# Patient Record
Sex: MALE | Race: WHITE | NOT HISPANIC OR LATINO | Employment: FULL TIME | URBAN - METROPOLITAN AREA
[De-identification: names, ages, dates, MRNs, and addresses within clinical notes are randomized per-mention and may not be internally consistent; named-entity substitution may affect disease eponyms.]

---

## 2017-01-05 ENCOUNTER — OFFICE VISIT (OUTPATIENT)
Dept: FAMILY MEDICINE | Facility: CLINIC | Age: 42
End: 2017-01-05
Payer: COMMERCIAL

## 2017-01-05 VITALS
SYSTOLIC BLOOD PRESSURE: 146 MMHG | DIASTOLIC BLOOD PRESSURE: 70 MMHG | OXYGEN SATURATION: 99 % | HEIGHT: 72 IN | WEIGHT: 315 LBS | HEART RATE: 106 BPM | TEMPERATURE: 98 F | BODY MASS INDEX: 42.66 KG/M2

## 2017-01-05 DIAGNOSIS — J30.89 NON-SEASONAL ALLERGIC RHINITIS DUE TO OTHER ALLERGIC TRIGGER: ICD-10-CM

## 2017-01-05 DIAGNOSIS — J02.9 PHARYNGITIS, UNSPECIFIED ETIOLOGY: Primary | ICD-10-CM

## 2017-01-05 PROCEDURE — 96372 THER/PROPH/DIAG INJ SC/IM: CPT | Mod: S$GLB,,, | Performed by: NURSE PRACTITIONER

## 2017-01-05 PROCEDURE — 3077F SYST BP >= 140 MM HG: CPT | Mod: S$GLB,,, | Performed by: NURSE PRACTITIONER

## 2017-01-05 PROCEDURE — 99213 OFFICE O/P EST LOW 20 MIN: CPT | Mod: 25,S$GLB,, | Performed by: NURSE PRACTITIONER

## 2017-01-05 PROCEDURE — 3078F DIAST BP <80 MM HG: CPT | Mod: S$GLB,,, | Performed by: NURSE PRACTITIONER

## 2017-01-05 PROCEDURE — 1159F MED LIST DOCD IN RCRD: CPT | Mod: S$GLB,,, | Performed by: NURSE PRACTITIONER

## 2017-01-05 RX ORDER — AZITHROMYCIN 250 MG/1
TABLET, FILM COATED ORAL
Qty: 6 TABLET | Refills: 0 | Status: SHIPPED | OUTPATIENT
Start: 2017-01-05 | End: 2017-01-10

## 2017-01-05 RX ORDER — BETAMETHASONE SODIUM PHOSPHATE AND BETAMETHASONE ACETATE 3; 3 MG/ML; MG/ML
12 INJECTION, SUSPENSION INTRA-ARTICULAR; INTRALESIONAL; INTRAMUSCULAR; SOFT TISSUE
Status: COMPLETED | OUTPATIENT
Start: 2017-01-05 | End: 2017-01-05

## 2017-01-05 RX ORDER — MIRTAZAPINE 15 MG/1
1 TABLET, FILM COATED ORAL NIGHTLY
Refills: 5 | COMMUNITY
Start: 2016-12-06 | End: 2019-03-08

## 2017-01-05 RX ADMIN — BETAMETHASONE SODIUM PHOSPHATE AND BETAMETHASONE ACETATE 12 MG: 3; 3 INJECTION, SUSPENSION INTRA-ARTICULAR; INTRALESIONAL; INTRAMUSCULAR; SOFT TISSUE at 08:01

## 2017-01-05 NOTE — PROGRESS NOTES
Subjective:       Patient ID: Kirill Gandhi III is a 41 y.o. male.    Chief Complaint: Nasal Congestion; Cough (coughing up green/brown mucus); and Sore Throat    Cough   This is a new problem. The current episode started in the past 7 days. The problem has been unchanged. The problem occurs hourly. The cough is productive of sputum. Associated symptoms include nasal congestion, postnasal drip, rhinorrhea and a sore throat. Pertinent negatives include no chest pain, chills, ear congestion, ear pain, fever, headaches, heartburn, hemoptysis, myalgias, rash, shortness of breath, sweats, weight loss or wheezing. The symptoms are aggravated by lying down. Treatments tried: beandryl, zyrtec. The treatment provided no relief. There is no history of asthma, bronchiectasis, bronchitis, COPD, emphysema, environmental allergies or pneumonia.   Sore Throat    This is a new problem. The current episode started in the past 7 days. The problem has been unchanged. Neither side of throat is experiencing more pain than the other. There has been no fever. The pain is at a severity of 0/10. The patient is experiencing no pain. Associated symptoms include congestion and coughing. Pertinent negatives include no abdominal pain, diarrhea, drooling, ear discharge, ear pain, headaches, hoarse voice, plugged ear sensation, neck pain, shortness of breath, stridor, swollen glands, trouble swallowing or vomiting. He has had no exposure to strep or mono. Treatments tried: benadyl zyrtec. The treatment provided no relief.     Review of Systems   Constitutional: Negative for chills, diaphoresis, fatigue, fever and weight loss.   HENT: Positive for congestion, postnasal drip, rhinorrhea, sinus pressure, sore throat and voice change. Negative for drooling, ear discharge, ear pain, hoarse voice and trouble swallowing.    Eyes: Negative for photophobia.   Respiratory: Positive for cough. Negative for hemoptysis, chest tightness, shortness of  breath, wheezing and stridor.    Cardiovascular: Negative for chest pain, palpitations and leg swelling.   Gastrointestinal: Negative for abdominal pain, diarrhea, heartburn and vomiting.   Musculoskeletal: Negative for myalgias and neck pain.   Skin: Negative for color change, pallor, rash and wound.   Allergic/Immunologic: Negative for environmental allergies.   Neurological: Negative for dizziness, weakness, light-headedness and headaches.       Objective:      Physical Exam   Constitutional: He is oriented to person, place, and time. He appears well-developed and well-nourished. No distress.   HENT:   Right Ear: External ear normal. A middle ear effusion is present.   Left Ear: External ear normal. A middle ear effusion is present.   Nose: Mucosal edema and rhinorrhea present. Right sinus exhibits no maxillary sinus tenderness and no frontal sinus tenderness. Left sinus exhibits no maxillary sinus tenderness and no frontal sinus tenderness.   Mouth/Throat: Posterior oropharyngeal edema and posterior oropharyngeal erythema present. No oropharyngeal exudate.   Cardiovascular: Normal rate, regular rhythm and normal heart sounds.    Pulmonary/Chest: Effort normal and breath sounds normal.   Neurological: He is alert and oriented to person, place, and time.   Skin: Skin is warm and dry. He is not diaphoretic.   Psychiatric: He has a normal mood and affect. His behavior is normal. Judgment and thought content normal.   Vitals reviewed.      Assessment:       1. Pharyngitis, unspecified etiology    2. Non-seasonal allergic rhinitis due to other allergic trigger        Plan:       Pharyngitis, unspecified etiology  -     azithromycin (Z-ZAINAB) 250 MG tablet; Take 2 tablets by mouth on day 1; Take 1 tablet by mouth on days 2-5  Dispense: 6 tablet; Refill: 0    Non-seasonal allergic rhinitis due to other allergic trigger    Other orders  -     betamethasone acetate-betamethasone sodium phosphate injection 12 mg; Inject 2  mLs (12 mg total) into the muscle one time.      Continue zyrtec  Start mucinex

## 2017-03-10 DIAGNOSIS — E11.9 TYPE 2 DIABETES MELLITUS WITHOUT COMPLICATION: ICD-10-CM

## 2017-03-24 ENCOUNTER — OFFICE VISIT (OUTPATIENT)
Dept: FAMILY MEDICINE | Facility: CLINIC | Age: 42
End: 2017-03-24
Payer: COMMERCIAL

## 2017-03-24 VITALS
SYSTOLIC BLOOD PRESSURE: 136 MMHG | WEIGHT: 315 LBS | OXYGEN SATURATION: 97 % | BODY MASS INDEX: 42.66 KG/M2 | HEIGHT: 72 IN | DIASTOLIC BLOOD PRESSURE: 96 MMHG | TEMPERATURE: 99 F | HEART RATE: 82 BPM

## 2017-03-24 DIAGNOSIS — F32.A DEPRESSION, UNSPECIFIED DEPRESSION TYPE: ICD-10-CM

## 2017-03-24 DIAGNOSIS — Z20.2 EXPOSURE TO SEXUALLY TRANSMITTED DISEASE (STD): ICD-10-CM

## 2017-03-24 DIAGNOSIS — J30.89 NON-SEASONAL ALLERGIC RHINITIS DUE TO OTHER ALLERGIC TRIGGER: ICD-10-CM

## 2017-03-24 DIAGNOSIS — E11.9 TYPE 2 DIABETES MELLITUS WITHOUT COMPLICATION, WITHOUT LONG-TERM CURRENT USE OF INSULIN: ICD-10-CM

## 2017-03-24 DIAGNOSIS — I10 ESSENTIAL HYPERTENSION: ICD-10-CM

## 2017-03-24 DIAGNOSIS — Z00.00 ROUTINE HEALTH MAINTENANCE: Primary | ICD-10-CM

## 2017-03-24 LAB
C TRACH DNA SPEC QL NAA+PROBE: NOT DETECTED
N GONORRHOEA DNA SPEC QL NAA+PROBE: NOT DETECTED

## 2017-03-24 PROCEDURE — 87591 N.GONORRHOEAE DNA AMP PROB: CPT

## 2017-03-24 PROCEDURE — 3075F SYST BP GE 130 - 139MM HG: CPT | Mod: S$GLB,,, | Performed by: FAMILY MEDICINE

## 2017-03-24 PROCEDURE — 3080F DIAST BP >= 90 MM HG: CPT | Mod: S$GLB,,, | Performed by: FAMILY MEDICINE

## 2017-03-24 PROCEDURE — 99395 PREV VISIT EST AGE 18-39: CPT | Mod: S$GLB,,, | Performed by: FAMILY MEDICINE

## 2017-03-24 RX ORDER — METRONIDAZOLE 500 MG/1
TABLET ORAL
Qty: 4 TABLET | Refills: 0 | Status: SHIPPED | OUTPATIENT
Start: 2017-03-24 | End: 2017-07-14

## 2017-03-24 RX ORDER — ETODOLAC 400 MG/1
400 TABLET, FILM COATED ORAL 2 TIMES DAILY
COMMUNITY
End: 2017-06-19 | Stop reason: SDUPTHER

## 2017-03-24 NOTE — MR AVS SNAPSHOT
[facility]  [address] [place] [id number]  Phone: [phone]  Fax: [phone]                  Kirill Gandhi III   3/24/2017 8:40 AM   Office Visit    Description:  Male : 1975   Provider:  Marcos Ibarra MD   Department:  [facility]           Reason for Visit     Annual Exam           Diagnoses this Visit        Comments    Routine health maintenance    -  Primary     Type 2 diabetes mellitus without complication, without long-term current use of insulin         Depression, unspecified depression type         Non-seasonal allergic rhinitis due to other allergic trigger         Essential hypertension         Exposure to sexually transmitted disease (STD)                To Do List           Goals (5 Years of Data)     None      [facility]     [facility] -  Assistance  Registered nurses in the [facility] provide clinical advisement, health education, appointment booking, and other advisory services.  Call for this free service at [phone].             Medications           STOP taking these medications     aspirin 81 MG Chew Take 81 mg by mouth once daily.           Verify that the below list of medications is an accurate representation of the medications you are currently taking.  If none reported, the list may be blank. If incorrect, please contact your healthcare provider. Carry this list with you in case of emergency.           Current Medications     etodolac (LODINE) 400 MG tablet Take 400 mg by mouth 2 (two) times daily.    fluticasone (FLONASE) 50 mcg/actuation nasal spray 2 sprays by Each Nare route 2 (two) times daily.    fluvoxaMINE (LUVOX) 100 MG tablet Take 1.5 tablets (150 mg total) by mouth 2 (two) times daily.    ibuprofen (ADVIL,MOTRIN) 200 MG tablet Take 2 tablets (400 mg total) by mouth every 6 (six) hours as needed for Pain.    metformin (GLUCOPHAGE) 1000 MG tablet Take 1 tablet  (1,000 mg total) by mouth 2 (two) times daily with meals.    mirtazapine (REMERON) 15 MG tablet Take 1 tablet by mouth every evening.    multivitamin capsule Take 1 capsule by mouth once daily.    valsartan-hydrochlorothiazide (DIOVAN-HCT) 320-12.5 mg per tablet Take 1 tablet by mouth once daily.    vitamin D 1000 units Tab Take 4,000 mg by mouth once daily.     diazePAM (VALIUM) 2 MG tablet Take 1 tablet (2 mg total) by mouth every 12 (twelve) hours as needed for Anxiety.           Clinical Reference Information           Your Vitals Were     BP Pulse Temp Height Weight SpO2    136/96 (BP Location: Left arm, Patient Position: Sitting, BP Method: Manual) 82 98.6 °F (37 °C) 6' (1.829 m) 155.4 kg (342 lb 9.6 oz) 97%    BMI                46.46 kg/m2          Blood Pressure          Most Recent Value    BP  (!)  136/96      Allergies as of 3/24/2017     No Known Allergies      Immunizations Administered on Date of Encounter - 3/24/2017     None      Orders Placed During Today's Visit     Future Labs/Procedures Expected by Expires    HIV-1 and HIV-2 antibodies  3/24/2017 5/23/2018    C. trachomatis/N. gonorrhoeae by AMP DNA Urine  As directed 3/24/2018    CBC auto differential  As directed 3/24/2018    Comprehensive metabolic panel  As directed 3/24/2018    Hemoglobin A1c  As directed 3/24/2018    Lipid panel  As directed 3/24/2018    RPR  As directed 3/24/2018    TSH  As directed 3/24/2018      Language Assistance Services     ATTENTION: Language assistance services are available, free of charge. Please call [phone].      ATENCIÓN: Si habla español, tiene a pena disposición servicios gratuitos de asistencia lingüística. Llame al [phone].     CHÚ Ý: N?u b?n nói Ti?ng Vi?t, có các d?ch v? h? tr? ngôn ng? mi?n phí dành cho b?n. G?i s? [phone].         [facility] complies with applicable Federal civil rights laws and does not discriminate on the basis of race, color, national origin, age,  disability, or sex.

## 2017-03-25 NOTE — PROGRESS NOTES
Patient ID: Kirill Gandhi III is a 41 y.o. male.    Chief Complaint: Annual Exam    HPI      Kirill Gandhi III is a 41 y.o. malehere bc girlfriend with trich. Here for tx.  NO symtoms.  Chronic med prob   dm stable  Depression - stable  Sleep apnea stable               Review of Symptoms    Constitutional  Neg activity change, No chills /fever   Resp  Neg hemoptysis, stridor, choking  CVS  Neg chest pain, palpitations    Physical Exam    Constitutional:  Oriented to person, place, and time.appears well-developed and well-nourished.  No distress.      HENT  Head: Normocephalic and atraumatic  Right Ear: External ear normal.   Left Ear: External ear normal.   Nose: External nose normal.   Mouth:  Moist mucus membranes.    Eyes:  Conjunctivae are normal. Right eye exhibits no discharge.  Left eye exhibits no discharge. No scleral icterus.  No periorbital edema    Cardiovascular:  Regular rate, Regular rhythm     No extrasystole  Normal S1-S2      Pulmonary/Chest:   Normal effort and breath sounds.  No wheezing, rhonchi or rales.    Musculoskeletal:  No edema. No obvious deformity No waisting       Neurological:  Alert and oriented to person, place, and time.   Coordination normal.     Skin:   Skin is warm and dry.  No diaphoresis.   No rash noted.     Psychiatric: Normal mood and affect. Behavior is normal.  Judgment and thought content normal.       Assessment / Plan:      ICD-10-CM ICD-9-CM   1. Routine health maintenance Z00.00 V70.0   2. Type 2 diabetes mellitus without complication, without long-term current use of insulin E11.9 250.00   3. Depression, unspecified depression type F32.9 311   4. Non-seasonal allergic rhinitis due to other allergic trigger J30.89 477.8   5. Essential hypertension I10 401.9   6. Exposure to sexually transmitted disease (STD) Z20.2 V01.6     Routine health maintenance  -     Comprehensive metabolic panel; Future  -     CBC auto differential; Future  -     Lipid  panel; Future  -     TSH; Future  -     Hemoglobin A1c; Future    Type 2 diabetes mellitus without complication, without long-term current use of insulin  -     Comprehensive metabolic panel; Future  -     CBC auto differential; Future  -     Lipid panel; Future  -     TSH; Future  -     Hemoglobin A1c; Future    Depression, unspecified depression type    Non-seasonal allergic rhinitis due to other allergic trigger    Essential hypertension    Exposure to sexually transmitted disease (STD)  -     HIV-1 and HIV-2 antibodies; Future; Expected date: 3/24/17  -     C. trachomatis/N. gonorrhoeae by AMP DNA Urine; Future  -     RPR; Future    Other orders  -     metronidazole (FLAGYL) 500 MG tablet; 4 tablets one time  Dispense: 4 tablet; Refill: 0

## 2017-03-27 LAB
ALBUMIN SERPL-MCNC: 4 G/DL (ref 3.6–5.1)
ALBUMIN/GLOB SERPL: 1.2 (CALC) (ref 1–2.5)
ALP SERPL-CCNC: 60 U/L (ref 40–115)
ALT SERPL-CCNC: 27 U/L (ref 9–46)
AST SERPL-CCNC: 24 U/L (ref 10–40)
BASOPHILS # BLD AUTO: 8 CELLS/UL (ref 0–200)
BASOPHILS NFR BLD AUTO: 0.1 %
BILIRUB SERPL-MCNC: 0.6 MG/DL (ref 0.2–1.2)
BUN SERPL-MCNC: 11 MG/DL (ref 7–25)
BUN/CREAT SERPL: ABNORMAL (CALC) (ref 6–22)
CALCIUM SERPL-MCNC: 9.4 MG/DL (ref 8.6–10.3)
CHLORIDE SERPL-SCNC: 104 MMOL/L (ref 98–110)
CHOLEST SERPL-MCNC: 143 MG/DL (ref 125–200)
CHOLEST/HDLC SERPL: 3.9 (CALC)
CO2 SERPL-SCNC: 29 MMOL/L (ref 20–31)
CREAT SERPL-MCNC: 0.91 MG/DL (ref 0.6–1.35)
EOSINOPHIL # BLD AUTO: 92 CELLS/UL (ref 15–500)
EOSINOPHIL NFR BLD AUTO: 1.1 %
ERYTHROCYTE [DISTWIDTH] IN BLOOD BY AUTOMATED COUNT: 14.1 % (ref 11–15)
GFR SERPL CREATININE-BSD FRML MDRD: 106 ML/MIN/1.73M2
GLOBULIN SER CALC-MCNC: 3.4 G/DL (CALC) (ref 1.9–3.7)
GLUCOSE SERPL-MCNC: 114 MG/DL (ref 65–99)
HBA1C MFR BLD: 6.8 % OF TOTAL HGB
HCT VFR BLD AUTO: 44.2 % (ref 38.5–50)
HDLC SERPL-MCNC: 37 MG/DL
HGB BLD-MCNC: 14.6 G/DL (ref 13.2–17.1)
HIV 1+2 AB+HIV1 P24 AG SERPL QL IA: NORMAL
LDLC SERPL CALC-MCNC: 86 MG/DL (CALC)
LYMPHOCYTES # BLD AUTO: 2016 CELLS/UL (ref 850–3900)
LYMPHOCYTES NFR BLD AUTO: 24 %
MCH RBC QN AUTO: 29.3 PG (ref 27–33)
MCHC RBC AUTO-ENTMCNC: 33 G/DL (ref 32–36)
MCV RBC AUTO: 88.6 FL (ref 80–100)
MONOCYTES # BLD AUTO: 613 CELLS/UL (ref 200–950)
MONOCYTES NFR BLD AUTO: 7.3 %
NEUTROPHILS # BLD AUTO: 5670 CELLS/UL (ref 1500–7800)
NEUTROPHILS NFR BLD AUTO: 67.5 %
NONHDLC SERPL-MCNC: 106 MG/DL (CALC)
PLATELET # BLD AUTO: 244 THOUSAND/UL (ref 140–400)
PMV BLD REES-ECKER: 9.2 FL (ref 7.5–12.5)
POTASSIUM SERPL-SCNC: 4.3 MMOL/L (ref 3.5–5.3)
PROT SERPL-MCNC: 7.4 G/DL (ref 6.1–8.1)
RBC # BLD AUTO: 4.99 MILLION/UL (ref 4.2–5.8)
RPR SER QL: NORMAL
SODIUM SERPL-SCNC: 143 MMOL/L (ref 135–146)
TRIGL SERPL-MCNC: 100 MG/DL
TSH SERPL-ACNC: 0.45 MIU/L (ref 0.4–4.5)
WBC # BLD AUTO: 8.4 THOUSAND/UL (ref 3.8–10.8)

## 2017-04-28 ENCOUNTER — TELEPHONE (OUTPATIENT)
Dept: FAMILY MEDICINE | Facility: CLINIC | Age: 42
End: 2017-04-28

## 2017-04-28 RX ORDER — VALSARTAN AND HYDROCHLOROTHIAZIDE 320; 12.5 MG/1; MG/1
1 TABLET, FILM COATED ORAL DAILY
Qty: 90 TABLET | Refills: 3 | Status: CANCELLED | OUTPATIENT
Start: 2017-04-28

## 2017-05-03 RX ORDER — VALSARTAN AND HYDROCHLOROTHIAZIDE 320; 12.5 MG/1; MG/1
1 TABLET, FILM COATED ORAL DAILY
Qty: 90 TABLET | Refills: 3 | Status: SHIPPED | OUTPATIENT
Start: 2017-05-03 | End: 2017-10-11 | Stop reason: SDUPTHER

## 2017-05-03 NOTE — TELEPHONE ENCOUNTER
Please send 30 day supply to [facility] in [place]     valsartan-hydrochlorothiazide (DIOVAN-HCT) 320-12.5 mg per tablet Take 1 tablet by mouth once daily. - Oral

## 2017-06-19 RX ORDER — FLUTICASONE PROPIONATE 50 MCG
SPRAY, SUSPENSION (ML) NASAL
Qty: 48 G | Refills: 2 | Status: SHIPPED | OUTPATIENT
Start: 2017-06-19 | End: 2018-04-23 | Stop reason: SDUPTHER

## 2017-06-19 RX ORDER — ETODOLAC 400 MG/1
TABLET, FILM COATED ORAL
Qty: 270 TABLET | Refills: 0 | Status: SHIPPED | OUTPATIENT
Start: 2017-06-19 | End: 2018-04-23 | Stop reason: SDUPTHER

## 2017-06-19 RX ORDER — VALSARTAN AND HYDROCHLOROTHIAZIDE 320; 12.5 MG/1; MG/1
TABLET, FILM COATED ORAL
Qty: 90 TABLET | Refills: 2 | Status: SHIPPED | OUTPATIENT
Start: 2017-06-19 | End: 2017-07-14 | Stop reason: SDUPTHER

## 2017-07-14 ENCOUNTER — OFFICE VISIT (OUTPATIENT)
Dept: FAMILY MEDICINE | Facility: CLINIC | Age: 42
End: 2017-07-14
Payer: COMMERCIAL

## 2017-07-14 VITALS
WEIGHT: 315 LBS | HEART RATE: 101 BPM | HEIGHT: 72 IN | SYSTOLIC BLOOD PRESSURE: 118 MMHG | OXYGEN SATURATION: 98 % | BODY MASS INDEX: 42.66 KG/M2 | TEMPERATURE: 99 F | RESPIRATION RATE: 18 BRPM | DIASTOLIC BLOOD PRESSURE: 76 MMHG

## 2017-07-14 DIAGNOSIS — M62.838 TRAPEZIUS MUSCLE SPASM: Primary | ICD-10-CM

## 2017-07-14 DIAGNOSIS — I10 ESSENTIAL HYPERTENSION: ICD-10-CM

## 2017-07-14 DIAGNOSIS — G47.30 SLEEP APNEA, UNSPECIFIED TYPE: ICD-10-CM

## 2017-07-14 PROCEDURE — 99213 OFFICE O/P EST LOW 20 MIN: CPT | Mod: S$GLB,,, | Performed by: FAMILY MEDICINE

## 2017-07-14 RX ORDER — TIZANIDINE 2 MG/1
4 TABLET ORAL EVERY 6 HOURS PRN
Qty: 25 TABLET | Refills: 1 | Status: SHIPPED | OUTPATIENT
Start: 2017-07-14 | End: 2017-07-24

## 2017-07-17 ENCOUNTER — PATIENT MESSAGE (OUTPATIENT)
Dept: FAMILY MEDICINE | Facility: CLINIC | Age: 42
End: 2017-07-17

## 2017-07-17 RX ORDER — TRAMADOL HYDROCHLORIDE 50 MG/1
50 TABLET ORAL EVERY 6 HOURS PRN
Qty: 30 TABLET | Refills: 0 | Status: SHIPPED | OUTPATIENT
Start: 2017-07-17 | End: 2017-07-27

## 2017-07-17 NOTE — PROGRESS NOTES
Patient ID: Kirill Gandhi III is a 42 y.o. male.    Chief Complaint: Neck Pain (neck pain radiating into left shoulder , started about 1 week ago - nothing OTC )    HPI       Kirill Gandhi III is a 42 y.o. male complaints of left neck and shoulder pain.  Once the area along trapezius from insertion the occipital area toward shoulder.  Patient with increased pain with placing arm along stem well.  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  Pt with no loss of strength or neurological symptoms.    Sleep apnea-using CPAP-finds it very helpful.  Having denies sleep-much more rested    Hypertension-stable no complaints at this time      Review of Symptoms    Constitutional  No change in activity, No chills fever   Resp  Neg hemoptysis, stridor, choking  CVS  Neg chest pain, palpitations    Physical Exam    Constitutional:   Oriented to person, place, and time.appears well-developed and well-nourished.   No distress.     HENT  Head: Normocephalic and atraumatic  Right Ear: External ear normal.   Left Ear: External ear normal.   Nose: External nose normal.   Mouth: Moist mucous membranes    Eyes:   Conjunctivae are normal. Right eye exhibits no discharge. Left eye exhibits no discharge. No scleral icterus. No periorbital edema    Musculoskeletal:  No edema. No obvious deformity No waisting     Neurological:  Alert and oriented to person, place, and time. Coordination normal.     Skin:   Skin is warm and dry.  No diaphoresis.   No rash noted.     Psychiatric: Normal mood and affect. Behavior is normal. Judgment and thought content normal.       Assessment / Plan:      ICD-10-CM ICD-9-CM   1. Trapezius muscle spasm M62.838 728.85   2. Sleep apnea, unspecified type G47.30 780.57   3. Essential hypertension I10 401.9     Trapezius muscle spasm    Sleep apnea, unspecified type    Essential hypertension    Other orders  -     tizanidine (ZANAFLEX) 2 MG tablet; Take 2  tablets (4 mg total) by mouth every 6 (six) hours as needed (for muscle spasm).  Dispense: 25 tablet; Refill: 1    discussed injections along trapezius muscle-trigger point injections-seems to be amenable to discussed pros and cons  Injected no problems

## 2017-07-22 RX ORDER — DIAZEPAM 2 MG/1
TABLET ORAL
Qty: 60 TABLET | Refills: 0 | Status: SHIPPED | OUTPATIENT
Start: 2017-07-22 | End: 2017-10-11 | Stop reason: SDUPTHER

## 2017-10-11 RX ORDER — VALSARTAN AND HYDROCHLOROTHIAZIDE 320; 12.5 MG/1; MG/1
1 TABLET, FILM COATED ORAL DAILY
Qty: 90 TABLET | Refills: 3 | Status: SHIPPED | OUTPATIENT
Start: 2017-10-11 | End: 2018-04-23 | Stop reason: SDUPTHER

## 2017-10-11 RX ORDER — FLUVOXAMINE MALEATE 100 MG/1
150 TABLET, COATED ORAL 2 TIMES DAILY
Qty: 90 TABLET | Refills: 3 | Status: SHIPPED | OUTPATIENT
Start: 2017-10-11 | End: 2018-04-23 | Stop reason: SDUPTHER

## 2017-10-11 RX ORDER — DIAZEPAM 2 MG/1
2 TABLET ORAL EVERY 12 HOURS PRN
Qty: 60 TABLET | Refills: 0 | Status: SHIPPED | OUTPATIENT
Start: 2017-10-11 | End: 2018-04-16 | Stop reason: SDUPTHER

## 2017-10-20 ENCOUNTER — OFFICE VISIT (OUTPATIENT)
Dept: FAMILY MEDICINE | Facility: CLINIC | Age: 42
End: 2017-10-20

## 2017-10-20 VITALS
RESPIRATION RATE: 15 BRPM | TEMPERATURE: 99 F | SYSTOLIC BLOOD PRESSURE: 121 MMHG | BODY MASS INDEX: 49.26 KG/M2 | OXYGEN SATURATION: 98 % | WEIGHT: 315 LBS | DIASTOLIC BLOOD PRESSURE: 78 MMHG | HEART RATE: 98 BPM

## 2017-10-20 DIAGNOSIS — S39.012A STRAIN OF LUMBAR REGION, INITIAL ENCOUNTER: ICD-10-CM
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
## 2017-10-20 PROCEDURE — 99213 OFFICE O/P EST LOW 20 MIN: CPT | Mod: S$GLB,,, | Performed by: NURSE PRACTITIONER

## 2017-10-20 RX ORDER — HYDROCODONE BITARTRATE AND ACETAMINOPHEN 5; 325 MG/1; MG/1
1 TABLET ORAL EVERY 6 HOURS PRN
Qty: 20 TABLET | Refills: 0 | Status: SHIPPED | OUTPATIENT
Start: 2017-10-20 | End: 2019-03-08

## 2017-10-20 RX ORDER — ASPIRIN 81 MG/1
81 TABLET ORAL DAILY
COMMUNITY
End: 2021-03-25

## 2017-10-20 RX ORDER — CYCLOBENZAPRINE HCL 10 MG
10 TABLET ORAL 3 TIMES DAILY PRN
Qty: 30 TABLET | Refills: 0 | Status: SHIPPED | OUTPATIENT
Start: 2017-10-20 | End: 2019-07-22 | Stop reason: SDUPTHER
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
## 2017-12-07 ENCOUNTER — OFFICE VISIT (OUTPATIENT)
Dept: FAMILY MEDICINE | Facility: CLINIC | Age: 42
End: 2017-12-07
Payer: MEDICAID

## 2017-12-07 ENCOUNTER — LAB VISIT (OUTPATIENT)
Dept: LAB | Facility: HOSPITAL | Age: 42
End: 2017-12-07
Attending: NURSE PRACTITIONER
Payer: MEDICAID

## 2017-12-07 VITALS
OXYGEN SATURATION: 99 % | DIASTOLIC BLOOD PRESSURE: 78 MMHG | HEART RATE: 96 BPM | SYSTOLIC BLOOD PRESSURE: 126 MMHG | HEIGHT: 72 IN | WEIGHT: 315 LBS | TEMPERATURE: 99 F | BODY MASS INDEX: 42.66 KG/M2

## 2017-12-07 DIAGNOSIS — I10 ESSENTIAL HYPERTENSION: ICD-10-CM

## 2017-12-07 DIAGNOSIS — Z23 NEED FOR INFLUENZA VACCINATION: ICD-10-CM

## 2017-12-07 DIAGNOSIS — E11.9 TYPE 2 DIABETES MELLITUS WITHOUT COMPLICATION, WITHOUT LONG-TERM CURRENT USE OF INSULIN: ICD-10-CM

## 2017-12-07 DIAGNOSIS — Z23 NEED FOR TDAP VACCINATION: ICD-10-CM

## 2017-12-07 DIAGNOSIS — Z23 NEED FOR PNEUMOCOCCAL VACCINATION: ICD-10-CM

## 2017-12-07 DIAGNOSIS — Z00.00 ANNUAL PHYSICAL EXAM: ICD-10-CM

## 2017-12-07 DIAGNOSIS — Z00.00 ANNUAL PHYSICAL EXAM: Primary | ICD-10-CM

## 2017-12-07 LAB
ALBUMIN SERPL BCP-MCNC: 4.1 G/DL
ALP SERPL-CCNC: 73 U/L
ALT SERPL W/O P-5'-P-CCNC: 62 U/L
ANION GAP SERPL CALC-SCNC: 10 MMOL/L
AST SERPL-CCNC: 33 U/L
BASOPHILS # BLD AUTO: 0.03 K/UL
BASOPHILS NFR BLD: 0.5 %
BILIRUB SERPL-MCNC: 0.3 MG/DL
BUN SERPL-MCNC: 12 MG/DL
CALCIUM SERPL-MCNC: 9.1 MG/DL
CHLORIDE SERPL-SCNC: 106 MMOL/L
CHOLEST SERPL-MCNC: 164 MG/DL
CHOLEST/HDLC SERPL: 4.7 {RATIO}
CO2 SERPL-SCNC: 28 MMOL/L
CREAT SERPL-MCNC: 0.88 MG/DL
DIFFERENTIAL METHOD: ABNORMAL
EOSINOPHIL # BLD AUTO: 0.2 K/UL
EOSINOPHIL NFR BLD: 3.3 %
ERYTHROCYTE [DISTWIDTH] IN BLOOD BY AUTOMATED COUNT: 14.7 %
EST. GFR  (AFRICAN AMERICAN): >60 ML/MIN/1.73 M^2
EST. GFR  (NON AFRICAN AMERICAN): >60 ML/MIN/1.73 M^2
ESTIMATED AVG GLUCOSE: 151 MG/DL
GLUCOSE SERPL-MCNC: 161 MG/DL
HBA1C MFR BLD HPLC: 6.9 %
HCT VFR BLD AUTO: 43.9 %
HDLC SERPL-MCNC: 35 MG/DL
HDLC SERPL: 21.3 %
HGB BLD-MCNC: 14.2 G/DL
LDLC SERPL CALC-MCNC: 96.6 MG/DL
LYMPHOCYTES # BLD AUTO: 2 K/UL
LYMPHOCYTES NFR BLD: 32.6 %
MCH RBC QN AUTO: 29.3 PG
MCHC RBC AUTO-ENTMCNC: 32.3 G/DL
MCV RBC AUTO: 91 FL
MONOCYTES # BLD AUTO: 0.7 K/UL
MONOCYTES NFR BLD: 11.3 %
NEUTROPHILS # BLD AUTO: 3.2 K/UL
NEUTROPHILS NFR BLD: 52.1 %
NONHDLC SERPL-MCNC: 129 MG/DL
PLATELET # BLD AUTO: 296 K/UL
PMV BLD AUTO: 10.4 FL
POTASSIUM SERPL-SCNC: 3.7 MMOL/L
PROT SERPL-MCNC: 7.6 G/DL
RBC # BLD AUTO: 4.85 M/UL
SODIUM SERPL-SCNC: 144 MMOL/L
TRIGL SERPL-MCNC: 162 MG/DL
TSH SERPL DL<=0.005 MIU/L-ACNC: 0.84 UIU/ML
WBC # BLD AUTO: 6.04 K/UL

## 2017-12-07 PROCEDURE — 90472 IMMUNIZATION ADMIN EACH ADD: CPT | Mod: S$GLB,,, | Performed by: FAMILY MEDICINE

## 2017-12-07 PROCEDURE — 90732 PPSV23 VACC 2 YRS+ SUBQ/IM: CPT | Mod: S$GLB,,, | Performed by: FAMILY MEDICINE

## 2017-12-07 PROCEDURE — 90471 IMMUNIZATION ADMIN: CPT | Mod: S$GLB,,, | Performed by: FAMILY MEDICINE

## 2017-12-07 PROCEDURE — 83036 HEMOGLOBIN GLYCOSYLATED A1C: CPT | Mod: PO

## 2017-12-07 PROCEDURE — 90686 IIV4 VACC NO PRSV 0.5 ML IM: CPT | Mod: S$GLB,,, | Performed by: FAMILY MEDICINE

## 2017-12-07 PROCEDURE — 84443 ASSAY THYROID STIM HORMONE: CPT | Mod: PO

## 2017-12-07 PROCEDURE — 80053 COMPREHEN METABOLIC PANEL: CPT | Mod: PO

## 2017-12-07 PROCEDURE — 99396 PREV VISIT EST AGE 40-64: CPT | Mod: S$GLB,,, | Performed by: NURSE PRACTITIONER

## 2017-12-07 PROCEDURE — 80061 LIPID PANEL: CPT

## 2017-12-07 PROCEDURE — 90715 TDAP VACCINE 7 YRS/> IM: CPT | Mod: S$GLB,,, | Performed by: FAMILY MEDICINE

## 2017-12-07 PROCEDURE — 36415 COLL VENOUS BLD VENIPUNCTURE: CPT | Mod: PO

## 2017-12-07 PROCEDURE — 85025 COMPLETE CBC W/AUTO DIFF WBC: CPT | Mod: PO

## 2017-12-07 NOTE — PROGRESS NOTES
Subjective:       Patient ID: Kirill Gandhi III is a 42 y.o. male.    Chief Complaint: Annual Exam    Pt presents to the clinic today for annual exam. No complaints at this time.       Review of Systems   Constitutional: Negative.    HENT: Negative.    Eyes: Negative.    Respiratory: Negative.    Cardiovascular: Negative.    Gastrointestinal: Negative.    Genitourinary: Negative.    Musculoskeletal: Negative.    Neurological: Negative.    Psychiatric/Behavioral: Negative.        Objective:      Physical Exam   Constitutional: He is oriented to person, place, and time. He appears well-developed and well-nourished. No distress.   HENT:   Head: Normocephalic.   Right Ear: Tympanic membrane and external ear normal.   Left Ear: Tympanic membrane and external ear normal.   Nose: No mucosal edema or rhinorrhea. Right sinus exhibits no maxillary sinus tenderness and no frontal sinus tenderness. Left sinus exhibits no maxillary sinus tenderness and no frontal sinus tenderness.   Mouth/Throat: No oropharyngeal exudate, posterior oropharyngeal edema or posterior oropharyngeal erythema.   Eyes: Pupils are equal, round, and reactive to light.   Neck: Normal range of motion.   Cardiovascular: Normal rate, regular rhythm and normal heart sounds.    Pulmonary/Chest: Effort normal and breath sounds normal. No respiratory distress. He has no wheezes.   Abdominal: Soft. Bowel sounds are normal. He exhibits no distension. There is no tenderness.   Musculoskeletal: Normal range of motion. He exhibits no edema.   Neurological: He is alert and oriented to person, place, and time.   Skin: Skin is warm and dry. He is not diaphoretic.   Psychiatric: He has a normal mood and affect. His behavior is normal. Judgment and thought content normal.       Assessment:       1. Annual physical exam    2. Essential hypertension    3. Need for Tdap vaccination    4. Need for influenza vaccination    5. Need for pneumococcal vaccination    6.  Type 2 diabetes mellitus without complication, without long-term current use of insulin        Plan:       Annual physical exam  -     Lipid panel; Future  -     Hemoglobin A1c; Future  -     TSH; Future  -     CBC auto differential; Future  -     Comprehensive metabolic panel; Future  -     Urinalysis; Future    Essential hypertension  -     Lipid panel; Future  -     CBC auto differential; Future  -     Comprehensive metabolic panel; Future  -     Urinalysis; Future    Need for Tdap vaccination  -     (In Office Administered) Tdap Vaccine    Need for influenza vaccination  -     Influenza - Quadrivalent (3 years & older) (PF)    Need for pneumococcal vaccination  -     (In Office Administered) Pneumococcal Polysaccharide Vaccine (23 Valent) (SQ/IM)    Type 2 diabetes mellitus without complication, without long-term current use of insulin  -     Hemoglobin A1c; Future  -     Comprehensive metabolic panel; Future

## 2017-12-08 ENCOUNTER — PATIENT MESSAGE (OUTPATIENT)
Dept: FAMILY MEDICINE | Facility: CLINIC | Age: 42
End: 2017-12-08

## 2017-12-08 DIAGNOSIS — R73.09 ELEVATED HEMOGLOBIN A1C: Primary | ICD-10-CM

## 2017-12-08 DIAGNOSIS — R74.8 ELEVATED LIVER ENZYMES: ICD-10-CM

## 2018-03-29 DIAGNOSIS — E11.9 TYPE 2 DIABETES MELLITUS WITHOUT COMPLICATION: ICD-10-CM

## 2018-04-16 RX ORDER — DIAZEPAM 2 MG/1
TABLET ORAL
Qty: 60 TABLET | Refills: 0 | Status: SHIPPED | OUTPATIENT
Start: 2018-04-16 | End: 2018-04-23 | Stop reason: SDUPTHER

## 2018-04-24 ENCOUNTER — PATIENT MESSAGE (OUTPATIENT)
Dept: FAMILY MEDICINE | Facility: CLINIC | Age: 43
End: 2018-04-24

## 2018-04-24 RX ORDER — DIAZEPAM 2 MG/1
2 TABLET ORAL EVERY 12 HOURS PRN
Qty: 60 TABLET | Refills: 0 | Status: SHIPPED | OUTPATIENT
Start: 2018-04-24 | End: 2018-07-31 | Stop reason: SDUPTHER

## 2018-04-24 RX ORDER — FLUVOXAMINE MALEATE 100 MG/1
150 TABLET, COATED ORAL 2 TIMES DAILY
Qty: 90 TABLET | Refills: 3 | Status: SHIPPED | OUTPATIENT
Start: 2018-04-24 | End: 2019-03-30 | Stop reason: SDUPTHER

## 2018-04-24 RX ORDER — METFORMIN HYDROCHLORIDE 1000 MG/1
1000 TABLET ORAL 2 TIMES DAILY WITH MEALS
Qty: 180 TABLET | Refills: 3 | Status: SHIPPED | OUTPATIENT
Start: 2018-04-24 | End: 2019-05-28 | Stop reason: SDUPTHER

## 2018-04-24 RX ORDER — FLUTICASONE PROPIONATE 50 MCG
2 SPRAY, SUSPENSION (ML) NASAL DAILY
Qty: 48 G | Refills: 2 | Status: SHIPPED | OUTPATIENT
Start: 2018-04-24 | End: 2020-01-13 | Stop reason: SDUPTHER

## 2018-04-24 RX ORDER — ETODOLAC 400 MG/1
400 TABLET, FILM COATED ORAL 3 TIMES DAILY
Qty: 270 TABLET | Refills: 0 | Status: SHIPPED | OUTPATIENT
Start: 2018-04-24 | End: 2019-03-08

## 2018-04-24 RX ORDER — VALSARTAN AND HYDROCHLOROTHIAZIDE 320; 12.5 MG/1; MG/1
1 TABLET, FILM COATED ORAL DAILY
Qty: 90 TABLET | Refills: 3 | Status: SHIPPED | OUTPATIENT
Start: 2018-04-24 | End: 2019-06-03 | Stop reason: SDUPTHER

## 2018-04-24 NOTE — TELEPHONE ENCOUNTER
[place] JOINER [facility] Staff   Caller: Self (Today, 10:44 AM)             Just wanted you to know that he is now using [facility] for the pharmacy.  I updated the chart.

## 2018-07-20 ENCOUNTER — PATIENT OUTREACH (OUTPATIENT)
Dept: ADMINISTRATIVE | Facility: HOSPITAL | Age: 43
End: 2018-07-20

## 2018-07-31 RX ORDER — DIAZEPAM 2 MG/1
TABLET ORAL
Qty: 60 TABLET | Refills: 0 | Status: SHIPPED | OUTPATIENT
Start: 2018-07-31 | End: 2018-10-17 | Stop reason: SDUPTHER

## 2018-10-17 RX ORDER — DIAZEPAM 2 MG/1
TABLET ORAL
Qty: 60 TABLET | Refills: 0 | Status: SHIPPED | OUTPATIENT
Start: 2018-10-17 | End: 2018-12-02 | Stop reason: SDUPTHER

## 2018-12-03 RX ORDER — DIAZEPAM 2 MG/1
TABLET ORAL
Qty: 60 TABLET | Refills: 0 | Status: SHIPPED | OUTPATIENT
Start: 2018-12-03 | End: 2019-02-28 | Stop reason: SDUPTHER

## 2018-12-10 ENCOUNTER — PATIENT OUTREACH (OUTPATIENT)
Dept: ADMINISTRATIVE | Facility: HOSPITAL | Age: 43
End: 2018-12-10

## 2018-12-26 ENCOUNTER — PATIENT OUTREACH (OUTPATIENT)
Dept: ADMINISTRATIVE | Facility: HOSPITAL | Age: 43
End: 2018-12-26

## 2019-02-28 RX ORDER — DIAZEPAM 2 MG/1
TABLET ORAL
Qty: 60 TABLET | Refills: 0 | OUTPATIENT
Start: 2019-02-28

## 2019-02-28 RX ORDER — DIAZEPAM 2 MG/1
TABLET ORAL
Qty: 60 TABLET | Refills: 0 | Status: CANCELLED | OUTPATIENT
Start: 2019-02-28

## 2019-02-28 RX ORDER — DIAZEPAM 2 MG/1
2 TABLET ORAL EVERY 12 HOURS PRN
Qty: 60 TABLET | Refills: 0 | Status: SHIPPED | OUTPATIENT
Start: 2019-02-28 | End: 2019-03-08

## 2019-02-28 NOTE — TELEPHONE ENCOUNTER
----- Message from Asad Pardo sent at 2/28/2019  3:32 PM CST -----  Contact: [phone]/self  Refill request for diazePAM (VALIUM) 2 MG tablet  Pharmacy: [facility]# [phone]

## 2019-03-08 ENCOUNTER — TELEPHONE (OUTPATIENT)
Dept: FAMILY MEDICINE | Facility: CLINIC | Age: 44
End: 2019-03-08

## 2019-03-08 ENCOUNTER — OFFICE VISIT (OUTPATIENT)
Dept: FAMILY MEDICINE | Facility: CLINIC | Age: 44
End: 2019-03-08
Payer: COMMERCIAL

## 2019-03-08 VITALS
HEART RATE: 87 BPM | WEIGHT: 315 LBS | OXYGEN SATURATION: 100 % | TEMPERATURE: 99 F | BODY MASS INDEX: 42.66 KG/M2 | SYSTOLIC BLOOD PRESSURE: 112 MMHG | HEIGHT: 72 IN | DIASTOLIC BLOOD PRESSURE: 74 MMHG

## 2019-03-08 DIAGNOSIS — M19.90 ARTHRITIS: ICD-10-CM

## 2019-03-08 DIAGNOSIS — E11.8 TYPE 2 DIABETES MELLITUS WITH COMPLICATION, WITHOUT LONG-TERM CURRENT USE OF INSULIN: Primary | ICD-10-CM

## 2019-03-08 DIAGNOSIS — R09.81 SINUS CONGESTION: ICD-10-CM

## 2019-03-08 DIAGNOSIS — G47.30 SLEEP APNEA, UNSPECIFIED TYPE: ICD-10-CM

## 2019-03-08 DIAGNOSIS — R25.2 MUSCLE CRAMPS: ICD-10-CM

## 2019-03-08 DIAGNOSIS — Z23 NEED FOR INFLUENZA VACCINATION: ICD-10-CM

## 2019-03-08 DIAGNOSIS — Z13.220 NEED FOR LIPID SCREENING: ICD-10-CM

## 2019-03-08 PROCEDURE — 3074F SYST BP LT 130 MM HG: CPT | Mod: S$GLB,,, | Performed by: FAMILY MEDICINE

## 2019-03-08 PROCEDURE — 3078F PR MOST RECENT DIASTOLIC BLOOD PRESSURE < 80 MM HG: ICD-10-PCS | Mod: S$GLB,,, | Performed by: FAMILY MEDICINE

## 2019-03-08 PROCEDURE — 3008F BODY MASS INDEX DOCD: CPT | Mod: S$GLB,,, | Performed by: FAMILY MEDICINE

## 2019-03-08 PROCEDURE — 3078F DIAST BP <80 MM HG: CPT | Mod: S$GLB,,, | Performed by: FAMILY MEDICINE

## 2019-03-08 PROCEDURE — 99214 PR OFFICE/OUTPT VISIT, EST, LEVL IV, 30-39 MIN: ICD-10-PCS | Mod: S$GLB,,, | Performed by: FAMILY MEDICINE

## 2019-03-08 PROCEDURE — 3074F PR MOST RECENT SYSTOLIC BLOOD PRESSURE < 130 MM HG: ICD-10-PCS | Mod: S$GLB,,, | Performed by: FAMILY MEDICINE

## 2019-03-08 PROCEDURE — 99214 OFFICE O/P EST MOD 30 MIN: CPT | Mod: S$GLB,,, | Performed by: FAMILY MEDICINE

## 2019-03-08 PROCEDURE — 3008F PR BODY MASS INDEX (BMI) DOCUMENTED: ICD-10-PCS | Mod: S$GLB,,, | Performed by: FAMILY MEDICINE

## 2019-03-08 RX ORDER — AZITHROMYCIN 250 MG/1
TABLET, FILM COATED ORAL
Qty: 6 TABLET | Refills: 0 | Status: SHIPPED | OUTPATIENT
Start: 2019-03-08 | End: 2019-03-13

## 2019-03-08 RX ORDER — ATORVASTATIN CALCIUM 10 MG/1
10 TABLET, FILM COATED ORAL EVERY OTHER DAY
Qty: 45 TABLET | Refills: 3 | Status: SHIPPED | OUTPATIENT
Start: 2019-03-08 | End: 2020-01-13 | Stop reason: SDUPTHER

## 2019-03-08 RX ORDER — MELOXICAM 7.5 MG/1
7.5 TABLET ORAL DAILY
Qty: 90 TABLET | Refills: 1 | Status: SHIPPED | OUTPATIENT
Start: 2019-03-08 | End: 2019-09-12 | Stop reason: ALTCHOICE

## 2019-03-08 RX ORDER — BUPROPION HYDROCHLORIDE 150 MG/1
150 TABLET ORAL DAILY
Qty: 30 TABLET | Refills: 11 | Status: SHIPPED | OUTPATIENT
Start: 2019-03-08 | End: 2020-01-13 | Stop reason: SDUPTHER

## 2019-03-08 NOTE — TELEPHONE ENCOUNTER
----- Message from Meera Mary sent at 3/8/2019  8:43 AM CST -----  Contact: self / [phone]  Patient is requesting to be seen today; he said he has chest congestion symptoms, Runny nose , water eyes. sinus infection symptoms. Please advise

## 2019-03-11 NOTE — PROGRESS NOTES
Patient ID: Kiirll Gandhi III is a 43 y.o. male.    Chief Complaint: Nasal Congestion and Follow-up    HPI       Kirill Gandhi III is a 43 y.o. male complains about several things including:  Nasal congestion fullness and postnasal drip air without fever chills nausea vomiting diarrhea.  Complains that depression still persist despite the use of current medication.  Not severe but does not want to relapse-exacerbate current problems.  Sleep apnea-long time since he has had a sleep study and read titration  Complains of muscle cramps periodically when/in the evenings after he has walked a fair amount at work.  Change jobs now works a lot walking throughout the [employer]        Review of Symptoms    Constitutional  Neg activity change, No chills/ fever   Resp  Neg hemoptysis, stridor, choking  CVS  Neg chest pain, palpitations        Physical Exam    Vitals:    03/08/19 1442   BP: 112/74   Pulse: 87   Temp: 98.5 °F (36.9 °C)       Constitutional:   Oriented to person, place, and time.appears well-developed and well-nourished.   No distress.     HENT:   Head: Normocephalic and atraumatic.     Right Ear: Tympanic membrane, external ear and ear canal normal     Left Ear: Tympanic membrane, external ear and ear canal normal    Nose: External Normal. Normal turbinates, No rhinorrhea     Mouth/Throat: Uvula is midline, oropharynx is clear and moist and mucous membranes are normal.     Neck: supple no anterior cervical adenopathy    Carotid artery:  Bruit    NEG []   POS[]    Eyes:   Conjunctivae are normal. Right eye exhibits no discharge. Left eye exhibits no discharge. No scleral icterus. No periorbital edema       Cardiovascular:  Regular rate and rhythm with normal S1 and S2     Pulmonary/Chest:   Clear to auscultation bilaterally without wheezes, rhonchi or rales        Musculoskeletal:  No edema. No obvious deformity No wasting     Neurological:   Alert and oriented to person, place, and time.  Coordination normal.     Skin:   Skin is warm and dry.   No diaphoresis.   No rash noted.    Psychiatric: Normal mood and affect. Behavior is normal. Judgment and thought content normal.       Assessment / Plan:      ICD-10-CM ICD-9-CM   1. Type 2 diabetes mellitus with complication, without long-term current use of insulin E11.8 250.90   2. Need for influenza vaccination Z23 V04.81   3. Need for lipid screening Z13.220 V77.91   4. Arthritis M19.90 716.90   5. Muscle cramps R25.2 729.82   6. Sinus congestion R09.81 478.19   7. Sleep apnea, unspecified type G47.30 780.57     Type 2 diabetes mellitus with complication, without long-term current use of insulin  -     Hemoglobin A1c; Future; Expected date: 03/08/2019  -     Lipid panel; Future; Expected date: 03/08/2019  -     Diabetic Eye Screening Photo; Future  -     Comprehensive metabolic panel; Future  -     CBC auto differential; Future  -     TSH; Future    Need for influenza vaccination  -     Cancel: Influenza - Quadrivalent (3 years & older) (PF)    Need for lipid screening  -     Lipid panel; Future; Expected date: 03/08/2019    Arthritis    Muscle cramps    Sinus congestion    Sleep apnea, unspecified type  -     CPAP titration (Must have diagnosis of VICENTE from previous sleep study.); Future    Other orders  -     buPROPion (WELLBUTRIN XL) 150 MG TB24 tablet; Take 1 tablet (150 mg total) by mouth once daily.  Dispense: 30 tablet; Refill: 11  -     meloxicam (MOBIC) 7.5 MG tablet; Take 1 tablet (7.5 mg total) by mouth once daily.  Dispense: 90 tablet; Refill: 1  -     azithromycin (Z-ZAINAB) 250 MG tablet; Take 2 tablets by mouth on day 1; Take 1 tablet by mouth on days 2-5  Dispense: 6 tablet; Refill: 0  -     flash glucose sensor (FREESTYLE BARRETT 14 DAY SENSOR) Kit; Use to test glucose  Dispense: 12 kit; Refill: 3  -     flash glucose scanning reader Misc; Use to test glucose  Dispense: 1 each; Refill: 0  -     atorvastatin (LIPITOR) 10 MG tablet; Take 1  tablet (10 mg total) by mouth every other day.  Dispense: 45 tablet; Refill: 3

## 2019-03-28 ENCOUNTER — TELEPHONE (OUTPATIENT)
Dept: SLEEP MEDICINE | Facility: OTHER | Age: 44
End: 2019-03-28

## 2019-03-28 ENCOUNTER — LAB VISIT (OUTPATIENT)
Dept: LAB | Facility: HOSPITAL | Age: 44
End: 2019-03-28
Attending: FAMILY MEDICINE
Payer: COMMERCIAL

## 2019-03-28 DIAGNOSIS — E11.8 TYPE 2 DIABETES MELLITUS WITH COMPLICATION, WITHOUT LONG-TERM CURRENT USE OF INSULIN: ICD-10-CM

## 2019-03-28 DIAGNOSIS — Z13.220 NEED FOR LIPID SCREENING: ICD-10-CM

## 2019-03-28 LAB
ALBUMIN SERPL BCP-MCNC: 4.4 G/DL (ref 3.5–5.2)
ALP SERPL-CCNC: 71 U/L (ref 38–126)
ALT SERPL W/O P-5'-P-CCNC: 33 U/L (ref 10–44)
ANION GAP SERPL CALC-SCNC: 7 MMOL/L (ref 8–16)
AST SERPL-CCNC: 30 U/L (ref 15–46)
BASOPHILS # BLD AUTO: 0.04 K/UL (ref 0–0.2)
BASOPHILS NFR BLD: 0.6 % (ref 0–1.9)
BILIRUB SERPL-MCNC: 0.4 MG/DL (ref 0.1–1)
BUN SERPL-MCNC: 18 MG/DL (ref 2–20)
CALCIUM SERPL-MCNC: 9.4 MG/DL (ref 8.7–10.5)
CHLORIDE SERPL-SCNC: 105 MMOL/L (ref 95–110)
CHOLEST SERPL-MCNC: 130 MG/DL (ref 120–199)
CHOLEST/HDLC SERPL: 2.9 {RATIO} (ref 2–5)
CO2 SERPL-SCNC: 31 MMOL/L (ref 23–29)
CREAT SERPL-MCNC: 1.03 MG/DL (ref 0.5–1.4)
DIFFERENTIAL METHOD: ABNORMAL
EOSINOPHIL # BLD AUTO: 0.1 K/UL (ref 0–0.5)
EOSINOPHIL NFR BLD: 1.9 % (ref 0–8)
ERYTHROCYTE [DISTWIDTH] IN BLOOD BY AUTOMATED COUNT: 14 % (ref 11.5–14.5)
EST. GFR  (AFRICAN AMERICAN): >60 ML/MIN/1.73 M^2
EST. GFR  (NON AFRICAN AMERICAN): >60 ML/MIN/1.73 M^2
ESTIMATED AVG GLUCOSE: 117 MG/DL (ref 68–131)
GLUCOSE SERPL-MCNC: 112 MG/DL (ref 70–110)
HBA1C MFR BLD HPLC: 5.7 % (ref 4–5.6)
HCT VFR BLD AUTO: 44.8 % (ref 40–54)
HDLC SERPL-MCNC: 45 MG/DL (ref 40–75)
HDLC SERPL: 34.6 % (ref 20–50)
HGB BLD-MCNC: 14.3 G/DL (ref 14–18)
LDLC SERPL CALC-MCNC: 72.4 MG/DL (ref 63–159)
LYMPHOCYTES # BLD AUTO: 1.7 K/UL (ref 1–4.8)
LYMPHOCYTES NFR BLD: 24.7 % (ref 18–48)
MCH RBC QN AUTO: 29.8 PG (ref 27–31)
MCHC RBC AUTO-ENTMCNC: 31.9 G/DL (ref 32–36)
MCV RBC AUTO: 93 FL (ref 82–98)
MONOCYTES # BLD AUTO: 0.9 K/UL (ref 0.3–1)
MONOCYTES NFR BLD: 12.1 % (ref 4–15)
NEUTROPHILS # BLD AUTO: 4.3 K/UL (ref 1.8–7.7)
NEUTROPHILS NFR BLD: 60.6 % (ref 38–73)
NONHDLC SERPL-MCNC: 85 MG/DL
PLATELET # BLD AUTO: 248 K/UL (ref 150–350)
PMV BLD AUTO: 10.5 FL (ref 9.2–12.9)
POTASSIUM SERPL-SCNC: 5.1 MMOL/L (ref 3.5–5.1)
PROT SERPL-MCNC: 8.1 G/DL (ref 6–8.4)
RBC # BLD AUTO: 4.8 M/UL (ref 4.6–6.2)
SODIUM SERPL-SCNC: 143 MMOL/L (ref 136–145)
T4 FREE SERPL-MCNC: 1.07 NG/DL (ref 0.71–1.51)
TRIGL SERPL-MCNC: 63 MG/DL (ref 30–150)
TSH SERPL DL<=0.005 MIU/L-ACNC: 0.39 UIU/ML (ref 0.4–4)
WBC # BLD AUTO: 7.01 K/UL (ref 3.9–12.7)

## 2019-03-28 PROCEDURE — 36415 COLL VENOUS BLD VENIPUNCTURE: CPT | Mod: PO

## 2019-03-28 PROCEDURE — 80053 COMPREHEN METABOLIC PANEL: CPT | Mod: PO

## 2019-03-28 PROCEDURE — 80061 LIPID PANEL: CPT

## 2019-03-28 PROCEDURE — 84443 ASSAY THYROID STIM HORMONE: CPT | Mod: PO

## 2019-03-28 PROCEDURE — 83036 HEMOGLOBIN GLYCOSYLATED A1C: CPT

## 2019-03-28 PROCEDURE — 85025 COMPLETE CBC W/AUTO DIFF WBC: CPT | Mod: PO

## 2019-03-28 PROCEDURE — 84439 ASSAY OF FREE THYROXINE: CPT

## 2019-03-30 RX ORDER — FLUVOXAMINE MALEATE 100 MG/1
150 TABLET, COATED ORAL 2 TIMES DAILY
Qty: 90 TABLET | Refills: 1 | Status: SHIPPED | OUTPATIENT
Start: 2019-03-30 | End: 2019-04-22 | Stop reason: SDUPTHER

## 2019-04-04 ENCOUNTER — TELEPHONE (OUTPATIENT)
Dept: SLEEP MEDICINE | Facility: OTHER | Age: 44
End: 2019-04-04

## 2019-04-09 ENCOUNTER — TELEPHONE (OUTPATIENT)
Dept: SLEEP MEDICINE | Facility: OTHER | Age: 44
End: 2019-04-09

## 2019-04-15 ENCOUNTER — TELEPHONE (OUTPATIENT)
Dept: SLEEP MEDICINE | Facility: OTHER | Age: 44
End: 2019-04-15

## 2019-04-15 NOTE — TELEPHONE ENCOUNTER
Left messages to schedule the sleep study,no response.  Sent out a message through my [facility] to schedule.

## 2019-04-22 RX ORDER — FLUVOXAMINE MALEATE 100 MG/1
150 TABLET, COATED ORAL 2 TIMES DAILY
Qty: 90 TABLET | Refills: 1 | Status: SHIPPED | OUTPATIENT
Start: 2019-04-22 | End: 2019-07-22

## 2019-04-23 ENCOUNTER — TELEPHONE (OUTPATIENT)
Dept: SLEEP MEDICINE | Facility: OTHER | Age: 44
End: 2019-04-23

## 2019-04-25 ENCOUNTER — TELEPHONE (OUTPATIENT)
Dept: FAMILY MEDICINE | Facility: CLINIC | Age: 44
End: 2019-04-25

## 2019-04-27 RX ORDER — FLUVOXAMINE MALEATE 100 MG/1
150 TABLET, COATED ORAL 2 TIMES DAILY
Qty: 90 TABLET | Refills: 2 | Status: SHIPPED | OUTPATIENT
Start: 2019-04-27 | End: 2019-07-28 | Stop reason: SDUPTHER

## 2019-05-01 RX ORDER — DIAZEPAM 2 MG/1
2 TABLET ORAL EVERY 12 HOURS PRN
Qty: 60 TABLET | Refills: 0 | Status: SHIPPED | OUTPATIENT
Start: 2019-05-01 | End: 2019-08-23 | Stop reason: SDUPTHER

## 2019-05-02 ENCOUNTER — TELEPHONE (OUTPATIENT)
Dept: SLEEP MEDICINE | Facility: OTHER | Age: 44
End: 2019-05-02

## 2019-05-24 ENCOUNTER — PATIENT MESSAGE (OUTPATIENT)
Dept: FAMILY MEDICINE | Facility: CLINIC | Age: 44
End: 2019-05-24

## 2019-05-25 RX ORDER — TERBINAFINE HYDROCHLORIDE 250 MG/1
TABLET ORAL
Qty: 30 TABLET | Refills: 1 | Status: SHIPPED | OUTPATIENT
Start: 2019-05-25 | End: 2019-07-22

## 2019-05-28 RX ORDER — METFORMIN HYDROCHLORIDE 1000 MG/1
1000 TABLET ORAL 2 TIMES DAILY WITH MEALS
Qty: 180 TABLET | Refills: 0 | Status: SHIPPED | OUTPATIENT
Start: 2019-05-28 | End: 2019-08-26 | Stop reason: SDUPTHER

## 2019-06-03 RX ORDER — VALSARTAN AND HYDROCHLOROTHIAZIDE 320; 12.5 MG/1; MG/1
1 TABLET, FILM COATED ORAL DAILY
Qty: 90 TABLET | Refills: 2 | Status: SHIPPED | OUTPATIENT
Start: 2019-06-03 | End: 2020-01-13 | Stop reason: SDUPTHER

## 2019-07-22 ENCOUNTER — OFFICE VISIT (OUTPATIENT)
Dept: FAMILY MEDICINE | Facility: CLINIC | Age: 44
End: 2019-07-22
Payer: COMMERCIAL

## 2019-07-22 ENCOUNTER — TELEPHONE (OUTPATIENT)
Dept: FAMILY MEDICINE | Facility: CLINIC | Age: 44
End: 2019-07-22

## 2019-07-22 VITALS
TEMPERATURE: 99 F | DIASTOLIC BLOOD PRESSURE: 80 MMHG | SYSTOLIC BLOOD PRESSURE: 124 MMHG | HEART RATE: 100 BPM | BODY MASS INDEX: 42.66 KG/M2 | WEIGHT: 315 LBS | HEIGHT: 72 IN | OXYGEN SATURATION: 98 %

## 2019-07-22 DIAGNOSIS — M54.30 SCIATIC LEG PAIN: Primary | ICD-10-CM

## 2019-07-22 DIAGNOSIS — S39.012A STRAIN OF LUMBAR REGION, INITIAL ENCOUNTER: ICD-10-CM

## 2019-07-22 PROCEDURE — 3079F PR MOST RECENT DIASTOLIC BLOOD PRESSURE 80-89 MM HG: ICD-10-PCS | Mod: CPTII,S$GLB,, | Performed by: FAMILY MEDICINE

## 2019-07-22 PROCEDURE — 3079F DIAST BP 80-89 MM HG: CPT | Mod: CPTII,S$GLB,, | Performed by: FAMILY MEDICINE

## 2019-07-22 PROCEDURE — 3008F PR BODY MASS INDEX (BMI) DOCUMENTED: ICD-10-PCS | Mod: CPTII,S$GLB,, | Performed by: FAMILY MEDICINE

## 2019-07-22 PROCEDURE — 3074F PR MOST RECENT SYSTOLIC BLOOD PRESSURE < 130 MM HG: ICD-10-PCS | Mod: CPTII,S$GLB,, | Performed by: FAMILY MEDICINE

## 2019-07-22 PROCEDURE — 99214 PR OFFICE/OUTPT VISIT, EST, LEVL IV, 30-39 MIN: ICD-10-PCS | Mod: S$GLB,,, | Performed by: FAMILY MEDICINE

## 2019-07-22 PROCEDURE — 3074F SYST BP LT 130 MM HG: CPT | Mod: CPTII,S$GLB,, | Performed by: FAMILY MEDICINE

## 2019-07-22 PROCEDURE — 3008F BODY MASS INDEX DOCD: CPT | Mod: CPTII,S$GLB,, | Performed by: FAMILY MEDICINE

## 2019-07-22 PROCEDURE — 99214 OFFICE O/P EST MOD 30 MIN: CPT | Mod: S$GLB,,, | Performed by: FAMILY MEDICINE

## 2019-07-22 RX ORDER — TERBINAFINE HYDROCHLORIDE 250 MG/1
TABLET ORAL
Qty: 10 TABLET | Refills: 3 | Status: SHIPPED | OUTPATIENT
Start: 2019-07-22 | End: 2019-12-27

## 2019-07-22 RX ORDER — HYDROCODONE BITARTRATE AND ACETAMINOPHEN 7.5; 325 MG/1; MG/1
1 TABLET ORAL EVERY 6 HOURS PRN
Qty: 25 TABLET | Refills: 0 | Status: SHIPPED | OUTPATIENT
Start: 2019-07-22 | End: 2019-09-12 | Stop reason: ALTCHOICE

## 2019-07-22 RX ORDER — CYCLOBENZAPRINE HCL 10 MG
10 TABLET ORAL 3 TIMES DAILY PRN
Qty: 30 TABLET | Refills: 0 | Status: SHIPPED | OUTPATIENT
Start: 2019-07-22 | End: 2019-08-01

## 2019-07-22 NOTE — TELEPHONE ENCOUNTER
Please advise     ----- Message from Erin Blandon sent at 7/22/2019 10:56 AM CDT -----  Contact: self, [phone]  Patient requests to be seen today, states he is having back pain due to sciatica.

## 2019-07-23 NOTE — PROGRESS NOTES
Patient ID: Kirill Gandhi III is a 44 y.o. male.    Chief Complaint: Leg Pain    HPI      Kirill Gandhi III is a 44 y.o. male complains of lower back pain mild to moderate intensity however pain running down his leg is a shooting fire type pain. Severe intensity.  No fever chills.  No nausea vomiting diarrhea.  No red flag symptoms such as loss of bowel or bladder function.  No loss of strength.  Is uncomfortable moving from sitting to standing and standing is sitting.  Use of over-the-counter medicines ibuprofen etc not much help.  Not aggressively using ice or heat.    Vitals:    07/22/19 1620   BP: 124/80   Pulse: 100   Temp: 98.5 °F (36.9 °C)   SpO2: 98%   Weight: (!) 160.6 kg (353 lb 15.2 oz)   Height: 6' (1.829 m)            Review of Symptoms    Constitutional  Neg activity change, No chills /fever   Resp  Neg hemoptysis, stridor, choking  CVS  Neg chest pain, palpitations    Physical Exam    Constitutional:  Oriented to person, place, and time.appears well-developed and well-nourished.  No distress.      HENT  Head: Normocephalic and atraumatic  Right Ear: External ear normal.   Left Ear: External ear normal.   Nose: External nose normal.   Mouth:  Moist mucus membranes.    Eyes:  Conjunctivae are normal. Right eye exhibits no discharge.  Left eye exhibits no discharge. No scleral icterus.  No periorbital edema    Cardiovascular:  Regular rate and rhythm with normal S1 and S2     Pulmonary/Chest:   Clear to auscultation bilaterally without wheezes, rhonchi or rales      Musculoskeletal:  No edema. No obvious deformity No wasting  Gait is normal blood walks very slowly and moves gingerly.  Strength of upper and lower leg muscle groups normal   sensation is normal.  Able to flex forward about hip over 45° degrees and able to extend spine backwards with no pain.  Straight leg lift-assessing with patient on table seated position and lifting more leg produces severe neurological pain right  side     Neurological:  Alert and oriented to person, place, and time.   Coordination normal.     Skin:   Skin is warm and dry.  No diaphoresis.   No rash noted.     Psychiatric: Normal mood and affect. Behavior is normal.  Judgment and thought content normal.     Complete Blood Count  Lab Results   Component Value Date    RBC 4.80 03/28/2019    HGB 14.3 03/28/2019    HCT 44.8 03/28/2019    MCV 93 03/28/2019    MCH 29.8 03/28/2019    MCHC 31.9 (L) 03/28/2019    RDW 14.0 03/28/2019     03/28/2019    MPV 10.5 03/28/2019    GRAN 4.3 03/28/2019    GRAN 60.6 03/28/2019    LYMPH 1.7 03/28/2019    LYMPH 24.7 03/28/2019    MONO 0.9 03/28/2019    MONO 12.1 03/28/2019    EOS 0.1 03/28/2019    BASO 0.04 03/28/2019    EOSINOPHIL 1.9 03/28/2019    BASOPHIL 0.6 03/28/2019    DIFFMETHOD Automated 03/28/2019       Comprehensive Metabolic Panel  Lab Results   Component Value Date     (H) 03/28/2019    BUN 18 03/28/2019    CREATININE 1.03 03/28/2019     03/28/2019    K 5.1 03/28/2019     03/28/2019    PROT 8.1 03/28/2019    ALBUMIN 4.4 03/28/2019    BILITOT 0.4 03/28/2019    AST 30 03/28/2019    ALKPHOS 71 03/28/2019    CO2 31 (H) 03/28/2019    ALT 33 03/28/2019    ANIONGAP 7 (L) 03/28/2019    EGFRNONAA >60.0 03/28/2019    ESTGFRAFRICA >60.0 03/28/2019       TSH  Lab Results   Component Value Date    TSH 0.389 (L) 03/28/2019       Assessment / Plan:      ICD-10-CM ICD-9-CM   1. Sciatic leg pain M54.30 724.3   2. Strain of lumbar region, initial encounter S39.012A 847.2     Sciatic leg pain    Strain of lumbar region, initial encounter  -     cyclobenzaprine (FLEXERIL) 10 MG tablet; Take 1 tablet (10 mg total) by mouth 3 (three) times daily as needed for Muscle spasms.  Dispense: 30 tablet; Refill: 0    Other orders  -     HYDROcodone-acetaminophen (NORCO) 7.5-325 mg per tablet; Take 1 tablet by mouth every 6 (six) hours as needed for Pain.  Dispense: 25 tablet; Refill: 0  -     terbinafine HCl (LAMISIL) 250  mg tablet; Two po the first 5 days of the month for 3 mo  Dispense: 10 tablet; Refill: 3     discussed using heat ice-continue moving do not lay sedentary-use of over-the-counter medications and Flexeril needed-hydrocodone for severe pain.

## 2019-07-24 ENCOUNTER — HOSPITAL ENCOUNTER (EMERGENCY)
Facility: HOSPITAL | Age: 44
Discharge: HOME | End: 2019-07-24
Attending: EMERGENCY MEDICINE
Payer: COMMERCIAL

## 2019-07-24 VITALS
TEMPERATURE: 98 F | WEIGHT: 315 LBS | RESPIRATION RATE: 15 BRPM | HEIGHT: 72 IN | SYSTOLIC BLOOD PRESSURE: 151 MMHG | OXYGEN SATURATION: 99 % | DIASTOLIC BLOOD PRESSURE: 84 MMHG | HEART RATE: 74 BPM | BODY MASS INDEX: 42.66 KG/M2

## 2019-07-24 DIAGNOSIS — E11.9 TYPE 2 DIABETES MELLITUS WITHOUT COMPLICATION, WITHOUT LONG-TERM CURRENT USE OF INSULIN: ICD-10-CM

## 2019-07-24 DIAGNOSIS — E66.01 MORBID OBESITY: ICD-10-CM

## 2019-07-24 DIAGNOSIS — M54.31 SCIATICA OF RIGHT SIDE: Primary | ICD-10-CM

## 2019-07-24 PROCEDURE — 99284 EMERGENCY DEPT VISIT MOD MDM: CPT | Mod: 25

## 2019-07-24 PROCEDURE — 99283 EMERGENCY DEPT VISIT LOW MDM: CPT | Mod: ,,, | Performed by: EMERGENCY MEDICINE

## 2019-07-24 PROCEDURE — 63600175 PHARM REV CODE 636 W HCPCS: Performed by: EMERGENCY MEDICINE

## 2019-07-24 PROCEDURE — 96372 THER/PROPH/DIAG INJ SC/IM: CPT

## 2019-07-24 PROCEDURE — 99283 PR EMERGENCY DEPT VISIT,LEVEL III: ICD-10-PCS | Mod: ,,, | Performed by: EMERGENCY MEDICINE

## 2019-07-24 RX ORDER — METHYLPREDNISOLONE SOD SUCC 125 MG
125 VIAL (EA) INJECTION
Status: COMPLETED | OUTPATIENT
Start: 2019-07-24 | End: 2019-07-24

## 2019-07-24 RX ORDER — METHYLPREDNISOLONE 4 MG/1
TABLET ORAL
Qty: 1 PACKAGE | Refills: 0 | Status: SHIPPED | OUTPATIENT
Start: 2019-07-24 | End: 2019-09-12 | Stop reason: ALTCHOICE

## 2019-07-24 RX ORDER — HYDROMORPHONE HYDROCHLORIDE 1 MG/ML
2 INJECTION, SOLUTION INTRAMUSCULAR; INTRAVENOUS; SUBCUTANEOUS
Status: COMPLETED | OUTPATIENT
Start: 2019-07-24 | End: 2019-07-24

## 2019-07-24 RX ADMIN — METHYLPREDNISOLONE SODIUM SUCCINATE 125 MG: 125 INJECTION, POWDER, FOR SOLUTION INTRAMUSCULAR; INTRAVENOUS at 11:07

## 2019-07-24 RX ADMIN — HYDROMORPHONE HYDROCHLORIDE 2 MG: 1 INJECTION, SOLUTION INTRAMUSCULAR; INTRAVENOUS; SUBCUTANEOUS at 11:07

## 2019-07-25 NOTE — ED PROVIDER NOTES
Encounter Date: 7/24/2019    SCRIBE #1 NOTE: I, Linda Brett, am scribing for, and in the presence of,  Baudilio Silva III, MD. I have scribed the entire note.       History     Chief Complaint   Patient presents with    Sciatica     Pt reports w/ c/o sciatica pain after seeing PCP. PEr pt regimen of ibuprofen and muscle relaxer not working. Denies injury or trauma. Ambulatory to triage     Time patient was seen by the provider: 11:12 PM      The patient is a 44 y.o. male with co-morbidities including: HTN, DM, sleep apnea, CTS, who presents to the ED with a complaint of sciatica pain. Patient reports he was seen by his PCP two days ago and was given Ibuprofen and muscle relaxer without relief of pain. He reports slight relief when he lays on his back and puts his wedge behinds his knees. Pain is exacerbated when he stands for a long period of time and when he is moving to get into certain positions with associated symptom of limited activity secondary to pain. Denies any loss of sensation, urinary or bowel incontinence. 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 He has not been able to work for the past 2 days. Patient is on Metformin and reports testing his sugar by looking at the inflammation in his hands. He reports his sugar is normally under 160.         Review of patient's allergies indicates:  No Known Allergies  Past Medical History:   Diagnosis Date    Allergy     Anxiety     CTS (carpal tunnel syndrome) 6/26/2015    Depression     Diabetes 4/17/2015    Hx of vasectomy     Hypertension     Sleep apnea      Past Surgical History:   Procedure Laterality Date    ADENOIDECTOMY      FRACTURE SURGERY      bilateral elbow fracture 3 years ago    HERNIA REPAIR      bilateral groin hernia    TONSILLECTOMY       Family History   Problem Relation Age of Onset    Fibromyalgia Mother     Osteoarthritis Mother     Rheum arthritis Neg Hx     Psoriasis Neg Hx     Lupus Neg  Hx     Kidney disease Neg Hx     Inflammatory bowel disease Neg Hx      Social History     Tobacco Use    Smoking status: Never Smoker    Smokeless tobacco: Never Used    Tobacco comment: ; one child    Substance Use Topics    Alcohol use: Yes     Comment: 750 ml  once a month or so    Drug use: No     Review of Systems   Constitutional: Positive for activity change. Negative for fever.   HENT: Negative for sore throat.    Respiratory: Negative for shortness of breath.    Cardiovascular: Negative for chest pain.   Gastrointestinal: Negative for nausea.   Genitourinary: Negative for dysuria.   Musculoskeletal: Positive for back pain.   Skin: Negative for rash.   Neurological: Negative for weakness.   Hematological: Does not bruise/bleed easily.       Physical Exam     Initial Vitals [07/24/19 2038]   BP Pulse Resp Temp SpO2   (!) 151/84 74 15 98.1 °F (36.7 °C) 99 %      MAP       --         Physical Exam    Constitutional: He appears well-developed and well-nourished. He appears distressed (secondary to pain).   Morbidly obese    HENT:   Head: Normocephalic and atraumatic.   Eyes: EOM are normal. Pupils are equal, round, and reactive to light.   Neck: Normal range of motion. Neck supple.   Cardiovascular: Normal rate and regular rhythm. Exam reveals no gallop and no friction rub.    No murmur heard.  Pulmonary/Chest: Breath sounds normal. No respiratory distress. He has no wheezes. He has no rhonchi. He has no rales.   Abdominal: Soft. There is no tenderness. There is no rebound and no guarding.   Musculoskeletal: Normal range of motion. He exhibits tenderness. He exhibits no edema.   Patient is tender in area of right sacral iliac region. Although due to body habitus, it is difficult to pinpoint exact location of pain. No palpable spasms.    Neurological: He is alert and oriented to person, place, and time. He has normal strength. No cranial nerve deficit or sensory deficit.   Light sensation is intact  to light touch with 5/5 strength in bilateral lower extremity and + DTRs at the patella, bilaterally.    Skin: Skin is warm and dry.   Psychiatric: He has a normal mood and affect. His behavior is normal. Judgment and thought content normal.         ED Course   Procedures  Labs Reviewed - No data to display       Imaging Results    None          Medical Decision Making:   History:   Old Medical Records: I decided to obtain old medical records.  Old Records Summarized: records from clinic visits.       <> Summary of Records: Records summarized in HPI  Initial Assessment:   Patient has no signs of cauda equina syndrome. I explained the signs and symptoms to the patient and family in regards of emergent findings of sciatic pathology. Patient and family verbally there understands of this and expectation of treatment and when to follow up with his physician. Patient was advised that he needs physical therapy and weight control as well as increase in mobility in contrast to being sedentary. Patient is given injection of Sulu-Medrol and prescription of Medrol pack. He is advised that this may alter his blood sugar and he would need to monitor it closely.             Scribe Attestation:   Scribe #1: I performed the above scribed service and the documentation accurately describes the services I performed. I attest to the accuracy of the note.               Clinical Impression:       ICD-10-CM ICD-9-CM   1. Sciatica of right side M54.31 724.3   2. Type 2 diabetes mellitus without complication, without long-term current use of insulin E11.9 250.00   3. Morbid obesity E66.01 278.01         Disposition:   Disposition: Discharged  Condition: Stable                        Baudiilo Silva III, MD  07/24/19 3667

## 2019-07-25 NOTE — DISCHARGE INSTRUCTIONS
Please drink plenty of fluids.  Please get plenty of rest.  Please return here or go to the Emergency Department for any concerns or worsening of condition.  If you were prescribed a narcotic medication, do not drive or operate heavy equipment or machinery while taking these medications.  If you were not prescribed an anti-inflammatory medication, and if you do not have any history of stomach/intestinal ulcers, or kidney disease, or are not taking a blood thinner such as Coumadin, Plavix, Pradaxa, Eloquis, or Xaralta for example, it is OK to take over the counter Ibuprofen or Advil or Motrin or Aleve as directed.  Do not take these medications on an empty stomach.  If you lose control of your bowel and/or bladder, please go to the nearest Emergency Department immediately.  If you lose sensation in between your legs by your genitalia and/or rectum, please go to the nearest Emergency Department immediately.  If you lose control or sensation of any extremity, please go to the nearest Emergency Department immediately.  Please follow up with your primary care doctor or specialist as needed.    If you  smoke, please stop smoking.  PLEASE NOTE THAT THE STEROIDS THAT YOU HAVE RECEIVED AND ARE GOING TO TAKE ARE GOING TO AFFECT YOUR BLOOD SUGARS AND THEY SHOULD BE FOLLOWED ACCORDINGLY

## 2019-07-25 NOTE — ED TRIAGE NOTES
Pt reports R sciatica pain that increased x5 days. Pt reports 9/10 pain that radiates from R buttocks to R knee. Pt reports only being able to stand for 10min at a time. Went to PCP on Monday who prescribed pain rx, muscle relaxer, and ibuprofen.

## 2019-07-28 RX ORDER — FLUVOXAMINE MALEATE 100 MG/1
150 TABLET, COATED ORAL 2 TIMES DAILY
Qty: 270 TABLET | Refills: 0 | Status: SHIPPED | OUTPATIENT
Start: 2019-07-28 | End: 2020-01-21 | Stop reason: SDUPTHER

## 2019-07-30 ENCOUNTER — TELEPHONE (OUTPATIENT)
Dept: FAMILY MEDICINE | Facility: CLINIC | Age: 44
End: 2019-07-30

## 2019-07-30 ENCOUNTER — PATIENT MESSAGE (OUTPATIENT)
Dept: FAMILY MEDICINE | Facility: CLINIC | Age: 44
End: 2019-07-30

## 2019-07-30 NOTE — TELEPHONE ENCOUNTER
Haydee JOINER [facility] Staff   Caller: [phone]/self (Today,  4:46 PM)             Patient is requesting a call back regarding

## 2019-07-30 NOTE — TELEPHONE ENCOUNTER
----- Message from Haydee Yee sent at 7/30/2019 11:25 AM CDT -----  Contact: [phone]/self  Type:  Sooner Apoointment Request    Name of Caller: self  When is the first available appointment? 08/09  Symptoms: ER follow up with sCiatica  Would the patient rather a call back or a response via [email or web address]? call  Best Call Back Number:   Additional Information:  Needs a Thursday appointment only with Dr. Valencia. 08/08 Thursday        And I called him and LM with details and advised him to rtn call if this will not work    I also sent him a message on his my chart

## 2019-07-30 NOTE — TELEPHONE ENCOUNTER
I spoke with the pt  He went to Er last week after seeing you and   The doc added steroid to the       Pain med and muscle relaxer he was already taking   Finished medrol dose pack today ( the pain never got better with steroids )  Pain is going all the way down his leg now  No loss of movement  Severe pain with any movement  Ok while at rest    He is scheduled to see you 8/9/19 ( next Friday )    He said you advised him to call with update if no better  Please advise

## 2019-07-31 NOTE — TELEPHONE ENCOUNTER
Message   Received: Today   Message Contents   Haydee JOINER [facility] [place] Staff   Caller: [phone] /self (Today,  3:32 PM)             Patient is requesting a call back. thanks

## 2019-07-31 NOTE — TELEPHONE ENCOUNTER
MONET I spoke with the pt   He will try PT - he said his insurance coverage regarding PT sucks - his payment will probably be around $75 a visit.  He said he will go for an eval and determine if they can give him home PT ideas to try    He is leaving work early daily bc he is not able to stand all day at work    He will call me back with the name of PT that he would like me to send the orders to   As he is living on the [facility] now. He will call his insurance to get a list of covered locations - I will put in the order once I hear back from the pt

## 2019-08-06 NOTE — TELEPHONE ENCOUNTER
Pt is requesting an appt to see you Friday at 3 pm ?  This is regarding the sciatic pain    Also he is requesting a refill of pain meds.    
Sure that is ok for Friday     
congestion

## 2019-08-08 ENCOUNTER — TELEPHONE (OUTPATIENT)
Dept: ORTHOPEDICS | Facility: CLINIC | Age: 44
End: 2019-08-08

## 2019-08-08 DIAGNOSIS — M51.36 DDD (DEGENERATIVE DISC DISEASE), LUMBAR: Primary | ICD-10-CM

## 2019-08-13 ENCOUNTER — HOSPITAL ENCOUNTER (OUTPATIENT)
Dept: RADIOLOGY | Facility: HOSPITAL | Age: 44
Discharge: HOME | End: 2019-08-13
Attending: PHYSICIAN ASSISTANT
Payer: COMMERCIAL

## 2019-08-13 ENCOUNTER — INITIAL CONSULT (OUTPATIENT)
Dept: ORTHOPEDICS | Facility: CLINIC | Age: 44
End: 2019-08-13
Payer: COMMERCIAL

## 2019-08-13 VITALS — BODY MASS INDEX: 44.1 KG/M2 | WEIGHT: 315 LBS | HEIGHT: 71 IN

## 2019-08-13 DIAGNOSIS — M51.36 DDD (DEGENERATIVE DISC DISEASE), LUMBAR: ICD-10-CM

## 2019-08-13 DIAGNOSIS — M54.16 LUMBAR RADICULOPATHY: Primary | ICD-10-CM

## 2019-08-13 PROCEDURE — 99204 PR OFFICE/OUTPT VISIT, NEW, LEVL IV, 45-59 MIN: ICD-10-PCS | Mod: S$GLB,,, | Performed by: PHYSICIAN ASSISTANT

## 2019-08-13 PROCEDURE — 99999 PR PBB SHADOW E&M-EST. PATIENT-LVL III: CPT | Mod: PBBFAC,,, | Performed by: PHYSICIAN ASSISTANT

## 2019-08-13 PROCEDURE — 72100 XR LUMBAR SPINE AP AND LAT WITH FLEX/EXT: ICD-10-PCS | Mod: 26,,, | Performed by: RADIOLOGY

## 2019-08-13 PROCEDURE — 72120 XR LUMBAR SPINE AP AND LAT WITH FLEX/EXT: ICD-10-PCS | Mod: 26,,, | Performed by: RADIOLOGY

## 2019-08-13 PROCEDURE — 72100 X-RAY EXAM L-S SPINE 2/3 VWS: CPT | Mod: 26,,, | Performed by: RADIOLOGY

## 2019-08-13 PROCEDURE — 3008F BODY MASS INDEX DOCD: CPT | Mod: CPTII,S$GLB,, | Performed by: PHYSICIAN ASSISTANT

## 2019-08-13 PROCEDURE — 3008F PR BODY MASS INDEX (BMI) DOCUMENTED: ICD-10-PCS | Mod: CPTII,S$GLB,, | Performed by: PHYSICIAN ASSISTANT

## 2019-08-13 PROCEDURE — 99999 PR PBB SHADOW E&M-EST. PATIENT-LVL III: ICD-10-PCS | Mod: PBBFAC,,, | Performed by: PHYSICIAN ASSISTANT

## 2019-08-13 PROCEDURE — 72120 X-RAY BEND ONLY L-S SPINE: CPT | Mod: 26,,, | Performed by: RADIOLOGY

## 2019-08-13 PROCEDURE — 99204 OFFICE O/P NEW MOD 45 MIN: CPT | Mod: S$GLB,,, | Performed by: PHYSICIAN ASSISTANT

## 2019-08-13 PROCEDURE — 72100 X-RAY EXAM L-S SPINE 2/3 VWS: CPT | Mod: TC

## 2019-08-13 RX ORDER — METHOCARBAMOL 750 MG/1
750 TABLET, FILM COATED ORAL 3 TIMES DAILY
Qty: 60 TABLET | Refills: 0 | Status: SHIPPED | OUTPATIENT
Start: 2019-08-13 | End: 2019-09-02

## 2019-08-13 NOTE — PROGRESS NOTES
DATE: 8/13/2019  PATIENT: Kirill Gandhi III    Supervising Physician: Freedom Hardin M.D.    CHIEF COMPLAINT: low back and right leg pain    HISTORY:  Kirill Gandhi III is a 44 y.o. male here for initial evaluation of low back and right leg pain (Back - 9, Leg - 9).  The pain in the back is what bothers him most.  The pain has been present for about a month. The patient describes the pain as burning and sharp.  The pain is worse with standing, walking, and bending and improved by sitting and lying down. There is associated numbness and tingling. There is subjective weakness in his foot.  He says sometimes he rolls his ankle when the pain is really bad.  Prior treatments have included medrol dose pack, norco, flexeril, ibuprofen, mobic and tylenol, but no ESIs or surgery.    The patient denies myelopathic symptoms such as handwriting changes or difficulty with buttons/coins/keys. Denies perineal paresthesias, bowel/bladder dysfunction.    PAST MEDICAL/SURGICAL HISTORY:  Past Medical History:   Diagnosis Date    Allergy     Anxiety     CTS (carpal tunnel syndrome) 6/26/2015    Depression     Diabetes 4/17/2015    Hx of vasectomy     Hypertension     Sleep apnea      Past Surgical History:   Procedure Laterality Date    ADENOIDECTOMY      FRACTURE SURGERY      bilateral elbow fracture 3 years ago    HERNIA REPAIR      bilateral groin hernia    TONSILLECTOMY         Medications:   Current Outpatient Medications on File Prior to Visit   Medication Sig Dispense Refill    aspirin (ECOTRIN) 81 MG EC tablet Take 81 mg by mouth once daily.      atorvastatin (LIPITOR) 10 MG tablet Take 1 tablet (10 mg total) by mouth every other day. 45 tablet 3    buPROPion (WELLBUTRIN XL) 150 MG TB24 tablet Take 1 tablet (150 mg total) by mouth once daily. 30 tablet 11    diazePAM (VALIUM) 2 MG tablet TAKE 1 TABLET (2 MG TOTAL) BY MOUTH EVERY 12 (TWELVE) HOURS AS NEEDED FOR ANXIETY 60 tablet 0    flash  glucose scanning reader Misc Use to test glucose 1 each 0    flash glucose sensor (FREESTYLE BARRETT 14 DAY SENSOR) Kit Use to test glucose 12 kit 3    fluticasone (FLONASE) 50 mcg/actuation nasal spray 2 sprays (100 mcg total) by Each Nare route once daily. 48 g 2    fluvoxaMINE (LUVOX) 100 MG tablet TAKE 1.5 TABLETS (150 MG TOTAL) BY MOUTH 2 (TWO) TIMES DAILY. 270 tablet 0    ibuprofen (ADVIL,MOTRIN) 200 MG tablet Take 2 tablets (400 mg total) by mouth every 6 (six) hours as needed for Pain. 30 tablet 0    meloxicam (MOBIC) 7.5 MG tablet Take 1 tablet (7.5 mg total) by mouth once daily. 90 tablet 1    metFORMIN (GLUCOPHAGE) 1000 MG tablet TAKE 1 TABLET (1,000 MG TOTAL) BY MOUTH 2 (TWO) TIMES DAILY WITH MEALS. 180 tablet 0    multivitamin capsule Take 1 capsule by mouth once daily.      terbinafine HCl (LAMISIL) 250 mg tablet Two po the first 5 days of the month for 3 mo 10 tablet 3    valsartan-hydrochlorothiazide (DIOVAN-HCT) 320-12.5 mg per tablet TAKE 1 TABLET BY MOUTH ONCE DAILY. 90 tablet 2    vitamin D 1000 units Tab Take 4,000 mg by mouth once daily.       HYDROcodone-acetaminophen (NORCO) 7.5-325 mg per tablet Take 1 tablet by mouth every 6 (six) hours as needed for Pain. 25 tablet 0    methylPREDNISolone (MEDROL DOSEPACK) 4 mg tablet use as directed 1 Package 0     No current facility-administered medications on file prior to visit.        Social History:   Social History     Socioeconomic History    Marital status:      Spouse name: Not on file    Number of children: Not on file    Years of education: Not on file    Highest education level: Not on file   Occupational History    Not on file   Social Needs    Financial resource strain: Not on file    Food insecurity:     Worry: Not on file     Inability: Not on file    Transportation needs:     Medical: Not on file     Non-medical: Not on file   Tobacco Use    Smoking status: Never Smoker    Smokeless tobacco: Never Used     "Tobacco comment: ; one child    Substance and Sexual Activity    Alcohol use: Yes     Comment: 750 ml  once a month or so    Drug use: No    Sexual activity: Not on file   Lifestyle    Physical activity:     Days per week: Not on file     Minutes per session: Not on file    Stress: Not on file   Relationships    Social connections:     Talks on phone: Not on file     Gets together: Not on file     Attends Buddhism service: Not on file     Active member of club or organization: Not on file     Attends meetings of clubs or organizations: Not on file     Relationship status: Not on file   Other Topics Concern    Not on file   Social History Narrative    Not on file       REVIEW OF SYSTEMS:  Constitution: Negative. Negative for chills, fever and night sweats.   Cardiovascular: Negative for chest pain and syncope.   Respiratory: Negative for cough and shortness of breath.   Gastrointestinal: See HPI. Negative for nausea/vomiting. Negative for abdominal pain.  Genitourinary: See HPI. Negative for discoloration or dysuria.  Skin: Negative for dry skin, itching and rash.   Hematologic/Lymphatic: Negative for bleeding problem. Does not bruise/bleed easily.   Musculoskeletal: Negative for falls and muscle weakness.   Neurological: See HPI. No seizures.   Endocrine: Negative for polydipsia, polyphagia and polyuria.   Allergic/Immunologic: Negative for hives and persistent infections.     EXAM:  Ht 5' 11" (1.803 m)   Wt (!) 160.8 kg (354 lb 8 oz)   BMI 49.44 kg/m²     General: The patient is a very pleasant 44 y.o. male in no apparent distress, the patient is oriented to person, place and time.  Psych: Normal mood and affect  HEENT: Vision grossly intact, hearing intact to the spoken word.  Lungs: Respirations unlabored.  Gait: Antalgic station and gait, mild difficulty with toe and heel walk.   Skin: Dorsal lumbar skin negative for rashes, lesions, hairy patches and surgical scars. There is mild lumbar " tenderness to palpation.  Range of motion: Lumbar range of motion is acceptable.  Spinal Balance: Global saggital and coronal spinal balance acceptable, not significant for scoliosis and kyphosis.  Musculoskeletal: No pain with the range of motion of the bilateral hips. No trochanteric tenderness to palpation.  Vascular: Bilateral lower extremities warm and well perfused, dorsalis pedis pulses 2+ bilaterally.  Neurological: Normal strength and tone in all major motor groups in the bilateral lower extremities with the exception of decreased strength with dorsiflexion and EHL extension on the right. Normal sensation to light touch in the L2-S1 dermatomes bilaterally.  Deep tendon reflexes symmetric 2+ in the left lower extremity, slightly diminished right patellar reflex.  Negative Babinski bilaterally. Straight leg raise positive bilaterally, reproduces pain on the right.    IMAGING:      Today I personally reviewed AP, Lat and Flex/Ex  upright L-spine films that demonstrate mild degenerative changes and mild scoliosis.          Body mass index is 49.44 kg/m².    Hemoglobin A1C   Date Value Ref Range Status   03/28/2019 5.7 (H) 4.0 - 5.6 % Final     Comment:     ADA Screening Guidelines:  5.7-6.4%  Consistent with prediabetes  >or=6.5%  Consistent with diabetes  High levels of fetal hemoglobin interfere with the HbA1C  assay. Heterozygous hemoglobin variants (HbS, HgC, etc)do  not significantly interfere with this assay.   However, presence of multiple variants may affect accuracy.     12/07/2017 6.9 (H) 4.0 - 5.6 % Final     Comment:     According to ADA guidelines, hemoglobin A1c <7.0% represents  optimal control in non-pregnant diabetic patients. Different  metrics may apply to specific patient populations.   Standards of Medical Care in Diabetes-2016.  For the purpose of screening for the presence of diabetes:  <5.7%     Consistent with the absence of diabetes  5.7-6.4%  Consistent with increasing risk for  diabetes   (prediabetes)  >or=6.5%  Consistent with diabetes  Currently, no consensus exists for use of hemoglobin A1c  for diagnosis of diabetes for children.  This Hemoglobin A1c assay has significant interference with fetal   hemoglobin   (HbF). The results are invalid for patients with abnormal amounts of   HbF,   including those with known Hereditary Persistence   of Fetal Hemoglobin. Heterozygous hemoglobin variants (HbAS, HbAC,   HbAD, HbAE, HbA2) do not significantly interfere with this assay;   however, presence of multiple variants in a sample may impact the %   interference.     07/20/2016 6.6 (H) 4.5 - 6.2 % Final     Comment:     According to ADA guidelines, hemoglobin A1C <7.0% represents  optimal control in non-pregnant diabetic patients.  Different  metrics may apply to specific populations.   Standards of Medical Care in Diabetes - 2016.  For the purpose of screening for the presence of diabetes:  <5.7%     Consistent with the absence of diabetes  5.7-6.4%  Consistent with increasing risk for diabetes   (prediabetes)  >or=6.5%  Consistent with diabetes  Currently no consensus exists for use of hemoglobin A1C  for diagnosis of diabetes for children.             ASSESSMENT/PLAN:    Kirill was seen today for low-back pain and leg pain.    Diagnoses and all orders for this visit:    Lumbar radiculopathy  -     MRI Lumbar Spine Without Contrast; Future    DDD (degenerative disc disease), lumbar  -     MRI Lumbar Spine Without Contrast; Future    Other orders  -     methocarbamol (ROBAXIN) 750 MG Tab; Take 1 tablet (750 mg total) by mouth 3 (three) times daily. As needed for muscle spasms for 60 doses        Today we discussed at length all of the different treatment options including anti-inflammatories, acetaminophen, rest, ice, heat, physical therapy including strengthening and stretching exercises, home exercises, ROM, aerobic conditioning, aqua therapy, other modalities including ultrasound, massage,  and dry needling, epidural steroid injections and finally surgical intervention.      The patient is having low back and right leg pain that has been present for about a month.  His pain has failed to respond to steroids, anti-inflammatory medications and pain medication.  He has diminished patellar reflex on the right and decreased strength on the right.  We will get an MRI lumbar spine.  Follow up after the MRI to discuss results and further treatment including ESIs.     Follow up if symptoms worsen or fail to improve.

## 2019-08-20 ENCOUNTER — OFFICE VISIT (OUTPATIENT)
Dept: ORTHOPEDICS | Facility: CLINIC | Age: 44
End: 2019-08-20
Payer: COMMERCIAL

## 2019-08-20 ENCOUNTER — HOSPITAL ENCOUNTER (OUTPATIENT)
Dept: RADIOLOGY | Facility: HOSPITAL | Age: 44
Discharge: HOME | End: 2019-08-20
Attending: PHYSICIAN ASSISTANT
Payer: COMMERCIAL

## 2019-08-20 VITALS — HEIGHT: 71 IN | WEIGHT: 315 LBS | BODY MASS INDEX: 44.1 KG/M2

## 2019-08-20 DIAGNOSIS — M48.062 SPINAL STENOSIS OF LUMBAR REGION WITH NEUROGENIC CLAUDICATION: ICD-10-CM

## 2019-08-20 DIAGNOSIS — M54.16 LUMBAR RADICULOPATHY: Primary | ICD-10-CM

## 2019-08-20 DIAGNOSIS — M51.36 DDD (DEGENERATIVE DISC DISEASE), LUMBAR: ICD-10-CM

## 2019-08-20 DIAGNOSIS — M54.16 LUMBAR RADICULOPATHY: ICD-10-CM

## 2019-08-20 PROCEDURE — 99213 PR OFFICE/OUTPT VISIT, EST, LEVL III, 20-29 MIN: ICD-10-PCS | Mod: S$GLB,,, | Performed by: PHYSICIAN ASSISTANT

## 2019-08-20 PROCEDURE — 99213 OFFICE O/P EST LOW 20 MIN: CPT | Mod: S$GLB,,, | Performed by: PHYSICIAN ASSISTANT

## 2019-08-20 PROCEDURE — 99999 PR PBB SHADOW E&M-EST. PATIENT-LVL III: ICD-10-PCS | Mod: PBBFAC,,, | Performed by: PHYSICIAN ASSISTANT

## 2019-08-20 PROCEDURE — 3008F BODY MASS INDEX DOCD: CPT | Mod: CPTII,S$GLB,, | Performed by: PHYSICIAN ASSISTANT

## 2019-08-20 PROCEDURE — 72148 MRI LUMBAR SPINE W/O DYE: CPT | Mod: TC

## 2019-08-20 PROCEDURE — 72148 MRI LUMBAR SPINE WITHOUT CONTRAST: ICD-10-PCS | Mod: 26,,, | Performed by: RADIOLOGY

## 2019-08-20 PROCEDURE — 3008F PR BODY MASS INDEX (BMI) DOCUMENTED: ICD-10-PCS | Mod: CPTII,S$GLB,, | Performed by: PHYSICIAN ASSISTANT

## 2019-08-20 PROCEDURE — 72148 MRI LUMBAR SPINE W/O DYE: CPT | Mod: 26,,, | Performed by: RADIOLOGY

## 2019-08-20 PROCEDURE — 99999 PR PBB SHADOW E&M-EST. PATIENT-LVL III: CPT | Mod: PBBFAC,,, | Performed by: PHYSICIAN ASSISTANT

## 2019-08-20 NOTE — PROGRESS NOTES
"DATE: 8/21/2019  PATIENT: Kirill Gandhi III    Attending Physician: Freedom Hardin M.D.    HISTORY:  Kirill Gandhi III is a 44 y.o. male who returns to me today for MRI results.  He was last seen by me 8/13/2019.  Today he is doing well but notes he is feeling a little better today.    The Patient denies myelopathic symptoms such as handwriting changes or difficulty with buttons/coins/keys. Denies perineal paresthesias, bowel/bladder dysfunction.    PMH/PSH/FamHx/SocHx:  Unchanged from prior visit    ROS:  REVIEW OF SYSTEMS:  Constitution: Negative. Negative for chills, fever and night sweats.   HENT: Negative for congestion and headaches.    Eyes: Negative for blurred vision, left vision loss and right vision loss.   Cardiovascular: Negative for chest pain and syncope.   Respiratory: Negative for cough and shortness of breath.    Endocrine: Negative for polydipsia, polyphagia and polyuria.   Hematologic/Lymphatic: Negative for bleeding problem. Does not bruise/bleed easily.   Skin: Negative for dry skin, itching and rash.   Musculoskeletal: Negative for falls and muscle weakness.   Gastrointestinal: Negative for abdominal pain and bowel incontinence.   Allergic/Immunologic: Negative for hives and persistent infections.  Genitourinary: Negative for urinary retention/incontinence and nocturia.   Neurological: negative for disturbances in coordination, no myelopathic symptoms such as handwriting changes or difficulty with buttons, coins, keys or small objects. No loss of balance and seizures.   Psychiatric/Behavioral: Negative for depression. The patient does not have insomnia.   Denies perineal paresthesias, bowel or bladder incontinence    EXAM:  Ht 5' 11" (1.803 m)   Wt (!) 160.6 kg (354 lb)   BMI 49.37 kg/m²     Physical exam stable.  Neuro exam stable.       IMAGING:  No new imaging today.    Today I personally re- reviewed AP, Lat and Flex/Ex  upright L-spine that demonstrate mild " degenerative changes and mild scoliosis.      MRI lumbar spine demonstrates congenital stenosis with moderate stenosis at L3/4 and mild stenosis at L4/5 with moderate bilateral neural foraminal narrowing.     Body mass index is 49.37 kg/m².    Hemoglobin A1C   Date Value Ref Range Status   03/28/2019 5.7 (H) 4.0 - 5.6 % Final     Comment:     ADA Screening Guidelines:  5.7-6.4%  Consistent with prediabetes  >or=6.5%  Consistent with diabetes  High levels of fetal hemoglobin interfere with the HbA1C  assay. Heterozygous hemoglobin variants (HbS, HgC, etc)do  not significantly interfere with this assay.   However, presence of multiple variants may affect accuracy.     12/07/2017 6.9 (H) 4.0 - 5.6 % Final     Comment:     According to ADA guidelines, hemoglobin A1c <7.0% represents  optimal control in non-pregnant diabetic patients. Different  metrics may apply to specific patient populations.   Standards of Medical Care in Diabetes-2016.  For the purpose of screening for the presence of diabetes:  <5.7%     Consistent with the absence of diabetes  5.7-6.4%  Consistent with increasing risk for diabetes   (prediabetes)  >or=6.5%  Consistent with diabetes  Currently, no consensus exists for use of hemoglobin A1c  for diagnosis of diabetes for children.  This Hemoglobin A1c assay has significant interference with fetal   hemoglobin   (HbF). The results are invalid for patients with abnormal amounts of   HbF,   including those with known Hereditary Persistence   of Fetal Hemoglobin. Heterozygous hemoglobin variants (HbAS, HbAC,   HbAD, HbAE, HbA2) do not significantly interfere with this assay;   however, presence of multiple variants in a sample may impact the %   interference.     07/20/2016 6.6 (H) 4.5 - 6.2 % Final     Comment:     According to ADA guidelines, hemoglobin A1C <7.0% represents  optimal control in non-pregnant diabetic patients.  Different  metrics may apply to specific populations.   Standards of Medical  Care in Diabetes - 2016.  For the purpose of screening for the presence of diabetes:  <5.7%     Consistent with the absence of diabetes  5.7-6.4%  Consistent with increasing risk for diabetes   (prediabetes)  >or=6.5%  Consistent with diabetes  Currently no consensus exists for use of hemoglobin A1C  for diagnosis of diabetes for children.           ASSESSMENT/PLAN:    Kirill was seen today for follow-up.    Diagnoses and all orders for this visit:    Lumbar radiculopathy    Spinal stenosis of lumbar region with neurogenic claudication        Today we discussed at length all of the different treatment options including anti-inflammatories, acetaminophen, rest, ice, heat, physical therapy including strengthening and stretching exercises, home exercises, ROM, aerobic conditioning, aqua therapy, other modalities including ultrasound, massage, and dry needling, epidural steroid injections and finally surgical intervention.      Plan for TFESI with Dr. Batista on the [facility].  Follow up 2 weeks after injection.     Follow up if symptoms worsen or fail to improve.

## 2019-08-20 NOTE — H&P (VIEW-ONLY)
"DATE: 8/21/2019  PATIENT: Kirill Gandhi III    Attending Physician: Freedom Hardin M.D.    HISTORY:  Kirill Gandhi III is a 44 y.o. male who returns to me today for MRI results.  He was last seen by me 8/13/2019.  Today he is doing well but notes he is feeling a little better today.    The Patient denies myelopathic symptoms such as handwriting changes or difficulty with buttons/coins/keys. Denies perineal paresthesias, bowel/bladder dysfunction.    PMH/PSH/FamHx/SocHx:  Unchanged from prior visit    ROS:  REVIEW OF SYSTEMS:  Constitution: Negative. Negative for chills, fever and night sweats.   HENT: Negative for congestion and headaches.    Eyes: Negative for blurred vision, left vision loss and right vision loss.   Cardiovascular: Negative for chest pain and syncope.   Respiratory: Negative for cough and shortness of breath.    Endocrine: Negative for polydipsia, polyphagia and polyuria.   Hematologic/Lymphatic: Negative for bleeding problem. Does not bruise/bleed easily.   Skin: Negative for dry skin, itching and rash.   Musculoskeletal: Negative for falls and muscle weakness.   Gastrointestinal: Negative for abdominal pain and bowel incontinence.   Allergic/Immunologic: Negative for hives and persistent infections.  Genitourinary: Negative for urinary retention/incontinence and nocturia.   Neurological: negative for disturbances in coordination, no myelopathic symptoms such as handwriting changes or difficulty with buttons, coins, keys or small objects. No loss of balance and seizures.   Psychiatric/Behavioral: Negative for depression. The patient does not have insomnia.   Denies perineal paresthesias, bowel or bladder incontinence    EXAM:  Ht 5' 11" (1.803 m)   Wt (!) 160.6 kg (354 lb)   BMI 49.37 kg/m²     Physical exam stable.  Neuro exam stable.       IMAGING:  No new imaging today.    Today I personally re- reviewed AP, Lat and Flex/Ex  upright L-spine that demonstrate mild " degenerative changes and mild scoliosis.      MRI lumbar spine demonstrates congenital stenosis with moderate stenosis at L3/4 and mild stenosis at L4/5 with moderate bilateral neural foraminal narrowing.     Body mass index is 49.37 kg/m².    Hemoglobin A1C   Date Value Ref Range Status   03/28/2019 5.7 (H) 4.0 - 5.6 % Final     Comment:     ADA Screening Guidelines:  5.7-6.4%  Consistent with prediabetes  >or=6.5%  Consistent with diabetes  High levels of fetal hemoglobin interfere with the HbA1C  assay. Heterozygous hemoglobin variants (HbS, HgC, etc)do  not significantly interfere with this assay.   However, presence of multiple variants may affect accuracy.     12/07/2017 6.9 (H) 4.0 - 5.6 % Final     Comment:     According to ADA guidelines, hemoglobin A1c <7.0% represents  optimal control in non-pregnant diabetic patients. Different  metrics may apply to specific patient populations.   Standards of Medical Care in Diabetes-2016.  For the purpose of screening for the presence of diabetes:  <5.7%     Consistent with the absence of diabetes  5.7-6.4%  Consistent with increasing risk for diabetes   (prediabetes)  >or=6.5%  Consistent with diabetes  Currently, no consensus exists for use of hemoglobin A1c  for diagnosis of diabetes for children.  This Hemoglobin A1c assay has significant interference with fetal   hemoglobin   (HbF). The results are invalid for patients with abnormal amounts of   HbF,   including those with known Hereditary Persistence   of Fetal Hemoglobin. Heterozygous hemoglobin variants (HbAS, HbAC,   HbAD, HbAE, HbA2) do not significantly interfere with this assay;   however, presence of multiple variants in a sample may impact the %   interference.     07/20/2016 6.6 (H) 4.5 - 6.2 % Final     Comment:     According to ADA guidelines, hemoglobin A1C <7.0% represents  optimal control in non-pregnant diabetic patients.  Different  metrics may apply to specific populations.   Standards of Medical  Care in Diabetes - 2016.  For the purpose of screening for the presence of diabetes:  <5.7%     Consistent with the absence of diabetes  5.7-6.4%  Consistent with increasing risk for diabetes   (prediabetes)  >or=6.5%  Consistent with diabetes  Currently no consensus exists for use of hemoglobin A1C  for diagnosis of diabetes for children.           ASSESSMENT/PLAN:    Kirill was seen today for follow-up.    Diagnoses and all orders for this visit:    Lumbar radiculopathy    Spinal stenosis of lumbar region with neurogenic claudication        Today we discussed at length all of the different treatment options including anti-inflammatories, acetaminophen, rest, ice, heat, physical therapy including strengthening and stretching exercises, home exercises, ROM, aerobic conditioning, aqua therapy, other modalities including ultrasound, massage, and dry needling, epidural steroid injections and finally surgical intervention.      Plan for TFESI with Dr. Batista on the [facility].  Follow up 2 weeks after injection.     Follow up if symptoms worsen or fail to improve.

## 2019-08-23 ENCOUNTER — PATIENT MESSAGE (OUTPATIENT)
Dept: ORTHOPEDICS | Facility: CLINIC | Age: 44
End: 2019-08-23

## 2019-08-23 RX ORDER — DIAZEPAM 2 MG/1
2 TABLET ORAL EVERY 12 HOURS PRN
Qty: 60 TABLET | Refills: 0 | Status: SHIPPED | OUTPATIENT
Start: 2019-08-23 | End: 2019-10-13 | Stop reason: SDUPTHER

## 2019-08-26 ENCOUNTER — TELEPHONE (OUTPATIENT)
Dept: FAMILY MEDICINE | Facility: CLINIC | Age: 44
End: 2019-08-26

## 2019-08-26 ENCOUNTER — PATIENT MESSAGE (OUTPATIENT)
Dept: PAIN MEDICINE | Facility: CLINIC | Age: 44
End: 2019-08-26

## 2019-08-26 ENCOUNTER — TELEPHONE (OUTPATIENT)
Dept: PAIN MEDICINE | Facility: CLINIC | Age: 44
End: 2019-08-26

## 2019-08-26 DIAGNOSIS — M54.16 LUMBAR RADICULOPATHY: Primary | ICD-10-CM

## 2019-08-26 RX ORDER — METFORMIN HYDROCHLORIDE 1000 MG/1
TABLET ORAL
Qty: 180 TABLET | Refills: 0 | Status: SHIPPED | OUTPATIENT
Start: 2019-08-26 | End: 2020-01-13 | Stop reason: SDUPTHER

## 2019-08-26 NOTE — TELEPHONE ENCOUNTER
VM left asking patient to contact clinic to discuss scheduling epidural ordered by NIKHIL Goyal.  Phone number included.

## 2019-08-26 NOTE — TELEPHONE ENCOUNTER
----- Message from Nedra Goyal PA-C sent at 8/26/2019 10:15 AM CDT -----  Regarding: RE: Procedure Referral?  He would like to have it on the [facility].  It is for a right L3/4 and L4/5 TFESI.    Thanks    ----- Message -----  From: Dede Real RN  Sent: 8/26/2019  10:09 AM  To: Nedra Goyal PA-C  Subject: RE: Procedure Referral?                          I apologize, I do not see the message.  Would you like him scheduled on the [facility]?  If so, which procedure.  Or I can forward to [place] or [place] staff if needed.    Just an FYI our next opening for procedures on the  is 9/18/19.  ----- Message -----  From: Nedra Goyal PA-C  Sent: 8/26/2019  10:05 AM  To: Dede Real RN  Subject: RE: Procedure Referral?                          I sent a message to Dr. Batista and his staff inbox last week.    ----- Message -----  From: Dede Real RN  Sent: 8/26/2019   9:55 AM  To: Nedra Goyal PA-C  Subject: Procedure Referral?                              Good morning,     I received a message from this patient mentioning an epidural.  I do not recall a referral from you for a procedure for him.  Did I miss it, or was it to another location?    Please advise.

## 2019-08-26 NOTE — TELEPHONE ENCOUNTER
Please advise      ----- Message from Dede Real RN sent at 8/26/2019 11:21 AM CDT -----  Regarding: Clearance to hold ASA  Dr. Valencia,    Mr. Gandhi is scheduled to have right L3 + L4 TF NATALIE on 09/18/2019.  Dr. Batista is requesting that the patient stop taking ASA seven days prior to this procedure.  Please indicate whether or not he is able to stop taking the medication listed above.  Feel free to contact the clinic with any questions or concerns you may have.      Thank you in advance for your time and expertise,    AIDAN Rowley, RN

## 2019-08-26 NOTE — TELEPHONE ENCOUNTER
Mr. Gandhi will be scheduled for right L3 + L4 TF NATALIE on 09/18/2019.  Reviewed the pre-procedure instructions listed below with him- copy will also be sent via [employer].  Instructed patient to notify clinic if he begins feeling ill, runs a fever, is prescribed antibiotics, and/or has any outpatient procedures within the two weeks leading up to this procedure.  Instructed patient to report to [facility], 2nd Floor Endoscopy Registration Desk.  All questions answered- patient verbalized understanding.     Day of Procedure  - Ensure you have obtained an arrival time from the Pain Management Staff  o Procedure Area will call 1-3 days in advance with your arrival time.  Please check any voicemails received.  o If you arrive past your scheduled procedure time, you may be asked to reschedule your procedure.  - Ensure you have a  with you to remain present throughout your procedure for your safety.  o If you arrive without a responsible adult to stay with you and drive you home, you may be asked to reschedule your procedure.  - Take all of your prescribed medications (exceptions noted below) with a small amount of water  - This is NOT a fasting procedure, you may have a light meal before coming.  - Wear comfortable clothing (loose fitting pants).  - You may wear glasses, dentures, contact lenses, and/or hearing aids. Please remove all jewelry and metal hairpins.  - Notify the nurse during the intake process if you are allergic to any medications, if you are diabetic, or if you are not feeling well  - Contact the Pain Management Clinic with the following:  o A fever greater than 100° (degrees)   o Feel ill, have any type of infection, or are taking antibiotics now or within the two (2) weeks leading up to this procedure  o Have any outpatient procedures within the two (2) weeks leading up to this procedure (colonoscopy, major dental work, etc.)    IF you are taking blood thinners: Only upon receiving  clearance and notification from the Pain Management Department  7 Days Prior to Your Procedure:  - Stop taking Plavix/Clopridogrel, Effient/Prasugel, ASA  5 Days Prior to Your Procedure:  - Stop taking Coumadin/Warfarin.  An INR may be drawn prior to your procedure.  If labs are required, you will need to arrive earlier than your scheduled arrival time.  - Stop taking Pradaxa/Dabigatra,  Brilinta/ Ticagrelor  3 Days Prior to Your Procedure:  - Stop taking Xarelto/Rivaroxaban, Eliquis/Apixaban, Aggrenox/Dipyridamole, Reopro/Abciximab

## 2019-08-26 NOTE — TELEPHONE ENCOUNTER
----- Message from Nedra Goyal PA-C sent at 8/26/2019 10:15 AM CDT -----  Regarding: RE: Procedure Referral?  He would like to have it on the [facility].  It is for a right L3/4 and L4/5 TFESI.    Thanks    ----- Message -----  From: Dede Real RN  Sent: 8/26/2019  10:09 AM  To: Nedra Goyal PA-C  Subject: RE: Procedure Referral?                          I apologize, I do not see the message.  Would you like [place] [facility]  If so, which procedure.  Or I can forward to [place] or [place] staff if needed.    Just an FYI our next opening for procedures on the  is 9/18/19.  ----- Message -----  From: Nedra Goyal PA-C  Sent: 8/26/2019  10:05 AM  To: Dede Real RN  Subject: RE: Procedure Referral?                          I sent a message to Dr. Batista and his staff inbox last week.    ----- Message -----  From: Dede Real RN  Sent: 8/26/2019   9:55 AM  To: Nedra Goyal PA-C  Subject: Procedure Referral?                              Good morning,     I received a message from this patient mentioning an epidural.  I do not recall a referral from you for a procedure for him.  Did I miss it, or was it to another location?    Please advise.

## 2019-08-27 ENCOUNTER — TELEPHONE (OUTPATIENT)
Dept: PAIN MEDICINE | Facility: CLINIC | Age: 44
End: 2019-08-27

## 2019-08-27 DIAGNOSIS — M54.16 LUMBAR RADICULOPATHY: Primary | ICD-10-CM

## 2019-08-30 ENCOUNTER — TELEPHONE (OUTPATIENT)
Dept: SLEEP MEDICINE | Facility: OTHER | Age: 44
End: 2019-08-30

## 2019-09-03 ENCOUNTER — TELEPHONE (OUTPATIENT)
Dept: SLEEP MEDICINE | Facility: OTHER | Age: 44
End: 2019-09-03

## 2019-09-04 ENCOUNTER — HOSPITAL ENCOUNTER (OUTPATIENT)
Facility: HOSPITAL | Age: 44
Discharge: HOME | End: 2019-09-04
Attending: PAIN MEDICINE | Admitting: PAIN MEDICINE
Payer: COMMERCIAL

## 2019-09-04 ENCOUNTER — HOSPITAL ENCOUNTER (OUTPATIENT)
Dept: RADIOLOGY | Facility: HOSPITAL | Age: 44
Discharge: HOME | End: 2019-09-04
Attending: PAIN MEDICINE | Admitting: PAIN MEDICINE
Payer: COMMERCIAL

## 2019-09-04 VITALS
OXYGEN SATURATION: 98 % | RESPIRATION RATE: 18 BRPM | WEIGHT: 315 LBS | BODY MASS INDEX: 44.1 KG/M2 | TEMPERATURE: 98 F | HEIGHT: 71 IN | HEART RATE: 74 BPM | SYSTOLIC BLOOD PRESSURE: 123 MMHG | DIASTOLIC BLOOD PRESSURE: 68 MMHG

## 2019-09-04 DIAGNOSIS — M51.36 DDD (DEGENERATIVE DISC DISEASE), LUMBAR: Primary | ICD-10-CM

## 2019-09-04 DIAGNOSIS — G56.00 CTS (CARPAL TUNNEL SYNDROME): ICD-10-CM

## 2019-09-04 PROBLEM — M51.369 DDD (DEGENERATIVE DISC DISEASE), LUMBAR: Status: ACTIVE | Noted: 2019-09-04

## 2019-09-04 LAB — POCT GLUCOSE: 124 MG/DL (ref 70–110)

## 2019-09-04 PROCEDURE — 64484 NJX AA&/STRD TFRM EPI L/S EA: CPT | Performed by: PAIN MEDICINE

## 2019-09-04 PROCEDURE — 64483 NJX AA&/STRD TFRM EPI L/S 1: CPT | Mod: RT,,, | Performed by: PAIN MEDICINE

## 2019-09-04 PROCEDURE — 25500020 PHARM REV CODE 255: Performed by: PAIN MEDICINE

## 2019-09-04 PROCEDURE — 64483 PR EPIDURAL INJ, ANES/STEROID, TRANSFORAMINAL, LUMB/SACR, SNGL LEVL: ICD-10-PCS | Mod: RT,,, | Performed by: PAIN MEDICINE

## 2019-09-04 PROCEDURE — 25000003 PHARM REV CODE 250: Performed by: PAIN MEDICINE

## 2019-09-04 PROCEDURE — 64483 NJX AA&/STRD TFRM EPI L/S 1: CPT | Performed by: PAIN MEDICINE

## 2019-09-04 PROCEDURE — 63600175 PHARM REV CODE 636 W HCPCS: Performed by: PAIN MEDICINE

## 2019-09-04 PROCEDURE — 82962 GLUCOSE BLOOD TEST: CPT | Performed by: PAIN MEDICINE

## 2019-09-04 PROCEDURE — 25000003 PHARM REV CODE 250

## 2019-09-04 RX ORDER — LIDOCAINE HYDROCHLORIDE 20 MG/ML
10 INJECTION, SOLUTION INFILTRATION; PERINEURAL ONCE
Status: COMPLETED | OUTPATIENT
Start: 2019-09-04 | End: 2019-09-04

## 2019-09-04 RX ORDER — ALPRAZOLAM 0.5 MG/1
TABLET, ORALLY DISINTEGRATING ORAL
Status: COMPLETED
Start: 2019-09-04 | End: 2019-09-04

## 2019-09-04 RX ORDER — DEXAMETHASONE SODIUM PHOSPHATE 10 MG/ML
10 INJECTION INTRAMUSCULAR; INTRAVENOUS ONCE
Status: COMPLETED | OUTPATIENT
Start: 2019-09-04 | End: 2019-09-04

## 2019-09-04 RX ORDER — LIDOCAINE HYDROCHLORIDE 10 MG/ML
INJECTION, SOLUTION EPIDURAL; INFILTRATION; INTRACAUDAL; PERINEURAL
Status: DISCONTINUED
Start: 2019-09-04 | End: 2019-09-04 | Stop reason: HOSPADM

## 2019-09-04 RX ORDER — LIDOCAINE HYDROCHLORIDE 20 MG/ML
INJECTION, SOLUTION INFILTRATION; PERINEURAL
Status: DISCONTINUED
Start: 2019-09-04 | End: 2019-09-04 | Stop reason: HOSPADM

## 2019-09-04 RX ORDER — ALPRAZOLAM 0.5 MG/1
1 TABLET, ORALLY DISINTEGRATING ORAL ONCE AS NEEDED
Status: COMPLETED | OUTPATIENT
Start: 2019-09-04 | End: 2019-09-04

## 2019-09-04 RX ORDER — DEXAMETHASONE SODIUM PHOSPHATE 10 MG/ML
INJECTION INTRAMUSCULAR; INTRAVENOUS
Status: DISCONTINUED
Start: 2019-09-04 | End: 2019-09-04 | Stop reason: HOSPADM

## 2019-09-04 RX ORDER — LIDOCAINE HYDROCHLORIDE 10 MG/ML
1 INJECTION, SOLUTION EPIDURAL; INFILTRATION; INTRACAUDAL; PERINEURAL ONCE
Status: COMPLETED | OUTPATIENT
Start: 2019-09-04 | End: 2019-09-04

## 2019-09-04 RX ADMIN — ALPRAZOLAM 1 MG: 0.5 TABLET, ORALLY DISINTEGRATING ORAL at 01:09

## 2019-09-04 NOTE — DISCHARGE INSTRUCTIONS
Home Care Instructions Pain Management:    1. DIET:   You may resume your normal diet today.   2. BATHING:   You may shower with luke warm water.  3. DRESSING:   You may remove your bandage today.   4. ACTIVITY LEVEL:   You may resume your normal activities 24 hrs after your procedure.  5. MEDICATIONS:   You may resume your normal medications today.   6. SPECIAL INSTRUCTIONS:   No heat to the injection site for 24 hrs including, bath or shower, heating pad, moist heat, or hot tubs.    Use ice pack to injection site for any pain or discomfort.  Apply ice packs for 20 minute intervals as needed.   If you have received any sedatives by mouth today you may not drive for 12 hours.    If you have received any sedation through your IV, you may not drive for 24 hrs.     PLEASE CALL YOUR DOCTOR IF:  1. Redness or swelling around the injection site.  2. Fever of 101 degrees  3. Drainage (pus) from the injection site.  4. For any continuous bleeding (some dried blood over the incision is normal.)    FOR EMERGENCIES:   If any unusual problems or difficulties occur during clinic hours, call [phone] or [phone].       Fall Prevention  Millions of people fall every year and injure themselves. You may have had anesthesia or sedation which may increase your risk of falling. You may have health issues that put you at an increased risk of falling.     Here are ways to reduce your risk of falling.  ·   · Make your home safe by keeping walkways clear of objects you may trip over.  · Use non-slip pads under rugs. Do not use area rugs or small throw rugs.  · Use non-slip mats in bathtubs and showers.  · Install handrails and lights on staircases.  · Do not walk in poorly lit areas.  · Do not stand on chairs or wobbly ladders.  · Use caution when reaching overhead or looking upward. This position can cause a loss of balance.  · Be sure your shoes fit properly, have non-slip bottoms and are in good condition.   · Wear shoes both inside and  out. Avoid going barefoot or wearing slippers.  · Be cautious when going up and down stairs, curbs, and when walking on uneven sidewalks.  · If your balance is poor, consider using a cane or walker.  · If your fall was related to alcohol use, stop or limit alcohol intake.   · If your fall was related to use of sleeping medicines, talk to your doctor about this. You may need to reduce your dosage at bedtime if you awaken during the night to go to the bathroom.    · To reduce the need for nighttime bathroom trips:  ¨ Avoid drinking fluids for several hours before going to bed  ¨ Empty your bladder before going to bed  ¨ Men can keep a urinal at the bedside  · Stay as active as you can. Balance, flexibility, strength, and endurance all come from exercise. They all play a role in preventing falls. Ask your healthcare provider which types of activity are right for you.  · Get your vision checked on a regular basis.  · If you have pets, know where they are before you stand up or walk so you don't trip over them.  · Use night lights.

## 2019-09-04 NOTE — OP NOTE
Lumbar transforaminal NATALIE    Time-out taken to identify patient and procedure side prior to starting the procedure.   I attest that I have reviewed the patient's home medications prior to the procedure and no contraindication have been identified. I  re-evaluated the patient after the patient was positioned for the procedure in the procedure room immediately before the procedural time-out. The vital signs are current and represent the current state of the patient which has not significantly changed since the preprocedure assessment.                              Date of Service: 09/04/2019    PCP: Marcos Ibarra MD    Referring Physician:                                     PROCEDURE: Right L3 (L3 foraminal access achieved, however arterial uptake of contrast medium was noted.  Needle was withdrawn and no medications injected at this level) and L4 transforaminal epidural steroid injection under fluoroscopy    REASON FOR PROCEDURE: Right Lumbar radiculopathy [M54.16]    PHYSICIAN: Harjinder Batista MD  ASSISTANTS:None    MEDICATIONS INJECTED:  Preservative-free dexamethasone 10mg, Xylocaine 1% MPF 3-5ml. 3ml per level. Preservative free, sterile normal saline is used to get larger volume as needed.  LOCAL ANESTHETIC INJECTED:  Xylocaine 2% 3ml per site.    SEDATION MEDICATIONS: None  ESTIMATED BLOOD LOSS:  None.    COMPLICATIONS:  None.    TECHNIQUE:   Laying in a prone position, the patient was prepped and draped in the usual sterile fashion using ChloraPrep and fenestrated drape.  The area to be injected was determined under fluoroscopic guidance.  Local anesthetic was given by raising a wheel and going down to the hub of a 27-gauge 1.25 inch needle.  The 5 inch 22-gauge spinal needle was introduced towards the transverse process of each above named nerve root level.  The needle was walked medially then hinged into the neural foramen.  Omnipaque was injected to confirm appropriate placement and that there was  no vascular runoff. L3 foraminal access achieved, however arterial uptake of contrast medium was noted.  Needle was withdrawn and no medications injected at this level. At the L4 level, no vascular uptake was noted and the medication was injected after applying negative pressure. The patient tolerated the procedure well.    PAIN BEFORE THE PROCEDURE: 5/10.    PAIN AFTER THE PROCEDURE: 2/10.    The patient was monitored after the procedure.  Patient was given post procedure and discharge instructions to follow at home.  We will see the patient back in two weeks or the patient may call to inform of status. The patient was discharged in a stable condition.

## 2019-09-10 ENCOUNTER — PATIENT MESSAGE (OUTPATIENT)
Dept: ORTHOPEDICS | Facility: CLINIC | Age: 44
End: 2019-09-10

## 2019-09-10 ENCOUNTER — TELEPHONE (OUTPATIENT)
Dept: SLEEP MEDICINE | Facility: OTHER | Age: 44
End: 2019-09-10

## 2019-09-11 RX ORDER — METHOCARBAMOL 750 MG/1
750 TABLET, FILM COATED ORAL 4 TIMES DAILY
Qty: 40 TABLET | Refills: 0 | Status: SHIPPED | OUTPATIENT
Start: 2019-09-11 | End: 2019-09-12

## 2019-09-12 ENCOUNTER — HOSPITAL ENCOUNTER (EMERGENCY)
Facility: HOSPITAL | Age: 44
Discharge: HOME | End: 2019-09-12
Attending: EMERGENCY MEDICINE
Payer: COMMERCIAL

## 2019-09-12 VITALS
HEIGHT: 72 IN | TEMPERATURE: 98 F | OXYGEN SATURATION: 98 % | RESPIRATION RATE: 18 BRPM | SYSTOLIC BLOOD PRESSURE: 150 MMHG | BODY MASS INDEX: 42.66 KG/M2 | WEIGHT: 315 LBS | DIASTOLIC BLOOD PRESSURE: 80 MMHG | HEART RATE: 85 BPM

## 2019-09-12 DIAGNOSIS — M54.31 SCIATICA OF RIGHT SIDE: Primary | ICD-10-CM

## 2019-09-12 PROCEDURE — 63600175 PHARM REV CODE 636 W HCPCS: Performed by: NURSE PRACTITIONER

## 2019-09-12 PROCEDURE — 96372 THER/PROPH/DIAG INJ SC/IM: CPT

## 2019-09-12 PROCEDURE — 99284 EMERGENCY DEPT VISIT MOD MDM: CPT | Mod: 25

## 2019-09-12 RX ORDER — KETOROLAC TROMETHAMINE 30 MG/ML
10 INJECTION, SOLUTION INTRAMUSCULAR; INTRAVENOUS
Status: COMPLETED | OUTPATIENT
Start: 2019-09-12 | End: 2019-09-12

## 2019-09-12 RX ORDER — TRIAMCINOLONE ACETONIDE 40 MG/ML
40 INJECTION, SUSPENSION INTRA-ARTICULAR; INTRAMUSCULAR
Status: COMPLETED | OUTPATIENT
Start: 2019-09-12 | End: 2019-09-12

## 2019-09-12 RX ORDER — TIZANIDINE 2 MG/1
2 TABLET ORAL EVERY 6 HOURS PRN
Qty: 12 TABLET | Refills: 0 | Status: SHIPPED | OUTPATIENT
Start: 2019-09-12 | End: 2019-09-13

## 2019-09-12 RX ORDER — ORPHENADRINE CITRATE 30 MG/ML
30 INJECTION INTRAMUSCULAR; INTRAVENOUS
Status: COMPLETED | OUTPATIENT
Start: 2019-09-12 | End: 2019-09-12

## 2019-09-12 RX ORDER — SULINDAC 150 MG/1
150 TABLET ORAL 2 TIMES DAILY
Qty: 10 TABLET | Refills: 0 | Status: SHIPPED | OUTPATIENT
Start: 2019-09-12 | End: 2019-10-16

## 2019-09-12 RX ADMIN — ORPHENADRINE CITRATE 30 MG: 60 INJECTION INTRAMUSCULAR; INTRAVENOUS at 07:09

## 2019-09-12 RX ADMIN — TRIAMCINOLONE ACETONIDE 40 MG: 40 INJECTION, SUSPENSION INTRA-ARTICULAR; INTRAMUSCULAR at 07:09

## 2019-09-12 RX ADMIN — KETOROLAC TROMETHAMINE 10 MG: 30 INJECTION, SOLUTION INTRAMUSCULAR at 07:09

## 2019-09-13 RX ORDER — CYCLOBENZAPRINE HCL 10 MG
TABLET ORAL
Qty: 30 TABLET | Refills: 0 | Status: SHIPPED | OUTPATIENT
Start: 2019-09-13 | End: 2019-09-18

## 2019-09-13 NOTE — ED PROVIDER NOTES
"Encounter Date: 9/12/2019    SCRIBE #1 NOTE: I, Jas Renteria, am scribing for, and in the presence of,  Noel De Leon DNP. I have scribed the following portions of the note - Other sections scribed: HPI, ROS.       History     Chief Complaint   Patient presents with    Back Pain     congential back pain "it feels like electric shocks from midback to feet"; received epidural injections x1 wk ago; denies any incontience     CC: Back Pain    HPI: This 44 y.o. Male with anxiety, carpal tunnel syndrome, depression, diabetes mellitus, hypertension and sleep apnea presents to the ED for an evaluation of back pain which radiates down his bilateral leg for the past 1.5 months. Pt reports his pain has been worsening since moving furniture this past weekend. He has shocking pain with standing. His pain is worse with bending. Pt visited his PCP for sciatica and was rx pain meds and muscle relaxers. He was referred to a specialist to have a MRI. Pt was scheduled for 2 epidurals 1 week ago but during the procedure the provider hit a blood vessel. He did not inject the meds. Pt has a follow up next week. He took norco for pain in the past with no relief. Pt took Ketoralac in the past with relief but his insurance does not cover it anymore. He attempted ibuprofen and tylenol for the past week with slight relief. Pt denies fever, abdominal pain, rhinorrhea, cough or chest pain.     The history is provided by the patient. No  was used.     Review of patient's allergies indicates:  No Known Allergies  Past Medical History:   Diagnosis Date    Allergy     Anxiety     CTS (carpal tunnel syndrome) 6/26/2015    Depression     Diabetes 4/17/2015    Hx of vasectomy     Hypertension     Sleep apnea      Past Surgical History:   Procedure Laterality Date    ADENOIDECTOMY      FRACTURE SURGERY      bilateral elbow fracture 3 years ago    HERNIA REPAIR      bilateral groin hernia    Injection, Steroid, " Epidural Transforaminal Right 9/4/2019    Performed by Harjinder [facility]ista Jr., [facility] at [facility] ENDO    TONSILLECTOMY       Family History   Problem Relation Age of Onset    Fibromyalgia Mother     Osteoarthritis Mother     Rheum arthritis Neg Hx     Psoriasis Neg Hx     Lupus Neg Hx     Kidney disease Neg Hx     Inflammatory bowel disease Neg Hx      Social History     Tobacco Use    Smoking status: Never Smoker    Smokeless tobacco: Never Used    Tobacco comment: ; one child    Substance Use Topics    Alcohol use: Yes     Comment: 750 ml  once a month or so    Drug use: No     Review of Systems   Constitutional: Negative for chills and fever.   HENT: Negative for congestion, rhinorrhea and sore throat.    Eyes: Negative for pain.   Respiratory: Negative for shortness of breath.    Cardiovascular: Negative for chest pain.   Gastrointestinal: Negative for abdominal pain, diarrhea, nausea and vomiting.   Genitourinary: Negative for dysuria and hematuria.   Musculoskeletal: Positive for arthralgias and back pain. Negative for neck pain.   Skin: Negative for rash.   Neurological: Negative for dizziness, syncope and light-headedness.   Psychiatric/Behavioral: The patient is not nervous/anxious.        Physical Exam     Initial Vitals [09/12/19 1847]   BP Pulse Resp Temp SpO2   (!) 168/92 92 20 98 °F (36.7 °C) 98 %      MAP       --         Physical Exam    Nursing note and vitals reviewed.  Constitutional: He appears well-developed and well-nourished. He is not diaphoretic. No distress.   HENT:   Head: Normocephalic and atraumatic.   Right Ear: External ear normal.   Left Ear: External ear normal.   Nose: Nose normal.   Eyes: Pupils are equal, round, and reactive to light. Right eye exhibits no discharge. Left eye exhibits no discharge. No scleral icterus.   Neck: Normal range of motion.   Pulmonary/Chest: No respiratory distress.   Abdominal: He exhibits no distension.   Musculoskeletal: Normal range of  motion.   Spine is atraumatic, without step-offs or tenderness. Pos right straight leg raise.   Neurological: He is alert and oriented to person, place, and time. He has normal strength. No cranial nerve deficit or sensory deficit. He exhibits normal muscle tone. He displays a negative Romberg sign. Coordination and gait normal. GCS eye subscore is 4. GCS verbal subscore is 5. GCS motor subscore is 6.   Equal  strength bilaterally, equal bicep flexion and tricep extension strength, leg extension and flexion strength appropriate and equal, foot plantar- and dorsi-flexion equal and appropriate   Skin: Skin is dry. Capillary refill takes less than 2 seconds.         ED Course   Procedures  Labs Reviewed - No data to display       Imaging Results    None          Medical Decision Making:   Initial Assessment:   44-year-old male with a 1.5 month history of sciatica presents for same.  He reports shooting stabbing pain down the right leg not resolved by previous measures tried which include muscle relaxers, epidural spinal injections.  Physical examination reveals an obese 44-year-old male with a positive right straight leg raise, no tenderness of the back or spine, no step-offs.  He denies being febrile and other red flags.  Differential Diagnosis:   Sciatica, lumbar radiculopathy, cervical radiculopathy  ED Management:  After carefully reviewing the patient's medical record I have ordered injection of triamcinolone 40 mg IM, Toradol 10 mg IM, Norflex 30 mg IM.  He will be discharged home with prescriptions for Clinoril and Zanaflex with drowsy warning.  Return for worsening or changes in condition. Symptomatic therapies and return precautions on AVS.              Scribe Attestation:   Scribe #1: I performed the above scribed service and the documentation accurately describes the services I performed. I attest to the accuracy of the note.    Attending Attestation:           Physician Attestation for  Scribe:  Physician Attestation Statement for Scribe #1: I, Noel De Leon DNP, reviewed documentation, as scribed by Jas Renteria in my presence, and it is both accurate and complete.                    Clinical Impression:   No diagnosis found.      Disposition:   Disposition: Discharged  Condition: Stable                        Noel De Leon DNP  09/12/19 1952

## 2019-09-13 NOTE — ED TRIAGE NOTES
Month and half of back pain radiating down the legs. Has seen specialist but moved furniture this weekend and now it is worsened. Alert and oriented, ambulatory, respirations even unlabored.

## 2019-09-18 ENCOUNTER — OFFICE VISIT (OUTPATIENT)
Dept: ORTHOPEDICS | Facility: CLINIC | Age: 44
End: 2019-09-18
Payer: COMMERCIAL

## 2019-09-18 ENCOUNTER — PATIENT MESSAGE (OUTPATIENT)
Dept: ORTHOPEDICS | Facility: CLINIC | Age: 44
End: 2019-09-18

## 2019-09-18 VITALS — HEIGHT: 72 IN | WEIGHT: 315 LBS | BODY MASS INDEX: 42.66 KG/M2

## 2019-09-18 DIAGNOSIS — M48.062 SPINAL STENOSIS OF LUMBAR REGION WITH NEUROGENIC CLAUDICATION: ICD-10-CM

## 2019-09-18 DIAGNOSIS — M54.16 LUMBAR RADICULOPATHY: Primary | ICD-10-CM

## 2019-09-18 PROCEDURE — 99999 PR PBB SHADOW E&M-EST. PATIENT-LVL III: CPT | Mod: PBBFAC,,, | Performed by: PHYSICIAN ASSISTANT

## 2019-09-18 PROCEDURE — 99999 PR PBB SHADOW E&M-EST. PATIENT-LVL III: ICD-10-PCS | Mod: PBBFAC,,, | Performed by: PHYSICIAN ASSISTANT

## 2019-09-18 PROCEDURE — 3008F BODY MASS INDEX DOCD: CPT | Mod: CPTII,S$GLB,, | Performed by: PHYSICIAN ASSISTANT

## 2019-09-18 PROCEDURE — 99213 OFFICE O/P EST LOW 20 MIN: CPT | Mod: S$GLB,,, | Performed by: PHYSICIAN ASSISTANT

## 2019-09-18 PROCEDURE — 99213 PR OFFICE/OUTPT VISIT, EST, LEVL III, 20-29 MIN: ICD-10-PCS | Mod: S$GLB,,, | Performed by: PHYSICIAN ASSISTANT

## 2019-09-18 PROCEDURE — 3008F PR BODY MASS INDEX (BMI) DOCUMENTED: ICD-10-PCS | Mod: CPTII,S$GLB,, | Performed by: PHYSICIAN ASSISTANT

## 2019-09-18 RX ORDER — TIZANIDINE 4 MG/1
4 TABLET ORAL EVERY 6 HOURS PRN
Qty: 60 TABLET | Refills: 0 | Status: SHIPPED | OUTPATIENT
Start: 2019-09-18 | End: 2019-09-28

## 2019-09-18 NOTE — PROGRESS NOTES
DATE: 9/18/2019  PATIENT: Kirill Gandhi III    Attending Physician: Freedom Hardin M.D.    HISTORY:  Kirill Gandhi III is a 44 y.o. male who returns to me today after having an NATALIE 9/4/2019.  He was last seen by me 8/20/2019.  Today he is doing well but notes he did not have any relief with the injections. .    The Patient denies myelopathic symptoms such as handwriting changes or difficulty with buttons/coins/keys. Denies perineal paresthesias, bowel/bladder dysfunction.    PMH/PSH/FamHx/SocHx:  Unchanged from prior visit    ROS:  REVIEW OF SYSTEMS:  Constitution: Negative. Negative for chills, fever and night sweats.   HENT: Negative for congestion and headaches.    Eyes: Negative for blurred vision, left vision loss and right vision loss.   Cardiovascular: Negative for chest pain and syncope.   Respiratory: Negative for cough and shortness of breath.    Endocrine: Negative for polydipsia, polyphagia and polyuria.   Hematologic/Lymphatic: Negative for bleeding problem. Does not bruise/bleed easily.   Skin: Negative for dry skin, itching and rash.   Musculoskeletal: Negative for falls and muscle weakness.   Gastrointestinal: Negative for abdominal pain and bowel incontinence.   Allergic/Immunologic: Negative for hives and persistent infections.  Genitourinary: Negative for urinary retention/incontinence and nocturia.   Neurological: negative for disturbances in coordination, no myelopathic symptoms such as handwriting changes or difficulty with buttons, coins, keys or small objects. No loss of balance and seizures.   Psychiatric/Behavioral: Negative for depression. The patient does not have insomnia.   Denies perineal paresthesias, bowel or bladder incontinence    EXAM:  Ht 6' (1.829 m)   Wt (!) 163 kg (359 lb 5.6 oz)   BMI 48.74 kg/m²     Physical exam stable.  Neuro exam stable.       IMAGING:  No new imaging today.    Today I personally re- reviewed AP, Lat and Flex/Ex  upright L-spine  that demonstrate mild degenerative changes and mild scoliosis.   there is slight retrolisthesis of L3/4.      MRI lumbar spine demonstrates congenital stenosis with moderate stenosis at L3/4 and mild stenosis at L4/5 with moderate bilateral neural foraminal narrowing.     Body mass index is 48.74 kg/m².    Hemoglobin A1C   Date Value Ref Range Status   03/28/2019 5.7 (H) 4.0 - 5.6 % Final     Comment:     ADA Screening Guidelines:  5.7-6.4%  Consistent with prediabetes  >or=6.5%  Consistent with diabetes  High levels of fetal hemoglobin interfere with the HbA1C  assay. Heterozygous hemoglobin variants (HbS, HgC, etc)do  not significantly interfere with this assay.   However, presence of multiple variants may affect accuracy.     12/07/2017 6.9 (H) 4.0 - 5.6 % Final     Comment:     According to ADA guidelines, hemoglobin A1c <7.0% represents  optimal control in non-pregnant diabetic patients. Different  metrics may apply to specific patient populations.   Standards of Medical Care in Diabetes-2016.  For the purpose of screening for the presence of diabetes:  <5.7%     Consistent with the absence of diabetes  5.7-6.4%  Consistent with increasing risk for diabetes   (prediabetes)  >or=6.5%  Consistent with diabetes  Currently, no consensus exists for use of hemoglobin A1c  for diagnosis of diabetes for children.  This Hemoglobin A1c assay has significant interference with fetal   hemoglobin   (HbF). The results are invalid for patients with abnormal amounts of   HbF,   including those with known Hereditary Persistence   of Fetal Hemoglobin. Heterozygous hemoglobin variants (HbAS, HbAC,   HbAD, HbAE, HbA2) do not significantly interfere with this assay;   however, presence of multiple variants in a sample may impact the %   interference.     07/20/2016 6.6 (H) 4.5 - 6.2 % Final     Comment:     According to ADA guidelines, hemoglobin A1C <7.0% represents  optimal control in non-pregnant diabetic patients.   Different  metrics may apply to specific populations.   Standards of Medical Care in Diabetes - 2016.  For the purpose of screening for the presence of diabetes:  <5.7%     Consistent with the absence of diabetes  5.7-6.4%  Consistent with increasing risk for diabetes   (prediabetes)  >or=6.5%  Consistent with diabetes  Currently no consensus exists for use of hemoglobin A1C  for diagnosis of diabetes for children.           ASSESSMENT/PLAN:    Kirill was seen today for follow-up.    Diagnoses and all orders for this visit:    Lumbar radiculopathy    Spinal stenosis of lumbar region with neurogenic claudication    Other orders  -     tiZANidine (ZANAFLEX) 4 MG tablet; Take 1 tablet (4 mg total) by mouth every 6 (six) hours as needed.        Discussed with Dr. Hardin.  The patient is a candidate for an L3/4 TLIF.  We will get this scheduled for the end of October.     Follow up if symptoms worsen or fail to improve.

## 2019-09-19 ENCOUNTER — TELEPHONE (OUTPATIENT)
Dept: SLEEP MEDICINE | Facility: OTHER | Age: 44
End: 2019-09-19

## 2019-09-19 RX ORDER — METHOCARBAMOL 750 MG/1
TABLET, FILM COATED ORAL
Qty: 40 TABLET | Refills: 0 | OUTPATIENT
Start: 2019-09-19

## 2019-09-20 ENCOUNTER — PATIENT MESSAGE (OUTPATIENT)
Dept: ORTHOPEDICS | Facility: CLINIC | Age: 44
End: 2019-09-20

## 2019-09-20 DIAGNOSIS — M54.16 LUMBAR RADICULOPATHY: ICD-10-CM

## 2019-09-20 DIAGNOSIS — M48.062 SPINAL STENOSIS OF LUMBAR REGION WITH NEUROGENIC CLAUDICATION: Primary | ICD-10-CM

## 2019-09-20 DIAGNOSIS — M51.36 DDD (DEGENERATIVE DISC DISEASE), LUMBAR: ICD-10-CM

## 2019-09-25 RX ORDER — CYCLOBENZAPRINE HCL 10 MG
TABLET ORAL
Qty: 30 TABLET | Refills: 0 | OUTPATIENT
Start: 2019-09-25

## 2019-09-26 ENCOUNTER — PATIENT MESSAGE (OUTPATIENT)
Dept: FAMILY MEDICINE | Facility: CLINIC | Age: 44
End: 2019-09-26

## 2019-09-26 DIAGNOSIS — Z01.818 PREOPERATIVE TESTING: Primary | ICD-10-CM

## 2019-09-26 NOTE — TELEPHONE ENCOUNTER
----- Message from Sarah Pedersen RN sent at 9/26/2019  2:52 PM CDT -----  Patient is scheduled for lumbar spine fusion ,TLIF, posterior approach, using pedicle screw L3-4 on 11/7 with Dr. Hardin( approximately 260 minutes of general anesthesia). Patient will need medical clearance for this surgery.Please schedule a preop medical clearance appt. Thanks!          I scheduled the pt for October 22 at 840 am  I mailed an appt reminder to the pt   And sent message thru the portal/also Lm with details for the pt         Dr Ibarra do you want any work up prior to this appt?

## 2019-10-02 ENCOUNTER — TELEPHONE (OUTPATIENT)
Dept: SLEEP MEDICINE | Facility: OTHER | Age: 44
End: 2019-10-02

## 2019-10-02 ENCOUNTER — TELEPHONE (OUTPATIENT)
Dept: FAMILY MEDICINE | Facility: CLINIC | Age: 44
End: 2019-10-02

## 2019-10-02 NOTE — TELEPHONE ENCOUNTER
LM advising patient, appointment has been rescheduled to 10/08/2019 @ 11 am. Patient advised to contact office to confirm.

## 2019-10-02 NOTE — TELEPHONE ENCOUNTER
----- Message from Sarah Pedersen RN sent at 9/26/2019  2:52 PM CDT -----  Patient is scheduled for lumbar spine fusion ,TLIF, posterior approach, using pedicle screw L3-4 on 11/7 with Dr. Hardin( approximately 260 minutes of general anesthesia). Patient will need medical clearance for this surgery.Please schedule a preop medical clearance appt. Thanks!

## 2019-10-02 NOTE — TELEPHONE ENCOUNTER
Patient rescheduled to 10/16/2019 @ 2:40 pm       ----- Message from Icara Austin sent at 10/2/2019  9:41 AM CDT -----  Contact: self  Type:  Patient states received a call that his appointment that was scheduled on 10/22/2019 was rescheduled for 10/8/2019   Patient states that 10/8/80310 is not a good day.        Name of Caller:Patient   When is the first available appointment?  Symptoms:  Best Call Back Number:[phone]  Additional Information: please call patient to discus

## 2019-10-08 ENCOUNTER — HOSPITAL ENCOUNTER (OUTPATIENT)
Dept: SLEEP MEDICINE | Facility: OTHER | Age: 44
Discharge: HOME | End: 2019-10-08
Attending: FAMILY MEDICINE
Payer: COMMERCIAL

## 2019-10-08 ENCOUNTER — PATIENT OUTREACH (OUTPATIENT)
Dept: ADMINISTRATIVE | Facility: OTHER | Age: 44
End: 2019-10-08

## 2019-10-08 DIAGNOSIS — G47.30 SLEEP APNEA, UNSPECIFIED TYPE: ICD-10-CM

## 2019-10-08 DIAGNOSIS — G47.33 OSA (OBSTRUCTIVE SLEEP APNEA): ICD-10-CM

## 2019-10-08 PROCEDURE — 95811 POLYSOM 6/>YRS CPAP 4/> PARM: CPT | Mod: 26,,, | Performed by: PSYCHIATRY & NEUROLOGY

## 2019-10-08 PROCEDURE — 95811 POLYSOM 6/>YRS CPAP 4/> PARM: CPT

## 2019-10-08 PROCEDURE — 95811 PR POLYSOMNOGRAPHY W/CPAP: ICD-10-PCS | Mod: 26,,, | Performed by: PSYCHIATRY & NEUROLOGY

## 2019-10-09 ENCOUNTER — OFFICE VISIT (OUTPATIENT)
Dept: NEUROSURGERY | Facility: CLINIC | Age: 44
End: 2019-10-09
Payer: COMMERCIAL

## 2019-10-09 ENCOUNTER — TELEPHONE (OUTPATIENT)
Dept: NEUROSURGERY | Facility: CLINIC | Age: 44
End: 2019-10-09

## 2019-10-09 VITALS
SYSTOLIC BLOOD PRESSURE: 133 MMHG | HEART RATE: 64 BPM | BODY MASS INDEX: 42.66 KG/M2 | WEIGHT: 315 LBS | DIASTOLIC BLOOD PRESSURE: 87 MMHG | HEIGHT: 72 IN

## 2019-10-09 DIAGNOSIS — M51.16 LUMBAR DISC HERNIATION WITH RADICULOPATHY: Primary | ICD-10-CM

## 2019-10-09 PROCEDURE — 3075F SYST BP GE 130 - 139MM HG: CPT | Mod: CPTII,S$GLB,, | Performed by: NEUROLOGICAL SURGERY

## 2019-10-09 PROCEDURE — 99999 PR PBB SHADOW E&M-EST. PATIENT-LVL III: CPT | Mod: PBBFAC,,, | Performed by: NEUROLOGICAL SURGERY

## 2019-10-09 PROCEDURE — 3079F PR MOST RECENT DIASTOLIC BLOOD PRESSURE 80-89 MM HG: ICD-10-PCS | Mod: CPTII,S$GLB,, | Performed by: NEUROLOGICAL SURGERY

## 2019-10-09 PROCEDURE — 99999 PR PBB SHADOW E&M-EST. PATIENT-LVL III: ICD-10-PCS | Mod: PBBFAC,,, | Performed by: NEUROLOGICAL SURGERY

## 2019-10-09 PROCEDURE — 3008F PR BODY MASS INDEX (BMI) DOCUMENTED: ICD-10-PCS | Mod: CPTII,S$GLB,, | Performed by: NEUROLOGICAL SURGERY

## 2019-10-09 PROCEDURE — 3075F PR MOST RECENT SYSTOLIC BLOOD PRESS GE 130-139MM HG: ICD-10-PCS | Mod: CPTII,S$GLB,, | Performed by: NEUROLOGICAL SURGERY

## 2019-10-09 PROCEDURE — 99205 PR OFFICE/OUTPT VISIT, NEW, LEVL V, 60-74 MIN: ICD-10-PCS | Mod: S$GLB,,, | Performed by: NEUROLOGICAL SURGERY

## 2019-10-09 PROCEDURE — 3008F BODY MASS INDEX DOCD: CPT | Mod: CPTII,S$GLB,, | Performed by: NEUROLOGICAL SURGERY

## 2019-10-09 PROCEDURE — 99205 OFFICE O/P NEW HI 60 MIN: CPT | Mod: S$GLB,,, | Performed by: NEUROLOGICAL SURGERY

## 2019-10-09 PROCEDURE — 3079F DIAST BP 80-89 MM HG: CPT | Mod: CPTII,S$GLB,, | Performed by: NEUROLOGICAL SURGERY

## 2019-10-09 RX ORDER — GABAPENTIN 300 MG/1
300 CAPSULE ORAL 3 TIMES DAILY
Qty: 90 CAPSULE | Refills: 11 | Status: SHIPPED | OUTPATIENT
Start: 2019-10-09 | End: 2019-10-21

## 2019-10-09 NOTE — PROGRESS NOTES
CPAP Titration was performed on [facility]. The entire procedure was explained, patient was informed that there may be a need to enter the room during the night to fix a lead or make an adjustment to the equipment, and bio calibrations were completed. He was given instructions on how to call out for assistance.    Patient education was performed. He was given the after visit summary.    EKG: NSR    The patient was titrated from a pressure of 7  to 11 cm H2O. Medium Eson nasal mask was used with chinstrap, humidity, and C-Flex.     Supine REM sleep was obtained during study    His reaction to PAP was normal.    Leg kicks observed

## 2019-10-09 NOTE — PROGRESS NOTES
NEUROSURGICAL OUTPATIENT CONSULTATION NOTE    DATE OF SERVICE:  10/09/2019    ATTENDING PHYSICIAN:  Jose L Brown MD    CONSULT REQUESTED BY:  Self-referred    REASON FOR CONSULT:  Bilateral leg pain, low back pain    SUBJECTIVE:    HISTORY OF PRESENT ILLNESS:  This is a very pleasant 44 y.o. male, morbid obesity with BMI of 48.44.  Has been complaining of worsening low back pain and bilateral leg pain.  His main complaint is the leg pain.  He has more right than left leg pain.  The right leg pain is is in the L4 distribution in the leftleg pain is in the L5 distribution.  Pain is associated with numbness.  Pain is worse with bending forward.  Pain is interfering with walking.  The more he walks the more he has to lean forward or set.  Pain is also interfering at work when he has to stand for prolonged period of time in sometime lift up to 50 lb of weight.    He recently saw Dr. Hardin's physician assistant and a L3-4 transforaminal interbody fusion with instrumentation was recommended.  The patient is seeking a 2nd opinion.    Low Back Pain Scale  R Low Back-Pain Score: 7  R Low Back-Pain Intensity: Pain killers give moderate relief from pain  R Low Back-Pain Score: It is painful to look after myself and I am slow and careful  Low Back-Lifting: Pain prevents me from lifting heavy weights  but I can manage light weights if they are conveniently placed   Low Back-Walking: Pain prevents me walking more than .25 mile   Low Back-Sitting: Pain prevents me from sitting more than 30 minutes   Low Back-Standing: I cannot stand for longer than 10 minutes with increasing pain   Low Back-Sleeping: Because of pain my normal nights sleep is reduced by less than one quarter   Low Back-Social Life: Pain has restricted my social life and I do not go out very often   Low Back-Traveling: I have extra pain while traveling but it does not compel me to seek alternate forms of travel   Low Back-Changing Degree of Pain: My pain seems  to be getting better but improvement is slow         PAST MEDICAL HISTORY:  Active Ambulatory Problems     Diagnosis Date Noted    Joint pain 04/16/2015    Obesity 04/16/2015    Vitamin D deficiency 04/16/2015    Fatigue 04/16/2015    Bilateral hand pain 04/16/2015    Diabetes 04/17/2015    Sleep apnea 06/17/2015    CTS (carpal tunnel syndrome) 06/26/2015    Elevated sed rate 06/26/2015    DDD (degenerative disc disease), lumbar 09/04/2019     Resolved Ambulatory Problems     Diagnosis Date Noted    No Resolved Ambulatory Problems     Past Medical History:   Diagnosis Date    Allergy     Anxiety     Depression     Hx of vasectomy     Hypertension        PAST SURGICAL HISTORY:  Past Surgical History:   Procedure Laterality Date    ADENOIDECTOMY      EPIDURAL STEROID INJECTION Right 9/4/2019    Procedure: Injection, Steroid, Epidural Transforaminal;  Surgeon: Harjinder Batista Jr., MD;  Location: [facility];  Service: Pain Management;  Laterality: Right;  Right L3 + L4 TF NATALIE    [id number]  [id number]    Arrive @ 1300; ASA last 8/27; Check BG; NEEDS CONSENT    FRACTURE SURGERY      bilateral elbow fracture 3 years ago    HERNIA REPAIR      bilateral groin hernia    TONSILLECTOMY         SOCIAL HISTORY:   Social History     Socioeconomic History    Marital status: Significant Other     Spouse name: Not on file    Number of children: Not on file    Years of education: Not on file    Highest education level: Not on file   Occupational History    Not on file   Social Needs    Financial resource strain: Not on file    Food insecurity:     Worry: Not on file     Inability: Not on file    Transportation needs:     Medical: Not on file     Non-medical: Not on file   Tobacco Use    Smoking status: Never Smoker    Smokeless tobacco: Never Used    Tobacco comment: ; one child    Substance and Sexual Activity    Alcohol use: Yes     Comment: 750 ml  once a month or so    Drug use: No    Sexual  activity: Not on file   Lifestyle    Physical activity:     Days per week: Not on file     Minutes per session: Not on file    Stress: Not on file   Relationships    Social connections:     Talks on phone: Not on file     Gets together: Not on file     Attends Oriental orthodox service: Not on file     Active member of club or organization: Not on file     Attends meetings of clubs or organizations: Not on file     Relationship status: Not on file   Other Topics Concern    Not on file   Social History Narrative    Not on file       FAMILY HISTORY:  Family History   Problem Relation Age of Onset    Fibromyalgia Mother     Osteoarthritis Mother     Rheum arthritis Neg Hx     Psoriasis Neg Hx     Lupus Neg Hx     Kidney disease Neg Hx     Inflammatory bowel disease Neg Hx        CURRENTS MEDICATIONS:  Current Outpatient Medications on File Prior to Visit   Medication Sig Dispense Refill    aspirin (ECOTRIN) 81 MG EC tablet Take 81 mg by mouth once daily.      atorvastatin (LIPITOR) 10 MG tablet Take 1 tablet (10 mg total) by mouth every other day. 45 tablet 3    buPROPion (WELLBUTRIN XL) 150 MG TB24 tablet Take 1 tablet (150 mg total) by mouth once daily. 30 tablet 11    flash glucose scanning reader Misc Use to test glucose 1 each 0    flash glucose sensor (FREESTYLE BARRETT 14 DAY SENSOR) Kit Use to test glucose 12 kit 3    fluvoxaMINE (LUVOX) 100 MG tablet TAKE 1.5 TABLETS (150 MG TOTAL) BY MOUTH 2 (TWO) TIMES DAILY. 270 tablet 0    ibuprofen (ADVIL,MOTRIN) 800 MG tablet Take 800 mg by mouth 3 (three) times daily.      metFORMIN (GLUCOPHAGE) 1000 MG tablet TAKE 1 TABLET BY MOUTH TWICE A DAY WITH MEALS 180 tablet 0    multivitamin capsule Take 1 capsule by mouth once daily.      sulindac (CLINORIL) 150 MG tablet Take 1 tablet (150 mg total) by mouth 2 (two) times daily. 10 tablet 0    valsartan-hydrochlorothiazide (DIOVAN-HCT) 320-12.5 mg per tablet TAKE 1 TABLET BY MOUTH ONCE DAILY. 90 tablet 2     vitamin D 1000 units Tab Take 4,000 mg by mouth once daily.       diazePAM (VALIUM) 2 MG tablet Take 1 tablet (2 mg total) by mouth every 12 (twelve) hours as needed for Anxiety. (Patient not taking: Reported on 10/9/2019) 60 tablet 0    fluticasone (FLONASE) 50 mcg/actuation nasal spray 2 sprays (100 mcg total) by Each Nare route once daily. (Patient not taking: Reported on 10/9/2019) 48 g 2    terbinafine HCl (LAMISIL) 250 mg tablet Two po the first 5 days of the month for 3 mo (Patient not taking: Reported on 10/9/2019) 10 tablet 3     No current facility-administered medications on file prior to visit.        ALLERGIES:  Review of patient's allergies indicates:  No Known Allergies    REVIEW OF SYSTEMS:  Review of Systems   Constitutional: Negative for diaphoresis, fever and weight loss.   Respiratory: Negative for shortness of breath.    Cardiovascular: Negative for chest pain.   Gastrointestinal: Negative for blood in stool.   Genitourinary: Negative for hematuria.   Endo/Heme/Allergies: Does not bruise/bleed easily.   All other systems reviewed and are negative.      OBJECTIVE:    PHYSICAL EXAMINATION:   Vitals:    10/09/19 0810   BP: 133/87   Pulse: 64       Physical Exam:  Vitals reviewed.    Constitutional: He appears well-developed and well-nourished.     Eyes: Pupils are equal, round, and reactive to light. Conjunctivae and EOM are normal.     Cardiovascular: Normal distal pulses and no edema.     Abdominal: Soft.     Skin: Skin displays no rash on trunk and no rash on extremities. Skin displays no lesions on trunk and no lesions on extremities.     Psych/Behavior: He is alert. He is oriented to person, place, and time. He has a normal mood and affect.     Musculoskeletal:        Neck: Range of motion is full.     Neurological:        DTRs: Tricep reflexes are 2+ on the right side and 2+ on the left side. Bicep reflexes are 2+ on the right side and 2+ on the left side. Brachioradialis reflexes are 2+  on the right side and 2+ on the left side. Patellar reflexes are 2+ on the right side and 2+ on the left side. Achilles reflexes are 2+ on the right side and 2+ on the left side.       Back Exam     Tenderness   The patient is experiencing no tenderness.     Range of Motion   Extension: normal   Flexion: normal   Lateral bend right: normal   Lateral bend left: normal   Rotation right: normal   Rotation left: normal     Muscle Strength   Right Quadriceps:  5/5   Left Quadriceps:  5/5   Right Hamstrings:  5/5   Left Hamstrings:  5/5     Tests   Straight leg raise right: negative  Straight leg raise left: negative    Other   Toe walk: normal  Heel walk: normal              Neurologic Exam     Mental Status   Oriented to person, place, and time.   Speech: speech is normal   Level of consciousness: alert    Cranial Nerves   Cranial nerves II through XII intact.     CN III, IV, VI   Pupils are equal, round, and reactive to light.  Extraocular motions are normal.     Motor Exam   Muscle bulk: normal  Overall muscle tone: normal    Strength   Right deltoid: 5/5  Left deltoid: 5/5  Right biceps: 5/5  Left biceps: 5/5  Right triceps: 5/5  Left triceps: 5/5  Right wrist flexion: 5/5  Left wrist flexion: 5/5  Right wrist extension: 5/5  Left wrist extension: 5/5  Right interossei: 5/5  Left interossei: 5/5  Right iliopsoas: 5/5  Left iliopsoas: 5/5  Right quadriceps: 5/5  Left quadriceps: 5/5  Right hamstrin/5  Left hamstrin/5  Right anterior tibial: 5/5  Left anterior tibial: 5/5  Right posterior tibial: 5/5  Left posterior tibial: 5/5  Right peroneal: 5/5  Left peroneal: 5/5  Right gastroc: 5/5  Left gastroc: 5/5    Sensory Exam   Light touch normal.   Pinprick normal.     Gait, Coordination, and Reflexes     Gait  Gait: normal    Coordination   Finger to nose coordination: normal  Tandem walking coordination: normal    Reflexes   Right brachioradialis: 2+  Left brachioradialis: 2+  Right biceps: 2+  Left biceps:  2+  Right triceps: 2+  Left triceps: 2+  Right patellar: 2+  Left patellar: 2+  Right achilles: 2+  Left achilles: 2+  Right plantar: normal  Left plantar: normal  Right Hughes: absent  Left Hughes: absent  Right ankle clonus: absent  Left ankle clonus: absent        DIAGNOSTIC DATA:  I personally interpreted the following imaging:   Lumbar x-rays shows mild levoscoliosis at L3-4  Lumbar spine MRI 2019 shows right L3-4 disc herniation with lateral recess stenosis and left L4-5 disc herniation with lateral recess stenosis, no spondylolisthesis    ASSESMENT:  This is a 44 y.o. male with     Problem List Items Addressed This Visit        Neuro    Lumbar disc herniation with radiculopathy - Primary    Relevant Orders    Ambulatory consult to Physical Therapy      Mild scoliosis    PLAN:  I explained the natural history of the disease and all treatment options including lumbar fusion surgery and micro decompression surgery.  After a long discussion the patient agreed upon a right L3-4 and left L4-5 minimally invasive laminectomy with medial facetectomy and microdiskectomy to help with his radicular symptoms.  Patient is aware that in the future his scoliosis may worsen and might require a fusion surgery.    We have discussed the risks of surgery including bleeding, infection, failure of surgery, CSF leak, nerve root injury, spinal cord injury, ureter injury, weakness, paralysis, peripheral neuropathy, need for reoperation. Patient understands the risks and would like to proceed with surgery.      The patient has increased perioperative risks because of these comorbidities:  Morbid obesity with BMI 48, sleep apnea, type 2 diabetes.         Jose L Brown MD  Cell:[phone]

## 2019-10-11 ENCOUNTER — CLINICAL SUPPORT (OUTPATIENT)
Dept: REHABILITATION | Facility: HOSPITAL | Age: 44
End: 2019-10-11
Attending: NEUROLOGICAL SURGERY
Payer: COMMERCIAL

## 2019-10-11 DIAGNOSIS — M51.16 LUMBAR DISC HERNIATION WITH RADICULOPATHY: ICD-10-CM

## 2019-10-11 DIAGNOSIS — M54.41 CHRONIC BILATERAL LOW BACK PAIN WITH BILATERAL SCIATICA: ICD-10-CM

## 2019-10-11 DIAGNOSIS — M54.42 CHRONIC BILATERAL LOW BACK PAIN WITH BILATERAL SCIATICA: ICD-10-CM

## 2019-10-11 DIAGNOSIS — G89.29 CHRONIC BILATERAL LOW BACK PAIN WITH BILATERAL SCIATICA: ICD-10-CM

## 2019-10-11 DIAGNOSIS — M51.36 DDD (DEGENERATIVE DISC DISEASE), LUMBAR: Primary | ICD-10-CM

## 2019-10-11 PROCEDURE — 97163 PT EVAL HIGH COMPLEX 45 MIN: CPT | Mod: PN | Performed by: PHYSICAL THERAPIST

## 2019-10-11 PROCEDURE — 97110 THERAPEUTIC EXERCISES: CPT | Mod: PN | Performed by: PHYSICAL THERAPIST

## 2019-10-11 NOTE — PLAN OF CARE
[facility]  Physical Therapy Initial Evaluation    [facility]  [facility] [id number]    Therapy Diagnosis:   Encounter Diagnoses   Name Primary?    Chronic bilateral low back pain with bilateral sciatica     DDD (degenerative disc disease), lumbar Yes    Lumbar disc herniation with radiculopathy      Physician: Jose L Brown MD    Physician Orders: PT Eval and Treat   --Right L3-4 and Left L4-5 disc herniation with radiculopathy. Lumbar spine physical therapy 3 times a week for 6 weeks.   Back extensor and core muscles strengthening. Myofascial release. Laura exercises. Home exercises.  Medical Diagnosis from Referral: M51.16 (ICD-10-CM) - Lumbar disc herniation with radiculopathy  Evaluation Date: 10/11/2019  Authorization Period Expiration: 12/31/2019  Plan of Care Expiration: 11/22/2019  Visit # / Visits authorized: 1/ 60  FOTO: 1/10        Time In: 0805  Time Out: 0855  Total Billable Time: 50 minutes     Precautions: Standard, Diabetes and morbid obesity    Subjective   Date of onset: 08/2019  History of current condition - Kirill reports: sciatic problems all life, started back in august and wouldn't go away. Medication didn't help. Saw specialist. MRI - bulging disc. NATALIE - 3 weeks later. Only 20% better day after. Standing and walking are horrible. Walks a lot with job. Has to keep hand on cart to take pressure off. Has to lean over to take pressure off. 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 Takes muscle relaxor. Right side is always worse even though it happends on both side. Fallen 1 time. Numbness burning in leg at some point, left side same pattern just not as bad or often. Muscle relaxor helps.        Medical History:   Past Medical History:   Diagnosis Date    Allergy     Anxiety     CTS (carpal tunnel syndrome) 6/26/2015    Depression     Diabetes 4/17/2015    Hx of vasectomy     Hypertension     Sleep apnea         Surgical History:   Kirill [facility] [place] [facility]  has a past surgical history that includes Tonsillectomy; Adenoidectomy; Fracture surgery; Hernia repair; and Epidural steroid injection (Right, 9/4/2019).    Medications:   Kirill has a current medication list which includes the following prescription(s): aspirin, atorvastatin, bupropion, diazepam, flash glucose scanning reader, flash glucose sensor, fluticasone propionate, fluvoxamine, gabapentin, ibuprofen, metformin, multivitamin, sulindac, terbinafine hcl, valsartan-hydrochlorothiazide, and vitamin d.    Allergies:   Review of patient's allergies indicates:  No Known Allergies     Imaging, MRI studies: 1. Mild lumbar levoscoliosis.  2. Lumbar spondylosis, most significant at L3-L4, L4-L5, and L5-S1 resulting in moderate spinal canal stenosis at L3-L4 and variable mild to moderate neural foraminal narrowing from L3-S1.    Prior Therapy: No  Social History:  lives with an adult   Occupation: Inventory  Prior Level of Function: Able to walk 5 miles for job without back pain  Current Level of Function: unable to walk for over 10 minutes at work without significant, debilitating pain    Pain:  Current 6/10, worst 10/10, best 3/10   Location: bilateral trunk  Description: Aching, Dull, Burning, Throbbing, Grabbing, Tight, Tingling, Sharp, Electric and Shooting  Aggravating Factors: Standing, Walking, Extension, Flexing and Getting out of bed/chair  Easing Factors: pain medication and rest    Pts goals: To work without significant pain    Objective     Posture: slight decrease in lumbar lordosis, forward head, rounded shoulders  Palpation: significant tenderness along mid lumbar spine with PA mobilizations  Sensation: no significant deficits globally    Range of Motion/Strength:   Thoracolumbar AROM Pain/Dysfunction with Movement   Flexion 50 Pain   Extension 15 Pain   Right side bending 15    Left side bending 15        Hip Right Left Pain/Dysfunction  "with Movement   AROM/PROM WNL WNL No pain elicited with bilateral hip ROM, minimal stiffness with end range rotational activity but no further pain     Knee Right Left Pain/Dysfunction with Movement   AROM/PROM WNL WNL      Ankle Right Left Pain/Dysfunction with Movement   AROM/PROM WNL WNL      L/E MMT Right Left Pain/Dysfunction with Movement   Hip Flexion 4/5 4/5    Hip Extension 4+/5 4+/5    Hip Abduction 4/5 4/5    Hip Adduction 5/5 5/5    Knee Flexion 5/5 5/5    Knee Extension 4+/5 4+/5    Ankle DF 5/5 5/5    Ankle PF 5/5 5/5        Special Tests: SLR elicited hamstring pulling and tightness with some back pain bilaterally. JASEN (-). Piriformis test (-)    Gait Analysis: No deficits noted at this time but pt reports following 10 minutes of walking, leaning over and holding onto cart is only option.       CMS Impairment/Limitation/Restriction for FOTO Lumbar Survey    Therapist reviewed FOTO scores for Kirill [facility] on 10/11/2019.   FOTO documents entered into [employer] ([employer]) - see Media section.    Limitation Score: 60%         TREATMENT   Treatment Time In: 835  Treatment Time Out: 855  Total Treatment time separate from Evaluation: 20 minutes    Kirill received therapeutic exercises to develop strength, endurance and core stabilization for 20 minutes including:  Abdominal bracing - 5" hold, 30x  Bracing with marching - 4 marches, 20x  Supine Hip Add - 5" hold, 30x  Supine Hip Abd -  5 " hold, 30x  Piriformis stretch - 15" hold, 5x bilateral      Home Exercises Provided and Patient Education Provided     Education provided:   - HEP provided  - Home modalities as needed    Written Home Exercises Provided: yes.  Exercises were reviewed and Kirill was able to demonstrate them prior to the end of the session.  Kirill demonstrated good  understanding of the education provided.     See EMR under Patient Instructions for exercises provided on evaluation.      Assessment   Kirill is a 44 y.o. male referred to " outpatient Physical Therapy with a medical diagnosis of lumbar radiculopathy. Pt presents with low back pain that crosses along spine with right sided radiculopathy mainly but does get left at times. Radiating pains better now on muscle relaxers. Pt tolerated gentle lumbar stability program and was unable to tolerate extension program at this time.     Pt prognosis is Guarded.   Pt will benefit from skilled outpatient Physical Therapy to address the deficits stated above and in the chart below, provide pt/family education, and to maximize pt's level of independence.     Plan of care discussed with patient: Yes  Pt's spiritual, cultural and educational needs considered and patient is agreeable to the plan of care and goals as stated below:     Anticipated Barriers for therapy: High BMI, Profession    Medical Necessity is demonstrated by the following  History  Co-morbidities and personal factors that may impact the plan of care Co-morbidities:   high BMI and young age    Personal Factors:   lifestyle     high   Examination  Body Structures and Functions, activity limitations and participation restrictions that may impact the plan of care Body Regions:   back    Body Systems:    gross symmetry  ROM  strength  gross coordinated movement  transfers    Participation Restrictions:   Walking    Activity limitations:   Learning and applying knowledge  no deficits    General Tasks and Commands  undertaking a single task    Communication  no deficits    Mobility  lifting and carrying objects  walking    Self care  no deficits    Domestic Life  doing house work (cleaning house, washing dishes, laundry)    Interactions/Relationships  no deficits    Life Areas  no deficits    Community and Social Life  community life  recreation and leisure         high   Clinical Presentation unstable clinical presentation with unpredictable characteristics high   Decision Making/ Complexity Score: high       Goals:  Long Term Goals (6  Weeks):  1. Pt will be compliant with HEP to supplement PT in decreasing pain with functional mobility.  2. Pt will improve FOTO score to </= 50% limited to decrease perceived limitation with maintaining/changing body position.   3. Pt will improve impaired LE MMTs to >/=4+/5 to improve strength for functional tasks.  4. Pt will report no pain during lumbar ROM to promote functional mobility.    Plan   Plan of care Certification: 10/11/2019 to 11/22/2019.    Outpatient Physical Therapy 3 times weekly for 6 weeks to include the following interventions: Gait Training, Manual Therapy, Moist Heat/ Ice, Neuromuscular Re-ed, Patient Education, Self Care, Therapeutic Activites and Therapeutic Exercise, ASTYM, Kinesiotaping PRN, Functional Dry Needling    Ivan Miramontes, PT, DPT

## 2019-10-11 NOTE — PATIENT INSTRUCTIONS
Posture - Sitting    Sit upright, head facing forward. Scoot your tailbone all the way to the back of your chair. Lean slightly forward and place a rolled up towel at your waist. Lean back so shoulder blades lightly touch the back of the chair. Keep shoulders relaxed, and avoid rounded back. Keep hips level with knees. Avoid crossing legs for long periods.         Lumbar Rotation    Feet on floor, slowly rock knees from side to side in small, pain-free range of motion. Allow lower back to rotate slightly, but keep shoulders flat on ground. Hold 1-2 seconds.  Repeat 15 times per set. Do 2 sets per session. Do 1 sessions per day.         Pelvic Tilt    Flatten back by tightening stomach muscles and buttocks. Do not hold your breath.  Hold 5 seconds.  Repeat 20 times per set. Do 2 sets per session. Do 1 sessions per day.        Bent Leg Lift (Hook-Lying)    Tighten stomach and slowly raise right leg from floor. Keep trunk rigid. Slowly lower and switch sides.  Repeat 20 times per set. Do 1 sets per session. Do 1 sessions per day.           Strengthening: Hip Abductor - Resisted    Lie flat with band looped around both legs above knees, and push thighs apart, ensuring right and left move together.  Repeat 30 times per set. Do 1 sets per session. Do 1 sessions per day.

## 2019-10-13 RX ORDER — DIAZEPAM 2 MG/1
TABLET ORAL
Qty: 60 TABLET | Refills: 0 | Status: SHIPPED | OUTPATIENT
Start: 2019-10-13 | End: 2019-11-27 | Stop reason: SDUPTHER

## 2019-10-14 PROBLEM — M54.41 CHRONIC BILATERAL LOW BACK PAIN WITH BILATERAL SCIATICA: Status: ACTIVE | Noted: 2019-10-14

## 2019-10-14 PROBLEM — M54.42 CHRONIC BILATERAL LOW BACK PAIN WITH BILATERAL SCIATICA: Status: ACTIVE | Noted: 2019-10-14

## 2019-10-14 PROBLEM — G89.29 CHRONIC BILATERAL LOW BACK PAIN WITH BILATERAL SCIATICA: Status: ACTIVE | Noted: 2019-10-14

## 2019-10-15 ENCOUNTER — TELEPHONE (OUTPATIENT)
Dept: PREADMISSION TESTING | Facility: HOSPITAL | Age: 44
End: 2019-10-15

## 2019-10-15 NOTE — TELEPHONE ENCOUNTER
This patient is using Dr Brown and the surgery will be done at [facility]. He is canceling with .

## 2019-10-15 NOTE — TELEPHONE ENCOUNTER
----- Message from Sarah Pedersen RN sent at 10/11/2019  3:08 PM CDT -----  11/7 SURGERY  Please schedule poc appt, labs, ua and ekg.  Thanks!

## 2019-10-16 ENCOUNTER — OFFICE VISIT (OUTPATIENT)
Dept: FAMILY MEDICINE | Facility: CLINIC | Age: 44
End: 2019-10-16
Payer: COMMERCIAL

## 2019-10-16 VITALS
HEIGHT: 72 IN | WEIGHT: 315 LBS | TEMPERATURE: 99 F | DIASTOLIC BLOOD PRESSURE: 70 MMHG | SYSTOLIC BLOOD PRESSURE: 116 MMHG | OXYGEN SATURATION: 99 % | BODY MASS INDEX: 42.66 KG/M2 | HEART RATE: 108 BPM

## 2019-10-16 DIAGNOSIS — E11.8 TYPE 2 DIABETES MELLITUS WITH COMPLICATION, WITHOUT LONG-TERM CURRENT USE OF INSULIN: ICD-10-CM

## 2019-10-16 DIAGNOSIS — I10 ESSENTIAL HYPERTENSION: ICD-10-CM

## 2019-10-16 DIAGNOSIS — R73.09 ELEVATED HEMOGLOBIN A1C: ICD-10-CM

## 2019-10-16 DIAGNOSIS — Z01.818 PREOP EXAMINATION: Primary | ICD-10-CM

## 2019-10-16 DIAGNOSIS — M54.30 SCIATIC LEG PAIN: ICD-10-CM

## 2019-10-16 PROCEDURE — 3044F HG A1C LEVEL LT 7.0%: CPT | Mod: CPTII,S$GLB,, | Performed by: FAMILY MEDICINE

## 2019-10-16 PROCEDURE — 99214 PR OFFICE/OUTPT VISIT, EST, LEVL IV, 30-39 MIN: ICD-10-PCS | Mod: S$GLB,,, | Performed by: FAMILY MEDICINE

## 2019-10-16 PROCEDURE — 3074F PR MOST RECENT SYSTOLIC BLOOD PRESSURE < 130 MM HG: ICD-10-PCS | Mod: CPTII,S$GLB,, | Performed by: FAMILY MEDICINE

## 2019-10-16 PROCEDURE — 3074F SYST BP LT 130 MM HG: CPT | Mod: CPTII,S$GLB,, | Performed by: FAMILY MEDICINE

## 2019-10-16 PROCEDURE — 3008F PR BODY MASS INDEX (BMI) DOCUMENTED: ICD-10-PCS | Mod: CPTII,S$GLB,, | Performed by: FAMILY MEDICINE

## 2019-10-16 PROCEDURE — 3078F DIAST BP <80 MM HG: CPT | Mod: CPTII,S$GLB,, | Performed by: FAMILY MEDICINE

## 2019-10-16 PROCEDURE — 3008F BODY MASS INDEX DOCD: CPT | Mod: CPTII,S$GLB,, | Performed by: FAMILY MEDICINE

## 2019-10-16 PROCEDURE — 3078F PR MOST RECENT DIASTOLIC BLOOD PRESSURE < 80 MM HG: ICD-10-PCS | Mod: CPTII,S$GLB,, | Performed by: FAMILY MEDICINE

## 2019-10-16 PROCEDURE — 3044F PR MOST RECENT HEMOGLOBIN A1C LEVEL <7.0%: ICD-10-PCS | Mod: CPTII,S$GLB,, | Performed by: FAMILY MEDICINE

## 2019-10-16 PROCEDURE — 99214 OFFICE O/P EST MOD 30 MIN: CPT | Mod: S$GLB,,, | Performed by: FAMILY MEDICINE

## 2019-10-16 RX ORDER — ACETAMINOPHEN 325 MG/1
325 TABLET ORAL EVERY 6 HOURS PRN
Status: ON HOLD | COMMUNITY
End: 2021-05-01 | Stop reason: HOSPADM

## 2019-10-16 RX ORDER — CYCLOBENZAPRINE HCL 5 MG
5 TABLET ORAL 3 TIMES DAILY PRN
COMMUNITY
End: 2019-10-22

## 2019-10-16 NOTE — Clinical Note
If lab work EKG and other imaging are normal patient in stable condition and prepared for surgery-- no additional intervention needed.

## 2019-10-18 ENCOUNTER — CLINICAL SUPPORT (OUTPATIENT)
Dept: REHABILITATION | Facility: HOSPITAL | Age: 44
End: 2019-10-18
Attending: NEUROLOGICAL SURGERY
Payer: COMMERCIAL

## 2019-10-18 DIAGNOSIS — M51.36 DDD (DEGENERATIVE DISC DISEASE), LUMBAR: ICD-10-CM

## 2019-10-18 DIAGNOSIS — M54.42 CHRONIC BILATERAL LOW BACK PAIN WITH BILATERAL SCIATICA: ICD-10-CM

## 2019-10-18 DIAGNOSIS — G89.29 CHRONIC BILATERAL LOW BACK PAIN WITH BILATERAL SCIATICA: ICD-10-CM

## 2019-10-18 DIAGNOSIS — M51.16 LUMBAR DISC HERNIATION WITH RADICULOPATHY: ICD-10-CM

## 2019-10-18 DIAGNOSIS — M54.41 CHRONIC BILATERAL LOW BACK PAIN WITH BILATERAL SCIATICA: ICD-10-CM

## 2019-10-18 PROCEDURE — 97110 THERAPEUTIC EXERCISES: CPT | Mod: PN

## 2019-10-18 NOTE — PROGRESS NOTES
"  Physical Therapy Daily Treatment Note     [facility]  [facility] Number[facility] [id number]    Therapy Diagnosis:   Encounter Diagnoses   Name Primary?    Chronic bilateral low back pain with bilateral sciatica     Lumbar disc herniation with radiculopathy     DDD (degenerative disc disease), lumbar      Physician: Jose L Brown MD    Visit Date: 10/18/2019    Physician Orders: PT Eval and Treat   --Right L3-4 and Left L4-5 disc herniation with radiculopathy. Lumbar spine physical therapy 3 times a week for 6 weeks.   Back extensor and core muscles strengthening. Myofascial release. Laura exercises. Home exercises.  Medical Diagnosis from Referral: M51.16 (ICD-10-CM) - Lumbar disc herniation with radiculopathy  Evaluation Date: 10/11/2019  Authorization Period Expiration: 12/31/2019  Plan of Care Expiration: 11/22/2019  Visit # / Visits authorized: 2/ 60  FOTO: 2/10      Time In: 1400  Time Out: 1445  Total Billable Time: 40 minutes    Precautions: Standard, Diabetes and morbid obesity    Subjective     Pt reports: he had severe pain shortly after leaving last visit "10/10" rest of that day.  "back to "normal - normal for me which is a 7/10" next morning.  States he did not even try any of the exercises after that. Coming straight from work.  "I work in [employer] - it took about an hour and 15 minutes."  Lives in [place].  It will take another hour from here to get home.  I should have taken that muscle relaxor"  He was not compliant with home exercise program.  Response to previous treatment: pain  Functional change: none    Pain: 7/10  Location: bilateral low back right side pain greater than left      Objective     Kirill received therapeutic exercises to develop strength, endurance and core stabilization for 40 minutes including:  Abdominal bracing - 5" hold, 30x  Bracing with marching - 4 marches, 20x  Supine Hip Add - 5" hold, 30x  Supine Hip Abd -  5 " hold, 30x  Piriformis stretch - 15" hold, " "5x bilateral    Home Exercises Provided and Patient Education Provided     Education provided:   - Reviewed log roll technique for supine<>sit and bed mobility  - Postural awareness and TAs with daily activity.  - Technique for car transfers and use of lumbar roll to decrease strain especially with long drive.    Written Home Exercises Provided: Patient instructed to cont prior HEP.  Exercises were reviewed and Kirill was able to demonstrate them prior to the end of the session.  Kirill demonstrated fair  understanding of the education provided.     See EMR under Patient Instructions for exercises provided 10/11/2019.      Assessment     Patient with difficulty isolating and maintaining abdominal bracing with good technique.  Required verbal instruction and tactile facilitation as well as increased time to perform same and to do so with normal breathing with limited success, greater in sitting compared to supine.  Completed all therex with reports of pain "about the same" after treatment.      Kirill is progressing well towards his goals.   Pt prognosis is Guarded.     Pt will continue to benefit from skilled outpatient physical therapy to address the deficits listed in the problem list box on initial evaluation, provide pt/family education and to maximize pt's level of independence in the home and community environment.     Pt's spiritual, cultural and educational needs considered and pt agreeable to plan of care and goals.    Anticipated barriers to physical therapy: High BMI, profession    Goals:  Long Term Goals (6 Weeks):  1. Pt will be compliant with HEP to supplement PT in decreasing pain with functional mobility.  (PROGRESSING, NOT MET)  2. Pt will improve FOTO score to </= 50% limited to decrease perceived limitation with maintaining/changing body position.   (PROGRESSING, NOT MET)  3. Pt will improve impaired LE MMTs to >/=4+/5 to improve strength for functional tasks.   (PROGRESSING, NOT MET)  4. Pt will " report no pain during lumbar ROM to promote functional mobility.   (PROGRESSING, NOT MET)      Plan     Continue PT per POC, progress towards established goals.    Gillian Deluna, PTA

## 2019-10-20 ENCOUNTER — PATIENT MESSAGE (OUTPATIENT)
Dept: ORTHOPEDICS | Facility: CLINIC | Age: 44
End: 2019-10-20

## 2019-10-20 ENCOUNTER — PATIENT MESSAGE (OUTPATIENT)
Dept: SURGERY | Facility: HOSPITAL | Age: 44
End: 2019-10-20

## 2019-10-20 NOTE — PROGRESS NOTES
Patient ID: Kirill Gandhi III is a 44 y.o. male.    Chief Complaint: Pre-op Exam; Toe Injury; and Nail Problem    HPI       Kirill Gandhi III is a 44 y.o. male here for preoperative examination prior to laminectomy L3-4 right side and L4-5 left side.  No complaints of respiratory problems or cardiovascular problems at this time.  Previously treated for all key mycosis.  Would like to make sure treatment is going well.      Review of Symptoms    Constitutional  Neg activity change, No chills/ fever   Resp  Neg hemoptysis, stridor, choking  CVS  Neg chest pain, palpitations        Physical Exam    Vitals:    10/16/19 1454   BP: 116/70   Pulse: 108   Temp: 99.1 °F (37.3 °C)   SpO2: 99%   Weight: (!) 164.2 kg (361 lb 15.9 oz)   Height: 6' (1.829 m)        Constitutional:   Oriented to person, place, and time.appears well-developed and well-nourished.   No distress.     HENT:   Head: Normocephalic and atraumatic.     Right Ear: Tympanic membrane, external ear and ear canal normal     Left Ear: Tympanic membrane, external ear and ear canal normal    Nose: External Normal. Normal turbinates, No rhinorrhea     Mouth/Throat: Uvula is midline, oropharynx is clear and moist and mucous membranes are normal.     Neck: supple no anterior cervical adenopathy      Eyes:   Conjunctivae are normal. Right eye exhibits no discharge. Left eye exhibits no discharge. No scleral icterus. No periorbital edema     Cardiovascular:  Regular rate and rhythm with normal S1 and S2     Pulmonary/Chest:   Clear to auscultation bilaterally without wheezes, rhonchi or rales    Musculoskeletal:  No edema. No obvious deformity No wasting     Neurological:   Alert and oriented to person, place, and time. Coordination normal.     Skin:   Skin is warm and dry.   No diaphoresis.   No rash noted.    Psychiatric: Normal mood and affect. Behavior is normal. Judgment and thought content normal.       Assessment / Plan:      ICD-10-CM  ICD-9-CM   1. Preop examination Z01.818 V72.84   2. Sciatic leg pain M54.30 724.3   3. Type 2 diabetes mellitus with complication, without long-term current use of insulin E11.8 250.90   4. Elevated hemoglobin A1c R73.09 790.29   5. Essential hypertension I10 401.9     Preop examination    Sciatic leg pain    Type 2 diabetes mellitus with complication, without long-term current use of insulin    Elevated hemoglobin A1c    Essential hypertension      Patient with preoperative visit-in stable condition medically at this time.  Lab work is pending.  Last hemoglobin A1c six months ago had control of glucose without hemoglobin A1c of 5.7.    If lab work EKG and other imaging are normal patient in stable condition and prepared for surgery- no additional intervention needed.

## 2019-10-21 RX ORDER — GABAPENTIN 300 MG/1
600 CAPSULE ORAL 3 TIMES DAILY
Qty: 180 CAPSULE | Refills: 11 | Status: SHIPPED | OUTPATIENT
Start: 2019-10-21 | End: 2020-01-13 | Stop reason: SDUPTHER

## 2019-10-22 RX ORDER — TIZANIDINE HYDROCHLORIDE 4 MG/1
4 CAPSULE, GELATIN COATED ORAL 3 TIMES DAILY PRN
Qty: 90 CAPSULE | Refills: 0 | Status: SHIPPED | OUTPATIENT
Start: 2019-10-22 | End: 2019-11-25 | Stop reason: SDUPTHER

## 2019-10-30 ENCOUNTER — CLINICAL SUPPORT (OUTPATIENT)
Dept: REHABILITATION | Facility: HOSPITAL | Age: 44
End: 2019-10-30
Attending: NEUROLOGICAL SURGERY
Payer: COMMERCIAL

## 2019-10-30 ENCOUNTER — TELEPHONE (OUTPATIENT)
Dept: FAMILY MEDICINE | Facility: CLINIC | Age: 44
End: 2019-10-30

## 2019-10-30 ENCOUNTER — PATIENT MESSAGE (OUTPATIENT)
Dept: FAMILY MEDICINE | Facility: CLINIC | Age: 44
End: 2019-10-30

## 2019-10-30 DIAGNOSIS — M51.16 LUMBAR DISC HERNIATION WITH RADICULOPATHY: ICD-10-CM

## 2019-10-30 DIAGNOSIS — M54.42 CHRONIC BILATERAL LOW BACK PAIN WITH BILATERAL SCIATICA: ICD-10-CM

## 2019-10-30 DIAGNOSIS — M51.36 DDD (DEGENERATIVE DISC DISEASE), LUMBAR: ICD-10-CM

## 2019-10-30 DIAGNOSIS — M54.41 CHRONIC BILATERAL LOW BACK PAIN WITH BILATERAL SCIATICA: ICD-10-CM

## 2019-10-30 DIAGNOSIS — G89.29 CHRONIC BILATERAL LOW BACK PAIN WITH BILATERAL SCIATICA: ICD-10-CM

## 2019-10-30 PROCEDURE — 97110 THERAPEUTIC EXERCISES: CPT | Mod: PN

## 2019-10-30 NOTE — TELEPHONE ENCOUNTER
I LM for the pt to rtn call regarding his recent sleep study  He needs a cpap  I have the orders and records printed   I will discuss with the pt and then fax to DME    I spoke with rakesh and she advised that she can run it and see is she is in network unless he prefers another provider

## 2019-10-30 NOTE — PROGRESS NOTES
"  Physical Therapy Daily Treatment Note     Name: Kirill [facility]  Clinic Number: [id number]    Therapy Diagnosis:   Encounter Diagnoses   Name Primary?    Chronic bilateral low back pain with bilateral sciatica     Lumbar disc herniation with radiculopathy     DDD (degenerative disc disease), lumbar      Physician: Jose L Brown MD    Visit Date: 10/30/2019    Physician Orders: PT Eval and Treat   --Right L3-4 and Left L4-5 disc herniation with radiculopathy. Lumbar spine physical therapy 3 times a week for 6 weeks.   Back extensor and core muscles strengthening. Myofascial release. Laura exercises. Home exercises.  Medical Diagnosis from Referral: M51.16 (ICD-10-CM) - Lumbar disc herniation with radiculopathy  Evaluation Date: 10/11/2019  Authorization Period Expiration: 12/31/2019  Plan of Care Expiration: 11/22/2019  Visit # / Visits authorized: 3/ 60  FOTO: 3/10      Time In: 1800  Time Out: 1900  Total Billable Time: 60 minutes    Precautions: Standard, Diabetes and morbid obesity    Subjective     Pt reports: Has sever pain. Went to see a chiropractor over the weekend where he received TENS, decompression table and another tool to help with pain. Pt reports he has been able to sleep better at night but still in pain when waking up in the morning. He also feels his pain medication by his doctor that is in capsule form has not worked as well as the tablet form.   He was not compliant with home exercise program.  Response to previous treatment: pain  Functional change: none    Pain: 10/10  Location: bilateral low back right side pain greater than left      Objective     Kirill received therapeutic exercises to develop strength, endurance and core stabilization for 60 minutes including:  Abdominal bracing - 5" hold, 30x  Bracing with marching - 4 marches, 20x  Supine Hip Add - 5" hold, 30x  Supine Hip Abd -  5 " hold, 30x  Piriformis stretch - 15" hold, 5x bilateral  Pelvic Titls - 3x10, " "5"    Mechanical Traction 10': 150lb high 75lb low    Home Exercises Provided and Patient Education Provided     Education provided:   - Reviewed log roll technique for supine<>sit and bed mobility  - Postural awareness and TAs with daily activity.  - Technique for car transfers and use of lumbar roll to decrease strain especially with long drive.    Written Home Exercises Provided: Patient instructed to cont prior HEP.  Exercises were reviewed and Kirill was able to demonstrate them prior to the end of the session.  Kirill demonstrated fair  understanding of the education provided.     See EMR under Patient Instructions for exercises provided 10/11/2019.      Assessment     Pt was able to tolerate all exercises this session. Pt felt relief in lower back when lying in supine and knees over a bolster. Extended time needed between sets and exercises due to fatigue and pain. Pt progressed with pelvic tilts and mechanical traction. Pt reports he feels a lot better at the end of the session after having traction performed.     Kirill is progressing well towards his goals.   Pt prognosis is Guarded.     Pt will continue to benefit from skilled outpatient physical therapy to address the deficits listed in the problem list box on initial evaluation, provide pt/family education and to maximize pt's level of independence in the home and community environment.     Pt's spiritual, cultural and educational needs considered and pt agreeable to plan of care and goals.    Anticipated barriers to physical therapy: High BMI, profession    Goals:  Long Term Goals (6 Weeks):  1. Pt will be compliant with HEP to supplement PT in decreasing pain with functional mobility.  (PROGRESSING, NOT MET)  2. Pt will improve FOTO score to </= 50% limited to decrease perceived limitation with maintaining/changing body position.   (PROGRESSING, NOT MET)  3. Pt will improve impaired LE MMTs to >/=4+/5 to improve strength for functional tasks.   " (PROGRESSING, NOT MET)  4. Pt will report no pain during lumbar ROM to promote functional mobility.   (PROGRESSING, NOT MET)      Plan     Continue PT per POC, progress towards established goals. Assess pt's pain level from after doing traction until the next day.     Justin Hickey, PT

## 2019-11-01 ENCOUNTER — CLINICAL SUPPORT (OUTPATIENT)
Dept: REHABILITATION | Facility: HOSPITAL | Age: 44
End: 2019-11-01
Attending: NEUROLOGICAL SURGERY
Payer: COMMERCIAL

## 2019-11-01 DIAGNOSIS — M51.16 LUMBAR DISC HERNIATION WITH RADICULOPATHY: ICD-10-CM

## 2019-11-01 DIAGNOSIS — M54.42 CHRONIC BILATERAL LOW BACK PAIN WITH BILATERAL SCIATICA: ICD-10-CM

## 2019-11-01 DIAGNOSIS — M51.36 DDD (DEGENERATIVE DISC DISEASE), LUMBAR: ICD-10-CM

## 2019-11-01 DIAGNOSIS — G89.29 CHRONIC BILATERAL LOW BACK PAIN WITH BILATERAL SCIATICA: ICD-10-CM

## 2019-11-01 DIAGNOSIS — M54.41 CHRONIC BILATERAL LOW BACK PAIN WITH BILATERAL SCIATICA: ICD-10-CM

## 2019-11-01 PROCEDURE — 97012 MECHANICAL TRACTION THERAPY: CPT | Mod: PN

## 2019-11-01 PROCEDURE — 97110 THERAPEUTIC EXERCISES: CPT | Mod: PN

## 2019-11-01 NOTE — PROGRESS NOTES
"  Physical Therapy Daily Treatment Note     [facility]  [facility] [id number]    Therapy Diagnosis:   No diagnosis found.  Physician: Jose L Brown MD    Visit Date: 11/1/2019    Physician Orders: PT Eval and Treat   --Right L3-4 and Left L4-5 disc herniation with radiculopathy. Lumbar spine physical therapy 3 times a week for 6 weeks.   Back extensor and core muscles strengthening. Myofascial release. Laura exercises. Home exercises.  Medical Diagnosis from Referral: M51.16 (ICD-10-CM) - Lumbar disc herniation with radiculopathy  Evaluation Date: 10/11/2019  Authorization Period Expiration: 12/31/2019  Plan of Care Expiration: 11/22/2019  Visit # / Visits authorized: 3/ 60  FOTO: 3/10      Time In: 1800  Time Out: 1900  Total Billable Time: 60 minutes    Precautions: Standard, Diabetes and morbid obesity    Subjective     Pt reports: Has sever pain. Went to see a chiropractor over the weekend where he received TENS, decompression table and another tool to help with pain. Pt reports he has been able to sleep better at night but still in pain when waking up in the morning. He also feels his pain medication by his doctor that is in capsule form has not worked as well as the tablet form.   He was not compliant with home exercise program.  Response to previous treatment: pain  Functional change: none    Pain: 10/10  Location: bilateral low back right side pain greater than left      Objective     Kirill received therapeutic exercises to develop strength, endurance and core stabilization for 60 minutes including:  Abdominal bracing - 5" hold, 30x  Bracing with marching - 4 marches, 20x  Supine Hip Add - 5" hold, 30x  Supine Hip Abd -  5 " hold, 30x  Piriformis stretch - 15" hold, 5x bilateral  Pelvic Titls - 3x10, 5"    Mechanical Traction 10': 150lb high 75lb low    Home Exercises Provided and Patient Education Provided     Education provided:   - Reviewed log roll technique for supine<>sit " and bed mobility  - Postural awareness and TAs with daily activity.  - Technique for car transfers and use of lumbar roll to decrease strain especially with long drive.    Written Home Exercises Provided: Patient instructed to cont prior HEP.  Exercises were reviewed and Kirill was able to demonstrate them prior to the end of the session.  Kirill demonstrated fair  understanding of the education provided.     See EMR under Patient Instructions for exercises provided 10/11/2019.      Assessment     Pt was able to tolerate all exercises this session. Pt felt relief in lower back when lying in supine and knees over a bolster. Extended time needed between sets and exercises due to fatigue and pain. Pt progressed with pelvic tilts and mechanical traction. Pt reports he feels a lot better at the end of the session after having traction performed.     Kirill is progressing well towards his goals.   Pt prognosis is Guarded.     Pt will continue to benefit from skilled outpatient physical therapy to address the deficits listed in the problem list box on initial evaluation, provide pt/family education and to maximize pt's level of independence in the home and community environment.     Pt's spiritual, cultural and educational needs considered and pt agreeable to plan of care and goals.    Anticipated barriers to physical therapy: High BMI, profession    Goals:  Long Term Goals (6 Weeks):  1. Pt will be compliant with HEP to supplement PT in decreasing pain with functional mobility.  (PROGRESSING, NOT MET)  2. Pt will improve FOTO score to </= 50% limited to decrease perceived limitation with maintaining/changing body position.   (PROGRESSING, NOT MET)  3. Pt will improve impaired LE MMTs to >/=4+/5 to improve strength for functional tasks.   (PROGRESSING, NOT MET)  4. Pt will report no pain during lumbar ROM to promote functional mobility.   (PROGRESSING, NOT MET)      Plan     Continue PT per POC, progress towards established  goals. Assess pt's pain level from after doing traction until the next day.     Gillian Deluna, PTA

## 2019-11-01 NOTE — PROGRESS NOTES
"  Physical Therapy Daily Treatment Note     [facility]  [facility] [id number]    Therapy Diagnosis:   Encounter Diagnoses   Name Primary?    Chronic bilateral low back pain with bilateral sciatica     Lumbar disc herniation with radiculopathy     DDD (degenerative disc disease), lumbar      Physician: Jose L Brown MD    Visit Date: 11/1/2019    Physician Orders: PT Eval and Treat   --Right L3-4 and Left L4-5 disc herniation with radiculopathy. Lumbar spine physical therapy 3 times a week for 6 weeks.   Back extensor and core muscles strengthening. Myofascial release. Laura exercises. Home exercises.  Medical Diagnosis from Referral: M51.16 (ICD-10-CM) - Lumbar disc herniation with radiculopathy  Evaluation Date: 10/11/2019  Authorization Period Expiration: 12/31/2019  Plan of Care Expiration: 11/22/2019  Visit # / Visits authorized: 4/ 60  FOTO: 4/10      Time In: 1500  Time Out: 1600  Total Billable Time: 60 minutes (1 traction, 3 TE)    Precautions: Standard, Diabetes and morbid obesity    Subjective     Pt reports: The traction table was amazing and has really helped. I have mild pain today.   He was not compliant with home exercise program.  Response to previous treatment: pain  Functional change: none    Pain: 2/10  Location: bilateral low back right side pain greater than left      Objective     Kirill received therapeutic exercises to develop strength, endurance and core stabilization for 60 minutes including:  Abdominal bracing - 5" hold, 30x  Bracing with marching - 4 marches, 20x  Supine Hip Add - 5" hold, 30x  Supine Hip Abd -  5 " hold, 30x  Piriformis stretch - 15" hold, 5x bilateral  Pelvic Titls - 3x10, 5"  Reverse Crunches on red swiss ball in supine 3x10    Mechanical Traction 10': 150lb high 75lb low    Home Exercises Provided and Patient Education Provided     Education provided:   - Reviewed log roll technique for supine<>sit and bed mobility  - Postural awareness " and TAs with daily activity.  - Technique for car transfers and use of lumbar roll to decrease strain especially with long drive.    Written Home Exercises Provided: Patient instructed to cont prior HEP.  Exercises were reviewed and Kirill was able to demonstrate them prior to the end of the session.  Kirill demonstrated fair  understanding of the education provided.     See EMR under Patient Instructions for exercises provided 10/11/2019.      Assessment     Pt was able to tolerate all exercises this session. Pt was educated on importance of performing HEP. Pt finds big relief with traction. Pt progressed with reverse curls on swiss ball. He will report back next visit on how weekend goes since PT tx with traction.   Kirill is progressing well towards his goals.   Pt prognosis is Guarded.     Pt will continue to benefit from skilled outpatient physical therapy to address the deficits listed in the problem list box on initial evaluation, provide pt/family education and to maximize pt's level of independence in the home and community environment.     Pt's spiritual, cultural and educational needs considered and pt agreeable to plan of care and goals.    Anticipated barriers to physical therapy: High BMI, profession    Goals:  Long Term Goals (6 Weeks):  1. Pt will be compliant with HEP to supplement PT in decreasing pain with functional mobility.  (PROGRESSING, NOT MET)  2. Pt will improve FOTO score to </= 50% limited to decrease perceived limitation with maintaining/changing body position.   (PROGRESSING, NOT MET)  3. Pt will improve impaired LE MMTs to >/=4+/5 to improve strength for functional tasks.   (PROGRESSING, NOT MET)  4. Pt will report no pain during lumbar ROM to promote functional mobility.   (PROGRESSING, NOT MET)      Plan     Continue PT per POC, progress towards established goals. Check how pt faired over weekend after PT with traction.     Justin Hickey, PT

## 2019-11-06 ENCOUNTER — CLINICAL SUPPORT (OUTPATIENT)
Dept: REHABILITATION | Facility: HOSPITAL | Age: 44
End: 2019-11-06
Attending: NEUROLOGICAL SURGERY
Payer: COMMERCIAL

## 2019-11-06 DIAGNOSIS — M54.41 CHRONIC BILATERAL LOW BACK PAIN WITH BILATERAL SCIATICA: ICD-10-CM

## 2019-11-06 DIAGNOSIS — G89.29 CHRONIC BILATERAL LOW BACK PAIN WITH BILATERAL SCIATICA: ICD-10-CM

## 2019-11-06 DIAGNOSIS — M54.42 CHRONIC BILATERAL LOW BACK PAIN WITH BILATERAL SCIATICA: ICD-10-CM

## 2019-11-06 DIAGNOSIS — M51.36 DDD (DEGENERATIVE DISC DISEASE), LUMBAR: ICD-10-CM

## 2019-11-06 DIAGNOSIS — M51.16 LUMBAR DISC HERNIATION WITH RADICULOPATHY: ICD-10-CM

## 2019-11-06 PROCEDURE — 97110 THERAPEUTIC EXERCISES: CPT | Mod: PN | Performed by: PHYSICAL THERAPIST

## 2019-11-06 PROCEDURE — 97012 MECHANICAL TRACTION THERAPY: CPT | Mod: PN | Performed by: PHYSICAL THERAPIST

## 2019-11-06 NOTE — PROGRESS NOTES
"  Physical Therapy Daily Treatment Note     [facility]  [id number]    Therapy Diagnosis:   Encounter Diagnoses   Name Primary?    Chronic bilateral low back pain with bilateral sciatica     Lumbar disc herniation with radiculopathy     DDD (degenerative disc disease), lumbar      Physician: Jose L Brown MD    Visit Date: 11/6/2019    Physician Orders: PT Eval and Treat   --Right L3-4 and Left L4-5 disc herniation with radiculopathy. Lumbar spine physical therapy 3 times a week for 6 weeks.   Back extensor and core muscles strengthening. Myofascial release. Laura exercises. Home exercises.  Medical Diagnosis from Referral: M51.16 (ICD-10-CM) - Lumbar disc herniation with radiculopathy  Evaluation Date: 10/11/2019  Authorization Period Expiration: 12/31/2019  Plan of Care Expiration: 11/22/2019  Visit # / Visits authorized: 5/ 60  FOTO: 5/10      Time In: 500  Time Out: 600  Total Billable Time: 60 minutes (1 traction, 3 TE)    Precautions: Standard, Diabetes and morbid obesity    Subjective     Pt reports: Relief with traction more than anything, continuing therex at home   He was not compliant with home exercise program.  Response to previous treatment: pain  Functional change: none    Pain: 2/10  Location: bilateral low back right side pain greater than left      Objective     Kirill received therapeutic exercises to develop strength, endurance and core stabilization for 50 minutes including:  Abdominal bracing - 5" hold, 30x  Bracing with marching - 4 marches, 20x  Supine Hip Add - 5" hold, 30x  Supine Hip Abd -  5 " hold, 30x  Piriformis stretch - 15" hold, 5x bilateral  Pelvic Tilts - 3x10, 5"  Reverse Crunches on red swiss ball in supine 3x10    Mechanical Traction 10': 150lb high 75lb low    Home Exercises Provided and Patient Education Provided     Education provided:   - Reviewed log roll technique for supine<>sit and bed mobility  - Postural awareness and TAs with " daily activity.  - Technique for car transfers and use of lumbar roll to decrease strain especially with long drive.    Written Home Exercises Provided: Patient instructed to cont prior HEP.  Exercises were reviewed and Kirill was able to demonstrate them prior to the end of the session.  Kirill demonstrated fair  understanding of the education provided.     See EMR under Patient Instructions for exercises provided 10/11/2019.      Assessment     Pt reports minimal pain during therex today, pt gets most relief from lumbar traction table. Pt getting ready for surgery but performing core therex at home   Kirill is progressing well towards his goals.   Pt prognosis is Guarded.     Pt will continue to benefit from skilled outpatient physical therapy to address the deficits listed in the problem list box on initial evaluation, provide pt/family education and to maximize pt's level of independence in the home and community environment.     Pt's spiritual, cultural and educational needs considered and pt agreeable to plan of care and goals.    Anticipated barriers to physical therapy: High BMI, profession    Goals:  Long Term Goals (6 Weeks):  1. Pt will be compliant with HEP to supplement PT in decreasing pain with functional mobility.  (PROGRESSING, NOT MET)  2. Pt will improve FOTO score to </= 50% limited to decrease perceived limitation with maintaining/changing body position.   (PROGRESSING, NOT MET)  3. Pt will improve impaired LE MMTs to >/=4+/5 to improve strength for functional tasks.   (PROGRESSING, NOT MET)  4. Pt will report no pain during lumbar ROM to promote functional mobility.   (PROGRESSING, NOT MET)      Plan     Continue PT per POC, progress towards established goals. Check how pt faired over weekend after PT with traction.     Ivan Miramontes, PT

## 2019-11-08 ENCOUNTER — CLINICAL SUPPORT (OUTPATIENT)
Dept: REHABILITATION | Facility: HOSPITAL | Age: 44
End: 2019-11-08
Attending: NEUROLOGICAL SURGERY
Payer: COMMERCIAL

## 2019-11-08 DIAGNOSIS — M54.41 CHRONIC BILATERAL LOW BACK PAIN WITH BILATERAL SCIATICA: ICD-10-CM

## 2019-11-08 DIAGNOSIS — M54.42 CHRONIC BILATERAL LOW BACK PAIN WITH BILATERAL SCIATICA: ICD-10-CM

## 2019-11-08 DIAGNOSIS — M51.16 LUMBAR DISC HERNIATION WITH RADICULOPATHY: ICD-10-CM

## 2019-11-08 DIAGNOSIS — G89.29 CHRONIC BILATERAL LOW BACK PAIN WITH BILATERAL SCIATICA: ICD-10-CM

## 2019-11-08 DIAGNOSIS — M51.36 DDD (DEGENERATIVE DISC DISEASE), LUMBAR: ICD-10-CM

## 2019-11-08 PROCEDURE — 97012 MECHANICAL TRACTION THERAPY: CPT | Mod: PN

## 2019-11-08 PROCEDURE — 97110 THERAPEUTIC EXERCISES: CPT | Mod: PN

## 2019-11-11 NOTE — PROGRESS NOTES
"  Physical Therapy Daily Treatment Note     [facility]  [id number]    Therapy Diagnosis:   Encounter Diagnoses   Name Primary?    Chronic bilateral low back pain with bilateral sciatica     Lumbar disc herniation with radiculopathy     DDD (degenerative disc disease), lumbar      Physician: Jose L Brown MD    Visit Date: 11/8/2019    Physician Orders: PT Eval and Treat   --Right L3-4 and Left L4-5 disc herniation with radiculopathy. Lumbar spine physical therapy 3 times a week for 6 weeks.   Back extensor and core muscles strengthening. Myofascial release. Laura exercises. Home exercises.  Medical Diagnosis from Referral: M51.16 (ICD-10-CM) - Lumbar disc herniation with radiculopathy  Evaluation Date: 10/11/2019  Authorization Period Expiration: 12/31/2019  Plan of Care Expiration: 11/22/2019    Visit # / Visits authorized: 6/ 60  FOTO: 6/10    Time In: 1500  Time Out: 1600  Total Billable Time: 40 minutes (1 traction, 2 TE)  Precautions: Standard, Diabetes and morbid obesity    Subjective     Pt reports: Relief with traction more than anything, continuing therex at home.  Set for surgery per Dr. Brown Thanksgiving week.   He was compliant with home exercise program per patient   Response to previous treatment: pain  Functional change: none  Pain: 2/10  Location: bilateral low back right side pain greater than left      Objective     Kirill received therapeutic exercises to develop strength, endurance and core stabilization for 30 minutes with PT 1:1 including assessment and 10 minutes under supervison:  Abdominal bracing - 5" hold, 30x  Bracing with marching - 4 marches, 20x  Supine Hip Add - 5" hold, 30x  Supine Hip Abd -  5 " hold, 30x  Piriformis stretch - 15" hold, 5x bilateral  Pelvic Tilts - 3x10, 5"  Reverse Crunches on red swiss ball in supine 3x10    Mechanical Traction 10': 150lb high 75lb low    Home Exercises Provided and Patient Education Provided "     Education provided:   - Reviewed log roll technique for supine<>sit and bed mobility  - Postural awareness and TAs with daily activity.  - Technique for car transfers and use of lumbar roll to decrease strain especially with long drive.    Written Home Exercises Provided: Patient instructed to cont prior HEP.  Exercises were reviewed and Kirill was able to demonstrate them prior to the end of the session.  Kirill demonstrated fair  understanding of the education provided.     See EMR under Patient Instructions for exercises provided 10/11/2019.      Assessment   A:  Good relief with mechanical traction especially involving LE radicular pain.  Pre-hab prior to surgery in two weeks.     Kirill is progressing well towards his goals.   Pt prognosis is Guarded.     Pt will continue to benefit from skilled outpatient physical therapy to address the deficits listed in the problem list box on initial evaluation, provide pt/family education and to maximize pt's level of independence in the home and community environment.     Pt's spiritual, cultural and educational needs considered and pt agreeable to plan of care and goals.    Anticipated barriers to physical therapy: High BMI, profession    Goals:  Long Term Goals (6 Weeks):  1. Pt will be compliant with HEP to supplement PT in decreasing pain with functional mobility.  (PROGRESSING, NOT MET)  2. Pt will improve FOTO score to </= 50% limited to decrease perceived limitation with maintaining/changing body position.   (PROGRESSING, NOT MET)  3. Pt will improve impaired LE MMTs to >/=4+/5 to improve strength for functional tasks.   (PROGRESSING, NOT MET)  4. Pt will report no pain during lumbar ROM to promote functional mobility.   (PROGRESSING, NOT MET)      Plan     Continue PT per POC, progress towards established goals. Check how pt faired over weekend after PT with traction.     Paul Fung, PT

## 2019-11-12 ENCOUNTER — PATIENT MESSAGE (OUTPATIENT)
Dept: FAMILY MEDICINE | Facility: CLINIC | Age: 44
End: 2019-11-12

## 2019-11-13 ENCOUNTER — CLINICAL SUPPORT (OUTPATIENT)
Dept: REHABILITATION | Facility: HOSPITAL | Age: 44
End: 2019-11-13
Attending: NEUROLOGICAL SURGERY
Payer: COMMERCIAL

## 2019-11-13 ENCOUNTER — HOSPITAL ENCOUNTER (OUTPATIENT)
Dept: CARDIOLOGY | Facility: HOSPITAL | Age: 44
Discharge: HOME | End: 2019-11-13
Attending: ANESTHESIOLOGY
Payer: COMMERCIAL

## 2019-11-13 DIAGNOSIS — G89.29 CHRONIC BILATERAL LOW BACK PAIN WITH BILATERAL SCIATICA: ICD-10-CM

## 2019-11-13 DIAGNOSIS — M54.41 CHRONIC BILATERAL LOW BACK PAIN WITH BILATERAL SCIATICA: ICD-10-CM

## 2019-11-13 DIAGNOSIS — Z01.818 PREOPERATIVE TESTING: ICD-10-CM

## 2019-11-13 DIAGNOSIS — M51.36 DDD (DEGENERATIVE DISC DISEASE), LUMBAR: ICD-10-CM

## 2019-11-13 DIAGNOSIS — M54.42 CHRONIC BILATERAL LOW BACK PAIN WITH BILATERAL SCIATICA: ICD-10-CM

## 2019-11-13 DIAGNOSIS — M51.16 LUMBAR DISC HERNIATION WITH RADICULOPATHY: ICD-10-CM

## 2019-11-13 PROCEDURE — 93005 ELECTROCARDIOGRAM TRACING: CPT | Mod: PO

## 2019-11-13 PROCEDURE — 97012 MECHANICAL TRACTION THERAPY: CPT | Mod: PN | Performed by: PHYSICAL THERAPIST

## 2019-11-13 PROCEDURE — 97110 THERAPEUTIC EXERCISES: CPT | Mod: PN | Performed by: PHYSICAL THERAPIST

## 2019-11-13 PROCEDURE — 93010 ELECTROCARDIOGRAM REPORT: CPT | Mod: ,,, | Performed by: INTERNAL MEDICINE

## 2019-11-13 PROCEDURE — 93010 EKG 12-LEAD: ICD-10-PCS | Mod: ,,, | Performed by: INTERNAL MEDICINE

## 2019-11-13 NOTE — TELEPHONE ENCOUNTER
I have the form and will fax back to [facility] today  Can you please review test results and advise

## 2019-11-15 ENCOUNTER — CLINICAL SUPPORT (OUTPATIENT)
Dept: REHABILITATION | Facility: HOSPITAL | Age: 44
End: 2019-11-15
Attending: NEUROLOGICAL SURGERY
Payer: COMMERCIAL

## 2019-11-15 DIAGNOSIS — M54.42 CHRONIC BILATERAL LOW BACK PAIN WITH BILATERAL SCIATICA: ICD-10-CM

## 2019-11-15 DIAGNOSIS — M51.36 DDD (DEGENERATIVE DISC DISEASE), LUMBAR: ICD-10-CM

## 2019-11-15 DIAGNOSIS — M54.41 CHRONIC BILATERAL LOW BACK PAIN WITH BILATERAL SCIATICA: ICD-10-CM

## 2019-11-15 DIAGNOSIS — M51.16 LUMBAR DISC HERNIATION WITH RADICULOPATHY: ICD-10-CM

## 2019-11-15 DIAGNOSIS — G89.29 CHRONIC BILATERAL LOW BACK PAIN WITH BILATERAL SCIATICA: ICD-10-CM

## 2019-11-15 PROCEDURE — 97110 THERAPEUTIC EXERCISES: CPT | Mod: PN

## 2019-11-15 PROCEDURE — 97012 MECHANICAL TRACTION THERAPY: CPT | Mod: PN

## 2019-11-15 NOTE — PROGRESS NOTES
"  Physical Therapy Daily Treatment Note     [facility]  [facility] [id number]    Therapy Diagnosis:   Encounter Diagnoses   Name Primary?    Chronic bilateral low back pain with bilateral sciatica     Lumbar disc herniation with radiculopathy     DDD (degenerative disc disease), lumbar      Physician: Jose L Brown MD    Visit Date: 11/15/2019    Physician Orders: PT Eval and Treat   --Right L3-4 and Left L4-5 disc herniation with radiculopathy. Lumbar spine physical therapy 3 times a week for 6 weeks.   Back extensor and core muscles strengthening. Myofascial release. Laura exercises. Home exercises.  Medical Diagnosis from Referral: M51.16 (ICD-10-CM) - Lumbar disc herniation with radiculopathy  Evaluation Date: 10/11/2019  Authorization Period Expiration: 12/31/2019  Plan of Care Expiration: 11/22/2019    Visit # / Visits authorized: 8/ 60  FOTO: 8/10    Time In: 1505  Time Out: 1600  Total Billable Time: 50 minutes (1 traction, 3 TE)  Precautions: Standard, Diabetes and morbid obesity    Subjective     Pt reports: Relief with traction for several hours.  Set for surgery per Dr. Brown day after Thanksgiving,. States pain goes down left leg now not right.  He was compliant with home exercise program per patient   Response to previous treatment: pain  Functional change: none  Pain: 2/10  Location: bilateral low back right side pain greater than left      Objective     Kirill received therapeutic exercises to develop strength, endurance and core stabilization for 40 minutes.  Abdominal bracing - 5" hold, 30x  Bracing with marching - 4 marches, 20x  Supine Hip Add - 5" hold, 30x  Not today  Supine Hip Abd -  5 " hold, 30x  Not today  Piriformis stretch - 30" hold, 5x bilateral  Pelvic Tilts - 3x10, 5"  Reverse Crunches on blue peanut in supine 3x10  Seated TAs;  5"x20  Standing TAs:  5"x30  Standing hip abduction:  1x10 B stopped due to increased pain  Step ups:  Blue 1x10 B (with " "TAs)  Leg Press:  5.0 2x10 DL    Mechanical Traction 10': 150lb high 75lb low    Home Exercises Provided and Patient Education Provided     Education provided:   - Reviewed log roll technique for supine<>sit and bed mobility  - Postural awareness and TAs with daily activity.  - Technique for car transfers and use of lumbar roll to decrease strain especially with long drive.    Written Home Exercises Provided: Patient instructed to cont prior HEP.  Exercises were reviewed and Kirill was able to demonstrate them prior to the end of the session.  Kirill demonstrated fair  understanding of the education provided.     See EMR under Patient Instructions for exercises provided 10/11/2019.      Assessment   Difficulty with isolation and tone of abdominals with core stabilization in supine.  Improved when performed in sitting and standing. Continued reports of good relief with mechanical traction especially involving LE radicular pain, but notes return of symptoms by next day.  Reports some increased pain with trial standing core stabilization despite verbal instruction to perform in pain free range - rated "8/10" before mechanical traction, States "much better now - 2/10" after mechanical traction.     Kirill is progressing well towards his goals.   Pt prognosis is Guarded.     Pt will continue to benefit from skilled outpatient physical therapy to address the deficits listed in the problem list box on initial evaluation, provide pt/family education and to maximize pt's level of independence in the home and community environment.     Pt's spiritual, cultural and educational needs considered and pt agreeable to plan of care and goals.    Anticipated barriers to physical therapy: High BMI, profession    Goals:  Long Term Goals (6 Weeks):  1. Pt will be compliant with HEP to supplement PT in decreasing pain with functional mobility.  (PROGRESSING, NOT MET)  2. Pt will improve FOTO score to </= 50% limited to decrease perceived " limitation with maintaining/changing body position.   (PROGRESSING, NOT MET)  3. Pt will improve impaired LE MMTs to >/=4+/5 to improve strength for functional tasks.   (PROGRESSING, NOT MET)  4. Pt will report no pain during lumbar ROM to promote functional mobility.   (PROGRESSING, NOT MET)      Plan     Continue PT per POC, progress towards established goals. Monitor response to standing core stabilization today.    Gillian Deluna, PTA

## 2019-11-18 ENCOUNTER — TELEPHONE (OUTPATIENT)
Dept: SURGERY | Facility: HOSPITAL | Age: 44
End: 2019-11-18

## 2019-11-18 NOTE — PROGRESS NOTES
"  Physical Therapy Daily Treatment Note     [facility]  Clinic Number: [id number]    Therapy Diagnosis:   Encounter Diagnoses   Name Primary?    Chronic bilateral low back pain with bilateral sciatica     Lumbar disc herniation with radiculopathy     DDD (degenerative disc disease), lumbar      Physician: Jose L Brown MD    Visit Date: 11/13/2019    Physician Orders: PT Eval and Treat   --Right L3-4 and Left L4-5 disc herniation with radiculopathy. Lumbar spine physical therapy 3 times a week for 6 weeks.   Back extensor and core muscles strengthening. Myofascial release. Laura exercises. Home exercises.  Medical Diagnosis from Referral: M51.16 (ICD-10-CM) - Lumbar disc herniation with radiculopathy  Evaluation Date: 10/11/2019  Authorization Period Expiration: 12/31/2019  Plan of Care Expiration: 11/22/2019    Visit # / Visits authorized: 7/ 60  FOTO: 7/10    Time In: 500  Time Out: 550  Total Billable Time: 50 minutes (1 traction, 2 TE)  Precautions: Standard, Diabetes and morbid obesity    Subjective     Pt reports: continued sciatica pain in LLE but improved with traction and stretching   He was compliant with home exercise program per patient   Response to previous treatment: decrease pain  Functional change: none  Pain: 2/10  Location: bilateral low back right side pain greater than left      Objective     Kirill received therapeutic exercises to develop strength, endurance and core stabilization for 40 minutes including:  Abdominal bracing - 5" hold, 30x  Bracing with marching - 4 marches, 20x  Supine Hip Add - 5" hold, 30x  Supine Hip Abd -  5 " hold, 30x  Piriformis stretch - 15" hold, 5x bilateral  Pelvic Tilts - 3x10, 5"  Reverse Crunches on red swiss ball in supine 3x10    -progress to standing next visit    Mechanical Lumbar Traction 10': 150lb high 75lb low    Home Exercises Provided and Patient Education Provided     Education provided:   - Postural awareness and TAs with " daily activity.  - Technique for car transfers and use of lumbar roll to decrease strain especially with long drive.    Written Home Exercises Provided: Patient instructed to cont prior HEP.  Exercises were reviewed and Kirill was able to demonstrate them prior to the end of the session.  Kirill demonstrated fair  understanding of the education provided.     See EMR under Patient Instructions for exercises provided 10/11/2019.      Assessment   A:  Decrease pain with traction activity, continued improved in pain but still has consistent sciatic pain that affects work.    Kirill is progressing well towards his goals.   Pt prognosis is Guarded.     Pt will continue to benefit from skilled outpatient physical therapy to address the deficits listed in the problem list box on initial evaluation, provide pt/family education and to maximize pt's level of independence in the home and community environment.     Pt's spiritual, cultural and educational needs considered and pt agreeable to plan of care and goals.    Anticipated barriers to physical therapy: High BMI, profession    Goals:  Long Term Goals (6 Weeks):  1. Pt will be compliant with HEP to supplement PT in decreasing pain with functional mobility.  (PROGRESSING, NOT MET)  2. Pt will improve FOTO score to </= 50% limited to decrease perceived limitation with maintaining/changing body position.   (PROGRESSING, NOT MET)  3. Pt will improve impaired LE MMTs to >/=4+/5 to improve strength for functional tasks.   (PROGRESSING, NOT MET)  4. Pt will report no pain during lumbar ROM to promote functional mobility.   (PROGRESSING, NOT MET)      Plan     Continue PT per POC, progress towards established goals.    Ivan Miramontes, PT

## 2019-11-19 ENCOUNTER — TELEPHONE (OUTPATIENT)
Dept: SURGERY | Facility: HOSPITAL | Age: 44
End: 2019-11-19

## 2019-11-20 ENCOUNTER — HOSPITAL ENCOUNTER (OUTPATIENT)
Dept: PREADMISSION TESTING | Facility: HOSPITAL | Age: 44
Discharge: HOME | End: 2019-11-20
Attending: NEUROLOGICAL SURGERY
Payer: COMMERCIAL

## 2019-11-20 ENCOUNTER — ANESTHESIA EVENT (OUTPATIENT)
Dept: SURGERY | Facility: HOSPITAL | Age: 44
End: 2019-11-20
Payer: COMMERCIAL

## 2019-11-20 ENCOUNTER — CLINICAL SUPPORT (OUTPATIENT)
Dept: REHABILITATION | Facility: HOSPITAL | Age: 44
End: 2019-11-20
Attending: NEUROLOGICAL SURGERY
Payer: COMMERCIAL

## 2019-11-20 VITALS
OXYGEN SATURATION: 96 % | SYSTOLIC BLOOD PRESSURE: 126 MMHG | HEART RATE: 87 BPM | RESPIRATION RATE: 18 BRPM | HEIGHT: 71 IN | WEIGHT: 315 LBS | DIASTOLIC BLOOD PRESSURE: 69 MMHG | BODY MASS INDEX: 44.1 KG/M2

## 2019-11-20 DIAGNOSIS — M51.16 LUMBAR DISC HERNIATION WITH RADICULOPATHY: ICD-10-CM

## 2019-11-20 DIAGNOSIS — M54.42 CHRONIC BILATERAL LOW BACK PAIN WITH BILATERAL SCIATICA: ICD-10-CM

## 2019-11-20 DIAGNOSIS — M51.36 DDD (DEGENERATIVE DISC DISEASE), LUMBAR: ICD-10-CM

## 2019-11-20 DIAGNOSIS — M54.41 CHRONIC BILATERAL LOW BACK PAIN WITH BILATERAL SCIATICA: ICD-10-CM

## 2019-11-20 DIAGNOSIS — G89.29 CHRONIC BILATERAL LOW BACK PAIN WITH BILATERAL SCIATICA: ICD-10-CM

## 2019-11-20 PROCEDURE — 97012 MECHANICAL TRACTION THERAPY: CPT | Mod: PN | Performed by: PHYSICAL THERAPIST

## 2019-11-20 PROCEDURE — 97110 THERAPEUTIC EXERCISES: CPT | Mod: PN | Performed by: PHYSICAL THERAPIST

## 2019-11-20 RX ORDER — LIDOCAINE HYDROCHLORIDE 10 MG/ML
1 INJECTION, SOLUTION EPIDURAL; INFILTRATION; INTRACAUDAL; PERINEURAL ONCE
Status: CANCELLED | OUTPATIENT
Start: 2019-11-20 | End: 2019-11-20

## 2019-11-20 RX ORDER — SODIUM CHLORIDE, SODIUM LACTATE, POTASSIUM CHLORIDE, CALCIUM CHLORIDE 600; 310; 30; 20 MG/100ML; MG/100ML; MG/100ML; MG/100ML
INJECTION, SOLUTION INTRAVENOUS CONTINUOUS
Status: CANCELLED | OUTPATIENT
Start: 2019-11-20

## 2019-11-20 NOTE — PROGRESS NOTES
"  Physical Therapy Daily Treatment Note     [facility]  [facility] [id number]    Therapy Diagnosis:   Encounter Diagnoses   Name Primary?    Chronic bilateral low back pain with bilateral sciatica     Lumbar disc herniation with radiculopathy     DDD (degenerative disc disease), lumbar      Physician: Jose L Brown MD    Visit Date: 11/20/2019    Physician Orders: PT Eval and Treat   --Right L3-4 and Left L4-5 disc herniation with radiculopathy. Lumbar spine physical therapy 3 times a week for 6 weeks.   Back extensor and core muscles strengthening. Myofascial release. Laura exercises. Home exercises.  Medical Diagnosis from Referral: M51.16 (ICD-10-CM) - Lumbar disc herniation with radiculopathy  Evaluation Date: 10/11/2019  Authorization Period Expiration: 12/31/2019  Plan of Care Expiration: 11/22/2019    Visit # / Visits authorized: 9/ 60  FOTO: 9/10    Time In: 500  Time Out: 600  Total Billable Time: 60 minutes (1 traction, 3 TE)  Precautions: Standard, Diabetes and morbid obesity    Subjective     Pt reports: traction has improved pain but would like to increase strengthening component.   He was compliant with home exercise program per patient   Response to previous treatment: pain  Functional change: none  Pain: 2/10  Location: bilateral low back right side pain greater than left      Objective     Kirill received therapeutic exercises to develop strength, endurance and core stabilization for 50 minutes.  Bracing with marching - 4 marches, 20x  Piriformis stretch - 30" hold, 5x bilateral  Pelvic Tilts - 3x10, 5"  Reverse Crunches on blue peanut in supine 3x10  Standing hip abduction:  2x10 B stopped due to increased pain  Step ups:  Blue 3x10 B (with TAs)  Leg Press:  5.0 2x10 DL  Standing punch outs: purple 2x15 bilateral  Scap retractions: purple SC 2x15  Straight arm pull downs: GTB 3x10    Mechanical Traction 10': 150lb high 75lb low    Home Exercises Provided and Patient " Education Provided     Education provided:   - Reviewed log roll technique for supine<>sit and bed mobility  - Postural awareness and TAs with daily activity.  - Technique for car transfers and use of lumbar roll to decrease strain especially with long drive.    Written Home Exercises Provided: Patient instructed to cont prior HEP.  Exercises were reviewed and Kirill was able to demonstrate them prior to the end of the session.  Kirill demonstrated fair  understanding of the education provided.     See EMR under Patient Instructions for exercises provided 10/11/2019.      Assessment   Improvement in pain with traction activity. Pt reports increased fatigue following therex due to increase in strengthening therex overall. Pt ready for dc next visit.      Kirill is progressing well towards his goals.   Pt prognosis is Guarded.     Pt will continue to benefit from skilled outpatient physical therapy to address the deficits listed in the problem list box on initial evaluation, provide pt/family education and to maximize pt's level of independence in the home and community environment.     Pt's spiritual, cultural and educational needs considered and pt agreeable to plan of care and goals.    Anticipated barriers to physical therapy: High BMI, profession    Goals:  Long Term Goals (6 Weeks):  1. Pt will be compliant with HEP to supplement PT in decreasing pain with functional mobility.  (PROGRESSING, NOT MET)  2. Pt will improve FOTO score to </= 50% limited to decrease perceived limitation with maintaining/changing body position.   (PROGRESSING, NOT MET)  3. Pt will improve impaired LE MMTs to >/=4+/5 to improve strength for functional tasks.   (PROGRESSING, NOT MET)  4. Pt will report no pain during lumbar ROM to promote functional mobility.   (PROGRESSING, NOT MET)      Plan     Continue PT per POC, progress towards established goals. Monitor response to standing core stabilization today.    Ivan Miramontes, PT

## 2019-11-20 NOTE — PLAN OF CARE
· Arrive on 11/29/2019 at  05:30 am.  · Report to the 2nd floor Procedural Check In Room .   · Nothing to eat or drink after midnight the night before your procedure.  · Take the Diovan medications the morning of surgery with a small sip of water.                                                         · Please remove all body piercings and leave all your jewelery and valuables at home .  · Please remove your contact lenses.   · Wear loose comfortable clothing.  · You will not be able to drive that day, please make arrangement for transportation to and from your procedure.  · You cannot be alone for 24 hours after anesthesia. Make arrangements as needed.  · Shower the night before your procedure and the morning of your procedure with Hibiclens antibacterial solution.  · No lotions, creams or powders  · Do not shave 3 days prior to procedure  · Report any signs or symptoms of an infection to your surgeon. Examples: urinary (bladder) infection, upper respiratory infection, skin boils.   · Practice good hand washing before, during, and after procedure.   · Stop Aspirin, Ibuprofen, Advil, Motrin, Aleve, Nuprin, Naprosyn (all NSAID Medication) or any other blood thinners 5 days before your procedure unless directed by your physician.  Also hold all over the counter vitamins and herbal supplements for 5 days prior to your procedure.  · You may take Tylenol or Acetaminophen up the day of surgery for any pain.        I have read or had read and explained to me, and understand the above information.    Additional comments or instructions:  For additional questions call [phone]        Pre-Op Bathing Instructions    Before surgery, you can play an important role in your own health.    Because skin is not sterile, we need to be sure that your skin is as free of germs as possible. By following the instructions below, you can reduce the number of germs on your skin before surgery.    IMPORTANT: You will need to shower with a  special soap called Hibiclens*, available at any pharmacy, over the counter usually in the first aid isle.  If you are allergic to Chlorhexidine (the antiseptic in Hibiclens), use an antibacterial soap such as Dial Soap for your preoperative showers.  You will shower with Hibiclens the night before and the morning of surgery. Both the night before your surgery and the morning of your surgery see steps 2 and 3.   Do not use Hibiclens on the head, face or genitals to avoid injury to those areas.    STEP #1  1.  Shower with Hibiclens solution night before and the morning of surgery.      STEP #2: THE NIGHT BEFORE YOUR SURGERY     1. Do not shave the area of your body where your surgery will be performed.  2. Wash your hair as usual with your normal  Shampoo. Wash body shoulder to toes with your normal soap.  3. Squeeze Hibiclens into hand apply to surgical site.   4. Wash the site gently for five (5) minutes. Do not scrub your skin too hard.   5. Do not wash with your regular soap after Hibiclens is used.  6. Rinse your body thoroughly.  7. Pat yourself dry with a clean, soft towel.  8. Do not use lotion, cream, or powder.  9. Wear clean clothes.    STEP #3: THE MORNING OF YOUR SURGERY     1. Repeat Step #2.    * Not to be used by people allergic to Chlorhexidine.          ANESTHESIA  -For the first 24 hours after surgery:  Do not drive, use heavy equipment, make important decisions, or drink alcohol  -It is normal to feel sleepy for several hours.  Rest until you are more awake.  -Have someone stay with you, if needed.  They can watch for problems and help keep you safe.  -Some possible post anesthesia side effects include: nausea and vomiting, sore throat and hoarseness, sleepiness, and dizziness.

## 2019-11-20 NOTE — ANESTHESIA PREPROCEDURE EVALUATION
11/20/2019  Kirill Gandhi III is a 44 y.o., male scheduled for L3-4, L4-5 laminectomy on 11/29/19.    PCP optimization in [facility].    Past Medical History:   Diagnosis Date    Allergy     Anxiety     Back pain     CTS (carpal tunnel syndrome) 6/26/2015    Depression     Diabetes 4/17/2015    Hx of vasectomy     Hypertension     OCD (obsessive compulsive disorder)     Sleep apnea      Past Surgical History:   Procedure Laterality Date    ADENOIDECTOMY      EPIDURAL STEROID INJECTION Right 9/4/2019    Procedure: Injection, Steroid, Epidural Transforaminal;  Surgeon: Harjinder Batista Jr., MD;  Location: [facility];  Service: Pain Management;  Laterality: Right;  Right L3 + L4 TF NATALIE    [id number]  [id number]    Arrive @ 1300; ASA last 8/27; Check BG; NEEDS CONSENT    FRACTURE SURGERY      bilateral elbow fracture 3 years ago    HERNIA REPAIR      bilateral groin hernia    TONSILLECTOMY      VASECTOMY         Anesthesia Evaluation    I have reviewed the Patient Summary Reports.    I have reviewed the Nursing Notes.   I have reviewed the Medications.     Review of Systems  Anesthesia Hx:  No problems with previous Anesthesia  Denies Family Hx of Anesthesia complications.    Social:  Non-Smoker, Social Alcohol Use    Hematology/Oncology:  Hematology Normal        Cardiovascular:   Hypertension  Denies Angina. ECG has been reviewed.    Pulmonary:   Denies Shortness of breath. Sleep Apnea, CPAP    Renal/:  Renal/ Normal     Hepatic/GI:  Hepatic/GI Normal    Musculoskeletal:   Arthritis   Lumbar Spine Disorders, Lumbar Disc Disease, Radiculopathy   Neurological:  Neurology Normal    Endocrine:   Diabetes, well controlled, type 2        Physical Exam  General:  Morbid Obesity    Airway/Jaw/Neck:  Airway Findings: Mouth Opening: Normal Tongue: Normal  General Airway Assessment: Adult  Mallampati: III   TM Distance: Normal, at least 6 cm  Jaw/Neck Findings:  Neck Findings:  Short Neck, Girth Increased      Dental:  DENTAL FINDINGS: Normal   Chest/Lungs:  Chest/Lungs Findings: Clear to auscultation, Normal Respiratory Rate     Heart/Vascular:  Heart Findings: Rate: Normal  Rhythm: Regular Rhythm  Sounds: Normal        Mental Status:  Mental Status Findings:  Cooperative, Alert and Oriented       EKG 11/13/19  NSR      Anesthesia Plan  Type of Anesthesia, risks & benefits discussed:  Anesthesia Type:  general  Patient's Preference:   Intra-op Monitoring Plan: standard ASA monitors  Intra-op Monitoring Plan Comments:   Post Op Pain Control Plan: multimodal analgesia  Post Op Pain Control Plan Comments:   Induction:   IV  Beta Blocker:  Patient is not currently on a Beta-Blocker (No further documentation required).       Informed Consent:    ASA Score: 3     Day of Surgery Review of History & Physical:        Anesthesia Plan Notes: Anesthesia consent to be signed prior to procedure on 11/29/19          Ready For Surgery From Anesthesia Perspective.

## 2019-11-20 NOTE — PLAN OF CARE
Allergies, medical, surgical, family and psychosocial histories reviewed with patient. Periop plan of care discussed. Preop instructions given, including medications to take and to hold. Hibiclens soap and instructions on use given. Time given for questions and pt voiced understanding.      Pt contact number: [phone]

## 2019-11-20 NOTE — DISCHARGE INSTRUCTIONS
· Arrive on 11/29/2019 at  05:30 am.  · Report to the 2nd floor Procedural Check In Room .   · Nothing to eat or drink after midnight the night before your procedure.  · Take the Diovan medications the morning of surgery with a small sip of water.                                                         · Please remove all body piercings and leave all your jewelery and valuables at home .  · Please remove your contact lenses.   · Wear loose comfortable clothing.  · You will not be able to drive that day, please make arrangement for transportation to and from your procedure.  · You cannot be alone for 24 hours after anesthesia. Make arrangements as needed.  · Shower the night before your procedure and the morning of your procedure with Hibiclens antibacterial solution.  · No lotions, creams or powders  · Do not shave 3 days prior to procedure  · Report any signs or symptoms of an infection to your surgeon. Examples: urinary (bladder) infection, upper respiratory infection, skin boils.   · Practice good hand washing before, during, and after procedure.   · Stop Aspirin, Ibuprofen, Advil, Motrin, Aleve, Nuprin, Naprosyn (all NSAID Medication) or any other blood thinners 5 days before your procedure unless directed by your physician.  Also hold all over the counter vitamins and herbal supplements for 5 days prior to your procedure.  · You may take Tylenol or Acetaminophen up the day of surgery for any pain.        I have read or had read and explained to me, and understand the above information.    Additional comments or instructions:  For additional questions call [phone]        Pre-Op Bathing Instructions    Before surgery, you can play an important role in your own health.    Because skin is not sterile, we need to be sure that your skin is as free of germs as possible. By following the instructions below, you can reduce the number of germs on your skin before surgery.    IMPORTANT: You will need to shower with a  special soap called Hibiclens*, available at any pharmacy, over the counter usually in the first aid isle.  If you are allergic to Chlorhexidine (the antiseptic in Hibiclens), use an antibacterial soap such as Dial Soap for your preoperative showers.  You will shower with Hibiclens the night before and the morning of surgery. Both the night before your surgery and the morning of your surgery see steps 2 and 3.   Do not use Hibiclens on the head, face or genitals to avoid injury to those areas.    STEP #1  1.  Shower with Hibiclens solution night before and the morning of surgery.      STEP #2: THE NIGHT BEFORE YOUR SURGERY     1. Do not shave the area of your body where your surgery will be performed.  2. Wash your hair as usual with your normal  Shampoo. Wash body shoulder to toes with your normal soap.  3. Squeeze Hibiclens into hand apply to surgical site.   4. Wash the site gently for five (5) minutes. Do not scrub your skin too hard.   5. Do not wash with your regular soap after Hibiclens is used.  6. Rinse your body thoroughly.  7. Pat yourself dry with a clean, soft towel.  8. Do not use lotion, cream, or powder.  9. Wear clean clothes.    STEP #3: THE MORNING OF YOUR SURGERY     1. Repeat Step #2.    * Not to be used by people allergic to Chlorhexidine.          ANESTHESIA  -For the first 24 hours after surgery:  Do not drive, use heavy equipment, make important decisions, or drink alcohol  -It is normal to feel sleepy for several hours.  Rest until you are more awake.  -Have someone stay with you, if needed.  They can watch for problems and help keep you safe.  -Some possible post anesthesia side effects include: nausea and vomiting, sore throat and hoarseness, sleepiness, and dizziness.

## 2019-11-21 ENCOUNTER — PATIENT MESSAGE (OUTPATIENT)
Dept: SURGERY | Facility: HOSPITAL | Age: 44
End: 2019-11-21

## 2019-11-22 ENCOUNTER — CLINICAL SUPPORT (OUTPATIENT)
Dept: REHABILITATION | Facility: HOSPITAL | Age: 44
End: 2019-11-22
Attending: NEUROLOGICAL SURGERY
Payer: COMMERCIAL

## 2019-11-22 DIAGNOSIS — M54.42 CHRONIC BILATERAL LOW BACK PAIN WITH BILATERAL SCIATICA: ICD-10-CM

## 2019-11-22 DIAGNOSIS — M51.16 LUMBAR DISC HERNIATION WITH RADICULOPATHY: ICD-10-CM

## 2019-11-22 DIAGNOSIS — M54.41 CHRONIC BILATERAL LOW BACK PAIN WITH BILATERAL SCIATICA: ICD-10-CM

## 2019-11-22 DIAGNOSIS — G89.29 CHRONIC BILATERAL LOW BACK PAIN WITH BILATERAL SCIATICA: ICD-10-CM

## 2019-11-22 DIAGNOSIS — M51.36 DDD (DEGENERATIVE DISC DISEASE), LUMBAR: ICD-10-CM

## 2019-11-22 PROCEDURE — 97110 THERAPEUTIC EXERCISES: CPT | Mod: PN | Performed by: PHYSICAL THERAPIST

## 2019-11-22 PROCEDURE — 97012 MECHANICAL TRACTION THERAPY: CPT | Mod: PN | Performed by: PHYSICAL THERAPIST

## 2019-11-25 RX ORDER — TIZANIDINE HYDROCHLORIDE 4 MG/1
CAPSULE, GELATIN COATED ORAL
Qty: 90 CAPSULE | Refills: 0 | Status: SHIPPED | OUTPATIENT
Start: 2019-11-25 | End: 2019-12-26

## 2019-11-25 NOTE — PROGRESS NOTES
"  Physical Therapy Daily Treatment Note     [facility]  [facility] [id number]    Therapy Diagnosis:   Encounter Diagnoses   Name Primary?    Chronic bilateral low back pain with bilateral sciatica     Lumbar disc herniation with radiculopathy     DDD (degenerative disc disease), lumbar      Physician: Jose L Brown MD    Visit Date: 11/22/2019    Physician Orders: PT Eval and Treat   --Right L3-4 and Left L4-5 disc herniation with radiculopathy. Lumbar spine physical therapy 3 times a week for 6 weeks.   Back extensor and core muscles strengthening. Myofascial release. Laura exercises. Home exercises.  Medical Diagnosis from Referral: M51.16 (ICD-10-CM) - Lumbar disc herniation with radiculopathy  Evaluation Date: 10/11/2019  Authorization Period Expiration: 12/31/2019  Plan of Care Expiration: 11/22/2019    Visit # / Visits authorized: 10/ 60  FOTO: 10/10    Time In: 300  Time Out: 400  Total Billable Time: 60 minutes (1 traction, 3 TE)  Precautions: Standard, Diabetes and morbid obesity    Subjective     Pt reports: ready for DC today, continuing HEP  He was compliant with home exercise program per patient   Response to previous treatment: decreased pain  Functional change: none  Pain: 2/10  Location: bilateral low back right side pain greater than left      Objective     Kirill received therapeutic exercises to develop strength, endurance and core stabilization for 50 minutes.  Bracing with marching - 4 marches, 20x  Piriformis stretch - 30" hold, 5x bilateral  Pelvic Tilts - 3x10, 5"  Reverse Crunches on blue peanut in supine 3x10  Standing hip abduction:  2x10 B  Step ups:  Blue 3x10 B (with TAs)  Leg Press:  5.0 2x10 DL  Standing punch outs: purple 2x15 bilateral  Standing chops and lifts: GTB 2x15 bilateral  Scap retractions: purple SC 2x15  Straight arm pull downs: GTB 3x10    Mechanical Traction 10': 150lb high 75lb low    Range of Motion/Strength:   Thoracolumbar AROM " Pain/Dysfunction with Movement   Flexion 55 Pain   Extension 25 Pain   Right side bending 15     Left side bending 15           L/E MMT Right Left Pain/Dysfunction with Movement   Hip Flexion 5/5 5/5     Hip Extension 4+/5 4+/5     Hip Abduction 5/5 4/5     Hip Adduction 5/5 5/5     Knee Flexion 5/5 5/5     Knee Extension 4+/5 4+/5     Ankle DF 5/5 5/5     Ankle PF 5/5 5/5             Home Exercises Provided and Patient Education Provided     Education provided:   - Reviewed log roll technique for supine<>sit and bed mobility  - Postural awareness and TAs with daily activity.  - Technique for car transfers and use of lumbar roll to decrease strain especially with long drive.    Written Home Exercises Provided: Patient instructed to cont prior HEP.  Exercises were reviewed and Kirill was able to demonstrate them prior to the end of the session.  Kirill demonstrated fair  understanding of the education provided.     See EMR under Patient Instructions for exercises provided 10/11/2019.      Assessment   Pt reports fatigue but no increase in pain in hips or back with therex. Pt will continue HEP to further improve core stability prior to and after surgery.       Kirill is progressing well towards his goals.   Pt prognosis is Guarded.     Pt will continue to benefit from skilled outpatient physical therapy to address the deficits listed in the problem list box on initial evaluation, provide pt/family education and to maximize pt's level of independence in the home and community environment.     Pt's spiritual, cultural and educational needs considered and pt agreeable to plan of care and goals.    Anticipated barriers to physical therapy: High BMI, profession    Goals:  Long Term Goals (6 Weeks):  1. Pt will be compliant with HEP to supplement PT in decreasing pain with functional mobility.  MET  2. Pt will improve FOTO score to </= 50% limited to decrease perceived limitation with maintaining/changing body position.   NOT  MET  3. Pt will improve impaired LE MMTs to >/=4+/5 to improve strength for functional tasks.   MET  4. Pt will report no pain during lumbar ROM to promote functional mobility.  MET      Plan     MATHEUS Miramontes PT       Outpatient Therapy Discharge Summary     Name: Kirill Gandhi [facility]  Clinic Number: [id number]    Therapy Diagnosis:   Encounter Diagnoses   Name Primary?    Chronic bilateral low back pain with bilateral sciatica     Lumbar disc herniation with radiculopathy     DDD (degenerative disc disease), lumbar      Physician: Jose L Brown MD     Physician Orders: PT Eval and Treat   --Right L3-4 and Left L4-5 disc herniation with radiculopathy. Lumbar spine physical therapy 3 times a week for 6 weeks.   Back extensor and core muscles strengthening. Myofascial release. Laura exercises. Home exercises.  Medical Diagnosis from Referral: M51.16 (ICD-10-CM) - Lumbar disc herniation with radiculopathy  Evaluation Date: 10/11/2019      Date of Last visit: 11/22/2019  Total Visits Received: 10      Assessment    Goals: Please see above for advancement toward functional goals    Discharge reason: Patient has completed the physician's prescription, Patient has reached the maximum rehab potential for the present time and Patient requested discharge    Plan   This patient is discharged from Physical Therapy

## 2019-11-27 RX ORDER — DIAZEPAM 2 MG/1
TABLET ORAL
Qty: 60 TABLET | Refills: 0 | Status: SHIPPED | OUTPATIENT
Start: 2019-11-27 | End: 2020-01-21 | Stop reason: SDUPTHER

## 2019-11-29 ENCOUNTER — HOSPITAL ENCOUNTER (OUTPATIENT)
Facility: HOSPITAL | Age: 44
Discharge: HOME | End: 2019-11-30
Attending: NEUROLOGICAL SURGERY | Admitting: NEUROLOGICAL SURGERY
Payer: COMMERCIAL

## 2019-11-29 ENCOUNTER — ANESTHESIA (OUTPATIENT)
Dept: SURGERY | Facility: HOSPITAL | Age: 44
End: 2019-11-29
Payer: COMMERCIAL

## 2019-11-29 DIAGNOSIS — M51.16 LUMBAR DISC HERNIATION WITH RADICULOPATHY: ICD-10-CM

## 2019-11-29 DIAGNOSIS — E11.9 TYPE 2 DIABETES MELLITUS WITHOUT COMPLICATION: ICD-10-CM

## 2019-11-29 DIAGNOSIS — G47.33 OSA (OBSTRUCTIVE SLEEP APNEA): Primary | ICD-10-CM

## 2019-11-29 LAB
ABO + RH BLD: NORMAL
BLD GP AB SCN CELLS X3 SERPL QL: NORMAL
POCT GLUCOSE: 100 MG/DL (ref 70–110)
POCT GLUCOSE: 111 MG/DL (ref 70–110)
POCT GLUCOSE: 135 MG/DL (ref 70–110)

## 2019-11-29 PROCEDURE — 63047 PR LAMINEC/FACETECT/FORAMIN,LUMBAR 1 SEG: ICD-10-PCS | Mod: 22,,, | Performed by: NEUROLOGICAL SURGERY

## 2019-11-29 PROCEDURE — 25000003 PHARM REV CODE 250: Performed by: NEUROLOGICAL SURGERY

## 2019-11-29 PROCEDURE — 63600175 PHARM REV CODE 636 W HCPCS: Performed by: NURSE PRACTITIONER

## 2019-11-29 PROCEDURE — 94660 CPAP INITIATION&MGMT: CPT

## 2019-11-29 PROCEDURE — 86850 RBC ANTIBODY SCREEN: CPT

## 2019-11-29 PROCEDURE — 99900035 HC TECH TIME PER 15 MIN (STAT)

## 2019-11-29 PROCEDURE — 63600175 PHARM REV CODE 636 W HCPCS: Performed by: ANESTHESIOLOGY

## 2019-11-29 PROCEDURE — 97161 PT EVAL LOW COMPLEX 20 MIN: CPT

## 2019-11-29 PROCEDURE — 36000711: Performed by: NEUROLOGICAL SURGERY

## 2019-11-29 PROCEDURE — 63048 LAM FACETEC &FORAMOT EA ADDL: CPT | Mod: ,,, | Performed by: NEUROLOGICAL SURGERY

## 2019-11-29 PROCEDURE — C1729 CATH, DRAINAGE: HCPCS | Performed by: NEUROLOGICAL SURGERY

## 2019-11-29 PROCEDURE — 25000003 PHARM REV CODE 250: Performed by: PHYSICIAN ASSISTANT

## 2019-11-29 PROCEDURE — 63600175 PHARM REV CODE 636 W HCPCS: Performed by: NEUROLOGICAL SURGERY

## 2019-11-29 PROCEDURE — 63048 PR LAMINECT/FACETECT/FORAMINOT, EA ADDTL VERTEBRAL SEGM: ICD-10-PCS | Mod: ,,, | Performed by: NEUROLOGICAL SURGERY

## 2019-11-29 PROCEDURE — 63600175 PHARM REV CODE 636 W HCPCS: Performed by: NURSE ANESTHETIST, CERTIFIED REGISTERED

## 2019-11-29 PROCEDURE — 94799 UNLISTED PULMONARY SVC/PX: CPT

## 2019-11-29 PROCEDURE — 94761 N-INVAS EAR/PLS OXIMETRY MLT: CPT

## 2019-11-29 PROCEDURE — 97165 OT EVAL LOW COMPLEX 30 MIN: CPT

## 2019-11-29 PROCEDURE — 63047 LAM FACETEC & FORAMOT LUMBAR: CPT | Mod: 22,,, | Performed by: NEUROLOGICAL SURGERY

## 2019-11-29 PROCEDURE — 63600175 PHARM REV CODE 636 W HCPCS: Performed by: PHYSICIAN ASSISTANT

## 2019-11-29 PROCEDURE — 97535 SELF CARE MNGMENT TRAINING: CPT

## 2019-11-29 PROCEDURE — 27000190 HC CPAP FULL FACE MASK W/VALVE

## 2019-11-29 PROCEDURE — 36415 COLL VENOUS BLD VENIPUNCTURE: CPT

## 2019-11-29 PROCEDURE — 37000009 HC ANESTHESIA EA ADD 15 MINS: Performed by: NEUROLOGICAL SURGERY

## 2019-11-29 PROCEDURE — 36000710: Performed by: NEUROLOGICAL SURGERY

## 2019-11-29 PROCEDURE — 71000033 HC RECOVERY, INTIAL HOUR: Performed by: NEUROLOGICAL SURGERY

## 2019-11-29 PROCEDURE — 37000008 HC ANESTHESIA 1ST 15 MINUTES: Performed by: NEUROLOGICAL SURGERY

## 2019-11-29 PROCEDURE — 25000003 PHARM REV CODE 250: Performed by: NURSE ANESTHETIST, CERTIFIED REGISTERED

## 2019-11-29 RX ORDER — NEOSTIGMINE METHYLSULFATE 1 MG/ML
INJECTION, SOLUTION INTRAVENOUS
Status: DISCONTINUED | OUTPATIENT
Start: 2019-11-29 | End: 2019-11-29

## 2019-11-29 RX ORDER — PROPOFOL 10 MG/ML
VIAL (ML) INTRAVENOUS
Status: DISCONTINUED | OUTPATIENT
Start: 2019-11-29 | End: 2019-11-29

## 2019-11-29 RX ORDER — MUPIROCIN 20 MG/G
1 OINTMENT TOPICAL 2 TIMES DAILY
Status: DISCONTINUED | OUTPATIENT
Start: 2019-11-29 | End: 2019-11-30 | Stop reason: HOSPADM

## 2019-11-29 RX ORDER — VALSARTAN AND HYDROCHLOROTHIAZIDE 320; 12.5 MG/1; MG/1
1 TABLET, FILM COATED ORAL DAILY
Status: DISCONTINUED | OUTPATIENT
Start: 2019-11-29 | End: 2019-11-29 | Stop reason: CLARIF

## 2019-11-29 RX ORDER — GLYCOPYRROLATE 0.2 MG/ML
INJECTION INTRAMUSCULAR; INTRAVENOUS
Status: DISCONTINUED | OUTPATIENT
Start: 2019-11-29 | End: 2019-11-29

## 2019-11-29 RX ORDER — GABAPENTIN 300 MG/1
600 CAPSULE ORAL 3 TIMES DAILY
Status: DISCONTINUED | OUTPATIENT
Start: 2019-11-29 | End: 2019-11-30 | Stop reason: HOSPADM

## 2019-11-29 RX ORDER — BACITRACIN 50000 [IU]/1
INJECTION, POWDER, FOR SOLUTION INTRAMUSCULAR
Status: DISCONTINUED | OUTPATIENT
Start: 2019-11-29 | End: 2019-11-29 | Stop reason: HOSPADM

## 2019-11-29 RX ORDER — ACETAMINOPHEN 325 MG/1
650 TABLET ORAL EVERY 6 HOURS
Status: DISCONTINUED | OUTPATIENT
Start: 2019-11-29 | End: 2019-11-30 | Stop reason: HOSPADM

## 2019-11-29 RX ORDER — ROCURONIUM BROMIDE 10 MG/ML
INJECTION, SOLUTION INTRAVENOUS
Status: DISCONTINUED | OUTPATIENT
Start: 2019-11-29 | End: 2019-11-29

## 2019-11-29 RX ORDER — SODIUM CHLORIDE 9 MG/ML
INJECTION, SOLUTION INTRAVENOUS CONTINUOUS
Status: DISCONTINUED | OUTPATIENT
Start: 2019-11-29 | End: 2019-11-29

## 2019-11-29 RX ORDER — HYDROMORPHONE HYDROCHLORIDE 2 MG/ML
0.5 INJECTION, SOLUTION INTRAMUSCULAR; INTRAVENOUS; SUBCUTANEOUS EVERY 5 MIN PRN
Status: DISCONTINUED | OUTPATIENT
Start: 2019-11-29 | End: 2019-11-29 | Stop reason: HOSPADM

## 2019-11-29 RX ORDER — SODIUM CHLORIDE 0.9 % (FLUSH) 0.9 %
10 SYRINGE (ML) INJECTION
Status: DISCONTINUED | OUTPATIENT
Start: 2019-11-29 | End: 2019-11-30 | Stop reason: HOSPADM

## 2019-11-29 RX ORDER — ONDANSETRON 2 MG/ML
4 INJECTION INTRAMUSCULAR; INTRAVENOUS ONCE AS NEEDED
Status: DISCONTINUED | OUTPATIENT
Start: 2019-11-29 | End: 2019-11-29 | Stop reason: HOSPADM

## 2019-11-29 RX ORDER — CYCLOBENZAPRINE HCL 10 MG
10 TABLET ORAL
Status: COMPLETED | OUTPATIENT
Start: 2019-11-29 | End: 2019-11-29

## 2019-11-29 RX ORDER — CELECOXIB 100 MG/1
200 CAPSULE ORAL 2 TIMES DAILY
Status: DISCONTINUED | OUTPATIENT
Start: 2019-11-29 | End: 2019-11-30 | Stop reason: HOSPADM

## 2019-11-29 RX ORDER — ATORVASTATIN CALCIUM 10 MG/1
10 TABLET, FILM COATED ORAL EVERY OTHER DAY
Status: DISCONTINUED | OUTPATIENT
Start: 2019-11-30 | End: 2019-11-30 | Stop reason: HOSPADM

## 2019-11-29 RX ORDER — IBUPROFEN 200 MG
16 TABLET ORAL
Status: DISCONTINUED | OUTPATIENT
Start: 2019-11-29 | End: 2019-11-30 | Stop reason: HOSPADM

## 2019-11-29 RX ORDER — LIDOCAINE HYDROCHLORIDE 10 MG/ML
1 INJECTION, SOLUTION EPIDURAL; INFILTRATION; INTRACAUDAL; PERINEURAL ONCE
Status: DISCONTINUED | OUTPATIENT
Start: 2019-11-29 | End: 2019-11-29 | Stop reason: HOSPADM

## 2019-11-29 RX ORDER — CANDESARTAN 16 MG/1
16 TABLET ORAL DAILY
Status: DISCONTINUED | OUTPATIENT
Start: 2019-11-30 | End: 2019-11-30 | Stop reason: HOSPADM

## 2019-11-29 RX ORDER — ACETAMINOPHEN 500 MG
1000 TABLET ORAL
Status: COMPLETED | OUTPATIENT
Start: 2019-11-29 | End: 2019-11-29

## 2019-11-29 RX ORDER — LIDOCAINE HCL/PF 100 MG/5ML
SYRINGE (ML) INTRAVENOUS
Status: DISCONTINUED | OUTPATIENT
Start: 2019-11-29 | End: 2019-11-29

## 2019-11-29 RX ORDER — INSULIN ASPART 100 [IU]/ML
1-10 INJECTION, SOLUTION INTRAVENOUS; SUBCUTANEOUS
Status: DISCONTINUED | OUTPATIENT
Start: 2019-11-29 | End: 2019-11-30 | Stop reason: HOSPADM

## 2019-11-29 RX ORDER — FENTANYL CITRATE 50 UG/ML
INJECTION, SOLUTION INTRAMUSCULAR; INTRAVENOUS
Status: DISCONTINUED | OUTPATIENT
Start: 2019-11-29 | End: 2019-11-29

## 2019-11-29 RX ORDER — PREGABALIN 75 MG/1
75 CAPSULE ORAL
Status: COMPLETED | OUTPATIENT
Start: 2019-11-29 | End: 2019-11-29

## 2019-11-29 RX ORDER — VANCOMYCIN HYDROCHLORIDE 1 G/20ML
INJECTION, POWDER, LYOPHILIZED, FOR SOLUTION INTRAVENOUS
Status: DISCONTINUED | OUTPATIENT
Start: 2019-11-29 | End: 2019-11-29 | Stop reason: HOSPADM

## 2019-11-29 RX ORDER — SODIUM CHLORIDE, SODIUM LACTATE, POTASSIUM CHLORIDE, CALCIUM CHLORIDE 600; 310; 30; 20 MG/100ML; MG/100ML; MG/100ML; MG/100ML
INJECTION, SOLUTION INTRAVENOUS CONTINUOUS
Status: DISCONTINUED | OUTPATIENT
Start: 2019-11-29 | End: 2019-11-29

## 2019-11-29 RX ORDER — IBUPROFEN 200 MG
24 TABLET ORAL
Status: DISCONTINUED | OUTPATIENT
Start: 2019-11-29 | End: 2019-11-30 | Stop reason: HOSPADM

## 2019-11-29 RX ORDER — GLUCAGON 1 MG
1 KIT INJECTION
Status: DISCONTINUED | OUTPATIENT
Start: 2019-11-29 | End: 2019-11-30 | Stop reason: HOSPADM

## 2019-11-29 RX ORDER — DIAZEPAM 2 MG/1
2 TABLET ORAL EVERY 12 HOURS PRN
Status: DISCONTINUED | OUTPATIENT
Start: 2019-11-29 | End: 2019-11-30 | Stop reason: HOSPADM

## 2019-11-29 RX ORDER — BUPROPION HYDROCHLORIDE 150 MG/1
150 TABLET ORAL DAILY
Status: DISCONTINUED | OUTPATIENT
Start: 2019-11-29 | End: 2019-11-30 | Stop reason: HOSPADM

## 2019-11-29 RX ORDER — HEPARIN SODIUM 5000 [USP'U]/ML
5000 INJECTION, SOLUTION INTRAVENOUS; SUBCUTANEOUS EVERY 8 HOURS
Status: DISCONTINUED | OUTPATIENT
Start: 2019-11-29 | End: 2019-11-30 | Stop reason: HOSPADM

## 2019-11-29 RX ORDER — SUCCINYLCHOLINE CHLORIDE 20 MG/ML
INJECTION INTRAMUSCULAR; INTRAVENOUS
Status: DISCONTINUED | OUTPATIENT
Start: 2019-11-29 | End: 2019-11-29

## 2019-11-29 RX ORDER — BISACODYL 10 MG
10 SUPPOSITORY, RECTAL RECTAL DAILY
Status: DISCONTINUED | OUTPATIENT
Start: 2019-11-29 | End: 2019-11-30 | Stop reason: HOSPADM

## 2019-11-29 RX ORDER — CELECOXIB 100 MG/1
200 CAPSULE ORAL
Status: COMPLETED | OUTPATIENT
Start: 2019-11-29 | End: 2019-11-29

## 2019-11-29 RX ORDER — TRAMADOL HYDROCHLORIDE 50 MG/1
100 TABLET ORAL EVERY 6 HOURS
Status: DISCONTINUED | OUTPATIENT
Start: 2019-11-29 | End: 2019-11-30 | Stop reason: HOSPADM

## 2019-11-29 RX ORDER — METFORMIN HYDROCHLORIDE 500 MG/1
1000 TABLET ORAL 2 TIMES DAILY WITH MEALS
Status: DISCONTINUED | OUTPATIENT
Start: 2019-11-29 | End: 2019-11-30 | Stop reason: HOSPADM

## 2019-11-29 RX ORDER — HYDROCHLOROTHIAZIDE 12.5 MG/1
12.5 TABLET ORAL DAILY
Status: DISCONTINUED | OUTPATIENT
Start: 2019-11-30 | End: 2019-11-30 | Stop reason: HOSPADM

## 2019-11-29 RX ORDER — OXYCODONE HCL 10 MG/1
10 TABLET, FILM COATED, EXTENDED RELEASE ORAL
Status: COMPLETED | OUTPATIENT
Start: 2019-11-29 | End: 2019-11-29

## 2019-11-29 RX ORDER — HYDROMORPHONE HYDROCHLORIDE 1 MG/ML
1 INJECTION, SOLUTION INTRAMUSCULAR; INTRAVENOUS; SUBCUTANEOUS
Status: DISCONTINUED | OUTPATIENT
Start: 2019-11-29 | End: 2019-11-30 | Stop reason: HOSPADM

## 2019-11-29 RX ORDER — MAG HYDROX/ALUMINUM HYD/SIMETH 200-200-20
30 SUSPENSION, ORAL (FINAL DOSE FORM) ORAL EVERY 4 HOURS PRN
Status: DISCONTINUED | OUTPATIENT
Start: 2019-11-29 | End: 2019-11-30 | Stop reason: HOSPADM

## 2019-11-29 RX ORDER — PHENYLEPHRINE HYDROCHLORIDE 10 MG/ML
INJECTION INTRAVENOUS
Status: DISCONTINUED | OUTPATIENT
Start: 2019-11-29 | End: 2019-11-29

## 2019-11-29 RX ORDER — MIDAZOLAM HYDROCHLORIDE 1 MG/ML
INJECTION INTRAMUSCULAR; INTRAVENOUS
Status: DISCONTINUED | OUTPATIENT
Start: 2019-11-29 | End: 2019-11-29

## 2019-11-29 RX ORDER — ONDANSETRON 8 MG/1
8 TABLET, ORALLY DISINTEGRATING ORAL EVERY 6 HOURS PRN
Status: DISCONTINUED | OUTPATIENT
Start: 2019-11-29 | End: 2019-11-30 | Stop reason: HOSPADM

## 2019-11-29 RX ORDER — ONDANSETRON HYDROCHLORIDE 2 MG/ML
INJECTION, SOLUTION INTRAMUSCULAR; INTRAVENOUS
Status: DISCONTINUED | OUTPATIENT
Start: 2019-11-29 | End: 2019-11-29

## 2019-11-29 RX ORDER — AMOXICILLIN 250 MG
2 CAPSULE ORAL NIGHTLY PRN
Status: DISCONTINUED | OUTPATIENT
Start: 2019-11-29 | End: 2019-11-30 | Stop reason: HOSPADM

## 2019-11-29 RX ADMIN — OXYCODONE HYDROCHLORIDE 10 MG: 10 TABLET, FILM COATED, EXTENDED RELEASE ORAL at 06:11

## 2019-11-29 RX ADMIN — PHENYLEPHRINE HYDROCHLORIDE 100 MCG: 10 INJECTION INTRAVENOUS at 07:11

## 2019-11-29 RX ADMIN — TRAMADOL HYDROCHLORIDE 100 MG: 50 TABLET, FILM COATED ORAL at 05:11

## 2019-11-29 RX ADMIN — PHENYLEPHRINE HYDROCHLORIDE 100 MCG: 10 INJECTION INTRAVENOUS at 08:11

## 2019-11-29 RX ADMIN — HYDROMORPHONE HYDROCHLORIDE 1 MG: 1 INJECTION, SOLUTION INTRAMUSCULAR; INTRAVENOUS; SUBCUTANEOUS at 03:11

## 2019-11-29 RX ADMIN — ACETAMINOPHEN 1000 MG: 500 TABLET ORAL at 06:11

## 2019-11-29 RX ADMIN — NEOSTIGMINE METHYLSULFATE 5 MG: 1 INJECTION INTRAVENOUS at 09:11

## 2019-11-29 RX ADMIN — ROCURONIUM BROMIDE 25 MG: 10 INJECTION, SOLUTION INTRAVENOUS at 07:11

## 2019-11-29 RX ADMIN — PHENYLEPHRINE HYDROCHLORIDE 200 MCG: 10 INJECTION INTRAVENOUS at 07:11

## 2019-11-29 RX ADMIN — METFORMIN HYDROCHLORIDE 1000 MG: 500 TABLET ORAL at 05:11

## 2019-11-29 RX ADMIN — ROCURONIUM BROMIDE 5 MG: 10 INJECTION, SOLUTION INTRAVENOUS at 07:11

## 2019-11-29 RX ADMIN — GABAPENTIN 600 MG: 300 CAPSULE ORAL at 09:11

## 2019-11-29 RX ADMIN — FENTANYL CITRATE 50 MCG: 50 INJECTION, SOLUTION INTRAMUSCULAR; INTRAVENOUS at 09:11

## 2019-11-29 RX ADMIN — SODIUM CHLORIDE, SODIUM LACTATE, POTASSIUM CHLORIDE, AND CALCIUM CHLORIDE 10 ML/HR: .6; .31; .03; .02 INJECTION, SOLUTION INTRAVENOUS at 06:11

## 2019-11-29 RX ADMIN — GLYCOPYRROLATE 0.8 MG: 0.2 INJECTION, SOLUTION INTRAMUSCULAR; INTRAVENOUS at 09:11

## 2019-11-29 RX ADMIN — ACETAMINOPHEN 650 MG: 325 TABLET ORAL at 12:11

## 2019-11-29 RX ADMIN — ONDANSETRON 8 MG: 2 INJECTION, SOLUTION INTRAMUSCULAR; INTRAVENOUS at 09:11

## 2019-11-29 RX ADMIN — FENTANYL CITRATE 100 MCG: 50 INJECTION, SOLUTION INTRAMUSCULAR; INTRAVENOUS at 08:11

## 2019-11-29 RX ADMIN — SODIUM CHLORIDE: 0.9 INJECTION, SOLUTION INTRAVENOUS at 12:11

## 2019-11-29 RX ADMIN — HEPARIN SODIUM 5000 UNITS: 5000 INJECTION, SOLUTION INTRAVENOUS; SUBCUTANEOUS at 09:11

## 2019-11-29 RX ADMIN — SUCCINYLCHOLINE CHLORIDE 120 MG: 20 INJECTION, SOLUTION INTRAMUSCULAR; INTRAVENOUS at 07:11

## 2019-11-29 RX ADMIN — DEXTROSE 3 G: 50 INJECTION, SOLUTION INTRAVENOUS at 07:11

## 2019-11-29 RX ADMIN — CELECOXIB 200 MG: 100 CAPSULE ORAL at 06:11

## 2019-11-29 RX ADMIN — SODIUM CHLORIDE, SODIUM LACTATE, POTASSIUM CHLORIDE, AND CALCIUM CHLORIDE: .6; .31; .03; .02 INJECTION, SOLUTION INTRAVENOUS at 09:11

## 2019-11-29 RX ADMIN — HYDROMORPHONE HYDROCHLORIDE 1 MG: 1 INJECTION, SOLUTION INTRAMUSCULAR; INTRAVENOUS; SUBCUTANEOUS at 06:11

## 2019-11-29 RX ADMIN — HYDROMORPHONE HYDROCHLORIDE 0.5 MG: 2 INJECTION, SOLUTION INTRAMUSCULAR; INTRAVENOUS; SUBCUTANEOUS at 10:11

## 2019-11-29 RX ADMIN — CYCLOBENZAPRINE 10 MG: 10 TABLET, FILM COATED ORAL at 06:11

## 2019-11-29 RX ADMIN — PROPOFOL 200 MG: 10 INJECTION, EMULSION INTRAVENOUS at 07:11

## 2019-11-29 RX ADMIN — CELECOXIB 200 MG: 100 CAPSULE ORAL at 12:11

## 2019-11-29 RX ADMIN — ACETAMINOPHEN 650 MG: 325 TABLET ORAL at 05:11

## 2019-11-29 RX ADMIN — MUPIROCIN 1 G: 20 OINTMENT TOPICAL at 09:11

## 2019-11-29 RX ADMIN — CELECOXIB 200 MG: 100 CAPSULE ORAL at 09:11

## 2019-11-29 RX ADMIN — MIDAZOLAM HYDROCHLORIDE 2 MG: 1 INJECTION, SOLUTION INTRAMUSCULAR; INTRAVENOUS at 06:11

## 2019-11-29 RX ADMIN — FENTANYL CITRATE 100 MCG: 50 INJECTION, SOLUTION INTRAMUSCULAR; INTRAVENOUS at 07:11

## 2019-11-29 RX ADMIN — HEPARIN SODIUM 5000 UNITS: 5000 INJECTION, SOLUTION INTRAVENOUS; SUBCUTANEOUS at 02:11

## 2019-11-29 RX ADMIN — TRAMADOL HYDROCHLORIDE 100 MG: 50 TABLET, FILM COATED ORAL at 12:11

## 2019-11-29 RX ADMIN — MUPIROCIN 1 G: 20 OINTMENT TOPICAL at 12:11

## 2019-11-29 RX ADMIN — GABAPENTIN 600 MG: 300 CAPSULE ORAL at 02:11

## 2019-11-29 RX ADMIN — SODIUM CHLORIDE, SODIUM LACTATE, POTASSIUM CHLORIDE, AND CALCIUM CHLORIDE: .6; .31; .03; .02 INJECTION, SOLUTION INTRAVENOUS at 08:11

## 2019-11-29 RX ADMIN — HYDROMORPHONE HYDROCHLORIDE 1 MG: 1 INJECTION, SOLUTION INTRAMUSCULAR; INTRAVENOUS; SUBCUTANEOUS at 10:11

## 2019-11-29 RX ADMIN — PREGABALIN 75 MG: 75 CAPSULE ORAL at 06:11

## 2019-11-29 RX ADMIN — LIDOCAINE HYDROCHLORIDE 60 MG: 20 INJECTION, SOLUTION INTRAVENOUS at 07:11

## 2019-11-29 NOTE — PT/OT/SLP EVAL
"Occupational Therapy   Evaluation    Name: Kirill Gandhi III  MRN: [id number]  Admitting Diagnosis:  Lumbar disc herniation with radiculopathy Day of Surgery    Recommendations:     Discharge Recommendations: (OP PT When cleared by MD)  Discharge Equipment Recommendations:  shower chair  Barriers to discharge:  None    Assessment:     Kirill Gandhi III is a 44 y.o. male with a medical diagnosis of Lumbar disc herniation with radiculopathy.  He presents with s/o lumbar surgery . Performance deficits affecting function: weakness, impaired self care skills, impaired balance, impaired functional mobilty, impaired endurance, gait instability, pain, decreased ROM, impaired skin, orthopedic precautions.      Pt would benefit from cont OT services in order to maximize functional independence. Recommending shower chair for home use. OP PT when cleared by MD     Rehab Prognosis: Good; patient would benefit from acute skilled OT services to address these deficits and reach maximum level of function.       Plan:     Patient to be seen 5 x/week to address the above listed problems via self-care/home management, therapeutic activities, therapeutic exercises  · Plan of Care Expires: 12/29/19  · Plan of Care Reviewed with: patient, daughter, mother    Subjective     Chief Complaint: low back pain   Patient/Family Comments/goals: "to not hurt no more"     Occupational Profile:  Living Environment: mother and dghtr, SSH , threshold to enter, t/s combo  Previous level of function: reports independence; working at a  ; drives; SPC PRN with increased pain   Equipment Used at Home:  cane, straight  Assistance upon Discharge: from mother     Pain/Comfort:  · Pain Rating 1: 8/10  · Location - Orientation 1: lower  · Location 1: lumbar spine  · Pain Addressed 1: Reposition, Distraction, Pre-medicate for activity, Nurse notified    Patients cultural, spiritual, Christian conflicts given the current " situation:      Objective:     Communicated with: nsg prior to session.    General Precautions: Standard, fall   Orthopedic Precautions:spinal precautions   Braces: N/A     Occupational Performance:    Bed Mobility:    · Patient completed Rolling/Turning to Right with contact guard assistance  · Patient completed Scooting/Bridging with contact guard assistance  · Patient completed Supine to Sit with contact guard assistance and minimum assistance  · Patient completed Sit to Supine with contact guard assistance and minimum assistance    Functional Mobility/Transfers:  · Patient completed Sit <> Stand Transfer with contact guard assistance and minimum assistance  with  rolling walker   · Functional Mobility: CGA with RW; verbal/visual cues for safety and management of RW     Activities of Daily Living:  · Lower Body Dressing: total assistance    · Toileting: contact guard assistance and minimum assistance use of urinal in standing in bathroom     Cognitive/Visual Perceptual:  Cognitive/Psychosocial Skills:  -       Oriented to: Person, Place, Time and Situation   -       Follows Commands/attention:Follows multistep  commands  -       Communication: clear/fluent  -       Memory: No Deficits noted  -       Safety awareness/insight to disability: intact   -       Mood/Affect/Coping skills/emotional control: Lethargic    Physical Exam:  Balance:    -       WFL sitting- limited by pain; CGA standing   Postural examination/scapula alignment:    -       Rounded shoulders  Skin integrity: Wound surgical lumbar spine  Sensation:    -       Intact  Upper Extremity Range of Motion:   WFL based on observed function during session   Upper Extremity Strength:  WFL based on observed function during session    Strength:  Good     AMPAC 6 Click ADL:  AMPAC Total Score: 18    Treatment & Education:  Pt educated on back precautions prior to OOB axs   Educated on log rolling technique for bed mobility with pt return demonstration  CGA- Min A  Therapist demonstrating use of RW and turning precautions while maintaining spinal precautions prior to mobility trials; Functional mobility in room and hallway CGA with RW  Pt performing toileting CGA/min A with urinal while standing; unable to void at this time   Returned to supine log rolling min A   Therapist demonstrating adaptive LBD technique- pt verbalizing understanding   Education:    Patient left supine with all lines intact, call button in reach, bed alarm on and nsg notified    GOALS:   Multidisciplinary Problems     Occupational Therapy Goals        Problem: Occupational Therapy Goal    Goal Priority Disciplines Outcome Interventions   Occupational Therapy Goal     OT, PT/OT Ongoing, Progressing    Description:  Goals to be met by: 12/29/19     Patient will increase functional independence with ADLs by performing:    LE Dressing with Contact Guard Assistance.  Grooming while standing with Supervision.  Toileting from toilet with Supervision for hygiene and clothing management.   Supine to sit with Supervision.  Step transfer with Supervision  Toilet transfer to toilet with Supervision.                      History:     Past Medical History:   Diagnosis Date    Allergy     Anxiety     Back pain     CTS (carpal tunnel syndrome) 6/26/2015    Depression     Diabetes 4/17/2015    Hx of vasectomy     Hypertension     OCD (obsessive compulsive disorder)     Sleep apnea        Past Surgical History:   Procedure Laterality Date    ADENOIDECTOMY      EPIDURAL STEROID INJECTION Right 9/4/2019    Procedure: Injection, Steroid, Epidural Transforaminal;  Surgeon: Harjinder Batista Jr., MD;  Location: [facility];  Service: Pain Management;  Laterality: Right;  Right L3 + L4 TF NATALIE    [id number]  [id number]    Arrive @ 1300; ASA last 8/27; Check BG; NEEDS CONSENT    FRACTURE SURGERY      bilateral elbow fracture 3 years ago    HERNIA REPAIR      bilateral groin hernia    TONSILLECTOMY      VASECTOMY          Time Tracking:     OT Date of Treatment: 11/29/19  OT Start Time: 1336  OT Stop Time: 1400  OT Total Time (min): 24 min    Billable Minutes:Evaluation 10  Self Care/Home Management 14    Glendy Gibbs OT  11/29/2019

## 2019-11-29 NOTE — PLAN OF CARE
Problem: Occupational Therapy Goal  Goal: Occupational Therapy Goal  Description  Goals to be met by: 12/29/19     Patient will increase functional independence with ADLs by performing:    LE Dressing with Contact Guard Assistance.  Grooming while standing with Supervision.  Toileting from toilet with Supervision for hygiene and clothing management.   Supine to sit with Supervision.  Step transfer with Supervision  Toilet transfer to toilet with Supervision.     Outcome: Ongoing, Progressing       Pt would benefit from cont OT services in order to maximize functional independence. Recommending shower chair for home use. OP PT when cleared by MD

## 2019-11-29 NOTE — ANESTHESIA POSTPROCEDURE EVALUATION
Anesthesia Post Evaluation    Patient: Kirill Dentherford III    Procedure(s) Performed: Procedure(s) (LRB):  LAMINECTOMY, SPINE, LUMBAR Rigth L3-4, Left L4-5 Laminectomy, medial Facetectomy, and microdiscectomy (Bilateral)    Final Anesthesia Type: general    Patient location during evaluation: PACU  Patient participation: Yes- Able to Participate  Level of consciousness: awake and alert  Post-procedure vital signs: reviewed and stable  Pain management: adequate  Airway patency: patent  VICENTE mitigation strategies: Multimodal analgesia and Extubation while patient is awake  PONV status at discharge: No PONV  Anesthetic complications: no      Cardiovascular status: hemodynamically stable and blood pressure returned to baseline  Respiratory status: room air, unassisted and spontaneous ventilation  Hydration status: euvolemic  Follow-up not needed.          Vitals Value Taken Time   BP 87/48 11/29/2019 10:57 AM   Temp 36.5 °C (97.7 °F) 11/29/2019 10:10 AM   Pulse 84 11/29/2019 10:58 AM   Resp 11 11/29/2019 10:58 AM   SpO2 92 % 11/29/2019 10:58 AM   Vitals shown include unvalidated device data.      No case tracking events are documented in the log.      Pain/Ingrid Score: Pain Rating Prior to Med Admin: 3 (11/29/2019 10:55 AM)  Ingrid Score: 10 (11/29/2019 10:55 AM)

## 2019-11-29 NOTE — H&P
NEUROSURGICAL OUTPATIENT CONSULTATION NOTE     DATE OF SERVICE:  11/29/2019     ATTENDING PHYSICIAN:  Jose L Brown MD     CONSULT REQUESTED BY:  Self-referred     REASON FOR CONSULT:  Bilateral leg pain, low back pain     SUBJECTIVE:     HISTORY OF PRESENT ILLNESS:  This is a very pleasant 44 y.o. male, morbid obesity with BMI of 48.44.  Has been complaining of worsening low back pain and bilateral leg pain.  His main complaint is the leg pain.  He has more right than left leg pain.  The right leg pain is is in the L4 distribution in the leftleg pain is in the L5 distribution.  Pain is associated with numbness.  Pain is worse with bending forward.  Pain is interfering with walking.  The more he walks the more he has to lean forward or set.  Pain is also interfering at work when he has to stand for prolonged period of time in sometime lift up to 50 lb of weight.     He recently saw Dr. Hardin's physician assistant and a L3-4 transforaminal interbody fusion with instrumentation was recommended.  The patient is seeking a 2nd opinion.     Low Back Pain Scale  R Low Back-Pain Score: 7  R Low Back-Pain Intensity: Pain killers give moderate relief from pain  R Low Back-Pain Score: It is painful to look after myself and I am slow and careful  Low Back-Lifting: Pain prevents me from lifting heavy weights  but I can manage light weights if they are conveniently placed   Low Back-Walking: Pain prevents me walking more than .25 mile   Low Back-Sitting: Pain prevents me from sitting more than 30 minutes   Low Back-Standing: I cannot stand for longer than 10 minutes with increasing pain   Low Back-Sleeping: Because of pain my normal nights sleep is reduced by less than one quarter   Low Back-Social Life: Pain has restricted my social life and I do not go out very often   Low Back-Traveling: I have extra pain while traveling but it does not compel me to seek alternate forms of travel   Low Back-Changing Degree of Pain: My  pain seems to be getting better but improvement is slow        PAST MEDICAL HISTORY:       Active Ambulatory Problems     Diagnosis Date Noted    Joint pain 04/16/2015    Obesity 04/16/2015    Vitamin D deficiency 04/16/2015    Fatigue 04/16/2015    Bilateral hand pain 04/16/2015    Diabetes 04/17/2015    Sleep apnea 06/17/2015    CTS (carpal tunnel syndrome) 06/26/2015    Elevated sed rate 06/26/2015    DDD (degenerative disc disease), lumbar 09/04/2019           Resolved Ambulatory Problems     Diagnosis Date Noted    No Resolved Ambulatory Problems           Past Medical History:   Diagnosis Date    Allergy      Anxiety      Depression      Hx of vasectomy      Hypertension           PAST SURGICAL HISTORY:        Past Surgical History:   Procedure Laterality Date    ADENOIDECTOMY        EPIDURAL STEROID INJECTION Right 9/4/2019     Procedure: Injection, Steroid, Epidural Transforaminal;  Surgeon: Harjinder Batista Jr., MD;  Location: [facility];  Service: Pain Management;  Laterality: Right;  Right L3 + L4 TF NATALIE     [id number]  [id number]     Arrive @ 1300; ASA last 8/27; Check BG; NEEDS CONSENT    FRACTURE SURGERY         bilateral elbow fracture 3 years ago    HERNIA REPAIR         bilateral groin hernia    TONSILLECTOMY             SOCIAL HISTORY:   Social History               Socioeconomic History    Marital status: Significant Other       Spouse name: Not on file    Number of children: Not on file    Years of education: Not on file    Highest education level: Not on file   Occupational History    Not on file   Social Needs    Financial resource strain: Not on file    Food insecurity:       Worry: Not on file       Inability: Not on file    Transportation needs:       Medical: Not on file       Non-medical: Not on file   Tobacco Use    Smoking status: Never Smoker    Smokeless tobacco: Never Used    Tobacco comment: ; one child    Substance and Sexual Activity    Alcohol use:  Yes       Comment: 750 ml  once a month or so    Drug use: No    Sexual activity: Not on file   Lifestyle    Physical activity:       Days per week: Not on file       Minutes per session: Not on file    Stress: Not on file   Relationships    Social connections:       Talks on phone: Not on file       Gets together: Not on file       Attends Restoration service: Not on file       Active member of club or organization: Not on file       Attends meetings of clubs or organizations: Not on file       Relationship status: Not on file   Other Topics Concern    Not on file   Social History Narrative    Not on file            FAMILY HISTORY:        Family History   Problem Relation Age of Onset    Fibromyalgia Mother      Osteoarthritis Mother      Rheum arthritis Neg Hx      Psoriasis Neg Hx      Lupus Neg Hx      Kidney disease Neg Hx      Inflammatory bowel disease Neg Hx           CURRENTS MEDICATIONS:         Current Outpatient Medications on File Prior to Visit   Medication Sig Dispense Refill    aspirin (ECOTRIN) 81 MG EC tablet Take 81 mg by mouth once daily.        atorvastatin (LIPITOR) 10 MG tablet Take 1 tablet (10 mg total) by mouth every other day. 45 tablet 3    buPROPion (WELLBUTRIN XL) 150 MG TB24 tablet Take 1 tablet (150 mg total) by mouth once daily. 30 tablet 11    flash glucose scanning reader Misc Use to test glucose 1 each 0    flash glucose sensor (FREESTYLE BARRETT 14 DAY SENSOR) Kit Use to test glucose 12 kit 3    fluvoxaMINE (LUVOX) 100 MG tablet TAKE 1.5 TABLETS (150 MG TOTAL) BY MOUTH 2 (TWO) TIMES DAILY. 270 tablet 0    ibuprofen (ADVIL,MOTRIN) 800 MG tablet Take 800 mg by mouth 3 (three) times daily.        metFORMIN (GLUCOPHAGE) 1000 MG tablet TAKE 1 TABLET BY MOUTH TWICE A DAY WITH MEALS 180 tablet 0    multivitamin capsule Take 1 capsule by mouth once daily.        sulindac (CLINORIL) 150 MG tablet Take 1 tablet (150 mg total) by mouth 2 (two) times daily. 10 tablet 0     valsartan-hydrochlorothiazide (DIOVAN-HCT) 320-12.5 mg per tablet TAKE 1 TABLET BY MOUTH ONCE DAILY. 90 tablet 2    vitamin D 1000 units Tab Take 4,000 mg by mouth once daily.         diazePAM (VALIUM) 2 MG tablet Take 1 tablet (2 mg total) by mouth every 12 (twelve) hours as needed for Anxiety. (Patient not taking: Reported on 10/9/2019) 60 tablet 0    fluticasone (FLONASE) 50 mcg/actuation nasal spray 2 sprays (100 mcg total) by Each Nare route once daily. (Patient not taking: Reported on 10/9/2019) 48 g 2    terbinafine HCl (LAMISIL) 250 mg tablet Two po the first 5 days of the month for 3 mo (Patient not taking: Reported on 10/9/2019) 10 tablet 3      No current facility-administered medications on file prior to visit.          ALLERGIES:  Review of patient's allergies indicates:  No Known Allergies     REVIEW OF SYSTEMS:  Review of Systems   Constitutional: Negative for diaphoresis, fever and weight loss.   Respiratory: Negative for shortness of breath.    Cardiovascular: Negative for chest pain.   Gastrointestinal: Negative for blood in stool.   Genitourinary: Negative for hematuria.   Endo/Heme/Allergies: Does not bruise/bleed easily.   All other systems reviewed and are negative.        OBJECTIVE:     PHYSICAL EXAMINATION:       Vitals:     10/09/19 0810   BP: 133/87   Pulse: 64         Physical Exam:  Vitals reviewed.     Constitutional: He appears well-developed and well-nourished.      Eyes: Pupils are equal, round, and reactive to light. Conjunctivae and EOM are normal.      Cardiovascular: Normal distal pulses and no edema.      Abdominal: Soft.      Skin: Skin displays no rash on trunk and no rash on extremities. Skin displays no lesions on trunk and no lesions on extremities.     Psych/Behavior: He is alert. He is oriented to person, place, and time. He has a normal mood and affect.     Musculoskeletal:        Neck: Range of motion is full.     Neurological:        DTRs: Tricep reflexes are 2+  on the right side and 2+ on the left side. Bicep reflexes are 2+ on the right side and 2+ on the left side. Brachioradialis reflexes are 2+ on the right side and 2+ on the left side. Patellar reflexes are 2+ on the right side and 2+ on the left side. Achilles reflexes are 2+ on the right side and 2+ on the left side.         Back Exam      Tenderness   The patient is experiencing no tenderness.      Range of Motion   Extension: normal   Flexion: normal   Lateral bend right: normal   Lateral bend left: normal   Rotation right: normal   Rotation left: normal      Muscle Strength   Right Quadriceps:  5/5   Left Quadriceps:  5/5   Right Hamstrings:  5/5   Left Hamstrings:  5/5      Tests   Straight leg raise right: negative  Straight leg raise left: negative     Other   Toe walk: normal  Heel walk: normal                    Neurologic Exam      Mental Status   Oriented to person, place, and time.   Speech: speech is normal   Level of consciousness: alert     Cranial Nerves   Cranial nerves II through XII intact.      CN III, IV, VI   Pupils are equal, round, and reactive to light.  Extraocular motions are normal.      Motor Exam   Muscle bulk: normal  Overall muscle tone: normal     Strength   Right deltoid: 5/5  Left deltoid: 5/5  Right biceps: 5/5  Left biceps: 5/5  Right triceps: 5/5  Left triceps: 5/5  Right wrist flexion: 5/5  Left wrist flexion: 5/5  Right wrist extension: 5/5  Left wrist extension: 5/5  Right interossei: 5/5  Left interossei: 5/5  Right iliopsoas: 5/5  Left iliopsoas: 5/5  Right quadriceps: 5/5  Left quadriceps: 5/5  Right hamstrin/5  Left hamstrin/5  Right anterior tibial: 5/5  Left anterior tibial: 5/5  Right posterior tibial: 5/5  Left posterior tibial: 5/5  Right peroneal: 5/5  Left peroneal: 5/5  Right gastroc: 5/5  Left gastroc: 5/5     Sensory Exam   Light touch normal.   Pinprick normal.      Gait, Coordination, and Reflexes      Gait  Gait: normal     Coordination   Finger to  nose coordination: normal  Tandem walking coordination: normal     Reflexes   Right brachioradialis: 2+  Left brachioradialis: 2+  Right biceps: 2+  Left biceps: 2+  Right triceps: 2+  Left triceps: 2+  Right patellar: 2+  Left patellar: 2+  Right achilles: 2+  Left achilles: 2+  Right plantar: normal  Left plantar: normal  Right Hughes: absent  Left Hughes: absent  Right ankle clonus: absent  Left ankle clonus: absent           DIAGNOSTIC DATA:  I personally interpreted the following imaging:   Lumbar x-rays shows mild levoscoliosis at L3-4  Lumbar spine MRI 2019 shows right L3-4 disc herniation with lateral recess stenosis and left L4-5 disc herniation with lateral recess stenosis, no spondylolisthesis     ASSESMENT:  This is a 44 y.o. male with           Problem List Items Addressed This Visit                 Neuro      Lumbar disc herniation with radiculopathy - Primary      Relevant Orders      Ambulatory consult to Physical Therapy        Mild scoliosis     PLAN:  I explained the natural history of the disease and all treatment options including lumbar fusion surgery and micro decompression surgery.  After a long discussion the patient agreed upon a right L3-4 and left L4-5 minimally invasive laminectomy with medial facetectomy and microdiskectomy to help with his radicular symptoms.  Patient is aware that in the future his scoliosis may worsen and might require a fusion surgery.     We have discussed the risks of surgery including bleeding, infection, failure of surgery, CSF leak, nerve root injury, spinal cord injury, ureter injury, weakness, paralysis, peripheral neuropathy, need for reoperation. Patient understands the risks and would like to proceed with surgery.        The patient has increased perioperative risks because of these comorbidities:  Morbid obesity with BMI 48, sleep apnea, type 2 diabetes.           Jose L Brown MD  Cell:[phone]

## 2019-11-29 NOTE — PT/OT/SLP EVAL
Physical Therapy Evaluation    Patient Name:  Kirill Gandhi III   MRN:  [id number]    Recommendations:     Discharge Recommendations:  home   Discharge Equipment Recommendations: walker, rolling, bath bench, shower chair   Barriers to discharge: None    Assessment:     Kirill Gandhi III is a 44 y.o. male admitted with a medical diagnosis of Lumbar disc herniation with radiculopathy.  He presents with the following impairments/functional limitations:  weakness, gait instability, decreased ROM, decreased lower extremity function, impaired balance, impaired endurance, impaired functional mobilty, impaired self care skills, pain, impaired skin, edema . Patient able to come from supine <> sit with SBA. Gait with RW ~ 70 ft and SBA.    .    Rehab Prognosis: Good; patient would benefit from acute skilled PT services to address these deficits and reach maximum level of function.    Recent Surgery: Procedure(s) (LRB):  LAMINECTOMY, SPINE, LUMBAR Rigth L3-4, Left L4-5 Laminectomy, medial Facetectomy, and microdiscectomy (Bilateral) Day of Surgery    Plan:     During this hospitalization, patient to be seen daily to address the identified rehab impairments via gait training, therapeutic activities, therapeutic exercises and progress toward the following goals:    · Plan of Care Expires:  12/29/19    Subjective     Chief Complaint: weakness and pain  Patient/Family Comments/goals: go home  Pain/Comfort:  · Pain Rating 1: 8/10  · Location - Side 1: Right  · Location 1: lumbar spine  · Pain Addressed 1: Reposition, Distraction    Patients cultural, spiritual, Hoahaoism conflicts given the current situation:      Living Environment:  Lives with family H t/s combo  Prior to admission, patients level of function was independent.  Equipment used at home: none.  DME owned (not currently used): none.  Upon discharge, patient will have assistance from family.    Objective:     Communicated with primary nurse prior to  session.  Patient found HOB elevated with peripheral IV, SCD  upon PT entry to room.    General Precautions: Standard, fall   Orthopedic Precautions:N/A   Braces: N/A     Exams:  · RLE ROM: WFL  · RLE Strength: WFL  · LLE ROM: WFL  · LLE Strength: WFL    Functional Mobility:  · Bed Mobility:     · Supine to Sit: stand by assistance and contact guard assistance  · Sit to Supine: stand by assistance and contact guard assistance  · Transfers:     · Sit to Stand:  contact guard assistance with rolling walker  · Gait: 70 ft with RW SBA  · Balance: fair with RW      Therapeutic Activities and Exercises:   Reviewed proper posture and body mechanics    AM-PAC 6 CLICK MOBILITY  Total Score:20     Patient left HOB elevated with all lines intact and call button in reach.    GOALS:   Multidisciplinary Problems     Physical Therapy Goals        Problem: Physical Therapy Goal    Goal Priority Disciplines Outcome Goal Variances Interventions   Physical Therapy Goal     PT, PT/OT      Description:  Goals to be met by: 2019     Patient will increase functional independence with mobility by performin. Supine to sit with Modified Lawrence  2. Sit to stand transfer with Modified Lawrence  3. Gait  x >200 feet with Modified Lawrence using Rolling Walker/or no AD.                       History:     Past Medical History:   Diagnosis Date    Allergy     Anxiety     Back pain     CTS (carpal tunnel syndrome) 2015    Depression     Diabetes 2015    Hx of vasectomy     Hypertension     OCD (obsessive compulsive disorder)     Sleep apnea        Past Surgical History:   Procedure Laterality Date    ADENOIDECTOMY      EPIDURAL STEROID INJECTION Right 2019    Procedure: Injection, Steroid, Epidural Transforaminal;  Surgeon: Harjinder Batista Jr., MD;  Location: [facility];  Service: Pain Management;  Laterality: Right;  Right L3 + L4 TF NATALIE    [id number]  [id number]    Arrive @ 1300; ASA last ; Check  BG; NEEDS CONSENT    FRACTURE SURGERY      bilateral elbow fracture 3 years ago    HERNIA REPAIR      bilateral groin hernia    TONSILLECTOMY      VASECTOMY         Time Tracking:     PT Received On: 11/29/19  PT Start Time: 1336     PT Stop Time: 1400  PT Total Time (min): 24 min     Billable Minutes: Evaluation 20      Jose L Miller, PT  11/29/2019

## 2019-11-29 NOTE — NURSING
Permission to enter room through virtual sytem attained from patient.  The role of virtual nurse explained. Admission questions asked and replies entered into chart   Safety precautions reviewed with patient.  Physical and occupational therapy arrived .  Will complete admit process after therapy session complete  Pt pale in color.  Drifts into a light sleep with snoring but is listening to all statements and opens eyes to answer appropriately   Mother at bedside

## 2019-11-29 NOTE — OP NOTE
DATE OF PROCEDURE: 11/29/2019     PREOPERATIVE DIAGNOSES:  1.  L3-4 bilateral lateral stenosis with radiculopathy  2.  L4-5 left disc herniation with radiculopathy  3.  Morbid obesity with BMI of 48.74     POSTOPERATIVE DIAGNOSES:  1.  L3-4 bilateral lateral stenosis with radiculopathy  2.  L4-5 left disc herniation with radiculopathy  3.  Morbid obesity with BMI of 48.74     PROCEDURES:     1.  Left MIS approach to the L4-5 level and right MIS approach to the L3-4 level  2.  L3-4 laminectomy, medial facetectomy and microdiskectomy  3.  L4-5 laminectomy, medial facetectomy and microdiskectomy  4. Fluoroscopy     PRIMARY SURGEON: Jose L Brown M.D.     ASSISTANT SURGEON: ROBBIN     INDICATIONS: This is a 44-year-old male with worsening bilateral leg pain and difficulty walking.  The risks of the procedure include hemorrhage, infection, paralysis, loss of sensation, loss of sphincter functions, death and postoperative medical complication.    COMPLEXITY:  This was deemed to be a more complex procedure requiring more time and skills.  1 hr more was needed to achieve the exposure, the decompression and the closure because of the morbid obesity of the patient with a BMI of 48.74.     DESCRIPTION OF THE PROCEDURE: The patient was intubated under general   anesthesia. He was placed prone on the José Luis table frame. All pressure points   were carefully padded.      Using fluoroscopy, 1 x 18 mm incision was planned between   L4-5 15 mm left to the midline. Incision was made with # 15 blade. Subcutaneous tissue hemostasis was completed and the thoracolumbar fascia was opened with a k-wire. We then inserted the serial dilators and a final 18 mm x 8 cm tubular retractor was docked at the junction of the inferior lamina and the right inferior facet of L4 and the retractor was fixed on the table and then using a microscope, the multifidus muscle was subperiosteally dissected using the monopolar. We then exposed the base of the  spinous process, the lamina, and the medial facets. Using the high speed caleb, we drilled the base of the spinous process, the ipsilateral lamina, as well as the ipsilateral one-half of the medial facet to decompress the  traversing  left L5 nerve root. The yellow ligament insertion was exposed and  the ligament was resected using Kerrisons ipsilaterally and contralaterally.      We the retracted the dural sac, we divided the annulus and the PLL partially.  We then performed a microdiskectomy. The extruded disc was resected piece meal to decompress the left traversing L5 nerve root further more.      Abundant irrigation and vacomycin application.      We placed a TLS drain in the epidural space and the distal extremity of the drain was tunneled through the skin.  The drain was fixated with 3 0 nylon.    Using fluoroscopy, 1 x 18 mm incision was planned between   L3-4 15 mm right to the midline. Incision was made with # 15 blade. Subcutaneous tissue hemostasis was completed and the thoracolumbar fascia was opened with a k-wire. We then inserted the serial dilators and a final 18 mm x 8 cm tubular retractor was docked at the junction of the inferior lamina and the right inferior facet of L3 and the retractor was fixed on the table and then using a microscope, the multifidus muscle was subperiosteally dissected using the monopolar. We then exposed the base of the spinous process, the lamina, and the medial facets. Using the high speed caleb, we drilled the base of the spinous process, the ipsilateral lamina, as well as the ipsilateral one-half of the medial facet to decompress the  traversing  left L4 nerve root. The yellow ligament insertion was exposed and  the ligament was resected using Kerrisons ipsilaterally and contralaterally.      We the retracted the dural sac, we divided the annulus and the PLL partially.  We then performed a microdiskectomy. The extruded disc was resected piece meal to decompress the left  traversing L4 nerve root further more.      Abundant irrigation and vacomycin application.      We placed a TLS drain in the epidural space and the distal extremity of the drain was tunneled through the skin.  The drain was fixated with 3 0 nylon.     The thoracolumbar fascia was closed with 0 Vicryl. The wound's subcutaneous layer was closed with inverted 2-0 Vicryl and the skin was closed with staples. The wound was dressed and the patient was transferred to the Recovery Room under the care of the anesthesiologist. Blood loss was 40 mL. There was no complication.

## 2019-11-29 NOTE — TRANSFER OF CARE
Anesthesia Transfer of Care Note    Patient: Kirill Dentherford III    Procedure(s) Performed: Procedure(s) (LRB):  LAMINECTOMY, SPINE, LUMBAR Rigth L3-4, Left L4-5 Laminectomy, medial Facetectomy, and microdiscectomy (Bilateral)    Patient location: PACU    Anesthesia Type: general    Transport from OR: Transported from OR on 6-10 L/min O2 by face mask with adequate spontaneous ventilation    Post pain: adequate analgesia    Post assessment: no apparent anesthetic complications and tolerated procedure well    Post vital signs: stable    Level of consciousness: awake, alert and oriented    Nausea/Vomiting: no nausea/vomiting    Complications: none    Transfer of care protocol was followed      Last vitals:   Visit Vitals  /65   Pulse 84   Temp 36.6 °C (97.9 °F) (Skin)   Resp 20   Ht 6' (1.829 m)   Wt (!) 163 kg (359 lb 5.6 oz)   SpO2 97%   BMI 48.74 kg/m²

## 2019-11-30 VITALS
SYSTOLIC BLOOD PRESSURE: 133 MMHG | DIASTOLIC BLOOD PRESSURE: 62 MMHG | HEART RATE: 73 BPM | RESPIRATION RATE: 20 BRPM | OXYGEN SATURATION: 94 % | BODY MASS INDEX: 42.66 KG/M2 | TEMPERATURE: 98 F | WEIGHT: 315 LBS | HEIGHT: 72 IN

## 2019-11-30 LAB
POCT GLUCOSE: 115 MG/DL (ref 70–110)
POCT GLUCOSE: 155 MG/DL (ref 70–110)

## 2019-11-30 PROCEDURE — 97116 GAIT TRAINING THERAPY: CPT

## 2019-11-30 PROCEDURE — 25000003 PHARM REV CODE 250: Performed by: NEUROLOGICAL SURGERY

## 2019-11-30 PROCEDURE — 99900035 HC TECH TIME PER 15 MIN (STAT)

## 2019-11-30 PROCEDURE — 94761 N-INVAS EAR/PLS OXIMETRY MLT: CPT

## 2019-11-30 PROCEDURE — 63600175 PHARM REV CODE 636 W HCPCS: Performed by: NEUROLOGICAL SURGERY

## 2019-11-30 PROCEDURE — 90686 IIV4 VACC NO PRSV 0.5 ML IM: CPT | Performed by: NEUROLOGICAL SURGERY

## 2019-11-30 PROCEDURE — 97110 THERAPEUTIC EXERCISES: CPT

## 2019-11-30 PROCEDURE — 90471 IMMUNIZATION ADMIN: CPT | Performed by: NEUROLOGICAL SURGERY

## 2019-11-30 PROCEDURE — 94660 CPAP INITIATION&MGMT: CPT

## 2019-11-30 RX ORDER — OXYCODONE AND ACETAMINOPHEN 5; 325 MG/1; MG/1
1-2 TABLET ORAL EVERY 6 HOURS PRN
Qty: 48 TABLET | Refills: 0 | Status: SHIPPED | OUTPATIENT
Start: 2019-11-30 | End: 2019-12-08 | Stop reason: SDUPTHER

## 2019-11-30 RX ORDER — CELECOXIB 200 MG/1
200 CAPSULE ORAL 2 TIMES DAILY
Qty: 60 CAPSULE | Refills: 2 | Status: SHIPPED | OUTPATIENT
Start: 2019-11-30 | End: 2020-01-22 | Stop reason: SDUPTHER

## 2019-11-30 RX ADMIN — TRAMADOL HYDROCHLORIDE 100 MG: 50 TABLET, FILM COATED ORAL at 12:11

## 2019-11-30 RX ADMIN — HYDROMORPHONE HYDROCHLORIDE 1 MG: 1 INJECTION, SOLUTION INTRAMUSCULAR; INTRAVENOUS; SUBCUTANEOUS at 05:11

## 2019-11-30 RX ADMIN — TRAMADOL HYDROCHLORIDE 100 MG: 50 TABLET, FILM COATED ORAL at 06:11

## 2019-11-30 RX ADMIN — MUPIROCIN 1 G: 20 OINTMENT TOPICAL at 08:11

## 2019-11-30 RX ADMIN — ACETAMINOPHEN 650 MG: 325 TABLET ORAL at 12:11

## 2019-11-30 RX ADMIN — HYDROCHLOROTHIAZIDE 12.5 MG: 12.5 TABLET ORAL at 08:11

## 2019-11-30 RX ADMIN — GABAPENTIN 600 MG: 300 CAPSULE ORAL at 08:11

## 2019-11-30 RX ADMIN — HYDROMORPHONE HYDROCHLORIDE 1 MG: 1 INJECTION, SOLUTION INTRAMUSCULAR; INTRAVENOUS; SUBCUTANEOUS at 12:11

## 2019-11-30 RX ADMIN — BUPROPION HYDROCHLORIDE 150 MG: 150 TABLET, FILM COATED, EXTENDED RELEASE ORAL at 08:11

## 2019-11-30 RX ADMIN — INFLUENZA VIRUS VACCINE 0.5 ML: 15; 15; 15; 15 SUSPENSION INTRAMUSCULAR at 03:11

## 2019-11-30 RX ADMIN — HEPARIN SODIUM 5000 UNITS: 5000 INJECTION, SOLUTION INTRAVENOUS; SUBCUTANEOUS at 06:11

## 2019-11-30 RX ADMIN — ACETAMINOPHEN 650 MG: 325 TABLET ORAL at 06:11

## 2019-11-30 RX ADMIN — HYDROMORPHONE HYDROCHLORIDE 1 MG: 1 INJECTION, SOLUTION INTRAMUSCULAR; INTRAVENOUS; SUBCUTANEOUS at 04:11

## 2019-11-30 RX ADMIN — CANDESARTAN CILEXETIL 16 MG: 16 TABLET ORAL at 08:11

## 2019-11-30 RX ADMIN — HYDROMORPHONE HYDROCHLORIDE 1 MG: 1 INJECTION, SOLUTION INTRAMUSCULAR; INTRAVENOUS; SUBCUTANEOUS at 09:11

## 2019-11-30 RX ADMIN — ATORVASTATIN CALCIUM 10 MG: 10 TABLET, FILM COATED ORAL at 09:11

## 2019-11-30 RX ADMIN — METFORMIN HYDROCHLORIDE 1000 MG: 500 TABLET ORAL at 08:11

## 2019-11-30 RX ADMIN — CELECOXIB 200 MG: 100 CAPSULE ORAL at 08:11

## 2019-11-30 NOTE — NURSING
Cued into patient's room with permission. Family at bedside. Patient is alert and oriented. Denies pain at this time. Side rails x3 up with bed alarm activated and is on. No concerns or questions at this time. Patient is on room air. Patient verbalizes understanding on fall precautions and to call for assistance before getting out of bed. Will continue  To be available as needed

## 2019-11-30 NOTE — NURSING
Patient discharged per MD orders. VN to go over discharge instructions. PIV discontinued per MD orders. awaitting transport to wheel patient to main lobby.

## 2019-11-30 NOTE — PLAN OF CARE
Discharge orders noted, no HH  Ordered.    DME orders placed for Heavy duty drop arm BSC & Heavy duty Rolling Walker. [place] [facility] [phone], he gave Ok to pull from [facility] [facility], he stated both HD were ok to pull.    Future Appointments   Date Time Provider Department Center   12/13/2019  9:00 AM Rosendo aBrroso PA-C [facility]   1/6/2020  4:00 PM Jose L Brown MD [facility]       Pt's nurse will go over medications/signs and symptoms prior to discharge       11/30/19 1430   Final Note   Assessment Type Final Discharge Note   Anticipated Discharge Disposition Home   What phone number can be called within the next 1-3 days to see how you are doing after discharge? [phone]   Hospital Follow Up  Appt(s) scheduled? Yes   Right Care Referral Info   Post Acute Recommendation No Care     Senia Chang, RN Transitional Navigator  [phone]

## 2019-11-30 NOTE — DISCHARGE INSTRUCTIONS
Remain active with walking and other light activities daily.  No heavy lifting, no extreme bending or twisting.  Limit upright sitting to 15 minutes per hour.     Ok to shower on today, but do not immerse wound in water. Remove dressing after shower and leave incision air dry.

## 2019-11-30 NOTE — PLAN OF CARE
VN reviewed discharge instructions with pt.  AVS printed and handed to pt by bedside nurse.  Reviewed follow-up appointments, medications, diet, and importance of medication compliance.  Reviewed home care instructions, treatment plan, self-management, and when to seek medical attention.  Allowed time for questions.  All questions answered.  Patient verbalized complete understanding of discharge instructions and voices no concerns.     Discharge instructions complete.    Transport/wheelchair requested.  Bedside nurse notified.

## 2019-11-30 NOTE — PLAN OF CARE
Problem: Diabetes Comorbidity  Goal: Blood Glucose Level Within Desired Range  Outcome: Ongoing, Progressing     Problem: Adult Inpatient Plan of Care  Goal: Plan of Care Review  Outcome: Ongoing, Progressing  Goal: Patient-Specific Goal (Individualization)  Outcome: Ongoing, Progressing  Goal: Absence of Hospital-Acquired Illness or Injury  Outcome: Ongoing, Progressing  Goal: Optimal Comfort and Wellbeing  Outcome: Ongoing, Progressing  Goal: Readiness for Transition of Care  Outcome: Ongoing, Progressing  Goal: Rounds/Family Conference  Outcome: Ongoing, Progressing     Problem: Fall Injury Risk  Goal: Absence of Fall and Fall-Related Injury  Outcome: Ongoing, Progressing     Problem: Infection  Goal: Infection Symptom Resolution  Outcome: Ongoing, Progressing

## 2019-11-30 NOTE — PLAN OF CARE
Problem: Physical Therapy Goal  Goal: Physical Therapy Goal  Description  Goals to be met by: 2019     Patient will increase functional independence with mobility by performin. Supine to sit with Modified [place]  2. Sit to stand transfer with Modified [place]  3. Gait  x >200 feet with Modified [place] using Rolling Walker/or no AD.       2019 1547 by Mendy Goldberg, PTA  Reactivated   Pt continues to work and progress toward all goals.

## 2019-11-30 NOTE — PT/OT/SLP PROGRESS
Physical Therapy Treatment    Patient Name:  Kirill Gandhi III   MRN:  [id number]    Recommendations:     Discharge Recommendations:  home   Discharge Equipment Recommendations: shower chair, walker, rolling(Bariatric RW)   Barriers to discharge: None    Assessment:     Kirill Gandhi III is a 44 y.o. male admitted with a medical diagnosis of Lumbar disc herniation with radiculopathy.  He presents with the following impairments/functional limitations:  weakness, impaired endurance, impaired functional mobilty, gait instability, impaired balance, decreased coordination, decreased lower extremity function, decreased ROM, pain, orthopedic precautions, impaired self care skills. Pt able to perform ambulation training by 2 trials with bariatric RW and SBA ~150 ft and ~200 ft. Also, seated therapeutic exercises 1 x 10 reps BLE's and 5 sit to stand transfers. Recommending home upon discharge.    Rehab Prognosis: Good; patient would benefit from acute skilled PT services to address these deficits and reach maximum level of function.    Recent Surgery: Procedure(s) (LRB):  LAMINECTOMY, SPINE, LUMBAR Rigth L3-4, Left L4-5 Laminectomy, medial Facetectomy, and microdiscectomy (Bilateral) 1 Day Post-Op    Plan:     During this hospitalization, patient to be seen daily to address the identified rehab impairments via gait training, therapeutic activities, therapeutic exercises and progress toward the following goals:    · Plan of Care Expires:  12/29/19    Subjective     Chief Complaint: None expressed  Patient/Family Comments/goals: None expressed  Pain/Comfort:  · Pain Rating 1: 8/10  · Location - Orientation 1: lower  · Location 1: lumbar spine  · Pain Addressed 1: Distraction, Reposition, Cessation of Activity  · Pain Rating Post-Intervention 1: 8/10      Objective:     Communicated with  nurse prior to session.  Patient found  sitting at EOB with peripheral IV upon PT entry to room.     General Precautions:  Standard, fall   Orthopedic Precautions:spinal precautions   Braces: N/A     Functional Mobility:  · Transfers:     · Sit to Stand:  stand by assistance, contact guard assistance and  1 from EOB and 1 from waiting room chair. Also, 5 reps from waiting room chair  with rolling walker  · Gait:  2 trials ~150 ft and ~200 ft with RW and SBA      AM-PAC 6 CLICK MOBILITY  Turning over in bed (including adjusting bedclothes, sheets and blankets)?: 4  Sitting down on and standing up from a chair with arms (e.g., wheelchair, bedside commode, etc.): 3  Moving from lying on back to sitting on the side of the bed?: 4  Moving to and from a bed to a chair (including a wheelchair)?: 4  Need to walk in hospital room?: 3  Climbing 3-5 steps with a railing?: 3  Basic Mobility Total Score: 21       Therapeutic Activities and Exercises:   Pt sitting at EOB with mother present. Performed ambulation training as documented above. Seated therapeutic exercises 1 x 10 reps BLE's consisting of LAQ's and hip add/abd. Also 10 reps of glut isometrics with an approximate 3 second hold. 5 sit to stand transfers for additional exercise.    Patient left  seated at EOB with mother present with all lines intact, call button in reach,  nurse notified and  nurse and mother present..    GOALS:   Multidisciplinary Problems     Physical Therapy Goals        Problem: Physical Therapy Goal    Goal Priority Disciplines Outcome Goal Variances Interventions   Physical Therapy Goal     PT, PT/OT Ongoing, Progressing     Description:  Goals to be met by: 2019     Patient will increase functional independence with mobility by performin. Supine to sit with Modified [place]  2. Sit to stand transfer with Modified [place]  3. Gait  x >200 feet with Modified [place] using Rolling Walker/or no AD.                        Time Tracking:     PT Received On: 19  PT Start Time: 1227     PT Stop Time: 1253  PT Total Time (min): 26 min      Billable Minutes: Gait Training  15 and Therapeutic Exercise  11    Treatment Type: Treatment  PT/PTA: PTA     PTA Visit Number: 1     Mendy Goldberg PTA  11/30/2019

## 2019-11-30 NOTE — PLAN OF CARE
Patient Mr. Gandhi is AAOX4. Vital signs stable. Pain controlled with Prn. Dilaudid and regular analgesia. Independently mobilizing to the toilet. Drains X 2 in situ with serosanguineous drainage. Blood sugar remains stable. Slept well with CPAP. Voided freely and frequently. Anticipated discharge today. Will continue to monitor patient.

## 2019-11-30 NOTE — PROGRESS NOTES
NEUROSURGICAL POST-OPERATIVE PROGRESS NOTE    DATE OF SERVICE:  11/30/2019      ATTENDING PHYSICIAN:  Jose L Brown MD    SUBJECTIVE:    INTERIM HISTORY:    This is a very pleasant 44 y.o. y.o. male, who is status day one L3-4 and L4-5 laminectomy and microdiscectomy. No leg pain since the surgery. Incisional pain controlled by medicine. Has been walking out of bed and in corridor. .               OBJECTIVE:    PHYSICAL EXAMINATION:   Vitals:    11/30/19 0805   BP: (!) 126/56   Pulse: 89   Resp: 20   Temp: 96.8 °F (36 °C)       Neurosurgery Physical Exam    Ortho Exam    Neurologic Exam     Motor Exam     Strength   Strength 5/5 throughout.       Incision CDI    DIAGNOSTIC DATA:    NA    ASSESMENT:    This is a 44 y.o. male who is s/p day one L3-4 and L4-5 laminectomy and microdiscectomy with resolution of pre-operative leg pain. Voiding, tolerating PO.    Problem List Items Addressed This Visit        Neuro    * (Principal) Lumbar disc herniation with radiculopathy    Relevant Orders    Recheck Blood Glucose:    POCT glucose    If any glucose result is less than 50 or greater than 400:    If 2nd result is less than 50 or greater than 400:    Do not admin Aspart correction between scheduled prandial Aspart    Diet diabetic [facility]; 1500 Calorie    Vital signs    Neurovascular checks    Intake and output    Chlorohexidine Gluconate Bath    PT evaluate and treat    OT evaluate and treat    Pulse Oximetry Q4H    Incentive spirometry    Drain - full suction (Completed)    Patient does not have a dobbs    Place in Outpatient - Extended Recovery    CPAP QHS    WALKER FOR HOME USE    COMMODE FOR HOME USE    BATH/SHOWER CHAIR FOR HOME USE    DISCHARGE PATIENT       Other    VICENTE (obstructive sleep apnea) - Primary    Relevant Orders    CPAP QHS          PLAN:    DC home today  Rolling walker, bedside commode, shower chair  Percocet and celebrex ordered  FU in 2 weeks for wound check        Jose L Brown  MD  Pager: [phone]

## 2019-12-01 NOTE — DISCHARGE SUMMARY
[facility]  Neurosurgery  Discharge Summary      Patient Name: Kirill Gandhi III  MRN: [id number]  Admission Date: 11/29/2019  Hospital Length of Stay: 0 days  Discharge Date and Time: 11/30/2019  5:49 PM  Attending Physician: No att. providers found   Discharging Provider: Jose L Brown MD  Primary Care Provider: Marcos Ibarra MD     HPI: Patient with disc herniation at L3-4 and L4-5 causing lateral recess stenosis and radiculopathy    Procedure(s) (LRB):  LAMINECTOMY, SPINE, LUMBAR Rigth L3-4, Left L4-5 Laminectomy, medial Facetectomy, and microdiscectomy (Bilateral)     Hospital Course: Surgery was uncomplicated and post-operative course uneventful. The patient was able to void, tolerate PO and walk after the surgery. Reports resolution of pre-operative leg pain.     Consults: PT-OT    Significant Diagnostic Studies: NA    Pending Diagnostic Studies:     None        Final Active Diagnoses:    Diagnosis Date Noted POA    PRINCIPAL PROBLEM:  Lumbar disc herniation with radiculopathy [M51.16] 10/09/2019 Yes      Problems Resolved During this Admission:      Discharged Condition: good    Disposition: Home or Self Care    Follow Up:  Follow-up Information     Rosendo Barroso PA-C On 12/13/2019.    Specialty:  Neurosurgery  Why:  @ 9:00 am  Contact information:  [address]  [address] [address] [phone]             Please follow up.    Why:  DME: Bath/Shower Chair orders were entered. Bath/Shower Chairs are not covered by patient's insurance, one can be purchased out of pocket at [employer] and most drug [email or web address] for an approximate cost of $45-$65.           [facility].    Specialty:  DME Provider  Why:  DME: provider of Bed Side Commode and Rolling Walker  Contact information:  [address]  [address]  [place] [id number] [phone]                 Patient Instructions:      WALKER FOR HOME USE     Order Specific Question Answer Comments   Type of  Walker: Heavy duty    With wheels? Yes    Height: 6' (1.829 m)    Weight: 171.6 kg (378 lb 5 oz)    Length of need (1-99 months): 1    Does patient have medical equipment at home? cane, straight    Please check all that apply: Patient's condition impairs ambulation.      COMMODE FOR HOME USE     Order Specific Question Answer Comments   Type: Heavy duty drop arm    Height: 6' (1.829 m)    Weight: 171.6 kg (378 lb 5 oz)    Does patient have medical equipment at home? cane, straight    Length of need (1-99 months): 1      BATH/SHOWER CHAIR FOR HOME USE   Order Comments: Heavy duty     Order Specific Question Answer Comments   Height: 6' (1.829 m)    Weight: 171.6 kg (378 lb 5 oz)    Does patient have medical equipment at home? cane, straight    Length of need (1-99 months): 1    Type: With back      Medications:  Reconciled Home Medications:      Medication List      START taking these medications    celecoxib 200 MG capsule  Commonly known as:  CeleBREX  Take 1 capsule (200 mg total) by mouth 2 (two) times daily.     oxyCODONE-acetaminophen 5-325 mg per tablet  Commonly known as:  PERCOCET  Take 1-2 tablets by mouth every 6 (six) hours as needed for Pain.        CONTINUE taking these medications    acetaminophen 325 MG tablet  Commonly known as:  TYLENOL  Take 325 mg by mouth every 6 (six) hours as needed for Pain.     aspirin 81 MG EC tablet  Commonly known as:  ECOTRIN  Take 81 mg by mouth once daily.     atorvastatin 10 MG tablet  Commonly known as:  LIPITOR  Take 1 tablet (10 mg total) by mouth every other day.     buPROPion 150 MG TB24 tablet  Commonly known as:  WELLBUTRIN XL  Take 1 tablet (150 mg total) by mouth once daily.     diazePAM 2 MG tablet  Commonly known as:  VALIUM  TAKE 1 TABLET BY MOUTH EVERY 12 HOURS AS NEEDED FOR ANXIETY     flash glucose scanning reader Misc  Use to test glucose     flash glucose sensor Kit  Commonly known as:  FreeStyle Vishnu 14 Day Sensor  Use to test glucose     fluticasone  propionate 50 mcg/actuation nasal spray  Commonly known as:  FLONASE  2 sprays (100 mcg total) by Each Nare route once daily.     fluvoxaMINE 100 MG tablet  Commonly known as:  LUVOX  TAKE 1.5 TABLETS (150 MG TOTAL) BY MOUTH 2 (TWO) TIMES DAILY.     gabapentin 300 MG capsule  Commonly known as:  NEURONTIN  Take 2 capsules (600 mg total) by mouth 3 (three) times daily.     metFORMIN 1000 MG tablet  Commonly known as:  GLUCOPHAGE  TAKE 1 TABLET BY MOUTH TWICE A DAY WITH MEALS     multivitamin capsule  Take 1 capsule by mouth once daily.     NON FORMULARY MEDICATION  Uses Cpap Machine nightly on a setting of 10     terbinafine HCl 250 mg tablet  Commonly known as:  LamISIL  Two po the first 5 days of the month for 3 mo     tiZANidine 4 mg Cap  TAKE 1 CAPSULE BY MOUTH THREE TIMES DAILY AS NEEDED     valsartan-hydrochlorothiazide 320-12.5 mg per tablet  Commonly known as:  DIOVAN-HCT  TAKE 1 TABLET BY MOUTH ONCE DAILY.     vitamin D 1000 units Tab  Commonly known as:  VITAMIN D3  Take 4,000 mg by mouth once daily.            Jose L Brown MD  Neurosurgery  [facility]

## 2019-12-06 ENCOUNTER — PATIENT MESSAGE (OUTPATIENT)
Dept: NEUROSURGERY | Facility: CLINIC | Age: 44
End: 2019-12-06

## 2019-12-08 RX ORDER — OXYCODONE AND ACETAMINOPHEN 5; 325 MG/1; MG/1
1-2 TABLET ORAL EVERY 6 HOURS PRN
Qty: 48 TABLET | Refills: 0 | Status: SHIPPED | OUTPATIENT
Start: 2019-12-08 | End: 2020-05-13

## 2019-12-10 ENCOUNTER — PATIENT MESSAGE (OUTPATIENT)
Dept: NEUROSURGERY | Facility: CLINIC | Age: 44
End: 2019-12-10

## 2019-12-10 ENCOUNTER — PATIENT MESSAGE (OUTPATIENT)
Dept: SURGERY | Facility: HOSPITAL | Age: 44
End: 2019-12-10

## 2019-12-11 NOTE — PROGRESS NOTES
Neurosurgery Wound Check Progress Note    HPI  Kirill Gandhi III is 44 y.o. male with history of morbid obesity (BMI 47.72), vitamin-D deficiency, diabetes, sleep apnea, multi joint pain, and who is status post left MIS L3-4 and L4-5 laminectomy, medial facetectomy, microdiskectomy with Dr. Brown on 11/29/2019.  Patient is here with his mother for 2 weeks wound check.  He reports that preoperative bilateral shooting pain down to his feet has resolved.  He has some residual intermittent posterior incision and left anterior thigh pain that is very tolerable.  He continues to take gabapentin 600 mg t.i.d., tizanidine p.r.n. muscle spasm, and oxycodone only with severe pain.  Denies any fever, chills, bladder/bowel incontinence, or new complaints.        Low Back Pain Scale  R Low Back-Pain Score: 2  R Low Back-Pain Intensity: The pain is bad, but I manage without taking pain killers  R Low Back-Pain Score: It is painful to look after myself and I am slow and careful  Low Back-Lifting: I can only lift very light weights   Low Back-Walking: Pain prevents me walking more than .5 mile   Low Back-Sitting: I can sit only in my favorite chair as long as I like   Low Back-Standing: I cannot stand for longer than 10 minutes with increasing pain   Low Back-Sleeping: I have pain in bed but it does not prevent me from sleeping well   Low Back-Social Life: Pain has restricted my social life and I do not go out very often   Low Back-Traveling: Pain restricts me to short necessary journeys under 30 minutes   Low Back-Changing Degree of Pain: My pain seems to be getting better but improvement is slow           EXAM  Vitals:    12/13/19 0914   BP: (!) 146/99   Pulse: 94   Temp: 98.9 °F (37.2 °C)     Body mass index is 47.72 kg/m².      General: well developed, well nourished. no acute distress. Comfortable.  Morbidly obese.  Neurologic: Awake, alert and oriented x3. Thought content appropriate.  Head: normocephalic,  atraumatic    GCS: Motor: 6/Verbal: 5/Eyes: 4 GCS Total: 15  Cranial nerves: face symmetric, tongue midline, pupils equal, round, reactive to light with accomodation, extraocular muscles intact. CN II-XII grossly intact.    Sensory: response to light touch throughout  Motor Strength: Moves all extremities spontaneously with good tone. Full strength upper and lower extremities. No abnormal movements seen.      Strength   Deltoids Triceps Biceps Wrist Extension Wrist Flexion Hand    Upper: R 5/5 5/5 5/5 5/5 5/5 5/5     L 5/5 5/5 5/5 5/5 5/5 5/5       Iliopsoas Quadriceps Knee  Flexion Tibialis  anterior Gastro- cnemius EHL   Lower: R 5/5 5/5 5/5 5/5 5/5 5/5     L 5/5 5/5 5/5 5/5 5/5 5/5     Negative straight leg raise    No midline or paraspinal tenderness to palpation  Gait is normal.       Heart: RRR, no cyanosis, pallor, or edema.    Lungs: normal respiratory effort  Abdomen: soft, non-tender and symmetric  Extremities: warm with no cyanosis, edema, or clubbing  Skin: warm, dry and intact. No visible rashes or lesions.        Bilateral posterior lumbar Surgical incision:  C/D/I without any signs of infection.  Incision is healing well.  Right lumbar incision is slowly healing with superficial scabbing along incision.  No erythema, warmth, edema, TTP.  No drainage.  Wound edges are well approximated.    Staples intact and removed without difficulty.          IMAGING/LABS  No imaging/labs available for review during this appt.        ASSESSMENT/PLAN    44-year-old male with history of morbidly obese D and is 2 weeks status post MIS L3-4 L4-5 laminectomy, medial facetectomy, microdiskectomy who presents for 2 weeks wound check.      -Patient is doing well postoperatively.  Preoperative bilateral shooting pain to feet has resolved since surgery.  Some intermittent incisional pain and muscle soreness that is expected. Incision is healing well.I have reiterated wound care, activity restrictions, and follow up appts  as below.     -continue above pain regimen.  No refills today.  -Do not submerge incision for another 6 weeks. Pat the incision dry after your shower.  -Patient to continue using bacitracin twice a day for another 2 weeks.  -Keep incision open to air. Turn q2h to promote appropriate wound healing.  -Monocryl suture will dissolve on it's own.   -Continue p.r.n. stool softener and miralax to prevent constipation with pain med use.   -Increase ambulation. No heavy lifting >10lbs.   No over bending or twisting at incisional site.  Fall precautions.  -Patient has follow up with Dr. Brown in 4-6 weeks.      All questions were answered. Patient was encouraged to call clinic with any future concerns prior to follow up appt. If any worsening symptoms, patient should report to ED.     Please call with any questions.    SAILAJA Aguilar Neurosurgery      Note dictated with voice recognition software, please excuse any grammatical errors.

## 2019-12-12 ENCOUNTER — NURSE TRIAGE (OUTPATIENT)
Dept: ADMINISTRATIVE | Facility: CLINIC | Age: 44
End: 2019-12-12

## 2019-12-12 NOTE — TELEPHONE ENCOUNTER
Hand where iv was, lump where iv was  Nickel size  Area appear to be getting larger.  Denies fever  Area is mildly tender to touch but otherwise not painful  Denies redness, some bruising  Recommended to be seen within 24 hours. Kirill already has appt with NP.  Reason for Disposition   Small painless lump at prior IV site   Minor bruising at prior IV site    Additional Information   Negative: Severe difficulty breathing (e.g., struggling for each breath, speaks in single words)   Negative: Shock suspected (e.g., cold/pale/clammy skin, too weak to stand, low BP, rapid pulse)   Negative: Sounds like a life-threatening emergency to the triager   Negative: IV not running or running slowly   Negative: [1] Difficulty breathing AND [2] not severe   Negative: Arm is swollen, new onset (or leg swelling if IV in lower extremity)   Negative: Fever > 100.5 F (38.1 C)   Negative: Patient sounds very sick or weak to the triager   Negative: Pus or cloudy fluid from IV site   Negative: [1] Red streak at IV site AND [2] palpable cord   Negative: [1] Red streak at IV site AND [2] longer than 1 inch (2.5 cm)   Negative: [1] Skin redness AND [2] extends > 1 inch (2.5 cm) from IV site   Negative: Skin swelling at IV site   Negative: [1] Raised bruise at IV site AND [2] size > 2 inches (5 cm) AND [3] expanding   Negative: [1] Pain at IV site or shooting up arm AND [2] IV running slowly   Negative: [1] Mild bleeding at IV site AND [2] not stopped after following CARE ADVICE   Negative: [1] Dressing needs to be changed (e.g., wet, soiled, loose, or open to air) AND [2] unable or unwilling to perform dressing change   Negative: [1] Small area of skin redness at IV site (<1 inch or 2.5 cm) AND [2] no fever, swelling   Negative: [1] Pain at IV site or shooting up arm AND [2] NO redness or swelling AND [3]  IV running normally   Negative: [1] Pain at IV site or shooting up arm AND [2] NO redness or swelling AND [3] IV has  been removed    Protocols used: IV SITE (SKIN) SYMPTOMS-A-AH

## 2019-12-13 ENCOUNTER — OFFICE VISIT (OUTPATIENT)
Dept: NEUROSURGERY | Facility: CLINIC | Age: 44
End: 2019-12-13
Payer: COMMERCIAL

## 2019-12-13 VITALS
TEMPERATURE: 99 F | DIASTOLIC BLOOD PRESSURE: 99 MMHG | WEIGHT: 315 LBS | HEART RATE: 94 BPM | HEIGHT: 72 IN | SYSTOLIC BLOOD PRESSURE: 146 MMHG | BODY MASS INDEX: 42.66 KG/M2

## 2019-12-13 DIAGNOSIS — Z51.89 VISIT FOR WOUND CHECK: ICD-10-CM

## 2019-12-13 DIAGNOSIS — M51.16 LUMBAR DISC HERNIATION WITH RADICULOPATHY: Primary | ICD-10-CM

## 2019-12-13 PROCEDURE — 99999 PR PBB SHADOW E&M-EST. PATIENT-LVL IV: ICD-10-PCS | Mod: PBBFAC,,, | Performed by: PHYSICIAN ASSISTANT

## 2019-12-13 PROCEDURE — 99024 PR POST-OP FOLLOW-UP VISIT: ICD-10-PCS | Mod: S$GLB,,, | Performed by: PHYSICIAN ASSISTANT

## 2019-12-13 PROCEDURE — 99024 POSTOP FOLLOW-UP VISIT: CPT | Mod: S$GLB,,, | Performed by: PHYSICIAN ASSISTANT

## 2019-12-13 PROCEDURE — 99999 PR PBB SHADOW E&M-EST. PATIENT-LVL IV: CPT | Mod: PBBFAC,,, | Performed by: PHYSICIAN ASSISTANT

## 2019-12-26 ENCOUNTER — PATIENT MESSAGE (OUTPATIENT)
Dept: NEUROSURGERY | Facility: CLINIC | Age: 44
End: 2019-12-26

## 2019-12-26 RX ORDER — TIZANIDINE HYDROCHLORIDE 4 MG/1
CAPSULE, GELATIN COATED ORAL
Qty: 90 CAPSULE | Refills: 0 | Status: SHIPPED | OUTPATIENT
Start: 2019-12-26 | End: 2020-01-13 | Stop reason: SDUPTHER

## 2019-12-27 RX ORDER — TERBINAFINE HYDROCHLORIDE 250 MG/1
TABLET ORAL
Qty: 40 TABLET | Refills: 2 | Status: SHIPPED | OUTPATIENT
Start: 2019-12-27 | End: 2020-03-04

## 2019-12-30 ENCOUNTER — PATIENT MESSAGE (OUTPATIENT)
Dept: NEUROSURGERY | Facility: CLINIC | Age: 44
End: 2019-12-30

## 2020-01-13 ENCOUNTER — PATIENT MESSAGE (OUTPATIENT)
Dept: FAMILY MEDICINE | Facility: CLINIC | Age: 45
End: 2020-01-13

## 2020-01-14 RX ORDER — VALSARTAN AND HYDROCHLOROTHIAZIDE 320; 12.5 MG/1; MG/1
1 TABLET, FILM COATED ORAL DAILY
Qty: 90 TABLET | Refills: 2 | Status: SHIPPED | OUTPATIENT
Start: 2020-01-14 | End: 2020-09-30 | Stop reason: SDUPTHER

## 2020-01-14 RX ORDER — METFORMIN HYDROCHLORIDE 1000 MG/1
1000 TABLET ORAL 2 TIMES DAILY WITH MEALS
Qty: 180 TABLET | Refills: 0 | Status: SHIPPED | OUTPATIENT
Start: 2020-01-14 | End: 2020-09-16

## 2020-01-14 RX ORDER — ATORVASTATIN CALCIUM 10 MG/1
10 TABLET, FILM COATED ORAL EVERY OTHER DAY
Qty: 45 TABLET | Refills: 3 | Status: SHIPPED | OUTPATIENT
Start: 2020-01-14 | End: 2020-09-30 | Stop reason: SDUPTHER

## 2020-01-14 RX ORDER — FLUTICASONE PROPIONATE 50 MCG
2 SPRAY, SUSPENSION (ML) NASAL DAILY
Qty: 48 G | Refills: 2 | Status: SHIPPED | OUTPATIENT
Start: 2020-01-14 | End: 2021-04-06

## 2020-01-14 RX ORDER — GABAPENTIN 300 MG/1
600 CAPSULE ORAL 3 TIMES DAILY
Qty: 180 CAPSULE | Refills: 11 | Status: SHIPPED | OUTPATIENT
Start: 2020-01-14 | End: 2020-11-01

## 2020-01-14 RX ORDER — TIZANIDINE HYDROCHLORIDE 4 MG/1
1 CAPSULE, GELATIN COATED ORAL 3 TIMES DAILY PRN
Qty: 90 CAPSULE | Refills: 0 | Status: SHIPPED | OUTPATIENT
Start: 2020-01-14 | End: 2020-02-06

## 2020-01-14 RX ORDER — BUPROPION HYDROCHLORIDE 150 MG/1
150 TABLET ORAL DAILY
Qty: 30 TABLET | Refills: 11 | Status: SHIPPED | OUTPATIENT
Start: 2020-01-14 | End: 2020-02-19

## 2020-01-21 ENCOUNTER — PATIENT MESSAGE (OUTPATIENT)
Dept: FAMILY MEDICINE | Facility: CLINIC | Age: 45
End: 2020-01-21

## 2020-01-21 RX ORDER — FLUVOXAMINE MALEATE 100 MG/1
150 TABLET, COATED ORAL 2 TIMES DAILY
Qty: 270 TABLET | Refills: 0 | Status: SHIPPED | OUTPATIENT
Start: 2020-01-21 | End: 2020-01-31 | Stop reason: SDUPTHER

## 2020-01-21 RX ORDER — DIAZEPAM 2 MG/1
2 TABLET ORAL EVERY 12 HOURS PRN
Qty: 60 TABLET | Refills: 0 | Status: SHIPPED | OUTPATIENT
Start: 2020-01-21 | End: 2020-01-31 | Stop reason: SDUPTHER

## 2020-01-22 ENCOUNTER — OFFICE VISIT (OUTPATIENT)
Dept: NEUROSURGERY | Facility: CLINIC | Age: 45
End: 2020-01-22
Payer: COMMERCIAL

## 2020-01-22 VITALS
HEART RATE: 88 BPM | WEIGHT: 315 LBS | HEIGHT: 72 IN | BODY MASS INDEX: 42.66 KG/M2 | SYSTOLIC BLOOD PRESSURE: 147 MMHG | DIASTOLIC BLOOD PRESSURE: 82 MMHG | TEMPERATURE: 99 F

## 2020-01-22 DIAGNOSIS — Z98.890 S/P LUMBAR MICRODISCECTOMY: Primary | ICD-10-CM

## 2020-01-22 PROCEDURE — 99999 PR PBB SHADOW E&M-EST. PATIENT-LVL V: CPT | Mod: PBBFAC,,, | Performed by: NEUROLOGICAL SURGERY

## 2020-01-22 PROCEDURE — 99024 PR POST-OP FOLLOW-UP VISIT: ICD-10-PCS | Mod: S$GLB,,, | Performed by: NEUROLOGICAL SURGERY

## 2020-01-22 PROCEDURE — 99999 PR PBB SHADOW E&M-EST. PATIENT-LVL V: ICD-10-PCS | Mod: PBBFAC,,, | Performed by: NEUROLOGICAL SURGERY

## 2020-01-22 PROCEDURE — 99024 POSTOP FOLLOW-UP VISIT: CPT | Mod: S$GLB,,, | Performed by: NEUROLOGICAL SURGERY

## 2020-01-22 RX ORDER — CELECOXIB 200 MG/1
200 CAPSULE ORAL 2 TIMES DAILY
Qty: 180 CAPSULE | Refills: 1 | Status: SHIPPED | OUTPATIENT
Start: 2020-01-22 | End: 2020-09-22

## 2020-01-23 NOTE — PROGRESS NOTES
NEUROSURGICAL POST-OPERATIVE PROGRESS NOTE    DATE OF SERVICE:  01/23/2020      ATTENDING PHYSICIAN:  Jose L Brown MD    SUBJECTIVE:    INTERIM HISTORY:    This is a very pleasant 44 y.o. y.o. male, who is status post L3-4 and L4-5 laminectomy with microdiskectomy.  Patient reports near complete resolution of his right leg pain and left leg pain.  He only has occasional right leg pain when he walks.  He has started to become more active and has the joint a gym.  He is very satisfied with his outcome.              OBJECTIVE:    PHYSICAL EXAMINATION:   Vitals:    01/22/20 1718   BP: (!) 147/82   Pulse: 88   Temp: 99 °F (37.2 °C)       Neurosurgery Physical Exam    Ortho Exam    Neurologic Exam     Motor Exam     Strength   Strength 5/5 throughout.       Wound has healed.    DIAGNOSTIC DATA:    NA    ASSESMENT:    This is a 44 y.o. male who is s/p 6 weeks L3-4 and L4-5 microdiskectomy with their complete resolution of leg pain.    Problem List Items Addressed This Visit     None      Visit Diagnoses     S/P lumbar microdiscectomy    -  Primary    Relevant Orders    Ambulatory consult to Physical Therapy          PLAN:    Will follow-up as needed  All questions answered        Jose L Brown MD  Pager: [phone]

## 2020-01-24 ENCOUNTER — PATIENT MESSAGE (OUTPATIENT)
Dept: FAMILY MEDICINE | Facility: CLINIC | Age: 45
End: 2020-01-24

## 2020-01-28 ENCOUNTER — TELEPHONE (OUTPATIENT)
Dept: NEUROSURGERY | Facility: CLINIC | Age: 45
End: 2020-01-28

## 2020-01-28 NOTE — TELEPHONE ENCOUNTER
----- Message from Adrienne Cueva sent at 1/28/2020  9:43 AM CST -----  Good Morning we are unable to contact pt to [facility] for thrpy

## 2020-01-28 NOTE — TELEPHONE ENCOUNTER
----- Message from Adrienne Cueva sent at 1/28/2020  9:43 AM CST -----  Good Morning we are unable to contact pt to [facility] for thrpy

## 2020-01-31 RX ORDER — FLUVOXAMINE MALEATE 100 MG/1
150 TABLET, COATED ORAL 2 TIMES DAILY
Qty: 270 TABLET | Refills: 0 | Status: SHIPPED | OUTPATIENT
Start: 2020-01-31 | End: 2020-02-19

## 2020-01-31 RX ORDER — DIAZEPAM 2 MG/1
2 TABLET ORAL EVERY 12 HOURS PRN
Qty: 60 TABLET | Refills: 0 | Status: SHIPPED | OUTPATIENT
Start: 2020-01-31 | End: 2020-03-19 | Stop reason: SDUPTHER

## 2020-02-06 RX ORDER — TIZANIDINE HYDROCHLORIDE 4 MG/1
CAPSULE, GELATIN COATED ORAL
Qty: 90 CAPSULE | Refills: 0 | Status: SHIPPED | OUTPATIENT
Start: 2020-02-06 | End: 2021-02-16 | Stop reason: SDUPTHER

## 2020-02-10 ENCOUNTER — CLINICAL SUPPORT (OUTPATIENT)
Dept: REHABILITATION | Facility: HOSPITAL | Age: 45
End: 2020-02-10
Attending: NEUROLOGICAL SURGERY
Payer: COMMERCIAL

## 2020-02-10 DIAGNOSIS — G89.29 CHRONIC BILATERAL LOW BACK PAIN WITH BILATERAL SCIATICA: Primary | ICD-10-CM

## 2020-02-10 DIAGNOSIS — M54.42 CHRONIC BILATERAL LOW BACK PAIN WITH BILATERAL SCIATICA: Primary | ICD-10-CM

## 2020-02-10 DIAGNOSIS — M54.41 CHRONIC BILATERAL LOW BACK PAIN WITH BILATERAL SCIATICA: Primary | ICD-10-CM

## 2020-02-10 PROBLEM — M54.50 LUMBAGO: Status: ACTIVE | Noted: 2019-10-14

## 2020-02-10 PROBLEM — M51.16 LUMBAR DISC HERNIATION WITH RADICULOPATHY: Status: RESOLVED | Noted: 2019-10-09 | Resolved: 2020-02-10

## 2020-02-10 PROBLEM — M51.36 DDD (DEGENERATIVE DISC DISEASE), LUMBAR: Status: RESOLVED | Noted: 2019-09-04 | Resolved: 2020-02-10

## 2020-02-10 PROBLEM — M51.369 DDD (DEGENERATIVE DISC DISEASE), LUMBAR: Status: RESOLVED | Noted: 2019-09-04 | Resolved: 2020-02-10

## 2020-02-10 PROCEDURE — 97162 PT EVAL MOD COMPLEX 30 MIN: CPT | Mod: PN

## 2020-02-10 PROCEDURE — 97110 THERAPEUTIC EXERCISES: CPT | Mod: PN

## 2020-02-11 NOTE — PLAN OF CARE
[facility]  Physical Therapy Initial Evaluation    Name: Kirill Gandhi III  Clinic Number: [id number]    Therapy Diagnosis: No diagnosis found.  Physician: Jose L Brown MD    Physician Orders: PT Eval and Treat:    S/P right L3-4 and left L4-5 microdiscectomy. Lumbar spine physical therapy 3 times a week for 6 weeks.   Back extensor and core muscles strengthening. Bridges, plank, reverse situp. Home exercises.      Medical Diagnosis from Referral: Z98.890 (ICD-10-CM) - S/P lumbar microdiscectomy  Evaluation Date: 2/10/2020  Authorization Period Expiration: 12/31/2020  Plan of Care Expiration: 3/20/2020  Visit # / Visits authorized: 1 / 60    Time In: 600pm  Time Out: 700pm  Total Appointment Time (timed & untimed codes): 60 minutes    Precautions: Standard, Diabetes and Recent s/p L3-4 and L4-5 microdiscectomy    Subjective   Date of onset: 11/29/2019 right L3-4 and left L4-5 microdiscectomy  History of current condition - Kirill reports: chronic issues prior to Sx. Reports was sent to   Pt reports that today was his first day of work; pt works inventory in a [employer] Reports he is now full duty and reports that he walks a lot for work - upwards of 5 miles on some days. Also reports that recently he has purchased a local gym membership and has been walking 1 mile x 2 bouts and 15 minutes on a Nu Step between each mile.      Medical History:   Past Medical History:   Diagnosis Date    Allergy     Anxiety     Back pain     CTS (carpal tunnel syndrome) 6/26/2015    Depression     Diabetes 4/17/2015    Hx of vasectomy     Hypertension     OCD (obsessive compulsive disorder)     Sleep apnea        Surgical History:   Kirill Gandhi III  has a past surgical history that includes Tonsillectomy; Adenoidectomy; Fracture surgery; Hernia repair; Epidural steroid injection (Right, 9/4/2019); Vasectomy; and Lumbar laminectomy (Bilateral, 11/29/2019).    Medications:    Kirill has a current medication list which includes the following prescription(s): acetaminophen, aspirin, atorvastatin, bupropion, celecoxib, diazepam, flash glucose scanning reader, flash glucose sensor, fluticasone propionate, fluvoxamine, gabapentin, metformin, multivitamin, NON FORMULARY MEDICATION, oxycodone-acetaminophen, terbinafine hcl, tizanidine, valsartan-hydrochlorothiazide, and vitamin d.    Allergies:   Review of patient's allergies indicates:  No Known Allergies     Imaging:   MRI of the lumbar spine on 8/13/2019 prior to Sx: 1. Mild lumbar levoscoliosis.  2. Lumbar spondylosis, most significant at L3-L4, L4-L5, and L5-S1 resulting in moderate spinal canal stenosis at L3-L4 and variable mild to moderate neural foraminal narrowing from L3-S1.    Prior Therapy: Yes, preoperatively   Social History:  lives with their family  Occupation: [employer]  Prior Level of Function: Independent with all ADLs; however, marked pain and limited walking, lifting, pulling, pushing abilities  Current Level of Function: Independent with all ADLs; however, limited walking and standing tolerance as well as limited pushing, pulling, lifting abilities    Pain:  Current 2/10, worst 5/10, best 0/10   Location: bilateral back  and buttocks ; then also in B calves. Currently denies any shooting pains in the upper thighs.   Description: Burning and Sharp  Aggravating Factors: Just dealing with chasity long; walking for too long  Easing Factors: But also walking makes feel better and sitting makes it better, just in the correct dosage. Also medications make his s/s ease and more tolerable to functional activities.     Pts goals: Lifting and pushing 50# for work, decrease pain, standing much longer with less pain (standing roughly 7-8 hours per day over a 10 hour shift).     Objective     Observations: Pt is obese; ASIS/PSIS appears symmetrical    Lumbar AROM :   Lumbar AROM (*) = degrees current Comments   Flexion  51 Min  c/o buttock and calf muscle tightness   Extension  20 Min c/o LBP    R Rotation  60 % no signs or symptoms   L Rotation  60 % no signs or symptoms   R Side bending  50% no signs or symptoms   L Side bending   50% no signs or symptoms     - Michelle's sign with return to neutral: (-)  Neuro screening:   - Dermatomes: WFL  - Myotomes: WFL  - UMN screening: WNL  - Slump testing (for provocation of s/s): (+) B  - SLR (for provocation of s/s): (-)  Hip screening: MMT grossly 4 /5  Muscle length: Hamstring 90/90 (-)  Palpation: scar appear well healed for time since Sx.   Functional screening: lifting mechanics, pushing, pulling: NT at this time.       Limitation/Restriction for FOTO Lumbar Survey    Therapist reviewed FOTO scores for Kirill Riggsford III on 2/10/2020.   FOTO documents entered into [employer] - see Media section.    Limitation Score: 41%         TREATMENT   Treatment Time In: 635pm  Treatment Time Out: 700pm  Total Treatment time (time-based codes) separate from Evaluation: 25 minutes    Kirill received therapeutic exercises to develop strength, endurance, ROM, flexibility and core stabilization for 25 minutes including:  - Bridging with gluteal set to initiate  2x10  - Lateral plank lift    1x10 each  - SL Hip abduction    1x10 each  - Supine sciatic nn glides   1x20 each  - Supine TrA marching   2x10 keeping posterior pelvic tilt  - Standing hip extension   1x10 each  - TM for endurance and posture  10 mins    Home Exercises and Patient Education Provided    Education provided:   - POC, HEP, Body mechanics with t/f in bed    Written Home Exercises Provided: yes.  Exercises were reviewed and Kirill was able to demonstrate them prior to the end of the session.  Kirill demonstrated good  understanding of the education provided.     See EMR under Patient Instructions for exercises provided 2/10/2020.    Assessment   Kirill is a 44 y.o. male referred to outpatient Physical Therapy with a medical diagnosis of S/P  right L3-4 and left L4-5 microdiscectomy.     Pt presents with reports of marked improvements in s/s post-operatively; however, reports that he cont to have chief c/o upper buttock and B calf discomfort with prolonged activities such as walking which is plays a major role with his work duties (reports 75% of his work day is typically walking). He is currently full duty at work though reports 75% sitting and only 25% standing/walking and he needs to be able to build up his standing and walking tolerance to be able to tolerate 75% of his work day standing.   He further demonstrates marked core weakness and + slump testing B, though dermatomes and myotomes all appears WFL at this time.     Pt prognosis is Good.   Pt will benefit from skilled outpatient Physical Therapy to address the deficits stated above and in the chart below, provide pt/family education, and to maximize pt's level of independence.     Plan of care discussed with patient: Yes  Pt's spiritual, cultural and educational needs considered and patient is agreeable to the plan of care and goals as stated below:     Anticipated Barriers for therapy: High BMI, profession, recent Lumbar Sx    Medical Necessity is demonstrated by the following  History  Co-morbidities and personal factors that may impact the plan of care Co-morbidities:   diabetes, high BMI and prior lumbar surgery    Personal Factors:   lifestyle     moderate   Examination  Body Structures and Functions, activity limitations and participation restrictions that may impact the plan of care Body Regions:   back  lower extremities    Body Systems:    ROM  strength  transfers    Participation Restrictions:   Work schedule    Activity limitations:   Learning and applying knowledge  no deficits    General Tasks and Commands  no deficits    Communication  no deficits    Mobility  walking, carrying, lifting, pulling, pushing    Self care  no deficits    Domestic Life  cooking  doing house work  (cleaning house, washing dishes, laundry)  assisting others    Interactions/Relationships  no deficits    Life Areas  employment    Community and Social Life  community life  recreation and leisure         high   Clinical Presentation evolving clinical presentation with changing clinical characteristics moderate   Decision Making/ Complexity Score: moderate     Goals:  Short Term Goals: 4 weeks   1.) Pt will become compliant and independent with her initial HEP to promote decreased pain and improved mobility and strength.   2) Pt to report decrease in pain levels from 5/10 at worse to 3/10 at worse.   3.) Pt will improve walking tolerance from 1 mile to 2 miles in order to promote full and safe return to work as he reports he has to walk upwards of 5 miles per day for work.    4.) Pt will be able to lift from floor to waist 30# with proper body mechanics in order to promote a safe return to work as he reports he is required to be able to lift up to 50# and is now considered full duty.    5.) Pt will demonstrate WFL lumbar AROM in all planes with min to no c/o s/s to improve general mobility.     Long Term Goals: 8 weeks   1.) Pt will become compliant and independent with an updated, progressed HEP to promote decreased pain and improved mobility and strength as well as prep for discharge from further PT intervention.   2) Pt to report decrease in pain levels from 5/10 at worse to 1/10 at worse.  3.) Pt will improve walking tolerance from 1 mile to 3 miles in order to promote full and safe return to work as he reports he has to walk upwards of 5 miles per day for work.    4.) Pt will be able to lift from floor to waist 50# with proper body mechanics in order to promote a safe return to work as he reports he is required to be able to lift up to 50# and is now considered full duty.    5.) Pt will improve her FOTO score from 41% impairment to 35% improvement to indicate improvement in overall function.    6.) Pt will  demonstrate full lumbar AROM in all planes with no c/o s/s to improve general mobility.     Plan   Plan of care Certification: 2/10/2020 to 3/20/2020.    Outpatient Physical Therapy 2 times weekly for 6 weeks to include the following interventions: Manual Therapy, Moist Heat/ Ice, Neuromuscular Re-ed, Patient Education, Self Care, Therapeutic Activites, Therapeutic Exercise and Soft tissue modalities as needed.      (MD orders for 3 x per week; however, the patient reports that he is unable to come into the clinic at a frequency of 3 x per week due to his work schedule. He has been set up at a frequency of 2 x per week at this time and is also transitioning to the gym setting as well.)    Abundio Jones, PT

## 2020-02-13 ENCOUNTER — OFFICE VISIT (OUTPATIENT)
Dept: URGENT CARE | Facility: CLINIC | Age: 45
End: 2020-02-13
Payer: COMMERCIAL

## 2020-02-13 VITALS
OXYGEN SATURATION: 97 % | SYSTOLIC BLOOD PRESSURE: 170 MMHG | RESPIRATION RATE: 18 BRPM | HEIGHT: 72 IN | HEART RATE: 65 BPM | TEMPERATURE: 97 F | BODY MASS INDEX: 42.66 KG/M2 | DIASTOLIC BLOOD PRESSURE: 96 MMHG | WEIGHT: 315 LBS

## 2020-02-13 DIAGNOSIS — J06.9 URI, ACUTE: ICD-10-CM

## 2020-02-13 DIAGNOSIS — J02.9 ACUTE PHARYNGITIS, UNSPECIFIED ETIOLOGY: Primary | ICD-10-CM

## 2020-02-13 LAB
CTP QC/QA: YES
MOLECULAR STREP A: NEGATIVE

## 2020-02-13 PROCEDURE — 99213 PR OFFICE/OUTPT VISIT, EST, LEVL III, 20-29 MIN: ICD-10-PCS | Mod: S$GLB,,, | Performed by: FAMILY MEDICINE

## 2020-02-13 PROCEDURE — 87651 POCT STREP A MOLECULAR: ICD-10-PCS | Mod: QW,S$GLB,, | Performed by: FAMILY MEDICINE

## 2020-02-13 PROCEDURE — 87651 STREP A DNA AMP PROBE: CPT | Mod: QW,S$GLB,, | Performed by: FAMILY MEDICINE

## 2020-02-13 PROCEDURE — 99213 OFFICE O/P EST LOW 20 MIN: CPT | Mod: S$GLB,,, | Performed by: FAMILY MEDICINE

## 2020-02-14 ENCOUNTER — TELEPHONE (OUTPATIENT)
Dept: FAMILY MEDICINE | Facility: CLINIC | Age: 45
End: 2020-02-14

## 2020-02-14 NOTE — PROGRESS NOTES
Subjective:       Patient ID: Kirill Gandhi III is a 44 y.o. male.    Vitals:  height is 6' (1.829 m) and weight is 157.9 kg (348 lb) (abnormal). His temperature is 96.6 °F (35.9 °C). His blood pressure is 170/96 (abnormal) and his pulse is 65. His respiration is 18 and oxygen saturation is 97%.     Chief Complaint: Sore Throat    44-year-old with complaint of sore throat the since this morning no fever no chills no body ache just sore throat    Sore Throat    This is a new problem. The current episode started today. The problem has been unchanged. Neither side of throat is experiencing more pain than the other. There has been no fever. The pain is at a severity of 3/10. The pain is mild. Associated symptoms include trouble swallowing. Pertinent negatives include no congestion, coughing, ear pain, shortness of breath, stridor or vomiting. He has tried nothing for the symptoms. The treatment provided no relief.       Constitution: Negative for chills, sweating, fatigue and fever.   HENT: Positive for sinus pain, sore throat and trouble swallowing. Negative for ear pain, congestion, sinus pressure and voice change.    Neck: Negative for painful lymph nodes.   Eyes: Negative for eye redness.   Respiratory: Negative for chest tightness, cough, sputum production, bloody sputum, COPD, shortness of breath, stridor, wheezing and asthma.    Gastrointestinal: Negative for nausea and vomiting.   Musculoskeletal: Negative for muscle ache.   Skin: Negative for rash.   Allergic/Immunologic: Negative for seasonal allergies and asthma.   Hematologic/Lymphatic: Negative for swollen lymph nodes.       Objective:      Physical Exam   Constitutional: He is oriented to person, place, and time. He appears well-developed and well-nourished. He is cooperative.  Non-toxic appearance. He does not appear ill.   HENT:   Head: Normocephalic and atraumatic.   Right Ear: Hearing, tympanic membrane, external ear and ear canal normal.    Left Ear: Hearing, tympanic membrane, external ear and ear canal normal.   Nose: Nose normal. No mucosal edema, rhinorrhea or nasal deformity. No epistaxis. Right sinus exhibits no maxillary sinus tenderness and no frontal sinus tenderness. Left sinus exhibits no maxillary sinus tenderness and no frontal sinus tenderness.   Mouth/Throat: Uvula is midline, oropharynx is clear and moist and mucous membranes are normal. No trismus in the jaw. Normal dentition. No uvula swelling. No oropharyngeal exudate or posterior oropharyngeal erythema.   Throat is clear no exudates TMs normal no lymphadenopathy   Eyes: Conjunctivae and lids are normal. Right eye exhibits no discharge. Left eye exhibits no discharge. No scleral icterus.   Neck: Trachea normal, normal range of motion, full passive range of motion without pain and phonation normal. Neck supple. No JVD present. No tracheal deviation present.   Cardiovascular: Normal rate, regular rhythm, normal heart sounds, intact distal pulses and normal pulses. Exam reveals no friction rub.   No murmur heard.  Pulmonary/Chest: Effort normal and breath sounds normal. No stridor. No respiratory distress. He has no wheezes. He has no rales.   Abdominal: Soft. Normal appearance and bowel sounds are normal. He exhibits no distension, no pulsatile midline mass and no mass. There is no tenderness.   Musculoskeletal: Normal range of motion. He exhibits no edema or deformity.   Lymphadenopathy:     He has no cervical adenopathy.   Neurological: He is alert and oriented to person, place, and time. He exhibits normal muscle tone. Coordination normal.   Skin: Skin is warm, dry, intact, not diaphoretic and not pale.   Psychiatric: He has a normal mood and affect. His speech is normal and behavior is normal. Judgment and thought content normal. Cognition and memory are normal.   Nursing note and vitals reviewed.        Assessment:       1. Acute pharyngitis, unspecified etiology    2. URI,  acute        Plan:         Acute pharyngitis, unspecified etiology  -     POCT Strep A, Molecular    URI, acute  -     POCT Strep A, Molecular     Claritin one daily    Warm salt water gargles q 4 hours           Results for orders placed or performed in visit on 02/13/20   POCT Strep A, Molecular   Result Value Ref Range    Molecular Strep A, POC Negative Negative     Acceptable Yes

## 2020-02-17 ENCOUNTER — CLINICAL SUPPORT (OUTPATIENT)
Dept: REHABILITATION | Facility: HOSPITAL | Age: 45
End: 2020-02-17
Attending: NEUROLOGICAL SURGERY
Payer: COMMERCIAL

## 2020-02-17 DIAGNOSIS — M54.42 CHRONIC BILATERAL LOW BACK PAIN WITH BILATERAL SCIATICA: ICD-10-CM

## 2020-02-17 DIAGNOSIS — G89.29 CHRONIC BILATERAL LOW BACK PAIN WITH BILATERAL SCIATICA: ICD-10-CM

## 2020-02-17 DIAGNOSIS — M54.41 CHRONIC BILATERAL LOW BACK PAIN WITH BILATERAL SCIATICA: ICD-10-CM

## 2020-02-17 PROCEDURE — 97110 THERAPEUTIC EXERCISES: CPT | Mod: PN

## 2020-02-17 NOTE — PROGRESS NOTES
"  Physical Therapy Daily Treatment Note     Name: Kirill Riggs [facility]  Clinic Number: [id number]    Therapy Diagnosis:   Encounter Diagnosis   Name Primary?    Chronic bilateral low back pain with bilateral sciatica      Physician: Jose L Brown MD    Visit Date: 2/17/2020       Physician Orders: PT Eval and Treat:     S/P right L3-4 and left L4-5 microdiscectomy. Lumbar spine physical therapy 3 times a week for 6 weeks.   Back extensor and core muscles strengthening. Bridges, plank, reverse situp. Home exercises.      Medical Diagnosis from Referral: Z98.890 (ICD-10-CM) - S/P lumbar microdiscectomy  Evaluation Date: 2/10/2020  Authorization Period Expiration: 12/31/2020  Plan of Care Expiration: 3/20/2020  Visit # / Visits authorized: 2 / 60    Time In: 600pm  Time Out: 700pm  Total Billable Time: 60 minutes (4 TE)  Precautions: Standard, Diabetes and Recent s/p L3-4 and L4-5 microdiscectomy       Subjective     Pt reports: that he was unable to come last week due to being ill; however, reports that he has been keeping up with his ex's as much as possible. Still working full duty though not on his feet at this time as much as he was before. Spending 25% of the day on his feet standing/walking and 75% sitting vs before 75% standing/walking.   He was compliant with home exercise program.  Response to previous treatment: Was very sore after his IE  Functional change: none noted at this time since IE    Pain: 1/10  Location: bilateral buttocks      Objective     Kirill received therapeutic exercises to develop strength, endurance, ROM, flexibility and core stabilization for 60 minutes including:  - TM for posture and endurance  5 minutes  - Standing gastroc/soleus stretch  5x30" (B)  - Bridging with gluteal set to initiate                2x10 from large bolster  - SL Clams                                                 1x10 each  - Supine sciatic nn glides                                2x20 each  - Supine TrA " marching                                    2x10 keeping posterior pelvic tilt  - Standing hip extension                                 1x10 each  - [place] holding onto yellow wt ball  3x10 focusing on body mechanics  - 5# lateral walk    3x10 steps  - Staggered stance OG scaption lift  2x15 reps, 3# in each hand  - TM for endurance and posture                     5 mins    Home Exercises Provided and Patient Education Provided     Education provided:   - POC, HEP, Body mechanics with t/f in bed      Written Home Exercises Provided: Patient instructed to cont prior HEP.  Exercises were reviewed and Kirill was able to demonstrate them prior to the end of the session.  Kirill demonstrated good  understanding of the education provided.     See EMR under Patient Instructions for exercises provided prior visit.    Assessment     Kirill is a 44 y.o. male referred to outpatient Physical Therapy with a medical diagnosis of S/P right L3-4 and left L4-5 microdiscectomy. He was out of the clinic last week due to illness and has returned for his first f/u today. He did well with his activities today though with ongoing marked core and LE weakness noted. Furthermore his standing and walking tolerance is fair at this time. 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     Kirill is progressing well towards his goals.   Pt prognosis is Good.     Pt will continue to benefit from skilled outpatient physical therapy to address the deficits listed in the problem list box on initial evaluation, provide pt/family education and to maximize pt's level of independence in the home and community environment.     Pt's spiritual, cultural and educational needs considered and pt agreeable to plan of care and goals.     Anticipated Barriers for therapy: High BMI, profession, recent Lumbar Sx      Goals:  Short Term Goals: 4 weeks   1.) Pt will become compliant and independent with her initial HEP to promote decreased pain and improved mobility and strength. (Ongoing, not met)  2) Pt to report decrease in pain levels from 5/10 at worse to 3/10 at worse. (Ongoing, not met)  3.) Pt will improve walking tolerance from 1 mile to 2 miles in order to promote full and safe return to work as he reports he has to walk upwards of 5 miles per day for work.  (Ongoing, not met)  4.) Pt will be able to lift from floor to waist 30# with proper body mechanics in order to promote a safe return to work as he reports he is required to be able to lift up to 50# and is now considered full duty.  (Ongoing, not met)  5.) Pt will demonstrate WFL lumbar AROM in all planes with min to no c/o s/s to improve general mobility. (Ongoing, not met)     Long Term Goals: 8 weeks   1.) Pt will become compliant and independent with an updated, progressed HEP to promote decreased pain and improved mobility and strength as well as prep for discharge from further PT intervention. (Ongoing, not met)  2) Pt to report decrease in pain levels from 5/10 at worse to 1/10 at worse.(Ongoing, not met)  3.) Pt will improve walking tolerance from 1 mile to 3 miles in order to promote full and safe return to work as he reports he has to walk upwards of 5 miles per day for work.  (Ongoing, not met)  4.) Pt will be able to lift from floor to waist 50# with proper body mechanics in order to promote a safe return to work as he reports he is required to be able to lift up to 50# and is now considered full duty.  (Ongoing, not met)  5.) Pt will improve her FOTO score from 41% impairment to 35% improvement to indicate improvement in overall function.  (Ongoing, not met)  6.) Pt will demonstrate full lumbar AROM in all planes with no c/o s/s to improve general mobility. (Ongoing, not met)      Plan     Plan of care Certification: 2/10/2020 to 3/20/2020.      Outpatient Physical Therapy 2 times weekly for 6 weeks to include the following interventions: Manual Therapy, Moist Heat/ Ice, Neuromuscular Re-ed, Patient Education, Self Care, Therapeutic Activites, Therapeutic Exercise and Soft tissue modalities as needed.       (MD orders for 3 x per week; however, the patient reports that he is unable to come into the clinic at a frequency of 3 x per week due to his work schedule. He has been set up at a frequency of 2 x per week at this time and is also transitioning to the gym setting as well.)      Abundio Jones, PT

## 2020-02-18 ENCOUNTER — TELEPHONE (OUTPATIENT)
Dept: FAMILY MEDICINE | Facility: CLINIC | Age: 45
End: 2020-02-18

## 2020-02-18 PROBLEM — M54.50 CHRONIC LOWER BACK PAIN: Status: ACTIVE | Noted: 2020-02-18

## 2020-02-18 PROBLEM — G89.29 CHRONIC LOWER BACK PAIN: Status: ACTIVE | Noted: 2020-02-18

## 2020-02-19 ENCOUNTER — CLINICAL SUPPORT (OUTPATIENT)
Dept: REHABILITATION | Facility: HOSPITAL | Age: 45
End: 2020-02-19
Attending: NEUROLOGICAL SURGERY
Payer: COMMERCIAL

## 2020-02-19 ENCOUNTER — OFFICE VISIT (OUTPATIENT)
Dept: PSYCHIATRY | Facility: CLINIC | Age: 45
End: 2020-02-19
Payer: COMMERCIAL

## 2020-02-19 ENCOUNTER — OFFICE VISIT (OUTPATIENT)
Dept: OPTOMETRY | Facility: CLINIC | Age: 45
End: 2020-02-19
Payer: COMMERCIAL

## 2020-02-19 VITALS
BODY MASS INDEX: 42.66 KG/M2 | HEART RATE: 107 BPM | SYSTOLIC BLOOD PRESSURE: 142 MMHG | DIASTOLIC BLOOD PRESSURE: 87 MMHG | WEIGHT: 315 LBS | HEIGHT: 72 IN

## 2020-02-19 DIAGNOSIS — H52.203 HYPEROPIA WITH ASTIGMATISM AND PRESBYOPIA, BILATERAL: ICD-10-CM

## 2020-02-19 DIAGNOSIS — H25.13 NUCLEAR SCLEROSIS OF BOTH EYES: ICD-10-CM

## 2020-02-19 DIAGNOSIS — F33.1 MODERATE EPISODE OF RECURRENT MAJOR DEPRESSIVE DISORDER: ICD-10-CM

## 2020-02-19 DIAGNOSIS — H52.03 HYPEROPIA WITH ASTIGMATISM AND PRESBYOPIA, BILATERAL: ICD-10-CM

## 2020-02-19 DIAGNOSIS — G89.29 CHRONIC BILATERAL LOW BACK PAIN WITH BILATERAL SCIATICA: ICD-10-CM

## 2020-02-19 DIAGNOSIS — M54.41 CHRONIC BILATERAL LOW BACK PAIN WITH BILATERAL SCIATICA: ICD-10-CM

## 2020-02-19 DIAGNOSIS — H52.4 HYPEROPIA WITH ASTIGMATISM AND PRESBYOPIA, BILATERAL: ICD-10-CM

## 2020-02-19 DIAGNOSIS — F42.9 OBSESSIVE-COMPULSIVE DISORDER, UNSPECIFIED TYPE: ICD-10-CM

## 2020-02-19 DIAGNOSIS — E11.9 TYPE 2 DIABETES MELLITUS WITHOUT RETINOPATHY: ICD-10-CM

## 2020-02-19 DIAGNOSIS — H02.831 DERMATOCHALASIS OF BOTH UPPER EYELIDS: Primary | ICD-10-CM

## 2020-02-19 DIAGNOSIS — F41.9 ANXIETY: ICD-10-CM

## 2020-02-19 DIAGNOSIS — H02.834 DERMATOCHALASIS OF BOTH UPPER EYELIDS: Primary | ICD-10-CM

## 2020-02-19 DIAGNOSIS — M54.42 CHRONIC BILATERAL LOW BACK PAIN WITH BILATERAL SCIATICA: ICD-10-CM

## 2020-02-19 PROCEDURE — 92015 PR REFRACTION: ICD-10-PCS | Mod: S$GLB,,, | Performed by: OPTOMETRIST

## 2020-02-19 PROCEDURE — 3079F DIAST BP 80-89 MM HG: CPT | Mod: CPTII,S$GLB,, | Performed by: NURSE PRACTITIONER

## 2020-02-19 PROCEDURE — 3077F PR MOST RECENT SYSTOLIC BLOOD PRESSURE >= 140 MM HG: ICD-10-PCS | Mod: CPTII,S$GLB,, | Performed by: NURSE PRACTITIONER

## 2020-02-19 PROCEDURE — 3008F PR BODY MASS INDEX (BMI) DOCUMENTED: ICD-10-PCS | Mod: CPTII,S$GLB,, | Performed by: NURSE PRACTITIONER

## 2020-02-19 PROCEDURE — 99205 PR OFFICE/OUTPT VISIT, NEW, LEVL V, 60-74 MIN: ICD-10-PCS | Mod: S$GLB,,, | Performed by: NURSE PRACTITIONER

## 2020-02-19 PROCEDURE — 3077F SYST BP >= 140 MM HG: CPT | Mod: CPTII,S$GLB,, | Performed by: NURSE PRACTITIONER

## 2020-02-19 PROCEDURE — 99999 PR PBB SHADOW E&M-EST. PATIENT-LVL II: ICD-10-PCS | Mod: PBBFAC,,, | Performed by: OPTOMETRIST

## 2020-02-19 PROCEDURE — 99205 OFFICE O/P NEW HI 60 MIN: CPT | Mod: S$GLB,,, | Performed by: NURSE PRACTITIONER

## 2020-02-19 PROCEDURE — 92004 PR EYE EXAM, NEW PATIENT,COMPREHESV: ICD-10-PCS | Mod: S$GLB,,, | Performed by: OPTOMETRIST

## 2020-02-19 PROCEDURE — 3008F BODY MASS INDEX DOCD: CPT | Mod: CPTII,S$GLB,, | Performed by: NURSE PRACTITIONER

## 2020-02-19 PROCEDURE — 99999 PR PBB SHADOW E&M-EST. PATIENT-LVL III: ICD-10-PCS | Mod: PBBFAC,,, | Performed by: NURSE PRACTITIONER

## 2020-02-19 PROCEDURE — 97110 THERAPEUTIC EXERCISES: CPT | Mod: PN

## 2020-02-19 PROCEDURE — 99999 PR PBB SHADOW E&M-EST. PATIENT-LVL III: CPT | Mod: PBBFAC,,, | Performed by: NURSE PRACTITIONER

## 2020-02-19 PROCEDURE — 3079F PR MOST RECENT DIASTOLIC BLOOD PRESSURE 80-89 MM HG: ICD-10-PCS | Mod: CPTII,S$GLB,, | Performed by: NURSE PRACTITIONER

## 2020-02-19 PROCEDURE — 92004 COMPRE OPH EXAM NEW PT 1/>: CPT | Mod: S$GLB,,, | Performed by: OPTOMETRIST

## 2020-02-19 PROCEDURE — 99999 PR PBB SHADOW E&M-EST. PATIENT-LVL II: CPT | Mod: PBBFAC,,, | Performed by: OPTOMETRIST

## 2020-02-19 PROCEDURE — 92015 DETERMINE REFRACTIVE STATE: CPT | Mod: S$GLB,,, | Performed by: OPTOMETRIST

## 2020-02-19 RX ORDER — FLUOXETINE HYDROCHLORIDE 20 MG/1
20 CAPSULE ORAL DAILY
Qty: 30 CAPSULE | Refills: 1 | Status: SHIPPED | OUTPATIENT
Start: 2020-02-19 | End: 2020-03-19 | Stop reason: SDUPTHER

## 2020-02-19 RX ORDER — DIAZEPAM 2 MG/1
2 TABLET ORAL 2 TIMES DAILY PRN
Qty: 60 TABLET | Refills: 2 | Status: SHIPPED | OUTPATIENT
Start: 2020-02-19 | End: 2020-03-19 | Stop reason: SDUPTHER

## 2020-02-19 NOTE — PROGRESS NOTES
"  Physical Therapy Daily Treatment Note     Name: [facility]  Clinic Number: [id number]    Therapy Diagnosis:   Encounter Diagnosis   Name Primary?    Chronic bilateral low back pain with bilateral sciatica      Physician: Jose L Brown MD    Visit Date: 2/19/2020       Physician Orders: PT Eval and Treat:     S/P right L3-4 and left L4-5 microdiscectomy. Lumbar spine physical therapy 3 times a week for 6 weeks.   Back extensor and core muscles strengthening. Bridges, plank, reverse situp. Home exercises.      Medical Diagnosis from Referral: Z98.890 (ICD-10-CM) - S/P lumbar microdiscectomy  Evaluation Date: 2/10/2020  Authorization Period Expiration: 12/31/2020  Plan of Care Expiration: 3/20/2020  Visit # / Visits authorized: 3/ 60 DONE    Time In: 1100am  Time Out: 1155am  Total Billable Time: 55 minutes (4 TE)  Precautions: Standard, Diabetes and Recent s/p L3-4 and L4-5 microdiscectomy       Subjective     Pt reports: that he was feeling much better with changes to prior session vs ex's performed on IE.   Still working full duty though not on his feet at this time as much as he was before. Spending 25% of the day on his feet standing/walking and 75% sitting vs before 75% standing/walking.   He was compliant with home exercise program.  Response to previous treatment: Decreased soreness as compared to after IE  Functional change: none noted at this time since IE    Pain: 1/10  Location: bilateral buttocks      Objective     Kirill received therapeutic exercises to develop strength, endurance, ROM, flexibility and core stabilization for 60 minutes including:  - TM for posture and endurance  10 minutes (0.32mi)  - Standing gastroc/soleus stretch  5x30" (B)  - Bridging with gluteal set to initiate                2x10 from large bolster  - Supine bent knee fall out   2x10 each, BTB  - SL Clams                                                 1x10 each  - Supine sciatic nn glides                        "         2x20 each  - Supine TrA marching                                    2x10 keeping posterior pelvic tilt 3# ankle wts  - Standing hip extension                                 2x10 each  - Standing hip abduction    2x10 each  - [place] holding onto yellow wt ball  3x10 focusing on body mechanics  - 5# lateral walk    3x10 steps  - Staggered stance OG scaption lift  2x15 reps, 3# in each hand  - Staggered stance Horizontal Rows  2x20 (GTB)  - Staggered stance B Shoulder pull downs 2x20 (GTB)  - Treadmill      5 minutes working on postural and ambulatory endurance (0.16mi)    Home Exercises Provided and Patient Education Provided     Education provided:   - POC, HEP, Body mechanics with t/f in bed    Written Home Exercises Provided: Patient instructed to cont prior HEP.  Exercises were reviewed and Kirill was able to demonstrate them prior to the end of the session.  Kirill demonstrated good  understanding of the education provided.     See EMR under Patient Instructions for exercises provided prior visit.    Assessment     Kirill is a 44 y.o. male referred to outpatient Physical Therapy with a medical diagnosis of S/P right L3-4 and left L4-5 microdiscectomy. Pt responded well to changes made at last visit reported marked decrease in soreness as compared to initial evaluation. He did well with his activities today though with ongoing marked core and LE weakness noted. Furthermore his standing and walking tolerance is fair at this time. 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     Kirill is progressing well towards his goals.   Pt prognosis is Good.     Pt will continue to benefit from skilled outpatient physical therapy to address the deficits listed in the problem list box on initial evaluation, provide pt/family education and to maximize pt's level of independence  in the home and community environment.     Pt's spiritual, cultural and educational needs considered and pt agreeable to plan of care and goals.     Anticipated Barriers for therapy: High BMI, profession, recent Lumbar Sx     Goals:  Short Term Goals: 4 weeks   1.) Pt will become compliant and independent with her initial HEP to promote decreased pain and improved mobility and strength. (Ongoing, not met)  2) Pt to report decrease in pain levels from 5/10 at worse to 3/10 at worse. (Ongoing, not met)  3.) Pt will improve walking tolerance from 1 mile to 2 miles in order to promote full and safe return to work as he reports he has to walk upwards of 5 miles per day for work.  (Ongoing, not met)  4.) Pt will be able to lift from floor to waist 30# with proper body mechanics in order to promote a safe return to work as he reports he is required to be able to lift up to 50# and is now considered full duty.  (Ongoing, not met)  5.) Pt will demonstrate WFL lumbar AROM in all planes with min to no c/o s/s to improve general mobility. (Ongoing, not met)     Long Term Goals: 8 weeks   1.) Pt will become compliant and independent with an updated, progressed HEP to promote decreased pain and improved mobility and strength as well as prep for discharge from further PT intervention. (Ongoing, not met)  2) Pt to report decrease in pain levels from 5/10 at worse to 1/10 at worse.(Ongoing, not met)  3.) Pt will improve walking tolerance from 1 mile to 3 miles in order to promote full and safe return to work as he reports he has to walk upwards of 5 miles per day for work.  (Ongoing, not met)  4.) Pt will be able to lift from floor to waist 50# with proper body mechanics in order to promote a safe return to work as he reports he is required to be able to lift up to 50# and is now considered full duty.  (Ongoing, not met)  5.) Pt will improve her FOTO score from 41% impairment to 35% improvement to indicate improvement in overall  function.  (Ongoing, not met)  6.) Pt will demonstrate full lumbar AROM in all planes with no c/o s/s to improve general mobility. (Ongoing, not met)      Plan     Plan of care Certification: 2/10/2020 to 3/20/2020.     Outpatient Physical Therapy 2 times weekly for 6 weeks to include the following interventions: Manual Therapy, Moist Heat/ Ice, Neuromuscular Re-ed, Patient Education, Self Care, Therapeutic Activites, Therapeutic Exercise and Soft tissue modalities as needed.       (MD orders for 3 x per week; however, the patient reports that he is unable to come into the clinic at a frequency of 3 x per week due to his work schedule. He has been set up at a frequency of 2 x per week at this time and is also transitioning to the gym setting as well.)      Abundio Jones, PT

## 2020-02-19 NOTE — PROGRESS NOTES
Outpatient Psychiatry Initial Visit (MD/NP)    2/19/2020    Kirill Gandhi III, a 44 y.o. male, presenting for initial evaluation visit. Met with patient.    Reason for Encounter: Referral from Marcos Ibarra MD. Patient complains of No chief complaint on file.    History of Present Illness:   Kirill Gandhi III arrived early for his appointment. He is casually dressed with good hygiene and grooming. He reports having depression, anxiety, and OCD. Hasn't seen a psychiatrist in a few years since 2016.- has been managed by PCP since. Recently stopped Luvox due to interaction with Tizanidine. Started on Wellbutrin about a 3 months ago but feels its ineffective. Takes Valium twice a day everyday. Discussed starting Prozac and continuing Valium for the time being. Patient agreeable.     Depression and Anxiety   He complains of anhedonia, depressed mood, difficulty concentrating, fatigue, feelings of worthlessness/guilt, hopelessness and hypersomnia. Patient denies recurrent thoughts of death, suicidal attempt, suicidal thoughts with specific plan, suicidal thoughts without plan, weight gain and weight loss. He has the following anxiety symptoms: difficulty concentrating, fatigue, feelings of losing control, irritable, palpitations, racing thoughts, and restlessness. Checking verifying, making use everything is perfect, will leave and have to come back to to check things, Onset of symptoms was approximately 8 months ago.  Symptoms have been stable since that time. He denies current suicidal and homicidal ideation. Family history significant for anxiety, depression, heart disease and substance abuse.Possible organic causes contributing are: none. Risk factors: positive family history in  father, negative life event divorce, recent, breakup, job loss, house forceclosed, filed bankruptcy and previous episode of depression Previous treatment includes individual therapy and medication Valium,  Wellbutrin and Luvox.   He complains of the following medication side effects: none.    Stressors:  Finances, things not going as planned,     History:     Past Psychiatric History:   Previous therapy: yes- therapy as a child when parent   Previous psychiatric treatment and medication trials: yes - Wellbutrin for 3 months total, Valium  Previous psychiatric hospitalizations: no  Previous diagnoses: yes - Depression, Anxiety, OCD  Previous suicide attempts: no  History of violence: no  Education: high school diploma/GED  Other pertinent history: Financial  Depression screening was performed with standardized tool: Yes - No Depression  Major depression Inventory 18/50    Substance Abuse History:  Recreational drugs: denies  Use of alcohol: occasional, social use 3 times since Jan - previously having drinking  Use of caffeine: caffeinated soft drinks 1 /day  Tobacco use: yes - vaping  Legal consequences of chemical use: no  Use of OTC medications: Tylenol as needed    Additional historical information includes:   Seizure: denies  Head trauma/TBI: fell off a horse about 20 years ago and LOC    The following portions of the patient's history were reviewed and updated as appropriate: allergies, current medications, past family history, past medical history, past social history, past surgical history and problem list.      Review Of Systems:     Medical Review Of Systems:  Constitutional: negative  Eyes: negative  Ears, nose, mouth, throat, and face: negative  Respiratory: negative  Cardiovascular: negative  Gastrointestinal: positive for diarrhea    Psychiatric Review Of Systems:  Sleep: no  Appetite changes: no  Weight changes: no  Energy: yes, fatigue  Interest/pleasure/anhedonia: no  Somatic symptoms: no  Libido: no  Anxiety/panic: yes  Guilty/hopeless: yes, sometimes  Self-injurious behavior/risky behavior: yes, driving fast on interstate 85mph  Any drugs: no  Alcohol: yes       Current Evaluation:  "    Musculoskeletal  Muscle Strength/Tone:  no tremor, no tic   Gait & Station:  non-ataxic      Relevant Elements of Neurological Exam: normal gait    Nutritional Screening: Considering the patient's height and weight, medications, medical history and preferences, should a referral be made to the dietitian? no    Mental Status Evaluation:  Appearance:  unremarkable, age appropriate   Behavior:  normal, cooperative   Speech:  no latency; no press   Mood:  "alright"   Affect:  congruent and appropriate   Thought Process:  normal and logical   Thought Content:  normal, no suicidality, no homicidality, delusions, or paranoia   Sensorium:  grossly intact   Cognition:  grossly intact   Insight:  intact   Judgment:  behavior is adequate to circumstances     Physical/Somatic Complaints   The patient lists: no physical complaints.    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    Vitals:    02/19/20 1246   BP: (!) 142/87   Pulse: 107   Weight: (!) 157.8 kg (347 lb 14.2 oz)   Height: 5' 11.5" (1.816 m)        General:  unremarkable, age appropriate       Laboratory Data  Office Visit on 02/13/2020   Component Date Value Ref Range Status    Molecular Strep A, POC 02/13/2020 Negative  Negative Final     Acceptable 02/13/2020 Yes   Final         Medications  Outpatient Encounter Medications as of 2/19/2020   Medication Sig Dispense Refill    acetaminophen (TYLENOL) 325 MG tablet Take 325 mg by mouth every 6 (six) hours as needed for Pain.      aspirin (ECOTRIN) 81 MG EC tablet Take 81 mg by mouth once daily.      atorvastatin (LIPITOR) 10 MG tablet Take 1 tablet (10 mg total) by mouth every other day. 45 tablet 3    " celecoxib (CELEBREX) 200 MG capsule Take 1 capsule (200 mg total) by mouth 2 (two) times daily. 180 capsule 1    diazePAM (VALIUM) 2 MG tablet Take 1 tablet (2 mg total) by mouth every 12 (twelve) hours as needed. 60 tablet 0    diazePAM (VALIUM) 2 MG tablet Take 1 tablet (2 mg total) by mouth 2 (two) times daily as needed for Anxiety. 60 tablet 2    flash glucose scanning reader Misc Use to test glucose 1 each 0    flash glucose sensor (FREESTYLE BARRETT 14 DAY SENSOR) Kit Use to test glucose 12 kit 3    FLUoxetine 20 MG capsule Take 1 capsule (20 mg total) by mouth once daily. 30 capsule 1    fluticasone propionate (FLONASE) 50 mcg/actuation nasal spray 2 sprays (100 mcg total) by Each Nostril route once daily. 48 g 2    gabapentin (NEURONTIN) 300 MG capsule Take 2 capsules (600 mg total) by mouth 3 (three) times daily. 180 capsule 11    metFORMIN (GLUCOPHAGE) 1000 MG tablet Take 1 tablet (1,000 mg total) by mouth 2 (two) times daily with meals. 180 tablet 0    multivitamin capsule Take 1 capsule by mouth once daily.      NON FORMULARY MEDICATION Uses Cpap Machine nightly on a setting of 10      oxyCODONE-acetaminophen (PERCOCET) 5-325 mg per tablet Take 1-2 tablets by mouth every 6 (six) hours as needed for Pain. 48 tablet 0    terbinafine HCl (LAMISIL) 250 mg tablet TAKE 2 TABLETS BY MOUTH DAILY FOR THE FIRST 5 DAYS OF EACH MONTH FOR 3 MONTHS 40 tablet 2    tiZANidine 4 mg Cap TAKE 1 CAPSULE BY MOUTH THREE TIMES A DAY AS NEEDED 90 capsule 0    valsartan-hydrochlorothiazide (DIOVAN-HCT) 320-12.5 mg per tablet Take 1 tablet by mouth once daily. 90 tablet 2    vitamin D 1000 units Tab Take 4,000 mg by mouth once daily.       [DISCONTINUED] buPROPion (WELLBUTRIN XL) 150 MG TB24 tablet Take 1 tablet (150 mg total) by mouth once daily. 30 tablet 11    [DISCONTINUED] fluvoxaMINE (LUVOX) 100 MG tablet Take 1.5 tablets (150 mg total) by mouth 2 (two) times daily. 270 tablet 0     No facility-administered  encounter medications on file as of 2/19/2020.            Assessment - Diagnosis - Goals:     Impression:  Kirill Gandhi III, a 44 y.o. male, presenting for initial evaluation visit. He reports having depression, anxiety, and OCD. Hasn't seen a psychiatrist in a few years since 2016.- has been managed by PCP since. Recently stopped Luvox due to interaction with Tizanidine. Started on Wellbutrin about a 3 months ago but feels its ineffective. Takes Valium twice a day everyday. Discussed starting Prozac and continuing Valium for the time being while we attempt to treat the anxiety with a medication with fewer risks. . Patient agreeable.         ICD-10-CM ICD-9-CM   1. Obsessive-compulsive disorder, unspecified type F42.9 300.3   2. Moderate episode of recurrent major depressive disorder F33.1 296.32   3. Anxiety F41.9 300.00       Strengths and Liabilities: Strength: Patient accepts guidance/feedback, Strength: Patient is expressive/articulate., Strength: Patient is intelligent., Liability: Patient lacks coping skills.    Treatment Goals:    Anxiety: reducing negative automatic thoughts, reducing physical symptoms of anxiety and reducing time spent worrying (<30 minutes/day)  Depression: increasing energy, increasing interest in usual activities, increasing motivation, reducing excessive guilt, reducing fatigue and reducing negative automatic thoughts    Treatment Plan/Recommendations:   · Medication Management: Continue current medications.   · Discontinue Wellbutrin  · Start Prozac 20 mg daily  · Continue Valium 2 mg BID prn anxiety - continue this medication for the time being while we attempt to treat the anxiety with a medication with fewer risks.   Informed pt of the risks of continuous Benzodiazepine use including tolerance, dependence and withdrawals that may be life threatening upon abrupt cessation. Also advised not to take Benzodiazepines with Opiates or other sedatives and also not to drive or  operate heavy machinery while using Benzodiazepines.  · The risks and benefits of medication were discussed with the patient.  · Referral for further treatment to social work team for psychotherapy and psychologist for psychotherapy  · The treatment plan and follow up plan were reviewed with the patient.   Discussed diagnosis, risks and benefits of proposed treatment above vs alternative treatments vs no treatment, and potential side effects of these treatments. The patient expresses understanding of the above and displays the capacity to agree with this treatment given said understanding. Patient also agrees that, currently, the benefits outweigh the risks and would like to pursue treatment at this time. Particularly emphasized risk of triggering suicidal ideation with SSRIs/antidepressants and risk of serotonin syndrome as patient is on multiple serotonergic agents. The patient expresses understanding of the former and displays the capacity to agree with the current plan.    Encouraged Patient to keep future appointments.    Take medications as prescribed and abstain from substance abuse.    In the event of an emergency patient was advised to go to the emergency room    Referral to: Outpatient individual therapy.    Return to Clinic: 3 months    Total time: 60 minutes with more than 50% of time spent counseling and/or coordinating care.  (which included pts differential diagnosis and prognosis for psychiatric conditions, risks, benefits of treatments, instructions and adherence to treatment plan, risk reduction, reviewing current psychiatric medication regimen, medical problems and social stressors. In addtion to possible discussion with other healthcare provider/s)    Blaire Staples DNP

## 2020-02-19 NOTE — PROGRESS NOTES
HPI     43 y/o male here today for DM eye exam. Patient is type 2 Dm on oral   meds. Decreased Va ou at both distance and near. Patient is also concerned   with droopy lids would like to consider ptosis eye sx. Pt has floaters ou   w/o flashes. No previous sx or injury ou. No other ocular complaints at   this time. Pt is glasses wearer.    No gtts      Hemoglobin A1C       Date                     Value               Ref Range             Status                11/13/2019               5.9 (H)             4.0 - 5.6 %           Final                   03/28/2019               5.7 (H)             4.0 - 5.6 %           Final                  12/07/2017               6.9 (H)             4.0 - 5.6 %           Final                    Last edited by Nakia Holt, OD on 2/19/2020  3:42 PM. (History)            Assessment /Plan     For exam results, see Encounter Report.    Dermatochalasis of both upper eyelids    Nuclear sclerosis of both eyes    Type 2 diabetes mellitus without retinopathy    Hyperopia with astigmatism and presbyopia, bilateral      1. Educated pt on findings. Symptomatic. Refer to Dr. Deal for ptosis eval.    2. Trc NS OU. Not visually significant. No need for removal at this time. Monitor yearly.     3. No retinopathy noted, OU. Continue proper BS control and annual DM eye exams. Monitor yearly.     4. Updated SRx. First time PAL wearer, educated on possible adaptation symptoms. Monitor yearly.       RTC with Dr. Deal, me yearly or prn.

## 2020-02-20 ENCOUNTER — TELEPHONE (OUTPATIENT)
Dept: OPHTHALMOLOGY | Facility: CLINIC | Age: 45
End: 2020-02-20

## 2020-02-20 NOTE — TELEPHONE ENCOUNTER
----- Message from Qi Guadalupe sent at 2/19/2020  5:00 PM CST -----      ----- Message -----  From: Keyona Trujillo  Sent: 2/19/2020   4:13 PM CST  To: [facility] Staff    Dr. Holt would like this pt scheduled for a ptosis eval. Please advise on how Dr. Deal would like this scheduled or let me know if you will reach out to the pt for scheduling.     Thank you

## 2020-02-23 ENCOUNTER — HOSPITAL ENCOUNTER (EMERGENCY)
Facility: HOSPITAL | Age: 45
Discharge: HOME | End: 2020-02-23
Attending: EMERGENCY MEDICINE
Payer: COMMERCIAL

## 2020-02-23 VITALS
HEART RATE: 118 BPM | HEIGHT: 71 IN | SYSTOLIC BLOOD PRESSURE: 144 MMHG | RESPIRATION RATE: 20 BRPM | TEMPERATURE: 99 F | OXYGEN SATURATION: 97 % | DIASTOLIC BLOOD PRESSURE: 98 MMHG | WEIGHT: 315 LBS | BODY MASS INDEX: 44.1 KG/M2

## 2020-02-23 DIAGNOSIS — J10.1 INFLUENZA A: Primary | ICD-10-CM

## 2020-02-23 LAB
CTP QC/QA: YES
POC MOLECULAR INFLUENZA A AGN: POSITIVE
POC MOLECULAR INFLUENZA B AGN: NEGATIVE

## 2020-02-23 PROCEDURE — 87502 INFLUENZA DNA AMP PROBE: CPT | Mod: ER

## 2020-02-23 PROCEDURE — 99284 EMERGENCY DEPT VISIT MOD MDM: CPT | Mod: 25,ER

## 2020-02-23 PROCEDURE — 25000003 PHARM REV CODE 250: Mod: ER | Performed by: PHYSICIAN ASSISTANT

## 2020-02-23 RX ORDER — ACETAMINOPHEN 500 MG
1000 TABLET ORAL
Status: COMPLETED | OUTPATIENT
Start: 2020-02-23 | End: 2020-02-23

## 2020-02-23 RX ORDER — BENZONATATE 100 MG/1
100 CAPSULE ORAL 3 TIMES DAILY PRN
Qty: 20 CAPSULE | Refills: 0 | Status: SHIPPED | OUTPATIENT
Start: 2020-02-23 | End: 2020-03-04

## 2020-02-23 RX ORDER — OSELTAMIVIR PHOSPHATE 75 MG/1
75 CAPSULE ORAL 2 TIMES DAILY
Qty: 10 CAPSULE | Refills: 0 | Status: SHIPPED | OUTPATIENT
Start: 2020-02-23 | End: 2020-02-28

## 2020-02-23 RX ORDER — OXYMETAZOLINE HCL 0.05 %
1 SPRAY, NON-AEROSOL (ML) NASAL 2 TIMES DAILY
Qty: 15 ML | Refills: 0 | Status: SHIPPED | OUTPATIENT
Start: 2020-02-23 | End: 2020-02-26

## 2020-02-23 RX ORDER — IBUPROFEN 600 MG/1
600 TABLET ORAL
Status: COMPLETED | OUTPATIENT
Start: 2020-02-23 | End: 2020-02-23

## 2020-02-23 RX ADMIN — ACETAMINOPHEN 1000 MG: 500 TABLET ORAL at 06:02

## 2020-02-23 RX ADMIN — IBUPROFEN 600 MG: 600 TABLET ORAL at 06:02

## 2020-02-24 NOTE — ED PROVIDER NOTES
Encounter Date: 2/23/2020    SCRIBE #1 NOTE: I, Tiera Patterson, am scribing for, and in the presence of,  BOLIVAR Hoyt. I have scribed the following portions of the note - Other sections scribed: HPI, and ROS.       History     Chief Complaint   Patient presents with    Nasal Congestion     reports generalized body aches     44 year old male complains of congestion, cough with green production, body aches, runny nose and sneezing x 2 days. He notes nausea and sore throat. He denies vomiting and abdominal pain. He reports taking Tylenol. He reports his girlfriend has the flu. PMHx of Hypertension and Diabetes Mellitus.    The history is provided by the patient. No  was used.     Review of patient's allergies indicates:  No Known Allergies  Past Medical History:   Diagnosis Date    Allergy     Anxiety     Back pain     CTS (carpal tunnel syndrome) 6/26/2015    Depression     Diabetes 4/17/2015    Hx of vasectomy     Hypertension     OCD (obsessive compulsive disorder)     Sleep apnea      Past Surgical History:   Procedure Laterality Date    ADENOIDECTOMY      EPIDURAL STEROID INJECTION Right 9/4/2019    Procedure: Injection, Steroid, Epidural Transforaminal;  Surgeon: Harjinder Batista Jr., MD;  Location: [facility] ENDO;  Service: Pain Management;  Laterality: Right;  Right L3 + L4 TF NATALIE    [id number]  [id number]    Arrive @ 1300; ASA last 8/27; Check BG; NEEDS CONSENT    FRACTURE SURGERY      bilateral elbow fracture 3 years ago    HERNIA REPAIR      bilateral groin hernia    LUMBAR LAMINECTOMY Bilateral 11/29/2019    Procedure: LAMINECTOMY, SPINE, LUMBAR Rigth L3-4, Left L4-5 Laminectomy, medial Facetectomy, and microdiscectomy;  Surgeon: Jose L Brown MD;  Location: [facility] OR;  Service: Neurosurgery;  Laterality: Bilateral;  Procedure: Rigth L3-4, Left L4-5 Laminectomy, medial Facetectomy, and microdiscectomy  Surgery Time: 2.5 Hrs  LOS: 0-1  Anesthesia: General  Blood: Type &  Screen  Radiology: C-arm  Micros    TONSILLECTOMY      VASECTOMY       Family History   Problem Relation Age of Onset    Fibromyalgia Mother     Osteoarthritis Mother     Cataracts Maternal Grandmother     Macular degeneration Maternal Grandfather     Cataracts Maternal Grandfather     Rheum arthritis Neg Hx     Psoriasis Neg Hx     Lupus Neg Hx     Kidney disease Neg Hx     Inflammatory bowel disease Neg Hx     Amblyopia Neg Hx     Blindness Neg Hx     Glaucoma Neg Hx     Retinal detachment Neg Hx     Strabismus Neg Hx      Social History     Tobacco Use    Smoking status: Never Smoker    Smokeless tobacco: Never Used    Tobacco comment: ; one child    Substance Use Topics    Alcohol use: Yes     Frequency: Monthly or less     Drinks per session: 1 or 2     Binge frequency: Never     Comment: 750 ml  once a month or so    Drug use: No     Review of Systems   Constitutional: Negative.  Negative for fever.   HENT: Positive for congestion, rhinorrhea, sneezing and sore throat.    Eyes: Negative.  Negative for pain.   Respiratory: Positive for cough. Negative for shortness of breath.    Cardiovascular: Negative.  Negative for chest pain.   Gastrointestinal: Positive for nausea. Negative for abdominal pain and vomiting.   Genitourinary: Negative.  Negative for dysuria.   Musculoskeletal: Positive for myalgias. Negative for back pain.   Skin: Negative.  Negative for rash.   Neurological: Negative.  Negative for headaches.   Hematological: Negative.    Psychiatric/Behavioral: Negative.    All other systems reviewed and are negative.      Physical Exam     Initial Vitals [02/23/20 1743]   BP Pulse Resp Temp SpO2   (!) 144/98 (!) 124 20 98.2 °F (36.8 °C) 97 %      MAP       --         Physical Exam    Nursing note and vitals reviewed.  Constitutional: He appears well-developed and well-nourished. He is not diaphoretic. No distress.   HENT:   Head: Normocephalic and atraumatic.   Nasal congestion  present without active rhinorrhea. No sinus TTP. TMs intact without erythema or swelling; able to discern bony landmarks. No mastoid tenderness or swelling behind the ears. No pain with manipulation of external ears. No oropharyngeal edema, swelling, erythema, tonsillar exudates, uvula deviation, changes in phonation, trismus, drooling, or cervical adenopathy. No meningeal signs.      Eyes: Conjunctivae and EOM are normal. Pupils are equal, round, and reactive to light. Right eye exhibits no discharge. Left eye exhibits no discharge.   Neck: Normal range of motion. No tracheal deviation present. No JVD present.   Cardiovascular: Regular rhythm and normal heart sounds. Tachycardia present.  Exam reveals no friction rub.    No murmur heard.  Pulmonary/Chest: Breath sounds normal. No stridor. No respiratory distress. He has no wheezes. He has no rhonchi. He has no rales. He exhibits no tenderness.   Abdominal: Soft. He exhibits no distension. There is no tenderness. There is no guarding.   Musculoskeletal: Normal range of motion.   Neurological: He is alert and oriented to person, place, and time.   Skin: Skin is warm and dry. No rash and no abscess noted. No erythema. No pallor.         ED Course   Procedures  Labs Reviewed   POCT INFLUENZA A/B MOLECULAR - Abnormal; Notable for the following components:       Result Value    POC Molecular Influenza A Ag Positive (*)     All other components within normal limits          Imaging Results    None          Medical Decision Making:   History:   Old Medical Records: I decided to obtain old medical records.  Clinical Tests:   Lab Tests: Ordered and Reviewed      This is an urgent evaluation of a 44 y.o. male presenting to the ED for URI symptoms. Denies abdominal pain and emesis. Patient is non-toxic appearing and in no acute distress. Spectrum of symptoms most consistent with viral URI. Low suspicion for PNA. No respiratory distress. No strep throat. No sinus TTP or  purulent rhinorrhea to suggest acute bacterial rhinosinusitis at this time. No evidence of AOM, mastoiditis, PTA, Chano's, epiglottitis, and meningitis. Does not endorse symptoms consistent with DKA today.       Tolerating PO. Discharged home with supportive care. I discussed the use of OTC medications for symptom control. I advised patient to maintain adequate hydration and advance diet as tolerated to maintain adequate nutrition.    I discussed with the patient the diagnosis, treatment plan, indications for return to the emergency department, and for expected follow-up. The patient verbalized an understanding. The patient is asked if there are any questions or concerns. We discuss the case, until all issues are addressed to the patients satisfaction. Patient understands and is agreeable to the plan.          Scribe Attestation:   Scribe #1: I performed the above scribed service and the documentation accurately describes the services I performed. I attest to the accuracy of the note.    Scribe attestation: I, Jas Reid, personally performed the services described in this documentation. All medical record entries made by the scribe were at my direction and in my presence.  I have reviewed the chart and agree that the record reflects my personal performance and is accurate and complete                       Clinical Impression:     1. Influenza A                                Jas Reid PA-C  02/23/20 2045

## 2020-02-26 PROBLEM — F32.A DEPRESSION: Status: ACTIVE | Noted: 2020-02-26

## 2020-02-26 PROBLEM — F42.9 OCD (OBSESSIVE COMPULSIVE DISORDER): Status: ACTIVE | Noted: 2020-02-26

## 2020-02-26 PROBLEM — F41.9 ANXIETY: Status: ACTIVE | Noted: 2020-02-26

## 2020-03-03 ENCOUNTER — CLINICAL SUPPORT (OUTPATIENT)
Dept: REHABILITATION | Facility: HOSPITAL | Age: 45
End: 2020-03-03
Attending: NEUROLOGICAL SURGERY
Payer: COMMERCIAL

## 2020-03-03 DIAGNOSIS — M54.42 CHRONIC BILATERAL LOW BACK PAIN WITH BILATERAL SCIATICA: ICD-10-CM

## 2020-03-03 DIAGNOSIS — G89.29 CHRONIC BILATERAL LOW BACK PAIN WITH BILATERAL SCIATICA: ICD-10-CM

## 2020-03-03 DIAGNOSIS — M54.41 CHRONIC BILATERAL LOW BACK PAIN WITH BILATERAL SCIATICA: ICD-10-CM

## 2020-03-03 PROCEDURE — 97110 THERAPEUTIC EXERCISES: CPT | Mod: PN

## 2020-03-04 ENCOUNTER — OFFICE VISIT (OUTPATIENT)
Dept: FAMILY MEDICINE | Facility: CLINIC | Age: 45
End: 2020-03-04
Payer: COMMERCIAL

## 2020-03-04 ENCOUNTER — CLINICAL SUPPORT (OUTPATIENT)
Dept: REHABILITATION | Facility: HOSPITAL | Age: 45
End: 2020-03-04
Attending: NEUROLOGICAL SURGERY
Payer: COMMERCIAL

## 2020-03-04 VITALS
OXYGEN SATURATION: 98 % | DIASTOLIC BLOOD PRESSURE: 76 MMHG | WEIGHT: 315 LBS | TEMPERATURE: 98 F | BODY MASS INDEX: 44.1 KG/M2 | SYSTOLIC BLOOD PRESSURE: 122 MMHG | HEART RATE: 92 BPM | HEIGHT: 71 IN

## 2020-03-04 DIAGNOSIS — L73.9 FOLLICULITIS: Primary | ICD-10-CM

## 2020-03-04 DIAGNOSIS — B35.1 ONYCHOMYCOSIS OF LEFT GREAT TOE: ICD-10-CM

## 2020-03-04 DIAGNOSIS — G89.29 CHRONIC BILATERAL LOW BACK PAIN WITH BILATERAL SCIATICA: ICD-10-CM

## 2020-03-04 DIAGNOSIS — R05.9 COUGH: ICD-10-CM

## 2020-03-04 DIAGNOSIS — M54.42 CHRONIC BILATERAL LOW BACK PAIN WITH BILATERAL SCIATICA: ICD-10-CM

## 2020-03-04 DIAGNOSIS — M54.41 CHRONIC BILATERAL LOW BACK PAIN WITH BILATERAL SCIATICA: ICD-10-CM

## 2020-03-04 PROCEDURE — 99214 OFFICE O/P EST MOD 30 MIN: CPT | Mod: S$GLB,,, | Performed by: FAMILY MEDICINE

## 2020-03-04 PROCEDURE — 3074F SYST BP LT 130 MM HG: CPT | Mod: CPTII,S$GLB,, | Performed by: FAMILY MEDICINE

## 2020-03-04 PROCEDURE — 3074F PR MOST RECENT SYSTOLIC BLOOD PRESSURE < 130 MM HG: ICD-10-PCS | Mod: CPTII,S$GLB,, | Performed by: FAMILY MEDICINE

## 2020-03-04 PROCEDURE — 97110 THERAPEUTIC EXERCISES: CPT | Mod: PN

## 2020-03-04 PROCEDURE — 3008F PR BODY MASS INDEX (BMI) DOCUMENTED: ICD-10-PCS | Mod: CPTII,S$GLB,, | Performed by: FAMILY MEDICINE

## 2020-03-04 PROCEDURE — 3078F DIAST BP <80 MM HG: CPT | Mod: CPTII,S$GLB,, | Performed by: FAMILY MEDICINE

## 2020-03-04 PROCEDURE — 3078F PR MOST RECENT DIASTOLIC BLOOD PRESSURE < 80 MM HG: ICD-10-PCS | Mod: CPTII,S$GLB,, | Performed by: FAMILY MEDICINE

## 2020-03-04 PROCEDURE — 99214 PR OFFICE/OUTPT VISIT, EST, LEVL IV, 30-39 MIN: ICD-10-PCS | Mod: S$GLB,,, | Performed by: FAMILY MEDICINE

## 2020-03-04 PROCEDURE — 3008F BODY MASS INDEX DOCD: CPT | Mod: CPTII,S$GLB,, | Performed by: FAMILY MEDICINE

## 2020-03-04 RX ORDER — TERBINAFINE HYDROCHLORIDE 250 MG/1
250 TABLET ORAL DAILY
Qty: 30 TABLET | Refills: 3 | Status: SHIPPED | OUTPATIENT
Start: 2020-03-04 | End: 2020-04-03

## 2020-03-04 RX ORDER — SULFAMETHOXAZOLE AND TRIMETHOPRIM 800; 160 MG/1; MG/1
1 TABLET ORAL 2 TIMES DAILY
Qty: 14 TABLET | Refills: 0 | Status: SHIPPED | OUTPATIENT
Start: 2020-03-04 | End: 2020-09-30

## 2020-03-04 NOTE — PROGRESS NOTES
"  Physical Therapy Daily Treatment Note     Name: Kirill Riggs [facility]  Clinic Number: [id number]    Therapy Diagnosis:   Encounter Diagnosis   Name Primary?    Chronic bilateral low back pain with bilateral sciatica      Physician: Jose L Brown MD    Visit Date: 3/3/2020       Physician Orders: PT Eval and Treat:     S/P right L3-4 and left L4-5 microdiscectomy. Lumbar spine physical therapy 3 times a week for 6 weeks.   Back extensor and core muscles strengthening. Bridges, plank, reverse situp. Home exercises.      Medical Diagnosis from Referral: Z98.890 (ICD-10-CM) - S/P lumbar microdiscectomy  Evaluation Date: 2/10/2020  Authorization Period Expiration: 12/31/2020  Plan of Care Expiration: 3/20/2020  Visit # / Visits authorized: 4/ 60     Time In: 1800  Time Out: 1900  Total Billable Time: 55 minutes (4 TE)  Precautions: Standard, Diabetes and Recent s/p L3-4 and L4-5 microdiscectomy       Subjective     Pt reports: Is having good days and bad days at work. But for the most part has seen an improvement in his strength and recovery.   He was compliant with home exercise program.  Response to previous treatment: Decreased soreness as compared to after IE  Functional change: none noted at this time since IE    Pain: 1/10  Location: bilateral buttocks      Objective     Kirill received therapeutic exercises to develop strength, endurance, ROM, flexibility and core stabilization for 55 minutes including:  - TM for posture and endurance  10 minutes (0.32mi)  - Standing gastroc/soleus stretch  5x30" (B)  - Bridging with gluteal set to initiate                3x10 from large bolster  - Supine bent knee fall out   3x10 each, BTB  - SL Clams                                                 3x10 B, BTB  - Supine sciatic nn glides                                2x20 each  - Supine TrA marching                                    2x10 keeping posterior pelvic tilt 3# ankle wts  - Standing hip extension                "                  2x10 each  - Standing hip abduction    2x10 each  - [place] holding onto yellow wt ball  3x10 focusing on body mechanics  - 5# lateral walk    3x10 steps  - Staggered stance OG scaption lift  2x15 reps, 3# in each hand - OOT  - Staggered stance Horizontal Rows  2x20 (GTB) - OOT  - Staggered stance B Shoulder pull downs 2x20 (GTB) - OOT  - Treadmill      5 minutes working on postural and ambulatory endurance (0.16mi)- OOT    Home Exercises Provided and Patient Education Provided     Education provided:   - POC, HEP, Body mechanics with t/f in bed    Written Home Exercises Provided: Patient instructed to cont prior HEP.  Exercises were reviewed and Kirill was able to demonstrate them prior to the end of the session.  Kirill demonstrated good  understanding of the education provided.     See EMR under Patient Instructions for exercises provided prior visit.    Assessment     Pt was able to tolerate all his exercises without any c/o increased pain or discomfort. Pt needed min verbal cuing for mini squats. Pt progressed with additional sets for all exercises. PT will monitor response for next visit and progress accordingly.     Kirill is progressing well towards his goals.   Pt prognosis is Good.     Pt will continue to benefit from skilled outpatient physical therapy to address the deficits listed in the problem list box on initial evaluation, provide pt/family education and to maximize pt's level of independence in the home and community environment.     Pt's spiritual, cultural and educational needs considered and pt agreeable to plan of care and goals.     Anticipated Barriers for therapy: High BMI, profession, recent Lumbar Sx     Goals:  Short Term Goals: 4 weeks   1.) Pt will become compliant and independent with her initial HEP to promote decreased pain and improved mobility and strength. (Ongoing, not met)  2) Pt to report decrease in pain levels from 5/10 at worse to 3/10 at worse. (Ongoing, not  met)  3.) Pt will improve walking tolerance from 1 mile to 2 miles in order to promote full and safe return to work as he reports he has to walk upwards of 5 miles per day for work.  (Ongoing, not met)  4.) Pt will be able to lift from floor to waist 30# with proper body mechanics in order to promote a safe return to work as he reports he is required to be able to lift up to 50# and is now considered full duty.  (Ongoing, not met)  5.) Pt will demonstrate WFL lumbar AROM in all planes with min to no c/o s/s to improve general mobility. (Ongoing, not met)     Long Term Goals: 8 weeks   1.) Pt will become compliant and independent with an updated, progressed HEP to promote decreased pain and improved mobility and strength as well as prep for discharge from further PT intervention. (Ongoing, not met)  2) Pt to report decrease in pain levels from 5/10 at worse to 1/10 at worse.(Ongoing, not met)  3.) Pt will improve walking tolerance from 1 mile to 3 miles in order to promote full and safe return to work as he reports he has to walk upwards of 5 miles per day for work.  (Ongoing, not met)  4.) Pt will be able to lift from floor to waist 50# with proper body mechanics in order to promote a safe return to work as he reports he is required to be able to lift up to 50# and is now considered full duty.  (Ongoing, not met)  5.) Pt will improve her FOTO score from 41% impairment to 35% improvement to indicate improvement in overall function.  (Ongoing, not met)  6.) Pt will demonstrate full lumbar AROM in all planes with no c/o s/s to improve general mobility. (Ongoing, not met)      Plan     Plan of care Certification: 2/10/2020 to 3/20/2020.     Outpatient Physical Therapy 2 times weekly for 6 weeks to include the following interventions: Manual Therapy, Moist Heat/ Ice, Neuromuscular Re-ed, Patient Education, Self Care, Therapeutic Activites, Therapeutic Exercise and Soft tissue modalities as needed.       (MD orders for 3  x per week; however, the patient reports that he is unable to come into the clinic at a frequency of 3 x per week due to his work schedule. He has been set up at a frequency of 2 x per week at this time and is also transitioning to the gym setting as well.)      Justin Hickey, PT

## 2020-03-04 NOTE — PROGRESS NOTES
Subjective:       Patient ID: Kirill Gandhi III is a 44 y.o. male.    Chief Complaint:   Chief Complaint   Patient presents with    URI       URI    This is a new problem. Episode onset: Was treated for flu 10 days ago at urgent care.  Feels better, but concerned that he is still coughing.  The problem has been gradually improving. There has been no fever. Associated symptoms include coughing. Pertinent negatives include no abdominal pain, chest pain, congestion, diarrhea, dysuria, ear pain, headaches, joint pain, joint swelling, nausea, neck pain, plugged ear sensation, rash, rhinorrhea, sinus pain, sneezing, sore throat, swollen glands, vomiting or wheezing. Treatments tried: tamiflu, tessalon perles and otc decongestants.  The treatment provided significant relief.   Nail Problem   This is a chronic problem. The current episode started more than 1 year ago. Episode frequency: Left great toenail.  The problem has been gradually worsening. Associated symptoms include coughing. Pertinent negatives include no abdominal pain, chest pain, congestion, fatigue, fever, headaches, nausea, neck pain, rash, sore throat, swollen glands or vomiting. Treatments tried: took lamisil previously and it improved, now getting worse again.  The treatment provided significant relief.   Rash   This is a recurrent problem. The current episode started more than 1 year ago (rash on his neck whenever he shaves. ). The problem has been waxing and waning since onset. The affected locations include the neck. The rash is characterized by redness. Associated symptoms include coughing and nail changes. Pertinent negatives include no congestion, diarrhea, fatigue, fever, joint pain, rhinorrhea, shortness of breath, sore throat or vomiting. Past treatments include nothing.     Review of Systems   Constitutional: Negative for fatigue and fever.   HENT: Negative for congestion, ear pain, postnasal drip, rhinorrhea, sinus pressure, sinus  pain, sneezing, sore throat and voice change.    Eyes: Negative for discharge.   Respiratory: Positive for cough. Negative for chest tightness, shortness of breath and wheezing.    Cardiovascular: Negative for chest pain.   Gastrointestinal: Negative for abdominal pain, diarrhea, nausea and vomiting.   Genitourinary: Negative for dysuria.   Musculoskeletal: Negative for joint pain and neck pain.   Skin: Positive for nail changes. Negative for rash.   Neurological: Negative for headaches.       Past Medical History:   Diagnosis Date    Allergy     Anxiety     Back pain     CTS (carpal tunnel syndrome) 6/26/2015    Depression     Diabetes 4/17/2015    Hx of vasectomy     Hypertension     OCD (obsessive compulsive disorder)     Sleep apnea      Social History     Socioeconomic History    Marital status: Significant Other     Spouse name: Not on file    Number of children: Not on file    Years of education: Not on file    Highest education level: Not on file   Occupational History    Not on file   Social Needs    Financial resource strain: Very hard    Food insecurity:     Worry: Often true     Inability: Often true    Transportation needs:     Medical: No     Non-medical: No   Tobacco Use    Smoking status: Never Smoker    Smokeless tobacco: Never Used    Tobacco comment: ; one child    Substance and Sexual Activity    Alcohol use: Yes     Frequency: Monthly or less     Drinks per session: 1 or 2     Binge frequency: Never     Comment: 750 ml  once a month or so    Drug use: No    Sexual activity: Not on file   Lifestyle    Physical activity:     Days per week: 0 days     Minutes per session: 0 min    Stress: Very much   Relationships    Social connections:     Talks on phone: More than three times a week     Gets together: Never     Attends Yazdanism service: Not on file     Active member of club or organization: No     Attends meetings of clubs or organizations: Never      "Relationship status: Living with partner   Other Topics Concern    Not on file   Social History Narrative    Not on file       Objective:     /76 (BP Location: Left arm, Patient Position: Sitting)   Pulse 92   Temp 98 °F (36.7 °C) (Oral)   Ht 5' 11" (1.803 m)   Wt (!) 156.9 kg (345 lb 13.7 oz)   SpO2 98%   BMI 48.24 kg/m²     Physical Exam   Constitutional: He appears well-developed. No distress.   HENT:   Head: Normocephalic.   Right Ear: Tympanic membrane, external ear and ear canal normal.   Left Ear: Tympanic membrane, external ear and ear canal normal.   Nose: Rhinorrhea present.   Mouth/Throat: Posterior oropharyngeal erythema present.   Cardiovascular: Normal rate and regular rhythm.   Pulmonary/Chest: Effort normal and breath sounds normal. He has no wheezes.   Lymphadenopathy:     He has no cervical adenopathy.   Skin: Skin is warm and dry. No rash noted.   Erythematous petechial rash on neck where he shaves.     Left great toenail is yellow and thickened.        Assessment:       Folliculitis  -     sulfamethoxazole-trimethoprim 800-160mg (BACTRIM DS) 800-160 mg Tab; Take 1 tablet by mouth 2 (two) times daily.  Dispense: 14 tablet; Refill: 0    Onychomycosis of left great toe  -     terbinafine HCl (LAMISIL) 250 mg tablet; Take 1 tablet (250 mg total) by mouth once daily.  Dispense: 30 tablet; Refill: 3    Cough     - Reassurance given.  He is no longer contagious, but the cough can persist for several weeks.     "

## 2020-03-04 NOTE — PROGRESS NOTES
"  Physical Therapy Daily Treatment Note     [facility]  Cl[facility] Number: [id number]    Therapy Diagnosis:   Encounter Diagnosis   Name Primary?    Chronic bilateral low back pain with bilateral sciatica      Physician: Jose L Brown MD    Visit Date: 3/4/2020       Physician Orders: PT Eval and Treat:     S/P right L3-4 and left L4-5 microdiscectomy. Lumbar spine physical therapy 3 times a week for 6 weeks.   Back extensor and core muscles strengthening. Bridges, plank, reverse situp. Home exercises.      Medical Diagnosis from Referral: Z98.890 (ICD-10-CM) - S/P lumbar microdiscectomy  Evaluation Date: 2/10/2020  Authorization Period Expiration: 12/31/2020  Plan of Care Expiration: 3/20/2020  Visit # / Visits authorized: 4/ 60     Time In: 1800  Time Out: 1900  Total Billable Time: 55 minutes (4 TE)  Precautions: Standard, Diabetes and Recent s/p L3-4 and L4-5 microdiscectomy       Subjective     Pt reports: Getting up and moving first thing in the morning is tough.    He was compliant with home exercise program.  Response to previous treatment: Decreased soreness as compared to after IE  Functional change: none noted at this time since IE    Pain: 3/10  Location: bilateral buttocks      Objective     Kirill received therapeutic exercises to develop strength, endurance, ROM, flexibility and core stabilization for 55 minutes including:  - TM for posture and endurance  10 minutes (0.32mi)  - Standing gastroc/soleus stretch  5x30" (B)  - Bridging with gluteal set to initiate                3x10 from large bolster  - Supine bent knee fall out   3x10 each, BTB  - SL Clams                                                 3x10 B, BTB  - Supine sciatic nn glides                                2x20 each  - Supine TrA marching                                    2x10 keeping posterior pelvic tilt 3# ankle wts  - Standing hip extension                                 2x10 each  - Standing hip abduction " "   2x10 each  - [place] holding onto yellow wt ball  3x10 focusing on body mechanics  - 5# lateral walk    3x10 steps- OOT  - Staggered stance OG scaption lift  2x15 reps, 3# in each hand - OOT  - Staggered stance Horizontal Rows  2x20 (GTB) - OOT  - Staggered stance B Shoulder pull downs 2x20 (GTB) - OOT  - Seated 3-way prayer stretch  3x30"    Home Exercises Provided and Patient Education Provided     Education provided:   - POC, HEP, Body mechanics with t/f in bed    Written Home Exercises Provided: Patient instructed to cont prior HEP.  Exercises were reviewed and Kirill was able to demonstrate them prior to the end of the session.  Kirill demonstrated good  understanding of the education provided.     See EMR under Patient Instructions for exercises provided prior visit.    Assessment     Pt was able to tolerate all his exercises without any c/o increased pain or discomfort. Pt progressed with 3-way prayer stretch seated and felt a good relief. PT and pt discussed joining a gym to continue strengthening after therapy is done, as well as looking into a diet that works best for him or speaking with a nutritionist or dietician to help lose some weight in order to improve health and decrease back pain.     Kirill is progressing well towards his goals.   Pt prognosis is Good.     Pt will continue to benefit from skilled outpatient physical therapy to address the deficits listed in the problem list box on initial evaluation, provide pt/family education and to maximize pt's level of independence in the home and community environment.     Pt's spiritual, cultural and educational needs considered and pt agreeable to plan of care and goals.     Anticipated Barriers for therapy: High BMI, profession, recent Lumbar Sx     Goals:  Short Term Goals: 4 weeks   1.) Pt will become compliant and independent with her initial HEP to promote decreased pain and improved mobility and strength. (Ongoing, not met)  2) Pt to report " decrease in pain levels from 5/10 at worse to 3/10 at worse. (Ongoing, not met)  3.) Pt will improve walking tolerance from 1 mile to 2 miles in order to promote full and safe return to work as he reports he has to walk upwards of 5 miles per day for work.  (Ongoing, not met)  4.) Pt will be able to lift from floor to waist 30# with proper body mechanics in order to promote a safe return to work as he reports he is required to be able to lift up to 50# and is now considered full duty.  (Ongoing, not met)  5.) Pt will demonstrate WFL lumbar AROM in all planes with min to no c/o s/s to improve general mobility. (Ongoing, not met)     Long Term Goals: 8 weeks   1.) Pt will become compliant and independent with an updated, progressed HEP to promote decreased pain and improved mobility and strength as well as prep for discharge from further PT intervention. (Ongoing, not met)  2) Pt to report decrease in pain levels from 5/10 at worse to 1/10 at worse.(Ongoing, not met)  3.) Pt will improve walking tolerance from 1 mile to 3 miles in order to promote full and safe return to work as he reports he has to walk upwards of 5 miles per day for work.  (Ongoing, not met)  4.) Pt will be able to lift from floor to waist 50# with proper body mechanics in order to promote a safe return to work as he reports he is required to be able to lift up to 50# and is now considered full duty.  (Ongoing, not met)  5.) Pt will improve her FOTO score from 41% impairment to 35% improvement to indicate improvement in overall function.  (Ongoing, not met)  6.) Pt will demonstrate full lumbar AROM in all planes with no c/o s/s to improve general mobility. (Ongoing, not met)      Plan     Plan of care Certification: 2/10/2020 to 3/20/2020.     Outpatient Physical Therapy 2 times weekly for 6 weeks to include the following interventions: Manual Therapy, Moist Heat/ Ice, Neuromuscular Re-ed, Patient Education, Self Care, Therapeutic Activites,  Therapeutic Exercise and Soft tissue modalities as needed.       (MD orders for 3 x per week; however, the patient reports that he is unable to come into the clinic at a frequency of 3 x per week due to his work schedule. He has been set up at a frequency of 2 x per week at this time and is also transitioning to the gym setting as well.)      Justin Hickey, PT

## 2020-03-17 ENCOUNTER — PATIENT MESSAGE (OUTPATIENT)
Dept: FAMILY MEDICINE | Facility: CLINIC | Age: 45
End: 2020-03-17

## 2020-03-19 ENCOUNTER — PATIENT MESSAGE (OUTPATIENT)
Dept: FAMILY MEDICINE | Facility: CLINIC | Age: 45
End: 2020-03-19

## 2020-03-19 ENCOUNTER — PATIENT MESSAGE (OUTPATIENT)
Dept: PSYCHIATRY | Facility: CLINIC | Age: 45
End: 2020-03-19

## 2020-03-19 DIAGNOSIS — Z20.2 ENCOUNTER FOR ASSESSMENT OF STD EXPOSURE: Primary | ICD-10-CM

## 2020-03-19 DIAGNOSIS — Z20.2 STD EXPOSURE: Primary | ICD-10-CM

## 2020-03-19 RX ORDER — DIAZEPAM 2 MG/1
2 TABLET ORAL 2 TIMES DAILY PRN
Qty: 180 TABLET | Refills: 0 | Status: SHIPPED | OUTPATIENT
Start: 2020-03-19 | End: 2020-05-13 | Stop reason: SDUPTHER

## 2020-03-19 RX ORDER — FLUOXETINE HYDROCHLORIDE 40 MG/1
40 CAPSULE ORAL DAILY
Qty: 90 CAPSULE | Refills: 0 | Status: SHIPPED | OUTPATIENT
Start: 2020-03-19 | End: 2020-05-13 | Stop reason: SDUPTHER

## 2020-03-19 RX ORDER — TRIAMCINOLONE ACETONIDE 1 MG/G
CREAM TOPICAL 2 TIMES DAILY
Qty: 80 G | Refills: 0 | Status: SHIPPED | OUTPATIENT
Start: 2020-03-19 | End: 2020-05-13

## 2020-03-21 NOTE — TELEPHONE ENCOUNTER
Shahla I do not under stand why some of the labs I ordered were not drawn.  I do not see the GC/CL test I ordered but when I go to order it again is ask me to dc the previous order.  ( I knew I had ordered it)      I am so confused.

## 2020-03-23 ENCOUNTER — PATIENT MESSAGE (OUTPATIENT)
Dept: FAMILY MEDICINE | Facility: CLINIC | Age: 45
End: 2020-03-23

## 2020-03-23 DIAGNOSIS — Z20.2 ENCOUNTER FOR ASSESSMENT OF STD EXPOSURE: Primary | ICD-10-CM

## 2020-03-23 DIAGNOSIS — Z20.2 STD EXPOSURE: ICD-10-CM

## 2020-03-23 LAB
HBV SURFACE AB SER QL IA: NORMAL
HCV AB S/CO SERPL IA: 0.01
HCV AB SERPL QL IA: NORMAL
HIV 1+2 AB+HIV1 P24 AG SERPL QL IA: NORMAL

## 2020-03-24 RX ORDER — FLUOXETINE HYDROCHLORIDE 20 MG/1
CAPSULE ORAL
Qty: 30 CAPSULE | Refills: 1 | OUTPATIENT
Start: 2020-03-24

## 2020-03-26 LAB
ALBUMIN/CREAT UR: 5 MCG/MG CREAT
C TRACH RRNA SPEC QL NAA+PROBE: NOT DETECTED
COMMENT: NORMAL
CREAT UR-MCNC: 131 MG/DL (ref 20–320)
MICROALBUMIN UR-MCNC: 0.6 MG/DL
N GONORRHOEA RRNA SPEC QL NAA+PROBE: NOT DETECTED

## 2020-04-03 ENCOUNTER — TELEPHONE (OUTPATIENT)
Dept: REHABILITATION | Facility: HOSPITAL | Age: 45
End: 2020-04-03

## 2020-05-11 ENCOUNTER — PATIENT MESSAGE (OUTPATIENT)
Dept: PSYCHIATRY | Facility: CLINIC | Age: 45
End: 2020-05-11

## 2020-05-13 ENCOUNTER — OFFICE VISIT (OUTPATIENT)
Dept: PSYCHIATRY | Facility: CLINIC | Age: 45
End: 2020-05-13
Payer: COMMERCIAL

## 2020-05-13 DIAGNOSIS — F42.9 OBSESSIVE-COMPULSIVE DISORDER, UNSPECIFIED TYPE: Primary | ICD-10-CM

## 2020-05-13 DIAGNOSIS — F41.9 ANXIETY: ICD-10-CM

## 2020-05-13 PROCEDURE — 99213 PR OFFICE/OUTPT VISIT, EST, LEVL III, 20-29 MIN: ICD-10-PCS | Mod: 95,,, | Performed by: NURSE PRACTITIONER

## 2020-05-13 PROCEDURE — 99213 OFFICE O/P EST LOW 20 MIN: CPT | Mod: 95,,, | Performed by: NURSE PRACTITIONER

## 2020-05-13 RX ORDER — DIAZEPAM 2 MG/1
2 TABLET ORAL 2 TIMES DAILY PRN
Qty: 180 TABLET | Refills: 0 | Status: SHIPPED | OUTPATIENT
Start: 2020-06-14 | End: 2020-07-15 | Stop reason: SDUPTHER

## 2020-05-13 RX ORDER — FLUOXETINE HYDROCHLORIDE 20 MG/1
60 CAPSULE ORAL DAILY
Qty: 270 CAPSULE | Refills: 0 | Status: SHIPPED | OUTPATIENT
Start: 2020-05-13 | End: 2020-07-15 | Stop reason: SDUPTHER

## 2020-05-13 NOTE — PROGRESS NOTES
"Outpatient Psychiatry Follow-Up Visit (MD/NP)    5/13/2020    The patient location is: [facility]  The chief complaint leading to consultation is: OCD  Visit type: audiovisual  Total time spent with patient: 18 minutes  Each patient to whom he or she provides medical services by telemedicine is:  (1) informed of the relationship between the physician and patient and the respective role of any other health care provider with respect to management of the patient; and (2) notified that he or she may decline to receive medical services by telemedicine and may withdraw from such care at any time.    Clinical Status of Patient:  Outpatient (Ambulatory)    Chief Complaint:  Kirill Gandhi III is a 44 y.o. male who presents today for follow-up of OCD.  Met with patient.      Interval History and Content of Current Session:  Interim Events/Subjective Report/Content of Current Session:    Kirill Gandhi III checked in on time for his appointment. He reports he tried Prozac 40 mg daily - notes he deal with situations better. Luvox kept him from checking things better than this one however neither are completely resolving his issues. Currently still getting into a funk, not wanting to get out of the bed sometimes. Denies ASE to current regimen. Discussed going up to 60 then 80 mg. Will consider Effexor in future. Reports he is more active with his DM care- believes he lost 5 pounds. Sleeping well- no issues once his CPAP is on. Appetite- normal. Energy- "dull." Using valium 2 mg twice a day every day. He feels less antsy- forgot to take it a few times and feels "more nervous" than he normally would feel when on Valium. First psychotherapy visit on with MADHAV Chau PhD Taryn 3. Denies SI/HI/AVH. No objective s/sx of psychosis or mihaela.Patient verbalized motivation for compliance with medications and all other elements of treatment plan.       Previous medications:  Wellbutrin- ineffective  Prozac-   Luvox- partial " response  Risperidone - ineffective    Review of Systems   · PSYCHIATRIC: Pertinant items are noted in the narrative.  · CONSTITUTIONAL: No weight gain or loss.   · MUSCULOSKELETAL: No pain or stiffness of the joints.  · ENT: No dizziness, tinnitus or hearing loss.  · RESPIRATORY: No shortness of breath.  · CARDIOVASCULAR: No tachycardia or chest pain.  · GASTROINTESTINAL: No nausea, vomiting, pain, constipation or diarrhea.    Past Medical, Family and Social History: The patient's past medical, family and social history have been reviewed and updated as appropriate within the electronic medical record - see encounter notes.    Compliance: yes    Side effects: None    Risk Parameters:  Patient reports no suicidal ideation  Patient reports no homicidal ideation  Patient reports no self-injurious behavior  Patient reports no violent behavior    Exam (detailed: at least 9 elements; comprehensive: all 15 elements)   Constitutional  Vitals:     General:  unremarkable, age appropriate     Musculoskeletal  Muscle Strength/Tone:  not examined   Gait & Station:  SHARIF     Psychiatric  Speech:  no latency; no press   Mood & Affect:  steady  congruent and appropriate   Thought Process:  normal and logical   Associations:  intact   Thought Content:  normal, no suicidality, no homicidality, delusions, or paranoia   Insight:  intact   Judgement: behavior is adequate to circumstances   Orientation:  grossly intact   Memory: intact for content of interview   Language: grossly intact   Attention Span & Concentration:  able to focus   Fund of Knowledge:  intact and appropriate to age and level of education     Assessment and Diagnosis   Status/Progress: Based on the examination today, the patient's problem(s) is/are inadequately controlled.  New problems have not been presented today.   Co-morbidities, Diagnostic uncertainty and Lack of compliance are not complicating management of the primary condition.  There are no active rule-out  diagnoses for this patient at this time.     General Impression:Kirill Gandhi III, a 44 y.o. male, presenting for initial evaluation visit. He reports having depression, anxiety, and OCD. Hasn't seen a psychiatrist in a few years since 2016.- has been managed by PCP since. Recently stopped Luvox due to interaction with Tizanidine. Started on Wellbutrin about a 3 months ago but feels its ineffective. Takes Valium twice a day everyday. Discussed starting Prozac and continuing Valium for the time being while we attempt to treat the anxiety with a medication with fewer risks. Patient agreeable. Presents 5/13/20- not some improvement on mid range dose of Prozac. Will increase to 60 then 80 if needed      ICD-10-CM ICD-9-CM   1. Obsessive-compulsive disorder, unspecified type F42.9 300.3   2. Anxiety F41.9 300.00       Intervention/Counseling/Treatment Plan   · Medication Management: Continue current medications.   · Increase Prozac to 60 mg daily  · Continue Valium 2 mg BID prn anxiety - continue this medication for the time being while we attempt to treat the anxiety with a medication with fewer risks.   Informed pt of the risks of continuous Benzodiazepine use including tolerance, dependence and withdrawals that may be life threatening upon abrupt cessation. Also advised not to take Benzodiazepines with Opiates or other sedatives and also not to drive or operate heavy machinery while using Benzodiazepines.  · The risks and benefits of medication were discussed with the patient.  · The treatment plan and follow up plan were reviewed with the patient.   Discussed diagnosis, risks and benefits of proposed treatment above vs alternative treatments vs no treatment, and potential side effects of these treatments. The patient expresses understanding of the above and displays the capacity to agree with this treatment given said understanding. Patient also agrees that, currently, the benefits outweigh the risks and  would like to pursue treatment at this time. Particularly emphasized risk of triggering suicidal ideation with SSRIs/antidepressants and risk of serotonin syndrome as patient is on multiple serotonergic agents. The patient expresses understanding of the former and displays the capacity to agree with the current plan.    Encouraged Patient to keep future appointments.    Take medications as prescribed and abstain from substance abuse.    In the event of an emergency patient was advised to go to the emergency room      Return to Clinic: 3 months    Blaire Staples DNP  [facility]

## 2020-05-27 ENCOUNTER — PATIENT MESSAGE (OUTPATIENT)
Dept: PSYCHIATRY | Facility: CLINIC | Age: 45
End: 2020-05-27

## 2020-05-29 DIAGNOSIS — E11.9 TYPE 2 DIABETES MELLITUS WITHOUT COMPLICATION: ICD-10-CM

## 2020-06-03 ENCOUNTER — OFFICE VISIT (OUTPATIENT)
Dept: PSYCHIATRY | Facility: CLINIC | Age: 45
End: 2020-06-03
Payer: COMMERCIAL

## 2020-06-03 DIAGNOSIS — F42.9 OBSESSIVE-COMPULSIVE DISORDER, UNSPECIFIED TYPE: Primary | ICD-10-CM

## 2020-06-03 PROCEDURE — 99999 PR PBB SHADOW E&M-EST. PATIENT-LVL I: CPT | Mod: PBBFAC,,, | Performed by: SOCIAL WORKER

## 2020-06-03 PROCEDURE — 99999 PR PBB SHADOW E&M-EST. PATIENT-LVL I: ICD-10-PCS | Mod: PBBFAC,,, | Performed by: SOCIAL WORKER

## 2020-06-03 PROCEDURE — 90791 PSYCH DIAGNOSTIC EVALUATION: CPT | Mod: S$GLB,,, | Performed by: SOCIAL WORKER

## 2020-06-03 PROCEDURE — 90791 PR PSYCHIATRIC DIAGNOSTIC EVALUATION: ICD-10-PCS | Mod: S$GLB,,, | Performed by: SOCIAL WORKER

## 2020-06-05 NOTE — PROGRESS NOTES
"Psychiatry Initial Visit (PhD/LCSW)  Diagnostic Interview - CPT 46164    Date: 6/3/2020    Site: [facility]    Referral source: Blaire Staples NP    Clinical status of patient: Outpatient    Kirill Gandhi III, a 44 y.o. male, for initial evaluation visit.  Met with patient.    Chief complaint/reason for encounter: anxiety    History of present illness: Pt seen for referral for therapy.  He says he has had depression and anxiety since a child when he worried a lot.  His mother and friends frequently tell him he needs to see someone for therapy.  In September of last year a 3 year off and on relationship ended.  He was really hurt of this even though there were many reasons it was good for the relationship to end. He was  previously for 14 years and was  3 and a half years ago.  He has custody of his 16 year old daughter as her mother has mental illnesses.  He lives with his mother in [place] and works an hour away in [place], [place].  He states he gets into a "funk" of not caring about anything and then he changes and his anxiety and OCD get worse.  He finds it difficult if things don't work out as planned.  His co-workers tell him he must always need a relationship in his life as he complains about not having one all the time.    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  His 16 year odl daughter is currently living with " her mother in [facility].      Substance use:   Alcohol: social   Drugs: none   Tobacco: vaping   Caffeine: 1-2 cups a day    Current medications and drug reactions (include OTC, herbal): see medication list     Strengths and liabilities: Strength: Patient accepts guidance/feedback, Strength: Patient is expressive/articulate., Strength: Patient has positive support network., Strength: Patient is stable., Liability: Patient lacks coping skills.    Current Evaluation:     Mental Status Exam:  General Appearance:  unremarkable, age appropriate, obese   Speech: normal tone, normal rate, normal pitch, normal volume      Level of Cooperation: cooperative      Thought Processes: normal and logical   Mood: steady      Thought Content: normal, no suicidality, no homicidality, delusions, or paranoia   Affect: congruent and appropriate   Orientation: Oriented x3   Memory: intact   Attention Span & Concentration: intact   Fund of General Knowledge: intact and appropriate to age and level of education   Abstract Reasoning: did not assess   Judgment & Insight: fair     Language  intact     Diagnostic Impression - Plan:       ICD-10-CM ICD-9-CM   1. Obsessive-compulsive disorder, unspecified type F42.9 300.3       Plan:individual psychotherapy and medication management by physician    Return to Clinic: 3 weeks    Length of Service (minutes): 45

## 2020-06-24 ENCOUNTER — PATIENT OUTREACH (OUTPATIENT)
Dept: ADMINISTRATIVE | Facility: OTHER | Age: 45
End: 2020-06-24

## 2020-06-25 ENCOUNTER — OFFICE VISIT (OUTPATIENT)
Dept: OPHTHALMOLOGY | Facility: CLINIC | Age: 45
End: 2020-06-25
Payer: COMMERCIAL

## 2020-06-25 ENCOUNTER — CLINICAL SUPPORT (OUTPATIENT)
Dept: OPHTHALMOLOGY | Facility: CLINIC | Age: 45
End: 2020-06-25
Payer: COMMERCIAL

## 2020-06-25 DIAGNOSIS — G47.33 OSA (OBSTRUCTIVE SLEEP APNEA): ICD-10-CM

## 2020-06-25 DIAGNOSIS — H02.403 PTOSIS OF BOTH EYELIDS: Primary | ICD-10-CM

## 2020-06-25 DIAGNOSIS — H57.813 BROW PTOSIS, BILATERAL: ICD-10-CM

## 2020-06-25 PROCEDURE — 92083 EXTENDED VISUAL FIELD XM: CPT | Mod: S$GLB,,, | Performed by: OPHTHALMOLOGY

## 2020-06-25 PROCEDURE — 99999 PR PBB SHADOW E&M-EST. PATIENT-LVL I: CPT | Mod: PBBFAC,,,

## 2020-06-25 PROCEDURE — 92285 EXTERNAL OCULAR PHOTOGRAPHY: CPT | Mod: S$GLB,,, | Performed by: OPHTHALMOLOGY

## 2020-06-25 PROCEDURE — 92285 EXTERNAL PHOTOGRAPHY - OU - BOTH EYES: ICD-10-PCS | Mod: S$GLB,,, | Performed by: OPHTHALMOLOGY

## 2020-06-25 PROCEDURE — 99999 PR PBB SHADOW E&M-EST. PATIENT-LVL I: ICD-10-PCS | Mod: PBBFAC,,,

## 2020-06-25 PROCEDURE — 92014 PR EYE EXAM, EST PATIENT,COMPREHESV: ICD-10-PCS | Mod: S$GLB,,, | Performed by: OPHTHALMOLOGY

## 2020-06-25 PROCEDURE — 99999 PR PBB SHADOW E&M-EST. PATIENT-LVL III: ICD-10-PCS | Mod: PBBFAC,,, | Performed by: OPHTHALMOLOGY

## 2020-06-25 PROCEDURE — 92014 COMPRE OPH EXAM EST PT 1/>: CPT | Mod: S$GLB,,, | Performed by: OPHTHALMOLOGY

## 2020-06-25 PROCEDURE — 99999 PR PBB SHADOW E&M-EST. PATIENT-LVL III: CPT | Mod: PBBFAC,,, | Performed by: OPHTHALMOLOGY

## 2020-06-25 PROCEDURE — 92083 GOLDMANN PERIMETRY - OU - EXTENDED - BOTH EYES: ICD-10-PCS | Mod: S$GLB,,, | Performed by: OPHTHALMOLOGY

## 2020-06-25 RX ORDER — TERBINAFINE HYDROCHLORIDE 250 MG/1
TABLET ORAL
COMMUNITY
Start: 2020-06-21 | End: 2020-09-30 | Stop reason: SDUPTHER

## 2020-06-25 NOTE — PROGRESS NOTES
HPI     Patient here for ptosis. Referred by: Dr. Holt at [facility].  How long have eyelid(s) been droopy? Several yrs.  Any h/o wearing hard CLs? No.  Do eyelids feel heavy? Patient is unsure.  Does the height of the eyelid(s) fluctuate throughout the day? No.  Do eyelid(s) interfere with daily activities such as driving, reading,   working? Yes, patient states that he has to lift up eyebrows when trying   to read.  Patient states that his mother noticed that he was holding his eyebrows up   to read and they began a conversation about having ptosis repair. Patient   states that his mother has ptosis and is having surgery soon. Patient   denies any other complaints.    No eye meds.    No previous eye sx.  Dm      Last edited by Qi Guadalupe on 6/25/2020 11:04 AM. (History)            Assessment /Plan     For exam results, see Encounter Report.    Ptosis of both eyelids  -     External Photography - OU - Both Eyes  -     Goldmann Perimetry - OU - Extended - Both Eyes    Brow ptosis, bilateral    VICENTE (obstructive sleep apnea)      The patient is a pleasant 45-year-old male here for evaluation of bilateral upper eyelid heaviness.  Patient feels the heaviness equally on both sides.  This has been progressive and affects his activities of daily living.    On exam, the patient has bilateral brow ptosis and bilateral upper eyelid acquired myogenic ptosis.  He has very mild bilateral upper eyelid dermatochalasis.    Pt. With significant improvement of superior visual fields with lids and brows taped vs. untaped.    Options of mullerectomy versus external levator resection were discussed with the patient in regard to upper eyelid ptosis repair. He will need bilateral direct brow lift as well. Patient is concerned about the external levator resection as it requires him to be awake to adjust the eyelid height and contour during the operation.     Patient has a history of sleep apnea, and understands that his  tissues will loosen up over time.    He will decide between mm CR and external levator resection and let us know.    Patient would prefer to be asleep during the operation.    Informed consent obtained after extensive risks/benefits/alternatives were discussed with the patient including but not limited to pain, bleeding, infection, ocular injury, loss of the eye, asymmetry, need for revision in future, scarring.  Alternatives such as waiting were discussed.  All questions were answered.

## 2020-06-25 NOTE — PROGRESS NOTES
Ext pix done ou       Ptosis vf done ou     Rel & Fix =  Good ou      Coop =   Good     Patient denies any allergies to latex/ eye patch at this moment     jthomas

## 2020-07-15 ENCOUNTER — OFFICE VISIT (OUTPATIENT)
Dept: PSYCHIATRY | Facility: CLINIC | Age: 45
End: 2020-07-15
Payer: COMMERCIAL

## 2020-07-15 DIAGNOSIS — F42.2 MIXED OBSESSIONAL THOUGHTS AND ACTS: Primary | ICD-10-CM

## 2020-07-15 DIAGNOSIS — F41.9 ANXIETY: ICD-10-CM

## 2020-07-15 PROCEDURE — 99213 OFFICE O/P EST LOW 20 MIN: CPT | Mod: 95,,, | Performed by: NURSE PRACTITIONER

## 2020-07-15 PROCEDURE — 99213 PR OFFICE/OUTPT VISIT, EST, LEVL III, 20-29 MIN: ICD-10-PCS | Mod: 95,,, | Performed by: NURSE PRACTITIONER

## 2020-07-15 RX ORDER — FLUOXETINE HYDROCHLORIDE 40 MG/1
80 CAPSULE ORAL DAILY
Qty: 180 CAPSULE | Refills: 1 | Status: SHIPPED | OUTPATIENT
Start: 2020-07-15 | End: 2021-02-05 | Stop reason: SDUPTHER

## 2020-07-15 RX ORDER — DIAZEPAM 2 MG/1
1 TABLET ORAL 2 TIMES DAILY PRN
Qty: 30 TABLET | Refills: 2 | Status: SHIPPED | OUTPATIENT
Start: 2020-07-15 | End: 2020-08-10

## 2020-07-15 NOTE — PROGRESS NOTES
"Outpatient Psychiatry Follow-Up Visit (MD/NP)    7/15/2020    The patient location is: parked in car outside house  The chief complaint leading to consultation is: OCD    Visit type: audiovisual    Face to Face time with patient:   20 minutes of total time spent on the encounter, which includes face to face time and non-face to face time preparing to see the patient (eg, review of tests), Obtaining and/or reviewing separately obtained history, Documenting clinical information in the electronic or other health record, Independently interpreting results (not separately reported) and communicating results to the patient/family/caregiver, or Care coordination (not separately reported).     Each patient to whom he or she provides medical services by telemedicine is:  (1) informed of the relationship between the physician and patient and the respective role of any other health care provider with respect to management of the patient; and (2) notified that he or she may decline to receive medical services by telemedicine and may withdraw from such care at any time.    Clinical Status of Patient:  Outpatient (Ambulatory)    Chief Complaint:  Kirill Gandhi III is a 45 y.o. male who presents today for follow-up of OCD.  Met with patient.      Interval History and Content of Current Session:  Interim Events/Subjective Report/Content of Current Session:    Kirill Gandhi III checked in on time for his appointment. He reports he "definitely feels a difference." Feels its the right dosage. 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 Work has been crazy but he is able to deal with it without "feeling like I am going crazy." He reports when he wakes up he can't just stay in the bed anymore. Denies ASE to current regimen. Energy is good. Sleep is "pretty good." Appetite - trying to do a diet competition with his cousin and they are both struggling. Feels his " "appetite is too good. He reports losing 4 pounds the first week. Continues to utilize Valium twice daily. Discussed decreasing to 1 mg BID - patient agreeable to plan.  First psychotherapy visit on with MADHAV Chau PhD went well - will be seeing her every 3 weeks. Denies SI/HI/AVH. No objective s/sx of psychosis or mihaela.Patient verbalized motivation for compliance with medications and all other elements of treatment plan.       Previous medications:  Wellbutrin- ineffective  Prozac- effective  Luvox- partial response  Risperidone - ineffective    Review of Systems   · PSYCHIATRIC: Pertinant items are noted in the narrative.  · CONSTITUTIONAL: No weight gain or loss.   · ENT: No dizziness, tinnitus or hearing loss.  · RESPIRATORY: No shortness of breath.  · CARDIOVASCULAR: No tachycardia or chest pain.  · GASTROINTESTINAL: No nausea, vomiting, pain, constipation or diarrhea.    Past Medical, Family and Social History: The patient's past medical, family and social history have been reviewed and updated as appropriate within the electronic medical record - see encounter notes.    Compliance: yes    Side effects: None    Risk Parameters:  Patient reports no suicidal ideation  Patient reports no homicidal ideation  Patient reports no self-injurious behavior  Patient reports no violent behavior    Exam (detailed: at least 9 elements; comprehensive: all 15 elements)   Constitutional  Vitals:     General:  unremarkable, age appropriate     Musculoskeletal  Muscle Strength/Tone:  not examined   Gait & Station:  SHARIF     Psychiatric  Speech:  no latency; no press   Mood & Affect:  "pretty good"  congruent and appropriate   Thought Process:  normal and logical   Associations:  intact   Thought Content:  normal, no suicidality, no homicidality, delusions, or paranoia   Insight:  intact   Judgement: behavior is adequate to circumstances   Orientation:  grossly intact   Memory: intact for content of interview   Language: grossly " intact   Attention Span & Concentration:  able to focus   Fund of Knowledge:  intact and appropriate to age and level of education     Assessment and Diagnosis   Status/Progress: Based on the examination today, the patient's problem(s) is/are improved.  New problems have not been presented today.   Co-morbidities, Diagnostic uncertainty and Lack of compliance are not complicating management of the primary condition.  There are no active rule-out diagnoses for this patient at this time.     General Impression:Kirill Gandhi III, a 44 y.o. male, presenting for initial evaluation visit. He reports having depression, anxiety, and OCD. Hasn't seen a psychiatrist in a few years since 2016.- has been managed by PCP since. Recently stopped Luvox due to interaction with Tizanidine. Started on Wellbutrin about a 3 months ago but feels its ineffective. Takes Valium twice a day everyday. Discussed starting Prozac and continuing Valium for the time being while we attempt to treat the anxiety with a medication with fewer risks. Patient agreeable. Presents 5/13/20- not some improvement on mid range dose of Prozac. Will increase to 60 then 80 if needed. Presents 7/15/20- doing well on Prozac 80. Valium taper started.       ICD-10-CM ICD-9-CM   1. Mixed obsessional thoughts and acts  F42.2 300.3   2. Anxiety  F41.9 300.00       Intervention/Counseling/Treatment Plan   · Medication Management: Continue current medications.   · Prozac to 80 mg daily  · Decrease Valium to 1 mg BID prn anxiety - continue this medication for the time being while we attempt to treat the anxiety with a medication with fewer risks.   Informed pt of the risks of continuous Benzodiazepine use including tolerance, dependence and withdrawals that may be life threatening upon abrupt cessation. Also advised not to take Benzodiazepines with Opiates or other sedatives and also not to drive or operate heavy machinery while using Benzodiazepines.  · The  risks and benefits of medication were discussed with the patient.  · The treatment plan and follow up plan were reviewed with the patient.   Discussed diagnosis, risks and benefits of proposed treatment above vs alternative treatments vs no treatment, and potential side effects of these treatments. The patient expresses understanding of the above and displays the capacity to agree with this treatment given said understanding. Patient also agrees that, currently, the benefits outweigh the risks and would like to pursue treatment at this time. Particularly emphasized risk of triggering suicidal ideation with SSRIs/antidepressants and risk of serotonin syndrome as patient is on multiple serotonergic agents. The patient expresses understanding of the former and displays the capacity to agree with the current plan.    Encouraged Patient to keep future appointments.    Take medications as prescribed and abstain from substance abuse.    In the event of an emergency patient was advised to go to the emergency room      Return to Clinic: 3 months    Blaire Staples DNP  [facility]

## 2020-07-22 ENCOUNTER — OFFICE VISIT (OUTPATIENT)
Dept: PSYCHIATRY | Facility: CLINIC | Age: 45
End: 2020-07-22
Payer: COMMERCIAL

## 2020-07-22 DIAGNOSIS — F41.9 ANXIETY: ICD-10-CM

## 2020-07-22 DIAGNOSIS — F42.2 MIXED OBSESSIONAL THOUGHTS AND ACTS: Primary | ICD-10-CM

## 2020-07-22 PROCEDURE — 90834 PSYTX W PT 45 MINUTES: CPT | Mod: S$GLB,,, | Performed by: SOCIAL WORKER

## 2020-07-22 PROCEDURE — 90834 PR PSYCHOTHERAPY W/PATIENT, 45 MIN: ICD-10-PCS | Mod: S$GLB,,, | Performed by: SOCIAL WORKER

## 2020-07-22 PROCEDURE — 99999 PR PBB SHADOW E&M-EST. PATIENT-LVL I: CPT | Mod: PBBFAC,,, | Performed by: SOCIAL WORKER

## 2020-07-22 PROCEDURE — 99999 PR PBB SHADOW E&M-EST. PATIENT-LVL I: ICD-10-PCS | Mod: PBBFAC,,, | Performed by: SOCIAL WORKER

## 2020-07-22 NOTE — PROGRESS NOTES
Individual Psychotherapy (PhD/LCSW)    7/22/2020    Site:  [facility]         Therapeutic Intervention: Met with patient.  Outpatient - Insight oriented psychotherapy 45 min - CPT code 91388    Chief complaint/reason for encounter: anxiety     Interval history and content of current session: Pt seen for first follow-up.  His 16 year old daughter has been with him since mid June and he has been busy doing things with her.  She will go back to [place] in 3 weeks to be with her mother for the school year.  Discussed how he hasn't been lonely since she has been here but he is afraid of elsie alone once she leaves.  He wants to resume looking for someone to have a relationship with then.  Discussed how he has a major fear of being alone and this makes him desperate to find a partner.  However, his desperation often makes him choose the wrong person and he fears not finding anyone else.    Treatment plan:  · Target symptoms: anxiety   · Why chosen therapy is appropriate versus another modality: relevant to diagnosis  · Outcome monitoring methods: self-report, observation  · Therapeutic intervention type: insight oriented psychotherapy    Risk parameters:  Patient reports no suicidal ideation  Patient reports no homicidal ideation  Patient reports no self-injurious behavior  Patient reports no violent behavior    Verbal deficits: None    Patient's response to intervention:  The patient's response to intervention is accepting.    Progress toward goals and other mental status changes:  The patient's progress toward goals is fair .    Diagnosis:     ICD-10-CM ICD-9-CM   1. Mixed obsessional thoughts and acts  F42.2 300.3   2. Anxiety  F41.9 300.00       Plan:  individual psychotherapy and medication management by physician    Return to clinic: 3 weeks    Length of Service (minutes): 45

## 2020-08-10 ENCOUNTER — TELEPHONE (OUTPATIENT)
Dept: OPHTHALMOLOGY | Facility: CLINIC | Age: 45
End: 2020-08-10

## 2020-08-10 RX ORDER — BUSPIRONE HYDROCHLORIDE 15 MG/1
15 TABLET ORAL 2 TIMES DAILY
Qty: 60 TABLET | Refills: 2 | Status: SHIPPED | OUTPATIENT
Start: 2020-08-10 | End: 2020-10-07

## 2020-08-10 RX ORDER — DIAZEPAM 2 MG/1
2 TABLET ORAL NIGHTLY
Qty: 30 TABLET | Refills: 0 | Status: SHIPPED | OUTPATIENT
Start: 2020-08-10 | End: 2021-02-05 | Stop reason: SDUPTHER

## 2020-09-02 ENCOUNTER — OFFICE VISIT (OUTPATIENT)
Dept: PSYCHIATRY | Facility: CLINIC | Age: 45
End: 2020-09-02
Payer: COMMERCIAL

## 2020-09-02 DIAGNOSIS — F42.2 MIXED OBSESSIONAL THOUGHTS AND ACTS: Primary | ICD-10-CM

## 2020-09-02 DIAGNOSIS — F41.9 ANXIETY: ICD-10-CM

## 2020-09-02 PROCEDURE — 90834 PR PSYCHOTHERAPY W/PATIENT, 45 MIN: ICD-10-PCS | Mod: S$GLB,,, | Performed by: SOCIAL WORKER

## 2020-09-02 PROCEDURE — 90834 PSYTX W PT 45 MINUTES: CPT | Mod: S$GLB,,, | Performed by: SOCIAL WORKER
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
## 2020-09-15 ENCOUNTER — HOSPITAL ENCOUNTER (EMERGENCY)
Facility: HOSPITAL | Age: 45
Discharge: HOME | End: 2020-09-15
Attending: EMERGENCY MEDICINE
Payer: COMMERCIAL

## 2020-09-15 VITALS
OXYGEN SATURATION: 97 % | SYSTOLIC BLOOD PRESSURE: 192 MMHG | RESPIRATION RATE: 18 BRPM | HEIGHT: 72 IN | DIASTOLIC BLOOD PRESSURE: 81 MMHG | WEIGHT: 315 LBS | TEMPERATURE: 98 F | BODY MASS INDEX: 42.66 KG/M2 | HEART RATE: 86 BPM

## 2020-09-15 DIAGNOSIS — Z20.822 SUSPECTED COVID-19 VIRUS INFECTION: Primary | ICD-10-CM

## 2020-09-15 PROCEDURE — 99283 EMERGENCY DEPT VISIT LOW MDM: CPT | Mod: 25,ER

## 2020-09-15 PROCEDURE — U0003 INFECTIOUS AGENT DETECTION BY NUCLEIC ACID (DNA OR RNA); SEVERE ACUTE RESPIRATORY SYNDROME CORONAVIRUS 2 (SARS-COV-2) (CORONAVIRUS DISEASE [COVID-19]), AMPLIFIED PROBE TECHNIQUE, MAKING USE OF HIGH THROUGHPUT TECHNOLOGIES AS DESCRIBED BY CMS-2020-01-R: HCPCS | Mod: ER

## 2020-09-15 RX ORDER — PROMETHAZINE HYDROCHLORIDE AND DEXTROMETHORPHAN HYDROBROMIDE 6.25; 15 MG/5ML; MG/5ML
5 SYRUP ORAL 3 TIMES DAILY PRN
Qty: 118 ML | Refills: 0 | Status: SHIPPED | OUTPATIENT
Start: 2020-09-15 | End: 2020-09-25

## 2020-09-15 NOTE — Clinical Note
"Kirill"Irving Gandhi was seen and treated in our emergency department on 9/15/2020.  He may return to work on 09/22/2020.  COVID-19 test is pending.  If test is negative and all symptoms have resolved per CDC guidelines patient may return to work in 72 hr.  If test is positive return to work 10 days after onset of symptoms and 24 hr after symptoms have resolved per current CDC guidelines.      If you have any questions or concerns, please don't hesitate to call.      BOLIVAR Gan"

## 2020-09-15 NOTE — ED PROVIDER NOTES
NAME:  Kirill Gandhi III  CSN:     [id number]  MRN:    [id number]  ADMIT DATE: 9/15/2020        EMERGENCY DEPARTMENT ENCOUNTER    CHIEF COMPLAINT    Chief Complaint   Patient presents with    Headache     Pt with c/o sore throat, headache, congestion, loss of taste.     Sore Throat         HPI    Kirill Gandhi III is a 45 y.o. male who  has a past medical history of Allergy, Anxiety, Back pain, CTS (carpal tunnel syndrome) (6/26/2015), Depression, Diabetes (4/17/2015), vasectomy, Hypertension, OCD (obsessive compulsive disorder), and Sleep apnea.    Patient presents with sore throat, headache, congestion and decreased sense of smell and taste since yesterday.  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  He complains of mild shortness of breath.  No chest pain.  They are not a healthcare worker, immunocompromised due to transplant, medications/chemotherapy or active cancer.   Patient does not receive hemodialysis for ESRD nor do they have chronic lung disease.  The patient does not live/work in a communal setting such as a nursing/group home or shelter.           ALLERGIES    Review of patient's allergies indicates:  No Known Allergies      PAST MEDICAL HISTORY  Past Medical History:   Diagnosis Date    Allergy     Anxiety     Back pain     CTS (carpal tunnel syndrome) 6/26/2015    Depression     Diabetes 4/17/2015    Hx of vasectomy     Hypertension     OCD (obsessive compulsive disorder)     Sleep apnea          SURGICAL HISTORY    Past Surgical History:   Procedure Laterality Date    ADENOIDECTOMY      EPIDURAL STEROID INJECTION Right 9/4/2019    Procedure: Injection, Steroid, Epidural Transforaminal;  Surgeon: Harjinder Batista Jr., MD;  Location: [facility] [place] [facility];  Service: Pain Management;  Laterality: Right;  Right L3 + L4 TF NATALIE    [id number]  [id number]    Arrive @ 1300; ASA last 8/27;  Check BG; NEEDS CONSENT    FRACTURE SURGERY      bilateral elbow fracture 3 years ago    HERNIA REPAIR      bilateral groin hernia    LUMBAR LAMINECTOMY Bilateral 11/29/2019    Procedure: LAMINECTOMY, SPINE, LUMBAR Rigth L3-4, Left L4-5 Laminectomy, medial Facetectomy, and microdiscectomy;  Surgeon: Jose L Brown MD;  Location: [facility] OR;  Service: Neurosurgery;  Laterality: Bilateral;  Procedure: Rigth L3-4, Left L4-5 Laminectomy, medial Facetectomy, and microdiscectomy  Surgery Time: 2.5 Hrs  LOS: 0-1  Anesthesia: General  Blood: Type & Screen  Radiology: C-arm  Micros    TONSILLECTOMY      VASECTOMY           SOCIAL HISTORY    Social History     Socioeconomic History    Marital status: Significant Other     Spouse name: Not on file    Number of children: Not on file    Years of education: Not on file    Highest education level: Not on file   Occupational History    Not on file   Social Needs    Financial resource strain: Very hard    Food insecurity     Worry: Often true     Inability: Often true    Transportation needs     Medical: No     Non-medical: No   Tobacco Use    Smoking status: Never Smoker    Smokeless tobacco: Never Used    Tobacco comment: ; one child    Substance and Sexual Activity    Alcohol use: Yes     Frequency: Monthly or less     Drinks per session: 1 or 2     Binge frequency: Never     Comment: 750 ml  once a month or so    Drug use: No    Sexual activity: Not on file   Lifestyle    Physical activity     Days per week: 0 days     Minutes per session: 0 min    Stress: Very much   Relationships    Social connections     Talks on phone: More than three times a week     Gets together: Never     Attends Jehovah's witness service: Not on file     Active member of club or organization: No     Attends meetings of clubs or organizations: Never     Relationship status: Living with partner   Other Topics Concern    Not on file   Social History Narrative    Not on file          FAMILY HISTORY    Family History   Problem Relation Age of Onset    Fibromyalgia Mother     Osteoarthritis Mother     Cataracts Maternal Grandmother     Macular degeneration Maternal Grandfather     Cataracts Maternal Grandfather     Rheum arthritis Neg Hx     Psoriasis Neg Hx     Lupus Neg Hx     Kidney disease Neg Hx     Inflammatory bowel disease Neg Hx     Amblyopia Neg Hx     Blindness Neg Hx     Glaucoma Neg Hx     Retinal detachment Neg Hx     Strabismus Neg Hx          REVIEW OF SYSTEMS   Review of Systems  As per HPI and below:  --  General:  (-)  fever, (-)  chills, (+)   fatigue, (+) appetite change   --  HENT:  (+)  congestion, (+) sore throat , + anosmia  --  EYE: (-) visual changes, (-) eye pain    --  Allergy and Immunology:  (-) seasonal allergies  --  Hematological:  (-) easy bleeding/bruising  --  Respiratory:  (+)  cough, (+)  shortness of breath  --  Cardiovascular:  (-) chest pain  --  Gastrointestinal:  (-) abdominal pain, (-) change in bowel habits, (-) nausea   --  Genito-Urinary:  (-) dysuria, (-) flank pain    --  Dermatological:  (-) rash   --  Musculoskeletal:  (+) myalgias, (-) back pain   --  Neurological:  (-) for headache  --  Psychological:  (-) sleep disturbance   --  All other systems reviewed and otherwise negative.      PHYSICAL EXAM    Reviewed Triage Note    VITAL SIGNS:   Initial Vitals [09/15/20 1607]   BP Pulse Resp Temp SpO2   (!) 192/81 86 18 98.4 °F (36.9 °C) 97 %      MAP       --              Physical Exam    Nursing Notes and Triage Vitals reviewed by me.    Telemedicine exam performed via [employer]     Constitutional:  Well developed, well nourished, no apparent distress.  HENT:  Normocephalic, atraumatic.  Nose:  No rhinorrhea or discharge noted.  Pharynx:  Mucous membranes moist, no erythema per tele-presenter.  No tenderness to frontal/maxillary sinuses on exam by tele-presenter.  Eyes:  No conjunctival injection, pallor or icterus.   No discharge.  Neck: Normal range of motion.  No cervical adenopathy palpated per patient self-exam.  Respiratory:  No respiratory distress or conversational dyspnea. No accessory muscle use or retractions.  Cardiovascular:  No perioral cyanosis, cap refill < 2 sec on patient self-exam.  Musculoskeletal:  No gross deformity or limited range of motion of all major joints.  Integument:  Warm and dry. No rash.  Neurologic:  Alert and oriented x3, GCS 15. Moving all extremities. No aphasia.   Psychiatric:  Affect normal. Mood normal.  Normal behavior.        LABS  Pertinent labs reviewed. (See chart for details)   Labs Reviewed   SARS-COV-2 (COVID-19) QUALITATIVE PCR         RADIOLOGY    Imaging Results          X-Ray Chest AP Portable (Final result)  Result time 09/15/20 16:28:39    Final result by Srinivasa Christian MD (09/15/20 16:28:39)                 Impression:      No acute findings.      Electronically signed by: Srinivasa Christian MD  Date:    09/15/2020  Time:    16:28             Narrative:    EXAMINATION:  XR CHEST AP PORTABLE    CLINICAL HISTORY:  Cough., Suspected Covid-19 Virus Infection;    COMPARISON:  None    FINDINGS:  Heart size is normal. The lung fields are clear. No acute cardiopulmonary infiltrate.                                  PROCEDURES    Procedures      EKG               ED COURSE & MEDICAL DECISION MAKING:  Suspect COVID-19 due to decreased smell and taste and other related symptoms.  Patient was advised to home quarantine and we will treated by COVID-19 protocols.  Return to the emergency department for chest pain, increased shortness of breath or if worse in any way.      Medications - No data to display              IMPRESSION:    ICD-10-CM ICD-9-CM   1. Suspected Covid-19 Virus Infection  R68.89          DISPOSITION:   ED Disposition Condition    Discharge Stable              PRESCRIPTIONS:   ED Prescriptions     None               Virtual Onsite Care Note:  This emergency  department encounter was performed via [employer], a HIPAA-compliant virtual exam application, in concert with a tele-presenter in the room. A face to face evaluation was available to the patient in the case it was deemed necessary by me or the Emergency Department staff.       DISCLAIMER: This note was prepared with [employer] voice recognition transcription software. Garbled syntax, mangled pronouns, and other bizarre constructions may be attributed to that software system.         BOLIVAR Gan  09/15/20 3073

## 2020-09-16 LAB — SARS-COV-2 RNA RESP QL NAA+PROBE: NOT DETECTED

## 2020-09-16 RX ORDER — METFORMIN HYDROCHLORIDE 1000 MG/1
TABLET ORAL
Qty: 60 TABLET | Refills: 0 | Status: SHIPPED | OUTPATIENT
Start: 2020-09-16 | End: 2020-09-30

## 2020-09-30 ENCOUNTER — OFFICE VISIT (OUTPATIENT)
Dept: FAMILY MEDICINE | Facility: CLINIC | Age: 45
End: 2020-09-30
Payer: COMMERCIAL

## 2020-09-30 ENCOUNTER — TELEPHONE (OUTPATIENT)
Dept: FAMILY MEDICINE | Facility: CLINIC | Age: 45
End: 2020-09-30

## 2020-09-30 VITALS
SYSTOLIC BLOOD PRESSURE: 124 MMHG | TEMPERATURE: 98 F | DIASTOLIC BLOOD PRESSURE: 62 MMHG | WEIGHT: 315 LBS | HEIGHT: 72 IN | BODY MASS INDEX: 42.66 KG/M2 | OXYGEN SATURATION: 97 % | HEART RATE: 106 BPM

## 2020-09-30 DIAGNOSIS — E11.8 TYPE 2 DIABETES MELLITUS WITH COMPLICATION, WITHOUT LONG-TERM CURRENT USE OF INSULIN: Primary | ICD-10-CM

## 2020-09-30 DIAGNOSIS — I10 ESSENTIAL HYPERTENSION: ICD-10-CM

## 2020-09-30 DIAGNOSIS — F42.2 MIXED OBSESSIONAL THOUGHTS AND ACTS: ICD-10-CM

## 2020-09-30 DIAGNOSIS — E11.9 TYPE 2 DIABETES MELLITUS WITHOUT COMPLICATION, WITHOUT LONG-TERM CURRENT USE OF INSULIN: ICD-10-CM

## 2020-09-30 PROCEDURE — 3074F SYST BP LT 130 MM HG: CPT | Mod: CPTII,S$GLB,, | Performed by: FAMILY MEDICINE

## 2020-09-30 PROCEDURE — 3008F BODY MASS INDEX DOCD: CPT | Mod: CPTII,S$GLB,, | Performed by: FAMILY MEDICINE

## 2020-09-30 PROCEDURE — 99214 OFFICE O/P EST MOD 30 MIN: CPT | Mod: S$GLB,,, | Performed by: FAMILY MEDICINE

## 2020-09-30 PROCEDURE — 3044F HG A1C LEVEL LT 7.0%: CPT | Mod: CPTII,S$GLB,, | Performed by: FAMILY MEDICINE

## 2020-09-30 PROCEDURE — 3044F PR MOST RECENT HEMOGLOBIN A1C LEVEL <7.0%: ICD-10-PCS | Mod: CPTII,S$GLB,, | Performed by: FAMILY MEDICINE

## 2020-09-30 PROCEDURE — 3078F PR MOST RECENT DIASTOLIC BLOOD PRESSURE < 80 MM HG: ICD-10-PCS | Mod: CPTII,S$GLB,, | Performed by: FAMILY MEDICINE

## 2020-09-30 PROCEDURE — 3008F PR BODY MASS INDEX (BMI) DOCUMENTED: ICD-10-PCS | Mod: CPTII,S$GLB,, | Performed by: FAMILY MEDICINE

## 2020-09-30 PROCEDURE — 3078F DIAST BP <80 MM HG: CPT | Mod: CPTII,S$GLB,, | Performed by: FAMILY MEDICINE

## 2020-09-30 PROCEDURE — 3074F PR MOST RECENT SYSTOLIC BLOOD PRESSURE < 130 MM HG: ICD-10-PCS | Mod: CPTII,S$GLB,, | Performed by: FAMILY MEDICINE

## 2020-09-30 PROCEDURE — 99214 PR OFFICE/OUTPT VISIT, EST, LEVL IV, 30-39 MIN: ICD-10-PCS | Mod: S$GLB,,, | Performed by: FAMILY MEDICINE

## 2020-09-30 RX ORDER — METFORMIN HYDROCHLORIDE 500 MG/1
1000 TABLET, EXTENDED RELEASE ORAL
Qty: 180 TABLET | Refills: 3 | Status: SHIPPED | OUTPATIENT
Start: 2020-09-30 | End: 2021-04-14 | Stop reason: SDUPTHER

## 2020-09-30 RX ORDER — VALSARTAN AND HYDROCHLOROTHIAZIDE 320; 12.5 MG/1; MG/1
1 TABLET, FILM COATED ORAL DAILY
Qty: 90 TABLET | Refills: 2 | Status: SHIPPED | OUTPATIENT
Start: 2020-09-30 | End: 2021-04-14 | Stop reason: SDUPTHER

## 2020-09-30 RX ORDER — IBUPROFEN 100 MG/5ML
1000 SUSPENSION, ORAL (FINAL DOSE FORM) ORAL DAILY
COMMUNITY

## 2020-09-30 RX ORDER — ATORVASTATIN CALCIUM 10 MG/1
10 TABLET, FILM COATED ORAL EVERY OTHER DAY
Qty: 45 TABLET | Refills: 3 | Status: SHIPPED | OUTPATIENT
Start: 2020-09-30 | End: 2021-04-14 | Stop reason: SDUPTHER

## 2020-09-30 RX ORDER — TERBINAFINE HYDROCHLORIDE 250 MG/1
250 TABLET ORAL DAILY
Qty: 90 TABLET | Refills: 0 | Status: SHIPPED | OUTPATIENT
Start: 2020-09-30 | End: 2021-09-27

## 2020-09-30 NOTE — PROGRESS NOTES
Patient ID: Kirill Gandhi III is a 45 y.o. male.    Chief Complaint: Follow-up and Medication Refill    HPI      Kirill Gandhi III is a 45 y.o. male here following up on diabetes.  Patient states diabetes under better control.  Weight loss over the last several months intentional.  Psychiatric-see in psychiatrist and medications have been changed.  Allergic rhinitis-stable with current medication p.r.n. use of Flonase  Hypertension taking valsartan HCTZ-stable      Vitals:    09/30/20 1428   BP: 124/62   BP Location: Left arm   Patient Position: Sitting   Pulse: 106   Temp: 97.6 °F (36.4 °C)   TempSrc: Oral   SpO2: 97%   Weight: (!) 153.3 kg (337 lb 13.7 oz)   Height: 6' (1.829 m)            Review of Symptoms  Answers for HPI/ROS submitted by the patient on 9/30/2020   activity change: No  unexpected weight change: No  neck pain: No  hearing loss: No  rhinorrhea: No  trouble swallowing: No  eye discharge: No  visual disturbance: No  chest tightness: No  wheezing: No  chest pain: No  palpitations: No  blood in stool: No  constipation: No  vomiting: No  diarrhea: No  polydipsia: No  polyuria: No  difficulty urinating: No  urgency: No  hematuria: No  joint swelling: No  arthralgias: No  headaches: No  weakness: No  confusion: No  dysphoric mood: No  Constitutional  Neg activity change, No chills /fever   Resp  Neg hemoptysis, stridor, choking  CVS  Neg chest pain, palpitations    Physical Exam    Constitutional:  Oriented to person, place, and time.appears well-developed and well-nourished.  No distress.      HENT  Head: Normocephalic and atraumatic  Right Ear: External ear normal.   Left Ear: External ear normal.   Nose: External nose normal.   Mouth:  Moist mucus membranes.    Eyes:  Conjunctivae are normal. Right eye exhibits no discharge.  Left eye exhibits no discharge. No scleral icterus.  No periorbital edema    Cardiovascular:  Regular rate and rhythm with normal S1 and S2      Pulmonary/Chest:   Clear to auscultation bilaterally without wheezes, rhonchi or rales      Musculoskeletal:  No edema. No obvious deformity No wasting       Neurological:  Alert and oriented to person, place, and time.   Coordination normal.     Skin:   Skin is warm and dry.  No diaphoresis.   No rash noted.     Psychiatric: Normal mood and affect. Behavior is normal.  Judgment and thought content normal.     Complete Blood Count  Lab Results   Component Value Date    RBC 4.24 (L) 10/07/2020    HGB 13.0 (L) 10/07/2020    HCT 41.3 10/07/2020    MCV 97 10/07/2020    MCH 30.7 10/07/2020    MCHC 31.5 (L) 10/07/2020    RDW 13.4 10/07/2020     10/07/2020    MPV 10.2 10/07/2020    GRAN 4.0 10/07/2020    GRAN 57.3 10/07/2020    LYMPH 2.0 10/07/2020    LYMPH 28.4 10/07/2020    MONO 0.8 10/07/2020    MONO 11.0 10/07/2020    EOS 0.1 10/07/2020    BASO 0.06 10/07/2020    EOSINOPHIL 2.0 10/07/2020    BASOPHIL 0.9 10/07/2020    DIFFMETHOD Automated 10/07/2020       Comprehensive Metabolic Panel  Lab Results   Component Value Date     10/07/2020    BUN 16 10/07/2020    CREATININE 0.97 10/07/2020     (H) 10/07/2020    K 4.6 10/07/2020     10/07/2020    PROT 7.5 10/07/2020    ALBUMIN 4.3 10/07/2020    BILITOT 0.3 10/07/2020    AST 37 10/07/2020    ALKPHOS 73 10/07/2020    CO2 31 (H) 10/07/2020    ALT 34 10/07/2020    ANIONGAP 7 (L) 10/07/2020    EGFRNONAA >60.0 10/07/2020    ESTGFRAFRICA >60.0 10/07/2020       TSH  Lab Results   Component Value Date    TSH 0.365 (L) 10/07/2020       Assessment / Plan:      ICD-10-CM ICD-9-CM   1. Type 2 diabetes mellitus with complication, without long-term current use of insulin  E11.8 250.90   2. Essential hypertension  I10 401.9   3. Type 2 diabetes mellitus without complication, without long-term current use of insulin  E11.9 250.00   4. Mixed obsessional thoughts and acts  F42.2 300.3     Type 2 diabetes mellitus with complication, without long-term current use of  insulin  -     Hemoglobin A1C; Future; Expected date: 09/30/2020  -     Comprehensive metabolic panel; Future  -     CBC auto differential; Future  -     Lipid Panel; Future  -     TSH; Future    Essential hypertension  -     Hemoglobin A1C; Future; Expected date: 09/30/2020  -     Comprehensive metabolic panel; Future  -     CBC auto differential; Future  -     Lipid Panel; Future  -     TSH; Future    Type 2 diabetes mellitus without complication, without long-term current use of insulin  -     Hemoglobin A1C; Future; Expected date: 09/30/2020  -     Comprehensive metabolic panel; Future  -     CBC auto differential; Future  -     Lipid Panel; Future  -     TSH; Future    Mixed obsessional thoughts and acts  -     Hemoglobin A1C; Future; Expected date: 09/30/2020  -     Comprehensive metabolic panel; Future  -     CBC auto differential; Future  -     Lipid Panel; Future  -     TSH; Future    Other orders  -     flash glucose scanning reader Misc; Disp one reader to monitor glucose- covered by insurance dexcom  Dispense: 1 each; Refill: 1  -     flash glucose sensor Kit; Dispense sensor to read glucose  dexcom  Dispense: 12 kit; Refill: 11  -     valsartan-hydrochlorothiazide (DIOVAN-HCT) 320-12.5 mg per tablet; Take 1 tablet by mouth once daily.  Dispense: 90 tablet; Refill: 2  -     terbinafine HCL (LAMISIL) 250 mg tablet; Take 1 tablet (250 mg total) by mouth once daily.  Dispense: 90 tablet; Refill: 0  -     metFORMIN (GLUCOPHAGE-XR) 500 MG ER 24hr tablet; Take 2 tablets (1,000 mg total) by mouth daily with breakfast.  Dispense: 180 tablet; Refill: 3  -     atorvastatin (LIPITOR) 10 MG tablet; Take 1 tablet (10 mg total) by mouth every other day.  Dispense: 45 tablet; Refill: 3

## 2020-10-05 ENCOUNTER — TELEPHONE (OUTPATIENT)
Dept: FAMILY MEDICINE | Facility: CLINIC | Age: 45
End: 2020-10-05

## 2020-10-05 ENCOUNTER — PATIENT MESSAGE (OUTPATIENT)
Dept: INTERNAL MEDICINE | Facility: CLINIC | Age: 45
End: 2020-10-05

## 2020-10-07 ENCOUNTER — LAB VISIT (OUTPATIENT)
Dept: LAB | Facility: HOSPITAL | Age: 45
End: 2020-10-07
Attending: FAMILY MEDICINE
Payer: COMMERCIAL

## 2020-10-07 DIAGNOSIS — I10 ESSENTIAL HYPERTENSION: ICD-10-CM

## 2020-10-07 DIAGNOSIS — F42.2 MIXED OBSESSIONAL THOUGHTS AND ACTS: ICD-10-CM

## 2020-10-07 DIAGNOSIS — E11.9 TYPE 2 DIABETES MELLITUS WITHOUT COMPLICATION, WITHOUT LONG-TERM CURRENT USE OF INSULIN: ICD-10-CM

## 2020-10-07 DIAGNOSIS — E11.8 TYPE 2 DIABETES MELLITUS WITH COMPLICATION, WITHOUT LONG-TERM CURRENT USE OF INSULIN: ICD-10-CM

## 2020-10-07 LAB
ALBUMIN SERPL BCP-MCNC: 4.3 G/DL (ref 3.5–5.2)
ALP SERPL-CCNC: 73 U/L (ref 38–126)
ALT SERPL W/O P-5'-P-CCNC: 34 U/L (ref 10–44)
ANION GAP SERPL CALC-SCNC: 7 MMOL/L (ref 8–16)
AST SERPL-CCNC: 37 U/L (ref 15–46)
BASOPHILS # BLD AUTO: 0.06 K/UL (ref 0–0.2)
BASOPHILS NFR BLD: 0.9 % (ref 0–1.9)
BILIRUB SERPL-MCNC: 0.3 MG/DL (ref 0.1–1)
BUN SERPL-MCNC: 16 MG/DL (ref 2–20)
CALCIUM SERPL-MCNC: 9 MG/DL (ref 8.7–10.5)
CHLORIDE SERPL-SCNC: 108 MMOL/L (ref 95–110)
CHOLEST SERPL-MCNC: 132 MG/DL (ref 120–199)
CHOLEST/HDLC SERPL: 3.6 {RATIO} (ref 2–5)
CO2 SERPL-SCNC: 31 MMOL/L (ref 23–29)
CREAT SERPL-MCNC: 0.97 MG/DL (ref 0.5–1.4)
DIFFERENTIAL METHOD: ABNORMAL
EOSINOPHIL # BLD AUTO: 0.1 K/UL (ref 0–0.5)
EOSINOPHIL NFR BLD: 2 % (ref 0–8)
ERYTHROCYTE [DISTWIDTH] IN BLOOD BY AUTOMATED COUNT: 13.4 % (ref 11.5–14.5)
EST. GFR  (AFRICAN AMERICAN): >60 ML/MIN/1.73 M^2
EST. GFR  (NON AFRICAN AMERICAN): >60 ML/MIN/1.73 M^2
ESTIMATED AVG GLUCOSE: 108 MG/DL (ref 68–131)
GLUCOSE SERPL-MCNC: 102 MG/DL (ref 70–110)
HBA1C MFR BLD HPLC: 5.4 % (ref 4–5.6)
HCT VFR BLD AUTO: 41.3 % (ref 40–54)
HDLC SERPL-MCNC: 37 MG/DL (ref 40–75)
HDLC SERPL: 28 % (ref 20–50)
HGB BLD-MCNC: 13 G/DL (ref 14–18)
IMM GRANULOCYTES # BLD AUTO: 0.03 K/UL (ref 0–0.04)
IMM GRANULOCYTES NFR BLD AUTO: 0.4 % (ref 0–0.5)
LDLC SERPL CALC-MCNC: 76 MG/DL (ref 63–159)
LYMPHOCYTES # BLD AUTO: 2 K/UL (ref 1–4.8)
LYMPHOCYTES NFR BLD: 28.4 % (ref 18–48)
MCH RBC QN AUTO: 30.7 PG (ref 27–31)
MCHC RBC AUTO-ENTMCNC: 31.5 G/DL (ref 32–36)
MCV RBC AUTO: 97 FL (ref 82–98)
MONOCYTES # BLD AUTO: 0.8 K/UL (ref 0.3–1)
MONOCYTES NFR BLD: 11 % (ref 4–15)
NEUTROPHILS # BLD AUTO: 4 K/UL (ref 1.8–7.7)
NEUTROPHILS NFR BLD: 57.3 % (ref 38–73)
NONHDLC SERPL-MCNC: 95 MG/DL
NRBC BLD-RTO: 0 /100 WBC
PLATELET # BLD AUTO: 314 K/UL (ref 150–350)
PMV BLD AUTO: 10.2 FL (ref 9.2–12.9)
POTASSIUM SERPL-SCNC: 4.6 MMOL/L (ref 3.5–5.1)
PROT SERPL-MCNC: 7.5 G/DL (ref 6–8.4)
RBC # BLD AUTO: 4.24 M/UL (ref 4.6–6.2)
SODIUM SERPL-SCNC: 146 MMOL/L (ref 136–145)
T4 FREE SERPL-MCNC: 0.93 NG/DL (ref 0.71–1.51)
TRIGL SERPL-MCNC: 95 MG/DL (ref 30–150)
TSH SERPL DL<=0.005 MIU/L-ACNC: 0.36 UIU/ML (ref 0.4–4)
WBC # BLD AUTO: 6.97 K/UL (ref 3.9–12.7)

## 2020-10-07 PROCEDURE — 85025 COMPLETE CBC W/AUTO DIFF WBC: CPT | Mod: PO

## 2020-10-07 PROCEDURE — 84439 ASSAY OF FREE THYROXINE: CPT

## 2020-10-07 PROCEDURE — 80053 COMPREHEN METABOLIC PANEL: CPT | Mod: PO

## 2020-10-07 PROCEDURE — 84443 ASSAY THYROID STIM HORMONE: CPT | Mod: PO

## 2020-10-07 PROCEDURE — 36415 COLL VENOUS BLD VENIPUNCTURE: CPT | Mod: PO

## 2020-10-07 PROCEDURE — 80061 LIPID PANEL: CPT

## 2020-10-07 PROCEDURE — 83036 HEMOGLOBIN GLYCOSYLATED A1C: CPT

## 2020-10-28 ENCOUNTER — OFFICE VISIT (OUTPATIENT)
Dept: PSYCHIATRY | Facility: CLINIC | Age: 45
End: 2020-10-28
Payer: COMMERCIAL

## 2020-10-28 DIAGNOSIS — F41.9 ANXIETY: ICD-10-CM

## 2020-10-28 DIAGNOSIS — F42.2 MIXED OBSESSIONAL THOUGHTS AND ACTS: Primary | ICD-10-CM

## 2020-10-28 PROCEDURE — 90834 PSYTX W PT 45 MINUTES: CPT | Mod: S$GLB,,, | Performed by: SOCIAL WORKER

## 2020-10-28 PROCEDURE — 99999 PR PBB SHADOW E&M-EST. PATIENT-LVL I: ICD-10-PCS | Mod: PBBFAC,,, | Performed by: SOCIAL WORKER

## 2020-10-28 PROCEDURE — 90834 PR PSYCHOTHERAPY W/PATIENT, 45 MIN: ICD-10-PCS | Mod: S$GLB,,, | Performed by: SOCIAL WORKER

## 2020-10-28 PROCEDURE — 99999 PR PBB SHADOW E&M-EST. PATIENT-LVL I: CPT | Mod: PBBFAC,,, | Performed by: SOCIAL WORKER

## 2020-10-29 NOTE — PROGRESS NOTES
Individual Psychotherapy (PhD/LCSW)    10/28/2020    Site:  [facility]         Therapeutic Intervention: Met with patient.  Outpatient - Insight oriented psychotherapy 45 min - CPT code 57782    Chief complaint/reason for encounter: anxiety     Interval history and content of current session: Pt seen for follow-up.  He is sitting very close to a woman in the waiting room and excitedly tells me he decided to go back to online dating site after our last visit and met this woman.  She is very similar to him.  Likes to see him daily and to be close to him.  He has introduced her to his family and they all like her.  She has 2 daughters 12 and 15 and lives with her mother.  He feels very good about this.  He also talks about frustration about hie 16 year old daughter who has 1 F and 4 D's in school in [place]. He know her mother doesn't push her as he would and is trying to do so from here.    Treatment plan:  · Target symptoms: anxiety   · Why chosen therapy is appropriate versus another modality: relevant to diagnosis  · Outcome monitoring methods: self-report, observation  · Therapeutic intervention type: insight oriented psychotherapy    Risk parameters:  Patient reports no suicidal ideation  Patient reports no homicidal ideation  Patient reports no self-injurious behavior  Patient reports no violent behavior    Verbal deficits: None    Patient's response to intervention:  The patient's response to intervention is accepting.    Progress toward goals and other mental status changes:  The patient's progress toward goals is fair .    Diagnosis:     ICD-10-CM ICD-9-CM   1. Mixed obsessional thoughts and acts  F42.2 300.3   2. Anxiety  F41.9 300.00       Plan:  individual psychotherapy and medication management by physician    Return to clinic: 3 weeks    Length of Service (minutes): 45

## 2020-12-09 ENCOUNTER — OFFICE VISIT (OUTPATIENT)
Dept: PSYCHIATRY | Facility: CLINIC | Age: 45
End: 2020-12-09
Payer: COMMERCIAL

## 2020-12-09 DIAGNOSIS — F41.9 ANXIETY: Primary | ICD-10-CM

## 2020-12-09 PROCEDURE — 90834 PSYTX W PT 45 MINUTES: CPT | Mod: S$GLB,,, | Performed by: SOCIAL WORKER

## 2020-12-09 PROCEDURE — 90834 PR PSYCHOTHERAPY W/PATIENT, 45 MIN: ICD-10-PCS | Mod: S$GLB,,, | Performed by: SOCIAL WORKER

## 2020-12-09 NOTE — PROGRESS NOTES
Individual Psychotherapy (PhD/LCSW)    12/9/2020    Site:  [facility]         Therapeutic Intervention: Met with patient.  Outpatient - Insight oriented psychotherapy 45 min - CPT code 51594    Chief complaint/reason for encounter: anxiety     Interval history and content of current session: Pt seen for follow-up.  He states his relationship is going well and he plans to move into her house with her and her mother next month.  His daughter was here for Thanksgiving and got along well with GF's 2 daughters.  She is coming back for [place] holidays.  His biggest concern is his daughter and how she is failing in school in [place] and how her mother does not push her to do better.  He would like to make her live here with him but feels she will make his life miserable if she does this.  Discussed his options on how to handle this situation.    Treatment plan:  · Target symptoms: anxiety   · Why chosen therapy is appropriate versus another modality: relevant to diagnosis  · Outcome monitoring methods: self-report, observation  · Therapeutic intervention type: insight oriented psychotherapy    Risk parameters:  Patient reports no suicidal ideation  Patient reports no homicidal ideation  Patient reports no self-injurious behavior  Patient reports no violent behavior    Verbal deficits: None    Patient's response to intervention:  The patient's response to intervention is accepting.    Progress toward goals and other mental status changes:  The patient's progress toward goals is fair .    Diagnosis:     ICD-10-CM ICD-9-CM   1. Anxiety  F41.9 300.00       Plan:  individual psychotherapy and medication management by physician    Return to clinic: 3 weeks    Length of Service (minutes): 45

## 2021-01-26 ENCOUNTER — TELEPHONE (OUTPATIENT)
Dept: FAMILY MEDICINE | Facility: CLINIC | Age: 46
End: 2021-01-26

## 2021-01-27 ENCOUNTER — OFFICE VISIT (OUTPATIENT)
Dept: FAMILY MEDICINE | Facility: CLINIC | Age: 46
End: 2021-01-27
Payer: COMMERCIAL

## 2021-01-27 VITALS
HEART RATE: 93 BPM | DIASTOLIC BLOOD PRESSURE: 80 MMHG | TEMPERATURE: 98 F | HEIGHT: 72 IN | WEIGHT: 315 LBS | BODY MASS INDEX: 42.66 KG/M2 | OXYGEN SATURATION: 98 % | SYSTOLIC BLOOD PRESSURE: 140 MMHG

## 2021-01-27 DIAGNOSIS — R05.9 COUGH IN ADULT: ICD-10-CM

## 2021-01-27 DIAGNOSIS — J06.9 URI, ACUTE: Primary | ICD-10-CM

## 2021-01-27 DIAGNOSIS — Z03.818 ENCNTR FOR OBS FOR SUSP EXPSR TO OTH BIOLG AGENTS RULED OUT: ICD-10-CM

## 2021-01-27 PROCEDURE — 3072F PR LOW RISK FOR RETINOPATHY: ICD-10-PCS | Mod: S$GLB,,, | Performed by: FAMILY MEDICINE

## 2021-01-27 PROCEDURE — 3079F DIAST BP 80-89 MM HG: CPT | Mod: CPTII,S$GLB,, | Performed by: FAMILY MEDICINE

## 2021-01-27 PROCEDURE — 3077F SYST BP >= 140 MM HG: CPT | Mod: CPTII,S$GLB,, | Performed by: FAMILY MEDICINE

## 2021-01-27 PROCEDURE — 3008F PR BODY MASS INDEX (BMI) DOCUMENTED: ICD-10-PCS | Mod: CPTII,S$GLB,, | Performed by: FAMILY MEDICINE

## 2021-01-27 PROCEDURE — 99213 OFFICE O/P EST LOW 20 MIN: CPT | Mod: S$GLB,,, | Performed by: FAMILY MEDICINE

## 2021-01-27 PROCEDURE — 1125F AMNT PAIN NOTED PAIN PRSNT: CPT | Mod: S$GLB,,, | Performed by: FAMILY MEDICINE

## 2021-01-27 PROCEDURE — 3077F PR MOST RECENT SYSTOLIC BLOOD PRESSURE >= 140 MM HG: ICD-10-PCS | Mod: CPTII,S$GLB,, | Performed by: FAMILY MEDICINE

## 2021-01-27 PROCEDURE — 3072F LOW RISK FOR RETINOPATHY: CPT | Mod: S$GLB,,, | Performed by: FAMILY MEDICINE

## 2021-01-27 PROCEDURE — 3079F PR MOST RECENT DIASTOLIC BLOOD PRESSURE 80-89 MM HG: ICD-10-PCS | Mod: CPTII,S$GLB,, | Performed by: FAMILY MEDICINE

## 2021-01-27 PROCEDURE — 1125F PR PAIN SEVERITY QUANTIFIED, PAIN PRESENT: ICD-10-PCS | Mod: S$GLB,,, | Performed by: FAMILY MEDICINE

## 2021-01-27 PROCEDURE — 3008F BODY MASS INDEX DOCD: CPT | Mod: CPTII,S$GLB,, | Performed by: FAMILY MEDICINE

## 2021-01-27 PROCEDURE — 99213 PR OFFICE/OUTPT VISIT, EST, LEVL III, 20-29 MIN: ICD-10-PCS | Mod: S$GLB,,, | Performed by: FAMILY MEDICINE

## 2021-01-27 RX ORDER — AZITHROMYCIN 250 MG/1
TABLET, FILM COATED ORAL
Qty: 6 TABLET | Refills: 0 | Status: SHIPPED | OUTPATIENT
Start: 2021-01-27 | End: 2021-03-25

## 2021-01-27 RX ORDER — ALBUTEROL SULFATE 90 UG/1
2 AEROSOL, METERED RESPIRATORY (INHALATION) EVERY 6 HOURS PRN
Qty: 18 G | Refills: 0 | Status: SHIPPED | OUTPATIENT
Start: 2021-01-27 | End: 2021-02-16

## 2021-02-05 ENCOUNTER — PATIENT MESSAGE (OUTPATIENT)
Dept: PSYCHIATRY | Facility: CLINIC | Age: 46
End: 2021-02-05

## 2021-02-05 ENCOUNTER — OFFICE VISIT (OUTPATIENT)
Dept: PSYCHIATRY | Facility: CLINIC | Age: 46
End: 2021-02-05
Payer: COMMERCIAL

## 2021-02-05 DIAGNOSIS — F99 INSOMNIA DUE TO OTHER MENTAL DISORDER: ICD-10-CM

## 2021-02-05 DIAGNOSIS — F51.05 INSOMNIA DUE TO OTHER MENTAL DISORDER: ICD-10-CM

## 2021-02-05 DIAGNOSIS — F33.0 MILD EPISODE OF RECURRENT MAJOR DEPRESSIVE DISORDER: ICD-10-CM

## 2021-02-05 DIAGNOSIS — F42.2 MIXED OBSESSIONAL THOUGHTS AND ACTS: Primary | ICD-10-CM

## 2021-02-05 PROCEDURE — 99214 OFFICE O/P EST MOD 30 MIN: CPT | Mod: 95,,, | Performed by: NURSE PRACTITIONER

## 2021-02-05 PROCEDURE — 3072F PR LOW RISK FOR RETINOPATHY: ICD-10-PCS | Mod: ,,, | Performed by: NURSE PRACTITIONER

## 2021-02-05 PROCEDURE — 3072F LOW RISK FOR RETINOPATHY: CPT | Mod: ,,, | Performed by: NURSE PRACTITIONER

## 2021-02-05 PROCEDURE — 99214 PR OFFICE/OUTPT VISIT, EST, LEVL IV, 30-39 MIN: ICD-10-PCS | Mod: 95,,, | Performed by: NURSE PRACTITIONER

## 2021-02-05 RX ORDER — DIAZEPAM 2 MG/1
2 TABLET ORAL NIGHTLY
Qty: 5 TABLET | Refills: 0 | Status: SHIPPED | OUTPATIENT
Start: 2021-02-05 | End: 2021-04-22 | Stop reason: SDUPTHER

## 2021-02-05 RX ORDER — FLUOXETINE HYDROCHLORIDE 40 MG/1
80 CAPSULE ORAL DAILY
Qty: 180 CAPSULE | Refills: 1 | Status: SHIPPED | OUTPATIENT
Start: 2021-02-05 | End: 2021-02-05 | Stop reason: SDUPTHER

## 2021-02-05 RX ORDER — BUSPIRONE HYDROCHLORIDE 15 MG/1
15 TABLET ORAL 3 TIMES DAILY
Qty: 90 TABLET | Refills: 2 | Status: SHIPPED | OUTPATIENT
Start: 2021-02-05 | End: 2021-04-14 | Stop reason: SDUPTHER

## 2021-02-05 RX ORDER — FLUOXETINE HYDROCHLORIDE 40 MG/1
80 CAPSULE ORAL DAILY
Qty: 180 CAPSULE | Refills: 1 | Status: SHIPPED | OUTPATIENT
Start: 2021-02-05 | End: 2021-04-14 | Stop reason: SDUPTHER

## 2021-02-05 RX ORDER — BUSPIRONE HYDROCHLORIDE 15 MG/1
15 TABLET ORAL 2 TIMES DAILY
Qty: 60 TABLET | Refills: 2 | Status: SHIPPED | OUTPATIENT
Start: 2021-02-05 | End: 2021-02-05 | Stop reason: SDUPTHER

## 2021-02-17 RX ORDER — TIZANIDINE HYDROCHLORIDE 4 MG/1
4 CAPSULE, GELATIN COATED ORAL 3 TIMES DAILY PRN
Qty: 90 CAPSULE | Refills: 0 | Status: ON HOLD | OUTPATIENT
Start: 2021-02-17 | End: 2021-05-01 | Stop reason: HOSPADM

## 2021-03-01 ENCOUNTER — TELEPHONE (OUTPATIENT)
Dept: NEUROSURGERY | Facility: CLINIC | Age: 46
End: 2021-03-01

## 2021-03-01 ENCOUNTER — HOSPITAL ENCOUNTER (OUTPATIENT)
Dept: RADIOLOGY | Facility: HOSPITAL | Age: 46
Discharge: HOME | End: 2021-03-01
Attending: PHYSICIAN ASSISTANT
Payer: COMMERCIAL

## 2021-03-01 ENCOUNTER — OFFICE VISIT (OUTPATIENT)
Dept: NEUROSURGERY | Facility: CLINIC | Age: 46
End: 2021-03-01
Payer: COMMERCIAL

## 2021-03-01 VITALS — BODY MASS INDEX: 42.66 KG/M2 | HEIGHT: 72 IN | WEIGHT: 315 LBS

## 2021-03-01 DIAGNOSIS — M54.41 CHRONIC BILATERAL LOW BACK PAIN WITH RIGHT-SIDED SCIATICA: Primary | ICD-10-CM

## 2021-03-01 DIAGNOSIS — Z98.890 H/O LUMBAR DISCECTOMY: ICD-10-CM

## 2021-03-01 DIAGNOSIS — M47.26 OTHER SPONDYLOSIS WITH RADICULOPATHY, LUMBAR REGION: ICD-10-CM

## 2021-03-01 DIAGNOSIS — G89.29 CHRONIC BILATERAL LOW BACK PAIN WITH RIGHT-SIDED SCIATICA: Primary | ICD-10-CM

## 2021-03-01 DIAGNOSIS — M51.36 DDD (DEGENERATIVE DISC DISEASE), LUMBAR: ICD-10-CM

## 2021-03-01 DIAGNOSIS — M54.16 LUMBAR RADICULOPATHY: ICD-10-CM

## 2021-03-01 DIAGNOSIS — M54.9 BACK PAIN, UNSPECIFIED BACK LOCATION, UNSPECIFIED BACK PAIN LATERALITY, UNSPECIFIED CHRONICITY: ICD-10-CM

## 2021-03-01 DIAGNOSIS — M54.9 BACK PAIN, UNSPECIFIED BACK LOCATION, UNSPECIFIED BACK PAIN LATERALITY, UNSPECIFIED CHRONICITY: Primary | ICD-10-CM

## 2021-03-01 PROCEDURE — 1125F PR PAIN SEVERITY QUANTIFIED, PAIN PRESENT: ICD-10-PCS | Mod: S$GLB,,, | Performed by: PHYSICIAN ASSISTANT

## 2021-03-01 PROCEDURE — 3072F PR LOW RISK FOR RETINOPATHY: ICD-10-PCS | Mod: S$GLB,,, | Performed by: PHYSICIAN ASSISTANT

## 2021-03-01 PROCEDURE — 72100 X-RAY EXAM L-S SPINE 2/3 VWS: CPT | Mod: 26,,, | Performed by: RADIOLOGY

## 2021-03-01 PROCEDURE — 1125F AMNT PAIN NOTED PAIN PRSNT: CPT | Mod: S$GLB,,, | Performed by: PHYSICIAN ASSISTANT

## 2021-03-01 PROCEDURE — 72100 XR LUMBAR SPINE AP AND LAT WITH FLEX/EXT: ICD-10-PCS | Mod: 26,,, | Performed by: RADIOLOGY

## 2021-03-01 PROCEDURE — 99999 PR PBB SHADOW E&M-EST. PATIENT-LVL IV: CPT | Mod: PBBFAC,,, | Performed by: PHYSICIAN ASSISTANT

## 2021-03-01 PROCEDURE — 99214 OFFICE O/P EST MOD 30 MIN: CPT | Mod: S$GLB,,, | Performed by: PHYSICIAN ASSISTANT

## 2021-03-01 PROCEDURE — 3008F BODY MASS INDEX DOCD: CPT | Mod: CPTII,S$GLB,, | Performed by: PHYSICIAN ASSISTANT

## 2021-03-01 PROCEDURE — 72120 XR LUMBAR SPINE AP AND LAT WITH FLEX/EXT: ICD-10-PCS | Mod: 26,,, | Performed by: RADIOLOGY

## 2021-03-01 PROCEDURE — 72100 X-RAY EXAM L-S SPINE 2/3 VWS: CPT | Mod: TC,PN

## 2021-03-01 PROCEDURE — 99999 PR PBB SHADOW E&M-EST. PATIENT-LVL IV: ICD-10-PCS | Mod: PBBFAC,,, | Performed by: PHYSICIAN ASSISTANT

## 2021-03-01 PROCEDURE — 3008F PR BODY MASS INDEX (BMI) DOCUMENTED: ICD-10-PCS | Mod: CPTII,S$GLB,, | Performed by: PHYSICIAN ASSISTANT

## 2021-03-01 PROCEDURE — 99214 PR OFFICE/OUTPT VISIT, EST, LEVL IV, 30-39 MIN: ICD-10-PCS | Mod: S$GLB,,, | Performed by: PHYSICIAN ASSISTANT

## 2021-03-01 PROCEDURE — 3072F LOW RISK FOR RETINOPATHY: CPT | Mod: S$GLB,,, | Performed by: PHYSICIAN ASSISTANT

## 2021-03-01 PROCEDURE — 72120 X-RAY BEND ONLY L-S SPINE: CPT | Mod: 26,,, | Performed by: RADIOLOGY

## 2021-03-01 RX ORDER — GABAPENTIN 800 MG/1
800 TABLET ORAL 3 TIMES DAILY
Qty: 270 TABLET | Refills: 1 | Status: SHIPPED | OUTPATIENT
Start: 2021-03-01 | End: 2021-06-16 | Stop reason: SDUPTHER

## 2021-03-01 RX ORDER — METHYLPREDNISOLONE 4 MG/1
TABLET ORAL
Qty: 1 PACKAGE | Refills: 0 | Status: SHIPPED | OUTPATIENT
Start: 2021-03-01 | End: 2021-03-25

## 2021-03-17 ENCOUNTER — HOSPITAL ENCOUNTER (OUTPATIENT)
Dept: RADIOLOGY | Facility: HOSPITAL | Age: 46
Discharge: HOME | End: 2021-03-17
Attending: PHYSICIAN ASSISTANT
Payer: COMMERCIAL

## 2021-03-17 DIAGNOSIS — G89.29 CHRONIC BILATERAL LOW BACK PAIN WITH RIGHT-SIDED SCIATICA: ICD-10-CM

## 2021-03-17 DIAGNOSIS — M54.41 CHRONIC BILATERAL LOW BACK PAIN WITH RIGHT-SIDED SCIATICA: ICD-10-CM

## 2021-03-17 DIAGNOSIS — M54.16 LUMBAR RADICULOPATHY: ICD-10-CM

## 2021-03-17 DIAGNOSIS — M51.36 DDD (DEGENERATIVE DISC DISEASE), LUMBAR: ICD-10-CM

## 2021-03-17 DIAGNOSIS — M47.26 OTHER SPONDYLOSIS WITH RADICULOPATHY, LUMBAR REGION: ICD-10-CM

## 2021-03-17 PROCEDURE — 72148 MRI LUMBAR SPINE W/O DYE: CPT | Mod: TC

## 2021-03-17 PROCEDURE — 72148 MRI LUMBAR SPINE W/O DYE: CPT | Mod: 26,,, | Performed by: RADIOLOGY

## 2021-03-17 PROCEDURE — 72148 MRI LUMBAR SPINE WITHOUT CONTRAST: ICD-10-PCS | Mod: 26,,, | Performed by: RADIOLOGY

## 2021-03-24 ENCOUNTER — TELEPHONE (OUTPATIENT)
Dept: ELECTROPHYSIOLOGY | Facility: CLINIC | Age: 46
End: 2021-03-24

## 2021-03-24 ENCOUNTER — OFFICE VISIT (OUTPATIENT)
Dept: ELECTROPHYSIOLOGY | Facility: CLINIC | Age: 46
End: 2021-03-24
Payer: COMMERCIAL

## 2021-03-24 VITALS
HEIGHT: 72 IN | HEART RATE: 80 BPM | WEIGHT: 315 LBS | DIASTOLIC BLOOD PRESSURE: 32 MMHG | SYSTOLIC BLOOD PRESSURE: 124 MMHG | BODY MASS INDEX: 42.66 KG/M2

## 2021-03-24 DIAGNOSIS — I48.92 ATRIAL FLUTTER, UNSPECIFIED TYPE: Primary | ICD-10-CM

## 2021-03-24 DIAGNOSIS — G47.33 OSA (OBSTRUCTIVE SLEEP APNEA): ICD-10-CM

## 2021-03-24 DIAGNOSIS — E74.39 GLUCOSE INTOLERANCE: ICD-10-CM

## 2021-03-24 DIAGNOSIS — R00.2 PALPITATIONS: Primary | ICD-10-CM

## 2021-03-24 DIAGNOSIS — I48.92 ATRIAL FLUTTER, UNSPECIFIED TYPE: ICD-10-CM

## 2021-03-24 DIAGNOSIS — E66.01 MORBID OBESITY: ICD-10-CM

## 2021-03-24 DIAGNOSIS — I10 ESSENTIAL HYPERTENSION: ICD-10-CM

## 2021-03-24 PROCEDURE — 93010 ELECTROCARDIOGRAM REPORT: CPT | Mod: S$GLB,,, | Performed by: INTERNAL MEDICINE

## 2021-03-24 PROCEDURE — 3072F LOW RISK FOR RETINOPATHY: CPT | Mod: S$GLB,,, | Performed by: STUDENT IN AN ORGANIZED HEALTH CARE EDUCATION/TRAINING PROGRAM

## 2021-03-24 PROCEDURE — 99205 OFFICE O/P NEW HI 60 MIN: CPT | Mod: S$GLB,,, | Performed by: STUDENT IN AN ORGANIZED HEALTH CARE EDUCATION/TRAINING PROGRAM

## 2021-03-24 PROCEDURE — 93005 ELECTROCARDIOGRAM TRACING: CPT | Mod: S$GLB,,, | Performed by: STUDENT IN AN ORGANIZED HEALTH CARE EDUCATION/TRAINING PROGRAM

## 2021-03-24 PROCEDURE — 99999 PR PBB SHADOW E&M-EST. PATIENT-LVL IV: CPT | Mod: PBBFAC,,, | Performed by: STUDENT IN AN ORGANIZED HEALTH CARE EDUCATION/TRAINING PROGRAM

## 2021-03-24 PROCEDURE — 3008F PR BODY MASS INDEX (BMI) DOCUMENTED: ICD-10-PCS | Mod: CPTII,S$GLB,, | Performed by: STUDENT IN AN ORGANIZED HEALTH CARE EDUCATION/TRAINING PROGRAM

## 2021-03-24 PROCEDURE — 3078F DIAST BP <80 MM HG: CPT | Mod: CPTII,S$GLB,, | Performed by: STUDENT IN AN ORGANIZED HEALTH CARE EDUCATION/TRAINING PROGRAM

## 2021-03-24 PROCEDURE — 1126F AMNT PAIN NOTED NONE PRSNT: CPT | Mod: S$GLB,,, | Performed by: STUDENT IN AN ORGANIZED HEALTH CARE EDUCATION/TRAINING PROGRAM

## 2021-03-24 PROCEDURE — 3078F PR MOST RECENT DIASTOLIC BLOOD PRESSURE < 80 MM HG: ICD-10-PCS | Mod: CPTII,S$GLB,, | Performed by: STUDENT IN AN ORGANIZED HEALTH CARE EDUCATION/TRAINING PROGRAM

## 2021-03-24 PROCEDURE — 93010 RHYTHM STRIP: ICD-10-PCS | Mod: S$GLB,,, | Performed by: INTERNAL MEDICINE

## 2021-03-24 PROCEDURE — 99999 PR PBB SHADOW E&M-EST. PATIENT-LVL IV: ICD-10-PCS | Mod: PBBFAC,,, | Performed by: STUDENT IN AN ORGANIZED HEALTH CARE EDUCATION/TRAINING PROGRAM

## 2021-03-24 PROCEDURE — 93005 RHYTHM STRIP: ICD-10-PCS | Mod: S$GLB,,, | Performed by: STUDENT IN AN ORGANIZED HEALTH CARE EDUCATION/TRAINING PROGRAM

## 2021-03-24 PROCEDURE — 3074F PR MOST RECENT SYSTOLIC BLOOD PRESSURE < 130 MM HG: ICD-10-PCS | Mod: CPTII,S$GLB,, | Performed by: STUDENT IN AN ORGANIZED HEALTH CARE EDUCATION/TRAINING PROGRAM

## 2021-03-24 PROCEDURE — 3074F SYST BP LT 130 MM HG: CPT | Mod: CPTII,S$GLB,, | Performed by: STUDENT IN AN ORGANIZED HEALTH CARE EDUCATION/TRAINING PROGRAM

## 2021-03-24 PROCEDURE — 99205 PR OFFICE/OUTPT VISIT, NEW, LEVL V, 60-74 MIN: ICD-10-PCS | Mod: S$GLB,,, | Performed by: STUDENT IN AN ORGANIZED HEALTH CARE EDUCATION/TRAINING PROGRAM

## 2021-03-24 PROCEDURE — 3072F PR LOW RISK FOR RETINOPATHY: ICD-10-PCS | Mod: S$GLB,,, | Performed by: STUDENT IN AN ORGANIZED HEALTH CARE EDUCATION/TRAINING PROGRAM

## 2021-03-24 PROCEDURE — 3008F BODY MASS INDEX DOCD: CPT | Mod: CPTII,S$GLB,, | Performed by: STUDENT IN AN ORGANIZED HEALTH CARE EDUCATION/TRAINING PROGRAM

## 2021-03-24 PROCEDURE — 1126F PR PAIN SEVERITY QUANTIFIED, NO PAIN PRESENT: ICD-10-PCS | Mod: S$GLB,,, | Performed by: STUDENT IN AN ORGANIZED HEALTH CARE EDUCATION/TRAINING PROGRAM

## 2021-03-25 PROBLEM — R00.2 PALPITATIONS: Status: ACTIVE | Noted: 2021-03-25

## 2021-03-31 ENCOUNTER — HOSPITAL ENCOUNTER (OUTPATIENT)
Dept: RADIOLOGY | Facility: HOSPITAL | Age: 46
Discharge: HOME | End: 2021-03-31
Attending: NEUROLOGICAL SURGERY
Payer: COMMERCIAL

## 2021-03-31 ENCOUNTER — PATIENT MESSAGE (OUTPATIENT)
Dept: NEUROSURGERY | Facility: CLINIC | Age: 46
End: 2021-03-31

## 2021-03-31 ENCOUNTER — OFFICE VISIT (OUTPATIENT)
Dept: NEUROSURGERY | Facility: CLINIC | Age: 46
End: 2021-03-31
Payer: COMMERCIAL

## 2021-03-31 VITALS — HEIGHT: 72 IN | WEIGHT: 315 LBS | BODY MASS INDEX: 42.66 KG/M2

## 2021-03-31 DIAGNOSIS — Z98.890 S/P LUMBAR LAMINECTOMY: Primary | ICD-10-CM

## 2021-03-31 DIAGNOSIS — M43.8X9 SAGITTAL PLANE IMBALANCE: ICD-10-CM

## 2021-03-31 DIAGNOSIS — M51.36 DEGENERATIVE DISC DISEASE, LUMBAR: ICD-10-CM

## 2021-03-31 DIAGNOSIS — M43.16 SPONDYLOLISTHESIS AT L4-L5 LEVEL: ICD-10-CM

## 2021-03-31 DIAGNOSIS — M41.50 SCOLIOSIS DUE TO DEGENERATIVE DISEASE OF SPINE IN ADULT PATIENT: ICD-10-CM

## 2021-03-31 DIAGNOSIS — Z98.890 S/P LUMBAR LAMINECTOMY: ICD-10-CM

## 2021-03-31 DIAGNOSIS — M53.2X6 SPINAL INSTABILITY OF LUMBAR REGION: ICD-10-CM

## 2021-03-31 DIAGNOSIS — M40.30 FLAT BACK SYNDROME, ACQUIRED: ICD-10-CM

## 2021-03-31 PROCEDURE — 1125F PR PAIN SEVERITY QUANTIFIED, PAIN PRESENT: ICD-10-PCS | Mod: S$GLB,,, | Performed by: NEUROLOGICAL SURGERY

## 2021-03-31 PROCEDURE — 3008F PR BODY MASS INDEX (BMI) DOCUMENTED: ICD-10-PCS | Mod: CPTII,S$GLB,, | Performed by: NEUROLOGICAL SURGERY

## 2021-03-31 PROCEDURE — 3072F LOW RISK FOR RETINOPATHY: CPT | Mod: S$GLB,,, | Performed by: NEUROLOGICAL SURGERY

## 2021-03-31 PROCEDURE — 72082 X-RAY EXAM ENTIRE SPI 2/3 VW: CPT | Mod: TC,PN

## 2021-03-31 PROCEDURE — 99214 PR OFFICE/OUTPT VISIT, EST, LEVL IV, 30-39 MIN: ICD-10-PCS | Mod: S$GLB,,, | Performed by: NEUROLOGICAL SURGERY

## 2021-03-31 PROCEDURE — 3072F PR LOW RISK FOR RETINOPATHY: ICD-10-PCS | Mod: S$GLB,,, | Performed by: NEUROLOGICAL SURGERY

## 2021-03-31 PROCEDURE — 3008F BODY MASS INDEX DOCD: CPT | Mod: CPTII,S$GLB,, | Performed by: NEUROLOGICAL SURGERY

## 2021-03-31 PROCEDURE — 99999 PR PBB SHADOW E&M-EST. PATIENT-LVL III: ICD-10-PCS | Mod: PBBFAC,,, | Performed by: NEUROLOGICAL SURGERY

## 2021-03-31 PROCEDURE — 1125F AMNT PAIN NOTED PAIN PRSNT: CPT | Mod: S$GLB,,, | Performed by: NEUROLOGICAL SURGERY

## 2021-03-31 PROCEDURE — 99214 OFFICE O/P EST MOD 30 MIN: CPT | Mod: S$GLB,,, | Performed by: NEUROLOGICAL SURGERY

## 2021-03-31 PROCEDURE — 72082 XR SCOLIOSIS COMPLETE: ICD-10-PCS | Mod: 26,,, | Performed by: RADIOLOGY

## 2021-03-31 PROCEDURE — 99999 PR PBB SHADOW E&M-EST. PATIENT-LVL III: CPT | Mod: PBBFAC,,, | Performed by: NEUROLOGICAL SURGERY

## 2021-03-31 PROCEDURE — 72082 X-RAY EXAM ENTIRE SPI 2/3 VW: CPT | Mod: 26,,, | Performed by: RADIOLOGY

## 2021-04-01 ENCOUNTER — TELEPHONE (OUTPATIENT)
Dept: NEUROSURGERY | Facility: CLINIC | Age: 46
End: 2021-04-01

## 2021-04-01 DIAGNOSIS — M43.8X9 SAGITTAL PLANE IMBALANCE: Primary | ICD-10-CM

## 2021-04-01 DIAGNOSIS — M43.27 FUSION OF SPINE, LUMBOSACRAL REGION: ICD-10-CM

## 2021-04-01 DIAGNOSIS — M40.30 FLAT BACK SYNDROME: ICD-10-CM

## 2021-04-05 ENCOUNTER — PATIENT MESSAGE (OUTPATIENT)
Dept: ELECTROPHYSIOLOGY | Facility: CLINIC | Age: 46
End: 2021-04-05

## 2021-04-07 ENCOUNTER — OFFICE VISIT (OUTPATIENT)
Dept: PSYCHIATRY | Facility: CLINIC | Age: 46
End: 2021-04-07
Payer: COMMERCIAL

## 2021-04-07 DIAGNOSIS — F41.9 ANXIETY: ICD-10-CM

## 2021-04-07 DIAGNOSIS — F42.2 MIXED OBSESSIONAL THOUGHTS AND ACTS: Primary | ICD-10-CM

## 2021-04-07 PROCEDURE — 3072F PR LOW RISK FOR RETINOPATHY: ICD-10-PCS | Mod: S$GLB,,, | Performed by: SOCIAL WORKER

## 2021-04-07 PROCEDURE — 90834 PSYTX W PT 45 MINUTES: CPT | Mod: S$GLB,,, | Performed by: SOCIAL WORKER

## 2021-04-07 PROCEDURE — 90834 PR PSYCHOTHERAPY W/PATIENT, 45 MIN: ICD-10-PCS | Mod: S$GLB,,, | Performed by: SOCIAL WORKER

## 2021-04-07 PROCEDURE — 3072F LOW RISK FOR RETINOPATHY: CPT | Mod: S$GLB,,, | Performed by: SOCIAL WORKER

## 2021-04-08 ENCOUNTER — ANESTHESIA EVENT (OUTPATIENT)
Dept: SURGERY | Facility: HOSPITAL | Age: 46
DRG: 460 | End: 2021-04-08
Payer: COMMERCIAL

## 2021-04-09 ENCOUNTER — PATIENT MESSAGE (OUTPATIENT)
Dept: SURGERY | Facility: HOSPITAL | Age: 46
End: 2021-04-09

## 2021-04-12 ENCOUNTER — CLINICAL SUPPORT (OUTPATIENT)
Dept: CARDIOLOGY | Facility: HOSPITAL | Age: 46
End: 2021-04-12
Attending: STUDENT IN AN ORGANIZED HEALTH CARE EDUCATION/TRAINING PROGRAM
Payer: COMMERCIAL

## 2021-04-12 DIAGNOSIS — I48.92 ATRIAL FLUTTER, UNSPECIFIED TYPE: ICD-10-CM

## 2021-04-12 PROCEDURE — 93227 HOLTER MONITOR - 48 HOUR (CUPID ONLY): ICD-10-PCS | Mod: ,,, | Performed by: STUDENT IN AN ORGANIZED HEALTH CARE EDUCATION/TRAINING PROGRAM

## 2021-04-12 PROCEDURE — 93226 XTRNL ECG REC<48 HR SCAN A/R: CPT

## 2021-04-12 PROCEDURE — 93227 XTRNL ECG REC<48 HR R&I: CPT | Mod: ,,, | Performed by: STUDENT IN AN ORGANIZED HEALTH CARE EDUCATION/TRAINING PROGRAM

## 2021-04-13 ENCOUNTER — PATIENT MESSAGE (OUTPATIENT)
Dept: SURGERY | Facility: HOSPITAL | Age: 46
End: 2021-04-13

## 2021-04-14 ENCOUNTER — PATIENT MESSAGE (OUTPATIENT)
Dept: FAMILY MEDICINE | Facility: CLINIC | Age: 46
End: 2021-04-14

## 2021-04-14 ENCOUNTER — OFFICE VISIT (OUTPATIENT)
Dept: FAMILY MEDICINE | Facility: CLINIC | Age: 46
End: 2021-04-14
Payer: COMMERCIAL

## 2021-04-14 ENCOUNTER — HOSPITAL ENCOUNTER (OUTPATIENT)
Dept: RADIOLOGY | Facility: HOSPITAL | Age: 46
Discharge: HOME | End: 2021-04-14
Attending: FAMILY MEDICINE
Payer: COMMERCIAL

## 2021-04-14 ENCOUNTER — PATIENT MESSAGE (OUTPATIENT)
Dept: SURGERY | Facility: HOSPITAL | Age: 46
End: 2021-04-14

## 2021-04-14 ENCOUNTER — HOSPITAL ENCOUNTER (OUTPATIENT)
Dept: CARDIOLOGY | Facility: HOSPITAL | Age: 46
Discharge: HOME | End: 2021-04-14
Attending: STUDENT IN AN ORGANIZED HEALTH CARE EDUCATION/TRAINING PROGRAM
Payer: COMMERCIAL

## 2021-04-14 VITALS
DIASTOLIC BLOOD PRESSURE: 78 MMHG | BODY MASS INDEX: 42.66 KG/M2 | HEART RATE: 96 BPM | SYSTOLIC BLOOD PRESSURE: 120 MMHG | TEMPERATURE: 98 F | WEIGHT: 315 LBS | HEIGHT: 72 IN | OXYGEN SATURATION: 97 %

## 2021-04-14 VITALS — HEIGHT: 72 IN | BODY MASS INDEX: 42.66 KG/M2 | WEIGHT: 315 LBS

## 2021-04-14 DIAGNOSIS — F42.2 MIXED OBSESSIONAL THOUGHTS AND ACTS: ICD-10-CM

## 2021-04-14 DIAGNOSIS — E11.8 TYPE 2 DIABETES MELLITUS WITH COMPLICATION, WITHOUT LONG-TERM CURRENT USE OF INSULIN: ICD-10-CM

## 2021-04-14 DIAGNOSIS — Z72.0 TOBACCO USE: ICD-10-CM

## 2021-04-14 DIAGNOSIS — F33.0 MILD EPISODE OF RECURRENT MAJOR DEPRESSIVE DISORDER: ICD-10-CM

## 2021-04-14 DIAGNOSIS — F99 INSOMNIA DUE TO OTHER MENTAL DISORDER: ICD-10-CM

## 2021-04-14 DIAGNOSIS — Z00.00 ROUTINE HEALTH MAINTENANCE: Primary | ICD-10-CM

## 2021-04-14 DIAGNOSIS — F51.05 INSOMNIA DUE TO OTHER MENTAL DISORDER: ICD-10-CM

## 2021-04-14 DIAGNOSIS — I10 ESSENTIAL HYPERTENSION: ICD-10-CM

## 2021-04-14 DIAGNOSIS — Z01.818 PRE-OP EXAM: ICD-10-CM

## 2021-04-14 DIAGNOSIS — I48.92 ATRIAL FLUTTER, UNSPECIFIED TYPE: ICD-10-CM

## 2021-04-14 LAB
ASCENDING AORTA: 2.91 CM
BSA FOR ECHO PROCEDURE: 2.96 M2
CV ECHO LV RWT: 0.34 CM
CV STRESS BASE HR: 63 BPM
DIASTOLIC BLOOD PRESSURE: 53 MMHG
DOP CALC LVOT AREA: 4.6 CM2
DOP CALC LVOT DIAMETER: 2.43 CM
DOP CALC LVOT PEAK VEL: 1.06 M/S
DOP CALC LVOT STROKE VOLUME: 99.94 CM3
DOP CALCLVOT PEAK VEL VTI: 21.56 CM
E WAVE DECELERATION TIME: 223.22 MSEC
E/A RATIO: 1.17
E/E' RATIO: 8.11 M/S
ECHO LV POSTERIOR WALL: 0.93 CM (ref 0.6–1.1)
EJECTION FRACTION: 60 %
FRACTIONAL SHORTENING: 32 % (ref 28–44)
INTERVENTRICULAR SEPTUM: 0.97 CM (ref 0.6–1.1)
IVRT: 122.74 MSEC
LA MAJOR: 5.48 CM
LA MINOR: 5.55 CM
LA WIDTH: 3.03 CM
LEFT ATRIUM SIZE: 4.13 CM
LEFT ATRIUM VOLUME INDEX: 20.9 ML/M2
LEFT ATRIUM VOLUME: 58.66 CM3
LEFT INTERNAL DIMENSION IN SYSTOLE: 3.69 CM (ref 2.1–4)
LEFT VENTRICLE DIASTOLIC VOLUME INDEX: 51.67 ML/M2
LEFT VENTRICLE DIASTOLIC VOLUME: 144.68 ML
LEFT VENTRICLE MASS INDEX: 70 G/M2
LEFT VENTRICLE SYSTOLIC VOLUME INDEX: 20.6 ML/M2
LEFT VENTRICLE SYSTOLIC VOLUME: 57.63 ML
LEFT VENTRICULAR INTERNAL DIMENSION IN DIASTOLE: 5.46 CM (ref 3.5–6)
LEFT VENTRICULAR MASS: 196.88 G
LV LATERAL E/E' RATIO: 7 M/S
LV SEPTAL E/E' RATIO: 9.63 M/S
MV A" WAVE DURATION": 9.42 MSEC
MV PEAK A VEL: 0.66 M/S
MV PEAK E VEL: 0.77 M/S
MV STENOSIS PRESSURE HALF TIME: 64.73 MS
MV VALVE AREA P 1/2 METHOD: 3.4 CM2
OHS CV CPX 1 MINUTE RECOVERY HEART RATE: 125 BPM
OHS CV CPX 85 PERCENT MAX PREDICTED HEART RATE MALE: 150
OHS CV CPX ESTIMATED METS: 8
OHS CV CPX MAX PREDICTED HEART RATE: 177
OHS CV CPX PATIENT IS FEMALE: 0
OHS CV CPX PATIENT IS MALE: 1
OHS CV CPX PEAK DIASTOLIC BLOOD PRESSURE: 104 MMHG
OHS CV CPX PEAK HEAR RATE: 150 BPM
OHS CV CPX PEAK RATE PRESSURE PRODUCT: NORMAL
OHS CV CPX PEAK SYSTOLIC BLOOD PRESSURE: 154 MMHG
OHS CV CPX PERCENT MAX PREDICTED HEART RATE ACHIEVED: 85
OHS CV CPX RATE PRESSURE PRODUCT PRESENTING: 6993
PISA TR MAX VEL: 2.81 M/S
PULM VEIN S/D RATIO: 1.17
PV PEAK D VEL: 0.41 M/S
PV PEAK S VEL: 0.48 M/S
RA MAJOR: 5.03 CM
RA PRESSURE: 3 MMHG
RA WIDTH: 3.54 CM
RIGHT VENTRICULAR END-DIASTOLIC DIMENSION: 3.65 CM
RV TISSUE DOPPLER FREE WALL SYSTOLIC VELOCITY 1 (APICAL 4 CHAMBER VIEW): 9.87 CM/S
SINUS: 3.32 CM
STJ: 2.54 CM
STRESS ECHO POST EXERCISE DUR MIN: 5 MINUTES
STRESS ECHO POST EXERCISE DUR SEC: 28 SECONDS
SYSTOLIC BLOOD PRESSURE: 111 MMHG
TDI LATERAL: 0.11 M/S
TDI SEPTAL: 0.08 M/S
TDI: 0.1 M/S
TR MAX PG: 32 MMHG
TRICUSPID ANNULAR PLANE SYSTOLIC EXCURSION: 2.34 CM
TV REST PULMONARY ARTERY PRESSURE: 35 MMHG

## 2021-04-14 PROCEDURE — 25500020 PHARM REV CODE 255: Performed by: STUDENT IN AN ORGANIZED HEALTH CARE EDUCATION/TRAINING PROGRAM

## 2021-04-14 PROCEDURE — 3072F PR LOW RISK FOR RETINOPATHY: ICD-10-PCS | Mod: S$GLB,,, | Performed by: FAMILY MEDICINE

## 2021-04-14 PROCEDURE — 3008F BODY MASS INDEX DOCD: CPT | Mod: CPTII,S$GLB,, | Performed by: FAMILY MEDICINE

## 2021-04-14 PROCEDURE — 93352 ADMIN ECG CONTRAST AGENT: CPT | Mod: ,,, | Performed by: INTERNAL MEDICINE

## 2021-04-14 PROCEDURE — 93351 STRESS TTE COMPLETE: CPT

## 2021-04-14 PROCEDURE — 3008F PR BODY MASS INDEX (BMI) DOCUMENTED: ICD-10-PCS | Mod: CPTII,S$GLB,, | Performed by: FAMILY MEDICINE

## 2021-04-14 PROCEDURE — 3051F HG A1C>EQUAL 7.0%<8.0%: CPT | Mod: CPTII,S$GLB,, | Performed by: FAMILY MEDICINE

## 2021-04-14 PROCEDURE — 3051F PR MOST RECENT HEMOGLOBIN A1C LEVEL 7.0 - < 8.0%: ICD-10-PCS | Mod: CPTII,S$GLB,, | Performed by: FAMILY MEDICINE

## 2021-04-14 PROCEDURE — 93351 STRESS ECHO (CUPID ONLY): ICD-10-PCS | Mod: 26,,, | Performed by: INTERNAL MEDICINE

## 2021-04-14 PROCEDURE — 93351 STRESS TTE COMPLETE: CPT | Mod: 26,,, | Performed by: INTERNAL MEDICINE

## 2021-04-14 PROCEDURE — 93352 STRESS ECHO (CUPID ONLY): ICD-10-PCS | Mod: ,,, | Performed by: INTERNAL MEDICINE

## 2021-04-14 PROCEDURE — 1125F AMNT PAIN NOTED PAIN PRSNT: CPT | Mod: S$GLB,,, | Performed by: FAMILY MEDICINE

## 2021-04-14 PROCEDURE — 1125F PR PAIN SEVERITY QUANTIFIED, PAIN PRESENT: ICD-10-PCS | Mod: S$GLB,,, | Performed by: FAMILY MEDICINE

## 2021-04-14 PROCEDURE — 71046 X-RAY EXAM CHEST 2 VIEWS: CPT | Mod: TC,FY,PO

## 2021-04-14 PROCEDURE — 99214 PR OFFICE/OUTPT VISIT, EST, LEVL IV, 30-39 MIN: ICD-10-PCS | Mod: S$GLB,,, | Performed by: FAMILY MEDICINE

## 2021-04-14 PROCEDURE — 99214 OFFICE O/P EST MOD 30 MIN: CPT | Mod: S$GLB,,, | Performed by: FAMILY MEDICINE

## 2021-04-14 PROCEDURE — 3072F LOW RISK FOR RETINOPATHY: CPT | Mod: S$GLB,,, | Performed by: FAMILY MEDICINE

## 2021-04-14 RX ORDER — MUPIROCIN 20 MG/G
OINTMENT TOPICAL
Status: ON HOLD | COMMUNITY
Start: 2021-04-12 | End: 2021-05-01 | Stop reason: HOSPADM

## 2021-04-14 RX ORDER — CLOBETASOL PROPIONATE 0.5 MG/G
OINTMENT TOPICAL
COMMUNITY
Start: 2021-04-12 | End: 2022-01-12

## 2021-04-14 RX ORDER — ATORVASTATIN CALCIUM 10 MG/1
10 TABLET, FILM COATED ORAL EVERY OTHER DAY
Qty: 45 TABLET | Refills: 3 | Status: SHIPPED | OUTPATIENT
Start: 2021-04-14 | End: 2022-08-08 | Stop reason: SDUPTHER

## 2021-04-14 RX ORDER — VALSARTAN AND HYDROCHLOROTHIAZIDE 320; 12.5 MG/1; MG/1
1 TABLET, FILM COATED ORAL DAILY
Qty: 90 TABLET | Refills: 3 | Status: SHIPPED | OUTPATIENT
Start: 2021-04-14 | End: 2022-08-17 | Stop reason: SDUPTHER

## 2021-04-14 RX ORDER — ASPIRIN 81 MG/1
81 TABLET ORAL NIGHTLY
COMMUNITY

## 2021-04-14 RX ORDER — METFORMIN HYDROCHLORIDE 500 MG/1
1000 TABLET, EXTENDED RELEASE ORAL
Qty: 180 TABLET | Refills: 3 | Status: SHIPPED | OUTPATIENT
Start: 2021-04-14 | End: 2022-04-18

## 2021-04-14 RX ORDER — DICLOFENAC SODIUM 10 MG/G
GEL TOPICAL
COMMUNITY
Start: 2021-04-12 | End: 2022-01-12

## 2021-04-14 RX ORDER — BUSPIRONE HYDROCHLORIDE 15 MG/1
15 TABLET ORAL 3 TIMES DAILY
Qty: 90 TABLET | Refills: 3 | Status: SHIPPED | OUTPATIENT
Start: 2021-04-14 | End: 2021-05-19 | Stop reason: SDUPTHER

## 2021-04-14 RX ORDER — LIDOCAINE 50 MG/G
OINTMENT TOPICAL
COMMUNITY
Start: 2021-04-12 | End: 2022-01-12

## 2021-04-14 RX ORDER — CELECOXIB 200 MG/1
200 CAPSULE ORAL 2 TIMES DAILY
Qty: 180 CAPSULE | Refills: 0 | Status: ON HOLD | OUTPATIENT
Start: 2021-04-14 | End: 2021-05-01 | Stop reason: SDUPTHER

## 2021-04-14 RX ORDER — FLUOXETINE HYDROCHLORIDE 40 MG/1
80 CAPSULE ORAL DAILY
Qty: 180 CAPSULE | Refills: 1 | Status: SHIPPED | OUTPATIENT
Start: 2021-04-14 | End: 2021-05-19 | Stop reason: SDUPTHER

## 2021-04-14 RX ORDER — FLUTICASONE PROPIONATE 50 MCG
2 SPRAY, SUSPENSION (ML) NASAL DAILY
Qty: 48 ML | Refills: 2 | Status: SHIPPED | OUTPATIENT
Start: 2021-04-14

## 2021-04-14 RX ADMIN — HUMAN ALBUMIN MICROSPHERES AND PERFLUTREN 0.66 MG: 10; .22 INJECTION, SOLUTION INTRAVENOUS at 11:04

## 2021-04-15 ENCOUNTER — PATIENT MESSAGE (OUTPATIENT)
Dept: RESEARCH | Facility: HOSPITAL | Age: 46
End: 2021-04-15

## 2021-04-15 ENCOUNTER — PATIENT MESSAGE (OUTPATIENT)
Dept: FAMILY MEDICINE | Facility: CLINIC | Age: 46
End: 2021-04-15

## 2021-04-15 LAB
OHS CV EVENT MONITOR DAY: 0
OHS CV HOLTER LENGTH DECIMAL HOURS: 47.98
OHS CV HOLTER LENGTH HOURS: 47
OHS CV HOLTER LENGTH MINUTES: 59

## 2021-04-16 ENCOUNTER — PATIENT MESSAGE (OUTPATIENT)
Dept: FAMILY MEDICINE | Facility: CLINIC | Age: 46
End: 2021-04-16

## 2021-04-19 ENCOUNTER — PATIENT MESSAGE (OUTPATIENT)
Dept: SURGERY | Facility: HOSPITAL | Age: 46
End: 2021-04-19

## 2021-04-19 ENCOUNTER — PATIENT MESSAGE (OUTPATIENT)
Dept: NEUROSURGERY | Facility: CLINIC | Age: 46
End: 2021-04-19

## 2021-04-23 ENCOUNTER — HOSPITAL ENCOUNTER (OUTPATIENT)
Dept: PREADMISSION TESTING | Facility: HOSPITAL | Age: 46
Discharge: HOME | DRG: 460 | End: 2021-04-23
Attending: NEUROLOGICAL SURGERY
Payer: COMMERCIAL

## 2021-04-23 ENCOUNTER — LAB VISIT (OUTPATIENT)
Dept: FAMILY MEDICINE | Facility: CLINIC | Age: 46
End: 2021-04-23
Payer: COMMERCIAL

## 2021-04-23 VITALS
TEMPERATURE: 98 F | OXYGEN SATURATION: 99 % | WEIGHT: 315 LBS | RESPIRATION RATE: 18 BRPM | HEART RATE: 66 BPM | DIASTOLIC BLOOD PRESSURE: 70 MMHG | HEIGHT: 72 IN | BODY MASS INDEX: 42.66 KG/M2 | SYSTOLIC BLOOD PRESSURE: 123 MMHG

## 2021-04-23 DIAGNOSIS — Z41.9 SURGERY, ELECTIVE: ICD-10-CM

## 2021-04-23 PROCEDURE — U0005 INFEC AGEN DETEC AMPLI PROBE: HCPCS | Performed by: NEUROLOGICAL SURGERY

## 2021-04-23 PROCEDURE — U0003 INFECTIOUS AGENT DETECTION BY NUCLEIC ACID (DNA OR RNA); SEVERE ACUTE RESPIRATORY SYNDROME CORONAVIRUS 2 (SARS-COV-2) (CORONAVIRUS DISEASE [COVID-19]), AMPLIFIED PROBE TECHNIQUE, MAKING USE OF HIGH THROUGHPUT TECHNOLOGIES AS DESCRIBED BY CMS-2020-01-R: HCPCS | Performed by: NEUROLOGICAL SURGERY

## 2021-04-23 RX ORDER — LIDOCAINE HYDROCHLORIDE 10 MG/ML
1 INJECTION, SOLUTION EPIDURAL; INFILTRATION; INTRACAUDAL; PERINEURAL ONCE
Status: CANCELLED | OUTPATIENT
Start: 2021-04-23 | End: 2021-04-23

## 2021-04-23 RX ORDER — SODIUM CHLORIDE, SODIUM LACTATE, POTASSIUM CHLORIDE, CALCIUM CHLORIDE 600; 310; 30; 20 MG/100ML; MG/100ML; MG/100ML; MG/100ML
INJECTION, SOLUTION INTRAVENOUS CONTINUOUS
Status: CANCELLED | OUTPATIENT
Start: 2021-04-23

## 2021-04-24 LAB — SARS-COV-2 RNA RESP QL NAA+PROBE: NOT DETECTED

## 2021-04-26 ENCOUNTER — ANESTHESIA (OUTPATIENT)
Dept: SURGERY | Facility: HOSPITAL | Age: 46
DRG: 460 | End: 2021-04-26
Payer: COMMERCIAL

## 2021-04-26 ENCOUNTER — HOSPITAL ENCOUNTER (INPATIENT)
Facility: HOSPITAL | Age: 46
LOS: 5 days | Discharge: HOME HEALTH | DRG: 460 | End: 2021-05-01
Attending: NEUROLOGICAL SURGERY | Admitting: NEUROLOGICAL SURGERY
Payer: COMMERCIAL

## 2021-04-26 VITALS — SYSTOLIC BLOOD PRESSURE: 143 MMHG | OXYGEN SATURATION: 94 % | HEART RATE: 96 BPM | DIASTOLIC BLOOD PRESSURE: 66 MMHG

## 2021-04-26 DIAGNOSIS — Z01.818 PREOP TESTING: ICD-10-CM

## 2021-04-26 DIAGNOSIS — G47.33 OSA (OBSTRUCTIVE SLEEP APNEA): ICD-10-CM

## 2021-04-26 DIAGNOSIS — M43.27 FUSION OF SPINE, LUMBOSACRAL REGION: ICD-10-CM

## 2021-04-26 DIAGNOSIS — M51.36 DDD (DEGENERATIVE DISC DISEASE), LUMBAR: Primary | ICD-10-CM

## 2021-04-26 PROBLEM — M51.369 DDD (DEGENERATIVE DISC DISEASE), LUMBAR: Status: ACTIVE | Noted: 2021-04-26

## 2021-04-26 LAB — POCT GLUCOSE: 181 MG/DL (ref 70–110)

## 2021-04-26 PROCEDURE — 22585 ARTHRD ANT NTRBD MIN DSC EA: CPT | Mod: ,,, | Performed by: NEUROLOGICAL SURGERY

## 2021-04-26 PROCEDURE — 63600175 PHARM REV CODE 636 W HCPCS

## 2021-04-26 PROCEDURE — 63600175 PHARM REV CODE 636 W HCPCS: Performed by: STUDENT IN AN ORGANIZED HEALTH CARE EDUCATION/TRAINING PROGRAM

## 2021-04-26 PROCEDURE — 63600175 PHARM REV CODE 636 W HCPCS: Performed by: NEUROLOGICAL SURGERY

## 2021-04-26 PROCEDURE — 63600175 PHARM REV CODE 636 W HCPCS: Performed by: NURSE ANESTHETIST, CERTIFIED REGISTERED

## 2021-04-26 PROCEDURE — 22842 INSERT SPINE FIXATION DEVICE: CPT | Mod: ,,, | Performed by: NEUROLOGICAL SURGERY

## 2021-04-26 PROCEDURE — C1713 ANCHOR/SCREW BN/BN,TIS/BN: HCPCS | Performed by: NEUROLOGICAL SURGERY

## 2021-04-26 PROCEDURE — 36000710: Performed by: NEUROLOGICAL SURGERY

## 2021-04-26 PROCEDURE — 71000033 HC RECOVERY, INTIAL HOUR: Performed by: NEUROLOGICAL SURGERY

## 2021-04-26 PROCEDURE — 22853 PR INSERT BIOMECH DEV W/INTERBODY ARTHRODESIS, EA CONTIGUOUS DEFECT: ICD-10-PCS | Mod: ,,, | Performed by: NEUROLOGICAL SURGERY

## 2021-04-26 PROCEDURE — 22853 INSJ BIOMECHANICAL DEVICE: CPT | Mod: ,,, | Performed by: NEUROLOGICAL SURGERY

## 2021-04-26 PROCEDURE — 22585 PR ARTHRODESIS ANT INTERBODY MIN DISCECTOMY,EA ADDL: ICD-10-PCS | Mod: ,,, | Performed by: NEUROLOGICAL SURGERY

## 2021-04-26 PROCEDURE — 99900035 HC TECH TIME PER 15 MIN (STAT)

## 2021-04-26 PROCEDURE — 94761 N-INVAS EAR/PLS OXIMETRY MLT: CPT

## 2021-04-26 PROCEDURE — 27000221 HC OXYGEN, UP TO 24 HOURS

## 2021-04-26 PROCEDURE — 20930 SP BONE ALGRFT MORSEL ADD-ON: CPT | Mod: ,,, | Performed by: NEUROLOGICAL SURGERY

## 2021-04-26 PROCEDURE — 63600175 PHARM REV CODE 636 W HCPCS: Performed by: NURSE PRACTITIONER

## 2021-04-26 PROCEDURE — 22842 PR POSTERIOR SEGMENTAL INSTRUMENTATION 3-6 VRT SEG: ICD-10-PCS | Mod: ,,, | Performed by: NEUROLOGICAL SURGERY

## 2021-04-26 PROCEDURE — 37000008 HC ANESTHESIA 1ST 15 MINUTES: Performed by: NEUROLOGICAL SURGERY

## 2021-04-26 PROCEDURE — 22558 ARTHRD ANT NTRBD MIN DSC LUM: CPT | Mod: 22,,, | Performed by: NEUROLOGICAL SURGERY

## 2021-04-26 PROCEDURE — 27201423 OPTIME MED/SURG SUP & DEVICES STERILE SUPPLY: Performed by: NEUROLOGICAL SURGERY

## 2021-04-26 PROCEDURE — 25000003 PHARM REV CODE 250: Performed by: NURSE ANESTHETIST, CERTIFIED REGISTERED

## 2021-04-26 PROCEDURE — 20000000 HC ICU ROOM

## 2021-04-26 PROCEDURE — 27800903 OPTIME MED/SURG SUP & DEVICES OTHER IMPLANTS: Performed by: NEUROLOGICAL SURGERY

## 2021-04-26 PROCEDURE — 22558 PR ARTHRODESIS ANT INTERBODY MIN DISCECTOMY,LUMBAR: ICD-10-PCS | Mod: 22,,, | Performed by: NEUROLOGICAL SURGERY

## 2021-04-26 PROCEDURE — 25000003 PHARM REV CODE 250: Performed by: NEUROLOGICAL SURGERY

## 2021-04-26 PROCEDURE — 36000711: Performed by: NEUROLOGICAL SURGERY

## 2021-04-26 PROCEDURE — 25000003 PHARM REV CODE 250: Performed by: STUDENT IN AN ORGANIZED HEALTH CARE EDUCATION/TRAINING PROGRAM

## 2021-04-26 PROCEDURE — 37000009 HC ANESTHESIA EA ADD 15 MINS: Performed by: NEUROLOGICAL SURGERY

## 2021-04-26 PROCEDURE — 20930 PR ALLOGRAFT FOR SPINE SURGERY ONLY MORSELIZED: ICD-10-PCS | Mod: ,,, | Performed by: NEUROLOGICAL SURGERY

## 2021-04-26 DEVICE — IMPLANTABLE DEVICE: Type: IMPLANTABLE DEVICE | Site: BACK | Status: FUNCTIONAL

## 2021-04-26 RX ORDER — FLUOXETINE HYDROCHLORIDE 20 MG/1
80 CAPSULE ORAL DAILY
Status: DISCONTINUED | OUTPATIENT
Start: 2021-04-27 | End: 2021-05-01 | Stop reason: HOSPADM

## 2021-04-26 RX ORDER — INSULIN ASPART 100 [IU]/ML
1-10 INJECTION, SOLUTION INTRAVENOUS; SUBCUTANEOUS
Status: DISCONTINUED | OUTPATIENT
Start: 2021-04-26 | End: 2021-05-01 | Stop reason: HOSPADM

## 2021-04-26 RX ORDER — HYDROMORPHONE HYDROCHLORIDE 2 MG/ML
0.2 INJECTION, SOLUTION INTRAMUSCULAR; INTRAVENOUS; SUBCUTANEOUS EVERY 5 MIN PRN
Status: DISCONTINUED | OUTPATIENT
Start: 2021-04-26 | End: 2021-04-27

## 2021-04-26 RX ORDER — VECURONIUM BROMIDE FOR INJECTION 1 MG/ML
INJECTION, POWDER, LYOPHILIZED, FOR SOLUTION INTRAVENOUS
Status: DISCONTINUED | OUTPATIENT
Start: 2021-04-26 | End: 2021-04-26

## 2021-04-26 RX ORDER — NEOSTIGMINE METHYLSULFATE 1 MG/ML
INJECTION, SOLUTION INTRAVENOUS
Status: DISCONTINUED | OUTPATIENT
Start: 2021-04-26 | End: 2021-04-26

## 2021-04-26 RX ORDER — PREGABALIN 75 MG/1
75 CAPSULE ORAL
Status: COMPLETED | OUTPATIENT
Start: 2021-04-26 | End: 2021-04-26

## 2021-04-26 RX ORDER — HYDROMORPHONE HYDROCHLORIDE 2 MG/ML
0.5 INJECTION, SOLUTION INTRAMUSCULAR; INTRAVENOUS; SUBCUTANEOUS EVERY 5 MIN PRN
Status: DISCONTINUED | OUTPATIENT
Start: 2021-04-26 | End: 2021-04-27

## 2021-04-26 RX ORDER — FENTANYL CITRATE 50 UG/ML
INJECTION, SOLUTION INTRAMUSCULAR; INTRAVENOUS
Status: DISCONTINUED | OUTPATIENT
Start: 2021-04-26 | End: 2021-04-26

## 2021-04-26 RX ORDER — CELECOXIB 100 MG/1
200 CAPSULE ORAL 2 TIMES DAILY
Status: DISCONTINUED | OUTPATIENT
Start: 2021-04-26 | End: 2021-05-01 | Stop reason: HOSPADM

## 2021-04-26 RX ORDER — SODIUM CHLORIDE, SODIUM LACTATE, POTASSIUM CHLORIDE, CALCIUM CHLORIDE 600; 310; 30; 20 MG/100ML; MG/100ML; MG/100ML; MG/100ML
INJECTION, SOLUTION INTRAVENOUS CONTINUOUS
Status: DISCONTINUED | OUTPATIENT
Start: 2021-04-26 | End: 2021-04-26

## 2021-04-26 RX ORDER — CELECOXIB 100 MG/1
200 CAPSULE ORAL
Status: COMPLETED | OUTPATIENT
Start: 2021-04-26 | End: 2021-04-26

## 2021-04-26 RX ORDER — HEPARIN SODIUM 5000 [USP'U]/ML
5000 INJECTION, SOLUTION INTRAVENOUS; SUBCUTANEOUS EVERY 8 HOURS
Status: DISCONTINUED | OUTPATIENT
Start: 2021-04-26 | End: 2021-05-01 | Stop reason: HOSPADM

## 2021-04-26 RX ORDER — OXYCODONE HYDROCHLORIDE 5 MG/1
15 TABLET ORAL ONCE
Status: DISCONTINUED | OUTPATIENT
Start: 2021-04-26 | End: 2021-04-26

## 2021-04-26 RX ORDER — BUSPIRONE HYDROCHLORIDE 5 MG/1
15 TABLET ORAL 3 TIMES DAILY
Status: DISCONTINUED | OUTPATIENT
Start: 2021-04-26 | End: 2021-04-26

## 2021-04-26 RX ORDER — PROCHLORPERAZINE EDISYLATE 5 MG/ML
5 INJECTION INTRAMUSCULAR; INTRAVENOUS EVERY 30 MIN PRN
Status: DISCONTINUED | OUTPATIENT
Start: 2021-04-26 | End: 2021-04-27

## 2021-04-26 RX ORDER — ONDANSETRON 8 MG/1
8 TABLET, ORALLY DISINTEGRATING ORAL EVERY 6 HOURS PRN
Status: DISCONTINUED | OUTPATIENT
Start: 2021-04-26 | End: 2021-05-01 | Stop reason: HOSPADM

## 2021-04-26 RX ORDER — AMOXICILLIN 250 MG
2 CAPSULE ORAL NIGHTLY PRN
Status: DISCONTINUED | OUTPATIENT
Start: 2021-04-26 | End: 2021-05-01 | Stop reason: HOSPADM

## 2021-04-26 RX ORDER — ONDANSETRON HYDROCHLORIDE 2 MG/ML
INJECTION, SOLUTION INTRAMUSCULAR; INTRAVENOUS
Status: DISCONTINUED | OUTPATIENT
Start: 2021-04-26 | End: 2021-04-26

## 2021-04-26 RX ORDER — CYCLOBENZAPRINE HCL 10 MG
10 TABLET ORAL
Status: COMPLETED | OUTPATIENT
Start: 2021-04-26 | End: 2021-04-26

## 2021-04-26 RX ORDER — CEFAZOLIN SODIUM 2 G/50ML
2 SOLUTION INTRAVENOUS ONCE
Status: COMPLETED | OUTPATIENT
Start: 2021-04-26 | End: 2021-04-26

## 2021-04-26 RX ORDER — OXYCODONE HCL 10 MG/1
10 TABLET, FILM COATED, EXTENDED RELEASE ORAL
Status: COMPLETED | OUTPATIENT
Start: 2021-04-26 | End: 2021-04-26

## 2021-04-26 RX ORDER — PROPOFOL 10 MG/ML
VIAL (ML) INTRAVENOUS
Status: DISCONTINUED | OUTPATIENT
Start: 2021-04-26 | End: 2021-04-26

## 2021-04-26 RX ORDER — ACETAMINOPHEN 325 MG/1
650 TABLET ORAL
Status: COMPLETED | OUTPATIENT
Start: 2021-04-26 | End: 2021-04-26

## 2021-04-26 RX ORDER — HYDROMORPHONE HYDROCHLORIDE 2 MG/ML
0.5 INJECTION, SOLUTION INTRAMUSCULAR; INTRAVENOUS; SUBCUTANEOUS EVERY 5 MIN PRN
Status: DISCONTINUED | OUTPATIENT
Start: 2021-04-26 | End: 2021-04-26 | Stop reason: HOSPADM

## 2021-04-26 RX ORDER — MIDAZOLAM HYDROCHLORIDE 1 MG/ML
2 INJECTION INTRAMUSCULAR; INTRAVENOUS ONCE
Status: COMPLETED | OUTPATIENT
Start: 2021-04-26 | End: 2021-04-26

## 2021-04-26 RX ORDER — PHENYLEPHRINE HYDROCHLORIDE 10 MG/ML
INJECTION INTRAVENOUS
Status: DISCONTINUED | OUTPATIENT
Start: 2021-04-26 | End: 2021-04-26

## 2021-04-26 RX ORDER — BUPIVACAINE HCL/EPINEPHRINE 0.25-.0005
VIAL (ML) INJECTION
Status: DISCONTINUED | OUTPATIENT
Start: 2021-04-26 | End: 2021-04-26 | Stop reason: HOSPADM

## 2021-04-26 RX ORDER — TRAMADOL HYDROCHLORIDE 50 MG/1
100 TABLET ORAL EVERY 6 HOURS
Status: DISCONTINUED | OUTPATIENT
Start: 2021-04-27 | End: 2021-04-26

## 2021-04-26 RX ORDER — HYDROMORPHONE HYDROCHLORIDE 2 MG/ML
2 INJECTION, SOLUTION INTRAMUSCULAR; INTRAVENOUS; SUBCUTANEOUS
Status: DISCONTINUED | OUTPATIENT
Start: 2021-04-26 | End: 2021-05-01 | Stop reason: HOSPADM

## 2021-04-26 RX ORDER — GLUCAGON 1 MG
1 KIT INJECTION
Status: DISCONTINUED | OUTPATIENT
Start: 2021-04-26 | End: 2021-05-01 | Stop reason: HOSPADM

## 2021-04-26 RX ORDER — ATORVASTATIN CALCIUM 10 MG/1
10 TABLET, FILM COATED ORAL EVERY OTHER DAY
Status: DISCONTINUED | OUTPATIENT
Start: 2021-04-27 | End: 2021-05-01 | Stop reason: HOSPADM

## 2021-04-26 RX ORDER — BISACODYL 10 MG
10 SUPPOSITORY, RECTAL RECTAL DAILY
Status: DISCONTINUED | OUTPATIENT
Start: 2021-04-27 | End: 2021-05-01 | Stop reason: HOSPADM

## 2021-04-26 RX ORDER — LIDOCAINE HCL/PF 100 MG/5ML
SYRINGE (ML) INTRAVENOUS
Status: DISCONTINUED | OUTPATIENT
Start: 2021-04-26 | End: 2021-04-26

## 2021-04-26 RX ORDER — LIDOCAINE HYDROCHLORIDE 10 MG/ML
1 INJECTION, SOLUTION EPIDURAL; INFILTRATION; INTRACAUDAL; PERINEURAL ONCE
Status: DISCONTINUED | OUTPATIENT
Start: 2021-04-26 | End: 2021-04-26 | Stop reason: HOSPADM

## 2021-04-26 RX ORDER — MIDAZOLAM HYDROCHLORIDE 1 MG/ML
INJECTION INTRAMUSCULAR; INTRAVENOUS
Status: DISCONTINUED | OUTPATIENT
Start: 2021-04-26 | End: 2021-04-26

## 2021-04-26 RX ORDER — IBUPROFEN 200 MG
16 TABLET ORAL
Status: DISCONTINUED | OUTPATIENT
Start: 2021-04-26 | End: 2021-05-01 | Stop reason: HOSPADM

## 2021-04-26 RX ORDER — METHYLENE BLUE 5 MG/ML
INJECTION INTRAVENOUS
Status: DISCONTINUED | OUTPATIENT
Start: 2021-04-26 | End: 2021-04-26

## 2021-04-26 RX ORDER — BUPIVACAINE HCL/EPINEPHRINE 0.5-1:200K
VIAL (ML) INJECTION
Status: DISCONTINUED | OUTPATIENT
Start: 2021-04-26 | End: 2021-04-26 | Stop reason: HOSPADM

## 2021-04-26 RX ORDER — MIDAZOLAM HYDROCHLORIDE 1 MG/ML
INJECTION INTRAMUSCULAR; INTRAVENOUS
Status: COMPLETED
Start: 2021-04-26 | End: 2021-04-26

## 2021-04-26 RX ORDER — DIAZEPAM 10 MG/2ML
2.5 INJECTION INTRAMUSCULAR EVERY 4 HOURS PRN
Status: DISCONTINUED | OUTPATIENT
Start: 2021-04-26 | End: 2021-04-30

## 2021-04-26 RX ORDER — PROCHLORPERAZINE EDISYLATE 5 MG/ML
5 INJECTION INTRAMUSCULAR; INTRAVENOUS EVERY 6 HOURS PRN
Status: DISCONTINUED | OUTPATIENT
Start: 2021-04-26 | End: 2021-05-01 | Stop reason: HOSPADM

## 2021-04-26 RX ORDER — SODIUM CHLORIDE, SODIUM LACTATE, POTASSIUM CHLORIDE, CALCIUM CHLORIDE 600; 310; 30; 20 MG/100ML; MG/100ML; MG/100ML; MG/100ML
INJECTION, SOLUTION INTRAVENOUS CONTINUOUS
Status: DISCONTINUED | OUTPATIENT
Start: 2021-04-26 | End: 2021-04-27

## 2021-04-26 RX ORDER — OXYCODONE HYDROCHLORIDE 5 MG/1
15 TABLET ORAL ONCE
Status: COMPLETED | OUTPATIENT
Start: 2021-04-26 | End: 2021-04-26

## 2021-04-26 RX ORDER — TRAMADOL HYDROCHLORIDE 50 MG/1
100 TABLET ORAL EVERY 6 HOURS
Status: DISCONTINUED | OUTPATIENT
Start: 2021-04-27 | End: 2021-04-30

## 2021-04-26 RX ORDER — MUPIROCIN 20 MG/G
OINTMENT TOPICAL 2 TIMES DAILY
Status: COMPLETED | OUTPATIENT
Start: 2021-04-26 | End: 2021-05-01

## 2021-04-26 RX ORDER — BUSPIRONE HYDROCHLORIDE 5 MG/1
15 TABLET ORAL 3 TIMES DAILY
Status: DISCONTINUED | OUTPATIENT
Start: 2021-04-27 | End: 2021-05-01 | Stop reason: HOSPADM

## 2021-04-26 RX ORDER — ONDANSETRON 2 MG/ML
4 INJECTION INTRAMUSCULAR; INTRAVENOUS DAILY PRN
Status: DISCONTINUED | OUTPATIENT
Start: 2021-04-26 | End: 2021-04-27

## 2021-04-26 RX ORDER — MUPIROCIN 20 MG/G
OINTMENT TOPICAL 2 TIMES DAILY
Status: DISCONTINUED | OUTPATIENT
Start: 2021-04-26 | End: 2021-04-26

## 2021-04-26 RX ORDER — IBUPROFEN 200 MG
24 TABLET ORAL
Status: DISCONTINUED | OUTPATIENT
Start: 2021-04-26 | End: 2021-05-01 | Stop reason: HOSPADM

## 2021-04-26 RX ORDER — SUCCINYLCHOLINE CHLORIDE 20 MG/ML
INJECTION INTRAMUSCULAR; INTRAVENOUS
Status: DISCONTINUED | OUTPATIENT
Start: 2021-04-26 | End: 2021-04-26

## 2021-04-26 RX ORDER — MAG HYDROX/ALUMINUM HYD/SIMETH 200-200-20
30 SUSPENSION, ORAL (FINAL DOSE FORM) ORAL EVERY 4 HOURS PRN
Status: DISCONTINUED | OUTPATIENT
Start: 2021-04-26 | End: 2021-05-01 | Stop reason: HOSPADM

## 2021-04-26 RX ORDER — ACETAMINOPHEN 325 MG/1
650 TABLET ORAL EVERY 6 HOURS
Status: DISCONTINUED | OUTPATIENT
Start: 2021-04-27 | End: 2021-05-01 | Stop reason: HOSPADM

## 2021-04-26 RX ORDER — GABAPENTIN 300 MG/1
600 CAPSULE ORAL 3 TIMES DAILY
Status: DISCONTINUED | OUTPATIENT
Start: 2021-04-26 | End: 2021-04-27

## 2021-04-26 RX ORDER — ROCURONIUM BROMIDE 10 MG/ML
INJECTION, SOLUTION INTRAVENOUS
Status: DISCONTINUED | OUTPATIENT
Start: 2021-04-26 | End: 2021-04-26

## 2021-04-26 RX ORDER — DEXAMETHASONE SODIUM PHOSPHATE 4 MG/ML
INJECTION, SOLUTION INTRA-ARTICULAR; INTRALESIONAL; INTRAMUSCULAR; INTRAVENOUS; SOFT TISSUE
Status: DISCONTINUED | OUTPATIENT
Start: 2021-04-26 | End: 2021-04-26

## 2021-04-26 RX ADMIN — SODIUM CHLORIDE, SODIUM LACTATE, POTASSIUM CHLORIDE, AND CALCIUM CHLORIDE: .6; .31; .03; .02 INJECTION, SOLUTION INTRAVENOUS at 09:04

## 2021-04-26 RX ADMIN — HYDROMORPHONE HYDROCHLORIDE 0.5 MG: 2 INJECTION INTRAMUSCULAR; INTRAVENOUS; SUBCUTANEOUS at 08:04

## 2021-04-26 RX ADMIN — METHYLENE BLUE 25 MG: 5 INJECTION INTRAVENOUS at 02:04

## 2021-04-26 RX ADMIN — VECURONIUM BROMIDE 4 MG: 10 INJECTION, POWDER, LYOPHILIZED, FOR SOLUTION INTRAVENOUS at 01:04

## 2021-04-26 RX ADMIN — HYDROMORPHONE HYDROCHLORIDE 2 MG: 2 INJECTION INTRAMUSCULAR; INTRAVENOUS; SUBCUTANEOUS at 08:04

## 2021-04-26 RX ADMIN — BUSPIRONE HYDROCHLORIDE 15 MG: 5 TABLET ORAL at 08:04

## 2021-04-26 RX ADMIN — SODIUM CHLORIDE, SODIUM LACTATE, POTASSIUM CHLORIDE, AND CALCIUM CHLORIDE: .6; .31; .03; .02 INJECTION, SOLUTION INTRAVENOUS at 08:04

## 2021-04-26 RX ADMIN — SODIUM CHLORIDE, SODIUM LACTATE, POTASSIUM CHLORIDE, AND CALCIUM CHLORIDE: .6; .31; .03; .02 INJECTION, SOLUTION INTRAVENOUS at 12:04

## 2021-04-26 RX ADMIN — FENTANYL CITRATE 50 MCG: 50 INJECTION, SOLUTION INTRAMUSCULAR; INTRAVENOUS at 02:04

## 2021-04-26 RX ADMIN — DIAZEPAM 2.5 MG: 10 INJECTION, SOLUTION INTRAMUSCULAR; INTRAVENOUS at 08:04

## 2021-04-26 RX ADMIN — CELECOXIB 200 MG: 100 CAPSULE ORAL at 08:04

## 2021-04-26 RX ADMIN — ROCURONIUM BROMIDE 10 MG: 10 INJECTION, SOLUTION INTRAVENOUS at 12:04

## 2021-04-26 RX ADMIN — ROCURONIUM BROMIDE 30 MG: 10 INJECTION, SOLUTION INTRAVENOUS at 12:04

## 2021-04-26 RX ADMIN — MIDAZOLAM HYDROCHLORIDE 2 MG: 1 INJECTION INTRAMUSCULAR; INTRAVENOUS at 11:04

## 2021-04-26 RX ADMIN — CELECOXIB 200 MG: 100 CAPSULE ORAL at 09:04

## 2021-04-26 RX ADMIN — MIDAZOLAM HYDROCHLORIDE 2 MG: 1 INJECTION, SOLUTION INTRAMUSCULAR; INTRAVENOUS at 12:04

## 2021-04-26 RX ADMIN — FENTANYL CITRATE 100 MCG: 50 INJECTION, SOLUTION INTRAMUSCULAR; INTRAVENOUS at 12:04

## 2021-04-26 RX ADMIN — PREGABALIN 75 MG: 75 CAPSULE ORAL at 09:04

## 2021-04-26 RX ADMIN — NEOSTIGMINE METHYLSULFATE 5 MG: 1 INJECTION INTRAVENOUS at 07:04

## 2021-04-26 RX ADMIN — GABAPENTIN 600 MG: 300 CAPSULE ORAL at 08:04

## 2021-04-26 RX ADMIN — CEFAZOLIN SODIUM 3 G: 2 SOLUTION INTRAVENOUS at 01:04

## 2021-04-26 RX ADMIN — GLYCOPYRROLATE 0.8 MG: 0.2 INJECTION, SOLUTION INTRAMUSCULAR; INTRAVITREAL at 07:04

## 2021-04-26 RX ADMIN — FENTANYL CITRATE 100 MCG: 50 INJECTION, SOLUTION INTRAMUSCULAR; INTRAVENOUS at 07:04

## 2021-04-26 RX ADMIN — FENTANYL CITRATE 50 MCG: 50 INJECTION, SOLUTION INTRAMUSCULAR; INTRAVENOUS at 06:04

## 2021-04-26 RX ADMIN — FENTANYL CITRATE 50 MCG: 50 INJECTION, SOLUTION INTRAMUSCULAR; INTRAVENOUS at 01:04

## 2021-04-26 RX ADMIN — ACETAMINOPHEN 650 MG: 325 TABLET ORAL at 09:04

## 2021-04-26 RX ADMIN — CEFAZOLIN SODIUM 3 G: 2 SOLUTION INTRAVENOUS at 04:04

## 2021-04-26 RX ADMIN — CYCLOBENZAPRINE 10 MG: 10 TABLET, FILM COATED ORAL at 09:04

## 2021-04-26 RX ADMIN — HEPARIN SODIUM 5000 UNITS: 5000 INJECTION INTRAVENOUS; SUBCUTANEOUS at 09:04

## 2021-04-26 RX ADMIN — OXYCODONE HYDROCHLORIDE 10 MG: 10 TABLET, FILM COATED, EXTENDED RELEASE ORAL at 09:04

## 2021-04-26 RX ADMIN — VECURONIUM BROMIDE 4 MG: 10 INJECTION, POWDER, LYOPHILIZED, FOR SOLUTION INTRAVENOUS at 02:04

## 2021-04-26 RX ADMIN — PROPOFOL 50 MG: 10 INJECTION, EMULSION INTRAVENOUS at 02:04

## 2021-04-26 RX ADMIN — ACETAMINOPHEN 650 MG: 325 TABLET ORAL at 11:04

## 2021-04-26 RX ADMIN — PHENYLEPHRINE HYDROCHLORIDE 100 MCG: 10 INJECTION INTRAVENOUS at 05:04

## 2021-04-26 RX ADMIN — PROPOFOL 200 MG: 10 INJECTION, EMULSION INTRAVENOUS at 12:04

## 2021-04-26 RX ADMIN — OXYCODONE HYDROCHLORIDE 15 MG: 5 TABLET ORAL at 10:04

## 2021-04-26 RX ADMIN — FENTANYL CITRATE 50 MCG: 50 INJECTION, SOLUTION INTRAMUSCULAR; INTRAVENOUS at 05:04

## 2021-04-26 RX ADMIN — DEXAMETHASONE SODIUM PHOSPHATE 8 MG: 4 INJECTION, SOLUTION INTRA-ARTICULAR; INTRALESIONAL; INTRAMUSCULAR; INTRAVENOUS; SOFT TISSUE at 01:04

## 2021-04-26 RX ADMIN — VECURONIUM BROMIDE 1 MG: 10 INJECTION, POWDER, LYOPHILIZED, FOR SOLUTION INTRAVENOUS at 06:04

## 2021-04-26 RX ADMIN — PROCHLORPERAZINE EDISYLATE 5 MG: 5 INJECTION INTRAMUSCULAR; INTRAVENOUS at 10:04

## 2021-04-26 RX ADMIN — VECURONIUM BROMIDE 2 MG: 10 INJECTION, POWDER, LYOPHILIZED, FOR SOLUTION INTRAVENOUS at 05:04

## 2021-04-26 RX ADMIN — ONDANSETRON 8 MG: 2 INJECTION, SOLUTION INTRAMUSCULAR; INTRAVENOUS at 06:04

## 2021-04-26 RX ADMIN — SUCCINYLCHOLINE CHLORIDE 180 MG: 20 INJECTION, SOLUTION INTRAMUSCULAR; INTRAVENOUS at 12:04

## 2021-04-26 RX ADMIN — MIDAZOLAM 2 MG: 1 INJECTION INTRAMUSCULAR; INTRAVENOUS at 11:04

## 2021-04-26 RX ADMIN — MUPIROCIN: 20 OINTMENT TOPICAL at 09:04

## 2021-04-26 RX ADMIN — SODIUM CHLORIDE, SODIUM LACTATE, POTASSIUM CHLORIDE, AND CALCIUM CHLORIDE: .6; .31; .03; .02 INJECTION, SOLUTION INTRAVENOUS at 01:04

## 2021-04-26 RX ADMIN — HYDROMORPHONE HYDROCHLORIDE 2 MG: 2 INJECTION INTRAMUSCULAR; INTRAVENOUS; SUBCUTANEOUS at 11:04

## 2021-04-26 RX ADMIN — LIDOCAINE HYDROCHLORIDE 100 MG: 20 INJECTION, SOLUTION INTRAVENOUS at 12:04

## 2021-04-26 RX ADMIN — INSULIN ASPART 3 UNITS: 100 INJECTION, SOLUTION INTRAVENOUS; SUBCUTANEOUS at 09:04

## 2021-04-26 RX ADMIN — ONDANSETRON 4 MG: 2 INJECTION INTRAMUSCULAR; INTRAVENOUS at 09:04

## 2021-04-27 ENCOUNTER — CLINICAL SUPPORT (OUTPATIENT)
Dept: SMOKING CESSATION | Facility: CLINIC | Age: 46
End: 2021-04-27

## 2021-04-27 ENCOUNTER — TELEPHONE (OUTPATIENT)
Dept: NEUROSURGERY | Facility: CLINIC | Age: 46
End: 2021-04-27

## 2021-04-27 DIAGNOSIS — F17.210 CIGARETTE SMOKER: Primary | ICD-10-CM

## 2021-04-27 LAB
ANION GAP SERPL CALC-SCNC: 14 MMOL/L (ref 8–16)
BASOPHILS # BLD AUTO: 0.03 K/UL (ref 0–0.2)
BASOPHILS NFR BLD: 0.2 % (ref 0–1.9)
BUN SERPL-MCNC: 12 MG/DL (ref 6–20)
CALCIUM SERPL-MCNC: 8.3 MG/DL (ref 8.7–10.5)
CHLORIDE SERPL-SCNC: 101 MMOL/L (ref 95–110)
CO2 SERPL-SCNC: 21 MMOL/L (ref 23–29)
CREAT SERPL-MCNC: 0.8 MG/DL (ref 0.5–1.4)
DIFFERENTIAL METHOD: ABNORMAL
EOSINOPHIL # BLD AUTO: 0 K/UL (ref 0–0.5)
EOSINOPHIL NFR BLD: 0 % (ref 0–8)
ERYTHROCYTE [DISTWIDTH] IN BLOOD BY AUTOMATED COUNT: 13 % (ref 11.5–14.5)
EST. GFR  (AFRICAN AMERICAN): >60 ML/MIN/1.73 M^2
EST. GFR  (NON AFRICAN AMERICAN): >60 ML/MIN/1.73 M^2
GLUCOSE SERPL-MCNC: 224 MG/DL (ref 70–110)
HCT VFR BLD AUTO: 34.3 % (ref 40–54)
HGB BLD-MCNC: 11.8 G/DL (ref 14–18)
IMM GRANULOCYTES # BLD AUTO: 0.07 K/UL (ref 0–0.04)
IMM GRANULOCYTES NFR BLD AUTO: 0.5 % (ref 0–0.5)
LYMPHOCYTES # BLD AUTO: 1.1 K/UL (ref 1–4.8)
LYMPHOCYTES NFR BLD: 8 % (ref 18–48)
MCH RBC QN AUTO: 31.5 PG (ref 27–31)
MCHC RBC AUTO-ENTMCNC: 34.4 G/DL (ref 32–36)
MCV RBC AUTO: 92 FL (ref 82–98)
MONOCYTES # BLD AUTO: 1.2 K/UL (ref 0.3–1)
MONOCYTES NFR BLD: 8.9 % (ref 4–15)
NEUTROPHILS # BLD AUTO: 10.9 K/UL (ref 1.8–7.7)
NEUTROPHILS NFR BLD: 82.4 % (ref 38–73)
NRBC BLD-RTO: 0 /100 WBC
PLATELET # BLD AUTO: 262 K/UL (ref 150–450)
PMV BLD AUTO: 10.7 FL (ref 9.2–12.9)
POCT GLUCOSE: 145 MG/DL (ref 70–110)
POCT GLUCOSE: 173 MG/DL (ref 70–110)
POCT GLUCOSE: 188 MG/DL (ref 70–110)
POCT GLUCOSE: 218 MG/DL (ref 70–110)
POCT GLUCOSE: 292 MG/DL (ref 70–110)
POTASSIUM SERPL-SCNC: 5 MMOL/L (ref 3.5–5.1)
RBC # BLD AUTO: 3.75 M/UL (ref 4.6–6.2)
SODIUM SERPL-SCNC: 136 MMOL/L (ref 136–145)
WBC # BLD AUTO: 13.27 K/UL (ref 3.9–12.7)

## 2021-04-27 PROCEDURE — 25000003 PHARM REV CODE 250: Performed by: NEUROLOGICAL SURGERY

## 2021-04-27 PROCEDURE — 80048 BASIC METABOLIC PNL TOTAL CA: CPT | Performed by: NEUROLOGICAL SURGERY

## 2021-04-27 PROCEDURE — 63600175 PHARM REV CODE 636 W HCPCS: Performed by: NEUROLOGICAL SURGERY

## 2021-04-27 PROCEDURE — 27000190 HC CPAP FULL FACE MASK W/VALVE

## 2021-04-27 PROCEDURE — 99406 PT REFUSED TOBACCO CESSATION: ICD-10-PCS | Mod: S$GLB,,,

## 2021-04-27 PROCEDURE — 99406 BEHAV CHNG SMOKING 3-10 MIN: CPT | Mod: S$GLB,,,

## 2021-04-27 PROCEDURE — 97530 THERAPEUTIC ACTIVITIES: CPT

## 2021-04-27 PROCEDURE — 97161 PT EVAL LOW COMPLEX 20 MIN: CPT

## 2021-04-27 PROCEDURE — 94799 UNLISTED PULMONARY SVC/PX: CPT

## 2021-04-27 PROCEDURE — 27000221 HC OXYGEN, UP TO 24 HOURS

## 2021-04-27 PROCEDURE — 85025 COMPLETE CBC W/AUTO DIFF WBC: CPT | Performed by: NEUROLOGICAL SURGERY

## 2021-04-27 PROCEDURE — 94761 N-INVAS EAR/PLS OXIMETRY MLT: CPT

## 2021-04-27 PROCEDURE — 97165 OT EVAL LOW COMPLEX 30 MIN: CPT

## 2021-04-27 PROCEDURE — 99900035 HC TECH TIME PER 15 MIN (STAT)

## 2021-04-27 PROCEDURE — 11000001 HC ACUTE MED/SURG PRIVATE ROOM

## 2021-04-27 PROCEDURE — 97535 SELF CARE MNGMENT TRAINING: CPT

## 2021-04-27 PROCEDURE — 94660 CPAP INITIATION&MGMT: CPT

## 2021-04-27 RX ORDER — POLYETHYLENE GLYCOL 3350 17 G/17G
17 POWDER, FOR SOLUTION ORAL DAILY
Status: DISCONTINUED | OUTPATIENT
Start: 2021-04-27 | End: 2021-05-01 | Stop reason: HOSPADM

## 2021-04-27 RX ORDER — OXYCODONE HYDROCHLORIDE 5 MG/1
10 TABLET ORAL EVERY 6 HOURS PRN
Status: DISCONTINUED | OUTPATIENT
Start: 2021-04-27 | End: 2021-04-28

## 2021-04-27 RX ORDER — GABAPENTIN 400 MG/1
800 CAPSULE ORAL 3 TIMES DAILY
Status: DISCONTINUED | OUTPATIENT
Start: 2021-04-27 | End: 2021-05-01 | Stop reason: HOSPADM

## 2021-04-27 RX ORDER — DIPHENHYDRAMINE HCL 25 MG
25 CAPSULE ORAL EVERY 6 HOURS PRN
Status: DISCONTINUED | OUTPATIENT
Start: 2021-04-27 | End: 2021-05-01 | Stop reason: HOSPADM

## 2021-04-27 RX ADMIN — Medication 2 TABLET: at 11:04

## 2021-04-27 RX ADMIN — INSULIN ASPART 4 UNITS: 100 INJECTION, SOLUTION INTRAVENOUS; SUBCUTANEOUS at 07:04

## 2021-04-27 RX ADMIN — HEPARIN SODIUM 5000 UNITS: 5000 INJECTION INTRAVENOUS; SUBCUTANEOUS at 01:04

## 2021-04-27 RX ADMIN — HYDROMORPHONE HYDROCHLORIDE 2 MG: 2 INJECTION INTRAMUSCULAR; INTRAVENOUS; SUBCUTANEOUS at 11:04

## 2021-04-27 RX ADMIN — TRAMADOL HYDROCHLORIDE 100 MG: 50 TABLET ORAL at 11:04

## 2021-04-27 RX ADMIN — CELECOXIB 200 MG: 100 CAPSULE ORAL at 08:04

## 2021-04-27 RX ADMIN — DIAZEPAM 2.5 MG: 10 INJECTION, SOLUTION INTRAMUSCULAR; INTRAVENOUS at 05:04

## 2021-04-27 RX ADMIN — ONDANSETRON 8 MG: 8 TABLET, ORALLY DISINTEGRATING ORAL at 12:04

## 2021-04-27 RX ADMIN — FLUOXETINE HYDROCHLORIDE 80 MG: 20 CAPSULE ORAL at 03:04

## 2021-04-27 RX ADMIN — GABAPENTIN 800 MG: 400 CAPSULE ORAL at 03:04

## 2021-04-27 RX ADMIN — POLYETHYLENE GLYCOL 3350 17 GRAM ORAL POWDER PACKET 17 G: at 08:04

## 2021-04-27 RX ADMIN — ACETAMINOPHEN 650 MG: 325 TABLET ORAL at 11:04

## 2021-04-27 RX ADMIN — BUSPIRONE HYDROCHLORIDE 15 MG: 5 TABLET ORAL at 08:04

## 2021-04-27 RX ADMIN — OXYCODONE HYDROCHLORIDE 10 MG: 5 TABLET ORAL at 05:04

## 2021-04-27 RX ADMIN — OXYCODONE HYDROCHLORIDE 10 MG: 5 TABLET ORAL at 09:04

## 2021-04-27 RX ADMIN — ATORVASTATIN CALCIUM 10 MG: 10 TABLET, FILM COATED ORAL at 08:04

## 2021-04-27 RX ADMIN — ACETAMINOPHEN 650 MG: 325 TABLET ORAL at 05:04

## 2021-04-27 RX ADMIN — GABAPENTIN 800 MG: 400 CAPSULE ORAL at 08:04

## 2021-04-27 RX ADMIN — Medication 2 TABLET: at 08:04

## 2021-04-27 RX ADMIN — HYDROMORPHONE HYDROCHLORIDE 2 MG: 2 INJECTION INTRAMUSCULAR; INTRAVENOUS; SUBCUTANEOUS at 07:04

## 2021-04-27 RX ADMIN — HYDROMORPHONE HYDROCHLORIDE 2 MG: 2 INJECTION INTRAMUSCULAR; INTRAVENOUS; SUBCUTANEOUS at 03:04

## 2021-04-27 RX ADMIN — HEPARIN SODIUM 5000 UNITS: 5000 INJECTION INTRAVENOUS; SUBCUTANEOUS at 10:04

## 2021-04-27 RX ADMIN — MUPIROCIN: 20 OINTMENT TOPICAL at 08:04

## 2021-04-27 RX ADMIN — INSULIN ASPART 2 UNITS: 100 INJECTION, SOLUTION INTRAVENOUS; SUBCUTANEOUS at 04:04

## 2021-04-27 RX ADMIN — HEPARIN SODIUM 5000 UNITS: 5000 INJECTION INTRAVENOUS; SUBCUTANEOUS at 05:04

## 2021-04-27 RX ADMIN — HYDROMORPHONE HYDROCHLORIDE 2 MG: 2 INJECTION INTRAMUSCULAR; INTRAVENOUS; SUBCUTANEOUS at 04:04

## 2021-04-27 RX ADMIN — DIPHENHYDRAMINE HYDROCHLORIDE 25 MG: 25 CAPSULE ORAL at 05:04

## 2021-04-27 RX ADMIN — SODIUM CHLORIDE, SODIUM LACTATE, POTASSIUM CHLORIDE, AND CALCIUM CHLORIDE: .6; .31; .03; .02 INJECTION, SOLUTION INTRAVENOUS at 06:04

## 2021-04-27 RX ADMIN — BUSPIRONE HYDROCHLORIDE 15 MG: 5 TABLET ORAL at 03:04

## 2021-04-27 RX ADMIN — TRAMADOL HYDROCHLORIDE 100 MG: 50 TABLET ORAL at 01:04

## 2021-04-28 LAB
POCT GLUCOSE: 194 MG/DL (ref 70–110)
POCT GLUCOSE: 210 MG/DL (ref 70–110)
POCT GLUCOSE: 233 MG/DL (ref 70–110)
POCT GLUCOSE: 242 MG/DL (ref 70–110)

## 2021-04-28 PROCEDURE — 11000001 HC ACUTE MED/SURG PRIVATE ROOM

## 2021-04-28 PROCEDURE — 97110 THERAPEUTIC EXERCISES: CPT

## 2021-04-28 PROCEDURE — 97116 GAIT TRAINING THERAPY: CPT

## 2021-04-28 PROCEDURE — 63600175 PHARM REV CODE 636 W HCPCS: Performed by: NEUROLOGICAL SURGERY

## 2021-04-28 PROCEDURE — 25000003 PHARM REV CODE 250: Performed by: NEUROLOGICAL SURGERY

## 2021-04-28 PROCEDURE — 99900035 HC TECH TIME PER 15 MIN (STAT)

## 2021-04-28 PROCEDURE — 97535 SELF CARE MNGMENT TRAINING: CPT

## 2021-04-28 PROCEDURE — 94799 UNLISTED PULMONARY SVC/PX: CPT

## 2021-04-28 PROCEDURE — 94761 N-INVAS EAR/PLS OXIMETRY MLT: CPT

## 2021-04-28 PROCEDURE — 97530 THERAPEUTIC ACTIVITIES: CPT

## 2021-04-28 RX ORDER — OXYCODONE HYDROCHLORIDE 5 MG/1
10 TABLET ORAL EVERY 6 HOURS
Status: DISCONTINUED | OUTPATIENT
Start: 2021-04-28 | End: 2021-05-01 | Stop reason: HOSPADM

## 2021-04-28 RX ADMIN — BUSPIRONE HYDROCHLORIDE 15 MG: 5 TABLET ORAL at 03:04

## 2021-04-28 RX ADMIN — OXYCODONE HYDROCHLORIDE 10 MG: 5 TABLET ORAL at 04:04

## 2021-04-28 RX ADMIN — INSULIN ASPART 2 UNITS: 100 INJECTION, SOLUTION INTRAVENOUS; SUBCUTANEOUS at 08:04

## 2021-04-28 RX ADMIN — BUSPIRONE HYDROCHLORIDE 15 MG: 5 TABLET ORAL at 08:04

## 2021-04-28 RX ADMIN — MUPIROCIN: 20 OINTMENT TOPICAL at 10:04

## 2021-04-28 RX ADMIN — INSULIN ASPART 4 UNITS: 100 INJECTION, SOLUTION INTRAVENOUS; SUBCUTANEOUS at 06:04

## 2021-04-28 RX ADMIN — GABAPENTIN 800 MG: 400 CAPSULE ORAL at 03:04

## 2021-04-28 RX ADMIN — HYDROMORPHONE HYDROCHLORIDE 2 MG: 2 INJECTION INTRAMUSCULAR; INTRAVENOUS; SUBCUTANEOUS at 03:04

## 2021-04-28 RX ADMIN — TRAMADOL HYDROCHLORIDE 100 MG: 50 TABLET ORAL at 05:04

## 2021-04-28 RX ADMIN — HEPARIN SODIUM 5000 UNITS: 5000 INJECTION INTRAVENOUS; SUBCUTANEOUS at 02:04

## 2021-04-28 RX ADMIN — HYDROMORPHONE HYDROCHLORIDE 2 MG: 2 INJECTION INTRAMUSCULAR; INTRAVENOUS; SUBCUTANEOUS at 01:04

## 2021-04-28 RX ADMIN — HYDROMORPHONE HYDROCHLORIDE 2 MG: 2 INJECTION INTRAMUSCULAR; INTRAVENOUS; SUBCUTANEOUS at 06:04

## 2021-04-28 RX ADMIN — GABAPENTIN 800 MG: 400 CAPSULE ORAL at 10:04

## 2021-04-28 RX ADMIN — HYDROMORPHONE HYDROCHLORIDE 2 MG: 2 INJECTION INTRAMUSCULAR; INTRAVENOUS; SUBCUTANEOUS at 08:04

## 2021-04-28 RX ADMIN — OXYCODONE HYDROCHLORIDE 10 MG: 5 TABLET ORAL at 11:04

## 2021-04-28 RX ADMIN — HEPARIN SODIUM 5000 UNITS: 5000 INJECTION INTRAVENOUS; SUBCUTANEOUS at 09:04

## 2021-04-28 RX ADMIN — TRAMADOL HYDROCHLORIDE 100 MG: 50 TABLET ORAL at 11:04

## 2021-04-28 RX ADMIN — HYDROMORPHONE HYDROCHLORIDE 2 MG: 2 INJECTION INTRAMUSCULAR; INTRAVENOUS; SUBCUTANEOUS at 04:04

## 2021-04-28 RX ADMIN — CELECOXIB 200 MG: 100 CAPSULE ORAL at 08:04

## 2021-04-28 RX ADMIN — DIAZEPAM 2.5 MG: 10 INJECTION, SOLUTION INTRAMUSCULAR; INTRAVENOUS at 09:04

## 2021-04-28 RX ADMIN — HYDROMORPHONE HYDROCHLORIDE 2 MG: 2 INJECTION INTRAMUSCULAR; INTRAVENOUS; SUBCUTANEOUS at 10:04

## 2021-04-28 RX ADMIN — OXYCODONE HYDROCHLORIDE 10 MG: 5 TABLET ORAL at 06:04

## 2021-04-28 RX ADMIN — FLUOXETINE HYDROCHLORIDE 80 MG: 20 CAPSULE ORAL at 10:04

## 2021-04-28 RX ADMIN — TRAMADOL HYDROCHLORIDE 100 MG: 50 TABLET ORAL at 04:04

## 2021-04-28 RX ADMIN — ACETAMINOPHEN 650 MG: 325 TABLET ORAL at 05:04

## 2021-04-28 RX ADMIN — ACETAMINOPHEN 650 MG: 325 TABLET ORAL at 11:04

## 2021-04-28 RX ADMIN — CELECOXIB 200 MG: 100 CAPSULE ORAL at 10:04

## 2021-04-28 RX ADMIN — GABAPENTIN 800 MG: 400 CAPSULE ORAL at 08:04

## 2021-04-28 RX ADMIN — MUPIROCIN: 20 OINTMENT TOPICAL at 08:04

## 2021-04-28 RX ADMIN — BUSPIRONE HYDROCHLORIDE 15 MG: 5 TABLET ORAL at 10:04

## 2021-04-28 RX ADMIN — HEPARIN SODIUM 5000 UNITS: 5000 INJECTION INTRAVENOUS; SUBCUTANEOUS at 05:04

## 2021-04-28 RX ADMIN — POLYETHYLENE GLYCOL 3350 17 GRAM ORAL POWDER PACKET 17 G: at 10:04

## 2021-04-29 ENCOUNTER — TELEPHONE (OUTPATIENT)
Dept: FAMILY MEDICINE | Facility: CLINIC | Age: 46
End: 2021-04-29

## 2021-04-29 LAB
POCT GLUCOSE: 182 MG/DL (ref 70–110)
POCT GLUCOSE: 241 MG/DL (ref 70–110)
POCT GLUCOSE: 250 MG/DL (ref 70–110)
POCT GLUCOSE: 261 MG/DL (ref 70–110)

## 2021-04-29 PROCEDURE — 94761 N-INVAS EAR/PLS OXIMETRY MLT: CPT

## 2021-04-29 PROCEDURE — 11000001 HC ACUTE MED/SURG PRIVATE ROOM

## 2021-04-29 PROCEDURE — 97530 THERAPEUTIC ACTIVITIES: CPT | Mod: CQ

## 2021-04-29 PROCEDURE — 25000003 PHARM REV CODE 250: Performed by: NEUROLOGICAL SURGERY

## 2021-04-29 PROCEDURE — 99900035 HC TECH TIME PER 15 MIN (STAT)

## 2021-04-29 PROCEDURE — 63600175 PHARM REV CODE 636 W HCPCS: Performed by: NEUROLOGICAL SURGERY

## 2021-04-29 PROCEDURE — 97110 THERAPEUTIC EXERCISES: CPT | Mod: CQ

## 2021-04-29 PROCEDURE — 94799 UNLISTED PULMONARY SVC/PX: CPT

## 2021-04-29 PROCEDURE — 94760 N-INVAS EAR/PLS OXIMETRY 1: CPT

## 2021-04-29 PROCEDURE — 97116 GAIT TRAINING THERAPY: CPT | Mod: CQ

## 2021-04-29 RX ADMIN — HEPARIN SODIUM 5000 UNITS: 5000 INJECTION INTRAVENOUS; SUBCUTANEOUS at 09:04

## 2021-04-29 RX ADMIN — HEPARIN SODIUM 5000 UNITS: 5000 INJECTION INTRAVENOUS; SUBCUTANEOUS at 01:04

## 2021-04-29 RX ADMIN — INSULIN ASPART 2 UNITS: 100 INJECTION, SOLUTION INTRAVENOUS; SUBCUTANEOUS at 06:04

## 2021-04-29 RX ADMIN — ACETAMINOPHEN 650 MG: 325 TABLET ORAL at 12:04

## 2021-04-29 RX ADMIN — MUPIROCIN: 20 OINTMENT TOPICAL at 08:04

## 2021-04-29 RX ADMIN — TRAMADOL HYDROCHLORIDE 100 MG: 50 TABLET ORAL at 11:04

## 2021-04-29 RX ADMIN — MUPIROCIN: 20 OINTMENT TOPICAL at 09:04

## 2021-04-29 RX ADMIN — BUSPIRONE HYDROCHLORIDE 15 MG: 5 TABLET ORAL at 08:04

## 2021-04-29 RX ADMIN — GABAPENTIN 800 MG: 400 CAPSULE ORAL at 09:04

## 2021-04-29 RX ADMIN — DIAZEPAM 2.5 MG: 10 INJECTION, SOLUTION INTRAMUSCULAR; INTRAVENOUS at 04:04

## 2021-04-29 RX ADMIN — HYDROMORPHONE HYDROCHLORIDE 2 MG: 2 INJECTION INTRAMUSCULAR; INTRAVENOUS; SUBCUTANEOUS at 12:04

## 2021-04-29 RX ADMIN — DIAZEPAM 2.5 MG: 10 INJECTION, SOLUTION INTRAMUSCULAR; INTRAVENOUS at 01:04

## 2021-04-29 RX ADMIN — TRAMADOL HYDROCHLORIDE 100 MG: 50 TABLET ORAL at 12:04

## 2021-04-29 RX ADMIN — HYDROMORPHONE HYDROCHLORIDE 2 MG: 2 INJECTION INTRAMUSCULAR; INTRAVENOUS; SUBCUTANEOUS at 08:04

## 2021-04-29 RX ADMIN — ACETAMINOPHEN 650 MG: 325 TABLET ORAL at 06:04

## 2021-04-29 RX ADMIN — HYDROMORPHONE HYDROCHLORIDE 2 MG: 2 INJECTION INTRAMUSCULAR; INTRAVENOUS; SUBCUTANEOUS at 11:04

## 2021-04-29 RX ADMIN — ACETAMINOPHEN 650 MG: 325 TABLET ORAL at 11:04

## 2021-04-29 RX ADMIN — TRAMADOL HYDROCHLORIDE 100 MG: 50 TABLET ORAL at 06:04

## 2021-04-29 RX ADMIN — FLUOXETINE HYDROCHLORIDE 80 MG: 20 CAPSULE ORAL at 08:04

## 2021-04-29 RX ADMIN — INSULIN ASPART 6 UNITS: 100 INJECTION, SOLUTION INTRAVENOUS; SUBCUTANEOUS at 04:04

## 2021-04-29 RX ADMIN — GABAPENTIN 800 MG: 400 CAPSULE ORAL at 02:04

## 2021-04-29 RX ADMIN — ACETAMINOPHEN 650 MG: 325 TABLET ORAL at 05:04

## 2021-04-29 RX ADMIN — HYDROMORPHONE HYDROCHLORIDE 2 MG: 2 INJECTION INTRAMUSCULAR; INTRAVENOUS; SUBCUTANEOUS at 05:04

## 2021-04-29 RX ADMIN — DIAZEPAM 2.5 MG: 10 INJECTION, SOLUTION INTRAMUSCULAR; INTRAVENOUS at 11:04

## 2021-04-29 RX ADMIN — HYDROMORPHONE HYDROCHLORIDE 2 MG: 2 INJECTION INTRAMUSCULAR; INTRAVENOUS; SUBCUTANEOUS at 04:04

## 2021-04-29 RX ADMIN — DIAZEPAM 2.5 MG: 10 INJECTION, SOLUTION INTRAMUSCULAR; INTRAVENOUS at 09:04

## 2021-04-29 RX ADMIN — DIAZEPAM 2.5 MG: 10 INJECTION, SOLUTION INTRAMUSCULAR; INTRAVENOUS at 06:04

## 2021-04-29 RX ADMIN — BUSPIRONE HYDROCHLORIDE 15 MG: 5 TABLET ORAL at 02:04

## 2021-04-29 RX ADMIN — OXYCODONE HYDROCHLORIDE 10 MG: 5 TABLET ORAL at 06:04

## 2021-04-29 RX ADMIN — INSULIN ASPART 2 UNITS: 100 INJECTION, SOLUTION INTRAVENOUS; SUBCUTANEOUS at 09:04

## 2021-04-29 RX ADMIN — HYDROMORPHONE HYDROCHLORIDE 2 MG: 2 INJECTION INTRAMUSCULAR; INTRAVENOUS; SUBCUTANEOUS at 02:04

## 2021-04-29 RX ADMIN — ATORVASTATIN CALCIUM 10 MG: 10 TABLET, FILM COATED ORAL at 08:04

## 2021-04-29 RX ADMIN — HYDROMORPHONE HYDROCHLORIDE 2 MG: 2 INJECTION INTRAMUSCULAR; INTRAVENOUS; SUBCUTANEOUS at 09:04

## 2021-04-29 RX ADMIN — CELECOXIB 200 MG: 100 CAPSULE ORAL at 08:04

## 2021-04-29 RX ADMIN — INSULIN ASPART 6 UNITS: 100 INJECTION, SOLUTION INTRAVENOUS; SUBCUTANEOUS at 06:04

## 2021-04-29 RX ADMIN — OXYCODONE HYDROCHLORIDE 10 MG: 5 TABLET ORAL at 12:04

## 2021-04-29 RX ADMIN — HEPARIN SODIUM 5000 UNITS: 5000 INJECTION INTRAVENOUS; SUBCUTANEOUS at 06:04

## 2021-04-29 RX ADMIN — GABAPENTIN 800 MG: 400 CAPSULE ORAL at 08:04

## 2021-04-29 RX ADMIN — POLYETHYLENE GLYCOL 3350 17 GRAM ORAL POWDER PACKET 17 G: at 08:04

## 2021-04-29 RX ADMIN — INSULIN ASPART 4 UNITS: 100 INJECTION, SOLUTION INTRAVENOUS; SUBCUTANEOUS at 12:04

## 2021-04-29 RX ADMIN — OXYCODONE HYDROCHLORIDE 10 MG: 5 TABLET ORAL at 11:04

## 2021-04-29 RX ADMIN — Medication 2 TABLET: at 09:04

## 2021-04-29 RX ADMIN — BUSPIRONE HYDROCHLORIDE 15 MG: 5 TABLET ORAL at 09:04

## 2021-04-29 RX ADMIN — CELECOXIB 200 MG: 100 CAPSULE ORAL at 09:04

## 2021-04-30 LAB
POCT GLUCOSE: 188 MG/DL (ref 70–110)
POCT GLUCOSE: 230 MG/DL (ref 70–110)
POCT GLUCOSE: 231 MG/DL (ref 70–110)
POCT GLUCOSE: 279 MG/DL (ref 70–110)

## 2021-04-30 PROCEDURE — 97116 GAIT TRAINING THERAPY: CPT | Mod: CQ

## 2021-04-30 PROCEDURE — 94660 CPAP INITIATION&MGMT: CPT

## 2021-04-30 PROCEDURE — 11000001 HC ACUTE MED/SURG PRIVATE ROOM

## 2021-04-30 PROCEDURE — 97530 THERAPEUTIC ACTIVITIES: CPT | Mod: CQ

## 2021-04-30 PROCEDURE — 94761 N-INVAS EAR/PLS OXIMETRY MLT: CPT

## 2021-04-30 PROCEDURE — 25000003 PHARM REV CODE 250: Performed by: PHYSICIAN ASSISTANT

## 2021-04-30 PROCEDURE — 94799 UNLISTED PULMONARY SVC/PX: CPT

## 2021-04-30 PROCEDURE — 99900035 HC TECH TIME PER 15 MIN (STAT)

## 2021-04-30 PROCEDURE — 63600175 PHARM REV CODE 636 W HCPCS: Performed by: NEUROLOGICAL SURGERY

## 2021-04-30 PROCEDURE — 97535 SELF CARE MNGMENT TRAINING: CPT | Mod: CO

## 2021-04-30 PROCEDURE — 25000003 PHARM REV CODE 250: Performed by: NEUROLOGICAL SURGERY

## 2021-04-30 RX ORDER — DIAZEPAM 5 MG/1
5 TABLET ORAL EVERY 8 HOURS PRN
Status: DISCONTINUED | OUTPATIENT
Start: 2021-04-30 | End: 2021-05-01 | Stop reason: HOSPADM

## 2021-04-30 RX ORDER — MORPHINE SULFATE 15 MG/1
15 TABLET, FILM COATED, EXTENDED RELEASE ORAL EVERY 12 HOURS
Status: DISCONTINUED | OUTPATIENT
Start: 2021-04-30 | End: 2021-05-01 | Stop reason: HOSPADM

## 2021-04-30 RX ADMIN — CELECOXIB 200 MG: 100 CAPSULE ORAL at 08:04

## 2021-04-30 RX ADMIN — ACETAMINOPHEN 650 MG: 325 TABLET ORAL at 12:04

## 2021-04-30 RX ADMIN — DIAZEPAM 5 MG: 5 TABLET ORAL at 08:04

## 2021-04-30 RX ADMIN — TRAMADOL HYDROCHLORIDE 100 MG: 50 TABLET ORAL at 06:04

## 2021-04-30 RX ADMIN — BUSPIRONE HYDROCHLORIDE 15 MG: 5 TABLET ORAL at 08:04

## 2021-04-30 RX ADMIN — CELECOXIB 200 MG: 100 CAPSULE ORAL at 09:04

## 2021-04-30 RX ADMIN — FLUOXETINE HYDROCHLORIDE 80 MG: 20 CAPSULE ORAL at 08:04

## 2021-04-30 RX ADMIN — BUSPIRONE HYDROCHLORIDE 15 MG: 5 TABLET ORAL at 09:04

## 2021-04-30 RX ADMIN — HEPARIN SODIUM 5000 UNITS: 5000 INJECTION INTRAVENOUS; SUBCUTANEOUS at 07:04

## 2021-04-30 RX ADMIN — HEPARIN SODIUM 5000 UNITS: 5000 INJECTION INTRAVENOUS; SUBCUTANEOUS at 09:04

## 2021-04-30 RX ADMIN — MORPHINE SULFATE 15 MG: 15 TABLET, FILM COATED, EXTENDED RELEASE ORAL at 11:04

## 2021-04-30 RX ADMIN — HYDROMORPHONE HYDROCHLORIDE 2 MG: 2 INJECTION INTRAMUSCULAR; INTRAVENOUS; SUBCUTANEOUS at 06:04

## 2021-04-30 RX ADMIN — INSULIN ASPART 4 UNITS: 100 INJECTION, SOLUTION INTRAVENOUS; SUBCUTANEOUS at 06:04

## 2021-04-30 RX ADMIN — HYDROMORPHONE HYDROCHLORIDE 2 MG: 2 INJECTION INTRAMUSCULAR; INTRAVENOUS; SUBCUTANEOUS at 10:04

## 2021-04-30 RX ADMIN — INSULIN ASPART 1 UNITS: 100 INJECTION, SOLUTION INTRAVENOUS; SUBCUTANEOUS at 10:04

## 2021-04-30 RX ADMIN — MORPHINE SULFATE 15 MG: 15 TABLET, FILM COATED, EXTENDED RELEASE ORAL at 09:04

## 2021-04-30 RX ADMIN — GABAPENTIN 800 MG: 400 CAPSULE ORAL at 04:04

## 2021-04-30 RX ADMIN — BISACODYL 10 MG: 10 SUPPOSITORY RECTAL at 08:04

## 2021-04-30 RX ADMIN — INSULIN ASPART 4 UNITS: 100 INJECTION, SOLUTION INTRAVENOUS; SUBCUTANEOUS at 05:04

## 2021-04-30 RX ADMIN — MUPIROCIN: 20 OINTMENT TOPICAL at 08:04

## 2021-04-30 RX ADMIN — ACETAMINOPHEN 650 MG: 325 TABLET ORAL at 06:04

## 2021-04-30 RX ADMIN — OXYCODONE HYDROCHLORIDE 10 MG: 5 TABLET ORAL at 06:04

## 2021-04-30 RX ADMIN — HEPARIN SODIUM 5000 UNITS: 5000 INJECTION INTRAVENOUS; SUBCUTANEOUS at 02:04

## 2021-04-30 RX ADMIN — HYDROMORPHONE HYDROCHLORIDE 2 MG: 2 INJECTION INTRAMUSCULAR; INTRAVENOUS; SUBCUTANEOUS at 01:04

## 2021-04-30 RX ADMIN — DIAZEPAM 5 MG: 5 TABLET ORAL at 11:04

## 2021-04-30 RX ADMIN — HYDROMORPHONE HYDROCHLORIDE 2 MG: 2 INJECTION INTRAMUSCULAR; INTRAVENOUS; SUBCUTANEOUS at 02:04

## 2021-04-30 RX ADMIN — POLYETHYLENE GLYCOL 3350 17 GRAM ORAL POWDER PACKET 17 G: at 08:04

## 2021-04-30 RX ADMIN — MUPIROCIN: 20 OINTMENT TOPICAL at 09:04

## 2021-04-30 RX ADMIN — GABAPENTIN 800 MG: 400 CAPSULE ORAL at 08:04

## 2021-04-30 RX ADMIN — ACETAMINOPHEN 650 MG: 325 TABLET ORAL at 05:04

## 2021-04-30 RX ADMIN — GABAPENTIN 800 MG: 400 CAPSULE ORAL at 09:04

## 2021-04-30 RX ADMIN — HYDROMORPHONE HYDROCHLORIDE 2 MG: 2 INJECTION INTRAMUSCULAR; INTRAVENOUS; SUBCUTANEOUS at 08:04

## 2021-04-30 RX ADMIN — BUSPIRONE HYDROCHLORIDE 15 MG: 5 TABLET ORAL at 04:04

## 2021-04-30 RX ADMIN — Medication 2 TABLET: at 10:04

## 2021-04-30 RX ADMIN — OXYCODONE HYDROCHLORIDE 10 MG: 5 TABLET ORAL at 12:04

## 2021-04-30 RX ADMIN — OXYCODONE HYDROCHLORIDE 10 MG: 5 TABLET ORAL at 05:04

## 2021-04-30 RX ADMIN — INSULIN ASPART 6 UNITS: 100 INJECTION, SOLUTION INTRAVENOUS; SUBCUTANEOUS at 12:04

## 2021-05-01 ENCOUNTER — PATIENT MESSAGE (OUTPATIENT)
Dept: NEUROSURGERY | Facility: CLINIC | Age: 46
End: 2021-05-01

## 2021-05-01 ENCOUNTER — NURSE TRIAGE (OUTPATIENT)
Dept: ADMINISTRATIVE | Facility: CLINIC | Age: 46
End: 2021-05-01

## 2021-05-01 VITALS
HEIGHT: 71 IN | RESPIRATION RATE: 16 BRPM | DIASTOLIC BLOOD PRESSURE: 58 MMHG | HEART RATE: 80 BPM | OXYGEN SATURATION: 96 % | BODY MASS INDEX: 44.1 KG/M2 | WEIGHT: 315 LBS | TEMPERATURE: 98 F | SYSTOLIC BLOOD PRESSURE: 125 MMHG

## 2021-05-01 LAB
POCT GLUCOSE: 212 MG/DL (ref 70–110)
POCT GLUCOSE: 216 MG/DL (ref 70–110)

## 2021-05-01 PROCEDURE — 99900035 HC TECH TIME PER 15 MIN (STAT)

## 2021-05-01 PROCEDURE — 94660 CPAP INITIATION&MGMT: CPT

## 2021-05-01 PROCEDURE — 25000003 PHARM REV CODE 250: Performed by: NEUROLOGICAL SURGERY

## 2021-05-01 PROCEDURE — 97110 THERAPEUTIC EXERCISES: CPT | Mod: CQ

## 2021-05-01 PROCEDURE — 63600175 PHARM REV CODE 636 W HCPCS: Performed by: NEUROLOGICAL SURGERY

## 2021-05-01 PROCEDURE — 25000003 PHARM REV CODE 250: Performed by: PHYSICIAN ASSISTANT

## 2021-05-01 PROCEDURE — 97116 GAIT TRAINING THERAPY: CPT | Mod: CQ

## 2021-05-01 RX ORDER — MORPHINE SULFATE 15 MG/1
15 TABLET, FILM COATED, EXTENDED RELEASE ORAL EVERY 12 HOURS
Qty: 14 TABLET | Refills: 0 | Status: SHIPPED | OUTPATIENT
Start: 2021-05-01 | End: 2021-09-27

## 2021-05-01 RX ORDER — TIZANIDINE 4 MG/1
4 TABLET ORAL EVERY 8 HOURS PRN
Qty: 90 TABLET | Refills: 0 | Status: SHIPPED | OUTPATIENT
Start: 2021-05-01 | End: 2021-05-24

## 2021-05-01 RX ORDER — CELECOXIB 200 MG/1
200 CAPSULE ORAL 2 TIMES DAILY PRN
Qty: 180 CAPSULE | Refills: 0
Start: 2021-05-01 | End: 2021-08-27

## 2021-05-01 RX ORDER — OXYCODONE AND ACETAMINOPHEN 10; 325 MG/1; MG/1
1 TABLET ORAL EVERY 8 HOURS PRN
Qty: 60 TABLET | Refills: 0 | Status: SHIPPED | OUTPATIENT
Start: 2021-05-01 | End: 2021-05-10 | Stop reason: SDUPTHER

## 2021-05-01 RX ADMIN — MUPIROCIN: 20 OINTMENT TOPICAL at 08:05

## 2021-05-01 RX ADMIN — HYDROMORPHONE HYDROCHLORIDE 2 MG: 2 INJECTION INTRAMUSCULAR; INTRAVENOUS; SUBCUTANEOUS at 01:05

## 2021-05-01 RX ADMIN — OXYCODONE HYDROCHLORIDE 10 MG: 5 TABLET ORAL at 06:05

## 2021-05-01 RX ADMIN — FLUOXETINE HYDROCHLORIDE 80 MG: 20 CAPSULE ORAL at 08:05

## 2021-05-01 RX ADMIN — ACETAMINOPHEN 650 MG: 325 TABLET ORAL at 06:05

## 2021-05-01 RX ADMIN — INSULIN ASPART 4 UNITS: 100 INJECTION, SOLUTION INTRAVENOUS; SUBCUTANEOUS at 04:05

## 2021-05-01 RX ADMIN — GABAPENTIN 800 MG: 400 CAPSULE ORAL at 02:05

## 2021-05-01 RX ADMIN — MORPHINE SULFATE 15 MG: 15 TABLET, FILM COATED, EXTENDED RELEASE ORAL at 08:05

## 2021-05-01 RX ADMIN — ACETAMINOPHEN 650 MG: 325 TABLET ORAL at 12:05

## 2021-05-01 RX ADMIN — HYDROMORPHONE HYDROCHLORIDE 2 MG: 2 INJECTION INTRAMUSCULAR; INTRAVENOUS; SUBCUTANEOUS at 10:05

## 2021-05-01 RX ADMIN — HEPARIN SODIUM 5000 UNITS: 5000 INJECTION INTRAVENOUS; SUBCUTANEOUS at 02:05

## 2021-05-01 RX ADMIN — HYDROMORPHONE HYDROCHLORIDE 2 MG: 2 INJECTION INTRAMUSCULAR; INTRAVENOUS; SUBCUTANEOUS at 04:05

## 2021-05-01 RX ADMIN — HYDROMORPHONE HYDROCHLORIDE 2 MG: 2 INJECTION INTRAMUSCULAR; INTRAVENOUS; SUBCUTANEOUS at 02:05

## 2021-05-01 RX ADMIN — CELECOXIB 200 MG: 100 CAPSULE ORAL at 08:05

## 2021-05-01 RX ADMIN — BUSPIRONE HYDROCHLORIDE 15 MG: 5 TABLET ORAL at 02:05

## 2021-05-01 RX ADMIN — POLYETHYLENE GLYCOL 3350 17 GRAM ORAL POWDER PACKET 17 G: at 08:05

## 2021-05-01 RX ADMIN — DIAZEPAM 5 MG: 5 TABLET ORAL at 12:05

## 2021-05-01 RX ADMIN — DIPHENHYDRAMINE HYDROCHLORIDE 25 MG: 25 CAPSULE ORAL at 04:05

## 2021-05-01 RX ADMIN — BUSPIRONE HYDROCHLORIDE 15 MG: 5 TABLET ORAL at 08:05

## 2021-05-01 RX ADMIN — BISACODYL 10 MG: 10 SUPPOSITORY RECTAL at 08:05

## 2021-05-01 RX ADMIN — DIAZEPAM 5 MG: 5 TABLET ORAL at 04:05

## 2021-05-01 RX ADMIN — OXYCODONE HYDROCHLORIDE 10 MG: 5 TABLET ORAL at 12:05

## 2021-05-01 RX ADMIN — GABAPENTIN 800 MG: 400 CAPSULE ORAL at 08:05

## 2021-05-01 RX ADMIN — Medication 2 TABLET: at 08:05

## 2021-05-01 RX ADMIN — ATORVASTATIN CALCIUM 10 MG: 10 TABLET, FILM COATED ORAL at 08:05

## 2021-05-01 RX ADMIN — HEPARIN SODIUM 5000 UNITS: 5000 INJECTION INTRAVENOUS; SUBCUTANEOUS at 06:05

## 2021-05-02 PROCEDURE — G0180 PR HOME HEALTH MD CERTIFICATION: ICD-10-PCS | Mod: ,,, | Performed by: NEUROLOGICAL SURGERY

## 2021-05-02 PROCEDURE — G0180 MD CERTIFICATION HHA PATIENT: HCPCS | Mod: ,,, | Performed by: NEUROLOGICAL SURGERY

## 2021-05-04 ENCOUNTER — PATIENT OUTREACH (OUTPATIENT)
Dept: ADMINISTRATIVE | Facility: CLINIC | Age: 46
End: 2021-05-04

## 2021-05-04 ENCOUNTER — PATIENT MESSAGE (OUTPATIENT)
Dept: ADMINISTRATIVE | Facility: CLINIC | Age: 46
End: 2021-05-04

## 2021-05-08 ENCOUNTER — HOSPITAL ENCOUNTER (EMERGENCY)
Facility: HOSPITAL | Age: 46
Discharge: HOME | End: 2021-05-08
Attending: EMERGENCY MEDICINE
Payer: COMMERCIAL

## 2021-05-08 VITALS
DIASTOLIC BLOOD PRESSURE: 80 MMHG | RESPIRATION RATE: 19 BRPM | HEIGHT: 72 IN | HEART RATE: 81 BPM | BODY MASS INDEX: 42.66 KG/M2 | SYSTOLIC BLOOD PRESSURE: 130 MMHG | TEMPERATURE: 99 F | WEIGHT: 315 LBS | OXYGEN SATURATION: 99 %

## 2021-05-08 DIAGNOSIS — Z48.89 ENCOUNTER FOR POST SURGICAL WOUND CHECK: Primary | ICD-10-CM

## 2021-05-08 PROCEDURE — 99283 EMERGENCY DEPT VISIT LOW MDM: CPT | Mod: ER

## 2021-05-08 RX ORDER — BACITRACIN ZINC 500 UNIT/G
OINTMENT (GRAM) TOPICAL 2 TIMES DAILY
Qty: 30 G | Refills: 0 | Status: SHIPPED | OUTPATIENT
Start: 2021-05-08 | End: 2021-09-27

## 2021-05-10 ENCOUNTER — CLINICAL SUPPORT (OUTPATIENT)
Dept: NEUROSURGERY | Facility: CLINIC | Age: 46
End: 2021-05-10
Payer: COMMERCIAL

## 2021-05-10 ENCOUNTER — HOSPITAL ENCOUNTER (OUTPATIENT)
Dept: RADIOLOGY | Facility: HOSPITAL | Age: 46
Discharge: HOME | End: 2021-05-10
Attending: NEUROLOGICAL SURGERY
Payer: COMMERCIAL

## 2021-05-10 VITALS
HEART RATE: 74 BPM | WEIGHT: 315 LBS | BODY MASS INDEX: 42.66 KG/M2 | DIASTOLIC BLOOD PRESSURE: 84 MMHG | HEIGHT: 72 IN | SYSTOLIC BLOOD PRESSURE: 149 MMHG

## 2021-05-10 DIAGNOSIS — Z98.890 POST-OPERATIVE STATE: Primary | ICD-10-CM

## 2021-05-10 DIAGNOSIS — M43.27 FUSION OF SPINE, LUMBOSACRAL REGION: ICD-10-CM

## 2021-05-10 PROCEDURE — 72100 X-RAY EXAM L-S SPINE 2/3 VWS: CPT | Mod: TC,PN

## 2021-05-10 PROCEDURE — 72100 X-RAY EXAM L-S SPINE 2/3 VWS: CPT | Mod: 26,,, | Performed by: RADIOLOGY

## 2021-05-10 PROCEDURE — 72100 XR LUMBAR SPINE AP AND LATERAL: ICD-10-PCS | Mod: 26,,, | Performed by: RADIOLOGY

## 2021-05-10 PROCEDURE — 99999 PR PBB SHADOW E&M-EST. PATIENT-LVL V: CPT | Mod: PBBFAC,,,

## 2021-05-10 PROCEDURE — 99999 PR PBB SHADOW E&M-EST. PATIENT-LVL V: ICD-10-PCS | Mod: PBBFAC,,,

## 2021-05-10 RX ORDER — HONEY 100 %
1 PASTE (ML) TOPICAL 2 TIMES DAILY
Qty: 1 TUBE | Refills: 3 | Status: SHIPPED | OUTPATIENT
Start: 2021-05-10 | End: 2022-01-12

## 2021-05-10 RX ORDER — OXYCODONE AND ACETAMINOPHEN 10; 325 MG/1; MG/1
1 TABLET ORAL EVERY 8 HOURS PRN
Qty: 60 TABLET | Refills: 0 | Status: SHIPPED | OUTPATIENT
Start: 2021-05-10 | End: 2021-06-23 | Stop reason: SDUPTHER

## 2021-05-11 ENCOUNTER — EXTERNAL HOME HEALTH (OUTPATIENT)
Dept: HOME HEALTH SERVICES | Facility: HOSPITAL | Age: 46
End: 2021-05-11
Payer: COMMERCIAL

## 2021-05-12 ENCOUNTER — OFFICE VISIT (OUTPATIENT)
Dept: FAMILY MEDICINE | Facility: CLINIC | Age: 46
End: 2021-05-12
Payer: COMMERCIAL

## 2021-05-12 VITALS
HEIGHT: 72 IN | WEIGHT: 315 LBS | OXYGEN SATURATION: 98 % | SYSTOLIC BLOOD PRESSURE: 120 MMHG | BODY MASS INDEX: 42.66 KG/M2 | DIASTOLIC BLOOD PRESSURE: 60 MMHG | HEART RATE: 119 BPM | TEMPERATURE: 97 F

## 2021-05-12 DIAGNOSIS — M51.36 DDD (DEGENERATIVE DISC DISEASE), LUMBAR: Primary | ICD-10-CM

## 2021-05-12 DIAGNOSIS — E11.8 TYPE 2 DIABETES MELLITUS WITH COMPLICATION, WITHOUT LONG-TERM CURRENT USE OF INSULIN: ICD-10-CM

## 2021-05-12 PROCEDURE — 99213 PR OFFICE/OUTPT VISIT, EST, LEVL III, 20-29 MIN: ICD-10-PCS | Mod: S$GLB,,, | Performed by: FAMILY MEDICINE

## 2021-05-12 PROCEDURE — 3008F BODY MASS INDEX DOCD: CPT | Mod: CPTII,S$GLB,, | Performed by: FAMILY MEDICINE

## 2021-05-12 PROCEDURE — 3008F PR BODY MASS INDEX (BMI) DOCUMENTED: ICD-10-PCS | Mod: CPTII,S$GLB,, | Performed by: FAMILY MEDICINE

## 2021-05-12 PROCEDURE — 1125F PR PAIN SEVERITY QUANTIFIED, PAIN PRESENT: ICD-10-PCS | Mod: S$GLB,,, | Performed by: FAMILY MEDICINE

## 2021-05-12 PROCEDURE — 3072F PR LOW RISK FOR RETINOPATHY: ICD-10-PCS | Mod: S$GLB,,, | Performed by: FAMILY MEDICINE

## 2021-05-12 PROCEDURE — 99213 OFFICE O/P EST LOW 20 MIN: CPT | Mod: S$GLB,,, | Performed by: FAMILY MEDICINE

## 2021-05-12 PROCEDURE — 1125F AMNT PAIN NOTED PAIN PRSNT: CPT | Mod: S$GLB,,, | Performed by: FAMILY MEDICINE

## 2021-05-12 PROCEDURE — 3072F LOW RISK FOR RETINOPATHY: CPT | Mod: S$GLB,,, | Performed by: FAMILY MEDICINE

## 2021-05-13 ENCOUNTER — TELEPHONE (OUTPATIENT)
Dept: FAMILY MEDICINE | Facility: CLINIC | Age: 46
End: 2021-05-13

## 2021-05-19 ENCOUNTER — TELEPHONE (OUTPATIENT)
Dept: ELECTROPHYSIOLOGY | Facility: CLINIC | Age: 46
End: 2021-05-19

## 2021-05-19 ENCOUNTER — OFFICE VISIT (OUTPATIENT)
Dept: PSYCHIATRY | Facility: CLINIC | Age: 46
End: 2021-05-19
Payer: COMMERCIAL

## 2021-05-19 DIAGNOSIS — F42.2 MIXED OBSESSIONAL THOUGHTS AND ACTS: ICD-10-CM

## 2021-05-19 DIAGNOSIS — F33.0 MILD EPISODE OF RECURRENT MAJOR DEPRESSIVE DISORDER: ICD-10-CM

## 2021-05-19 DIAGNOSIS — F99 INSOMNIA DUE TO OTHER MENTAL DISORDER: ICD-10-CM

## 2021-05-19 DIAGNOSIS — F43.21 GRIEF: ICD-10-CM

## 2021-05-19 DIAGNOSIS — F43.23 ADJUSTMENT DISORDER WITH MIXED ANXIETY AND DEPRESSED MOOD: ICD-10-CM

## 2021-05-19 DIAGNOSIS — F51.05 INSOMNIA DUE TO OTHER MENTAL DISORDER: ICD-10-CM

## 2021-05-19 PROCEDURE — 99214 PR OFFICE/OUTPT VISIT, EST, LEVL IV, 30-39 MIN: ICD-10-PCS | Mod: 95,,, | Performed by: NURSE PRACTITIONER

## 2021-05-19 PROCEDURE — 3072F PR LOW RISK FOR RETINOPATHY: ICD-10-PCS | Mod: ,,, | Performed by: NURSE PRACTITIONER

## 2021-05-19 PROCEDURE — 99214 OFFICE O/P EST MOD 30 MIN: CPT | Mod: 95,,, | Performed by: NURSE PRACTITIONER

## 2021-05-19 PROCEDURE — 3072F LOW RISK FOR RETINOPATHY: CPT | Mod: ,,, | Performed by: NURSE PRACTITIONER

## 2021-05-19 RX ORDER — DIAZEPAM 2 MG/1
2 TABLET ORAL EVERY 12 HOURS
Qty: 15 TABLET | Refills: 1 | Status: SHIPPED | OUTPATIENT
Start: 2021-05-19 | End: 2021-06-25 | Stop reason: SDUPTHER

## 2021-05-19 RX ORDER — FLUOXETINE HYDROCHLORIDE 40 MG/1
80 CAPSULE ORAL DAILY
Qty: 180 CAPSULE | Refills: 1 | Status: SHIPPED | OUTPATIENT
Start: 2021-05-19 | End: 2021-06-25 | Stop reason: SDUPTHER

## 2021-05-19 RX ORDER — BUSPIRONE HYDROCHLORIDE 15 MG/1
15 TABLET ORAL 3 TIMES DAILY
Qty: 90 TABLET | Refills: 3 | Status: SHIPPED | OUTPATIENT
Start: 2021-05-19 | End: 2021-06-25 | Stop reason: SDUPTHER

## 2021-05-24 ENCOUNTER — PATIENT MESSAGE (OUTPATIENT)
Dept: PSYCHIATRY | Facility: CLINIC | Age: 46
End: 2021-05-24

## 2021-05-24 DIAGNOSIS — I48.0 PAROXYSMAL ATRIAL FIBRILLATION: Primary | ICD-10-CM

## 2021-06-02 ENCOUNTER — PATIENT MESSAGE (OUTPATIENT)
Dept: NEUROSURGERY | Facility: CLINIC | Age: 46
End: 2021-06-02

## 2021-06-03 ENCOUNTER — PATIENT MESSAGE (OUTPATIENT)
Dept: NEUROSURGERY | Facility: CLINIC | Age: 46
End: 2021-06-03

## 2021-06-04 ENCOUNTER — TELEPHONE (OUTPATIENT)
Dept: NEUROSURGERY | Facility: CLINIC | Age: 46
End: 2021-06-04

## 2021-06-07 ENCOUNTER — OFFICE VISIT (OUTPATIENT)
Dept: NEUROSURGERY | Facility: CLINIC | Age: 46
End: 2021-06-07
Payer: COMMERCIAL

## 2021-06-07 VITALS
WEIGHT: 315 LBS | SYSTOLIC BLOOD PRESSURE: 147 MMHG | BODY MASS INDEX: 42.66 KG/M2 | HEIGHT: 72 IN | TEMPERATURE: 98 F | HEART RATE: 78 BPM | DIASTOLIC BLOOD PRESSURE: 88 MMHG

## 2021-06-07 DIAGNOSIS — Z98.1 S/P LUMBAR SPINAL FUSION: ICD-10-CM

## 2021-06-07 DIAGNOSIS — T81.89XA DELAYED SURGICAL WOUND HEALING, INITIAL ENCOUNTER: Primary | ICD-10-CM

## 2021-06-07 PROCEDURE — 1125F AMNT PAIN NOTED PAIN PRSNT: CPT | Mod: S$GLB,,, | Performed by: PHYSICIAN ASSISTANT

## 2021-06-07 PROCEDURE — 3072F LOW RISK FOR RETINOPATHY: CPT | Mod: S$GLB,,, | Performed by: PHYSICIAN ASSISTANT

## 2021-06-07 PROCEDURE — 99024 POSTOP FOLLOW-UP VISIT: CPT | Mod: S$GLB,,, | Performed by: PHYSICIAN ASSISTANT

## 2021-06-07 PROCEDURE — 3008F PR BODY MASS INDEX (BMI) DOCUMENTED: ICD-10-PCS | Mod: CPTII,S$GLB,, | Performed by: PHYSICIAN ASSISTANT

## 2021-06-07 PROCEDURE — 1125F PR PAIN SEVERITY QUANTIFIED, PAIN PRESENT: ICD-10-PCS | Mod: S$GLB,,, | Performed by: PHYSICIAN ASSISTANT

## 2021-06-07 PROCEDURE — 3008F BODY MASS INDEX DOCD: CPT | Mod: CPTII,S$GLB,, | Performed by: PHYSICIAN ASSISTANT

## 2021-06-07 PROCEDURE — 99999 PR PBB SHADOW E&M-EST. PATIENT-LVL V: CPT | Mod: PBBFAC,,, | Performed by: PHYSICIAN ASSISTANT

## 2021-06-07 PROCEDURE — 99024 PR POST-OP FOLLOW-UP VISIT: ICD-10-PCS | Mod: S$GLB,,, | Performed by: PHYSICIAN ASSISTANT

## 2021-06-07 PROCEDURE — 3072F PR LOW RISK FOR RETINOPATHY: ICD-10-PCS | Mod: S$GLB,,, | Performed by: PHYSICIAN ASSISTANT

## 2021-06-07 PROCEDURE — 99999 PR PBB SHADOW E&M-EST. PATIENT-LVL V: ICD-10-PCS | Mod: PBBFAC,,, | Performed by: PHYSICIAN ASSISTANT

## 2021-06-08 ENCOUNTER — TELEPHONE (OUTPATIENT)
Dept: NEUROSURGERY | Facility: CLINIC | Age: 46
End: 2021-06-08

## 2021-06-09 ENCOUNTER — TELEPHONE (OUTPATIENT)
Dept: NEUROSURGERY | Facility: CLINIC | Age: 46
End: 2021-06-09

## 2021-06-09 DIAGNOSIS — E11.9 TYPE 2 DIABETES MELLITUS WITHOUT COMPLICATION: ICD-10-CM

## 2021-06-09 PROCEDURE — 99499 UNLISTED E&M SERVICE: CPT | Mod: ,,, | Performed by: NEUROLOGICAL SURGERY

## 2021-06-09 PROCEDURE — 99499 NO LOS: ICD-10-PCS | Mod: ,,, | Performed by: NEUROLOGICAL SURGERY

## 2021-06-10 ENCOUNTER — TELEPHONE (OUTPATIENT)
Dept: NEUROSURGERY | Facility: CLINIC | Age: 46
End: 2021-06-10

## 2021-06-14 ENCOUNTER — DOCUMENT SCAN (OUTPATIENT)
Dept: HOME HEALTH SERVICES | Facility: HOSPITAL | Age: 46
End: 2021-06-14
Payer: COMMERCIAL

## 2021-06-16 ENCOUNTER — OFFICE VISIT (OUTPATIENT)
Dept: NEUROSURGERY | Facility: CLINIC | Age: 46
End: 2021-06-16
Payer: COMMERCIAL

## 2021-06-16 ENCOUNTER — HOSPITAL ENCOUNTER (OUTPATIENT)
Dept: RADIOLOGY | Facility: HOSPITAL | Age: 46
Discharge: HOME | End: 2021-06-16
Attending: NEUROLOGICAL SURGERY
Payer: COMMERCIAL

## 2021-06-16 ENCOUNTER — TELEPHONE (OUTPATIENT)
Dept: NEUROSURGERY | Facility: CLINIC | Age: 46
End: 2021-06-16

## 2021-06-16 ENCOUNTER — PATIENT MESSAGE (OUTPATIENT)
Dept: NEUROSURGERY | Facility: CLINIC | Age: 46
End: 2021-06-16

## 2021-06-16 VITALS
BODY MASS INDEX: 50.74 KG/M2 | SYSTOLIC BLOOD PRESSURE: 138 MMHG | DIASTOLIC BLOOD PRESSURE: 86 MMHG | HEART RATE: 102 BPM | HEIGHT: 72 IN

## 2021-06-16 DIAGNOSIS — M43.27 FUSION OF SPINE, LUMBOSACRAL REGION: ICD-10-CM

## 2021-06-16 DIAGNOSIS — M79.18 ACUTE BUTTOCK PAIN: Primary | ICD-10-CM

## 2021-06-16 PROCEDURE — 3008F BODY MASS INDEX DOCD: CPT | Mod: CPTII,S$GLB,, | Performed by: NEUROLOGICAL SURGERY

## 2021-06-16 PROCEDURE — 72100 X-RAY EXAM L-S SPINE 2/3 VWS: CPT | Mod: 26,,, | Performed by: RADIOLOGY

## 2021-06-16 PROCEDURE — 99024 PR POST-OP FOLLOW-UP VISIT: ICD-10-PCS | Mod: S$GLB,,, | Performed by: NEUROLOGICAL SURGERY

## 2021-06-16 PROCEDURE — 3008F PR BODY MASS INDEX (BMI) DOCUMENTED: ICD-10-PCS | Mod: CPTII,S$GLB,, | Performed by: NEUROLOGICAL SURGERY

## 2021-06-16 PROCEDURE — 72100 XR LUMBAR SPINE AP AND LATERAL: ICD-10-PCS | Mod: 26,,, | Performed by: RADIOLOGY

## 2021-06-16 PROCEDURE — 99999 PR PBB SHADOW E&M-EST. PATIENT-LVL V: ICD-10-PCS | Mod: PBBFAC,,, | Performed by: NEUROLOGICAL SURGERY

## 2021-06-16 PROCEDURE — 99999 PR PBB SHADOW E&M-EST. PATIENT-LVL V: CPT | Mod: PBBFAC,,, | Performed by: NEUROLOGICAL SURGERY

## 2021-06-16 PROCEDURE — 1125F PR PAIN SEVERITY QUANTIFIED, PAIN PRESENT: ICD-10-PCS | Mod: S$GLB,,, | Performed by: NEUROLOGICAL SURGERY

## 2021-06-16 PROCEDURE — 99024 POSTOP FOLLOW-UP VISIT: CPT | Mod: S$GLB,,, | Performed by: NEUROLOGICAL SURGERY

## 2021-06-16 PROCEDURE — 1125F AMNT PAIN NOTED PAIN PRSNT: CPT | Mod: S$GLB,,, | Performed by: NEUROLOGICAL SURGERY

## 2021-06-16 PROCEDURE — 72100 X-RAY EXAM L-S SPINE 2/3 VWS: CPT | Mod: TC,PN

## 2021-06-16 PROCEDURE — 3072F PR LOW RISK FOR RETINOPATHY: ICD-10-PCS | Mod: S$GLB,,, | Performed by: NEUROLOGICAL SURGERY

## 2021-06-16 PROCEDURE — 3072F LOW RISK FOR RETINOPATHY: CPT | Mod: S$GLB,,, | Performed by: NEUROLOGICAL SURGERY

## 2021-06-16 RX ORDER — TIZANIDINE 4 MG/1
4 TABLET ORAL EVERY 8 HOURS PRN
Qty: 270 TABLET | Refills: 0 | Status: SHIPPED | OUTPATIENT
Start: 2021-06-16 | End: 2021-10-04

## 2021-06-16 RX ORDER — GABAPENTIN 800 MG/1
800 TABLET ORAL 3 TIMES DAILY
Qty: 270 TABLET | Refills: 3 | Status: SHIPPED | OUTPATIENT
Start: 2021-06-16 | End: 2023-02-05 | Stop reason: SDUPTHER

## 2021-06-17 ENCOUNTER — TELEPHONE (OUTPATIENT)
Dept: NEUROSURGERY | Facility: CLINIC | Age: 46
End: 2021-06-17

## 2021-06-22 ENCOUNTER — OFFICE VISIT (OUTPATIENT)
Dept: PSYCHIATRY | Facility: CLINIC | Age: 46
End: 2021-06-22
Payer: COMMERCIAL

## 2021-06-22 ENCOUNTER — HOSPITAL ENCOUNTER (OUTPATIENT)
Dept: RADIOLOGY | Facility: HOSPITAL | Age: 46
Discharge: HOME | End: 2021-06-22
Attending: NEUROLOGICAL SURGERY
Payer: COMMERCIAL

## 2021-06-22 DIAGNOSIS — F42.2 MIXED OBSESSIONAL THOUGHTS AND ACTS: Primary | ICD-10-CM

## 2021-06-22 PROCEDURE — 90834 PR PSYCHOTHERAPY W/PATIENT, 45 MIN: ICD-10-PCS | Mod: S$GLB,,, | Performed by: SOCIAL WORKER

## 2021-06-22 PROCEDURE — 3072F LOW RISK FOR RETINOPATHY: CPT | Mod: S$GLB,,, | Performed by: SOCIAL WORKER

## 2021-06-22 PROCEDURE — 3072F PR LOW RISK FOR RETINOPATHY: ICD-10-PCS | Mod: S$GLB,,, | Performed by: SOCIAL WORKER

## 2021-06-22 PROCEDURE — 72131 CT LUMBAR SPINE W/O DYE: CPT | Mod: TC

## 2021-06-22 PROCEDURE — 72131 CT LUMBAR SPINE W/O DYE: CPT | Mod: 26,,, | Performed by: RADIOLOGY

## 2021-06-22 PROCEDURE — 72131 CT LUMBAR SPINE WITHOUT CONTRAST: ICD-10-PCS | Mod: 26,,, | Performed by: RADIOLOGY

## 2021-06-22 PROCEDURE — 90834 PSYTX W PT 45 MINUTES: CPT | Mod: S$GLB,,, | Performed by: SOCIAL WORKER

## 2021-06-23 ENCOUNTER — DOCUMENT SCAN (OUTPATIENT)
Dept: HOME HEALTH SERVICES | Facility: HOSPITAL | Age: 46
End: 2021-06-23
Payer: COMMERCIAL

## 2021-06-23 RX ORDER — OXYCODONE AND ACETAMINOPHEN 10; 325 MG/1; MG/1
1 TABLET ORAL EVERY 8 HOURS PRN
Qty: 60 TABLET | Refills: 0 | Status: SHIPPED | OUTPATIENT
Start: 2021-06-23 | End: 2021-11-01

## 2021-06-25 ENCOUNTER — OFFICE VISIT (OUTPATIENT)
Dept: PSYCHIATRY | Facility: CLINIC | Age: 46
End: 2021-06-25
Payer: COMMERCIAL

## 2021-06-25 DIAGNOSIS — F51.05 INSOMNIA DUE TO OTHER MENTAL DISORDER: ICD-10-CM

## 2021-06-25 DIAGNOSIS — F42.2 MIXED OBSESSIONAL THOUGHTS AND ACTS: ICD-10-CM

## 2021-06-25 DIAGNOSIS — F99 INSOMNIA DUE TO OTHER MENTAL DISORDER: ICD-10-CM

## 2021-06-25 DIAGNOSIS — F33.0 MILD EPISODE OF RECURRENT MAJOR DEPRESSIVE DISORDER: ICD-10-CM

## 2021-06-25 PROCEDURE — 99214 OFFICE O/P EST MOD 30 MIN: CPT | Mod: 95,,, | Performed by: NURSE PRACTITIONER

## 2021-06-25 PROCEDURE — 3072F PR LOW RISK FOR RETINOPATHY: ICD-10-PCS | Mod: ,,, | Performed by: NURSE PRACTITIONER

## 2021-06-25 PROCEDURE — 3072F LOW RISK FOR RETINOPATHY: CPT | Mod: ,,, | Performed by: NURSE PRACTITIONER

## 2021-06-25 PROCEDURE — 99214 PR OFFICE/OUTPT VISIT, EST, LEVL IV, 30-39 MIN: ICD-10-PCS | Mod: 95,,, | Performed by: NURSE PRACTITIONER

## 2021-06-25 RX ORDER — FLUOXETINE HYDROCHLORIDE 40 MG/1
80 CAPSULE ORAL DAILY
Qty: 180 CAPSULE | Refills: 1 | Status: SHIPPED | OUTPATIENT
Start: 2021-06-25 | End: 2021-09-16 | Stop reason: SDUPTHER

## 2021-06-25 RX ORDER — BUSPIRONE HYDROCHLORIDE 15 MG/1
15 TABLET ORAL 3 TIMES DAILY
Qty: 90 TABLET | Refills: 3 | Status: SHIPPED | OUTPATIENT
Start: 2021-06-25 | End: 2021-09-16 | Stop reason: SDUPTHER

## 2021-06-25 RX ORDER — DIAZEPAM 2 MG/1
2 TABLET ORAL EVERY 12 HOURS
Qty: 15 TABLET | Refills: 1 | Status: SHIPPED | OUTPATIENT
Start: 2021-06-25 | End: 2022-04-11

## 2021-07-01 ENCOUNTER — TELEPHONE (OUTPATIENT)
Dept: NEUROSURGERY | Facility: CLINIC | Age: 46
End: 2021-07-01

## 2021-07-01 ENCOUNTER — PATIENT OUTREACH (OUTPATIENT)
Dept: ADMINISTRATIVE | Facility: OTHER | Age: 46
End: 2021-07-01

## 2021-07-06 ENCOUNTER — EXTERNAL HOME HEALTH (OUTPATIENT)
Dept: HOME HEALTH SERVICES | Facility: HOSPITAL | Age: 46
End: 2021-07-06
Payer: COMMERCIAL

## 2021-07-07 ENCOUNTER — PATIENT MESSAGE (OUTPATIENT)
Dept: ADMINISTRATIVE | Facility: HOSPITAL | Age: 46
End: 2021-07-07

## 2021-07-07 ENCOUNTER — PATIENT MESSAGE (OUTPATIENT)
Dept: NEUROSURGERY | Facility: CLINIC | Age: 46
End: 2021-07-07

## 2021-07-13 ENCOUNTER — CLINICAL SUPPORT (OUTPATIENT)
Dept: REHABILITATION | Facility: HOSPITAL | Age: 46
End: 2021-07-13
Attending: NEUROLOGICAL SURGERY
Payer: COMMERCIAL

## 2021-07-13 DIAGNOSIS — M54.41 CHRONIC BILATERAL LOW BACK PAIN WITH BILATERAL SCIATICA: ICD-10-CM

## 2021-07-13 DIAGNOSIS — M54.42 CHRONIC BILATERAL LOW BACK PAIN WITH BILATERAL SCIATICA: ICD-10-CM

## 2021-07-13 DIAGNOSIS — G89.29 CHRONIC BILATERAL LOW BACK PAIN WITH BILATERAL SCIATICA: ICD-10-CM

## 2021-07-13 DIAGNOSIS — M79.18 ACUTE BUTTOCK PAIN: ICD-10-CM

## 2021-07-13 DIAGNOSIS — M43.27 FUSION OF SPINE, LUMBOSACRAL REGION: Primary | ICD-10-CM

## 2021-07-13 PROCEDURE — 97162 PT EVAL MOD COMPLEX 30 MIN: CPT | Mod: PN

## 2021-07-13 PROCEDURE — 97530 THERAPEUTIC ACTIVITIES: CPT | Mod: PN

## 2021-07-13 PROCEDURE — 97110 THERAPEUTIC EXERCISES: CPT | Mod: PN

## 2021-07-14 ENCOUNTER — OFFICE VISIT (OUTPATIENT)
Dept: PSYCHIATRY | Facility: CLINIC | Age: 46
End: 2021-07-14
Payer: COMMERCIAL

## 2021-07-14 DIAGNOSIS — F33.0 MILD EPISODE OF RECURRENT MAJOR DEPRESSIVE DISORDER: Primary | ICD-10-CM

## 2021-07-14 PROCEDURE — 3072F PR LOW RISK FOR RETINOPATHY: ICD-10-PCS | Mod: S$GLB,,, | Performed by: SOCIAL WORKER

## 2021-07-14 PROCEDURE — 90834 PSYTX W PT 45 MINUTES: CPT | Mod: S$GLB,,, | Performed by: SOCIAL WORKER

## 2021-07-14 PROCEDURE — 90834 PR PSYCHOTHERAPY W/PATIENT, 45 MIN: ICD-10-PCS | Mod: S$GLB,,, | Performed by: SOCIAL WORKER

## 2021-07-14 PROCEDURE — 3072F LOW RISK FOR RETINOPATHY: CPT | Mod: S$GLB,,, | Performed by: SOCIAL WORKER

## 2021-07-17 ENCOUNTER — CLINICAL SUPPORT (OUTPATIENT)
Dept: REHABILITATION | Facility: HOSPITAL | Age: 46
End: 2021-07-17
Payer: COMMERCIAL

## 2021-07-17 DIAGNOSIS — M43.27 FUSION OF SPINE, LUMBOSACRAL REGION: Primary | ICD-10-CM

## 2021-07-17 DIAGNOSIS — M79.18 ACUTE BUTTOCK PAIN: ICD-10-CM

## 2021-07-17 DIAGNOSIS — G89.29 CHRONIC BILATERAL LOW BACK PAIN, UNSPECIFIED WHETHER SCIATICA PRESENT: ICD-10-CM

## 2021-07-17 DIAGNOSIS — M54.50 CHRONIC BILATERAL LOW BACK PAIN, UNSPECIFIED WHETHER SCIATICA PRESENT: ICD-10-CM

## 2021-07-17 PROCEDURE — 97110 THERAPEUTIC EXERCISES: CPT | Mod: PN,CQ

## 2021-07-19 ENCOUNTER — PATIENT MESSAGE (OUTPATIENT)
Dept: PSYCHIATRY | Facility: CLINIC | Age: 46
End: 2021-07-19

## 2021-07-23 ENCOUNTER — OFFICE VISIT (OUTPATIENT)
Dept: PSYCHIATRY | Facility: CLINIC | Age: 46
End: 2021-07-23
Payer: COMMERCIAL

## 2021-07-23 DIAGNOSIS — F99 INSOMNIA DUE TO OTHER MENTAL DISORDER: Primary | ICD-10-CM

## 2021-07-23 DIAGNOSIS — F51.05 INSOMNIA DUE TO OTHER MENTAL DISORDER: Primary | ICD-10-CM

## 2021-07-23 DIAGNOSIS — F42.2 MIXED OBSESSIONAL THOUGHTS AND ACTS: ICD-10-CM

## 2021-07-23 DIAGNOSIS — F33.0 MILD EPISODE OF RECURRENT MAJOR DEPRESSIVE DISORDER: ICD-10-CM

## 2021-07-23 PROCEDURE — 3072F PR LOW RISK FOR RETINOPATHY: ICD-10-PCS | Mod: CPTII,,, | Performed by: NURSE PRACTITIONER

## 2021-07-23 PROCEDURE — 3051F PR MOST RECENT HEMOGLOBIN A1C LEVEL 7.0 - < 8.0%: ICD-10-PCS | Mod: CPTII,,, | Performed by: NURSE PRACTITIONER

## 2021-07-23 PROCEDURE — 3051F HG A1C>EQUAL 7.0%<8.0%: CPT | Mod: CPTII,,, | Performed by: NURSE PRACTITIONER

## 2021-07-23 PROCEDURE — 3072F LOW RISK FOR RETINOPATHY: CPT | Mod: CPTII,,, | Performed by: NURSE PRACTITIONER

## 2021-07-23 PROCEDURE — 99214 PR OFFICE/OUTPT VISIT, EST, LEVL IV, 30-39 MIN: ICD-10-PCS | Mod: 95,,, | Performed by: NURSE PRACTITIONER

## 2021-07-23 PROCEDURE — 99214 OFFICE O/P EST MOD 30 MIN: CPT | Mod: 95,,, | Performed by: NURSE PRACTITIONER

## 2021-07-23 RX ORDER — HYDROXYZINE HYDROCHLORIDE 25 MG/1
TABLET, FILM COATED ORAL
Qty: 60 TABLET | Refills: 2 | Status: SHIPPED | OUTPATIENT
Start: 2021-07-23 | End: 2021-10-14

## 2021-07-24 ENCOUNTER — CLINICAL SUPPORT (OUTPATIENT)
Dept: REHABILITATION | Facility: HOSPITAL | Age: 46
End: 2021-07-24
Payer: COMMERCIAL

## 2021-07-24 DIAGNOSIS — M43.27 FUSION OF SPINE, LUMBOSACRAL REGION: Primary | ICD-10-CM

## 2021-07-24 DIAGNOSIS — M79.18 ACUTE BUTTOCK PAIN: ICD-10-CM

## 2021-07-24 PROCEDURE — 97110 THERAPEUTIC EXERCISES: CPT | Mod: PN,CQ

## 2021-07-27 ENCOUNTER — CLINICAL SUPPORT (OUTPATIENT)
Dept: REHABILITATION | Facility: HOSPITAL | Age: 46
End: 2021-07-27
Payer: COMMERCIAL

## 2021-07-27 DIAGNOSIS — M79.18 ACUTE BUTTOCK PAIN: ICD-10-CM

## 2021-07-27 DIAGNOSIS — M43.27 FUSION OF SPINE, LUMBOSACRAL REGION: ICD-10-CM

## 2021-07-27 PROCEDURE — 97140 MANUAL THERAPY 1/> REGIONS: CPT | Mod: PN,CQ

## 2021-07-27 PROCEDURE — 97110 THERAPEUTIC EXERCISES: CPT | Mod: PN,CQ

## 2021-07-28 ENCOUNTER — OFFICE VISIT (OUTPATIENT)
Dept: NEUROSURGERY | Facility: CLINIC | Age: 46
End: 2021-07-28
Payer: COMMERCIAL

## 2021-07-28 ENCOUNTER — TELEPHONE (OUTPATIENT)
Dept: NEUROSURGERY | Facility: CLINIC | Age: 46
End: 2021-07-28

## 2021-07-28 ENCOUNTER — HOSPITAL ENCOUNTER (OUTPATIENT)
Dept: RADIOLOGY | Facility: HOSPITAL | Age: 46
Discharge: HOME | End: 2021-07-28
Attending: NEUROLOGICAL SURGERY
Payer: COMMERCIAL

## 2021-07-28 VITALS — BODY MASS INDEX: 50.74 KG/M2 | HEIGHT: 72 IN

## 2021-07-28 DIAGNOSIS — M79.18 LEFT BUTTOCK PAIN: ICD-10-CM

## 2021-07-28 DIAGNOSIS — Z98.1 S/P LUMBAR SPINAL FUSION: Primary | ICD-10-CM

## 2021-07-28 DIAGNOSIS — M43.27 FUSION OF SPINE, LUMBOSACRAL REGION: ICD-10-CM

## 2021-07-28 PROCEDURE — 72100 X-RAY EXAM L-S SPINE 2/3 VWS: CPT | Mod: TC,PN

## 2021-07-28 PROCEDURE — 3072F PR LOW RISK FOR RETINOPATHY: ICD-10-PCS | Mod: CPTII,S$GLB,, | Performed by: PHYSICIAN ASSISTANT

## 2021-07-28 PROCEDURE — 72100 XR LUMBAR SPINE AP AND LATERAL: ICD-10-PCS | Mod: 26,,, | Performed by: INTERNAL MEDICINE

## 2021-07-28 PROCEDURE — 3008F PR BODY MASS INDEX (BMI) DOCUMENTED: ICD-10-PCS | Mod: CPTII,S$GLB,, | Performed by: PHYSICIAN ASSISTANT

## 2021-07-28 PROCEDURE — 99999 PR PBB SHADOW E&M-EST. PATIENT-LVL III: CPT | Mod: PBBFAC,,, | Performed by: PHYSICIAN ASSISTANT

## 2021-07-28 PROCEDURE — 1159F PR MEDICATION LIST DOCUMENTED IN MEDICAL RECORD: ICD-10-PCS | Mod: CPTII,S$GLB,, | Performed by: PHYSICIAN ASSISTANT

## 2021-07-28 PROCEDURE — 99214 OFFICE O/P EST MOD 30 MIN: CPT | Mod: S$GLB,,, | Performed by: PHYSICIAN ASSISTANT

## 2021-07-28 PROCEDURE — 1159F MED LIST DOCD IN RCRD: CPT | Mod: CPTII,S$GLB,, | Performed by: PHYSICIAN ASSISTANT

## 2021-07-28 PROCEDURE — 3051F PR MOST RECENT HEMOGLOBIN A1C LEVEL 7.0 - < 8.0%: ICD-10-PCS | Mod: CPTII,S$GLB,, | Performed by: PHYSICIAN ASSISTANT

## 2021-07-28 PROCEDURE — 1160F RVW MEDS BY RX/DR IN RCRD: CPT | Mod: CPTII,S$GLB,, | Performed by: PHYSICIAN ASSISTANT

## 2021-07-28 PROCEDURE — 3051F HG A1C>EQUAL 7.0%<8.0%: CPT | Mod: CPTII,S$GLB,, | Performed by: PHYSICIAN ASSISTANT

## 2021-07-28 PROCEDURE — 1125F PR PAIN SEVERITY QUANTIFIED, PAIN PRESENT: ICD-10-PCS | Mod: CPTII,S$GLB,, | Performed by: PHYSICIAN ASSISTANT

## 2021-07-28 PROCEDURE — 1125F AMNT PAIN NOTED PAIN PRSNT: CPT | Mod: CPTII,S$GLB,, | Performed by: PHYSICIAN ASSISTANT

## 2021-07-28 PROCEDURE — 99999 PR PBB SHADOW E&M-EST. PATIENT-LVL III: ICD-10-PCS | Mod: PBBFAC,,, | Performed by: PHYSICIAN ASSISTANT

## 2021-07-28 PROCEDURE — 3072F LOW RISK FOR RETINOPATHY: CPT | Mod: CPTII,S$GLB,, | Performed by: PHYSICIAN ASSISTANT

## 2021-07-28 PROCEDURE — 99214 PR OFFICE/OUTPT VISIT, EST, LEVL IV, 30-39 MIN: ICD-10-PCS | Mod: S$GLB,,, | Performed by: PHYSICIAN ASSISTANT

## 2021-07-28 PROCEDURE — 3008F BODY MASS INDEX DOCD: CPT | Mod: CPTII,S$GLB,, | Performed by: PHYSICIAN ASSISTANT

## 2021-07-28 PROCEDURE — 72100 X-RAY EXAM L-S SPINE 2/3 VWS: CPT | Mod: 26,,, | Performed by: INTERNAL MEDICINE

## 2021-07-28 PROCEDURE — 1160F PR REVIEW ALL MEDS BY PRESCRIBER/CLIN PHARMACIST DOCUMENTED: ICD-10-PCS | Mod: CPTII,S$GLB,, | Performed by: PHYSICIAN ASSISTANT

## 2021-07-29 ENCOUNTER — DOCUMENTATION ONLY (OUTPATIENT)
Dept: REHABILITATION | Facility: HOSPITAL | Age: 46
End: 2021-07-29

## 2021-07-31 ENCOUNTER — CLINICAL SUPPORT (OUTPATIENT)
Dept: REHABILITATION | Facility: HOSPITAL | Age: 46
End: 2021-07-31
Payer: COMMERCIAL

## 2021-07-31 DIAGNOSIS — M79.18 ACUTE BUTTOCK PAIN: ICD-10-CM

## 2021-07-31 DIAGNOSIS — M43.27 FUSION OF SPINE, LUMBOSACRAL REGION: Primary | ICD-10-CM

## 2021-07-31 PROCEDURE — 97110 THERAPEUTIC EXERCISES: CPT | Mod: PN,CQ

## 2021-07-31 PROCEDURE — 97140 MANUAL THERAPY 1/> REGIONS: CPT | Mod: PN,CQ

## 2021-08-02 ENCOUNTER — LAB VISIT (OUTPATIENT)
Dept: LAB | Facility: HOSPITAL | Age: 46
End: 2021-08-02
Attending: PLASTIC SURGERY
Payer: COMMERCIAL

## 2021-08-02 DIAGNOSIS — E11.9 DIABETES MELLITUS WITHOUT COMPLICATION: ICD-10-CM

## 2021-08-02 DIAGNOSIS — T81.89XA NON-HEALING SURGICAL WOUND: Primary | ICD-10-CM

## 2021-08-02 LAB — PREALB SERPL-MCNC: <2.5 MG/DL (ref 20–43)

## 2021-08-02 PROCEDURE — 84134 ASSAY OF PREALBUMIN: CPT | Performed by: PLASTIC SURGERY

## 2021-08-03 ENCOUNTER — PATIENT MESSAGE (OUTPATIENT)
Dept: OPHTHALMOLOGY | Facility: CLINIC | Age: 46
End: 2021-08-03

## 2021-08-03 ENCOUNTER — TELEPHONE (OUTPATIENT)
Dept: OPTOMETRY | Facility: CLINIC | Age: 46
End: 2021-08-03

## 2021-08-04 ENCOUNTER — TELEPHONE (OUTPATIENT)
Dept: PAIN MEDICINE | Facility: CLINIC | Age: 46
End: 2021-08-04

## 2021-08-05 ENCOUNTER — CLINICAL SUPPORT (OUTPATIENT)
Dept: REHABILITATION | Facility: HOSPITAL | Age: 46
End: 2021-08-05
Payer: COMMERCIAL

## 2021-08-05 DIAGNOSIS — M54.50 CHRONIC LEFT-SIDED LOW BACK PAIN WITHOUT SCIATICA: ICD-10-CM

## 2021-08-05 DIAGNOSIS — M79.18 ACUTE BUTTOCK PAIN: ICD-10-CM

## 2021-08-05 DIAGNOSIS — M43.27 FUSION OF SPINE, LUMBOSACRAL REGION: ICD-10-CM

## 2021-08-05 DIAGNOSIS — G89.29 CHRONIC LEFT-SIDED LOW BACK PAIN WITHOUT SCIATICA: ICD-10-CM

## 2021-08-05 PROCEDURE — 97140 MANUAL THERAPY 1/> REGIONS: CPT | Mod: PN,CQ

## 2021-08-05 PROCEDURE — 97110 THERAPEUTIC EXERCISES: CPT | Mod: PN,CQ

## 2021-08-08 PROCEDURE — G0179 MD RECERTIFICATION HHA PT: HCPCS | Mod: ,,, | Performed by: NEUROLOGICAL SURGERY

## 2021-08-08 PROCEDURE — G0179 PR HOME HEALTH MD RECERTIFICATION: ICD-10-PCS | Mod: ,,, | Performed by: NEUROLOGICAL SURGERY

## 2021-08-10 ENCOUNTER — CLINICAL SUPPORT (OUTPATIENT)
Dept: REHABILITATION | Facility: HOSPITAL | Age: 46
End: 2021-08-10
Payer: COMMERCIAL

## 2021-08-10 DIAGNOSIS — M54.50 CHRONIC LEFT-SIDED LOW BACK PAIN WITHOUT SCIATICA: ICD-10-CM

## 2021-08-10 DIAGNOSIS — M43.27 FUSION OF SPINE, LUMBOSACRAL REGION: ICD-10-CM

## 2021-08-10 DIAGNOSIS — G89.29 CHRONIC LEFT-SIDED LOW BACK PAIN WITHOUT SCIATICA: ICD-10-CM

## 2021-08-10 DIAGNOSIS — M79.18 ACUTE BUTTOCK PAIN: ICD-10-CM

## 2021-08-10 PROCEDURE — 97140 MANUAL THERAPY 1/> REGIONS: CPT | Mod: PN

## 2021-08-10 PROCEDURE — 97110 THERAPEUTIC EXERCISES: CPT | Mod: PN

## 2021-08-11 ENCOUNTER — DOCUMENT SCAN (OUTPATIENT)
Dept: HOME HEALTH SERVICES | Facility: HOSPITAL | Age: 46
End: 2021-08-11
Payer: COMMERCIAL

## 2021-08-12 ENCOUNTER — OFFICE VISIT (OUTPATIENT)
Dept: OPTOMETRY | Facility: CLINIC | Age: 46
End: 2021-08-12
Payer: COMMERCIAL

## 2021-08-12 DIAGNOSIS — E11.9 TYPE 2 DIABETES MELLITUS WITHOUT RETINOPATHY: Primary | ICD-10-CM

## 2021-08-12 DIAGNOSIS — H52.4 HYPEROPIA WITH ASTIGMATISM AND PRESBYOPIA, BILATERAL: ICD-10-CM

## 2021-08-12 DIAGNOSIS — H02.831 DERMATOCHALASIS OF BOTH UPPER EYELIDS: ICD-10-CM

## 2021-08-12 DIAGNOSIS — H52.203 HYPEROPIA WITH ASTIGMATISM AND PRESBYOPIA, BILATERAL: ICD-10-CM

## 2021-08-12 DIAGNOSIS — H25.13 NUCLEAR SCLEROSIS OF BOTH EYES: ICD-10-CM

## 2021-08-12 DIAGNOSIS — H02.834 DERMATOCHALASIS OF BOTH UPPER EYELIDS: ICD-10-CM

## 2021-08-12 DIAGNOSIS — H52.03 HYPEROPIA WITH ASTIGMATISM AND PRESBYOPIA, BILATERAL: ICD-10-CM

## 2021-08-12 LAB
LEFT EYE DM RETINOPATHY: NEGATIVE
RIGHT EYE DM RETINOPATHY: NEGATIVE

## 2021-08-12 PROCEDURE — 1126F AMNT PAIN NOTED NONE PRSNT: CPT | Mod: CPTII,S$GLB,, | Performed by: OPTOMETRIST

## 2021-08-12 PROCEDURE — 2023F DILAT RTA XM W/O RTNOPTHY: CPT | Mod: CPTII,S$GLB,, | Performed by: OPTOMETRIST

## 2021-08-12 PROCEDURE — 3051F PR MOST RECENT HEMOGLOBIN A1C LEVEL 7.0 - < 8.0%: ICD-10-PCS | Mod: CPTII,S$GLB,, | Performed by: OPTOMETRIST

## 2021-08-12 PROCEDURE — 2023F PR DILATED RETINAL EXAM W/O EVID OF RETINOPATHY: ICD-10-PCS | Mod: CPTII,S$GLB,, | Performed by: OPTOMETRIST

## 2021-08-12 PROCEDURE — 92014 PR EYE EXAM, EST PATIENT,COMPREHESV: ICD-10-PCS | Mod: S$GLB,,, | Performed by: OPTOMETRIST

## 2021-08-12 PROCEDURE — 92015 DETERMINE REFRACTIVE STATE: CPT | Mod: S$GLB,,, | Performed by: OPTOMETRIST

## 2021-08-12 PROCEDURE — 99999 PR PBB SHADOW E&M-EST. PATIENT-LVL II: ICD-10-PCS | Mod: PBBFAC,,, | Performed by: OPTOMETRIST

## 2021-08-12 PROCEDURE — 1160F RVW MEDS BY RX/DR IN RCRD: CPT | Mod: CPTII,S$GLB,, | Performed by: OPTOMETRIST

## 2021-08-12 PROCEDURE — 1126F PR PAIN SEVERITY QUANTIFIED, NO PAIN PRESENT: ICD-10-PCS | Mod: CPTII,S$GLB,, | Performed by: OPTOMETRIST

## 2021-08-12 PROCEDURE — 3051F HG A1C>EQUAL 7.0%<8.0%: CPT | Mod: CPTII,S$GLB,, | Performed by: OPTOMETRIST

## 2021-08-12 PROCEDURE — 1160F PR REVIEW ALL MEDS BY PRESCRIBER/CLIN PHARMACIST DOCUMENTED: ICD-10-PCS | Mod: CPTII,S$GLB,, | Performed by: OPTOMETRIST

## 2021-08-12 PROCEDURE — 99999 PR PBB SHADOW E&M-EST. PATIENT-LVL II: CPT | Mod: PBBFAC,,, | Performed by: OPTOMETRIST

## 2021-08-12 PROCEDURE — 92014 COMPRE OPH EXAM EST PT 1/>: CPT | Mod: S$GLB,,, | Performed by: OPTOMETRIST

## 2021-08-12 PROCEDURE — 92015 PR REFRACTION: ICD-10-PCS | Mod: S$GLB,,, | Performed by: OPTOMETRIST

## 2021-08-12 PROCEDURE — 1159F MED LIST DOCD IN RCRD: CPT | Mod: CPTII,S$GLB,, | Performed by: OPTOMETRIST

## 2021-08-12 PROCEDURE — 1159F PR MEDICATION LIST DOCUMENTED IN MEDICAL RECORD: ICD-10-PCS | Mod: CPTII,S$GLB,, | Performed by: OPTOMETRIST

## 2021-08-16 ENCOUNTER — TELEPHONE (OUTPATIENT)
Dept: PAIN MEDICINE | Facility: CLINIC | Age: 46
End: 2021-08-16

## 2021-08-17 ENCOUNTER — CLINICAL SUPPORT (OUTPATIENT)
Dept: REHABILITATION | Facility: HOSPITAL | Age: 46
End: 2021-08-17
Payer: COMMERCIAL

## 2021-08-17 ENCOUNTER — EXTERNAL HOME HEALTH (OUTPATIENT)
Dept: HOME HEALTH SERVICES | Facility: HOSPITAL | Age: 46
End: 2021-08-17
Payer: COMMERCIAL

## 2021-08-17 DIAGNOSIS — G89.29 CHRONIC LEFT-SIDED LOW BACK PAIN WITHOUT SCIATICA: ICD-10-CM

## 2021-08-17 DIAGNOSIS — M79.18 ACUTE BUTTOCK PAIN: ICD-10-CM

## 2021-08-17 DIAGNOSIS — M54.50 CHRONIC LEFT-SIDED LOW BACK PAIN WITHOUT SCIATICA: ICD-10-CM

## 2021-08-17 DIAGNOSIS — M43.27 FUSION OF SPINE, LUMBOSACRAL REGION: ICD-10-CM

## 2021-08-17 PROCEDURE — 97110 THERAPEUTIC EXERCISES: CPT | Mod: PN

## 2021-08-17 PROCEDURE — 97530 THERAPEUTIC ACTIVITIES: CPT | Mod: PN

## 2021-08-17 PROCEDURE — 97112 NEUROMUSCULAR REEDUCATION: CPT | Mod: PN

## 2021-08-19 ENCOUNTER — CLINICAL SUPPORT (OUTPATIENT)
Dept: REHABILITATION | Facility: HOSPITAL | Age: 46
End: 2021-08-19
Payer: COMMERCIAL

## 2021-08-19 ENCOUNTER — PATIENT MESSAGE (OUTPATIENT)
Dept: NEUROSURGERY | Facility: CLINIC | Age: 46
End: 2021-08-19

## 2021-08-19 DIAGNOSIS — M79.18 ACUTE BUTTOCK PAIN: ICD-10-CM

## 2021-08-19 DIAGNOSIS — M54.50 CHRONIC LEFT-SIDED LOW BACK PAIN WITHOUT SCIATICA: ICD-10-CM

## 2021-08-19 DIAGNOSIS — M43.27 FUSION OF SPINE, LUMBOSACRAL REGION: ICD-10-CM

## 2021-08-19 DIAGNOSIS — G89.29 CHRONIC LEFT-SIDED LOW BACK PAIN WITHOUT SCIATICA: ICD-10-CM

## 2021-08-19 PROCEDURE — 97110 THERAPEUTIC EXERCISES: CPT | Mod: PN,CQ

## 2021-08-24 ENCOUNTER — DOCUMENTATION ONLY (OUTPATIENT)
Dept: REHABILITATION | Facility: HOSPITAL | Age: 46
End: 2021-08-24

## 2021-08-26 ENCOUNTER — CLINICAL SUPPORT (OUTPATIENT)
Dept: REHABILITATION | Facility: HOSPITAL | Age: 46
End: 2021-08-26
Payer: COMMERCIAL

## 2021-08-26 DIAGNOSIS — M43.27 FUSION OF SPINE, LUMBOSACRAL REGION: ICD-10-CM

## 2021-08-26 DIAGNOSIS — G89.29 CHRONIC LEFT-SIDED LOW BACK PAIN WITHOUT SCIATICA: ICD-10-CM

## 2021-08-26 DIAGNOSIS — M54.50 CHRONIC LEFT-SIDED LOW BACK PAIN WITHOUT SCIATICA: ICD-10-CM

## 2021-08-26 DIAGNOSIS — M79.18 ACUTE BUTTOCK PAIN: ICD-10-CM

## 2021-08-26 PROCEDURE — 97110 THERAPEUTIC EXERCISES: CPT | Mod: PN

## 2021-08-27 RX ORDER — CELECOXIB 200 MG/1
CAPSULE ORAL
Qty: 180 CAPSULE | Refills: 0 | Status: SHIPPED | OUTPATIENT
Start: 2021-08-27 | End: 2022-02-04

## 2021-09-08 ENCOUNTER — DOCUMENTATION ONLY (OUTPATIENT)
Dept: PSYCHIATRY | Facility: CLINIC | Age: 46
End: 2021-09-08

## 2021-09-14 ENCOUNTER — PATIENT MESSAGE (OUTPATIENT)
Dept: PSYCHIATRY | Facility: CLINIC | Age: 46
End: 2021-09-14

## 2021-09-14 ENCOUNTER — TELEPHONE (OUTPATIENT)
Dept: NEUROSURGERY | Facility: CLINIC | Age: 46
End: 2021-09-14

## 2021-09-16 ENCOUNTER — OFFICE VISIT (OUTPATIENT)
Dept: PSYCHIATRY | Facility: CLINIC | Age: 46
End: 2021-09-16
Payer: COMMERCIAL

## 2021-09-16 DIAGNOSIS — F99 INSOMNIA DUE TO OTHER MENTAL DISORDER: ICD-10-CM

## 2021-09-16 DIAGNOSIS — F51.05 INSOMNIA DUE TO OTHER MENTAL DISORDER: ICD-10-CM

## 2021-09-16 DIAGNOSIS — F42.2 MIXED OBSESSIONAL THOUGHTS AND ACTS: ICD-10-CM

## 2021-09-16 DIAGNOSIS — F33.0 MILD EPISODE OF RECURRENT MAJOR DEPRESSIVE DISORDER: ICD-10-CM

## 2021-09-16 PROCEDURE — 3072F PR LOW RISK FOR RETINOPATHY: ICD-10-PCS | Mod: CPTII,95,, | Performed by: NURSE PRACTITIONER

## 2021-09-16 PROCEDURE — 1160F PR REVIEW ALL MEDS BY PRESCRIBER/CLIN PHARMACIST DOCUMENTED: ICD-10-PCS | Mod: CPTII,95,, | Performed by: NURSE PRACTITIONER

## 2021-09-16 PROCEDURE — 3051F HG A1C>EQUAL 7.0%<8.0%: CPT | Mod: CPTII,95,, | Performed by: NURSE PRACTITIONER

## 2021-09-16 PROCEDURE — 99214 OFFICE O/P EST MOD 30 MIN: CPT | Mod: 95,,, | Performed by: NURSE PRACTITIONER

## 2021-09-16 PROCEDURE — 1159F MED LIST DOCD IN RCRD: CPT | Mod: CPTII,95,, | Performed by: NURSE PRACTITIONER

## 2021-09-16 PROCEDURE — 3072F LOW RISK FOR RETINOPATHY: CPT | Mod: CPTII,95,, | Performed by: NURSE PRACTITIONER

## 2021-09-16 PROCEDURE — 1159F PR MEDICATION LIST DOCUMENTED IN MEDICAL RECORD: ICD-10-PCS | Mod: CPTII,95,, | Performed by: NURSE PRACTITIONER

## 2021-09-16 PROCEDURE — 3051F PR MOST RECENT HEMOGLOBIN A1C LEVEL 7.0 - < 8.0%: ICD-10-PCS | Mod: CPTII,95,, | Performed by: NURSE PRACTITIONER

## 2021-09-16 PROCEDURE — 99214 PR OFFICE/OUTPT VISIT, EST, LEVL IV, 30-39 MIN: ICD-10-PCS | Mod: 95,,, | Performed by: NURSE PRACTITIONER

## 2021-09-16 PROCEDURE — 1160F RVW MEDS BY RX/DR IN RCRD: CPT | Mod: CPTII,95,, | Performed by: NURSE PRACTITIONER

## 2021-09-16 RX ORDER — FLUOXETINE HYDROCHLORIDE 40 MG/1
80 CAPSULE ORAL DAILY
Qty: 180 CAPSULE | Refills: 1 | Status: SHIPPED | OUTPATIENT
Start: 2021-09-16 | End: 2022-04-05

## 2021-09-16 RX ORDER — BUSPIRONE HYDROCHLORIDE 15 MG/1
15 TABLET ORAL 3 TIMES DAILY
Qty: 90 TABLET | Refills: 3 | Status: SHIPPED | OUTPATIENT
Start: 2021-09-16 | End: 2022-03-10

## 2021-09-21 ENCOUNTER — CLINICAL SUPPORT (OUTPATIENT)
Dept: REHABILITATION | Facility: HOSPITAL | Age: 46
End: 2021-09-21
Payer: COMMERCIAL

## 2021-09-21 DIAGNOSIS — G89.29 CHRONIC LEFT-SIDED LOW BACK PAIN WITHOUT SCIATICA: ICD-10-CM

## 2021-09-21 DIAGNOSIS — M79.18 ACUTE BUTTOCK PAIN: ICD-10-CM

## 2021-09-21 DIAGNOSIS — M54.50 CHRONIC LEFT-SIDED LOW BACK PAIN WITHOUT SCIATICA: ICD-10-CM

## 2021-09-21 DIAGNOSIS — M43.27 FUSION OF SPINE, LUMBOSACRAL REGION: ICD-10-CM

## 2021-09-21 PROCEDURE — 97110 THERAPEUTIC EXERCISES: CPT | Mod: PN

## 2021-09-21 PROCEDURE — 97140 MANUAL THERAPY 1/> REGIONS: CPT | Mod: PN

## 2021-09-21 PROCEDURE — 97530 THERAPEUTIC ACTIVITIES: CPT | Mod: PN

## 2021-09-22 ENCOUNTER — DOCUMENT SCAN (OUTPATIENT)
Dept: HOME HEALTH SERVICES | Facility: HOSPITAL | Age: 46
End: 2021-09-22
Payer: COMMERCIAL

## 2021-09-23 ENCOUNTER — CLINICAL SUPPORT (OUTPATIENT)
Dept: REHABILITATION | Facility: HOSPITAL | Age: 46
End: 2021-09-23
Payer: COMMERCIAL

## 2021-09-23 DIAGNOSIS — M54.50 CHRONIC LEFT-SIDED LOW BACK PAIN WITHOUT SCIATICA: ICD-10-CM

## 2021-09-23 DIAGNOSIS — G89.29 CHRONIC LEFT-SIDED LOW BACK PAIN WITHOUT SCIATICA: ICD-10-CM

## 2021-09-23 DIAGNOSIS — M79.18 ACUTE BUTTOCK PAIN: ICD-10-CM

## 2021-09-23 DIAGNOSIS — M43.27 FUSION OF SPINE, LUMBOSACRAL REGION: ICD-10-CM

## 2021-09-23 PROCEDURE — 97110 THERAPEUTIC EXERCISES: CPT | Mod: PN,CQ

## 2021-09-25 ENCOUNTER — HOSPITAL ENCOUNTER (EMERGENCY)
Facility: HOSPITAL | Age: 46
Discharge: HOME | End: 2021-09-25
Attending: EMERGENCY MEDICINE
Payer: COMMERCIAL

## 2021-09-25 VITALS
OXYGEN SATURATION: 97 % | RESPIRATION RATE: 18 BRPM | DIASTOLIC BLOOD PRESSURE: 59 MMHG | SYSTOLIC BLOOD PRESSURE: 133 MMHG | WEIGHT: 262.81 LBS | BODY MASS INDEX: 35.6 KG/M2 | HEART RATE: 85 BPM | HEIGHT: 72 IN | TEMPERATURE: 100 F

## 2021-09-25 DIAGNOSIS — R73.9 HYPERGLYCEMIA: ICD-10-CM

## 2021-09-25 DIAGNOSIS — K80.20 GALLSTONES: ICD-10-CM

## 2021-09-25 DIAGNOSIS — R10.9 FLANK PAIN: Primary | ICD-10-CM

## 2021-09-25 LAB
ALBUMIN SERPL-MCNC: 3.1 G/DL (ref 3.3–5.5)
ALLENS TEST: ABNORMAL
ALP SERPL-CCNC: 111 U/L (ref 42–141)
BILIRUB SERPL-MCNC: 0.5 MG/DL (ref 0.2–1.6)
BILIRUBIN, POC UA: NEGATIVE
BLOOD, POC UA: ABNORMAL
BUN SERPL-MCNC: 13 MG/DL (ref 7–22)
CALCIUM SERPL-MCNC: 9.6 MG/DL (ref 8–10.3)
CHLORIDE SERPL-SCNC: 100 MMOL/L (ref 98–108)
CLARITY, POC UA: CLEAR
COLOR, POC UA: YELLOW
CREAT SERPL-MCNC: 0.8 MG/DL (ref 0.6–1.2)
GLUCOSE SERPL-MCNC: 433 MG/DL (ref 73–118)
GLUCOSE, POC UA: ABNORMAL
HCO3 UR-SCNC: 28.3 MMOL/L (ref 24–28)
KETONES, POC UA: NEGATIVE
LDH SERPL L TO P-CCNC: 1.94 MMOL/L (ref 0.5–2.2)
LEUKOCYTE EST, POC UA: NEGATIVE
NITRITE, POC UA: NEGATIVE
PCO2 BLDA: 46.7 MMHG (ref 35–45)
PH SMN: 7.39 [PH] (ref 7.35–7.45)
PH UR STRIP: 6 [PH]
PO2 BLDA: 57 MMHG (ref 40–60)
POC ALT (SGPT): 29 U/L (ref 10–47)
POC AST (SGOT): 30 U/L (ref 11–38)
POC BE: 3 MMOL/L
POC SATURATED O2: 89 % (ref 95–100)
POC TCO2: 27 MMOL/L (ref 18–33)
POC TCO2: 30 MMOL/L (ref 24–29)
POCT GLUCOSE: 290 MG/DL (ref 70–110)
POCT GLUCOSE: 374 MG/DL (ref 70–110)
POTASSIUM BLD-SCNC: 4 MMOL/L (ref 3.6–5.1)
PROTEIN, POC UA: NEGATIVE
PROTEIN, POC: 7.2 G/DL (ref 6.4–8.1)
SAMPLE: ABNORMAL
SITE: ABNORMAL
SODIUM BLD-SCNC: 142 MMOL/L (ref 128–145)
SPECIFIC GRAVITY, POC UA: 1.02
UROBILINOGEN, POC UA: 0.2 E.U./DL

## 2021-09-25 PROCEDURE — 96375 TX/PRO/DX INJ NEW DRUG ADDON: CPT | Mod: ER

## 2021-09-25 PROCEDURE — 63600175 PHARM REV CODE 636 W HCPCS: Mod: ER | Performed by: EMERGENCY MEDICINE

## 2021-09-25 PROCEDURE — 99285 EMERGENCY DEPT VISIT HI MDM: CPT | Mod: 25,ER

## 2021-09-25 PROCEDURE — 25500020 PHARM REV CODE 255: Mod: ER | Performed by: EMERGENCY MEDICINE

## 2021-09-25 PROCEDURE — 80053 COMPREHEN METABOLIC PANEL: CPT | Mod: ER

## 2021-09-25 PROCEDURE — 96361 HYDRATE IV INFUSION ADD-ON: CPT | Mod: ER

## 2021-09-25 PROCEDURE — 25000003 PHARM REV CODE 250: Mod: ER | Performed by: EMERGENCY MEDICINE

## 2021-09-25 PROCEDURE — 96374 THER/PROPH/DIAG INJ IV PUSH: CPT | Mod: ER,59

## 2021-09-25 PROCEDURE — 82962 GLUCOSE BLOOD TEST: CPT | Mod: ER

## 2021-09-25 PROCEDURE — 85025 COMPLETE CBC W/AUTO DIFF WBC: CPT | Mod: ER

## 2021-09-25 PROCEDURE — 82803 BLOOD GASES ANY COMBINATION: CPT | Mod: ER

## 2021-09-25 PROCEDURE — 81003 URINALYSIS AUTO W/O SCOPE: CPT | Mod: ER

## 2021-09-25 PROCEDURE — 83036 HEMOGLOBIN GLYCOSYLATED A1C: CPT | Performed by: EMERGENCY MEDICINE

## 2021-09-25 RX ORDER — KETOROLAC TROMETHAMINE 30 MG/ML
15 INJECTION, SOLUTION INTRAMUSCULAR; INTRAVENOUS
Status: COMPLETED | OUTPATIENT
Start: 2021-09-25 | End: 2021-09-25

## 2021-09-25 RX ADMIN — IOHEXOL 100 ML: 350 INJECTION, SOLUTION INTRAVENOUS at 08:09

## 2021-09-25 RX ADMIN — KETOROLAC TROMETHAMINE 15 MG: 30 INJECTION, SOLUTION INTRAMUSCULAR; INTRAVENOUS at 06:09

## 2021-09-25 RX ADMIN — SODIUM CHLORIDE 1000 ML: 0.9 INJECTION, SOLUTION INTRAVENOUS at 08:09

## 2021-09-25 RX ADMIN — INSULIN HUMAN 4 UNITS: 100 INJECTION, SOLUTION PARENTERAL at 06:09

## 2021-09-26 LAB
ESTIMATED AVG GLUCOSE: 266 MG/DL (ref 68–131)
HBA1C MFR BLD: 10.9 % (ref 4–5.6)

## 2021-09-27 ENCOUNTER — OFFICE VISIT (OUTPATIENT)
Dept: FAMILY MEDICINE | Facility: CLINIC | Age: 46
End: 2021-09-27
Payer: COMMERCIAL

## 2021-09-27 VITALS
HEIGHT: 72 IN | DIASTOLIC BLOOD PRESSURE: 72 MMHG | OXYGEN SATURATION: 96 % | BODY MASS INDEX: 36.14 KG/M2 | HEART RATE: 84 BPM | SYSTOLIC BLOOD PRESSURE: 118 MMHG

## 2021-09-27 DIAGNOSIS — F42.2 MIXED OBSESSIONAL THOUGHTS AND ACTS: ICD-10-CM

## 2021-09-27 DIAGNOSIS — E11.8 TYPE 2 DIABETES MELLITUS WITH COMPLICATION, WITHOUT LONG-TERM CURRENT USE OF INSULIN: Primary | ICD-10-CM

## 2021-09-27 PROCEDURE — 3074F SYST BP LT 130 MM HG: CPT | Mod: CPTII,S$GLB,, | Performed by: FAMILY MEDICINE

## 2021-09-27 PROCEDURE — 3008F BODY MASS INDEX DOCD: CPT | Mod: CPTII,S$GLB,, | Performed by: FAMILY MEDICINE

## 2021-09-27 PROCEDURE — 3046F PR MOST RECENT HEMOGLOBIN A1C LEVEL > 9.0%: ICD-10-PCS | Mod: CPTII,S$GLB,, | Performed by: FAMILY MEDICINE

## 2021-09-27 PROCEDURE — 3072F PR LOW RISK FOR RETINOPATHY: ICD-10-PCS | Mod: CPTII,S$GLB,, | Performed by: FAMILY MEDICINE

## 2021-09-27 PROCEDURE — 3008F PR BODY MASS INDEX (BMI) DOCUMENTED: ICD-10-PCS | Mod: CPTII,S$GLB,, | Performed by: FAMILY MEDICINE

## 2021-09-27 PROCEDURE — 99213 OFFICE O/P EST LOW 20 MIN: CPT | Mod: S$GLB,,, | Performed by: FAMILY MEDICINE

## 2021-09-27 PROCEDURE — 3072F LOW RISK FOR RETINOPATHY: CPT | Mod: CPTII,S$GLB,, | Performed by: FAMILY MEDICINE

## 2021-09-27 PROCEDURE — 3074F PR MOST RECENT SYSTOLIC BLOOD PRESSURE < 130 MM HG: ICD-10-PCS | Mod: CPTII,S$GLB,, | Performed by: FAMILY MEDICINE

## 2021-09-27 PROCEDURE — 1159F PR MEDICATION LIST DOCUMENTED IN MEDICAL RECORD: ICD-10-PCS | Mod: CPTII,S$GLB,, | Performed by: FAMILY MEDICINE

## 2021-09-27 PROCEDURE — 99213 PR OFFICE/OUTPT VISIT, EST, LEVL III, 20-29 MIN: ICD-10-PCS | Mod: S$GLB,,, | Performed by: FAMILY MEDICINE

## 2021-09-27 PROCEDURE — 1159F MED LIST DOCD IN RCRD: CPT | Mod: CPTII,S$GLB,, | Performed by: FAMILY MEDICINE

## 2021-09-27 PROCEDURE — 3046F HEMOGLOBIN A1C LEVEL >9.0%: CPT | Mod: CPTII,S$GLB,, | Performed by: FAMILY MEDICINE

## 2021-09-27 PROCEDURE — 3078F DIAST BP <80 MM HG: CPT | Mod: CPTII,S$GLB,, | Performed by: FAMILY MEDICINE

## 2021-09-27 PROCEDURE — 3078F PR MOST RECENT DIASTOLIC BLOOD PRESSURE < 80 MM HG: ICD-10-PCS | Mod: CPTII,S$GLB,, | Performed by: FAMILY MEDICINE

## 2021-09-27 RX ORDER — DULAGLUTIDE 0.75 MG/.5ML
0.75 INJECTION, SOLUTION SUBCUTANEOUS WEEKLY
Qty: 4 PEN | Refills: 1 | Status: SHIPPED | OUTPATIENT
Start: 2021-09-27 | End: 2021-12-09

## 2021-09-28 ENCOUNTER — DOCUMENTATION ONLY (OUTPATIENT)
Dept: REHABILITATION | Facility: HOSPITAL | Age: 46
End: 2021-09-28

## 2021-09-28 ENCOUNTER — CLINICAL SUPPORT (OUTPATIENT)
Dept: REHABILITATION | Facility: HOSPITAL | Age: 46
End: 2021-09-28
Payer: COMMERCIAL

## 2021-09-28 DIAGNOSIS — M43.27 FUSION OF SPINE, LUMBOSACRAL REGION: ICD-10-CM

## 2021-09-28 DIAGNOSIS — M79.18 ACUTE BUTTOCK PAIN: ICD-10-CM

## 2021-09-28 DIAGNOSIS — G89.29 CHRONIC LEFT-SIDED LOW BACK PAIN WITHOUT SCIATICA: ICD-10-CM

## 2021-09-28 DIAGNOSIS — M54.50 CHRONIC LEFT-SIDED LOW BACK PAIN WITHOUT SCIATICA: ICD-10-CM

## 2021-09-28 PROCEDURE — 97140 MANUAL THERAPY 1/> REGIONS: CPT | Mod: PN

## 2021-09-28 PROCEDURE — 97110 THERAPEUTIC EXERCISES: CPT | Mod: PN

## 2021-09-30 ENCOUNTER — CLINICAL SUPPORT (OUTPATIENT)
Dept: REHABILITATION | Facility: HOSPITAL | Age: 46
End: 2021-09-30
Payer: COMMERCIAL

## 2021-09-30 DIAGNOSIS — M79.18 ACUTE BUTTOCK PAIN: ICD-10-CM

## 2021-09-30 DIAGNOSIS — G89.29 CHRONIC LEFT-SIDED LOW BACK PAIN WITHOUT SCIATICA: ICD-10-CM

## 2021-09-30 DIAGNOSIS — M43.27 FUSION OF SPINE, LUMBOSACRAL REGION: ICD-10-CM

## 2021-09-30 DIAGNOSIS — M54.50 CHRONIC LEFT-SIDED LOW BACK PAIN WITHOUT SCIATICA: ICD-10-CM

## 2021-09-30 PROCEDURE — 97140 MANUAL THERAPY 1/> REGIONS: CPT | Mod: PN

## 2021-09-30 PROCEDURE — 97110 THERAPEUTIC EXERCISES: CPT | Mod: PN

## 2021-09-30 PROCEDURE — 97112 NEUROMUSCULAR REEDUCATION: CPT | Mod: PN

## 2021-10-04 ENCOUNTER — PATIENT MESSAGE (OUTPATIENT)
Dept: FAMILY MEDICINE | Facility: CLINIC | Age: 46
End: 2021-10-04

## 2021-10-06 ENCOUNTER — TELEPHONE (OUTPATIENT)
Dept: NEUROSURGERY | Facility: CLINIC | Age: 46
End: 2021-10-06

## 2021-10-06 DIAGNOSIS — Z98.1 S/P LUMBAR FUSION: Primary | ICD-10-CM

## 2021-10-12 ENCOUNTER — HOSPITAL ENCOUNTER (EMERGENCY)
Facility: HOSPITAL | Age: 46
Discharge: HOME | End: 2021-10-13
Attending: EMERGENCY MEDICINE
Payer: COMMERCIAL

## 2021-10-12 DIAGNOSIS — M54.9 BACK PAIN: ICD-10-CM

## 2021-10-12 DIAGNOSIS — S39.012A LUMBAR STRAIN, INITIAL ENCOUNTER: Primary | ICD-10-CM

## 2021-10-12 LAB
BILIRUBIN, POC UA: NEGATIVE
BLOOD, POC UA: ABNORMAL
CLARITY, POC UA: CLEAR
COLOR, POC UA: YELLOW
GLUCOSE, POC UA: ABNORMAL
KETONES, POC UA: NEGATIVE
LEUKOCYTE EST, POC UA: NEGATIVE
NITRITE, POC UA: NEGATIVE
PH UR STRIP: 5.5 [PH]
POCT GLUCOSE: 327 MG/DL (ref 70–110)
PROTEIN, POC UA: NEGATIVE
SPECIFIC GRAVITY, POC UA: >=1.03
UROBILINOGEN, POC UA: 0.2 E.U./DL

## 2021-10-12 PROCEDURE — 96361 HYDRATE IV INFUSION ADD-ON: CPT | Mod: ER

## 2021-10-12 PROCEDURE — 82962 GLUCOSE BLOOD TEST: CPT | Mod: ER

## 2021-10-12 PROCEDURE — 63600175 PHARM REV CODE 636 W HCPCS: Mod: ER | Performed by: EMERGENCY MEDICINE

## 2021-10-12 PROCEDURE — 96374 THER/PROPH/DIAG INJ IV PUSH: CPT | Mod: ER

## 2021-10-12 PROCEDURE — 96372 THER/PROPH/DIAG INJ SC/IM: CPT | Mod: ER

## 2021-10-12 PROCEDURE — 81003 URINALYSIS AUTO W/O SCOPE: CPT | Mod: ER

## 2021-10-12 PROCEDURE — 25000003 PHARM REV CODE 250: Mod: ER | Performed by: EMERGENCY MEDICINE

## 2021-10-12 PROCEDURE — 99284 EMERGENCY DEPT VISIT MOD MDM: CPT | Mod: 25,ER

## 2021-10-12 RX ORDER — KETOROLAC TROMETHAMINE 30 MG/ML
INJECTION, SOLUTION INTRAMUSCULAR; INTRAVENOUS
Status: DISCONTINUED
Start: 2021-10-12 | End: 2021-10-13 | Stop reason: HOSPADM

## 2021-10-12 RX ORDER — ORPHENADRINE CITRATE 30 MG/ML
60 INJECTION INTRAMUSCULAR; INTRAVENOUS
Status: COMPLETED | OUTPATIENT
Start: 2021-10-12 | End: 2021-10-12

## 2021-10-12 RX ORDER — KETOROLAC TROMETHAMINE 30 MG/ML
15 INJECTION, SOLUTION INTRAMUSCULAR; INTRAVENOUS
Status: COMPLETED | OUTPATIENT
Start: 2021-10-12 | End: 2021-10-12

## 2021-10-12 RX ORDER — ORPHENADRINE CITRATE 30 MG/ML
INJECTION INTRAMUSCULAR; INTRAVENOUS
Status: DISCONTINUED
Start: 2021-10-12 | End: 2021-10-13 | Stop reason: HOSPADM

## 2021-10-12 RX ADMIN — KETOROLAC TROMETHAMINE 15 MG: 30 INJECTION, SOLUTION INTRAMUSCULAR at 11:10

## 2021-10-12 RX ADMIN — SODIUM CHLORIDE 1000 ML: 0.9 INJECTION, SOLUTION INTRAVENOUS at 11:10

## 2021-10-12 RX ADMIN — ORPHENADRINE CITRATE 60 MG: 30 INJECTION INTRAMUSCULAR; INTRAVENOUS at 11:10

## 2021-10-13 VITALS
HEART RATE: 86 BPM | WEIGHT: 315 LBS | DIASTOLIC BLOOD PRESSURE: 60 MMHG | TEMPERATURE: 99 F | BODY MASS INDEX: 42.66 KG/M2 | HEIGHT: 72 IN | SYSTOLIC BLOOD PRESSURE: 117 MMHG | OXYGEN SATURATION: 98 % | RESPIRATION RATE: 14 BRPM

## 2021-10-13 LAB
ALBUMIN SERPL-MCNC: 3.5 G/DL (ref 3.3–5.5)
ALP SERPL-CCNC: 107 U/L (ref 42–141)
BILIRUB SERPL-MCNC: 0.5 MG/DL (ref 0.2–1.6)
BUN SERPL-MCNC: 17 MG/DL (ref 7–22)
CALCIUM SERPL-MCNC: 10 MG/DL (ref 8–10.3)
CHLORIDE SERPL-SCNC: 98 MMOL/L (ref 98–108)
CREAT SERPL-MCNC: 1 MG/DL (ref 0.6–1.2)
GLUCOSE SERPL-MCNC: 288 MG/DL (ref 73–118)
POC ALT (SGPT): 33 U/L (ref 10–47)
POC AST (SGOT): 38 U/L (ref 11–38)
POC TCO2: 29 MMOL/L (ref 18–33)
POTASSIUM BLD-SCNC: 3.8 MMOL/L (ref 3.6–5.1)
PROTEIN, POC: 8.5 G/DL (ref 6.4–8.1)
SODIUM BLD-SCNC: 144 MMOL/L (ref 128–145)

## 2021-10-13 PROCEDURE — 80053 COMPREHEN METABOLIC PANEL: CPT | Mod: ER

## 2021-10-13 PROCEDURE — 85025 COMPLETE CBC W/AUTO DIFF WBC: CPT | Mod: ER

## 2021-10-13 RX ORDER — ORPHENADRINE CITRATE 100 MG/1
100 TABLET, EXTENDED RELEASE ORAL 2 TIMES DAILY
Qty: 20 TABLET | Refills: 0 | Status: SHIPPED | OUTPATIENT
Start: 2021-10-13 | End: 2021-10-23

## 2021-10-13 RX ORDER — ORPHENADRINE CITRATE 100 MG/1
100 TABLET, EXTENDED RELEASE ORAL 2 TIMES DAILY
Qty: 20 TABLET | Refills: 0 | Status: SHIPPED | OUTPATIENT
Start: 2021-10-13 | End: 2021-10-13 | Stop reason: SDUPTHER

## 2021-10-13 RX ORDER — PREDNISONE 20 MG/1
40 TABLET ORAL DAILY
Qty: 10 TABLET | Refills: 0 | Status: SHIPPED | OUTPATIENT
Start: 2021-10-13 | End: 2021-10-18

## 2021-10-14 ENCOUNTER — CLINICAL SUPPORT (OUTPATIENT)
Dept: REHABILITATION | Facility: HOSPITAL | Age: 46
End: 2021-10-14
Payer: COMMERCIAL

## 2021-10-14 DIAGNOSIS — M79.18 ACUTE BUTTOCK PAIN: ICD-10-CM

## 2021-10-14 DIAGNOSIS — M54.50 CHRONIC LEFT-SIDED LOW BACK PAIN WITHOUT SCIATICA: ICD-10-CM

## 2021-10-14 DIAGNOSIS — G89.29 CHRONIC LEFT-SIDED LOW BACK PAIN WITHOUT SCIATICA: ICD-10-CM

## 2021-10-14 DIAGNOSIS — M43.27 FUSION OF SPINE, LUMBOSACRAL REGION: ICD-10-CM

## 2021-10-14 PROCEDURE — 97110 THERAPEUTIC EXERCISES: CPT | Mod: PN

## 2021-10-14 PROCEDURE — 97112 NEUROMUSCULAR REEDUCATION: CPT | Mod: PN

## 2021-10-27 ENCOUNTER — TELEPHONE (OUTPATIENT)
Dept: REHABILITATION | Facility: HOSPITAL | Age: 46
End: 2021-10-27
Payer: COMMERCIAL

## 2021-10-28 ENCOUNTER — PATIENT OUTREACH (OUTPATIENT)
Dept: ADMINISTRATIVE | Facility: OTHER | Age: 46
End: 2021-10-28
Payer: COMMERCIAL

## 2021-11-01 ENCOUNTER — OFFICE VISIT (OUTPATIENT)
Dept: NEUROSURGERY | Facility: CLINIC | Age: 46
End: 2021-11-01
Payer: COMMERCIAL

## 2021-11-01 ENCOUNTER — TELEPHONE (OUTPATIENT)
Dept: NEUROSURGERY | Facility: CLINIC | Age: 46
End: 2021-11-01

## 2021-11-01 ENCOUNTER — HOSPITAL ENCOUNTER (OUTPATIENT)
Dept: RADIOLOGY | Facility: HOSPITAL | Age: 46
Discharge: HOME | End: 2021-11-01
Attending: NEUROLOGICAL SURGERY
Payer: COMMERCIAL

## 2021-11-01 VITALS — DIASTOLIC BLOOD PRESSURE: 74 MMHG | HEART RATE: 76 BPM | SYSTOLIC BLOOD PRESSURE: 132 MMHG

## 2021-11-01 DIAGNOSIS — Z98.1 S/P LUMBAR FUSION: ICD-10-CM

## 2021-11-01 DIAGNOSIS — Z98.1 STATUS POST LUMBAR SPINAL FUSION: ICD-10-CM

## 2021-11-01 DIAGNOSIS — M53.3 SACROILIAC JOINT DYSFUNCTION OF BOTH SIDES: Primary | ICD-10-CM

## 2021-11-01 PROCEDURE — 72082 X-RAY EXAM ENTIRE SPI 2/3 VW: CPT | Mod: TC,PN

## 2021-11-01 PROCEDURE — 3072F LOW RISK FOR RETINOPATHY: CPT | Mod: CPTII,S$GLB,, | Performed by: NEUROLOGICAL SURGERY

## 2021-11-01 PROCEDURE — 72082 XR SCOLIOSIS COMPLETE: ICD-10-PCS | Mod: 26,,, | Performed by: RADIOLOGY

## 2021-11-01 PROCEDURE — 3078F PR MOST RECENT DIASTOLIC BLOOD PRESSURE < 80 MM HG: ICD-10-PCS | Mod: CPTII,S$GLB,, | Performed by: NEUROLOGICAL SURGERY

## 2021-11-01 PROCEDURE — 3046F HEMOGLOBIN A1C LEVEL >9.0%: CPT | Mod: CPTII,S$GLB,, | Performed by: NEUROLOGICAL SURGERY

## 2021-11-01 PROCEDURE — 1159F MED LIST DOCD IN RCRD: CPT | Mod: CPTII,S$GLB,, | Performed by: NEUROLOGICAL SURGERY

## 2021-11-01 PROCEDURE — 99999 PR PBB SHADOW E&M-EST. PATIENT-LVL III: ICD-10-PCS | Mod: PBBFAC,,, | Performed by: NEUROLOGICAL SURGERY

## 2021-11-01 PROCEDURE — 3075F SYST BP GE 130 - 139MM HG: CPT | Mod: CPTII,S$GLB,, | Performed by: NEUROLOGICAL SURGERY

## 2021-11-01 PROCEDURE — 99213 PR OFFICE/OUTPT VISIT, EST, LEVL III, 20-29 MIN: ICD-10-PCS | Mod: S$GLB,,, | Performed by: NEUROLOGICAL SURGERY

## 2021-11-01 PROCEDURE — 72082 X-RAY EXAM ENTIRE SPI 2/3 VW: CPT | Mod: 26,,, | Performed by: RADIOLOGY

## 2021-11-01 PROCEDURE — 3078F DIAST BP <80 MM HG: CPT | Mod: CPTII,S$GLB,, | Performed by: NEUROLOGICAL SURGERY

## 2021-11-01 PROCEDURE — 99999 PR PBB SHADOW E&M-EST. PATIENT-LVL III: CPT | Mod: PBBFAC,,, | Performed by: NEUROLOGICAL SURGERY

## 2021-11-01 PROCEDURE — 99213 OFFICE O/P EST LOW 20 MIN: CPT | Mod: S$GLB,,, | Performed by: NEUROLOGICAL SURGERY

## 2021-11-01 PROCEDURE — 3075F PR MOST RECENT SYSTOLIC BLOOD PRESS GE 130-139MM HG: ICD-10-PCS | Mod: CPTII,S$GLB,, | Performed by: NEUROLOGICAL SURGERY

## 2021-11-01 PROCEDURE — 3072F PR LOW RISK FOR RETINOPATHY: ICD-10-PCS | Mod: CPTII,S$GLB,, | Performed by: NEUROLOGICAL SURGERY

## 2021-11-01 PROCEDURE — 1159F PR MEDICATION LIST DOCUMENTED IN MEDICAL RECORD: ICD-10-PCS | Mod: CPTII,S$GLB,, | Performed by: NEUROLOGICAL SURGERY

## 2021-11-01 PROCEDURE — 3046F PR MOST RECENT HEMOGLOBIN A1C LEVEL > 9.0%: ICD-10-PCS | Mod: CPTII,S$GLB,, | Performed by: NEUROLOGICAL SURGERY

## 2021-11-01 RX ORDER — TRAMADOL HYDROCHLORIDE 50 MG/1
50 TABLET ORAL EVERY 8 HOURS PRN
Qty: 45 TABLET | Refills: 1 | Status: SHIPPED | OUTPATIENT
Start: 2021-11-01 | End: 2022-04-06 | Stop reason: CLARIF

## 2021-11-02 ENCOUNTER — TELEPHONE (OUTPATIENT)
Dept: NEUROSURGERY | Facility: CLINIC | Age: 46
End: 2021-11-02
Payer: COMMERCIAL

## 2021-11-02 DIAGNOSIS — M53.3 SACROILIAC JOINT DYSFUNCTION OF BOTH SIDES: Primary | ICD-10-CM

## 2021-11-03 ENCOUNTER — PATIENT MESSAGE (OUTPATIENT)
Dept: NEUROSURGERY | Facility: CLINIC | Age: 46
End: 2021-11-03
Payer: COMMERCIAL

## 2021-11-04 DIAGNOSIS — E11.9 TYPE 2 DIABETES MELLITUS WITHOUT COMPLICATION: ICD-10-CM

## 2021-11-08 ENCOUNTER — TELEPHONE (OUTPATIENT)
Dept: NEUROSURGERY | Facility: CLINIC | Age: 46
End: 2021-11-08
Payer: COMMERCIAL

## 2021-11-10 RX ORDER — METHOCARBAMOL 750 MG/1
1500 TABLET, FILM COATED ORAL
Qty: 180 TABLET | Refills: 6 | Status: SHIPPED | OUTPATIENT
Start: 2021-11-10 | End: 2021-11-20

## 2021-11-10 RX ORDER — METHOCARBAMOL 750 MG/1
750 TABLET, FILM COATED ORAL
Qty: 90 TABLET | Refills: 2 | Status: SHIPPED | OUTPATIENT
Start: 2021-11-10 | End: 2021-11-10 | Stop reason: SDUPTHER

## 2021-11-15 ENCOUNTER — PATIENT MESSAGE (OUTPATIENT)
Dept: SURGERY | Facility: HOSPITAL | Age: 46
End: 2021-11-15
Payer: COMMERCIAL

## 2021-11-16 ENCOUNTER — TELEPHONE (OUTPATIENT)
Dept: NEUROSURGERY | Facility: CLINIC | Age: 46
End: 2021-11-16
Payer: COMMERCIAL

## 2021-11-16 DIAGNOSIS — Z98.1 S/P LUMBAR FUSION: ICD-10-CM

## 2021-11-16 DIAGNOSIS — R29.898 WEAKNESS OF BOTH LOWER EXTREMITIES: Primary | ICD-10-CM

## 2021-11-23 ENCOUNTER — PATIENT MESSAGE (OUTPATIENT)
Dept: SURGERY | Facility: HOSPITAL | Age: 46
End: 2021-11-23
Payer: COMMERCIAL

## 2021-11-23 ENCOUNTER — HOSPITAL ENCOUNTER (OUTPATIENT)
Dept: RADIOLOGY | Facility: HOSPITAL | Age: 46
Discharge: HOME | End: 2021-11-23
Attending: NEUROLOGICAL SURGERY
Payer: COMMERCIAL

## 2021-11-23 DIAGNOSIS — Z98.1 S/P LUMBAR FUSION: ICD-10-CM

## 2021-11-23 DIAGNOSIS — R29.898 WEAKNESS OF BOTH LOWER EXTREMITIES: ICD-10-CM

## 2021-11-23 PROCEDURE — 72146 MRI SPINE CERVICAL-THORACIC-LUMBAR WITHOUT CONTRAST (XPD): ICD-10-PCS | Mod: 26,,, | Performed by: RADIOLOGY

## 2021-11-23 PROCEDURE — 72148 MRI LUMBAR SPINE W/O DYE: CPT | Mod: 26,,, | Performed by: RADIOLOGY

## 2021-11-23 PROCEDURE — 72148 MRI SPINE CERVICAL-THORACIC-LUMBAR WITHOUT CONTRAST (XPD): ICD-10-PCS | Mod: 26,,, | Performed by: RADIOLOGY

## 2021-11-23 PROCEDURE — 72146 MRI CHEST SPINE W/O DYE: CPT | Mod: TC

## 2021-11-23 PROCEDURE — 72146 MRI CHEST SPINE W/O DYE: CPT | Mod: 26,,, | Performed by: RADIOLOGY

## 2021-11-23 PROCEDURE — 72141 MRI SPINE CERVICAL-THORACIC-LUMBAR WITHOUT CONTRAST (XPD): ICD-10-PCS | Mod: 26,,, | Performed by: RADIOLOGY

## 2021-11-23 PROCEDURE — 72141 MRI NECK SPINE W/O DYE: CPT | Mod: 26,,, | Performed by: RADIOLOGY

## 2021-11-23 RX ORDER — OXYCODONE AND ACETAMINOPHEN 10; 325 MG/1; MG/1
1 TABLET ORAL EVERY 8 HOURS PRN
Qty: 90 TABLET | Refills: 0 | Status: SHIPPED | OUTPATIENT
Start: 2021-11-23 | End: 2022-02-14 | Stop reason: SDUPTHER

## 2021-11-26 ENCOUNTER — DOCUMENTATION ONLY (OUTPATIENT)
Dept: NEUROSURGERY | Facility: CLINIC | Age: 46
End: 2021-11-26
Payer: COMMERCIAL

## 2021-11-29 ENCOUNTER — TELEPHONE (OUTPATIENT)
Dept: NEUROSURGERY | Facility: CLINIC | Age: 46
End: 2021-11-29
Payer: COMMERCIAL

## 2021-11-30 ENCOUNTER — PATIENT MESSAGE (OUTPATIENT)
Dept: NEUROSURGERY | Facility: CLINIC | Age: 46
End: 2021-11-30
Payer: COMMERCIAL

## 2021-12-09 ENCOUNTER — TELEPHONE (OUTPATIENT)
Dept: FAMILY MEDICINE | Facility: CLINIC | Age: 46
End: 2021-12-09
Payer: COMMERCIAL

## 2021-12-09 ENCOUNTER — LAB VISIT (OUTPATIENT)
Dept: LAB | Facility: HOSPITAL | Age: 46
End: 2021-12-09
Attending: FAMILY MEDICINE
Payer: COMMERCIAL

## 2021-12-09 DIAGNOSIS — E11.9 TYPE 2 DIABETES MELLITUS WITHOUT COMPLICATION: ICD-10-CM

## 2021-12-09 DIAGNOSIS — E11.8 TYPE 2 DIABETES MELLITUS WITH COMPLICATION, WITHOUT LONG-TERM CURRENT USE OF INSULIN: ICD-10-CM

## 2021-12-09 DIAGNOSIS — E11.8 TYPE 2 DIABETES MELLITUS WITH COMPLICATION, WITHOUT LONG-TERM CURRENT USE OF INSULIN: Primary | ICD-10-CM

## 2021-12-09 LAB
CHOLEST SERPL-MCNC: 158 MG/DL (ref 120–199)
CHOLEST/HDLC SERPL: 4.4 {RATIO} (ref 2–5)
ESTIMATED AVG GLUCOSE: 252 MG/DL (ref 68–131)
HBA1C MFR BLD: 10.4 % (ref 4–5.6)
HDLC SERPL-MCNC: 36 MG/DL (ref 40–75)
HDLC SERPL: 22.8 % (ref 20–50)
LDLC SERPL CALC-MCNC: 67.2 MG/DL (ref 63–159)
NONHDLC SERPL-MCNC: 122 MG/DL
TRIGL SERPL-MCNC: 274 MG/DL (ref 30–150)

## 2021-12-09 PROCEDURE — 36415 COLL VENOUS BLD VENIPUNCTURE: CPT | Mod: PO | Performed by: FAMILY MEDICINE

## 2021-12-09 PROCEDURE — 83036 HEMOGLOBIN GLYCOSYLATED A1C: CPT | Performed by: FAMILY MEDICINE

## 2021-12-09 PROCEDURE — 80061 LIPID PANEL: CPT | Performed by: FAMILY MEDICINE

## 2021-12-09 RX ORDER — DULAGLUTIDE 1.5 MG/.5ML
1.5 INJECTION, SOLUTION SUBCUTANEOUS WEEKLY
Qty: 12 PEN | Refills: 3 | Status: SHIPPED | OUTPATIENT
Start: 2021-12-09 | End: 2022-02-16

## 2021-12-13 ENCOUNTER — HOSPITAL ENCOUNTER (OUTPATIENT)
Facility: HOSPITAL | Age: 46
Discharge: HOME | End: 2021-12-13
Attending: NEUROLOGICAL SURGERY | Admitting: NEUROLOGICAL SURGERY
Payer: COMMERCIAL

## 2021-12-13 ENCOUNTER — ANESTHESIA EVENT (OUTPATIENT)
Dept: SURGERY | Facility: HOSPITAL | Age: 46
End: 2021-12-13
Payer: COMMERCIAL

## 2021-12-13 ENCOUNTER — ANESTHESIA (OUTPATIENT)
Dept: SURGERY | Facility: HOSPITAL | Age: 46
End: 2021-12-13
Payer: COMMERCIAL

## 2021-12-13 VITALS
BODY MASS INDEX: 42.66 KG/M2 | HEART RATE: 62 BPM | TEMPERATURE: 98 F | RESPIRATION RATE: 18 BRPM | DIASTOLIC BLOOD PRESSURE: 59 MMHG | WEIGHT: 315 LBS | SYSTOLIC BLOOD PRESSURE: 110 MMHG | OXYGEN SATURATION: 99 % | HEIGHT: 72 IN

## 2021-12-13 DIAGNOSIS — M53.3 SACROILIAC JOINT DYSFUNCTION OF BOTH SIDES: Primary | ICD-10-CM

## 2021-12-13 DIAGNOSIS — M53.3 SI (SACROILIAC) JOINT DYSFUNCTION: ICD-10-CM

## 2021-12-13 LAB — POCT GLUCOSE: 241 MG/DL (ref 70–110)

## 2021-12-13 PROCEDURE — 25000003 PHARM REV CODE 250: Performed by: NEUROLOGICAL SURGERY

## 2021-12-13 PROCEDURE — 71000016 HC POSTOP RECOV ADDL HR: Performed by: NEUROLOGICAL SURGERY

## 2021-12-13 PROCEDURE — 27096 INJECT SACROILIAC JOINT: CPT | Mod: 50,,, | Performed by: NEUROLOGICAL SURGERY

## 2021-12-13 PROCEDURE — 63600175 PHARM REV CODE 636 W HCPCS: Performed by: NEUROLOGICAL SURGERY

## 2021-12-13 PROCEDURE — 25500020 PHARM REV CODE 255: Performed by: NEUROLOGICAL SURGERY

## 2021-12-13 PROCEDURE — 36000705 HC OR TIME LEV I EA ADD 15 MIN: Performed by: NEUROLOGICAL SURGERY

## 2021-12-13 PROCEDURE — 25000003 PHARM REV CODE 250: Performed by: NURSE ANESTHETIST, CERTIFIED REGISTERED

## 2021-12-13 PROCEDURE — 37000008 HC ANESTHESIA 1ST 15 MINUTES: Performed by: NEUROLOGICAL SURGERY

## 2021-12-13 PROCEDURE — 37000009 HC ANESTHESIA EA ADD 15 MINS: Performed by: NEUROLOGICAL SURGERY

## 2021-12-13 PROCEDURE — 63600175 PHARM REV CODE 636 W HCPCS: Performed by: NURSE ANESTHETIST, CERTIFIED REGISTERED

## 2021-12-13 PROCEDURE — 36000704 HC OR TIME LEV I 1ST 15 MIN: Performed by: NEUROLOGICAL SURGERY

## 2021-12-13 PROCEDURE — 71000015 HC POSTOP RECOV 1ST HR: Performed by: NEUROLOGICAL SURGERY

## 2021-12-13 PROCEDURE — 27096 PR INJECTION,SACROILIAC JOINT: ICD-10-PCS | Mod: 50,,, | Performed by: NEUROLOGICAL SURGERY

## 2021-12-13 RX ORDER — PROPOFOL 10 MG/ML
VIAL (ML) INTRAVENOUS
Status: DISCONTINUED | OUTPATIENT
Start: 2021-12-13 | End: 2021-12-13

## 2021-12-13 RX ORDER — PROPOFOL 10 MG/ML
VIAL (ML) INTRAVENOUS CONTINUOUS PRN
Status: DISCONTINUED | OUTPATIENT
Start: 2021-12-13 | End: 2021-12-13

## 2021-12-13 RX ORDER — BUPIVACAINE HYDROCHLORIDE 2.5 MG/ML
INJECTION, SOLUTION INFILTRATION; PERINEURAL
Status: DISCONTINUED | OUTPATIENT
Start: 2021-12-13 | End: 2021-12-13 | Stop reason: HOSPADM

## 2021-12-13 RX ORDER — LIDOCAINE HCL/PF 100 MG/5ML
SYRINGE (ML) INTRAVENOUS
Status: DISCONTINUED | OUTPATIENT
Start: 2021-12-13 | End: 2021-12-13

## 2021-12-13 RX ORDER — METHYLPREDNISOLONE ACETATE 40 MG/ML
INJECTION, SUSPENSION INTRA-ARTICULAR; INTRALESIONAL; INTRAMUSCULAR; SOFT TISSUE
Status: DISCONTINUED | OUTPATIENT
Start: 2021-12-13 | End: 2021-12-13 | Stop reason: HOSPADM

## 2021-12-13 RX ORDER — LIDOCAINE HYDROCHLORIDE 10 MG/ML
INJECTION INFILTRATION; PERINEURAL
Status: DISCONTINUED | OUTPATIENT
Start: 2021-12-13 | End: 2021-12-13 | Stop reason: HOSPADM

## 2021-12-13 RX ADMIN — PROPOFOL 20 MG: 10 INJECTION, EMULSION INTRAVENOUS at 07:12

## 2021-12-13 RX ADMIN — SODIUM CHLORIDE, SODIUM LACTATE, POTASSIUM CHLORIDE, AND CALCIUM CHLORIDE: .6; .31; .03; .02 INJECTION, SOLUTION INTRAVENOUS at 06:12

## 2021-12-13 RX ADMIN — LIDOCAINE HYDROCHLORIDE 50 MG: 20 INJECTION, SOLUTION INTRAVENOUS at 07:12

## 2021-12-13 RX ADMIN — PROPOFOL 150 MCG/KG/MIN: 10 INJECTION, EMULSION INTRAVENOUS at 07:12

## 2021-12-13 RX ADMIN — PROPOFOL 70 MG: 10 INJECTION, EMULSION INTRAVENOUS at 07:12

## 2021-12-14 ENCOUNTER — CLINICAL SUPPORT (OUTPATIENT)
Dept: REHABILITATION | Facility: HOSPITAL | Age: 46
End: 2021-12-14
Attending: NEUROLOGICAL SURGERY
Payer: COMMERCIAL

## 2021-12-14 ENCOUNTER — TELEPHONE (OUTPATIENT)
Dept: FAMILY MEDICINE | Facility: CLINIC | Age: 46
End: 2021-12-14
Payer: COMMERCIAL

## 2021-12-14 DIAGNOSIS — M53.3 SACROILIAC JOINT DYSFUNCTION OF BOTH SIDES: ICD-10-CM

## 2021-12-14 DIAGNOSIS — M54.17 RADICULOPATHY OF LUMBOSACRAL REGION: ICD-10-CM

## 2021-12-14 DIAGNOSIS — R26.2 DIFFICULTY WALKING: ICD-10-CM

## 2021-12-14 DIAGNOSIS — M54.41 CHRONIC BILATERAL LOW BACK PAIN WITH BILATERAL SCIATICA: ICD-10-CM

## 2021-12-14 DIAGNOSIS — M54.42 CHRONIC BILATERAL LOW BACK PAIN WITH BILATERAL SCIATICA: ICD-10-CM

## 2021-12-14 DIAGNOSIS — R29.898 WEAKNESS OF BOTH LOWER EXTREMITIES: ICD-10-CM

## 2021-12-14 DIAGNOSIS — G89.29 CHRONIC BILATERAL LOW BACK PAIN WITH BILATERAL SCIATICA: ICD-10-CM

## 2021-12-14 PROCEDURE — 97110 THERAPEUTIC EXERCISES: CPT | Mod: PN

## 2021-12-14 PROCEDURE — 97163 PT EVAL HIGH COMPLEX 45 MIN: CPT | Mod: PN

## 2021-12-15 DIAGNOSIS — G96.198 SPINAL ARACHNOID CYST: Primary | ICD-10-CM

## 2021-12-16 ENCOUNTER — PATIENT MESSAGE (OUTPATIENT)
Dept: NEUROSURGERY | Facility: CLINIC | Age: 46
End: 2021-12-16
Payer: COMMERCIAL

## 2021-12-16 ENCOUNTER — PATIENT OUTREACH (OUTPATIENT)
Dept: ADMINISTRATIVE | Facility: OTHER | Age: 46
End: 2021-12-16
Payer: COMMERCIAL

## 2021-12-17 ENCOUNTER — TELEPHONE (OUTPATIENT)
Dept: NEUROSURGERY | Facility: CLINIC | Age: 46
End: 2021-12-17
Payer: COMMERCIAL

## 2021-12-17 DIAGNOSIS — M89.9 LESION OF LUMBAR SPINE: Primary | ICD-10-CM

## 2021-12-18 DIAGNOSIS — G95.9 ACUTE MYELOPATHY: Primary | ICD-10-CM

## 2021-12-20 ENCOUNTER — CLINICAL SUPPORT (OUTPATIENT)
Dept: DIABETES | Facility: CLINIC | Age: 46
End: 2021-12-20
Payer: COMMERCIAL

## 2021-12-20 DIAGNOSIS — E11.8 TYPE 2 DIABETES MELLITUS WITH COMPLICATION, WITHOUT LONG-TERM CURRENT USE OF INSULIN: ICD-10-CM

## 2021-12-20 PROCEDURE — 99999 PR PBB SHADOW E&M-EST. PATIENT-LVL I: ICD-10-PCS | Mod: PBBFAC,,, | Performed by: DIETITIAN, REGISTERED

## 2021-12-20 PROCEDURE — G0108 DIAB MANAGE TRN  PER INDIV: HCPCS | Mod: S$GLB,,, | Performed by: DIETITIAN, REGISTERED

## 2021-12-20 PROCEDURE — G0108 PR DIAB MANAGE TRN  PER INDIV: ICD-10-PCS | Mod: S$GLB,,, | Performed by: DIETITIAN, REGISTERED

## 2021-12-20 PROCEDURE — 99999 PR PBB SHADOW E&M-EST. PATIENT-LVL I: CPT | Mod: PBBFAC,,, | Performed by: DIETITIAN, REGISTERED

## 2021-12-29 ENCOUNTER — OFFICE VISIT (OUTPATIENT)
Dept: NEUROSURGERY | Facility: CLINIC | Age: 46
End: 2021-12-29
Payer: COMMERCIAL

## 2021-12-29 DIAGNOSIS — G96.198 ARACHNOID CYST OF SPINE: Primary | ICD-10-CM

## 2021-12-29 DIAGNOSIS — Z98.1 STATUS POST LUMBAR AND LUMBOSACRAL FUSION BY ANTERIOR TECHNIQUE: ICD-10-CM

## 2021-12-29 DIAGNOSIS — M53.3 SACROILIAC JOINT DYSFUNCTION OF BOTH SIDES: ICD-10-CM

## 2021-12-29 PROCEDURE — 3061F PR NEG MICROALBUMINURIA RESULT DOCUMENTED/REVIEW: ICD-10-PCS | Mod: CPTII,95,, | Performed by: NEUROLOGICAL SURGERY

## 2021-12-29 PROCEDURE — 3046F PR MOST RECENT HEMOGLOBIN A1C LEVEL > 9.0%: ICD-10-PCS | Mod: CPTII,95,, | Performed by: NEUROLOGICAL SURGERY

## 2021-12-29 PROCEDURE — 99212 OFFICE O/P EST SF 10 MIN: CPT | Mod: 95,,, | Performed by: NEUROLOGICAL SURGERY

## 2021-12-29 PROCEDURE — 3072F PR LOW RISK FOR RETINOPATHY: ICD-10-PCS | Mod: CPTII,95,, | Performed by: NEUROLOGICAL SURGERY

## 2021-12-29 PROCEDURE — 3072F LOW RISK FOR RETINOPATHY: CPT | Mod: CPTII,95,, | Performed by: NEUROLOGICAL SURGERY

## 2021-12-29 PROCEDURE — 3066F NEPHROPATHY DOC TX: CPT | Mod: CPTII,95,, | Performed by: NEUROLOGICAL SURGERY

## 2021-12-29 PROCEDURE — 3061F NEG MICROALBUMINURIA REV: CPT | Mod: CPTII,95,, | Performed by: NEUROLOGICAL SURGERY

## 2021-12-29 PROCEDURE — 3066F PR DOCUMENTATION OF TREATMENT FOR NEPHROPATHY: ICD-10-PCS | Mod: CPTII,95,, | Performed by: NEUROLOGICAL SURGERY

## 2021-12-29 PROCEDURE — 99212 PR OFFICE/OUTPT VISIT, EST, LEVL II, 10-19 MIN: ICD-10-PCS | Mod: 95,,, | Performed by: NEUROLOGICAL SURGERY

## 2021-12-29 PROCEDURE — 3046F HEMOGLOBIN A1C LEVEL >9.0%: CPT | Mod: CPTII,95,, | Performed by: NEUROLOGICAL SURGERY

## 2022-01-02 ENCOUNTER — PATIENT MESSAGE (OUTPATIENT)
Dept: NEUROSURGERY | Facility: CLINIC | Age: 47
End: 2022-01-02
Payer: COMMERCIAL

## 2022-01-03 NOTE — PROGRESS NOTES
[facility]   Physical Therapy Treatment Note     [facility]  [facility] [id number]    Therapy Diagnosis:   Encounter Diagnoses   Name Primary?    Chronic bilateral low back pain with bilateral sciatica     Weakness of both lower extremities     Difficulty walking     Radiculopathy of lumbosacral region      Physician: Jose L Brown MD    Visit Date: 1/4/2022      Physician Orders: PT Eval and Treat   Medical Diagnosis from Referral: Sacroiliac joint dysfunction of both sides [M53.3]  Evaluation Date: 12/14/2021  Authorization Period Expiration: 12/13/2022  Plan of Care Expiration: 03/04/2022  Progress Note Due: 01/14/2022  Visit # / Visits authorized: 1/ pending   FOTO: 1/ 3      Precautions: Standard and Diabetes, hx of lumbar spine laminectomy (2019), lumbar spine fusion (04/2021)     Time In: 1035   Time Out: 1115  Total Appointment Time (timed & untimed codes):40 minutes      SUBJECTIVE     Pt reports: pain today. Continued numbness/tingling and pain into buttocks. He stated his symptoms are worsened when he goes to stand or lay down.   He was compliant with home exercise program.  Response to previous treatment: good  Functional change: none to note    Pain: 10/10  Location: bilateral back      OBJECTIVE     Objective Measures updated at progress report unless specified.     Treatment     Kirill received the treatments listed below:      therapeutic exercises to develop strength, endurance, ROM, flexibility, posture and core stabilization for 40 minutes including:    Nustep x 6 min  Seated LAQ 2 x 10  Seated HS curl RTB 2 x 10  Glute sets                                           2 x 10  Left hip flexion Iso                            2 x 10  Posterior pelvic tilt                           2 x 10  Bridges 2 x 10 -unable to perform  Hip add ball squeeze 2 x 10  Clamshells 2 x 10  LTR 2 x 10        cold pack for 10 minutes to low back.        Patient  Education and Home Exercises     Home Exercises Provided and Patient Education Provided     Education provided:   -     Written Home Exercises Provided: Patient instructed to cont prior HEP. Exercises were reviewed and Kirill was able to demonstrate them prior to the end of the session.  Kirill demonstrated good  understanding of the education provided. See EMR under Patient Instructions for exercises provided during therapy sessions    ASSESSMENT     Pt tolerated 1st treatment session post PT eval fairly well. He enters clinic with RW and notable reliance on UE's. Pain expressed and noted with transitional mobility (sit <>stand and supine <> sit).  Introduced and prescribed therex performed to address the pt's deficits in LE strength, core strength/stabilization, postural awareness and tolerance to activity. No adverse reaction to note. Performed to pt's tolerance.          Kirill Is progressing well towards his goals.   Pt prognosis is Good.     Pt will continue to benefit from skilled outpatient physical therapy to address the deficits listed in the problem list box on initial evaluation, provide pt/family education and to maximize pt's level of independence in the home and community environment.     Pt's spiritual, cultural and educational needs considered and pt agreeable to plan of care and goals.     Anticipated barriers to physical therapy: chronicity of symptoms, multiple body parts       Goals:   Short Term Goals: 4 weeks   1. The patient with report compliance with HEP to maximize functional outcomes.  2. The patient will demonstrate increase in BLE MMT by 1/2 grade to improve pt's functional mobility  3. The patient will decrease pain level by 50% in order to improve QOL.     Long Term Goals: 12 weeks   1. The patient will demonstrate understanding and performance of advanced HEP to allow for independence once discharged from therapy.   2. 2. The patient will demonstrate increase in BLE MMT by 1 grade to  improve pt's functional mobility  3. The patient will report 64 % functional limitation on FOTO to indicate an improvement in overall function.  4. The patient will be able to perform supine<>sit transfer w/o pain in order to improve pt's ability to sleep.  5. Pt will perform 10 squats w/ HHA w/o pain in order to improve pt's functional mobility.  6. Pt will be able to walk 5' w/o AD in order to improve pt's community ambulation      PLAN     Plan of care Certification: 12/14/2021 to 03/04/2022.     Outpatient Physical Therapy 2 times weekly for 6 weeks to include the following interventions: Cervical/Lumbar Traction, Electrical Stimulation Dry Needling/cupping, Gait Training, Manual Therapy, Moist Heat/ Ice, Neuromuscular Re-ed, Patient Education, Self Care, Therapeutic Activites and Therapeutic Exercise.       Sandra Paula, PTA

## 2022-01-04 ENCOUNTER — CLINICAL SUPPORT (OUTPATIENT)
Dept: REHABILITATION | Facility: HOSPITAL | Age: 47
End: 2022-01-04
Attending: NEUROLOGICAL SURGERY
Payer: COMMERCIAL

## 2022-01-04 DIAGNOSIS — G89.29 CHRONIC BILATERAL LOW BACK PAIN WITH BILATERAL SCIATICA: ICD-10-CM

## 2022-01-04 DIAGNOSIS — M54.41 CHRONIC BILATERAL LOW BACK PAIN WITH BILATERAL SCIATICA: ICD-10-CM

## 2022-01-04 DIAGNOSIS — R26.2 DIFFICULTY WALKING: ICD-10-CM

## 2022-01-04 DIAGNOSIS — M54.17 RADICULOPATHY OF LUMBOSACRAL REGION: ICD-10-CM

## 2022-01-04 DIAGNOSIS — R29.898 WEAKNESS OF BOTH LOWER EXTREMITIES: ICD-10-CM

## 2022-01-04 DIAGNOSIS — M54.42 CHRONIC BILATERAL LOW BACK PAIN WITH BILATERAL SCIATICA: ICD-10-CM

## 2022-01-04 PROCEDURE — 97110 THERAPEUTIC EXERCISES: CPT | Mod: PN,CQ

## 2022-01-06 ENCOUNTER — CLINICAL SUPPORT (OUTPATIENT)
Dept: REHABILITATION | Facility: HOSPITAL | Age: 47
End: 2022-01-06
Attending: NEUROLOGICAL SURGERY
Payer: COMMERCIAL

## 2022-01-06 DIAGNOSIS — M54.41 CHRONIC BILATERAL LOW BACK PAIN WITH BILATERAL SCIATICA: ICD-10-CM

## 2022-01-06 DIAGNOSIS — M54.17 RADICULOPATHY OF LUMBOSACRAL REGION: ICD-10-CM

## 2022-01-06 DIAGNOSIS — G89.29 CHRONIC BILATERAL LOW BACK PAIN WITH BILATERAL SCIATICA: ICD-10-CM

## 2022-01-06 DIAGNOSIS — R29.898 WEAKNESS OF BOTH LOWER EXTREMITIES: ICD-10-CM

## 2022-01-06 DIAGNOSIS — R26.2 DIFFICULTY WALKING: ICD-10-CM

## 2022-01-06 DIAGNOSIS — M54.42 CHRONIC BILATERAL LOW BACK PAIN WITH BILATERAL SCIATICA: ICD-10-CM

## 2022-01-06 PROCEDURE — 97110 THERAPEUTIC EXERCISES: CPT | Mod: PN,CQ

## 2022-01-06 NOTE — PROGRESS NOTES
[facility]   Physical Therapy Treatment Note     [facility]  [facility] [id number]    Therapy Diagnosis:   Encounter Diagnoses   Name Primary?    Chronic bilateral low back pain with bilateral sciatica     Weakness of both lower extremities     Difficulty walking     Radiculopathy of lumbosacral region      Physician: Jose L Brown MD    Visit Date: 1/6/2022      Physician Orders: PT Eval and Treat   Medical Diagnosis from Referral: Sacroiliac joint dysfunction of both sides [M53.3]  Evaluation Date: 12/14/2021  Authorization Period Expiration: 12/13/2022  Plan of Care Expiration: 03/04/2022  Progress Note Due: 01/14/2022  Visit # / Visits authorized: 2/ 20   FOTO: 1/ 3      Precautions: Standard and Diabetes, hx of lumbar spine laminectomy (2019), lumbar spine fusion (04/2021)     Time In: 1000   Time Out: 1055  Total Appointment Time (timed & untimed codes): 55 minutes      SUBJECTIVE     Pt reports: had increase in pain after last session. Pelvic exercises remain difficult and painful.   He was compliant with home exercise program.  Response to previous treatment: good  Functional change: none to note    Pain: 10/10  Location: bilateral back      OBJECTIVE     Objective Measures updated at progress report unless specified.     Treatment     Kirill received the treatments listed below:      therapeutic exercises to develop strength, endurance, ROM, flexibility, posture and core stabilization for 55 minutes including:    Nustep     x 6 min  Seated LAQ 2#    2 x 10  Seated HS curl GTB   2 x 10  +Seated row with RTB  2 x 10  +Seated no money with RTB 2 x 10  +Seated paloff press with RTB 2 x 10 ea  +seated flexion roll out with PB 10x  Glute sets                                          15 x ea  Left hip flexion Iso                            2 x 10  Posterior pelvic tilt                           15 x ea  Bridges 2 x 10 -unable to perform  Hip add ball  squeeze   15 x ea  + Hip fall out    15 x ea  Clamshells 2 x 10  LTR 2 x 10        cold pack for 10 minutes to low back.        Patient Education and Home Exercises     Home Exercises Provided and Patient Education Provided     Education provided:   -     Written Home Exercises Provided: Patient instructed to cont prior HEP. Exercises were reviewed and Kirill was able to demonstrate them prior to the end of the session.  Kirill demonstrated good  understanding of the education provided. See EMR under Patient Instructions for exercises provided during therapy sessions    ASSESSMENT     Kirill had improved tolerance to session today.  Progressed with postural, thoracic and lumbar strengthening/stabilization in seated position. No pain provocation to note with exercises. However he continues with pain with transitional mobility (sit <>stand and supine <> sit). Pt will benefit from continued PT interventions to help regain strength, activity tolerance and functional independence.         Kirill Is progressing well towards his goals.   Pt prognosis is Good.     Pt will continue to benefit from skilled outpatient physical therapy to address the deficits listed in the problem list box on initial evaluation, provide pt/family education and to maximize pt's level of independence in the home and community environment.     Pt's spiritual, cultural and educational needs considered and pt agreeable to plan of care and goals.     Anticipated barriers to physical therapy: chronicity of symptoms, multiple body parts       Goals:   Short Term Goals: 4 weeks   1. The patient with report compliance with HEP to maximize functional outcomes.  2. The patient will demonstrate increase in BLE MMT by 1/2 grade to improve pt's functional mobility  3. The patient will decrease pain level by 50% in order to improve QOL.     Long Term Goals: 12 weeks   1. The patient will demonstrate understanding and performance of advanced HEP to allow for  independence once discharged from therapy.   2. 2. The patient will demonstrate increase in BLE MMT by 1 grade to improve pt's functional mobility  3. The patient will report 64 % functional limitation on FOTO to indicate an improvement in overall function.  4. The patient will be able to perform supine<>sit transfer w/o pain in order to improve pt's ability to sleep.  5. Pt will perform 10 squats w/ HHA w/o pain in order to improve pt's functional mobility.  6. Pt will be able to walk 5' w/o AD in order to improve pt's community ambulation      PLAN     Plan of care Certification: 12/14/2021 to 03/04/2022.     Outpatient Physical Therapy 2 times weekly for 6 weeks to include the following interventions: Cervical/Lumbar Traction, Electrical Stimulation Dry Needling/cupping, Gait Training, Manual Therapy, Moist Heat/ Ice, Neuromuscular Re-ed, Patient Education, Self Care, Therapeutic Activites and Therapeutic Exercise.       Sandra Paula, PTA

## 2022-01-07 ENCOUNTER — TELEPHONE (OUTPATIENT)
Dept: NEUROSURGERY | Facility: CLINIC | Age: 47
End: 2022-01-07
Payer: COMMERCIAL

## 2022-01-07 NOTE — TELEPHONE ENCOUNTER
Please see the messages below and schedule the CT myelogram if it has not been done already.  Dr. Brown was asking if Glendy if she had figured it out.     Please respond to me and Dr. Brown with an update on this.    Thanks,  Lolly Duggan, [facility], PA-C  Neurosurgery  [facility]  01/07/2022

## 2022-01-07 NOTE — TELEPHONE ENCOUNTER
----- Message from Jose L Brown MD sent at 1/6/2022  2:49 PM CST -----  Regarding: RE: Myelogram  Did you figure out how to do it?    ----- Message -----  From: Glendy Shepard MA  Sent: 1/5/2022   8:43 AM CST  To: Lolly Duggan PA-C, #  Subject: FW: Myelogram                                    Same with this patient, he is scheduled for CT Myelogram but they are requesting a day of surgery bed request be put in. Dr. Brown is this something that you order or do I need to? Thanks, Glendy  ----- Message -----  From: RT Lulu  Sent: 1/4/2022   4:10 PM CST  To: Glendy Shepard MA  Subject: Myelogram                                        Hi Glendy,    Mr. Gandhi's myelogram has been approved and scheduled with the patient for 1/24.  You will need to place the DOSC request in for the same day.  The medications that he will need to be instructed to stop are aspirin and celebrex for 5 days, valium, fluoxetine, hydroxyzine HCL for 2 days.    Thank you  Bridgette Glass  Fluoroscopy Team

## 2022-01-11 ENCOUNTER — PATIENT MESSAGE (OUTPATIENT)
Dept: NEUROSURGERY | Facility: CLINIC | Age: 47
End: 2022-01-11
Payer: COMMERCIAL

## 2022-01-11 ENCOUNTER — CLINICAL SUPPORT (OUTPATIENT)
Dept: REHABILITATION | Facility: HOSPITAL | Age: 47
End: 2022-01-11
Attending: NEUROLOGICAL SURGERY
Payer: COMMERCIAL

## 2022-01-11 DIAGNOSIS — M54.42 CHRONIC BILATERAL LOW BACK PAIN WITH BILATERAL SCIATICA: ICD-10-CM

## 2022-01-11 DIAGNOSIS — G89.29 CHRONIC BILATERAL LOW BACK PAIN WITH BILATERAL SCIATICA: ICD-10-CM

## 2022-01-11 DIAGNOSIS — M54.17 RADICULOPATHY OF LUMBOSACRAL REGION: ICD-10-CM

## 2022-01-11 DIAGNOSIS — R26.2 DIFFICULTY WALKING: ICD-10-CM

## 2022-01-11 DIAGNOSIS — R29.898 WEAKNESS OF BOTH LOWER EXTREMITIES: ICD-10-CM

## 2022-01-11 DIAGNOSIS — M54.41 CHRONIC BILATERAL LOW BACK PAIN WITH BILATERAL SCIATICA: ICD-10-CM

## 2022-01-11 PROCEDURE — 97140 MANUAL THERAPY 1/> REGIONS: CPT | Mod: PN

## 2022-01-11 PROCEDURE — 97110 THERAPEUTIC EXERCISES: CPT | Mod: PN

## 2022-01-11 NOTE — PROGRESS NOTES
[facility]   Physical Therapy Treatment Note     [facility]  [facility] [id number]    Therapy Diagnosis:   Encounter Diagnoses   Name Primary?    Chronic bilateral low back pain with bilateral sciatica     Weakness of both lower extremities     Difficulty walking     Radiculopathy of lumbosacral region      Physician: Jose L Brown MD    Visit Date: 1/11/2022      Physician Orders: PT Eval and Treat   Medical Diagnosis from Referral: Sacroiliac joint dysfunction of both sides [M53.3]  Evaluation Date: 12/14/2021  Authorization Period Expiration: 12/13/2022  Plan of Care Expiration: 03/04/2022  Progress Note Due: 01/14/2022  Visit # / Visits authorized: 3/ 20   FOTO: 1/ 3      Precautions: Standard and Diabetes, hx of lumbar spine laminectomy (2019), lumbar spine fusion (04/2021)    Time In: 1520  Time Out: 1600  Total Appointment Time (timed & untimed codes): 40 minutes      SUBJECTIVE     Pt reports: that his back feels about the same, and it hurts most during transfers (10/10 pain). Pt reports that when he's just sitting still, his pain is at ~1/10.  He was compliant with home exercise program.  Response to previous treatment: good  Functional change: none to note    Pain: 1/10  Location: bilateral back      OBJECTIVE     Objective Measures updated at progress report unless specified.     Treatment     Kirill received the treatments listed below:      therapeutic exercises to develop strength, endurance, ROM, flexibility, posture and core stabilization for 25 minutes including:    Nustep     x 6 min  +Seated Pelvic Tilt   2 x 10  +Seated March   2 x 10  Seated LAQ 2#    2 x 10  Seated HS curl GTB   2 x 10  Seated row with RTB  2 x 10  Seated no money with RTB 2 x 10  Seated paloff press with RTB 2 x 10 ea  seated flexion roll out with PB 10x  Glute sets                                          15 x ea  Left hip flexion Iso                            2 x  10  Posterior pelvic tilt                           15 x ea  Bridges     2 x 10  +SLR     2 x 10  Hip add ball squeeze   15 x ea  + Hip fall out    15 x ea   Clamshells 2 x 10  LTR 2 x 10    Kirill received the following manual therapy techniques:  were applied for 15 minutes, including:  STM lumbar spine Paraspinals       cold pack for 00 minutes to low back.        Patient Education and Home Exercises     Home Exercises Provided and Patient Education Provided     Education provided:   -     Written Home Exercises Provided: Patient instructed to cont prior HEP. Exercises were reviewed and Kirill was able to demonstrate them prior to the end of the session.  Kirill demonstrated good  understanding of the education provided. See EMR under Patient Instructions for exercises provided during therapy sessions    ASSESSMENT     Pt tolerated tx well. After STM, pt reported significant relief in pain w/ active movement and transfers. Pt was able to perform a bridge and SLR on his first attempt. As a result, pt was progressed with greater lumbar stability exercises. Therapist provided verbal/tactile cues to maintain proper form. Pt reported no adverse effects to exercises. Pt would benefit from continued skilled physical therapy in order to reach pt's goals.         Kirill Is progressing well towards his goals.   Pt prognosis is Good.     Pt will continue to benefit from skilled outpatient physical therapy to address the deficits listed in the problem list box on initial evaluation, provide pt/family education and to maximize pt's level of independence in the home and community environment.     Pt's spiritual, cultural and educational needs considered and pt agreeable to plan of care and goals.     Anticipated barriers to physical therapy: chronicity of symptoms, multiple body parts       Goals:   Short Term Goals: 4 weeks   1. The patient with report compliance with HEP to maximize functional outcomes.  2. The patient will  demonstrate increase in BLE MMT by 1/2 grade to improve pt's functional mobility  3. The patient will decrease pain level by 50% in order to improve QOL.     Long Term Goals: 12 weeks   1. The patient will demonstrate understanding and performance of advanced HEP to allow for independence once discharged from therapy.   2. 2. The patient will demonstrate increase in BLE MMT by 1 grade to improve pt's functional mobility  3. The patient will report 64 % functional limitation on FOTO to indicate an improvement in overall function.  4. The patient will be able to perform supine<>sit transfer w/o pain in order to improve pt's ability to sleep.  5. Pt will perform 10 squats w/ HHA w/o pain in order to improve pt's functional mobility.  6. Pt will be able to walk 5' w/o AD in order to improve pt's community ambulation      PLAN     Plan of care Certification: 12/14/2021 to 03/04/2022.     Outpatient Physical Therapy 2 times weekly for 6 weeks to include the following interventions: Cervical/Lumbar Traction, Electrical Stimulation Dry Needling/cupping, Gait Training, Manual Therapy, Moist Heat/ Ice, Neuromuscular Re-ed, Patient Education, Self Care, Therapeutic Activites and Therapeutic Exercise.       Ronnie Hood, PT

## 2022-01-12 ENCOUNTER — OFFICE VISIT (OUTPATIENT)
Dept: FAMILY MEDICINE | Facility: CLINIC | Age: 47
End: 2022-01-12
Payer: COMMERCIAL

## 2022-01-12 ENCOUNTER — TELEPHONE (OUTPATIENT)
Dept: NEUROSURGERY | Facility: CLINIC | Age: 47
End: 2022-01-12
Payer: COMMERCIAL

## 2022-01-12 VITALS
HEART RATE: 91 BPM | SYSTOLIC BLOOD PRESSURE: 120 MMHG | HEIGHT: 72 IN | TEMPERATURE: 98 F | BODY MASS INDEX: 42.66 KG/M2 | OXYGEN SATURATION: 98 % | WEIGHT: 315 LBS | DIASTOLIC BLOOD PRESSURE: 68 MMHG

## 2022-01-12 DIAGNOSIS — G96.198 SPINAL ARACHNOID CYST: Primary | ICD-10-CM

## 2022-01-12 DIAGNOSIS — E11.8 TYPE 2 DIABETES MELLITUS WITH COMPLICATION, WITHOUT LONG-TERM CURRENT USE OF INSULIN: Primary | ICD-10-CM

## 2022-01-12 DIAGNOSIS — M54.50 CHRONIC LEFT-SIDED LOW BACK PAIN WITHOUT SCIATICA: ICD-10-CM

## 2022-01-12 DIAGNOSIS — I10 ESSENTIAL HYPERTENSION: ICD-10-CM

## 2022-01-12 DIAGNOSIS — G89.29 CHRONIC LEFT-SIDED LOW BACK PAIN WITHOUT SCIATICA: ICD-10-CM

## 2022-01-12 DIAGNOSIS — R29.898 WEAKNESS OF BOTH LOWER EXTREMITIES: ICD-10-CM

## 2022-01-12 PROCEDURE — 3008F BODY MASS INDEX DOCD: CPT | Mod: CPTII,S$GLB,, | Performed by: FAMILY MEDICINE

## 2022-01-12 PROCEDURE — 3074F PR MOST RECENT SYSTOLIC BLOOD PRESSURE < 130 MM HG: ICD-10-PCS | Mod: CPTII,S$GLB,, | Performed by: FAMILY MEDICINE

## 2022-01-12 PROCEDURE — 3074F SYST BP LT 130 MM HG: CPT | Mod: CPTII,S$GLB,, | Performed by: FAMILY MEDICINE

## 2022-01-12 PROCEDURE — 1159F MED LIST DOCD IN RCRD: CPT | Mod: CPTII,S$GLB,, | Performed by: FAMILY MEDICINE

## 2022-01-12 PROCEDURE — 3078F DIAST BP <80 MM HG: CPT | Mod: CPTII,S$GLB,, | Performed by: FAMILY MEDICINE

## 2022-01-12 PROCEDURE — 3072F LOW RISK FOR RETINOPATHY: CPT | Mod: CPTII,S$GLB,, | Performed by: FAMILY MEDICINE

## 2022-01-12 PROCEDURE — 3072F PR LOW RISK FOR RETINOPATHY: ICD-10-PCS | Mod: CPTII,S$GLB,, | Performed by: FAMILY MEDICINE

## 2022-01-12 PROCEDURE — 1159F PR MEDICATION LIST DOCUMENTED IN MEDICAL RECORD: ICD-10-PCS | Mod: CPTII,S$GLB,, | Performed by: FAMILY MEDICINE

## 2022-01-12 PROCEDURE — 99214 OFFICE O/P EST MOD 30 MIN: CPT | Mod: S$GLB,,, | Performed by: FAMILY MEDICINE

## 2022-01-12 PROCEDURE — 99214 PR OFFICE/OUTPT VISIT, EST, LEVL IV, 30-39 MIN: ICD-10-PCS | Mod: S$GLB,,, | Performed by: FAMILY MEDICINE

## 2022-01-12 PROCEDURE — 3078F PR MOST RECENT DIASTOLIC BLOOD PRESSURE < 80 MM HG: ICD-10-PCS | Mod: CPTII,S$GLB,, | Performed by: FAMILY MEDICINE

## 2022-01-12 PROCEDURE — 3008F PR BODY MASS INDEX (BMI) DOCUMENTED: ICD-10-PCS | Mod: CPTII,S$GLB,, | Performed by: FAMILY MEDICINE

## 2022-01-12 RX ORDER — INSULIN LISPRO 100 [IU]/ML
INJECTION, SOLUTION INTRAVENOUS; SUBCUTANEOUS
Qty: 15 ML | Refills: 1 | Status: SHIPPED | OUTPATIENT
Start: 2022-01-12 | End: 2022-02-16

## 2022-01-12 RX ORDER — BLOOD-GLUCOSE TRANSMITTER
EACH MISCELLANEOUS
Qty: 1 EACH | Refills: 11 | Status: SHIPPED | OUTPATIENT
Start: 2022-01-12 | End: 2023-04-21

## 2022-01-12 NOTE — PROGRESS NOTES
Patient ID: Kirill Gandhi III is a 46 y.o. male.    Chief Complaint: Follow-up, Fall, and Extremity Weakness    HPI      Kirill Gandhi III is a 46 y.o. male complains of new extremity weakness and recent fall.  Continues to do with lower back pain and denied only.  Working with neurosurgeon for correction.  Has had previous surgery which provided some relief but not full relief of current problems.  Diabetes not controlled this time.    Vitals:    01/12/22 1359   BP: 120/68   BP Location: Left arm   Patient Position: Sitting   Pulse: 91   Temp: 98.4 °F (36.9 °C)   TempSrc: Oral   SpO2: 98%   Weight: (!) 160.3 kg (353 lb 8.1 oz)   Height: 6' (1.829 m)            Review of Symptoms    Constitutional  Neg activity change, No chills /fever   Resp  Neg hemoptysis, stridor, choking  CVS  Neg chest pain, palpitations    Physical Exam    Constitutional:  Oriented to person, place, and time.appears well-developed and well-nourished.  No distress.      HENT  Head: Normocephalic and atraumatic  Right Ear: External ear normal.   Left Ear: External ear normal.   Nose: External nose normal.   Mouth:  Moist mucus membranes.    Eyes:  Conjunctivae are normal. Right eye exhibits no discharge.  Left eye exhibits no discharge. No scleral icterus.  No periorbital edema    Cardiovascular:  Regular rate and rhythm with normal S1 and S2     Pulmonary/Chest:   Clear to auscultation bilaterally without wheezes, rhonchi or rales      Musculoskeletal:  No edema. No obvious deformity No wasting       Neurological:  Alert and oriented to person, place, and time.   Coordination normal.     Skin:   Skin is warm and dry.  No diaphoresis.   No rash noted.     Psychiatric: Normal mood and affect. Behavior is normal.  Judgment and thought content normal.     Complete Blood Count  Lab Results   Component Value Date    RBC 3.75 (L) 04/27/2021    HGB 11.8 (L) 04/27/2021    HCT 34.3 (L) 04/27/2021    MCV 92 04/27/2021    MCH 31.5 (H)  04/27/2021    MCHC 34.4 04/27/2021    RDW 13.0 04/27/2021     04/27/2021    MPV 10.7 04/27/2021    GRAN 10.9 (H) 04/27/2021    GRAN 82.4 (H) 04/27/2021    LYMPH 1.1 04/27/2021    LYMPH 8.0 (L) 04/27/2021    MONO 1.2 (H) 04/27/2021    MONO 8.9 04/27/2021    EOS 0.0 04/27/2021    BASO 0.03 04/27/2021    EOSINOPHIL 0.0 04/27/2021    BASOPHIL 0.2 04/27/2021    DIFFMETHOD Automated 04/27/2021       Comprehensive Metabolic Panel  No results found for: GLU, BUN, CREATININE, NA, K, CL, PROT, ALBUMIN, BILITOT, AST, ALKPHOS, CO2, ALT, ANIONGAP, EGFRNONAA, ESTGFRAFRICA    TSH  No results found for: TSH    Assessment / Plan:      ICD-10-CM ICD-9-CM   1. Type 2 diabetes mellitus with complication, without long-term current use of insulin  E11.8 250.90   2. Essential hypertension  I10 401.9     Type 2 diabetes mellitus with complication, without long-term current use of insulin  -     Hemoglobin A1C; Standing    Essential hypertension    Other orders  -     insulin lispro (HUMALOG KWIKPEN INSULIN) 100 unit/mL pen; Use with sliding scale three times a day with meals.  ( see comments)  Dispense: 15 mL; Refill: 1  -     flash glucose sensor Kit; Dispense sensor to read glucose  dexcom  Dispense: 12 kit; Refill: 11  -     blood-glucose transmitter (DEXCOM G6 TRANSMITTER) [place]; Use to check glucose - 90 day supply  Dispense: 1 each; Refill: 11      Long discussion of the use of sliding scale following up with neurosurgeon regarding lumbar pain and weakness.

## 2022-01-12 NOTE — PATIENT INSTRUCTIONS
Insulin inject after checking glucose at breakfast lunch and dinner.      0 - 150 - 0 units  151 - 200 - 2u  201 - 250 - 4u  251 - 300 - 6u  301 - 350 - 8u

## 2022-01-13 ENCOUNTER — PATIENT MESSAGE (OUTPATIENT)
Dept: FAMILY MEDICINE | Facility: CLINIC | Age: 47
End: 2022-01-13
Payer: COMMERCIAL

## 2022-01-13 ENCOUNTER — CLINICAL SUPPORT (OUTPATIENT)
Dept: REHABILITATION | Facility: HOSPITAL | Age: 47
End: 2022-01-13
Attending: NEUROLOGICAL SURGERY
Payer: COMMERCIAL

## 2022-01-13 DIAGNOSIS — M54.17 RADICULOPATHY OF LUMBOSACRAL REGION: ICD-10-CM

## 2022-01-13 DIAGNOSIS — R29.898 WEAKNESS OF BOTH LOWER EXTREMITIES: ICD-10-CM

## 2022-01-13 DIAGNOSIS — R26.2 DIFFICULTY WALKING: ICD-10-CM

## 2022-01-13 DIAGNOSIS — M54.41 CHRONIC BILATERAL LOW BACK PAIN WITH BILATERAL SCIATICA: ICD-10-CM

## 2022-01-13 DIAGNOSIS — M54.42 CHRONIC BILATERAL LOW BACK PAIN WITH BILATERAL SCIATICA: ICD-10-CM

## 2022-01-13 DIAGNOSIS — G89.29 CHRONIC BILATERAL LOW BACK PAIN WITH BILATERAL SCIATICA: ICD-10-CM

## 2022-01-13 PROCEDURE — 97110 THERAPEUTIC EXERCISES: CPT | Mod: PN

## 2022-01-13 PROCEDURE — 97140 MANUAL THERAPY 1/> REGIONS: CPT | Mod: PN

## 2022-01-13 RX ORDER — PEN NEEDLE, DIABETIC 29 G X1/2"
NEEDLE, DISPOSABLE MISCELLANEOUS
Qty: 100 EACH | Refills: 11 | Status: SHIPPED | OUTPATIENT
Start: 2022-01-13

## 2022-01-13 RX ORDER — INSULIN ASPART 100 [IU]/ML
INJECTION, SOLUTION INTRAVENOUS; SUBCUTANEOUS
Qty: 5 EACH | Refills: 1 | Status: SHIPPED | OUTPATIENT
Start: 2022-01-13 | End: 2022-04-11

## 2022-01-13 NOTE — PROGRESS NOTES
"[facility] OUTPATIENT THERAPY AND WELLNESS   Physical Therapy Treatment Note     Name: Kirill Gandhi [facility]  Clinic Number: [id number]    Therapy Diagnosis:   Encounter Diagnoses   Name Primary?    Chronic bilateral low back pain with bilateral sciatica     Weakness of both lower extremities     Difficulty walking     Radiculopathy of lumbosacral region      Physician: Jose L Brown MD    Visit Date: 1/13/2022      Physician Orders: PT Eval and Treat   Medical Diagnosis from Referral: Sacroiliac joint dysfunction of both sides [M53.3]  Evaluation Date: 12/14/2021  Authorization Period Expiration: 12/13/2022  Plan of Care Expiration: 03/04/2022  Progress Note Due:  02/13/2022  Visit # / Visits authorized: 4/ 20   FOTO: 1/ 3      Precautions: Standard and Diabetes, hx of lumbar spine laminectomy (2019), lumbar spine fusion (04/2021)    Time In: 1045  Time Out: 1130  Total Appointment Time (timed & untimed codes): 45 minutes      SUBJECTIVE     Pt reports: that he is sore today, but he did not have the "shocking" pain the past two night for the first time in a long time. Pt reports that this was the first time he was able to get into/out of bed w/o 10/10 pain.  He was compliant with home exercise program.  Response to previous treatment: good  Functional change: none to note    Pain: 6/10  Location: bilateral back      OBJECTIVE     Last reassessed: 01/13/2022  Lumbar Range of Motion: Side bending and rotation were not tested secondary to high pain levels    Degrees Pain   Flexion 80%    Discomfort in his back         Extension 25%    No pain         Left Side Bending Mid thigh No pain         Right Side Bending Mid thigh No pain         Left rotation    75% Pain into Left glute         Right Rotation    50% No pain               Lower Extremity Strength  Right LE   Left LE     Knee extension: 4+/5 Knee extension: 4+/5   Knee flexion: 3+/5 Knee flexion: 4/5   Hip flexion: 3+/5 Hip flexion: 4/5   Hip extension:  " NT Hip extension: NT   Hip abduction: 4/5 Hip abduction: 4/5   Hip adduction: 4/5 Hip adduction 4/5   Ankle dorsiflexion: 4/5 Ankle dorsiflexion: 4/5   Ankle plantarflexion: NT Ankle plantarflexion: NT            Special Tests:  -Scour: R = (+) pain into Right glute               L = (+) pain into B glutes  -Quadrant: R = (+) pain into Right glute                     L = (+) pain into Left back  -SLR:   R = NT              L = NT  -SIJ Compression: (-)  -SIJ Distraction: (-)  -Leg Length Discrepancy: (+) Left posterior innominate     Neuro Dynamic Testing:               Sciatic nerve:                                       Slump: R = (+)                                      L = (+)                                Treatment     Kirill received the treatments listed below:      therapeutic exercises to develop strength, endurance, ROM, flexibility, posture and core stabilization for 30 minutes including:    Progress note Measurements  Nustep     x 6 min  Seated Pelvic Tilt   2 x 10  Seated March   2 x 10  +Prone hip Extension  2 x 10  Seated LAQ 2#    2 x 10  Seated HS curl GTB   2 x 10  Seated row with RTB  2 x 10  Seated no money with RTB 2 x 10  Seated paloff press with RTB 2 x 10 ea  seated flexion roll out with PB 10x  Glute sets                                          15 x ea  Left hip flexion Iso                            2 x 10  Posterior pelvic tilt                           15 x ea  Bridges     2 x 10  SLR     2 x 10  Hip add ball squeeze   15 x ea  Hip fall out    15 x ea   Clamshells 2 x 10  LTR w/ SB    2 x 10    Kirill received the following manual therapy techniques:  were applied for 15 minutes, including:  STM lumbar spine Paraspinals       cold pack for 00 minutes to low back.        Patient Education and Home Exercises     Home Exercises Provided and Patient Education Provided     Education provided:   -     Written Home Exercises Provided: Patient instructed to cont prior HEP. Exercises were reviewed  "and Kirill was able to demonstrate them prior to the end of the session.  Kirill demonstrated good  understanding of the education provided. See EMR under Patient Instructions for exercises provided during therapy sessions    ASSESSMENT     Pt tolerated tx well. Pt displays improvement in his RANGE OF MOTION and strength. However, Pt continues to have limitations of ROM, strength, gait, pain, and functional mobility which impact the patient's ability to perform functional activities such as walking, bending, lifting, squatting, performing transfers, and performing ADLs. Pt has reported that his pain at night has improved, and has reduced in his "shock-like" pain. Pt was progressed with greater glute strengthening to improve pelvic/hip stability. Therapist provided verbal/tactile cues to maintain proper form. Pt reported no adverse effects to exercises. Pt would benefit from continued skilled physical therapy in order to reach pt's goals.         Kirill Is progressing well towards his goals.   Pt prognosis is Good.     Pt will continue to benefit from skilled outpatient physical therapy to address the deficits listed in the problem list box on initial evaluation, provide pt/family education and to maximize pt's level of independence in the home and community environment.     Pt's spiritual, cultural and educational needs considered and pt agreeable to plan of care and goals.     Anticipated barriers to physical therapy: chronicity of symptoms, multiple body parts       Goals:   Short Term Goals: 4 weeks   1. The patient with report compliance with HEP to maximize functional outcomes. Met  2. The patient will demonstrate increase in BLE MMT by 1/2 grade to improve pt's functional mobility Ongoing  3. The patient will decrease pain level by 50% in order to improve QOL. ongoing     Long Term Goals: 12 weeks   1. The patient will demonstrate understanding and performance of advanced HEP to allow for independence once " discharged from therapy.   2. 2. The patient will demonstrate increase in BLE MMT by 1 grade to improve pt's functional mobility  3. The patient will report 64 % functional limitation on FOTO to indicate an improvement in overall function.  4. The patient will be able to perform supine<>sit transfer w/o pain in order to improve pt's ability to sleep.  5. Pt will perform 10 squats w/ HHA w/o pain in order to improve pt's functional mobility.  6. Pt will be able to walk 5' w/o AD in order to improve pt's community ambulation      PLAN     Plan of care Certification: 12/14/2021 to 03/04/2022.     Outpatient Physical Therapy 2 times weekly for 6 weeks to include the following interventions: Cervical/Lumbar Traction, Electrical Stimulation Dry Needling/cupping, Gait Training, Manual Therapy, Moist Heat/ Ice, Neuromuscular Re-ed, Patient Education, Self Care, Therapeutic Activites and Therapeutic Exercise.       Ronnie Hood, PT

## 2022-01-18 ENCOUNTER — TELEPHONE (OUTPATIENT)
Dept: NEUROSURGERY | Facility: CLINIC | Age: 47
End: 2022-01-18
Payer: COMMERCIAL

## 2022-01-18 ENCOUNTER — CLINICAL SUPPORT (OUTPATIENT)
Dept: REHABILITATION | Facility: HOSPITAL | Age: 47
End: 2022-01-18
Attending: NEUROLOGICAL SURGERY
Payer: COMMERCIAL

## 2022-01-18 DIAGNOSIS — R26.2 DIFFICULTY WALKING: ICD-10-CM

## 2022-01-18 DIAGNOSIS — R29.898 WEAKNESS OF BOTH LOWER EXTREMITIES: ICD-10-CM

## 2022-01-18 DIAGNOSIS — M54.17 RADICULOPATHY OF LUMBOSACRAL REGION: ICD-10-CM

## 2022-01-18 DIAGNOSIS — G89.29 CHRONIC BILATERAL LOW BACK PAIN WITH BILATERAL SCIATICA: ICD-10-CM

## 2022-01-18 DIAGNOSIS — M54.42 CHRONIC BILATERAL LOW BACK PAIN WITH BILATERAL SCIATICA: ICD-10-CM

## 2022-01-18 DIAGNOSIS — M54.41 CHRONIC BILATERAL LOW BACK PAIN WITH BILATERAL SCIATICA: ICD-10-CM

## 2022-01-18 PROCEDURE — 97110 THERAPEUTIC EXERCISES: CPT | Mod: PN

## 2022-01-18 PROCEDURE — 97140 MANUAL THERAPY 1/> REGIONS: CPT | Mod: PN

## 2022-01-18 NOTE — TELEPHONE ENCOUNTER
Spoke with pt regarding disability forms and pt stated he still has to use a walker or a wheel chair to go long distances. Pt states he also needs help getting dressed. I verbalized understanding and informed pt I would fax forms once complete. Pt verbalized understanding.

## 2022-01-18 NOTE — PROGRESS NOTES
[facility]   Physical Therapy Treatment Note     [facility]  [facility] [id number]    Therapy Diagnosis:   Encounter Diagnoses   Name Primary?    Chronic bilateral low back pain with bilateral sciatica     Weakness of both lower extremities     Difficulty walking     Radiculopathy of lumbosacral region      Physician: Jose L Brown MD    Visit Date: 1/18/2022      Physician Orders: PT Eval and Treat   Medical Diagnosis from Referral: Sacroiliac joint dysfunction of both sides [M53.3]  Evaluation Date: 12/14/2021  Authorization Period Expiration: 12/13/2022  Plan of Care Expiration: 03/04/2022  Progress Note Due:  02/13/2022  Visit # / Visits authorized: 5/ 20   FOTO: 1/ 3      Precautions: Standard and Diabetes, hx of lumbar spine laminectomy (2019), lumbar spine fusion (04/2021)    Time In: 1500  Time Out: 1555  Total Appointment Time (timed & untimed codes): 55 minutes      SUBJECTIVE     Pt reports: that his back is in a lot of pain today. He states that he almost did not come to therapy.  He was compliant with home exercise program.  Response to previous treatment: good  Functional change: none to note    Pain: 8/10  Location: bilateral back      OBJECTIVE   Last reassessed: 01/13/2022              Treatment     Kirill received the treatments listed below:      therapeutic exercises to develop strength, endurance, ROM, flexibility, posture and core stabilization for 30 minutes including:    Nustep     x 6 min  Seated Pelvic Tilt   2 x 10  Seated March   2 x 10  Prone hip Extension  2 x 10  Seated LAQ 2#    2 x 10  Seated HS curl GTB   2 x 10  Seated row with RTB  2 x 10  Seated no money with RTB 2 x 10  Seated paloff press with RTB 2 x 10 ea  seated flexion roll out with PB 10x  Glute sets                                          15 x ea  Left hip flexion Iso                            2 x 10  Posterior pelvic tilt                           15 x  ea  Bridges     2 x 10  SLR     2 x 10  +Supine march   2 x 10  Hip add ball squeeze   2x15 x ea  Hip fall out    15 x ea   Clamshells 2 x 10  LTR w/ SB    2 x 10    Kirill received the following manual therapy techniques:  were applied for 25 minutes, including:  Cupping lumbar spine Paraspinals & scar  STM lumbar spine Paraspinals   STM B Glutes      cold pack for 00 minutes to low back.        Patient Education and Home Exercises     Home Exercises Provided and Patient Education Provided     Education provided:   -     Written Home Exercises Provided: Patient instructed to cont prior HEP. Exercises were reviewed and Kirill was able to demonstrate them prior to the end of the session.  Kirill demonstrated good  understanding of the education provided. See EMR under Patient Instructions for exercises provided during therapy sessions    ASSESSMENT     Pt tolerated tx well. Due to pt's high pain, exercises were reduced, and more time was spent on STM. Pt could not tolerate SLR today, so supine marches were performed instead. LTR was also performed w/o the SB. Cupping was attempted on pt's lumbar spine, and pt reported decreased pain levels after. Therapist provided verbal/tactile cues to maintain proper form. Pt reported no adverse effects to exercises. Pt would benefit from continued skilled physical therapy in order to reach pt's goals.     Kirill Is progressing well towards his goals.   Pt prognosis is Good.     Pt will continue to benefit from skilled outpatient physical therapy to address the deficits listed in the problem list box on initial evaluation, provide pt/family education and to maximize pt's level of independence in the home and community environment.     Pt's spiritual, cultural and educational needs considered and pt agreeable to plan of care and goals.     Anticipated barriers to physical therapy: chronicity of symptoms, multiple body parts       Goals:   Short Term Goals: 4 weeks   1. The patient with  report compliance with HEP to maximize functional outcomes. Met  2. The patient will demonstrate increase in BLE MMT by 1/2 grade to improve pt's functional mobility Ongoing  3. The patient will decrease pain level by 50% in order to improve QOL. ongoing     Long Term Goals: 12 weeks   1. The patient will demonstrate understanding and performance of advanced HEP to allow for independence once discharged from therapy.   2. 2. The patient will demonstrate increase in BLE MMT by 1 grade to improve pt's functional mobility  3. The patient will report 64 % functional limitation on FOTO to indicate an improvement in overall function.  4. The patient will be able to perform supine<>sit transfer w/o pain in order to improve pt's ability to sleep.  5. Pt will perform 10 squats w/ HHA w/o pain in order to improve pt's functional mobility.  6. Pt will be able to walk 5' w/o AD in order to improve pt's community ambulation      PLAN     Plan of care Certification: 12/14/2021 to 03/04/2022.     Outpatient Physical Therapy 2 times weekly for 6 weeks to include the following interventions: Cervical/Lumbar Traction, Electrical Stimulation Dry Needling/cupping, Gait Training, Manual Therapy, Moist Heat/ Ice, Neuromuscular Re-ed, Patient Education, Self Care, Therapeutic Activites and Therapeutic Exercise.       Ronnie Hood, PT

## 2022-01-20 ENCOUNTER — TELEPHONE (OUTPATIENT)
Dept: NEUROSURGERY | Facility: CLINIC | Age: 47
End: 2022-01-20
Payer: COMMERCIAL

## 2022-01-20 ENCOUNTER — PATIENT MESSAGE (OUTPATIENT)
Dept: NEUROSURGERY | Facility: CLINIC | Age: 47
End: 2022-01-20
Payer: COMMERCIAL

## 2022-01-20 ENCOUNTER — CLINICAL SUPPORT (OUTPATIENT)
Dept: REHABILITATION | Facility: HOSPITAL | Age: 47
End: 2022-01-20
Attending: NEUROLOGICAL SURGERY
Payer: COMMERCIAL

## 2022-01-20 DIAGNOSIS — Z41.9 SURGERY, ELECTIVE: Primary | ICD-10-CM

## 2022-01-20 DIAGNOSIS — M54.42 CHRONIC BILATERAL LOW BACK PAIN WITH BILATERAL SCIATICA: ICD-10-CM

## 2022-01-20 DIAGNOSIS — M54.41 CHRONIC BILATERAL LOW BACK PAIN WITH BILATERAL SCIATICA: ICD-10-CM

## 2022-01-20 DIAGNOSIS — R26.2 DIFFICULTY WALKING: ICD-10-CM

## 2022-01-20 DIAGNOSIS — R29.898 WEAKNESS OF BOTH LOWER EXTREMITIES: ICD-10-CM

## 2022-01-20 DIAGNOSIS — M54.17 RADICULOPATHY OF LUMBOSACRAL REGION: ICD-10-CM

## 2022-01-20 DIAGNOSIS — G89.29 CHRONIC BILATERAL LOW BACK PAIN WITH BILATERAL SCIATICA: ICD-10-CM

## 2022-01-20 PROCEDURE — 97140 MANUAL THERAPY 1/> REGIONS: CPT | Mod: PN

## 2022-01-20 PROCEDURE — 97110 THERAPEUTIC EXERCISES: CPT | Mod: PN

## 2022-01-20 NOTE — PROGRESS NOTES
"[facility]   Physical Therapy Treatment Note     [facility]  [facility] Number[facility] [id number]    Therapy Diagnosis:   Encounter Diagnoses   Name Primary?    Chronic bilateral low back pain with bilateral sciatica     Weakness of both lower extremities     Difficulty walking     Radiculopathy of lumbosacral region      Physician: Jose L Brown MD    Visit Date: 1/20/2022      Physician Orders: PT Eval and Treat   Medical Diagnosis from Referral: Sacroiliac joint dysfunction of both sides [M53.3]  Evaluation Date: 12/14/2021  Authorization Period Expiration: 12/13/2022  Plan of Care Expiration: 03/04/2022  Progress Note Due:  02/13/2022  Visit # / Visits authorized: 6/ 20   FOTO: 1/ 3      Precautions: Standard and Diabetes, hx of lumbar spine laminectomy (2019), lumbar spine fusion (04/2021)    Time In: 1546  Time Out: 1632  Total Appointment Time (timed & untimed codes): 46 minutes      SUBJECTIVE     Pt reports: that his back feels a little better, but he still has N/T going into his "butt cheeks."  He was compliant with home exercise program.  Response to previous treatment: good  Functional change: none to note    Pain: 6/10  Location: bilateral back      OBJECTIVE   Last reassessed: 01/13/2022              Treatment     Kirill received the treatments listed below:      therapeutic exercises to develop strength, endurance, ROM, flexibility, posture and core stabilization for 20 minutes including:    Nustep     x 6 min  +Pudendal N glide Seated on High Low 1 x 10  Seated Pelvic Tilt   2 x 10  Seated March   2 x 10  Prone hip Extension  1 x 10  +Windshield wipers   2 x 10  Seated LAQ 2#    2 x 10  Seated HS curl GTB   2 x 10  Seated row with RTB  2 x 10  Seated no money with RTB 2 x 10  Seated paloff press with RTB 2 x 10 ea  seated flexion roll out with PB 10x  Glute sets                                          15 x ea  Left hip flexion Iso                           "  2 x 10  Posterior pelvic tilt                           15 x ea  Bridges     2 x 10  Left SLR    2 x 12  +Right HSS    2 x 10  +Supine march   2 x 10  Hip add ball squeeze   2x15 x ea  Hip fall out    15 x ea   Clamshells 2 x 10  LTR w/ SB    2 x 10    Kirill received the following manual therapy techniques:  were applied for 25 minutes, including:  Cupping lumbar spine Paraspinals & scar  STM lumbar spine Paraspinals   STM B Glutes  Left posterior innominate correction    cold pack for 00 minutes to low back.        Patient Education and Home Exercises     Home Exercises Provided and Patient Education Provided     Education provided:   -     Written Home Exercises Provided: Patient instructed to cont prior HEP. Exercises were reviewed and Kirill was able to demonstrate them prior to the end of the session.  Kirill demonstrated good  understanding of the education provided. See EMR under Patient Instructions for exercises provided during therapy sessions    ASSESSMENT     Pt tolerated tx well. Due to pt's reduction in pain, pt's innominate rotation was tested. Pt tested positive for a Left posterior innominate. As a result, MET correction was performed. After MET correction, pt reported improved ability to perform transfers w/ reduced pain. Pt was also progressed with pelvic stability exercises. Therapist provided verbal/tactile cues to maintain proper form. Pt reported no adverse effects to exercises. Pt would benefit from continued skilled physical therapy in order to reach pt's goals.     Kirill Is progressing well towards his goals.   Pt prognosis is Good.     Pt will continue to benefit from skilled outpatient physical therapy to address the deficits listed in the problem list box on initial evaluation, provide pt/family education and to maximize pt's level of independence in the home and community environment.     Pt's spiritual, cultural and educational needs considered and pt agreeable to plan of care and  goals.     Anticipated barriers to physical therapy: chronicity of symptoms, multiple body parts       Goals:   Short Term Goals: 4 weeks   1. The patient with report compliance with HEP to maximize functional outcomes. Met  2. The patient will demonstrate increase in BLE MMT by 1/2 grade to improve pt's functional mobility Ongoing  3. The patient will decrease pain level by 50% in order to improve QOL. ongoing     Long Term Goals: 12 weeks   1. The patient will demonstrate understanding and performance of advanced HEP to allow for independence once discharged from therapy.   2. 2. The patient will demonstrate increase in BLE MMT by 1 grade to improve pt's functional mobility  3. The patient will report 64 % functional limitation on FOTO to indicate an improvement in overall function.  4. The patient will be able to perform supine<>sit transfer w/o pain in order to improve pt's ability to sleep.  5. Pt will perform 10 squats w/ HHA w/o pain in order to improve pt's functional mobility.  6. Pt will be able to walk 5' w/o AD in order to improve pt's community ambulation      PLAN     Plan of care Certification: 12/14/2021 to 03/04/2022.     Outpatient Physical Therapy 2 times weekly for 6 weeks to include the following interventions: Cervical/Lumbar Traction, Electrical Stimulation Dry Needling/cupping, Gait Training, Manual Therapy, Moist Heat/ Ice, Neuromuscular Re-ed, Patient Education, Self Care, Therapeutic Activites and Therapeutic Exercise.       Ronnie Hood, PT

## 2022-01-21 ENCOUNTER — LAB VISIT (OUTPATIENT)
Dept: PRIMARY CARE CLINIC | Facility: CLINIC | Age: 47
End: 2022-01-21
Payer: COMMERCIAL

## 2022-01-21 DIAGNOSIS — Z41.9 SURGERY, ELECTIVE: ICD-10-CM

## 2022-01-21 PROCEDURE — U0005 INFEC AGEN DETEC AMPLI PROBE: HCPCS | Performed by: NEUROLOGICAL SURGERY

## 2022-01-21 PROCEDURE — U0003 INFECTIOUS AGENT DETECTION BY NUCLEIC ACID (DNA OR RNA); SEVERE ACUTE RESPIRATORY SYNDROME CORONAVIRUS 2 (SARS-COV-2) (CORONAVIRUS DISEASE [COVID-19]), AMPLIFIED PROBE TECHNIQUE, MAKING USE OF HIGH THROUGHPUT TECHNOLOGIES AS DESCRIBED BY CMS-2020-01-R: HCPCS | Performed by: NEUROLOGICAL SURGERY

## 2022-01-22 LAB — SARS-COV-2 RNA RESP QL NAA+PROBE: NOT DETECTED

## 2022-01-24 ENCOUNTER — TELEPHONE (OUTPATIENT)
Dept: NEUROSURGERY | Facility: CLINIC | Age: 47
End: 2022-01-24
Payer: COMMERCIAL

## 2022-01-24 ENCOUNTER — HOSPITAL ENCOUNTER (OUTPATIENT)
Dept: RADIOLOGY | Facility: HOSPITAL | Age: 47
Discharge: HOME | End: 2022-01-24
Attending: NEUROLOGICAL SURGERY
Payer: COMMERCIAL

## 2022-01-24 ENCOUNTER — HOSPITAL ENCOUNTER (OUTPATIENT)
Facility: HOSPITAL | Age: 47
Discharge: HOME | End: 2022-01-24
Attending: NEUROLOGICAL SURGERY | Admitting: NEUROLOGICAL SURGERY
Payer: COMMERCIAL

## 2022-01-24 VITALS
BODY MASS INDEX: 42.66 KG/M2 | TEMPERATURE: 98 F | HEIGHT: 72 IN | SYSTOLIC BLOOD PRESSURE: 108 MMHG | WEIGHT: 315 LBS | OXYGEN SATURATION: 100 % | RESPIRATION RATE: 18 BRPM | HEART RATE: 77 BPM | DIASTOLIC BLOOD PRESSURE: 51 MMHG

## 2022-01-24 LAB — POCT GLUCOSE: 241 MG/DL (ref 70–110)

## 2022-01-24 PROCEDURE — 82962 GLUCOSE BLOOD TEST: CPT

## 2022-01-24 RX ORDER — ACETAMINOPHEN 500 MG
500 TABLET ORAL NIGHTLY
COMMUNITY
End: 2022-04-06 | Stop reason: CLARIF

## 2022-01-24 RX ORDER — ACETAMINOPHEN 500 MG
1000 TABLET ORAL 2 TIMES DAILY PRN
COMMUNITY

## 2022-01-24 NOTE — TELEPHONE ENCOUNTER
Pt's Disability forms were scanned into chart and faxed to [employer] [phone]. Confirmation was received.

## 2022-01-26 ENCOUNTER — OFFICE VISIT (OUTPATIENT)
Dept: PSYCHIATRY | Facility: CLINIC | Age: 47
End: 2022-01-26
Payer: COMMERCIAL

## 2022-01-26 DIAGNOSIS — F41.9 ANXIETY: ICD-10-CM

## 2022-01-26 DIAGNOSIS — F33.0 MILD EPISODE OF RECURRENT MAJOR DEPRESSIVE DISORDER: Primary | ICD-10-CM

## 2022-01-26 PROCEDURE — 90834 PSYTX W PT 45 MINUTES: CPT | Mod: S$GLB,,, | Performed by: SOCIAL WORKER

## 2022-01-26 PROCEDURE — 3072F PR LOW RISK FOR RETINOPATHY: ICD-10-PCS | Mod: CPTII,S$GLB,, | Performed by: SOCIAL WORKER

## 2022-01-26 PROCEDURE — 99999 PR PBB SHADOW E&M-EST. PATIENT-LVL I: CPT | Mod: PBBFAC,,, | Performed by: SOCIAL WORKER

## 2022-01-26 PROCEDURE — 90834 PR PSYCHOTHERAPY W/PATIENT, 45 MIN: ICD-10-PCS | Mod: S$GLB,,, | Performed by: SOCIAL WORKER

## 2022-01-26 PROCEDURE — 3072F LOW RISK FOR RETINOPATHY: CPT | Mod: CPTII,S$GLB,, | Performed by: SOCIAL WORKER

## 2022-01-26 PROCEDURE — 99999 PR PBB SHADOW E&M-EST. PATIENT-LVL I: ICD-10-PCS | Mod: PBBFAC,,, | Performed by: SOCIAL WORKER

## 2022-01-27 ENCOUNTER — PATIENT MESSAGE (OUTPATIENT)
Dept: NEUROSURGERY | Facility: CLINIC | Age: 47
End: 2022-01-27
Payer: COMMERCIAL

## 2022-01-27 ENCOUNTER — PATIENT MESSAGE (OUTPATIENT)
Dept: PSYCHIATRY | Facility: CLINIC | Age: 47
End: 2022-01-27
Payer: COMMERCIAL

## 2022-01-27 NOTE — PROGRESS NOTES
Individual Psychotherapy (PhD/LCSW)    1/26/2022    Site:  [facility]         Therapeutic Intervention: Met with patient.  Outpatient - Insight oriented psychotherapy 45 min - CPT code 78709    Chief complaint/reason for encounter: anxiety     Interval history and content of current session: Pt seen for follow-up.  He has not been seen since July and is not doing well.  He arrives in a wheelchair.  He states that after returning to work for 2 weeks, his legs gave out and he is having back problems from his surgery that are causing all of this.  He is able to get around with a walker in his home.  He is having additional major stress due to his 17 year old daughter and GF not getting along.  The GF picks on the daughter who gets very angry and resentful.  Pt is at the point of wanting to move out but he doesn't know how to get things working better again.  Suggested that GF come in with him next time to work on their issues with his daughter.    Treatment plan:  · Target symptoms: anxiety   · Why chosen therapy is appropriate versus another modality: relevant to diagnosis  · Outcome monitoring methods: self-report, observation  · Therapeutic intervention type: insight oriented psychotherapy    Risk parameters:  Patient reports no suicidal ideation  Patient reports no homicidal ideation  Patient reports no self-injurious behavior  Patient reports no violent behavior    Verbal deficits: None    Patient's response to intervention:  The patient's response to intervention is accepting.    Progress toward goals and other mental status changes:  The patient's progress toward goals is fair .    Diagnosis:     ICD-10-CM ICD-9-CM   1. Mild episode of recurrent major depressive disorder  F33.0 296.31   2. Anxiety  F41.9 300.00       Plan:  individual psychotherapy and medication management by physician    Return to clinic: 3 weeks    Length of Service (minutes): 45

## 2022-01-28 ENCOUNTER — PATIENT MESSAGE (OUTPATIENT)
Dept: NEUROSURGERY | Facility: CLINIC | Age: 47
End: 2022-01-28
Payer: COMMERCIAL

## 2022-02-01 ENCOUNTER — CLINICAL SUPPORT (OUTPATIENT)
Dept: REHABILITATION | Facility: HOSPITAL | Age: 47
End: 2022-02-01
Attending: NEUROLOGICAL SURGERY
Payer: COMMERCIAL

## 2022-02-01 DIAGNOSIS — R26.2 DIFFICULTY WALKING: ICD-10-CM

## 2022-02-01 DIAGNOSIS — G89.29 CHRONIC BILATERAL LOW BACK PAIN WITH BILATERAL SCIATICA: ICD-10-CM

## 2022-02-01 DIAGNOSIS — R29.898 WEAKNESS OF BOTH LOWER EXTREMITIES: ICD-10-CM

## 2022-02-01 DIAGNOSIS — M54.17 RADICULOPATHY OF LUMBOSACRAL REGION: ICD-10-CM

## 2022-02-01 DIAGNOSIS — M54.42 CHRONIC BILATERAL LOW BACK PAIN WITH BILATERAL SCIATICA: ICD-10-CM

## 2022-02-01 DIAGNOSIS — M54.41 CHRONIC BILATERAL LOW BACK PAIN WITH BILATERAL SCIATICA: ICD-10-CM

## 2022-02-01 PROCEDURE — 97110 THERAPEUTIC EXERCISES: CPT | Mod: PN

## 2022-02-01 PROCEDURE — 97116 GAIT TRAINING THERAPY: CPT | Mod: PN

## 2022-02-01 NOTE — PROGRESS NOTES
[facility]   Physical Therapy Treatment Note     [facility]  [facility] [id number]    Therapy Diagnosis:   Encounter Diagnoses   Name Primary?    Chronic bilateral low back pain with bilateral sciatica     Weakness of both lower extremities     Difficulty walking     Radiculopathy of lumbosacral region      Physician: Jose L Brown MD    Visit Date: 2/1/2022      Physician Orders: PT Eval and Treat   Medical Diagnosis from Referral: Sacroiliac joint dysfunction of both sides [M53.3]  Evaluation Date: 12/14/2021  Authorization Period Expiration: 12/13/2022  Plan of Care Expiration: 03/04/2022  Progress Note Due:  02/13/2022  Visit # / Visits authorized: 7/ 20   FOTO: 1/ 3      Precautions: Standard and Diabetes, hx of lumbar spine laminectomy (2019), lumbar spine fusion (04/2021)    Time In: 1130  Time Out: 1215  Total Appointment Time (timed & untimed codes): 45 minutes      SUBJECTIVE     Pt reports:that he feels a little better today  He was compliant with home exercise program.  Response to previous treatment: good  Functional change: none to note    Pain: 4/10  Location: bilateral back      OBJECTIVE   Last reassessed: 01/13/2022              Treatment     Kirill received the treatments listed below:      therapeutic exercises to develop strength, endurance, ROM, flexibility, posture and core stabilization for 30 minutes including:    Nustep     x 6 min  +Pudendal N glide Seated on High Low 1 x 10  Seated Pelvic Tilt   2 x 10  Seated March   2 x 10  Prone hip Extension  1 x 10  +Windshield wipers   2 x 10  Seated LAQ 2#    2 x 10  Seated HS curl GTB   2 x 10  Seated row with RTB  2 x 10  Seated no money with RTB 2 x 10  Seated paloff press with RTB 2 x 10 ea  seated flexion roll out with PB 10x  Glute sets                                          15 x ea  Left hip flexion Iso                            2 x 10  Posterior pelvic tilt                            15 x ea  Bridges     2 x 10  Left SLR    2 x 12  +Supine March   2 x 8  Right HSS    2 x 10  +Supine march   2 x 10  Hip add ball squeeze   2x15 x ea  Hip fall out    15 x ea   Clamshells 2 x 10  LTR w/ SB    2 x 10  +Seated Sciatic N glide  2 x 10  +Seated T/S rotation  2 x 10    Kirill received the following manual therapy techniques:  were applied for 05 minutes, including:  Cupping lumbar spine Paraspinals & scar  STM lumbar spine Paraspinals   STM B Glutes  Left posterior innominate correction    Kirill participated in gait training to improve functional mobility and safety for 10  minutes, includin laps in //  4 steps x 8 w/o UE support    cold pack for 00 minutes to low back.        Patient Education and Home Exercises     Home Exercises Provided and Patient Education Provided     Education provided:   -     Written Home Exercises Provided: Patient instructed to cont prior HEP. Exercises were reviewed and Kirill was able to demonstrate them prior to the end of the session.  Kirill demonstrated good  understanding of the education provided. See EMR under Patient Instructions for exercises provided during therapy sessions    ASSESSMENT     Pt tolerated tx well. Due to pt's reduction in pain, pt's innominate rotation was tested. Pt tested positive for a Left posterior innominate. As a result, MET correction was performed. After MET correction, pt reported improved pain levels. Pt was given greater thoracic and core stability exercises to improve pt's bed mobility. Therapist provided verbal/tactile cues to maintain proper form. Pt reported no adverse effects to exercises. Pt would benefit from continued skilled physical therapy in order to reach pt's goals.     Kirill Is progressing well towards his goals.   Pt prognosis is Good.     Pt will continue to benefit from skilled outpatient physical therapy to address the deficits listed in the problem list box on initial evaluation, provide pt/family education and  to maximize pt's level of independence in the home and community environment.     Pt's spiritual, cultural and educational needs considered and pt agreeable to plan of care and goals.     Anticipated barriers to physical therapy: chronicity of symptoms, multiple body parts       Goals:   Short Term Goals: 4 weeks   1. The patient with report compliance with HEP to maximize functional outcomes. Met  2. The patient will demonstrate increase in BLE MMT by 1/2 grade to improve pt's functional mobility Ongoing  3. The patient will decrease pain level by 50% in order to improve QOL. ongoing     Long Term Goals: 12 weeks   1. The patient will demonstrate understanding and performance of advanced HEP to allow for independence once discharged from therapy.   2. 2. The patient will demonstrate increase in BLE MMT by 1 grade to improve pt's functional mobility  3. The patient will report 64 % functional limitation on FOTO to indicate an improvement in overall function.  4. The patient will be able to perform supine<>sit transfer w/o pain in order to improve pt's ability to sleep.  5. Pt will perform 10 squats w/ HHA w/o pain in order to improve pt's functional mobility.  6. Pt will be able to walk 5' w/o AD in order to improve pt's community ambulation      PLAN     Plan of care Certification: 12/14/2021 to 03/04/2022.     Outpatient Physical Therapy 2 times weekly for 6 weeks to include the following interventions: Cervical/Lumbar Traction, Electrical Stimulation Dry Needling/cupping, Gait Training, Manual Therapy, Moist Heat/ Ice, Neuromuscular Re-ed, Patient Education, Self Care, Therapeutic Activites and Therapeutic Exercise.       Ronnie Hood, PT

## 2022-02-02 NOTE — PROGRESS NOTES
[facility]   Physical Therapy Treatment Note     [facility]  [facility] [id number]    Therapy Diagnosis:   Encounter Diagnoses   Name Primary?    Chronic bilateral low back pain with bilateral sciatica     Weakness of both lower extremities     Difficulty walking     Radiculopathy of lumbosacral region      Physician: Jose L Brown MD    Visit Date: 2/3/2022      Physician Orders: PT Eval and Treat   Medical Diagnosis from Referral: Sacroiliac joint dysfunction of both sides [M53.3]  Evaluation Date: 12/14/2021  Authorization Period Expiration: 12/13/2022  Plan of Care Expiration: 03/04/2022  Progress Note Due:  02/13/2022  Visit # / Visits authorized: 8/ 20   FOTO: 1/ 3      Precautions: Standard and Diabetes, hx of lumbar spine laminectomy (2019), lumbar spine fusion (04/2021)    Time In: 1000  Time Out: 1045  Total Appointment Time (timed & untimed codes): 45 minutes      SUBJECTIVE     Pt reports: that his back pain is worse today.   He was compliant with home exercise program.  Response to previous treatment: soreness  Functional change: none to note    Pain: 7/10  Location: bilateral back      OBJECTIVE   Last reassessed: 01/13/2022              Treatment     Kirill received the treatments listed below:      therapeutic exercises to develop strength, endurance, ROM, flexibility, posture and core stabilization for 30 minutes including:    Nustep     x 6 min  +Pudendal N glide Seated on High Low 1 x 10  Seated Pelvic Tilt   2 x 10  Seated March   2 x 10  Prone hip Extension  2 x 10  +Windshield wipers   2 x 10  Seated LAQ 2#    2 x 10  Seated HS curl GTB   2 x 10  Seated row with RTB  2 x 10  Seated no money with RTB 2 x 10  Seated paloff press with RTB 2 x 10 ea  seated flexion roll out with PB 10x  Glute sets                                          15 x ea  Left hip flexion Iso                            2 x 10  Posterior pelvic tilt                            15 x ea  Bridges     2 x 12  SLR     2 x 12  +Supine March   2 x 8  Right HSS    2 x 10  +Supine march   2 x 10  Hip add ball squeeze   2x15 x ea  Hip fall out    15 x ea   Clamshells 2 x 10  LTR w/ SB    2 x 10  Seated Sciatic N glide  2 x 10  Seated T/S rotation   2 x 15    Kirill received the following manual therapy techniques:  were applied for 15 minutes, including:  Cupping lumbar spine Paraspinals & scar  STM lumbar spine Paraspinals   Gr II-III CPA T8-T12  STM B Glutes  Left posterior innominate correction    Kirill participated in gait training to improve functional mobility and safety for 00  minutes, includin laps in //  4 steps x 8 w/o UE support    cold pack for 00 minutes to low back.        Patient Education and Home Exercises     Home Exercises Provided and Patient Education Provided     Education provided:   - form    Written Home Exercises Provided: Patient instructed to cont prior HEP. Exercises were reviewed and Kirill was able to demonstrate them prior to the end of the session.  Kirill demonstrated good  understanding of the education provided. See EMR under Patient Instructions for exercises provided during therapy sessions    ASSESSMENT     Pt tolerated tx well. Pt presented to clinic w/ RW, but was not wearing his lumbar brace today. Due to pt's reports of increased, STM and joint mobilization was performed. After manual, pt reported no radicular symptoms into his glutes or LE. Pt was progressed on strengthening w/ increased repetitions. Therapist provided verbal/tactile cues to maintain proper form. Pt reported no adverse effects to exercises. Pt would benefit from continued skilled physical therapy in order to reach pt's goals.     Kirill Is progressing well towards his goals.   Pt prognosis is Good.     Pt will continue to benefit from skilled outpatient physical therapy to address the deficits listed in the problem list box on initial evaluation, provide pt/family education and  to maximize pt's level of independence in the home and community environment.     Pt's spiritual, cultural and educational needs considered and pt agreeable to plan of care and goals.     Anticipated barriers to physical therapy: chronicity of symptoms, multiple body parts       Goals:   Short Term Goals: 4 weeks   1. The patient with report compliance with HEP to maximize functional outcomes. Met  2. The patient will demonstrate increase in BLE MMT by 1/2 grade to improve pt's functional mobility Ongoing  3. The patient will decrease pain level by 50% in order to improve QOL. ongoing     Long Term Goals: 12 weeks   1. The patient will demonstrate understanding and performance of advanced HEP to allow for independence once discharged from therapy.   2. 2. The patient will demonstrate increase in BLE MMT by 1 grade to improve pt's functional mobility  3. The patient will report 64 % functional limitation on FOTO to indicate an improvement in overall function.  4. The patient will be able to perform supine<>sit transfer w/o pain in order to improve pt's ability to sleep.  5. Pt will perform 10 squats w/ HHA w/o pain in order to improve pt's functional mobility.  6. Pt will be able to walk 5' w/o AD in order to improve pt's community ambulation      PLAN     Plan of care Certification: 12/14/2021 to 03/04/2022.     Outpatient Physical Therapy 2 times weekly for 6 weeks to include the following interventions: Cervical/Lumbar Traction, Electrical Stimulation Dry Needling/cupping, Gait Training, Manual Therapy, Moist Heat/ Ice, Neuromuscular Re-ed, Patient Education, Self Care, Therapeutic Activites and Therapeutic Exercise.       Ronnie Hood, PT

## 2022-02-03 ENCOUNTER — CLINICAL SUPPORT (OUTPATIENT)
Dept: REHABILITATION | Facility: HOSPITAL | Age: 47
End: 2022-02-03
Attending: NEUROLOGICAL SURGERY
Payer: COMMERCIAL

## 2022-02-03 DIAGNOSIS — R29.898 WEAKNESS OF BOTH LOWER EXTREMITIES: ICD-10-CM

## 2022-02-03 DIAGNOSIS — M54.41 CHRONIC BILATERAL LOW BACK PAIN WITH BILATERAL SCIATICA: ICD-10-CM

## 2022-02-03 DIAGNOSIS — M54.17 RADICULOPATHY OF LUMBOSACRAL REGION: ICD-10-CM

## 2022-02-03 DIAGNOSIS — G89.29 CHRONIC BILATERAL LOW BACK PAIN WITH BILATERAL SCIATICA: ICD-10-CM

## 2022-02-03 DIAGNOSIS — M54.42 CHRONIC BILATERAL LOW BACK PAIN WITH BILATERAL SCIATICA: ICD-10-CM

## 2022-02-03 DIAGNOSIS — R26.2 DIFFICULTY WALKING: ICD-10-CM

## 2022-02-03 PROCEDURE — 97140 MANUAL THERAPY 1/> REGIONS: CPT | Mod: PN

## 2022-02-03 PROCEDURE — 97110 THERAPEUTIC EXERCISES: CPT | Mod: PN

## 2022-02-09 NOTE — PROGRESS NOTES
[facility]   Physical Therapy Treatment Note     Name: Kirill Gandhi [facility]  Clinic Number: [id number]    Therapy Diagnosis:   Encounter Diagnoses   Name Primary?    Chronic bilateral low back pain with bilateral sciatica     Weakness of both lower extremities     Difficulty walking     Radiculopathy of lumbosacral region      Physician: Jose L Brown MD    Visit Date: 2/10/2022      Physician Orders: PT Eval and Treat   Medical Diagnosis from Referral: Sacroiliac joint dysfunction of both sides [M53.3]  Evaluation Date: 12/14/2021  Authorization Period Expiration: 12/13/2022  Plan of Care Expiration: 05/04/2022  Progress Note Due:  03/10/2022  Visit # / Visits authorized: 9/ 20   FOTO: 1/ 3      Precautions: Standard and Diabetes, hx of lumbar spine laminectomy (2019), lumbar spine fusion (04/2021)    Time In: 1045  Time Out: 1130  Total Appointment Time (timed & untimed codes): 45 minutes      SUBJECTIVE     Pt reports:that he had a miserable day, because he could not fall asleep last night. However, pt reports that on average, he feels ~50-60% better since starting therapy. Pt also reports that his LE knee are buckling less often, but still feels the need for a walker for safety.  He was compliant with home exercise program.  Response to previous treatment: soreness  Functional change: none to note    Pain: 6/10  Location: bilateral back      OBJECTIVE   Last reassessed: 02/10/2022    Lumbar Range of Motion: Side bending and rotation were not tested secondary to high pain levels    Degrees Pain   Flexion 80%    Pain into lower back         Extension 25%    Discomfort in lower back         Left Side Bending 2in above knee No pain         Right Side Bending 2 in above knee No pain         Left rotation    75% Pain into lower back         Right Rotation    75% Slight pain into lower back               Lower Extremity Strength  Right LE   Left LE     Knee extension: 5/5 Knee  extension: 5/5   Knee flexion: 4-/5 Knee flexion: 4+/5   Hip flexion: 4/5 Hip flexion: 4+/5   Hip extension:  4-/5 Hip extension: 4-/5   Hip abduction: 4+/5 Hip abduction: 4+/5   Hip adduction: 4/5 Hip adduction 4/5   Ankle dorsiflexion: 4/5 Ankle dorsiflexion: 4/5   Ankle plantarflexion: NT Ankle plantarflexion: NT            Special Tests:  -Scour: R = (+) pain into Left lower back               L = (+) pain into Left lower back  -Quadrant: R = (+) pain into left back                     L = (+) pain into Left back  -SLR:   R = (-)              L = (-)  -SIJ Compression: (-)  -SIJ Distraction: (-)  -Leg Length Discrepancy: (+) Left posterior innominate     Neuro Dynamic Testing:               Sciatic nerve:                                       Slump: R = (+)                                      L = (+)                Treatment     Kirill received the treatments listed below:      therapeutic exercises to develop strength, endurance, ROM, flexibility, posture and core stabilization for 30 minutes including:    Progress Note Measurements  Nustep     x 6 min  +Pudendal N glide Seated on High Low 1 x 10  Seated Pelvic Tilt   2 x 10  Seated March   2 x 10  Prone hip Extension  2 x 12  +Windshield wipers   2 x 10  Seated LAQ 2#    2 x 10  Seated HS curl GTB   2 x 10  Seated row with RTB  2 x 10  Seated no money with RTB 2 x 10  Seated paloff press with RTB 2 x 10 ea  seated flexion roll out with PB 10x  Glute sets                                          15 x ea  Left hip flexion Iso                            2 x 10  Posterior pelvic tilt                           15 x ea  Bridges     2 x 15  SLR     2 x 12  +Supine March   2 x 8  Right HSS    2 x 10  +Supine march   2 x 10  Hip add ball squeeze   2x15 x ea  Hip fall out    15 x ea   Clamshells 2 x 10  LTR w/ SB    2 x 10  Seated Sciatic N glide  2 x 10  Seated T/S rotation   2 x 15    Kirill received the following manual therapy techniques:  were applied for 15  minutes, including:  Cupping lumbar spine Paraspinals & scar  STM lumbar spine Paraspinals   Gr II-III CPA T8-T12  STM B Glutes  Left posterior innominate correction    Kirill participated in gait training to improve functional mobility and safety for 00  minutes, includin laps in //  4 steps x 8 w/o UE support    cold pack for 00 minutes to low back.        Patient Education and Home Exercises     Home Exercises Provided and Patient Education Provided     Education provided:   - form    Written Home Exercises Provided: Patient instructed to cont prior HEP. Exercises were reviewed and Kirill was able to demonstrate them prior to the end of the session.  Kirill demonstrated good  understanding of the education provided. See EMR under Patient Instructions for exercises provided during therapy sessions    ASSESSMENT     Pt tolerated tx well. Pt displays improvement into his pain and BLE strength. However, pt continues to have limitations of ROM, strength, gait, pain, and functional mobility which impact the patient's ability to perform functional activities such as walking, standing, bending, lifting, squatting, performing transfers, and performing ADLs. Pt reports that his pain has been improving with therapy, and feels that it has been benefiting him. Pt was progressed with greater repetitions in order to improve his BLE and core strength/stability.  Therapist provided verbal/tactile cues to maintain proper form. Pt reported no adverse effects to exercises. Pt would benefit from continued skilled physical therapy in order to reach pt's goals.     Kirill Is progressing well towards his goals.   Pt prognosis is Good.     Pt will continue to benefit from skilled outpatient physical therapy to address the deficits listed in the problem list box on initial evaluation, provide pt/family education and to maximize pt's level of independence in the home and community environment.     Pt's spiritual, cultural and educational  needs considered and pt agreeable to plan of care and goals.     Anticipated barriers to physical therapy: chronicity of symptoms, multiple body parts       Goals:   Short Term Goals: 4 weeks   1. The patient with report compliance with HEP to maximize functional outcomes. Met  2. The patient will demonstrate increase in BLE MMT by 1/2 grade to improve pt's functional mobility Met  3. The patient will decrease pain level by 50% in order to improve QOL. ongoing     Long Term Goals: 12 weeks   1. The patient will demonstrate understanding and performance of advanced HEP to allow for independence once discharged from therapy. Met  2. 2. The patient will demonstrate increase in BLE MMT by 1 grade to improve pt's functional mobility Ongoing  3. The patient will report 64 % functional limitation on FOTO to indicate an improvement in overall function.  4. The patient will be able to perform supine<>sit transfer w/o pain in order to improve pt's ability to sleep.  5. Pt will perform 10 squats w/ HHA w/o pain in order to improve pt's functional mobility.  6. Pt will be able to walk 5' w/o AD in order to improve pt's community ambulation      PLAN     Plan of care Certification: 12/14/2021 to 05/04/2022.     Outpatient Physical Therapy 2 times weekly for 6 weeks to include the following interventions: Cervical/Lumbar Traction, Electrical Stimulation Dry Needling/cupping, Gait Training, Manual Therapy, Moist Heat/ Ice, Neuromuscular Re-ed, Patient Education, Self Care, Therapeutic Activites and Therapeutic Exercise.       Ronnie Hood, PT

## 2022-02-10 ENCOUNTER — CLINICAL SUPPORT (OUTPATIENT)
Dept: REHABILITATION | Facility: HOSPITAL | Age: 47
End: 2022-02-10
Attending: NEUROLOGICAL SURGERY
Payer: COMMERCIAL

## 2022-02-10 DIAGNOSIS — R29.898 WEAKNESS OF BOTH LOWER EXTREMITIES: ICD-10-CM

## 2022-02-10 DIAGNOSIS — G89.29 CHRONIC BILATERAL LOW BACK PAIN WITH BILATERAL SCIATICA: ICD-10-CM

## 2022-02-10 DIAGNOSIS — M54.42 CHRONIC BILATERAL LOW BACK PAIN WITH BILATERAL SCIATICA: ICD-10-CM

## 2022-02-10 DIAGNOSIS — R26.2 DIFFICULTY WALKING: ICD-10-CM

## 2022-02-10 DIAGNOSIS — M54.17 RADICULOPATHY OF LUMBOSACRAL REGION: ICD-10-CM

## 2022-02-10 DIAGNOSIS — M54.41 CHRONIC BILATERAL LOW BACK PAIN WITH BILATERAL SCIATICA: ICD-10-CM

## 2022-02-10 PROCEDURE — 97110 THERAPEUTIC EXERCISES: CPT | Mod: PN

## 2022-02-10 PROCEDURE — 97140 MANUAL THERAPY 1/> REGIONS: CPT | Mod: PN

## 2022-02-14 ENCOUNTER — OFFICE VISIT (OUTPATIENT)
Dept: NEUROSURGERY | Facility: CLINIC | Age: 47
End: 2022-02-14
Payer: COMMERCIAL

## 2022-02-14 VITALS — BODY MASS INDEX: 42.66 KG/M2 | HEIGHT: 72 IN | WEIGHT: 315 LBS

## 2022-02-14 DIAGNOSIS — M46.1 BILATERAL SACROILIITIS: Primary | ICD-10-CM

## 2022-02-14 DIAGNOSIS — Z98.1 STATUS POST LUMBAR SPINAL FUSION: ICD-10-CM

## 2022-02-14 DIAGNOSIS — G96.198 SPINAL ARACHNOID CYST: ICD-10-CM

## 2022-02-14 DIAGNOSIS — M54.9 DORSALGIA, UNSPECIFIED: ICD-10-CM

## 2022-02-14 PROCEDURE — 3008F BODY MASS INDEX DOCD: CPT | Mod: CPTII,S$GLB,, | Performed by: NEUROLOGICAL SURGERY

## 2022-02-14 PROCEDURE — 3072F PR LOW RISK FOR RETINOPATHY: ICD-10-PCS | Mod: CPTII,S$GLB,, | Performed by: NEUROLOGICAL SURGERY

## 2022-02-14 PROCEDURE — 3072F LOW RISK FOR RETINOPATHY: CPT | Mod: CPTII,S$GLB,, | Performed by: NEUROLOGICAL SURGERY

## 2022-02-14 PROCEDURE — 1159F PR MEDICATION LIST DOCUMENTED IN MEDICAL RECORD: ICD-10-PCS | Mod: CPTII,S$GLB,, | Performed by: NEUROLOGICAL SURGERY

## 2022-02-14 PROCEDURE — 99999 PR PBB SHADOW E&M-EST. PATIENT-LVL III: ICD-10-PCS | Mod: PBBFAC,,, | Performed by: NEUROLOGICAL SURGERY

## 2022-02-14 PROCEDURE — 3008F PR BODY MASS INDEX (BMI) DOCUMENTED: ICD-10-PCS | Mod: CPTII,S$GLB,, | Performed by: NEUROLOGICAL SURGERY

## 2022-02-14 PROCEDURE — 99215 OFFICE O/P EST HI 40 MIN: CPT | Mod: S$GLB,,, | Performed by: NEUROLOGICAL SURGERY

## 2022-02-14 PROCEDURE — 99215 PR OFFICE/OUTPT VISIT, EST, LEVL V, 40-54 MIN: ICD-10-PCS | Mod: S$GLB,,, | Performed by: NEUROLOGICAL SURGERY

## 2022-02-14 PROCEDURE — 99999 PR PBB SHADOW E&M-EST. PATIENT-LVL III: CPT | Mod: PBBFAC,,, | Performed by: NEUROLOGICAL SURGERY

## 2022-02-14 PROCEDURE — 1159F MED LIST DOCD IN RCRD: CPT | Mod: CPTII,S$GLB,, | Performed by: NEUROLOGICAL SURGERY

## 2022-02-14 RX ORDER — OXYCODONE AND ACETAMINOPHEN 10; 325 MG/1; MG/1
1 TABLET ORAL EVERY 8 HOURS PRN
Qty: 90 TABLET | Refills: 0 | Status: SHIPPED | OUTPATIENT
Start: 2022-02-14 | End: 2022-04-06 | Stop reason: SDUPTHER

## 2022-02-14 NOTE — PROGRESS NOTES
NEUROSURGICAL PROGRESS NOTE    DATE OF SERVICE:  02/14/2022    ATTENDING PHYSICIAN:  Jose L Brown MD    SUBJECTIVE:    INTERIM HISTORY:    This is a very pleasant 46 y.o. male, BMI of 48, who is status post L3-S1 OLIF with posterior instrumentation in April 2021 for status post lumbar laminectomy with degenerative disc disease and recurrent stenosis with radiculopathy.  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  Since his last lumbar fusion surgery he has been complaining of worsening bilateral buttock pain.  He had a few episodes of sudden leg weakness associated with sharp pain, mainly on the left side.  It happened at work when he was climbing stairs.  We proceeded with bilateral SI joint block and steroid injection that did not give him significant pain relief.  It is unclear if the patient had pain relief for the duration of the block.  Reviewing the fluoroscopy shots the left side injection was in the SI joint but the right side injection appeared to be superficial and subcapsular.  He has been doing physical therapy 3 times a week for 6 weeks for hip strengthening flexibility exercises, force closure exercises with partial pain relief.  He reports significant improvement in his back pain and ability to walk when he wears his SI joint belt.  He does not have worsening gait imbalance or sphincter dysfunction symptoms.              PAST MEDICAL HISTORY:  Active Ambulatory Problems     Diagnosis Date Noted    Joint pain 04/16/2015    Obesity 04/16/2015    Vitamin D deficiency 04/16/2015    Fatigue 04/16/2015    Bilateral hand pain 04/16/2015    Diabetes 04/17/2015    VICENTE (obstructive sleep apnea) 06/17/2015    CTS (carpal tunnel syndrome) 06/26/2015    Elevated sed rate 06/26/2015    Lumbago 10/14/2019    Chronic lower back pain 02/18/2020    OCD (obsessive compulsive disorder) 02/26/2020    Depression 02/26/2020    Anxiety 02/26/2020    Insomnia due to other mental disorder  02/05/2021    Palpitations 03/25/2021    DDD (degenerative disc disease), lumbar 04/26/2021    Grief 05/19/2021    Adjustment disorder with mixed anxiety and depressed mood 05/19/2021    Mixed obsessional thoughts and acts 06/25/2021    Mild episode of recurrent major depressive disorder 06/25/2021    Fusion of spine, lumbosacral region 07/13/2021    Acute buttock pain 07/13/2021    Sacroiliac joint dysfunction of both sides 12/13/2021    Chronic bilateral low back pain with bilateral sciatica 12/14/2021    Weakness of both lower extremities 12/14/2021    Difficulty walking 12/14/2021    Radiculopathy of lumbosacral region 12/14/2021     Resolved Ambulatory Problems     Diagnosis Date Noted    DDD (degenerative disc disease), lumbar 09/04/2019    Lumbar disc herniation with radiculopathy 10/09/2019     Past Medical History:   Diagnosis Date    Allergy     Back pain     Hx of vasectomy     Hypertension     Sleep apnea        PAST SURGICAL HISTORY:  Past Surgical History:   Procedure Laterality Date    ADENOIDECTOMY      EPIDURAL STEROID INJECTION Right 9/4/2019    Procedure: Injection, Steroid, Epidural Transforaminal;  Surgeon: Harjinder Batista Jr., MD;  Location: [facility] ENDO;  Service: Pain Management;  Laterality: Right;  Right L3 + L4 TF NATALIE    [id number]  [id number]    Arrive @ 1300; ASA last 8/27; Check BG; NEEDS CONSENT    FRACTURE SURGERY      bilateral elbow fracture 3 years ago    FUSION OF LUMBAR SPINE BY ANTERIOR APPROACH Left 4/26/2021    Procedure: FUSION, SPINE, LUMBAR, ANTERIOR APPROACH Left L3-4 L4-5 OLIF BRENDA, ALL Release, L5-S1 ALIF;  Surgeon: Jose L Brown MD;  Location: [facility] OR;  Service: Neurosurgery;  Laterality: Left;  Procedure: Left L3-4 L4-5 OLIF BRENDA, ALL Release, L5-S1 ALIF  Surgery Time: 2.5 Hrs  LOS: 2-3  Anesthesia: General  Blood: Type & Screen  Radiology: Josesline  Brace: LSO  Bed: Regular  Position: LAteral Righ    FUSION OF SPINE WITH INSTRUMENTATION N/A 4/26/2021     Procedure: FUSION, SPINE, WITH INSTRUMENTATION L3-S1 Posteior Segmental Instrumentation;  Surgeon: Jose L Brown MD;  Location: [facility] OR;  Service: Neurosurgery;  Laterality: N/A;  Procedure: L3-S1 Posteior Segmental Instrumentation  Sirgery Time: 2 Hrs  LOS: 2-3  Anesthesia: General  Blood: Tyoe & Screen  Radiology: Dual C arm  Brace:LSO  Bed: [place] [id number] Poster  Position: Prone  Equipment: Depuy     HERNIA REPAIR      bilateral groin hernia    INJECTION OF STEROID Bilateral 12/13/2021    Procedure: INJECTION, STEROID Bilateral SI JOint Block and Steroid Injection;  Surgeon: Jose L Brown MD;  Location: [facility] OR;  Service: Neurosurgery;  Laterality: Bilateral;  Procedure: Bilateral SI JOint Block and Steroid Injection   Surgery Time: 30 min  LOS: 0  Anesthesia: MAC  Radiology: C-arm  Bed: Regular with pillows  Position: Prone    LUMBAR LAMINECTOMY Bilateral 11/29/2019    Procedure: LAMINECTOMY, SPINE, LUMBAR Rigth L3-4, Left L4-5 Laminectomy, medial Facetectomy, and microdiscectomy;  Surgeon: Jose L Brown MD;  Location: [facility] OR;  Service: Neurosurgery;  Laterality: Bilateral;  Procedure: Rigth L3-4, Left L4-5 Laminectomy, medial Facetectomy, and microdiscectomy  Surgery Time: 2.5 Hrs  LOS: 0-1  Anesthesia: General  Blood: Type & Screen  Radiology: C-arm  Micros    TONSILLECTOMY      VASECTOMY         SOCIAL HISTORY:   Social History     Socioeconomic History    Marital status: Significant Other   Tobacco Use    Smoking status: Current Every Day Smoker     Packs/day: 0.00     Years: 24.00     Pack years: 0.00     Types: Vaping with nicotine     Start date: 1997    Smokeless tobacco: Never Used    Tobacco comment: Handout provided for Ambualtory Smoking Cessaiton program   Substance and Sexual Activity    Alcohol use: Not Currently     Alcohol/week: 0.0 standard drinks     Comment: 750 ml  once a month or so    Drug use: Never    Sexual activity: Yes     Partners: Female     Birth  control/protection: None     Comment: ; one child      Social Determinants of Health     Financial Resource Strain: High Risk    Difficulty of Paying Living Expenses: Very hard   Food Insecurity: No Food Insecurity    Worried About Running Out of Food in the Last Year: Never true    Ran Out of Food in the Last Year: Never true   Transportation Needs: No Transportation Needs    Lack of Transportation (Medical): No    Lack of Transportation (Non-Medical): No   Physical Activity: Inactive    Days of Exercise per Week: 0 days    Minutes of Exercise per Session: 0 min   Stress: Stress Concern Present    Feeling of Stress : Very much   Social Connections: Unknown    Frequency of Communication with Friends and Family: Once a week    Frequency of Social Gatherings with Friends and Family: Never    Active Member of Clubs or Organizations: No    Attends Club or Organization Meetings: Never    Marital Status: Living with partner   Housing Stability: High Risk    Unable to Pay for Housing in the Last Year: Yes    Number of Places Lived in the Last Year: 1    Unstable Housing in the Last Year: No       FAMILY HISTORY:  Family History   Problem Relation Age of Onset    Fibromyalgia Mother     Osteoarthritis Mother     Cataracts Maternal Grandmother     Macular degeneration Maternal Grandfather     Cataracts Maternal Grandfather     Rheum arthritis Neg Hx     Psoriasis Neg Hx     Lupus Neg Hx     Kidney disease Neg Hx     Inflammatory bowel disease Neg Hx     Amblyopia Neg Hx     Blindness Neg Hx     Glaucoma Neg Hx     Retinal detachment Neg Hx     Strabismus Neg Hx        CURRENTS MEDICATIONS:  Current Outpatient Medications on File Prior to Visit   Medication Sig Dispense Refill    acetaminophen (TYLENOL) 500 MG tablet Take 1,000 mg by mouth daily as needed for Pain.      ascorbic acid, vitamin C, (VITAMIN C) 1000 MG tablet Take 1,000 mg by mouth once daily.      aspirin (ECOTRIN) 81 MG  "EC tablet Take 81 mg by mouth every evening.      atorvastatin (LIPITOR) 10 MG tablet Take 1 tablet (10 mg total) by mouth every other day. 45 tablet 3    blood-glucose transmitter (DEXCOM G6 TRANSMITTER) Cayla Use to check glucose - 90 day supply 1 each 11    busPIRone (BUSPAR) 15 MG tablet Take 1 tablet (15 mg total) by mouth 3 (three) times daily. (Patient taking differently: Take 15 mg by mouth. 1 1/2 tablets BID) 90 tablet 3    celecoxib (CELEBREX) 200 MG capsule TAKE 1 CAPSULE BY MOUTH TWICE A  capsule 0    dulaglutide (TRULICITY) 1.5 mg/0.5 mL pen injector Inject 1.5 mg into the skin once a week. 12 pen 3    flash glucose scanning reader Misc Disp one reader to monitor glucose- covered by insurance dexcom 1 each 1    flash glucose sensor Kit Dispense sensor to read glucose  dexcom 12 kit 11    FLUoxetine 40 MG capsule Take 2 capsules (80 mg total) by mouth once daily. 180 capsule 1    fluticasone propionate (FLONASE) 50 mcg/actuation nasal spray 2 sprays (100 mcg total) by Each Nostril route once daily. 48 mL 2    gabapentin (NEURONTIN) 800 MG tablet Take 1 tablet (800 mg total) by mouth 3 (three) times daily. 270 tablet 3    hydrOXYzine HCL (ATARAX) 25 MG tablet TAKE 1 -2 TABS NIGHTLY AS NEEDED FOR INSOMNIA 180 tablet 0    insulin aspart U-100 (NOVOLOG FLEXPEN U-100 INSULIN) 100 unit/mL (3 mL) InPn pen Inject 2 to 8 units of insulin according to the sliding scale 2 to 3 times a day with meals.  See comments for ss..asss 5 each 1    metFORMIN (GLUCOPHAGE-XR) 500 MG ER 24hr tablet Take 2 tablets (1,000 mg total) by mouth daily with breakfast. 180 tablet 3    multivitamin capsule Take 1 capsule by mouth once daily.      NON FORMULARY MEDICATION Uses Cpap Machine nightly on a setting of 10      oxyCODONE-acetaminophen (PERCOCET)  mg per tablet Take 1 tablet by mouth every 8 (eight) hours as needed for Pain. 90 tablet 0    pen needle, diabetic 31 gauge x 1/4" Ndle Use with flex pen " 100 each 11    tiZANidine (ZANAFLEX) 4 MG tablet TAKE 1 TABLET (4 MG TOTAL) BY MOUTH EVERY 8 (EIGHT) HOURS AS NEEDED (SPASMS). 270 tablet 0    valsartan-hydrochlorothiazide (DIOVAN-HCT) 320-12.5 mg per tablet Take 1 tablet by mouth once daily. 90 tablet 3    vitamin D 1000 units Tab Take 4,000 mg by mouth once daily.       acetaminophen (TYLENOL) 500 MG tablet Take 500 mg by mouth every evening.      diazePAM (VALIUM) 2 MG tablet Take 1 tablet (2 mg total) by mouth every 12 (twelve) hours. 15 tablet 1    insulin lispro (HUMALOG KWIKPEN INSULIN) 100 unit/mL pen Use with sliding scale three times a day with meals.  ( see comments) 15 mL 1    traMADoL (ULTRAM) 50 mg tablet Take 1 tablet (50 mg total) by mouth every 8 (eight) hours as needed for Pain. 45 tablet 1    [DISCONTINUED] fluvoxaMINE (LUVOX) 100 MG tablet Take 1.5 tablets (150 mg total) by mouth 2 (two) times daily. 270 tablet 0     No current facility-administered medications on file prior to visit.       ALLERGIES:  Review of patient's allergies indicates:  No Known Allergies    REVIEW OF SYSTEMS:  Review of Systems   Constitutional: Negative for diaphoresis, fever and weight loss.   Respiratory: Negative for shortness of breath.    Cardiovascular: Negative for chest pain.   Gastrointestinal: Negative for blood in stool.   Genitourinary: Negative for hematuria.   Endo/Heme/Allergies: Does not bruise/bleed easily.   All other systems reviewed and are negative.        OBJECTIVE:    PHYSICAL EXAMINATION:   There were no vitals filed for this visit.    Physical Exam:  Vitals reviewed.    Constitutional: He appears well-developed and well-nourished.     Eyes: Pupils are equal, round, and reactive to light. Conjunctivae and EOM are normal.     Cardiovascular: Normal distal pulses and no edema.     Abdominal: Soft.     Skin: Skin displays no rash on trunk and no rash on extremities. Skin displays no lesions on trunk and no lesions on extremities.      Psych/Behavior: He is alert. He is oriented to person, place, and time. He has a normal mood and affect.     Musculoskeletal:        Neck: Range of motion is full.     Neurological:        DTRs: Tricep reflexes are 2+ on the right side and 2+ on the left side. Bicep reflexes are 2+ on the right side and 2+ on the left side. Brachioradialis reflexes are 2+ on the right side and 2+ on the left side. Patellar reflexes are 3+ on the right side and 3+ on the left side. Achilles reflexes are 2+ on the right side and 2+ on the left side.       Back Exam     Tenderness   The patient is experiencing tenderness in the sacroiliac.    Range of Motion   Extension: normal   Flexion: normal   Lateral bend right: normal   Lateral bend left: normal   Rotation right: normal   Rotation left: normal     Muscle Strength   Right Quadriceps:  5/5   Left Quadriceps:  5/5   Right Hamstrings:  5/5   Left Hamstrings:  5/5     Tests   Straight leg raise right: negative  Straight leg raise left: negative    Other   Toe walk: normal  Heel walk: normal            SI joint:   Palpation at the right and left SI joints is positive and painful  JASEN test is positive bilaterally  Gaenslen test is positive bilaterally  Thigh thrust test is positive bilaterally     Neurologic Exam     Mental Status   Oriented to person, place, and time.   Speech: speech is normal   Level of consciousness: alert    Cranial Nerves   Cranial nerves II through XII intact.     CN III, IV, VI   Pupils are equal, round, and reactive to light.  Extraocular motions are normal.     Motor Exam   Muscle bulk: normal  Overall muscle tone: normal    Strength   Right deltoid: 5/5  Left deltoid: 5/5  Right biceps: 5/5  Left biceps: 5/5  Right triceps: 5/5  Left triceps: 5/5  Right wrist flexion: 5/5  Left wrist flexion: 5/5  Right wrist extension: 5/5  Left wrist extension: 5/5  Right interossei: 5/5  Left interossei: 5/5  Right iliopsoas: 5/5  Left iliopsoas: 5/5  Right  quadriceps: 5/5  Left quadriceps: 5/5  Right hamstrin/5  Left hamstrin/5  Right anterior tibial: 5/5  Left anterior tibial: 5/5  Right posterior tibial: 5/5  Left posterior tibial: 5/5  Right peroneal: 5/5  Left peroneal: 5/5  Right gastroc: 5/5  Left gastroc: 5/5    Sensory Exam   Light touch normal.   Pinprick normal.     Gait, Coordination, and Reflexes     Gait  Gait: normal (Antalgic, leaning forward)    Coordination   Finger to nose coordination: normal    Reflexes   Right brachioradialis: 2+  Left brachioradialis: 2+  Right biceps: 2+  Left biceps: 2+  Right triceps: 2+  Left triceps: 2+  Right patellar: 3+  Left patellar: 3+  Right achilles: 2+  Left achilles: 2+  Right plantar: normal  Left plantar: normal  Right Hughes: absent  Left Hughes: absent  Right ankle clonus: absent  Left ankle clonus: absent        DIAGNOSTIC DATA:  I personally interpreted the following imaging:   Thoracic spine MRI shows arachnoid web at T3-4 with slight indentation of the cord posteriorly, no intramedullary signal change  Lumbar spine MRI shows L3-S1 fusion without residual stenosis  Cervical spine MRI shows mild central canal stenosis throughout        ASSESMENT:  This is a 46 y.o. male with     Problem List Items Addressed This Visit    None     Visit Diagnoses     Bilateral sacroiliitis    -  Primary    Relevant Orders    NM Fusion Spect CT    Ambulatory referral/consult to Physical/Occupational Therapy    Dorsalgia, unspecified        Relevant Orders    CT Lumbar Spine Without Contrast    NM Fusion Spect CT    Ambulatory referral/consult to Physical/Occupational Therapy    Status post lumbar spinal fusion        Relevant Orders    Ambulatory referral/consult to Physical/Occupational Therapy    Spinal arachnoid cyst        BMI 45.0-49.9, adult                PLAN:  Continue physical therapy with muscle strengthening and flexibility exercises in the pelvis, abdomen hip areas  Will order CT of the lumbar spine to  rule out pseudoarthrosis  SPECT CT to rule out pseudoarthrosis and to rule out sacroiliitis  We might proceed with a repeat SI joint block and steroid injection if no pseudoarthrosis was found on the CT of the lumbar spine  Can continue to use SI joint belt as needed when he walks for prolonged period of time  Physical therapy should zahida at improving self confidence, improving ability to walk for longer distances  All questions answered  Will call back once imaging is completed    More than 50% of the time was spent on discussing conservative management treatments (medication, physical therapy exercises) and possible interventions (spinal injections and surgical procedures). Care coordination was discussed.  45 minutes          Jose L Brown MD  Cell:[phone]

## 2022-02-16 ENCOUNTER — TELEPHONE (OUTPATIENT)
Dept: FAMILY MEDICINE | Facility: CLINIC | Age: 47
End: 2022-02-16
Payer: COMMERCIAL

## 2022-02-16 ENCOUNTER — PATIENT MESSAGE (OUTPATIENT)
Dept: FAMILY MEDICINE | Facility: CLINIC | Age: 47
End: 2022-02-16
Payer: COMMERCIAL

## 2022-02-16 RX ORDER — DULAGLUTIDE 3 MG/.5ML
3 INJECTION, SOLUTION SUBCUTANEOUS
Qty: 12 PEN | Refills: 11 | Status: ON HOLD | OUTPATIENT
Start: 2022-02-16 | End: 2022-04-18 | Stop reason: HOSPADM

## 2022-02-16 NOTE — TELEPHONE ENCOUNTER
Patient requesting a sooner appt  As he is already scheduled on 3/4/2022    He said he is having trouble with his DM    . Dr. Valencia put me on Insulin sliding scale before breakfast lunch and dinner. And with that my numbers run between 200 to 325. Right now I am at 271 at 11:05am and the last thing I ate was two hot pockets at 5:30 am. Also I am having a lot of burning in my feet the last few days. Thank you in advance for your help     Please advise

## 2022-02-16 NOTE — TELEPHONE ENCOUNTER
Can you send me a complete list of your readings-over the last week you can send this through the portal.  Take a picture if need be.

## 2022-02-17 ENCOUNTER — OFFICE VISIT (OUTPATIENT)
Dept: UROLOGY | Facility: CLINIC | Age: 47
End: 2022-02-17
Payer: COMMERCIAL

## 2022-02-17 VITALS
DIASTOLIC BLOOD PRESSURE: 71 MMHG | SYSTOLIC BLOOD PRESSURE: 102 MMHG | HEIGHT: 72 IN | HEART RATE: 76 BPM | WEIGHT: 315 LBS | BODY MASS INDEX: 42.66 KG/M2

## 2022-02-17 DIAGNOSIS — R35.0 FREQUENCY OF MICTURITION: Primary | ICD-10-CM

## 2022-02-17 DIAGNOSIS — R39.15 URGENCY OF URINATION: ICD-10-CM

## 2022-02-17 PROCEDURE — 3078F PR MOST RECENT DIASTOLIC BLOOD PRESSURE < 80 MM HG: ICD-10-PCS | Mod: CPTII,S$GLB,, | Performed by: UROLOGY

## 2022-02-17 PROCEDURE — 99204 PR OFFICE/OUTPT VISIT, NEW, LEVL IV, 45-59 MIN: ICD-10-PCS | Mod: S$GLB,,, | Performed by: UROLOGY

## 2022-02-17 PROCEDURE — 3078F DIAST BP <80 MM HG: CPT | Mod: CPTII,S$GLB,, | Performed by: UROLOGY

## 2022-02-17 PROCEDURE — 3074F SYST BP LT 130 MM HG: CPT | Mod: CPTII,S$GLB,, | Performed by: UROLOGY

## 2022-02-17 PROCEDURE — 99999 PR PBB SHADOW E&M-EST. PATIENT-LVL V: CPT | Mod: PBBFAC,,, | Performed by: UROLOGY

## 2022-02-17 PROCEDURE — 1159F PR MEDICATION LIST DOCUMENTED IN MEDICAL RECORD: ICD-10-PCS | Mod: CPTII,S$GLB,, | Performed by: UROLOGY

## 2022-02-17 PROCEDURE — 3072F PR LOW RISK FOR RETINOPATHY: ICD-10-PCS | Mod: CPTII,S$GLB,, | Performed by: UROLOGY

## 2022-02-17 PROCEDURE — 3008F BODY MASS INDEX DOCD: CPT | Mod: CPTII,S$GLB,, | Performed by: UROLOGY

## 2022-02-17 PROCEDURE — 99204 OFFICE O/P NEW MOD 45 MIN: CPT | Mod: S$GLB,,, | Performed by: UROLOGY

## 2022-02-17 PROCEDURE — 99999 PR PBB SHADOW E&M-EST. PATIENT-LVL V: ICD-10-PCS | Mod: PBBFAC,,, | Performed by: UROLOGY

## 2022-02-17 PROCEDURE — 3072F LOW RISK FOR RETINOPATHY: CPT | Mod: CPTII,S$GLB,, | Performed by: UROLOGY

## 2022-02-17 PROCEDURE — 1159F MED LIST DOCD IN RCRD: CPT | Mod: CPTII,S$GLB,, | Performed by: UROLOGY

## 2022-02-17 PROCEDURE — 3008F PR BODY MASS INDEX (BMI) DOCUMENTED: ICD-10-PCS | Mod: CPTII,S$GLB,, | Performed by: UROLOGY

## 2022-02-17 PROCEDURE — 3074F PR MOST RECENT SYSTOLIC BLOOD PRESSURE < 130 MM HG: ICD-10-PCS | Mod: CPTII,S$GLB,, | Performed by: UROLOGY

## 2022-02-17 NOTE — PROGRESS NOTES
Subjective:       Patient ID: Kirill Gandhi III is a 46 y.o. male.    Chief Complaint: Urinary Tract Infection and Urinary Frequency    HPI patient is here complaining of urinary frequency and occasionally has urgency and minimal urge does take of his medication with HCTZ in it for is hypertension.  He voids twice an hour during the day and 2-3 times per night.  incontinence.  His urine is negative.  He is diabetic.  nsulin-dependent.  He    Past Medical History:   Diagnosis Date    Allergy     Anxiety     Back pain     CTS (carpal tunnel syndrome) 6/26/2015    Depression     Diabetes 4/17/2015    Hx of vasectomy     Hypertension     OCD (obsessive compulsive disorder)     Sleep apnea        Past Surgical History:   Procedure Laterality Date    ADENOIDECTOMY      EPIDURAL STEROID INJECTION Right 9/4/2019    Procedure: Injection, Steroid, Epidural Transforaminal;  Surgeon: Harjinder Batista Jr., MD;  Location: [facility];  Service: Pain Management;  Laterality: Right;  Right L3 + L4 TF NATALIE    19100  24125    Arrive @ 1300; ASA last 8/27; Check BG; NEEDS CONSENT    FRACTURE SURGERY      bilateral elbow fracture 3 years ago    FUSION OF LUMBAR SPINE BY ANTERIOR APPROACH Left 4/26/2021    Procedure: FUSION, SPINE, LUMBAR, ANTERIOR APPROACH Left L3-4 L4-5 OLIF BRENDA, ALL Release, L5-S1 ALIF;  Surgeon: Jose L Brown MD;  Location: [facility] OR;  Service: Neurosurgery;  Laterality: Left;  Procedure: Left L3-4 L4-5 OLIF BRENDA, ALL Release, L5-S1 ALIF  Surgery Time: 2.5 Hrs  LOS: 2-3  Anesthesia: General  Blood: Type & Screen  Radiology: Josseline  Brace: LSO  Bed: Regular  Position: LAteral Righ    FUSION OF SPINE WITH INSTRUMENTATION N/A 4/26/2021    Procedure: FUSION, SPINE, WITH INSTRUMENTATION L3-S1 Posteior Segmental Instrumentation;  Surgeon: Jose L Brown MD;  Location: [facility] OR;  Service: Neurosurgery;  Laterality: N/A;  Procedure: L3-S1 Posteior Segmental Instrumentation  Sirgery Time: 2  Hrs  LOS: 2-3  Anesthesia: General  Blood: Tyoe & Screen  Radiology: Dual C arm  Brace:LSO  Bed: [place] [id number] Poster  Position: Prone  Equipment: Depuy     HERNIA REPAIR      bilateral groin hernia    INJECTION OF STEROID Bilateral 12/13/2021    Procedure: INJECTION, STEROID Bilateral SI JOint Block and Steroid Injection;  Surgeon: Jose L Brown MD;  Location: [facility] OR;  Service: Neurosurgery;  Laterality: Bilateral;  Procedure: Bilateral SI JOint Block and Steroid Injection   Surgery Time: 30 min  LOS: 0  Anesthesia: MAC  Radiology: C-arm  Bed: Regular with pillows  Position: Prone    LUMBAR LAMINECTOMY Bilateral 11/29/2019    Procedure: LAMINECTOMY, SPINE, LUMBAR Rigth L3-4, Left L4-5 Laminectomy, medial Facetectomy, and microdiscectomy;  Surgeon: Jose L Brown MD;  Location: [facility] OR;  Service: Neurosurgery;  Laterality: Bilateral;  Procedure: Rigth L3-4, Left L4-5 Laminectomy, medial Facetectomy, and microdiscectomy  Surgery Time: 2.5 Hrs  LOS: 0-1  Anesthesia: General  Blood: Type & Screen  Radiology: C-arm  Micros    TONSILLECTOMY      VASECTOMY         Family History   Problem Relation Age of Onset    Fibromyalgia Mother     Osteoarthritis Mother     Cataracts Maternal Grandmother     Macular degeneration Maternal Grandfather     Cataracts Maternal Grandfather     Rheum arthritis Neg Hx     Psoriasis Neg Hx     Lupus Neg Hx     Kidney disease Neg Hx     Inflammatory bowel disease Neg Hx     Amblyopia Neg Hx     Blindness Neg Hx     Glaucoma Neg Hx     Retinal detachment Neg Hx     Strabismus Neg Hx        Social History     Socioeconomic History    Marital status: Significant Other   Tobacco Use    Smoking status: Current Every Day Smoker     Packs/day: 0.00     Years: 24.00     Pack years: 0.00     Types: Vaping with nicotine     Start date: 1997    Smokeless tobacco: Never Used    Tobacco comment: Handout provided for [employer] Smoking Cessaiton program   Substance and Sexual  Activity    Alcohol use: Not Currently     Alcohol/week: 0.0 standard drinks     Comment: 750 ml  once a month or so    Drug use: Never    Sexual activity: Yes     Partners: Female     Birth control/protection: None     Comment: ; one child      Social Determinants of Health     Financial Resource Strain: High Risk    Difficulty of Paying Living Expenses: Very hard   Food Insecurity: No Food Insecurity    Worried About Running Out of Food in the Last Year: Never true    Ran Out of Food in the Last Year: Never true   Transportation Needs: No Transportation Needs    Lack of Transportation (Medical): No    Lack of Transportation (Non-Medical): No   Physical Activity: Inactive    Days of Exercise per Week: 0 days    Minutes of Exercise per Session: 0 min   Stress: Stress Concern Present    Feeling of Stress : Very much   Social Connections: Unknown    Frequency of Communication with Friends and Family: Once a week    Frequency of Social Gatherings with Friends and Family: Never    Active Member of Clubs or Organizations: No    Attends Club or Organization Meetings: Never    Marital Status: Living with partner   Housing Stability: High Risk    Unable to Pay for Housing in the Last Year: Yes    Number of Places Lived in the Last Year: 1    Unstable Housing in the Last Year: No       Allergies:  Patient has no known allergies.    Medications:    Current Outpatient Medications:     acetaminophen (TYLENOL) 500 MG tablet, Take 1,000 mg by mouth daily as needed for Pain., Disp: , Rfl:     ascorbic acid, vitamin C, (VITAMIN C) 1000 MG tablet, Take 1,000 mg by mouth once daily., Disp: , Rfl:     aspirin (ECOTRIN) 81 MG EC tablet, Take 81 mg by mouth every evening., Disp: , Rfl:     atorvastatin (LIPITOR) 10 MG tablet, Take 1 tablet (10 mg total) by mouth every other day., Disp: 45 tablet, Rfl: 3    blood-glucose transmitter (DEXCOM G6 TRANSMITTER) Cayla, Use to check glucose - 90 day supply, Disp: 1  "each, Rfl: 11    busPIRone (BUSPAR) 15 MG tablet, Take 1 tablet (15 mg total) by mouth 3 (three) times daily. (Patient taking differently: Take 15 mg by mouth. 1 1/2 tablets BID), Disp: 90 tablet, Rfl: 3    celecoxib (CELEBREX) 200 MG capsule, TAKE 1 CAPSULE BY MOUTH TWICE A DAY, Disp: 180 capsule, Rfl: 0    dulaglutide (TRULICITY) 3 mg/0.5 mL pen injector, Inject 3 mg into the skin every 7 days., Disp: 12 pen, Rfl: 11    flash glucose scanning reader Misc, Disp one reader to monitor glucose- covered by insurance dexcom, Disp: 1 each, Rfl: 1    flash glucose sensor Kit, Dispense sensor to read glucose  dexcom, Disp: 12 kit, Rfl: 11    FLUoxetine 40 MG capsule, Take 2 capsules (80 mg total) by mouth once daily., Disp: 180 capsule, Rfl: 1    fluticasone propionate (FLONASE) 50 mcg/actuation nasal spray, 2 sprays (100 mcg total) by Each Nostril route once daily., Disp: 48 mL, Rfl: 2    gabapentin (NEURONTIN) 800 MG tablet, Take 1 tablet (800 mg total) by mouth 3 (three) times daily., Disp: 270 tablet, Rfl: 3    hydrOXYzine HCL (ATARAX) 25 MG tablet, TAKE 1 -2 TABS NIGHTLY AS NEEDED FOR INSOMNIA, Disp: 180 tablet, Rfl: 0    insulin aspart U-100 (NOVOLOG FLEXPEN U-100 INSULIN) 100 unit/mL (3 mL) InPn pen, Inject 2 to 8 units of insulin according to the sliding scale 2 to 3 times a day with meals.  See comments for ss..asss, Disp: 5 each, Rfl: 1    metFORMIN (GLUCOPHAGE-XR) 500 MG ER 24hr tablet, Take 2 tablets (1,000 mg total) by mouth daily with breakfast., Disp: 180 tablet, Rfl: 3    multivitamin capsule, Take 1 capsule by mouth once daily., Disp: , Rfl:     NON FORMULARY MEDICATION, Uses Cpap Machine nightly on a setting of 10, Disp: , Rfl:     oxyCODONE-acetaminophen (PERCOCET)  mg per tablet, Take 1 tablet by mouth every 8 (eight) hours as needed for Pain., Disp: 90 tablet, Rfl: 0    pen needle, diabetic 31 gauge x 1/4" Ndle, Use with flex pen, Disp: 100 each, Rfl: 11    tiZANidine (ZANAFLEX) 4 " MG tablet, TAKE 1 TABLET (4 MG TOTAL) BY MOUTH EVERY 8 (EIGHT) HOURS AS NEEDED (SPASMS)., Disp: 270 tablet, Rfl: 0    valsartan-hydrochlorothiazide (DIOVAN-HCT) 320-12.5 mg per tablet, Take 1 tablet by mouth once daily., Disp: 90 tablet, Rfl: 3    vitamin D 1000 units Tab, Take 4,000 mg by mouth once daily. , Disp: , Rfl:     acetaminophen (TYLENOL) 500 MG tablet, Take 500 mg by mouth every evening., Disp: , Rfl:     diazePAM (VALIUM) 2 MG tablet, Take 1 tablet (2 mg total) by mouth every 12 (twelve) hours., Disp: 15 tablet, Rfl: 1    traMADoL (ULTRAM) 50 mg tablet, Take 1 tablet (50 mg total) by mouth every 8 (eight) hours as needed for Pain., Disp: 45 tablet, Rfl: 1    Review of Systems   Constitutional: Negative for activity change, appetite change, chills, diaphoresis, fatigue, fever and unexpected weight change.   HENT: Negative for congestion, dental problem, hearing loss, mouth sores, postnasal drip, rhinorrhea, sinus pressure and trouble swallowing.    Eyes: Negative for pain, discharge and itching.   Respiratory: Negative for apnea, cough, choking, chest tightness, shortness of breath and wheezing.    Cardiovascular: Negative for chest pain, palpitations and leg swelling.   Gastrointestinal: Negative for abdominal distention, abdominal pain, anal bleeding, blood in stool, constipation, diarrhea, nausea, rectal pain and vomiting.   Endocrine: Negative for polydipsia and polyuria.   Genitourinary: Positive for decreased urine volume, difficulty urinating, frequency and urgency. Negative for dysuria, enuresis, flank pain, genital sores, hematuria, penile discharge, penile pain, penile swelling, scrotal swelling and testicular pain.   Musculoskeletal: Negative for arthralgias, back pain and myalgias.   Skin: Negative for color change, rash and wound.   Neurological: Negative for dizziness, syncope, speech difficulty, light-headedness and headaches.   Hematological: Negative for adenopathy. Does not  bruise/bleed easily.   Psychiatric/Behavioral: Negative for behavioral problems, confusion, hallucinations and sleep disturbance.       Objective:      Physical Exam  Constitutional:       Appearance: He is well-developed.   HENT:      Head: Normocephalic.   Cardiovascular:      Rate and Rhythm: Normal rate.   Pulmonary:      Effort: Pulmonary effort is normal.   Abdominal:      Palpations: Abdomen is soft.   Genitourinary:     Prostate: Normal.      Comments: Small and flat patient is significantly overweight and in wheelchair postvoid residual is minimal  Skin:     General: Skin is warm.   Neurological:      Mental Status: He is alert.   Psychiatric:         Mood and Affect: Mood and affect normal.         Assessment:       1. Frequency of micturition    2. Urgency of urination        Plan:       Kirill was seen today for urinary tract infection and urinary frequency.    Diagnoses and all orders for this visit:    Frequency of micturition  -     US Retroperitoneal Complete; Future  -     Cystoscopy; Future  -     Simple urodynamics; Future    Urgency of urination    Some of the patient is symptoms are situational.  He takes a walker to the bathroom at ni of recommend he cut back on some his fluids ght his symptoms seem to be worse durin and will get a workup g the day than at night indicating that this might have to do with his diuretics

## 2022-02-18 ENCOUNTER — OFFICE VISIT (OUTPATIENT)
Dept: PODIATRY | Facility: CLINIC | Age: 47
End: 2022-02-18
Payer: COMMERCIAL

## 2022-02-18 VITALS
BODY MASS INDEX: 42.66 KG/M2 | SYSTOLIC BLOOD PRESSURE: 115 MMHG | HEART RATE: 66 BPM | DIASTOLIC BLOOD PRESSURE: 67 MMHG | HEIGHT: 72 IN | WEIGHT: 315 LBS

## 2022-02-18 DIAGNOSIS — B35.1 ONYCHOMYCOSIS DUE TO DERMATOPHYTE: Primary | ICD-10-CM

## 2022-02-18 DIAGNOSIS — E11.42 TYPE 2 DIABETES MELLITUS WITH DIABETIC POLYNEUROPATHY, UNSPECIFIED WHETHER LONG TERM INSULIN USE: ICD-10-CM

## 2022-02-18 PROCEDURE — 3078F PR MOST RECENT DIASTOLIC BLOOD PRESSURE < 80 MM HG: ICD-10-PCS | Mod: CPTII,S$GLB,, | Performed by: PODIATRIST

## 2022-02-18 PROCEDURE — 3074F PR MOST RECENT SYSTOLIC BLOOD PRESSURE < 130 MM HG: ICD-10-PCS | Mod: CPTII,S$GLB,, | Performed by: PODIATRIST

## 2022-02-18 PROCEDURE — 3008F PR BODY MASS INDEX (BMI) DOCUMENTED: ICD-10-PCS | Mod: CPTII,S$GLB,, | Performed by: PODIATRIST

## 2022-02-18 PROCEDURE — 1159F PR MEDICATION LIST DOCUMENTED IN MEDICAL RECORD: ICD-10-PCS | Mod: CPTII,S$GLB,, | Performed by: PODIATRIST

## 2022-02-18 PROCEDURE — 3072F LOW RISK FOR RETINOPATHY: CPT | Mod: CPTII,S$GLB,, | Performed by: PODIATRIST

## 2022-02-18 PROCEDURE — 99204 OFFICE O/P NEW MOD 45 MIN: CPT | Mod: S$GLB,,, | Performed by: PODIATRIST

## 2022-02-18 PROCEDURE — 99204 PR OFFICE/OUTPT VISIT, NEW, LEVL IV, 45-59 MIN: ICD-10-PCS | Mod: S$GLB,,, | Performed by: PODIATRIST

## 2022-02-18 PROCEDURE — 1160F RVW MEDS BY RX/DR IN RCRD: CPT | Mod: CPTII,S$GLB,, | Performed by: PODIATRIST

## 2022-02-18 PROCEDURE — 3074F SYST BP LT 130 MM HG: CPT | Mod: CPTII,S$GLB,, | Performed by: PODIATRIST

## 2022-02-18 PROCEDURE — 99999 PR PBB SHADOW E&M-EST. PATIENT-LVL V: CPT | Mod: PBBFAC,,, | Performed by: PODIATRIST

## 2022-02-18 PROCEDURE — 3008F BODY MASS INDEX DOCD: CPT | Mod: CPTII,S$GLB,, | Performed by: PODIATRIST

## 2022-02-18 PROCEDURE — 3072F PR LOW RISK FOR RETINOPATHY: ICD-10-PCS | Mod: CPTII,S$GLB,, | Performed by: PODIATRIST

## 2022-02-18 PROCEDURE — 3078F DIAST BP <80 MM HG: CPT | Mod: CPTII,S$GLB,, | Performed by: PODIATRIST

## 2022-02-18 PROCEDURE — 1160F PR REVIEW ALL MEDS BY PRESCRIBER/CLIN PHARMACIST DOCUMENTED: ICD-10-PCS | Mod: CPTII,S$GLB,, | Performed by: PODIATRIST

## 2022-02-18 PROCEDURE — 99999 PR PBB SHADOW E&M-EST. PATIENT-LVL V: ICD-10-PCS | Mod: PBBFAC,,, | Performed by: PODIATRIST

## 2022-02-18 PROCEDURE — 1159F MED LIST DOCD IN RCRD: CPT | Mod: CPTII,S$GLB,, | Performed by: PODIATRIST

## 2022-02-18 RX ORDER — BLOOD-GLUCOSE METER
1 EACH MISCELLANEOUS 3 TIMES DAILY
COMMUNITY
Start: 2022-02-10

## 2022-02-18 RX ORDER — CICLOPIROX 80 MG/ML
SOLUTION TOPICAL NIGHTLY
Qty: 6.6 ML | Refills: 11 | Status: SHIPPED | OUTPATIENT
Start: 2022-02-18 | End: 2022-04-06 | Stop reason: CLARIF

## 2022-02-18 RX ORDER — METHOCARBAMOL 750 MG/1
TABLET, FILM COATED ORAL
COMMUNITY
Start: 2022-02-08 | End: 2022-04-06 | Stop reason: CLARIF

## 2022-02-18 RX ORDER — BLOOD-GLUCOSE SENSOR
EACH MISCELLANEOUS
Status: ON HOLD | COMMUNITY
Start: 2022-01-12 | End: 2022-10-31 | Stop reason: CLARIF

## 2022-02-18 RX ORDER — LANCETS 33 GAUGE
EACH MISCELLANEOUS 3 TIMES DAILY
COMMUNITY
Start: 2022-02-10

## 2022-02-18 NOTE — PROGRESS NOTES
Subjective:      Patient ID: Kirill Gandhi III is a 46 y.o. male.    Chief Complaint: Nail Problem (Pain from toe nail that was damaged in the past and has fungus for a while.)    Kirill is a 46 y.o. male who presents to the clinic for evaluation and treatment of high risk feet. Kirill has a past medical history of Allergy, Anxiety, Back pain, CTS (carpal tunnel syndrome) (6/26/2015), Depression, Diabetes (4/17/2015), vasectomy, Hypertension, OCD (obsessive compulsive disorder), and Sleep apnea. The patient's chief complaint is long, thick toenail right hallux. . Gradual onset, worsening over past several weeks, aggravated by increased weight bearing, shoe gear, pressure.  No previous medical treatment.  OTC pain med not helping. Denies recent trauma, surgery right hallux.  Dropped couch on it many years ago.    PCP: Marcos Ibarra MD    Date Last Seen by PCP:   Chief Complaint   Patient presents with    Nail Problem     Pain from toe nail that was damaged in the past and has fungus for a while.        Current shoe gear:  Affected Foot: Casual shoes     Unaffected Foot: Casual shoes    Hemoglobin A1C   Date Value Ref Range Status   12/09/2021 10.4 (H) 4.0 - 5.6 % Final     Comment:     ADA Screening Guidelines:  5.7-6.4%  Consistent with prediabetes  >or=6.5%  Consistent with diabetes    High levels of fetal hemoglobin interfere with the HbA1C  assay. Heterozygous hemoglobin variants (HbS, HgC, etc)do  not significantly interfere with this assay.   However, presence of multiple variants may affect accuracy.     09/25/2021 10.9 (H) 4.0 - 5.6 % Final     Comment:     ADA Screening Guidelines:  5.7-6.4%  Consistent with prediabetes  >or=6.5%  Consistent with diabetes    High levels of fetal hemoglobin interfere with the HbA1C  assay. Heterozygous hemoglobin variants (HbS, HgC, etc)do  not significantly interfere with this assay.   However, presence of multiple variants may affect accuracy.     04/14/2021  7.6 (H) 4.0 - 5.6 % Final     Comment:     ADA Screening Guidelines:  5.7-6.4%  Consistent with prediabetes  >or=6.5%  Consistent with diabetes    High levels of fetal hemoglobin interfere with the HbA1C  assay. Heterozygous hemoglobin variants (HbS, HgC, etc)do  not significantly interfere with this assay.   However, presence of multiple variants may affect accuracy.         Review of Systems   Constitutional: Negative for chills, diaphoresis, fever, malaise/fatigue and night sweats.   Cardiovascular: Negative for claudication, cyanosis, leg swelling and syncope.   Skin: Positive for nail changes. Negative for color change, dry skin, rash, suspicious lesions and unusual hair distribution.   Musculoskeletal: Negative for falls, joint pain, joint swelling, muscle cramps, muscle weakness and stiffness.   Gastrointestinal: Negative for constipation, diarrhea, nausea and vomiting.   Neurological: Positive for paresthesias and sensory change. Negative for brief paralysis, disturbances in coordination, focal weakness, numbness and tremors.           Objective:      Physical Exam  Constitutional:       General: He is not in acute distress.     Appearance: He is well-developed and well-nourished. He is not diaphoretic.   Cardiovascular:      Pulses:           Popliteal pulses are 2+ on the right side and 2+ on the left side.        Dorsalis pedis pulses are 2+ on the right side and 2+ on the left side.        Posterior tibial pulses are 2+ on the right side and 2+ on the left side.      Comments: Capillary refill 3 seconds all toes/distal feet, all toes/both feet warm to touch.      Negative lymphadenopathy bilateral popliteal fossa and tarsal tunnel.      Negavie lower extremity edema bilateral.    Musculoskeletal:      Right ankle: No swelling, deformity, ecchymosis or lacerations. Normal range of motion. Normal pulse.      Right Achilles Tendon: Normal. No pain or defects. Garcia's test negative.      Comments: Normal  angle, base, station of gait. All ten toes without clubbing, cyanosis, or signs of ischemia.  No pain to palpation bilateral lower extremities.  Range of motion, stability, muscle strength, and muscle tone normal bilateral feet and legs.    Lymphadenopathy:      Lower Body: No right inguinal adenopathy. No left inguinal adenopathy.      Comments: Negative lymphadenopathy bilateral popliteal fossa and tarsal tunnel.    Negative lymphangitic streaking bilateral feet/ankles/legs.   Skin:     General: Skin is warm, dry and intact.      Capillary Refill: Capillary refill takes 2 to 3 seconds.      Coloration: Skin is not pale.      Findings: No abrasion, bruising, burn, ecchymosis, erythema, laceration, lesion or rash.      Nails: There is no clubbing or cyanosis.      Comments: Skin is normal age and health appropriate color, turgor, texture, and temperature bilateral lower extremities without ulceration, hyperpigmentation, discoloration, masses nodules or cords palpated.  No ecchymosis, erythema, edema, or cardinal signs of infection bilateral lower extremities.    Toenails 1st  right are hypertrophic, dystrophic, discolored tanish brown with tan, gray crumbly subungual debris.  Tender to distal nail plate pressure, without periungual skin abnormality of each.         Neurological:      Mental Status: He is alert and oriented to person, place, and time.      Sensory: No sensory deficit.      Motor: No tremor, atrophy or abnormal muscle tone.      Gait: Gait normal.      Deep Tendon Reflexes: Strength normal.      Reflex Scores:       Patellar reflexes are 2+ on the right side and 2+ on the left side.       Achilles reflexes are 2+ on the right side and 2+ on the left side.     Comments: Negative tinel sign to percussion sural, superficial peroneal, deep peroneal, saphenous, and posterior tibial nerves right and left ankles and feet.    Paresthesias, and burning bilateral feet with no clearly identified trigger or  source.     Psychiatric:         Mood and Affect: Mood and affect normal.         Behavior: Behavior is cooperative.               Assessment:       Encounter Diagnosis   Name Primary?    Onychomycosis due to dermatophyte Yes         Plan:       Kirill was seen today for nail problem.    Diagnoses and all orders for this visit:    Onychomycosis due to dermatophyte    Other orders  -     ciclopirox (PENLAC) 8 % Soln; Apply topically nightly.      I counseled the patient on his conditions, their implications and medical management.    With the patient's permission, I debrided right hallux with a sterile nipper and curette, removing all offending nail and debris.  Patient tolerated the procedure well and related significant relief.        penlac    Discussed conservative treatment with shoes of adequate dimensions, material, and style to alleviate symptoms and delay or prevent surgical intervention.    The patient has received literature on basic diabetic foot care.  Patient will inspect feet daily, wear protective shoe gear when ambulatory, and apply moisturizer to skin as needed to maintain elasticity and help prevent ulceration.            No follow-ups on file.

## 2022-02-21 ENCOUNTER — HOSPITAL ENCOUNTER (OUTPATIENT)
Dept: RADIOLOGY | Facility: HOSPITAL | Age: 47
Discharge: HOME | End: 2022-02-21
Attending: NEUROLOGICAL SURGERY
Payer: COMMERCIAL

## 2022-02-21 DIAGNOSIS — M54.9 DORSALGIA, UNSPECIFIED: ICD-10-CM

## 2022-02-21 PROCEDURE — 72131 CT LUMBAR SPINE WITHOUT CONTRAST: ICD-10-PCS | Mod: 26,,, | Performed by: RADIOLOGY

## 2022-02-21 PROCEDURE — 72131 CT LUMBAR SPINE W/O DYE: CPT | Mod: TC

## 2022-02-21 PROCEDURE — 72131 CT LUMBAR SPINE W/O DYE: CPT | Mod: 26,,, | Performed by: RADIOLOGY

## 2022-02-23 ENCOUNTER — CLINICAL SUPPORT (OUTPATIENT)
Dept: REHABILITATION | Facility: HOSPITAL | Age: 47
End: 2022-02-23
Attending: NEUROLOGICAL SURGERY
Payer: COMMERCIAL

## 2022-02-23 ENCOUNTER — TELEPHONE (OUTPATIENT)
Dept: NEUROSURGERY | Facility: CLINIC | Age: 47
End: 2022-02-23
Payer: COMMERCIAL

## 2022-02-23 ENCOUNTER — PATIENT MESSAGE (OUTPATIENT)
Dept: NEUROSURGERY | Facility: CLINIC | Age: 47
End: 2022-02-23
Payer: COMMERCIAL

## 2022-02-23 DIAGNOSIS — M54.17 RADICULOPATHY OF LUMBOSACRAL REGION: ICD-10-CM

## 2022-02-23 DIAGNOSIS — M54.42 CHRONIC BILATERAL LOW BACK PAIN WITH BILATERAL SCIATICA: Primary | ICD-10-CM

## 2022-02-23 DIAGNOSIS — M54.41 CHRONIC BILATERAL LOW BACK PAIN WITH BILATERAL SCIATICA: Primary | ICD-10-CM

## 2022-02-23 DIAGNOSIS — R26.2 DIFFICULTY WALKING: ICD-10-CM

## 2022-02-23 DIAGNOSIS — R29.898 WEAKNESS OF BOTH LOWER EXTREMITIES: ICD-10-CM

## 2022-02-23 DIAGNOSIS — G89.29 CHRONIC BILATERAL LOW BACK PAIN WITH BILATERAL SCIATICA: Primary | ICD-10-CM

## 2022-02-23 PROCEDURE — 97140 MANUAL THERAPY 1/> REGIONS: CPT | Mod: PN,CQ

## 2022-02-23 PROCEDURE — 97110 THERAPEUTIC EXERCISES: CPT | Mod: PN,CQ

## 2022-02-23 NOTE — PROGRESS NOTES
"[facility]   Physical Therapy Treatment Note     [facility]  [facility] Number: [id number]    Therapy Diagnosis:   Encounter Diagnoses   Name Primary?    Chronic bilateral low back pain with bilateral sciatica Yes    Weakness of both lower extremities     Difficulty walking     Radiculopathy of lumbosacral region      Physician: Jose L Brown MD    Visit Date: 2/23/2022      Physician Orders: PT Eval and Treat   Medical Diagnosis from Referral: Sacroiliac joint dysfunction of both sides [M53.3]  Evaluation Date: 12/14/2021  Authorization Period Expiration: 12/13/2022  Plan of Care Expiration: 05/04/2022  Progress Note Due:  03/10/2022  Visit # / Visits authorized: 10/ 20   FOTO: 1/ 3      Precautions: Standard and Diabetes, hx of lumbar spine laminectomy (2019), lumbar spine fusion (04/2021)    Time In: 1500PM  Time Out: 1610PM  Total Appointment Time (timed & untimed codes): 60 minutes      SUBJECTIVE     Pt reports: not doing well since he hasn't been able to get into therapy. He recently got a CT scan that showed loose hardware.      He was compliant with home exercise program.  Response to previous treatment: soreness  Functional change: none to note    Pain: 6/10  Location: bilateral back      OBJECTIVE   Last reassessed: 02/10/2022              Treatment     Kirill received the treatments listed below:      therapeutic exercises to develop strength, endurance, ROM, flexibility, posture and core stabilization for 40 minutes including:    Progress Note Measurements  Nustep     x 6 min  Seated Sciatic N glide  2 x 10  Supine march   2 x 10/ea  LTR w/ SB    2 x 10  +Piriformis Stretch    3x30"/ea  HSS     3 x 30"  Bridges     2 x 15    +Pudendal N glide Seated on High Low 1 x 10  Seated Pelvic Tilt   2 x 10  Seated March   2 x 10  Prone hip Extension  2 x 12  +Windshield wipers   2 x 10  Seated LAQ 2#    2 x 10  Seated HS curl GTB   2 x 10  Seated row with RTB  2 " x 10  Seated no money with RTB 2 x 10  Seated paloff press with RTB 2 x 10 ea  seated flexion roll out with PB 10x  Glute sets                                          15 x ea  Left hip flexion Iso                            2 x 10  Posterior pelvic tilt                           15 x ea    SLR     2 x 12  Hip add ball squeeze   2x15 x ea  Hip fall out    15 x ea   Clamshells 2 x 10  Seated T/S rotation   2 x 15    Kirill received the following manual therapy techniques:  were applied for 20 minutes, including:  Cupping lumbar spine Paraspinals & scar  STM lumbar spine Paraspinals  W/ biofreeze  Gr II-III CPA T8-T12  MFR B piriformis  Left posterior innominate correction    Kirill participated in gait training to improve functional mobility and safety for 00  minutes, includin laps in //  4 steps x 8 w/o UE support    cold pack for 00 minutes to low back.        Patient Education and Home Exercises     Home Exercises Provided and Patient Education Provided     Education provided:   - form  -Add piriformis stretch to HEP    Written Home Exercises Provided: Patient instructed to cont prior HEP. Exercises were reviewed and Kirill was able to demonstrate them prior to the end of the session.  Kirill demonstrated good  understanding of the education provided. See EMR under Patient Instructions for exercises provided during therapy sessions    ASSESSMENT     Kirill tolerated the above tx session well with minimal c/o pain. Pt presents reporting an increase in symptoms due to limited therapy visits. Pt also reported that CT results shows loose hardware in his back. There were no adverse effects with any of the prescribed exercises, and pt responded well to piriformis release on both sides. Pain decreased post tx, but no numerical value given.     Kirill Is progressing well towards his goals.   Pt prognosis is Good.     Pt will continue to benefit from skilled outpatient physical therapy to address the deficits listed in the  problem list box on initial evaluation, provide pt/family education and to maximize pt's level of independence in the home and community environment.     Pt's spiritual, cultural and educational needs considered and pt agreeable to plan of care and goals.     Anticipated barriers to physical therapy: chronicity of symptoms, multiple body parts       Goals:   Short Term Goals: 4 weeks   1. The patient with report compliance with HEP to maximize functional outcomes. Met  2. The patient will demonstrate increase in BLE MMT by 1/2 grade to improve pt's functional mobility Met  3. The patient will decrease pain level by 50% in order to improve QOL. ongoing     Long Term Goals: 12 weeks   1. The patient will demonstrate understanding and performance of advanced HEP to allow for independence once discharged from therapy. Met  2. 2. The patient will demonstrate increase in BLE MMT by 1 grade to improve pt's functional mobility Ongoing  3. The patient will report 64 % functional limitation on FOTO to indicate an improvement in overall function.  4. The patient will be able to perform supine<>sit transfer w/o pain in order to improve pt's ability to sleep.  5. Pt will perform 10 squats w/ HHA w/o pain in order to improve pt's functional mobility.  6. Pt will be able to walk 5' w/o AD in order to improve pt's community ambulation      PLAN     Plan of care Certification: 12/14/2021 to 05/04/2022.     Outpatient Physical Therapy 2 times weekly for 6 weeks to include the following interventions: Cervical/Lumbar Traction, Electrical Stimulation Dry Needling/cupping, Gait Training, Manual Therapy, Moist Heat/ Ice, Neuromuscular Re-ed, Patient Education, Self Care, Therapeutic Activites and Therapeutic Exercise.       Chikis Diaz, PTA   2/23/2022

## 2022-02-23 NOTE — TELEPHONE ENCOUNTER
----- Message from Jose L Brown MD sent at 2/22/2022 11:08 AM CST -----  Give him an appointment with me to discuss results of lumbar CT. He has non fusion at L5-S1 with loosening of the S1  hardware. This is most likely why he has increased buttock pain    DD

## 2022-02-24 ENCOUNTER — TELEPHONE (OUTPATIENT)
Dept: FAMILY MEDICINE | Facility: CLINIC | Age: 47
End: 2022-02-24
Payer: COMMERCIAL

## 2022-02-24 ENCOUNTER — PATIENT MESSAGE (OUTPATIENT)
Dept: FAMILY MEDICINE | Facility: CLINIC | Age: 47
End: 2022-02-24
Payer: COMMERCIAL

## 2022-02-25 ENCOUNTER — TELEPHONE (OUTPATIENT)
Dept: FAMILY MEDICINE | Facility: CLINIC | Age: 47
End: 2022-02-25
Payer: COMMERCIAL

## 2022-02-25 ENCOUNTER — TELEPHONE (OUTPATIENT)
Dept: NEUROSURGERY | Facility: CLINIC | Age: 47
End: 2022-02-25

## 2022-02-25 ENCOUNTER — OFFICE VISIT (OUTPATIENT)
Dept: NEUROSURGERY | Facility: CLINIC | Age: 47
End: 2022-02-25
Payer: COMMERCIAL

## 2022-02-25 VITALS — HEIGHT: 72 IN | BODY MASS INDEX: 42.66 KG/M2 | WEIGHT: 315 LBS

## 2022-02-25 DIAGNOSIS — Z98.1 S/P LUMBAR SPINAL FUSION: ICD-10-CM

## 2022-02-25 DIAGNOSIS — M53.3 SACROILIAC JOINT DYSFUNCTION OF BOTH SIDES: ICD-10-CM

## 2022-02-25 DIAGNOSIS — S32.009K PSEUDOARTHROSIS OF LUMBAR SPINE: ICD-10-CM

## 2022-02-25 DIAGNOSIS — S32.009K PSEUDOARTHROSIS OF LUMBAR SPINE: Primary | ICD-10-CM

## 2022-02-25 DIAGNOSIS — Z98.1 STATUS POST LUMBAR SPINAL FUSION: Primary | ICD-10-CM

## 2022-02-25 DIAGNOSIS — Q74.2 FUSION CONGENITAL, SACROILIAC JOINT: ICD-10-CM

## 2022-02-25 DIAGNOSIS — M53.3 SI (SACROILIAC) JOINT DYSFUNCTION: ICD-10-CM

## 2022-02-25 DIAGNOSIS — G96.198 SPINAL ARACHNOID CYST: ICD-10-CM

## 2022-02-25 DIAGNOSIS — Z41.9 SURGERY, ELECTIVE: ICD-10-CM

## 2022-02-25 DIAGNOSIS — T84.498A LOOSENING OF BONE FIXATION DEVICE, INITIAL ENCOUNTER: ICD-10-CM

## 2022-02-25 PROCEDURE — 3008F PR BODY MASS INDEX (BMI) DOCUMENTED: ICD-10-PCS | Mod: CPTII,S$GLB,, | Performed by: NEUROLOGICAL SURGERY

## 2022-02-25 PROCEDURE — 99214 PR OFFICE/OUTPT VISIT, EST, LEVL IV, 30-39 MIN: ICD-10-PCS | Mod: S$GLB,,, | Performed by: NEUROLOGICAL SURGERY

## 2022-02-25 PROCEDURE — 3072F LOW RISK FOR RETINOPATHY: CPT | Mod: CPTII,S$GLB,, | Performed by: NEUROLOGICAL SURGERY

## 2022-02-25 PROCEDURE — 3008F BODY MASS INDEX DOCD: CPT | Mod: CPTII,S$GLB,, | Performed by: NEUROLOGICAL SURGERY

## 2022-02-25 PROCEDURE — 1159F PR MEDICATION LIST DOCUMENTED IN MEDICAL RECORD: ICD-10-PCS | Mod: CPTII,S$GLB,, | Performed by: NEUROLOGICAL SURGERY

## 2022-02-25 PROCEDURE — 99999 PR PBB SHADOW E&M-EST. PATIENT-LVL IV: ICD-10-PCS | Mod: PBBFAC,,, | Performed by: NEUROLOGICAL SURGERY

## 2022-02-25 PROCEDURE — 99214 OFFICE O/P EST MOD 30 MIN: CPT | Mod: S$GLB,,, | Performed by: NEUROLOGICAL SURGERY

## 2022-02-25 PROCEDURE — 3052F HG A1C>EQUAL 8.0%<EQUAL 9.0%: CPT | Mod: CPTII,S$GLB,, | Performed by: NEUROLOGICAL SURGERY

## 2022-02-25 PROCEDURE — 1159F MED LIST DOCD IN RCRD: CPT | Mod: CPTII,S$GLB,, | Performed by: NEUROLOGICAL SURGERY

## 2022-02-25 PROCEDURE — 3072F PR LOW RISK FOR RETINOPATHY: ICD-10-PCS | Mod: CPTII,S$GLB,, | Performed by: NEUROLOGICAL SURGERY

## 2022-02-25 PROCEDURE — 99999 PR PBB SHADOW E&M-EST. PATIENT-LVL IV: CPT | Mod: PBBFAC,,, | Performed by: NEUROLOGICAL SURGERY

## 2022-02-25 PROCEDURE — 3052F PR MOST RECENT HEMOGLOBIN A1C LEVEL 8.0 - < 9.0%: ICD-10-PCS | Mod: CPTII,S$GLB,, | Performed by: NEUROLOGICAL SURGERY

## 2022-02-25 NOTE — TELEPHONE ENCOUNTER
----- Message from Glendy Shepard MA sent at 2/25/2022  4:33 PM CST -----  Regarding: Pre op clearance  Good evening, this patient is scheduled for spine surgery with Dr. Jose L Brown on 3/23/22 and will need pre op clearance from his PCP prior to surgery. Can you please assist with contacting this patient to schedule an appointment with your office for pre op clearance?     Thanks,   Glendy Shepard MA   [facility]

## 2022-02-25 NOTE — PROGRESS NOTES
NEUROSURGICAL PROGRESS NOTE    DATE OF SERVICE:  02/25/2022    ATTENDING PHYSICIAN:  Jose L Brown MD    SUBJECTIVE:  This is a very pleasant 46 y.o. male, BMI of 48, who is status post L3-S1 OLIF with posterior instrumentation in April 2021 for status post lumbar laminectomy with degenerative disc disease and recurrent stenosis with radiculopathy.  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  Since his last lumbar fusion surgery he has been complaining of worsening bilateral buttock pain.  He had a few episodes of sudden leg weakness associated with sharp pain, mainly on the left side.  It happened at work when he was climbing stairs.  We proceeded with bilateral SI joint block and steroid injection that did not give him significant pain relief.  It is unclear if the patient had pain relief for the duration of the block.  Reviewing the fluoroscopy shots the left side injection was in the SI joint but the right side injection appeared to be superficial and subcapsular.  He has been doing physical therapy 3 times a week for 6 weeks for hip strengthening flexibility exercises, force closure exercises with partial pain relief.  He reports significant improvement in his back pain and ability to walk when he wears his SI joint belt.  He does not have worsening gait imbalance or sphincter dysfunction symptoms.    INTERIM HISTORY:    Been complaining of worsening back and buttock pain.  He also feels a pop in his back with certain movement.  He is in physical therapy and has notice improvement of the pain with massage therapy.  He is in a wheelchair today due to difficulty walking for prolonged period of time.              PAST MEDICAL HISTORY:  Active Ambulatory Problems     Diagnosis Date Noted    Joint pain 04/16/2015    Obesity 04/16/2015    Vitamin D deficiency 04/16/2015    Fatigue 04/16/2015    Bilateral hand pain 04/16/2015    Diabetes 04/17/2015    VICENTE (obstructive sleep apnea) 06/17/2015     CTS (carpal tunnel syndrome) 06/26/2015    Elevated sed rate 06/26/2015    Lumbago 10/14/2019    Chronic lower back pain 02/18/2020    OCD (obsessive compulsive disorder) 02/26/2020    Depression 02/26/2020    Anxiety 02/26/2020    Insomnia due to other mental disorder 02/05/2021    Palpitations 03/25/2021    DDD (degenerative disc disease), lumbar 04/26/2021    Grief 05/19/2021    Adjustment disorder with mixed anxiety and depressed mood 05/19/2021    Mixed obsessional thoughts and acts 06/25/2021    Mild episode of recurrent major depressive disorder 06/25/2021    Fusion of spine, lumbosacral region 07/13/2021    Acute buttock pain 07/13/2021    Sacroiliac joint dysfunction of both sides 12/13/2021    Chronic bilateral low back pain with bilateral sciatica 12/14/2021    Weakness of both lower extremities 12/14/2021    Difficulty walking 12/14/2021    Radiculopathy of lumbosacral region 12/14/2021     Resolved Ambulatory Problems     Diagnosis Date Noted    DDD (degenerative disc disease), lumbar 09/04/2019    Lumbar disc herniation with radiculopathy 10/09/2019     Past Medical History:   Diagnosis Date    Allergy     Back pain     Hx of vasectomy     Hypertension     Sleep apnea        PAST SURGICAL HISTORY:  Past Surgical History:   Procedure Laterality Date    ADENOIDECTOMY      EPIDURAL STEROID INJECTION Right 9/4/2019    Procedure: Injection, Steroid, Epidural Transforaminal;  Surgeon: Harjinder Batista Jr., MD;  Location: [facility];  Service: Pain Management;  Laterality: Right;  Right L3 + L4 TF NATALIE    [id number]  [id number]    Arrive @ 1300; ASA last 8/27; Check BG; NEEDS CONSENT    FRACTURE SURGERY      bilateral elbow fracture 3 years ago    FUSION OF LUMBAR SPINE BY ANTERIOR APPROACH Left 4/26/2021    Procedure: FUSION, SPINE, LUMBAR, ANTERIOR APPROACH Left L3-4 L4-5 OLIF BRENDA, ALL Release, L5-S1 ALIF;  Surgeon: Jose L Brown MD;  Location: [facility] OR;  Service: Neurosurgery;   Laterality: Left;  Procedure: Left L3-4 L4-5 OLIF BRENDA, ALL Release, L5-S1 ALIF  Surgery Time: 2.5 Hrs  LOS: 2-3  Anesthesia: General  Blood: Type & Screen  Radiology: Josseline  Brace: LSO  Bed: Regular  Position: LAteral Righ    FUSION OF SPINE WITH INSTRUMENTATION N/A 4/26/2021    Procedure: FUSION, SPINE, WITH INSTRUMENTATION L3-S1 Posteior Segmental Instrumentation;  Surgeon: Jose L Brown MD;  Location[facility];  Service: Neurosurgery;  Laterality: N/A;  Procedure: L3-S1 Posteior Segmental Instrumentation  Sirgery Time: 2 Hrs  LOS: 2-3  Anesthesia: General  Blood: Tyoe & Screen  Radiology: Dual C arm  Brace:LSO  Bed: [place] [address] Poster  Position: Prone  Equipment: eSoft     HERNIA REPAIR      bilateral groin hernia    INJECTION OF STEROID Bilateral 12/13/2021    Procedure: INJECTION, STEROID Bilateral SI JOint Block and Steroid Injection;  Surgeon: Jose L Brown MD;  Location: [facility];  Service: Neurosurgery;  Laterality: Bilateral;  Procedure: Bilateral SI JOint Block and Steroid Injection   Surgery Time: 30 min  LOS: 0  Anesthesia: MAC  Radiology: C-arm  Bed: Regular with pillows  Position: Prone    LUMBAR LAMINECTOMY Bilateral 11/29/2019    Procedure: LAMINECTOMY, SPINE, LUMBAR Rigth L3-4, Left L4-5 Laminectomy, medial Facetectomy, and microdiscectomy;  Surgeon: Jose L Brown MD;  Location: [facility];  Service: Neurosurgery;  Laterality: Bilateral;  Procedure: Rigth L3-4, Left L4-5 Laminectomy, medial Facetectomy, and microdiscectomy  Surgery Time: 2.5 Hrs  LOS: 0-1  Anesthesia: General  Blood: Type & Screen  Radiology: C-arm  Micros    TONSILLECTOMY      VASECTOMY         SOCIAL HISTORY:   Social History     Socioeconomic History    Marital status: Significant Other   Tobacco Use    Smoking status: Current Every Day Smoker     Packs/day: 0.00     Years: 24.00     Pack years: 0.00     Types: Vaping with nicotine     Start date: 1997    Smokeless tobacco: Never Used    Tobacco comment: Handout  provided for [employer] Smoking Cessaiton program   Substance and Sexual Activity    Alcohol use: Not Currently     Alcohol/week: 0.0 standard drinks     Comment: 750 ml  once a month or so    Drug use: Never    Sexual activity: Yes     Partners: Female     Birth control/protection: None     Comment: ; one child      Social Determinants of Health     Financial Resource Strain: High Risk    Difficulty of Paying Living Expenses: Very hard   Food Insecurity: No Food Insecurity    Worried About Running Out of Food in the Last Year: Never true    Ran Out of Food in the Last Year: Never true   Transportation Needs: No Transportation Needs    Lack of Transportation (Medical): No    Lack of Transportation (Non-Medical): No   Physical Activity: Inactive    Days of Exercise per Week: 0 days    Minutes of Exercise per Session: 0 min   Stress: Stress Concern Present    Feeling of Stress : Very much   Social Connections: Unknown    Frequency of Communication with Friends and Family: Once a week    Frequency of Social Gatherings with Friends and Family: Never    Active Member of Clubs or Organizations: No    Attends Club or Organization Meetings: Never    Marital Status: Living with partner   Housing Stability: High Risk    Unable to Pay for Housing in the Last Year: Yes    Number of Places Lived in the Last Year: 1    Unstable Housing in the Last Year: No       FAMILY HISTORY:  Family History   Problem Relation Age of Onset    Fibromyalgia Mother     Osteoarthritis Mother     Cataracts Maternal Grandmother     Macular degeneration Maternal Grandfather     Cataracts Maternal Grandfather     Rheum arthritis Neg Hx     Psoriasis Neg Hx     Lupus Neg Hx     Kidney disease Neg Hx     Inflammatory bowel disease Neg Hx     Amblyopia Neg Hx     Blindness Neg Hx     Glaucoma Neg Hx     Retinal detachment Neg Hx     Strabismus Neg Hx        CURRENTS MEDICATIONS:  Current Outpatient Medications on  File Prior to Visit   Medication Sig Dispense Refill    acetaminophen (TYLENOL) 500 MG tablet Take 1,000 mg by mouth daily as needed for Pain.      ascorbic acid, vitamin C, (VITAMIN C) 1000 MG tablet Take 1,000 mg by mouth once daily.      aspirin (ECOTRIN) 81 MG EC tablet Take 81 mg by mouth every evening.      atorvastatin (LIPITOR) 10 MG tablet Take 1 tablet (10 mg total) by mouth every other day. 45 tablet 3    blood-glucose transmitter (DEXCOM G6 TRANSMITTER) Cayla Use to check glucose - 90 day supply 1 each 11    busPIRone (BUSPAR) 15 MG tablet Take 1 tablet (15 mg total) by mouth 3 (three) times daily. (Patient taking differently: Take 15 mg by mouth. 1 1/2 tablets BID) 90 tablet 3    celecoxib (CELEBREX) 200 MG capsule TAKE 1 CAPSULE BY MOUTH TWICE A  capsule 0    ciclopirox (PENLAC) 8 % Soln Apply topically nightly. 6.6 mL 11    DEXCOM G6 SENSOR Cayla SMARTSI Each Topical Every 10 Days      dulaglutide (TRULICITY) 3 mg/0.5 mL pen injector Inject 3 mg into the skin every 7 days. 12 pen 11    flash glucose scanning reader Misc Disp one reader to monitor glucose- covered by insurance dexcom 1 each 1    flash glucose sensor Kit Dispense sensor to read glucose  dexcom 12 kit 11    FLUoxetine 40 MG capsule Take 2 capsules (80 mg total) by mouth once daily. 180 capsule 1    fluticasone propionate (FLONASE) 50 mcg/actuation nasal spray 2 sprays (100 mcg total) by Each Nostril route once daily. 48 mL 2    gabapentin (NEURONTIN) 800 MG tablet Take 1 tablet (800 mg total) by mouth 3 (three) times daily. 270 tablet 3    hydrOXYzine HCL (ATARAX) 25 MG tablet TAKE 1 -2 TABS NIGHTLY AS NEEDED FOR INSOMNIA 180 tablet 0    insulin aspart U-100 (NOVOLOG FLEXPEN U-100 INSULIN) 100 unit/mL (3 mL) InPn pen Inject 2 to 8 units of insulin according to the sliding scale 2 to 3 times a day with meals.  See comments for ss..asss 5 each 1    metFORMIN (GLUCOPHAGE-XR) 500 MG ER 24hr tablet Take 2 tablets  "(1,000 mg total) by mouth daily with breakfast. 180 tablet 3    methocarbamoL (ROBAXIN) 750 MG Tab       multivitamin capsule Take 1 capsule by mouth once daily.      NON FORMULARY MEDICATION Uses Cpap Machine nightly on a setting of 10      ONETOUCH DELICA PLUS LANCET 33 gauge Misc Apply topically 3 (three) times daily.      ONETOUCH VERIO TEST STRIPS Strp 1 strip 3 (three) times daily.      oxyCODONE-acetaminophen (PERCOCET)  mg per tablet Take 1 tablet by mouth every 8 (eight) hours as needed for Pain. 90 tablet 0    pen needle, diabetic 31 gauge x 1/4" Ndle Use with flex pen 100 each 11    tiZANidine (ZANAFLEX) 4 MG tablet TAKE 1 TABLET (4 MG TOTAL) BY MOUTH EVERY 8 (EIGHT) HOURS AS NEEDED (SPASMS). 270 tablet 0    valsartan-hydrochlorothiazide (DIOVAN-HCT) 320-12.5 mg per tablet Take 1 tablet by mouth once daily. 90 tablet 3    vitamin D 1000 units Tab Take 4,000 mg by mouth once daily.       acetaminophen (TYLENOL) 500 MG tablet Take 500 mg by mouth every evening.      diazePAM (VALIUM) 2 MG tablet Take 1 tablet (2 mg total) by mouth every 12 (twelve) hours. 15 tablet 1    traMADoL (ULTRAM) 50 mg tablet Take 1 tablet (50 mg total) by mouth every 8 (eight) hours as needed for Pain. 45 tablet 1    [DISCONTINUED] fluvoxaMINE (LUVOX) 100 MG tablet Take 1.5 tablets (150 mg total) by mouth 2 (two) times daily. 270 tablet 0     No current facility-administered medications on file prior to visit.       ALLERGIES:  Review of patient's allergies indicates:  No Known Allergies    REVIEW OF SYSTEMS:  Review of Systems   Constitutional: Negative for diaphoresis, fever and weight loss.   Respiratory: Negative for shortness of breath.    Cardiovascular: Negative for chest pain.   Gastrointestinal: Negative for blood in stool.   Genitourinary: Negative for hematuria.   Endo/Heme/Allergies: Does not bruise/bleed easily.   All other systems reviewed and are negative.        OBJECTIVE:    PHYSICAL " EXAMINATION:   Physical Exam:  Vitals reviewed.     Constitutional: He appears well-developed and well-nourished.      Eyes: Pupils are equal, round, and reactive to light. Conjunctivae and EOM are normal.      Cardiovascular: Normal distal pulses and no edema.      Abdominal: Soft.      Skin: Skin displays no rash on trunk and no rash on extremities. Skin displays no lesions on trunk and no lesions on extremities.     Psych/Behavior: He is alert. He is oriented to person, place, and time. He has a normal mood and affect.     Musculoskeletal:        Neck: Range of motion is full.     Neurological:        DTRs: Tricep reflexes are 2+ on the right side and 2+ on the left side. Bicep reflexes are 2+ on the right side and 2+ on the left side. Brachioradialis reflexes are 2+ on the right side and 2+ on the left side. Patellar reflexes are 3+ on the right side and 3+ on the left side. Achilles reflexes are 2+ on the right side and 2+ on the left side.         Back Exam      Tenderness   The patient is experiencing tenderness in the sacroiliac.     Range of Motion   Extension: normal   Flexion: normal   Lateral bend right: normal   Lateral bend left: normal   Rotation right: normal   Rotation left: normal      Muscle Strength   Right Quadriceps:  5/5   Left Quadriceps:  5/5   Right Hamstrings:  5/5   Left Hamstrings:  5/5      Tests   Straight leg raise right: negative  Straight leg raise left: negative     Other   Toe walk: normal  Heel walk: normal                 SI joint:   Palpation at the right and left SI joints is positive and painful  JASEN test is positive bilaterally  Gaenslen test is positive bilaterally  Thigh thrust test is positive bilaterally      Neurologic Exam      Mental Status   Oriented to person, place, and time.   Speech: speech is normal   Level of consciousness: alert     Cranial Nerves   Cranial nerves II through XII intact.      CN III, IV, VI   Pupils are equal, round, and reactive to  light.  Extraocular motions are normal.      Motor Exam   Muscle bulk: normal  Overall muscle tone: normal     Strength   Right deltoid: 5/5  Left deltoid: 5/5  Right biceps: 5/5  Left biceps: 5/5  Right triceps: 5/5  Left triceps: 5/5  Right wrist flexion: 5/5  Left wrist flexion: 5/5  Right wrist extension: 5/5  Left wrist extension: 5/5  Right interossei: 5/5  Left interossei: 5/5  Right iliopsoas: 5/5  Left iliopsoas: 5/5  Right quadriceps: 5/5  Left quadriceps: 5/5  Right hamstrin/5  Left hamstrin/5  Right anterior tibial: 5/5  Left anterior tibial: 5/5  Right posterior tibial: 5/5  Left posterior tibial: 5/5  Right peroneal: 5/5  Left peroneal: 5/5  Right gastroc: 5/5  Left gastroc: 5/5     Sensory Exam   Light touch normal.   Pinprick normal.      Gait, Coordination, and Reflexes      Gait  Gait: normal (Antalgic, leaning forward)     Coordination   Finger to nose coordination: normal     Reflexes   Right brachioradialis: 2+  Left brachioradialis: 2+  Right biceps: 2+  Left biceps: 2+  Right triceps: 2+  Left triceps: 2+  Right patellar: 3+  Left patellar: 3+  Right achilles: 2+  Left achilles: 2+  Right plantar: normal  Left plantar: normal  Right Hughes: absent  Left Hughes: absent  Right ankle clonus: absent  Left ankle clonus: absent    DIAGNOSTIC DATA:  I personally interpreted the following imaging:   Thoracic spine MRI shows arachnoid web at T3-4 with slight indentation of the cord posteriorly, no intramedullary signal change  Lumbar spine MRI shows L3-S1 fusion without residual stenosis  Cervical spine MRI shows mild central canal stenosis throughout    CT of the lumbar spine shows lucency around the bilateral S1 screws and around the L5-S1 anterior interbody fusion spacer, including the integral instrumentation of the spacer, no lucency around the L3, L4 and L5 screws    SPECT CT pending    ASSESMENT:  This is a 46 y.o. male with     Problem List Items Addressed This Visit    None     Visit  Diagnoses     Status post lumbar spinal fusion    -  Primary    Pseudoarthrosis of lumbar spine        Loosening of bone fixation device, initial encounter        SI (sacroiliac) joint dysfunction        Spinal arachnoid cyst                PLAN:  I explained the natural history of the disease and all treatment options. I recommended a open posterior revision of L3-S1 segmental instrumentation, mentation of the screw with PMMA, sacral pelvic fixation, posterolateral arthrodesis at L4-5 and L5-S1 using autograft harvested from the same incision and allograft Rodríguez Altapore, open bilateral SI joint fusion using Bedrock device from [employer] to prevent further pseudoarthrosis in the context of sacral pelvic fixation.  The goals of surgery is to help with the pain associated with pseudoarthrosis.  Hopefully will regain more mobility in be able to walk normally.  However since he has a upper thoracic arachnoid cyst this possible that his gait remains partially unstable.    We have discussed the risks of surgery including death, coma, bleeding, infection, failure of surgery, CSF leak, nerve root injury, spinal cord injury, ureter injury, weakness, paralysis, peripheral neuropathy, malplaced hardware, migration of hardware, non-union, need for reoperation. Patient understands the risks and would like to proceed with surgery.      The patient has increase perioperative risks because of these comorbidities:  Morbid obesity with BMI of 48.         Jose L Brown MD  Cell:[phone]

## 2022-02-28 ENCOUNTER — HOSPITAL ENCOUNTER (OUTPATIENT)
Dept: RADIOLOGY | Facility: HOSPITAL | Age: 47
Discharge: HOME | End: 2022-02-28
Attending: NEUROLOGICAL SURGERY
Payer: COMMERCIAL

## 2022-02-28 ENCOUNTER — TELEPHONE (OUTPATIENT)
Dept: NEUROSURGERY | Facility: CLINIC | Age: 47
End: 2022-02-28
Payer: COMMERCIAL

## 2022-02-28 DIAGNOSIS — M54.9 DORSALGIA, UNSPECIFIED: ICD-10-CM

## 2022-02-28 DIAGNOSIS — M46.1 BILATERAL SACROILIITIS: ICD-10-CM

## 2022-02-28 PROCEDURE — 78830 NM SPECT/CT SINGLE AREA: ICD-10-PCS | Mod: 26,,, | Performed by: RADIOLOGY

## 2022-02-28 PROCEDURE — A9503 TC99M MEDRONATE: HCPCS

## 2022-02-28 PROCEDURE — 78830 RP LOCLZJ TUM SPECT W/CT 1: CPT | Mod: 26,,, | Performed by: RADIOLOGY

## 2022-02-28 PROCEDURE — 78830 RP LOCLZJ TUM SPECT W/CT 1: CPT | Mod: TC

## 2022-02-28 NOTE — TELEPHONE ENCOUNTER
Pt's disability forms were scanned into chart and faxed to [employer] [phone]. Confirmation was received.

## 2022-02-28 NOTE — TELEPHONE ENCOUNTER
I LM for the pt that he has an appt already scheduled with Dr Ibarra march 4 at 8 am    I advised we can do his sx clearance at this appt

## 2022-03-03 ENCOUNTER — CLINICAL SUPPORT (OUTPATIENT)
Dept: REHABILITATION | Facility: HOSPITAL | Age: 47
End: 2022-03-03
Attending: NEUROLOGICAL SURGERY
Payer: COMMERCIAL

## 2022-03-03 DIAGNOSIS — R26.2 DIFFICULTY WALKING: ICD-10-CM

## 2022-03-03 DIAGNOSIS — G89.29 CHRONIC BILATERAL LOW BACK PAIN WITH BILATERAL SCIATICA: Primary | ICD-10-CM

## 2022-03-03 DIAGNOSIS — M54.17 RADICULOPATHY OF LUMBOSACRAL REGION: ICD-10-CM

## 2022-03-03 DIAGNOSIS — M54.41 CHRONIC BILATERAL LOW BACK PAIN WITH BILATERAL SCIATICA: Primary | ICD-10-CM

## 2022-03-03 DIAGNOSIS — M54.42 CHRONIC BILATERAL LOW BACK PAIN WITH BILATERAL SCIATICA: Primary | ICD-10-CM

## 2022-03-03 DIAGNOSIS — R29.898 WEAKNESS OF BOTH LOWER EXTREMITIES: ICD-10-CM

## 2022-03-03 PROCEDURE — 97110 THERAPEUTIC EXERCISES: CPT | Mod: PN

## 2022-03-04 ENCOUNTER — PATIENT MESSAGE (OUTPATIENT)
Dept: FAMILY MEDICINE | Facility: CLINIC | Age: 47
End: 2022-03-04

## 2022-03-04 ENCOUNTER — OFFICE VISIT (OUTPATIENT)
Dept: FAMILY MEDICINE | Facility: CLINIC | Age: 47
End: 2022-03-04
Payer: COMMERCIAL

## 2022-03-04 DIAGNOSIS — Z01.818 PRE-OP EXAM: ICD-10-CM

## 2022-03-04 DIAGNOSIS — E11.8 TYPE 2 DIABETES MELLITUS WITH COMPLICATION, WITHOUT LONG-TERM CURRENT USE OF INSULIN: Primary | ICD-10-CM

## 2022-03-04 DIAGNOSIS — I10 ESSENTIAL HYPERTENSION: ICD-10-CM

## 2022-03-04 PROCEDURE — 3052F HG A1C>EQUAL 8.0%<EQUAL 9.0%: CPT | Mod: CPTII,95,, | Performed by: FAMILY MEDICINE

## 2022-03-04 PROCEDURE — 3072F LOW RISK FOR RETINOPATHY: CPT | Mod: CPTII,95,, | Performed by: FAMILY MEDICINE

## 2022-03-04 PROCEDURE — 3072F PR LOW RISK FOR RETINOPATHY: ICD-10-PCS | Mod: CPTII,95,, | Performed by: FAMILY MEDICINE

## 2022-03-04 PROCEDURE — 99214 OFFICE O/P EST MOD 30 MIN: CPT | Mod: 95,,, | Performed by: FAMILY MEDICINE

## 2022-03-04 PROCEDURE — 99214 PR OFFICE/OUTPT VISIT, EST, LEVL IV, 30-39 MIN: ICD-10-PCS | Mod: 95,,, | Performed by: FAMILY MEDICINE

## 2022-03-04 PROCEDURE — 3052F PR MOST RECENT HEMOGLOBIN A1C LEVEL 8.0 - < 9.0%: ICD-10-PCS | Mod: CPTII,95,, | Performed by: FAMILY MEDICINE

## 2022-03-04 NOTE — PROGRESS NOTES
Patient ID: Kirill Gandhi III is a 46 y.o. male.    Chief Complaint: Pre-op Exam and Follow-up    HPI       Kirill Gandhi III is a 46 y.o. male video visit for preoperative clearance revision of surgical procedure and fusion of SI joint as well as diabetes follow-up.  Currently is without any cardiac symptoms such as chest pain palpitations shortness of breath PND orthopnea.  He has new respiratory illnesses.  No cough cold congestion fever chills nausea vomiting or urinary symptoms.  Diabetes-recently increased from 1.5-3 milligram Trulicity weekly.  Glucose readings have been running in the low 200s to high 106. Hemoglobin A1c was over 11 now 8.711 days ago-experiencing better control than previous.  Continues to struggle with appropriate diet-ingestion of a significant amount of carbohydrates.        Review of Symptoms    Constitutional  No change in activity, No chills fever   Resp  Neg hemoptysis, stridor, choking  CVS  Neg chest pain, palpitations    There were no vitals taken for this visit.    Physical Exam    There were no vitals filed for this visit.    Constitutional:   Oriented to person, place, and time.appears well-developed and well-nourished.   No distress.        Psychiatric: Normal mood and affect. Behavior is normal. Judgment and thought content normal.     Complete Blood Count  Lab Results   Component Value Date    RBC 3.75 (L) 04/27/2021    HGB 11.8 (L) 04/27/2021    HCT 34.3 (L) 04/27/2021    MCV 92 04/27/2021    MCH 31.5 (H) 04/27/2021    MCHC 34.4 04/27/2021    RDW 13.0 04/27/2021     04/27/2021    MPV 10.7 04/27/2021    GRAN 10.9 (H) 04/27/2021    GRAN 82.4 (H) 04/27/2021    LYMPH 1.1 04/27/2021    LYMPH 8.0 (L) 04/27/2021    MONO 1.2 (H) 04/27/2021    MONO 8.9 04/27/2021    EOS 0.0 04/27/2021    BASO 0.03 04/27/2021    EOSINOPHIL 0.0 04/27/2021    BASOPHIL 0.2 04/27/2021    DIFFMETHOD Automated 04/27/2021       Comprehensive Metabolic Panel  No results found for:  GLU, BUN, CREATININE, NA, K, CL, PROT, ALBUMIN, BILITOT, AST, ALKPHOS, CO2, ALT, ANIONGAP, EGFRNONAA, ESTGFRAFRICA    TSH  No results found for: TSH    Assessment / Plan:      ICD-10-CM ICD-9-CM   1. Type 2 diabetes mellitus with complication, without long-term current use of insulin  E11.8 250.90   2. Essential hypertension  I10 401.9   3. Pre-op exam  Z01.818 V72.84     Type 2 diabetes mellitus with complication, without long-term current use of insulin  -     X-Ray Chest PA And Lateral; Future; Expected date: 03/04/2022  -     Protime-INR; Future  -     APTT; Future  -     Comprehensive Metabolic Panel; Future; Expected date: 03/04/2022  -     CBC Auto Differential; Future; Expected date: 03/04/2022  -     EKG 12-lead; Future    Essential hypertension  -     X-Ray Chest PA And Lateral; Future; Expected date: 03/04/2022  -     Protime-INR; Future  -     APTT; Future  -     Comprehensive Metabolic Panel; Future; Expected date: 03/04/2022  -     CBC Auto Differential; Future; Expected date: 03/04/2022  -     EKG 12-lead; Future    Pre-op exam  -     X-Ray Chest PA And Lateral; Future; Expected date: 03/04/2022  -     Protime-INR; Future  -     APTT; Future  -     Comprehensive Metabolic Panel; Future; Expected date: 03/04/2022  -     CBC Auto Differential; Future; Expected date: 03/04/2022  -     EKG 12-lead; Future    Current we appears stable for coming surgery.  Obviously no physical examination done.  Lab work ordered as well as EKG and chest x-ray appear    The patient location is:  home  The chief complaint leading to consultation is:  Preop diabetes  Visit type: Virtual visit with synchronous audio and video  Total time spent with patient:  15 minutes  Each patient to whom he or she provides medical services by telemedicine is:  (1) informed of the relationship between the physician and patient and the respective role of any other health care provider with respect to management of the patient; and (2)  notified that he or she may decline to receive medical services by telemedicine and may withdraw from such care at any time.    Answers for HPI/ROS submitted by the patient on 3/3/2022  Diabetes type: type 2  MedicAlert ID: No  Disease duration: 5 years  blurred vision: No  chest pain: No  fatigue: Yes  foot paresthesias: Yes  foot ulcerations: No  polydipsia: No  polyphagia: Yes  polyuria: Yes  visual change: No  weakness: No  weight loss: No  Symptom course: stable  confusion: No  dizziness: No  headaches: No  hunger: No  mood changes: No  nervous/anxious: Yes  pallor: No  seizures: No  sleepiness: No  speech difficulty: No  sweats: No  tremors: No  blackouts: No  hospitalization: No  nocturnal hypoglycemia: No  required assistance: No  required glucagon: No  CVA: No  heart disease: No  impotence: No  nephropathy: No  peripheral neuropathy: No  PVD: No  retinopathy: No  autonomic neuropathy: No  CAD risks: hypertension, obesity, sedentary lifestyle, stress, diabetes mellitus, male sex  Current treatments: insulin injections, oral agent (dual therapy)  Treatment compliance: some of the time  Dose schedule: pre-breakfast, pre-lunch, pre-dinner  Given by: patient  Injection sites: abdominal wall  Home blood tests: 5+ x per day  Home urines: <1 x per month  Monitoring compliance: excellent  Blood glucose trend: fluctuating dramatically  Weight trend: stable  Current diet: generally unhealthy, high fat/cholesterol, high salt  Meal planning: avoidance of concentrated sweets  Exercise: never  Dietitian visit: Yes  Eye exam current: Yes  Sees podiatrist: No

## 2022-03-07 ENCOUNTER — HOSPITAL ENCOUNTER (OUTPATIENT)
Dept: RADIOLOGY | Facility: HOSPITAL | Age: 47
Discharge: HOME | End: 2022-03-07
Attending: FAMILY MEDICINE
Payer: COMMERCIAL

## 2022-03-07 ENCOUNTER — HOSPITAL ENCOUNTER (OUTPATIENT)
Dept: CARDIOLOGY | Facility: HOSPITAL | Age: 47
Discharge: HOME | End: 2022-03-07
Attending: FAMILY MEDICINE
Payer: COMMERCIAL

## 2022-03-07 DIAGNOSIS — Z01.818 PRE-OP EXAM: ICD-10-CM

## 2022-03-07 DIAGNOSIS — E11.8 TYPE 2 DIABETES MELLITUS WITH COMPLICATION, WITHOUT LONG-TERM CURRENT USE OF INSULIN: ICD-10-CM

## 2022-03-07 DIAGNOSIS — I10 ESSENTIAL HYPERTENSION: ICD-10-CM

## 2022-03-07 PROCEDURE — 93010 EKG 12-LEAD: ICD-10-PCS | Mod: ,,, | Performed by: INTERNAL MEDICINE

## 2022-03-07 PROCEDURE — 93005 ELECTROCARDIOGRAM TRACING: CPT | Mod: PO

## 2022-03-07 PROCEDURE — 93010 ELECTROCARDIOGRAM REPORT: CPT | Mod: ,,, | Performed by: INTERNAL MEDICINE

## 2022-03-07 PROCEDURE — 71046 X-RAY EXAM CHEST 2 VIEWS: CPT | Mod: TC,FY,PO

## 2022-03-09 ENCOUNTER — TELEPHONE (OUTPATIENT)
Dept: FAMILY MEDICINE | Facility: CLINIC | Age: 47
End: 2022-03-09
Payer: COMMERCIAL

## 2022-03-09 ENCOUNTER — PATIENT MESSAGE (OUTPATIENT)
Dept: FAMILY MEDICINE | Facility: CLINIC | Age: 47
End: 2022-03-09
Payer: COMMERCIAL

## 2022-03-09 DIAGNOSIS — R94.31 ABNORMAL EKG: ICD-10-CM

## 2022-03-09 DIAGNOSIS — I10 ESSENTIAL HYPERTENSION: Primary | ICD-10-CM

## 2022-03-09 DIAGNOSIS — E11.8 TYPE 2 DIABETES MELLITUS WITH COMPLICATION, WITHOUT LONG-TERM CURRENT USE OF INSULIN: ICD-10-CM

## 2022-03-10 ENCOUNTER — TELEPHONE (OUTPATIENT)
Dept: ELECTROPHYSIOLOGY | Facility: CLINIC | Age: 47
End: 2022-03-10
Payer: COMMERCIAL

## 2022-03-10 ENCOUNTER — OFFICE VISIT (OUTPATIENT)
Dept: CARDIOLOGY | Facility: CLINIC | Age: 47
End: 2022-03-10
Payer: COMMERCIAL

## 2022-03-10 VITALS
BODY MASS INDEX: 42.66 KG/M2 | OXYGEN SATURATION: 99 % | SYSTOLIC BLOOD PRESSURE: 131 MMHG | DIASTOLIC BLOOD PRESSURE: 77 MMHG | WEIGHT: 315 LBS | HEIGHT: 72 IN | RESPIRATION RATE: 16 BRPM | HEART RATE: 82 BPM

## 2022-03-10 DIAGNOSIS — R07.89 CHEST PAIN, ATYPICAL: ICD-10-CM

## 2022-03-10 DIAGNOSIS — G47.33 OSA (OBSTRUCTIVE SLEEP APNEA): ICD-10-CM

## 2022-03-10 DIAGNOSIS — R06.09 DOE (DYSPNEA ON EXERTION): ICD-10-CM

## 2022-03-10 DIAGNOSIS — R94.31 ABNORMAL EKG: ICD-10-CM

## 2022-03-10 DIAGNOSIS — I10 ESSENTIAL HYPERTENSION: ICD-10-CM

## 2022-03-10 DIAGNOSIS — E11.8 TYPE 2 DIABETES MELLITUS WITH COMPLICATION, WITHOUT LONG-TERM CURRENT USE OF INSULIN: ICD-10-CM

## 2022-03-10 DIAGNOSIS — R00.2 PALPITATIONS: Primary | ICD-10-CM

## 2022-03-10 PROCEDURE — 3075F SYST BP GE 130 - 139MM HG: CPT | Mod: CPTII,S$GLB,, | Performed by: INTERNAL MEDICINE

## 2022-03-10 PROCEDURE — 3052F HG A1C>EQUAL 8.0%<EQUAL 9.0%: CPT | Mod: CPTII,S$GLB,, | Performed by: INTERNAL MEDICINE

## 2022-03-10 PROCEDURE — 3078F DIAST BP <80 MM HG: CPT | Mod: CPTII,S$GLB,, | Performed by: INTERNAL MEDICINE

## 2022-03-10 PROCEDURE — 3008F BODY MASS INDEX DOCD: CPT | Mod: CPTII,S$GLB,, | Performed by: INTERNAL MEDICINE

## 2022-03-10 PROCEDURE — 3052F PR MOST RECENT HEMOGLOBIN A1C LEVEL 8.0 - < 9.0%: ICD-10-PCS | Mod: CPTII,S$GLB,, | Performed by: INTERNAL MEDICINE

## 2022-03-10 PROCEDURE — 3072F PR LOW RISK FOR RETINOPATHY: ICD-10-PCS | Mod: CPTII,S$GLB,, | Performed by: INTERNAL MEDICINE

## 2022-03-10 PROCEDURE — 99204 PR OFFICE/OUTPT VISIT, NEW, LEVL IV, 45-59 MIN: ICD-10-PCS | Mod: S$GLB,,, | Performed by: INTERNAL MEDICINE

## 2022-03-10 PROCEDURE — 99999 PR PBB SHADOW E&M-EST. PATIENT-LVL III: ICD-10-PCS | Mod: PBBFAC,,, | Performed by: INTERNAL MEDICINE

## 2022-03-10 PROCEDURE — 3078F PR MOST RECENT DIASTOLIC BLOOD PRESSURE < 80 MM HG: ICD-10-PCS | Mod: CPTII,S$GLB,, | Performed by: INTERNAL MEDICINE

## 2022-03-10 PROCEDURE — 3075F PR MOST RECENT SYSTOLIC BLOOD PRESS GE 130-139MM HG: ICD-10-PCS | Mod: CPTII,S$GLB,, | Performed by: INTERNAL MEDICINE

## 2022-03-10 PROCEDURE — 99204 OFFICE O/P NEW MOD 45 MIN: CPT | Mod: S$GLB,,, | Performed by: INTERNAL MEDICINE

## 2022-03-10 PROCEDURE — 99999 PR PBB SHADOW E&M-EST. PATIENT-LVL III: CPT | Mod: PBBFAC,,, | Performed by: INTERNAL MEDICINE

## 2022-03-10 PROCEDURE — 3072F LOW RISK FOR RETINOPATHY: CPT | Mod: CPTII,S$GLB,, | Performed by: INTERNAL MEDICINE

## 2022-03-10 PROCEDURE — 3008F PR BODY MASS INDEX (BMI) DOCUMENTED: ICD-10-PCS | Mod: CPTII,S$GLB,, | Performed by: INTERNAL MEDICINE

## 2022-03-10 NOTE — PROGRESS NOTES
Subjective:    Patient ID:  Kirill Gandhi III is a 46 y.o. male who presents for evaluation of No chief complaint on file.      HPI     Referred by Dr Ibarra   Kirill Gandhi III is a 46 y.o. male video visit for preoperative clearance revision of surgical procedure and fusion of SI joint as well as diabetes follow-up.  Currently is without any cardiac symptoms such as chest pain palpitations shortness of breath PND orthopnea.  He has new respiratory illnesses.  No cough cold congestion fever chills nausea vomiting or urinary symptoms.  Diabetes-recently increased from 1.5-3 milligram Trulicity weekly.  Glucose readings have been running in the low 200s to high 106. Hemoglobin A1c was over 11 now 8.711 days ago-experiencing better control than previous.  Continues to struggle with appropriate diet-ingestion of a significant amount of carbohydrates.      EKG 3/7/22 NSR NSSTT changes - anterolateral and inferior TWI - new from 2021    Stress echo 4/14/21  ·  The stress echo portion of this study is negative for myocardial ischemia. Target heart rate was achieved.  · The ECG portion of this study is negative for myocardial ischemia.  · There were no arrhythmias during stress.  · The test was stopped because the patient experienced fatigue.  · The patient's exercise capacity was below average. Achieved 8 METs.  · The left ventricle is normal in size with normal systolic function. The estimated ejection fraction is 60%.  · Normal left ventricular diastolic function.  · Normal right ventricular size with low normal right ventricular systolic function.  · The estimated PA systolic pressure is 35 mmHg.  · Normal central venous pressure (3 mmHg).    Holter 4/12/21  · Baseline rhythm was normal sinus rhythm with normal intervals and an average heart rate of 76 bpm.  · There was one episode with reported symtpoms of nausea, tiredness, and fatigue. This episode corresponded to normal sinus rhythm.  · There  were no atrial or ventricular arrhythmias.    3/10/22 Needs clearance for spine surgery revision. Activity limited. Mild HOLMAN. Denies CP    Review of Systems   Constitutional: Negative for decreased appetite.   HENT: Negative for ear discharge.    Eyes: Negative for blurred vision.   Endocrine: Negative for polyphagia.   Skin: Negative for nail changes.   Genitourinary: Negative for bladder incontinence.   Neurological: Negative for aphonia.   Psychiatric/Behavioral: Negative for hallucinations.   Allergic/Immunologic: Negative for hives.        Objective:    Physical Exam  Constitutional:       Appearance: He is well-developed.   HENT:      Head: Normocephalic and atraumatic.   Eyes:      Conjunctiva/sclera: Conjunctivae normal.      Pupils: Pupils are equal, round, and reactive to light.   Cardiovascular:      Rate and Rhythm: Normal rate.      Pulses: Intact distal pulses.      Heart sounds: Normal heart sounds.   Pulmonary:      Effort: Pulmonary effort is normal.      Breath sounds: Normal breath sounds.   Abdominal:      General: Bowel sounds are normal.      Palpations: Abdomen is soft.   Musculoskeletal:         General: Normal range of motion.      Cervical back: Normal range of motion and neck supple.   Skin:     General: Skin is warm and dry.   Neurological:      Mental Status: He is alert and oriented to person, place, and time.           Assessment:       1. Palpitations    2. VICENTE (obstructive sleep apnea)    3. Chest pain, atypical    4. HOLMAN (dyspnea on exertion)         Plan:       PET stress for new EKG changes - will clear for spine surgery if ok

## 2022-03-10 NOTE — PROGRESS NOTES
"[facility]   Physical Therapy Treatment Note     [facility]  [facility] [id number]    Therapy Diagnosis:   Encounter Diagnoses   Name Primary?    Chronic bilateral low back pain with bilateral sciatica Yes    Weakness of both lower extremities     Difficulty walking     Radiculopathy of lumbosacral region      Physician: Jose L Brown MD    Visit Date: 3/11/2022      Physician Orders: PT Eval and Treat   Medical Diagnosis from Referral: Sacroiliac joint dysfunction of both sides [M53.3]  Evaluation Date: 12/14/2021  Authorization Period Expiration: 12/13/2022  Plan of Care Expiration: 05/04/2022  Progress Note Due:  03/10/2022  Visit # / Visits authorized: 12/ 20   FOTO: 1/ 3      Precautions: Standard and Diabetes, hx of lumbar spine laminectomy (2019), lumbar spine fusion (04/2021)    Time In: 1220PM  Time Out: 1310PM  Total Appointment Time (timed & untimed codes): 50 minutes      SUBJECTIVE   Pt reports: his surgery has been pushed back due to an abnormality they found with his heart.    He hasn't been feeling up to doing his exercises lately.    He was not compliant with home exercise program.  Response to previous treatment: okay with normal soreness  Functional change: none to note    Pain: 6/10  Location: bilateral back      OBJECTIVE   Last reassessed: 02/10/2022              Treatment     Kirill received the treatments listed below:      therapeutic exercises to develop strength, endurance, ROM, flexibility, posture and core stabilization for 40 minutes including:      Recumbent Bike lvl 2.5  x 6 min  Seated Sciatic N glide  2 x 10   Seated QL stretch   2 x 10   Supine march   3 x 10/ea  LTR w/ SB    2 x 10  Piriformis Stretch    2x30"/ea  HSS     3 x 30"/B  Bridges     2 x 15    NP:  +Pudendal N glide Seated on High Low 1 x 10  Seated Pelvic Tilt   2 x 10  Seated March   2 x 10  Prone hip Extension  2 x 12  +Windshield wipers   2 x 10  Seated LAQ " "2#    2 x 10  Seated HS curl GTB   2 x 10  Seated row with RTB  2 x 10  Seated no money with RTB 2 x 10  Seated paloff press with RTB 2 x 10 ea  seated flexion roll out with PB 10x  Glute sets                                          15 x ea  Left hip flexion Iso                            2 x 10  Posterior pelvic tilt                           15 x ea  SLR     2 x 12  Hip add ball squeeze   2x15 x ea  Hip fall out    15 x ea   Clamshells 2 x 10  Seated T/S rotation   2 x 15    Kirill received the following manual therapy techniques:  were applied for 10 minutes, including:  Cupping lumbar spine Paraspinals & scar  STM lumbar spine Paraspinals  W/ biofreeze  Gr II-III CPA T8-T12  MFR B piriformis  Left posterior innominate correction    Kirill participated in gait training to improve functional mobility and safety for 00  minutes, includin laps in //  4 steps x 8 w/o UE support    cold pack for 00 minutes to low back.        Patient Education and Home Exercises     Home Exercises Provided and Patient Education Provided     Education provided:   -Continue HEP    Written Home Exercises Provided: Patient instructed to cont prior HEP. Exercises were reviewed and Kirill was able to demonstrate them prior to the end of the session.  Kirill demonstrated good  understanding of the education provided. See EMR under Patient Instructions for exercises provided during therapy sessions    ASSESSMENT   Kirill tolerated the above tx session well with minimal c/o pain.There were no adverse effects with any of the prescribed exercises, and pt responded well to piriformis release on both sides.Pt also continues to have pain and difficulty with changing positions. Continue to progress based on pt tolerance. Pt encouraged to continue HEP as it will help serve as "prehab" for upcoming surgery as well as decrease instances of pain. Reassessment and updated FOTO score due immediately.     Kirill Is progressing well towards his goals.   Pt " prognosis is Good.     Pt will continue to benefit from skilled outpatient physical therapy to address the deficits listed in the problem list box on initial evaluation, provide pt/family education and to maximize pt's level of independence in the home and community environment.     Pt's spiritual, cultural and educational needs considered and pt agreeable to plan of care and goals.     Anticipated barriers to physical therapy: chronicity of symptoms, multiple body parts       Goals:   Short Term Goals: 4 weeks   1. The patient with report compliance with HEP to maximize functional outcomes. Met  2. The patient will demonstrate increase in BLE MMT by 1/2 grade to improve pt's functional mobility Met  3. The patient will decrease pain level by 50% in order to improve QOL. ongoing     Long Term Goals: 12 weeks   1. The patient will demonstrate understanding and performance of advanced HEP to allow for independence once discharged from therapy. Met  2. 2. The patient will demonstrate increase in BLE MMT by 1 grade to improve pt's functional mobility Ongoing  3. The patient will report 64 % functional limitation on FOTO to indicate an improvement in overall function.  4. The patient will be able to perform supine<>sit transfer w/o pain in order to improve pt's ability to sleep.  5. Pt will perform 10 squats w/ HHA w/o pain in order to improve pt's functional mobility.  6. Pt will be able to walk 5' w/o AD in order to improve pt's community ambulation      PLAN     Plan of care Certification: 12/14/2021 to 05/04/2022.     Outpatient Physical Therapy 2 times weekly for 6 weeks to include the following interventions: Cervical/Lumbar Traction, Electrical Stimulation Dry Needling/cupping, Gait Training, Manual Therapy, Moist Heat/ Ice, Neuromuscular Re-ed, Patient Education, Self Care, Therapeutic Activites and Therapeutic Exercise.       Chikis Diaz, PTA   03/11/2022

## 2022-03-11 ENCOUNTER — CLINICAL SUPPORT (OUTPATIENT)
Dept: REHABILITATION | Facility: HOSPITAL | Age: 47
End: 2022-03-11
Attending: NEUROLOGICAL SURGERY
Payer: COMMERCIAL

## 2022-03-11 DIAGNOSIS — G89.29 CHRONIC BILATERAL LOW BACK PAIN WITH BILATERAL SCIATICA: Primary | ICD-10-CM

## 2022-03-11 DIAGNOSIS — M54.17 RADICULOPATHY OF LUMBOSACRAL REGION: ICD-10-CM

## 2022-03-11 DIAGNOSIS — R26.2 DIFFICULTY WALKING: ICD-10-CM

## 2022-03-11 DIAGNOSIS — M54.42 CHRONIC BILATERAL LOW BACK PAIN WITH BILATERAL SCIATICA: Primary | ICD-10-CM

## 2022-03-11 DIAGNOSIS — R29.898 WEAKNESS OF BOTH LOWER EXTREMITIES: ICD-10-CM

## 2022-03-11 DIAGNOSIS — M54.41 CHRONIC BILATERAL LOW BACK PAIN WITH BILATERAL SCIATICA: Primary | ICD-10-CM

## 2022-03-11 PROCEDURE — 97140 MANUAL THERAPY 1/> REGIONS: CPT | Mod: PN,CQ

## 2022-03-11 PROCEDURE — 97110 THERAPEUTIC EXERCISES: CPT | Mod: PN,CQ

## 2022-03-14 ENCOUNTER — TELEPHONE (OUTPATIENT)
Dept: NEUROSURGERY | Facility: CLINIC | Age: 47
End: 2022-03-14
Payer: COMMERCIAL

## 2022-03-14 ENCOUNTER — PATIENT MESSAGE (OUTPATIENT)
Dept: SURGERY | Facility: HOSPITAL | Age: 47
End: 2022-03-14
Payer: COMMERCIAL

## 2022-03-14 ENCOUNTER — PATIENT MESSAGE (OUTPATIENT)
Dept: CARDIOLOGY | Facility: CLINIC | Age: 47
End: 2022-03-14
Payer: COMMERCIAL

## 2022-03-16 ENCOUNTER — CLINICAL SUPPORT (OUTPATIENT)
Dept: REHABILITATION | Facility: HOSPITAL | Age: 47
End: 2022-03-16
Attending: NEUROLOGICAL SURGERY
Payer: COMMERCIAL

## 2022-03-16 DIAGNOSIS — R26.2 DIFFICULTY WALKING: ICD-10-CM

## 2022-03-16 DIAGNOSIS — G89.29 CHRONIC BILATERAL LOW BACK PAIN WITH BILATERAL SCIATICA: Primary | ICD-10-CM

## 2022-03-16 DIAGNOSIS — M54.42 CHRONIC BILATERAL LOW BACK PAIN WITH BILATERAL SCIATICA: Primary | ICD-10-CM

## 2022-03-16 DIAGNOSIS — M54.17 RADICULOPATHY OF LUMBOSACRAL REGION: ICD-10-CM

## 2022-03-16 DIAGNOSIS — R29.898 WEAKNESS OF BOTH LOWER EXTREMITIES: ICD-10-CM

## 2022-03-16 DIAGNOSIS — M54.41 CHRONIC BILATERAL LOW BACK PAIN WITH BILATERAL SCIATICA: Primary | ICD-10-CM

## 2022-03-16 PROCEDURE — 97140 MANUAL THERAPY 1/> REGIONS: CPT | Mod: PN

## 2022-03-16 PROCEDURE — 97110 THERAPEUTIC EXERCISES: CPT | Mod: PN

## 2022-03-16 PROCEDURE — 97116 GAIT TRAINING THERAPY: CPT | Mod: PN

## 2022-03-16 NOTE — PROGRESS NOTES
"[facility]   Physical Therapy Progress Note     Name: Kirill Gandhi [facility]  Clinic Number: [id number]    Therapy Diagnosis:   Encounter Diagnoses   Name Primary?    Chronic bilateral low back pain with bilateral sciatica Yes    Weakness of both lower extremities     Difficulty walking     Radiculopathy of lumbosacral region      Physician: Jose L Brown MD    Visit Date: 3/16/2022      Physician Orders: PT Eval and Treat   Medical Diagnosis from Referral: Sacroiliac joint dysfunction of both sides [M53.3]  Evaluation Date: 12/14/2021  Authorization Period Expiration: 12/13/2022  Plan of Care Expiration: 05/04/2022  Progress Note Due:  04/16/2022   Visit # / Visits authorized: 13/ 20   FOTO: 1/ 3      Precautions: Standard and Diabetes, hx of lumbar spine laminectomy (2019), lumbar spine fusion (04/2021)    Time In: 1515  Time Out: 1600  Total Appointment Time (timed & untimed codes): 45 minutes      SUBJECTIVE   Pt reports: that his surgery will not be until mid April to correct his lumbar fusion. Pt states that his L5 I not currently fused, because the hole "widened" where the screws are located.  He was not compliant with home exercise program.  Response to previous treatment: okay with normal soreness  Functional change: none to note    Pain: 6/10  Location: bilateral back      OBJECTIVE   Last reassessed: 03/16//2022     Lumbar Range of Motion: Side bending and rotation were not tested secondary to high pain levels    Degrees Pain   Flexion Mid-shin    Mild pain into Left/S         Extension 25%    No pain         Left Side Bending 2in above knee No pain - slight discomfort         Right Side Bending 2 in above knee No pain - slight discomfort         Left rotation    75% Discomfort into Left side         Right Rotation    75% Slight discomfort               Lower Extremity Strength  Right LE   Left LE     Knee extension: 5/5 Knee extension: 5/5   Knee flexion: 4/5 Knee " "flexion: 4+/5   Hip flexion: 4/5 Hip flexion: 4+/5   Hip extension:  4-/5 Hip extension: 4-/5   Hip abduction: 5/5 Hip abduction: 5/5   Hip adduction: 4+/5 Hip adduction 4+/5   Ankle dorsiflexion: 4/5 Ankle dorsiflexion: 4/5   Ankle plantarflexion: NT Ankle plantarflexion: NT            Special Tests:  -Scour: R = (+) slight pain into lower back               L = (-)  -Quadrant: R = (+) slight pain into lower back                     L = (-)  -SLR:   R = (-)              L = (-)  -SIJ Compression: (-)  -SIJ Distraction: (-)  -Leg Length Discrepancy: (+) Left posterior innominate     Neuro Dynamic Testing:               Sciatic nerve:                                       Slump: R = (+)                                      L = (+)                  Treatment     Kirill received the treatments listed below:      therapeutic exercises to develop strength, endurance, ROM, flexibility, posture and core stabilization for 27 minutes including:    Progress Note measurements  Recumbent Bike lvl 2.5  x 6 min  Seated March   2 x 10  Bridges     2 x 15  SLR     2 x 12  +SL hip Abduction   2 x 6    Seated Sciatic N glide  2 x 10   Seated QL stretch   2 x 10   Supine march   3 x 10/ea  LTR w/ SB    2 x 10  Piriformis Stretch    2x30"/ea  HSS     3 x 30"/B    NP:  +Pudendal N glide Seated on High Low 1 x 10  Seated Pelvic Tilt   2 x 10  Prone hip Extension  2 x 12  +Windshield wipers   2 x 10  Seated LAQ 2#    2 x 10  Seated HS curl GTB   2 x 10  Seated row with RTB  2 x 10  Seated no money with RTB 2 x 10  Seated paloff press with RTB 2 x 10 ea  seated flexion roll out with PB 10x  Glute sets                                          15 x ea  Left hip flexion Iso                            2 x 10  Posterior pelvic tilt                           15 x ea  Hip add ball squeeze   2x15 x ea  Hip fall out    15 x ea   Clamshells 2 x 10  Seated T/S rotation   2 x 15    Kirill received the following manual therapy techniques:  were applied " for 10 minutes, including:    Cupping lumbar spine Paraspinals & scar  STM lumbar spine Paraspinals  W/ biofreeze  Gr II-III CPA T8-T12  MFR B piriformis  Left posterior innominate correction  STM Right iliopsoas    Kirill participated in gait training to improve functional mobility and safety for 08  minutes, includin laps in //  4 steps x 8 w/o UE support  100' w/ quad cane & instruction    cold pack for 00 minutes to low back.        Patient Education and Home Exercises     Home Exercises Provided and Patient Education Provided     Education provided:   -Continue HEP    Written Home Exercises Provided: Patient instructed to cont prior HEP. Exercises were reviewed and Kirill was able to demonstrate them prior to the end of the session.  Kirill demonstrated good  understanding of the education provided. See EMR under Patient Instructions for exercises provided during therapy sessions    ASSESSMENT   Pt tolerated tx well. Pt displays improvement in his strength, pain, and RANGE OF MOTION. However, pt continues to have limitations of ROM, strength, gait, pain, and functional mobility which impact the patient's ability to perform functional activities such as bed transfers, bending, lifting, squatting, performing transfers, and performing ADLs. Pt continues to improve on his pain levels and gait. Pt will be progressed to more independent ambulation and greater LE strengthening in future visits. Therapist provided verbal/tactile cues to maintain proper form. Pt reported no adverse effects to exercises. Pt would benefit from continued skilled physical therapy in order to reach pt's goals.     Kirill Is progressing well towards his goals.   Pt prognosis is Good.     Pt will continue to benefit from skilled outpatient physical therapy to address the deficits listed in the problem list box on initial evaluation, provide pt/family education and to maximize pt's level of independence in the home and community environment.      Pt's spiritual, cultural and educational needs considered and pt agreeable to plan of care and goals.     Anticipated barriers to physical therapy: chronicity of symptoms, multiple body parts       Goals:   Short Term Goals: 4 weeks   1. The patient with report compliance with HEP to maximize functional outcomes. Met  2. The patient will demonstrate increase in BLE MMT by 1/2 grade to improve pt's functional mobility Met  3. The patient will decrease pain level by 50% in order to improve QOL. ongoing     Long Term Goals: 12 weeks   1. The patient will demonstrate understanding and performance of advanced HEP to allow for independence once discharged from therapy. Met  2. 2. The patient will demonstrate increase in BLE MMT by 1 grade to improve pt's functional mobility Ongoing  3. The patient will report 64 % functional limitation on FOTO to indicate an improvement in overall function. Ongoing  4. The patient will be able to perform supine<>sit transfer w/o pain in order to improve pt's ability to sleep. Met  5. Pt will perform 10 squats w/ HHA w/o pain in order to improve pt's functional mobility. Ongoing  6. Pt will be able to walk 5' w/o AD in order to improve pt's community ambulation Ongoing    PLAN     Plan of care Certification: 12/14/2021 to 05/04/2022.     Outpatient Physical Therapy 2 times weekly for 6 weeks to include the following interventions: Cervical/Lumbar Traction, Electrical Stimulation Dry Needling/cupping, Gait Training, Manual Therapy, Moist Heat/ Ice, Neuromuscular Re-ed, Patient Education, Self Care, Therapeutic Activites and Therapeutic Exercise.       Ronnie Hood, PT   03/16/2022

## 2022-03-17 NOTE — PROGRESS NOTES
[facility]   Physical Therapy Treatment Note     Name: Kirill Gandhi [facility]  Clinic Number: [id number]    Therapy Diagnosis:   Encounter Diagnoses   Name Primary?    Chronic bilateral low back pain with bilateral sciatica Yes    Weakness of both lower extremities     Difficulty walking     Radiculopathy of lumbosacral region      Physician: Jose L Brown MD    Visit Date: 3/18/2022      Physician Orders: PT Eval and Treat   Medical Diagnosis from Referral: Sacroiliac joint dysfunction of both sides [M53.3]  Evaluation Date: 12/14/2021  Authorization Period Expiration: 12/13/2022  Plan of Care Expiration: 05/04/2022  Progress Note Due:  04/16/2022   Visit # / Visits authorized: 13/ 20   FOTO: 1/ 3      Precautions: Standard and Diabetes, hx of lumbar spine laminectomy (2019), lumbar spine fusion (04/2021)    Time In: 1130  Time Out: 1215  Total Appointment Time (timed & untimed codes): 45 minutes      SUBJECTIVE   Pt reports: that when he left his last visit, his pain increased to 10/10 for 30-45 minutes. However, his pain calmed down  He was not compliant with home exercise program.  Response to previous treatment: okay with normal soreness  Functional change: none to note    Pain: 5/10  Location: bilateral back      OBJECTIVE   Last reassessed: 03/16//2022     Lumbar Range of Motion: Side bending and rotation were not tested secondary to high pain levels    Degrees Pain   Flexion Mid-shin    Mild pain into Left/S         Extension 25%    No pain         Left Side Bending 2in above knee No pain - slight discomfort         Right Side Bending 2 in above knee No pain - slight discomfort         Left rotation    75% Discomfort into Left side         Right Rotation    75% Slight discomfort               Lower Extremity Strength  Right LE   Left LE     Knee extension: 5/5 Knee extension: 5/5   Knee flexion: 4/5 Knee flexion: 4+/5   Hip flexion: 4/5 Hip flexion: 4+/5   Hip extension:  " 4-/5 Hip extension: 4-/5   Hip abduction: 5/5 Hip abduction: 5/5   Hip adduction: 4+/5 Hip adduction 4+/5   Ankle dorsiflexion: 4/5 Ankle dorsiflexion: 4/5   Ankle plantarflexion: NT Ankle plantarflexion: NT            Special Tests:  -Scour: R = (+) slight pain into lower back               L = (-)  -Quadrant: R = (+) slight pain into lower back                     L = (-)  -SLR:   R = (-)              L = (-)  -SIJ Compression: (-)  -SIJ Distraction: (-)  -Leg Length Discrepancy: (+) Left posterior innominate     Neuro Dynamic Testing:               Sciatic nerve:                                       Slump: R = (+)                                      L = (+)                  Treatment     Kirill received the treatments listed below:      therapeutic exercises to develop strength, endurance, ROM, flexibility, posture and core stabilization for 30 minutes including:      Recumbent Bike lvl 2.5  x 6 min  Prone hip Extension  2 x 15  Seated March   2 x 10  Bridges 8#     2 x 10  SLR     2 x 12  Posterior pelvic tilt                          2x15   Seated Sciatic N glide  2 x 10   Piriformis Stretch    2x30"/ea    +SL hip Abduction   2 x 6  Seated QL stretch   2 x 10   Supine march   3 x 10/ea  LTR w/ SB    2 x 10  HSS     3 x 30"/B    NP:  +Pudendal N glide Seated on High Low 1 x 10  Seated Pelvic Tilt   2 x 10  +Windshield wipers   2 x 10  Seated LAQ 2#    2 x 10  Seated HS curl GTB   2 x 10  Seated row with RTB  2 x 10  Seated no money with RTB 2 x 10  Seated paloff press with RTB 2 x 10 ea  seated flexion roll out with PB 10x  Glute sets                                          15 x ea  Left hip flexion Iso                            2 x 10  Hip add ball squeeze   2x15 x ea  Hip fall out    15 x ea   Clamshells 2 x 10  Seated T/S rotation   2 x 15    Kirill received the following manual therapy techniques:  were applied for 15 minutes, including:    Cupping lumbar spine Paraspinals & scar  STM lumbar spine " Paraspinals   Gr II-III CPA T8-T12  MFR B piriformis  Left posterior innominate correction  STM Right iliopsoas    Kirill participated in gait training to improve functional mobility and safety for 00  minutes, includin laps in //  4 steps x 8 w/o UE support  100' w/ quad cane & instruction    cold pack for 00 minutes to low back.        Patient Education and Home Exercises     Home Exercises Provided and Patient Education Provided     Education provided:   -Continue HEP    Written Home Exercises Provided: Patient instructed to cont prior HEP. Exercises were reviewed and Kirill was able to demonstrate them prior to the end of the session.  Kirill demonstrated good  understanding of the education provided. See EMR under Patient Instructions for exercises provided during therapy sessions    ASSESSMENT   Pt tolerated tx well. Pt tolerated manual intervention much better compared to previous session. Pt had no c/o abnormal tenderness into his lumbar spine Paraspinals, but did report TTP on his lower multifidus. SL hip abduction and gait training were removed today secondary to c/o very high pain after his last session. Therapist provided verbal/tactile cues to maintain proper form. Pt reported no adverse effects to exercises. Pt would benefit from continued skilled physical therapy in order to reach pt's goals.     Kirill Is progressing well towards his goals.   Pt prognosis is Good.     Pt will continue to benefit from skilled outpatient physical therapy to address the deficits listed in the problem list box on initial evaluation, provide pt/family education and to maximize pt's level of independence in the home and community environment.     Pt's spiritual, cultural and educational needs considered and pt agreeable to plan of care and goals.     Anticipated barriers to physical therapy: chronicity of symptoms, multiple body parts       Goals:   Short Term Goals: 4 weeks   1. The patient with report compliance with  HEP to maximize functional outcomes. Met  2. The patient will demonstrate increase in BLE MMT by 1/2 grade to improve pt's functional mobility Met  3. The patient will decrease pain level by 50% in order to improve QOL. ongoing     Long Term Goals: 12 weeks   1. The patient will demonstrate understanding and performance of advanced HEP to allow for independence once discharged from therapy. Met  2. 2. The patient will demonstrate increase in BLE MMT by 1 grade to improve pt's functional mobility Ongoing  3. The patient will report 64 % functional limitation on FOTO to indicate an improvement in overall function. Ongoing  4. The patient will be able to perform supine<>sit transfer w/o pain in order to improve pt's ability to sleep. Met  5. Pt will perform 10 squats w/ HHA w/o pain in order to improve pt's functional mobility. Ongoing  6. Pt will be able to walk 5' w/o AD in order to improve pt's community ambulation Ongoing    PLAN     Plan of care Certification: 12/14/2021 to 05/04/2022.     Outpatient Physical Therapy 2 times weekly for 6 weeks to include the following interventions: Cervical/Lumbar Traction, Electrical Stimulation Dry Needling/cupping, Gait Training, Manual Therapy, Moist Heat/ Ice, Neuromuscular Re-ed, Patient Education, Self Care, Therapeutic Activites and Therapeutic Exercise.       Ronnie Hood, PT   03/18/2022

## 2022-03-18 ENCOUNTER — CLINICAL SUPPORT (OUTPATIENT)
Dept: REHABILITATION | Facility: HOSPITAL | Age: 47
End: 2022-03-18
Attending: NEUROLOGICAL SURGERY
Payer: COMMERCIAL

## 2022-03-18 DIAGNOSIS — R29.898 WEAKNESS OF BOTH LOWER EXTREMITIES: ICD-10-CM

## 2022-03-18 DIAGNOSIS — G89.29 CHRONIC BILATERAL LOW BACK PAIN WITH BILATERAL SCIATICA: Primary | ICD-10-CM

## 2022-03-18 DIAGNOSIS — R26.2 DIFFICULTY WALKING: ICD-10-CM

## 2022-03-18 DIAGNOSIS — M54.42 CHRONIC BILATERAL LOW BACK PAIN WITH BILATERAL SCIATICA: Primary | ICD-10-CM

## 2022-03-18 DIAGNOSIS — M54.41 CHRONIC BILATERAL LOW BACK PAIN WITH BILATERAL SCIATICA: Primary | ICD-10-CM

## 2022-03-18 DIAGNOSIS — M54.17 RADICULOPATHY OF LUMBOSACRAL REGION: ICD-10-CM

## 2022-03-18 PROCEDURE — 97140 MANUAL THERAPY 1/> REGIONS: CPT | Mod: PN

## 2022-03-18 PROCEDURE — 97110 THERAPEUTIC EXERCISES: CPT | Mod: PN

## 2022-03-21 NOTE — PROGRESS NOTES
[facility] OUTPAT[facility] THERAPY AND [facility]LLNESS   Physical Therapy Treatment Note     Name: Kirill Gandhi III  Clinic Number: [id number]    Therapy Diagnosis:   Encounter Diagnoses   Name Primary?    Chronic bilateral low back pain with bilateral sciatica Yes    Weakness of both lower extremities     Difficulty walking     Radiculopathy of lumbosacral region      Physician: Jose L Brown MD    Visit Date: 3/22/2022      Physician Orders: PT Eval and Treat   Medical Diagnosis from Referral: Sacroiliac joint dysfunction of both sides [M53.3]  Evaluation Date: 12/14/2021  Authorization Period Expiration: 12/13/2022  Plan of Care Expiration: 05/04/2022  Progress Note Due:  04/16/2022   Visit # / Visits authorized: 15/ 20   FOTO: 1/ 3      Precautions: Standard and Diabetes, hx of lumbar spine laminectomy (2019), lumbar spine fusion (04/2021)    Time In: 1045  Time Out: 1130  Total Appointment Time (timed & untimed codes): 45 minutes      SUBJECTIVE   Pt reports:that his back is feeling okay today  He was not compliant with home exercise program.  Response to previous treatment: okay with normal soreness  Functional change: none to note    Pain: 5/10  Location: bilateral back      OBJECTIVE   Last reassessed: 03/16//2022     Lumbar Range of Motion: Side bending and rotation were not tested secondary to high pain levels    Degrees Pain   Flexion Mid-shin    Mild pain into Left/S         Extension 25%    No pain         Left Side Bending 2in above knee No pain - slight discomfort         Right Side Bending 2 in above knee No pain - slight discomfort         Left rotation    75% Discomfort into Left side         Right Rotation    75% Slight discomfort               Lower Extremity Strength  Right LE   Left LE     Knee extension: 5/5 Knee extension: 5/5   Knee flexion: 4/5 Knee flexion: 4+/5   Hip flexion: 4/5 Hip flexion: 4+/5   Hip extension:  4-/5 Hip extension: 4-/5   Hip abduction: 5/5 Hip abduction: 5/5   Hip  "adduction: 4+/5 Hip adduction 4+/5   Ankle dorsiflexion: 4/5 Ankle dorsiflexion: 4/5   Ankle plantarflexion: NT Ankle plantarflexion: NT            Special Tests:  -Scour: R = (+) slight pain into lower back               L = (-)  -Quadrant: R = (+) slight pain into lower back                     L = (-)  -SLR:   R = (-)              L = (-)  -SIJ Compression: (-)  -SIJ Distraction: (-)  -Leg Length Discrepancy: (+) Left posterior innominate     Neuro Dynamic Testing:               Sciatic nerve:                                       Slump: R = (+)                                      L = (+)                  Treatment     Kirill received the treatments listed below:      therapeutic exercises to develop strength, endurance, ROM, flexibility, posture and core stabilization for 25 minutes including:      Recumbent Bike lvl 2.5  x 6 min  Prone hip Extension  2 x 15  Seated March 1#   2 x 10  Bridges 8#     2 x 10  SLR 1#    2 x 10  Posterior pelvic tilt                          2x15   Seated Sciatic N glide  2 x 10   Piriformis Stretch    2x30"/ea    SL hip Abduction   2 x 10  Seated QL stretch   2 x 10   Supine march   3 x 10/ea  LTR w/ SB    2 x 10  HSS     3 x 30"/B    NP:  +Pudendal N glide Seated on High Low 1 x 10  Seated Pelvic Tilt   2 x 10  +Windshield wipers   2 x 10  Seated LAQ 2#    2 x 10  Seated HS curl GTB   2 x 10  Seated row with RTB  2 x 10  Seated no money with RTB 2 x 10  Seated paloff press with RTB 2 x 10 ea  seated flexion roll out with PB 10x  Glute sets                                          15 x ea  Left hip flexion Iso                            2 x 10  Hip add ball squeeze   2x15 x ea  Hip fall out    15 x ea   Clamshells 2 x 10  Seated T/S rotation   2 x 15    Kirill received the following manual therapy techniques:  were applied for 20 minutes, including:    Cupping lumbar spine Paraspinals & scar  STM lumbar spine Paraspinals   Gr II-III CPA T8-T12  MFR B piriformis  Left posterior " innominate correction  STM Right iliopsoas  STM Right glute    Kirill participated in gait training to improve functional mobility and safety for 00  minutes, includin laps in //  4 steps x 8 w/o UE support  100' w/ quad cane & instruction    cold pack for 00 minutes to low back.        Patient Education and Home Exercises     Home Exercises Provided and Patient Education Provided     Education provided:   -Continue HEP    Written Home Exercises Provided: Patient instructed to cont prior HEP. Exercises were reviewed and Kirill was able to demonstrate them prior to the end of the session.  Kirill demonstrated good  understanding of the education provided. See EMR under Patient Instructions for exercises provided during therapy sessions    ASSESSMENT   Pt tolerated tx well. Sl hip abduction was reintroduced, because pt was feeling better. However after his 2nd set, pt reported greater TTP on his Right glute. As a result, STM was performed to reduce pt's pain levels. After STM, pt reported relief with arom. Therapist provided verbal/tactile cues to maintain proper form. Pt reported no adverse effects to exercises. Pt would benefit from continued skilled physical therapy in order to reach pt's goals.       Kirill Is progressing well towards his goals.   Pt prognosis is Good.     Pt will continue to benefit from skilled outpatient physical therapy to address the deficits listed in the problem list box on initial evaluation, provide pt/family education and to maximize pt's level of independence in the home and community environment.     Pt's spiritual, cultural and educational needs considered and pt agreeable to plan of care and goals.     Anticipated barriers to physical therapy: chronicity of symptoms, multiple body parts       Goals:   Short Term Goals: 4 weeks   1. The patient with report compliance with HEP to maximize functional outcomes. Met  2. The patient will demonstrate increase in BLE MMT by 1/2 grade to  improve pt's functional mobility Met  3. The patient will decrease pain level by 50% in order to improve QOL. ongoing     Long Term Goals: 12 weeks   1. The patient will demonstrate understanding and performance of advanced HEP to allow for independence once discharged from therapy. Met  2. 2. The patient will demonstrate increase in BLE MMT by 1 grade to improve pt's functional mobility Ongoing  3. The patient will report 64 % functional limitation on FOTO to indicate an improvement in overall function. Ongoing  4. The patient will be able to perform supine<>sit transfer w/o pain in order to improve pt's ability to sleep. Met  5. Pt will perform 10 squats w/ HHA w/o pain in order to improve pt's functional mobility. Ongoing  6. Pt will be able to walk 5' w/o AD in order to improve pt's community ambulation Ongoing    PLAN     Plan of care Certification: 12/14/2021 to 05/04/2022.     Outpatient Physical Therapy 2 times weekly for 6 weeks to include the following interventions: Cervical/Lumbar Traction, Electrical Stimulation Dry Needling/cupping, Gait Training, Manual Therapy, Moist Heat/ Ice, Neuromuscular Re-ed, Patient Education, Self Care, Therapeutic Activites and Therapeutic Exercise.       Ronnie Hood, PT   03/22/2022

## 2022-03-22 ENCOUNTER — CLINICAL SUPPORT (OUTPATIENT)
Dept: REHABILITATION | Facility: HOSPITAL | Age: 47
End: 2022-03-22
Attending: NEUROLOGICAL SURGERY
Payer: COMMERCIAL

## 2022-03-22 DIAGNOSIS — R26.2 DIFFICULTY WALKING: ICD-10-CM

## 2022-03-22 DIAGNOSIS — R29.898 WEAKNESS OF BOTH LOWER EXTREMITIES: ICD-10-CM

## 2022-03-22 DIAGNOSIS — M54.41 CHRONIC BILATERAL LOW BACK PAIN WITH BILATERAL SCIATICA: Primary | ICD-10-CM

## 2022-03-22 DIAGNOSIS — G89.29 CHRONIC BILATERAL LOW BACK PAIN WITH BILATERAL SCIATICA: Primary | ICD-10-CM

## 2022-03-22 DIAGNOSIS — M54.17 RADICULOPATHY OF LUMBOSACRAL REGION: ICD-10-CM

## 2022-03-22 DIAGNOSIS — M54.42 CHRONIC BILATERAL LOW BACK PAIN WITH BILATERAL SCIATICA: Primary | ICD-10-CM

## 2022-03-22 PROCEDURE — 97140 MANUAL THERAPY 1/> REGIONS: CPT | Mod: PN

## 2022-03-22 PROCEDURE — 97110 THERAPEUTIC EXERCISES: CPT | Mod: PN

## 2022-03-24 ENCOUNTER — CLINICAL SUPPORT (OUTPATIENT)
Dept: REHABILITATION | Facility: HOSPITAL | Age: 47
End: 2022-03-24
Attending: NEUROLOGICAL SURGERY
Payer: COMMERCIAL

## 2022-03-24 DIAGNOSIS — M54.17 RADICULOPATHY OF LUMBOSACRAL REGION: ICD-10-CM

## 2022-03-24 DIAGNOSIS — G89.29 CHRONIC BILATERAL LOW BACK PAIN WITH BILATERAL SCIATICA: Primary | ICD-10-CM

## 2022-03-24 DIAGNOSIS — M54.41 CHRONIC BILATERAL LOW BACK PAIN WITH BILATERAL SCIATICA: Primary | ICD-10-CM

## 2022-03-24 DIAGNOSIS — R29.898 WEAKNESS OF BOTH LOWER EXTREMITIES: ICD-10-CM

## 2022-03-24 DIAGNOSIS — M54.42 CHRONIC BILATERAL LOW BACK PAIN WITH BILATERAL SCIATICA: Primary | ICD-10-CM

## 2022-03-24 DIAGNOSIS — R26.2 DIFFICULTY WALKING: ICD-10-CM

## 2022-03-24 PROCEDURE — 97140 MANUAL THERAPY 1/> REGIONS: CPT | Mod: PN

## 2022-03-24 PROCEDURE — 97110 THERAPEUTIC EXERCISES: CPT | Mod: PN

## 2022-03-24 PROCEDURE — 97116 GAIT TRAINING THERAPY: CPT | Mod: PN

## 2022-03-24 NOTE — PROGRESS NOTES
[facility]   Physical Therapy Treatment Note     Name: Kirill Gandhi III  Clinic Number: [id number]    Therapy Diagnosis:   Encounter Diagnoses   Name Primary?    Chronic bilateral low back pain with bilateral sciatica Yes    Weakness of both lower extremities     Difficulty walking     Radiculopathy of lumbosacral region      Physician: Jose L Brown MD    Visit Date: 3/24/2022      Physician Orders: PT Eval and Treat   Medical Diagnosis from Referral: Sacroiliac joint dysfunction of both sides [M53.3]  Evaluation Date: 12/14/2021  Authorization Period Expiration: 12/13/2022  Plan of Care Expiration: 05/04/2022  Progress Note Due:  04/16/2022   Visit # / Visits authorized: 16/ 20   FOTO: 1/ 3      Precautions: Standard and Diabetes, hx of lumbar spine laminectomy (2019), lumbar spine fusion (04/2021)    Time In: 1600  Time Out: 1645  Total Appointment Time (timed & untimed codes): 45 minutes      SUBJECTIVE   Pt reports:that he had another bout of 10/10 after his last appointment. Pt stated that he believes it's coming from his SL hip abduction.  He was not compliant with home exercise program.  Response to previous treatment: okay with normal soreness  Functional change: none to note    Pain: 5/10  Location: bilateral back      OBJECTIVE   Last reassessed: 03/16//2022     Lumbar Range of Motion: Side bending and rotation were not tested secondary to high pain levels    Degrees Pain   Flexion Mid-shin    Mild pain into Left/S         Extension 25%    No pain         Left Side Bending 2in above knee No pain - slight discomfort         Right Side Bending 2 in above knee No pain - slight discomfort         Left rotation    75% Discomfort into Left side         Right Rotation    75% Slight discomfort               Lower Extremity Strength  Right LE   Left LE     Knee extension: 5/5 Knee extension: 5/5   Knee flexion: 4/5 Knee flexion: 4+/5   Hip flexion: 4/5 Hip flexion: 4+/5  "  Hip extension:  4-/5 Hip extension: 4-/5   Hip abduction: 5/5 Hip abduction: 5/5   Hip adduction: 4+/5 Hip adduction 4+/5   Ankle dorsiflexion: 4/5 Ankle dorsiflexion: 4/5   Ankle plantarflexion: NT Ankle plantarflexion: NT            Special Tests:  -Scour: R = (+) slight pain into lower back               L = (-)  -Quadrant: R = (+) slight pain into lower back                     L = (-)  -SLR:   R = (-)              L = (-)  -SIJ Compression: (-)  -SIJ Distraction: (-)  -Leg Length Discrepancy: (+) Left posterior innominate     Neuro Dynamic Testing:               Sciatic nerve:                                       Slump: R = (+)                                      L = (+)                  Treatment     Kirill received the treatments listed below:      therapeutic exercises to develop strength, endurance, ROM, flexibility, posture and core stabilization for 23 minutes including:      Recumbent Bike lvl 2.5  x 6 min  Prone hip Extension  2 x 15  Seated March 1#   2 x 10  Bridges 8#     2 x 10  SLR 1#    2 x 12  +Sit<>stand    2 x 6  +Windshield wipers   1 x 10  Posterior pelvic tilt                          2x15   Seated Sciatic N glide  2 x 10   Piriformis Stretch    2x30"/ea    SL hip Abduction   2 x 10  Seated QL stretch   2 x 10   Supine march   3 x 10/ea  LTR w/ SB    2 x 10  HSS     3 x 30"/B    NP:  +Pudendal N glide Seated on High Low 1 x 10  Seated Pelvic Tilt   2 x 10  +Windshield wipers   2 x 10  Seated LAQ 2#    2 x 10  Seated HS curl GTB   2 x 10  Seated row with RTB  2 x 10  Seated no money with RTB 2 x 10  Seated paloff press with RTB 2 x 10 ea  seated flexion roll out with PB 10x  Glute sets                                          15 x ea  Left hip flexion Iso                            2 x 10  Hip add ball squeeze   2x15 x ea  Hip fall out    15 x ea   Clamshells 2 x 10  Seated T/S rotation   2 x 15    Kirill received the following manual therapy techniques:  were applied for 14 minutes, " including:    Cupping lumbar spine Paraspinals & scar  STM lumbar spine Paraspinals   Gr II-III CPA T8-T12  MFR B piriformis  Left posterior innominate correction  STM R iliopsoas  STM L glute med/min/max, QL, & piriformis    Kirill participated in gait training to improve functional mobility and safety for 8  minutes, includin laps in //  4 steps x 8 w/o UE support  4x100' w/ quad cane & instruction    cold pack for 00 minutes to low back.        Patient Education and Home Exercises     Home Exercises Provided and Patient Education Provided     Education provided:   -Continue HEP    Written Home Exercises Provided: Patient instructed to cont prior HEP. Exercises were reviewed and Kirill was able to demonstrate them prior to the end of the session.  Kirill demonstrated good  understanding of the education provided. See EMR under Patient Instructions for exercises provided during therapy sessions    ASSESSMENT   Pt tolerated tx well. Pt continues to have TTP on his multifidus, B QL, and glutes. However, pt continues to improve on his BLE strength. As a result, pt was progressed with sit<>stands w/ HHA. Therapist provided verbal/tactile cues to maintain proper form. Pt reported no adverse effects to exercises. Pt would benefit from continued skilled physical therapy in order to reach pt's goals.       Kirill Is progressing well towards his goals.   Pt prognosis is Good.     Pt will continue to benefit from skilled outpatient physical therapy to address the deficits listed in the problem list box on initial evaluation, provide pt/family education and to maximize pt's level of independence in the home and community environment.     Pt's spiritual, cultural and educational needs considered and pt agreeable to plan of care and goals.     Anticipated barriers to physical therapy: chronicity of symptoms, multiple body parts       Goals:   Short Term Goals: 4 weeks   1. The patient with report compliance with HEP to  maximize functional outcomes. Met  2. The patient will demonstrate increase in BLE MMT by 1/2 grade to improve pt's functional mobility Met  3. The patient will decrease pain level by 50% in order to improve QOL. ongoing     Long Term Goals: 12 weeks   1. The patient will demonstrate understanding and performance of advanced HEP to allow for independence once discharged from therapy. Met  2. 2. The patient will demonstrate increase in BLE MMT by 1 grade to improve pt's functional mobility Ongoing  3. The patient will report 64 % functional limitation on FOTO to indicate an improvement in overall function. Ongoing  4. The patient will be able to perform supine<>sit transfer w/o pain in order to improve pt's ability to sleep. Met  5. Pt will perform 10 squats w/ HHA w/o pain in order to improve pt's functional mobility. Ongoing  6. Pt will be able to walk 5' w/o AD in order to improve pt's community ambulation Ongoing    PLAN     Plan of care Certification: 12/14/2021 to 05/04/2022.     Outpatient Physical Therapy 2 times weekly for 6 weeks to include the following interventions: Cervical/Lumbar Traction, Electrical Stimulation Dry Needling/cupping, Gait Training, Manual Therapy, Moist Heat/ Ice, Neuromuscular Re-ed, Patient Education, Self Care, Therapeutic Activites and Therapeutic Exercise.       Ronnie Hood, PT   03/24/2022

## 2022-03-31 ENCOUNTER — CLINICAL SUPPORT (OUTPATIENT)
Dept: REHABILITATION | Facility: HOSPITAL | Age: 47
End: 2022-03-31
Attending: NEUROLOGICAL SURGERY
Payer: COMMERCIAL

## 2022-03-31 PROCEDURE — 97140 MANUAL THERAPY 1/> REGIONS: CPT | Mod: PN

## 2022-03-31 PROCEDURE — 97110 THERAPEUTIC EXERCISES: CPT | Mod: PN

## 2022-03-31 NOTE — PROGRESS NOTES
[facility]   Physical Therapy Treatment Note     Name: Kirill Gandhi III  Clinic Number: [id number]    Therapy Diagnosis:   No diagnosis found.  Physician: Jose L Brown MD    Visit Date: 3/31/2022      Physician Orders: PT Eval and Treat   Medical Diagnosis from Referral: Sacroiliac joint dysfunction of both sides [M53.3]  Evaluation Date: 12/14/2021  Authorization Period Expiration: 12/13/2022  Plan of Care Expiration: 05/04/2022  Progress Note Due:  04/16/2022   Visit # / Visits authorized: 17/ 20   FOTO: 1/ 3      Precautions: Standard and Diabetes, hx of lumbar spine laminectomy (2019), lumbar spine fusion (04/2021)    Time In: 1130  Time Out: 1215  Total Appointment Time (timed & untimed codes): 45 minutes      SUBJECTIVE   Pt reports: that he feels more pain today after having moved this past weekend.  He was not compliant with home exercise program.  Response to previous treatment: okay with normal soreness  Functional change: none to note    Pain: 6/10  Location: bilateral back      OBJECTIVE   Last reassessed: 03/16//2022     Lumbar Range of Motion: Side bending and rotation were not tested secondary to high pain levels    Degrees Pain   Flexion Mid-shin    Mild pain into Left/S         Extension 25%    No pain         Left Side Bending 2in above knee No pain - slight discomfort         Right Side Bending 2 in above knee No pain - slight discomfort         Left rotation    75% Discomfort into Left side         Right Rotation    75% Slight discomfort               Lower Extremity Strength  Right LE   Left LE     Knee extension: 5/5 Knee extension: 5/5   Knee flexion: 4/5 Knee flexion: 4+/5   Hip flexion: 4/5 Hip flexion: 4+/5   Hip extension:  4-/5 Hip extension: 4-/5   Hip abduction: 5/5 Hip abduction: 5/5   Hip adduction: 4+/5 Hip adduction 4+/5   Ankle dorsiflexion: 4/5 Ankle dorsiflexion: 4/5   Ankle plantarflexion: NT Ankle plantarflexion: NT            Special  "Tests:  -Scour: R = (+) slight pain into lower back               L = (-)  -Quadrant: R = (+) slight pain into lower back                     L = (-)  -SLR:   R = (-)              L = (-)  -SIJ Compression: (-)  -SIJ Distraction: (-)  -Leg Length Discrepancy: (+) Left posterior innominate     Neuro Dynamic Testing:               Sciatic nerve:                                       Slump: R = (+)                                      L = (+)                  Treatment     Kirill received the treatments listed below:      therapeutic exercises to develop strength, endurance, ROM, flexibility, posture and core stabilization for 25 minutes including:      Recumbent Bike lvl 2.5  x 6 min  Prone hip Extension  2 x 15  Seated March 1#   2 x 10  Bridges     2 x 15  SLR 2#    2 x 10  Seated Pelvic Tilt   2 x 15    NP:  +Sit<>stand    2 x 6  +Windshield wipers   1 x 10  Posterior pelvic tilt                          2x15   Seated Sciatic N glide  2 x 10   Piriformis Stretch    2x30"/ea  SL hip Abduction   2 x 10  Seated QL stretch   2 x 10   Supine march   3 x 10/ea  LTR w/ SB    2 x 10  HSS     3 x 30"/B  +Pudendal N glide Seated on High Low 1 x 10  +Windshield wipers   2 x 10  Seated LAQ 2#    2 x 10  Seated HS curl GTB   2 x 10  Seated row with RTB  2 x 10  Seated no money with RTB 2 x 10  Seated paloff press with RTB 2 x 10 ea  seated flexion roll out with PB 10x  Glute sets                                          15 x ea  Left hip flexion Iso                            2 x 10  Hip add ball squeeze   2x15 x ea  Hip fall out    15 x ea   Clamshells 2 x 10  Seated T/S rotation   2 x 15    Kirill received the following manual therapy techniques:  were applied for 20 minutes, including:    Cupping lumbar spine Paraspinals & scar  STM lumbar spine Paraspinals   Gr II-III CPA T8-T12  MFR B piriformis  Left posterior innominate correction  STM R iliopsoas  STM B glute med/min/max, QL, & piriformis    Kirill participated in gait " training to improve functional mobility and safety for 00  minutes, includin laps in //  4 steps x 8 w/o UE support  4x100' w/ quad cane & instruction    cold pack for 00 minutes to low back.        Patient Education and Home Exercises     Home Exercises Provided and Patient Education Provided     Education provided:   -Continue HEP    Written Home Exercises Provided: Patient instructed to cont prior HEP. Exercises were reviewed and Kirill was able to demonstrate them prior to the end of the session.  Kirill demonstrated good  understanding of the education provided. See EMR under Patient Instructions for exercises provided during therapy sessions    ASSESSMENT   Pt tolerated tx well. Pt reported increased pain into his Right side today, where in previous weeks, pt reported pain predominantly into his Left lower back. As a result, greater STM was performed on his lumbar spine Paraspinals and multifidus. Open books were added to improve thoracolumbar mobility and reduce stess into his Paraspinals. Therapist provided verbal/tactile cues to maintain proper form. Pt reported no adverse effects to exercises. Pt would benefit from continued skilled physical therapy in order to reach pt's goals.       Kirill Is progressing well towards his goals.   Pt prognosis is Good.     Pt will continue to benefit from skilled outpatient physical therapy to address the deficits listed in the problem list box on initial evaluation, provide pt/family education and to maximize pt's level of independence in the home and community environment.     Pt's spiritual, cultural and educational needs considered and pt agreeable to plan of care and goals.     Anticipated barriers to physical therapy: chronicity of symptoms, multiple body parts       Goals:   Short Term Goals: 4 weeks   1. The patient with report compliance with HEP to maximize functional outcomes. Met  2. The patient will demonstrate increase in BLE MMT by 1/2 grade to improve  pt's functional mobility Met  3. The patient will decrease pain level by 50% in order to improve QOL. ongoing     Long Term Goals: 12 weeks   1. The patient will demonstrate understanding and performance of advanced HEP to allow for independence once discharged from therapy. Met  2. 2. The patient will demonstrate increase in BLE MMT by 1 grade to improve pt's functional mobility Ongoing  3. The patient will report 64 % functional limitation on FOTO to indicate an improvement in overall function. Ongoing  4. The patient will be able to perform supine<>sit transfer w/o pain in order to improve pt's ability to sleep. Met  5. Pt will perform 10 squats w/ HHA w/o pain in order to improve pt's functional mobility. Ongoing  6. Pt will be able to walk 5' w/o AD in order to improve pt's community ambulation Ongoing    PLAN     Plan of care Certification: 12/14/2021 to 05/04/2022.     Outpatient Physical Therapy 2 times weekly for 6 weeks to include the following interventions: Cervical/Lumbar Traction, Electrical Stimulation Dry Needling/cupping, Gait Training, Manual Therapy, Moist Heat/ Ice, Neuromuscular Re-ed, Patient Education, Self Care, Therapeutic Activites and Therapeutic Exercise.       Ronnie Hood, PT   03/31/2022

## 2022-04-06 ENCOUNTER — ANESTHESIA EVENT (OUTPATIENT)
Dept: SURGERY | Facility: HOSPITAL | Age: 47
DRG: 460 | End: 2022-04-06
Payer: COMMERCIAL

## 2022-04-06 ENCOUNTER — PATIENT MESSAGE (OUTPATIENT)
Dept: SURGERY | Facility: HOSPITAL | Age: 47
End: 2022-04-06
Payer: COMMERCIAL

## 2022-04-06 ENCOUNTER — HOSPITAL ENCOUNTER (OUTPATIENT)
Dept: PREADMISSION TESTING | Facility: HOSPITAL | Age: 47
Discharge: HOME | End: 2022-04-06
Attending: NEUROLOGICAL SURGERY
Payer: COMMERCIAL

## 2022-04-06 ENCOUNTER — TELEPHONE (OUTPATIENT)
Dept: CARDIOLOGY | Facility: HOSPITAL | Age: 47
End: 2022-04-06
Payer: COMMERCIAL

## 2022-04-06 VITALS
BODY MASS INDEX: 42.66 KG/M2 | RESPIRATION RATE: 16 BRPM | TEMPERATURE: 99 F | HEIGHT: 72 IN | HEART RATE: 95 BPM | DIASTOLIC BLOOD PRESSURE: 68 MMHG | WEIGHT: 315 LBS | SYSTOLIC BLOOD PRESSURE: 129 MMHG | OXYGEN SATURATION: 99 %

## 2022-04-06 DIAGNOSIS — Z01.818 PREOP TESTING: Primary | ICD-10-CM

## 2022-04-06 RX ORDER — LIDOCAINE HYDROCHLORIDE 10 MG/ML
1 INJECTION, SOLUTION EPIDURAL; INFILTRATION; INTRACAUDAL; PERINEURAL ONCE
Status: CANCELLED | OUTPATIENT
Start: 2022-04-06 | End: 2022-04-06

## 2022-04-06 RX ORDER — SODIUM CHLORIDE, SODIUM LACTATE, POTASSIUM CHLORIDE, CALCIUM CHLORIDE 600; 310; 30; 20 MG/100ML; MG/100ML; MG/100ML; MG/100ML
INJECTION, SOLUTION INTRAVENOUS CONTINUOUS
Status: CANCELLED | OUTPATIENT
Start: 2022-04-06

## 2022-04-06 NOTE — ANESTHESIA PREPROCEDURE EVALUATION
"                                                                                                             04/06/2022  Kirill Gandhi III is a 46 y.o., male scheduled for FUSION, SPINE, WITH INSTRUMENTATION 4/13/22.    Felt like fighting with staff in OR at some time during last surgery.     Per cardiology "Cleared for spine surgery at low cardiac risk"      PET stress 4/8/22    There are no clinically significant perfusion abnormalities. There is a small to moderate (10-15%) amount of mild resting heterogeneity in the anterior and apical wall(s) that improves with stress consisent with endothelial dysfunction secondary to diabetes.    The whole heart absolute myocardial perfusion values averaged 0.49 cc/min/g at rest, which is reduced; 1.17 cc/min/g at stress, which is mildly reduced; and CFR is 2.41 , which is low normal.    CT attenuation images demonstrate no coronary calcifications and no aortic calcifications.    The gated perfusion images showed an ejection fraction of 58% at rest and 66% during stress. A normal ejection fraction is greater than 53%.    The wall motion is normal at rest and during stress.    The LV cavity size is normal at rest and stress.    The EKG portion of this study is negative for ischemia.    There were no arrhythmias during stress.    The patient reported no chest pain during the stress test.    There are no prior studies for comparison.        Stress echo 4/14/21  ·  The stress echo portion of this study is negative for myocardial ischemia. Target heart rate was achieved.  · The ECG portion of this study is negative for myocardial ischemia.  · There were no arrhythmias during stress.  · The test was stopped because the patient experienced fatigue.  · The patient's exercise capacity was below average. Achieved 8 METs.  · The left ventricle is normal in size with normal systolic function. The estimated ejection fraction is 60%.  · Normal left ventricular diastolic " function.  · Normal right ventricular size with low normal right ventricular systolic function.  · The estimated PA systolic pressure is 35 mmHg.  · Normal central venous pressure (3 mmHg).      Past Medical History:   Diagnosis Date    Allergy     Anxiety     Back pain     CTS (carpal tunnel syndrome) 6/26/2015    Depression     Diabetes 4/17/2015    Hx of vasectomy     Hypertension     OCD (obsessive compulsive disorder)     Sleep apnea      Past Surgical History:   Procedure Laterality Date    ADENOIDECTOMY      EPIDURAL STEROID INJECTION Right 9/4/2019    Procedure: Injection, Steroid, Epidural Transforaminal;  Surgeon: Harjinder Batista Jr., MD;  Location[facility] ENDO;  Service: Pain Management;  Laterality: Right;  Right L3 + L4 TF NATALIE    [id number]  [id number]    Arrive @ 1300; ASA last 8/27; Check BG; NEEDS CONSENT    FRACTURE SURGERY      bilateral elbow fracture 3 years ago    FUSION OF LUMBAR SPINE BY ANTERIOR APPROACH Left 4/26/2021    Procedure: FUSION, SPINE, LUMBAR, ANTERIOR APPROACH Left L3-4 L4-5 OLIF BRENDA, ALL Release, L5-S1 ALIF;  Surgeon: Jose L Brown MD;  Location: [facility] OR;  Service: Neurosurgery;  Laterality: Left;  Procedure: Left L3-4 L4-5 OLIF BRENDA, ALL Release, L5-S1 ALIF  Surgery Time: 2.5 Hrs  LOS: 2-3  Anesthesia: General  Blood: Type & Screen  Radiology: Josseline  Brace: LSO  Bed: Regular  Position: LAteral Righ    FUSION OF SPINE WITH INSTRUMENTATION N/A 4/26/2021    Procedure: FUSION, SPINE, WITH INSTRUMENTATION L3-S1 Posteior Segmental Instrumentation;  Surgeon: Jose L Brown MD;  Location: [facility] OR;  Service: Neurosurgery;  Laterality: N/A;  Procedure: L3-S1 Posteior Segmental Instrumentation  Sirgery Time: 2 Hrs  LOS: 2-3  Anesthesia: General  Blood: Tyoe & Screen  Radiology: Dual C arm  Brace:LSO  Bed: [place] [id number] Poster  Position: Prone  Equipment: Depuy     HERNIA REPAIR      bilateral groin hernia    INJECTION OF STEROID Bilateral 12/13/2021    Procedure: INJECTION,  STEROID Bilateral SI JOint Block and Steroid Injection;  Surgeon: Jose L Brown MD;  Location: [facility] OR;  Service: Neurosurgery;  Laterality: Bilateral;  Procedure: Bilateral SI JOint Block and Steroid Injection   Surgery Time: 30 min  LOS: 0  Anesthesia: MAC  Radiology: C-arm  Bed: Regular with pillows  Position: Prone    LUMBAR LAMINECTOMY Bilateral 11/29/2019    Procedure: LAMINECTOMY, SPINE, LUMBAR Rigth L3-4, Left L4-5 Laminectomy, medial Facetectomy, and microdiscectomy;  Surgeon: Jose L Brown MD;  Location: [facility] OR;  Service: Neurosurgery;  Laterality: Bilateral;  Procedure: Rigth L3-4, Left L4-5 Laminectomy, medial Facetectomy, and microdiscectomy  Surgery Time: 2.5 Hrs  LOS: 0-1  Anesthesia: General  Blood: Type & Screen  Radiology: C-arm  Micros    TONSILLECTOMY      VASECTOMY       Pre-op Assessment    I have reviewed the Patient Summary Reports.     I have reviewed the Nursing Notes.    I have reviewed the Medications.     Review of Systems  Anesthesia Hx:  No problems with previous Anesthesia Denies Hx of Anesthetic complications  History of prior surgery of interest to airway management or planning:  Denies Personal Hx of Anesthesia complications.   Social:  Former Smoker, No Alcohol Use    Hematology/Oncology:  Hematology Normal   Oncology Normal     EENT/Dental:EENT/Dental Normal   Cardiovascular:   Exercise tolerance: good Denies Pacemaker. Hypertension, well controlled   Denies Angina. HOLMAN    Pulmonary:   Sleep Apnea, CPAP    Renal/:  Renal/ Normal     Hepatic/GI:  Hepatic/GI Normal    Musculoskeletal:  Spine Disorders: lumbar Disc disease and Degenerative disease    Neurological:   Denies TIA. Denies CVA. Neuromuscular Disease,  Denies Seizures.   Peripheral Neuropathy    Endocrine:   Diabetes, type 2  Morbid Obesity / BMI > 40  Psych:   Psychiatric History anxiety depression          Physical Exam  General: Well nourished and Cooperative    Airway:  Mallampati: III   Mouth Opening:  Normal  TM Distance: Normal  Tongue: Normal  Neck ROM: Normal ROM    Dental:  Intact    Chest/Lungs:  Clear to auscultation, Normal Respiratory Rate    Heart:  Rate: Normal  Rhythm: Regular Rhythm  Sounds: Normal    CXR 3/7/22  The heart is normal and the lungs are clear.  Impression:No acute cardiopulmonary disease.    EKG 3/7/22  Normal sinus rhythm   Nonspecific ST abnormality   Abnormal ECG   When compared with ECG of 14-APR-2021 10:12,   Previous ECG has undetermined rhythm, needs review   T wave inversion now evident in Inferior leads   Confirmed by Noel Quintanilla MD ([id number]) on 3/7/2022 8:44:03 PM     Stress echo 4/14/21  ·  The stress echo portion of this study is negative for myocardial ischemia. Target heart rate was achieved.  · The ECG portion of this study is negative for myocardial ischemia.  · There were no arrhythmias during stress.  · The test was stopped because the patient experienced fatigue.  · The patient's exercise capacity was below average. Achieved 8 METs.  · The left ventricle is normal in size with normal systolic function. The estimated ejection fraction is 60%.  · Normal left ventricular diastolic function.  · Normal right ventricular size with low normal right ventricular systolic function.  · The estimated PA systolic pressure is 35 mmHg.  · Normal central venous pressure (3 mmHg).     Holter 4/12/21  · Baseline rhythm was normal sinus rhythm with normal intervals and an average heart rate of 76 bpm.  · There was one episode with reported symtpoms of nausea, tiredness, and fatigue. This episode corresponded to normal sinus rhythm.  · There were no atrial or ventricular arrhythmias.      Anesthesia Plan  Type of Anesthesia, risks & benefits discussed:    Anesthesia Type: Gen ETT  Intra-op Monitoring Plan: Standard ASA Monitors  Post Op Pain Control Plan: multimodal analgesia  Induction:  IV  Informed Consent: Informed consent signed with the Patient and all parties understand the risks  and agree with anesthesia plan.  All questions answered.   ASA Score: 3  Day of Surgery Review of History & Physical: I have interviewed and examined the patient. I have reviewed the patient's H&P dated:   Anesthesia Plan Notes: Anesthesia consent to be signed prior to surgery 4/13/22 4/13/22  Glucose 343 this AM. Surgeon aware. Wishes to proceed given patient with loose hardware and likely infeciton. Will order BMP and insulin injection     Ready For Surgery From Anesthesia Perspective.     .

## 2022-04-06 NOTE — PRE-PROCEDURE INSTRUCTIONS
shellie [phone]    Allergies, medical, surgical, family and psychosocial histories reviewed with patient. Periop plan of care reviewed. Preop instructions given, including medications to take and to hold. Hibiclens soap and instructions on use given. Time allotted for questions to be addressed.  Patient verbalized understanding.

## 2022-04-08 ENCOUNTER — HOSPITAL ENCOUNTER (OUTPATIENT)
Dept: CARDIOLOGY | Facility: HOSPITAL | Age: 47
Discharge: HOME | End: 2022-04-08
Attending: INTERNAL MEDICINE
Payer: COMMERCIAL

## 2022-04-08 ENCOUNTER — TELEPHONE (OUTPATIENT)
Dept: NEUROSURGERY | Facility: CLINIC | Age: 47
End: 2022-04-08
Payer: COMMERCIAL

## 2022-04-08 VITALS
SYSTOLIC BLOOD PRESSURE: 137 MMHG | BODY MASS INDEX: 42.66 KG/M2 | HEIGHT: 72 IN | RESPIRATION RATE: 22 BRPM | WEIGHT: 315 LBS | HEART RATE: 75 BPM | DIASTOLIC BLOOD PRESSURE: 58 MMHG

## 2022-04-08 DIAGNOSIS — R06.09 DOE (DYSPNEA ON EXERTION): ICD-10-CM

## 2022-04-08 DIAGNOSIS — R00.2 PALPITATIONS: ICD-10-CM

## 2022-04-08 DIAGNOSIS — G47.33 OSA (OBSTRUCTIVE SLEEP APNEA): ICD-10-CM

## 2022-04-08 DIAGNOSIS — R07.89 CHEST PAIN, ATYPICAL: ICD-10-CM

## 2022-04-08 LAB
CFR FLOW - ANTERIOR: 2.47
CFR FLOW - INFERIOR: 2.3
CFR FLOW - LATERAL: 2.25
CFR FLOW - MAX: 3.3
CFR FLOW - MIN: 1.44
CFR FLOW - SEPTAL: 2.6
CFR FLOW - WHOLE HEART: 2.41
CV PHARM DOSE: 0.4 MG
CV STRESS BASE HR: 73 BPM
DIASTOLIC BLOOD PRESSURE: 86 MMHG
EJECTION FRACTION- HIGH: 65 %
END DIASTOLIC INDEX-HIGH: 153 ML/M2
END DIASTOLIC INDEX-LOW: 93 ML/M2
END SYSTOLIC INDEX-HIGH: 71 ML/M2
END SYSTOLIC INDEX-LOW: 31 ML/M2
NUC REST DIASTOLIC VOLUME INDEX: 129
NUC REST EJECTION FRACTION: 58
NUC REST SYSTOLIC VOLUME INDEX: 54
NUC STRESS DIASTOLIC VOLUME INDEX: 149
NUC STRESS EJECTION FRACTION: 66 %
NUC STRESS SYSTOLIC VOLUME INDEX: 50
OHS CV CPX 85 PERCENT MAX PREDICTED HEART RATE MALE: 150
OHS CV CPX MAX PREDICTED HEART RATE: 176
OHS CV CPX PATIENT IS FEMALE: 0
OHS CV CPX PATIENT IS MALE: 1
OHS CV CPX PEAK DIASTOLIC BLOOD PRESSURE: 70 MMHG
OHS CV CPX PEAK HEAR RATE: 97 BPM
OHS CV CPX PEAK RATE PRESSURE PRODUCT: NORMAL
OHS CV CPX PEAK SYSTOLIC BLOOD PRESSURE: 193 MMHG
OHS CV CPX PERCENT MAX PREDICTED HEART RATE ACHIEVED: 55
OHS CV CPX RATE PRESSURE PRODUCT PRESENTING: NORMAL
REST FLOW - ANTERIOR: 0.42 CC/MIN/G
REST FLOW - INFERIOR: 0.56 CC/MIN/G
REST FLOW - LATERAL: 0.56 CC/MIN/G
REST FLOW - MAX: 0.79 CC/MIN/G
REST FLOW - MIN: 0.31 CC/MIN/G
REST FLOW - SEPTAL: 0.42 CC/MIN/G
REST FLOW - WHOLE HEART: 0.49 CC/MIN/G
RETIRED EF AND QEF - SEE NOTES: 53 %
STRESS FLOW - ANTERIOR: 1.04 CC/MIN/G
STRESS FLOW - INFERIOR: 1.27 CC/MIN/G
STRESS FLOW - LATERAL: 1.26 CC/MIN/G
STRESS FLOW - MAX: 1.84 CC/MIN/G
STRESS FLOW - MIN: 0.52 CC/MIN/G
STRESS FLOW - SEPTAL: 1.11 CC/MIN/G
STRESS FLOW - WHOLE HEART: 1.17 CC/MIN/G
SYSTOLIC BLOOD PRESSURE: 153 MMHG

## 2022-04-08 PROCEDURE — 78434 AQMBF PET REST & RX STRESS: CPT | Mod: 26,,, | Performed by: INTERNAL MEDICINE

## 2022-04-08 PROCEDURE — 93016 CV STRESS TEST SUPVJ ONLY: CPT | Mod: ,,, | Performed by: INTERNAL MEDICINE

## 2022-04-08 PROCEDURE — 78434 AQMBF PET REST & RX STRESS: CPT

## 2022-04-08 PROCEDURE — 63600175 PHARM REV CODE 636 W HCPCS: Performed by: INTERNAL MEDICINE

## 2022-04-08 PROCEDURE — 78434 CARDIAC PET SCAN STRESS (CUPID ONLY): ICD-10-PCS | Mod: 26,,, | Performed by: INTERNAL MEDICINE

## 2022-04-08 PROCEDURE — 93016 CARDIAC PET SCAN STRESS (CUPID ONLY): ICD-10-PCS | Mod: ,,, | Performed by: INTERNAL MEDICINE

## 2022-04-08 PROCEDURE — 78431 MYOCRD IMG PET RST&STRS CT: CPT | Mod: 26,,, | Performed by: INTERNAL MEDICINE

## 2022-04-08 PROCEDURE — 93018 CV STRESS TEST I&R ONLY: CPT | Mod: ,,, | Performed by: INTERNAL MEDICINE

## 2022-04-08 PROCEDURE — 93018 CARDIAC PET SCAN STRESS (CUPID ONLY): ICD-10-PCS | Mod: ,,, | Performed by: INTERNAL MEDICINE

## 2022-04-08 PROCEDURE — 78431 CARDIAC PET SCAN STRESS (CUPID ONLY): ICD-10-PCS | Mod: 26,,, | Performed by: INTERNAL MEDICINE

## 2022-04-08 RX ORDER — REGADENOSON 0.08 MG/ML
0.4 INJECTION, SOLUTION INTRAVENOUS ONCE
Status: COMPLETED | OUTPATIENT
Start: 2022-04-08 | End: 2022-04-08

## 2022-04-08 RX ADMIN — REGADENOSON 0.4 MG: 0.08 INJECTION, SOLUTION INTRAVENOUS at 01:04

## 2022-04-08 NOTE — TELEPHONE ENCOUNTER
----- Message from Nanci Lopez sent at 4/8/2022  1:35 PM CDT -----  Type:  Pharmacy Calling to Clarify an RX    Name of Caller:JAIMIE  Pharmacy Name:[facility]  Prescription Name:DICLOFENAC 1.5% 300ML OR PLIAGLIS LIDOCAINE TRICANIE  CLACIPOTRIENE 0.005%  What do they need to clarify?:Which one pt should be taking  Best Call Back Number:  Additional Information:

## 2022-04-09 RX ORDER — OXYCODONE AND ACETAMINOPHEN 10; 325 MG/1; MG/1
1 TABLET ORAL EVERY 8 HOURS PRN
Qty: 90 TABLET | Refills: 0 | Status: SHIPPED | OUTPATIENT
Start: 2022-04-09 | End: 2022-04-12 | Stop reason: SDUPTHER

## 2022-04-11 ENCOUNTER — PATIENT OUTREACH (OUTPATIENT)
Dept: ADMINISTRATIVE | Facility: OTHER | Age: 47
End: 2022-04-11
Payer: COMMERCIAL

## 2022-04-11 RX ORDER — INSULIN ASPART 100 [IU]/ML
INJECTION, SOLUTION INTRAVENOUS; SUBCUTANEOUS
Qty: 15 EACH | Refills: 3 | Status: ON HOLD | OUTPATIENT
Start: 2022-04-11 | End: 2022-04-18 | Stop reason: HOSPADM

## 2022-04-11 NOTE — PROGRESS NOTES
Health Maintenance Due   Topic Date Due    Pneumococcal Vaccines (Age 0-64) (2 - PCV) 12/07/2018     Updates were requested from care everywhere.  Chart was reviewed for overdue Proactive [facility] Encounters ([facility]UB[facility]) topics (CRS, Breast Cancer Screening, Eye exam)  Health Maintenance has been updated.  LINKS immunization registry triggered.  Immunizations were reconciled.

## 2022-04-11 NOTE — TELEPHONE ENCOUNTER
Refill Routing Note   Medication(s) are not appropriate for processing by [facility] for the following reason(s):      - SSI is outside of protocol    [facility] action(s):  Defer          Medication reconciliation completed: No     Appointments  past 12m or future 3m with PCP    Date Provider   Last Visit   3/4/2022 Marcos Ibarra MD   Next Visit   Visit date not found Marcos Ibarra MD   ED visits in past 90 days: 0        Note composed:6:36 PM 04/11/2022

## 2022-04-11 NOTE — TELEPHONE ENCOUNTER
No new care gaps identified.  Powered by [employer]. Reference number: [id number].   4/10/2022 10:17:45 PM CDT

## 2022-04-12 ENCOUNTER — OFFICE VISIT (OUTPATIENT)
Dept: CARDIOLOGY | Facility: CLINIC | Age: 47
End: 2022-04-12
Payer: COMMERCIAL

## 2022-04-12 VITALS
SYSTOLIC BLOOD PRESSURE: 125 MMHG | HEIGHT: 72 IN | RESPIRATION RATE: 16 BRPM | WEIGHT: 315 LBS | DIASTOLIC BLOOD PRESSURE: 73 MMHG | OXYGEN SATURATION: 97 % | BODY MASS INDEX: 42.66 KG/M2 | HEART RATE: 87 BPM

## 2022-04-12 DIAGNOSIS — R00.2 PALPITATIONS: Primary | ICD-10-CM

## 2022-04-12 DIAGNOSIS — G47.33 OSA (OBSTRUCTIVE SLEEP APNEA): ICD-10-CM

## 2022-04-12 PROCEDURE — 3052F HG A1C>EQUAL 8.0%<EQUAL 9.0%: CPT | Mod: CPTII,S$GLB,, | Performed by: INTERNAL MEDICINE

## 2022-04-12 PROCEDURE — 99999 PR PBB SHADOW E&M-EST. PATIENT-LVL III: ICD-10-PCS | Mod: PBBFAC,,, | Performed by: INTERNAL MEDICINE

## 2022-04-12 PROCEDURE — 3074F SYST BP LT 130 MM HG: CPT | Mod: CPTII,S$GLB,, | Performed by: INTERNAL MEDICINE

## 2022-04-12 PROCEDURE — 3078F DIAST BP <80 MM HG: CPT | Mod: CPTII,S$GLB,, | Performed by: INTERNAL MEDICINE

## 2022-04-12 PROCEDURE — 3072F LOW RISK FOR RETINOPATHY: CPT | Mod: CPTII,S$GLB,, | Performed by: INTERNAL MEDICINE

## 2022-04-12 PROCEDURE — 99214 OFFICE O/P EST MOD 30 MIN: CPT | Mod: S$GLB,,, | Performed by: INTERNAL MEDICINE

## 2022-04-12 PROCEDURE — 3008F PR BODY MASS INDEX (BMI) DOCUMENTED: ICD-10-PCS | Mod: CPTII,S$GLB,, | Performed by: INTERNAL MEDICINE

## 2022-04-12 PROCEDURE — 3078F PR MOST RECENT DIASTOLIC BLOOD PRESSURE < 80 MM HG: ICD-10-PCS | Mod: CPTII,S$GLB,, | Performed by: INTERNAL MEDICINE

## 2022-04-12 PROCEDURE — 1159F PR MEDICATION LIST DOCUMENTED IN MEDICAL RECORD: ICD-10-PCS | Mod: CPTII,S$GLB,, | Performed by: INTERNAL MEDICINE

## 2022-04-12 PROCEDURE — 3008F BODY MASS INDEX DOCD: CPT | Mod: CPTII,S$GLB,, | Performed by: INTERNAL MEDICINE

## 2022-04-12 PROCEDURE — 1159F MED LIST DOCD IN RCRD: CPT | Mod: CPTII,S$GLB,, | Performed by: INTERNAL MEDICINE

## 2022-04-12 PROCEDURE — 3052F PR MOST RECENT HEMOGLOBIN A1C LEVEL 8.0 - < 9.0%: ICD-10-PCS | Mod: CPTII,S$GLB,, | Performed by: INTERNAL MEDICINE

## 2022-04-12 PROCEDURE — 3074F PR MOST RECENT SYSTOLIC BLOOD PRESSURE < 130 MM HG: ICD-10-PCS | Mod: CPTII,S$GLB,, | Performed by: INTERNAL MEDICINE

## 2022-04-12 PROCEDURE — 3072F PR LOW RISK FOR RETINOPATHY: ICD-10-PCS | Mod: CPTII,S$GLB,, | Performed by: INTERNAL MEDICINE

## 2022-04-12 PROCEDURE — 99999 PR PBB SHADOW E&M-EST. PATIENT-LVL III: CPT | Mod: PBBFAC,,, | Performed by: INTERNAL MEDICINE

## 2022-04-12 PROCEDURE — 99214 PR OFFICE/OUTPT VISIT, EST, LEVL IV, 30-39 MIN: ICD-10-PCS | Mod: S$GLB,,, | Performed by: INTERNAL MEDICINE

## 2022-04-12 RX ORDER — OXYCODONE AND ACETAMINOPHEN 10; 325 MG/1; MG/1
1 TABLET ORAL EVERY 8 HOURS PRN
Qty: 90 TABLET | Refills: 0 | Status: SHIPPED | OUTPATIENT
Start: 2022-04-12 | End: 2022-09-14 | Stop reason: SDUPTHER

## 2022-04-12 NOTE — PROGRESS NOTES
Subjective:    Patient ID:  Kirill Gandhi III is a 46 y.o. male who presents for follow-up of No chief complaint on file.      HPI     Referred by Dr Ibarra   Kirill Gandhi III is a 46 y.o. male video visit for preoperative clearance revision of surgical procedure and fusion of SI joint as well as diabetes follow-up.  Currently is without any cardiac symptoms such as chest pain palpitations shortness of breath PND orthopnea.  He has new respiratory illnesses.  No cough cold congestion fever chills nausea vomiting or urinary symptoms.  Diabetes-recently increased from 1.5-3 milligram Trulicity weekly.  Glucose readings have been running in the low 200s to high 106. Hemoglobin A1c was over 11 now 8.711 days ago-experiencing better control than previous.  Continues to struggle with appropriate diet-ingestion of a significant amount of carbohydrates.       EKG 3/7/22 NSR NSSTT changes - anterolateral and inferior TWI - new from 2021    PET stress 4/8/22    There are no clinically significant perfusion abnormalities. There is a small to moderate (10-15%) amount of mild resting heterogeneity in the anterior and apical wall(s) that improves with stress consisent with endothelial dysfunction secondary to diabetes.    The whole heart absolute myocardial perfusion values averaged 0.49 cc/min/g at rest, which is reduced; 1.17 cc/min/g at stress, which is mildly reduced; and CFR is 2.41 , which is low normal.    CT attenuation images demonstrate no coronary calcifications and no aortic calcifications.    The gated perfusion images showed an ejection fraction of 58% at rest and 66% during stress. A normal ejection fraction is greater than 53%.    The wall motion is normal at rest and during stress.    The LV cavity size is normal at rest and stress.    The EKG portion of this study is negative for ischemia.    There were no arrhythmias during stress.    The patient reported no chest pain during  the stress test.    There are no prior studies for comparison.       Stress echo 4/14/21  ·  The stress echo portion of this study is negative for myocardial ischemia. Target heart rate was achieved.  · The ECG portion of this study is negative for myocardial ischemia.  · There were no arrhythmias during stress.  · The test was stopped because the patient experienced fatigue.  · The patient's exercise capacity was below average. Achieved 8 METs.  · The left ventricle is normal in size with normal systolic function. The estimated ejection fraction is 60%.  · Normal left ventricular diastolic function.  · Normal right ventricular size with low normal right ventricular systolic function.  · The estimated PA systolic pressure is 35 mmHg.  · Normal central venous pressure (3 mmHg).     Holter 4/12/21  · Baseline rhythm was normal sinus rhythm with normal intervals and an average heart rate of 76 bpm.  · There was one episode with reported symtpoms of nausea, tiredness, and fatigue. This episode corresponded to normal sinus rhythm.  · There were no atrial or ventricular arrhythmias.     3/10/22 Needs clearance for spine surgery revision. Activity limited. Mild HOLMAN. Denies CP   PET stress for new EKG changes - will clear for spine surgery if ok    4/12/22 Denies CP, mild stable HOLMAN  BP controlled    Review of Systems   Constitutional: Negative for decreased appetite.   HENT: Negative for ear discharge.    Eyes: Negative for blurred vision.   Endocrine: Negative for polyphagia.   Skin: Negative for nail changes.   Genitourinary: Negative for bladder incontinence.   Neurological: Negative for aphonia.   Psychiatric/Behavioral: Negative for hallucinations.   Allergic/Immunologic: Negative for hives.        Objective:    Physical Exam  Constitutional:       Appearance: He is well-developed.   HENT:      Head: Normocephalic and atraumatic.   Eyes:      Conjunctiva/sclera: Conjunctivae normal.      Pupils: Pupils are equal,  round, and reactive to light.   Cardiovascular:      Rate and Rhythm: Normal rate.      Pulses: Intact distal pulses.      Heart sounds: Normal heart sounds.   Pulmonary:      Effort: Pulmonary effort is normal.      Breath sounds: Normal breath sounds.   Abdominal:      General: Bowel sounds are normal.      Palpations: Abdomen is soft.   Musculoskeletal:         General: Normal range of motion.      Cervical back: Normal range of motion and neck supple.   Skin:     General: Skin is warm and dry.   Neurological:      Mental Status: He is alert and oriented to person, place, and time.           Assessment:       1. Palpitations    2. VICENTE (obstructive sleep apnea)         Plan:       Cleared for spine surgery at low cardiac risk  Continue Rx for  HTN, HLD  OV 6 months

## 2022-04-13 ENCOUNTER — HOSPITAL ENCOUNTER (INPATIENT)
Facility: HOSPITAL | Age: 47
LOS: 5 days | Discharge: HOME HEALTH | DRG: 460 | End: 2022-04-18
Attending: NEUROLOGICAL SURGERY | Admitting: NEUROLOGICAL SURGERY
Payer: COMMERCIAL

## 2022-04-13 ENCOUNTER — PATIENT MESSAGE (OUTPATIENT)
Dept: PSYCHIATRY | Facility: CLINIC | Age: 47
End: 2022-04-13
Payer: COMMERCIAL

## 2022-04-13 ENCOUNTER — ANESTHESIA (OUTPATIENT)
Dept: SURGERY | Facility: HOSPITAL | Age: 47
DRG: 460 | End: 2022-04-13
Payer: COMMERCIAL

## 2022-04-13 DIAGNOSIS — R07.9 CHEST PAIN: ICD-10-CM

## 2022-04-13 DIAGNOSIS — S32.009K PSEUDOARTHROSIS OF LUMBAR SPINE: Primary | ICD-10-CM

## 2022-04-13 LAB
ANION GAP SERPL CALC-SCNC: 11 MMOL/L (ref 8–16)
BUN SERPL-MCNC: 12 MG/DL (ref 6–20)
CALCIUM SERPL-MCNC: 9 MG/DL (ref 8.7–10.5)
CHLORIDE SERPL-SCNC: 104 MMOL/L (ref 95–110)
CO2 SERPL-SCNC: 24 MMOL/L (ref 23–29)
CREAT SERPL-MCNC: 1 MG/DL (ref 0.5–1.4)
EST. GFR  (AFRICAN AMERICAN): >60 ML/MIN/1.73 M^2
EST. GFR  (NON AFRICAN AMERICAN): >60 ML/MIN/1.73 M^2
GLUCOSE SERPL-MCNC: 325 MG/DL (ref 70–110)
POCT GLUCOSE: 231 MG/DL (ref 70–110)
POCT GLUCOSE: 255 MG/DL (ref 70–110)
POTASSIUM SERPL-SCNC: 3.8 MMOL/L (ref 3.5–5.1)
SODIUM SERPL-SCNC: 139 MMOL/L (ref 136–145)

## 2022-04-13 PROCEDURE — 20930 PR ALLOGRAFT FOR SPINE SURGERY ONLY MORSELIZED: ICD-10-PCS | Mod: ,,, | Performed by: NEUROLOGICAL SURGERY

## 2022-04-13 PROCEDURE — 25000003 PHARM REV CODE 250: Performed by: NEUROLOGICAL SURGERY

## 2022-04-13 PROCEDURE — 63600175 PHARM REV CODE 636 W HCPCS

## 2022-04-13 PROCEDURE — 63600175 PHARM REV CODE 636 W HCPCS: Performed by: NEUROLOGICAL SURGERY

## 2022-04-13 PROCEDURE — 61783 SCAN PROC SPINAL: CPT | Mod: ,,, | Performed by: NEUROLOGICAL SURGERY

## 2022-04-13 PROCEDURE — 94761 N-INVAS EAR/PLS OXIMETRY MLT: CPT

## 2022-04-13 PROCEDURE — 22614 PR ARTHRODESIS, POST/POSTLAT, SNGL INTERSPACE, EA ADDTL: ICD-10-PCS | Mod: ,,, | Performed by: NEUROLOGICAL SURGERY

## 2022-04-13 PROCEDURE — 22842 PR POSTERIOR SEGMENTAL INSTRUMENTATION 3-6 VRT SEG: ICD-10-PCS | Mod: ,,, | Performed by: NEUROLOGICAL SURGERY

## 2022-04-13 PROCEDURE — 63600175 PHARM REV CODE 636 W HCPCS: Performed by: STUDENT IN AN ORGANIZED HEALTH CARE EDUCATION/TRAINING PROGRAM

## 2022-04-13 PROCEDURE — 22842 INSERT SPINE FIXATION DEVICE: CPT | Mod: ,,, | Performed by: NEUROLOGICAL SURGERY

## 2022-04-13 PROCEDURE — 27000190 HC CPAP FULL FACE MASK W/VALVE

## 2022-04-13 PROCEDURE — 63600175 PHARM REV CODE 636 W HCPCS: Performed by: NURSE PRACTITIONER

## 2022-04-13 PROCEDURE — 22612 PR ARTHRODESIS, POST/POSTLAT, SNGL INTERSPACE, LUMBAR: ICD-10-PCS | Mod: 22,,, | Performed by: NEUROLOGICAL SURGERY

## 2022-04-13 PROCEDURE — 22848 PR PELVIC FIXATION OTHER THAN SACRUM: ICD-10-PCS | Mod: ,,, | Performed by: NEUROLOGICAL SURGERY

## 2022-04-13 PROCEDURE — 93010 EKG 12-LEAD: ICD-10-PCS | Mod: ,,, | Performed by: INTERNAL MEDICINE

## 2022-04-13 PROCEDURE — C1889 IMPLANT/INSERT DEVICE, NOC: HCPCS | Performed by: NEUROLOGICAL SURGERY

## 2022-04-13 PROCEDURE — 25000003 PHARM REV CODE 250

## 2022-04-13 PROCEDURE — 94660 CPAP INITIATION&MGMT: CPT

## 2022-04-13 PROCEDURE — 61783 PR STEREOTACTIC COMP ASSIST PROC,SPINAL: ICD-10-PCS | Mod: ,,, | Performed by: NEUROLOGICAL SURGERY

## 2022-04-13 PROCEDURE — 99900035 HC TECH TIME PER 15 MIN (STAT)

## 2022-04-13 PROCEDURE — 36415 COLL VENOUS BLD VENIPUNCTURE: CPT | Performed by: STUDENT IN AN ORGANIZED HEALTH CARE EDUCATION/TRAINING PROGRAM

## 2022-04-13 PROCEDURE — 27000221 HC OXYGEN, UP TO 24 HOURS

## 2022-04-13 PROCEDURE — 27280 PR FUSION OF SACROILIAC JOINT: ICD-10-PCS | Mod: 50,51,, | Performed by: NEUROLOGICAL SURGERY

## 2022-04-13 PROCEDURE — 37000009 HC ANESTHESIA EA ADD 15 MINS: Performed by: NEUROLOGICAL SURGERY

## 2022-04-13 PROCEDURE — 20930 SP BONE ALGRFT MORSEL ADD-ON: CPT | Mod: ,,, | Performed by: NEUROLOGICAL SURGERY

## 2022-04-13 PROCEDURE — 22614 ARTHRD PST TQ 1NTRSPC EA ADD: CPT | Mod: ,,, | Performed by: NEUROLOGICAL SURGERY

## 2022-04-13 PROCEDURE — 94799 UNLISTED PULMONARY SVC/PX: CPT

## 2022-04-13 PROCEDURE — 93005 ELECTROCARDIOGRAM TRACING: CPT

## 2022-04-13 PROCEDURE — 37000008 HC ANESTHESIA 1ST 15 MINUTES: Performed by: NEUROLOGICAL SURGERY

## 2022-04-13 PROCEDURE — 20000000 HC ICU ROOM

## 2022-04-13 PROCEDURE — 20936 PR AUTOGRAFT SPINE SURGERY LOCAL FROM SAME INCISION: ICD-10-PCS | Mod: ,,, | Performed by: NEUROLOGICAL SURGERY

## 2022-04-13 PROCEDURE — 93010 ELECTROCARDIOGRAM REPORT: CPT | Mod: ,,, | Performed by: INTERNAL MEDICINE

## 2022-04-13 PROCEDURE — 80048 BASIC METABOLIC PNL TOTAL CA: CPT | Performed by: STUDENT IN AN ORGANIZED HEALTH CARE EDUCATION/TRAINING PROGRAM

## 2022-04-13 PROCEDURE — C1713 ANCHOR/SCREW BN/BN,TIS/BN: HCPCS | Performed by: NEUROLOGICAL SURGERY

## 2022-04-13 PROCEDURE — 63600175 PHARM REV CODE 636 W HCPCS: Performed by: NURSE ANESTHETIST, CERTIFIED REGISTERED

## 2022-04-13 PROCEDURE — 20936 SP BONE AGRFT LOCAL ADD-ON: CPT | Mod: ,,, | Performed by: NEUROLOGICAL SURGERY

## 2022-04-13 PROCEDURE — 36000710: Performed by: NEUROLOGICAL SURGERY

## 2022-04-13 PROCEDURE — 27280 ARTHR SI JT OPN B1GRF INSTRM: CPT | Mod: 50,51,, | Performed by: NEUROLOGICAL SURGERY

## 2022-04-13 PROCEDURE — 22848 INSERT PELV FIXATION DEVICE: CPT | Mod: ,,, | Performed by: NEUROLOGICAL SURGERY

## 2022-04-13 PROCEDURE — 25000003 PHARM REV CODE 250: Performed by: NURSE ANESTHETIST, CERTIFIED REGISTERED

## 2022-04-13 PROCEDURE — 36000711: Performed by: NEUROLOGICAL SURGERY

## 2022-04-13 PROCEDURE — 22612 ARTHRD PST TQ 1NTRSPC LUMBAR: CPT | Mod: 22,,, | Performed by: NEUROLOGICAL SURGERY

## 2022-04-13 PROCEDURE — 27201423 OPTIME MED/SURG SUP & DEVICES STERILE SUPPLY: Performed by: NEUROLOGICAL SURGERY

## 2022-04-13 PROCEDURE — 27800903 OPTIME MED/SURG SUP & DEVICES OTHER IMPLANTS: Performed by: NEUROLOGICAL SURGERY

## 2022-04-13 DEVICE — BONE 30CC CANCELLOUS CRUSHED: Type: IMPLANTABLE DEVICE | Site: SPINE LUMBAR | Status: FUNCTIONAL

## 2022-04-13 DEVICE — SCREW INNER SINGLE SET TITANIU: Type: IMPLANTABLE DEVICE | Site: SPINE LUMBAR | Status: FUNCTIONAL

## 2022-04-13 DEVICE — SET SCREW EXPEDIUM VERSE UNITZ: Type: IMPLANTABLE DEVICE | Site: SPINE LUMBAR | Status: FUNCTIONAL

## 2022-04-13 DEVICE — KIT BONE GRFT BMP SM: Type: IMPLANTABLE DEVICE | Site: SPINE THORACIC | Status: FUNCTIONAL

## 2022-04-13 DEVICE — IMPLANTABLE DEVICE: Type: IMPLANTABLE DEVICE | Site: SPINE LUMBAR | Status: FUNCTIONAL

## 2022-04-13 RX ORDER — LIDOCAINE HYDROCHLORIDE 20 MG/ML
INJECTION, SOLUTION EPIDURAL; INFILTRATION; INTRACAUDAL; PERINEURAL
Status: DISCONTINUED | OUTPATIENT
Start: 2022-04-13 | End: 2022-04-13

## 2022-04-13 RX ORDER — BISACODYL 10 MG
10 SUPPOSITORY, RECTAL RECTAL DAILY
Status: DISCONTINUED | OUTPATIENT
Start: 2022-04-13 | End: 2022-04-18 | Stop reason: HOSPADM

## 2022-04-13 RX ORDER — ROCURONIUM BROMIDE 10 MG/ML
INJECTION, SOLUTION INTRAVENOUS
Status: DISCONTINUED | OUTPATIENT
Start: 2022-04-13 | End: 2022-04-13

## 2022-04-13 RX ORDER — PROCHLORPERAZINE EDISYLATE 5 MG/ML
5 INJECTION INTRAMUSCULAR; INTRAVENOUS EVERY 6 HOURS PRN
Status: DISCONTINUED | OUTPATIENT
Start: 2022-04-13 | End: 2022-04-18 | Stop reason: HOSPADM

## 2022-04-13 RX ORDER — PROCHLORPERAZINE EDISYLATE 5 MG/ML
5 INJECTION INTRAMUSCULAR; INTRAVENOUS EVERY 30 MIN PRN
Status: DISCONTINUED | OUTPATIENT
Start: 2022-04-13 | End: 2022-04-13 | Stop reason: HOSPADM

## 2022-04-13 RX ORDER — SUCCINYLCHOLINE CHLORIDE 20 MG/ML
INJECTION INTRAMUSCULAR; INTRAVENOUS
Status: DISCONTINUED | OUTPATIENT
Start: 2022-04-13 | End: 2022-04-13

## 2022-04-13 RX ORDER — ONDANSETRON 8 MG/1
8 TABLET, ORALLY DISINTEGRATING ORAL EVERY 6 HOURS PRN
Status: DISCONTINUED | OUTPATIENT
Start: 2022-04-13 | End: 2022-04-18 | Stop reason: HOSPADM

## 2022-04-13 RX ORDER — BUPIVACAINE HCL/EPINEPHRINE 0.25-.0005
VIAL (ML) INJECTION
Status: DISCONTINUED | OUTPATIENT
Start: 2022-04-13 | End: 2022-04-13 | Stop reason: HOSPADM

## 2022-04-13 RX ORDER — HYDROXYZINE HYDROCHLORIDE 25 MG/1
25 TABLET, FILM COATED ORAL NIGHTLY PRN
Status: DISCONTINUED | OUTPATIENT
Start: 2022-04-13 | End: 2022-04-18 | Stop reason: HOSPADM

## 2022-04-13 RX ORDER — EPHEDRINE SULFATE 50 MG/ML
INJECTION, SOLUTION INTRAVENOUS
Status: DISCONTINUED | OUTPATIENT
Start: 2022-04-13 | End: 2022-04-13

## 2022-04-13 RX ORDER — FLUOXETINE HYDROCHLORIDE 20 MG/1
80 CAPSULE ORAL DAILY
Status: DISCONTINUED | OUTPATIENT
Start: 2022-04-13 | End: 2022-04-18 | Stop reason: HOSPADM

## 2022-04-13 RX ORDER — HEPARIN SODIUM 5000 [USP'U]/ML
5000 INJECTION, SOLUTION INTRAVENOUS; SUBCUTANEOUS EVERY 8 HOURS
Status: DISCONTINUED | OUTPATIENT
Start: 2022-04-13 | End: 2022-04-18 | Stop reason: HOSPADM

## 2022-04-13 RX ORDER — INSULIN ASPART 100 [IU]/ML
1-10 INJECTION, SOLUTION INTRAVENOUS; SUBCUTANEOUS
Status: DISCONTINUED | OUTPATIENT
Start: 2022-04-13 | End: 2022-04-18 | Stop reason: HOSPADM

## 2022-04-13 RX ORDER — OXYCODONE HCL 10 MG/1
10 TABLET, FILM COATED, EXTENDED RELEASE ORAL
Status: COMPLETED | OUTPATIENT
Start: 2022-04-13 | End: 2022-04-13

## 2022-04-13 RX ORDER — LIDOCAINE HYDROCHLORIDE 10 MG/ML
1 INJECTION, SOLUTION EPIDURAL; INFILTRATION; INTRACAUDAL; PERINEURAL ONCE
Status: DISCONTINUED | OUTPATIENT
Start: 2022-04-13 | End: 2022-04-13 | Stop reason: HOSPADM

## 2022-04-13 RX ORDER — GLUCAGON 1 MG
1 KIT INJECTION
Status: DISCONTINUED | OUTPATIENT
Start: 2022-04-13 | End: 2022-04-18 | Stop reason: HOSPADM

## 2022-04-13 RX ORDER — MUPIROCIN 20 MG/G
OINTMENT TOPICAL 2 TIMES DAILY
Status: COMPLETED | OUTPATIENT
Start: 2022-04-13 | End: 2022-04-18

## 2022-04-13 RX ORDER — PHENYLEPHRINE HYDROCHLORIDE 10 MG/ML
INJECTION INTRAVENOUS
Status: DISCONTINUED | OUTPATIENT
Start: 2022-04-13 | End: 2022-04-13

## 2022-04-13 RX ORDER — DIAZEPAM 10 MG/2ML
2.5 INJECTION INTRAMUSCULAR EVERY 4 HOURS PRN
Status: DISCONTINUED | OUTPATIENT
Start: 2022-04-13 | End: 2022-04-16

## 2022-04-13 RX ORDER — BUSPIRONE HYDROCHLORIDE 5 MG/1
15 TABLET ORAL 3 TIMES DAILY
Status: DISCONTINUED | OUTPATIENT
Start: 2022-04-13 | End: 2022-04-18 | Stop reason: HOSPADM

## 2022-04-13 RX ORDER — MORPHINE SULFATE 2 MG/ML
10 INJECTION, SOLUTION INTRAMUSCULAR; INTRAVENOUS
Status: DISCONTINUED | OUTPATIENT
Start: 2022-04-13 | End: 2022-04-13

## 2022-04-13 RX ORDER — HYDROMORPHONE HYDROCHLORIDE 2 MG/ML
2 INJECTION, SOLUTION INTRAMUSCULAR; INTRAVENOUS; SUBCUTANEOUS
Status: DISCONTINUED | OUTPATIENT
Start: 2022-04-13 | End: 2022-04-16

## 2022-04-13 RX ORDER — CYCLOBENZAPRINE HCL 10 MG
10 TABLET ORAL
Status: COMPLETED | OUTPATIENT
Start: 2022-04-13 | End: 2022-04-13

## 2022-04-13 RX ORDER — PROPOFOL 10 MG/ML
VIAL (ML) INTRAVENOUS
Status: DISCONTINUED | OUTPATIENT
Start: 2022-04-13 | End: 2022-04-13

## 2022-04-13 RX ORDER — IBUPROFEN 200 MG
16 TABLET ORAL
Status: DISCONTINUED | OUTPATIENT
Start: 2022-04-13 | End: 2022-04-18 | Stop reason: HOSPADM

## 2022-04-13 RX ORDER — HYDROMORPHONE HYDROCHLORIDE 2 MG/ML
0.5 INJECTION, SOLUTION INTRAMUSCULAR; INTRAVENOUS; SUBCUTANEOUS EVERY 5 MIN PRN
Status: DISCONTINUED | OUTPATIENT
Start: 2022-04-13 | End: 2022-04-13 | Stop reason: HOSPADM

## 2022-04-13 RX ORDER — CEFAZOLIN SODIUM 1 G/3ML
INJECTION, POWDER, FOR SOLUTION INTRAMUSCULAR; INTRAVENOUS
Status: DISCONTINUED | OUTPATIENT
Start: 2022-04-13 | End: 2022-04-13

## 2022-04-13 RX ORDER — OXYCODONE HYDROCHLORIDE 5 MG/1
10 TABLET ORAL EVERY 4 HOURS PRN
Status: DISCONTINUED | OUTPATIENT
Start: 2022-04-13 | End: 2022-04-18 | Stop reason: HOSPADM

## 2022-04-13 RX ORDER — SODIUM CHLORIDE, SODIUM LACTATE, POTASSIUM CHLORIDE, CALCIUM CHLORIDE 600; 310; 30; 20 MG/100ML; MG/100ML; MG/100ML; MG/100ML
INJECTION, SOLUTION INTRAVENOUS CONTINUOUS
Status: DISCONTINUED | OUTPATIENT
Start: 2022-04-13 | End: 2022-04-14

## 2022-04-13 RX ORDER — VANCOMYCIN HYDROCHLORIDE 1 G/20ML
INJECTION, POWDER, LYOPHILIZED, FOR SOLUTION INTRAVENOUS
Status: DISCONTINUED | OUTPATIENT
Start: 2022-04-13 | End: 2022-04-13 | Stop reason: HOSPADM

## 2022-04-13 RX ORDER — NITROGLYCERIN 0.4 MG/1
0.4 TABLET SUBLINGUAL EVERY 5 MIN PRN
Status: DISCONTINUED | OUTPATIENT
Start: 2022-04-13 | End: 2022-04-18 | Stop reason: HOSPADM

## 2022-04-13 RX ORDER — MORPHINE SULFATE 4 MG/ML
4 INJECTION, SOLUTION INTRAMUSCULAR; INTRAVENOUS
Status: DISCONTINUED | OUTPATIENT
Start: 2022-04-13 | End: 2022-04-16

## 2022-04-13 RX ORDER — TIZANIDINE 4 MG/1
4 TABLET ORAL EVERY 8 HOURS PRN
Status: DISCONTINUED | OUTPATIENT
Start: 2022-04-13 | End: 2022-04-18 | Stop reason: HOSPADM

## 2022-04-13 RX ORDER — GABAPENTIN 400 MG/1
800 CAPSULE ORAL 3 TIMES DAILY
Status: DISCONTINUED | OUTPATIENT
Start: 2022-04-13 | End: 2022-04-18 | Stop reason: HOSPADM

## 2022-04-13 RX ORDER — HYDROMORPHONE HYDROCHLORIDE 2 MG/ML
0.2 INJECTION, SOLUTION INTRAMUSCULAR; INTRAVENOUS; SUBCUTANEOUS EVERY 5 MIN PRN
Status: ACTIVE | OUTPATIENT
Start: 2022-04-13 | End: 2022-04-13

## 2022-04-13 RX ORDER — CELECOXIB 100 MG/1
200 CAPSULE ORAL
Status: COMPLETED | OUTPATIENT
Start: 2022-04-13 | End: 2022-04-13

## 2022-04-13 RX ORDER — MIDAZOLAM HYDROCHLORIDE 1 MG/ML
INJECTION, SOLUTION INTRAMUSCULAR; INTRAVENOUS
Status: DISCONTINUED | OUTPATIENT
Start: 2022-04-13 | End: 2022-04-13

## 2022-04-13 RX ORDER — ATORVASTATIN CALCIUM 10 MG/1
10 TABLET, FILM COATED ORAL EVERY OTHER DAY
Status: DISCONTINUED | OUTPATIENT
Start: 2022-04-14 | End: 2022-04-18 | Stop reason: HOSPADM

## 2022-04-13 RX ORDER — AMOXICILLIN 250 MG
2 CAPSULE ORAL NIGHTLY PRN
Status: DISCONTINUED | OUTPATIENT
Start: 2022-04-13 | End: 2022-04-18 | Stop reason: HOSPADM

## 2022-04-13 RX ORDER — PREGABALIN 75 MG/1
75 CAPSULE ORAL
Status: COMPLETED | OUTPATIENT
Start: 2022-04-13 | End: 2022-04-13

## 2022-04-13 RX ORDER — CELECOXIB 100 MG/1
200 CAPSULE ORAL 2 TIMES DAILY
Status: DISCONTINUED | OUTPATIENT
Start: 2022-04-13 | End: 2022-04-18 | Stop reason: HOSPADM

## 2022-04-13 RX ORDER — IBUPROFEN 200 MG
24 TABLET ORAL
Status: DISCONTINUED | OUTPATIENT
Start: 2022-04-13 | End: 2022-04-18 | Stop reason: HOSPADM

## 2022-04-13 RX ORDER — ACETAMINOPHEN 325 MG/1
650 TABLET ORAL
Status: COMPLETED | OUTPATIENT
Start: 2022-04-13 | End: 2022-04-13

## 2022-04-13 RX ORDER — ONDANSETRON 2 MG/ML
4 INJECTION INTRAMUSCULAR; INTRAVENOUS DAILY PRN
Status: DISCONTINUED | OUTPATIENT
Start: 2022-04-13 | End: 2022-04-13 | Stop reason: HOSPADM

## 2022-04-13 RX ORDER — ACETAMINOPHEN 325 MG/1
650 TABLET ORAL EVERY 6 HOURS
Status: DISCONTINUED | OUTPATIENT
Start: 2022-04-13 | End: 2022-04-18 | Stop reason: HOSPADM

## 2022-04-13 RX ORDER — NEOSTIGMINE METHYLSULFATE 1 MG/ML
INJECTION, SOLUTION INTRAVENOUS
Status: DISCONTINUED | OUTPATIENT
Start: 2022-04-13 | End: 2022-04-13

## 2022-04-13 RX ORDER — MAG HYDROX/ALUMINUM HYD/SIMETH 200-200-20
30 SUSPENSION, ORAL (FINAL DOSE FORM) ORAL EVERY 4 HOURS PRN
Status: DISCONTINUED | OUTPATIENT
Start: 2022-04-13 | End: 2022-04-18 | Stop reason: HOSPADM

## 2022-04-13 RX ORDER — SODIUM CHLORIDE, SODIUM LACTATE, POTASSIUM CHLORIDE, CALCIUM CHLORIDE 600; 310; 30; 20 MG/100ML; MG/100ML; MG/100ML; MG/100ML
INJECTION, SOLUTION INTRAVENOUS CONTINUOUS
Status: DISCONTINUED | OUTPATIENT
Start: 2022-04-13 | End: 2022-04-16

## 2022-04-13 RX ORDER — ONDANSETRON 2 MG/ML
INJECTION INTRAMUSCULAR; INTRAVENOUS
Status: DISCONTINUED | OUTPATIENT
Start: 2022-04-13 | End: 2022-04-13

## 2022-04-13 RX ORDER — FENTANYL CITRATE 50 UG/ML
INJECTION, SOLUTION INTRAMUSCULAR; INTRAVENOUS
Status: DISCONTINUED | OUTPATIENT
Start: 2022-04-13 | End: 2022-04-13

## 2022-04-13 RX ADMIN — PHENYLEPHRINE HYDROCHLORIDE 100 MCG: 10 INJECTION INTRAVENOUS at 09:04

## 2022-04-13 RX ADMIN — PHENYLEPHRINE HYDROCHLORIDE 160 MCG: 10 INJECTION INTRAVENOUS at 12:04

## 2022-04-13 RX ADMIN — PHENYLEPHRINE HYDROCHLORIDE 100 MCG: 10 INJECTION INTRAVENOUS at 08:04

## 2022-04-13 RX ADMIN — PHENYLEPHRINE HYDROCHLORIDE 160 MCG: 10 INJECTION INTRAVENOUS at 11:04

## 2022-04-13 RX ADMIN — SODIUM CHLORIDE, SODIUM LACTATE, POTASSIUM CHLORIDE, AND CALCIUM CHLORIDE: .6; .31; .03; .02 INJECTION, SOLUTION INTRAVENOUS at 09:04

## 2022-04-13 RX ADMIN — SODIUM CHLORIDE, SODIUM LACTATE, POTASSIUM CHLORIDE, AND CALCIUM CHLORIDE 1000 ML: .6; .31; .03; .02 INJECTION, SOLUTION INTRAVENOUS at 02:04

## 2022-04-13 RX ADMIN — FENTANYL CITRATE 50 MCG: 50 INJECTION, SOLUTION INTRAMUSCULAR; INTRAVENOUS at 01:04

## 2022-04-13 RX ADMIN — PHENYLEPHRINE HYDROCHLORIDE 200 MCG: 10 INJECTION INTRAVENOUS at 08:04

## 2022-04-13 RX ADMIN — PHENYLEPHRINE HYDROCHLORIDE 200 MCG: 10 INJECTION INTRAVENOUS at 09:04

## 2022-04-13 RX ADMIN — MUPIROCIN: 20 OINTMENT TOPICAL at 09:04

## 2022-04-13 RX ADMIN — ROCURONIUM BROMIDE 40 MG: 10 INJECTION, SOLUTION INTRAVENOUS at 09:04

## 2022-04-13 RX ADMIN — ACETAMINOPHEN 650 MG: 325 TABLET ORAL at 05:04

## 2022-04-13 RX ADMIN — INSULIN HUMAN 10 UNITS: 100 INJECTION, SOLUTION PARENTERAL at 08:04

## 2022-04-13 RX ADMIN — HEPARIN SODIUM 5000 UNITS: 5000 INJECTION INTRAVENOUS; SUBCUTANEOUS at 09:04

## 2022-04-13 RX ADMIN — GLYCOPYRROLATE 0.2 MG: 0.2 INJECTION, SOLUTION INTRAMUSCULAR; INTRAVITREAL at 10:04

## 2022-04-13 RX ADMIN — GABAPENTIN 800 MG: 400 CAPSULE ORAL at 05:04

## 2022-04-13 RX ADMIN — FENTANYL CITRATE 50 MCG: 50 INJECTION, SOLUTION INTRAMUSCULAR; INTRAVENOUS at 10:04

## 2022-04-13 RX ADMIN — ROCURONIUM BROMIDE 40 MG: 10 INJECTION, SOLUTION INTRAVENOUS at 08:04

## 2022-04-13 RX ADMIN — GABAPENTIN 800 MG: 400 CAPSULE ORAL at 09:04

## 2022-04-13 RX ADMIN — ROCURONIUM BROMIDE 20 MG: 10 INJECTION, SOLUTION INTRAVENOUS at 08:04

## 2022-04-13 RX ADMIN — SODIUM CHLORIDE: 0.9 INJECTION, SOLUTION INTRAVENOUS at 08:04

## 2022-04-13 RX ADMIN — INSULIN ASPART 2 UNITS: 100 INJECTION, SOLUTION INTRAVENOUS; SUBCUTANEOUS at 09:04

## 2022-04-13 RX ADMIN — MORPHINE SULFATE 4 MG: 4 INJECTION INTRAVENOUS at 09:04

## 2022-04-13 RX ADMIN — FENTANYL CITRATE 100 MCG: 50 INJECTION, SOLUTION INTRAMUSCULAR; INTRAVENOUS at 08:04

## 2022-04-13 RX ADMIN — PREGABALIN 75 MG: 75 CAPSULE ORAL at 06:04

## 2022-04-13 RX ADMIN — HYDROMORPHONE HYDROCHLORIDE 2 MG: 2 INJECTION INTRAMUSCULAR; INTRAVENOUS; SUBCUTANEOUS at 07:04

## 2022-04-13 RX ADMIN — SODIUM CHLORIDE, SODIUM LACTATE, POTASSIUM CHLORIDE, AND CALCIUM CHLORIDE: .6; .31; .03; .02 INJECTION, SOLUTION INTRAVENOUS at 02:04

## 2022-04-13 RX ADMIN — OXYCODONE HYDROCHLORIDE 10 MG: 10 TABLET, FILM COATED, EXTENDED RELEASE ORAL at 06:04

## 2022-04-13 RX ADMIN — SODIUM CHLORIDE, SODIUM LACTATE, POTASSIUM CHLORIDE, AND CALCIUM CHLORIDE: .6; .31; .03; .02 INJECTION, SOLUTION INTRAVENOUS at 08:04

## 2022-04-13 RX ADMIN — ONDANSETRON 8 MG: 2 INJECTION, SOLUTION INTRAMUSCULAR; INTRAVENOUS at 11:04

## 2022-04-13 RX ADMIN — CYCLOBENZAPRINE 10 MG: 10 TABLET, FILM COATED ORAL at 06:04

## 2022-04-13 RX ADMIN — FLUOXETINE HYDROCHLORIDE 80 MG: 20 CAPSULE ORAL at 05:04

## 2022-04-13 RX ADMIN — PROPOFOL 200 MG: 10 INJECTION, EMULSION INTRAVENOUS at 08:04

## 2022-04-13 RX ADMIN — SUCCINYLCHOLINE CHLORIDE 140 MG: 20 INJECTION, SOLUTION INTRAMUSCULAR; INTRAVENOUS at 08:04

## 2022-04-13 RX ADMIN — MORPHINE SULFATE 4 MG: 4 INJECTION INTRAVENOUS at 04:04

## 2022-04-13 RX ADMIN — INSULIN ASPART 6 UNITS: 100 INJECTION, SOLUTION INTRAVENOUS; SUBCUTANEOUS at 06:04

## 2022-04-13 RX ADMIN — SODIUM CHLORIDE, SODIUM LACTATE, POTASSIUM CHLORIDE, AND CALCIUM CHLORIDE: .6; .31; .03; .02 INJECTION, SOLUTION INTRAVENOUS at 06:04

## 2022-04-13 RX ADMIN — PHENYLEPHRINE HYDROCHLORIDE 0.25 MCG/KG/MIN: 10 INJECTION INTRAVENOUS at 09:04

## 2022-04-13 RX ADMIN — GLYCOPYRROLATE 0.6 MG: 0.2 INJECTION, SOLUTION INTRAMUSCULAR; INTRAVITREAL at 01:04

## 2022-04-13 RX ADMIN — HYDROMORPHONE HYDROCHLORIDE 2 MG: 2 INJECTION INTRAMUSCULAR; INTRAVENOUS; SUBCUTANEOUS at 02:04

## 2022-04-13 RX ADMIN — BUSPIRONE HYDROCHLORIDE 15 MG: 5 TABLET ORAL at 09:04

## 2022-04-13 RX ADMIN — PHENYLEPHRINE HYDROCHLORIDE 160 MCG: 10 INJECTION INTRAVENOUS at 10:04

## 2022-04-13 RX ADMIN — HEPARIN SODIUM 5000 UNITS: 5000 INJECTION INTRAVENOUS; SUBCUTANEOUS at 05:04

## 2022-04-13 RX ADMIN — MIDAZOLAM 5 MG: 1 INJECTION INTRAMUSCULAR; INTRAVENOUS at 08:04

## 2022-04-13 RX ADMIN — ROCURONIUM BROMIDE 20 MG: 10 INJECTION, SOLUTION INTRAVENOUS at 11:04

## 2022-04-13 RX ADMIN — EPHEDRINE SULFATE 5 MG: 50 INJECTION, SOLUTION INTRAMUSCULAR; INTRAVENOUS; SUBCUTANEOUS at 11:04

## 2022-04-13 RX ADMIN — CEFAZOLIN 2 G: 330 INJECTION, POWDER, FOR SOLUTION INTRAMUSCULAR; INTRAVENOUS at 12:04

## 2022-04-13 RX ADMIN — CELECOXIB 200 MG: 100 CAPSULE ORAL at 06:04

## 2022-04-13 RX ADMIN — EPHEDRINE SULFATE 10 MG: 50 INJECTION, SOLUTION INTRAMUSCULAR; INTRAVENOUS; SUBCUTANEOUS at 10:04

## 2022-04-13 RX ADMIN — NEOSTIGMINE METHYLSULFATE 5 MG: 1 INJECTION INTRAVENOUS at 01:04

## 2022-04-13 RX ADMIN — LIDOCAINE HYDROCHLORIDE 100 MG: 20 INJECTION, SOLUTION EPIDURAL; INFILTRATION; INTRACAUDAL; PERINEURAL at 08:04

## 2022-04-13 RX ADMIN — CELECOXIB 200 MG: 100 CAPSULE ORAL at 09:04

## 2022-04-13 RX ADMIN — CEFAZOLIN 3 G: 330 INJECTION, POWDER, FOR SOLUTION INTRAMUSCULAR; INTRAVENOUS at 08:04

## 2022-04-13 RX ADMIN — BUSPIRONE HYDROCHLORIDE 15 MG: 5 TABLET ORAL at 05:04

## 2022-04-13 RX ADMIN — ACETAMINOPHEN 650 MG: 325 TABLET ORAL at 06:04

## 2022-04-13 RX ADMIN — FENTANYL CITRATE 50 MCG: 50 INJECTION, SOLUTION INTRAMUSCULAR; INTRAVENOUS at 08:04

## 2022-04-13 RX ADMIN — INSULIN HUMAN 10 UNITS: 100 INJECTION, SOLUTION PARENTERAL at 07:04

## 2022-04-13 RX ADMIN — DIAZEPAM 2.5 MG: 5 INJECTION, SOLUTION INTRAMUSCULAR; INTRAVENOUS at 09:04

## 2022-04-13 NOTE — ANESTHESIA PROCEDURE NOTES
Intubation    Date/Time: 4/13/2022 8:18 AM  Performed by: Nelia Grey  Authorized by: Verónica Soliz MD     Intubation:     Induction:  Intravenous    Intubated:  Postinduction    Mask Ventilation:  Easy mask    Attempts:  1    Attempted By:  Student    Method of Intubation:  Video laryngoscopy    Blade:  Muñoz 4    Laryngeal View Grade: Grade I - full view of cords      Difficult Airway Encountered?: No      Complications:  None    Airway Device:  Oral endotracheal tube    Airway Device Size:  7.5    Style/Cuff Inflation:  Cuffed (inflated to minimal occlusive pressure)    Tube secured:  22    Secured at:  The lips    Placement Verified By:  Capnometry    Complicating Factors:  None    Findings Post-Intubation:  BS equal bilateral

## 2022-04-13 NOTE — PLAN OF CARE
The sw met with the pt along with Aurora Gandhi(mother)[phone]/Madeline Gutierrez(s/o)[phone] who were in the room during the assessment. The pt was in a lot of pain so his mother and s/o answered the questions.  The pt lives in [place] along with his mother,s/o and dtr. The pt's s/o assists him with his adl's as needed and he has a w/c,rw,bcs,CPAP,shwr chair and Dexcom machine. The pt's employed at [employer] but isn't working currently and is on Long Term Disability. The pt still drives but his mother will transport him home at d/c. The sw completed the white board in the pt's room and gave his s/o a d/c brochure with her name and contact info on it. The sw encouraged them to call if they have any further questions or concerns. The sw will continue to follow the pt throughout his transitions of care and will assist with any d/c needs. The pt's s/o states the pt had this same surgery here a year ago. The pt has a very supportive family.        04/13/22 1637   Discharge Assessment   Assessment Type Discharge Planning Assessment   Confirmed/corrected address, phone number and insurance Yes   Confirmed Demographics Correct on Facesheet   Source of Information family   When was your last doctors appointment? 04/12/22   Communicated CORA with patient/caregiver Yes   Reason For Admission pseudoarthrosis of lumbar spine   Lives With parent(s);significant other;child(aashish), dependent   Do you expect to return to your current living situation? Yes   Do you have help at home or someone to help you manage your care at home? Yes   Who are your caregiver(s) and their phone number(s)? Aurora Gandhi(mother)[phone]/Madeline Gutierrez(s/o)[phone]   Prior to hospitilization cognitive status: Alert/Oriented   Current cognitive status: Alert/Oriented   Walking or Climbing Stairs Difficulty ambulation difficulty, requires equipment;stair climbing difficulty, requires equipment;transferring difficulty, assistance 1  person   Dressing/Bathing Difficulty bathing difficulty, assistance 1 person;dressing difficulty, assistance 1 person   Dressing/Bathing Management the pt's s/o assists him as needed with his bathing and putting on his socks and shoes   Home Accessibility wheelchair accessible   Home Layout Able to live on 1st floor   Equipment Currently Used at Home walker, rolling;bedside commode;CPAP;shower chair   Readmission within 30 days? No   Patient currently being followed by outpatient case management? No   Do you currently have service(s) that help you manage your care at home? No   Do you take prescription medications? Yes   Do you have prescription coverage? Yes   Coverage BCBS   Do you have any problems affording any of your prescribed medications? No  (the pt receives his meds affordably at [facility] in [place])   Is the patient taking medications as prescribed? yes   Who is going to help you get home at discharge? Aurora Gandhi(mother)[phone]   How do you get to doctors appointments? car, drives self   Are you on dialysis? No   Do you take coumadin? No   Discharge Plan A Home with family   Discharge Plan B Home Health   DME Needed Upon Discharge  other (see comments)  (TBD)   Discharge Plan discussed with: Spouse/sig other;Parent(s)   Name(s) and Number(s) Aurora Gandhi(mother)[phone]/Madeline Gutierrez(s/o)[phone]   Discharge Barriers Identified None   Relationship/Environment   Name(s) of Who Lives With Patient Aurora Gandhi(mother)[phone]/Madeline Gutierrez(s/o)[phone]

## 2022-04-13 NOTE — H&P
NEUROSURGICAL PROGRESS NOTE     DATE OF SERVICE:  04/13/22     ATTENDING PHYSICIAN:  Jose L Brown MD     SUBJECTIVE:  This is a very pleasant 46 y.o. male, BMI of 48, who is status post L3-S1 OLIF with posterior instrumentation in April 2021 for status post lumbar laminectomy with degenerative disc disease and recurrent stenosis with radiculopathy.  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  Since his last lumbar fusion surgery he has been complaining of worsening bilateral buttock pain.  He had a few episodes of sudden leg weakness associated with sharp pain, mainly on the left side.  It happened at work when he was climbing stairs.  We proceeded with bilateral SI joint block and steroid injection that did not give him significant pain relief.  It is unclear if the patient had pain relief for the duration of the block.  Reviewing the fluoroscopy shots the left side injection was in the SI joint but the right side injection appeared to be superficial and subcapsular.  He has been doing physical therapy 3 times a week for 6 weeks for hip strengthening flexibility exercises, force closure exercises with partial pain relief.  He reports significant improvement in his back pain and ability to walk when he wears his SI joint belt.  He does not have worsening gait imbalance or sphincter dysfunction symptoms.     INTERIM HISTORY:     Been complaining of worsening back and buttock pain.  He also feels a pop in his back with certain movement.  He is in physical therapy and has notice improvement of the pain with massage therapy.  He is in a wheelchair today due to difficulty walking for prolonged period of time.           PAST MEDICAL HISTORY:       Active Ambulatory Problems     Diagnosis Date Noted    Joint pain 04/16/2015    Obesity 04/16/2015    Vitamin D deficiency 04/16/2015    Fatigue 04/16/2015    Bilateral hand pain 04/16/2015    Diabetes 04/17/2015    VICENTE (obstructive sleep apnea) 06/17/2015     CTS (carpal tunnel syndrome) 06/26/2015    Elevated sed rate 06/26/2015    Lumbago 10/14/2019    Chronic lower back pain 02/18/2020    OCD (obsessive compulsive disorder) 02/26/2020    Depression 02/26/2020    Anxiety 02/26/2020    Insomnia due to other mental disorder 02/05/2021    Palpitations 03/25/2021    DDD (degenerative disc disease), lumbar 04/26/2021    Grief 05/19/2021    Adjustment disorder with mixed anxiety and depressed mood 05/19/2021    Mixed obsessional thoughts and acts 06/25/2021    Mild episode of recurrent major depressive disorder 06/25/2021    Fusion of spine, lumbosacral region 07/13/2021    Acute buttock pain 07/13/2021    Sacroiliac joint dysfunction of both sides 12/13/2021    Chronic bilateral low back pain with bilateral sciatica 12/14/2021    Weakness of both lower extremities 12/14/2021    Difficulty walking 12/14/2021    Radiculopathy of lumbosacral region 12/14/2021           Resolved Ambulatory Problems     Diagnosis Date Noted    DDD (degenerative disc disease), lumbar 09/04/2019    Lumbar disc herniation with radiculopathy 10/09/2019           Past Medical History:   Diagnosis Date    Allergy      Back pain      Hx of vasectomy      Hypertension      Sleep apnea           PAST SURGICAL HISTORY:        Past Surgical History:   Procedure Laterality Date    ADENOIDECTOMY        EPIDURAL STEROID INJECTION Right 9/4/2019     Procedure: Injection, Steroid, Epidural Transforaminal;  Surgeon: Harjinder Batista Jr., MD;  Location[facility];  Service: Pain Management;  Laterality: Right;  Right L3 + L4 TF NATALIE     [id number]  [id number]     Arrive @ 1300; ASA last 8/27; Check BG; NEEDS CONSENT    FRACTURE SURGERY         bilateral elbow fracture 3 years ago    FUSION OF LUMBAR SPINE BY ANTERIOR APPROACH Left 4/26/2021     Procedure: FUSION, SPINE, LUMBAR, ANTERIOR APPROACH Left L3-4 L4-5 OLIF BRENDA, ALL Release, L5-S1 ALIF;  Surgeon: Jose L Brown MD;  Location:  [facility] OR;  Service: Neurosurgery;  Laterality: Left;  Procedure: Left L3-4 L4-5 OLIF BRENDA, ALL Release, L5-S1 ALIF  Surgery Time: 2.5 Hrs  LOS: 2-3  Anesthesia: General  Blood: Type & Screen  Radiology: Josseline  Brace: LSO  Bed: Regular  Position: LAteral Righ    FUSION OF SPINE WITH INSTRUMENTATION N/A 4/26/2021     Procedure: FUSION, SPINE, WITH INSTRUMENTATION L3-S1 Posteior Segmental Instrumentation;  Surgeon: Jose L Brown MD;  Location: [facility];  Service: Neurosurgery;  Laterality: N/A;  Procedure: L3-S1 Posteior Segmental Instrumentation  Sirgery Time: 2 Hrs  LOS: 2-3  Anesthesia: General  Blood: Tyoe & Screen  Radiology: Dual C arm  Brace:LSO  Bed: [place] [address] Poster  Position: Prone  Equipment: SHERPANDIPITY     HERNIA REPAIR         bilateral groin hernia    INJECTION OF STEROID Bilateral 12/13/2021     Procedure: INJECTION, STEROID Bilateral SI JOint Block and Steroid Injection;  Surgeon: Jose L Brown MD;  Location: [facility];  Service: Neurosurgery;  Laterality: Bilateral;  Procedure: Bilateral SI JOint Block and Steroid Injection   Surgery Time: 30 min  LOS: 0  Anesthesia: MAC  Radiology: C-arm  Bed: Regular with pillows  Position: Prone    LUMBAR LAMINECTOMY Bilateral 11/29/2019     Procedure: LAMINECTOMY, SPINE, LUMBAR Rigth L3-4, Left L4-5 Laminectomy, medial Facetectomy, and microdiscectomy;  Surgeon: Jose L Brown MD;  Location: [facility];  Service: Neurosurgery;  Laterality: Bilateral;  Procedure: Rigth L3-4, Left L4-5 Laminectomy, medial Facetectomy, and microdiscectomy  Surgery Time: 2.5 Hrs  LOS: 0-1  Anesthesia: General  Blood: Type & Screen  Radiology: C-arm  Micros    TONSILLECTOMY        VASECTOMY             SOCIAL HISTORY:   Social History               Socioeconomic History    Marital status: Significant Other   Tobacco Use    Smoking status: Current Every Day Smoker       Packs/day: 0.00       Years: 24.00       Pack years: 0.00       Types: Vaping with nicotine       Start date: 1997     Smokeless tobacco: Never Used    Tobacco comment: Handout provided for Ambualtory Smoking Cessaiton program   Substance and Sexual Activity    Alcohol use: Not Currently       Alcohol/week: 0.0 standard drinks       Comment: 750 ml  once a month or so    Drug use: Never    Sexual activity: Yes       Partners: Female       Birth control/protection: None       Comment: ; one child       Social Determinants of Health          Financial Resource Strain: High Risk    Difficulty of Paying Living Expenses: Very hard   Food Insecurity: No Food Insecurity    Worried About Running Out of Food in the Last Year: Never true    Ran Out of Food in the Last Year: Never true   Transportation Needs: No Transportation Needs    Lack of Transportation (Medical): No    Lack of Transportation (Non-Medical): No   Physical Activity: Inactive    Days of Exercise per Week: 0 days    Minutes of Exercise per Session: 0 min   Stress: Stress Concern Present    Feeling of Stress : Very much   Social Connections: Unknown    Frequency of Communication with Friends and Family: Once a week    Frequency of Social Gatherings with Friends and Family: Never    Active Member of Clubs or Organizations: No    Attends Club or Organization Meetings: Never    Marital Status: Living with partner   Housing Stability: High Risk    Unable to Pay for Housing in the Last Year: Yes    Number of Places Lived in the Last Year: 1    Unstable Housing in the Last Year: No            FAMILY HISTORY:        Family History   Problem Relation Age of Onset    Fibromyalgia Mother      Osteoarthritis Mother      Cataracts Maternal Grandmother      Macular degeneration Maternal Grandfather      Cataracts Maternal Grandfather      Rheum arthritis Neg Hx      Psoriasis Neg Hx      Lupus Neg Hx      Kidney disease Neg Hx      Inflammatory bowel disease Neg Hx      Amblyopia Neg Hx      Blindness Neg Hx      Glaucoma Neg Hx      Retinal  detachment Neg Hx      Strabismus Neg Hx           CURRENTS MEDICATIONS:         Current Outpatient Medications on File Prior to Visit   Medication Sig Dispense Refill    acetaminophen (TYLENOL) 500 MG tablet Take 1,000 mg by mouth daily as needed for Pain.        ascorbic acid, vitamin C, (VITAMIN C) 1000 MG tablet Take 1,000 mg by mouth once daily.        aspirin (ECOTRIN) 81 MG EC tablet Take 81 mg by mouth every evening.        atorvastatin (LIPITOR) 10 MG tablet Take 1 tablet (10 mg total) by mouth every other day. 45 tablet 3    blood-glucose transmitter (DEXCOM G6 TRANSMITTER) Cayla Use to check glucose - 90 day supply 1 each 11    busPIRone (BUSPAR) 15 MG tablet Take 1 tablet (15 mg total) by mouth 3 (three) times daily. (Patient taking differently: Take 15 mg by mouth. 1 1/2 tablets BID) 90 tablet 3    celecoxib (CELEBREX) 200 MG capsule TAKE 1 CAPSULE BY MOUTH TWICE A  capsule 0    ciclopirox (PENLAC) 8 % Soln Apply topically nightly. 6.6 mL 11    DEXCOM G6 SENSOR Cayla SMARTSI Each Topical Every 10 Days        dulaglutide (TRULICITY) 3 mg/0.5 mL pen injector Inject 3 mg into the skin every 7 days. 12 pen 11    flash glucose scanning reader Misc Disp one reader to monitor glucose- covered by insurance dexcom 1 each 1    flash glucose sensor Kit Dispense sensor to read glucose  dexcom 12 kit 11    FLUoxetine 40 MG capsule Take 2 capsules (80 mg total) by mouth once daily. 180 capsule 1    fluticasone propionate (FLONASE) 50 mcg/actuation nasal spray 2 sprays (100 mcg total) by Each Nostril route once daily. 48 mL 2    gabapentin (NEURONTIN) 800 MG tablet Take 1 tablet (800 mg total) by mouth 3 (three) times daily. 270 tablet 3    hydrOXYzine HCL (ATARAX) 25 MG tablet TAKE 1 -2 TABS NIGHTLY AS NEEDED FOR INSOMNIA 180 tablet 0    insulin aspart U-100 (NOVOLOG FLEXPEN U-100 INSULIN) 100 unit/mL (3 mL) InPn pen Inject 2 to 8 units of insulin according to the sliding scale 2 to 3 times  "a day with meals.  See comments for ss..asss 5 each 1    metFORMIN (GLUCOPHAGE-XR) 500 MG ER 24hr tablet Take 2 tablets (1,000 mg total) by mouth daily with breakfast. 180 tablet 3    methocarbamoL (ROBAXIN) 750 MG Tab          multivitamin capsule Take 1 capsule by mouth once daily.        NON FORMULARY MEDICATION Uses Cpap Machine nightly on a setting of 10        ONETOUCH DELICA PLUS LANCET 33 gauge Misc Apply topically 3 (three) times daily.        ONETOUCH VERIO TEST STRIPS Strp 1 strip 3 (three) times daily.        oxyCODONE-acetaminophen (PERCOCET)  mg per tablet Take 1 tablet by mouth every 8 (eight) hours as needed for Pain. 90 tablet 0    pen needle, diabetic 31 gauge x 1/4" Ndle Use with flex pen 100 each 11    tiZANidine (ZANAFLEX) 4 MG tablet TAKE 1 TABLET (4 MG TOTAL) BY MOUTH EVERY 8 (EIGHT) HOURS AS NEEDED (SPASMS). 270 tablet 0    valsartan-hydrochlorothiazide (DIOVAN-HCT) 320-12.5 mg per tablet Take 1 tablet by mouth once daily. 90 tablet 3    vitamin D 1000 units Tab Take 4,000 mg by mouth once daily.         acetaminophen (TYLENOL) 500 MG tablet Take 500 mg by mouth every evening.        diazePAM (VALIUM) 2 MG tablet Take 1 tablet (2 mg total) by mouth every 12 (twelve) hours. 15 tablet 1    traMADoL (ULTRAM) 50 mg tablet Take 1 tablet (50 mg total) by mouth every 8 (eight) hours as needed for Pain. 45 tablet 1    [DISCONTINUED] fluvoxaMINE (LUVOX) 100 MG tablet Take 1.5 tablets (150 mg total) by mouth 2 (two) times daily. 270 tablet 0      No current facility-administered medications on file prior to visit.         ALLERGIES:  Review of patient's allergies indicates:  No Known Allergies     REVIEW OF SYSTEMS:  Review of Systems   Constitutional: Negative for diaphoresis, fever and weight loss.   Respiratory: Negative for shortness of breath.    Cardiovascular: Negative for chest pain.   Gastrointestinal: Negative for blood in stool.   Genitourinary: Negative for hematuria. "   Endo/Heme/Allergies: Does not bruise/bleed easily.   All other systems reviewed and are negative.           OBJECTIVE:     PHYSICAL EXAMINATION:   Physical Exam:  Vitals reviewed.     Constitutional: He appears well-developed and well-nourished.      Eyes: Pupils are equal, round, and reactive to light. Conjunctivae and EOM are normal.      Cardiovascular: Normal distal pulses and no edema.      Abdominal: Soft.      Skin: Skin displays no rash on trunk and no rash on extremities. Skin displays no lesions on trunk and no lesions on extremities.     Psych/Behavior: He is alert. He is oriented to person, place, and time. He has a normal mood and affect.     Musculoskeletal:        Neck: Range of motion is full.     Neurological:        DTRs: Tricep reflexes are 2+ on the right side and 2+ on the left side. Bicep reflexes are 2+ on the right side and 2+ on the left side. Brachioradialis reflexes are 2+ on the right side and 2+ on the left side. Patellar reflexes are 3+ on the right side and 3+ on the left side. Achilles reflexes are 2+ on the right side and 2+ on the left side.         Back Exam      Tenderness   The patient is experiencing tenderness in the sacroiliac.     Range of Motion   Extension: normal   Flexion: normal   Lateral bend right: normal   Lateral bend left: normal   Rotation right: normal   Rotation left: normal      Muscle Strength   Right Quadriceps:  5/5   Left Quadriceps:  5/5   Right Hamstrings:  5/5   Left Hamstrings:  5/5      Tests   Straight leg raise right: negative  Straight leg raise left: negative     Other   Toe walk: normal  Heel walk: normal                 SI joint:   Palpation at the right and left SI joints is positive and painful  JASEN test is positive bilaterally  Gaenslen test is positive bilaterally  Thigh thrust test is positive bilaterally      Neurologic Exam      Mental Status   Oriented to person, place, and time.   Speech: speech is normal   Level of consciousness:  alert     Cranial Nerves   Cranial nerves II through XII intact.      CN III, IV, VI   Pupils are equal, round, and reactive to light.  Extraocular motions are normal.      Motor Exam   Muscle bulk: normal  Overall muscle tone: normal     Strength   Right deltoid: 5/5  Left deltoid: 5/5  Right biceps: 5/5  Left biceps: 5/5  Right triceps: 5/5  Left triceps: 5/5  Right wrist flexion: 5/5  Left wrist flexion: 5/5  Right wrist extension: 5/5  Left wrist extension: 5/5  Right interossei: 5/5  Left interossei: 5/5  Right iliopsoas: 5/5  Left iliopsoas: 5/5  Right quadriceps: 5/5  Left quadriceps: 5/5  Right hamstrin/5  Left hamstrin/5  Right anterior tibial: 5/5  Left anterior tibial: 5/5  Right posterior tibial: 5/5  Left posterior tibial: 5/5  Right peroneal: 5/5  Left peroneal: 5/5  Right gastroc: 5/5  Left gastroc: 5/5     Sensory Exam   Light touch normal.   Pinprick normal.      Gait, Coordination, and Reflexes      Gait  Gait: normal (Antalgic, leaning forward)     Coordination   Finger to nose coordination: normal     Reflexes   Right brachioradialis: 2+  Left brachioradialis: 2+  Right biceps: 2+  Left biceps: 2+  Right triceps: 2+  Left triceps: 2+  Right patellar: 3+  Left patellar: 3+  Right achilles: 2+  Left achilles: 2+  Right plantar: normal  Left plantar: normal  Right Hughes: absent  Left Hughes: absent  Right ankle clonus: absent  Left ankle clonus: absent     DIAGNOSTIC DATA:  I personally interpreted the following imaging:   Thoracic spine MRI shows arachnoid web at T3-4 with slight indentation of the cord posteriorly, no intramedullary signal change  Lumbar spine MRI shows L3-S1 fusion without residual stenosis  Cervical spine MRI shows mild central canal stenosis throughout     CT of the lumbar spine shows lucency around the bilateral S1 screws and around the L5-S1 anterior interbody fusion spacer, including the integral instrumentation of the spacer, no lucency around the L3, L4 and L5  screws     His blood sugar 287     ASSESMENT:  This is a 46 y.o. male with      Problem List Items Addressed This Visit    None              Visit Diagnoses      Status post lumbar spinal fusion    -  Primary     Pseudoarthrosis of lumbar spine         Loosening of bone fixation device, initial encounter         SI (sacroiliac) joint dysfunction         Spinal arachnoid cyst                    PLAN:  I explained the natural history of the disease and all treatment options. I recommended a open posterior revision of L3-S1 segmental instrumentation, mentation of the screw with PMMA, sacral pelvic fixation, posterolateral arthrodesis at L4-5 and L5-S1 using autograft harvested from the same incision and allograft Rodríguez Altapore, open bilateral SI joint fusion using Bedrock device from [employer] to prevent further pseudoarthrosis in the context of sacral pelvic fixation.  The goals of surgery is to help with the pain associated with pseudoarthrosis.  Hopefully will regain more mobility in be able to walk normally.  However since he has a upper thoracic arachnoid cyst this possible that his gait remains partially unstable.     We have discussed the risks of surgery including death, coma, bleeding, infection, failure of surgery, CSF leak, nerve root injury, spinal cord injury, ureter injury, weakness, paralysis, peripheral neuropathy, malplaced hardware, migration of hardware, non-union, need for reoperation. Patient understands the risks and would like to proceed with surgery.        The patient has increase perioperative risks because of these comorbidities:  Morbid obesity with BMI of 48, hyperglycemia.           Jose L Brown MD  Cell:[phone]

## 2022-04-13 NOTE — OP NOTE
Date of surgery 04/13/2022    Preop diagnosis  1. Status post L3-S1 anterior interbody fusion with posterior instrumentation  2. Pseudoarthrosis at L5-S1 with nonfusion and loosening of hardware  3. SI joint dysfunction  4. Morbid obesity with BMI of 49    Postop diagnosis  Same    Surgery  1. Removal of bilateral rods and bilateral S1 loose screws  2. L2-S1 posterior segmental instrumentation, Depuis Viper Prime and Verse  3. Sacral pelvic fixation using S2 trans SI joint iliac screws  4. Open bilateral SI joint fusion using the SI bone Bedrock implants  5. Registration of navigated instruments for posterior segmental instrumentation and sacral pelvic fixation using [email or web address] 3D c-arm and [employer] navigation  6. Posterolateral arthrodesis at L2-3, L3-4, L4-5, L5-S1 bilaterally with allograft BMP and morselized bone chips    Surgeon  Jose L Brown MD    Complexity  This was deemed to be a more complex procedure requiring 1 hour more due to the revision nature of the surgery and the morbid obesity of the patient with BMI 49. More time was needed to achieve the exposure in proceed with all the steps of the surgeries.    Indication  This is a very pleasant 46 y.o. male, BMI of 48, who is status post L3-S1 OLIF with posterior instrumentation in April 2021 for status post lumbar laminectomy with degenerative disc disease and recurrent stenosis with radiculopathy.  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  Since his last lumbar fusion surgery he has been complaining of worsening bilateral buttock pain.  He had a few episodes of sudden leg weakness associated with sharp pain, mainly on the left side.  It happened at work when he was climbing stairs.  We proceeded with bilateral SI joint block and steroid injection that did not give him significant pain relief.  It is unclear if the patient had pain relief for the duration of the block.  Reviewing the fluoroscopy shots the left side injection was in the SI joint  but the right side injection appeared to be superficial and subcapsular.  He has been doing physical therapy 3 times a week for 6 weeks for hip strengthening flexibility exercises, force closure exercises with partial pain relief.  He reports significant improvement in his back pain and ability to walk when he wears his SI joint belt.  He does not have worsening gait imbalance or sphincter dysfunction symptoms.     INTERIM HISTORY:     Been complaining of worsening back and buttock pain.  He also feels a pop in his back with certain movement.  He is in physical therapy and has notice improvement of the pain with massage therapy.  He is in a wheelchair today due to difficulty walking for prolonged period of time.    Procedure  Patient was intubated under general anesthesia and positioned prone on a José Luis table.  All pressure points were carefully padded.  The thoracolumbar area was prepped and draped in a typical sterile fashion.  We used the midline scar as our incision.  Local anesthesia with 0.5 Marcaine with epi.  The skin was incised an orthostatic retractor were position.  Subperiosteal dissection over the posterior elements from L2-S2 and replacement of orthostatic retractor.  Hemostasis.  The right PSIS was felt and 2 array pins were placed for navigation.  A clear drape was then placed over the patient incision to protect sterility and a SHOP.CAm 3D spin was performed.  Reconstructed CT image was then transferred to the [facility].  The [facility] instruments were registered.  Clear drape was removed.  We then started with the S2 trans SI joint iliac screws bilaterally.  The entry point was right next lateral to the S2 foramen.  We used a navigated drill guide and drill her trajectory using navigation.  The trajectory was tapped.  Using navigation we placed bilateral 100 mm by 8.0 mm S2 iliac bolt bilaterally.  The positioning of the both were confirmed with fluoroscopy.  We then proceeded with the bilateral SI  joint fusion using the Kapit[employer] system.  The entry point was lateral to the S1 foramen.  Again using the navigated drill guide we drilled the trajectory at 70 mm through the SI joint right superiorly to the iliac bolt.  A guidewire was then placed in the trajectory.  A broach was used to create a tunnel for the implants.  Bilateral 70 mm triangular implants were then position through the SI joint to achieve the SI joint fusion.  Good positioning of the SI joint implants were confirmed with fluoroscopy.    We had removed all the caps and bilateral rods.  The bilateral S1 loose screws were removed.  Using the same prior trajectory we placed bilateral 9.0 by 55 mm screws at S1.  Good position of the screws were confirmed with fluoroscopy.  We then contoured bilateral titanium rods and reduced the rods on the screw tulips with caps and reducing towers.  All caps were torqued.  The tower tabs were snapped.  Abundant irrigation with 2 L of saline.  The posterior elements including the pars, transverse process and facet joints were decorticated and the bone dust was left in place.  This decortication was done at L2-3, L3-4, L4-5, L5-S1.  We then used a mixture of allograft containing a smal Infuse (BMP) and 60 cc of morselized bone chips and placed the allograft laterally and medially to the instrumentation over the decorticated bone to achieve the posterolateral arthrodesis at L2-3, L3-4, L4-5, L5-S1.  We then placed bilateral Hemovac draining over the graft and tunneled the drains superiorly through the muscle and skin.  The drains were fixated with 2 nylon.  The muscle layer was closed with interrupted 0 Vicryl.  The fascia was closed with interrupted 0 Vicryl.  The fat was closed in multilayer with 0 Vicryl.  Dermal layer was closed with inverted interrupted 2-0 Vicryl.  The skin was closed with staples.  The incision was dressed.  The blood loss was estimated that 600 cc. No complication

## 2022-04-13 NOTE — PROGRESS NOTES
He sw was asked by Dr. Brown to get this pt an LSO brace. The sw called [facility] [phone] spoke to Ana and gave her the info along with the MRN and room number. She states someone will be out later today to fit this pt.

## 2022-04-13 NOTE — PLAN OF CARE
Pt arrived from OR at 1428. Pt was in a lot of pain and requesting a lot of pain medication. Dilaudid initially given and pt became hypotensive. MD notified, 1L of LR bolus ordered and given. Pt BP returned to standard range following bolus. Morphine given for breakthrough pain, did not cause hypotension. Pt was c/o CP, MD notified, EKG ordered. Consult for hospital medicine placed. Pt has orders placed to begin PT/OT tomorrow. Pt will also use CPAP at night and when sleeping. Pt will also receive neuro checks q 4hrs. VS stable. Bed in lowest position, call light within reach, wheels locked. Will continue to monitor.

## 2022-04-13 NOTE — NURSING
Upon arrival to unit pt c/o pain and discomfort in lower back and legs. BP stable. PRN dose of dilaudid given. Pt became hypotensive 70's/40's. Dr. Brown notified. Order to give I L of LR bolus.

## 2022-04-13 NOTE — ANESTHESIA POSTPROCEDURE EVALUATION
Anesthesia Post Evaluation    Patient: Kirill Riggs Stevens III    Procedure(s) Performed: Procedure(s) (LRB):  FUSION, SPINE, WITH INSTRUMENTATION Stg 1: Revision of posterior L3-S1 hardware, Depuy. Sacro-pelvic fixation, screw augmentation PMMA. L4-S1 posterolateral arthrodesis. Open Gauge screws, 11-12mm (N/A)  FUSION, SACROILIAC JOINT Stg 2: Bilateral Open SI Joint fusion, Bedrock from Si Bone (Bilateral)    Final Anesthesia Type: general      Patient location during evaluation: ICU  Patient participation: Yes- Able to Participate  Level of consciousness: awake and alert  Post-procedure vital signs: reviewed and stable  Pain management: adequate  Airway patency: patent  VICENTE mitigation strategies: Multimodal analgesia  PONV status at discharge: No PONV  Anesthetic complications: no      Cardiovascular status: blood pressure returned to baseline and hemodynamically stable  Respiratory status: room air and nasal cannula  Hydration status: euvolemic  Follow-up not needed.          Vitals Value Taken Time   /63 04/13/22 1700   Temp 37.1 °C (98.8 °F) 04/13/22 1430   Pulse 94 04/13/22 1721   Resp 22 04/13/22 1721   SpO2 99 % 04/13/22 1721   Vitals shown include unvalidated device data.      No case tracking events are documented in the log.      Pain/Ingrid Score: Pain Rating Prior to Med Admin: 10 (4/13/2022  5:18 PM)

## 2022-04-13 NOTE — TRANSFER OF CARE
Anesthesia Transfer of Care Note    Patient: Kirill Riggs McKinley III    Procedure(s) Performed: Procedure(s) (LRB):  FUSION, SPINE, WITH INSTRUMENTATION Stg 1: Revision of posterior L3-S1 hardware, Depuy. Sacro-pelvic fixation, screw augmentation PMMA. L4-S1 posterolateral arthrodesis. Open Gauge screws, 11-12mm (N/A)  FUSION, SACROILIAC JOINT Stg 2: Bilateral Open SI Joint fusion, Bedrock from Si Bone (Bilateral)    Patient location: ICU    Anesthesia Type: general    Transport from OR: Transported from OR on 6-10 L/min O2 by face mask with adequate spontaneous ventilation    Post pain: pain needs to be addressed    Post assessment: no apparent anesthetic complications    Post vital signs: stable    Level of consciousness: awake, alert and oriented    Nausea/Vomiting: no nausea/vomiting    Complications: none    Transfer of care protocol was followed      Last vitals:   Visit Vitals  /66 (BP Location: Left arm, Patient Position: Lying)   Pulse 71   Temp 37 °C (98.6 °F) (Skin)   Resp 20   Ht 6' (1.829 m)   Wt (!) 165 kg (363 lb 12.1 oz)   SpO2 97%   BMI 49.33 kg/m²

## 2022-04-14 ENCOUNTER — CLINICAL SUPPORT (OUTPATIENT)
Dept: SMOKING CESSATION | Facility: CLINIC | Age: 47
End: 2022-04-14
Payer: COMMERCIAL

## 2022-04-14 DIAGNOSIS — F17.210 CIGARETTE SMOKER: Primary | ICD-10-CM

## 2022-04-14 LAB
ABO + RH BLD: NORMAL
ANION GAP SERPL CALC-SCNC: 12 MMOL/L (ref 8–16)
BASOPHILS # BLD AUTO: 0.04 K/UL (ref 0–0.2)
BASOPHILS NFR BLD: 0.4 % (ref 0–1.9)
BLD GP AB SCN CELLS X3 SERPL QL: NORMAL
BLD PROD TYP BPU: NORMAL
BLOOD UNIT EXPIRATION DATE: NORMAL
BLOOD UNIT TYPE CODE: 5100
BLOOD UNIT TYPE: NORMAL
BUN SERPL-MCNC: 16 MG/DL (ref 6–20)
CALCIUM SERPL-MCNC: 7.9 MG/DL (ref 8.7–10.5)
CHLORIDE SERPL-SCNC: 103 MMOL/L (ref 95–110)
CO2 SERPL-SCNC: 23 MMOL/L (ref 23–29)
CODING SYSTEM: NORMAL
CREAT SERPL-MCNC: 1.2 MG/DL (ref 0.5–1.4)
DIFFERENTIAL METHOD: ABNORMAL
DISPENSE STATUS: NORMAL
EOSINOPHIL # BLD AUTO: 0 K/UL (ref 0–0.5)
EOSINOPHIL NFR BLD: 0.3 % (ref 0–8)
ERYTHROCYTE [DISTWIDTH] IN BLOOD BY AUTOMATED COUNT: 15.8 % (ref 11.5–14.5)
EST. GFR  (AFRICAN AMERICAN): >60 ML/MIN/1.73 M^2
EST. GFR  (NON AFRICAN AMERICAN): >60 ML/MIN/1.73 M^2
GLUCOSE SERPL-MCNC: 207 MG/DL (ref 70–110)
GLUCOSE SERPL-MCNC: 243 MG/DL (ref 70–110)
GLUCOSE SERPL-MCNC: 277 MG/DL (ref 70–110)
HCO3 UR-SCNC: 22.9 MMOL/L (ref 24–28)
HCO3 UR-SCNC: 28 MMOL/L (ref 24–28)
HCT VFR BLD AUTO: 24.5 % (ref 40–54)
HCT VFR BLD CALC: 31 %PCV (ref 36–54)
HCT VFR BLD CALC: 31 %PCV (ref 36–54)
HGB BLD-MCNC: 11 G/DL
HGB BLD-MCNC: 11 G/DL
HGB BLD-MCNC: 7.7 G/DL (ref 14–18)
IMM GRANULOCYTES # BLD AUTO: 0.05 K/UL (ref 0–0.04)
IMM GRANULOCYTES NFR BLD AUTO: 0.5 % (ref 0–0.5)
LYMPHOCYTES # BLD AUTO: 1.5 K/UL (ref 1–4.8)
LYMPHOCYTES NFR BLD: 15.2 % (ref 18–48)
MCH RBC QN AUTO: 27 PG (ref 27–31)
MCHC RBC AUTO-ENTMCNC: 31.4 G/DL (ref 32–36)
MCV RBC AUTO: 86 FL (ref 82–98)
MONOCYTES # BLD AUTO: 1 K/UL (ref 0.3–1)
MONOCYTES NFR BLD: 10.4 % (ref 4–15)
NEUTROPHILS # BLD AUTO: 7 K/UL (ref 1.8–7.7)
NEUTROPHILS NFR BLD: 73.2 % (ref 38–73)
NRBC BLD-RTO: 0 /100 WBC
NUM UNITS TRANS PACKED RBC: NORMAL
PCO2 BLDA: 47.8 MMHG (ref 35–45)
PCO2 BLDA: 53 MMHG (ref 35–45)
PH SMN: 7.29 [PH] (ref 7.35–7.45)
PH SMN: 7.33 [PH] (ref 7.35–7.45)
PLATELET # BLD AUTO: 269 K/UL (ref 150–450)
PMV BLD AUTO: 10 FL (ref 9.2–12.9)
PO2 BLDA: 131 MMHG (ref 80–100)
PO2 BLDA: 538 MMHG (ref 80–100)
POC BE: -4 MMOL/L
POC BE: 2 MMOL/L
POC SATURATED O2: 100 % (ref 95–100)
POC SATURATED O2: 99 % (ref 95–100)
POC TCO2: 24 MMOL/L (ref 23–27)
POC TCO2: 30 MMOL/L (ref 23–27)
POCT GLUCOSE: 254 MG/DL (ref 70–110)
POCT GLUCOSE: 298 MG/DL (ref 70–110)
POCT GLUCOSE: 334 MG/DL (ref 70–110)
POCT GLUCOSE: 343 MG/DL (ref 70–110)
POTASSIUM BLD-SCNC: 4.1 MMOL/L (ref 3.5–5.1)
POTASSIUM BLD-SCNC: 4.5 MMOL/L (ref 3.5–5.1)
POTASSIUM SERPL-SCNC: 4.1 MMOL/L (ref 3.5–5.1)
RBC # BLD AUTO: 2.85 M/UL (ref 4.6–6.2)
SAMPLE: ABNORMAL
SAMPLE: ABNORMAL
SODIUM BLD-SCNC: 139 MMOL/L (ref 136–145)
SODIUM BLD-SCNC: 139 MMOL/L (ref 136–145)
SODIUM SERPL-SCNC: 138 MMOL/L (ref 136–145)
TROPONIN I SERPL DL<=0.01 NG/ML-MCNC: 0.01 NG/ML (ref 0–0.03)
WBC # BLD AUTO: 9.62 K/UL (ref 3.9–12.7)

## 2022-04-14 PROCEDURE — 99900035 HC TECH TIME PER 15 MIN (STAT)

## 2022-04-14 PROCEDURE — 36430 TRANSFUSION BLD/BLD COMPNT: CPT

## 2022-04-14 PROCEDURE — 97530 THERAPEUTIC ACTIVITIES: CPT

## 2022-04-14 PROCEDURE — 63600175 PHARM REV CODE 636 W HCPCS: Performed by: NEUROLOGICAL SURGERY

## 2022-04-14 PROCEDURE — 97161 PT EVAL LOW COMPLEX 20 MIN: CPT

## 2022-04-14 PROCEDURE — 25000003 PHARM REV CODE 250: Performed by: HOSPITALIST

## 2022-04-14 PROCEDURE — 86920 COMPATIBILITY TEST SPIN: CPT | Performed by: NURSE PRACTITIONER

## 2022-04-14 PROCEDURE — 11000001 HC ACUTE MED/SURG PRIVATE ROOM

## 2022-04-14 PROCEDURE — 86901 BLOOD TYPING SEROLOGIC RH(D): CPT | Performed by: NURSE PRACTITIONER

## 2022-04-14 PROCEDURE — 97535 SELF CARE MNGMENT TRAINING: CPT

## 2022-04-14 PROCEDURE — 84484 ASSAY OF TROPONIN QUANT: CPT | Performed by: NURSE PRACTITIONER

## 2022-04-14 PROCEDURE — 99406 BEHAV CHNG SMOKING 3-10 MIN: CPT | Mod: ,,,

## 2022-04-14 PROCEDURE — 94799 UNLISTED PULMONARY SVC/PX: CPT

## 2022-04-14 PROCEDURE — C9399 UNCLASSIFIED DRUGS OR BIOLOG: HCPCS | Performed by: HOSPITALIST

## 2022-04-14 PROCEDURE — 25000003 PHARM REV CODE 250: Performed by: NEUROLOGICAL SURGERY

## 2022-04-14 PROCEDURE — 94761 N-INVAS EAR/PLS OXIMETRY MLT: CPT

## 2022-04-14 PROCEDURE — 80048 BASIC METABOLIC PNL TOTAL CA: CPT | Performed by: NEUROLOGICAL SURGERY

## 2022-04-14 PROCEDURE — 85025 COMPLETE CBC W/AUTO DIFF WBC: CPT | Performed by: NEUROLOGICAL SURGERY

## 2022-04-14 PROCEDURE — P9016 RBC LEUKOCYTES REDUCED: HCPCS | Performed by: NURSE PRACTITIONER

## 2022-04-14 PROCEDURE — 94660 CPAP INITIATION&MGMT: CPT

## 2022-04-14 PROCEDURE — 97165 OT EVAL LOW COMPLEX 30 MIN: CPT

## 2022-04-14 PROCEDURE — 63600175 PHARM REV CODE 636 W HCPCS: Performed by: NURSE PRACTITIONER

## 2022-04-14 PROCEDURE — 97116 GAIT TRAINING THERAPY: CPT

## 2022-04-14 PROCEDURE — 99406 PT REFUSED TOBACCO CESSATION: ICD-10-PCS | Mod: ,,,

## 2022-04-14 PROCEDURE — 94760 N-INVAS EAR/PLS OXIMETRY 1: CPT

## 2022-04-14 RX ORDER — HYDROCODONE BITARTRATE AND ACETAMINOPHEN 500; 5 MG/1; MG/1
TABLET ORAL
Status: DISCONTINUED | OUTPATIENT
Start: 2022-04-14 | End: 2022-04-18 | Stop reason: HOSPADM

## 2022-04-14 RX ADMIN — MORPHINE SULFATE 4 MG: 4 INJECTION INTRAVENOUS at 01:04

## 2022-04-14 RX ADMIN — MUPIROCIN: 20 OINTMENT TOPICAL at 09:04

## 2022-04-14 RX ADMIN — INSULIN ASPART 6 UNITS: 100 INJECTION, SOLUTION INTRAVENOUS; SUBCUTANEOUS at 08:04

## 2022-04-14 RX ADMIN — CELECOXIB 200 MG: 100 CAPSULE ORAL at 08:04

## 2022-04-14 RX ADMIN — INSULIN ASPART 6 UNITS: 100 INJECTION, SOLUTION INTRAVENOUS; SUBCUTANEOUS at 11:04

## 2022-04-14 RX ADMIN — TIZANIDINE 4 MG: 4 TABLET ORAL at 09:04

## 2022-04-14 RX ADMIN — OXYCODONE 10 MG: 5 TABLET ORAL at 06:04

## 2022-04-14 RX ADMIN — HEPARIN SODIUM 5000 UNITS: 5000 INJECTION INTRAVENOUS; SUBCUTANEOUS at 05:04

## 2022-04-14 RX ADMIN — MORPHINE SULFATE 4 MG: 4 INJECTION INTRAVENOUS at 09:04

## 2022-04-14 RX ADMIN — DIAZEPAM 2.5 MG: 5 INJECTION, SOLUTION INTRAMUSCULAR; INTRAVENOUS at 09:04

## 2022-04-14 RX ADMIN — DEXTROSE 3 G: 50 INJECTION, SOLUTION INTRAVENOUS at 08:04

## 2022-04-14 RX ADMIN — INSULIN DETEMIR 15 UNITS: 100 INJECTION, SOLUTION SUBCUTANEOUS at 04:04

## 2022-04-14 RX ADMIN — INSULIN ASPART 3 UNITS: 100 INJECTION, SOLUTION INTRAVENOUS; SUBCUTANEOUS at 08:04

## 2022-04-14 RX ADMIN — MORPHINE SULFATE 4 MG: 4 INJECTION INTRAVENOUS at 12:04

## 2022-04-14 RX ADMIN — SODIUM CHLORIDE, SODIUM LACTATE, POTASSIUM CHLORIDE, AND CALCIUM CHLORIDE: .6; .31; .03; .02 INJECTION, SOLUTION INTRAVENOUS at 03:04

## 2022-04-14 RX ADMIN — OXYCODONE 10 MG: 5 TABLET ORAL at 02:04

## 2022-04-14 RX ADMIN — FLUOXETINE HYDROCHLORIDE 80 MG: 20 CAPSULE ORAL at 08:04

## 2022-04-14 RX ADMIN — BUSPIRONE HYDROCHLORIDE 15 MG: 5 TABLET ORAL at 08:04

## 2022-04-14 RX ADMIN — OXYCODONE 10 MG: 5 TABLET ORAL at 11:04

## 2022-04-14 RX ADMIN — HEPARIN SODIUM 5000 UNITS: 5000 INJECTION INTRAVENOUS; SUBCUTANEOUS at 09:04

## 2022-04-14 RX ADMIN — BISACODYL 10 MG: 10 SUPPOSITORY RECTAL at 08:04

## 2022-04-14 RX ADMIN — ATORVASTATIN CALCIUM 10 MG: 10 TABLET, FILM COATED ORAL at 08:04

## 2022-04-14 RX ADMIN — SODIUM CHLORIDE, SODIUM LACTATE, POTASSIUM CHLORIDE, AND CALCIUM CHLORIDE 500 ML: .6; .31; .03; .02 INJECTION, SOLUTION INTRAVENOUS at 02:04

## 2022-04-14 RX ADMIN — INSULIN ASPART 4 UNITS: 100 INJECTION, SOLUTION INTRAVENOUS; SUBCUTANEOUS at 05:04

## 2022-04-14 RX ADMIN — BUSPIRONE HYDROCHLORIDE 15 MG: 5 TABLET ORAL at 09:04

## 2022-04-14 RX ADMIN — DOCUSATE SODIUM AND SENNOSIDES 2 TABLET: 8.6; 5 TABLET, FILM COATED ORAL at 08:04

## 2022-04-14 RX ADMIN — DEXTROSE 3 G: 50 INJECTION, SOLUTION INTRAVENOUS at 11:04

## 2022-04-14 RX ADMIN — ALUMINUM HYDROXIDE, MAGNESIUM HYDROXIDE, AND SIMETHICONE 30 ML: 200; 200; 20 SUSPENSION ORAL at 11:04

## 2022-04-14 RX ADMIN — ACETAMINOPHEN 650 MG: 325 TABLET ORAL at 12:04

## 2022-04-14 RX ADMIN — GABAPENTIN 800 MG: 400 CAPSULE ORAL at 08:04

## 2022-04-14 RX ADMIN — ACETAMINOPHEN 650 MG: 325 TABLET ORAL at 05:04

## 2022-04-14 RX ADMIN — HEPARIN SODIUM 5000 UNITS: 5000 INJECTION INTRAVENOUS; SUBCUTANEOUS at 01:04

## 2022-04-14 RX ADMIN — GABAPENTIN 800 MG: 400 CAPSULE ORAL at 02:04

## 2022-04-14 RX ADMIN — DIAZEPAM 2.5 MG: 5 INJECTION, SOLUTION INTRAMUSCULAR; INTRAVENOUS at 05:04

## 2022-04-14 RX ADMIN — SODIUM CHLORIDE, SODIUM LACTATE, POTASSIUM CHLORIDE, AND CALCIUM CHLORIDE 500 ML: .6; .31; .03; .02 INJECTION, SOLUTION INTRAVENOUS at 05:04

## 2022-04-14 RX ADMIN — ACETAMINOPHEN 650 MG: 325 TABLET ORAL at 11:04

## 2022-04-14 RX ADMIN — OXYCODONE 10 MG: 5 TABLET ORAL at 07:04

## 2022-04-14 RX ADMIN — BUSPIRONE HYDROCHLORIDE 15 MG: 5 TABLET ORAL at 02:04

## 2022-04-14 NOTE — PT/OT/SLP EVAL
Physical Therapy Evaluation    Patient Name:  Kirill Gandhi III   MRN:  [id number]    Recommendations:     Discharge Recommendations:  home health PT (ongoing assessment pending progress)   Discharge Equipment Recommendations: walker, rolling, other (see comments) (appropriate height/weight fit RW)   Barriers to discharge: None    Assessment:     Kirill Gandhi III is a 46 y.o. male admitted with a medical diagnosis of Pseudoarthrosis of lumbar spine.  He presents with the following impairments/functional limitations:  impaired endurance, weakness, impaired sensation, impaired self care skills, impaired functional mobilty, pain, edema, impaired skin, orthopedic precautions, decreased lower extremity function, decreased upper extremity function, gait instability, impaired balance.    Pt demo good participation w min assist bed mobility and txfs sit<>stand w min assist.  Vitals stable throughout.  Rec cont acute PT services and at this time rec bariatric RW for home use and home health PT and OT with ongoing assessment for discharge needs pending progress.              Rehab Prognosis: Good; patient would benefit from acute skilled PT services to address these deficits and reach maximum level of function.    Recent Surgery: Procedure(s) (LRB):  FUSION, SPINE, WITH INSTRUMENTATION Stg 1: Revision of posterior L3-S1 hardware, Depuy. Sacro-pelvic fixation, screw augmentation PMMA. L4-S1 posterolateral arthrodesis. Open Gauge screws, 11-12mm (N/A)  FUSION, SACROILIAC JOINT Stg 2: Bilateral Open SI Joint fusion, Bedrock from Si Bone (Bilateral)  FUSION, SPINE, LUMBAR, OLIF, MINIMALLY INVASIVE  REMOVAL IMPLANT, POSTERIOR SEGMENT, INTRAOCULAR 1 Day Post-Op    Plan:     During this hospitalization, patient to be seen daily to address the identified rehab impairments via gait training, therapeutic activities, therapeutic exercises, neuromuscular re-education and progress toward the following goals:    · Plan of  Care Expires:  05/14/22    Subjective     Chief Complaint: reports 6/10 low back pain - reports dizziness w initial sit EOB but resolved during session w vitals stable throughout;    Patient/Family Comments/goals: PLOF - independence w mobility  Pain/Comfort:  · Pain Rating 1: 6/10  · Location - Side 1: Bilateral  · Location - Orientation 1: generalized (low back)  · Location 1: back  · Pain Addressed 1: Reposition, Pre-medicate for activity, Distraction, Cessation of Activity, Nurse notified  · Pain Rating Post-Intervention 1:  (no change after activity reported)    Patients cultural, spiritual, Ep[employer]opalian conflicts given the current situation: no    Living Environment:  Pt plans to discharge to stay at his mother's home which is [facility] with no [place].  Pt reports he was ambulating using a borrowed RW household levels and had min assistance w ADL for past approx 4 months.     Equipment used at home: walker, rolling, bedside commode, CPAP, shower chair (RW is borrowed and not bariatric/appropriate height or fit).    Upon discharge, patient will have assistance from mother and his girlfriend.    Objective:     Communicated with nsg prior to session and nsg present intermittently during session.  Patient found supine with bed alarm, blood pressure cuff, peripheral IV, MER drain, oxygen, pulse ox (continuous), telemetry  upon PT entry to room.    General Precautions: Standard, fall   Orthopedic Precautions:spinal precautions   Braces: LSO  Respiratory Status: Room air    Exams:  · Cog:  A & O x 4  · BLE ROM  WFL  Throughout  · BLE  MMT  WFL  Throughout    Modified testing to avoid increased pain s/p surgery  · Sensation lt touch and pressure intact throughout BLE     Functional Mobility:  · Bed mobility:  B rolling w min assist and VC tech log rolling and bed rail use; seated scoot sba w increased time and ed tech provided  · Txfs: sup<>sit with min assist and VC technique - increased time allowed throughout to improve tech;   Sit<>stand w min assist from elevated bed surface and VC technique w hand placement;  Seated EOB don/doff LSO fit and application tech provided max assist  · Pre gait and gait training;  Static stance w RW CGA with G balance;  amb ~10' w RW w change in direction w CGA and VC technique safety w slow sivan, careful movements and LSO intact throughout w step through gait pattern and good foot clearance demo'd    Therapeutic Activities and Exercises:   Ed safety w technique for spinal precautions, LSO fit and use and don/doff technique, tech w all mobility and AD use and fit, and  PT POC provided to pt and his mother and girlfriend present w all verbalizing understanding of ed.  Pt vitals stable throughout session w supine /61 and seated /55.  Bed mob training, sit EOB don/doff LSO fit and training and endurance and bal act;  txfs training, gait training.  Later in afternoon brought RW to pt in xray to assist CGA/min assist for sit<>stand from elevated bed surface w VC technique for standing spinal xray to be taken, then return to bed w ed for log roll and bed mobility and support positioning tech provided.    AM-PAC 6 CLICK MOBILITY  Total Score:      Patient left supine with all lines intact, call button in reach, bed alarm on, nsg notified and mother and girlfriend\ present.    GOALS:   Multidisciplinary Problems     Physical Therapy Goals        Problem: Physical Therapy    Goal Priority Disciplines Outcome Goal Variances Interventions   Physical Therapy Goal     PT, PT/OT Ongoing, Progressing     Description: Goals to be met by: discharge date     Patient will increase functional independence with mobility by performin. Supine to sit with Modified [place]  2. Sit to supine with Modified [place]  2. Rolling to Left and Right with Modified [place].  3. Sit to stand transfer with Modified [place]  4. Bed to chair transfer with Contact Guard Assistance using Rolling Walker  5.  Gait  x 150 feet with Stand-by Assistance using Rolling Walker.                      History:     Past Medical History:   Diagnosis Date    Allergy     Anxiety     Back pain     CTS (carpal tunnel syndrome) 6/26/2015    Depression     Diabetes 4/17/2015    Hx of vasectomy     Hypertension     OCD (obsessive compulsive disorder)     Sleep apnea        Past Surgical History:   Procedure Laterality Date    ADENOIDECTOMY      EPIDURAL STEROID INJECTION Right 9/4/2019    Procedure: Injection, Steroid, Epidural Transforaminal;  Surgeon: Harjinder Batista Jr., MD;  Location: [facility] ENDO;  Service: Pain Management;  Laterality: Right;  Right L3 + L4 TF NATALIE    [id number]  [id number]    Arrive @ 1300; ASA last 8/27; Check BG; NEEDS CONSENT    FRACTURE SURGERY      bilateral elbow fracture 3 years ago    FUSION OF LUMBAR SPINE BY ANTERIOR APPROACH Left 4/26/2021    Procedure: FUSION, SPINE, LUMBAR, ANTERIOR APPROACH Left L3-4 L4-5 OLIF BRENDA, ALL Release, L5-S1 ALIF;  Surgeon: Jose L Brown MD;  Location: [facility];  Service: Neurosurgery;  Laterality: Left;  Procedure: Left L3-4 L4-5 OLIF BRENDA, ALL Release, L5-S1 ALIF  Surgery Time: 2.5 Hrs  LOS: 2-3  Anesthesia: General  Blood: Type & Screen  Radiology: Josseline  Brace: LSO  Bed: Regular  Position: LAteral Righ    FUSION OF SACROILIAC JOINT Bilateral 4/13/2022    Procedure: FUSION, SACROILIAC JOINT Stg 2: Bilateral Open SI Joint fusion, Bedrock from Si Bone;  Surgeon: Jose L Brown MD;  Location: [facility];  Service: Neurosurgery;  Laterality: Bilateral;    FUSION OF SPINE WITH INSTRUMENTATION N/A 4/26/2021    Procedure: FUSION, SPINE, WITH INSTRUMENTATION L3-S1 Posteior Segmental Instrumentation;  Surgeon: Jose L Brown MD;  Location: [facility] OR;  Service: Neurosurgery;  Laterality: N/A;  Procedure: L3-S1 Posteior Segmental Instrumentation  Sirgery Time: 2 Hrs  LOS: 2-3  Anesthesia: General  Blood: Tyoe & Screen  Radiology: Dual C arm  Brace:LSO  Bed: [place] [address]  Poster  Position: Prone  Equipment: Depuy     FUSION OF SPINE WITH INSTRUMENTATION N/A 4/13/2022    Procedure: FUSION, SPINE, WITH INSTRUMENTATION Stg 1: Revision of posterior L3-S1 hardware, Depuy. Sacro-pelvic fixation, screw augmentation PMMA. L4-S1 posterolateral arthrodesis. Open Gauge screws, 11-12mm;  Surgeon: Jose L Brown MD;  Location: [facility] OR;  Service: Neurosurgery;  Laterality: N/A;    HERNIA REPAIR      bilateral groin hernia    INJECTION OF STEROID Bilateral 12/13/2021    Procedure: INJECTION, STEROID Bilateral SI JOint Block and Steroid Injection;  Surgeon: Jose L Brown MD;  Location: [facility] OR;  Service: Neurosurgery;  Laterality: Bilateral;  Procedure: Bilateral SI JOint Block and Steroid Injection   Surgery Time: 30 min  LOS: 0  Anesthesia: MAC  Radiology: C-arm  Bed: Regular with pillows  Position: Prone    LUMBAR LAMINECTOMY Bilateral 11/29/2019    Procedure: LAMINECTOMY, SPINE, LUMBAR Rigth L3-4, Left L4-5 Laminectomy, medial Facetectomy, and microdiscectomy;  Surgeon: Jose L Brown MD;  Location: [facility] OR;  Service: Neurosurgery;  Laterality: Bilateral;  Procedure: Rigth L3-4, Left L4-5 Laminectomy, medial Facetectomy, and microdiscectomy  Surgery Time: 2.5 Hrs  LOS: 0-1  Anesthesia: General  Blood: Type & Screen  Radiology: C-arm  Micros    REMOVAL IMPLANT, POSTERIOR SEGMENT, INTRAOCULAR  4/13/2022    Procedure: REMOVAL IMPLANT, POSTERIOR SEGMENT, INTRAOCULAR;  Surgeon: Jose L Brown MD;  Location: [facility];  Service: Neurosurgery;;    TONSILLECTOMY      VASECTOMY         Time Tracking:     PT Received On: 04/14/22  PT Start Time: 1235     PT Stop Time: 1320  (+[id number])  PT Total Time (min): 45 min  (+ OT)    Billable Minutes: Evaluation 15, Gait Training 15 and Therapeutic Activity 15      04/14/2022

## 2022-04-14 NOTE — ASSESSMENT & PLAN NOTE
- Consult for chest pain that developed yesterday  - EKG unremarkable and troponin negative  - Pain is reproducible with palpation and is likely musculoskeletal in origin due to long period of positioning on that side of the chest  - No further cardiac workup needed

## 2022-04-14 NOTE — CONSULTS
[facility] Medicine  Consult Note    Patient Name: Kirill Gandhi III  MRN: [id number]  Admission Date: 4/13/2022  Hospital Length of Stay: 0 days  Attending Physician: Jose L Brown MD   Primary Care Provider: Marcos Ibarra MD           Patient information was obtained from patient, past medical records and ER records.     Consults  Subjective:     Principal Problem:Pseudoarthrosis of lumbar spine    Chief Complaint: Chest pain       HPI: Kirill Gandhi is a 47 yo male with a past medical history of Anxiety, depression, Hypertension, Sleep apnea, Diabetes and Back pain. Dr. Marcos Valencia is his PCP. Dr. Jose L Brown is his Neurosurgeon and is admitted for spinal fusion. He is status post L3-S1 OLIF with posterior instrumentation in April 2021 for status post lumbar laminectomy with degenerative disc disease and recurrent stenosis with radiculopathy. Since his last lumbar fusion surgery he has been complaining of worsening bilateral buttock pain.    Hospital medicine was consulted for complaints of chest pain. Patient has no significant cardiac history. He does have a history of Hypertension and he is Morbidly Obese. Nitroglycerin 0.4mg SL resolved the pain. Troponin pending. EKG pending.     Past Medical History:   Diagnosis Date    Allergy     Anxiety     Back pain     CTS (carpal tunnel syndrome) 6/26/2015    Depression     Diabetes 4/17/2015    Hx of vasectomy     Hypertension     OCD (obsessive compulsive disorder)     Sleep apnea        Past Surgical History:   Procedure Laterality Date    ADENOIDECTOMY      EPIDURAL STEROID INJECTION Right 9/4/2019    Procedure: Injection, Steroid, Epidural Transforaminal;  Surgeon: Harjinder Batista Jr., MD;  Location: [facility];  Service: Pain Management;  Laterality: Right;  Right L3 + L4 TF NATALIE    [id number]  [id number]    Arrive @ 1300; ASA last 8/27; Check BG; NEEDS CONSENT    FRACTURE SURGERY      bilateral elbow fracture  3 years ago    FUSION OF LUMBAR SPINE BY ANTERIOR APPROACH Left 4/26/2021    Procedure: FUSION, SPINE, LUMBAR, ANTERIOR APPROACH Left L3-4 L4-5 OLIF BRENDA, ALL Release, L5-S1 ALIF;  Surgeon: Jose L Brown MD;  Location: [facility];  Service: Neurosurgery;  Laterality: Left;  Procedure: Left L3-4 L4-5 OLIF BRENDA, ALL Release, L5-S1 ALIF  Surgery Time: 2.5 Hrs  LOS: 2-3  Anesthesia: General  Blood: Type & Screen  Radiology: Josseline  Brace: LSO  Bed: Regular  Position: LAteral Righ    FUSION OF SPINE WITH INSTRUMENTATION N/A 4/26/2021    Procedure: FUSION, SPINE, WITH INSTRUMENTATION L3-S1 Posteior Segmental Instrumentation;  Surgeon: Jose L Brown MD;  Location: [facility];  Service: Neurosurgery;  Laterality: N/A;  Procedure: L3-S1 Posteior Segmental Instrumentation  Sirgery Time: 2 Hrs  LOS: 2-3  Anesthesia: General  Blood: Tyoe & Screen  Radiology: Dual C arm  Brace:LSO  Bed: [place] [id number] Poster  Position: Prone  Equipment: Depuy     HERNIA REPAIR      bilateral groin hernia    INJECTION OF STEROID Bilateral 12/13/2021    Procedure: INJECTION, STEROID Bilateral SI JOint Block and Steroid Injection;  Surgeon: Jose L Brown MD;  Location: [facility];  Service: Neurosurgery;  Laterality: Bilateral;  Procedure: Bilateral SI JOint Block and Steroid Injection   Surgery Time: 30 min  LOS: 0  Anesthesia: MAC  Radiology: C-arm  Bed: Regular with pillows  Position: Prone    LUMBAR LAMINECTOMY Bilateral 11/29/2019    Procedure: LAMINECTOMY, SPINE, LUMBAR Rigth L3-4, Left L4-5 Laminectomy, medial Facetectomy, and microdiscectomy;  Surgeon: Jose L Brown MD;  Location: [facility];  Service: Neurosurgery;  Laterality: Bilateral;  Procedure: Rigth L3-4, Left L4-5 Laminectomy, medial Facetectomy, and microdiscectomy  Surgery Time: 2.5 Hrs  LOS: 0-1  Anesthesia: General  Blood: Type & Screen  Radiology: C-arm  Micros    TONSILLECTOMY      VASECTOMY         Review of patient's allergies indicates:  No Known Allergies    No current  facility-administered medications on file prior to encounter.     Current Outpatient Medications on File Prior to Encounter   Medication Sig    acetaminophen (TYLENOL) 500 MG tablet Take 1,000 mg by mouth daily as needed for Pain.    ascorbic acid, vitamin C, (VITAMIN C) 1000 MG tablet Take 1,000 mg by mouth once daily.    atorvastatin (LIPITOR) 10 MG tablet Take 1 tablet (10 mg total) by mouth every other day.    fluticasone propionate (FLONASE) 50 mcg/actuation nasal spray 2 sprays (100 mcg total) by Each Nostril route once daily.    gabapentin (NEURONTIN) 800 MG tablet Take 1 tablet (800 mg total) by mouth 3 (three) times daily.    hydrOXYzine HCL (ATARAX) 25 MG tablet TAKE 1 -2 TABS NIGHTLY AS NEEDED FOR INSOMNIA    metFORMIN (GLUCOPHAGE-XR) 500 MG ER 24hr tablet Take 2 tablets (1,000 mg total) by mouth daily with breakfast.    multivitamin capsule Take 1 capsule by mouth once daily.    tiZANidine (ZANAFLEX) 4 MG tablet TAKE 1 TABLET (4 MG TOTAL) BY MOUTH EVERY 8 (EIGHT) HOURS AS NEEDED (SPASMS).    valsartan-hydrochlorothiazide (DIOVAN-HCT) 320-12.5 mg per tablet Take 1 tablet by mouth once daily.    vitamin D 1000 units Tab Take 4,000 mg by mouth once daily.     aspirin (ECOTRIN) 81 MG EC tablet Take 81 mg by mouth every evening.    blood-glucose transmitter (DEXCOM G6 TRANSMITTER) Cayla Use to check glucose - 90 day supply    celecoxib (CELEBREX) 200 MG capsule TAKE 1 CAPSULE BY MOUTH TWICE A DAY    DEXCOM G6 SENSOR Cayla SMARTSI Each Topical Every 10 Days    dulaglutide (TRULICITY) 3 mg/0.5 mL pen injector Inject 3 mg into the skin every 7 days.    flash glucose scanning reader Misc Disp one reader to monitor glucose- covered by insurance dexcom    flash glucose sensor Kit Dispense sensor to read glucose  dexcom    NON FORMULARY MEDICATION Uses Cpap Machine nightly on a setting of 10    ONETOUCH DELICA PLUS LANCET 33 gauge Misc Apply topically 3 (three) times daily.    ONETOUCH VERIO  "TEST STRIPS Strp 1 strip 3 (three) times daily.    pen needle, diabetic 31 gauge x 1/4" Ndle Use with flex pen    [DISCONTINUED] fluvoxaMINE (LUVOX) 100 MG tablet Take 1.5 tablets (150 mg total) by mouth 2 (two) times daily.     Family History     Problem Relation (Age of Onset)    Cataracts Maternal Grandmother, Maternal Grandfather    Fibromyalgia Mother    Macular degeneration Maternal Grandfather    Osteoarthritis Mother        Tobacco Use    Smoking status: Current Every Day Smoker     Packs/day: 0.00     Years: 24.00     Pack years: 0.00     Types: Vaping with nicotine     Start date: 1997    Smokeless tobacco: Never Used    Tobacco comment: Handout provided for Ambualtory Smoking Cessaiton program   Substance and Sexual Activity    Alcohol use: Not Currently     Alcohol/week: 0.0 standard drinks     Comment: 750 ml  once a month or so    Drug use: Never    Sexual activity: Yes     Partners: Female     Birth control/protection: None     Comment: ; one child      Review of Systems   Constitutional: Negative for diaphoresis and fever.   HENT: Negative for congestion, dental problem and trouble swallowing.    Eyes: Negative for redness and visual disturbance.   Respiratory: Negative for choking and shortness of breath.    Cardiovascular: Positive for chest pain. Negative for leg swelling.   Gastrointestinal: Negative for abdominal distention, abdominal pain and nausea.   Genitourinary: Negative for difficulty urinating and dysuria.   Musculoskeletal: Positive for gait problem.   Skin: Negative for color change and rash.   Allergic/Immunologic: Negative for food allergies.   Psychiatric/Behavioral: Negative for agitation.     Objective:     Vital Signs (Most Recent):  Temp: 99.7 °F (37.6 °C) (04/13/22 2000)  Pulse: 108 (04/13/22 2200)  Resp: 18 (04/13/22 2200)  BP: (!) 87/43 (04/13/22 2200)  SpO2: (!) 94 % (04/13/22 2304) Vital Signs (24h Range):  Temp:  [98.6 °F (37 °C)-99.7 °F (37.6 °C)] 99.7 °F " (37.6 °C)  Pulse:  [] 108  Resp:  [12-32] 18  SpO2:  [87 %-100 %] 94 %  BP: ()/(40-66) 87/43  Arterial Line BP: ()/(41-76) 110/45     Weight: (!) 166 kg (365 lb 15.4 oz)  Body mass index is 49.63 kg/m².    Physical Exam  HENT:      Head: Atraumatic.      Mouth/Throat:      Mouth: Mucous membranes are moist.   Eyes:      Pupils: Pupils are equal, round, and reactive to light.   Cardiovascular:      Rate and Rhythm: Normal rate.      Pulses: Normal pulses.   Pulmonary:      Effort: Pulmonary effort is normal.   Abdominal:      General: Abdomen is flat.      Palpations: Abdomen is soft.   Skin:     General: Skin is warm and dry.      Capillary Refill: Capillary refill takes less than 2 seconds.   Neurological:      Mental Status: He is alert and oriented to person, place, and time.   Psychiatric:         Mood and Affect: Mood normal.         Significant Labs: All pertinent labs within the past 24 hours have been reviewed.    Significant Imaging: EKG: I have reviewed all pertinent results/findings within the past 24 hours and my personal findings are: NSR    Assessment/Plan:     Active Diagnoses:    Diagnosis Date Noted POA    PRINCIPAL PROBLEM:  Pseudoarthrosis of lumbar spine [S32.009K] 04/13/2022 Yes      Problems Resolved During this Admission:     Chest Pain   STAT Ekg  Stat troponin  Nitroglycerin 0.4mg SL prn chest pain  Continous telemetry montoring        VTE Risk Mitigation (From admission, onward)         Ordered     heparin (porcine) injection 5,000 Units  Every 8 hours         04/13/22 1428     IP VTE HIGH RISK PATIENT  Once         04/13/22 1428     Place sequential compression device  Until discontinued         04/13/22 1428     Place DIAMOND hose  Until discontinued         04/13/22 0610                HOS POC IP DISCHARGE SUMMARY    Thank you for your consult. I will sign off. Please contact us if you have any additional questions.    Nia Wilks, NP  Department of Hospital Medicine    [facility] - Intensive [facility]

## 2022-04-14 NOTE — PLAN OF CARE
Problem: Occupational Therapy  Goal: Occupational Therapy Goal  Description: Goals to be met by: 4/30/2022    Patient will increase functional independence with ADLs by performing:    UE Dressing with Modified [place].  LE Dressing with Stand-by Assistance and Assistive Devices as needed.  Grooming while standing at sink with Stand-by Assistance.  Toileting from bedside commode with Stand-by Assistance for hygiene and clothing management.   Rolling to Bilateral with Modified [place] and use of bedrail as needed.   Supine to sit with Modified [place] and use of bedrail as needed.  Stand pivot transfers with Stand-by Assistance.  Toilet transfer to bedside commode with Stand-by Assistance.  Upper extremity exercise program x15 reps per handout, with independence.    Outcome: Ongoing, Progressing   OT initial eval completed and tx initiated.  OT recommending HHOT at discharge.  Continue with OT POC.

## 2022-04-14 NOTE — NURSING TRANSFER
Nursing Transfer Note      4/14/2022     Report called to Brandon RN for clean/ready bed 523.   All of patient's belongings were gathered by his significant other, Geraldine; and transported with pt, including his TLSO brace.       Reason patient is being transferred:  downgraded      Transfer To: 523 / medical bed    Transfer via bed    Transfer with bariatric bed    Transported by RN and transporter.        Medicines sent: IV fluids, insulin pen, mupiricon cream    Any special needs or follow-up needed: n/a    Chart send with patient: Yes    Notified: Geraldine PERALTA at     Patient reassessed at: 4/14/22 1530     Upon arrival to floor: patient oriented to room, call bell in reach and bed in lowest position

## 2022-04-14 NOTE — ASSESSMENT & PLAN NOTE
Status post L3-S1 anterior interbody fusion with posterior instrumentation  Pseudoarthrosis at L5-S1 with nonfusion and loosening of hardware  SI joint dysfunction  Chronic back pain  Fever   - Patient underwent the following procedure on 04/13/2022:  1. Removal of bilateral rods and bilateral S1 loose screws  2. L2-S1 posterior segmental instrumentation, Depuis Viper Prime and Verse  3. Sacral pelvic fixation using S2 trans SI joint iliac screws  4. Open bilateral SI joint fusion using the SI bone Bedrock implants  5. Registration of navigated instruments for posterior segmental instrumentation and sacral pelvic fixation using [employer] 3D c-arm and [employer] navigation  6. Posterolateral arthrodesis at L2-3, L3-4, L4-5, L5-S1 bilaterally with allograft BMP and morselized bone chips  - Pain control and physical therapy, antibiotics ordered by primary  - As per primary

## 2022-04-14 NOTE — NURSING
Notified Dr. Brown of pt's b/p low tonight and h/h 24.5/7.7 this morning and that the NP ordered one unit of PRBC. Notified of b/p lowers with iv meds. States to try p.o. med next and is ok for pt to get iv pain med if sbp over 100

## 2022-04-14 NOTE — PROGRESS NOTES
[facility] Medicine  Progress Note    Patient Name: Kirill Gandhi III  MRN: [id number]  Patient Class: IP- Inpatient   Admission Date: 4/13/2022  Length of Stay: 1 days  Attending Physician: Jose L Brown MD  Primary Care Provider: Marcos Ibarra MD        Subjective:     Principal Problem:Pseudoarthrosis of lumbar spine      Interval History: Noted fever overnight, blood pressure low in the 90s that was responsive to fluids.  Reports no real chest pain, but does have pain with pressure on the right side of his chest that is reproducible with pressure.  No shortness of breath, no nausea or vomiting.  No new weakness or numbness or tingling.    Review of Systems   Constitutional:  Positive for fever. Negative for chills.   Respiratory:  Negative for shortness of breath.    Cardiovascular:  Negative for chest pain.   Objective:     Vital Signs (Most Recent):  Temp: 98.5 °F (36.9 °C) (04/14/22 1443)  Pulse: 88 (04/14/22 1443)  Resp: (!) 22 (04/14/22 1443)  BP: (!) 97/46 (04/14/22 1443)  SpO2: (!) 91 % (04/14/22 1443)   Vital Signs (24h Range):  Temp:  [98.5 °F (36.9 °C)-101.1 °F (38.4 °C)] 98.5 °F (36.9 °C)  Pulse:  [] 88  Resp:  [10-38] 22  SpO2:  [87 %-100 %] 91 %  BP: ()/(41-63) 97/46  Arterial Line BP: ()/(37-76) 77/45     Weight: (!) 166 kg (365 lb 15.4 oz)  Body mass index is 49.63 kg/m².    Intake/Output Summary (Last 24 hours) at 4/14/2022 1601  Last data filed at 4/14/2022 1500  Gross per 24 hour   Intake 4565.17 ml   Output 2515 ml   Net 2050.17 ml      Physical Exam  Vitals and nursing note reviewed.   Constitutional:       General: He is not in acute distress.     Appearance: He is obese. He is not ill-appearing, toxic-appearing or diaphoretic.   HENT:      Head: Normocephalic and atraumatic.      Nose: Nose normal.   Eyes:      Extraocular Movements: Extraocular movements intact.      Conjunctiva/sclera: Conjunctivae normal.   Cardiovascular:      Rate and  Rhythm: Normal rate and regular rhythm.      Comments: Anterior chest wall, lateral to the sternum on the right side, tenderness to palpation present and is reproducible  Pulmonary:      Effort: Pulmonary effort is normal.      Breath sounds: Normal breath sounds.   Abdominal:      General: Bowel sounds are normal. There is no distension.      Palpations: Abdomen is soft.      Tenderness: There is no abdominal tenderness. There is no guarding or rebound.   Musculoskeletal:         General: Normal range of motion.      Cervical back: Normal range of motion and neck supple.   Skin:     Comments: Surgical site clean, dry and intact   Neurological:      General: No focal deficit present.      Mental Status: He is alert and oriented to person, place, and time.   Psychiatric:         Mood and Affect: Mood normal.         Thought Content: Thought content normal.       Significant Labs: All pertinent labs within the past 24 hours have been reviewed.    Significant Imaging: I have reviewed all pertinent imaging results/findings within the past 24 hours.      Assessment/Plan:      * Pseudoarthrosis of lumbar spine  Status post L3-S1 anterior interbody fusion with posterior instrumentation  Pseudoarthrosis at L5-S1 with nonfusion and loosening of hardware  SI joint dysfunction  Chronic back pain  Fever   - Patient underwent the following procedure on 04/13/2022:  1. Removal of bilateral rods and bilateral S1 loose screws  2. L2-S1 posterior segmental instrumentation, Depuis Viper Prime and Verse  3. Sacral pelvic fixation using S2 trans SI joint iliac screws  4. Open bilateral SI joint fusion using the SI bone Bedrock implants  5. Registration of navigated instruments for posterior segmental instrumentation and sacral pelvic fixation using [employer] 3D c-arm and [employer] navigation  6. Posterolateral arthrodesis at L2-3, L3-4, L4-5, L5-S1 bilaterally with allograft BMP and morselized bone chips  - Pain control and physical  therapy, antibiotics ordered by primary  - As per primary      Chest pain, atypical  - Consult for chest pain that developed yesterday  - EKG unremarkable and troponin negative  - Pain is reproducible with palpation and is likely musculoskeletal in origin due to long period of positioning on that side of the chest  - No further cardiac workup needed    Difficulty walking  - PT and OT    Depression  - Continue fluoxetine and buspirone    VICENTE (obstructive sleep apnea)  - CPAP QHS    Diabetes  Hemoglobin A1C   Date Value Ref Range Status   02/21/2022 8.7 (H) 4.0 - 5.6 % Final   - Hold outpatient regimen for now  - Start detemir 15 units daily and continue moderate dose sliding scale  - Will consider adding prandial insulin after review of repeat glucose levels  - Will continue to adjust insulin regimen as needed to achieve glucose between 140 and 180        VTE Risk Mitigation (From admission, onward)         Ordered     heparin (porcine) injection 5,000 Units  Every 8 hours         04/13/22 1428     IP VTE HIGH RISK PATIENT  Once         04/13/22 1428     Place sequential compression device  Until discontinued         04/13/22 1428     Place DIAMOND hose  Until discontinued         04/13/22 0610                      Erica Andrea MD  Department of Hospital Medicine   [place] [facility] Surg

## 2022-04-14 NOTE — PROGRESS NOTES
Smoking cessation education note: Pt is a former occasional cigarette smoker and vaping with nicotine. He states that he stopped using all nicotine approximately 1 yr. ago. Handout for Ambulatory Smoking Cessation Program provided in the event pt should require additional resources following hospital discharge.

## 2022-04-14 NOTE — SUBJECTIVE & OBJECTIVE
Interval History: Noted fever overnight, blood pressure low in the 90s that was responsive to fluids.  Reports no real chest pain, but does have pain with pressure on the right side of his chest that is reproducible with pressure.  No shortness of breath, no nausea or vomiting.  No new weakness or numbness or tingling.    Review of Systems   Constitutional:  Positive for fever. Negative for chills.   Respiratory:  Negative for shortness of breath.    Cardiovascular:  Negative for chest pain.   Objective:     Vital Signs (Most Recent):  Temp: 98.5 °F (36.9 °C) (04/14/22 1443)  Pulse: 88 (04/14/22 1443)  Resp: (!) 22 (04/14/22 1443)  BP: (!) 97/46 (04/14/22 1443)  SpO2: (!) 91 % (04/14/22 1443)   Vital Signs (24h Range):  Temp:  [98.5 °F (36.9 °C)-101.1 °F (38.4 °C)] 98.5 °F (36.9 °C)  Pulse:  [] 88  Resp:  [10-38] 22  SpO2:  [87 %-100 %] 91 %  BP: ()/(41-63) 97/46  Arterial Line BP: ()/(37-76) 77/45     Weight: (!) 166 kg (365 lb 15.4 oz)  Body mass index is 49.63 kg/m².    Intake/Output Summary (Last 24 hours) at 4/14/2022 1601  Last data filed at 4/14/2022 1500  Gross per 24 hour   Intake 4565.17 ml   Output 2515 ml   Net 2050.17 ml      Physical Exam  Vitals and nursing note reviewed.   Constitutional:       General: He is not in acute distress.     Appearance: He is obese. He is not ill-appearing, toxic-appearing or diaphoretic.   HENT:      Head: Normocephalic and atraumatic.      Nose: Nose normal.   Eyes:      Extraocular Movements: Extraocular movements intact.      Conjunctiva/sclera: Conjunctivae normal.   Cardiovascular:      Rate and Rhythm: Normal rate and regular rhythm.      Comments: Anterior chest wall, lateral to the sternum on the right side, tenderness to palpation present and is reproducible  Pulmonary:      Effort: Pulmonary effort is normal.      Breath sounds: Normal breath sounds.   Abdominal:      General: Bowel sounds are normal. There is no distension.      Palpations:  Abdomen is soft.      Tenderness: There is no abdominal tenderness. There is no guarding or rebound.   Musculoskeletal:         General: Normal range of motion.      Cervical back: Normal range of motion and neck supple.   Skin:     Comments: Surgical site clean, dry and intact   Neurological:      General: No focal deficit present.      Mental Status: He is alert and oriented to person, place, and time.   Psychiatric:         Mood and Affect: Mood normal.         Thought Content: Thought content normal.       Significant Labs: All pertinent labs within the past 24 hours have been reviewed.    Significant Imaging: I have reviewed all pertinent imaging results/findings within the past 24 hours.

## 2022-04-14 NOTE — ASSESSMENT & PLAN NOTE
Hemoglobin A1C   Date Value Ref Range Status   02/21/2022 8.7 (H) 4.0 - 5.6 % Final   - Hold outpatient regimen for now  - Start detemir 15 units daily and continue moderate dose sliding scale  - Will consider adding prandial insulin after review of repeat glucose levels  - Will continue to adjust insulin regimen as needed to achieve glucose between 140 and 180

## 2022-04-14 NOTE — NURSING
Pt transferred to bariatric bed w/ slide board and max assist x 4 RNs.  He tolerated the mobility w/o any incident.  Family at bs.  Pt set up for lunch.

## 2022-04-14 NOTE — PT/OT/SLP EVAL
Occupational Therapy  Co- Evaluation w/ PT and tx    Name: Kirill Gandhi III  MRN: [id number]  Admitting Diagnosis:  Pseudoarthrosis of lumbar spine  Recent Surgery: Procedure(s) (LRB):  FUSION, SPINE, WITH INSTRUMENTATION Stg 1: Revision of posterior L3-S1 hardware, Depuy. Sacro-pelvic fixation, screw augmentation PMMA. L4-S1 posterolateral arthrodesis. Open Gauge screws, 11-12mm (N/A)  FUSION, SACROILIAC JOINT Stg 2: Bilateral Open SI Joint fusion, Bedrock from Si Bone (Bilateral)  FUSION, SPINE, LUMBAR, OLIF, MINIMALLY INVASIVE  REMOVAL IMPLANT, POSTERIOR SEGMENT, INTRAOCULAR 1 Day Post-Op  The primary encounter diagnosis was Pseudoarthrosis of lumbar spine. A diagnosis of Chest pain was also pertinent to this visit.    Recommendations:     Discharge Recommendations: home health OT  Discharge Equipment Recommendations:  bath bench (bariatric)  Barriers to discharge:  None    Assessment:     Kirill Gandhi III is a 46 y.o. male with a medical diagnosis of Pseudoarthrosis of lumbar spine.  He presents with Performance deficits affecting function: weakness, impaired endurance, impaired self care skills, impaired functional mobilty, gait instability, impaired balance, decreased lower extremity function, decreased coordination, pain, impaired skin, orthopedic precautions.    Pt has a supportive family. HHOT and TTB recommended at [place].     Rehab Prognosis: Good; patient would benefit from acute skilled OT services to address these deficits and reach maximum level of function.       Plan:     Patient to be seen 5 x/week to address the above listed problems via self-care/home management, therapeutic activities, therapeutic exercises  · Plan of Care Expires: 05/14/22  · Plan of Care Reviewed with: patient, mother, significant other    Subjective     Chief Complaint: back pain  Patient/Family Comments/goals: pt would like to walk.    Occupational Profile:  Living Environment: Pt.lives with his mother, S.O.  and 19 y/o daughter in [facility] with no steps; t/s combo with shower chair which is not weight appropriate for pt (it belonged to his S.O.) pt reported the legs on the chair are starting to buckle.   Previous level of function: Pt. has been assisted via S.O. with bathing, dressing and toileting since Nov 2021 due issues with his back. Pt ambulated Letty with a standard RW (belongs to S.O.) due to limited space in previous home. Since pt is now living with his mother, he says the home is accessible for the bariatric rolling walker which he will be using at [place]. Pt has been using a wc also for mobility (standard sized wc is borrowed and too small for pt.) Family does al IADLS and provide transportation.   Roles and Routines: basic selfcare  Equipment Used at Home:  walker, rolling, bedside commode, grab bar (bariatric DME; grab bar next to toilet)  Assistance upon Discharge: S.O and mother    Pain/Comfort:  Pain Rating 1: 6/10  Location - Side 1: Bilateral  Location - Orientation 1: lower  Location 1: back  Pain Addressed 1: Reposition, Distraction, Cessation of Activity, Nurse notified, Pre-medicate for activity  Pain Rating Post-Intervention 1: 8/10    Patients cultural, spiritual, Sikh conflicts given the current situation: no    Objective:     Communicated with: nurse prior to session.  Patient found HOB elevated with telemetry, peripheral IV, pulse ox (continuous), blood pressure cuff, SCD, hemovac upon OT entry to room.    General Precautions: Standard, fall, diabetic   Orthopedic Precautions:spinal precautions   Braces: LSO  Respiratory Status: Room air    Occupational Performance:    Bed Mobility:    · Patient completed Rolling/Turning to Right with minimum assistance, with side rail and log roll  · Patient completed Scooting/Bridging with minimum assistance and with side rail  · Patient completed Supine to Sit with minimum assistance and with side rail  · Patient completed Sit to Supine with moderate  assistance, with side rail and for BLE    Functional Mobility/Transfers:  · Patient completed Sit <> Stand Transfer with minimum assistance  with  rolling walker (bariatric )  · Functional Mobility: ambulated CGA using RW for short distance in room.    Activities of Daily Living:  · Feeding:  independence .  · Upper Body Dressing: minimum assistance LSO brace seated EOB  · Lower Body Dressing: total assistance socks seated EOB    Cognitive/Visual Perceptual:  Cognitive/Psychosocial Skills:     -       Oriented to: Person, Place, Time and Situation   -       Follows Commands/attention:Follows multistep  commands  -       Communication: clear/fluent  -       Memory: No Deficits noted  -       Safety awareness/insight to disability: intact   -       Mood/Affect/Coping skills/emotional control: Cooperative  Visual/Perceptual:      -Intact glasses    Physical Exam:  Balance:    -       sitting:  good  dynamic: fair plus 2/2 spinal precautions  standing: fair plus   dynamic: fair  Postural examination/scapula alignment:    -       Rounded shoulders  Skin integrity: LB post op bandages in place and 2 hemovacs in place  Sensation:    -       Impaired  reports numbness finger tips; intact 3/3 to light touch  Dominant hand:    -       right  Upper Extremity Range of Motion:     -       Right Upper Extremity: WFL  -       Left Upper Extremity: WFL  Upper Extremity Strength:    -       Right Upper Extremity: WFL  -       Left Upper Extremity: WFL   Strength:    -       Right Upper Extremity: WFL  -       Left Upper Extremity: WFL    AMPAC 6 Click ADL:  AMPAC Total Score: 17    Treatment & Education:  Purpose of OT and POC explained to pt. S.O. and mother.  All questions/concerns addressed within scope.  Spinal precaution education provided, pt verbalized understanding and demonstrated.  HHOT and TTB recommended for home use. Pt explained that Shower chair is not appropriate for his weight and since shower chair legs are  bending it is not advisable to continue to use .   Education:    Patient left HOB elevated with all lines intact, call button in reach, nurse notified, family present and pt returned to bed 2/2 going to xray after OT session    GOALS:   Multidisciplinary Problems     Occupational Therapy Goals        Problem: Occupational Therapy    Goal Priority Disciplines Outcome Interventions   Occupational Therapy Goal     OT, PT/OT Ongoing, Progressing    Description: Goals to be met by: 4/30/2022    Patient will increase functional independence with ADLs by performing:    UE Dressing with Modified [place].  LE Dressing with Stand-by Assistance and Assistive Devices as needed.  Grooming while standing at sink with Stand-by Assistance.  Toileting from bedside commode with Stand-by Assistance for hygiene and clothing management.   Rolling to Bilateral with Modified [place] and use of bedrail as needed.   Supine to sit with Modified [place] and use of bedrail as needed.  Stand pivot transfers with Stand-by Assistance.  Toilet transfer to bedside commode with Stand-by Assistance.  Upper extremity exercise program x15 reps per handout, with independence.                     History:     Past Medical History:   Diagnosis Date    Allergy     Anxiety     Back pain     CTS (carpal tunnel syndrome) 6/26/2015    Depression     Diabetes 4/17/2015    Hx of vasectomy     Hypertension     OCD (obsessive compulsive disorder)     Sleep apnea          Past Surgical History:   Procedure Laterality Date    ADENOIDECTOMY      EPIDURAL STEROID INJECTION Right 9/4/2019    Procedure: Injection, Steroid, Epidural Transforaminal;  Surgeon: Harjinder Batista Jr., MD;  Location: [facility];  Service: Pain Management;  Laterality: Right;  Right L3 + L4 TF NATALIE    [id number]  [id number]    Arrive @ 1300; ASA last 8/27; Check BG; NEEDS CONSENT    FRACTURE SURGERY      bilateral elbow fracture 3 years ago    FUSION OF LUMBAR SPINE BY  ANTERIOR APPROACH Left 4/26/2021    Procedure: FUSION, SPINE, LUMBAR, ANTERIOR APPROACH Left L3-4 L4-5 OLIF BRENDA, ALL Release, L5-S1 ALIF;  Surgeon: Jose L Brown MD;  Location: [facility];  Service: Neurosurgery;  Laterality: Left;  Procedure: Left L3-4 L4-5 OLIF BRENDA, ALL Release, L5-S1 ALIF  Surgery Time: 2.5 Hrs  LOS: 2-3  Anesthesia: General  Blood: Type & Screen  Radiology: Josseline  Brace: LSO  Bed: Regular  Position: LAteral Rig    FUSION OF SACROILIAC JOINT Bilateral 4/13/2022    Procedure: FUSION, SACROILIAC JOINT Stg 2: Bilateral Open SI Joint fusion, Bedrock from Si Bone;  Surgeon: Jose L Brown MD;  Location: [facility];  Service: Neurosurgery;  Laterality: Bilateral;    FUSION OF SPINE WITH INSTRUMENTATION N/A 4/26/2021    Procedure: FUSION, SPINE, WITH INSTRUMENTATION L3-S1 Posteior Segmental Instrumentation;  Surgeon: Jose L Brown MD;  Location: [facility];  Service: Neurosurgery;  Laterality: N/A;  Procedure: L3-S1 Posteior Segmental Instrumentation  Sirgery Time: 2 Hrs  LOS: 2-3  Anesthesia: General  Blood: Tyoe & Screen  Radiology: Dual C arm  Brace:LSO  Bed: [place] [id number] Poster  Position: Prone  Equipment: Depuy     FUSION OF SPINE WITH INSTRUMENTATION N/A 4/13/2022    Procedure: FUSION, SPINE, WITH INSTRUMENTATION Stg 1: Revision of posterior L3-S1 hardware, Depuy. Sacro-pelvic fixation, screw augmentation PMMA. L4-S1 posterolateral arthrodesis. Open Gauge screws, 11-12mm;  Surgeon: Jose L Brown MD;  Location: [facility] OR;  Service: Neurosurgery;  Laterality: N/A;    HERNIA REPAIR      bilateral groin hernia    INJECTION OF STEROID Bilateral 12/13/2021    Procedure: INJECTION, STEROID Bilateral SI JOint Block and Steroid Injection;  Surgeon: Jose L Brown MD;  Location: [facility];  Service: Neurosurgery;  Laterality: Bilateral;  Procedure: Bilateral SI JOint Block and Steroid Injection   Surgery Time: 30 min  LOS: 0  Anesthesia: MAC  Radiology: C-arm  Bed: Regular with pillows  Position: Prone     LUMBAR LAMINECTOMY Bilateral 11/29/2019    Procedure: LAMINECTOMY, SPINE, LUMBAR Rigth L3-4, Left L4-5 Laminectomy, medial Facetectomy, and microdiscectomy;  Surgeon: Jose L Brown MD;  Location: [facility] OR;  Service: Neurosurgery;  Laterality: Bilateral;  Procedure: Rigth L3-4, Left L4-5 Laminectomy, medial Facetectomy, and microdiscectomy  Surgery Time: 2.5 Hrs  LOS: 0-1  Anesthesia: General  Blood: Type & Screen  Radiology: C-arm  Micros    REMOVAL IMPLANT, POSTERIOR SEGMENT, INTRAOCULAR  4/13/2022    Procedure: REMOVAL IMPLANT, POSTERIOR SEGMENT, INTRAOCULAR;  Surgeon: Jose L Brown MD;  Location: [facility] OR;  Service: Neurosurgery;;    TONSILLECTOMY      VASECTOMY         Time Tracking:     OT Date of Treatment: 04/14/22  OT Start Time: 1245  OT Stop Time: 1322  OT Total Time (min): 37 min    Billable Minutes:Evaluation 10  Self Care/Home Management 14  Total Time 24    4/14/2022

## 2022-04-14 NOTE — NURSING
Pt fitted with TLSO brace and seen by therapy.   Pt then transported down to radiology for standing xray; transported by hospital bed d/t no wide wheelchairs available.   PT was able to meet us in xray and assist with a walker for pt to stand.  Rad images completed, pt returned to bed and back to ICU w/o incident.

## 2022-04-14 NOTE — PLAN OF CARE
Problem: Physical Therapy  Goal: Physical Therapy Goal  Description: Goals to be met by: discharge date     Patient will increase functional independence with mobility by performin. Supine to sit with Modified [place]  2. Sit to supine with Modified [place]  2. Rolling to Left and Right with Modified [place].  3. Sit to stand transfer with Modified [place]  4. Bed to chair transfer with Contact Guard Assistance using Rolling Walker  5. Gait  x 150 feet with Stand-by Assistance using Rolling Walker.     Outcome: Ongoing, Progressing       PT Evaluation performed.  Pt demo good participation w min assist bed mobility and txfs sit<>stand w min assist.  Vitals stable throughout.  Rec cont acute PT services and at this time rec bariatric RW for home use and home health PT and OT with ongoing assessment for discharge needs pending progress.

## 2022-04-14 NOTE — PROGRESS NOTES
NEUROSURGICAL POST-OPERATIVE PROGRESS NOTE    DATE OF SERVICE:  04/14/2022      ATTENDING PHYSICIAN:  Jose L Brown MD    SUBJECTIVE:    INTERIM HISTORY:    This is a very pleasant 46 y.o. y.o. male, who is status postop day 1 revision of L2 to pelvis posterior instrumented fusion with SI joint fusion.  Doing well this morning.  Systolic blood pressure was running in the 90s.  Patient has remained in bed.  Has had some chest pain yesterday evening.  Was seen by hospital medicine.  Troponin were negative.  Patient received 1 unit overnight.    He is doing well this morning.  Mobilizing in bed okay.  No new onset of motor weakness or numbness.  No shortness of breath.  No chest pain this morning.              OBJECTIVE:    PHYSICAL EXAMINATION:   Vitals:    04/14/22 0658   BP:    Pulse:    Resp: 13   Temp:        Physical Exam:  Vitals reviewed.    Constitutional: He appears well-developed and well-nourished.     Eyes: Pupils are equal, round, and reactive to light. Conjunctivae and EOM are normal.     Cardiovascular: Normal distal pulses and no edema.     Abdominal: Soft.     Skin: Skin displays no rash on trunk and no rash on extremities. Skin displays no lesions on trunk and no lesions on extremities.     Psych/Behavior: He is alert. He is oriented to person, place, and time. He has a normal mood and affect.     Musculoskeletal:        Neck: Range of motion is full.       Ortho Exam    Neurologic Exam     Mental Status   Oriented to person, place, and time.     Cranial Nerves     CN III, IV, VI   Pupils are equal, round, and reactive to light.  Extraocular motions are normal.     Motor Exam     Strength   Strength 5/5 throughout.           DIAGNOSTIC DATA:    X-ray of the * lumbar spine pending  Drain 170 cc    ASSESMENT:    This is a 46 y.o. male who is s/p day 1 L2 to pelvis posterior instrumented fusion.  Doing well.      Problem List Items Addressed This Visit        Neuro    * (Principal) Pseudoarthrosis of  lumbar spine - Primary    Relevant Orders    Place DIAMOND hose    Compression Device (SCD)    Admit to Inpatient (Completed)    Inpatient consult to [facility]    POCT glucose    If any glucose result is less than 50 or greater than 400:    If 2nd result is less than 50 or greater than 400:    Do not admin Aspart correction between scheduled prandial Aspart    Recheck Blood Glucose:    Diet diabetic [facility]; 1500 Calorie    Vital signs    Turn cough deep breathe    Oral Care    Neurovascular checks    Intake and output    Place sequential compression device    Chlorohexidine Gluconate Bath    Incentive spirometry    Pulse Oximetry Q4H    Incentive spirometry    PT evaluate and treat    OT evaluate and treat    PT assistance with ambulation    Drain - full suction    Document drain output    Hemovac drain (Completed)    LSO brace (Completed)    Patient must wear orthosis while sitting or standing    Discontinue Sandhu Catheter (1 day after surgery)    CBC auto differential (Completed)    Basic metabolic panel (Completed)    X-Ray Lumbar Spine Ap And Lateral    Inpatient consult to Social Work    Transfer patient (Completed)    CPAP QHS    Transfer patient      Other Visit Diagnoses     Chest pain        Relevant Orders    EKG 12-lead (Completed)    EKG 12-lead (Completed)          PLAN:  Okay to transfer to floor.  Take oral pain medicine as prescribed  Acute to mobilize with PT/OT          Jose L Brown MD  Pager: [phone]

## 2022-04-15 LAB
ANION GAP SERPL CALC-SCNC: 6 MMOL/L (ref 8–16)
BASOPHILS # BLD AUTO: 0.04 K/UL (ref 0–0.2)
BASOPHILS NFR BLD: 0.6 % (ref 0–1.9)
BUN SERPL-MCNC: 11 MG/DL (ref 6–20)
CALCIUM SERPL-MCNC: 8.5 MG/DL (ref 8.7–10.5)
CHLORIDE SERPL-SCNC: 104 MMOL/L (ref 95–110)
CO2 SERPL-SCNC: 28 MMOL/L (ref 23–29)
CREAT SERPL-MCNC: 1 MG/DL (ref 0.5–1.4)
DIFFERENTIAL METHOD: ABNORMAL
EOSINOPHIL # BLD AUTO: 0.2 K/UL (ref 0–0.5)
EOSINOPHIL NFR BLD: 3.2 % (ref 0–8)
ERYTHROCYTE [DISTWIDTH] IN BLOOD BY AUTOMATED COUNT: 15.5 % (ref 11.5–14.5)
EST. GFR  (AFRICAN AMERICAN): >60 ML/MIN/1.73 M^2
EST. GFR  (NON AFRICAN AMERICAN): >60 ML/MIN/1.73 M^2
GLUCOSE SERPL-MCNC: 248 MG/DL (ref 70–110)
HCT VFR BLD AUTO: 25 % (ref 40–54)
HGB BLD-MCNC: 7.8 G/DL (ref 14–18)
IMM GRANULOCYTES # BLD AUTO: 0.03 K/UL (ref 0–0.04)
IMM GRANULOCYTES NFR BLD AUTO: 0.4 % (ref 0–0.5)
LYMPHOCYTES # BLD AUTO: 1.3 K/UL (ref 1–4.8)
LYMPHOCYTES NFR BLD: 19.7 % (ref 18–48)
MCH RBC QN AUTO: 27.2 PG (ref 27–31)
MCHC RBC AUTO-ENTMCNC: 31.2 G/DL (ref 32–36)
MCV RBC AUTO: 87 FL (ref 82–98)
MONOCYTES # BLD AUTO: 0.7 K/UL (ref 0.3–1)
MONOCYTES NFR BLD: 10.4 % (ref 4–15)
NEUTROPHILS # BLD AUTO: 4.5 K/UL (ref 1.8–7.7)
NEUTROPHILS NFR BLD: 65.7 % (ref 38–73)
NRBC BLD-RTO: 0 /100 WBC
PLATELET # BLD AUTO: 229 K/UL (ref 150–450)
PMV BLD AUTO: 10 FL (ref 9.2–12.9)
POCT GLUCOSE: 247 MG/DL (ref 70–110)
POCT GLUCOSE: 248 MG/DL (ref 70–110)
POCT GLUCOSE: 255 MG/DL (ref 70–110)
POCT GLUCOSE: 256 MG/DL (ref 70–110)
POCT GLUCOSE: 270 MG/DL (ref 70–110)
POCT GLUCOSE: 273 MG/DL (ref 70–110)
POCT GLUCOSE: 273 MG/DL (ref 70–110)
POCT GLUCOSE: 306 MG/DL (ref 70–110)
POTASSIUM SERPL-SCNC: 4 MMOL/L (ref 3.5–5.1)
RBC # BLD AUTO: 2.87 M/UL (ref 4.6–6.2)
SODIUM SERPL-SCNC: 138 MMOL/L (ref 136–145)
WBC # BLD AUTO: 6.81 K/UL (ref 3.9–12.7)

## 2022-04-15 PROCEDURE — 25000003 PHARM REV CODE 250: Performed by: NEUROLOGICAL SURGERY

## 2022-04-15 PROCEDURE — 94799 UNLISTED PULMONARY SVC/PX: CPT

## 2022-04-15 PROCEDURE — 97535 SELF CARE MNGMENT TRAINING: CPT

## 2022-04-15 PROCEDURE — C9399 UNCLASSIFIED DRUGS OR BIOLOG: HCPCS | Performed by: HOSPITALIST

## 2022-04-15 PROCEDURE — 85025 COMPLETE CBC W/AUTO DIFF WBC: CPT | Performed by: NEUROLOGICAL SURGERY

## 2022-04-15 PROCEDURE — 80048 BASIC METABOLIC PNL TOTAL CA: CPT | Performed by: NEUROLOGICAL SURGERY

## 2022-04-15 PROCEDURE — 94660 CPAP INITIATION&MGMT: CPT

## 2022-04-15 PROCEDURE — 63600175 PHARM REV CODE 636 W HCPCS: Performed by: NEUROLOGICAL SURGERY

## 2022-04-15 PROCEDURE — 97530 THERAPEUTIC ACTIVITIES: CPT

## 2022-04-15 PROCEDURE — 63600175 PHARM REV CODE 636 W HCPCS: Performed by: HOSPITALIST

## 2022-04-15 PROCEDURE — 25000003 PHARM REV CODE 250: Performed by: HOSPITALIST

## 2022-04-15 PROCEDURE — 99900035 HC TECH TIME PER 15 MIN (STAT)

## 2022-04-15 PROCEDURE — 36415 COLL VENOUS BLD VENIPUNCTURE: CPT | Performed by: NEUROLOGICAL SURGERY

## 2022-04-15 PROCEDURE — 97116 GAIT TRAINING THERAPY: CPT

## 2022-04-15 PROCEDURE — 63600175 PHARM REV CODE 636 W HCPCS: Performed by: NURSE PRACTITIONER

## 2022-04-15 PROCEDURE — 94760 N-INVAS EAR/PLS OXIMETRY 1: CPT

## 2022-04-15 PROCEDURE — 11000001 HC ACUTE MED/SURG PRIVATE ROOM

## 2022-04-15 RX ORDER — INSULIN ASPART 100 [IU]/ML
5 INJECTION, SOLUTION INTRAVENOUS; SUBCUTANEOUS
Status: DISCONTINUED | OUTPATIENT
Start: 2022-04-15 | End: 2022-04-15

## 2022-04-15 RX ORDER — INSULIN ASPART 100 [IU]/ML
10 INJECTION, SOLUTION INTRAVENOUS; SUBCUTANEOUS
Status: DISCONTINUED | OUTPATIENT
Start: 2022-04-15 | End: 2022-04-16

## 2022-04-15 RX ADMIN — SODIUM CHLORIDE, SODIUM LACTATE, POTASSIUM CHLORIDE, AND CALCIUM CHLORIDE 500 ML: .6; .31; .03; .02 INJECTION, SOLUTION INTRAVENOUS at 07:04

## 2022-04-15 RX ADMIN — ACETAMINOPHEN 650 MG: 325 TABLET ORAL at 05:04

## 2022-04-15 RX ADMIN — FLUOXETINE HYDROCHLORIDE 80 MG: 20 CAPSULE ORAL at 09:04

## 2022-04-15 RX ADMIN — INSULIN ASPART 3 UNITS: 100 INJECTION, SOLUTION INTRAVENOUS; SUBCUTANEOUS at 11:04

## 2022-04-15 RX ADMIN — INSULIN ASPART 6 UNITS: 100 INJECTION, SOLUTION INTRAVENOUS; SUBCUTANEOUS at 05:04

## 2022-04-15 RX ADMIN — INSULIN ASPART 8 UNITS: 100 INJECTION, SOLUTION INTRAVENOUS; SUBCUTANEOUS at 12:04

## 2022-04-15 RX ADMIN — HEPARIN SODIUM 5000 UNITS: 5000 INJECTION INTRAVENOUS; SUBCUTANEOUS at 11:04

## 2022-04-15 RX ADMIN — MUPIROCIN: 20 OINTMENT TOPICAL at 11:04

## 2022-04-15 RX ADMIN — CELECOXIB 200 MG: 100 CAPSULE ORAL at 09:04

## 2022-04-15 RX ADMIN — INSULIN ASPART 5 UNITS: 100 INJECTION, SOLUTION INTRAVENOUS; SUBCUTANEOUS at 12:04

## 2022-04-15 RX ADMIN — INSULIN DETEMIR 10 UNITS: 100 INJECTION, SOLUTION SUBCUTANEOUS at 12:04

## 2022-04-15 RX ADMIN — INSULIN ASPART 10 UNITS: 100 INJECTION, SOLUTION INTRAVENOUS; SUBCUTANEOUS at 05:04

## 2022-04-15 RX ADMIN — DEXTROSE 3 G: 50 INJECTION, SOLUTION INTRAVENOUS at 11:04

## 2022-04-15 RX ADMIN — HEPARIN SODIUM 5000 UNITS: 5000 INJECTION INTRAVENOUS; SUBCUTANEOUS at 05:04

## 2022-04-15 RX ADMIN — OXYCODONE 10 MG: 5 TABLET ORAL at 05:04

## 2022-04-15 RX ADMIN — GABAPENTIN 800 MG: 400 CAPSULE ORAL at 02:04

## 2022-04-15 RX ADMIN — ACETAMINOPHEN 650 MG: 325 TABLET ORAL at 12:04

## 2022-04-15 RX ADMIN — INSULIN DETEMIR 15 UNITS: 100 INJECTION, SOLUTION SUBCUTANEOUS at 09:04

## 2022-04-15 RX ADMIN — DEXTROSE 3 G: 50 INJECTION, SOLUTION INTRAVENOUS at 05:04

## 2022-04-15 RX ADMIN — OXYCODONE 10 MG: 5 TABLET ORAL at 01:04

## 2022-04-15 RX ADMIN — TIZANIDINE 4 MG: 4 TABLET ORAL at 05:04

## 2022-04-15 RX ADMIN — GABAPENTIN 800 MG: 400 CAPSULE ORAL at 09:04

## 2022-04-15 RX ADMIN — BUSPIRONE HYDROCHLORIDE 15 MG: 5 TABLET ORAL at 09:04

## 2022-04-15 RX ADMIN — DIAZEPAM 2.5 MG: 5 INJECTION, SOLUTION INTRAMUSCULAR; INTRAVENOUS at 01:04

## 2022-04-15 RX ADMIN — HEPARIN SODIUM 5000 UNITS: 5000 INJECTION INTRAVENOUS; SUBCUTANEOUS at 02:04

## 2022-04-15 RX ADMIN — INSULIN ASPART 4 UNITS: 100 INJECTION, SOLUTION INTRAVENOUS; SUBCUTANEOUS at 05:04

## 2022-04-15 RX ADMIN — BUSPIRONE HYDROCHLORIDE 15 MG: 5 TABLET ORAL at 02:04

## 2022-04-15 RX ADMIN — MORPHINE SULFATE 4 MG: 4 INJECTION INTRAVENOUS at 10:04

## 2022-04-15 RX ADMIN — DEXTROSE 3 G: 50 INJECTION, SOLUTION INTRAVENOUS at 02:04

## 2022-04-15 RX ADMIN — MUPIROCIN: 20 OINTMENT TOPICAL at 09:04

## 2022-04-15 RX ADMIN — OXYCODONE 10 MG: 5 TABLET ORAL at 03:04

## 2022-04-15 NOTE — PLAN OF CARE
Problem: Occupational Therapy  Goal: Occupational Therapy Goal  Description: Goals to be met by: 4/30/2022    Patient will increase functional independence with ADLs by performing:    UE Dressing with Modified [place].  LE Dressing with Stand-by Assistance and Assistive Devices as needed.  Grooming while standing at sink with Stand-by Assistance.  Toileting from bedside commode with Stand-by Assistance for hygiene and clothing management.   Rolling to Bilateral with Modified [place] and use of bedrail as needed.   Supine to sit with Modified [place] and use of bedrail as needed.  Stand pivot transfers with Stand-by Assistance.  Toilet transfer to bedside commode with Stand-by Assistance.  Upper extremity exercise program x15 reps per handout, with independence.    Outcome: Ongoing, Progressing

## 2022-04-15 NOTE — ASSESSMENT & PLAN NOTE
Hemoglobin A1C   Date Value Ref Range Status   02/21/2022 8.7 (H) 4.0 - 5.6 % Final   - Hold outpatient regimen   - Glucose levels   - Increase detemir 25 units daily, add Novolog 10 U QAC + continue moderate dose sliding scale  - Will continue to adjust insulin regimen as needed to achieve glucose between 140 and 180

## 2022-04-15 NOTE — PROGRESS NOTES
NEUROSURGICAL POST-OPERATIVE PROGRESS NOTE    DATE OF SERVICE:  04/15/2022      ATTENDING PHYSICIAN:  Jose L Brown MD    SUBJECTIVE:    INTERIM HISTORY:    This is a very pleasant 46 y.o. y.o. male, who is status postop day 2 revision of posterior lumbar fusion, sacral pelvic fixation and SI joint fusion.  Doing well.  Mobilizing with a walker well.  Improved back pain compared to before surgery.  No new onset of motor weakness or numbness.  Voiding appropriately.                OBJECTIVE:    PHYSICAL EXAMINATION:   Vitals:    04/15/22 1031   BP:    Pulse:    Resp: 18   Temp:        Physical Exam:  Vitals reviewed.    Constitutional: He appears well-developed and well-nourished.     Eyes: Pupils are equal, round, and reactive to light. Conjunctivae and EOM are normal.     Cardiovascular: Normal distal pulses and no edema.     Abdominal: Soft.     Skin: Skin displays no rash on trunk and no rash on extremities. Skin displays no lesions on trunk and no lesions on extremities.     Psych/Behavior: He is alert. He is oriented to person, place, and time. He has a normal mood and affect.     Musculoskeletal:        Neck: Range of motion is full.       Ortho Exam    Neurologic Exam     Mental Status   Oriented to person, place, and time.     Cranial Nerves     CN III, IV, VI   Pupils are equal, round, and reactive to light.  Extraocular motions are normal.     Motor Exam     Strength   Strength 5/5 throughout.       Dressing clean and dry    DIAGNOSTIC DATA:  Drain 320 cc  X-ray of the lumbar spine, AP and lateral views reveals the fusion hardware is intact. No loosening of screws or migration of hardware.    ASSESMENT:    This is a 46 y.o. male who is s/p day 2 revision of lumbar fusion, sacral pelvic fixation, SI joint fusion.  Doing well.    Problem List Items Addressed This Visit        Neuro    * (Principal) Pseudoarthrosis of lumbar spine - Primary    Relevant Orders    Admit to Inpatient (Completed)    Inpatient  consult to [facility]    POCT glucose    If any glucose result is less than 50 or greater than 400:    If 2nd result is less than 50 or greater than 400:    Do not admin Aspart correction between scheduled prandial Aspart    Recheck Blood Glucose:    Diet diabetic [facility]; 1500 Calorie    Vital signs    Turn cough deep breathe    Oral Care    Neurovascular checks    Intake and output    Place sequential compression device    Chlorohexidine Gluconate Bath    Incentive spirometry    Pulse Oximetry Q4H    Incentive spirometry    PT evaluate and treat    OT evaluate and treat    PT assistance with ambulation    Drain - full suction    Document drain output    Hemovac drain (Completed)    LSO brace (Completed)    Patient must wear orthosis while sitting or standing    Discontinue Sandhu Catheter (1 day after surgery)    CBC auto differential (Completed)    Basic metabolic panel (Completed)    X-Ray Lumbar Spine Ap And Lateral (Completed)    Inpatient consult to Social Work    Transfer patient (Completed)    CPAP QHS    Transfer patient (Completed)    Brace application (Completed)      Other Visit Diagnoses     Chest pain        Relevant Orders    EKG 12-lead (Completed)    EKG 12-lead (Completed)          PLAN:    Keep drain in  Continue to mobilize with PT        Jose L Brown MD  Pager: [phone]

## 2022-04-15 NOTE — PLAN OF CARE
Patient A&Ox4. On IVF rate increased. PRN pain medicine given. Low back hemovac drains intact. Call light within reach. Bed alarm on.

## 2022-04-15 NOTE — SUBJECTIVE & OBJECTIVE
Interval History:  Blood pressure low in the 90s that was responsive to fluids again overnight.  No chest pain.  No shortness of breath, no nausea or vomiting.  No new weakness or numbness or tingling.    Review of Systems   Constitutional:  Negative for chills and fever.   Respiratory:  Negative for shortness of breath.    Cardiovascular:  Negative for chest pain.   Objective:     Vital Signs (Most Recent):  Temp: 98.5 °F (36.9 °C) (04/15/22 0725)  Pulse: 84 (04/15/22 1132)  Resp: 18 (04/15/22 1533)  BP: (!) 111/51 (04/15/22 1132)  SpO2: 97 % (04/15/22 1132)   Vital Signs (24h Range):  Temp:  [98 °F (36.7 °C)-98.8 °F (37.1 °C)] 98.5 °F (36.9 °C)  Pulse:  [80-99] 84  Resp:  [17-21] 18  SpO2:  [94 %-98 %] 97 %  BP: (101-111)/(49-52) 111/51     Weight: (!) 166 kg (365 lb 15.4 oz)  Body mass index is 49.63 kg/m².    Intake/Output Summary (Last 24 hours) at 4/15/2022 1547  Last data filed at 4/15/2022 1200  Gross per 24 hour   Intake 240 ml   Output 1015 ml   Net -775 ml        Physical Exam  Vitals and nursing note reviewed.   Constitutional:       General: He is not in acute distress.     Appearance: He is obese. He is not ill-appearing, toxic-appearing or diaphoretic.   HENT:      Head: Normocephalic and atraumatic.      Nose: Nose normal.   Eyes:      Extraocular Movements: Extraocular movements intact.      Conjunctiva/sclera: Conjunctivae normal.   Cardiovascular:      Rate and Rhythm: Normal rate and regular rhythm.      Comments: Anterior chest wall, lateral to the sternum on the right side, tenderness to palpation present and is reproducible  Pulmonary:      Effort: Pulmonary effort is normal.      Breath sounds: Normal breath sounds.   Abdominal:      General: Bowel sounds are normal. There is no distension.      Palpations: Abdomen is soft.      Tenderness: There is no abdominal tenderness. There is no guarding or rebound.   Musculoskeletal:         General: Normal range of motion.      Cervical back: Normal  range of motion and neck supple.   Skin:     Comments: Surgical site clean, dry and intact   Neurological:      General: No focal deficit present.      Mental Status: He is alert and oriented to person, place, and time.   Psychiatric:         Mood and Affect: Mood normal.         Thought Content: Thought content normal.       Significant Labs: All pertinent labs within the past 24 hours have been reviewed.    Significant Imaging: I have reviewed all pertinent imaging results/findings within the past 24 hours.

## 2022-04-15 NOTE — ASSESSMENT & PLAN NOTE
Status post L3-S1 anterior interbody fusion with posterior instrumentation  Pseudoarthrosis at L5-S1 with nonfusion and loosening of hardware  SI joint dysfunction  Chronic back pain  Fever   - Patient underwent the following procedure on 04/13/2022:  1. Removal of bilateral rods and bilateral S1 loose screws  2. L2-S1 posterior segmental instrumentation, [employer] and Verse  3. Sacral pelvic fixation using S2 trans SI joint iliac screws  4. Open bilateral SI joint fusion using the SI bone Bedrock implants  5. Registration of navigated instruments for posterior segmental instrumentation and sacral pelvic fixation using [employer] 3D c-arm and "[employer] navigation  6. Posterolateral arthrodesis at L2-3, L3-4, L4-5, L5-S1 bilaterally with allograft BMP and morselized bone chips  - Pain control and physical therapy, antibiotics ordered by primary  - As per primary

## 2022-04-15 NOTE — PT/OT/SLP PROGRESS
Physical Therapy Treatment    Patient Name:  Kirill Gandhi III   MRN:  [id number]    Recommendations:     Discharge Recommendations:  home health PT, home health OT   Discharge Equipment Recommendations: bath bench   Barriers to discharge: None    Assessment:     Kirill Gandhi III is a 46 y.o. male admitted with a medical diagnosis of Pseudoarthrosis of lumbar spine.  He presents with the following impairments/functional limitations:  weakness, impaired endurance, impaired sensation, impaired self care skills, impaired functional mobilty, impaired balance, gait instability, decreased upper extremity function, decreased lower extremity function, pain, impaired skin, impaired cardiopulmonary response to activity.  Good participation with therapy and progressing with PT POC appropriately.  Rec HH PT upon discharge to home with family support.      Rehab Prognosis: Good; patient would benefit from acute skilled PT services to address these deficits and reach maximum level of function.    Recent Surgery: Procedure(s) (LRB):  FUSION, SPINE, WITH INSTRUMENTATION Stg 1: Revision of posterior L3-S1 hardware, Depuy. Sacro-pelvic fixation, screw augmentation PMMA. L4-S1 posterolateral arthrodesis. Open Gauge screws, 11-12mm (N/A)  FUSION, SACROILIAC JOINT Stg 2: Bilateral Open SI Joint fusion, Bedrock from Si Bone (Bilateral)  FUSION, SPINE, LUMBAR, OLIF, MINIMALLY INVASIVE  REMOVAL IMPLANT, POSTERIOR SEGMENT, INTRAOCULAR 2 Days Post-Op    Plan:     During this hospitalization, patient to be seen daily to address the identified rehab impairments via gait training, therapeutic activities, therapeutic exercises, neuromuscular re-education and progress toward the following goals:    · Plan of Care Expires:  05/14/22    Subjective     Chief Complaint: reports 9/10 back pain at incision site - recently returning to bed following restroom use and reporting fatigue;  Reports no dizziness or nausea;  Calling out in  pain during transitional movement from sup>sit but once seated resolving and improved reports  Patient/Family Comments/goals: PLOF;  independence  Pain/Comfort:  · Pain Rating 1: 9/10  · Location - Side 1: Bilateral  · Location - Orientation 1: generalized  · Location 1: back  · Pain Addressed 1: Pre-medicate for activity, Reposition, Distraction, Cessation of Activity, Nurse notified  · Pain Rating Post-Intervention 1: 9/10      Objective:     Communicated with nsg prior to session.  Patient found supine with bed alarm, peripheral IV, telemetry upon PT entry to room.     General Precautions: Standard, fall   Orthopedic Precautions:spinal precautions   Braces: LSO  Respiratory Status: Room air     Functional Mobility:  · Bed mobility:  B rolling with CGA and VC technique  · Txfs:  Sup>sit x 2 trials req'd secondary to pain and pt's quick return to R side lying during initial attempt;  Raised HOB w txf with min/mod assist and then SBA once seated EOB;  Sit<>stand with CGA/min assist and VC technique from slightly elevated bed height;  Don/doff LSO while seated EOB w set up and min assist provided  · Pre gait and gait:  Static stance w RW without c/o dizziness and adjusted LSO w SBA/CGA and VC;  and amb with RW w CGA and assist with line management ~50' including change in direction with slow careful sivan, good upright posture and step through gait pattern      AM-PAC 6 CLICK MOBILITY  Turning over in bed (including adjusting bedclothes, sheets and blankets)?: 3  Sitting down on and standing up from a chair with arms (e.g., wheelchair, bedside commode, etc.): 3  Moving from lying on back to sitting on the side of the bed?: 3  Moving to and from a bed to a chair (including a wheelchair)?: 3  Need to walk in hospital room?: 3  Climbing 3-5 steps with a railing?: 2  Basic Mobility Total Score: 17       Therapeutic Activities and Exercises:   Ed provided for safety w tech w mobility, spinal precautions, LSO use and  don/doff tech, AD height adjustment and use; and mobility  in home setting and in hospital room with staff present to assist w pt and sig other verbalizing understanding of safety ed to have staff present for mobility activities;  Bed mob training, txfs training, pre gait and gait training, endurance and bal act;  Pt reporting/demo increased pain today during transitional movements which improved once in resting sup or seated or stance positions but demo improving overall endurance and independence levels w good participation throughout    Patient left supine with all lines intact, call button in reach, bed alarm on, nsg notified and girlfriend present..    GOALS:   Multidisciplinary Problems     Physical Therapy Goals        Problem: Physical Therapy    Goal Priority Disciplines Outcome Goal Variances Interventions   Physical Therapy Goal     PT, PT/OT Ongoing, Progressing     Description: Goals to be met by: discharge date     Patient will increase functional independence with mobility by performin. Supine to sit with Modified [place]  2. Sit to supine with Modified [place]  2. Rolling to Left and Right with Modified [place].  3. Sit to stand transfer with Modified [place]  4. Bed to chair transfer with Contact Guard Assistance using Rolling Walker  5. Gait  x 150 feet with Stand-by Assistance using Rolling Walker.                      Time Tracking:     PT Received On: 04/15/22  PT Start Time: 1028     PT Stop Time: 1058  PT Total Time (min): 30 min     Billable Minutes: Gait Training 17 and Therapeutic Activity 13    Treatment Type: Treatment, Family Training  PT/PTA: PT     PTA Visit Number: 0     04/15/2022

## 2022-04-15 NOTE — PT/OT/SLP PROGRESS
Occupational Therapy   Treatment    Name: Kirill Gandhi III  MRN: [id number]  Admitting Diagnosis:  Pseudoarthrosis of lumbar spine  2 Days Post-Op  Pre-op Diagnosis: Pseudoarthrosis of lumbar spine [S32.009K]  Sacroiliac joint dysfunction of both sides [M53.3] s/p Procedure(s):  FUSION, SPINE, WITH INSTRUMENTATION Stg 1: Revision of posterior L3-S1 hardware, Depuy. Sacro-pelvic fixation, screw augmentation PMMA. L4-S1 posterolateral arthrodesis. Open Gauge screws, 11-12mm  FUSION, SACROILIAC JOINT Stg 2: Bilateral Open SI Joint fusion, Bedrock from Si Bone  FUSION, SPINE, LUMBAR, OLIF, MINIMALLY INVASIVE  REMOVAL IMPLANT, POSTERIOR SEGMENT, INTRAOCULAR   The primary encounter diagnosis was Pseudoarthrosis of lumbar spine. A diagnosis of Chest pain was also pertinent to this visit.    Recommendations:     Discharge Recommendations: home health OT, home health PT  Discharge Equipment Recommendations:  bath bench (bariatric)  Barriers to discharge:  None    Assessment:     Kirill Gandhi III is a 46 y.o. male with a medical diagnosis of Pseudoarthrosis of lumbar spine.  He presents with Performance deficits affecting function are weakness, impaired endurance, impaired functional mobilty, gait instability, impaired self care skills, impaired balance, decreased lower extremity function, pain, impaired skin, orthopedic precautions.   Pt. making excellent progress toward OT goals. He ambulated CGA using bariatric RW to sink, bathroom and BS chair on opposite side of room. No LOB. LSO brace in place all times. Pt. donned LSO brace with SBA seated EOB. He completed toileting with Min A for clothes management after urination seated on toilet. He completed g/h standing at sink with CGA,. Pt. agreeable to sit UI for dinner, CGA needed for stand step t/f using bariatric RW to BS chair with 2 pillow placed in seat. Family present and observed session. All questions and concerns addressed within scope.     Rehab  Prognosis:  Good; patient would benefit from acute skilled OT services to address these deficits and reach maximum level of function.       Plan:     Patient to be seen 5 x/week to address the above listed problems via self-care/home management, therapeutic activities, therapeutic exercises  · Plan of Care Expires: 05/14/22  · Plan of Care Reviewed with: patient, spouse, son, mother    Subjective     Pain/Comfort:  · Pain Rating 1: 8/10  · Location - Side 1: Bilateral  · Location - Orientation 1: lower  · Location 1: back  · Pain Addressed 1: Pre-medicate for activity, Reposition, Distraction, Cessation of Activity, Nurse notified  · Pain Rating Post-Intervention 1: 7/10    Objective:     Communicated with: nurse prior to session.  Patient found HOB elevated with peripheral IV, bed alarm, telemetry upon OT entry to room.    General Precautions: Standard, fall, diabetic   Orthopedic Precautions:spinal precautions   Braces: LSO  Respiratory Status: Room air     Occupational Performance:     Bed Mobility:    · Patient completed Rolling/Turning to Right with contact guard assistance and with side rail, log roll tech for spinal precautions  · Patient completed Scooting/Bridging with contact guard assistance  · Patient completed Supine to Sit with contact guard assistance and with side rail     Functional Mobility/Transfers:  · Patient completed Sit <> Stand Transfer with contact guard assistance and minimum assistance  with bariatric rolling walker and GB from toilet  · Patient completed Bed <> Chair Transfer using Step Transfer technique with contact guard assistance with bariatric rolling walker  · Patient completed Toilet Transfer Step Transfer technique with contact guard assistance with bariatric rolling walker  · Functional Mobility: ambulated short distance in room with CGA using BRW to sink, bathroom, sink and then bs chair on opposite side of the room; min vc for spinal prec when turning to avoid twisting  trunk.    Activities of Daily Living:  · Grooming: contact guard assistance. Pt. washed hands, brushed teeth and washed face standing at sink inside BRW  · Upper Body Dressing: stand by assistance donned LSO brace seated unsupported on EOB  · Toileting: minimum assistance pull up shorts after urinated seated on toilet with BRW for PRN standing balacne assist      AMPAC 6 Click ADL: 19    Treatment & Education:  issued /instructed in gait belt use, RW for ambulatory ADLs in room standing at sink, toileting in bathroom, reeducated in spinal precautions, impt of sitting UIC for meals/OOB activity, post op body mechanics for standing at sink    Patient left up in chair with all lines intact, call button in reach and mother, S.O and son presentEducation:      GOALS:   Multidisciplinary Problems     Occupational Therapy Goals        Problem: Occupational Therapy    Goal Priority Disciplines Outcome Interventions   Occupational Therapy Goal     OT, PT/OT Ongoing, Progressing    Description: Goals to be met by: 4/30/2022    Patient will increase functional independence with ADLs by performing:    UE Dressing with Modified [place].  LE Dressing with Stand-by Assistance and Assistive Devices as needed.  Grooming while standing at sink with Stand-by Assistance.  Toileting from bedside commode with Stand-by Assistance for hygiene and clothing management.   Rolling to Bilateral with Modified [place] and use of bedrail as needed.   Supine to sit with Modified [place] and use of bedrail as needed.  Stand pivot transfers with Stand-by Assistance.  Toilet transfer to bedside commode with Stand-by Assistance.  Upper extremity exercise program x15 reps per handout, with independence.                     Time Tracking:     OT Date of Treatment: 04/15/22  OT Start Time: 1522  OT Stop Time: 1555  OT Total Time (min): 33 min    Billable Minutes:Self Care/Home Management 20  Therapeutic Activity 13  Total Time 33    OT/JORDYN:  OT          4/15/2022

## 2022-04-15 NOTE — PLAN OF CARE
Problem: Physical Therapy  Goal: Physical Therapy Goal  Description: Goals to be met by: discharge date     Patient will increase functional independence with mobility by performin. Supine to sit with Modified [place]  2. Sit to supine with Modified [place]  2. Rolling to Left and Right with Modified [place].  3. Sit to stand transfer with Modified [place]  4. Bed to chair transfer with Contact Guard Assistance using Rolling Walker  5. Gait  x 150 feet with Stand-by Assistance using Rolling Walker.     Outcome: Ongoing, Progressing     Good participation with therapy and progressing with PT POC appropriately.  Rec HH PT upon discharge to home with family support.

## 2022-04-16 LAB
POCT GLUCOSE: 159 MG/DL (ref 70–110)
POCT GLUCOSE: 224 MG/DL (ref 70–110)
POCT GLUCOSE: 231 MG/DL (ref 70–110)
POCT GLUCOSE: 231 MG/DL (ref 70–110)

## 2022-04-16 PROCEDURE — C9399 UNCLASSIFIED DRUGS OR BIOLOG: HCPCS | Performed by: HOSPITALIST

## 2022-04-16 PROCEDURE — 97116 GAIT TRAINING THERAPY: CPT | Mod: CQ

## 2022-04-16 PROCEDURE — 97110 THERAPEUTIC EXERCISES: CPT | Mod: CQ

## 2022-04-16 PROCEDURE — 25000003 PHARM REV CODE 250: Performed by: NEUROLOGICAL SURGERY

## 2022-04-16 PROCEDURE — 99900035 HC TECH TIME PER 15 MIN (STAT)

## 2022-04-16 PROCEDURE — 11000001 HC ACUTE MED/SURG PRIVATE ROOM

## 2022-04-16 PROCEDURE — 94660 CPAP INITIATION&MGMT: CPT

## 2022-04-16 PROCEDURE — 63600175 PHARM REV CODE 636 W HCPCS: Performed by: NEUROLOGICAL SURGERY

## 2022-04-16 PROCEDURE — 94761 N-INVAS EAR/PLS OXIMETRY MLT: CPT

## 2022-04-16 PROCEDURE — 25000003 PHARM REV CODE 250: Performed by: HOSPITALIST

## 2022-04-16 PROCEDURE — 94799 UNLISTED PULMONARY SVC/PX: CPT

## 2022-04-16 RX ORDER — HYDROMORPHONE HYDROCHLORIDE 2 MG/ML
2 INJECTION, SOLUTION INTRAMUSCULAR; INTRAVENOUS; SUBCUTANEOUS
Status: DISCONTINUED | OUTPATIENT
Start: 2022-04-16 | End: 2022-04-18 | Stop reason: HOSPADM

## 2022-04-16 RX ORDER — PSEUDOEPHEDRINE HCL 120 MG/1
120 TABLET, FILM COATED, EXTENDED RELEASE ORAL 2 TIMES DAILY
Status: COMPLETED | OUTPATIENT
Start: 2022-04-16 | End: 2022-04-16

## 2022-04-16 RX ORDER — METHOCARBAMOL 750 MG/1
750 TABLET, FILM COATED ORAL 3 TIMES DAILY
Status: DISCONTINUED | OUTPATIENT
Start: 2022-04-16 | End: 2022-04-18 | Stop reason: HOSPADM

## 2022-04-16 RX ORDER — INSULIN ASPART 100 [IU]/ML
14 INJECTION, SOLUTION INTRAVENOUS; SUBCUTANEOUS
Status: DISCONTINUED | OUTPATIENT
Start: 2022-04-16 | End: 2022-04-17

## 2022-04-16 RX ADMIN — INSULIN ASPART 4 UNITS: 100 INJECTION, SOLUTION INTRAVENOUS; SUBCUTANEOUS at 08:04

## 2022-04-16 RX ADMIN — HYDROMORPHONE HYDROCHLORIDE 2 MG: 2 INJECTION, SOLUTION INTRAMUSCULAR; INTRAVENOUS; SUBCUTANEOUS at 09:04

## 2022-04-16 RX ADMIN — GABAPENTIN 800 MG: 400 CAPSULE ORAL at 03:04

## 2022-04-16 RX ADMIN — METHOCARBAMOL 750 MG: 750 TABLET ORAL at 09:04

## 2022-04-16 RX ADMIN — HYDROMORPHONE HYDROCHLORIDE 2 MG: 2 INJECTION, SOLUTION INTRAMUSCULAR; INTRAVENOUS; SUBCUTANEOUS at 03:04

## 2022-04-16 RX ADMIN — CELECOXIB 200 MG: 100 CAPSULE ORAL at 08:04

## 2022-04-16 RX ADMIN — HYDROMORPHONE HYDROCHLORIDE 2 MG: 2 INJECTION, SOLUTION INTRAMUSCULAR; INTRAVENOUS; SUBCUTANEOUS at 08:04

## 2022-04-16 RX ADMIN — GABAPENTIN 800 MG: 400 CAPSULE ORAL at 08:04

## 2022-04-16 RX ADMIN — GABAPENTIN 800 MG: 400 CAPSULE ORAL at 12:04

## 2022-04-16 RX ADMIN — ATORVASTATIN CALCIUM 10 MG: 10 TABLET, FILM COATED ORAL at 08:04

## 2022-04-16 RX ADMIN — BUSPIRONE HYDROCHLORIDE 15 MG: 5 TABLET ORAL at 08:04

## 2022-04-16 RX ADMIN — DEXTROSE 3 G: 50 INJECTION, SOLUTION INTRAVENOUS at 04:04

## 2022-04-16 RX ADMIN — INSULIN DETEMIR 10 UNITS: 100 INJECTION, SOLUTION SUBCUTANEOUS at 12:04

## 2022-04-16 RX ADMIN — HEPARIN SODIUM 5000 UNITS: 5000 INJECTION INTRAVENOUS; SUBCUTANEOUS at 03:04

## 2022-04-16 RX ADMIN — DEXTROSE 3 G: 50 INJECTION, SOLUTION INTRAVENOUS at 09:04

## 2022-04-16 RX ADMIN — INSULIN ASPART 4 UNITS: 100 INJECTION, SOLUTION INTRAVENOUS; SUBCUTANEOUS at 12:04

## 2022-04-16 RX ADMIN — METHOCARBAMOL 750 MG: 750 TABLET ORAL at 03:04

## 2022-04-16 RX ADMIN — MUPIROCIN: 20 OINTMENT TOPICAL at 08:04

## 2022-04-16 RX ADMIN — CELECOXIB 200 MG: 100 CAPSULE ORAL at 12:04

## 2022-04-16 RX ADMIN — BUSPIRONE HYDROCHLORIDE 15 MG: 5 TABLET ORAL at 03:04

## 2022-04-16 RX ADMIN — PSEUDOEPHEDRINE HCL 120 MG: 120 TABLET, FILM COATED, EXTENDED RELEASE ORAL at 09:04

## 2022-04-16 RX ADMIN — INSULIN ASPART 10 UNITS: 100 INJECTION, SOLUTION INTRAVENOUS; SUBCUTANEOUS at 08:04

## 2022-04-16 RX ADMIN — PSEUDOEPHEDRINE HCL 120 MG: 120 TABLET, FILM COATED, EXTENDED RELEASE ORAL at 03:04

## 2022-04-16 RX ADMIN — INSULIN ASPART 4 UNITS: 100 INJECTION, SOLUTION INTRAVENOUS; SUBCUTANEOUS at 04:04

## 2022-04-16 RX ADMIN — DEXTROSE 3 G: 50 INJECTION, SOLUTION INTRAVENOUS at 05:04

## 2022-04-16 RX ADMIN — FLUOXETINE HYDROCHLORIDE 80 MG: 20 CAPSULE ORAL at 08:04

## 2022-04-16 RX ADMIN — BUSPIRONE HYDROCHLORIDE 15 MG: 5 TABLET ORAL at 12:04

## 2022-04-16 RX ADMIN — HEPARIN SODIUM 5000 UNITS: 5000 INJECTION INTRAVENOUS; SUBCUTANEOUS at 10:04

## 2022-04-16 RX ADMIN — ACETAMINOPHEN 650 MG: 325 TABLET ORAL at 06:04

## 2022-04-16 RX ADMIN — METHOCARBAMOL 750 MG: 750 TABLET ORAL at 08:04

## 2022-04-16 RX ADMIN — ACETAMINOPHEN 650 MG: 325 TABLET ORAL at 12:04

## 2022-04-16 RX ADMIN — ACETAMINOPHEN 650 MG: 325 TABLET ORAL at 05:04

## 2022-04-16 RX ADMIN — INSULIN ASPART 14 UNITS: 100 INJECTION, SOLUTION INTRAVENOUS; SUBCUTANEOUS at 12:04

## 2022-04-16 RX ADMIN — INSULIN ASPART 14 UNITS: 100 INJECTION, SOLUTION INTRAVENOUS; SUBCUTANEOUS at 04:04

## 2022-04-16 RX ADMIN — HEPARIN SODIUM 5000 UNITS: 5000 INJECTION INTRAVENOUS; SUBCUTANEOUS at 05:04

## 2022-04-16 NOTE — ASSESSMENT & PLAN NOTE
Status post L3-S1 anterior interbody fusion with posterior instrumentation  Pseudoarthrosis at L5-S1 with nonfusion and loosening of hardware  SI joint dysfunction  Chronic back pain  Fever   - Patient underwent the following procedure on 04/13/2022:  1. Removal of bilateral rods and bilateral S1 loose screws  2. L2-S1 posterior segmental instrumentation, Depuis Viper Prime and Verse  3. Sacral pelvic fixation using S2 trans SI joint iliac screws  4. Open bilateral SI joint fusion using the SI bone Bedrock implants  5. Registration of navigated instruments for posterior segmental instrumentation and sacral pelvic fixation using AMEE 3D c-arm and [place] navigation  6. Posterolateral arthrodesis at L2-3, L3-4, L4-5, L5-S1 bilaterally with allograft BMP and morselized bone chips  - Pain control and physical therapy, antibiotics ordered by primary  - Drain removed today and pain medications adjusted   - As per primary

## 2022-04-16 NOTE — PROGRESS NOTES
NEUROSURGICAL POST-OPERATIVE PROGRESS NOTE    DATE OF SERVICE:  04/16/2022      ATTENDING PHYSICIAN:  Jose L Brown MD    SUBJECTIVE:    INTERIM HISTORY:    This is a very pleasant 46 y.o. y.o. male, who is status postop day 3 L2 to S1 fusion revision and sacral pelvic fixation/SI joint fusion.   Doing well.  Able to mobilize without dizziness.  Pain poorly controlled due to low blood pressure and unable to get the pain medicine.  Was able to void his bladder normally.  Passing bowel movement.  No new onset of motor weakness or numbness.            OBJECTIVE:    PHYSICAL EXAMINATION:   Vitals:    04/16/22 0713   BP: (!) 121/59   Pulse: 78   Resp: 18   Temp: 98.3 °F (36.8 °C)       Physical Exam:  Vitals reviewed.    Constitutional: He appears well-developed and well-nourished.     Eyes: Pupils are equal, round, and reactive to light. Conjunctivae and EOM are normal.     Cardiovascular: Normal distal pulses and no edema.     Abdominal: Soft.     Skin: Skin displays no rash on trunk and no rash on extremities. Skin displays no lesions on trunk and no lesions on extremities.     Psych/Behavior: He is alert. He is oriented to person, place, and time. He has a normal mood and affect.     Musculoskeletal:        Neck: Range of motion is full.       Ortho Exam    Neurologic Exam     Mental Status   Oriented to person, place, and time.     Cranial Nerves     CN III, IV, VI   Pupils are equal, round, and reactive to light.  Extraocular motions are normal.     Motor Exam     Strength   Strength 5/5 throughout.       Incision CDI    DIAGNOSTIC DATA:    X-ray of the lumbar spine, AP and lateral views reveals the fusion hardware is intact. No loosening of screws or migration of hardware.    ASSESMENT:    This is a 46 y.o. male who is s/p day 3 L2 to pelvis fusion    Problem List Items Addressed This Visit        Neuro    * (Principal) Pseudoarthrosis of lumbar spine - Primary    Relevant Orders    Admit to Inpatient  (Completed)    Inpatient consult to [facility]    POCT glucose    If any glucose result is less than 50 or greater than 400:    If 2nd result is less than 50 or greater than 400:    Do not admin Aspart correction between scheduled prandial Aspart    Recheck Blood Glucose:    Diet diabetic [facility]; 1500 Calorie    Vital signs    Turn cough deep breathe    Oral Care    Neurovascular checks    Intake and output    Place sequential compression device    Chlorohexidine Gluconate Bath    Pulse Oximetry Q4H    PT evaluate and treat    OT evaluate and treat    PT assistance with ambulation    Drain - full suction    Document drain output    Hemovac drain (Completed)    LSO brace (Completed)    Patient must wear orthosis while sitting or standing    Discontinue Sandhu Catheter (1 day after surgery)    CBC auto differential (Completed)    Basic metabolic panel (Completed)    X-Ray Lumbar Spine Ap And Lateral (Completed)    Inpatient consult to Social Work    Transfer patient (Completed)    CPAP QHS    Transfer patient (Completed)    Brace application (Completed)    Basic metabolic panel (Completed)    CBC auto differential (Completed)    Incentive spirometry      Other Visit Diagnoses     Chest pain        Relevant Orders    EKG 12-lead (Completed)    EKG 12-lead (Completed)          PLAN:    Will change IV Dilaudid to subcu Dilaudid to decrease the chances of hypotension  Drain removed  Continue mobilizing with PT        Jose L Brown MD  Pager: [phone]

## 2022-04-16 NOTE — ASSESSMENT & PLAN NOTE
Hemoglobin A1C   Date Value Ref Range Status   02/21/2022 8.7 (H) 4.0 - 5.6 % Final   - Hold outpatient regimen   - Glucose levels were reviewed  - Increase detemir to 35 units daily and increase Novolog to 14 U QAC + continue moderate dose sliding scale  - Will continue to adjust insulin regimen as needed to achieve glucose between 140 and 180

## 2022-04-16 NOTE — PROGRESS NOTES
[facility] Medicine  Progress Note    Patient Name: Kirill Gandhi III  MRN: [id number]  Patient Class: IP- Inpatient   Admission Date: 4/13/2022  Length of Stay: 2 days  Attending Physician: Jose L Brown MD  Primary Care Provider: Marcos Ibarra MD        Subjective:     Principal Problem:Pseudoarthrosis of lumbar spine      Interval History:  Blood pressure low in the 90s that was responsive to fluids again overnight.  No chest pain.  No shortness of breath, no nausea or vomiting.  No new weakness or numbness or tingling.    Review of Systems   Constitutional:  Negative for chills and fever.   Respiratory:  Negative for shortness of breath.    Cardiovascular:  Negative for chest pain.   Objective:     Vital Signs (Most Recent):  Temp: 98.5 °F (36.9 °C) (04/15/22 0725)  Pulse: 84 (04/15/22 1132)  Resp: 18 (04/15/22 1533)  BP: (!) 111/51 (04/15/22 1132)  SpO2: 97 % (04/15/22 1132)   Vital Signs (24h Range):  Temp:  [98 °F (36.7 °C)-98.8 °F (37.1 °C)] 98.5 °F (36.9 °C)  Pulse:  [80-99] 84  Resp:  [17-21] 18  SpO2:  [94 %-98 %] 97 %  BP: (101-111)/(49-52) 111/51     Weight: (!) 166 kg (365 lb 15.4 oz)  Body mass index is 49.63 kg/m².    Intake/Output Summary (Last 24 hours) at 4/15/2022 1547  Last data filed at 4/15/2022 1200  Gross per 24 hour   Intake 240 ml   Output 1015 ml   Net -775 ml        Physical Exam  Vitals and nursing note reviewed.   Constitutional:       General: He is not in acute distress.     Appearance: He is obese. He is not ill-appearing, toxic-appearing or diaphoretic.   HENT:      Head: Normocephalic and atraumatic.      Nose: Nose normal.   Eyes:      Extraocular Movements: Extraocular movements intact.      Conjunctiva/sclera: Conjunctivae normal.   Cardiovascular:      Rate and Rhythm: Normal rate and regular rhythm.      Comments: Anterior chest wall, lateral to the sternum on the right side, tenderness to palpation present and is reproducible  Pulmonary:       Effort: Pulmonary effort is normal.      Breath sounds: Normal breath sounds.   Abdominal:      General: Bowel sounds are normal. There is no distension.      Palpations: Abdomen is soft.      Tenderness: There is no abdominal tenderness. There is no guarding or rebound.   Musculoskeletal:         General: Normal range of motion.      Cervical back: Normal range of motion and neck supple.   Skin:     Comments: Surgical site clean, dry and intact   Neurological:      General: No focal deficit present.      Mental Status: He is alert and oriented to person, place, and time.   Psychiatric:         Mood and Affect: Mood normal.         Thought Content: Thought content normal.       Significant Labs: All pertinent labs within the past 24 hours have been reviewed.    Significant Imaging: I have reviewed all pertinent imaging results/findings within the past 24 hours.      Assessment/Plan:      * Pseudoarthrosis of lumbar spine  Status post L3-S1 anterior interbody fusion with posterior instrumentation  Pseudoarthrosis at L5-S1 with nonfusion and loosening of hardware  SI joint dysfunction  Chronic back pain  Fever   - Patient underwent the following procedure on 04/13/2022:  1. Removal of bilateral rods and bilateral S1 loose screws  2. L2-S1 posterior segmental instrumentation, Depuis Viper Prime and Verse  3. Sacral pelvic fixation using S2 trans SI joint iliac screws  4. Open bilateral SI joint fusion using the SI bone Bedrock implants  5. Registration of navigated instruments for posterior segmental instrumentation and sacral pelvic fixation using lang[place]ojin 3D c-arm and 4 the stars navigation  6. Posterolateral arthrodesis at L2-3, L3-4, L4-5, L5-S1 bilaterally with allograft BMP and morselized bone chips  - Pain control and physical therapy, antibiotics ordered by primary  - As per primary      Chest pain, atypical  - Consult for chest pain that developed yesterday  - EKG unremarkable and troponin negative  - Pain is  reproducible with palpation and is likely musculoskeletal in origin due to long period of positioning on that side of the chest  - No further cardiac workup needed    Difficulty walking  - PT and OT    Depression  - Continue fluoxetine and buspirone    VICENTE (obstructive sleep apnea)  - CPAP QHS    Diabetes  Hemoglobin A1C   Date Value Ref Range Status   02/21/2022 8.7 (H) 4.0 - 5.6 % Final   - Hold outpatient regimen   - Glucose levels   - Increase detemir 25 units daily, add Novolog 10 U QAC + continue moderate dose sliding scale  - Will continue to adjust insulin regimen as needed to achieve glucose between 140 and 180        VTE Risk Mitigation (From admission, onward)         Ordered     heparin (porcine) injection 5,000 Units  Every 8 hours         04/13/22 1428     IP VTE HIGH RISK PATIENT  Once         04/13/22 1428     Place sequential compression device  Until discontinued         04/13/22 1428                      Erica Andrea MD  Department of Hospital Medicine   [facility]

## 2022-04-16 NOTE — PLAN OF CARE
Problem: Adult Inpatient Plan of Care  Goal: Plan of Care Review  Outcome: Ongoing, Progressing   Chart review complete.

## 2022-04-16 NOTE — PROGRESS NOTES
[facility] Medicine  Progress Note    Patient Name: Kirill Gandhi III  MRN: [id number]  Patient Class: IP- Inpatient   Admission Date: 4/13/2022  Length of Stay: 3 days  Attending Physician: Jose L Brown MD  Primary Care Provider: Marcos Ibarra MD        Subjective:     Principal Problem:Pseudoarthrosis of lumbar spine      Interval History:  Blood pressure low in the 90s again overnight after pain medications.  No chest pain.  No shortness of breath, no nausea or vomiting.  No new weakness or numbness or tingling.    Review of Systems   Constitutional:  Negative for chills and fever.   Respiratory:  Negative for shortness of breath.    Cardiovascular:  Negative for chest pain.   Objective:     Vital Signs (Most Recent):  Temp: 98.3 °F (36.8 °C) (04/16/22 0713)  Pulse: 78 (04/16/22 0713)  Resp: 18 (04/16/22 0950)  BP: (!) 121/59 (04/16/22 0713)  SpO2: 100 % (04/16/22 0812)   Vital Signs (24h Range):  Temp:  [97.6 °F (36.4 °C)-98.8 °F (37.1 °C)] 98.3 °F (36.8 °C)  Pulse:  [67-92] 78  Resp:  [17-20] 18  SpO2:  [95 %-100 %] 100 %  BP: ()/(40-59) 121/59     Weight: (!) 166 kg (365 lb 15.4 oz)  Body mass index is 49.63 kg/m².    Intake/Output Summary (Last 24 hours) at 4/16/2022 0957  Last data filed at 4/16/2022 0800  Gross per 24 hour   Intake 2077.97 ml   Output 2050 ml   Net 27.97 ml        Physical Exam  Vitals and nursing note reviewed.   Constitutional:       General: He is not in acute distress.     Appearance: He is obese. He is not ill-appearing, toxic-appearing or diaphoretic.   HENT:      Head: Normocephalic and atraumatic.      Nose: Nose normal.   Eyes:      Extraocular Movements: Extraocular movements intact.      Conjunctiva/sclera: Conjunctivae normal.   Cardiovascular:      Rate and Rhythm: Normal rate and regular rhythm.      Comments: Anterior chest wall, lateral to the sternum on the right side, tenderness to palpation present and is reproducible  Pulmonary:       Effort: Pulmonary effort is normal.      Breath sounds: Normal breath sounds.   Abdominal:      General: Bowel sounds are normal. There is no distension.      Palpations: Abdomen is soft.      Tenderness: There is no abdominal tenderness. There is no guarding or rebound.   Musculoskeletal:         General: Normal range of motion.      Cervical back: Normal range of motion and neck supple.   Skin:     Comments: Surgical site clean, dry and intact   Neurological:      General: No focal deficit present.      Mental Status: He is alert and oriented to person, place, and time.   Psychiatric:         Mood and Affect: Mood normal.         Thought Content: Thought content normal.       Significant Labs: All pertinent labs within the past 24 hours have been reviewed.    Significant Imaging: I have reviewed all pertinent imaging results/findings within the past 24 hours.      Assessment/Plan:      * Pseudoarthrosis of lumbar spine  Status post L3-S1 anterior interbody fusion with posterior instrumentation  Pseudoarthrosis at L5-S1 with nonfusion and loosening of hardware  SI joint dysfunction  Chronic back pain  Fever   - Patient underwent the following procedure on 04/13/2022:  1. Removal of bilateral rods and bilateral S1 loose screws  2. L2-S1 posterior segmental instrumentation, Depuis Viper Prime and Verse  3. Sacral pelvic fixation using S2 trans SI joint iliac screws  4. Open bilateral SI joint fusion using the SI bone Bedrock implants  5. Registration of navigated instruments for posterior segmental instrumentation and sacral pelvic fixation using Key[employer]ive 3D c-arm and [employer] navigation  6. Posterolateral arthrodesis at L2-3, L3-4, L4-5, L5-S1 bilaterally with allograft BMP and morselized bone chips  - Pain control and physical therapy, antibiotics ordered by primary  - Drain removed today and pain medications adjusted   - As per primary      Chest pain, atypical  - Consult for chest pain that developed yesterday  -  EKG unremarkable and troponin negative  - Pain is reproducible with palpation and is likely musculoskeletal in origin due to long period of positioning on that side of the chest  - No further cardiac workup needed    Difficulty walking  - PT and OT    Depression  - Continue fluoxetine and buspirone    VICENTE (obstructive sleep apnea)  - CPAP QHS    Diabetes  Hemoglobin A1C   Date Value Ref Range Status   02/21/2022 8.7 (H) 4.0 - 5.6 % Final   - Hold outpatient regimen   - Glucose levels were reviewed  - Increase detemir to 35 units daily and increase Novolog to 14 U QAC + continue moderate dose sliding scale  - Will continue to adjust insulin regimen as needed to achieve glucose between 140 and 180        VTE Risk Mitigation (From admission, onward)         Ordered     heparin (porcine) injection 5,000 Units  Every 8 hours         04/13/22 1428     IP VTE HIGH RISK PATIENT  Once         04/13/22 1428     Place sequential compression device  Until discontinued         04/13/22 1428                      Erica Andrea MD  Department of Hospital Medicine   [facility]

## 2022-04-16 NOTE — NURSING
Pt AAOx4. NADN. Resp even and unlabored. Skin warm and dry to touch. Hypotensive, received 500 ml bolus of LR. BP 86/40 after bolus H&H 7.8 and 25. Dr. Brown notified. BP now 99/48. All meds given per MAR. Safety maintained. Will continue to monitor.

## 2022-04-16 NOTE — NURSING
Patient c/o drowsiness and being sweaty. BP is 88/44, blood sugar 244. NP Tamela informed and put in order for 500cc bolus of LR.

## 2022-04-16 NOTE — PT/OT/SLP PROGRESS
"Physical Therapy Treatment    Patient Name:  Kirill Gandhi III   MRN:  [id number]    Recommendations:     Discharge Recommendations:  home health PT, home health OT   Discharge Equipment Recommendations: bath bench   Barriers to discharge: None    Assessment:     Kirill Gandhi III is a 46 y.o. male admitted with a medical diagnosis of Pseudoarthrosis of lumbar spine.  He presents with the following impairments/functional limitations:  weakness, impaired endurance, impaired functional mobilty, gait instability, impaired balance, decreased coordination, decreased upper extremity function, decreased lower extremity function, pain, decreased ROM, impaired skin, orthopedic precautions. Pt able to perform 3 ambulation training trials with Bariatric RW, LSO donned, requiring CGA/SBA. Would benefit from continued PT services to increase pt's independent functional mobility to level prior to admission.    Rehab Prognosis: Good; patient would benefit from acute skilled PT services to address these deficits and reach maximum level of function.    Recent Surgery: Procedure(s) (LRB):  FUSION, SPINE, WITH INSTRUMENTATION Stg 1: Revision of posterior L3-S1 hardware, Depuy. Sacro-pelvic fixation, screw augmentation PMMA. L4-S1 posterolateral arthrodesis. Open Gauge screws, 11-12mm (N/A)  FUSION, SACROILIAC JOINT Stg 2: Bilateral Open SI Joint fusion, Bedrock from Si Bone (Bilateral)  FUSION, SPINE, LUMBAR, OLIF, MINIMALLY INVASIVE  REMOVAL IMPLANT, POSTERIOR SEGMENT, INTRAOCULAR 3 Days Post-Op    Plan:     During this hospitalization, patient to be seen daily to address the identified rehab impairments via gait training, therapeutic activities, therapeutic exercises, neuromuscular re-education and progress toward the following goals:    · Plan of Care Expires:  05/14/22    Subjective     Chief Complaint: None Expressed  Patient/Family Comments/goals: "I feel I'm doing better today".  Pain/Comfort:  · Pain Rating 1:  " (Did not rate)  · Location - Side 1: Bilateral  · Location - Orientation 1: lower  · Location 1: back  · Pain Addressed 1: Pre-medicate for activity, Reposition, Distraction, Cessation of Activity, Nurse notified  · Pain Rating Post-Intervention 1:  (Did not rate)      Objective:     Communicated with  nurse prior to session.  Patient found supine with telemetry upon PT entry to room.     General Precautions: Standard, fall, diabetic   Orthopedic Precautions:spinal precautions   Braces: LSO  Respiratory Status: Room air     Functional Mobility:  · Bed Mobility:     · Rolling Right: stand by assistance  · Scooting: stand by assistance  · Supine to Sit: contact guard assistance  · Sit to Supine: contact guard assistance and  slight assistance to advance BLE's  · Transfers:     · Sit to Stand:  contact guard assistance with rolling walker and  Bariatric RW  · Gait:  3 trials ~15 ft, ~50 ft, and ~60 ft with bariatric RW, LSO donned, requiring CGA/SBA.      AM-PAC 6 CLICK MOBILITY  Turning over in bed (including adjusting bedclothes, sheets and blankets)?: 4  Sitting down on and standing up from a chair with arms (e.g., wheelchair, bedside commode, etc.): 3  Moving from lying on back to sitting on the side of the bed?: 3  Moving to and from a bed to a chair (including a wheelchair)?: 3  Need to walk in hospital room?: 3  Climbing 3-5 steps with a railing?: 2  Basic Mobility Total Score: 18       Therapeutic Activities and Exercises:  Pt able to sit at EOB and alex LSO with set up. Pt ambulated ~15 ft to bathroom. When finished pt able to complete self hygiene independently. Ambulated an additional ~50 ft and ~60 ft all trial with bariatric RW, LSO donned, requiring CGA/SBA. Seated therapeutic exercises 1 x 10 BLE's consisting of hip add/abd, LAQ's, and heel/toe raises. Standing heel raises 1 x 10 reps within bariatric RW boundaries.     Patient left supine with all lines intact, call button in reach and  nurse  notified..    GOALS:   Multidisciplinary Problems     Physical Therapy Goals        Problem: Physical Therapy    Goal Priority Disciplines Outcome Goal Variances Interventions   Physical Therapy Goal     PT, PT/OT Ongoing, Progressing     Description: Goals to be met by: discharge date     Patient will increase functional independence with mobility by performin. Supine to sit with Modified [place]  2. Sit to supine with Modified [place]  2. Rolling to Left and Right with Modified [place].  3. Sit to stand transfer with Modified [place]  4. Bed to chair transfer with Contact Guard Assistance using Rolling Walker  5. Gait  x 150 feet with Stand-by Assistance using Rolling Walker.                      Time Tracking:     PT Received On: 22  PT Start Time: 1301     PT Stop Time: 1334  PT Total Time (min): 33 min     Billable Minutes: Gait Training   21 and Therapeutic Exercise   12    Treatment Type: Treatment  PT/PTA: PTA     PTA Visit Number: 2022

## 2022-04-16 NOTE — SUBJECTIVE & OBJECTIVE
Interval History:  Blood pressure low in the 90s again overnight after pain medications.  No chest pain.  No shortness of breath, no nausea or vomiting.  No new weakness or numbness or tingling.    Review of Systems   Constitutional:  Negative for chills and fever.   Respiratory:  Negative for shortness of breath.    Cardiovascular:  Negative for chest pain.   Objective:     Vital Signs (Most Recent):  Temp: 98.3 °F (36.8 °C) (04/16/22 0713)  Pulse: 78 (04/16/22 0713)  Resp: 18 (04/16/22 0950)  BP: (!) 121/59 (04/16/22 0713)  SpO2: 100 % (04/16/22 0812)   Vital Signs (24h Range):  Temp:  [97.6 °F (36.4 °C)-98.8 °F (37.1 °C)] 98.3 °F (36.8 °C)  Pulse:  [67-92] 78  Resp:  [17-20] 18  SpO2:  [95 %-100 %] 100 %  BP: ()/(40-59) 121/59     Weight: (!) 166 kg (365 lb 15.4 oz)  Body mass index is 49.63 kg/m².    Intake/Output Summary (Last 24 hours) at 4/16/2022 0957  Last data filed at 4/16/2022 0800  Gross per 24 hour   Intake 2077.97 ml   Output 2050 ml   Net 27.97 ml        Physical Exam  Vitals and nursing note reviewed.   Constitutional:       General: He is not in acute distress.     Appearance: He is obese. He is not ill-appearing, toxic-appearing or diaphoretic.   HENT:      Head: Normocephalic and atraumatic.      Nose: Nose normal.   Eyes:      Extraocular Movements: Extraocular movements intact.      Conjunctiva/sclera: Conjunctivae normal.   Cardiovascular:      Rate and Rhythm: Normal rate and regular rhythm.      Comments: Anterior chest wall, lateral to the sternum on the right side, tenderness to palpation present and is reproducible  Pulmonary:      Effort: Pulmonary effort is normal.      Breath sounds: Normal breath sounds.   Abdominal:      General: Bowel sounds are normal. There is no distension.      Palpations: Abdomen is soft.      Tenderness: There is no abdominal tenderness. There is no guarding or rebound.   Musculoskeletal:         General: Normal range of motion.      Cervical back:  Normal range of motion and neck supple.   Skin:     Comments: Surgical site clean, dry and intact   Neurological:      General: No focal deficit present.      Mental Status: He is alert and oriented to person, place, and time.   Psychiatric:         Mood and Affect: Mood normal.         Thought Content: Thought content normal.       Significant Labs: All pertinent labs within the past 24 hours have been reviewed.    Significant Imaging: I have reviewed all pertinent imaging results/findings within the past 24 hours.

## 2022-04-16 NOTE — PLAN OF CARE
Problem: Physical Therapy  Goal: Physical Therapy Goal  Description: Goals to be met by: discharge date     Patient will increase functional independence with mobility by performin. Supine to sit with Modified [place]  2. Sit to supine with Modified [place]  2. Rolling to Left and Right with Modified [place].  3. Sit to stand transfer with Modified [place]  4. Bed to chair transfer with Contact Guard Assistance using Rolling Walker  5. Gait  x 150 feet with Stand-by Assistance using Rolling Walker.     Outcome: Ongoing, Progressing   Pt continues to work toward all goals. Able to perform 3 ambulation training trials ~15 ft, ~50 ft, ~ ~60 ft with  RW, LSO donned, requiring CGA/SBA.

## 2022-04-17 LAB
POCT GLUCOSE: 168 MG/DL (ref 70–110)
POCT GLUCOSE: 178 MG/DL (ref 70–110)
POCT GLUCOSE: 216 MG/DL (ref 70–110)
POCT GLUCOSE: 226 MG/DL (ref 70–110)
POCT GLUCOSE: 238 MG/DL (ref 70–110)

## 2022-04-17 PROCEDURE — 25000003 PHARM REV CODE 250: Performed by: HOSPITALIST

## 2022-04-17 PROCEDURE — C9399 UNCLASSIFIED DRUGS OR BIOLOG: HCPCS | Performed by: HOSPITALIST

## 2022-04-17 PROCEDURE — 63600175 PHARM REV CODE 636 W HCPCS: Performed by: NEUROLOGICAL SURGERY

## 2022-04-17 PROCEDURE — 99900035 HC TECH TIME PER 15 MIN (STAT)

## 2022-04-17 PROCEDURE — 94660 CPAP INITIATION&MGMT: CPT

## 2022-04-17 PROCEDURE — 97116 GAIT TRAINING THERAPY: CPT | Mod: CQ

## 2022-04-17 PROCEDURE — 94761 N-INVAS EAR/PLS OXIMETRY MLT: CPT

## 2022-04-17 PROCEDURE — 11000001 HC ACUTE MED/SURG PRIVATE ROOM

## 2022-04-17 PROCEDURE — 97110 THERAPEUTIC EXERCISES: CPT | Mod: CQ

## 2022-04-17 PROCEDURE — 25000003 PHARM REV CODE 250: Performed by: NEUROLOGICAL SURGERY

## 2022-04-17 RX ORDER — INSULIN ASPART 100 [IU]/ML
16 INJECTION, SOLUTION INTRAVENOUS; SUBCUTANEOUS
Status: DISCONTINUED | OUTPATIENT
Start: 2022-04-17 | End: 2022-04-18 | Stop reason: HOSPADM

## 2022-04-17 RX ADMIN — INSULIN ASPART 14 UNITS: 100 INJECTION, SOLUTION INTRAVENOUS; SUBCUTANEOUS at 10:04

## 2022-04-17 RX ADMIN — HEPARIN SODIUM 5000 UNITS: 5000 INJECTION INTRAVENOUS; SUBCUTANEOUS at 02:04

## 2022-04-17 RX ADMIN — MUPIROCIN: 20 OINTMENT TOPICAL at 09:04

## 2022-04-17 RX ADMIN — DEXTROSE 3 G: 50 INJECTION, SOLUTION INTRAVENOUS at 09:04

## 2022-04-17 RX ADMIN — HYDROMORPHONE HYDROCHLORIDE 2 MG: 2 INJECTION, SOLUTION INTRAMUSCULAR; INTRAVENOUS; SUBCUTANEOUS at 06:04

## 2022-04-17 RX ADMIN — INSULIN DETEMIR 5 UNITS: 100 INJECTION, SOLUTION SUBCUTANEOUS at 10:04

## 2022-04-17 RX ADMIN — HYDROMORPHONE HYDROCHLORIDE 2 MG: 2 INJECTION, SOLUTION INTRAMUSCULAR; INTRAVENOUS; SUBCUTANEOUS at 02:04

## 2022-04-17 RX ADMIN — METHOCARBAMOL 750 MG: 750 TABLET ORAL at 09:04

## 2022-04-17 RX ADMIN — BUSPIRONE HYDROCHLORIDE 15 MG: 5 TABLET ORAL at 02:04

## 2022-04-17 RX ADMIN — INSULIN ASPART 16 UNITS: 100 INJECTION, SOLUTION INTRAVENOUS; SUBCUTANEOUS at 04:04

## 2022-04-17 RX ADMIN — CELECOXIB 200 MG: 100 CAPSULE ORAL at 09:04

## 2022-04-17 RX ADMIN — INSULIN ASPART 4 UNITS: 100 INJECTION, SOLUTION INTRAVENOUS; SUBCUTANEOUS at 12:04

## 2022-04-17 RX ADMIN — ACETAMINOPHEN 650 MG: 325 TABLET ORAL at 06:04

## 2022-04-17 RX ADMIN — HEPARIN SODIUM 5000 UNITS: 5000 INJECTION INTRAVENOUS; SUBCUTANEOUS at 06:04

## 2022-04-17 RX ADMIN — GABAPENTIN 800 MG: 400 CAPSULE ORAL at 09:04

## 2022-04-17 RX ADMIN — INSULIN ASPART 2 UNITS: 100 INJECTION, SOLUTION INTRAVENOUS; SUBCUTANEOUS at 04:04

## 2022-04-17 RX ADMIN — HYDROMORPHONE HYDROCHLORIDE 2 MG: 2 INJECTION, SOLUTION INTRAMUSCULAR; INTRAVENOUS; SUBCUTANEOUS at 10:04

## 2022-04-17 RX ADMIN — ACETAMINOPHEN 650 MG: 325 TABLET ORAL at 12:04

## 2022-04-17 RX ADMIN — BUSPIRONE HYDROCHLORIDE 15 MG: 5 TABLET ORAL at 09:04

## 2022-04-17 RX ADMIN — ACETAMINOPHEN 650 MG: 325 TABLET ORAL at 05:04

## 2022-04-17 RX ADMIN — METHOCARBAMOL 750 MG: 750 TABLET ORAL at 02:04

## 2022-04-17 RX ADMIN — OXYCODONE 10 MG: 5 TABLET ORAL at 09:04

## 2022-04-17 RX ADMIN — OXYCODONE 10 MG: 5 TABLET ORAL at 12:04

## 2022-04-17 RX ADMIN — OXYCODONE 10 MG: 5 TABLET ORAL at 05:04

## 2022-04-17 RX ADMIN — HEPARIN SODIUM 5000 UNITS: 5000 INJECTION INTRAVENOUS; SUBCUTANEOUS at 09:04

## 2022-04-17 RX ADMIN — FLUOXETINE HYDROCHLORIDE 80 MG: 20 CAPSULE ORAL at 09:04

## 2022-04-17 RX ADMIN — GABAPENTIN 800 MG: 400 CAPSULE ORAL at 02:04

## 2022-04-17 RX ADMIN — INSULIN ASPART 16 UNITS: 100 INJECTION, SOLUTION INTRAVENOUS; SUBCUTANEOUS at 12:04

## 2022-04-17 RX ADMIN — INSULIN ASPART 4 UNITS: 100 INJECTION, SOLUTION INTRAVENOUS; SUBCUTANEOUS at 10:04

## 2022-04-17 RX ADMIN — DEXTROSE 3 G: 50 INJECTION, SOLUTION INTRAVENOUS at 02:04

## 2022-04-17 RX ADMIN — DEXTROSE 3 G: 50 INJECTION, SOLUTION INTRAVENOUS at 06:04

## 2022-04-17 NOTE — SUBJECTIVE & OBJECTIVE
Interval History:  Blood pressure better after change in pain medications.  No chest pain.  No shortness of breath, no nausea or vomiting.  No new weakness or numbness or tingling. Feeling much better.    Review of Systems   Constitutional:  Negative for chills and fever.   Respiratory:  Negative for shortness of breath.    Cardiovascular:  Negative for chest pain.   Objective:     Vital Signs (Most Recent):  Temp: 97.8 °F (36.6 °C) (04/17/22 0755)  Pulse: 78 (04/17/22 0755)  Resp: 18 (04/17/22 0755)  BP: (!) 124/58 (04/17/22 0755)  SpO2: 97 % (04/17/22 0735)   Vital Signs (24h Range):  Temp:  [97.8 °F (36.6 °C)-98.6 °F (37 °C)] 97.8 °F (36.6 °C)  Pulse:  [75-90] 78  Resp:  [14-18] 18  SpO2:  [95 %-100 %] 97 %  BP: ()/(46-60) 124/58     Weight: (!) 166 kg (365 lb 15.4 oz)  Body mass index is 49.63 kg/m².    Intake/Output Summary (Last 24 hours) at 4/17/2022 0948  Last data filed at 4/17/2022 0200  Gross per 24 hour   Intake 687.33 ml   Output 625 ml   Net 62.33 ml        Physical Exam  Vitals and nursing note reviewed.   Constitutional:       General: He is not in acute distress.     Appearance: He is obese. He is not ill-appearing, toxic-appearing or diaphoretic.   HENT:      Head: Normocephalic and atraumatic.      Nose: Nose normal.   Eyes:      Extraocular Movements: Extraocular movements intact.      Conjunctiva/sclera: Conjunctivae normal.   Cardiovascular:      Rate and Rhythm: Normal rate and regular rhythm.      Comments: Anterior chest wall, lateral to the sternum on the right side, tenderness to palpation resolved  Pulmonary:      Effort: Pulmonary effort is normal.      Breath sounds: Normal breath sounds.   Abdominal:      General: Bowel sounds are normal. There is no distension.      Palpations: Abdomen is soft.      Tenderness: There is no abdominal tenderness. There is no guarding or rebound.   Musculoskeletal:         General: Normal range of motion.      Cervical back: Normal range of motion  and neck supple.   Skin:     Comments: Surgical site clean, dry and intact   Neurological:      General: No focal deficit present.      Mental Status: He is alert and oriented to person, place, and time.   Psychiatric:         Mood and Affect: Mood normal.         Thought Content: Thought content normal.       Significant Labs: All pertinent labs within the past 24 hours have been reviewed.    Significant Imaging: I have reviewed all pertinent imaging results/findings within the past 24 hours.

## 2022-04-17 NOTE — PHARMACY MED REC
"Admission Medication History     The home medication history was taken by Lolly Torres PharmD.    Medication history obtained from patient    You may go to "Admission" then "Reconcile Home Medications" tabs to review and/or act upon these items.      The home medication list has been updated by the Pharmacy department.    Please read ALL comments highlighted in yellow.    Please address this information as you see fit.     Feel free to contact us if you have any questions or require assistance.      The medications listed below were removed from the home medication list.  Please reorder if appropriate:   Patient reports NOT TAKING the following medication(s):  o    N/A    o Patient reports he/she IS TAKING the following which was not ordered upon admit  o Ascorbic acid  o Aspirin 81 mg  o Metformin 1,000 mg QD  o Valsartan-HCTZ 320-12.5 mg QD    o Patient reports taking a DIFFERENT DRUG than that ordered upon admit  o N/A    o Patient reports taking a drug DIFFERENTLY than how ordered upon admit  o N/A      Potential issues to be addressed PRIOR TO DISCHARGE   Drug cost interfering with therapy (provide alternatives if possible)   Patient request for refills   Prescription could not be filled prior to admission   Patient lacks understanding of therapy      Lolly Torres PharmD.  [phone]                .          "

## 2022-04-17 NOTE — PROGRESS NOTES
NEUROSURGICAL POST-OPERATIVE PROGRESS NOTE    DATE OF SERVICE:  04/17/2022      ATTENDING PHYSICIAN:  Jose L Brown MD    SUBJECTIVE:    INTERIM HISTORY:    This is a very pleasant 46 y.o. y.o. male, who is status postop day 4 L2 to pelvis fusion.  Doing well.  Mobilizing well to Bactrim.  Overall appears feeling better.                OBJECTIVE:    PHYSICAL EXAMINATION:   Vitals:    04/17/22 1112   BP: 134/64   Pulse: 83   Resp: 18   Temp: 98.6 °F (37 °C)       Neurosurgery Physical Exam    Ortho Exam    Neurologic Exam     Motor Exam     Strength   Strength 5/5 throughout.       Incision clean and dry    DIAGNOSTIC DATA:    X-ray of the lumbar spine, AP and lateral views reveals the fusion hardware is intact. No loosening of screws or migration of hardware.    Blood glucose 226    ASSESMENT:    This is a 46 y.o. male who is s/p postop day for L2 to pelvis fusion, revision.  Doing well.    Problem List Items Addressed This Visit        Neuro    * (Principal) Pseudoarthrosis of lumbar spine - Primary    Relevant Orders    Admit to Inpatient (Completed)    Inpatient consult to [facility]    POCT glucose    If any glucose result is less than 50 or greater than 400:    If 2nd result is less than 50 or greater than 400:    Do not admin Aspart correction between scheduled prandial Aspart    Recheck Blood Glucose:    Diet diabetic [facility]; 1500 Calorie    Vital signs    Turn cough deep breathe    Oral Care    Neurovascular checks    Intake and output    Place sequential compression device    Chlorohexidine Gluconate Bath    Pulse Oximetry Q4H    PT evaluate and treat    OT evaluate and treat    PT assistance with ambulation    Drain - full suction    Document drain output    Hemovac drain (Completed)    LSO brace (Completed)    Patient must wear orthosis while sitting or standing    Discontinue Sandhu Catheter (1 day after surgery)    CBC auto differential (Completed)    Basic metabolic panel  (Completed)    X-Ray Lumbar Spine Ap And Lateral (Completed)    Inpatient consult to Social Work    Transfer patient (Completed)    CPAP QHS    Transfer patient (Completed)    Brace application (Completed)    Basic metabolic panel (Completed)    CBC auto differential (Completed)    Incentive spirometry      Other Visit Diagnoses     Chest pain        Relevant Orders    EKG 12-lead (Completed)    EKG 12-lead (Completed)          PLAN:    Discharge home today with home health PT/OT  Appreciate hospital medicine management  Blood glucose management per hospital medicine        Jose L Brown MD  Pager: [phone]

## 2022-04-17 NOTE — ASSESSMENT & PLAN NOTE
Status post L3-S1 anterior interbody fusion with posterior instrumentation  Pseudoarthrosis at L5-S1 with nonfusion and loosening of hardware  SI joint dysfunction  Chronic back pain  Fever   - Patient underwent the following procedure on 04/13/2022:  1. Removal of bilateral rods and bilateral S1 loose screws  2. L2-S1 posterior segmental instrumentation, Depuis Viper Prime and Verse  3. Sacral pelvic fixation using S2 trans SI joint iliac screws  4. Open bilateral SI joint fusion using the SI bone Bedrock implants  5. Registration of navigated instruments for posterior segmental instrumentation and sacral pelvic fixation using [employer] 3D c-arm and [employer] navigation  6. Posterolateral arthrodesis at L2-3, L3-4, L4-5, L5-S1 bilaterally with allograft BMP and morselized bone chips  - Pain control and physical therapy, antibiotics ordered by primary  - Drain removed 4/16/2022 and pain medications adjusted   - As per primary

## 2022-04-17 NOTE — ASSESSMENT & PLAN NOTE
- Consult for chest pain that developed post op  - EKG unremarkable and troponin negative  - Pain is reproducible with palpation and is likely musculoskeletal in origin due to long period of positioning on that side of the chest  - No further cardiac workup needed  - Resolved

## 2022-04-17 NOTE — ASSESSMENT & PLAN NOTE
Hemoglobin A1C   Date Value Ref Range Status   02/21/2022 8.7 (H) 4.0 - 5.6 % Final   - Hold outpatient regimen   - Glucose levels were reviewed  - Increase detemir to 40 units daily and increase Novolog to 16 U QAC + continue moderate dose sliding scale  - Will continue to adjust insulin regimen as needed to achieve glucose between 140 and 180

## 2022-04-17 NOTE — PLAN OF CARE
Patient A&Ox4. Island border dressing removed by Dr Brown lower back nguyen intact. LSO brace used when sitting up and walking. PRN pain medicine given. Call light within reach.

## 2022-04-17 NOTE — PLAN OF CARE
Problem: Physical Therapy  Goal: Physical Therapy Goal  Description: Goals to be met by: discharge date     Patient will increase functional independence with mobility by performin. Supine to sit with Modified [place]  2. Sit to supine with Modified [place]  2. Rolling to Left and Right with Modified [place].  3. Sit to stand transfer with Modified [place]  4. Bed to chair transfer with Contact Guard Assistance using Rolling Walker  5. Gait  x 150 feet with Stand-by Assistance using Rolling Walker.     Outcome: Ongoing, Progressing

## 2022-04-17 NOTE — PT/OT/SLP PROGRESS
Physical Therapy Treatment    Patient Name:  Kirill Gandhi III   MRN:  [id number]    Recommendations:     Discharge Recommendations:  home health PT   Discharge Equipment Recommendations: bath bench   Barriers to discharge: decreased mobility,strength and endurance    Assessment:     Kirill Gandhi III is a 46 y.o. male admitted with a medical diagnosis of Pseudoarthrosis of lumbar spine.  He presents with the following impairments/functional limitations:  weakness, impaired endurance, impaired functional mobilty, gait instability, impaired balance, decreased upper extremity function, decreased lower extremity function, decreased ROM, pain, impaired skin, orthopedic precautions,pt with improving status and may benefit from  services to address above functional limitations.    Rehab Prognosis: Good; patient would benefit from acute skilled PT services to address these deficits and reach maximum level of function.    Recent Surgery: Procedure(s) (LRB):  FUSION, SPINE, WITH INSTRUMENTATION Stg 1: Revision of posterior L3-S1 hardware, Depuy. Sacro-pelvic fixation, screw augmentation PMMA. L4-S1 posterolateral arthrodesis. Open Gauge screws, 11-12mm (N/A)  FUSION, SACROILIAC JOINT Stg 2: Bilateral Open SI Joint fusion, Bedrock from Si Bone (Bilateral)  FUSION, SPINE, LUMBAR, OLIF, MINIMALLY INVASIVE  REMOVAL IMPLANT, POSTERIOR SEGMENT, INTRAOCULAR 4 Days Post-Op    Plan:     During this hospitalization, patient to be seen daily to address the identified rehab impairments via gait training, therapeutic activities, therapeutic exercises, neuromuscular re-education and progress toward the following goals:    · Plan of Care Expires:  05/14/22    Subjective     Chief Complaint: n/a  Patient/Family Comments/goals: pt is feeling better.  Pain/Comfort:  · Pain Rating 1:  (no c/o's)      Objective:     Communicated with nsg prior to session.  Patient found EOB with telemetry upon PT entry to room.     General  Precautions: Standard, diabetic, fall   Orthopedic Precautions:spinal precautions   Braces: LSO  Respiratory Status: Room air     Functional Mobility:  · Transfers:     · Sit to Stand:  modified independence with rolling walker  · Gait: amb ~200' X 2 with RW and S with LSO donned  · Balance: fair standing balance with RW      AM-PAC 6 CLICK MOBILITY  Turning over in bed (including adjusting bedclothes, sheets and blankets)?: 4  Sitting down on and standing up from a chair with arms (e.g., wheelchair, bedside commode, etc.): 4  Moving from lying on back to sitting on the side of the bed?: 4  Moving to and from a bed to a chair (including a wheelchair)?: 3  Need to walk in hospital room?: 3  Climbing 3-5 steps with a railing?: 2  Basic Mobility Total Score: 20       Therapeutic Activities and Exercises: le seated laq's,hip flex and ap's X 10-12 reps and seated cervical ROM X 5-7 reps.       Patient left EOB with all lines intact, call button in reach, nsg notified and pt's mom present..    GOALS:   Multidisciplinary Problems     Physical Therapy Goals        Problem: Physical Therapy    Goal Priority Disciplines Outcome Goal Variances Interventions   Physical Therapy Goal     PT, PT/OT Ongoing, Progressing     Description: Goals to be met by: discharge date     Patient will increase functional independence with mobility by performin. Supine to sit with Modified [place]  2. Sit to supine with Modified [place]  2. Rolling to Left and Right with Modified [place].  3. Sit to stand transfer with Modified [place]  4. Bed to chair transfer with Contact Guard Assistance using Rolling Walker  5. Gait  x 150 feet with Stand-by Assistance using Rolling Walker.                      Time Tracking:     PT Received On: 22  PT Start Time: 1044     PT Stop Time: 1110  PT Total Time (min): 26 min     Billable Minutes: Gait Training 15 and Therapeutic Exercise 11    Treatment Type: Treatment  PT/PTA: PTA      PTA Visit Number: 2     04/17/2022

## 2022-04-17 NOTE — PROGRESS NOTES
[facility] Medicine  Progress Note    Patient Name: Kirill Gandhi III  MRN: [id number]  Patient Class: IP- Inpatient   Admission Date: 4/13/2022  Length of Stay: 4 days  Attending Physician: Jose L Brown MD  Primary Care Provider: Marcos Ibarra MD        Subjective:     Principal Problem:Pseudoarthrosis of lumbar spine      Interval History:  Blood pressure better after change in pain medications.  No chest pain.  No shortness of breath, no nausea or vomiting.  No new weakness or numbness or tingling. Feeling much better.    Review of Systems   Constitutional:  Negative for chills and fever.   Respiratory:  Negative for shortness of breath.    Cardiovascular:  Negative for chest pain.   Objective:     Vital Signs (Most Recent):  Temp: 97.8 °F (36.6 °C) (04/17/22 0755)  Pulse: 78 (04/17/22 0755)  Resp: 18 (04/17/22 0755)  BP: (!) 124/58 (04/17/22 0755)  SpO2: 97 % (04/17/22 0735)   Vital Signs (24h Range):  Temp:  [97.8 °F (36.6 °C)-98.6 °F (37 °C)] 97.8 °F (36.6 °C)  Pulse:  [75-90] 78  Resp:  [14-18] 18  SpO2:  [95 %-100 %] 97 %  BP: ()/(46-60) 124/58     Weight: (!) 166 kg (365 lb 15.4 oz)  Body mass index is 49.63 kg/m².    Intake/Output Summary (Last 24 hours) at 4/17/2022 0948  Last data filed at 4/17/2022 0200  Gross per 24 hour   Intake 687.33 ml   Output 625 ml   Net 62.33 ml        Physical Exam  Vitals and nursing note reviewed.   Constitutional:       General: He is not in acute distress.     Appearance: He is obese. He is not ill-appearing, toxic-appearing or diaphoretic.   HENT:      Head: Normocephalic and atraumatic.      Nose: Nose normal.   Eyes:      Extraocular Movements: Extraocular movements intact.      Conjunctiva/sclera: Conjunctivae normal.   Cardiovascular:      Rate and Rhythm: Normal rate and regular rhythm.      Comments: Anterior chest wall, lateral to the sternum on the right side, tenderness to palpation resolved  Pulmonary:      Effort:  Pulmonary effort is normal.      Breath sounds: Normal breath sounds.   Abdominal:      General: Bowel sounds are normal. There is no distension.      Palpations: Abdomen is soft.      Tenderness: There is no abdominal tenderness. There is no guarding or rebound.   Musculoskeletal:         General: Normal range of motion.      Cervical back: Normal range of motion and neck supple.   Skin:     Comments: Surgical site clean, dry and intact   Neurological:      General: No focal deficit present.      Mental Status: He is alert and oriented to person, place, and time.   Psychiatric:         Mood and Affect: Mood normal.         Thought Content: Thought content normal.       Significant Labs: All pertinent labs within the past 24 hours have been reviewed.    Significant Imaging: I have reviewed all pertinent imaging results/findings within the past 24 hours.      Assessment/Plan:      * Pseudoarthrosis of lumbar spine  Status post L3-S1 anterior interbody fusion with posterior instrumentation  Pseudoarthrosis at L5-S1 with nonfusion and loosening of hardware  SI joint dysfunction  Chronic back pain  Fever   - Patient underwent the following procedure on 04/13/2022:  1. Removal of bilateral rods and bilateral S1 loose screws  2. L2-S1 posterior segmental instrumentation, Depuis Viper Prime and Verse  3. Sacral pelvic fixation using S2 trans SI joint iliac screws  4. Open bilateral SI joint fusion using the SI bone Bedrock implants  5. Registration of navigated instruments for posterior segmental instrumentation and sacral pelvic fixation using Jump or Fall 3D c-arm and codetag navigation  6. Posterolateral arthrodesis at L2-3, L3-4, L4-5, L5-S1 bilaterally with allograft BMP and morselized bone chips  - Pain control and physical therapy, antibiotics ordered by primary  - Drain removed 4/16/2022 and pain medications adjusted   - As per primary      Chest pain, atypical  - Consult for chest pain that developed post op  - EKG  unremarkable and troponin negative  - Pain is reproducible with palpation and is likely musculoskeletal in origin due to long period of positioning on that side of the chest  - No further cardiac workup needed  - Resolved    Difficulty walking  - PT and OT    Depression  - Continue fluoxetine and buspirone    VICENTE (obstructive sleep apnea)  - CPAP QHS    Diabetes  Hemoglobin A1C   Date Value Ref Range Status   02/21/2022 8.7 (H) 4.0 - 5.6 % Final   - Hold outpatient regimen   - Glucose levels were reviewed  - Increase detemir to 40 units daily and increase Novolog to 16 U QAC + continue moderate dose sliding scale  - Will continue to adjust insulin regimen as needed to achieve glucose between 140 and 180        VTE Risk Mitigation (From admission, onward)         Ordered     heparin (porcine) injection 5,000 Units  Every 8 hours         04/13/22 1428     IP VTE HIGH RISK PATIENT  Once         04/13/22 1428     Place sequential compression device  Until discontinued         04/13/22 1428                      Erica Andrea MD  Department of Hospital Medicine   [facility] Surg

## 2022-04-18 VITALS
SYSTOLIC BLOOD PRESSURE: 128 MMHG | WEIGHT: 315 LBS | RESPIRATION RATE: 16 BRPM | HEIGHT: 72 IN | BODY MASS INDEX: 42.66 KG/M2 | DIASTOLIC BLOOD PRESSURE: 61 MMHG | HEART RATE: 78 BPM | TEMPERATURE: 98 F | OXYGEN SATURATION: 96 %

## 2022-04-18 LAB
POCT GLUCOSE: 158 MG/DL (ref 70–110)
POCT GLUCOSE: 259 MG/DL (ref 70–110)

## 2022-04-18 PROCEDURE — 97530 THERAPEUTIC ACTIVITIES: CPT | Mod: CQ

## 2022-04-18 PROCEDURE — 63600175 PHARM REV CODE 636 W HCPCS: Performed by: NEUROLOGICAL SURGERY

## 2022-04-18 PROCEDURE — 25000003 PHARM REV CODE 250: Performed by: NEUROLOGICAL SURGERY

## 2022-04-18 PROCEDURE — 99900035 HC TECH TIME PER 15 MIN (STAT)

## 2022-04-18 PROCEDURE — 94799 UNLISTED PULMONARY SVC/PX: CPT

## 2022-04-18 PROCEDURE — 94660 CPAP INITIATION&MGMT: CPT

## 2022-04-18 PROCEDURE — 97116 GAIT TRAINING THERAPY: CPT | Mod: CQ

## 2022-04-18 PROCEDURE — 94761 N-INVAS EAR/PLS OXIMETRY MLT: CPT

## 2022-04-18 RX ORDER — INSULIN ASPART 100 [IU]/ML
16 INJECTION, SOLUTION INTRAVENOUS; SUBCUTANEOUS
Qty: 15 ML | Refills: 0 | Status: SHIPPED | OUTPATIENT
Start: 2022-04-18 | End: 2022-04-28 | Stop reason: SDUPTHER

## 2022-04-18 RX ORDER — DIAZEPAM 5 MG/1
5 TABLET ORAL DAILY PRN
Qty: 30 TABLET | Refills: 0 | Status: SHIPPED | OUTPATIENT
Start: 2022-04-18 | End: 2022-09-15 | Stop reason: SDUPTHER

## 2022-04-18 RX ADMIN — OXYCODONE 10 MG: 5 TABLET ORAL at 11:04

## 2022-04-18 RX ADMIN — ATORVASTATIN CALCIUM 10 MG: 10 TABLET, FILM COATED ORAL at 09:04

## 2022-04-18 RX ADMIN — GABAPENTIN 800 MG: 400 CAPSULE ORAL at 09:04

## 2022-04-18 RX ADMIN — DEXTROSE 3 G: 50 INJECTION, SOLUTION INTRAVENOUS at 05:04

## 2022-04-18 RX ADMIN — BUSPIRONE HYDROCHLORIDE 15 MG: 5 TABLET ORAL at 09:04

## 2022-04-18 RX ADMIN — ACETAMINOPHEN 650 MG: 325 TABLET ORAL at 07:04

## 2022-04-18 RX ADMIN — OXYCODONE 10 MG: 5 TABLET ORAL at 01:04

## 2022-04-18 RX ADMIN — MUPIROCIN: 20 OINTMENT TOPICAL at 08:04

## 2022-04-18 RX ADMIN — ACETAMINOPHEN 650 MG: 325 TABLET ORAL at 01:04

## 2022-04-18 RX ADMIN — HEPARIN SODIUM 5000 UNITS: 5000 INJECTION INTRAVENOUS; SUBCUTANEOUS at 05:04

## 2022-04-18 RX ADMIN — METHOCARBAMOL 750 MG: 750 TABLET ORAL at 09:04

## 2022-04-18 RX ADMIN — FLUOXETINE HYDROCHLORIDE 80 MG: 20 CAPSULE ORAL at 09:04

## 2022-04-18 RX ADMIN — OXYCODONE 10 MG: 5 TABLET ORAL at 05:04

## 2022-04-18 RX ADMIN — CELECOXIB 200 MG: 100 CAPSULE ORAL at 09:04

## 2022-04-18 RX ADMIN — INSULIN ASPART 16 UNITS: 100 INJECTION, SOLUTION INTRAVENOUS; SUBCUTANEOUS at 09:04

## 2022-04-18 NOTE — PROGRESS NOTES
NEUROSURGICAL POST-OPERATIVE PROGRESS NOTE    DATE OF SERVICE:  04/18/2022      ATTENDING PHYSICIAN:  Jose L Brown MD    SUBJECTIVE:    INTERIM HISTORY:    This is a very pleasant 46 y.o. y.o. male, who is status post-op day 5 L2 to pelvis fusion.  Doing well.  Mobilizing well.  His pain is already better compared to before surgery.  No new onset of motor weakness or numbness.  Voiding well..               OBJECTIVE:    PHYSICAL EXAMINATION:   Vitals:    04/18/22 0808   BP:    Pulse: 77   Resp: 16   Temp:        Neurosurgery Physical Exam    Ortho Exam    Neurologic Exam    Incision CDI    DIAGNOSTIC DATA:    X-ray of the lumbar spine, AP and lateral views reveals the fusion hardware is intact. No loosening of screws or migration of hardware.    ASSESMENT:    This is a 46 y.o. male who is s/p day 5 L2 to pelvis fusion.  Doing well pain    Problem List Items Addressed This Visit        Neuro    * (Principal) Pseudoarthrosis of lumbar spine - Primary    Relevant Orders    Admit to Inpatient (Completed)    Inpatient consult to [facility]    POCT glucose    If any glucose result is less than 50 or greater than 400:    If 2nd result is less than 50 or greater than 400:    Do not admin Aspart correction between scheduled prandial Aspart    Recheck Blood Glucose:    Diet diabetic [facility]; 1500 Calorie    Vital signs    Turn cough deep breathe    Oral Care    Neurovascular checks    Intake and output    Place sequential compression device    Chlorohexidine Gluconate Bath    Pulse Oximetry Q4H    PT evaluate and treat    OT evaluate and treat    PT assistance with ambulation    Drain - full suction    Document drain output    Hemovac drain (Completed)    LSO brace (Completed)    Patient must wear orthosis while sitting or standing    Discontinue Sandhu Catheter (1 day after surgery)    CBC auto differential (Completed)    Basic metabolic panel (Completed)    X-Ray Lumbar Spine Ap And Lateral  (Completed)    Inpatient consult to Social Work    Transfer patient (Completed)    CPAP QHS    Transfer patient (Completed)    Brace application (Completed)    Basic metabolic panel (Completed)    CBC auto differential (Completed)    Incentive spirometry    Ambulatory referral/consult to Home Health    DISCHARGE PATIENT ATTENTION: Patient requires surgical mask upon discharge.      Other Visit Diagnoses     Chest pain        Relevant Orders    EKG 12-lead (Completed)    EKG 12-lead (Completed)          PLAN:  Discharging home today with home health PT/OT  All questions answered  See discharge instructions and prescriptions        Jose L Brown MD  Pager: [phone]

## 2022-04-18 NOTE — PLAN OF CARE
7276  CM informed the patient's mother, Aurora Gandhi ([phone]), via phone of  orders written. Aurora stated that she would like the patient to receive HH services from [facility]. Referral sent to [facility] via [facility].    1105  Patient resting quietly in bed with mother present when CM rounded. Patient in agreement with plan to discharge home with [facility] today, denied the need for assistance with transportation at time of discharge, & verbalized understanding regarding the appointments scheduled on 4/27/2022 to have an x-ray done at 1300 followed by a hospital follow up appointment with BOLIVAR Duggan at 1330. Patient awaiting bedside delivery of prescriptions. CM informed nurse Girish ([phone]) & virtual nurse Rio Quezada of the discharge status.       Will continue to follow.

## 2022-04-18 NOTE — DISCHARGE INSTRUCTIONS
Remain active with walking and other light activities daily.  No heavy lifting, no extreme bending or twisting.    No driving for 2 weeks  Okay to shower today, do not immerse wound in water

## 2022-04-18 NOTE — PT/OT/SLP PROGRESS
Physical Therapy Treatment    Patient Name:  Kirill Gandhi III   MRN:  [id number]    Recommendations:     Discharge Recommendations:  home health PT   Discharge Equipment Recommendations: bath bench   Barriers to discharge: decreased mobility,strength and endurance    Assessment:     Kirill Gandhi III is a 46 y.o. male admitted with a medical diagnosis of Pseudoarthrosis of lumbar spine.  He presents with the following impairments/functional limitations:  weakness, impaired endurance, gait instability, impaired functional mobilty, decreased ROM, impaired coordination, orthopedic precautions,pt with improving status and is being discharged with  services.    Rehab Prognosis: Good; patient would benefit from acute skilled PT services to address these deficits and reach maximum level of function.    Recent Surgery: Procedure(s) (LRB):  FUSION, SPINE, WITH INSTRUMENTATION Stg 1: Revision of posterior L3-S1 hardware, Depuy. Sacro-pelvic fixation, screw augmentation PMMA. L4-S1 posterolateral arthrodesis. Open Gauge screws, 11-12mm (N/A)  FUSION, SACROILIAC JOINT Stg 2: Bilateral Open SI Joint fusion, Bedrock from Si Bone (Bilateral)  FUSION, SPINE, LUMBAR, OLIF, MINIMALLY INVASIVE  REMOVAL IMPLANT, POSTERIOR SEGMENT, INTRAOCULAR 5 Days Post-Op    Plan:     During this hospitalization, patient to be seen daily to address the identified rehab impairments via gait training, therapeutic activities, therapeutic exercises, neuromuscular re-education and progress toward the following goals:    · Plan of Care Expires:  05/14/22    Subjective     Chief Complaint: n/a  Patient/Family Comments/goals: pt is going home today.  Pain/Comfort:  · Pain Rating 1: 0/10      Objective:     Communicated with nsg prior to session.  Patient found EOB with  (LSO) upon PT entry to room.     General Precautions: Standard, diabetic, fall   Orthopedic Precautions:spinal precautions   Braces: LSO  Respiratory Status: Room air      Functional Mobility:  · Transfers:     · Sit to Stand:  modified independence with rolling walker  · Gait: amb ~250' with RW and S with LSO  · Balance: good standing balance with RW      AM-PAC 6 CLICK MOBILITY  Turning over in bed (including adjusting bedclothes, sheets and blankets)?: 4  Sitting down on and standing up from a chair with arms (e.g., wheelchair, bedside commode, etc.): 4  Moving from lying on back to sitting on the side of the bed?: 4  Moving to and from a bed to a chair (including a wheelchair)?: 4  Need to walk in hospital room?: 4  Climbing 3-5 steps with a railing?: 3  Basic Mobility Total Score: 23       Therapeutic Activities and Exercises: reviewed pt safety and issued HEP upon discharge today.       Patient left EOB with all lines intact, call button in reach and pt's mom present..    GOALS:   Multidisciplinary Problems     Physical Therapy Goals        Problem: Physical Therapy    Goal Priority Disciplines Outcome Goal Variances Interventions   Physical Therapy Goal     PT, PT/OT Ongoing, Progressing     Description: Goals to be met by: discharge date     Patient will increase functional independence with mobility by performin. Supine to sit with Modified [place]  2. Sit to supine with Modified [place]  2. Rolling to Left and Right with Modified [place].  3. Sit to stand transfer with Modified [place]  4. Bed to chair transfer with Contact Guard Assistance using Rolling Walker  5. Gait  x 150 feet with Stand-by Assistance using Rolling Walker.                      Time Tracking:     PT Received On: 22  PT Start Time: 941     PT Stop Time: 1005  PT Total Time (min): 24 min     Billable Minutes: Gait Training 15 and Therapeutic Activity 9    Treatment Type: Treatment  PT/PTA: PTA     PTA Visit Number: 3     2022

## 2022-04-18 NOTE — PROGRESS NOTES
[facility]                    Pharmacy       Discharge Medication Education    Patient ACCEPTED medication education. Pharmacy has provided education on the name, indication, and possible side effects of the medication(s) prescribed, using teach-back method.     The following medications have also been discussed, during this admission.        Medication List        START taking these medications      LEVEMIR FLEXTOUCH U-100 INSULN 100 unit/mL (3 mL) Inpn pen  Generic drug: insulin detemir U-100  Inject 40 Units into the skin once daily.            CHANGE how you take these medications      diazePAM 5 MG tablet  Commonly known as: VALIUM  Take 1 tablet (5 mg total) by mouth daily as needed for Anxiety (muscle spasms).  What changed:   medication strength  how much to take  when to take this  reasons to take this     fluticasone propionate 50 mcg/actuation nasal spray  Commonly known as: FLONASE  2 sprays (100 mcg total) by Each Nostril route once daily.  What changed:   when to take this  reasons to take this     hydrOXYzine HCL 25 MG tablet  Commonly known as: ATARAX  TAKE 1 -2 TABS NIGHTLY AS NEEDED FOR INSOMNIA  What changed: See the new instructions.     insulin aspart U-100 100 unit/mL (3 mL) Inpn pen  Commonly known as: NovoLOG  Inject 16 Units into the skin 3 (three) times daily with meals.  What changed:   how much to take  how to take this  when to take this  additional instructions            CONTINUE taking these medications      acetaminophen 500 MG tablet  Commonly known as: TYLENOL     ascorbic acid (vitamin C) 1000 MG tablet  Commonly known as: VITAMIN C     aspirin 81 MG EC tablet  Commonly known as: ECOTRIN     atorvastatin 10 MG tablet  Commonly known as: LIPITOR  Take 1 tablet (10 mg total) by mouth every other day.     busPIRone 15 MG tablet  Commonly known as: BUSPAR  Take 1 tablet (15 mg total) by mouth 3 (three) times daily.     celecoxib 200 MG capsule  Commonly known as:  "CeleBREX  TAKE 1 CAPSULE BY MOUTH TWICE A DAY     DEXCOM G6 SENSOR [place]  Generic drug: blood-glucose sensor     DEXCOM G6 TRANSMITTER [place]  Generic drug: blood-glucose transmitter  Use to check glucose - 90 day supply     flash glucose scanning reader Misc  Disp one reader to monitor glucose- covered by insurance dexcom     flash glucose sensor Kit  Dispense sensor to read glucose  dexcom     FLUoxetine 40 MG capsule  TAKE 2 CAPSULES (80 MG TOTAL) BY MOUTH ONCE DAILY.     gabapentin 800 MG tablet  Commonly known as: NEURONTIN  Take 1 tablet (800 mg total) by mouth 3 (three) times daily.     multivitamin capsule     NON FORMULARY MEDICATION     ONETOUCH DELICA PLUS LANCET 33 gauge Misc  Generic drug: lancets     ONETOUCH VERIO TEST STRIPS Strp  Generic drug: blood sugar diagnostic     oxyCODONE-acetaminophen  mg per tablet  Commonly known as: PERCOCET  Take 1 tablet by mouth every 8 (eight) hours as needed for Pain.     pen needle, diabetic 31 gauge x 1/4" Ndle  Use with flex pen     tiZANidine 4 MG tablet  Commonly known as: ZANAFLEX  TAKE 1 TABLET (4 MG TOTAL) BY MOUTH EVERY 8 (EIGHT) HOURS AS NEEDED (SPASMS).     valsartan-hydrochlorothiazide 320-12.5 mg per tablet  Commonly known as: DIOVAN-HCT  Take 1 tablet by mouth once daily.     vitamin D 1000 units Tab  Commonly known as: VITAMIN D3            STOP taking these medications      metFORMIN 500 MG ER 24hr tablet  Commonly known as: GLUCOPHAGE-XR     TRULICITY 3 mg/0.5 mL pen injector  Generic drug: dulaglutide            ASK your doctor about these medications      fluvoxaMINE 100 MG tablet  Commonly known as: LUVOX  Take 1.5 tablets (150 mg total) by mouth 2 (two) times daily.               Where to Get Your Medications        These medications were sent to [facility] [address], [place] [address]      Hours: Mon-Fri, 8a-5:30p Phone: [phone]   diazePAM 5 MG tablet  insulin aspart U-100 100 unit/mL (3 mL) Inpn " pen  LEVEMIR FLEXTOUCH U-100 INSULN 100 unit/mL (3 mL) Inpn pen     Patient will discuss with physican about the Metformin being discontinued.     Thank you  Trish Pinto, PharmD  [phone]

## 2022-04-18 NOTE — PLAN OF CARE
Pt on RA with documented sats. Pt does IS well.  Ready for self instruct. Will continue to monitor.

## 2022-04-18 NOTE — ASSESSMENT & PLAN NOTE
Hemoglobin A1C   Date Value Ref Range Status   02/21/2022 8.7 (H) 4.0 - 5.6 % Final   - Hold outpatient regimen   - Glucose levels were reviewed  - Maintain detemir to 40 units daily and Novolog to 16 U QAC + continue moderate dose sliding scale  - He is well controlled on this regimen and he has requested prescription be sent to his pharmacy, which has been done  - Patient to follow up with his PCP for further adjustments

## 2022-04-18 NOTE — PLAN OF CARE
Discharge orders noted. Additional clinical references attached. Patient's discharge instructions given by bedside RN and reviewed by this VN. Education provided on home care instructions, diagnosis, when to seek medical attention, and follow-up appointments. New medications delivered by pharmacy. Medications reviewed at length with pt and wife, including self monitoring for s/s hyper- and hypoglycemia with starting new long acting insulin. Patient verbalized understanding and teach back method was used. Patient's ride/transportation home at bedside. All questions answered. Transport to [facility] requested. Floor nurse notified.

## 2022-04-18 NOTE — SUBJECTIVE & OBJECTIVE
Interval History:  Feels good, happy with his glucose levels. No shortness of breath, no nausea or vomiting.  No new weakness or numbness or tingling. Feeling much better. Ready to go home.    Review of Systems   Constitutional:  Negative for chills and fever.   Respiratory:  Negative for shortness of breath.    Cardiovascular:  Negative for chest pain.   Objective:     Vital Signs (Most Recent):  Temp: 97.7 °F (36.5 °C) (04/18/22 0726)  Pulse: 77 (04/18/22 0808)  Resp: 16 (04/18/22 0808)  BP: (!) 110/56 (04/18/22 0726)  SpO2: 100 % (04/18/22 0808)   Vital Signs (24h Range):  Temp:  [97.7 °F (36.5 °C)-98.7 °F (37.1 °C)] 97.7 °F (36.5 °C)  Pulse:  [72-91] 77  Resp:  [14-20] 16  SpO2:  [97 %-100 %] 100 %  BP: (104-134)/(50-64) 110/56     Weight: (!) 166 kg (365 lb 15.4 oz)  Body mass index is 49.63 kg/m².    Intake/Output Summary (Last 24 hours) at 4/18/2022 0844  Last data filed at 4/17/2022 1700  Gross per 24 hour   Intake 480 ml   Output 600 ml   Net -120 ml        Physical Exam  Vitals and nursing note reviewed.   Constitutional:       General: He is not in acute distress.     Appearance: He is obese. He is not ill-appearing, toxic-appearing or diaphoretic.   HENT:      Head: Normocephalic and atraumatic.      Nose: Nose normal.   Eyes:      Extraocular Movements: Extraocular movements intact.      Conjunctiva/sclera: Conjunctivae normal.   Cardiovascular:      Rate and Rhythm: Normal rate and regular rhythm.   Pulmonary:      Effort: Pulmonary effort is normal.      Breath sounds: Normal breath sounds.   Abdominal:      General: Bowel sounds are normal. There is no distension.      Palpations: Abdomen is soft.      Tenderness: There is no abdominal tenderness. There is no guarding or rebound.   Musculoskeletal:         General: Normal range of motion.      Cervical back: Normal range of motion and neck supple.   Skin:     Comments: Surgical site clean, dry and intact   Neurological:      General: No focal deficit  present.      Mental Status: He is alert and oriented to person, place, and time.   Psychiatric:         Mood and Affect: Mood normal.         Thought Content: Thought content normal.       Significant Labs: All pertinent labs within the past 24 hours have been reviewed.    Significant Imaging: I have reviewed all pertinent imaging results/findings within the past 24 hours.

## 2022-04-18 NOTE — ASSESSMENT & PLAN NOTE
Status post L3-S1 anterior interbody fusion with posterior instrumentation  Pseudoarthrosis at L5-S1 with nonfusion and loosening of hardware  SI joint dysfunction  Chronic back pain  Fever   - Patient underwent the following procedure on 04/13/2022:  1. Removal of bilateral rods and bilateral S1 loose screws  2. L2-S1 posterior segmental instrumentation, Depuis Viper Prime and Verse  3. Sacral pelvic fixation using S2 trans SI joint iliac screws  4. Open bilateral SI joint fusion using the SI bone Bedrock implants  5. Registration of navigated instruments for posterior segmental instrumentation and sacral pelvic fixation using [email or web address] 3D c-arm and Plex navigation  6. Posterolateral arthrodesis at L2-3, L3-4, L4-5, L5-S1 bilaterally with allograft BMP and morselized bone chips  - Pain control and physical therapy, antibiotics ordered by primary  - Drain removed 4/16/2022 and pain medications adjusted, doing well  - As per primary

## 2022-04-18 NOTE — PLAN OF CARE
Pt AAOx4. NADN. VSS. Respirations even and unlabored. Wears CPAP at night. 22g to LOYDA intact and SL. Plan of care explained. All meds given per MAR. LSO brace and walker used when ambulating. Incision clean and approximated, staples intact. Pt voiding without difficulty. Progress well. Will continue to monitor.

## 2022-04-18 NOTE — PROGRESS NOTES
[facility] Medicine  Progress Note    Patient Name: Kirill Gandhi III  MRN: [id number]  Patient Class: IP- Inpatient   Admission Date: 4/13/2022  Length of Stay: 5 days  Attending Physician: Jose L Brown MD  Primary Care Provider: Marcos Ibarra MD        Subjective:     Principal Problem:Pseudoarthrosis of lumbar spine      Interval History:  Feels good, happy with his glucose levels. No shortness of breath, no nausea or vomiting.  No new weakness or numbness or tingling. Feeling much better. Ready to go home.    Review of Systems   Constitutional:  Negative for chills and fever.   Respiratory:  Negative for shortness of breath.    Cardiovascular:  Negative for chest pain.   Objective:     Vital Signs (Most Recent):  Temp: 97.7 °F (36.5 °C) (04/18/22 0726)  Pulse: 77 (04/18/22 0808)  Resp: 16 (04/18/22 0808)  BP: (!) 110/56 (04/18/22 0726)  SpO2: 100 % (04/18/22 0808)   Vital Signs (24h Range):  Temp:  [97.7 °F (36.5 °C)-98.7 °F (37.1 °C)] 97.7 °F (36.5 °C)  Pulse:  [72-91] 77  Resp:  [14-20] 16  SpO2:  [97 %-100 %] 100 %  BP: (104-134)/(50-64) 110/56     Weight: (!) 166 kg (365 lb 15.4 oz)  Body mass index is 49.63 kg/m².    Intake/Output Summary (Last 24 hours) at 4/18/2022 0844  Last data filed at 4/17/2022 1700  Gross per 24 hour   Intake 480 ml   Output 600 ml   Net -120 ml        Physical Exam  Vitals and nursing note reviewed.   Constitutional:       General: He is not in acute distress.     Appearance: He is obese. He is not ill-appearing, toxic-appearing or diaphoretic.   HENT:      Head: Normocephalic and atraumatic.      Nose: Nose normal.   Eyes:      Extraocular Movements: Extraocular movements intact.      Conjunctiva/sclera: Conjunctivae normal.   Cardiovascular:      Rate and Rhythm: Normal rate and regular rhythm.   Pulmonary:      Effort: Pulmonary effort is normal.      Breath sounds: Normal breath sounds.   Abdominal:      General: Bowel sounds are normal. There  is no distension.      Palpations: Abdomen is soft.      Tenderness: There is no abdominal tenderness. There is no guarding or rebound.   Musculoskeletal:         General: Normal range of motion.      Cervical back: Normal range of motion and neck supple.   Skin:     Comments: Surgical site clean, dry and intact   Neurological:      General: No focal deficit present.      Mental Status: He is alert and oriented to person, place, and time.   Psychiatric:         Mood and Affect: Mood normal.         Thought Content: Thought content normal.       Significant Labs: All pertinent labs within the past 24 hours have been reviewed.    Significant Imaging: I have reviewed all pertinent imaging results/findings within the past 24 hours.      Assessment/Plan:      * Pseudoarthrosis of lumbar spine  Status post L3-S1 anterior interbody fusion with posterior instrumentation  Pseudoarthrosis at L5-S1 with nonfusion and loosening of hardware  SI joint dysfunction  Chronic back pain  Fever   - Patient underwent the following procedure on 04/13/2022:  1. Removal of bilateral rods and bilateral S1 loose screws  2. L2-S1 posterior segmental instrumentation, Depuis Viper Prime and Verse  3. Sacral pelvic fixation using S2 trans SI joint iliac screws  4. Open bilateral SI joint fusion using the SI bone Bedrock implants  5. Registration of navigated instruments for posterior segmental instrumentation and sacral pelvic fixation using [employer] 3D c-arm and [email or web address] navigation  6. Posterolateral arthrodesis at L2-3, L3-4, L4-5, L5-S1 bilaterally with allograft BMP and morselized bone chips  - Pain control and physical therapy, antibiotics ordered by primary  - Drain removed 4/16/2022 and pain medications adjusted, doing well  - As per primary      Chest pain, atypical  - Consult for chest pain that developed post op  - EKG unremarkable and troponin negative  - Pain is reproducible with palpation and is likely musculoskeletal in origin due to  long period of positioning on that side of the chest  - No further cardiac workup needed  - Resolved    Difficulty walking  - PT and OT    Depression  - Continue fluoxetine and buspirone    VICENTE (obstructive sleep apnea)  - CPAP QHS    Diabetes  Hemoglobin A1C   Date Value Ref Range Status   02/21/2022 8.7 (H) 4.0 - 5.6 % Final   - Hold outpatient regimen   - Glucose levels were reviewed  - Maintain detemir to 40 units daily and Novolog to 16 U QAC + continue moderate dose sliding scale  - He is well controlled on this regimen and he has requested prescription be sent to his pharmacy, which has been done  - Patient to follow up with his PCP for further adjustments        VTE Risk Mitigation (From admission, onward)         Ordered     heparin (porcine) injection 5,000 Units  Every 8 hours         04/13/22 1428     IP VTE HIGH RISK PATIENT  Once         04/13/22 1428     Place sequential compression device  Until discontinued         04/13/22 1428                Will sign off. Please re-consult if needed.      Erica Andrea MD  Department of Hospital Medicine   [facility]

## 2022-04-19 ENCOUNTER — PATIENT OUTREACH (OUTPATIENT)
Dept: ADMINISTRATIVE | Facility: CLINIC | Age: 47
End: 2022-04-19
Payer: COMMERCIAL

## 2022-04-19 ENCOUNTER — PATIENT MESSAGE (OUTPATIENT)
Dept: FAMILY MEDICINE | Facility: CLINIC | Age: 47
End: 2022-04-19
Payer: COMMERCIAL

## 2022-04-19 DIAGNOSIS — I10 PRIMARY HYPERTENSION: ICD-10-CM

## 2022-04-19 PROCEDURE — G0180 PR HOME HEALTH MD CERTIFICATION: ICD-10-PCS | Mod: ,,, | Performed by: NEUROLOGICAL SURGERY

## 2022-04-19 PROCEDURE — G0180 MD CERTIFICATION HHA PATIENT: HCPCS | Mod: ,,, | Performed by: NEUROLOGICAL SURGERY

## 2022-04-19 NOTE — PLAN OF CARE
[facility]  Discharge Final Note    Primary Care Provider: Marcos Ibarra MD    Expected Discharge Date: 4/18/2022    Final Discharge Note (most recent)       Final Note - 04/19/22 1632          Final Note    Assessment Type Final Discharge Note (P)      Anticipated Discharge Disposition Home-Health Care Svc (P)    Bo AGUILAR                    Important Message from Medicare             Contact Info       [facility]   Specialty: Radiology    [address]  [place] [address]   Phone: [phone]       Next Steps: Follow up on 4/27/2022    Instructions: at 1:00PM; scheduled x-ray    Lolly Duggan PA-C   Specialty: Neurosurgery, Spine Surgery   Relationship: Physician Assistant    [address]  [address]  [place] [address]   Phone: [phone]       Next Steps: Follow up on 4/27/2022    Instructions: at 1:30pm; scheduled neuro-surg hospital follow up appointment    [facility] [address] [address]   Phone: [phone]       Next Steps: Follow up on 4/19/2022    Instructions: will provide home health services

## 2022-04-19 NOTE — TELEPHONE ENCOUNTER
If you order the digital medicine program please advise the pt that he will have to go to his portal and complete the questionnaire and then they will contact him via portal with instructions      Make sure the problem list has DM and HTN listed

## 2022-04-19 NOTE — DISCHARGE SUMMARY
[facility]  Neurosurgery  Discharge Summary      Patient Name: Kirill Gandhi III  MRN: [id number]  Admission Date: 4/13/2022  Hospital Length of Stay: 5 days  Discharge Date and Time: 4/18/2022 12:44 PM  Attending Physician: No att. providers found   Discharging Provider: Jose L Brown MD  Primary Care Provider: Marcos Ibarra MD     HPI:This is a very pleasant 46 y.o. male, BMI of 48, who is status post L3-S1 OLIF with posterior instrumentation in April 2021 for status post lumbar laminectomy with degenerative disc disease and recurrent stenosis with radiculopathy.  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  Since his last lumbar fusion surgery he has been complaining of worsening bilateral buttock pain.  He had a few episodes of sudden leg weakness associated with sharp pain, mainly on the left side.  It happened at work when he was climbing stairs.  We proceeded with bilateral SI joint block and steroid injection that did not give him significant pain relief.  It is unclear if the patient had pain relief for the duration of the block.  Reviewing the fluoroscopy shots the left side injection was in the SI joint but the right side injection appeared to be superficial and subcapsular.  He has been doing physical therapy 3 times a week for 6 weeks for hip strengthening flexibility exercises, force closure exercises with partial pain relief.  He reports significant improvement in his back pain and ability to walk when he wears his SI joint belt.  He does not have worsening gait imbalance or sphincter dysfunction symptoms.     INTERIM HISTORY:     Been complaining of worsening back and buttock pain.  He also feels a pop in his back with certain movement.  He is in physical therapy and has notice improvement of the pain with massage therapy.  He is in a wheelchair today due to difficulty walking for prolonged period of time.    Procedure(s) (LRB):  1. Removal of bilateral rods and  bilateral S1 loose screws  2. L2-S1 posterior segmental instrumentation, Depuis Viper Prime and Verse  3. Sacral pelvic fixation using S2 trans SI joint iliac screws  4. Open bilateral SI joint fusion using the SI bone Bedrock implants  5. Registration of navigated instruments for posterior segmental instrumentation and sacral pelvic fixation using [employer] 3D c-arm and [employer] navigation  6. Posterolateral arthrodesis at L2-3, L3-4, L4-5, L5-S1 bilaterally with allograft BMP and morselized bone chips    Hospital Course:  Surgery was uncomplicated and postoperative course uneventful.  Following the surgery the patient was able to mobilize without difficulty.  His back pain was better than before surgery.  No new onset of motor weakness or numbness.  He participated well with physical therapy.  He was able to void his bladder without difficulty.    Consults:  PT/OT  Significant Diagnostic Studies:  Postop lumbar x-ray showed good position of hardware.    Pending Diagnostic Studies:     None        Final Active Diagnoses:    Diagnosis Date Noted POA    PRINCIPAL PROBLEM:  Pseudoarthrosis of lumbar spine [S32.009K] 04/13/2022 Yes    Chest pain, atypical [R07.89] 03/10/2022 Yes    Difficulty walking [R26.2] 12/14/2021 Yes    Depression [F32.A] 02/26/2020 Yes    VICENTE (obstructive sleep apnea) [G47.33] 06/17/2015 Yes    Diabetes [E11.9] 04/17/2015 Yes      Problems Resolved During this Admission:      Discharged Condition: good    Disposition: Home-Health Care [facility]    Follow Up:   Follow-up Information     [facility] Follow up on 4/27/2022.    Specialty: Radiology  Why: at 1:00PM; scheduled x-ray  Contact information:  [address]  [facility] [address] [phone]  Additional information:  Please park in [address] or D and use Kaye [address]oyd. Take [employer] elevators.           Lolly Duggan PA-C Follow up on 4/27/2022.    Specialties: Neurosurgery, Spine  Surgery  Why: at 1:30pm; scheduled neuro-surg hospital follow up appointment  Contact information:  [address] [facility]  [address]  [place] [address] [phone]             [facility] Follow up on 4/19/2022.    Why: will provide home health services  Contact information:  [address] [facility] [address] [phone]                     Patient Instructions:   No discharge procedures on file.  Medications:  Reconciled Home Medications:      Medication List      START taking these medications    LEVEMIR FLEXTOUCH U-100 INSULN 100 unit/mL (3 mL) Inpn pen  Generic drug: insulin detemir U-100  Inject 40 Units into the skin once daily.        CHANGE how you take these medications    diazePAM 5 MG tablet  Commonly known as: VALIUM  Take 1 tablet (5 mg total) by mouth daily as needed for Anxiety (muscle spasms).  What changed:   · medication strength  · how much to take  · when to take this  · reasons to take this     insulin aspart U-100 100 unit/mL (3 mL) Inpn pen  Commonly known as: NovoLOG  Inject 16 Units into the skin 3 (three) times daily with meals.  What changed:   · how much to take  · how to take this  · when to take this  · additional instructions        CONTINUE taking these medications    acetaminophen 500 MG tablet  Commonly known as: TYLENOL  Take 1,000 mg by mouth daily as needed for Pain.     ascorbic acid (vitamin C) 1000 MG tablet  Commonly known as: VITAMIN C  Take 1,000 mg by mouth once daily.     aspirin 81 MG EC tablet  Commonly known as: ECOTRIN  Take 81 mg by mouth every evening.     atorvastatin 10 MG tablet  Commonly known as: LIPITOR  Take 1 tablet (10 mg total) by mouth every other day.     busPIRone 15 MG tablet  Commonly known as: BUSPAR  Take 1 tablet (15 mg total) by mouth 3 (three) times daily.     celecoxib 200 MG capsule  Commonly known as: CeleBREX  TAKE 1 CAPSULE BY MOUTH TWICE A DAY     DEXCOM G6 SENSOR Cayla  Generic drug:  "blood-glucose sensor  SMARTSI Each Topical Every 10 Days     DEXCOM G6 TRANSMITTER Cayla  Generic drug: blood-glucose transmitter  Use to check glucose - 90 day supply     flash glucose scanning reader Misc  Disp one reader to monitor glucose- covered by insurance dexcom     flash glucose sensor Kit  Dispense sensor to read glucose  dexcom     FLUoxetine 40 MG capsule  TAKE 2 CAPSULES (80 MG TOTAL) BY MOUTH ONCE DAILY.     fluticasone propionate 50 mcg/actuation nasal spray  Commonly known as: FLONASE  2 sprays (100 mcg total) by Each Nostril route once daily.     gabapentin 800 MG tablet  Commonly known as: NEURONTIN  Take 1 tablet (800 mg total) by mouth 3 (three) times daily.     hydrOXYzine HCL 25 MG tablet  Commonly known as: ATARAX  TAKE 1 -2 TABS NIGHTLY AS NEEDED FOR INSOMNIA     multivitamin capsule  Take 1 capsule by mouth once daily.     NON FORMULARY MEDICATION  Uses Cpap Machine nightly on a setting of 10     ONETOUCH DELICA PLUS LANCET 33 gauge Misc  Generic drug: lancets  Apply topically 3 (three) times daily.     ONETOUCH VERIO TEST STRIPS Strp  Generic drug: blood sugar diagnostic  1 strip 3 (three) times daily.     oxyCODONE-acetaminophen  mg per tablet  Commonly known as: PERCOCET  Take 1 tablet by mouth every 8 (eight) hours as needed for Pain.     pen needle, diabetic 31 gauge x 1/4" Ndle  Use with flex pen     tiZANidine 4 MG tablet  Commonly known as: ZANAFLEX  TAKE 1 TABLET (4 MG TOTAL) BY MOUTH EVERY 8 (EIGHT) HOURS AS NEEDED (SPASMS).     valsartan-hydrochlorothiazide 320-12.5 mg per tablet  Commonly known as: DIOVAN-HCT  Take 1 tablet by mouth once daily.     vitamin D 1000 units Tab  Commonly known as: VITAMIN D3  Take 5,000 Units by mouth 2 (two) times a day.        STOP taking these medications    metFORMIN 500 MG ER 24hr tablet  Commonly known as: GLUCOPHAGE-XR     TRULICITY 3 mg/0.5 mL pen injector  Generic drug: dulaglutide        ASK your doctor about these medications  "   fluvoxaMINE 100 MG tablet  Commonly known as: LUVOX  Take 1.5 tablets (150 mg total) by mouth 2 (two) times daily.            Jose L Brown MD  Neurosurgery  [facility]

## 2022-04-19 NOTE — PROGRESS NOTES
C3 nurse spoke with Kirill Gandhi III   for a TCC post hospital discharge follow up call. The patient has a scheduled HOSFU appointment with.Marcos Ibarra MD  on 4/28/22 @ 940am.    Please do not reply to this message, as this inbox is not routinely monitored.

## 2022-04-20 PROBLEM — I10 PRIMARY HYPERTENSION: Status: ACTIVE | Noted: 2022-04-20

## 2022-04-20 NOTE — PHYSICIAN QUERY
PT Name: Kirill Gandhi III  MR #: [id number]    DOCUMENTATION CLARIFICATION      CDS/: Rossy Marinelli RN, CDS   Contact Information: [email or web address]    This form is a permanent document in the medical record.      Query Date: April 20, 2022    By submitting this query, we are merely seeking further clarification of documentation. Please utilize your independent clinical judgment when addressing the question(s) below.    The Medical Record contains the following:   Indicators  Supporting Clinical Findings Location in Medical Record    Anemia documented     x H&H H&H  12.7/40.4  H&H    7.7/24.5  H&H    7.8/25.0 Labs 3/7/22  Labs 4/14  Labs 4/15   x BP                    HR Arterial Line BP: ()/(41-76) Pulse:  []  Arterial Line BP: ()/(37-76)  Pulse:  []  BP: (101-111)/(49-52)   Pulse:  [80-99]  BP: ()/(40-59)  Pulse:  [67-92]  BP: (104-134)/(50-64)  Pulse:  [72-91] Hospital Medicine 4/13  Hospital Medicine 4/14  [facility] Hospital Medicine 4/15  Hospital Medicine 4/16  [facility] Hospital Medicine 4/18    GI bleeding documented      Acute bleeding (Non GI site)     x Transfusion(s) Transfused with 1u PRBC's  Blood bank record 4/14   x Acute/Chronic illness Pseudoarthrosis of lumbar spine,Loosening of bone fixation device,  DM, VICENTE,   Atypical chest pain onset 4/13-Pain is reproducible with palpation and is likely musculoskeletal in origin due to long period of positioning on that side of the chest H&P    Hospital Medicine 4/14   x Treatments Lactated ringers bolus 1000 mL  Lactated ringers bolus 500 mL  given twice  Lactated ringers bolus 500 mL MAR 4/13  MAR 4/14  MAR 4/15   x Other 1. Removal of bilateral rods and bilateral S1 loose screws  2. L2-S1 posterior segmental instrumentation, Depuis Viper Prime and Verse  3. Sacral pelvic fixation using S2 trans SI joint iliac screws  4. Open bilateral SI joint fusion using the SI bone Bedrock implants  5. Registration of navigated  instruments for posterior segmental instrumentation and sacral pelvic fixation using [employer] 3D c-arm and [employer] navigation  6. Posterolateral arthrodesis at L2-3, L3-4, L4-5, L5-S1 bilaterally with allograft BMP and morselized bone chips   mL OP note 4/13     Provider, please specify diagnosis or diagnoses associated with above clinical findings.   [  X ] Acute blood loss anemia expected post-operatively    [   ] Anemia, unspecified    [   ] Other Hematological Diagnosis (please specify): _________________   [   ] Clinically Undetermined              Please document in your progress notes daily for the duration of treatment, until resolved, and include in your discharge summary.    Form No. [id number]

## 2022-04-27 ENCOUNTER — HOSPITAL ENCOUNTER (OUTPATIENT)
Dept: RADIOLOGY | Facility: HOSPITAL | Age: 47
Discharge: HOME | End: 2022-04-27
Attending: NEUROLOGICAL SURGERY
Payer: COMMERCIAL

## 2022-04-27 ENCOUNTER — OFFICE VISIT (OUTPATIENT)
Dept: NEUROSURGERY | Facility: CLINIC | Age: 47
End: 2022-04-27
Payer: COMMERCIAL

## 2022-04-27 VITALS
DIASTOLIC BLOOD PRESSURE: 63 MMHG | WEIGHT: 315 LBS | HEART RATE: 81 BPM | HEIGHT: 72 IN | SYSTOLIC BLOOD PRESSURE: 94 MMHG | BODY MASS INDEX: 42.66 KG/M2

## 2022-04-27 DIAGNOSIS — Q74.2 FUSION CONGENITAL, SACROILIAC JOINT: ICD-10-CM

## 2022-04-27 DIAGNOSIS — Z98.1 S/P LUMBAR SPINAL FUSION: Primary | ICD-10-CM

## 2022-04-27 DIAGNOSIS — Z98.1 S/P LUMBAR SPINAL FUSION: ICD-10-CM

## 2022-04-27 PROCEDURE — 3008F BODY MASS INDEX DOCD: CPT | Mod: CPTII,S$GLB,, | Performed by: PHYSICIAN ASSISTANT

## 2022-04-27 PROCEDURE — 99024 PR POST-OP FOLLOW-UP VISIT: ICD-10-PCS | Mod: S$GLB,,, | Performed by: PHYSICIAN ASSISTANT

## 2022-04-27 PROCEDURE — 1159F PR MEDICATION LIST DOCUMENTED IN MEDICAL RECORD: ICD-10-PCS | Mod: CPTII,S$GLB,, | Performed by: PHYSICIAN ASSISTANT

## 2022-04-27 PROCEDURE — 3052F HG A1C>EQUAL 8.0%<EQUAL 9.0%: CPT | Mod: CPTII,S$GLB,, | Performed by: PHYSICIAN ASSISTANT

## 2022-04-27 PROCEDURE — 99999 PR PBB SHADOW E&M-EST. PATIENT-LVL III: ICD-10-PCS | Mod: PBBFAC,,, | Performed by: PHYSICIAN ASSISTANT

## 2022-04-27 PROCEDURE — 72100 X-RAY EXAM L-S SPINE 2/3 VWS: CPT | Mod: 26,,, | Performed by: RADIOLOGY

## 2022-04-27 PROCEDURE — 72100 X-RAY EXAM L-S SPINE 2/3 VWS: CPT | Mod: TC,FY

## 2022-04-27 PROCEDURE — 99024 POSTOP FOLLOW-UP VISIT: CPT | Mod: S$GLB,,, | Performed by: PHYSICIAN ASSISTANT

## 2022-04-27 PROCEDURE — 3072F PR LOW RISK FOR RETINOPATHY: ICD-10-PCS | Mod: CPTII,S$GLB,, | Performed by: PHYSICIAN ASSISTANT

## 2022-04-27 PROCEDURE — 1159F MED LIST DOCD IN RCRD: CPT | Mod: CPTII,S$GLB,, | Performed by: PHYSICIAN ASSISTANT

## 2022-04-27 PROCEDURE — 3008F PR BODY MASS INDEX (BMI) DOCUMENTED: ICD-10-PCS | Mod: CPTII,S$GLB,, | Performed by: PHYSICIAN ASSISTANT

## 2022-04-27 PROCEDURE — 3052F PR MOST RECENT HEMOGLOBIN A1C LEVEL 8.0 - < 9.0%: ICD-10-PCS | Mod: CPTII,S$GLB,, | Performed by: PHYSICIAN ASSISTANT

## 2022-04-27 PROCEDURE — 3078F PR MOST RECENT DIASTOLIC BLOOD PRESSURE < 80 MM HG: ICD-10-PCS | Mod: CPTII,S$GLB,, | Performed by: PHYSICIAN ASSISTANT

## 2022-04-27 PROCEDURE — 3074F SYST BP LT 130 MM HG: CPT | Mod: CPTII,S$GLB,, | Performed by: PHYSICIAN ASSISTANT

## 2022-04-27 PROCEDURE — 1160F PR REVIEW ALL MEDS BY PRESCRIBER/CLIN PHARMACIST DOCUMENTED: ICD-10-PCS | Mod: CPTII,S$GLB,, | Performed by: PHYSICIAN ASSISTANT

## 2022-04-27 PROCEDURE — 1160F RVW MEDS BY RX/DR IN RCRD: CPT | Mod: CPTII,S$GLB,, | Performed by: PHYSICIAN ASSISTANT

## 2022-04-27 PROCEDURE — 3072F LOW RISK FOR RETINOPATHY: CPT | Mod: CPTII,S$GLB,, | Performed by: PHYSICIAN ASSISTANT

## 2022-04-27 PROCEDURE — 3078F DIAST BP <80 MM HG: CPT | Mod: CPTII,S$GLB,, | Performed by: PHYSICIAN ASSISTANT

## 2022-04-27 PROCEDURE — 99999 PR PBB SHADOW E&M-EST. PATIENT-LVL III: CPT | Mod: PBBFAC,,, | Performed by: PHYSICIAN ASSISTANT

## 2022-04-27 PROCEDURE — 3074F PR MOST RECENT SYSTOLIC BLOOD PRESSURE < 130 MM HG: ICD-10-PCS | Mod: CPTII,S$GLB,, | Performed by: PHYSICIAN ASSISTANT

## 2022-04-27 PROCEDURE — 72100 XR LUMBAR SPINE AP AND LATERAL: ICD-10-PCS | Mod: 26,,, | Performed by: RADIOLOGY

## 2022-04-28 ENCOUNTER — OFFICE VISIT (OUTPATIENT)
Dept: FAMILY MEDICINE | Facility: CLINIC | Age: 47
End: 2022-04-28
Payer: COMMERCIAL

## 2022-04-28 ENCOUNTER — PATIENT MESSAGE (OUTPATIENT)
Dept: NEUROSURGERY | Facility: CLINIC | Age: 47
End: 2022-04-28
Payer: COMMERCIAL

## 2022-04-28 VITALS
BODY MASS INDEX: 42.66 KG/M2 | SYSTOLIC BLOOD PRESSURE: 130 MMHG | TEMPERATURE: 99 F | OXYGEN SATURATION: 97 % | DIASTOLIC BLOOD PRESSURE: 70 MMHG | HEART RATE: 85 BPM | HEIGHT: 72 IN | WEIGHT: 315 LBS

## 2022-04-28 DIAGNOSIS — E11.8 TYPE 2 DIABETES MELLITUS WITH COMPLICATION, WITHOUT LONG-TERM CURRENT USE OF INSULIN: Primary | ICD-10-CM

## 2022-04-28 DIAGNOSIS — Z72.0 TOBACCO USE: ICD-10-CM

## 2022-04-28 PROCEDURE — 99214 PR OFFICE/OUTPT VISIT, EST, LEVL IV, 30-39 MIN: ICD-10-PCS | Mod: S$GLB,,, | Performed by: FAMILY MEDICINE

## 2022-04-28 PROCEDURE — 3072F PR LOW RISK FOR RETINOPATHY: ICD-10-PCS | Mod: CPTII,S$GLB,, | Performed by: FAMILY MEDICINE

## 2022-04-28 PROCEDURE — 3008F PR BODY MASS INDEX (BMI) DOCUMENTED: ICD-10-PCS | Mod: CPTII,S$GLB,, | Performed by: FAMILY MEDICINE

## 2022-04-28 PROCEDURE — 3078F DIAST BP <80 MM HG: CPT | Mod: CPTII,S$GLB,, | Performed by: FAMILY MEDICINE

## 2022-04-28 PROCEDURE — 1111F PR DISCHARGE MEDS RECONCILED W/ CURRENT OUTPATIENT MED LIST: ICD-10-PCS | Mod: CPTII,S$GLB,, | Performed by: FAMILY MEDICINE

## 2022-04-28 PROCEDURE — 3072F LOW RISK FOR RETINOPATHY: CPT | Mod: CPTII,S$GLB,, | Performed by: FAMILY MEDICINE

## 2022-04-28 PROCEDURE — 3075F PR MOST RECENT SYSTOLIC BLOOD PRESS GE 130-139MM HG: ICD-10-PCS | Mod: CPTII,S$GLB,, | Performed by: FAMILY MEDICINE

## 2022-04-28 PROCEDURE — 3052F HG A1C>EQUAL 8.0%<EQUAL 9.0%: CPT | Mod: CPTII,S$GLB,, | Performed by: FAMILY MEDICINE

## 2022-04-28 PROCEDURE — 3052F PR MOST RECENT HEMOGLOBIN A1C LEVEL 8.0 - < 9.0%: ICD-10-PCS | Mod: CPTII,S$GLB,, | Performed by: FAMILY MEDICINE

## 2022-04-28 PROCEDURE — 3008F BODY MASS INDEX DOCD: CPT | Mod: CPTII,S$GLB,, | Performed by: FAMILY MEDICINE

## 2022-04-28 PROCEDURE — 3078F PR MOST RECENT DIASTOLIC BLOOD PRESSURE < 80 MM HG: ICD-10-PCS | Mod: CPTII,S$GLB,, | Performed by: FAMILY MEDICINE

## 2022-04-28 PROCEDURE — 3075F SYST BP GE 130 - 139MM HG: CPT | Mod: CPTII,S$GLB,, | Performed by: FAMILY MEDICINE

## 2022-04-28 PROCEDURE — 99214 OFFICE O/P EST MOD 30 MIN: CPT | Mod: S$GLB,,, | Performed by: FAMILY MEDICINE

## 2022-04-28 PROCEDURE — 1111F DSCHRG MED/CURRENT MED MERGE: CPT | Mod: CPTII,S$GLB,, | Performed by: FAMILY MEDICINE

## 2022-04-28 RX ORDER — INSULIN ASPART 100 [IU]/ML
16 INJECTION, SOLUTION INTRAVENOUS; SUBCUTANEOUS
Qty: 15 ML | Refills: 6 | Status: SHIPPED | OUTPATIENT
Start: 2022-04-28 | End: 2022-05-13 | Stop reason: SDUPTHER

## 2022-04-28 RX ORDER — METFORMIN HYDROCHLORIDE 500 MG/1
1000 TABLET, EXTENDED RELEASE ORAL
Qty: 180 TABLET | Refills: 3 | Status: ON HOLD | OUTPATIENT
Start: 2022-04-28 | End: 2022-11-12 | Stop reason: HOSPADM

## 2022-04-28 RX ORDER — ORAL SEMAGLUTIDE 3 MG/1
3 TABLET ORAL DAILY
Qty: 30 TABLET | Refills: 1 | Status: SHIPPED | OUTPATIENT
Start: 2022-04-28 | End: 2022-07-13

## 2022-04-28 NOTE — PROGRESS NOTES
Postoperative Wound Check:    Date of surgery 04/13/2022     Preop diagnosis  1. Status post L3-S1 anterior interbody fusion with posterior instrumentation  2. Pseudoarthrosis at L5-S1 with nonfusion and loosening of hardware  3. SI joint dysfunction  4. Morbid obesity with BMI of 49     Postop diagnosis  Same     Surgery  1. Removal of bilateral rods and bilateral S1 loose screws  2. L2-S1 posterior segmental instrumentation, Depuis Viper Prime and Verse  3. Sacral pelvic fixation using S2 trans SI joint iliac screws  4. Open bilateral SI joint fusion using the SI bone Bedrock implants  5. Registration of navigated instruments for posterior segmental instrumentation and sacral pelvic fixation using TabSys 3D c-arm and HelpAround navigation  6. Posterolateral arthrodesis at L2-3, L3-4, L4-5, L5-S1 bilaterally with allograft BMP and morselized bone chips     Surgeon  Jose L Brown MD      HPI:    Patient states he was doing well after surgery.  He was getting around good.  Very little low back pain.  Two days ago he was working with physical therapy and started having pain in the left buttock radiating down the left posterior leg to the ankle.  When this pain occurs it is 10/10 on a pain scale.  He only has it when walking and not when sitting or lying.  No right leg pain or paresthesias.    He is working with home health physical therapy.    Patient states that the bottom of the incision he has noticed a little slight drainage in between the buttocks.  No active drainage.  No fever chills sweats.    He is taking Percocet 10 mg every 8 hours.        Physical Exam:    Vitals:    04/27/22 1330   BP: 94/63   Pulse: 81   Weight: (!) 166 kg (365 lb 15.4 oz)   Height: 6' (1.829 m)   PainSc: 0-No pain         Incision:  Wound edges are approximated until the inferior portion of the incision between the buttocks where there is some very slight wound dehiscence and white granulation tissue.  Black suture barely visible.  No  redness, swelling, or drainage.      Diagnosis:     1. S/P lumbar spinal fusion              Plan:              -Removed the staples without any complications.  Chloraprep applied.  Base of the incision where there is wound dehiscence cleaned.  I tried to remove some white granulation tissue but it would not come out.  I reviewed this with Dr. Brown and he recommends Medihoney twice a day for 10 days.  He also recommends that he monitor his blood glucose level closely as this was an issue in the hospital with hyperglycemia.  -lumbar spine x-rays show good hardware placement.  -Dr. Brown that he does not half to work with PT if it is hurting too much.  He can take it easy for now and just do light walking.  -Message sent through [facility]    The patient will follow up with me in 4 weeks for the 6 week po fu with xrays beforehand.    All of the above discussed and reviewed with Dr. Brown.      Lolly Duggan, [facility], PA-C  Neurosurgery  [facility]

## 2022-05-01 ENCOUNTER — OFFICE VISIT (OUTPATIENT)
Dept: URGENT CARE | Facility: CLINIC | Age: 47
End: 2022-05-01
Payer: COMMERCIAL

## 2022-05-01 VITALS
SYSTOLIC BLOOD PRESSURE: 151 MMHG | WEIGHT: 315 LBS | BODY MASS INDEX: 42.66 KG/M2 | TEMPERATURE: 98 F | HEIGHT: 72 IN | OXYGEN SATURATION: 100 % | RESPIRATION RATE: 20 BRPM | HEART RATE: 92 BPM | DIASTOLIC BLOOD PRESSURE: 86 MMHG

## 2022-05-01 DIAGNOSIS — R30.0 DYSURIA: ICD-10-CM

## 2022-05-01 DIAGNOSIS — N30.01 ACUTE CYSTITIS WITH HEMATURIA: Primary | ICD-10-CM

## 2022-05-01 LAB
BILIRUB UR QL STRIP: NEGATIVE
GLUCOSE UR QL STRIP: NEGATIVE
KETONES UR QL STRIP: NEGATIVE
LEUKOCYTE ESTERASE UR QL STRIP: POSITIVE
PH, POC UA: 5 (ref 5–8)
POC BLOOD, URINE: POSITIVE
POC NITRATES, URINE: NEGATIVE
PROT UR QL STRIP: NEGATIVE
SP GR UR STRIP: 1.02 (ref 1–1.03)
UROBILINOGEN UR STRIP-ACNC: ABNORMAL (ref 0.3–2.2)

## 2022-05-01 PROCEDURE — 81003 URINALYSIS AUTO W/O SCOPE: CPT | Mod: QW,S$GLB,, | Performed by: PHYSICIAN ASSISTANT

## 2022-05-01 PROCEDURE — 3072F LOW RISK FOR RETINOPATHY: CPT | Mod: CPTII,S$GLB,, | Performed by: PHYSICIAN ASSISTANT

## 2022-05-01 PROCEDURE — 1159F PR MEDICATION LIST DOCUMENTED IN MEDICAL RECORD: ICD-10-PCS | Mod: CPTII,S$GLB,, | Performed by: PHYSICIAN ASSISTANT

## 2022-05-01 PROCEDURE — 3079F PR MOST RECENT DIASTOLIC BLOOD PRESSURE 80-89 MM HG: ICD-10-PCS | Mod: CPTII,S$GLB,, | Performed by: PHYSICIAN ASSISTANT

## 2022-05-01 PROCEDURE — 87186 SC STD MICRODIL/AGAR DIL: CPT | Performed by: PHYSICIAN ASSISTANT

## 2022-05-01 PROCEDURE — 1160F RVW MEDS BY RX/DR IN RCRD: CPT | Mod: CPTII,S$GLB,, | Performed by: PHYSICIAN ASSISTANT

## 2022-05-01 PROCEDURE — 87086 URINE CULTURE/COLONY COUNT: CPT | Performed by: PHYSICIAN ASSISTANT

## 2022-05-01 PROCEDURE — 81003 POCT URINALYSIS, DIPSTICK, AUTOMATED, W/O SCOPE: ICD-10-PCS | Mod: QW,S$GLB,, | Performed by: PHYSICIAN ASSISTANT

## 2022-05-01 PROCEDURE — 87088 URINE BACTERIA CULTURE: CPT | Performed by: PHYSICIAN ASSISTANT

## 2022-05-01 PROCEDURE — 99213 PR OFFICE/OUTPT VISIT, EST, LEVL III, 20-29 MIN: ICD-10-PCS | Mod: S$GLB,,, | Performed by: PHYSICIAN ASSISTANT

## 2022-05-01 PROCEDURE — 99213 OFFICE O/P EST LOW 20 MIN: CPT | Mod: S$GLB,,, | Performed by: PHYSICIAN ASSISTANT

## 2022-05-01 PROCEDURE — 1160F PR REVIEW ALL MEDS BY PRESCRIBER/CLIN PHARMACIST DOCUMENTED: ICD-10-PCS | Mod: CPTII,S$GLB,, | Performed by: PHYSICIAN ASSISTANT

## 2022-05-01 PROCEDURE — 3072F PR LOW RISK FOR RETINOPATHY: ICD-10-PCS | Mod: CPTII,S$GLB,, | Performed by: PHYSICIAN ASSISTANT

## 2022-05-01 PROCEDURE — 3008F BODY MASS INDEX DOCD: CPT | Mod: CPTII,S$GLB,, | Performed by: PHYSICIAN ASSISTANT

## 2022-05-01 PROCEDURE — 3077F PR MOST RECENT SYSTOLIC BLOOD PRESSURE >= 140 MM HG: ICD-10-PCS | Mod: CPTII,S$GLB,, | Performed by: PHYSICIAN ASSISTANT

## 2022-05-01 PROCEDURE — 3077F SYST BP >= 140 MM HG: CPT | Mod: CPTII,S$GLB,, | Performed by: PHYSICIAN ASSISTANT

## 2022-05-01 PROCEDURE — 3008F PR BODY MASS INDEX (BMI) DOCUMENTED: ICD-10-PCS | Mod: CPTII,S$GLB,, | Performed by: PHYSICIAN ASSISTANT

## 2022-05-01 PROCEDURE — 3052F HG A1C>EQUAL 8.0%<EQUAL 9.0%: CPT | Mod: CPTII,S$GLB,, | Performed by: PHYSICIAN ASSISTANT

## 2022-05-01 PROCEDURE — 3079F DIAST BP 80-89 MM HG: CPT | Mod: CPTII,S$GLB,, | Performed by: PHYSICIAN ASSISTANT

## 2022-05-01 PROCEDURE — 1159F MED LIST DOCD IN RCRD: CPT | Mod: CPTII,S$GLB,, | Performed by: PHYSICIAN ASSISTANT

## 2022-05-01 PROCEDURE — 87077 CULTURE AEROBIC IDENTIFY: CPT | Performed by: PHYSICIAN ASSISTANT

## 2022-05-01 PROCEDURE — 3052F PR MOST RECENT HEMOGLOBIN A1C LEVEL 8.0 - < 9.0%: ICD-10-PCS | Mod: CPTII,S$GLB,, | Performed by: PHYSICIAN ASSISTANT

## 2022-05-01 RX ORDER — PHENAZOPYRIDINE HYDROCHLORIDE 200 MG/1
200 TABLET, FILM COATED ORAL 3 TIMES DAILY PRN
Qty: 15 TABLET | Refills: 0 | Status: SHIPPED | OUTPATIENT
Start: 2022-05-01 | End: 2022-05-06

## 2022-05-01 RX ORDER — SULFAMETHOXAZOLE AND TRIMETHOPRIM 800; 160 MG/1; MG/1
1 TABLET ORAL 2 TIMES DAILY
Qty: 14 TABLET | Refills: 0 | Status: SHIPPED | OUTPATIENT
Start: 2022-05-01 | End: 2022-05-08

## 2022-05-01 NOTE — PROGRESS NOTES
Subjective:       Patient ID: Kirill Gandhi III is a 46 y.o. male.    Vitals:  height is 6' (1.829 m) and weight is 163.7 kg (361 lb) (abnormal). His temperature is 98.4 °F (36.9 °C). His blood pressure is 151/86 (abnormal) and his pulse is 92. His respiration is 20 and oxygen saturation is 100%.     Chief Complaint: Urinary Tract Infection (Peeing every 10 minutes and burns. - Entered by patient)    Mr. Gandhi presents for evaluation of urinary frequency, urgency and dysuria x 2 days.  He does report one low grade fever.  He denies any chills, N/V, flank pain.  He reports he recently had back surgery (4/13) and has since been using a urinal at night when he needs to urinate instead of getting up to go to the bathroom.  He does not wash hands/use hand .  His girlfriend does clean the urinal daily, but he uses it several times every night.  He has not taken anything for his symptoms.     Urinary Tract Infection   This is a new problem. The problem has been unchanged. The quality of the pain is described as burning. The pain is at a severity of 6/10. He is not sexually active. Associated symptoms include frequency and urgency. Pertinent negatives include no chills, flank pain, hematuria, nausea, vomiting or rash. He has tried nothing for the symptoms.       Constitution: Negative for appetite change, chills, sweating, fatigue and fever.   HENT: Negative for ear pain, ear discharge, postnasal drip, sinus pain, sinus pressure and sore throat.    Neck: Negative for neck pain and neck stiffness.   Cardiovascular: Negative for chest trauma, chest pain, leg swelling, palpitations, sob on exertion and passing out.   Eyes: Negative for blurred vision.   Respiratory: Negative for cough and shortness of breath.    Gastrointestinal: Negative for abdominal pain, nausea, vomiting and diarrhea.   Genitourinary: Positive for dysuria, frequency and urgency. Negative for urine decreased, flank pain, bladder  incontinence, bed wetting, hematuria and history of kidney stones.   Musculoskeletal: Negative for pain.   Skin: Negative for rash.   Neurological: Negative for dizziness, history of vertigo, light-headedness, passing out, facial drooping, speech difficulty, coordination disturbances, loss of balance and headaches.   Hematologic/Lymphatic: Negative for easy bruising/bleeding. Does not bruise/bleed easily.   Psychiatric/Behavioral: Negative for confusion.       Objective:      Physical Exam   Constitutional: He is oriented to person, place, and time. He appears well-developed.   HENT:   Head: Normocephalic and atraumatic.   Ears:   Right Ear: External ear normal.   Left Ear: External ear normal.   Nose: Nose normal.   Mouth/Throat: Mucous membranes are normal.   Eyes: Conjunctivae and lids are normal.   Neck: Trachea normal. Neck supple.   Cardiovascular: Normal rate, regular rhythm and normal heart sounds.   Pulmonary/Chest: Effort normal and breath sounds normal. No stridor. No respiratory distress. He has no decreased breath sounds. He has no wheezes. He has no rhonchi. He has no rales.   Abdominal: Normal appearance and bowel sounds are normal. He exhibits no distension, no abdominal bruit, no pulsatile midline mass and no mass. Soft. There is no abdominal tenderness. There is no rebound, no guarding, no tenderness at McBurney's point, negative Paula's sign, no left CVA tenderness, negative Rovsing's sign, negative psoas sign, no right CVA tenderness and negative obturator sign.   Musculoskeletal: Normal range of motion.         General: Normal range of motion.      Comments: LSO brace in place   Neurological: He is alert and oriented to person, place, and time. He has normal strength.   Skin: Skin is warm, dry, intact, not diaphoretic and not pale.   Psychiatric: His speech is normal and behavior is normal. Judgment and thought content normal.   Nursing note and vitals reviewed.          Results for orders  placed or performed in visit on 05/01/22   POCT Urinalysis, Dipstick, Automated, W/O Scope   Result Value Ref Range    POC Blood, Urine Positive (A) Negative    POC Bilirubin, Urine Negative Negative    POC Urobilinogen, Urine nORM 0.3 - 2.2    POC Ketones, Urine Negative Negative    POC Protein, Urine Negative Negative    POC Nitrates, Urine Negative Negative    POC Glucose, Urine Negative Negative    pH, UA 5.0 5 - 8    POC Specific Gravity, Urine 1.025 1.003 - 1.029    POC Leukocytes, Urine Positive (A) Negative       Assessment:       1. Acute cystitis with hematuria    2. Dysuria          Plan:         Acute cystitis with hematuria    Dysuria  -     POCT Urinalysis, Dipstick, Automated, W/O Scope  -     Culture, Urine    Other orders  -     sulfamethoxazole-trimethoprim 800-160mg (BACTRIM DS) 800-160 mg Tab; Take 1 tablet by mouth 2 (two) times daily. for 7 days  Dispense: 14 tablet; Refill: 0  -     phenazopyridine (PYRIDIUM) 200 MG tablet; Take 1 tablet (200 mg total) by mouth 3 (three) times daily as needed for Pain.  Dispense: 15 tablet; Refill: 0    Urinal likely source of UTI.  It would be best if he would get up to go urinate at night.  If he cannot do this, he needs to use hand  every time before urinating & clean the urinal with clorox wipe every time as well.  He verbalized understanding.  Will send urine cx.     Diagnoses and plan discussed with the patient, as well as the expected course and duration of his symptoms.  All questions and concerns were addressed prior to discharge.  He was advised to follow up with his PCP within 1 week if symptoms do not improve.  Emergency department precautions were given.  Patient verbalized understanding and was happy with the plan of care.   Note dictated with voice recognition software, please excuse any grammatical errors.    Patient Instructions   PLEASE READ YOUR DISCHARGE INSTRUCTIONS ENTIRELY AS IT CONTAINS IMPORTANT INFORMATION.  A culture was  sent to the lab today.  We will call to discuss your results in 3-5 days.    - Rest.    - Drink plenty of fluids.    - Tylenol or Ibuprofen as directed as needed for fever/pain.    - If you were prescribed antibiotics, please take them to completion.  - If you are female and on birth control pills - please use additional methods of contraception to prevent pregnancy while on antibiotics and for one cycle after.   - If you were prescribed a narcotic medication, a cough syrup, or a muscle relaxer, do not drive or operate heavy equipment or machinery while taking these medications, as they can cause drowsiness.   - If a referral to a specialty was made today, you should receive a phone call in the next few days to schedule an appt.  Please call [phone] to schedule an appt if have not gotten a phone call in the next few days.  - If you smoke, please stop smoking.  -You must understand that you've received an Urgent Care treatment only and that you may be released before all your medical problems are known or treated. You, the patient, will arrange for follow up care as instructed. Please arrange follow up with your primary medical clinic as soon as possible.   - Follow up with your PCP or specialty clinic as directed in the next 1-2 weeks if not improved or as needed.  You can call [phone] to schedule an appointment with the appropriate provider.    - Please return to Urgent Care or to the Emergency Department if your symptoms worsen.    Patient aware and verbalized understanding.

## 2022-05-01 NOTE — PROGRESS NOTES
Patient ID: Kirill Gandhi III is a 46 y.o. male.    Chief Complaint: Hospital Follow Up, Diabetes, Hypertension, and Leg Pain    HPI      Kirill Gandhi III is a 46 y.o. male recent admission for lower back pain with surgical procedure.  Following up on diabetes.  Glucose has been elevated in the 300s any good 400. Has been counting carbohydrates and adjusting insulin.      Vitals:    04/28/22 1000   BP: 130/70   BP Location: Right arm   Patient Position: Sitting   Pulse: 85   Temp: 98.5 °F (36.9 °C)   TempSrc: Oral   SpO2: 97%   Weight: (!) 164 kg (361 lb 10.6 oz)   Height: 6' (1.829 m)            Review of Symptoms      Physical Exam    Constitutional:  Oriented to person, place, and time.appears well-developed and well-nourished.  No distress.      HENT  Head: Normocephalic and atraumatic  Right Ear: External ear normal.   Left Ear: External ear normal.   Nose: External nose normal.   Mouth:  Moist mucus membranes.    Eyes:  Conjunctivae are normal. Right eye exhibits no discharge.  Left eye exhibits no discharge. No scleral icterus.  No periorbital edema    Cardiovascular:  Regular rate and rhythm with normal S1 and S2     Pulmonary/Chest:   Clear to auscultation bilaterally without wheezes, rhonchi or rales      Musculoskeletal:  No edema. No obvious deformity No wasting       Neurological:  Alert and oriented to person, place, and time.   Coordination normal.     Skin:   Skin is warm and dry.  No diaphoresis.   No rash noted.     Psychiatric: Normal mood and affect. Behavior is normal.  Judgment and thought content normal.     Complete Blood Count  Lab Results   Component Value Date    RBC 2.87 (L) 04/15/2022    HGB 7.8 (L) 04/15/2022    HCT 25.0 (L) 04/15/2022    MCV 87 04/15/2022    MCH 27.2 04/15/2022    MCHC 31.2 (L) 04/15/2022    RDW 15.5 (H) 04/15/2022     04/15/2022    MPV 10.0 04/15/2022    GRAN 4.5 04/15/2022    GRAN 65.7 04/15/2022    LYMPH 1.3 04/15/2022    LYMPH 19.7  04/15/2022    MONO 0.7 04/15/2022    MONO 10.4 04/15/2022    EOS 0.2 04/15/2022    BASO 0.04 04/15/2022    EOSINOPHIL 3.2 04/15/2022    BASOPHIL 0.6 04/15/2022    DIFFMETHOD Automated 04/15/2022       Comprehensive Metabolic Panel  Lab Results   Component Value Date     (H) 04/15/2022    BUN 11 04/15/2022    CREATININE 1.0 04/15/2022     04/15/2022    K 4.0 04/15/2022     04/15/2022    PROT 8.3 03/07/2022    ALBUMIN 4.3 03/07/2022    BILITOT 0.4 03/07/2022    AST 32 03/07/2022    ALKPHOS 93 03/07/2022    CO2 28 04/15/2022    ALT 30 03/07/2022    ANIONGAP 6 (L) 04/15/2022    EGFRNONAA >60 04/15/2022    ESTGFRAFRICA >60 04/15/2022       TSH  No results found for: TSH    Assessment / Plan:      ICD-10-CM ICD-9-CM   1. Type 2 diabetes mellitus with complication, without long-term current use of insulin  E11.8 250.90   2. Tobacco use  Z72.0 305.1     Type 2 diabetes mellitus with complication, without long-term current use of insulin  -     Hemoglobin A1C; Standing    Tobacco use  -     Ambulatory referral/consult to Smoking Cessation Program; Future; Expected date: 05/05/2022    Other orders  -     insulin detemir U-100 (LEVEMIR FLEXTOUCH) 100 unit/mL (3 mL) SubQ InPn pen; Inject 50 Units into the skin once daily.  Dispense: 15 mL; Refill: 3  -     insulin aspart U-100 (NOVOLOG) 100 unit/mL (3 mL) InPn pen; Inject 16 Units into the skin 3 (three) times daily with meals.  Dispense: 15 mL; Refill: 6  -     semaglutide (RYBELSUS) 3 mg tablet; Take 1 tablet (3 mg total) by mouth once daily. For DM  Dispense: 30 tablet; Refill: 1  -     metFORMIN (GLUCOPHAGE-XR) 500 MG ER 24hr tablet; Take 2 tablets (1,000 mg total) by mouth daily with breakfast.  Dispense: 180 tablet; Refill: 3      Discussed counting carbs discussed increasing Levemi discussed increasing NovoLog

## 2022-05-01 NOTE — PATIENT INSTRUCTIONS
PLEASE READ YOUR DISCHARGE INSTRUCTIONS ENTIRELY AS IT CONTAINS IMPORTANT INFORMATION.  A culture was sent to the lab today.  We will call to discuss your results in 3-5 days.    - Rest.    - Drink plenty of fluids.    - Tylenol or Ibuprofen as directed as needed for fever/pain.    - If you were prescribed antibiotics, please take them to completion.  - If you are female and on birth control pills - please use additional methods of contraception to prevent pregnancy while on antibiotics and for one cycle after.   - If you were prescribed a narcotic medication, a cough syrup, or a muscle relaxer, do not drive or operate heavy equipment or machinery while taking these medications, as they can cause drowsiness.   - If a referral to a specialty was made today, you should receive a phone call in the next few days to schedule an appt.  Please call [phone] to schedule an appt if have not gotten a phone call in the next few days.  - If you smoke, please stop smoking.  -You must understand that you've received an Urgent Care treatment only and that you may be released before all your medical problems are known or treated. You, the patient, will arrange for follow up care as instructed. Please arrange follow up with your primary medical clinic as soon as possible.   - Follow up with your PCP or specialty clinic as directed in the next 1-2 weeks if not improved or as needed.  You can call [phone] to schedule an appointment with the appropriate provider.    - Please return to Urgent Care or to the Emergency Department if your symptoms worsen.    Patient aware and verbalized understanding.

## 2022-05-02 ENCOUNTER — PATIENT MESSAGE (OUTPATIENT)
Dept: NEUROSURGERY | Facility: CLINIC | Age: 47
End: 2022-05-02
Payer: MEDICAID

## 2022-05-03 LAB — BACTERIA UR CULT: ABNORMAL

## 2022-05-04 ENCOUNTER — EXTERNAL HOME HEALTH (OUTPATIENT)
Dept: HOME HEALTH SERVICES | Facility: HOSPITAL | Age: 47
End: 2022-05-04
Payer: MEDICAID

## 2022-05-04 RX ORDER — CELECOXIB 200 MG/1
CAPSULE ORAL
Qty: 180 CAPSULE | Refills: 0 | Status: SHIPPED | OUTPATIENT
Start: 2022-05-04 | End: 2022-08-08 | Stop reason: SDUPTHER

## 2022-05-05 ENCOUNTER — TELEPHONE (OUTPATIENT)
Dept: URGENT CARE | Facility: CLINIC | Age: 47
End: 2022-05-05
Payer: MEDICAID

## 2022-05-05 NOTE — TELEPHONE ENCOUNTER
Discussed positive urine culture results with patient.  Patient treated appropriately at visit with Bactrim.  Patient having improvement in symptoms.  Advised patient to continue taking antibiotics until completion and notify if symptoms have not resolved.  Patient voiced understanding.

## 2022-05-10 ENCOUNTER — PATIENT MESSAGE (OUTPATIENT)
Dept: NEUROSURGERY | Facility: CLINIC | Age: 47
End: 2022-05-10
Payer: MEDICAID

## 2022-05-12 ENCOUNTER — PATIENT MESSAGE (OUTPATIENT)
Dept: FAMILY MEDICINE | Facility: CLINIC | Age: 47
End: 2022-05-12
Payer: MEDICAID

## 2022-05-13 RX ORDER — INSULIN ASPART 100 [IU]/ML
INJECTION, SOLUTION INTRAVENOUS; SUBCUTANEOUS
Qty: 30 ML | Refills: 6 | Status: SHIPPED | OUTPATIENT
Start: 2022-05-13 | End: 2022-11-08 | Stop reason: SDUPTHER

## 2022-05-25 ENCOUNTER — HOSPITAL ENCOUNTER (OUTPATIENT)
Dept: RADIOLOGY | Facility: HOSPITAL | Age: 47
Discharge: HOME | End: 2022-05-25
Attending: NEUROLOGICAL SURGERY
Payer: MEDICAID

## 2022-05-25 ENCOUNTER — OFFICE VISIT (OUTPATIENT)
Dept: NEUROSURGERY | Facility: CLINIC | Age: 47
End: 2022-05-25
Payer: COMMERCIAL

## 2022-05-25 ENCOUNTER — PATIENT MESSAGE (OUTPATIENT)
Dept: NEUROSURGERY | Facility: CLINIC | Age: 47
End: 2022-05-25

## 2022-05-25 VITALS — HEIGHT: 72 IN | BODY MASS INDEX: 42.66 KG/M2 | WEIGHT: 315 LBS

## 2022-05-25 DIAGNOSIS — Z98.1 S/P LUMBAR SPINAL FUSION: Primary | ICD-10-CM

## 2022-05-25 DIAGNOSIS — Z98.1 S/P LUMBAR SPINAL FUSION: ICD-10-CM

## 2022-05-25 DIAGNOSIS — Q74.2 FUSION CONGENITAL, SACROILIAC JOINT: ICD-10-CM

## 2022-05-25 PROCEDURE — 99024 PR POST-OP FOLLOW-UP VISIT: ICD-10-PCS | Mod: S$GLB,,, | Performed by: PHYSICIAN ASSISTANT

## 2022-05-25 PROCEDURE — 3008F PR BODY MASS INDEX (BMI) DOCUMENTED: ICD-10-PCS | Mod: CPTII,S$GLB,, | Performed by: PHYSICIAN ASSISTANT

## 2022-05-25 PROCEDURE — 1160F RVW MEDS BY RX/DR IN RCRD: CPT | Mod: CPTII,S$GLB,, | Performed by: PHYSICIAN ASSISTANT

## 2022-05-25 PROCEDURE — 99999 PR PBB SHADOW E&M-EST. PATIENT-LVL III: CPT | Mod: PBBFAC,,, | Performed by: PHYSICIAN ASSISTANT

## 2022-05-25 PROCEDURE — 72100 X-RAY EXAM L-S SPINE 2/3 VWS: CPT | Mod: TC,PN

## 2022-05-25 PROCEDURE — 3052F PR MOST RECENT HEMOGLOBIN A1C LEVEL 8.0 - < 9.0%: ICD-10-PCS | Mod: CPTII,S$GLB,, | Performed by: PHYSICIAN ASSISTANT

## 2022-05-25 PROCEDURE — 1159F MED LIST DOCD IN RCRD: CPT | Mod: CPTII,S$GLB,, | Performed by: PHYSICIAN ASSISTANT

## 2022-05-25 PROCEDURE — 1160F PR REVIEW ALL MEDS BY PRESCRIBER/CLIN PHARMACIST DOCUMENTED: ICD-10-PCS | Mod: CPTII,S$GLB,, | Performed by: PHYSICIAN ASSISTANT

## 2022-05-25 PROCEDURE — 3008F BODY MASS INDEX DOCD: CPT | Mod: CPTII,S$GLB,, | Performed by: PHYSICIAN ASSISTANT

## 2022-05-25 PROCEDURE — 3052F HG A1C>EQUAL 8.0%<EQUAL 9.0%: CPT | Mod: CPTII,S$GLB,, | Performed by: PHYSICIAN ASSISTANT

## 2022-05-25 PROCEDURE — 3072F LOW RISK FOR RETINOPATHY: CPT | Mod: CPTII,S$GLB,, | Performed by: PHYSICIAN ASSISTANT

## 2022-05-25 PROCEDURE — 3072F PR LOW RISK FOR RETINOPATHY: ICD-10-PCS | Mod: CPTII,S$GLB,, | Performed by: PHYSICIAN ASSISTANT

## 2022-05-25 PROCEDURE — 1159F PR MEDICATION LIST DOCUMENTED IN MEDICAL RECORD: ICD-10-PCS | Mod: CPTII,S$GLB,, | Performed by: PHYSICIAN ASSISTANT

## 2022-05-25 PROCEDURE — 99999 PR PBB SHADOW E&M-EST. PATIENT-LVL III: ICD-10-PCS | Mod: PBBFAC,,, | Performed by: PHYSICIAN ASSISTANT

## 2022-05-25 PROCEDURE — 99024 POSTOP FOLLOW-UP VISIT: CPT | Mod: S$GLB,,, | Performed by: PHYSICIAN ASSISTANT

## 2022-05-25 PROCEDURE — 72100 XR LUMBAR SPINE AP AND LATERAL: ICD-10-PCS | Mod: 26,,, | Performed by: RADIOLOGY

## 2022-05-25 PROCEDURE — 72100 X-RAY EXAM L-S SPINE 2/3 VWS: CPT | Mod: 26,,, | Performed by: RADIOLOGY

## 2022-05-25 NOTE — PROGRESS NOTES
Subjective:     Patient ID:  Kirill Gandhi III is a 46 y.o. male.    Kevin    Chief Complaint: 6 week po fu      Date of surgery 04/13/2022     Preop diagnosis  1. Status post L3-S1 anterior interbody fusion with posterior instrumentation  2. Pseudoarthrosis at L5-S1 with nonfusion and loosening of hardware  3. SI joint dysfunction  4. Morbid obesity with BMI of 49     Postop diagnosis  Same     Surgery  1. Removal of bilateral rods and bilateral S1 loose screws  2. L2-S1 posterior segmental instrumentation, Depuis Viper Prime and Verse  3. Sacral pelvic fixation using S2 trans SI joint iliac screws  4. Open bilateral SI joint fusion using the SI bone Bedrock implants  5. Registration of navigated instruments for posterior segmental instrumentation and sacral pelvic fixation using Automated [employer] 3D c-arm and [employer] navigation  6. Posterolateral arthrodesis at L2-3, L3-4, L4-5, L5-S1 bilaterally with allograft BMP and morselized bone chips     Surgeon  Jose L Brown MD    HPI    Kirill Gandhi III is a 46 y.o. male who presents for follow up.  Overall he feels like he is doing better than preoperatively.  He still has achy low back pain.  He has some pain in the left anterior lateral leg to the knee and numbness.  No right leg pain.  He gets some stabbing sensations in the right buttock region but the left side in the buttock is gone.  He is walking with a walker and is walking better.  He uses his back brace.  He does use the bone growth stimulator but sometimes forgets.  He feels like his legs are achy by the end of the day.    He takes Percocet and Zanaflex and Valium as needed.  She takes gabapentin scheduled.    He still has a small hole at the base of the incision.  It has healed and gotten smaller but is not completely gone.  He denies any drainage redness swelling or infection no fever chills or sweats.    Patient denies any recent accidents or trauma, no saddle anesthesias, and no bowel or  bladder incontinence.      Review of Systems:  Constitution: Negative for chills, fever, night sweats and weight loss.   Musculoskeletal: Negative for falls.   Gastrointestinal: Negative for bowel incontinence, nausea and vomiting.   Genitourinary: Negative for bladder incontinence.   Neurological: Negative for disturbances in coordination and loss of balance.      Objective:      Vitals:    05/25/22 1051   Weight: (!) 163 kg (359 lb 5.6 oz)   Height: 6' (1.829 m)   PainSc:   4         Physical Exam: exam done in the wheelchair    General:  Kirill Dentherford III is well-developed, well-nourished, appears stated age, in no acute distress, alert and oriented to person, place, and time.    Pulmonary/Chest:  Respiratory effort normal  Abdominal: Exhibits no distension  Psychiatric:  Normal mood and affect.  Behavior is normal.  Judgement and thought content normal      Lumbar Spine Inspection:  Midline surgical scar healed but in between the buttocks there is a small opening.  No active drainage.  Some redness around it.  No visible rashes.      Neurological:  Alert and oriented to person, place, and time    Muscle strength against resistance:     Right Left   Hip flexion  5 / 5 5 / 5   Hip extension 5 / 5 5 / 5   Hip abduction 5 / 5 5 / 5   Hip adduction  5 / 5 5 / 5   Knee extension  5 / 5 5 / 5   Knee flexion 5 / 5 5 / 5   Dorsiflexion  5 / 5 5 / 5   EHL  5 / 5 5 / 5   Plantar flexion  5 / 5 5 / 5   Inversion of the feet 5 / 5 5 / 5   Eversion of the feet  5 / 5 5 / 5       Reflexes:     Right Left   Patellar 2+ 2+   Achilles 2+ 2+     Clonus:  Negative bilaterally    On gross examination of the bilateral upper extremities, patient has full painfree ROM with no signs of clubbing, cyanosis, edema, or weakness.       XRAY Interpretation:     Lumbar spine xrays were personally reviewed today.  No fractures.  Hardware intact.      Assessment:          1. S/P lumbar spinal fusion            Plan:             All of  the above reviewed with Dr. Brown.  Continue to wear the back brace.  No physical therapy at this point.  Continue walking.  Recommend using Medi Honey twice a day until the small hole is completely closed at the base of the incision.    Patient will follow up with Dr. Brown in 6 weeks with x-rays for the 3 month postop follow-up.      Follow-Up:  Follow up in about 6 weeks (around 7/6/2022). If there are any questions prior to this, the patient was instructed to contact the office.       Lolly Duggan [facility], PA-C  Neurosurgery  [facility]  05/25/2022

## 2022-05-31 ENCOUNTER — PATIENT MESSAGE (OUTPATIENT)
Dept: ADMINISTRATIVE | Facility: HOSPITAL | Age: 47
End: 2022-05-31
Payer: MEDICAID

## 2022-06-28 ENCOUNTER — OFFICE VISIT (OUTPATIENT)
Dept: OPTOMETRY | Facility: CLINIC | Age: 47
End: 2022-06-28
Payer: MEDICAID

## 2022-06-28 DIAGNOSIS — H02.831 DERMATOCHALASIS OF BOTH UPPER EYELIDS: ICD-10-CM

## 2022-06-28 DIAGNOSIS — H11.9 CONJUNCTIVAL LESION, BENIGN: Primary | ICD-10-CM

## 2022-06-28 DIAGNOSIS — H02.834 DERMATOCHALASIS OF BOTH UPPER EYELIDS: ICD-10-CM

## 2022-06-28 DIAGNOSIS — H04.123 DRY EYE SYNDROME OF BOTH EYES: ICD-10-CM

## 2022-06-28 PROCEDURE — 3052F HG A1C>EQUAL 8.0%<EQUAL 9.0%: CPT | Mod: CPTII,,, | Performed by: OPTOMETRIST

## 2022-06-28 PROCEDURE — 1159F MED LIST DOCD IN RCRD: CPT | Mod: CPTII,,, | Performed by: OPTOMETRIST

## 2022-06-28 PROCEDURE — 92012 INTRM OPH EXAM EST PATIENT: CPT | Mod: S$PBB,,, | Performed by: OPTOMETRIST

## 2022-06-28 PROCEDURE — 3052F PR MOST RECENT HEMOGLOBIN A1C LEVEL 8.0 - < 9.0%: ICD-10-PCS | Mod: CPTII,,, | Performed by: OPTOMETRIST

## 2022-06-28 PROCEDURE — 99999 PR PBB SHADOW E&M-EST. PATIENT-LVL II: ICD-10-PCS | Mod: PBBFAC,,, | Performed by: OPTOMETRIST

## 2022-06-28 PROCEDURE — 99999 PR PBB SHADOW E&M-EST. PATIENT-LVL II: CPT | Mod: PBBFAC,,, | Performed by: OPTOMETRIST

## 2022-06-28 PROCEDURE — 99212 OFFICE O/P EST SF 10 MIN: CPT | Mod: PBBFAC | Performed by: OPTOMETRIST

## 2022-06-28 PROCEDURE — 92012 PR EYE EXAM, EST PATIENT,INTERMED: ICD-10-PCS | Mod: S$PBB,,, | Performed by: OPTOMETRIST

## 2022-06-28 PROCEDURE — 1159F PR MEDICATION LIST DOCUMENTED IN MEDICAL RECORD: ICD-10-PCS | Mod: CPTII,,, | Performed by: OPTOMETRIST

## 2022-06-28 NOTE — PROGRESS NOTES
HPI     CC: Patient reports bump in eye. He states it is not a stye. Pt has a   bump inside of upper left eyelid. He says it not painful. OU is tearing   and with mucus. Pt states his mom looked at bump last night and it has   gotten smaller. Pt states OU are crusty and often burn. Patient has annual   appointment scheduled for august 2022.    TOSIN: 08/12/2021 Dr. Holt     (No) Changes in vision   (No) Pain  (No) Irritation   (No) Itching   (No) Flashes  (Yes) Floaters, longstanding    (Yes) Glasses wearer  (No) CL wearer  (Yes) Uses eye gtts Lubricant drops OU, used last week    Does patient want a refraction today?  No     (No) Eye injury  (No) Eye surgery   (No)POHx  (Yes)FOHx ?    (Yes)DM  Hemoglobin A1C       Date                     Value               Ref Range             Status                02/21/2022               8.7 (H)             4.0 - 5.6 %           Final                  12/09/2021               10.4 (H)            4.0 - 5.6 %           Final                 09/25/2021               10.9 (H)            4.0 - 5.6 %           Final                      Last edited by Nakia Holt, OD on 6/28/2022 11:30 AM. (History)            Assessment /Plan     For exam results, see Encounter Report.    Conjunctival lesion, benign    Dermatochalasis of both upper eyelids    Dry eye syndrome of both eyes      1. Educated pt on findings. Cyst like lesion noted in superior temporal conj. Appears like orbital fat prolapse. Patient would like to see Dr. Deal for further eval. Discussed with patient to contact department with any significant changes. Monitor.     2. Longstanding ptosis OU. Patient nervous about procedure so hasn't proceeded with scheduling --- can re-discuss with Dr. Deal. Monitor.     3. Educated pt on findings. Recommended ATs TID-QID + dc/gel QHS for added lubrication and comfort. Pataday prn for allergy symptoms. If symptoms worsen or dont improve, RTC. Monitor.       RTC with Dr. Dela as  directed, me August 2022 for annual eye exam or prn.

## 2022-07-05 ENCOUNTER — TELEPHONE (OUTPATIENT)
Dept: OPHTHALMOLOGY | Facility: CLINIC | Age: 47
End: 2022-07-05
Payer: MEDICAID

## 2022-07-05 NOTE — TELEPHONE ENCOUNTER
----- Message from Lacey Randolph sent at 6/28/2022 10:48 AM CDT -----  Regarding: FW: geovanni    ----- Message -----  From: Ese Trujillo  Sent: 6/28/2022  10:45 AM CDT  To: [facility] Staff  Subject: lump                                             Pt has a bump under eyelid per Dr. Holt.

## 2022-07-12 NOTE — TELEPHONE ENCOUNTER
Care Due:                  Date            Visit Type   Department     Provider  --------------------------------------------------------------------------------                                [facility]  Last Visit: 04-      FOLLOW UP    MEDICINE       Marcos Ibarra                              EP -                              PRIMARY      [facility]  Next Visit: 08-      CARE ([facility])   MEDICINE       Marcos Ibarra                                                            Last  Test          Frequency    Reason                     Performed    Due Date  --------------------------------------------------------------------------------    HBA1C.......  6 months...  insulin, metFORMIN,        02- 08-                             semaglutide..............    [employer] Embedded Care Gaps. Reference number: [id number]. 7/12/2022   1:30:11 PM CDT

## 2022-07-13 RX ORDER — ORAL SEMAGLUTIDE 3 MG/1
TABLET ORAL
Qty: 30 TABLET | Refills: 1 | Status: SHIPPED | OUTPATIENT
Start: 2022-07-13 | End: 2022-09-01

## 2022-07-13 NOTE — TELEPHONE ENCOUNTER
Refill Routing Note   Medication(s) are not appropriate for processing by [facility] for the following reason(s):      - Medication is a new start (<3 months)    [facility] action(s):  Defer Medication-related problems identified: Requires labs     Medication Therapy Plan: New start (4/28/22); Defer  Medication reconciliation completed: No     Appointments  past 12m or future 3m with PCP    Date Provider   Last Visit   4/28/2022 Marcos Ibarra MD   Next Visit   8/8/2022 Marcos Ibarra MD   ED visits in past 90 days: 0        Note composed:12:47 PM 07/13/2022

## 2022-07-18 ENCOUNTER — HOSPITAL ENCOUNTER (OUTPATIENT)
Dept: RADIOLOGY | Facility: HOSPITAL | Age: 47
Discharge: HOME | End: 2022-07-18
Attending: NEUROLOGICAL SURGERY
Payer: MEDICAID

## 2022-07-18 ENCOUNTER — OFFICE VISIT (OUTPATIENT)
Dept: NEUROSURGERY | Facility: CLINIC | Age: 47
End: 2022-07-18
Payer: MEDICAID

## 2022-07-18 ENCOUNTER — TELEPHONE (OUTPATIENT)
Dept: NEUROSURGERY | Facility: CLINIC | Age: 47
End: 2022-07-18
Payer: MEDICAID

## 2022-07-18 VITALS — BODY MASS INDEX: 42.66 KG/M2 | WEIGHT: 315 LBS | HEIGHT: 72 IN

## 2022-07-18 DIAGNOSIS — Z98.1 STATUS POST LUMBAR SPINAL FUSION: Primary | ICD-10-CM

## 2022-07-18 DIAGNOSIS — Z98.1 S/P LUMBAR SPINAL FUSION: Primary | ICD-10-CM

## 2022-07-18 DIAGNOSIS — Q74.2 FUSION CONGENITAL, SACROILIAC JOINT: ICD-10-CM

## 2022-07-18 DIAGNOSIS — Z98.1 S/P LUMBAR SPINAL FUSION: ICD-10-CM

## 2022-07-18 PROCEDURE — 99212 OFFICE O/P EST SF 10 MIN: CPT | Mod: S$PBB,,, | Performed by: NEUROLOGICAL SURGERY

## 2022-07-18 PROCEDURE — 3072F LOW RISK FOR RETINOPATHY: CPT | Mod: CPTII,,, | Performed by: NEUROLOGICAL SURGERY

## 2022-07-18 PROCEDURE — 1159F PR MEDICATION LIST DOCUMENTED IN MEDICAL RECORD: ICD-10-PCS | Mod: CPTII,,, | Performed by: NEUROLOGICAL SURGERY

## 2022-07-18 PROCEDURE — 3008F PR BODY MASS INDEX (BMI) DOCUMENTED: ICD-10-PCS | Mod: CPTII,,, | Performed by: NEUROLOGICAL SURGERY

## 2022-07-18 PROCEDURE — 3052F HG A1C>EQUAL 8.0%<EQUAL 9.0%: CPT | Mod: CPTII,,, | Performed by: NEUROLOGICAL SURGERY

## 2022-07-18 PROCEDURE — 99212 PR OFFICE/OUTPT VISIT, EST, LEVL II, 10-19 MIN: ICD-10-PCS | Mod: S$PBB,,, | Performed by: NEUROLOGICAL SURGERY

## 2022-07-18 PROCEDURE — 99213 OFFICE O/P EST LOW 20 MIN: CPT | Mod: PBBFAC,PN | Performed by: NEUROLOGICAL SURGERY

## 2022-07-18 PROCEDURE — 3008F BODY MASS INDEX DOCD: CPT | Mod: CPTII,,, | Performed by: NEUROLOGICAL SURGERY

## 2022-07-18 PROCEDURE — 72082 XR SCOLIOSIS COMPLETE: ICD-10-PCS | Mod: 26,,, | Performed by: RADIOLOGY

## 2022-07-18 PROCEDURE — 3052F PR MOST RECENT HEMOGLOBIN A1C LEVEL 8.0 - < 9.0%: ICD-10-PCS | Mod: CPTII,,, | Performed by: NEUROLOGICAL SURGERY

## 2022-07-18 PROCEDURE — 72082 X-RAY EXAM ENTIRE SPI 2/3 VW: CPT | Mod: TC,PN

## 2022-07-18 PROCEDURE — 72082 X-RAY EXAM ENTIRE SPI 2/3 VW: CPT | Mod: 26,,, | Performed by: RADIOLOGY

## 2022-07-18 PROCEDURE — 1159F MED LIST DOCD IN RCRD: CPT | Mod: CPTII,,, | Performed by: NEUROLOGICAL SURGERY

## 2022-07-18 PROCEDURE — 99999 PR PBB SHADOW E&M-EST. PATIENT-LVL III: CPT | Mod: PBBFAC,,, | Performed by: NEUROLOGICAL SURGERY

## 2022-07-18 PROCEDURE — 99999 PR PBB SHADOW E&M-EST. PATIENT-LVL III: ICD-10-PCS | Mod: PBBFAC,,, | Performed by: NEUROLOGICAL SURGERY

## 2022-07-18 PROCEDURE — 3072F PR LOW RISK FOR RETINOPATHY: ICD-10-PCS | Mod: CPTII,,, | Performed by: NEUROLOGICAL SURGERY

## 2022-07-18 NOTE — PROGRESS NOTES
NEUROSURGICAL PROGRESS NOTE    DATE OF SERVICE:  07/18/2022    ATTENDING PHYSICIAN:  Jose L Brown MD    SUBJECTIVE:    INTERIM HISTORY:    This is a very pleasant 47 y.o. male, who is status post more than 3 months revision of posterior lumbar fusion due to pseudoarthrosis at L5-S1, open SI joint fusion with sacral pelvic fixation and instrumentation.  Overall he states that his pain is not as severe as before surgery.  He is now able to walk and stand more. However he still has difficulty standing up when he does the dishes or when he cooks.  He reports a diffuse low back pain and some muscle spasms.  He also reports some numbing sensation in the lumbosacral area.  He does not have severe shooting leg pain anymore.  He has been partially compliant with the bone growth stimulator.  He still uses his low back brace when he walks.  He has been terminated.  He has been sedentary since the last surgery.  He is not using narcotics regularly anymore.  He uses Zanaflex at night before going to bed.              PAST MEDICAL HISTORY:  Active Ambulatory Problems     Diagnosis Date Noted    Joint pain 04/16/2015    Obesity 04/16/2015    Vitamin D deficiency 04/16/2015    Fatigue 04/16/2015    Bilateral hand pain 04/16/2015    Diabetes 04/17/2015    VICENTE (obstructive sleep apnea) 06/17/2015    CTS (carpal tunnel syndrome) 06/26/2015    Elevated sed rate 06/26/2015    Lumbago 10/14/2019    Chronic lower back pain 02/18/2020    OCD (obsessive compulsive disorder) 02/26/2020    Depression 02/26/2020    Anxiety 02/26/2020    Insomnia due to other mental disorder 02/05/2021    Palpitations 03/25/2021    DDD (degenerative disc disease), lumbar 04/26/2021    Grief 05/19/2021    Adjustment disorder with mixed anxiety and depressed mood 05/19/2021    Mixed obsessional thoughts and acts 06/25/2021    Mild episode of recurrent major depressive disorder 06/25/2021    Fusion of spine, lumbosacral region 07/13/2021     Acute buttock pain 07/13/2021    Sacroiliac joint dysfunction of both sides 12/13/2021    Chronic bilateral low back pain with bilateral sciatica 12/14/2021    Weakness of both lower extremities 12/14/2021    Difficulty walking 12/14/2021    Radiculopathy of lumbosacral region 12/14/2021    Chest pain, atypical 03/10/2022    HOLMAN (dyspnea on exertion) 03/10/2022    Pseudoarthrosis of lumbar spine 04/13/2022    Primary hypertension 04/20/2022     Resolved Ambulatory Problems     Diagnosis Date Noted    DDD (degenerative disc disease), lumbar 09/04/2019    Lumbar disc herniation with radiculopathy 10/09/2019     Past Medical History:   Diagnosis Date    Allergy     Back pain     Hx of vasectomy     Hypertension     Sleep apnea        PAST SURGICAL HISTORY:  Past Surgical History:   Procedure Laterality Date    ADENOIDECTOMY      EPIDURAL STEROID INJECTION Right 09/04/2019    Procedure: Injection, Steroid, Epidural Transforaminal;  Surgeon: Harjinder Batista Jr., MD;  Location: [facility] ENDO;  Service: Pain Management;  Laterality: Right;  Right L3 + L4 TF NATALIE    [id number]  [id number]    Arrive @ 1300; ASA last 8/27; Check BG; NEEDS CONSENT    FRACTURE SURGERY      bilateral elbow fracture 3 years ago    FUSION OF LUMBAR SPINE BY ANTERIOR APPROACH Left 04/26/2021    Procedure: FUSION, SPINE, LUMBAR, ANTERIOR APPROACH Left L3-4 L4-5 OLIF BRENDA, ALL Release, L5-S1 ALIF;  Surgeon: Jose L Brown MD;  Location: [facility];  Service: Neurosurgery;  Laterality: Left;  Procedure: Left L3-4 L4-5 OLIF BRENDA, ALL Release, L5-S1 ALIF  Surgery Time: 2.5 Hrs  LOS: 2-3  Anesthesia: General  Blood: Type & Screen  Radiology: Josseline  Brace: LSO  Bed: Regular  Position: LAteral Righ    FUSION OF SACROILIAC JOINT Bilateral 04/13/2022    Procedure: FUSION, SACROILIAC JOINT Stg 2: Bilateral Open SI Joint fusion, Bedrock from Si Bone;  Surgeon: Jose L Brown MD;  Location: [facility] OR;  Service: Neurosurgery;  Laterality: Bilateral;     FUSION OF SPINE WITH INSTRUMENTATION N/A 04/26/2021    Procedure: FUSION, SPINE, WITH INSTRUMENTATION L3-S1 Posteior Segmental Instrumentation;  Surgeon: Jose L Brown MD;  Location: [facility] OR;  Service: Neurosurgery;  Laterality: N/A;  Procedure: L3-S1 Posteior Segmental Instrumentation  Sirgery Time: 2 Hrs  LOS: 2-3  Anesthesia: General  Blood: Tyoe & Screen  Radiology: Dual C arm  Brace:LSO  Bed: De [place] 4 Poster  Position: Prone  Equipment: Depuy     FUSION OF SPINE WITH INSTRUMENTATION N/A 04/13/2022    Procedure: FUSION, SPINE, WITH INSTRUMENTATION Stg 1: Revision of posterior L3-S1 hardware, Depuy. Sacro-pelvic fixation, screw augmentation PMMA. L4-S1 posterolateral arthrodesis. Open Gauge screws, 11-12mm;  Surgeon: Jose L Brown MD;  Location: [facility] OR;  Service: Neurosurgery;  Laterality: N/A;    HERNIA REPAIR      bilateral groin hernia    INJECTION OF STEROID Bilateral 12/13/2021    Procedure: INJECTION, STEROID Bilateral SI JOint Block and Steroid Injection;  Surgeon: Jose L Brown MD;  Location: [facility] OR;  Service: Neurosurgery;  Laterality: Bilateral;  Procedure: Bilateral SI JOint Block and Steroid Injection   Surgery Time: 30 min  LOS: 0  Anesthesia: MAC  Radiology: C-arm  Bed: Regular with pillows  Position: Prone    LUMBAR LAMINECTOMY Bilateral 11/29/2019    Procedure: LAMINECTOMY, SPINE, LUMBAR Rigth L3-4, Left L4-5 Laminectomy, medial Facetectomy, and microdiscectomy;  Surgeon: Jose L Brown MD;  Location: [facility] OR;  Service: Neurosurgery;  Laterality: Bilateral;  Procedure: Rigth L3-4, Left L4-5 Laminectomy, medial Facetectomy, and microdiscectomy  Surgery Time: 2.5 Hrs  LOS: 0-1  Anesthesia: General  Blood: Type & Screen  Radiology: C-arm  Micros    REMOVAL IMPLANT, POSTERIOR SEGMENT, INTRAOCULAR  04/13/2022    Procedure: REMOVAL IMPLANT, POSTERIOR SEGMENT, INTRAOCULAR;  Surgeon: Jose L Brown MD;  Location: [facility] OR;  Service: Neurosurgery;;    SPINE SURGERY       TONSILLECTOMY      VASECTOMY         SOCIAL HISTORY:   Social History     Socioeconomic History    Marital status: Significant Other   Tobacco Use    Smoking status: Former Smoker     Packs/day: 0.00     Years: 24.00     Pack years: 0.00     Types: Vaping with nicotine     Start date: 1/1/1997    Smokeless tobacco: Never Used    Tobacco comment: Handout provided for Amb[employer] Smoking Cessaiton program   Substance and Sexual Activity    Alcohol use: Not Currently     Comment: 750 ml  once a month or so    Drug use: Never    Sexual activity: Yes     Partners: Female     Birth control/protection: None     Comment: ; one child      Social Determinants of Health     Financial Resource Strain: Low Risk     Difficulty of Paying Living Expenses: Not very hard   Food Insecurity: No Food Insecurity    Worried About Running Out of Food in the Last Year: Never true    Ran Out of Food in the Last Year: Never true   Transportation Needs: No Transportation Needs    Lack of Transportation (Medical): No    Lack of Transportation (Non-Medical): No   Physical Activity: Inactive    Days of Exercise per Week: 0 days    Minutes of Exercise per Session: 0 min   Stress: Stress Concern Present    Feeling of Stress : Very much   Social Connections: Unknown    Frequency of Communication with Friends and Family: Never    Frequency of Social Gatherings with Friends and Family: Never    Active Member of Clubs or Organizations: No    Attends Club or Organization Meetings: Never    Marital Status: Living with partner   Housing Stability: High Risk    Unable to Pay for Housing in the Last Year: Yes    Number of Places Lived in the Last Year: 2    Unstable Housing in the Last Year: No       FAMILY HISTORY:  Family History   Problem Relation Age of Onset    Fibromyalgia Mother     Osteoarthritis Mother     Cataracts Maternal Grandmother     Macular degeneration Maternal Grandfather     Cataracts Maternal  Grandfather     Rheum arthritis Neg Hx     Psoriasis Neg Hx     Lupus Neg Hx     Kidney disease Neg Hx     Inflammatory bowel disease Neg Hx     Amblyopia Neg Hx     Blindness Neg Hx     Glaucoma Neg Hx     Retinal detachment Neg Hx     Strabismus Neg Hx        CURRENTS MEDICATIONS:  Current Outpatient Medications on File Prior to Visit   Medication Sig Dispense Refill    acetaminophen (TYLENOL) 500 MG tablet Take 1,000 mg by mouth daily as needed for Pain.      ascorbic acid, vitamin C, (VITAMIN C) 1000 MG tablet Take 1,000 mg by mouth once daily.      aspirin (ECOTRIN) 81 MG EC tablet Take 81 mg by mouth every evening.      blood-glucose transmitter (DEXCOM G6 TRANSMITTER) Cayla Use to check glucose - 90 day supply 1 each 11    busPIRone (BUSPAR) 15 MG tablet TAKE 1 TABLET BY MOUTH 3 TIMES DAILY. 15 tablet 0    celecoxib (CELEBREX) 200 MG capsule TAKE 1 CAPSULE BY MOUTH TWICE A  capsule 0    DEXCOM G6 SENSOR Cayla SMARTSI Each Topical Every 10 Days      flash glucose scanning reader Misc Disp one reader to monitor glucose- covered by insurance dexcom 1 each 1    flash glucose sensor Kit Dispense sensor to read glucose  dexcom 12 kit 11    FLUoxetine 40 MG capsule TAKE 2 CAPSULES (80 MG TOTAL) BY MOUTH ONCE DAILY. 180 capsule 0    fluticasone propionate (FLONASE) 50 mcg/actuation nasal spray 2 sprays (100 mcg total) by Each Nostril route once daily. 48 mL 2    hydrOXYzine HCL (ATARAX) 25 MG tablet TAKE 1 -2 TABS NIGHTLY AS NEEDED FOR INSOMNIA 180 tablet 0    insulin aspart U-100 (NOVOLOG) 100 unit/mL (3 mL) InPn pen Inject Between 45 and 50 units 3 times a day with meals. 30 mL 6    insulin detemir U-100 (LEVEMIR FLEXTOUCH) 100 unit/mL (3 mL) SubQ InPn pen Inject 50 Units into the skin once daily. 15 mL 3    metFORMIN (GLUCOPHAGE-XR) 500 MG ER 24hr tablet Take 2 tablets (1,000 mg total) by mouth daily with breakfast. 180 tablet 3    multivitamin capsule Take 1 capsule by mouth once  "daily.      NON FORMULARY MEDICATION Uses Cpap Machine nightly on a setting of 10      ONETOUCH DELICA PLUS LANCET 33 gauge Misc Apply topically 3 (three) times daily.      ONETOUCH VERIO TEST STRIPS Strp 1 strip 3 (three) times daily.      oxyCODONE-acetaminophen (PERCOCET)  mg per tablet Take 1 tablet by mouth every 8 (eight) hours as needed for Pain. 90 tablet 0    pen needle, diabetic 31 gauge x 1/4" Ndle Use with flex pen 100 each 11    RYBELSUS 3 mg tablet TAKE 1 TABLET BY MOUTH ONCE DAILY FOR DM 30 tablet 1    tiZANidine (ZANAFLEX) 4 MG tablet TAKE 1 TABLET (4 MG TOTAL) BY MOUTH EVERY 8 (EIGHT) HOURS AS NEEDED (SPASMS). 270 tablet 0    valsartan-hydrochlorothiazide (DIOVAN-HCT) 320-12.5 mg per tablet Take 1 tablet by mouth once daily. 90 tablet 3    vitamin D 1000 units Tab Take 5,000 Units by mouth 2 (two) times a day.      atorvastatin (LIPITOR) 10 MG tablet Take 1 tablet (10 mg total) by mouth every other day. 45 tablet 3    diazePAM (VALIUM) 5 MG tablet Take 1 tablet (5 mg total) by mouth daily as needed for Anxiety (muscle spasms). 30 tablet 0    gabapentin (NEURONTIN) 800 MG tablet Take 1 tablet (800 mg total) by mouth 3 (three) times daily. 270 tablet 3    [DISCONTINUED] fluvoxaMINE (LUVOX) 100 MG tablet Take 1.5 tablets (150 mg total) by mouth 2 (two) times daily. 270 tablet 0     No current facility-administered medications on file prior to visit.       ALLERGIES:  Review of patient's allergies indicates:   Allergen Reactions    Dilaudid [hydromorphone] Other (See Comments)     IV dilaudid severely drops BP       REVIEW OF SYSTEMS:  Review of Systems   Constitutional: Negative for diaphoresis, fever and weight loss.   Respiratory: Negative for shortness of breath.    Cardiovascular: Negative for chest pain.   Gastrointestinal: Negative for blood in stool.   Genitourinary: Negative for hematuria.   Endo/Heme/Allergies: Does not bruise/bleed easily.   All other systems reviewed and " are negative.        OBJECTIVE:    PHYSICAL EXAMINATION:   There were no vitals filed for this visit.    Physical Exam:  Vitals reviewed.    Constitutional: He appears well-developed and well-nourished.     Eyes: Pupils are equal, round, and reactive to light. Conjunctivae and EOM are normal.     Cardiovascular: Normal distal pulses and no edema.     Abdominal: Soft.     Skin: Skin displays no rash on trunk and no rash on extremities. Skin displays no lesions on trunk and no lesions on extremities.     Psych/Behavior: He is alert. He is oriented to person, place, and time. He has a normal mood and affect.     Musculoskeletal:        Neck: Range of motion is full.     Neurological:        DTRs: Tricep reflexes are 2+ on the right side and 2+ on the left side. Bicep reflexes are 2+ on the right side and 2+ on the left side. Brachioradialis reflexes are 2+ on the right side and 2+ on the left side. Patellar reflexes are 2+ on the right side and 2+ on the left side. Achilles reflexes are 2+ on the right side and 2+ on the left side.       Back Exam     Tenderness   The patient is experiencing tenderness in the lumbar.    Muscle Strength   Right Quadriceps:  5/5   Left Quadriceps:  5/5   Right Hamstrings:  5/5   Left Hamstrings:  5/5     Tests   Straight leg raise right: negative  Straight leg raise left: negative    Other   Toe walk: normal  Heel walk: normal            SI joint:   Palpation at the right and left SI joints not painful  JASEN test is negative bilaterally  Gaenslen test is negative bilaterally  Thigh thrust test is negative bilaterally    Neurologic Exam     Mental Status   Oriented to person, place, and time.   Speech: speech is normal   Level of consciousness: alert    Cranial Nerves   Cranial nerves II through XII intact.     CN III, IV, VI   Pupils are equal, round, and reactive to light.  Extraocular motions are normal.     Motor Exam   Muscle bulk: normal  Overall muscle tone: normal    Strength    Right deltoid: 5/5  Left deltoid: 5/5  Right biceps: 5/5  Left biceps: 5/5  Right triceps: 5/5  Left triceps: 5/5  Right wrist flexion: 5/5  Left wrist flexion: 5/5  Right wrist extension: 5/5  Left wrist extension: 5/5  Right interossei: 5/5  Left interossei: 5/5  Right iliopsoas: 5/5  Left iliopsoas: 5/5  Right quadriceps: 5/5  Left quadriceps: 5/5  Right hamstrin/5  Left hamstrin/5  Right anterior tibial: 5/5  Left anterior tibial: 5/5  Right posterior tibial: 5/5  Left posterior tibial: 5/5  Right peroneal: 5/5  Left peroneal: 5/5  Right gastroc: 5/5  Left gastroc: 5/5    Sensory Exam   Light touch normal.   Pinprick normal.     Gait, Coordination, and Reflexes     Gait  Gait: normal    Coordination   Finger to nose coordination: normal  Tandem walking coordination: normal    Reflexes   Right brachioradialis: 2+  Left brachioradialis: 2+  Right biceps: 2+  Left biceps: 2+  Right triceps: 2+  Left triceps: 2+  Right patellar: 2+  Left patellar: 2+  Right achilles: 2+  Left achilles: 2+  Right plantar: normal  Left plantar: normal  Right Hughes: absent  Left Hughes: absent  Right ankle clonus: absent  Left ankle clonus: absent        DIAGNOSTIC DATA:  I personally interpreted the following imaging:   Scoliosis film today shows good positioning of hardware, no lucency around the hardware, good spinal alignment    ASSESMENT:  This is a 47 y.o. male with     Problem List Items Addressed This Visit    None     Visit Diagnoses     Status post lumbar spinal fusion    -  Primary            PLAN:  He can start walking more every day, Laura exercises to strengthen his lumbar are axial muscles.  Patient can start swimming more regularly.  Continue to use bone growth stimulator  Refill on lidocaine cream.  Follow-up in 3 months with repeat lumbar x-rays        Jose L Brown MD  Cell:[phone]

## 2022-07-21 ENCOUNTER — PATIENT OUTREACH (OUTPATIENT)
Dept: ADMINISTRATIVE | Facility: HOSPITAL | Age: 47
End: 2022-07-21
Payer: MEDICAID

## 2022-07-21 ENCOUNTER — TELEPHONE (OUTPATIENT)
Dept: FAMILY MEDICINE | Facility: CLINIC | Age: 47
End: 2022-07-21
Payer: MEDICAID

## 2022-07-21 NOTE — TELEPHONE ENCOUNTER
I discussed results with pt girlfriend  Pt did review results on the portal she said    He has not been checking BP at home    Portal message sent tot he pt to set up lab appt in 2 months and to remind him to send bp readings

## 2022-07-21 NOTE — TELEPHONE ENCOUNTER
Your hemoglobin A1c is just about under control.  Do a little bit more to get you glucose more under control.  Repeat hemoglobin A1c in two months-standing orders    Ask him for blood pressure readings

## 2022-07-22 ENCOUNTER — PATIENT MESSAGE (OUTPATIENT)
Dept: PSYCHIATRY | Facility: CLINIC | Age: 47
End: 2022-07-22
Payer: MEDICAID

## 2022-07-23 DIAGNOSIS — F33.0 MILD EPISODE OF RECURRENT MAJOR DEPRESSIVE DISORDER: ICD-10-CM

## 2022-07-23 DIAGNOSIS — F99 INSOMNIA DUE TO OTHER MENTAL DISORDER: ICD-10-CM

## 2022-07-23 DIAGNOSIS — F42.2 MIXED OBSESSIONAL THOUGHTS AND ACTS: ICD-10-CM

## 2022-07-23 DIAGNOSIS — F51.05 INSOMNIA DUE TO OTHER MENTAL DISORDER: ICD-10-CM

## 2022-07-23 RX ORDER — BUSPIRONE HYDROCHLORIDE 15 MG/1
15 TABLET ORAL 3 TIMES DAILY
Qty: 15 TABLET | Refills: 0 | Status: SHIPPED | OUTPATIENT
Start: 2022-07-23 | End: 2022-07-27 | Stop reason: SDUPTHER

## 2022-07-23 RX ORDER — FLUOXETINE HYDROCHLORIDE 40 MG/1
80 CAPSULE ORAL DAILY
Qty: 180 CAPSULE | Refills: 0 | Status: SHIPPED | OUTPATIENT
Start: 2022-07-23 | End: 2022-09-15

## 2022-07-25 ENCOUNTER — PATIENT OUTREACH (OUTPATIENT)
Dept: ADMINISTRATIVE | Facility: HOSPITAL | Age: 47
End: 2022-07-25
Payer: MEDICAID

## 2022-07-25 ENCOUNTER — PATIENT MESSAGE (OUTPATIENT)
Dept: ADMINISTRATIVE | Facility: HOSPITAL | Age: 47
End: 2022-07-25
Payer: MEDICAID

## 2022-07-25 ENCOUNTER — OFFICE VISIT (OUTPATIENT)
Dept: PSYCHIATRY | Facility: CLINIC | Age: 47
End: 2022-07-25
Payer: MEDICAID

## 2022-07-25 DIAGNOSIS — F33.0 MILD EPISODE OF RECURRENT MAJOR DEPRESSIVE DISORDER: Primary | ICD-10-CM

## 2022-07-25 PROCEDURE — 3072F LOW RISK FOR RETINOPATHY: CPT | Mod: CPTII,,, | Performed by: SOCIAL WORKER

## 2022-07-25 PROCEDURE — 90834 PSYTX W PT 45 MINUTES: CPT | Mod: ,,, | Performed by: SOCIAL WORKER

## 2022-07-25 PROCEDURE — 3072F PR LOW RISK FOR RETINOPATHY: ICD-10-PCS | Mod: CPTII,,, | Performed by: SOCIAL WORKER

## 2022-07-25 PROCEDURE — 3052F HG A1C>EQUAL 8.0%<EQUAL 9.0%: CPT | Mod: CPTII,,, | Performed by: SOCIAL WORKER

## 2022-07-25 PROCEDURE — 90834 PR PSYCHOTHERAPY W/PATIENT, 45 MIN: ICD-10-PCS | Mod: ,,, | Performed by: SOCIAL WORKER

## 2022-07-25 PROCEDURE — 3052F PR MOST RECENT HEMOGLOBIN A1C LEVEL 8.0 - < 9.0%: ICD-10-PCS | Mod: CPTII,,, | Performed by: SOCIAL WORKER

## 2022-07-25 NOTE — PROGRESS NOTES
2022 Care Everywhere updates requested and reviewed.  Immunizations reconciled. Media reports reviewed.  Duplicate HM overrides and  orders removed.  Overdue HM topic chart audit and/or requested.  Overdue lab testing linked to upcoming lab appointments if applies.    Eye exam updated in     Health Maintenance Due   Topic Date Due    Pneumococcal Vaccines (Age 0-64) (2 - PCV) 2018    Colorectal Cancer Screening  Never done

## 2022-07-25 NOTE — PROGRESS NOTES
"Individual Psychotherapy (PhD/LCSW)    7/25/2022    Site:  [facility]         Therapeutic Intervention: Met with patient.  Outpatient - Insight oriented psychotherapy 45 min - CPT code 86457    Chief complaint/reason for encounter: anxiety     Interval history and content of current session: Pt seen for follow-up.  He has not been seen since January.  He had to have another back surgery in April as the fusion he had last April did not fuse in the lowest part of his back.  He is doing better after this surgery but has lost his job and is now on long-term disability.  He is having a hard time coping with not having anything to do.  He and his GF had to move in with his mother as they could no longer afford rent on their house.  His daughter has been back with her mother for the past month after graduating from high school here but is coming back soon.  He misses her.  He says he feels agitated and like he's about to "boil over".  His GF is in a dysfunctional relationship with her ex-, his wife, and her teen age daughter which frustrates him to hear about.  Discussed what he can do to feel more productive and to get through this difficult period.    Treatment plan:  · Target symptoms: anxiety   · Why chosen therapy is appropriate versus another modality: relevant to diagnosis  · Outcome monitoring methods: self-report, observation  · Therapeutic intervention type: insight oriented psychotherapy    Risk parameters:  Patient reports no suicidal ideation  Patient reports no homicidal ideation  Patient reports no self-injurious behavior  Patient reports no violent behavior    Verbal deficits: None    Patient's response to intervention:  The patient's response to intervention is accepting.    Progress toward goals and other mental status changes:  The patient's progress toward goals is fair .    Diagnosis:     ICD-10-CM ICD-9-CM   1. Mild episode of recurrent major depressive disorder  F33.0 296.31 "       Plan:  individual psychotherapy and medication management by physician    Return to clinic: 3 weeks    Length of Service (minutes): 45

## 2022-07-27 DIAGNOSIS — F42.2 MIXED OBSESSIONAL THOUGHTS AND ACTS: ICD-10-CM

## 2022-07-27 RX ORDER — BUSPIRONE HYDROCHLORIDE 15 MG/1
15 TABLET ORAL 3 TIMES DAILY
Qty: 90 TABLET | Refills: 1 | Status: SHIPPED | OUTPATIENT
Start: 2022-07-27 | End: 2022-09-15 | Stop reason: SDUPTHER

## 2022-08-08 ENCOUNTER — PATIENT MESSAGE (OUTPATIENT)
Dept: ADMINISTRATIVE | Facility: OTHER | Age: 47
End: 2022-08-08
Payer: MEDICAID

## 2022-08-08 ENCOUNTER — OFFICE VISIT (OUTPATIENT)
Dept: FAMILY MEDICINE | Facility: CLINIC | Age: 47
End: 2022-08-08
Payer: MEDICAID

## 2022-08-08 VITALS
BODY MASS INDEX: 42.66 KG/M2 | SYSTOLIC BLOOD PRESSURE: 110 MMHG | HEART RATE: 87 BPM | DIASTOLIC BLOOD PRESSURE: 60 MMHG | TEMPERATURE: 99 F | HEIGHT: 72 IN | WEIGHT: 315 LBS | OXYGEN SATURATION: 96 %

## 2022-08-08 DIAGNOSIS — E11.8 TYPE 2 DIABETES MELLITUS WITH COMPLICATION, WITHOUT LONG-TERM CURRENT USE OF INSULIN: Primary | ICD-10-CM

## 2022-08-08 DIAGNOSIS — Z23 NEED FOR PNEUMOCOCCAL VACCINATION: ICD-10-CM

## 2022-08-08 DIAGNOSIS — I10 ESSENTIAL HYPERTENSION: ICD-10-CM

## 2022-08-08 DIAGNOSIS — Z12.11 ENCOUNTER FOR SCREENING FOR MALIGNANT NEOPLASM OF COLON: ICD-10-CM

## 2022-08-08 PROCEDURE — 3072F LOW RISK FOR RETINOPATHY: CPT | Mod: CPTII,S$GLB,, | Performed by: FAMILY MEDICINE

## 2022-08-08 PROCEDURE — 3074F PR MOST RECENT SYSTOLIC BLOOD PRESSURE < 130 MM HG: ICD-10-PCS | Mod: CPTII,S$GLB,, | Performed by: FAMILY MEDICINE

## 2022-08-08 PROCEDURE — 3052F HG A1C>EQUAL 8.0%<EQUAL 9.0%: CPT | Mod: CPTII,S$GLB,, | Performed by: FAMILY MEDICINE

## 2022-08-08 PROCEDURE — 90677 PCV20 VACCINE IM: CPT | Mod: S$GLB,,, | Performed by: FAMILY MEDICINE

## 2022-08-08 PROCEDURE — 99214 PR OFFICE/OUTPT VISIT, EST, LEVL IV, 30-39 MIN: ICD-10-PCS | Mod: 25,S$GLB,, | Performed by: FAMILY MEDICINE

## 2022-08-08 PROCEDURE — 90471 PNEUMOCOCCAL CONJUGATE VACCINE 20-VALENT: ICD-10-PCS | Mod: S$GLB,,, | Performed by: FAMILY MEDICINE

## 2022-08-08 PROCEDURE — 3078F DIAST BP <80 MM HG: CPT | Mod: CPTII,S$GLB,, | Performed by: FAMILY MEDICINE

## 2022-08-08 PROCEDURE — 3008F BODY MASS INDEX DOCD: CPT | Mod: CPTII,S$GLB,, | Performed by: FAMILY MEDICINE

## 2022-08-08 PROCEDURE — 3072F PR LOW RISK FOR RETINOPATHY: ICD-10-PCS | Mod: CPTII,S$GLB,, | Performed by: FAMILY MEDICINE

## 2022-08-08 PROCEDURE — 3078F PR MOST RECENT DIASTOLIC BLOOD PRESSURE < 80 MM HG: ICD-10-PCS | Mod: CPTII,S$GLB,, | Performed by: FAMILY MEDICINE

## 2022-08-08 PROCEDURE — 3052F PR MOST RECENT HEMOGLOBIN A1C LEVEL 8.0 - < 9.0%: ICD-10-PCS | Mod: CPTII,S$GLB,, | Performed by: FAMILY MEDICINE

## 2022-08-08 PROCEDURE — 90677 PNEUMOCOCCAL CONJUGATE VACCINE 20-VALENT: ICD-10-PCS | Mod: S$GLB,,, | Performed by: FAMILY MEDICINE

## 2022-08-08 PROCEDURE — 1159F MED LIST DOCD IN RCRD: CPT | Mod: CPTII,S$GLB,, | Performed by: FAMILY MEDICINE

## 2022-08-08 PROCEDURE — 1159F PR MEDICATION LIST DOCUMENTED IN MEDICAL RECORD: ICD-10-PCS | Mod: CPTII,S$GLB,, | Performed by: FAMILY MEDICINE

## 2022-08-08 PROCEDURE — 3074F SYST BP LT 130 MM HG: CPT | Mod: CPTII,S$GLB,, | Performed by: FAMILY MEDICINE

## 2022-08-08 PROCEDURE — 3008F PR BODY MASS INDEX (BMI) DOCUMENTED: ICD-10-PCS | Mod: CPTII,S$GLB,, | Performed by: FAMILY MEDICINE

## 2022-08-08 PROCEDURE — 90471 IMMUNIZATION ADMIN: CPT | Mod: S$GLB,,, | Performed by: FAMILY MEDICINE

## 2022-08-08 PROCEDURE — 99214 OFFICE O/P EST MOD 30 MIN: CPT | Mod: 25,S$GLB,, | Performed by: FAMILY MEDICINE

## 2022-08-08 RX ORDER — DICLOFENAC SODIUM 16.05 MG/ML
SOLUTION TOPICAL DAILY PRN
Status: ON HOLD | COMMUNITY
Start: 2022-06-06 | End: 2022-11-12 | Stop reason: CLARIF

## 2022-08-08 RX ORDER — DULAGLUTIDE 3 MG/.5ML
3 INJECTION, SOLUTION SUBCUTANEOUS
Qty: 12 PEN | Refills: 3 | Status: SHIPPED | OUTPATIENT
Start: 2022-08-08 | End: 2022-10-14

## 2022-08-08 RX ORDER — CELECOXIB 200 MG/1
200 CAPSULE ORAL 2 TIMES DAILY
Qty: 180 CAPSULE | Refills: 0 | Status: SHIPPED | OUTPATIENT
Start: 2022-08-08 | End: 2022-11-08 | Stop reason: SDUPTHER

## 2022-08-08 RX ORDER — ATORVASTATIN CALCIUM 10 MG/1
10 TABLET, FILM COATED ORAL EVERY OTHER DAY
Qty: 45 TABLET | Refills: 3 | Status: SHIPPED | OUTPATIENT
Start: 2022-08-08 | End: 2023-08-11 | Stop reason: SDUPTHER

## 2022-08-08 NOTE — PROGRESS NOTES
After speaking to this patient, they are eligible and interested in the digital medicine program for Hypertension and diabetes.  Please go to your smart sets and sign the order for digital medicine.  After signing the order the patient will be contacted to complete enrollment.  Please let me know if you have any questions/concerns.  Thank you!

## 2022-08-08 NOTE — PROGRESS NOTES
Pt is due for a eye exam at the end of august with Nakia Holt with [facility]. Pt stated when he went in June 2022 they did not do a eye exam because pt was not due for one at that time

## 2022-08-11 ENCOUNTER — OFFICE VISIT (OUTPATIENT)
Dept: PSYCHIATRY | Facility: CLINIC | Age: 47
End: 2022-08-11
Payer: MEDICAID

## 2022-08-11 DIAGNOSIS — F33.0 MILD EPISODE OF RECURRENT MAJOR DEPRESSIVE DISORDER: Primary | ICD-10-CM

## 2022-08-11 PROCEDURE — 3052F HG A1C>EQUAL 8.0%<EQUAL 9.0%: CPT | Mod: CPTII,,, | Performed by: SOCIAL WORKER

## 2022-08-11 PROCEDURE — 3052F PR MOST RECENT HEMOGLOBIN A1C LEVEL 8.0 - < 9.0%: ICD-10-PCS | Mod: CPTII,,, | Performed by: SOCIAL WORKER

## 2022-08-11 PROCEDURE — 3072F LOW RISK FOR RETINOPATHY: CPT | Mod: CPTII,,, | Performed by: SOCIAL WORKER

## 2022-08-11 PROCEDURE — 90834 PSYTX W PT 45 MINUTES: CPT | Mod: ,,, | Performed by: SOCIAL WORKER

## 2022-08-11 PROCEDURE — 3072F PR LOW RISK FOR RETINOPATHY: ICD-10-PCS | Mod: CPTII,,, | Performed by: SOCIAL WORKER

## 2022-08-11 PROCEDURE — 90834 PR PSYCHOTHERAPY W/PATIENT, 45 MIN: ICD-10-PCS | Mod: ,,, | Performed by: SOCIAL WORKER

## 2022-08-11 NOTE — PROGRESS NOTES
"Individual Psychotherapy (PhD/LCSW)    8/11/2022    Site:  [facility]         Therapeutic Intervention: Met with patient.  Outpatient - Insight oriented psychotherapy 45 min - CPT code 40098    Chief complaint/reason for encounter: anxiety     Interval history and content of current session: Pt seen for follow-up.  He states he is doing a little better as his 18 year old daughter is now back here living with him.  He is enjoying her company as she seems to have given up some of her adolescent behaviors and is more cooperative and friendly than in the past.  He is still going a bit "stir crazy" due to now working and not having much to do as yet.  He feels his back is doing better since the 2nd surgery.  Discussed hobbies he could develop and ways he could use his time.    Treatment plan:  · Target symptoms: anxiety   · Why chosen therapy is appropriate versus another modality: relevant to diagnosis  · Outcome monitoring methods: self-report, observation  · Therapeutic intervention type: insight oriented psychotherapy    Risk parameters:  Patient reports no suicidal ideation  Patient reports no homicidal ideation  Patient reports no self-injurious behavior  Patient reports no violent behavior    Verbal deficits: None    Patient's response to intervention:  The patient's response to intervention is accepting.    Progress toward goals and other mental status changes:  The patient's progress toward goals is fair .    Diagnosis:     ICD-10-CM ICD-9-CM   1. Mild episode of recurrent major depressive disorder  F33.0 296.31       Plan:  individual psychotherapy and medication management by physician    Return to clinic: 3 weeks    Length of Service (minutes): 45      "

## 2022-08-12 ENCOUNTER — PATIENT MESSAGE (OUTPATIENT)
Dept: GASTROENTEROLOGY | Facility: CLINIC | Age: 47
End: 2022-08-12
Payer: MEDICAID

## 2022-08-12 ENCOUNTER — TELEPHONE (OUTPATIENT)
Dept: GASTROENTEROLOGY | Facility: CLINIC | Age: 47
End: 2022-08-12
Payer: MEDICAID

## 2022-08-12 NOTE — TELEPHONE ENCOUNTER
Referring Physician: Dr. Marcos Valencia                             Date: 8/12/2022    Reason for Referral: Screening colonoscopy      Family History of:   Colon polyp: No  Relationship/Age of Onset:       Colon cancer: No  Relationship/Age of Onset:       Patient with:   Hemoccults Done:       Iron deficient:   No      On Blood Thinner: No      Valvular heart disease/valve replacement: No      Anemia Present: no      On NSAID: Clelbrex    On Adipex or phentermine: No      Lung disease: No      Kidney disease: No      Hx of polyps:       Hx of colon cancer:       Previous colon evalations: First colonoscopy  When:   Where:   Pertinent symptoms:           Review of patient's allergies indicates: Dilaudid        Patient was scheduled for colonoscopy on  9/6/2022      with Dr. Champion at [facility].       instructions were reviewed with patient.        Prep sent to [facility] in [place]    SUPREP Instructions    You are scheduled for a colonoscopy with Dr. Champion on 9/6/2022 at [facility]. Enter through the [facility] Entrance and check in at [facility] Day Surgery.  To ensure that your test is accurate and complete, you MUST follow these instructions listed below.  If you have any questions, please call our office at [phone].  Plan on being at the hospital for your procedure for 3-4 hours.    1.  Follow a CLEAR LIQUID DIET for the entire day before your scheduled colonoscopy.  This means no solid food the entire day starting when you wake.  You may have as much of the clear liquids as you want throughout the day.   CLEAR LIQUID DIET:   - Avoid Red, Orange, Purple, and/or Blue food coloring   - NO DAIRY   - You can have:  Coffee with sugar (no creamer), tea, water, soda, apple or white grape juice, chicken or beef broth/bouillon (no meat, noodles, or veggies), green/yellow popsicles, green/yellow Jell-O, lemonade.    2.  AT 5 pm the evening before your colonoscopy, POUR ONE (1) BOTTLE OF SUPREP INTO  THE MIXING CONTAINER, PROVIDED INSIDE THE BOX.  ADD WATER TO THE LINE ON THE CONTAINER AND MIX IT WELL.  DRINK THE ENTIRE CONTAINER AND THEN DRINK TWO (2) MORE CONTAINERS OF WATER OVER THE NEXT 1 HOUR.  This is sometimes easier to drink if this solution is cold, so you can mix the solution 20 minutes ahead of time and place in the refrigerator prior to drinking.  You have to drink the solution within 30-45 minutes of mixing it.  Do NOT put this solution over ice.  It IS ok to drink with a straw.    3.  The endoscopy department will call you 1 day before your colonoscopy to tell you the exact time to arrive, AND to tell you the exact time to drink the 2nd portion of your prep (which will be FIVE HOURS BEFORE YOUR ARRIVAL TIME).  At this time given to you, POUR ONE (1) BOTTLE OF SUPREP INTO THE MIXING CONTAINER, PROVIDED INSIDE THE BOX.  ADD WATER TO THE LINE ON THE CONTAINER AND MIX IT WELL.  DRINK THE ENTIRE CONTAINER AND THEN DRINK TWO (2) MORE CONTAINERS OF WATER OVER THE NEXT 1 HOUR.  This is sometimes easier to drink if this solution is cold, so you can mix the solution 20 minutes ahead of time and place in the refrigerator prior to drinking.  You have to drink the solution within 30-45 minutes of mixing it.  Do NOT put this solution over ice.  It IS ok to drink with a straw.  Once this is complete, you may not have ANYTHING else by mouth!    4.  You must have someone with you to DRIVE YOU HOME since you will be receiving IV sedation for the colonoscopy.    5.  It is ok to take MOST of your REGULAR MEDICATIONS  in the morning of your test with a SIP of water.  THE ONLY MEDS YOU NEED TO HOLD ARE YOUR DIABETES MEDICATIONS,  SOME BLOOD PRESSURE MEDS, AND BLOOD THINNERS IF OK'D BY YOUR DOCTOR.  Do NOT have anything else to eat or drink the morning of your colonoscopy.  It is ok to brush your teeth.    6.  If you are on blood thinners THAT YOU HAVE BEEN INSTRUCTED TO HOLD BY YOUR DOCTOR FOR THIS PROCEDURE, then do  NOT take this the morning of your colonoscopy.  Do NOT stop these medications on your own, they must be approved to be held by your doctor.  Your colonoscopy can NOT be done if you are on these medications.  Examples of blood thinners include: Coumadin, Aggrenox, Plavix, Pradaxa, Reapro, Pletal, Xarelto, Ticagrelor, Brilinta, Eliquis, and high dose aspirin (325 mg).  You do not have to stop baby aspirin 81 mg.    7.  IF YOU ARE DIABETIC:  NO INSULIN OR ORAL MEDICATIONS THE MORNING OF THE COLONOSCOPY.  TAKE ONLY HALF THE DOSE OF YOUR INSULIN THE DAY BEFORE THE COLONOSCOPY.  DO NOT TAKE ANY ORAL DIABETIC MEDICATIONS THE DAY BEFORE THE COLONOSCOPY.  IF YOU ARE AN INSULIN DEPENDENT DIABETIC WITH UNSTABLE BLOOD SUGARS, NOTIFY YOUR PRIMARY CARE PHYSICIAN FOR INSTRUCTIONS.

## 2022-08-15 RX ORDER — SODIUM, POTASSIUM,MAG SULFATES 17.5-3.13G
1 SOLUTION, RECONSTITUTED, ORAL ORAL DAILY
Qty: 1 KIT | Refills: 0 | Status: SHIPPED | OUTPATIENT
Start: 2022-08-15 | End: 2022-08-17

## 2022-08-15 NOTE — PROGRESS NOTES
Patient ID: Kirill Gandhi III is a 47 y.o. male.    Chief Complaint: Follow-up and Back Pain    HPI       Kirill Gandhi III is a 47 y.o. male here following up on chronic medical conditions including back pain status post multiple surgeries.  Diabetes mellitus which is not as well controlled as was previously.  Anxiety stress.      Review of Symptoms      /60 (BP Location: Right arm, Patient Position: Sitting)   Pulse 87   Temp 98.5 °F (36.9 °C) (Oral)   Ht 6' (1.829 m)   Wt (!) 171.7 kg (378 lb 10.2 oz)   SpO2 96%   BMI 51.35 kg/m²     Physical Exam    Vitals:    08/08/22 1131   BP: 110/60   BP Location: Right arm   Patient Position: Sitting   Pulse: 87   Temp: 98.5 °F (36.9 °C)   TempSrc: Oral   SpO2: 96%   Weight: (!) 171.7 kg (378 lb 10.2 oz)   Height: 6' (1.829 m)       Constitutional:   Oriented to person, place, and time.appears well-developed and well-nourished.   No distress.      HENT  Head: Normocephalic and atraumatic  Right Ear: External ear normal.   Left Ear: External ear normal.   Nose: External nose normal.   Mouth: Moist mucous membranes    Eyes:   Conjunctivae are normal. Right eye exhibits no discharge. Left eye exhibits no discharge. No scleral icterus. No periorbital edema    Musculoskeletal:  No edema. No obvious deformity No wasting     Neurological:  Alert and oriented to person, place, and time. Coordination normal.     Skin:   Skin is warm and dry.  No diaphoresis.   No rash noted.     Psychiatric: Normal mood and affect. Behavior is normal. Judgment and thought content normal.     Complete Blood Count  Lab Results   Component Value Date    RBC 2.87 (L) 04/15/2022    HGB 7.8 (L) 04/15/2022    HCT 25.0 (L) 04/15/2022    MCV 87 04/15/2022    MCH 27.2 04/15/2022    MCHC 31.2 (L) 04/15/2022    RDW 15.5 (H) 04/15/2022     04/15/2022    MPV 10.0 04/15/2022    GRAN 4.5 04/15/2022    GRAN 65.7 04/15/2022    LYMPH 1.3 04/15/2022    LYMPH 19.7 04/15/2022     MONO 0.7 04/15/2022    MONO 10.4 04/15/2022    EOS 0.2 04/15/2022    BASO 0.04 04/15/2022    EOSINOPHIL 3.2 04/15/2022    BASOPHIL 0.6 04/15/2022    DIFFMETHOD Automated 04/15/2022       Comprehensive Metabolic Panel  Lab Results   Component Value Date     (H) 04/15/2022    BUN 11 04/15/2022    CREATININE 1.0 04/15/2022     04/15/2022    K 4.0 04/15/2022     04/15/2022    PROT 8.3 03/07/2022    ALBUMIN 4.3 03/07/2022    BILITOT 0.4 03/07/2022    AST 32 03/07/2022    ALKPHOS 93 03/07/2022    CO2 28 04/15/2022    ALT 30 03/07/2022    ANIONGAP 6 (L) 04/15/2022    EGFRNONAA >60 04/15/2022    ESTGFRAFRICA >60 04/15/2022       TSH  No results found for: TSH    Assessment / Plan:      ICD-10-CM ICD-9-CM   1. Type 2 diabetes mellitus with complication, without long-term current use of insulin  E11.8 250.90   2. Need for pneumococcal vaccination  Z23 V03.82   3. Essential hypertension  I10 401.9   4. Encounter for screening for malignant neoplasm of colon  Z12.11 V76.51     Type 2 diabetes mellitus with complication, without long-term current use of insulin  -     Ambulatory referral/consult to Ophthalmology; Future; Expected date: 08/15/2022  -     Diabetes Digital Medicine (DDMP) Enrollment Order  -     Diabetes Digital Medicine (DDMP): Assign Onboarding Questionnaires  -     Hypertension Digital Medicine (HDMP) Enrollment Order    Need for pneumococcal vaccination  -     (In Office Administered) Pneumococcal Conjugate Vaccine (20 Valent) (IM)    Essential hypertension  -     Diabetes Digital Medicine (DDMP): Assign Onboarding Questionnaires  -     Hypertension Digital Medicine (HDMP): Assign Onboarding Questionnaires    Encounter for screening for malignant neoplasm of colon  -     Case Request Endoscopy: COLONOSCOPY    Other orders  -     dulaglutide (TRULICITY) 3 mg/0.5 mL pen injector; Inject 3 mg into the skin every 7 days.  Dispense: 12 pen; Refill: 3  -     atorvastatin (LIPITOR) 10 MG tablet;  Take 1 tablet (10 mg total) by mouth every other day.  Dispense: 45 tablet; Refill: 3  -     celecoxib (CELEBREX) 200 MG capsule; Take 1 capsule (200 mg total) by mouth 2 (two) times daily.  Dispense: 180 capsule; Refill: 0    Patient with decrease mood and hind anxiety because of current medical conditions loss of job.  Spent most of our time discussing ways to deal with these problems.

## 2022-08-17 RX ORDER — VALSARTAN AND HYDROCHLOROTHIAZIDE 320; 12.5 MG/1; MG/1
1 TABLET, FILM COATED ORAL DAILY
Qty: 90 TABLET | Refills: 3 | Status: SHIPPED | OUTPATIENT
Start: 2022-08-17 | End: 2023-08-15

## 2022-08-17 NOTE — TELEPHONE ENCOUNTER
No new care gaps identified.  [facility] Embedded Care Gaps. Reference number: [id number]. 5/13/2022   12:51:02 PM CDT  
No

## 2022-08-17 NOTE — TELEPHONE ENCOUNTER
No new care gaps identified.  [facility] Embedded Care Gaps. Reference number: [id number]. 8/17/2022   12:32:45 PM CDT

## 2022-08-24 ENCOUNTER — TELEPHONE (OUTPATIENT)
Dept: FAMILY MEDICINE | Facility: CLINIC | Age: 47
End: 2022-08-24
Payer: MEDICAID

## 2022-08-24 NOTE — TELEPHONE ENCOUNTER
----- Message from Haydee Yee sent at 8/24/2022  9:32 AM CDT -----  Regarding: advice  Contact: [phone]  Type:  Needs Medical Advice    Who Called:  self   Symptoms (please be specific):  extremely bad gas pain and passing gas    How long has patient had these symptoms:   3 or 4 days ago   Pharmacy name and phone #:    [id number] - [place], [place]  [address] [place] [address];  Would the patient rather a call back or a response via [facility]?  Call him to discuss   Best Call Back Number:  [phone]  Additional Information:          I called the pt and LM for him to rtn call  Did he try anything otc?

## 2022-08-24 NOTE — TELEPHONE ENCOUNTER
OFTEN TIMES THIS IS DUE TO CONSTIPATION.  I SUGGEST TAKING DULCOLAX 5 MILLIGRAMS TWO TABLETS X1 NOW.  IF NO BOWEL MOVEMENT CONSIDER A BOTTLE OF MAGNESIUM CITRATE AND OR A FLEETS ENEMA X1 OR TWO.  I SUGGEST CLEANING HERSELF OUT BEFORE ADDRESSING SOMETHING THAT MAY BE MORE SERIOUS.-AGAIN MOSTLY DUE TO CONSTIPATION

## 2022-08-24 NOTE — TELEPHONE ENCOUNTER
Spoke with pt he stated that he has been having regular bowel movements he just has really bad trap gas and gas pains

## 2022-08-25 RX ORDER — INSULIN DETEMIR 100 [IU]/ML
50 INJECTION, SOLUTION SUBCUTANEOUS DAILY
Qty: 45 ML | Refills: 1 | Status: SHIPPED | OUTPATIENT
Start: 2022-08-25 | End: 2022-11-08 | Stop reason: SDUPTHER

## 2022-08-25 NOTE — TELEPHONE ENCOUNTER
Refill Decision Note   Kirill Hiram  is requesting a refill authorization.  Brief Assessment and Rationale for Refill:  Approve     Medication Therapy Plan:       Medication Reconciliation Completed: No   Comments:     No Care Gaps recommended.     Note composed:4:06 PM 08/25/2022

## 2022-08-25 NOTE — TELEPHONE ENCOUNTER
No new care gaps identified.  [facility] Embedded Care Gaps. Reference number: [id number]. 8/25/2022   4:04:21 PM CDT

## 2022-08-26 ENCOUNTER — PATIENT MESSAGE (OUTPATIENT)
Dept: ADMINISTRATIVE | Facility: OTHER | Age: 47
End: 2022-08-26
Payer: MEDICAID

## 2022-09-14 ENCOUNTER — PATIENT MESSAGE (OUTPATIENT)
Dept: FAMILY MEDICINE | Facility: CLINIC | Age: 47
End: 2022-09-14
Payer: MEDICAID

## 2022-09-15 ENCOUNTER — OFFICE VISIT (OUTPATIENT)
Dept: PSYCHIATRY | Facility: CLINIC | Age: 47
End: 2022-09-15
Payer: MEDICAID

## 2022-09-15 VITALS
DIASTOLIC BLOOD PRESSURE: 70 MMHG | BODY MASS INDEX: 51.65 KG/M2 | SYSTOLIC BLOOD PRESSURE: 158 MMHG | HEART RATE: 87 BPM | WEIGHT: 315 LBS

## 2022-09-15 DIAGNOSIS — F51.05 INSOMNIA DUE TO OTHER MENTAL DISORDER: ICD-10-CM

## 2022-09-15 DIAGNOSIS — F99 INSOMNIA DUE TO OTHER MENTAL DISORDER: ICD-10-CM

## 2022-09-15 DIAGNOSIS — F42.2 MIXED OBSESSIONAL THOUGHTS AND ACTS: ICD-10-CM

## 2022-09-15 DIAGNOSIS — F43.23 ADJUSTMENT DISORDER WITH MIXED ANXIETY AND DEPRESSED MOOD: Primary | ICD-10-CM

## 2022-09-15 PROCEDURE — 99999 PR PBB SHADOW E&M-EST. PATIENT-LVL III: ICD-10-PCS | Mod: PBBFAC,,, | Performed by: NURSE PRACTITIONER

## 2022-09-15 PROCEDURE — 3078F DIAST BP <80 MM HG: CPT | Mod: CPTII,,, | Performed by: NURSE PRACTITIONER

## 2022-09-15 PROCEDURE — 99999 PR PBB SHADOW E&M-EST. PATIENT-LVL III: CPT | Mod: PBBFAC,,, | Performed by: NURSE PRACTITIONER

## 2022-09-15 PROCEDURE — 3072F LOW RISK FOR RETINOPATHY: CPT | Mod: CPTII,,, | Performed by: NURSE PRACTITIONER

## 2022-09-15 PROCEDURE — 3052F HG A1C>EQUAL 8.0%<EQUAL 9.0%: CPT | Mod: CPTII,,, | Performed by: NURSE PRACTITIONER

## 2022-09-15 PROCEDURE — 3052F PR MOST RECENT HEMOGLOBIN A1C LEVEL 8.0 - < 9.0%: ICD-10-PCS | Mod: CPTII,,, | Performed by: NURSE PRACTITIONER

## 2022-09-15 PROCEDURE — 99214 PR OFFICE/OUTPT VISIT, EST, LEVL IV, 30-39 MIN: ICD-10-PCS | Mod: S$PBB,,, | Performed by: NURSE PRACTITIONER

## 2022-09-15 PROCEDURE — 1159F MED LIST DOCD IN RCRD: CPT | Mod: CPTII,,, | Performed by: NURSE PRACTITIONER

## 2022-09-15 PROCEDURE — 3072F PR LOW RISK FOR RETINOPATHY: ICD-10-PCS | Mod: CPTII,,, | Performed by: NURSE PRACTITIONER

## 2022-09-15 PROCEDURE — 99214 OFFICE O/P EST MOD 30 MIN: CPT | Mod: S$PBB,,, | Performed by: NURSE PRACTITIONER

## 2022-09-15 PROCEDURE — 3008F BODY MASS INDEX DOCD: CPT | Mod: CPTII,,, | Performed by: NURSE PRACTITIONER

## 2022-09-15 PROCEDURE — 3077F SYST BP >= 140 MM HG: CPT | Mod: CPTII,,, | Performed by: NURSE PRACTITIONER

## 2022-09-15 PROCEDURE — 99213 OFFICE O/P EST LOW 20 MIN: CPT | Mod: PBBFAC | Performed by: NURSE PRACTITIONER

## 2022-09-15 PROCEDURE — 1160F PR REVIEW ALL MEDS BY PRESCRIBER/CLIN PHARMACIST DOCUMENTED: ICD-10-PCS | Mod: CPTII,,, | Performed by: NURSE PRACTITIONER

## 2022-09-15 PROCEDURE — 1159F PR MEDICATION LIST DOCUMENTED IN MEDICAL RECORD: ICD-10-PCS | Mod: CPTII,,, | Performed by: NURSE PRACTITIONER

## 2022-09-15 PROCEDURE — 3078F PR MOST RECENT DIASTOLIC BLOOD PRESSURE < 80 MM HG: ICD-10-PCS | Mod: CPTII,,, | Performed by: NURSE PRACTITIONER

## 2022-09-15 PROCEDURE — 3008F PR BODY MASS INDEX (BMI) DOCUMENTED: ICD-10-PCS | Mod: CPTII,,, | Performed by: NURSE PRACTITIONER

## 2022-09-15 PROCEDURE — 3077F PR MOST RECENT SYSTOLIC BLOOD PRESSURE >= 140 MM HG: ICD-10-PCS | Mod: CPTII,,, | Performed by: NURSE PRACTITIONER

## 2022-09-15 PROCEDURE — 1160F RVW MEDS BY RX/DR IN RCRD: CPT | Mod: CPTII,,, | Performed by: NURSE PRACTITIONER

## 2022-09-15 RX ORDER — DULOXETIN HYDROCHLORIDE 30 MG/1
CAPSULE, DELAYED RELEASE ORAL
Qty: 74 CAPSULE | Refills: 0 | Status: SHIPPED | OUTPATIENT
Start: 2022-09-15 | End: 2022-10-27 | Stop reason: SDUPTHER

## 2022-09-15 RX ORDER — HYDROXYZINE HYDROCHLORIDE 25 MG/1
50 TABLET, FILM COATED ORAL NIGHTLY PRN
Qty: 180 TABLET | Refills: 0 | Status: SHIPPED | OUTPATIENT
Start: 2022-09-15 | End: 2023-03-02 | Stop reason: SDUPTHER

## 2022-09-15 RX ORDER — DIAZEPAM 5 MG/1
5 TABLET ORAL DAILY PRN
Qty: 30 TABLET | Refills: 0 | Status: SHIPPED | OUTPATIENT
Start: 2022-09-15 | End: 2022-11-07 | Stop reason: SDUPTHER

## 2022-09-15 RX ORDER — BUSPIRONE HYDROCHLORIDE 15 MG/1
15 TABLET ORAL 3 TIMES DAILY
Qty: 90 TABLET | Refills: 1 | Status: SHIPPED | OUTPATIENT
Start: 2022-09-15 | End: 2022-11-07 | Stop reason: SDUPTHER

## 2022-09-15 NOTE — PROGRESS NOTES
".Outpatient Psychiatry Follow-Up Visit (MD/NP)    9/15/2022    The patient location is: [place]  The chief complaint leading to consultation is: OCD, grief    Visit type: audiovisual    Face to Face time with patient: 32 minutes  38 minutes of total time spent on the encounter, which includes face to face time and non-face to face time preparing to see the patient (eg, review of tests), Obtaining and/or reviewing separately obtained history, Documenting clinical information in the electronic or other health record, Independently interpreting results (not separately reported) and communicating results to the patient/family/caregiver, or Care coordination (not separately reported).     Each patient to whom he or she provides medical services by telemedicine is:  (1) informed of the relationship between the physician and patient and the respective role of any other health care provider with respect to management of the patient; and (2) notified that he or she may decline to receive medical services by telemedicine and may withdraw from such care at any time.    Clinical Status of Patient:  Outpatient (Ambulatory)    Chief Complaint:  Kirill Gandhi III is a 47 y.o. male who presents today for follow-up of anxiety and OCD, grief .  Met with patient.      Interval History and Content of Current Session:  Interim Events/Subjective Report/Content of Current Session:    Kirill Gandhi III checked in on time for his appointment. Since last visit he had another one this April (first didn't take) and he inevitably lost his job because he is unable to work. Admits he is "tired of living" but suicidal. Reports poor hygiene, only thing that brings him loreta is playing video games with daughter. Relationship with g/f hasn't changed.  Continues to struggle with sleep. Fear of falling asleep. OCD is off the charts - lays there thinking at night. DM not well controlled due to emotional eating, eating out of boredom. " Having panic, hands shaking, crying. Took a shower for the first time in a week and cried about having to do it. Discussed risk and benefits to Cymbalta. Patient agreeable to plan. Denies SI/HI/AVH. No objective s/sx of psychosis or mihaela.Patient verbalized motivation for compliance with medications and all other elements of treatment plan.     Previous medications:  Wellbutrin- ineffective on its own  Prozac- effective  Luvox- partial response  Risperidone - ineffective  Buspar - effective  Celexa- ineffective      Review of Systems   PSYCHIATRIC: Pertinant items are noted in the narrative.  CONSTITUTIONAL: No weight gain or loss.   MUSCULOSKELETAL: See above.  NEUROLOGIC: No weakness, sensory changes, seizures, confusion, memory loss, tremor or other abnormal movements.  RESPIRATORY: No shortness of breath.  CARDIOVASCULAR: No tachycardia or chest pain.  GASTROINTESTINAL: No nausea, vomiting, pain, constipation or diarrhea.    Past Medical, Family and Social History: The patient's past medical, family and social history have been reviewed and updated as appropriate within the electronic medical record - see encounter notes.    Compliance: yes    Side effects: None    Risk Parameters:  Patient reports no suicidal ideation  Patient reports no homicidal ideation  Patient reports no self-injurious behavior  Patient reports no violent behavior    Exam (detailed: at least 9 elements; comprehensive: all 15 elements)   Constitutional  Vitals:     General:  unremarkable, age appropriate     Musculoskeletal  Muscle Strength/Tone:  not examined   Gait & Station:  SHARIF     Psychiatric  Speech:  no latency; no press   Mood & Affect:  depressed  congruent and appropriate   Thought Process:  normal and logical   Associations:  intact   Thought Content:  normal, no suicidality, no homicidality, delusions, or paranoia   Insight:  intact   Judgement: behavior is adequate to circumstances   Orientation:  grossly intact   Memory:  intact for content of interview   Language: grossly intact   Attention Span & Concentration:  able to focus   Fund of Knowledge:  intact and appropriate to age and level of education     Assessment and Diagnosis   Status/Progress: Based on the examination today, the patient's problem(s) is/are inadequately controlled.  New problems have not been presented today.   Co-morbidities, Diagnostic uncertainty and Lack of compliance are not complicating management of the primary condition.  There are no active rule-out diagnoses for this patient at this time.     General Impression:Kirill Gandhi III, a 44 y.o. male, presenting for initial evaluation visit. He reports having depression, anxiety, and OCD. Hasn't seen a psychiatrist in a few years since 2016.- has been managed by PCP since. Recently stopped Luvox due to interaction with Tizanidine. Started on Wellbutrin about a 3 months ago but feels its ineffective. Takes Valium twice a day everyday. Discussed starting Prozac and continuing Valium for the time being while we attempt to treat the anxiety with a medication with fewer risks. Patient agreeable. Presents 5/13/20- not some improvement on mid range dose of Prozac. Will increase to 60 then 80 if needed. Presents 7/15/20- doing well on Prozac 80. Valium taper started. Presents 2/5/21 - depression improved, OCD and anxiety still present - will increase Buspar. Presents 5/19/21- struggling since surgery and loss of his nanny- encouraged therapy. Present 7/23/21- insomnia - hydroxyzine started. Presents 9/16/21- symptom improvement Presents 9/15/22 -depressed and anxious - stop Prozac and start Cymbalta.       ICD-10-CM ICD-9-CM   1. Adjustment disorder with mixed anxiety and depressed mood  F43.23 309.28   2. Mixed obsessional thoughts and acts  F42.2 300.3   3. Insomnia due to other mental disorder  F51.05 300.9    F99 327.02         Intervention/Counseling/Treatment Plan   Medication Management: Continue  current medications.   Taper Prozac 40 mg x 7 days then stop  Start Cymbalta 30 mg daily x 2 weeks then increase to 60 mg daily  Buspar 15 mg TID  Hydroxyzine 25 - 50 mg night as needed for insomnia - does not need refill today  Valium 2 mg daily prn anxiety - does not need refill today  Informed pt of the risks of continuous Benzodiazepine use including tolerance, dependence and withdrawals that may be life threatening upon abrupt cessation. Also advised not to take Benzodiazepines with Opiates or other sedatives and also not to drive or operate heavy machinery while using Benzodiazepines.  The risks and benefits of medication were discussed with the patient.  The treatment plan and follow up plan were reviewed with the patient.  Discussed diagnosis, risks and benefits of proposed treatment above vs alternative treatments vs no treatment, and potential side effects of these treatments. The patient expresses understanding of the above and displays the capacity to agree with this treatment given said understanding. Patient also agrees that, currently, the benefits outweigh the risks and would like to pursue treatment at this time. Particularly emphasized risk of triggering suicidal ideation with SSRIs/antidepressants and risk of serotonin syndrome as patient is on multiple serotonergic agents. The patient expresses understanding of the former and displays the capacity to agree with the current plan.   Encouraged Patient to keep future appointments.   Take medications as prescribed and abstain from substance abuse.   In the event of an emergency patient was advised to go to the emergency room      Return to Clinic: 3 months    Blaire Staples DNP  [facility]

## 2022-09-17 ENCOUNTER — HOSPITAL ENCOUNTER (EMERGENCY)
Facility: HOSPITAL | Age: 47
Discharge: HOME | End: 2022-09-17
Attending: FAMILY MEDICINE
Payer: MEDICAID

## 2022-09-17 VITALS
RESPIRATION RATE: 20 BRPM | WEIGHT: 315 LBS | SYSTOLIC BLOOD PRESSURE: 178 MMHG | HEART RATE: 100 BPM | DIASTOLIC BLOOD PRESSURE: 90 MMHG | BODY MASS INDEX: 38.36 KG/M2 | HEIGHT: 76 IN | TEMPERATURE: 98 F | OXYGEN SATURATION: 98 %

## 2022-09-17 DIAGNOSIS — K52.9 GASTROENTERITIS: Primary | ICD-10-CM

## 2022-09-17 PROCEDURE — 25000003 PHARM REV CODE 250: Mod: ER | Performed by: FAMILY MEDICINE

## 2022-09-17 PROCEDURE — 63600175 PHARM REV CODE 636 W HCPCS: Mod: ER | Performed by: FAMILY MEDICINE

## 2022-09-17 PROCEDURE — 96372 THER/PROPH/DIAG INJ SC/IM: CPT | Performed by: FAMILY MEDICINE

## 2022-09-17 PROCEDURE — 99284 EMERGENCY DEPT VISIT MOD MDM: CPT | Mod: ER

## 2022-09-17 RX ORDER — FAMOTIDINE 20 MG/1
40 TABLET, FILM COATED ORAL
Status: COMPLETED | OUTPATIENT
Start: 2022-09-17 | End: 2022-09-17

## 2022-09-17 RX ORDER — DICYCLOMINE HYDROCHLORIDE 10 MG/ML
20 INJECTION INTRAMUSCULAR
Status: COMPLETED | OUTPATIENT
Start: 2022-09-17 | End: 2022-09-17

## 2022-09-17 RX ORDER — DICYCLOMINE HYDROCHLORIDE 20 MG/1
20 TABLET ORAL 3 TIMES DAILY PRN
Qty: 30 TABLET | Refills: 0 | Status: SHIPPED | OUTPATIENT
Start: 2022-09-17 | End: 2022-10-19 | Stop reason: SDUPTHER

## 2022-09-17 RX ORDER — ONDANSETRON 4 MG/1
4 TABLET, FILM COATED ORAL EVERY 8 HOURS PRN
Qty: 12 TABLET | Refills: 0 | Status: SHIPPED | OUTPATIENT
Start: 2022-09-17 | End: 2022-10-19 | Stop reason: DRUGHIGH

## 2022-09-17 RX ORDER — FAMOTIDINE 40 MG/1
40 TABLET, FILM COATED ORAL 2 TIMES DAILY
Qty: 60 TABLET | Refills: 0 | Status: SHIPPED | OUTPATIENT
Start: 2022-09-17 | End: 2022-10-19

## 2022-09-17 RX ORDER — CIPROFLOXACIN 500 MG/1
500 TABLET ORAL 2 TIMES DAILY
Qty: 20 TABLET | Refills: 0 | Status: SHIPPED | OUTPATIENT
Start: 2022-09-17 | End: 2022-09-27

## 2022-09-17 RX ORDER — ONDANSETRON 4 MG/1
4 TABLET, ORALLY DISINTEGRATING ORAL
Status: COMPLETED | OUTPATIENT
Start: 2022-09-17 | End: 2022-09-17

## 2022-09-17 RX ADMIN — ONDANSETRON 4 MG: 4 TABLET, ORALLY DISINTEGRATING ORAL at 02:09

## 2022-09-17 RX ADMIN — DICYCLOMINE HYDROCHLORIDE 20 MG: 20 INJECTION, SOLUTION INTRAMUSCULAR at 02:09

## 2022-09-17 RX ADMIN — FAMOTIDINE 40 MG: 20 TABLET ORAL at 02:09

## 2022-09-17 NOTE — ED PROVIDER NOTES
Encounter Date: 9/17/2022       History     Chief Complaint   Patient presents with    Abdominal Pain     Pt reports nausea, diarrhea, abd cramps, gas. Pt reports similar issue on 9-2-22. Last BM today.       Forty 42-year-old male complains of belching with lot of gas, abdominal cramping, flatulence, diarrhea since last night.  He used to have these symptoms once or twice in a month.  No blood in diarrhea.  No fever.  Patient had recent colonoscopy and was diagnosed with diverticulosis.  Not taking any medication for gastritis or GERD.  History of anxiety, depression, diabetes, hypertension, OCD    The history is provided by the patient.   Review of patient's allergies indicates:   Allergen Reactions    Dilaudid [hydromorphone] Other (See Comments)     IV dilaudid severely drops BP     Past Medical History:   Diagnosis Date    Allergy     Anxiety     Back pain     CTS (carpal tunnel syndrome) 6/26/2015    Depression     Diabetes 4/17/2015    Hx of vasectomy     Hypertension     OCD (obsessive compulsive disorder)     Sleep apnea      Past Surgical History:   Procedure Laterality Date    ADENOIDECTOMY      COLONOSCOPY N/A 9/6/2022    Procedure: COLONOSCOPY;  Surgeon: Rossy Champion MD;  Location: [facility] ENDO;  Service: Endoscopy;  Laterality: N/A;    EPIDURAL STEROID INJECTION Right 09/04/2019    Procedure: Injection, Steroid, Epidural Transforaminal;  Surgeon: Harjinder Batista Jr., MD;  Location: [facility] ENDO;  Service: Pain Management;  Laterality: Right;  Right L3 + L4 TF NATALIE    [id number]  [id number]    Arrive @ 1300; ASA last 8/27; Check BG; NEEDS CONSENT    FRACTURE SURGERY      bilateral elbow fracture 3 years ago    FUSION OF LUMBAR SPINE BY ANTERIOR APPROACH Left 04/26/2021    Procedure: FUSION, SPINE, LUMBAR, ANTERIOR APPROACH Left L3-4 L4-5 OLIF BRENDA, ALL Release, L5-S1 ALIF;  Surgeon: Jose L Brown MD;  Location: [facility] OR;  Service: Neurosurgery;  Laterality: Left;  Procedure: Left L3-4 L4-5 OLIF BRENDA, ALL  Release, L5-S1 ALIF  Surgery Time: 2.5 Hrs  LOS: 2-3  Anesthesia: General  Blood: Type & Screen  Radiology: Josseline  Brace: LSO  Bed: Regular  Position: LAteral Righ    FUSION OF SACROILIAC JOINT Bilateral 04/13/2022    Procedure: FUSION, SACROILIAC JOINT Stg 2: Bilateral Open SI Joint fusion, Bedrock from Si Bone;  Surgeon: Jose L Brown MD;  Location: [facility] OR;  Service: Neurosurgery;  Laterality: Bilateral;    FUSION OF SPINE WITH INSTRUMENTATION N/A 04/26/2021    Procedure: FUSION, SPINE, WITH INSTRUMENTATION L3-S1 Posteior Segmental Instrumentation;  Surgeon: Jose L Brown MD;  Location: [facility] OR;  Service: Neurosurgery;  Laterality: N/A;  Procedure: L3-S1 Posteior Segmental Instrumentation  Sirgery Time: 2 Hrs  LOS: 2-3  Anesthesia: General  Blood: Tyoe & Screen  Radiology: Dual C arm  Brace:LSO  Bed: [place] [id number] Poster  Position: Prone  Equipment: Tagged     FUSION OF SPINE WITH INSTRUMENTATION N/A 04/13/2022    Procedure: FUSION, SPINE, WITH INSTRUMENTATION Stg 1: Revision of posterior L3-S1 hardware, Depuy. Sacro-pelvic fixation, screw augmentation PMMA. L4-S1 posterolateral arthrodesis. Open Gauge screws, 11-12mm;  Surgeon: Jose L Brown MD;  Location: [facility] OR;  Service: Neurosurgery;  Laterality: N/A;    HERNIA REPAIR      bilateral groin hernia    INJECTION OF STEROID Bilateral 12/13/2021    Procedure: INJECTION, STEROID Bilateral SI JOint Block and Steroid Injection;  Surgeon: Jose L Brown MD;  Location: [facility] OR;  Service: Neurosurgery;  Laterality: Bilateral;  Procedure: Bilateral SI JOint Block and Steroid Injection   Surgery Time: 30 min  LOS: 0  Anesthesia: MAC  Radiology: C-arm  Bed: Regular with pillows  Position: Prone    LUMBAR LAMINECTOMY Bilateral 11/29/2019    Procedure: LAMINECTOMY, SPINE, LUMBAR Rigth L3-4, Left L4-5 Laminectomy, medial Facetectomy, and microdiscectomy;  Surgeon: Jose L Brown MD;  Location: [facility] OR;  Service: Neurosurgery;  Laterality: Bilateral;  Procedure: Rigth  L3-4, Left L4-5 Laminectomy, medial Facetectomy, and microdiscectomy  Surgery Time: 2.5 Hrs  LOS: 0-1  Anesthesia: General  Blood: Type & Screen  Radiology: C-arm  Micros    REMOVAL IMPLANT, POSTERIOR SEGMENT, INTRAOCULAR  04/13/2022    Procedure: REMOVAL IMPLANT, POSTERIOR SEGMENT, INTRAOCULAR;  Surgeon: Jose L Brown MD;  Location: [facility] OR;  Service: Neurosurgery;;    SPINE SURGERY      TONSILLECTOMY      VASECTOMY       Family History   Problem Relation Age of Onset    Fibromyalgia Mother     Osteoarthritis Mother     Cataracts Maternal Grandmother     Macular degeneration Maternal Grandfather     Cataracts Maternal Grandfather     Rheum arthritis Neg Hx     Psoriasis Neg Hx     Lupus Neg Hx     Kidney disease Neg Hx     Inflammatory bowel disease Neg Hx     Amblyopia Neg Hx     Blindness Neg Hx     Glaucoma Neg Hx     Retinal detachment Neg Hx     Strabismus Neg Hx      Social History     Tobacco Use    Smoking status: Former     Packs/day: 0.00     Years: 24.00     Pack years: 0.00     Types: Vaping with nicotine, Cigarettes     Start date: 1/1/1997    Smokeless tobacco: Never    Tobacco comments:     Handout provided for [employer] Smoking Cessaiton program   Substance Use Topics    Alcohol use: Not Currently     Comment: 750 ml  once a month or so    Drug use: Never     Review of Systems   Constitutional:  Negative for activity change, appetite change, chills and fever.   HENT:  Negative for congestion, ear discharge, rhinorrhea, sinus pressure, sinus pain, sore throat and trouble swallowing.    Eyes:  Negative for photophobia, pain, discharge, redness, itching and visual disturbance.   Respiratory:  Negative for cough, chest tightness, shortness of breath and wheezing.    Cardiovascular:  Negative for chest pain, palpitations and leg swelling.   Gastrointestinal:  Positive for abdominal pain, diarrhea and nausea. Negative for abdominal distention, blood in stool, constipation and vomiting.    Genitourinary:  Negative for dysuria, flank pain, frequency and hematuria.   Musculoskeletal:  Negative for back pain, gait problem, neck pain and neck stiffness.   Skin:  Negative for rash and wound.   Neurological:  Negative for dizziness, tremors, seizures, syncope, speech difficulty, weakness, light-headedness, numbness and headaches.   Psychiatric/Behavioral:  Negative for behavioral problems, confusion, hallucinations and sleep disturbance. The patient is not nervous/anxious.    All other systems reviewed and are negative.    Physical Exam     Initial Vitals [09/17/22 1316]   BP Pulse Resp Temp SpO2   (!) 188/95 107 (!) 22 98.4 °F (36.9 °C) 97 %      MAP       --         Physical Exam    Nursing note and vitals reviewed.  Constitutional: Vital signs are normal. He appears well-developed and well-nourished. He is active. No distress.   HENT:   Head: Normocephalic.   Nose: Nose normal.   Mouth/Throat: Oropharynx is clear and moist and mucous membranes are normal.   Eyes: Conjunctivae, EOM and lids are normal.   Neck: Neck supple.   Normal range of motion.  Cardiovascular:  Normal rate, regular rhythm, S1 normal, S2 normal and normal heart sounds.           Pulmonary/Chest: Breath sounds normal. No respiratory distress. He has no wheezes. He has no rhonchi. He has no rales. He exhibits no tenderness.   Abdominal: Abdomen is soft. Bowel sounds are normal. He exhibits no distension. There is no abdominal tenderness. There is no guarding.   Musculoskeletal:         General: Normal range of motion.      Right upper arm: Normal.      Left upper arm: Normal.      Cervical back: Normal range of motion and neck supple.      Right lower leg: Normal.      Left lower leg: Normal.     Neurological: He is alert and oriented to person, place, and time. He has normal strength. GCS score is 15. GCS eye subscore is 4. GCS verbal subscore is 5. GCS motor subscore is 6.   Skin: Skin is warm. Capillary refill takes less than 2  seconds.   Psychiatric: He has a normal mood and affect. His speech is normal and behavior is normal. Thought content normal. Cognition and memory are normal.       ED Course   Procedures  Labs Reviewed - No data to display       Imaging Results    None          Medications   dicyclomine injection 20 mg (has no administration in time range)   famotidine tablet 40 mg (40 mg Oral Given 9/17/22 1401)   ondansetron disintegrating tablet 4 mg (4 mg Oral Given 9/17/22 1400)     Medical Decision Making:   Initial Assessment:   Nausea, abdominal cramps and diarrhea.  No fever.  No blood in stool.  No fall swelling stool.  History of anxiety and intermittent belching and flatulence, diarrhea with similar kind of presentation at least once or twice in a month.  Recent colonoscopy with diverticulosis.  No vomiting.  Oral mucosa is moist.  Differential Diagnosis:   Gastroenteritis, viral versus bacterial,  Diverticulosis, diverticulitis, dehydration.  ED Management:  Afebrile.  No blood in stool.- intermittent chronic symptoms.  Possible irritable bowel syndrome.- Zofran, Bentyl and Pepcid..  Will consider Cipro since history of diverticulosis.  Follow-up primary care physician.  Follow-up gastroenterology.  ED with any worsening symptoms.                        Clinical Impression:   Final diagnoses:  [K52.9] Gastroenteritis (Primary)      ED Disposition Condition    Discharge Stable          ED Prescriptions       Medication Sig Dispense Start Date End Date Auth. Provider    ondansetron (ZOFRAN) 4 MG tablet Take 1 tablet (4 mg total) by mouth every 8 (eight) hours as needed. 12 tablet 9/17/2022 -- Dennis Cowart MD    dicyclomine (BENTYL) 20 mg tablet Take 1 tablet (20 mg total) by mouth 3 (three) times daily as needed (abdominal pain). 30 tablet 9/17/2022 -- Dennis Cowart MD    ciprofloxacin HCl (CIPRO) 500 MG tablet Take 1 tablet (500 mg total) by mouth 2 (two) times daily. for 10 days 20 tablet 9/17/2022 9/27/2022  Dennis Cowart MD    famotidine (PEPCID) 40 MG tablet Take 1 tablet (40 mg total) by mouth 2 (two) times daily. 60 tablet 9/17/2022 -- Dennis Cowart MD          Follow-up Information       Follow up With Specialties Details Why Contact Info    Marcos Ibarra MD Family Medicine Schedule an appointment as soon as possible for a visit in 2 days For  re-check [address] [place] [address]  [phone]               Dennis Cowart MD  09/17/22 8464

## 2022-09-17 NOTE — ED NOTES
Review of patient's allergies indicates:   Allergen Reactions    Dilaudid [hydromorphone] Other (See Comments)     IV dilaudid severely drops BP        Patient has verified the spelling of the name and  on armband.   APPEARANCE: Patient is alert, calm, oriented x 4, and does not appear distressed.  SKIN: Skin is normal for race, warm, and dry. Normal skin turgor and mucous membranes moist.  CARDIAC: Normal rate and rhythm, no murmur heard.   RESPIRATORY:Normal rate and effort. Breath sounds clear bilaterally throughout chest. Respirations are equal and unlabored.    GASTRO: Bowel sounds normal, abdomen is soft, no tenderness, and no abdominal distention. C/o nausea, vomiting and diarrhea since yesterday, Had same issues back around 22 and has had a colonoscopy. .   MUSCLE: Full ROM. No bony tenderness or soft tissue tenderness. No obvious deformity.  PERIPHERAL VASCULAR: peripheral pulses present. Normal cap refill. No edema. Warm to touch.  NEURO: 5/5 strength major flexors/extensors bilaterally. Sensory intact to light touch bilaterally. Arianne coma scale: eyes open spontaneously-4, oriented & converses-5, obeys commands-6. No neurological abnormalities.   MENTAL STATUS: awake, alert and aware of environment.  EYE: No overt deficits noted. No drainage. Sclera WNL  ENT: EARS: no obvious drainage. NOSE: no active bleeding. THROAT: no redness or swelling.  : Voids without complication

## 2022-09-19 ENCOUNTER — OFFICE VISIT (OUTPATIENT)
Dept: PSYCHIATRY | Facility: CLINIC | Age: 47
End: 2022-09-19
Payer: MEDICAID

## 2022-09-19 DIAGNOSIS — F43.23 ADJUSTMENT DISORDER WITH MIXED ANXIETY AND DEPRESSED MOOD: Primary | ICD-10-CM

## 2022-09-19 PROCEDURE — 90834 PR PSYCHOTHERAPY W/PATIENT, 45 MIN: ICD-10-PCS | Mod: ,,, | Performed by: SOCIAL WORKER

## 2022-09-19 PROCEDURE — 3052F HG A1C>EQUAL 8.0%<EQUAL 9.0%: CPT | Mod: CPTII,,, | Performed by: SOCIAL WORKER

## 2022-09-19 PROCEDURE — 3052F PR MOST RECENT HEMOGLOBIN A1C LEVEL 8.0 - < 9.0%: ICD-10-PCS | Mod: CPTII,,, | Performed by: SOCIAL WORKER

## 2022-09-19 PROCEDURE — 3072F PR LOW RISK FOR RETINOPATHY: ICD-10-PCS | Mod: CPTII,,, | Performed by: SOCIAL WORKER

## 2022-09-19 PROCEDURE — 3072F LOW RISK FOR RETINOPATHY: CPT | Mod: CPTII,,, | Performed by: SOCIAL WORKER

## 2022-09-19 PROCEDURE — 90834 PSYTX W PT 45 MINUTES: CPT | Mod: ,,, | Performed by: SOCIAL WORKER

## 2022-09-20 NOTE — PROGRESS NOTES
Individual Psychotherapy (PhD/LCSW)    9/19/2022    Site:  [facility]         Therapeutic Intervention: Met with patient.  Outpatient - Insight oriented psychotherapy 45 min - CPT code 05317    Chief complaint/reason for encounter: anxiety     Interval history and content of current session: Pt seen for follow-up.  He has his daughter Leny with him but she does not participate.  He reports down moods when he feels useless and has nothing to do. He has been less down because there has been a lot going on in the family recently that he was needed to help out with.  He also reports that he has been walking some with her father who lives nearby.  He had avoided him in the past due to his drug abuse but he has stopped that, only smokes marijuana, so pt feels he can give him another chance.  Encouraged pt to remain active, find a hobby, learn something new to stay occupied.    Treatment plan:  Target symptoms: anxiety   Why chosen therapy is appropriate versus another modality: relevant to diagnosis  Outcome monitoring methods: self-report, observation  Therapeutic intervention type: insight oriented psychotherapy    Risk parameters:  Patient reports no suicidal ideation  Patient reports no homicidal ideation  Patient reports no self-injurious behavior  Patient reports no violent behavior    Verbal deficits: None    Patient's response to intervention:  The patient's response to intervention is accepting.    Progress toward goals and other mental status changes:  The patient's progress toward goals is fair .    Diagnosis:     ICD-10-CM ICD-9-CM   1. Adjustment disorder with mixed anxiety and depressed mood  F43.23 309.28       Plan:  individual psychotherapy and medication management by physician    Return to clinic: 3 weeks    Length of Service (minutes): 45

## 2022-09-22 ENCOUNTER — PATIENT MESSAGE (OUTPATIENT)
Dept: PSYCHIATRY | Facility: CLINIC | Age: 47
End: 2022-09-22
Payer: MEDICAID

## 2022-09-22 ENCOUNTER — PATIENT MESSAGE (OUTPATIENT)
Dept: NEUROSURGERY | Facility: CLINIC | Age: 47
End: 2022-09-22
Payer: MEDICAID

## 2022-09-28 ENCOUNTER — TELEPHONE (OUTPATIENT)
Dept: SLEEP MEDICINE | Facility: CLINIC | Age: 47
End: 2022-09-28
Payer: MEDICAID

## 2022-09-28 NOTE — TELEPHONE ENCOUNTER
----- Message from Madalyn Bryson sent at 9/28/2022  1:23 PM CDT -----    Name of Who is Calling: RAFAEL GELLER III [[id number]]      What is the request in detail: Pt is requesting a call back for scheduling, he would like to be seen sooner than 12/27.       Can the clinic reply by [facility]: Yes      What Number to Call Back if not [facility]:   [phone]

## 2022-09-29 ENCOUNTER — TELEPHONE (OUTPATIENT)
Dept: FAMILY MEDICINE | Facility: CLINIC | Age: 47
End: 2022-09-29
Payer: MEDICAID

## 2022-09-29 DIAGNOSIS — R19.7 DIARRHEA, UNSPECIFIED TYPE: Primary | ICD-10-CM

## 2022-09-29 NOTE — TELEPHONE ENCOUNTER
----- Message from Eric Lam sent at 9/29/2022  1:03 PM CDT -----  Type:  Patient Requesting Referral    Who Called: Kirill  Does the patient already have the specialty appointment scheduled?: no  If yes, what is the date of that appointment?: n/a  Referral to What Specialty: gastroenterology   Reason for Referral: abdominal pain, diarrhea  Does the patient want the referral with a specific physician?: yes, Dr Rossy Champion  Is the specialist [facility] or [facility]  Patient Requesting a Response?: yes  Would the patient rather a call back or a response via [facility]?  [facility]  Best Call Back Number:[phone]  Additional Information:  no

## 2022-10-07 ENCOUNTER — PATIENT MESSAGE (OUTPATIENT)
Dept: NEUROSURGERY | Facility: CLINIC | Age: 47
End: 2022-10-07
Payer: MEDICAID

## 2022-10-07 ENCOUNTER — PATIENT MESSAGE (OUTPATIENT)
Dept: PSYCHIATRY | Facility: CLINIC | Age: 47
End: 2022-10-07
Payer: MEDICAID

## 2022-10-12 ENCOUNTER — OFFICE VISIT (OUTPATIENT)
Dept: OPTOMETRY | Facility: CLINIC | Age: 47
End: 2022-10-12
Payer: MEDICAID

## 2022-10-12 DIAGNOSIS — H52.203 HYPEROPIA WITH ASTIGMATISM AND PRESBYOPIA, BILATERAL: ICD-10-CM

## 2022-10-12 DIAGNOSIS — H25.13 NUCLEAR SCLEROSIS OF BOTH EYES: ICD-10-CM

## 2022-10-12 DIAGNOSIS — H04.123 DRY EYE SYNDROME OF BOTH EYES: ICD-10-CM

## 2022-10-12 DIAGNOSIS — H52.4 HYPEROPIA WITH ASTIGMATISM AND PRESBYOPIA, BILATERAL: ICD-10-CM

## 2022-10-12 DIAGNOSIS — E11.8 TYPE 2 DIABETES MELLITUS WITH COMPLICATION, WITHOUT LONG-TERM CURRENT USE OF INSULIN: ICD-10-CM

## 2022-10-12 DIAGNOSIS — H02.403 PTOSIS OF BOTH EYELIDS: ICD-10-CM

## 2022-10-12 DIAGNOSIS — E11.9 TYPE 2 DIABETES MELLITUS WITHOUT RETINOPATHY: Primary | ICD-10-CM

## 2022-10-12 DIAGNOSIS — H52.03 HYPEROPIA WITH ASTIGMATISM AND PRESBYOPIA, BILATERAL: ICD-10-CM

## 2022-10-12 PROCEDURE — 2023F DILAT RTA XM W/O RTNOPTHY: CPT | Mod: CPTII,,, | Performed by: OPTOMETRIST

## 2022-10-12 PROCEDURE — 3052F PR MOST RECENT HEMOGLOBIN A1C LEVEL 8.0 - < 9.0%: ICD-10-PCS | Mod: CPTII,,, | Performed by: OPTOMETRIST

## 2022-10-12 PROCEDURE — 92014 PR EYE EXAM, EST PATIENT,COMPREHESV: ICD-10-PCS | Mod: S$PBB,,, | Performed by: OPTOMETRIST

## 2022-10-12 PROCEDURE — 3052F HG A1C>EQUAL 8.0%<EQUAL 9.0%: CPT | Mod: CPTII,,, | Performed by: OPTOMETRIST

## 2022-10-12 PROCEDURE — 92015 PR REFRACTION: ICD-10-PCS | Mod: ,,, | Performed by: OPTOMETRIST

## 2022-10-12 PROCEDURE — 92015 DETERMINE REFRACTIVE STATE: CPT | Mod: ,,, | Performed by: OPTOMETRIST

## 2022-10-12 PROCEDURE — 92014 COMPRE OPH EXAM EST PT 1/>: CPT | Mod: S$PBB,,, | Performed by: OPTOMETRIST

## 2022-10-12 PROCEDURE — 2023F PR DILATED RETINAL EXAM W/O EVID OF RETINOPATHY: ICD-10-PCS | Mod: CPTII,,, | Performed by: OPTOMETRIST

## 2022-10-12 PROCEDURE — 99212 OFFICE O/P EST SF 10 MIN: CPT | Mod: PBBFAC,PN | Performed by: OPTOMETRIST

## 2022-10-12 PROCEDURE — 99999 PR PBB SHADOW E&M-EST. PATIENT-LVL II: CPT | Mod: PBBFAC,,, | Performed by: OPTOMETRIST

## 2022-10-12 PROCEDURE — 99999 PR PBB SHADOW E&M-EST. PATIENT-LVL II: ICD-10-PCS | Mod: PBBFAC,,, | Performed by: OPTOMETRIST

## 2022-10-12 NOTE — PROGRESS NOTES
HPI    CC: Pt is here today for a diabetic eye exam. He states he is seeing well   with his current glasses. He does still have some trouble with dry eye.   Patient reports no changes to eye bump.    TOSIN: 2021 with Dr. Holt     (-) Changes in vision   (-) Pain  (+) Irritation   (-) Itching   (-) Flashes  (+) Floaters; longstanding   (+) Glasses wearer  (-) CL wearer  (+) Uses eye gtts, OTC Tears prn OU    Does patient want a refraction today? Yes    (-) Eye injury  (-) Eye surgery   (-)POHx  (-)FOHx    (+)DM  Hemoglobin A1C       Date                     Value               Ref Range             Status                07/18/2022               8.1 (H)             4.0 - 5.6 %           Final                   02/21/2022               8.7 (H)             4.0 - 5.6 %           Final                   12/09/2021               10.4 (H)            4.0 - 5.6 %           Final                 Last edited by Nakia Holt, OD on 10/12/2022 12:37 PM.            Assessment /Plan     For exam results, see Encounter Report.    Type 2 diabetes mellitus without retinopathy    Type 2 diabetes mellitus with complication, without long-term current use of insulin  -     Ambulatory referral/consult to Ophthalmology    Nuclear sclerosis of both eyes    Dry eye syndrome of both eyes    Ptosis of both eyelids    Hyperopia with astigmatism and presbyopia, bilateral      1-2. No retinopathy noted, OU. Continue proper BS control and annual diabetic eye exams. Monitor yearly.      3. Educated pt on findings. Not visually significant. No need for removal at this time. Monitor yearly.      4. Educated pt on findings. Recommended ATs TID-QID for added lubrication and comfort. Monitor.      5. Educated pt on findings. Ptosis OU. Pt has seen Dr. Deal in the past --- he is interested in following back up. Will send Dr. Deal's team a message in order to get pt scheduled for ptosis eval. Monitor.     6. Updated SRx. Mild change from habitual.  Monitor yearly.        RTC with Dr. Deal as directed, me yearly or prn.

## 2022-10-13 ENCOUNTER — TELEPHONE (OUTPATIENT)
Dept: NEUROSURGERY | Facility: CLINIC | Age: 47
End: 2022-10-13
Payer: MEDICAID

## 2022-10-13 NOTE — TELEPHONE ENCOUNTER
----- Message from Nanci Lopez sent at 10/13/2022  1:40 PM CDT -----  Type:  Pharmacy Calling to Clarify an RX    Name of Caller:  Pharm[facility]   Prescription Name:oxyCODONE-acetaminophen (PERCOCET)  mg per tablet  What do they need to clarify?:diagnosis code   Best Call Back Number:[phone]  Additional Information:

## 2022-10-17 PROBLEM — E11.9 TYPE 2 DIABETES MELLITUS WITHOUT COMPLICATION, WITH LONG-TERM CURRENT USE OF INSULIN: Status: ACTIVE | Noted: 2022-10-17

## 2022-10-17 PROBLEM — Z79.4 TYPE 2 DIABETES MELLITUS WITHOUT COMPLICATION, WITH LONG-TERM CURRENT USE OF INSULIN: Status: ACTIVE | Noted: 2022-10-17

## 2022-10-18 ENCOUNTER — TELEPHONE (OUTPATIENT)
Dept: NEUROSURGERY | Facility: CLINIC | Age: 47
End: 2022-10-18

## 2022-10-18 ENCOUNTER — HOSPITAL ENCOUNTER (OUTPATIENT)
Dept: RADIOLOGY | Facility: HOSPITAL | Age: 47
Discharge: HOME | End: 2022-10-18
Attending: NEUROLOGICAL SURGERY
Payer: MEDICAID

## 2022-10-18 ENCOUNTER — PATIENT MESSAGE (OUTPATIENT)
Dept: NEUROSURGERY | Facility: CLINIC | Age: 47
End: 2022-10-18

## 2022-10-18 ENCOUNTER — OFFICE VISIT (OUTPATIENT)
Dept: NEUROSURGERY | Facility: CLINIC | Age: 47
End: 2022-10-18
Payer: MEDICAID

## 2022-10-18 VITALS
BODY MASS INDEX: 38.36 KG/M2 | DIASTOLIC BLOOD PRESSURE: 82 MMHG | WEIGHT: 315 LBS | HEIGHT: 76 IN | SYSTOLIC BLOOD PRESSURE: 140 MMHG | HEART RATE: 107 BPM

## 2022-10-18 DIAGNOSIS — Z98.1 S/P LUMBAR SPINAL FUSION: Primary | ICD-10-CM

## 2022-10-18 DIAGNOSIS — Z98.1 S/P LUMBAR SPINAL FUSION: ICD-10-CM

## 2022-10-18 PROCEDURE — 1159F PR MEDICATION LIST DOCUMENTED IN MEDICAL RECORD: ICD-10-PCS | Mod: CPTII,,, | Performed by: PHYSICIAN ASSISTANT

## 2022-10-18 PROCEDURE — 99214 PR OFFICE/OUTPT VISIT, EST, LEVL IV, 30-39 MIN: ICD-10-PCS | Mod: S$PBB,,, | Performed by: PHYSICIAN ASSISTANT

## 2022-10-18 PROCEDURE — 99999 PR PBB SHADOW E&M-EST. PATIENT-LVL V: CPT | Mod: PBBFAC,,, | Performed by: PHYSICIAN ASSISTANT

## 2022-10-18 PROCEDURE — 99999 PR PBB SHADOW E&M-EST. PATIENT-LVL V: ICD-10-PCS | Mod: PBBFAC,,, | Performed by: PHYSICIAN ASSISTANT

## 2022-10-18 PROCEDURE — 3077F SYST BP >= 140 MM HG: CPT | Mod: CPTII,,, | Performed by: PHYSICIAN ASSISTANT

## 2022-10-18 PROCEDURE — 3079F PR MOST RECENT DIASTOLIC BLOOD PRESSURE 80-89 MM HG: ICD-10-PCS | Mod: CPTII,,, | Performed by: PHYSICIAN ASSISTANT

## 2022-10-18 PROCEDURE — 99214 OFFICE O/P EST MOD 30 MIN: CPT | Mod: S$PBB,,, | Performed by: PHYSICIAN ASSISTANT

## 2022-10-18 PROCEDURE — 1159F MED LIST DOCD IN RCRD: CPT | Mod: CPTII,,, | Performed by: PHYSICIAN ASSISTANT

## 2022-10-18 PROCEDURE — 72100 X-RAY EXAM L-S SPINE 2/3 VWS: CPT | Mod: 26,,, | Performed by: RADIOLOGY

## 2022-10-18 PROCEDURE — 72100 X-RAY EXAM L-S SPINE 2/3 VWS: CPT | Mod: TC,FY

## 2022-10-18 PROCEDURE — 99215 OFFICE O/P EST HI 40 MIN: CPT | Mod: PBBFAC,PN | Performed by: PHYSICIAN ASSISTANT

## 2022-10-18 PROCEDURE — 1160F PR REVIEW ALL MEDS BY PRESCRIBER/CLIN PHARMACIST DOCUMENTED: ICD-10-PCS | Mod: CPTII,,, | Performed by: PHYSICIAN ASSISTANT

## 2022-10-18 PROCEDURE — 3052F HG A1C>EQUAL 8.0%<EQUAL 9.0%: CPT | Mod: CPTII,,, | Performed by: PHYSICIAN ASSISTANT

## 2022-10-18 PROCEDURE — 3072F LOW RISK FOR RETINOPATHY: CPT | Mod: CPTII,,, | Performed by: PHYSICIAN ASSISTANT

## 2022-10-18 PROCEDURE — 3052F PR MOST RECENT HEMOGLOBIN A1C LEVEL 8.0 - < 9.0%: ICD-10-PCS | Mod: CPTII,,, | Performed by: PHYSICIAN ASSISTANT

## 2022-10-18 PROCEDURE — 3079F DIAST BP 80-89 MM HG: CPT | Mod: CPTII,,, | Performed by: PHYSICIAN ASSISTANT

## 2022-10-18 PROCEDURE — 3072F PR LOW RISK FOR RETINOPATHY: ICD-10-PCS | Mod: CPTII,,, | Performed by: PHYSICIAN ASSISTANT

## 2022-10-18 PROCEDURE — 3077F PR MOST RECENT SYSTOLIC BLOOD PRESSURE >= 140 MM HG: ICD-10-PCS | Mod: CPTII,,, | Performed by: PHYSICIAN ASSISTANT

## 2022-10-18 PROCEDURE — 1160F RVW MEDS BY RX/DR IN RCRD: CPT | Mod: CPTII,,, | Performed by: PHYSICIAN ASSISTANT

## 2022-10-18 PROCEDURE — 72100 XR LUMBAR SPINE AP AND LATERAL: ICD-10-PCS | Mod: 26,,, | Performed by: RADIOLOGY

## 2022-10-18 NOTE — TELEPHONE ENCOUNTER
Dr. Brown,    Patient 6 months post op.  Still having right back and buttock pain.  Has been walking but it makes the pain worse.  Occasionally has leg pain and numbness.  His depression has gotten worse and he is seeing psych.    He uses the bone growth stimulator on and off because it makes him depressed when he is wearing it.  Wears the back brace on and off.    Do you want him to get a CT lumbar spine and if ok start PT?    Thanks,  Lolly Duggan, [facility], PA-C  Neurosurgery  [facility]  10/18/2022

## 2022-10-18 NOTE — PROGRESS NOTES
Subjective:     Patient ID:  Kirill Gandhi III is a 47 y.o. male.    Kevin    Chief Complaint: 6 month po fu    Date of surgery 04/13/2022     Preop diagnosis  1. Status post L3-S1 anterior interbody fusion with posterior instrumentation  2. Pseudoarthrosis at L5-S1 with nonfusion and loosening of hardware  3. SI joint dysfunction  4. Morbid obesity with BMI of 49     Postop diagnosis  Same     Surgery  1. Removal of bilateral rods and bilateral S1 loose screws  2. L2-S1 posterior segmental instrumentation, Depuis Viper Prime and Verse  3. Sacral pelvic fixation using S2 trans SI joint iliac screws  4. Open bilateral SI joint fusion using the SI bone Bedrock implants  5. Registration of navigated instruments for posterior segmental instrumentation and sacral pelvic fixation using PlayBuzz 3D c-arm and Thrasos navigation  6. Posterolateral arthrodesis at L2-3, L3-4, L4-5, L5-S1 bilaterally with allograft BMP and morselized bone chips     Surgeon  Jose L Brown MD    HPI    Kirill Gandhi III is a 47 y.o. male who presents for follow up.  Patient with continued pain in the right back and right buttock area.  He is only able to stand about 10-15 minutes before he has to sit down.  He gets better relief in the recliner.  He tried walking about a 1/2 mi but this made his back and buttock pain worse.  He has numbness and tingling that comes and goes in the bilateral posterior legs to the knees.  Occasional leg pain in the front of the legs that comes and goes.  He cannot afford a membership for swimming so he was unable to do that.  He is still taking oxycodone, gabapentin, Celebrex, tizanidine.    He wears the back brace on and off.      He has not been wearing the bone growth stimulator as it causes him some increased depression.  His depression has already been high which he has seen a psychiatrist for.      Patient denies any recent accidents or trauma, no saddle anesthesias, and no bowel or bladder  "incontinence.      Review of Systems:  Constitution: Negative for chills, fever, night sweats and weight loss.   Musculoskeletal: Negative for falls.   Gastrointestinal: Negative for bowel incontinence, nausea and vomiting.   Genitourinary: Negative for bladder incontinence.   Neurological: Negative for disturbances in coordination and loss of balance.      Objective:      Vitals:    10/18/22 1051   BP: (!) 140/82   Pulse: 107   Weight: (!) 172.4 kg (380 lb 1.2 oz)   Height: 6' 4" (1.93 m)   PainSc:   4   PainLoc: Back         Physical Exam:      General:  Kirill Riggsford YOSELYN is well-developed, well-nourished, appears stated age, in no acute distress, alert and oriented to person, place, and time.    Pulmonary/Chest:  Respiratory effort normal  Abdominal: Exhibits no distension  Psychiatric:  Normal mood and affect.  Behavior is normal.  Judgement and thought content normal      Musculoskeletal:    Lumbar ROM:   Pain in lumbar flexion, extension, left lateral bending, and right lateral bending.    Lumbar Spine Inspection:  Healed surgical scars with no visible rashes.    Lumbar Spine Palpation:  No tenderness to low back palpation.    SI Joint Palpation:  No tenderness to SI Joint palpation.        Neurological:  Alert and oriented to person, place, and time    Muscle strength against resistance:     Right Left   Hip flexion  5 / 5 5 / 5   Hip extension 5 / 5 5 / 5   Hip abduction 5 / 5 5 / 5   Hip adduction  5 / 5 5 / 5   Knee extension  5 / 5 5 / 5   Knee flexion 5 / 5 5 / 5   Dorsiflexion  5 / 5 5 / 5   EHL  5 / 5 5 / 5   Plantar flexion  5 / 5 5 / 5   Inversion of the feet 5 / 5 5 / 5   Eversion of the feet  5 / 5 5 / 5       On gross examination of the bilateral upper extremities, patient has full painfree ROM with no signs of clubbing, cyanosis, edema, or weakness.       XRAY Interpretation:     Lumbar spine xrays were personally reviewed today.  No fractures. Hardware intact.      Assessment:        "   1. S/P lumbar spinal fusion            Plan:             Will review all of the above with Dr. Brown to see if physical therapy is indicated or if he would like him to get a CT lumbar spine.      Continue medications as needed.      Continue wearing back brace only needed.    Contact the bone growth stimulator rep to see if he can wear it while sleeping.  See states he tolerates it better with sleeping.      Follow-Up:  Follow up if symptoms worsen or fail to improve. If there are any questions prior to this, the patient was instructed to contact the office.       KENRICK Gonzalez PA-C  Neurosurgery  [facility]  10/25/2022    Addendum:    10/25/2022     I reviewed everything with Dr. Brown.  He recommends PT and fu with him in 6 months with scoli xrays.  No CT needed at this time.    KENRICK Gonzalez, SAILAJA  Neurosurgery  [facility]

## 2022-10-19 ENCOUNTER — OFFICE VISIT (OUTPATIENT)
Dept: GASTROENTEROLOGY | Facility: CLINIC | Age: 47
End: 2022-10-19
Payer: MEDICAID

## 2022-10-19 VITALS — BODY MASS INDEX: 46.38 KG/M2 | WEIGHT: 315 LBS

## 2022-10-19 DIAGNOSIS — R11.0 NAUSEA: ICD-10-CM

## 2022-10-19 DIAGNOSIS — R14.3 EXCESSIVE FLATUS: ICD-10-CM

## 2022-10-19 DIAGNOSIS — R19.7 DIARRHEA, UNSPECIFIED TYPE: Primary | ICD-10-CM

## 2022-10-19 DIAGNOSIS — K21.9 GASTROESOPHAGEAL REFLUX DISEASE, UNSPECIFIED WHETHER ESOPHAGITIS PRESENT: ICD-10-CM

## 2022-10-19 PROCEDURE — 3052F PR MOST RECENT HEMOGLOBIN A1C LEVEL 8.0 - < 9.0%: ICD-10-PCS | Mod: CPTII,,, | Performed by: INTERNAL MEDICINE

## 2022-10-19 PROCEDURE — 99214 OFFICE O/P EST MOD 30 MIN: CPT | Mod: S$PBB,,, | Performed by: INTERNAL MEDICINE

## 2022-10-19 PROCEDURE — 99214 PR OFFICE/OUTPT VISIT, EST, LEVL IV, 30-39 MIN: ICD-10-PCS | Mod: S$PBB,,, | Performed by: INTERNAL MEDICINE

## 2022-10-19 PROCEDURE — 1160F RVW MEDS BY RX/DR IN RCRD: CPT | Mod: CPTII,,, | Performed by: INTERNAL MEDICINE

## 2022-10-19 PROCEDURE — 99999 PR PBB SHADOW E&M-EST. PATIENT-LVL V: ICD-10-PCS | Mod: PBBFAC,,, | Performed by: INTERNAL MEDICINE

## 2022-10-19 PROCEDURE — 3052F HG A1C>EQUAL 8.0%<EQUAL 9.0%: CPT | Mod: CPTII,,, | Performed by: INTERNAL MEDICINE

## 2022-10-19 PROCEDURE — 99999 PR PBB SHADOW E&M-EST. PATIENT-LVL V: CPT | Mod: PBBFAC,,, | Performed by: INTERNAL MEDICINE

## 2022-10-19 PROCEDURE — 99215 OFFICE O/P EST HI 40 MIN: CPT | Mod: PBBFAC,PO | Performed by: INTERNAL MEDICINE

## 2022-10-19 PROCEDURE — 3072F PR LOW RISK FOR RETINOPATHY: ICD-10-PCS | Mod: CPTII,,, | Performed by: INTERNAL MEDICINE

## 2022-10-19 PROCEDURE — 1159F PR MEDICATION LIST DOCUMENTED IN MEDICAL RECORD: ICD-10-PCS | Mod: CPTII,,, | Performed by: INTERNAL MEDICINE

## 2022-10-19 PROCEDURE — 1160F PR REVIEW ALL MEDS BY PRESCRIBER/CLIN PHARMACIST DOCUMENTED: ICD-10-PCS | Mod: CPTII,,, | Performed by: INTERNAL MEDICINE

## 2022-10-19 PROCEDURE — 3072F LOW RISK FOR RETINOPATHY: CPT | Mod: CPTII,,, | Performed by: INTERNAL MEDICINE

## 2022-10-19 PROCEDURE — 1159F MED LIST DOCD IN RCRD: CPT | Mod: CPTII,,, | Performed by: INTERNAL MEDICINE

## 2022-10-19 RX ORDER — PANTOPRAZOLE SODIUM 40 MG/1
40 TABLET, DELAYED RELEASE ORAL DAILY
Qty: 90 TABLET | Refills: 3 | Status: SHIPPED | OUTPATIENT
Start: 2022-10-19

## 2022-10-19 RX ORDER — DICYCLOMINE HYDROCHLORIDE 20 MG/1
20 TABLET ORAL 3 TIMES DAILY PRN
Qty: 60 TABLET | Refills: 2 | Status: SHIPPED | OUTPATIENT
Start: 2022-10-19

## 2022-10-19 RX ORDER — ONDANSETRON 4 MG/1
4 TABLET, ORALLY DISINTEGRATING ORAL EVERY 8 HOURS PRN
Qty: 30 TABLET | Refills: 5 | Status: SHIPPED | OUTPATIENT
Start: 2022-10-19

## 2022-10-19 NOTE — PATIENT INSTRUCTIONS
Stop Pepcid  Start pantoprazole every morning on an empty stomach; ok to eat 30 mins later  Ondansetron under the tongue as needed for nausea.  Can cause constipation.  Dicyclomine as needed for belly pain.  Can cause constipation.  Stop fiber gummies, causes gas.  Start Citrucel (generic) caplets.  One twice per day for now.      EGD Prep Instructions    [facility] [address] [place]  [address]    You are scheduled for an EGD with Dr. Champion on Monday, October 31 at [facility].  You will enter through the [facility] entrance and check in at [facility] Day [facility].    Breakfast ok on Sunday (day prior to EGD).  Then clear liquids all day with one smoothie during the day ok.    Nothing to EAT after midnight before the procedure.  You MAY brush your teeth.    You MAY take your blood pressure, heart, and seizure medication on the morning of the procedure, with a SIP of water.  Hold ALL other medications until after the procedure.    You must have someone with you to DRIVE YOU HOME since you will be receiving IV sedation for the procedure.    If you are on blood thinners THAT YOU HAVE BEEN INSTRUCTED TO HOLD BY YOUR DOCTOR FOR THIS PROCEDURE, then do NOT take this the morning of your EGD.  Do NOT stop these medications on your own, they must be approved to be held by your doctor.  Your EGD can NOT be done if you are on these medications.  Examples of blood thinners include: Coumadin, Aggrenox, Plavix, Pradaxa, Reapro, Pletal, Xarelto, Ticagrelor, Brilinta, Eliquis.  You do not have to stop baby aspirin 81 mg.    You will receive a call a few days before your EGD to tell you the time to arrive.  If you have not received a call by the day before your procedure, call the Pre-op Coordinator at [phone].

## 2022-10-20 NOTE — PROGRESS NOTES
Subjective:       Patient ID: Kirill Gandhi III is a 47 y.o. male.    Chief Complaint: Abdominal Pain, Gas, and Diarrhea    46 yo M s/p recent colonoscopy with me complains of excessive flatus, intermittent diarrhea, GERD, and episodic nausea.  He has long h/o excessive flatus that is independent of bowel pattern.  His normal bowel pattern is 2-3 BM per day but he can have up to 5 per day, with the consistency being highly variable.  He gets discrete episodes of feeling very gassy, having long large belches that smell of rotten eggs, associated nausea, and then usually followed by diarrhea.  He began taking fiber gummies after colonoscopy and can not associate more flatus since then but ideally he would like a fiber supplement that can be placed in his pill box daily.  He was given Pepcid which has helped somewhat.  He was also given Bentyl, Zofran, and Cipro by the ED.  The Cipro did seem to help with the diarrhea, and the other meds have also helped symptomatically.    Review of Systems   Constitutional:  Negative for chills and fever.   Respiratory:  Negative for shortness of breath and wheezing.    Cardiovascular:  Negative for chest pain, palpitations and leg swelling.   Gastrointestinal:  Positive for diarrhea, nausea and reflux.   Neurological:  Negative for dizziness and speech difficulty.       Objective:      Wt (!) 172.8 kg (381 lb)   BMI 46.38 kg/m²     Physical Exam  Constitutional:       Appearance: Normal appearance. He is well-developed. He is not ill-appearing.   HENT:      Head: Normocephalic and atraumatic.   Eyes:      Extraocular Movements: Extraocular movements intact.      Pupils: Pupils are equal, round, and reactive to light.   Pulmonary:      Effort: Pulmonary effort is normal. No respiratory distress.   Abdominal:      General: There is no distension.      Palpations: Abdomen is soft.   Musculoskeletal:         General: No deformity. Normal range of motion.      Cervical back:  Normal range of motion and neck supple.   Skin:     General: Skin is warm and dry.   Neurological:      General: No focal deficit present.      Mental Status: He is alert and oriented to person, place, and time.   Psychiatric:         Mood and Affect: Mood normal.         Behavior: Behavior normal.       Lab Results   Component Value Date    WBC 6.81 04/15/2022    HGB 7.8 (L) 04/15/2022    HCT 25.0 (L) 04/15/2022    MCV 87 04/15/2022     04/15/2022     Lab Results   Component Value Date    LABA1C 6.8 (H) 03/24/2017    HGBA1C 8.1 (H) 07/18/2022     Old records from chart reviewed and summarized, significant for:  Colonoscopy 9/2022: 10 mm TA, sigmoid tics, IH    Assessment:       Problem List Items Addressed This Visit          GI    Excessive flatus    GERD (gastroesophageal reflux disease)    Relevant Medications    pantoprazole (PROTONIX) 40 MG tablet    Other Relevant Orders    Case Request Endoscopy: EGD (ESOPHAGOGASTRODUODENOSCOPY) (Completed)    Nausea    Relevant Medications    ondansetron (ZOFRAN-ODT) 4 MG TbDL     Other Visit Diagnoses       Diarrhea, unspecified type    -  Primary    Relevant Medications    dicyclomine (BENTYL) 20 mg tablet              Plan:       Diarrhea, unspecified type; Excessive flatus  -     dicyclomine (BENTYL) 20 mg tablet; Take 1 tablet (20 mg total) by mouth 3 (three) times daily as needed (abdominal pain).  Dispense: 60 tablet; Refill: 2  -     Stop fiber gummies  -     Start Citrucel caplets, one bid, which can be placed in pill box  -     Likely that we need to get more stool out on a regular basis to prevent these episodes, but I am hesitant to do more fiber until gastric emptying workup complete.    Gastroesophageal reflux disease, unspecified whether esophagitis present  -     pantoprazole (PROTONIX) 40 MG tablet; Take 1 tablet (40 mg total) by mouth once daily.  Dispense: 90 tablet; Refill: 3  -     Case Request Endoscopy: EGD (ESOPHAGOGASTRODUODENOSCOPY)  -      Breakfast ok day prior to EGD, but then clear liquids all day after breakfast (ok to have one smoothie that day as well)  -     Stop Pepcid  -     If EGD ok and symptoms persist, will plan for GES    Nausea  -     ondansetron (ZOFRAN-ODT) 4 MG TbDL; Take 1 tablet (4 mg total) by mouth every 8 (eight) hours as needed (nausea).  Dispense: 30 tablet; Refill: 5

## 2022-10-25 ENCOUNTER — TELEPHONE (OUTPATIENT)
Dept: NEUROSURGERY | Facility: CLINIC | Age: 47
End: 2022-10-25
Payer: MEDICAID

## 2022-10-25 NOTE — TELEPHONE ENCOUNTER
Please call patient and let him know that I reviewed everything with Dr. Brown and it is ok to start PT which I ordered through [facility].    Scheduled him a fu with Dr. Brown in 6 months with scoliosis xrays for the 1 year po fu.    Thanks,  Lolly Duggan, [facility], PA-C  Neurosurgery  Rahatserick Trejo  10/25/2022

## 2022-10-25 NOTE — TELEPHONE ENCOUNTER
"Informed pt, per Lolly       "Please call patient and let him know that I reviewed everything with Dr. Brown and it is ok to start PT which I ordered through [facility]." Pt voiced understanding  "

## 2022-10-27 ENCOUNTER — PATIENT MESSAGE (OUTPATIENT)
Dept: PSYCHIATRY | Facility: CLINIC | Age: 47
End: 2022-10-27
Payer: MEDICAID

## 2022-10-27 DIAGNOSIS — F43.23 ADJUSTMENT DISORDER WITH MIXED ANXIETY AND DEPRESSED MOOD: ICD-10-CM

## 2022-10-27 DIAGNOSIS — F42.2 MIXED OBSESSIONAL THOUGHTS AND ACTS: ICD-10-CM

## 2022-10-27 RX ORDER — DULOXETIN HYDROCHLORIDE 60 MG/1
60 CAPSULE, DELAYED RELEASE ORAL DAILY
Qty: 90 CAPSULE | Refills: 0 | Status: SHIPPED | OUTPATIENT
Start: 2022-10-27 | End: 2022-11-07 | Stop reason: SDUPTHER

## 2022-10-28 ENCOUNTER — PATIENT MESSAGE (OUTPATIENT)
Dept: ADMINISTRATIVE | Facility: HOSPITAL | Age: 47
End: 2022-10-28
Payer: MEDICAID

## 2022-10-28 ENCOUNTER — PATIENT OUTREACH (OUTPATIENT)
Dept: ADMINISTRATIVE | Facility: HOSPITAL | Age: 47
End: 2022-10-28
Payer: MEDICAID

## 2022-10-28 DIAGNOSIS — E11.9 TYPE 2 DIABETES MELLITUS WITHOUT COMPLICATION, WITH LONG-TERM CURRENT USE OF INSULIN: Primary | ICD-10-CM

## 2022-10-28 DIAGNOSIS — Z79.4 TYPE 2 DIABETES MELLITUS WITHOUT COMPLICATION, WITH LONG-TERM CURRENT USE OF INSULIN: Primary | ICD-10-CM

## 2022-10-31 ENCOUNTER — PATIENT MESSAGE (OUTPATIENT)
Dept: NEUROSURGERY | Facility: CLINIC | Age: 47
End: 2022-10-31
Payer: MEDICAID

## 2022-11-02 ENCOUNTER — LAB VISIT (OUTPATIENT)
Dept: LAB | Facility: HOSPITAL | Age: 47
End: 2022-11-02
Attending: FAMILY MEDICINE
Payer: MEDICAID

## 2022-11-02 DIAGNOSIS — E11.9 TYPE 2 DIABETES MELLITUS WITHOUT COMPLICATION, WITH LONG-TERM CURRENT USE OF INSULIN: ICD-10-CM

## 2022-11-02 DIAGNOSIS — Z79.4 TYPE 2 DIABETES MELLITUS WITHOUT COMPLICATION, WITH LONG-TERM CURRENT USE OF INSULIN: ICD-10-CM

## 2022-11-02 LAB
ALBUMIN/CREAT UR: 31.8 UG/MG (ref 0–30)
CREAT UR-MCNC: 217 MG/DL (ref 23–375)
MICROALBUMIN UR DL<=1MG/L-MCNC: 69 UG/ML

## 2022-11-02 PROCEDURE — 82570 ASSAY OF URINE CREATININE: CPT | Mod: PO | Performed by: FAMILY MEDICINE

## 2022-11-02 PROCEDURE — 82043 UR ALBUMIN QUANTITATIVE: CPT | Mod: PO | Performed by: FAMILY MEDICINE

## 2022-11-07 ENCOUNTER — OFFICE VISIT (OUTPATIENT)
Dept: PSYCHIATRY | Facility: CLINIC | Age: 47
End: 2022-11-07
Payer: MEDICAID

## 2022-11-07 DIAGNOSIS — F43.23 ADJUSTMENT DISORDER WITH MIXED ANXIETY AND DEPRESSED MOOD: ICD-10-CM

## 2022-11-07 DIAGNOSIS — F42.2 MIXED OBSESSIONAL THOUGHTS AND ACTS: ICD-10-CM

## 2022-11-07 DIAGNOSIS — F99 INSOMNIA DUE TO OTHER MENTAL DISORDER: ICD-10-CM

## 2022-11-07 DIAGNOSIS — F51.05 INSOMNIA DUE TO OTHER MENTAL DISORDER: ICD-10-CM

## 2022-11-07 PROCEDURE — 99999 PR PBB SHADOW E&M-EST. PATIENT-LVL II: ICD-10-PCS | Mod: PBBFAC,,, | Performed by: NURSE PRACTITIONER

## 2022-11-07 PROCEDURE — 1159F MED LIST DOCD IN RCRD: CPT | Mod: CPTII,95,, | Performed by: NURSE PRACTITIONER

## 2022-11-07 PROCEDURE — 99214 PR OFFICE/OUTPT VISIT, EST, LEVL IV, 30-39 MIN: ICD-10-PCS | Mod: 95,,, | Performed by: NURSE PRACTITIONER

## 2022-11-07 PROCEDURE — 3060F POS MICROALBUMINURIA REV: CPT | Mod: CPTII,95,, | Performed by: NURSE PRACTITIONER

## 2022-11-07 PROCEDURE — 3066F NEPHROPATHY DOC TX: CPT | Mod: CPTII,95,, | Performed by: NURSE PRACTITIONER

## 2022-11-07 PROCEDURE — 1160F RVW MEDS BY RX/DR IN RCRD: CPT | Mod: CPTII,95,, | Performed by: NURSE PRACTITIONER

## 2022-11-07 PROCEDURE — 3052F PR MOST RECENT HEMOGLOBIN A1C LEVEL 8.0 - < 9.0%: ICD-10-PCS | Mod: CPTII,95,, | Performed by: NURSE PRACTITIONER

## 2022-11-07 PROCEDURE — 99214 OFFICE O/P EST MOD 30 MIN: CPT | Mod: 95,,, | Performed by: NURSE PRACTITIONER

## 2022-11-07 PROCEDURE — 99212 OFFICE O/P EST SF 10 MIN: CPT | Mod: PBBFAC | Performed by: NURSE PRACTITIONER

## 2022-11-07 PROCEDURE — 3052F HG A1C>EQUAL 8.0%<EQUAL 9.0%: CPT | Mod: CPTII,95,, | Performed by: NURSE PRACTITIONER

## 2022-11-07 PROCEDURE — 3060F PR POS MICROALBUMINURIA RESULT DOCUMENTED/REVIEW: ICD-10-PCS | Mod: CPTII,95,, | Performed by: NURSE PRACTITIONER

## 2022-11-07 PROCEDURE — 3066F PR DOCUMENTATION OF TREATMENT FOR NEPHROPATHY: ICD-10-PCS | Mod: CPTII,95,, | Performed by: NURSE PRACTITIONER

## 2022-11-07 PROCEDURE — 3072F LOW RISK FOR RETINOPATHY: CPT | Mod: CPTII,95,, | Performed by: NURSE PRACTITIONER

## 2022-11-07 PROCEDURE — 1159F PR MEDICATION LIST DOCUMENTED IN MEDICAL RECORD: ICD-10-PCS | Mod: CPTII,95,, | Performed by: NURSE PRACTITIONER

## 2022-11-07 PROCEDURE — 1160F PR REVIEW ALL MEDS BY PRESCRIBER/CLIN PHARMACIST DOCUMENTED: ICD-10-PCS | Mod: CPTII,95,, | Performed by: NURSE PRACTITIONER

## 2022-11-07 PROCEDURE — 3072F PR LOW RISK FOR RETINOPATHY: ICD-10-PCS | Mod: CPTII,95,, | Performed by: NURSE PRACTITIONER

## 2022-11-07 PROCEDURE — 99999 PR PBB SHADOW E&M-EST. PATIENT-LVL II: CPT | Mod: PBBFAC,,, | Performed by: NURSE PRACTITIONER

## 2022-11-07 RX ORDER — DIAZEPAM 5 MG/1
5 TABLET ORAL DAILY PRN
Qty: 30 TABLET | Refills: 0 | Status: SHIPPED | OUTPATIENT
Start: 2022-11-07 | End: 2023-03-02

## 2022-11-07 RX ORDER — BUSPIRONE HYDROCHLORIDE 15 MG/1
22.5 TABLET ORAL 2 TIMES DAILY
Qty: 90 TABLET | Refills: 1 | Status: SHIPPED | OUTPATIENT
Start: 2022-11-07 | End: 2022-12-16 | Stop reason: SDUPTHER

## 2022-11-07 RX ORDER — DULOXETIN HYDROCHLORIDE 60 MG/1
60 CAPSULE, DELAYED RELEASE ORAL 2 TIMES DAILY
Qty: 180 CAPSULE | Refills: 0 | Status: SHIPPED | OUTPATIENT
Start: 2022-11-07 | End: 2022-12-16 | Stop reason: SDUPTHER

## 2022-11-07 NOTE — PROGRESS NOTES
.Outpatient Psychiatry Follow-Up Visit (MD/NP)    11/7/2022    The patient location is: [place]  The chief complaint leading to consultation is: OCD, grief    Visit type: audiovisual    Face to Face time with patient: 20 minutes  25 minutes of total time spent on the encounter, which includes face to face time and non-face to face time preparing to see the patient (eg, review of tests), Obtaining and/or reviewing separately obtained history, Documenting clinical information in the electronic or other health record, Independently interpreting results (not separately reported) and communicating results to the patient/family/caregiver, or Care coordination (not separately reported).     Each patient to whom he or she provides medical services by telemedicine is:  (1) informed of the relationship between the physician and patient and the respective role of any other health care provider with respect to management of the patient; and (2) notified that he or she may decline to receive medical services by telemedicine and may withdraw from such care at any time.    Clinical Status of Patient:  Outpatient (Ambulatory)    Chief Complaint:  Kirill Gandhi III is a 47 y.o. male who presents today for follow-up of anxiety and OCD, grief .  Met with patient.      Interval History and Content of Current Session:  Interim Events/Subjective Report/Content of Current Session:    Kirill Gandhi III checked in on time for his appointment. Last visit we switched from Prozac to Cymbalta. Today he notes no big difference- no negative or positive side effects. Valium is helping with anxiety irritability. Agreeable to increasing the dose of Cymbalta. Briefly discussed adjunct antipsychotic as patient saw a comercial about Rexuilt. Will discuss further at follow-up if symptoms are still inadequately controled. Patient agreeable to plan. Denies SI/HI/AVH. No objective s/sx of psychosis or mihaela.Patient verbalized  motivation for compliance with medications and all other elements of treatment plan.     Previous medications:  Wellbutrin- ineffective on its own  Prozac- effective  Luvox- partial response  Risperidone - ineffective  Buspar - effective  Celexa- ineffective      Review of Systems   PSYCHIATRIC: Pertinant items are noted in the narrative.  CONSTITUTIONAL: No weight gain or loss.   MUSCULOSKELETAL: See above.  NEUROLOGIC: No weakness, sensory changes, seizures, confusion, memory loss, tremor or other abnormal movements.  RESPIRATORY: No shortness of breath.  CARDIOVASCULAR: No tachycardia or chest pain.  GASTROINTESTINAL: No nausea, vomiting, pain, constipation or diarrhea.    Past Medical, Family and Social History: The patient's past medical, family and social history have been reviewed and updated as appropriate within the electronic medical record - see encounter notes.    Compliance: yes    Side effects: None    Risk Parameters:  Patient reports no suicidal ideation  Patient reports no homicidal ideation  Patient reports no self-injurious behavior  Patient reports no violent behavior    Exam (detailed: at least 9 elements; comprehensive: all 15 elements)   Constitutional  Vitals:  From 10/31/22:  BP:121/65  HR:83   General:  unremarkable, age appropriate     Musculoskeletal  Muscle Strength/Tone:  not examined   Gait & Station:  SHARIF     Psychiatric  Speech:  no latency; no press   Mood & Affect:  anxious, depressed  congruent and appropriate   Thought Process:  normal and logical   Associations:  intact   Thought Content:  normal, no suicidality, no homicidality, delusions, or paranoia   Insight:  intact   Judgement: behavior is adequate to circumstances   Orientation:  grossly intact   Memory: intact for content of interview   Language: grossly intact   Attention Span & Concentration:  able to focus   Fund of Knowledge:  intact and appropriate to age and level of education     Assessment and Diagnosis    Status/Progress: Based on the examination today, the patient's problem(s) is/are inadequately controlled.  New problems have not been presented today.   Co-morbidities, Diagnostic uncertainty and Lack of compliance are not complicating management of the primary condition.  There are no active rule-out diagnoses for this patient at this time.     General Impression:Kirill Gandhi III, a 47 y.o. male, presenting for initial evaluation visit. He reports having depression, anxiety, and OCD. Hasn't seen a psychiatrist in a few years since 2016.- has been managed by PCP since. Recently stopped Luvox due to interaction with Tizanidine. Started on Wellbutrin about a 3 months ago but feels its ineffective. Takes Valium twice a day everyday. Discussed starting Prozac and continuing Valium for the time being while we attempt to treat the anxiety with a medication with fewer risks. Patient agreeable. Presents 5/13/20- not some improvement on mid range dose of Prozac. Will increase to 60 then 80 if needed. Presents 7/15/20- doing well on Prozac 80. Valium taper started. Presents 2/5/21 - depression improved, OCD and anxiety still present - will increase Buspar. Presents 5/19/21- struggling since surgery and loss of his nanny- encouraged therapy. Present 7/23/21- insomnia - hydroxyzine started. Presents 9/16/21- symptom improvement Presents 9/15/22 -depressed and anxious - stop Prozac and start Cymbalta. Presents 11/7/22- no change, will increase Cymbalta to twice daily      ICD-10-CM ICD-9-CM   1. Mixed obsessional thoughts and acts  F42.2 300.3   2. Adjustment disorder with mixed anxiety and depressed mood  F43.23 309.28   3. Insomnia due to other mental disorder  F51.05 300.9    F99 327.02           Intervention/Counseling/Treatment Plan   Medication Management: Continue current medications.   Increase Cymbalta 60 mg daily  Buspar 22.5 mg BID  Consider adding Abilify vs Rexulti  Gabapentin 800 mg TID - from pain  management   Hydroxyzine 25 - 50 mg night as needed for insomnia - does not need refill today  Valium 2 mg daily prn anxiety - does not need refill today  Informed pt of the risks of continuous Benzodiazepine use including tolerance, dependence and withdrawals that may be life threatening upon abrupt cessation. Also advised not to take Benzodiazepines with Opiates or other sedatives and also not to drive or operate heavy machinery while using Benzodiazepines.  The risks and benefits of medication were discussed with the patient.  The treatment plan and follow up plan were reviewed with the patient.  Discussed diagnosis, risks and benefits of proposed treatment above vs alternative treatments vs no treatment, and potential side effects of these treatments. The patient expresses understanding of the above and displays the capacity to agree with this treatment given said understanding. Patient also agrees that, currently, the benefits outweigh the risks and would like to pursue treatment at this time. Particularly emphasized risk of triggering suicidal ideation with SSRIs/antidepressants and risk of serotonin syndrome as patient is on multiple serotonergic agents. The patient expresses understanding of the former and displays the capacity to agree with the current plan.   Encouraged Patient to keep future appointments.   Take medications as prescribed and abstain from substance abuse.   In the event of an emergency patient was advised to go to the emergency room      Return to Clinic: 2 months    Blaire Staples DNP [facility]

## 2022-11-08 ENCOUNTER — OFFICE VISIT (OUTPATIENT)
Dept: FAMILY MEDICINE | Facility: CLINIC | Age: 47
End: 2022-11-08
Payer: MEDICAID

## 2022-11-08 VITALS
TEMPERATURE: 98 F | OXYGEN SATURATION: 98 % | HEIGHT: 72 IN | HEART RATE: 97 BPM | WEIGHT: 315 LBS | DIASTOLIC BLOOD PRESSURE: 76 MMHG | BODY MASS INDEX: 42.66 KG/M2 | SYSTOLIC BLOOD PRESSURE: 124 MMHG

## 2022-11-08 DIAGNOSIS — E11.8 TYPE 2 DIABETES MELLITUS WITH COMPLICATION, WITHOUT LONG-TERM CURRENT USE OF INSULIN: Primary | ICD-10-CM

## 2022-11-08 DIAGNOSIS — K21.9 GASTROESOPHAGEAL REFLUX DISEASE, UNSPECIFIED WHETHER ESOPHAGITIS PRESENT: ICD-10-CM

## 2022-11-08 DIAGNOSIS — I10 ESSENTIAL HYPERTENSION: ICD-10-CM

## 2022-11-08 PROCEDURE — 3072F LOW RISK FOR RETINOPATHY: CPT | Mod: CPTII,S$GLB,, | Performed by: FAMILY MEDICINE

## 2022-11-08 PROCEDURE — 3074F SYST BP LT 130 MM HG: CPT | Mod: CPTII,S$GLB,, | Performed by: FAMILY MEDICINE

## 2022-11-08 PROCEDURE — 3072F PR LOW RISK FOR RETINOPATHY: ICD-10-PCS | Mod: CPTII,S$GLB,, | Performed by: FAMILY MEDICINE

## 2022-11-08 PROCEDURE — 3060F PR POS MICROALBUMINURIA RESULT DOCUMENTED/REVIEW: ICD-10-PCS | Mod: CPTII,S$GLB,, | Performed by: FAMILY MEDICINE

## 2022-11-08 PROCEDURE — 3052F PR MOST RECENT HEMOGLOBIN A1C LEVEL 8.0 - < 9.0%: ICD-10-PCS | Mod: CPTII,S$GLB,, | Performed by: FAMILY MEDICINE

## 2022-11-08 PROCEDURE — 3008F PR BODY MASS INDEX (BMI) DOCUMENTED: ICD-10-PCS | Mod: CPTII,S$GLB,, | Performed by: FAMILY MEDICINE

## 2022-11-08 PROCEDURE — 3060F POS MICROALBUMINURIA REV: CPT | Mod: CPTII,S$GLB,, | Performed by: FAMILY MEDICINE

## 2022-11-08 PROCEDURE — 3066F PR DOCUMENTATION OF TREATMENT FOR NEPHROPATHY: ICD-10-PCS | Mod: CPTII,S$GLB,, | Performed by: FAMILY MEDICINE

## 2022-11-08 PROCEDURE — 3078F DIAST BP <80 MM HG: CPT | Mod: CPTII,S$GLB,, | Performed by: FAMILY MEDICINE

## 2022-11-08 PROCEDURE — 99214 PR OFFICE/OUTPT VISIT, EST, LEVL IV, 30-39 MIN: ICD-10-PCS | Mod: S$GLB,,, | Performed by: FAMILY MEDICINE

## 2022-11-08 PROCEDURE — 3008F BODY MASS INDEX DOCD: CPT | Mod: CPTII,S$GLB,, | Performed by: FAMILY MEDICINE

## 2022-11-08 PROCEDURE — 1159F PR MEDICATION LIST DOCUMENTED IN MEDICAL RECORD: ICD-10-PCS | Mod: CPTII,S$GLB,, | Performed by: FAMILY MEDICINE

## 2022-11-08 PROCEDURE — 99214 OFFICE O/P EST MOD 30 MIN: CPT | Mod: S$GLB,,, | Performed by: FAMILY MEDICINE

## 2022-11-08 PROCEDURE — 1159F MED LIST DOCD IN RCRD: CPT | Mod: CPTII,S$GLB,, | Performed by: FAMILY MEDICINE

## 2022-11-08 PROCEDURE — 3074F PR MOST RECENT SYSTOLIC BLOOD PRESSURE < 130 MM HG: ICD-10-PCS | Mod: CPTII,S$GLB,, | Performed by: FAMILY MEDICINE

## 2022-11-08 PROCEDURE — 3078F PR MOST RECENT DIASTOLIC BLOOD PRESSURE < 80 MM HG: ICD-10-PCS | Mod: CPTII,S$GLB,, | Performed by: FAMILY MEDICINE

## 2022-11-08 PROCEDURE — 3066F NEPHROPATHY DOC TX: CPT | Mod: CPTII,S$GLB,, | Performed by: FAMILY MEDICINE

## 2022-11-08 PROCEDURE — 3052F HG A1C>EQUAL 8.0%<EQUAL 9.0%: CPT | Mod: CPTII,S$GLB,, | Performed by: FAMILY MEDICINE

## 2022-11-08 RX ORDER — INSULIN ASPART 100 [IU]/ML
INJECTION, SOLUTION INTRAVENOUS; SUBCUTANEOUS
Qty: 45 ML | Refills: 6 | Status: SHIPPED | OUTPATIENT
Start: 2022-11-08 | End: 2023-04-21

## 2022-11-08 RX ORDER — CELECOXIB 200 MG/1
200 CAPSULE ORAL 2 TIMES DAILY
Qty: 180 CAPSULE | Refills: 0 | Status: ON HOLD | OUTPATIENT
Start: 2022-11-08 | End: 2022-11-12 | Stop reason: HOSPADM

## 2022-11-08 RX ORDER — INSULIN DETEMIR 100 [IU]/ML
INJECTION, SOLUTION SUBCUTANEOUS
Qty: 90 ML | Refills: 1 | Status: ON HOLD | OUTPATIENT
Start: 2022-11-08 | End: 2022-11-12 | Stop reason: SDUPTHER

## 2022-11-08 NOTE — PROGRESS NOTES
Patient ID: Kirill Gandhi III is a 47 y.o. male.    Chief Complaint: Follow-up    HPI      Kirill Gandhi III is a 47 y.o. male diabetes mellitus.  Patient's blood glucose have been elevated above 200 even up to 300 or so.  He is taking his Lantus daily basis 50 units along with 50 units t.i.d. with meals  Currently having a difficult time emotionally because of loss of job.  Continued lower back pain and feeling of ill helpless about his old situation.  Having excessive gas in the morning expect both burping and flatulence.  Vitals:    11/08/22 0813   BP: 124/76   BP Location: Left arm   Patient Position: Sitting   Pulse: 97   Temp: 97.8 °F (36.6 °C)   TempSrc: Oral   SpO2: 98%   Weight: (!) 175.1 kg (386 lb 0.4 oz)   Height: 6' (1.829 m)            Review of Symptoms      Physical Exam    Constitutional:  Oriented to person, place, and time.appears well-developed and well-nourished.  No distress.      HENT  Head: Normocephalic and atraumatic  Right Ear: External ear normal.   Left Ear: External ear normal.   Nose: External nose normal.   Mouth:  Moist mucus membranes.    Eyes:  Conjunctivae are normal. Right eye exhibits no discharge.  Left eye exhibits no discharge. No scleral icterus.  No periorbital edema    Cardiovascular:  Regular rate and rhythm with normal S1 and S2     Pulmonary/Chest:   Clear to auscultation bilaterally without wheezes, rhonchi or rales           Complete Blood Count  Lab Results   Component Value Date    RBC 2.87 (L) 04/15/2022    HGB 7.8 (L) 04/15/2022    HCT 25.0 (L) 04/15/2022    MCV 87 04/15/2022    MCH 27.2 04/15/2022    MCHC 31.2 (L) 04/15/2022    RDW 15.5 (H) 04/15/2022     04/15/2022    MPV 10.0 04/15/2022    GRAN 4.5 04/15/2022    GRAN 65.7 04/15/2022    LYMPH 1.3 04/15/2022    LYMPH 19.7 04/15/2022    MONO 0.7 04/15/2022    MONO 10.4 04/15/2022    EOS 0.2 04/15/2022    BASO 0.04 04/15/2022    EOSINOPHIL 3.2 04/15/2022    BASOPHIL 0.6 04/15/2022     DIFFMETHOD Automated 04/15/2022       Comprehensive Metabolic Panel  No results found for: GLU, BUN, CREATININE, NA, K, CL, PROT, ALBUMIN, BILITOT, AST, ALKPHOS, CO2, ALT, ANIONGAP, EGFRNONAA, ESTGFRAFRICA    TSH  No results found for: TSH    Assessment / Plan:      ICD-10-CM ICD-9-CM   1. Type 2 diabetes mellitus with complication, without long-term current use of insulin  E11.8 250.90   2. Essential hypertension  I10 401.9   3. Gastroesophageal reflux disease, unspecified whether esophagitis present  K21.9 530.81     Type 2 diabetes mellitus with complication, without long-term current use of insulin    Essential hypertension    Gastroesophageal reflux disease, unspecified whether esophagitis present    Other orders  -     insulin detemir U-100 (LEVEMIR FLEXTOUCH U-100 INSULN) 100 unit/mL (3 mL) InPn pen; Inject up to 100 units each day  Dispense: 90 mL; Refill: 1  -     insulin aspart U-100 (NOVOLOG) 100 unit/mL (3 mL) InPn pen; Inject Between 50 to 70 units 3 times a day with meals.  Dispense: 45 mL; Refill: 6  -     celecoxib (CELEBREX) 200 MG capsule; Take 1 capsule (200 mg total) by mouth 2 (two) times daily.  Dispense: 180 capsule; Refill: 0    Discussed plan for diabetes mellitus increasing Lantus every three days if glucose is above 180-increase by 5 units.      Muscle skeletal pain-discussed some type walking or exercise program     Mood any motions-discussed combination of physical exercise time alone prayer medication-suggested a running partner all sorts    Send glucose readings often so we can adjust insuli-next consider increasing NovoLog at mealtime

## 2022-11-10 ENCOUNTER — PATIENT MESSAGE (OUTPATIENT)
Dept: GASTROENTEROLOGY | Facility: CLINIC | Age: 47
End: 2022-11-10
Payer: MEDICAID

## 2022-11-10 ENCOUNTER — PATIENT MESSAGE (OUTPATIENT)
Dept: FAMILY MEDICINE | Facility: CLINIC | Age: 47
End: 2022-11-10
Payer: MEDICAID

## 2022-11-11 ENCOUNTER — TELEPHONE (OUTPATIENT)
Dept: FAMILY MEDICINE | Facility: CLINIC | Age: 47
End: 2022-11-11
Payer: MEDICAID

## 2022-11-11 ENCOUNTER — HOSPITAL ENCOUNTER (OUTPATIENT)
Facility: HOSPITAL | Age: 47
LOS: 1 days | Discharge: HOME | End: 2022-11-12
Attending: EMERGENCY MEDICINE | Admitting: INTERNAL MEDICINE
Payer: MEDICAID

## 2022-11-11 DIAGNOSIS — K21.9 GASTROESOPHAGEAL REFLUX DISEASE WITHOUT ESOPHAGITIS: ICD-10-CM

## 2022-11-11 DIAGNOSIS — E86.0 DEHYDRATION WITH HYPONATREMIA: ICD-10-CM

## 2022-11-11 DIAGNOSIS — E11.65 TYPE 2 DIABETES MELLITUS WITH HYPERGLYCEMIA, WITH LONG-TERM CURRENT USE OF INSULIN: ICD-10-CM

## 2022-11-11 DIAGNOSIS — M54.42 CHRONIC BILATERAL LOW BACK PAIN WITH BILATERAL SCIATICA: ICD-10-CM

## 2022-11-11 DIAGNOSIS — M54.41 CHRONIC BILATERAL LOW BACK PAIN WITH BILATERAL SCIATICA: ICD-10-CM

## 2022-11-11 DIAGNOSIS — I10 BENIGN ESSENTIAL HYPERTENSION: ICD-10-CM

## 2022-11-11 DIAGNOSIS — R07.9 CHEST PAIN: ICD-10-CM

## 2022-11-11 DIAGNOSIS — R82.4 KETONURIA: ICD-10-CM

## 2022-11-11 DIAGNOSIS — G89.29 CHRONIC BILATERAL LOW BACK PAIN WITH BILATERAL SCIATICA: ICD-10-CM

## 2022-11-11 DIAGNOSIS — F43.23 ADJUSTMENT DISORDER WITH MIXED ANXIETY AND DEPRESSED MOOD: ICD-10-CM

## 2022-11-11 DIAGNOSIS — Z79.4 TYPE 2 DIABETES MELLITUS WITH HYPERGLYCEMIA, WITH LONG-TERM CURRENT USE OF INSULIN: ICD-10-CM

## 2022-11-11 DIAGNOSIS — M53.3 SACROILIAC JOINT DYSFUNCTION OF BOTH SIDES: ICD-10-CM

## 2022-11-11 DIAGNOSIS — E86.0 DEHYDRATION: ICD-10-CM

## 2022-11-11 DIAGNOSIS — E66.01 MORBID OBESITY: ICD-10-CM

## 2022-11-11 DIAGNOSIS — E87.1 DEHYDRATION WITH HYPONATREMIA: ICD-10-CM

## 2022-11-11 DIAGNOSIS — F42.2 MIXED OBSESSIONAL THOUGHTS AND ACTS: ICD-10-CM

## 2022-11-11 DIAGNOSIS — R10.9 ABDOMINAL PAIN: ICD-10-CM

## 2022-11-11 DIAGNOSIS — E55.9 VITAMIN D DEFICIENCY: ICD-10-CM

## 2022-11-11 DIAGNOSIS — E87.20 METABOLIC ACIDEMIA: ICD-10-CM

## 2022-11-11 DIAGNOSIS — E87.1 HYPONATREMIA: ICD-10-CM

## 2022-11-11 DIAGNOSIS — G47.33 OSA (OBSTRUCTIVE SLEEP APNEA): ICD-10-CM

## 2022-11-11 DIAGNOSIS — R73.9 HYPERGLYCEMIA: ICD-10-CM

## 2022-11-11 DIAGNOSIS — K56.7 ILEUS: Primary | ICD-10-CM

## 2022-11-11 DIAGNOSIS — M43.27 FUSION OF SPINE, LUMBOSACRAL REGION: ICD-10-CM

## 2022-11-11 DIAGNOSIS — F33.0 MILD EPISODE OF RECURRENT MAJOR DEPRESSIVE DISORDER: ICD-10-CM

## 2022-11-11 LAB
ALBUMIN SERPL BCP-MCNC: 3.5 G/DL (ref 3.5–5.2)
ALP SERPL-CCNC: 102 U/L (ref 55–135)
ALT SERPL W/O P-5'-P-CCNC: 32 U/L (ref 10–44)
ANION GAP SERPL CALC-SCNC: 12 MMOL/L (ref 8–16)
AST SERPL-CCNC: 23 U/L (ref 10–40)
BACTERIA #/AREA URNS AUTO: NORMAL /HPF
BASOPHILS # BLD AUTO: 0.03 K/UL (ref 0–0.2)
BASOPHILS NFR BLD: 0.3 % (ref 0–1.9)
BILIRUB SERPL-MCNC: 0.4 MG/DL (ref 0.1–1)
BILIRUB UR QL STRIP: NEGATIVE
BUN SERPL-MCNC: 10 MG/DL (ref 6–20)
CALCIUM SERPL-MCNC: 9.5 MG/DL (ref 8.7–10.5)
CHLORIDE SERPL-SCNC: 99 MMOL/L (ref 95–110)
CLARITY UR REFRACT.AUTO: CLEAR
CO2 SERPL-SCNC: 22 MMOL/L (ref 23–29)
COLOR UR AUTO: YELLOW
CREAT SERPL-MCNC: 1 MG/DL (ref 0.5–1.4)
DIFFERENTIAL METHOD: ABNORMAL
EOSINOPHIL # BLD AUTO: 0.3 K/UL (ref 0–0.5)
EOSINOPHIL NFR BLD: 3 % (ref 0–8)
ERYTHROCYTE [DISTWIDTH] IN BLOOD BY AUTOMATED COUNT: 16.8 % (ref 11.5–14.5)
EST. GFR  (NO RACE VARIABLE): >60 ML/MIN/1.73 M^2
GLUCOSE SERPL-MCNC: 329 MG/DL (ref 70–110)
GLUCOSE UR QL STRIP: ABNORMAL
HCT VFR BLD AUTO: 40.2 % (ref 40–54)
HCV AB SERPL QL IA: NORMAL
HGB BLD-MCNC: 13 G/DL (ref 14–18)
HGB UR QL STRIP: NEGATIVE
HIV 1+2 AB+HIV1 P24 AG SERPL QL IA: NORMAL
IMM GRANULOCYTES # BLD AUTO: 0.03 K/UL (ref 0–0.04)
IMM GRANULOCYTES NFR BLD AUTO: 0.3 % (ref 0–0.5)
KETONES UR QL STRIP: ABNORMAL
LEUKOCYTE ESTERASE UR QL STRIP: NEGATIVE
LIPASE SERPL-CCNC: 26 U/L (ref 4–60)
LYMPHOCYTES # BLD AUTO: 1.8 K/UL (ref 1–4.8)
LYMPHOCYTES NFR BLD: 19.7 % (ref 18–48)
MCH RBC QN AUTO: 27.9 PG (ref 27–31)
MCHC RBC AUTO-ENTMCNC: 32.3 G/DL (ref 32–36)
MCV RBC AUTO: 86 FL (ref 82–98)
MICROSCOPIC COMMENT: NORMAL
MONOCYTES # BLD AUTO: 0.7 K/UL (ref 0.3–1)
MONOCYTES NFR BLD: 7.8 % (ref 4–15)
NEUTROPHILS # BLD AUTO: 6.4 K/UL (ref 1.8–7.7)
NEUTROPHILS NFR BLD: 68.9 % (ref 38–73)
NITRITE UR QL STRIP: NEGATIVE
NRBC BLD-RTO: 0 /100 WBC
PH UR STRIP: 5 [PH] (ref 5–8)
PLATELET # BLD AUTO: 314 K/UL (ref 150–450)
PMV BLD AUTO: 9.6 FL (ref 9.2–12.9)
POCT GLUCOSE: 186 MG/DL (ref 70–110)
POTASSIUM SERPL-SCNC: 3.9 MMOL/L (ref 3.5–5.1)
PROT SERPL-MCNC: 7.9 G/DL (ref 6–8.4)
PROT UR QL STRIP: NEGATIVE
RBC # BLD AUTO: 4.66 M/UL (ref 4.6–6.2)
SODIUM SERPL-SCNC: 133 MMOL/L (ref 136–145)
SP GR UR STRIP: 1.02 (ref 1–1.03)
TROPONIN I SERPL DL<=0.01 NG/ML-MCNC: <0.006 NG/ML (ref 0–0.03)
URN SPEC COLLECT METH UR: ABNORMAL
WBC # BLD AUTO: 9.28 K/UL (ref 3.9–12.7)
YEAST UR QL AUTO: NORMAL

## 2022-11-11 PROCEDURE — 99220 PR INITIAL OBSERVATION CARE,LEVL III: CPT | Mod: ,,, | Performed by: INTERNAL MEDICINE

## 2022-11-11 PROCEDURE — 87389 HIV-1 AG W/HIV-1&-2 AB AG IA: CPT | Performed by: PHYSICIAN ASSISTANT

## 2022-11-11 PROCEDURE — 63600175 PHARM REV CODE 636 W HCPCS: Performed by: EMERGENCY MEDICINE

## 2022-11-11 PROCEDURE — 96361 HYDRATE IV INFUSION ADD-ON: CPT

## 2022-11-11 PROCEDURE — 83690 ASSAY OF LIPASE: CPT | Performed by: EMERGENCY MEDICINE

## 2022-11-11 PROCEDURE — G0378 HOSPITAL OBSERVATION PER HR: HCPCS

## 2022-11-11 PROCEDURE — 63600175 PHARM REV CODE 636 W HCPCS

## 2022-11-11 PROCEDURE — 99285 EMERGENCY DEPT VISIT HI MDM: CPT | Mod: 25

## 2022-11-11 PROCEDURE — 25000003 PHARM REV CODE 250

## 2022-11-11 PROCEDURE — 25500020 PHARM REV CODE 255: Performed by: EMERGENCY MEDICINE

## 2022-11-11 PROCEDURE — 86803 HEPATITIS C AB TEST: CPT | Performed by: PHYSICIAN ASSISTANT

## 2022-11-11 PROCEDURE — 25000003 PHARM REV CODE 250: Performed by: EMERGENCY MEDICINE

## 2022-11-11 PROCEDURE — 96374 THER/PROPH/DIAG INJ IV PUSH: CPT | Mod: 59

## 2022-11-11 PROCEDURE — 99285 EMERGENCY DEPT VISIT HI MDM: CPT | Mod: ,,, | Performed by: EMERGENCY MEDICINE

## 2022-11-11 PROCEDURE — 96375 TX/PRO/DX INJ NEW DRUG ADDON: CPT

## 2022-11-11 PROCEDURE — 85025 COMPLETE CBC W/AUTO DIFF WBC: CPT | Performed by: EMERGENCY MEDICINE

## 2022-11-11 PROCEDURE — 81001 URINALYSIS AUTO W/SCOPE: CPT | Performed by: EMERGENCY MEDICINE

## 2022-11-11 PROCEDURE — 93010 ELECTROCARDIOGRAM REPORT: CPT | Mod: ,,, | Performed by: INTERNAL MEDICINE

## 2022-11-11 PROCEDURE — 93010 EKG 12-LEAD: ICD-10-PCS | Mod: ,,, | Performed by: INTERNAL MEDICINE

## 2022-11-11 PROCEDURE — 84484 ASSAY OF TROPONIN QUANT: CPT | Performed by: EMERGENCY MEDICINE

## 2022-11-11 PROCEDURE — 99220 PR INITIAL OBSERVATION CARE,LEVL III: ICD-10-PCS | Mod: ,,, | Performed by: INTERNAL MEDICINE

## 2022-11-11 PROCEDURE — 80053 COMPREHEN METABOLIC PANEL: CPT | Performed by: EMERGENCY MEDICINE

## 2022-11-11 PROCEDURE — 93005 ELECTROCARDIOGRAM TRACING: CPT

## 2022-11-11 PROCEDURE — 99285 PR EMERGENCY DEPT VISIT,LEVEL V: ICD-10-PCS | Mod: ,,, | Performed by: EMERGENCY MEDICINE

## 2022-11-11 RX ORDER — ATORVASTATIN CALCIUM 10 MG/1
10 TABLET, FILM COATED ORAL EVERY OTHER DAY
Status: DISCONTINUED | OUTPATIENT
Start: 2022-11-12 | End: 2022-11-12 | Stop reason: HOSPADM

## 2022-11-11 RX ORDER — TALC
6 POWDER (GRAM) TOPICAL NIGHTLY PRN
Status: DISCONTINUED | OUTPATIENT
Start: 2022-11-11 | End: 2022-11-12 | Stop reason: HOSPADM

## 2022-11-11 RX ORDER — FLUTICASONE PROPIONATE 50 MCG
2 SPRAY, SUSPENSION (ML) NASAL DAILY
Status: DISCONTINUED | OUTPATIENT
Start: 2022-11-11 | End: 2022-11-12 | Stop reason: HOSPADM

## 2022-11-11 RX ORDER — SODIUM CHLORIDE 0.9 % (FLUSH) 0.9 %
10 SYRINGE (ML) INJECTION
Status: DISCONTINUED | OUTPATIENT
Start: 2022-11-11 | End: 2022-11-12 | Stop reason: HOSPADM

## 2022-11-11 RX ORDER — SODIUM CHLORIDE 0.9 % (FLUSH) 0.9 %
10 SYRINGE (ML) INJECTION EVERY 12 HOURS PRN
Status: DISCONTINUED | OUTPATIENT
Start: 2022-11-11 | End: 2022-11-12 | Stop reason: HOSPADM

## 2022-11-11 RX ORDER — INSULIN ASPART 100 [IU]/ML
0-5 INJECTION, SOLUTION INTRAVENOUS; SUBCUTANEOUS EVERY 6 HOURS PRN
Status: DISCONTINUED | OUTPATIENT
Start: 2022-11-11 | End: 2022-11-11

## 2022-11-11 RX ORDER — SODIUM CHLORIDE 9 MG/ML
INJECTION, SOLUTION INTRAVENOUS CONTINUOUS
Status: ACTIVE | OUTPATIENT
Start: 2022-11-11 | End: 2022-11-12

## 2022-11-11 RX ORDER — SIMETHICONE 80 MG
1 TABLET,CHEWABLE ORAL 3 TIMES DAILY
Status: DISCONTINUED | OUTPATIENT
Start: 2022-11-11 | End: 2022-11-12 | Stop reason: HOSPADM

## 2022-11-11 RX ORDER — METHYLCELLULOSE 500 MG/1
1 TABLET ORAL NIGHTLY PRN
COMMUNITY
End: 2023-08-03

## 2022-11-11 RX ORDER — ONDANSETRON 2 MG/ML
4 INJECTION INTRAMUSCULAR; INTRAVENOUS
Status: COMPLETED | OUTPATIENT
Start: 2022-11-11 | End: 2022-11-11

## 2022-11-11 RX ORDER — HYOSCYAMINE SULFATE 0.38 MG/1
0.38 TABLET, EXTENDED RELEASE ORAL EVERY 8 HOURS
Status: DISCONTINUED | OUTPATIENT
Start: 2022-11-11 | End: 2022-11-12 | Stop reason: HOSPADM

## 2022-11-11 RX ORDER — HYOSCYAMINE SULFATE 0.38 MG/1
0.75 TABLET, EXTENDED RELEASE ORAL EVERY 12 HOURS
Status: DISCONTINUED | OUTPATIENT
Start: 2022-11-11 | End: 2022-11-11

## 2022-11-11 RX ORDER — GLUCAGON 1 MG
1 KIT INJECTION
Status: DISCONTINUED | OUTPATIENT
Start: 2022-11-11 | End: 2022-11-11

## 2022-11-11 RX ORDER — METOCLOPRAMIDE HYDROCHLORIDE 5 MG/ML
5 INJECTION INTRAMUSCULAR; INTRAVENOUS EVERY 6 HOURS
Status: COMPLETED | OUTPATIENT
Start: 2022-11-11 | End: 2022-11-12

## 2022-11-11 RX ORDER — DIAZEPAM 5 MG/1
5 TABLET ORAL DAILY
Status: DISCONTINUED | OUTPATIENT
Start: 2022-11-12 | End: 2022-11-12 | Stop reason: HOSPADM

## 2022-11-11 RX ORDER — DULOXETIN HYDROCHLORIDE 60 MG/1
60 CAPSULE, DELAYED RELEASE ORAL 2 TIMES DAILY
Status: DISCONTINUED | OUTPATIENT
Start: 2022-11-11 | End: 2022-11-11

## 2022-11-11 RX ORDER — INSULIN ASPART 100 [IU]/ML
1-10 INJECTION, SOLUTION INTRAVENOUS; SUBCUTANEOUS EVERY 6 HOURS PRN
Status: DISCONTINUED | OUTPATIENT
Start: 2022-11-11 | End: 2022-11-12 | Stop reason: HOSPADM

## 2022-11-11 RX ORDER — VALSARTAN 160 MG/1
160 TABLET ORAL 2 TIMES DAILY
Status: DISCONTINUED | OUTPATIENT
Start: 2022-11-11 | End: 2022-11-12 | Stop reason: HOSPADM

## 2022-11-11 RX ORDER — IBUPROFEN 200 MG
16 TABLET ORAL
Status: DISCONTINUED | OUTPATIENT
Start: 2022-11-11 | End: 2022-11-11

## 2022-11-11 RX ORDER — ASPIRIN 81 MG/1
81 TABLET ORAL NIGHTLY
Status: DISCONTINUED | OUTPATIENT
Start: 2022-11-11 | End: 2022-11-12 | Stop reason: HOSPADM

## 2022-11-11 RX ORDER — ONDANSETRON 2 MG/ML
4 INJECTION INTRAMUSCULAR; INTRAVENOUS EVERY 6 HOURS PRN
Status: DISCONTINUED | OUTPATIENT
Start: 2022-11-11 | End: 2022-11-12 | Stop reason: HOSPADM

## 2022-11-11 RX ORDER — INSULIN ASPART 100 [IU]/ML
1-10 INJECTION, SOLUTION INTRAVENOUS; SUBCUTANEOUS EVERY 6 HOURS PRN
Status: DISCONTINUED | OUTPATIENT
Start: 2022-11-11 | End: 2022-11-11

## 2022-11-11 RX ORDER — IBUPROFEN 200 MG
24 TABLET ORAL
Status: DISCONTINUED | OUTPATIENT
Start: 2022-11-11 | End: 2022-11-11

## 2022-11-11 RX ORDER — TIZANIDINE 4 MG/1
8 TABLET ORAL EVERY 8 HOURS PRN
Status: DISCONTINUED | OUTPATIENT
Start: 2022-11-11 | End: 2022-11-12 | Stop reason: HOSPADM

## 2022-11-11 RX ORDER — HYOSCYAMINE SULFATE 0.38 MG/1
0.38 TABLET, EXTENDED RELEASE ORAL EVERY 12 HOURS
Status: DISCONTINUED | OUTPATIENT
Start: 2022-11-11 | End: 2022-11-11

## 2022-11-11 RX ORDER — FAMOTIDINE 10 MG/ML
20 INJECTION INTRAVENOUS
Status: COMPLETED | OUTPATIENT
Start: 2022-11-11 | End: 2022-11-11

## 2022-11-11 RX ORDER — HYDROXYZINE HYDROCHLORIDE 25 MG/1
25 TABLET, FILM COATED ORAL NIGHTLY
Status: DISCONTINUED | OUTPATIENT
Start: 2022-11-11 | End: 2022-11-12 | Stop reason: HOSPADM

## 2022-11-11 RX ORDER — PANTOPRAZOLE SODIUM 40 MG/1
40 TABLET, DELAYED RELEASE ORAL DAILY
Status: DISCONTINUED | OUTPATIENT
Start: 2022-11-11 | End: 2022-11-11

## 2022-11-11 RX ORDER — ENOXAPARIN SODIUM 100 MG/ML
40 INJECTION SUBCUTANEOUS EVERY 24 HOURS
Status: DISCONTINUED | OUTPATIENT
Start: 2022-11-11 | End: 2022-11-12 | Stop reason: HOSPADM

## 2022-11-11 RX ORDER — NALOXONE HCL 0.4 MG/ML
0.02 VIAL (ML) INJECTION
Status: DISCONTINUED | OUTPATIENT
Start: 2022-11-11 | End: 2022-11-12 | Stop reason: HOSPADM

## 2022-11-11 RX ORDER — DULOXETIN HYDROCHLORIDE 30 MG/1
60 CAPSULE, DELAYED RELEASE ORAL 2 TIMES DAILY
Status: DISCONTINUED | OUTPATIENT
Start: 2022-11-11 | End: 2022-11-12 | Stop reason: HOSPADM

## 2022-11-11 RX ORDER — GLUCAGON 1 MG
1 KIT INJECTION
Status: DISCONTINUED | OUTPATIENT
Start: 2022-11-11 | End: 2022-11-12 | Stop reason: HOSPADM

## 2022-11-11 RX ORDER — ATORVASTATIN CALCIUM 10 MG/1
10 TABLET, FILM COATED ORAL EVERY OTHER DAY
Status: DISCONTINUED | OUTPATIENT
Start: 2022-11-12 | End: 2022-11-11

## 2022-11-11 RX ORDER — ASPIRIN 81 MG/1
81 TABLET ORAL NIGHTLY
Status: DISCONTINUED | OUTPATIENT
Start: 2022-11-11 | End: 2022-11-11

## 2022-11-11 RX ADMIN — HYOSCYAMINE SULFATE 0.38 MG: 0.38 TABLET, EXTENDED RELEASE ORAL at 09:11

## 2022-11-11 RX ADMIN — VALSARTAN 160 MG: 160 TABLET, FILM COATED ORAL at 09:11

## 2022-11-11 RX ADMIN — HYOSCYAMINE SULFATE 0.38 MG: 0.38 TABLET, EXTENDED RELEASE ORAL at 04:11

## 2022-11-11 RX ADMIN — FAMOTIDINE 20 MG: 10 INJECTION, SOLUTION INTRAVENOUS at 06:11

## 2022-11-11 RX ADMIN — SIMETHICONE 80 MG: 80 TABLET, CHEWABLE ORAL at 09:11

## 2022-11-11 RX ADMIN — ASPIRIN 81 MG: 81 TABLET, COATED ORAL at 09:11

## 2022-11-11 RX ADMIN — BUSPIRONE HYDROCHLORIDE 22.5 MG: 10 TABLET ORAL at 09:11

## 2022-11-11 RX ADMIN — METOCLOPRAMIDE 5 MG: 5 INJECTION, SOLUTION INTRAMUSCULAR; INTRAVENOUS at 11:11

## 2022-11-11 RX ADMIN — IOHEXOL 100 ML: 350 INJECTION, SOLUTION INTRAVENOUS at 07:11

## 2022-11-11 RX ADMIN — METOCLOPRAMIDE 5 MG: 5 INJECTION, SOLUTION INTRAMUSCULAR; INTRAVENOUS at 06:11

## 2022-11-11 RX ADMIN — DULOXETINE 60 MG: 60 CAPSULE, DELAYED RELEASE ORAL at 09:11

## 2022-11-11 RX ADMIN — SIMETHICONE 80 MG: 80 TABLET, CHEWABLE ORAL at 04:11

## 2022-11-11 RX ADMIN — ONDANSETRON 4 MG: 2 INJECTION INTRAMUSCULAR; INTRAVENOUS at 08:11

## 2022-11-11 RX ADMIN — DULOXETINE 60 MG: 60 CAPSULE, DELAYED RELEASE ORAL at 11:11

## 2022-11-11 RX ADMIN — SODIUM CHLORIDE: 0.9 INJECTION, SOLUTION INTRAVENOUS at 01:11

## 2022-11-11 RX ADMIN — SODIUM CHLORIDE: 0.9 INJECTION, SOLUTION INTRAVENOUS at 05:11

## 2022-11-11 RX ADMIN — INSULIN DETEMIR 35 UNITS: 100 INJECTION, SOLUTION SUBCUTANEOUS at 11:11

## 2022-11-11 RX ADMIN — SODIUM CHLORIDE 1000 ML: 0.9 INJECTION, SOLUTION INTRAVENOUS at 08:11

## 2022-11-11 RX ADMIN — HYDROXYZINE HYDROCHLORIDE 25 MG: 25 TABLET, FILM COATED ORAL at 09:11

## 2022-11-11 NOTE — SUBJECTIVE & OBJECTIVE
Past Medical History:   Diagnosis Date    Allergy     Anxiety     Back pain     Chronic bilateral low back pain with bilateral sciatica 12/14/2021    CTS (carpal tunnel syndrome) 06/26/2015    CTS (carpal tunnel syndrome) 6/26/2015    Depression     Diabetes 04/17/2015    Diabetes mellitus with insulin therapy     Elevated sed rate 6/26/2015    GERD (gastroesophageal reflux disease) 10/19/2022    Hx of vasectomy     Hypertension     Morbid obesity 4/16/2015    OCD (obsessive compulsive disorder)     Sleep apnea     Type 2 diabetes mellitus with hyperglycemia, with long-term current use of insulin 10/17/2022       Past Surgical History:   Procedure Laterality Date    ADENOIDECTOMY      COLONOSCOPY N/A 9/6/2022    Procedure: COLONOSCOPY;  Surgeon: Rossy Champion MD;  Location: [facility] ENDO;  Service: Endoscopy;  Laterality: N/A;    EPIDURAL STEROID INJECTION Right 09/04/2019    Procedure: Injection, Steroid, Epidural Transforaminal;  Surgeon: Harjinder Batista Jr., MD;  Location: [facility] ENDO;  Service: Pain Management;  Laterality: Right;  Right L3 + L4 TF NATALIE    [id number]  [id number]    Arrive @ 1300; ASA last 8/27; Check BG; NEEDS CONSENT    ESOPHAGOGASTRODUODENOSCOPY N/A 10/31/2022    Procedure: EGD (ESOPHAGOGASTRODUODENOSCOPY);  Surgeon: Rossy Champion MD;  Location: [facility];  Service: Endoscopy;  Laterality: N/A;    FRACTURE SURGERY      bilateral elbow fracture 3 years ago    FUSION OF LUMBAR SPINE BY ANTERIOR APPROACH Left 04/26/2021    Procedure: FUSION, SPINE, LUMBAR, ANTERIOR APPROACH Left L3-4 L4-5 OLIF BRENDA, ALL Release, L5-S1 ALIF;  Surgeon: Jose L Brown MD;  Location: [facility] OR;  Service: Neurosurgery;  Laterality: Left;  Procedure: Left L3-4 L4-5 OLIF BRENDA, ALL Release, L5-S1 ALIF  Surgery Time: 2.5 Hrs  LOS: 2-3  Anesthesia: General  Blood: Type & Screen  Radiology: Josseline  Brace: LSO  Bed: Regular  Position: LAteral Righ    FUSION OF SACROILIAC JOINT Bilateral 04/13/2022    Procedure: FUSION,  SACROILIAC JOINT Stg 2: Bilateral Open SI Joint fusion, Bedrock from Si Bone;  Surgeon: Jose L Brown MD;  Location: [facility] OR;  Service: Neurosurgery;  Laterality: Bilateral;    FUSION OF SPINE WITH INSTRUMENTATION N/A 04/26/2021    Procedure: FUSION, SPINE, WITH INSTRUMENTATION L3-S1 Posteior Segmental Instrumentation;  Surgeon: Jose L Brown MD;  Location: [facility] OR;  Service: Neurosurgery;  Laterality: N/A;  Procedure: L3-S1 Posteior Segmental Instrumentation  Sirgery Time: 2 Hrs  LOS: 2-3  Anesthesia: General  Blood: Tyoe & Screen  Radiology: Dual C arm  Brace:LSO  Bed: [address]  Position: Prone  Equipment: Depuy     FUSION OF SPINE WITH INSTRUMENTATION N/A 04/13/2022    Procedure: FUSION, SPINE, WITH INSTRUMENTATION Stg 1: Revision of posterior L3-S1 hardware, Depuy. Sacro-pelvic fixation, screw augmentation PMMA. L4-S1 posterolateral arthrodesis. Open Gauge screws, 11-12mm;  Surgeon: Jose L Brown MD;  Location: [facility] OR;  Service: Neurosurgery;  Laterality: N/A;    HERNIA REPAIR      bilateral groin hernia    INJECTION OF STEROID Bilateral 12/13/2021    Procedure: INJECTION, STEROID Bilateral SI JOint Block and Steroid Injection;  Surgeon: Jose L Brown MD;  Location: [facility] OR;  Service: Neurosurgery;  Laterality: Bilateral;  Procedure: Bilateral SI JOint Block and Steroid Injection   Surgery Time: 30 min  LOS: 0  Anesthesia: MAC  Radiology: C-arm  Bed: Regular with pillows  Position: Prone    LUMBAR LAMINECTOMY Bilateral 11/29/2019    Procedure: LAMINECTOMY, SPINE, LUMBAR Rigth L3-4, Left L4-5 Laminectomy, medial Facetectomy, and microdiscectomy;  Surgeon: Jose L Brown MD;  Location: [facility] OR;  Service: Neurosurgery;  Laterality: Bilateral;  Procedure: Rigth L3-4, Left L4-5 Laminectomy, medial Facetectomy, and microdiscectomy  Surgery Time: 2.5 Hrs  LOS: 0-1  Anesthesia: General  Blood: Type & Screen  Radiology: C-arm  Micros    REMOVAL IMPLANT, POSTERIOR SEGMENT, INTRAOCULAR  04/13/2022     Procedure: REMOVAL IMPLANT, POSTERIOR SEGMENT, INTRAOCULAR;  Surgeon: Jose L Brown MD;  Location: [facility] OR;  Service: Neurosurgery;;    SPINE SURGERY      TONSILLECTOMY      VASECTOMY         Review of patient's allergies indicates:   Allergen Reactions    Dilaudid [hydromorphone] Other (See Comments)     IV dilaudid severely drops BP       No current facility-administered medications on file prior to encounter.     Current Outpatient Medications on File Prior to Encounter   Medication Sig    acetaminophen (TYLENOL) 500 MG tablet Take 1,000 mg by mouth daily as needed for Pain.    ascorbic acid, vitamin C, (VITAMIN C) 1000 MG tablet Take 1,000 mg by mouth once daily.    aspirin (ECOTRIN) 81 MG EC tablet Take 81 mg by mouth every evening.    atorvastatin (LIPITOR) 10 MG tablet Take 1 tablet (10 mg total) by mouth every other day.    blood-glucose transmitter (DEXCOM G6 TRANSMITTER) Cayla Use to check glucose - 90 day supply (Patient not taking: Reported on 11/8/2022)    busPIRone (BUSPAR) 15 MG tablet Take 1.5 tablets (22.5 mg total) by mouth 2 (two) times daily.    celecoxib (CELEBREX) 200 MG capsule Take 1 capsule (200 mg total) by mouth 2 (two) times daily.    diazePAM (VALIUM) 5 MG tablet Take 1 tablet (5 mg total) by mouth daily as needed for Anxiety (muscle spasms).    diclofenac sodium 1.5 % Drop Apply topically.    dicyclomine (BENTYL) 20 mg tablet Take 1 tablet (20 mg total) by mouth 3 (three) times daily as needed (abdominal pain).    dulaglutide (TRULICITY) 4.5 mg/0.5 mL pen injector Inject 4.5 mg into the skin every 7 days.    DULoxetine (CYMBALTA) 60 MG capsule Take 1 capsule (60 mg total) by mouth 2 (two) times daily.    flash glucose scanning reader Misc Disp one reader to monitor glucose- covered by insurance dexcom    flash glucose sensor Kit Dispense sensor to read glucose  dexcom    fluticasone propionate (FLONASE) 50 mcg/actuation nasal spray 2 sprays (100 mcg total) by Each Nostril route once  "daily.    gabapentin (NEURONTIN) 800 MG tablet Take 1 tablet (800 mg total) by mouth 3 (three) times daily.    hydrOXYzine HCL (ATARAX) 25 MG tablet Take 2 tablets (50 mg total) by mouth nightly as needed (insomnia).    insulin aspart U-100 (NOVOLOG) 100 unit/mL (3 mL) InPn pen Inject Between 50 to 70 units 3 times a day with meals.    insulin detemir U-100 (LEVEMIR FLEXTOUCH U-100 INSULN) 100 unit/mL (3 mL) InPn pen Inject up to 100 units each day    metFORMIN (GLUCOPHAGE-XR) 500 MG ER 24hr tablet Take 2 tablets (1,000 mg total) by mouth daily with breakfast.    multivitamin capsule Take 1 capsule by mouth once daily.    NON FORMULARY MEDICATION Uses Cpap Machine nightly on a setting of 10    ondansetron (ZOFRAN-ODT) 4 MG TbDL Take 1 tablet (4 mg total) by mouth every 8 (eight) hours as needed (nausea).    ONETOUCH DELICA PLUS LANCET 33 gauge Misc Apply topically 3 (three) times daily.    ONETOUCH VERIO TEST STRIPS Strp 1 strip 3 (three) times daily.    oxyCODONE-acetaminophen (PERCOCET)  mg per tablet Take 1 tablet by mouth every 8 (eight) hours as needed for Pain.    pantoprazole (PROTONIX) 40 MG tablet Take 1 tablet (40 mg total) by mouth once daily.    pen needle, diabetic 31 gauge x 1/4" Ndle Use with flex pen    tiZANidine (ZANAFLEX) 4 MG tablet Take 1 tablet (4 mg total) by mouth every 8 (eight) hours as needed (spasms).    valsartan-hydrochlorothiazide (DIOVAN-HCT) 320-12.5 mg per tablet Take 1 tablet by mouth once daily.    vitamin D 1000 units Tab Take 5,000 Units by mouth 2 (two) times a day.    [DISCONTINUED] fluvoxaMINE (LUVOX) 100 MG tablet Take 1.5 tablets (150 mg total) by mouth 2 (two) times daily.     Family History       Problem Relation (Age of Onset)    Cataracts Maternal Grandmother, Maternal Grandfather    Fibromyalgia Mother    Macular degeneration Maternal Grandfather    Osteoarthritis Mother          Tobacco Use    Smoking status: Former     Packs/day: 0.00     Years: 24.00     Pack " years: 0.00     Types: Vaping with nicotine, Cigarettes     Start date: 1/1/1997    Smokeless tobacco: Never    Tobacco comments:     Handout provided for [employer]oking Cess[employer] program   Substance and Sexual Activity    Alcohol use: Not Currently     Comment: 750 ml  once a month or so    Drug use: Never    Sexual activity: Yes     Partners: Female     Birth control/protection: None     Comment: ; one child      Review of Systems   Constitutional:  Positive for appetite change. Negative for chills and fever.   HENT:  Negative for rhinorrhea and sore throat.    Eyes:  Negative for visual disturbance.   Respiratory:  Negative for cough and shortness of breath.    Cardiovascular:  Negative for chest pain and leg swelling.   Gastrointestinal:  Positive for abdominal pain, constipation, diarrhea and nausea. Negative for abdominal distention and vomiting.   Genitourinary:  Negative for dysuria.   Musculoskeletal:  Negative for arthralgias and myalgias.   Skin:  Negative for rash.   Neurological:  Negative for light-headedness and headaches.   Psychiatric/Behavioral:  Negative for agitation and confusion.    Objective:     Vital Signs (Most Recent):  Temp: 98.4 °F (36.9 °C) (11/11/22 0837)  Pulse: 84 (11/11/22 0837)  Resp: 16 (11/11/22 0837)  BP: 129/66 (11/11/22 0837)  SpO2: 96 % (11/11/22 0837) Vital Signs (24h Range):  Temp:  [98.3 °F (36.8 °C)-98.4 °F (36.9 °C)] 98.4 °F (36.9 °C)  Pulse:  [84-88] 84  Resp:  [16] 16  SpO2:  [96 %-100 %] 96 %  BP: (128-139)/() 129/66     Weight: (!) 174.6 kg (385 lb)  Body mass index is 52.22 kg/m².    Physical Exam  Constitutional:       General: He is not in acute distress.     Appearance: He is obese.   HENT:      Head: Normocephalic and atraumatic.   Eyes:      General: No scleral icterus.     Extraocular Movements: Extraocular movements intact.      Conjunctiva/sclera: Conjunctivae normal.   Cardiovascular:      Rate and Rhythm: Normal rate and regular rhythm.       Heart sounds: Normal heart sounds.   Pulmonary:      Effort: Pulmonary effort is normal.      Breath sounds: Normal breath sounds. No wheezing or rales.   Abdominal:      General: Abdomen is flat. There is no distension.      Palpations: Abdomen is soft.      Tenderness: There is no abdominal tenderness. There is no guarding.      Comments: Hyperactive BS   Musculoskeletal:         General: No swelling. Normal range of motion.      Cervical back: Normal range of motion.   Skin:     General: Skin is warm and dry.   Neurological:      General: No focal deficit present.      Mental Status: He is alert and oriented to person, place, and time.   Psychiatric:         Mood and Affect: Mood normal.         Behavior: Behavior normal.           Significant Labs: All pertinent labs within the past 24 hours have been reviewed.  CBC:   Recent Labs   Lab 11/11/22  0618   WBC 9.28   HGB 13.0*   HCT 40.2        CMP:   Recent Labs   Lab 11/11/22  0618   *   K 3.9   CL 99   CO2 22*   *   BUN 10   CREATININE 1.0   CALCIUM 9.5   PROT 7.9   ALBUMIN 3.5   BILITOT 0.4   ALKPHOS 102   AST 23   ALT 32   ANIONGAP 12       Significant Imaging: I have reviewed all pertinent imaging results/findings within the past 24 hours.

## 2022-11-11 NOTE — HPI
Mr. Gandhi is a 46 yo M with PMH DM2, HTN, OCD/anxiety who presents to the ED with 1 week of acutely worsening abdominal pain and gas. He states that since August, he has had intermittent abdominal pain that is relieved by belching or passing flatus. He reports a foul, rotten-eggs smell to his belches, and similarly foul-smelling flatus. These episodes of pain and gas have been progressing and now last as long as one week. He reports associated nausea but no vomiting. He also has intermittent diarrhea and constipation, sometimes within the same day. He states that sometimes eating provides temporary relief of his discomfort, but he often finds that he lacks an appetite. He has established outpatient with a GI doctor and had a colonoscopy and endoscopy since September with no conclusive findings.     The patient has had 2 back surgeries, most recently in April 2022, for L4-S1 and SI joint fusion. He is able to walk and perform ADLs independently but states walking longer distances is difficult due to back pain. He reports that he does not exercise often at all at home.    In the ED, patient is afebrile, VSS. Abdomen is soft and non-tender. Labs notable for no leukocytosis, sodium of 133 (corrects to 136), and glucose of 329. UA with 4+ glucose and trace ketones. CT A/P shows ileus, hepatic steatosis, and cholelithiasis. Patient received 1L NS in ED, made NPO. Admitted to medicine for further management.

## 2022-11-11 NOTE — ED TRIAGE NOTES
Pt states that he has been having abdominal pian for the past few days. Pt states that in the past he has had really bad gas coming out of both ends. Pt has been following up with Gastroenterology. Pt has had colonoscopy and endoscopy. Pt had endo 2 weeks ago and has been having gas since. Pt states that he had some diarrhea yesterday and today.

## 2022-11-11 NOTE — ED PROVIDER NOTES
"Encounter Date: 11/11/2022       History     Chief Complaint   Patient presents with    Abdominal Pain     Pt c/o abdominal/gas pain x1 week. -nausea/vomiting at this time.      47-year-old male presenting with abdominal pain.    PMH:  GERD, depression, DM type 2, hypertension, anxiety, back pain, sleep apnea, OCD, palpitations    Context:  The patient endorses ongoing abdominal discomfort since this past August.  He has seen a GI doctor and had an endoscopy/colonoscopy.  His most recent episode began approximately 1 week ago.  He describes diffuse abdominal pain, like "gas pain." The pain is associated with nausea and he belches.  He does not experience chest pain during the episodes, but states that the pain can be so severe that he becomes clammy and feels like he might pass out.  He is status post inguinal hernia repair.  He has intermittent episodes of constipation followed by diarrhea.  Onset:  Gradual  Location:  Diffuse abdomen  Duration:  Acute on chronic, most recent episode beginning this past week, intermittent  Modifiers:  Took Zofran this morning with some relief of nausea  Associated Symptoms:  Denies fever, dysuria, hematuria       The history is provided by the patient and medical records. No  was used.   Review of patient's allergies indicates:   Allergen Reactions    Dilaudid [hydromorphone] Other (See Comments)     IV dilaudid severely drops BP     Past Medical History:   Diagnosis Date    Allergy     Anxiety     Back pain     Chronic bilateral low back pain with bilateral sciatica 12/14/2021    CTS (carpal tunnel syndrome) 06/26/2015    CTS (carpal tunnel syndrome) 6/26/2015    Depression     Diabetes 04/17/2015    Diabetes mellitus with insulin therapy     Elevated sed rate 6/26/2015    GERD (gastroesophageal reflux disease) 10/19/2022    Hx of vasectomy     Hypertension     Morbid obesity 4/16/2015    OCD (obsessive compulsive disorder)     Sleep apnea     Type 2 diabetes " mellitus with hyperglycemia, with long-term current use of insulin 10/17/2022     Past Surgical History:   Procedure Laterality Date    ADENOIDECTOMY      COLONOSCOPY N/A 9/6/2022    Procedure: COLONOSCOPY;  Surgeon: Rossy Champion MD;  Location: [facility];  Service: Endoscopy;  Laterality: N/A;    EPIDURAL STEROID INJECTION Right 09/04/2019    Procedure: Injection, Steroid, Epidural Transforaminal;  Surgeon: Harjinder Batista Jr., MD;  Location: [facility] ENDO;  Service: Pain Management;  Laterality: Right;  Right L3 + L4 TF NATALIE    [id number]  [id number]    Arrive @ 1300; ASA last 8/27; Check BG; NEEDS CONSENT    ESOPHAGOGASTRODUODENOSCOPY N/A 10/31/2022    Procedure: EGD (ESOPHAGOGASTRODUODENOSCOPY);  Surgeon: Rossy Champion MD;  Location: [facility];  Service: Endoscopy;  Laterality: N/A;    FRACTURE SURGERY      bilateral elbow fracture 3 years ago    FUSION OF LUMBAR SPINE BY ANTERIOR APPROACH Left 04/26/2021    Procedure: FUSION, SPINE, LUMBAR, ANTERIOR APPROACH Left L3-4 L4-5 OLIF BRENDA, ALL Release, L5-S1 ALIF;  Surgeon: Jose L Brown MD;  Location: [facility];  Service: Neurosurgery;  Laterality: Left;  Procedure: Left L3-4 L4-5 OLIF BRENDA, ALL Release, L5-S1 ALIF  Surgery Time: 2.5 Hrs  LOS: 2-3  Anesthesia: General  Blood: Type & Screen  Radiology: Josseline  Brace: LSO  Bed: Regular  Position: LAteral Righ    FUSION OF SACROILIAC JOINT Bilateral 04/13/2022    Procedure: FUSION, SACROILIAC JOINT Stg 2: Bilateral Open SI Joint fusion, Bedrock from Si Bone;  Surgeon: Jose L Brown MD;  Location: [facility] OR;  Service: Neurosurgery;  Laterality: Bilateral;    FUSION OF SPINE WITH INSTRUMENTATION N/A 04/26/2021    Procedure: FUSION, SPINE, WITH INSTRUMENTATION L3-S1 Posteior Segmental Instrumentation;  Surgeon: Jose L Brown MD;  Location: [facility] OR;  Service: Neurosurgery;  Laterality: N/A;  Procedure: L3-S1 Posteior Segmental Instrumentation  Sirgery Time: 2 Hrs  LOS: 2-3  Anesthesia: General  Blood: Tyoe &  Screen  Radiology: Dual C arm  Brace:LSO  Bed: [place] [id number] Poster  Position: Prone  Equipment: Depuy     FUSION OF SPINE WITH INSTRUMENTATION N/A 04/13/2022    Procedure: FUSION, SPINE, WITH INSTRUMENTATION Stg 1: Revision of posterior L3-S1 hardware, Depuy. Sacro-pelvic fixation, screw augmentation PMMA. L4-S1 posterolateral arthrodesis. Open Gauge screws, 11-12mm;  Surgeon: Jose L Brown MD;  Location: [facility] OR;  Service: Neurosurgery;  Laterality: N/A;    HERNIA REPAIR      bilateral groin hernia    INJECTION OF STEROID Bilateral 12/13/2021    Procedure: INJECTION, STEROID Bilateral SI JOint Block and Steroid Injection;  Surgeon: Jose L Brown MD;  Location: [facility] OR;  Service: Neurosurgery;  Laterality: Bilateral;  Procedure: Bilateral SI JOint Block and Steroid Injection   Surgery Time: 30 min  LOS: 0  Anesthesia: MAC  Radiology: C-arm  Bed: Regular with pillows  Position: Prone    LUMBAR LAMINECTOMY Bilateral 11/29/2019    Procedure: LAMINECTOMY, SPINE, LUMBAR Rigth L3-4, Left L4-5 Laminectomy, medial Facetectomy, and microdiscectomy;  Surgeon: Jose L Brown MD;  Location: [facility] OR;  Service: Neurosurgery;  Laterality: Bilateral;  Procedure: Rigth L3-4, Left L4-5 Laminectomy, medial Facetectomy, and microdiscectomy  Surgery Time: 2.5 Hrs  LOS: 0-1  Anesthesia: General  Blood: Type & Screen  Radiology: C-arm  Micros    REMOVAL IMPLANT, POSTERIOR SEGMENT, INTRAOCULAR  04/13/2022    Procedure: REMOVAL IMPLANT, POSTERIOR SEGMENT, INTRAOCULAR;  Surgeon: Jose L Brown MD;  Location: [facility] OR;  Service: Neurosurgery;;    SPINE SURGERY      TONSILLECTOMY      VASECTOMY       Family History   Problem Relation Age of Onset    Fibromyalgia Mother     Osteoarthritis Mother     Cataracts Maternal Grandmother     Macular degeneration Maternal Grandfather     Cataracts Maternal Grandfather     Rheum arthritis Neg Hx     Psoriasis Neg Hx     Lupus Neg Hx     Kidney disease Neg Hx     Inflammatory bowel disease Neg Hx      Amblyopia Neg Hx     Blindness Neg Hx     Glaucoma Neg Hx     Retinal detachment Neg Hx     Strabismus Neg Hx      Social History     Tobacco Use    Smoking status: Former     Packs/day: 0.00     Years: 24.00     Pack years: 0.00     Types: Vaping with nicotine, Cigarettes     Start date: 1/1/1997    Smokeless tobacco: Never    Tobacco comments:     Handout provided for [employer]oking Cess[employer] program   Substance Use Topics    Alcohol use: Not Currently     Comment: 750 ml  once a month or so    Drug use: Never     Review of Systems   Constitutional:  Negative for fever.   HENT:  Negative for trouble swallowing.    Respiratory:  Negative for shortness of breath.    Cardiovascular:  Negative for chest pain.   Gastrointestinal:  Positive for abdominal pain, constipation, diarrhea and nausea. Negative for vomiting.   Genitourinary:  Negative for dysuria, hematuria, scrotal swelling and testicular pain.   Skin:  Negative for rash.   Allergic/Immunologic: Negative for food allergies.   Neurological:  Negative for speech difficulty.   Hematological:  Does not bruise/bleed easily.     Physical Exam     Initial Vitals   BP Pulse Resp Temp SpO2   11/11/22 0552 11/11/22 0552 11/11/22 0552 11/11/22 0553 11/11/22 0552   (!) 139/103 88 16 98.3 °F (36.8 °C) 100 %      MAP       --                Physical Exam    Nursing note and vitals reviewed.  Constitutional: He is not diaphoretic. No distress.   HENT:   Head: Normocephalic and atraumatic.   Eyes: Right eye exhibits no discharge. Left eye exhibits no discharge.   Neck: Neck supple. No tracheal deviation present.   Cardiovascular:  Normal rate and regular rhythm.           Pulmonary/Chest: Breath sounds normal. No respiratory distress.   Abdominal: Abdomen is soft. There is abdominal tenderness (Mild diffuse tenderness, worse in left lower quadrant).   Hyperactive bowel sounds   Musculoskeletal:      Cervical back: Neck supple.     Neurological: He is alert and oriented  to person, place, and time.   Skin: Skin is warm. No rash noted.   Psychiatric: He has a normal mood and affect. His behavior is normal.       ED Course   Procedures  Labs Reviewed   COMPREHENSIVE METABOLIC PANEL - Abnormal; Notable for the following components:       Result Value    Sodium 133 (*)     CO2 22 (*)     Glucose 329 (*)     All other components within normal limits    Narrative:     Release to patient->Immediate   CBC W/ AUTO DIFFERENTIAL - Abnormal; Notable for the following components:    Hemoglobin 13.0 (*)     RDW 16.8 (*)     All other components within normal limits    Narrative:     Release to patient->Immediate   URINALYSIS, REFLEX TO URINE CULTURE - Abnormal; Notable for the following components:    Glucose, UA 4+ (*)     Ketones, UA Trace (*)     All other components within normal limits    Narrative:     Specimen Source->Urine   HIV 1 / 2 ANTIBODY    Narrative:     Release to patient->Immediate   HEPATITIS C ANTIBODY    Narrative:     Release to patient->Immediate   LIPASE    Narrative:     Release to patient->Immediate   TROPONIN I    Narrative:     Release to patient->Immediate   URINALYSIS MICROSCOPIC    Narrative:     Specimen Source->Urine   POCT GLUCOSE MONITORING CONTINUOUS     EKG Readings: (Independently Interpreted)   Initial Reading: No STEMI. Previous EKG: Compared with most recent EKG Rhythm: Normal Sinus Rhythm. Heart Rate: 79. Clinical Impression: Normal Sinus Rhythm   No significant changes compared to prior, ST-TW changes similar      Imaging Results              CT Abdomen Pelvis With Contrast (Final result)  Result time 11/11/22 07:43:16      Final result by Juan Pacheco MD (11/11/22 07:43:16)                   Impression:      1. Scattered mildly dilated loops of small bowel with air-fluid levels.  No clear transition point or focal wall thickening to suggest inflammatory process.  Overall appearance suggestive of ileus.  2. Hepatic steatosis.  3.  Cholelithiasis.      Electronically signed by: Juan Pacheco MD  Date:    11/11/2022  Time:    07:43               Narrative:    EXAMINATION:  CT ABDOMEN PELVIS WITH CONTRAST    CLINICAL HISTORY:  Nausea/vomiting;    TECHNIQUE:  Low dose axial images, sagittal and coronal reformations were obtained from the lung bases to the pubic symphysis following the IV administration of 100 mL of Omnipaque 350 .  Oral contrast was not given.    COMPARISON:  09/25/2022    FINDINGS:  Lung bases are clear.  No pericardial or pleural effusion.    Liver demonstrates diffuse hypoattenuation in keeping with steatosis and appears mildly enlarged.  Layering small stones are seen within the gallbladder.  No evidence for wall thickening or pericholecystic edema.  No intrahepatic or extrahepatic biliary dilatation.  Pancreas, spleen, adrenal glands, and kidneys are unremarkable.  No hydronephrosis or renal mass.    There are scattered mildly dilated loops of small bowel with air-fluid levels.  No clear transition point.  No focal wall thickening to suggest inflammatory process.  Appendix is normal caliber.    Urinary bladder is decompressed.    No retroperitoneal lymphadenopathy.  No ascites.  Abdominal aorta is normal caliber.    Postoperative changes are seen in the lower abdominal wall.    Regional osseous structures demonstrate no aggressive appearing lytic or blastic lesions.  Postoperative changes are seen in the lower lumbar spine.                                       Medications   sodium chloride 0.9% flush 10 mL (has no administration in time range)   melatonin tablet 6 mg (has no administration in time range)   sodium chloride 0.9% flush 10 mL (has no administration in time range)   naloxone 0.4 mg/mL injection 0.02 mg (has no administration in time range)   glucose chewable tablet 16 g (has no administration in time range)   glucose chewable tablet 24 g (has no administration in time range)   glucagon (human recombinant)  injection 1 mg (has no administration in time range)   dextrose 10% bolus 125 mL (has no administration in time range)   dextrose 10% bolus 250 mL (has no administration in time range)   fluticasone propionate 50 mcg/actuation nasal spray 100 mcg (has no administration in time range)   insulin aspart U-100 pen 0-5 Units (has no administration in time range)   ondansetron injection 4 mg (has no administration in time range)   simethicone chewable tablet 80 mg (has no administration in time range)   insulin detemir U-100 pen 30 Units (has no administration in time range)   metoclopramide HCl injection 5 mg (has no administration in time range)   tiZANidine tablet 8 mg (has no administration in time range)   famotidine (PF) injection 20 mg (20 mg Intravenous Given 11/11/22 0634)   iohexoL (OMNIPAQUE 350) injection 100 mL (100 mLs Intravenous Given 11/11/22 0710)   sodium chloride 0.9% bolus 1,000 mL (0 mLs Intravenous Stopped 11/11/22 1003)   ondansetron injection 4 mg (4 mg Intravenous Given 11/11/22 0835)     Medical Decision Making:   History:   Old Medical Records: I decided to obtain old medical records.  Old Records Summarized: other records.       <> Summary of Records: 2022 EGD:   - Normal esophagus. Biopsied.    - Erythematous mucosa in the antrum. Biopsied.     - Normal examined duodenum. Biopsied.      - 1 cm hiatal hernia.     2022 Colonoscopy:  - One 10 mm polyp in the ascending colon, removed   - Diverticulosis in the sigmoid colon.   - Internal hemorrhoids.   Initial Assessment:   47-year-old male presenting with abdominal pain, nausea, belching.  On arrival, he is in no distress, afebrile, non peritonitic abdomen.  Plan for symptomatic treatment, lab evaluation, advanced imaging to rule out emergent intra-abdominal pathology.  Differential Diagnosis:   Including, but not limited to:    GERD, gastritis  Bowel obstruction  Anginal equivalent  IBS  Independently Interpreted Test(s):   I have ordered and  independently interpreted EKG Reading(s) - see prior notes  Clinical Tests:   Lab Tests: Reviewed and Ordered  Radiological Study: Ordered and Reviewed  Medical Tests: Reviewed and Ordered  ED Management:  Labs notable for: No leukocytosis, hyperglycemia, ketonuria, no anion gap elevation, not consistent with DKA.  CT with cholelithiasis and likely ileus.  I discussed the case with General surgery, who will evaluate the patient.  No emergent surgical intervention indicated at this time and General surgery recommending admission to Hospital Medicine.  Patient received IV fluids for rehydration.  I discussed the case with Hospital Medicine, will admit for bowel rest, continued hydration, and serial exams.  Patient understands and agrees with the plan for admission.  Other:   I have discussed this case with another health care provider.           ED Course as of 11/11/22 1024   Fri Nov 11, 2022   0744 Leukocytes, UA: Negative [AB]   0744 NITRITE UA: Negative  No UTI  [AB]   0744 WBC: 9.28  No leukocytosis  [AB]   0744 Glucose(!): 329  Hyperglycemia  [AB]   0744 Ketones, UA(!): Trace [AB]   0744 Anion Gap: 12 [AB]      ED Course User Index  [AB] Pa Schroeder MD                 Clinical Impression:   Final diagnoses:  [R10.9] Abdominal pain  [K56.7] Ileus (Primary)  [R73.9] Hyperglycemia  [R82.4] Ketonuria  [E86.0] Dehydration      ED Disposition Condition    Admit Stable                Pa Schroeder MD  11/11/22 1025

## 2022-11-11 NOTE — PHARMACY MED REC
"  Admission Medication History     The home medication history was taken by Nawaf Hood.    You may go to "Admission" then "Reconcile Home Medications" tabs to review and/or act upon these items.     The home medication list has been updated by the Pharmacy department.   Please read ALL comments highlighted in yellow.   Please address this information as you see fit.    Feel free to contact us if you have any questions or require assistance.      Medications listed below were obtained from: Patient    Current Outpatient Medications on File Prior to Encounter   Medication Sig    acetaminophen (TYLENOL) 500 MG tablet   Take 1,000 mg by mouth 2 (two) times daily as needed for Pain.    ascorbic acid, vitamin C, (VITAMIN C) 1000 MG tablet   Take 1,000 mg by mouth once daily.    aspirin (ECOTRIN) 81 MG EC tablet   Take 81 mg by mouth every evening.    aspirin/sod bicarb/citric acid (GIOVANY-SELTZER ORAL)   Take 2 tablets by mouth daily as needed (Upset stomach).    atorvastatin (LIPITOR) 10 MG tablet   Take 1 tablet (10 mg total) by mouth every other day.    busPIRone (BUSPAR) 15 MG tablet   Take 1.5 tablets (22.5 mg total) by mouth 2 (two) times daily.    diazePAM (VALIUM) 5 MG tablet   Take 1 tablet (5 mg total) by mouth daily as needed for Anxiety (muscle spasms).    dicyclomine (BENTYL) 20 mg tablet Take 1 tablet (20 mg total) by mouth 3 (three) times daily as needed (abdominal pain).      dulaglutide (TRULICITY) 4.5 mg/0.5 mL pen injector   Inject 4.5 mg into the skin every 7 days.    DULoxetine (CYMBALTA) 60 MG capsule   Take 1 capsule (60 mg total) by mouth 2 (two) times daily.    fluticasone propionate (FLONASE) 50 mcg/actuation nasal spray   2 sprays by Each Nostril route daily as needed for Rhinitis.    gabapentin (NEURONTIN) 800 MG tablet   Take 1 tablet (800 mg total) by mouth 3 (three) times daily.    insulin aspart U-100 (NOVOLOG) 100 unit/mL (3 mL) InPn pen   Inject Between 50 to 55 units 3 times a day with " meals.      insulin detemir U-100 (LEVEMIR FLEXTOUCH U-100 INSULN) 100 unit/mL (3 mL) InPn pen   Inject 60 units subcutaneously increasing by 5 units every 3 days. Up to 100 units each day.    lanolin/mineral oil/petrolatum (ARTIFICIAL TEARS OPHT)   Place 2 drops into both eyes daily as needed (Dry eye).    metFORMIN   (GLUCOPHAGE-XR) 500 MG ER 24hr tablet   Take 2 tablets (1,000 mg total) by mouth daily with breakfast.    methylcellulose, laxative, (CITRUCEL) 500 mg Tab   Take 1 tablet by mouth nightly as needed (Constipation).    multivitamin capsule Take 1 capsule by mouth once daily.    NON FORMULARY MEDICATION   Uses Cpap Machine nightly on a setting of 10    ondansetron (ZOFRAN-ODT) 4 MG TbDL   Take 1 tablet (4 mg total) by mouth every 8 (eight) hours as needed (nausea).    oxyCODONE-acetaminophen (PERCOCET)  mg per tablet Take 1 tablet by mouth every 8 (eight) hours as needed for Pain.    pantoprazole (PROTONIX) 40 MG tablet   Take 1 tablet (40 mg total) by mouth once daily.    tiZANidine (ZANAFLEX) 4 MG tablet Take 4 mg by mouth 2 (two) times daily as needed (spasms).      valsartan-hydrochlorothiazide (DIOVAN-HCT) 320-12.5 mg per tablet   Take 1 tablet by mouth once daily.    vitamin D 1000 units Tab Take 5,000 Units by mouth 2 (two) times a day.      blood-glucose transmitter (DEXCOM G6   TRANSMITTER) Cayla   Use to check glucose - 90 day supply    celecoxib (CELEBREX) 200 MG capsule   Take 1 capsule (200 mg total) by mouth 2 (two) times daily.    diclofenac sodium 1.5 % Drop   Apply topically daily as needed (Back pain).    flash glucose scanning reader Misc   Disp one reader to monitor glucose- covered by insurance dexcom    flash glucose sensor Kit   Dispense sensor to read glucose  dexcom    hydrOXYzine HCL (ATARAX) 25 MG tablet   Take 2 tablets (50 mg total) by mouth nightly as needed (insomnia).    ONETOUCH DELICA PLUS LANCET 33 gauge Misc   Apply topically 3 (three) times daily.    ONETOUCH  "VERIO TEST STRIPS Strp   1 strip 3 (three) times daily.    pen needle, diabetic 31 gauge x 1/4" Ndle   Use with flex pen         Potential issues to be addressed PRIOR TO DISCHARGE  Drug cost interfering with therapy: (CELEBREX)    Nawaf Hood, [facility]  EXT [id number]                .        "

## 2022-11-11 NOTE — ASSESSMENT & PLAN NOTE
Patient is a 47y M w/ hx of abdominal pain, bloating, gas, diarrhea who presents today to the ED w/ severe abdominal bloating, gas pain. He is passing gas and having bowel movements, now feels much better after he has passed gas over the past few hours. He is being worked up as an outpatient with a gastroenterologist. He is having intermittent episodes of urgent diarrhea and constipation. No fevers, chills, nausea, intolerance to PO. No evidence of obstruction or ileus at this time. No indication for surgical intervention.     Recommend bowel regimen for his intermittent constipation.  F/u w/ GI as an outpatient for further work-up

## 2022-11-11 NOTE — ED NOTES
Pt is AA&O times 4 Resp full and reg Abd large,soft to touch, tender to touch Bowel sounds faintly audible to all 4 quad of abd on auscultation

## 2022-11-11 NOTE — ASSESSMENT & PLAN NOTE
Patient's FSGs are uncontrolled due to hyperglycemia on current medication regimen.  Last A1c reviewed-   Lab Results   Component Value Date    LABA1C 6.8 (H) 03/24/2017    HGBA1C 8.7 (H) 11/02/2022     Most recent fingerstick glucose reviewed- No results for input(s): POCTGLUCOSE in the last 24 hours.  Current correctional scale  Low  Adjust anti-hyperglycemic dose as follows-   Antihyperglycemics (From admission, onward)    Start     Stop Route Frequency Ordered    11/11/22 1200  insulin detemir U-100 pen 35 Units         -- SubQ 2 times daily 11/11/22 1109    11/11/22 1134  insulin aspart U-100 pen 1-10 Units         -- SubQ Every 6 hours PRN 11/11/22 1034        Hold metformin, trulicity while patient is in the hospital.

## 2022-11-11 NOTE — SUBJECTIVE & OBJECTIVE
No current facility-administered medications on file prior to encounter.     Current Outpatient Medications on File Prior to Encounter   Medication Sig    acetaminophen (TYLENOL) 500 MG tablet Take 1,000 mg by mouth daily as needed for Pain.    ascorbic acid, vitamin C, (VITAMIN C) 1000 MG tablet Take 1,000 mg by mouth once daily.    aspirin (ECOTRIN) 81 MG EC tablet Take 81 mg by mouth every evening.    atorvastatin (LIPITOR) 10 MG tablet Take 1 tablet (10 mg total) by mouth every other day.    blood-glucose transmitter (DEXCOM G6 TRANSMITTER) Cayla Use to check glucose - 90 day supply (Patient not taking: Reported on 11/8/2022)    busPIRone (BUSPAR) 15 MG tablet Take 1.5 tablets (22.5 mg total) by mouth 2 (two) times daily.    celecoxib (CELEBREX) 200 MG capsule Take 1 capsule (200 mg total) by mouth 2 (two) times daily.    diazePAM (VALIUM) 5 MG tablet Take 1 tablet (5 mg total) by mouth daily as needed for Anxiety (muscle spasms).    diclofenac sodium 1.5 % Drop Apply topically.    dicyclomine (BENTYL) 20 mg tablet Take 1 tablet (20 mg total) by mouth 3 (three) times daily as needed (abdominal pain).    dulaglutide (TRULICITY) 4.5 mg/0.5 mL pen injector Inject 4.5 mg into the skin every 7 days.    DULoxetine (CYMBALTA) 60 MG capsule Take 1 capsule (60 mg total) by mouth 2 (two) times daily.    flash glucose scanning reader Misc Disp one reader to monitor glucose- covered by insurance dexcom    flash glucose sensor Kit Dispense sensor to read glucose  dexcom    fluticasone propionate (FLONASE) 50 mcg/actuation nasal spray 2 sprays (100 mcg total) by Each Nostril route once daily.    gabapentin (NEURONTIN) 800 MG tablet Take 1 tablet (800 mg total) by mouth 3 (three) times daily.    hydrOXYzine HCL (ATARAX) 25 MG tablet Take 2 tablets (50 mg total) by mouth nightly as needed (insomnia).    insulin aspart U-100 (NOVOLOG) 100 unit/mL (3 mL) InPn pen Inject Between 50 to 70 units 3 times a day with meals.    insulin  "detemir U-100 (LEVEMIR FLEXTOUCH U-100 INSULN) 100 unit/mL (3 mL) InPn pen Inject up to 100 units each day    metFORMIN (GLUCOPHAGE-XR) 500 MG ER 24hr tablet Take 2 tablets (1,000 mg total) by mouth daily with breakfast.    multivitamin capsule Take 1 capsule by mouth once daily.    NON FORMULARY MEDICATION Uses Cpap Machine nightly on a setting of 10    ondansetron (ZOFRAN-ODT) 4 MG TbDL Take 1 tablet (4 mg total) by mouth every 8 (eight) hours as needed (nausea).    ONETOUCH DELICA PLUS LANCET 33 gauge Misc Apply topically 3 (three) times daily.    ONETOUCH VERIO TEST STRIPS Strp 1 strip 3 (three) times daily.    oxyCODONE-acetaminophen (PERCOCET)  mg per tablet Take 1 tablet by mouth every 8 (eight) hours as needed for Pain.    pantoprazole (PROTONIX) 40 MG tablet Take 1 tablet (40 mg total) by mouth once daily.    pen needle, diabetic 31 gauge x 1/4" Ndle Use with flex pen    tiZANidine (ZANAFLEX) 4 MG tablet Take 1 tablet (4 mg total) by mouth every 8 (eight) hours as needed (spasms).    valsartan-hydrochlorothiazide (DIOVAN-HCT) 320-12.5 mg per tablet Take 1 tablet by mouth once daily.    vitamin D 1000 units Tab Take 5,000 Units by mouth 2 (two) times a day.    [DISCONTINUED] fluvoxaMINE (LUVOX) 100 MG tablet Take 1.5 tablets (150 mg total) by mouth 2 (two) times daily.       Review of patient's allergies indicates:   Allergen Reactions    Dilaudid [hydromorphone] Other (See Comments)     IV dilaudid severely drops BP       Past Medical History:   Diagnosis Date    Allergy     Anxiety     Back pain     Chronic bilateral low back pain with bilateral sciatica 12/14/2021    CTS (carpal tunnel syndrome) 06/26/2015    CTS (carpal tunnel syndrome) 6/26/2015    Depression     Diabetes 04/17/2015    Diabetes mellitus with insulin therapy     Elevated sed rate 6/26/2015    GERD (gastroesophageal reflux disease) 10/19/2022    Hx of vasectomy     Hypertension     Morbid obesity 4/16/2015    OCD (obsessive " compulsive disorder)     Sleep apnea     Type 2 diabetes mellitus with hyperglycemia, with long-term current use of insulin 10/17/2022     Past Surgical History:   Procedure Laterality Date    ADENOIDECTOMY      COLONOSCOPY N/A 9/6/2022    Procedure: COLONOSCOPY;  Surgeon: Rossy Champion MD;  Location: [facility];  Service: Endoscopy;  Laterality: N/A;    EPIDURAL STEROID INJECTION Right 09/04/2019    Procedure: Injection, Steroid, Epidural Transforaminal;  Surgeon: Harjinder Batista Jr., MD;  Location: [facility] ENDO;  Service: Pain Management;  Laterality: Right;  Right L3 + L4 TF NATALIE    [id number]  [id number]    Arrive @ 1300; ASA last 8/27; Check BG; NEEDS CONSENT    ESOPHAGOGASTRODUODENOSCOPY N/A 10/31/2022    Procedure: EGD (ESOPHAGOGASTRODUODENOSCOPY);  Surgeon: Rossy Champion MD;  Location: [facility];  Service: Endoscopy;  Laterality: N/A;    FRACTURE SURGERY      bilateral elbow fracture 3 years ago    FUSION OF LUMBAR SPINE BY ANTERIOR APPROACH Left 04/26/2021    Procedure: FUSION, SPINE, LUMBAR, ANTERIOR APPROACH Left L3-4 L4-5 OLIF BRENDA, ALL Release, L5-S1 ALIF;  Surgeon: Jose L Brown MD;  Location: [facility] OR;  Service: Neurosurgery;  Laterality: Left;  Procedure: Left L3-4 L4-5 OLIF BRENDA, ALL Release, L5-S1 ALIF  Surgery Time: 2.5 Hrs  LOS: 2-3  Anesthesia: General  Blood: Type & Screen  Radiology: Josseline  Brace: LSO  Bed: Regular  Position: LAteral Righ    FUSION OF SACROILIAC JOINT Bilateral 04/13/2022    Procedure: FUSION, SACROILIAC JOINT Stg 2: Bilateral Open SI Joint fusion, Bedrock from Si Bone;  Surgeon: Jose L Brown MD;  Location: [facility] OR;  Service: Neurosurgery;  Laterality: Bilateral;    FUSION OF SPINE WITH INSTRUMENTATION N/A 04/26/2021    Procedure: FUSION, SPINE, WITH INSTRUMENTATION L3-S1 Posteior Segmental Instrumentation;  Surgeon: Jose L Brown MD;  Location: [facility] OR;  Service: Neurosurgery;  Laterality: N/A;  Procedure: L3-S1 Posteior Segmental Instrumentation  Sirgery Time: 2  Hrs  LOS: 2-3  Anesthesia: General  Blood: Tyoe & Screen  Radiology: Dual C arm  Brace:LSO  Bed: [place] [id number] Poster  Position: Prone  Equipment: Depuy     FUSION OF SPINE WITH INSTRUMENTATION N/A 04/13/2022    Procedure: FUSION, SPINE, WITH INSTRUMENTATION Stg 1: Revision of posterior L3-S1 hardware, Depuy. Sacro-pelvic fixation, screw augmentation PMMA. L4-S1 posterolateral arthrodesis. Open Gauge screws, 11-12mm;  Surgeon: Jose L Brown MD;  Location: [facility] OR;  Service: Neurosurgery;  Laterality: N/A;    HERNIA REPAIR      bilateral groin hernia    INJECTION OF STEROID Bilateral 12/13/2021    Procedure: INJECTION, STEROID Bilateral SI JOint Block and Steroid Injection;  Surgeon: Jose L Brown MD;  Location: [facility] OR;  Service: Neurosurgery;  Laterality: Bilateral;  Procedure: Bilateral SI JOint Block and Steroid Injection   Surgery Time: 30 min  LOS: 0  Anesthesia: MAC  Radiology: C-arm  Bed: Regular with pillows  Position: Prone    LUMBAR LAMINECTOMY Bilateral 11/29/2019    Procedure: LAMINECTOMY, SPINE, LUMBAR Rigth L3-4, Left L4-5 Laminectomy, medial Facetectomy, and microdiscectomy;  Surgeon: Jose L Brown MD;  Location: [facility] OR;  Service: Neurosurgery;  Laterality: Bilateral;  Procedure: Rigth L3-4, Left L4-5 Laminectomy, medial Facetectomy, and microdiscectomy  Surgery Time: 2.5 Hrs  LOS: 0-1  Anesthesia: General  Blood: Type & Screen  Radiology: C-arm  Micros    REMOVAL IMPLANT, POSTERIOR SEGMENT, INTRAOCULAR  04/13/2022    Procedure: REMOVAL IMPLANT, POSTERIOR SEGMENT, INTRAOCULAR;  Surgeon: Jose L Brown MD;  Location: [facility] OR;  Service: Neurosurgery;;    SPINE SURGERY      TONSILLECTOMY      VASECTOMY       Family History       Problem Relation (Age of Onset)    Cataracts Maternal Grandmother, Maternal Grandfather    Fibromyalgia Mother    Macular degeneration Maternal Grandfather    Osteoarthritis Mother          Tobacco Use    Smoking status: Former     Packs/day: 0.00     Years: 24.00      Pack years: 0.00     Types: Vaping with nicotine, Cigarettes     Start date: 1/1/1997    Smokeless tobacco: Never    Tobacco comments:     Handout provided for [employer] Smoking Cessaiton program   Substance and Sexual Activity    Alcohol use: Not Currently     Comment: 750 ml  once a month or so    Drug use: Never    Sexual activity: Yes     Partners: Female     Birth control/protection: None     Comment: ; one child      Review of Systems   Constitutional: Negative.  Negative for chills, fatigue and fever.   HENT: Negative.     Eyes: Negative.    Respiratory: Negative.     Cardiovascular: Negative.    Gastrointestinal:  Positive for abdominal distention, abdominal pain, diarrhea and nausea. Negative for anal bleeding, blood in stool, constipation and vomiting.   Endocrine: Negative.    Objective:     Vital Signs (Most Recent):  Temp: 98.2 °F (36.8 °C) (11/11/22 1116)  Pulse: 84 (11/11/22 1116)  Resp: 18 (11/11/22 1116)  BP: 120/74 (11/11/22 1116)  SpO2: 99 % (11/11/22 1116) Vital Signs (24h Range):  Temp:  [98.2 °F (36.8 °C)-98.4 °F (36.9 °C)] 98.2 °F (36.8 °C)  Pulse:  [82-88] 84  Resp:  [16-18] 18  SpO2:  [96 %-100 %] 99 %  BP: (120-139)/() 120/74     Weight: (!) 174.6 kg (385 lb)  Body mass index is 52.22 kg/m².    Physical Exam  Constitutional:       General: He is not in acute distress.     Appearance: Normal appearance. He is obese. He is not ill-appearing.   HENT:      Head: Normocephalic and atraumatic.      Mouth/Throat:      Mouth: Mucous membranes are moist.   Eyes:      Pupils: Pupils are equal, round, and reactive to light.   Cardiovascular:      Rate and Rhythm: Normal rate.   Pulmonary:      Effort: Pulmonary effort is normal. No respiratory distress.   Abdominal:      General: Abdomen is flat. There is no distension.      Palpations: Abdomen is soft. There is no mass.      Tenderness: There is no abdominal tenderness. There is no guarding or rebound.      Hernia: No hernia is present.    Musculoskeletal:         General: Normal range of motion.      Cervical back: Normal range of motion.   Skin:     General: Skin is warm and dry.   Neurological:      General: No focal deficit present.      Mental Status: He is alert and oriented to person, place, and time.   Psychiatric:         Mood and Affect: Mood normal.         Behavior: Behavior normal.     Significant Labs:  I have reviewed all pertinent lab results within the past 24 hours.    Significant Diagnostics:  I have reviewed all pertinent imaging results/findings within the past 24 hours.

## 2022-11-11 NOTE — HPI
"47-y M w/ hx of GERD, depression, DM type 2, hypertension, anxiety, back pain, sleep apnea, OCD, bilateral inguinal hernia repair who presented to the ED today complaining of abdominal pain. He has a hx of ongoing abdominal pain for about a year now. The pain today was severe bringing him to the ed. Consult to general surgery for ileus.    Patient reports he has been having GI issues for a while now, and is being followed by a gastroenterologist whom has performed both EGD and Cscope revealing no abnormalities. He says that when he eats he becomes severely bloated and starts passing "rotten smelling" gas in copious amounts and also burping. He says he is tolerating a diet and has no nausea. His pain now is improved because he has been passing "lots of gas" today. No nausea now. No hx of abdominal surgeries, two spinal surgeries in the past.      "

## 2022-11-11 NOTE — ASSESSMENT & PLAN NOTE
Body mass index is 52.22 kg/m². Morbid obesity complicates all aspects of disease management from diagnostic modalities to treatment. Weight loss encouraged and health benefits explained to patient.

## 2022-11-11 NOTE — ASSESSMENT & PLAN NOTE
47 M with 3 months progressively worsening abdominal discomfort, loss of appetite, increased belching/flatus who presents with one week acutely worsening symptoms. Patient established with GI outpt, colonoscopy and endoscopy unrevealing except to confirm lack of motility. CT A/P shows ileus. Differential includes ileus, IBD, gastroparesis; most likely IBD.    -gen surg consulted, no intervention  -NPO  -NS 100cc/hr  -reglan 5mg IV q6  -started insulin, will monitor sugars and adjust as needed

## 2022-11-11 NOTE — Clinical Note
Diagnosis: Ileus [[id number]]   Admitting Provider:: GAB FORDE [[id number]]   Future Attending Provider: HENRIQUE LEAHY [[id number]]   Reason for IP Medical Treatment  (Clinical interventions that can only be accomplished in the IP setting? ) :: ileus   Estimated Length of Stay:: 2 midnights   I certify that Inpatient services for greater than or equal to 2 midnights are medically necessary:: Yes   Plans for Post-Acute care--if anticipated (pick the single best option):: A. No post acute care anticipated at this time

## 2022-11-11 NOTE — CONSULTS
"[facility]  General Surgery  Consult Note    Patient Name: Kirill Gandhi III  MRN: [id number]  Code Status: Full Code  Admission Date: 11/11/2022  Hospital Length of Stay: 0 days  Attending Physician: Chilo Hood MD  Primary Care Provider: Marcos Ibarra MD    Patient information was obtained from patient and ER records.     Inpatient consult to General surgery  Consult performed by: Lisa Mota MD  Consult ordered by: Pa Schroeder MD        Subjective:     Principal Problem: Ileus    History of Present Illness: 47-y M w/ hx of GERD, depression, DM type 2, hypertension, anxiety, back pain, sleep apnea, OCD, bilateral inguinal hernia repair who presented to the ED today complaining of abdominal pain. He has a hx of ongoing abdominal pain for about a year now. The pain today was severe bringing him to the ed. Consult to general surgery for ileus.    Patient reports he has been having GI issues for a while now, and is being followed by a gastroenterologist whom has performed both EGD and Cscope revealing no abnormalities. He says that when he eats he becomes severely bloated and starts passing "rotten smelling" gas in copious amounts and also burping. He says he is tolerating a diet and has no nausea. His pain now is improved because he has been passing "lots of gas" today. No nausea now. No hx of abdominal surgeries, two spinal surgeries in the past.          No current facility-administered medications on file prior to encounter.     Current Outpatient Medications on File Prior to Encounter   Medication Sig    acetaminophen (TYLENOL) 500 MG tablet Take 1,000 mg by mouth daily as needed for Pain.    ascorbic acid, vitamin C, (VITAMIN C) 1000 MG tablet Take 1,000 mg by mouth once daily.    aspirin (ECOTRIN) 81 MG EC tablet Take 81 mg by mouth every evening.    atorvastatin (LIPITOR) 10 MG tablet Take 1 tablet (10 mg total) by mouth every other day.    blood-glucose " transmitter (DEXCOM G6 TRANSMITTER) Cayla Use to check glucose - 90 day supply (Patient not taking: Reported on 11/8/2022)    busPIRone (BUSPAR) 15 MG tablet Take 1.5 tablets (22.5 mg total) by mouth 2 (two) times daily.    celecoxib (CELEBREX) 200 MG capsule Take 1 capsule (200 mg total) by mouth 2 (two) times daily.    diazePAM (VALIUM) 5 MG tablet Take 1 tablet (5 mg total) by mouth daily as needed for Anxiety (muscle spasms).    diclofenac sodium 1.5 % Drop Apply topically.    dicyclomine (BENTYL) 20 mg tablet Take 1 tablet (20 mg total) by mouth 3 (three) times daily as needed (abdominal pain).    dulaglutide (TRULICITY) 4.5 mg/0.5 mL pen injector Inject 4.5 mg into the skin every 7 days.    DULoxetine (CYMBALTA) 60 MG capsule Take 1 capsule (60 mg total) by mouth 2 (two) times daily.    flash glucose scanning reader Misc Disp one reader to monitor glucose- covered by insurance dexcom    flash glucose sensor Kit Dispense sensor to read glucose  dexcom    fluticasone propionate (FLONASE) 50 mcg/actuation nasal spray 2 sprays (100 mcg total) by Each Nostril route once daily.    gabapentin (NEURONTIN) 800 MG tablet Take 1 tablet (800 mg total) by mouth 3 (three) times daily.    hydrOXYzine HCL (ATARAX) 25 MG tablet Take 2 tablets (50 mg total) by mouth nightly as needed (insomnia).    insulin aspart U-100 (NOVOLOG) 100 unit/mL (3 mL) InPn pen Inject Between 50 to 70 units 3 times a day with meals.    insulin detemir U-100 (LEVEMIR FLEXTOUCH U-100 INSULN) 100 unit/mL (3 mL) InPn pen Inject up to 100 units each day    metFORMIN (GLUCOPHAGE-XR) 500 MG ER 24hr tablet Take 2 tablets (1,000 mg total) by mouth daily with breakfast.    multivitamin capsule Take 1 capsule by mouth once daily.    NON FORMULARY MEDICATION Uses Cpap Machine nightly on a setting of 10    ondansetron (ZOFRAN-ODT) 4 MG TbDL Take 1 tablet (4 mg total) by mouth every 8 (eight) hours as needed (nausea).    ONETOUCH DELICA PLUS  "LANCET 33 gauge Misc Apply topically 3 (three) times daily.    ONETOUCH VERIO TEST STRIPS Strp 1 strip 3 (three) times daily.    oxyCODONE-acetaminophen (PERCOCET)  mg per tablet Take 1 tablet by mouth every 8 (eight) hours as needed for Pain.    pantoprazole (PROTONIX) 40 MG tablet Take 1 tablet (40 mg total) by mouth once daily.    pen needle, diabetic 31 gauge x 1/4" Ndle Use with flex pen    tiZANidine (ZANAFLEX) 4 MG tablet Take 1 tablet (4 mg total) by mouth every 8 (eight) hours as needed (spasms).    valsartan-hydrochlorothiazide (DIOVAN-HCT) 320-12.5 mg per tablet Take 1 tablet by mouth once daily.    vitamin D 1000 units Tab Take 5,000 Units by mouth 2 (two) times a day.    [DISCONTINUED] fluvoxaMINE (LUVOX) 100 MG tablet Take 1.5 tablets (150 mg total) by mouth 2 (two) times daily.       Review of patient's allergies indicates:   Allergen Reactions    Dilaudid [hydromorphone] Other (See Comments)     IV dilaudid severely drops BP       Past Medical History:   Diagnosis Date    Allergy     Anxiety     Back pain     Chronic bilateral low back pain with bilateral sciatica 12/14/2021    CTS (carpal tunnel syndrome) 06/26/2015    CTS (carpal tunnel syndrome) 6/26/2015    Depression     Diabetes 04/17/2015    Diabetes mellitus with insulin therapy     Elevated sed rate 6/26/2015    GERD (gastroesophageal reflux disease) 10/19/2022    Hx of vasectomy     Hypertension     Morbid obesity 4/16/2015    OCD (obsessive compulsive disorder)     Sleep apnea     Type 2 diabetes mellitus with hyperglycemia, with long-term current use of insulin 10/17/2022     Past Surgical History:   Procedure Laterality Date    ADENOIDECTOMY      COLONOSCOPY N/A 9/6/2022    Procedure: COLONOSCOPY;  Surgeon: Rossy Champion MD;  Location: [facility];  Service: Endoscopy;  Laterality: N/A;    EPIDURAL STEROID INJECTION Right 09/04/2019    Procedure: Injection, Steroid, Epidural Transforaminal;  " Surgeon: Harjinder Batista Jr., MD;  Location: [facility];  Service: Pain Management;  Laterality: Right;  Right L3 + L4 TF NATALIE    10175  [id number]    Arrive @ 1300; ASA last 8/27; Check BG; NEEDS CONSENT    ESOPHAGOGASTRODUODENOSCOPY N/A 10/31/2022    Procedure: EGD (ESOPHAGOGASTRODUODENOSCOPY);  Surgeon: Rossy Champion MD;  Location: [facility];  Service: Endoscopy;  Laterality: N/A;    FRACTURE SURGERY      bilateral elbow fracture 3 years ago    FUSION OF LUMBAR SPINE BY ANTERIOR APPROACH Left 04/26/2021    Procedure: FUSION, SPINE, LUMBAR, ANTERIOR APPROACH Left L3-4 L4-5 OLIF BRENDA, ALL Release, L5-S1 ALIF;  Surgeon: Jose L Brown MD;  Location: [facility];  Service: Neurosurgery;  Laterality: Left;  Procedure: Left L3-4 L4-5 OLIF BRENDA, ALL Release, L5-S1 ALIF  Surgery Time: 2.5 Hrs  LOS: 2-3  Anesthesia: General  Blood: Type & Screen  Radiology: Josseline  Brace: LSO  Bed: Regular  Position: LAteral [facility]    FUSION OF SACROILIAC JOINT Bilateral 04/13/2022    Procedure: FUSION, SACROILIAC JOINT Stg 2: Bilateral Open SI Joint fusion, Bedrock from Si Bone;  Surgeon: Jose L Brown MD;  Location: [facility];  Service: Neurosurgery;  Laterality: Bilateral;    FUSION OF SPINE WITH INSTRUMENTATION N/A 04/26/2021    Procedure: FUSION, SPINE, WITH INSTRUMENTATION L3-S1 Posteior Segmental Instrumentation;  Surgeon: Jose L Brown MD;  Location: [facility];  Service: Neurosurgery;  Laterality: N/A;  Procedure: L3-S1 Posteior Segmental Instrumentation  Sirgery Time: 2 Hrs  LOS: 2-3  Anesthesia: General  Blood: Tyoe & Screen  Radiology: Dual C arm  Brace:LSO  Bed: [place] [id number] Poster  Position: Prone  Equipment: Depuy     FUSION OF SPINE WITH INSTRUMENTATION N/A 04/13/2022    Procedure: FUSION, SPINE, WITH INSTRUMENTATION Stg 1: Revision of posterior L3-S1 hardware, Depuy. Sacro-pelvic fixation, screw augmentation PMMA. L4-S1 posterolateral arthrodesis. Open Gauge screws, 11-12mm;  Surgeon: Jose L Brown MD;  [facility]  OR;  Service: Neurosurgery;  Laterality: N/A;    HERNIA REPAIR      bilateral groin hernia    INJECTION OF STEROID Bilateral 12/13/2021    Procedure: INJECTION, STEROID Bilateral SI JOint Block and Steroid Injection;  Surgeon: Jose L Brown MD;  Location: [facility] OR;  Service: Neurosurgery;  Laterality: Bilateral;  Procedure: Bilateral SI JOint Block and Steroid Injection   Surgery Time: 30 min  LOS: 0  Anesthesia: MAC  Radiology: C-arm  Bed: Regular with pillows  Position: Prone    LUMBAR LAMINECTOMY Bilateral 11/29/2019    Procedure: LAMINECTOMY, SPINE, LUMBAR Rigth L3-4, Left L4-5 Laminectomy, medial Facetectomy, and microdiscectomy;  Surgeon: Jose L Brown MD;  Location: [facility] OR;  Service: Neurosurgery;  Laterality: Bilateral;  Procedure: Rigth L3-4, Left L4-5 Laminectomy, medial Facetectomy, and microdiscectomy  Surgery Time: 2.5 Hrs  LOS: 0-1  Anesthesia: General  Blood: Type & Screen  Radiology: C-arm  Micros    REMOVAL IMPLANT, POSTERIOR SEGMENT, INTRAOCULAR  04/13/2022    Procedure: REMOVAL IMPLANT, POSTERIOR SEGMENT, INTRAOCULAR;  Surgeon: Jose L Brown MD;  Location: [facility] OR;  Service: Neurosurgery;;    SPINE SURGERY      TONSILLECTOMY      VASECTOMY       Family History       Problem Relation (Age of Onset)    Cataracts Maternal Grandmother, Maternal Grandfather    Fibromyalgia Mother    Macular degeneration Maternal Grandfather    Osteoarthritis Mother          Tobacco Use    Smoking status: Former     Packs/day: 0.00     Years: 24.00     Pack years: 0.00     Types: Vaping with nicotine, Cigarettes     Start date: 1/1/1997    Smokeless tobacco: Never    Tobacco comments:     Handout provided for [employer] Smoking Cessaiton program   Substance and Sexual Activity    Alcohol use: Not Currently     Comment: 750 ml  once a month or so    Drug use: Never    Sexual activity: Yes     Partners: Female     Birth control/protection: None     Comment: ; one child      Review of Systems    Constitutional: Negative.  Negative for chills, fatigue and fever.   HENT: Negative.     Eyes: Negative.    Respiratory: Negative.     Cardiovascular: Negative.    Gastrointestinal:  Positive for abdominal distention, abdominal pain, diarrhea and nausea. Negative for anal bleeding, blood in stool, constipation and vomiting.   Endocrine: Negative.    Objective:     Vital Signs (Most Recent):  Temp: 98.2 °F (36.8 °C) (11/11/22 1116)  Pulse: 84 (11/11/22 1116)  Resp: 18 (11/11/22 1116)  BP: 120/74 (11/11/22 1116)  SpO2: 99 % (11/11/22 1116) Vital Signs (24h Range):  Temp:  [98.2 °F (36.8 °C)-98.4 °F (36.9 °C)] 98.2 °F (36.8 °C)  Pulse:  [82-88] 84  Resp:  [16-18] 18  SpO2:  [96 %-100 %] 99 %  BP: (120-139)/() 120/74     Weight: (!) 174.6 kg (385 lb)  Body mass index is 52.22 kg/m².    Physical Exam  Constitutional:       General: He is not in acute distress.     Appearance: Normal appearance. He is obese. He is not ill-appearing.   HENT:      Head: Normocephalic and atraumatic.      Mouth/Throat:      Mouth: Mucous membranes are moist.   Eyes:      Pupils: Pupils are equal, round, and reactive to light.   Cardiovascular:      Rate and Rhythm: Normal rate.   Pulmonary:      Effort: Pulmonary effort is normal. No respiratory distress.   Abdominal:      General: Abdomen is flat. There is no distension.      Palpations: Abdomen is soft. There is no mass.      Tenderness: There is no abdominal tenderness. There is no guarding or rebound.      Hernia: No hernia is present.   Musculoskeletal:         General: Normal range of motion.      Cervical back: Normal range of motion.   Skin:     General: Skin is warm and dry.   Neurological:      General: No focal deficit present.      Mental Status: He is alert and oriented to person, place, and time.   Psychiatric:         Mood and Affect: Mood normal.         Behavior: Behavior normal.     Significant Labs:  I have reviewed all pertinent lab results within the past 24  hours.    Significant Diagnostics:  I have reviewed all pertinent imaging results/findings within the past 24 hours.      Assessment/Plan:     * Ileus  Patient is a 47y M w/ hx of abdominal pain, bloating, gas, diarrhea who presents today to the ED w/ severe abdominal bloating, gas pain. He is passing gas and having bowel movements, now feels much better after he has passed gas over the past few hours. He is being worked up as an outpatient with a gastroenterologist. He is having intermittent episodes of urgent diarrhea and constipation. No fevers, chills, nausea, intolerance to PO. No evidence of obstruction or ileus at this time. No indication for surgical intervention.     Recommend bowel regimen for his intermittent constipation.  F/u w/ GI as an outpatient for further work-up      VTE Risk Mitigation (From admission, onward)         Ordered     IP VTE HIGH RISK PATIENT  Once         11/11/22 0901     Place sequential compression device  Until discontinued         11/11/22 0901     Place sequential compression device  Until discontinued         11/11/22 0858                Thank you for your consult. I will sign off. Please contact us if you have any additional questions.    Lisa Mota MD  General Surgery  [facility]

## 2022-11-11 NOTE — H&P
[facility] - Emergency Dept  [facility] Medicine  History & Physical    Patient Name: Kirill Gandhi III  MRN: [id number]  Patient Class: IP- Inpatient  Admission Date: 11/11/2022  Attending Physician: Chilo Hood MD   Primary Care Provider: Marcos Ibarra MD         Patient information was obtained from patient, past medical records and ER records.     Subjective:     Principal Problem:Ileus    Chief Complaint:   Chief Complaint   Patient presents with    Abdominal Pain     Pt c/o abdominal/gas pain x1 week. -nausea/vomiting at this time.         HPI: Mr. Gandhi is a 46 yo M with PMH DM2, HTN, OCD/anxiety who presents to the ED with 1 week of acutely worsening abdominal pain and gas. He states that since August, he has had intermittent abdominal pain that is relieved by belching or passing flatus. He reports a foul, rotten-eggs smell to his belches, and similarly foul-smelling flatus. These episodes of pain and gas have been progressing and now last as long as one week. He reports associated nausea but no vomiting. He also has intermittent diarrhea and constipation, sometimes within the same day. He states that sometimes eating provides temporary relief of his discomfort, but he often finds that he lacks an appetite. He has established outpatient with a GI doctor and had a colonoscopy and endoscopy since September with no conclusive findings.     The patient has had 2 back surgeries, most recently in April 2022, for L4-S1 and SI joint fusion. He is able to walk and perform ADLs independently but states walking longer distances is difficult due to back pain. He reports that he does not exercise often at all at home.    In the ED, patient is afebrile, VSS. Abdomen is soft and non-tender. Labs notable for no leukocytosis, sodium of 133 (corrects to 136), and glucose of 329. UA with 4+ glucose and trace ketones. CT A/P shows ileus, hepatic steatosis, and cholelithiasis. Patient received 1L NS in ED, made  NPO. Admitted to medicine for further management.       Past Medical History:   Diagnosis Date    Allergy     Anxiety     Back pain     Chronic bilateral low back pain with bilateral sciatica 12/14/2021    CTS (carpal tunnel syndrome) 06/26/2015    CTS (carpal tunnel syndrome) 6/26/2015    Depression     Diabetes 04/17/2015    Diabetes mellitus with insulin therapy     Elevated sed rate 6/26/2015    GERD (gastroesophageal reflux disease) 10/19/2022    Hx of vasectomy     Hypertension     Morbid obesity 4/16/2015    OCD (obsessive compulsive disorder)     Sleep apnea     Type 2 diabetes mellitus with hyperglycemia, with long-term current use of insulin 10/17/2022       Past Surgical History:   Procedure Laterality Date    ADENOIDECTOMY      COLONOSCOPY N/A 9/6/2022    Procedure: COLONOSCOPY;  Surgeon: Rossy Champion MD;  [facility];  Service: Endoscopy;  Laterality: N/A;    EPIDURAL STEROID INJECTION Right 09/04/2019    Procedure: Injection, Steroid, Epidural Transforaminal;  Surgeon: Harjinder Batista Jr., MD;  Location: [facility] ENDO;  Service: Pain Management;  Laterality: Right;  Right L3 + L4 TF NATALIE    26605  22781    Arrive @ 1300; ASA last 8/27; Check BG; NEEDS CONSENT    ESOPHAGOGASTRODUODENOSCOPY N/A 10/31/2022    Procedure: EGD (ESOPHAGOGASTRODUODENOSCOPY);  Surgeon: Rossy Champion MD;  Location: [facility];  Service: Endoscopy;  Laterality: N/A;    FRACTURE SURGERY      bilateral elbow fracture 3 years ago    FUSION OF LUMBAR SPINE BY ANTERIOR APPROACH Left 04/26/2021    Procedure: FUSION, SPINE, LUMBAR, ANTERIOR APPROACH Left L3-4 L4-5 OLIF BRENDA, ALL Release, L5-S1 ALIF;  Surgeon: Jose L Brown MD;  Location: [facility] OR;  Service: Neurosurgery;  Laterality: Left;  Procedure: Left L3-4 L4-5 OLIF BRENDA, ALL Release, L5-S1 ALIF  Surgery Time: 2.5 Hrs  LOS: 2-3  Anesthesia: General  Blood: Type & Screen  Radiology: Josseline  Brace: LSO  Bed: Regular  Position: LAteral Righ     FUSION OF SACROILIAC JOINT Bilateral 04/13/2022    Procedure: FUSION, SACROILIAC JOINT Stg 2: Bilateral Open SI Joint fusion, Bedrock from Si Bone;  Surgeon: Jose L Brown MD;  Location: [facility] OR;  Service: Neurosurgery;  Laterality: Bilateral;    FUSION OF SPINE WITH INSTRUMENTATION N/A 04/26/2021    Procedure: FUSION, SPINE, WITH INSTRUMENTATION L3-S1 Posteior Segmental Instrumentation;  Surgeon: Jose L Brown MD;  Location: [facility] OR;  Service: Neurosurgery;  Laterality: N/A;  Procedure: L3-S1 Posteior Segmental Instrumentation  Sirgery Time: 2 Hrs  LOS: 2-3  Anesthesia: General  Blood: Tyoe & Screen  Radiology: Dual C arm  Brace:LSO  Bed: [address]  Position: Prone  Equipment: Depuy     FUSION OF SPINE WITH INSTRUMENTATION N/A 04/13/2022    Procedure: FUSION, SPINE, WITH INSTRUMENTATION Stg 1: Revision of posterior L3-S1 hardware, Depuy. Sacro-pelvic fixation, screw augmentation PMMA. L4-S1 posterolateral arthrodesis. Open Gauge screws, 11-12mm;  Surgeon: Jose L Brown MD;  Location: [facility] OR;  Service: Neurosurgery;  Laterality: N/A;    HERNIA REPAIR      bilateral groin hernia    INJECTION OF STEROID Bilateral 12/13/2021    Procedure: INJECTION, STEROID Bilateral SI JOint Block and Steroid Injection;  Surgeon: Jose L Brown MD;  Location: [facility] OR;  Service: Neurosurgery;  Laterality: Bilateral;  Procedure: Bilateral SI JOint Block and Steroid Injection   Surgery Time: 30 min  LOS: 0  Anesthesia: MAC  Radiology: C-arm  Bed: Regular with pillows  Position: Prone    LUMBAR LAMINECTOMY Bilateral 11/29/2019    Procedure: LAMINECTOMY, SPINE, LUMBAR Rigth L3-4, Left L4-5 Laminectomy, medial Facetectomy, and microdiscectomy;  Surgeon: Jose L Brown MD;  Location: [facility] OR;  Service: Neurosurgery;  Laterality: Bilateral;  Procedure: Rigth L3-4, Left L4-5 Laminectomy, medial Facetectomy, and microdiscectomy  Surgery Time: 2.5 Hrs  LOS: 0-1  Anesthesia: General  Blood: Type & Screen  Radiology:  C-arm  Micros    REMOVAL IMPLANT, POSTERIOR SEGMENT, INTRAOCULAR  04/13/2022    Procedure: REMOVAL IMPLANT, POSTERIOR SEGMENT, INTRAOCULAR;  Surgeon: Jose L Brown MD;  Location: [facility];  Service: Neurosurgery;;    SPINE SURGERY      TONSILLECTOMY      VASECTOMY         Review of patient's allergies indicates:   Allergen Reactions    Dilaudid [hydromorphone] Other (See Comments)     IV dilaudid severely drops BP       No current facility-administered medications on file prior to encounter.     Current Outpatient Medications on File Prior to Encounter   Medication Sig    acetaminophen (TYLENOL) 500 MG tablet Take 1,000 mg by mouth daily as needed for Pain.    ascorbic acid, vitamin C, (VITAMIN C) 1000 MG tablet Take 1,000 mg by mouth once daily.    aspirin (ECOTRIN) 81 MG EC tablet Take 81 mg by mouth every evening.    atorvastatin (LIPITOR) 10 MG tablet Take 1 tablet (10 mg total) by mouth every other day.    blood-glucose transmitter (DEXCOM G6 TRANSMITTER) Cayla Use to check glucose - 90 day supply (Patient not taking: Reported on 11/8/2022)    busPIRone (BUSPAR) 15 MG tablet Take 1.5 tablets (22.5 mg total) by mouth 2 (two) times daily.    celecoxib (CELEBREX) 200 MG capsule Take 1 capsule (200 mg total) by mouth 2 (two) times daily.    diazePAM (VALIUM) 5 MG tablet Take 1 tablet (5 mg total) by mouth daily as needed for Anxiety (muscle spasms).    diclofenac sodium 1.5 % Drop Apply topically.    dicyclomine (BENTYL) 20 mg tablet Take 1 tablet (20 mg total) by mouth 3 (three) times daily as needed (abdominal pain).    dulaglutide (TRULICITY) 4.5 mg/0.5 mL pen injector Inject 4.5 mg into the skin every 7 days.    DULoxetine (CYMBALTA) 60 MG capsule Take 1 capsule (60 mg total) by mouth 2 (two) times daily.    flash glucose scanning reader Misc Disp one reader to monitor glucose- covered by insurance dexcom    flash glucose sensor Kit Dispense sensor to read glucose  dexcom    fluticasone  "propionate (FLONASE) 50 mcg/actuation nasal spray 2 sprays (100 mcg total) by Each Nostril route once daily.    gabapentin (NEURONTIN) 800 MG tablet Take 1 tablet (800 mg total) by mouth 3 (three) times daily.    hydrOXYzine HCL (ATARAX) 25 MG tablet Take 2 tablets (50 mg total) by mouth nightly as needed (insomnia).    insulin aspart U-100 (NOVOLOG) 100 unit/mL (3 mL) InPn pen Inject Between 50 to 70 units 3 times a day with meals.    insulin detemir U-100 (LEVEMIR FLEXTOUCH U-100 INSULN) 100 unit/mL (3 mL) InPn pen Inject up to 100 units each day    metFORMIN (GLUCOPHAGE-XR) 500 MG ER 24hr tablet Take 2 tablets (1,000 mg total) by mouth daily with breakfast.    multivitamin capsule Take 1 capsule by mouth once daily.    NON FORMULARY MEDICATION Uses Cpap Machine nightly on a setting of 10    ondansetron (ZOFRAN-ODT) 4 MG TbDL Take 1 tablet (4 mg total) by mouth every 8 (eight) hours as needed (nausea).    ONETOUCH DELICA PLUS LANCET 33 gauge Misc Apply topically 3 (three) times daily.    ONETOUCH VERIO TEST STRIPS Strp 1 strip 3 (three) times daily.    oxyCODONE-acetaminophen (PERCOCET)  mg per tablet Take 1 tablet by mouth every 8 (eight) hours as needed for Pain.    pantoprazole (PROTONIX) 40 MG tablet Take 1 tablet (40 mg total) by mouth once daily.    pen needle, diabetic 31 gauge x 1/4" Ndle Use with flex pen    tiZANidine (ZANAFLEX) 4 MG tablet Take 1 tablet (4 mg total) by mouth every 8 (eight) hours as needed (spasms).    valsartan-hydrochlorothiazide (DIOVAN-HCT) 320-12.5 mg per tablet Take 1 tablet by mouth once daily.    vitamin D 1000 units Tab Take 5,000 Units by mouth 2 (two) times a day.    [DISCONTINUED] fluvoxaMINE (LUVOX) 100 MG tablet Take 1.5 tablets (150 mg total) by mouth 2 (two) times daily.     Family History       Problem Relation (Age of Onset)    Cataracts Maternal Grandmother, Maternal Grandfather    Fibromyalgia Mother    Macular degeneration Maternal Grandfather "    Osteoarthritis Mother          Tobacco Use    Smoking status: Former     Packs/day: 0.00     Years: 24.00     Pack years: 0.00     Types: Vaping with nicotine, Cigarettes     Start date: 1/1/1997    Smokeless tobacco: Never    Tobacco comments:     Handout provided for [employer] Smoking Cessaiton program   Substance and Sexual Activity    Alcohol use: Not Currently     Comment: 750 ml  once a month or so    Drug use: Never    Sexual activity: Yes     Partners: Female     Birth control/protection: None     Comment: ; one child      Review of Systems   Constitutional:  Positive for appetite change. Negative for chills and fever.   HENT:  Negative for rhinorrhea and sore throat.    Eyes:  Negative for visual disturbance.   Respiratory:  Negative for cough and shortness of breath.    Cardiovascular:  Negative for chest pain and leg swelling.   Gastrointestinal:  Positive for abdominal pain, constipation, diarrhea and nausea. Negative for abdominal distention and vomiting.   Genitourinary:  Negative for dysuria.   Musculoskeletal:  Negative for arthralgias and myalgias.   Skin:  Negative for rash.   Neurological:  Negative for light-headedness and headaches.   Psychiatric/Behavioral:  Negative for agitation and confusion.    Objective:     Vital Signs (Most Recent):  Temp: 98.4 °F (36.9 °C) (11/11/22 0837)  Pulse: 84 (11/11/22 0837)  Resp: 16 (11/11/22 0837)  BP: 129/66 (11/11/22 0837)  SpO2: 96 % (11/11/22 0837) Vital Signs (24h Range):  Temp:  [98.3 °F (36.8 °C)-98.4 °F (36.9 °C)] 98.4 °F (36.9 °C)  Pulse:  [84-88] 84  Resp:  [16] 16  SpO2:  [96 %-100 %] 96 %  BP: (128-139)/() 129/66     Weight: (!) 174.6 kg (385 lb)  Body mass index is 52.22 kg/m².    Physical Exam  Constitutional:       General: He is not in acute distress.     Appearance: He is obese.   HENT:      Head: Normocephalic and atraumatic.   Eyes:      General: No scleral icterus.     Extraocular Movements: Extraocular movements  intact.      Conjunctiva/sclera: Conjunctivae normal.   Cardiovascular:      Rate and Rhythm: Normal rate and regular rhythm.      Heart sounds: Normal heart sounds.   Pulmonary:      Effort: Pulmonary effort is normal.      Breath sounds: Normal breath sounds. No wheezing or rales.   Abdominal:      General: Abdomen is flat. There is no distension.      Palpations: Abdomen is soft.      Tenderness: There is no abdominal tenderness. There is no guarding.      Comments: Hyperactive BS   Musculoskeletal:         General: No swelling. Normal range of motion.      Cervical back: Normal range of motion.   Skin:     General: Skin is warm and dry.   Neurological:      General: No focal deficit present.      Mental Status: He is alert and oriented to person, place, and time.   Psychiatric:         Mood and Affect: Mood normal.         Behavior: Behavior normal.           Significant Labs: All pertinent labs within the past 24 hours have been reviewed.  CBC:   Recent Labs   Lab 11/11/22  0618   WBC 9.28   HGB 13.0*   HCT 40.2        CMP:   Recent Labs   Lab 11/11/22 0618   *   K 3.9   CL 99   CO2 22*   *   BUN 10   CREATININE 1.0   CALCIUM 9.5   PROT 7.9   ALBUMIN 3.5   BILITOT 0.4   ALKPHOS 102   AST 23   ALT 32   ANIONGAP 12       Significant Imaging: I have reviewed all pertinent imaging results/findings within the past 24 hours.    Assessment/Plan:     * Ileus  47 M with 3 months progressively worsening abdominal discomfort, loss of appetite, increased belching/flatus who presents with one week acutely worsening symptoms. Patient established with GI outpt, colonoscopy and endoscopy unrevealing except to confirm lack of motility. CT A/P shows ileus. Differential includes ileus, IBD, gastroparesis; most likely IBD.    -gen surg consulted, no intervention  -NPO  -NS 100cc/hr  -reglan 5mg IV q6  -started insulin, will monitor sugars and adjust as needed        Type 2 diabetes mellitus with hyperglycemia,  with long-term current use of insulin  Patient's FSGs are uncontrolled due to hyperglycemia on current medication regimen.  Last A1c reviewed-   Lab Results   Component Value Date    LABA1C 6.8 (H) 03/24/2017    HGBA1C 8.7 (H) 11/02/2022     Most recent fingerstick glucose reviewed- No results for input(s): POCTGLUCOSE in the last 24 hours.  Current correctional scale  Low  Adjust anti-hyperglycemic dose as follows-   Antihyperglycemics (From admission, onward)    Start     Stop Route Frequency Ordered    11/11/22 1200  insulin detemir U-100 pen 35 Units         -- SubQ 2 times daily 11/11/22 1109    11/11/22 1134  insulin aspart U-100 pen 1-10 Units         -- SubQ Every 6 hours PRN 11/11/22 1034        Hold metformin, trulicity while patient is in the hospital.    Dehydration with hyponatremia  S/p 1L NS in ED, continuous NS while NPO      OCD (obsessive compulsive disorder)  Continue duloxetine, buspirone      Adjustment disorder with mixed anxiety and depressed mood  Continue duloxetine, buspirone      Metabolic acidemia        Hyponatremia        Gastroesophageal reflux disease without esophagitis        Benign essential hypertension  Valsartan in place of diovan while inpatient      Chronic bilateral low back pain with bilateral sciatica        Sacroiliac joint dysfunction of both sides        Fusion of spine, lumbosacral region  Pt with some residual pain following surgery 04/2022    -PRN tizanidine      Mild episode of recurrent major depressive disorder  Continue home duloxetine, buspirone        Mixed obsessional thoughts and acts        VICENTE (obstructive sleep apnea)        Vitamin D deficiency        Morbid obesity  Body mass index is 52.22 kg/m². Morbid obesity complicates all aspects of disease management from diagnostic modalities to treatment. Weight loss encouraged and health benefits explained to patient.           VTE Risk Mitigation (From admission, onward)         Ordered     IP VTE HIGH RISK  PATIENT  Once         11/11/22 0901     Place sequential compression device  Until discontinued         11/11/22 0901     Place sequential compression device  Until discontinued         11/11/22 0858                   CLIFFORD RIVERA MD  Department of Hospital Medicine   [facility] - Emergency Dept

## 2022-11-11 NOTE — TELEPHONE ENCOUNTER
PA for celebrex submitted via Cover My Meds.     RAFAEL GELLER Key: [id number] - PA Case ID: [id number] help? Call us at [phone]  Status  [facility] to [facility]  Drug  CeleBREX 200MG capsules  Form  [facility] Electronic Prior Authorization Form ([employer]) 2017 [employer]

## 2022-11-12 VITALS
BODY MASS INDEX: 42.66 KG/M2 | OXYGEN SATURATION: 95 % | WEIGHT: 315 LBS | RESPIRATION RATE: 18 BRPM | SYSTOLIC BLOOD PRESSURE: 115 MMHG | HEIGHT: 72 IN | HEART RATE: 83 BPM | DIASTOLIC BLOOD PRESSURE: 63 MMHG | TEMPERATURE: 98 F

## 2022-11-12 LAB
ALBUMIN SERPL BCP-MCNC: 3 G/DL (ref 3.5–5.2)
ALP SERPL-CCNC: 83 U/L (ref 55–135)
ALT SERPL W/O P-5'-P-CCNC: 26 U/L (ref 10–44)
ANION GAP SERPL CALC-SCNC: 8 MMOL/L (ref 8–16)
AST SERPL-CCNC: 22 U/L (ref 10–40)
BASOPHILS # BLD AUTO: 0.03 K/UL (ref 0–0.2)
BASOPHILS NFR BLD: 0.4 % (ref 0–1.9)
BILIRUB SERPL-MCNC: 0.4 MG/DL (ref 0.1–1)
BUN SERPL-MCNC: 6 MG/DL (ref 6–20)
CALCIUM SERPL-MCNC: 8.9 MG/DL (ref 8.7–10.5)
CHLORIDE SERPL-SCNC: 105 MMOL/L (ref 95–110)
CO2 SERPL-SCNC: 24 MMOL/L (ref 23–29)
CREAT SERPL-MCNC: 0.8 MG/DL (ref 0.5–1.4)
DIFFERENTIAL METHOD: ABNORMAL
EOSINOPHIL # BLD AUTO: 0.4 K/UL (ref 0–0.5)
EOSINOPHIL NFR BLD: 4.6 % (ref 0–8)
ERYTHROCYTE [DISTWIDTH] IN BLOOD BY AUTOMATED COUNT: 16.7 % (ref 11.5–14.5)
EST. GFR  (NO RACE VARIABLE): >60 ML/MIN/1.73 M^2
GLUCOSE SERPL-MCNC: 201 MG/DL (ref 70–110)
HCT VFR BLD AUTO: 36 % (ref 40–54)
HGB BLD-MCNC: 11.4 G/DL (ref 14–18)
IMM GRANULOCYTES # BLD AUTO: 0.03 K/UL (ref 0–0.04)
IMM GRANULOCYTES NFR BLD AUTO: 0.4 % (ref 0–0.5)
LYMPHOCYTES # BLD AUTO: 2 K/UL (ref 1–4.8)
LYMPHOCYTES NFR BLD: 24.8 % (ref 18–48)
MAGNESIUM SERPL-MCNC: 1.6 MG/DL (ref 1.6–2.6)
MCH RBC QN AUTO: 27.7 PG (ref 27–31)
MCHC RBC AUTO-ENTMCNC: 31.7 G/DL (ref 32–36)
MCV RBC AUTO: 88 FL (ref 82–98)
MONOCYTES # BLD AUTO: 0.7 K/UL (ref 0.3–1)
MONOCYTES NFR BLD: 9.3 % (ref 4–15)
NEUTROPHILS # BLD AUTO: 4.8 K/UL (ref 1.8–7.7)
NEUTROPHILS NFR BLD: 60.5 % (ref 38–73)
NRBC BLD-RTO: 0 /100 WBC
PHOSPHATE SERPL-MCNC: 3.9 MG/DL (ref 2.7–4.5)
PLATELET # BLD AUTO: 279 K/UL (ref 150–450)
PMV BLD AUTO: 10.1 FL (ref 9.2–12.9)
POCT GLUCOSE: 201 MG/DL (ref 70–110)
POTASSIUM SERPL-SCNC: 4.2 MMOL/L (ref 3.5–5.1)
PROT SERPL-MCNC: 6.6 G/DL (ref 6–8.4)
RBC # BLD AUTO: 4.11 M/UL (ref 4.6–6.2)
SODIUM SERPL-SCNC: 137 MMOL/L (ref 136–145)
WBC # BLD AUTO: 7.99 K/UL (ref 3.9–12.7)

## 2022-11-12 PROCEDURE — 84100 ASSAY OF PHOSPHORUS: CPT

## 2022-11-12 PROCEDURE — 83735 ASSAY OF MAGNESIUM: CPT

## 2022-11-12 PROCEDURE — 97165 OT EVAL LOW COMPLEX 30 MIN: CPT

## 2022-11-12 PROCEDURE — 99225 PR SUBSEQUENT OBSERVATION CARE,LEVEL II: ICD-10-PCS | Mod: ,,, | Performed by: INTERNAL MEDICINE

## 2022-11-12 PROCEDURE — 63600175 PHARM REV CODE 636 W HCPCS

## 2022-11-12 PROCEDURE — 94761 N-INVAS EAR/PLS OXIMETRY MLT: CPT

## 2022-11-12 PROCEDURE — 97161 PT EVAL LOW COMPLEX 20 MIN: CPT

## 2022-11-12 PROCEDURE — 85025 COMPLETE CBC W/AUTO DIFF WBC: CPT

## 2022-11-12 PROCEDURE — 25000003 PHARM REV CODE 250

## 2022-11-12 PROCEDURE — 27000221 HC OXYGEN, UP TO 24 HOURS

## 2022-11-12 PROCEDURE — 96361 HYDRATE IV INFUSION ADD-ON: CPT

## 2022-11-12 PROCEDURE — G0378 HOSPITAL OBSERVATION PER HR: HCPCS

## 2022-11-12 PROCEDURE — 80053 COMPREHEN METABOLIC PANEL: CPT

## 2022-11-12 PROCEDURE — 99900035 HC TECH TIME PER 15 MIN (STAT)

## 2022-11-12 PROCEDURE — 27000190 HC CPAP FULL FACE MASK W/VALVE

## 2022-11-12 PROCEDURE — 99225 PR SUBSEQUENT OBSERVATION CARE,LEVEL II: CPT | Mod: ,,, | Performed by: INTERNAL MEDICINE

## 2022-11-12 PROCEDURE — 36415 COLL VENOUS BLD VENIPUNCTURE: CPT

## 2022-11-12 PROCEDURE — 94660 CPAP INITIATION&MGMT: CPT

## 2022-11-12 RX ORDER — METOCLOPRAMIDE 5 MG/1
5 TABLET ORAL 4 TIMES DAILY
Qty: 16 TABLET | Refills: 0 | Status: SHIPPED | OUTPATIENT
Start: 2022-11-12 | End: 2022-11-12 | Stop reason: SDUPTHER

## 2022-11-12 RX ORDER — METOCLOPRAMIDE 5 MG/1
10 TABLET ORAL EVERY 6 HOURS PRN
Qty: 32 TABLET | Refills: 0 | Status: SHIPPED | OUTPATIENT
Start: 2022-11-12 | End: 2022-11-15

## 2022-11-12 RX ORDER — INSULIN DETEMIR 100 [IU]/ML
INJECTION, SOLUTION SUBCUTANEOUS
Qty: 90 ML | Refills: 1 | Status: SHIPPED | OUTPATIENT
Start: 2022-11-12 | End: 2022-11-12 | Stop reason: SDUPTHER

## 2022-11-12 RX ORDER — INSULIN DETEMIR 100 [IU]/ML
INJECTION, SOLUTION SUBCUTANEOUS
Qty: 90 ML | Refills: 1 | Status: SHIPPED | OUTPATIENT
Start: 2022-11-12 | End: 2023-04-21

## 2022-11-12 RX ORDER — HYOSCYAMINE SULFATE 0.38 MG/1
0.38 TABLET, EXTENDED RELEASE ORAL EVERY 8 HOURS
Qty: 12 TABLET | Refills: 0 | Status: SHIPPED | OUTPATIENT
Start: 2022-11-12 | End: 2022-11-12 | Stop reason: HOSPADM

## 2022-11-12 RX ADMIN — METOCLOPRAMIDE 5 MG: 5 INJECTION, SOLUTION INTRAMUSCULAR; INTRAVENOUS at 12:11

## 2022-11-12 RX ADMIN — DULOXETINE 60 MG: 60 CAPSULE, DELAYED RELEASE ORAL at 08:11

## 2022-11-12 RX ADMIN — ATORVASTATIN CALCIUM 10 MG: 10 TABLET, FILM COATED ORAL at 08:11

## 2022-11-12 RX ADMIN — INSULIN DETEMIR 35 UNITS: 100 INJECTION, SOLUTION SUBCUTANEOUS at 08:11

## 2022-11-12 RX ADMIN — SIMETHICONE 80 MG: 80 TABLET, CHEWABLE ORAL at 08:11

## 2022-11-12 RX ADMIN — SODIUM CHLORIDE: 0.9 INJECTION, SOLUTION INTRAVENOUS at 02:11

## 2022-11-12 RX ADMIN — BUSPIRONE HYDROCHLORIDE 22.5 MG: 10 TABLET ORAL at 08:11

## 2022-11-12 RX ADMIN — DIAZEPAM 5 MG: 5 TABLET ORAL at 08:11

## 2022-11-12 RX ADMIN — VALSARTAN 160 MG: 160 TABLET, FILM COATED ORAL at 08:11

## 2022-11-12 RX ADMIN — METOCLOPRAMIDE 5 MG: 5 INJECTION, SOLUTION INTRAMUSCULAR; INTRAVENOUS at 08:11

## 2022-11-12 RX ADMIN — HYOSCYAMINE SULFATE 0.38 MG: 0.38 TABLET, EXTENDED RELEASE ORAL at 06:11

## 2022-11-12 NOTE — DISCHARGE SUMMARY
[facility] - [facility]  Discharge Summary      Patient Name: Kirill Gandhi III  MRN: [id number]  [facility]: [id number]  Patient Class: OP- Observation  Admission Date: 11/11/2022  Hospital Length of Stay: 1 days  Discharge Date and Time:  11/12/2022 12:10 PM  Attending Physician: Chilo Hood MD   Discharging Provider: CLIFFORD RIVERA MD  Primary Care Provider: Marcos Ibarra MD  [facility] Medicine Team: [facility] CLIFFORD RIVERA MD  Primary Care Team: [facility]    HPI:   Mr. Gandhi is a 46 yo M with PMH DM2, HTN, OCD/anxiety who presents to the ED with 1 week of acutely worsening abdominal pain and gas. He states that since August, he has had intermittent abdominal pain that is relieved by belching or passing flatus. He reports a foul, rotten-eggs smell to his belches, and similarly foul-smelling flatus. These episodes of pain and gas have been progressing and now last as long as one week. He reports associated nausea but no vomiting. He also has intermittent diarrhea and constipation, sometimes within the same day. He states that sometimes eating provides temporary relief of his discomfort, but he often finds that he lacks an appetite. He has established outpatient with a GI doctor and had a colonoscopy and endoscopy since September with no conclusive findings.     The patient has had 2 back surgeries, most recently in April 2022, for L4-S1 and SI joint fusion. He is able to walk and perform ADLs independently but states walking longer distances is difficult due to back pain. He reports that he does not exercise often at all at home.    In the ED, patient is afebrile, VSS. Abdomen is soft and non-tender. Labs notable for no leukocytosis, sodium of 133 (corrects to 136), and glucose of 329. UA with 4+ glucose and trace ketones. CT A/P shows ileus, hepatic steatosis, and cholelithiasis. Patient received 1L NS in ED, made NPO. Admitted to medicine for further management.       * No  surgery found *      Hospital Course:   Admitted for management of abdominal pain with belching/flatulence. Started IV reglan, NPO overnight; patient with significant improvement and relief of gas symptoms. Increased insulin to 35U detemir BID, will need continued increase in insulin. Will need follow-up for possible gastroparesis vs IBD, uncontrolled diabetes.        Goals of Care Treatment Preferences:  Code Status: Full Code      Consults:   Consults (From admission, onward)        Status Ordering Provider     Inpatient consult to General surgery  Once        Provider:  (Not yet assigned)    GAB Bills          No new Assessment & Plan notes have been filed under this hospital service since the last note was generated.  Service: Hospital Medicine    Final Active Diagnoses:    Diagnosis Date Noted POA    PRINCIPAL PROBLEM:  Ileus [K56.7] 11/11/2022 Yes    Type 2 diabetes mellitus with hyperglycemia, with long-term current use of insulin [E11.65, Z79.4] 10/17/2022 Not Applicable    Dehydration with hyponatremia [E86.0, E87.1] 11/11/2022 Yes    OCD (obsessive compulsive disorder) [F42.9] 02/26/2020 Yes    Adjustment disorder with mixed anxiety and depressed mood [F43.23] 05/19/2021 Yes    Hyponatremia [E87.1] 11/11/2022 Yes    Metabolic acidemia [E87.20] 11/11/2022 Yes    Gastroesophageal reflux disease without esophagitis [K21.9] 10/19/2022 Yes    Benign essential hypertension [I10] 04/20/2022 Yes    Chronic bilateral low back pain with bilateral sciatica [M54.42, M54.41, G89.29] 12/14/2021 Yes    Sacroiliac joint dysfunction of both sides [M53.3] 12/13/2021 Yes    Fusion of spine, lumbosacral region [M43.27] 07/13/2021 Yes    Mild episode of recurrent major depressive disorder [F33.0] 06/25/2021 Yes    Mixed obsessional thoughts and acts [F42.2] 06/25/2021 Yes    VICENTE (obstructive sleep apnea) [G47.33] 06/17/2015 Yes    Morbid obesity [E66.01] 04/16/2015 Yes    Vitamin D deficiency  "[E55.9] 04/16/2015 Yes      Problems Resolved During this Admission:       Discharged Condition: stable    Disposition: Home or Self Care    Follow Up:    Patient Instructions:   No discharge procedures on file.    Significant Diagnostic Studies: Labs: All labs within the past 24 hours have been reviewed    Pending Diagnostic Studies:     None         Medications:  Reconciled Home Medications:      Medication List      START taking these medications    metoclopramide HCl 5 MG tablet  Commonly known as: REGLAN  Take 2 tablets (10 mg total) by mouth every 6 (six) hours as needed (excess gas/bloating).        CHANGE how you take these medications    insulin aspart U-100 100 unit/mL (3 mL) Inpn pen  Commonly known as: NovoLOG  Inject Between 50 to 70 units 3 times a day with meals.  What changed: additional instructions     LEVEMIR FLEXTOUCH U-100 INSULN 100 unit/mL (3 mL) Inpn pen  Generic drug: insulin detemir U-100  Inject 80 units into the skin daily, increase by 5 every 3 days if needed up to 100 units each day  What changed: additional instructions     * pen needle, diabetic 31 gauge x 1/4" Ndle  Use with flex pen  What changed: Another medication with the same name was added. Make sure you understand how and when to take each.     * BD ULTRA-FINE SHORT PEN NEEDLE 31 gauge x 5/16" Ndle  Generic drug: pen needle, diabetic  Use to inject insulin into the skin.  What changed: You were already taking a medication with the same name, and this prescription was added. Make sure you understand how and when to take each.         * This list has 2 medication(s) that are the same as other medications prescribed for you. Read the directions carefully, and ask your doctor or other care provider to review them with you.            CONTINUE taking these medications    acetaminophen 500 MG tablet  Commonly known as: TYLENOL  Take 1,000 mg by mouth 2 (two) times daily as needed for Pain.     GIOVANY-SELTZER ORAL  Take 2 tablets by " mouth daily as needed (Upset stomach).     ARTIFICIAL TEARS OPHT  Place 2 drops into both eyes daily as needed (Dry eye).     ascorbic acid (vitamin C) 1000 MG tablet  Commonly known as: VITAMIN C  Take 1,000 mg by mouth once daily.     aspirin 81 MG EC tablet  Commonly known as: ECOTRIN  Take 81 mg by mouth every evening.     atorvastatin 10 MG tablet  Commonly known as: LIPITOR  Take 1 tablet (10 mg total) by mouth every other day.     busPIRone 15 MG tablet  Commonly known as: BUSPAR  Take 1.5 tablets (22.5 mg total) by mouth 2 (two) times daily.     CitruceL 500 mg Tab  Generic drug: methylcellulose (laxative)  Take 1 tablet by mouth nightly as needed (Constipation).     DEXCOM G6 TRANSMITTER [place]  Generic drug: blood-glucose transmitter  Use to check glucose - 90 day supply     diazePAM 5 MG tablet  Commonly known as: VALIUM  Take 1 tablet (5 mg total) by mouth daily as needed for Anxiety (muscle spasms).     diclofenac sodium 1.5 % Drop  Apply topically daily as needed (Back pain).     dicyclomine 20 mg tablet  Commonly known as: BENTYL  Take 1 tablet (20 mg total) by mouth 3 (three) times daily as needed (abdominal pain).     DULoxetine 60 MG capsule  Commonly known as: CYMBALTA  Take 1 capsule (60 mg total) by mouth 2 (two) times daily.     flash glucose scanning reader Misc  Disp one reader to monitor glucose- covered by insurance dexcom     flash glucose sensor Kit  Dispense sensor to read glucose  dexcom     gabapentin 800 MG tablet  Commonly known as: NEURONTIN  Take 1 tablet (800 mg total) by mouth 3 (three) times daily.     hydrOXYzine HCL 25 MG tablet  Commonly known as: ATARAX  Take 2 tablets (50 mg total) by mouth nightly as needed (insomnia).     multivitamin capsule  Take 1 capsule by mouth once daily.     NON FORMULARY MEDICATION  Uses Cpap Machine nightly on a setting of 10     ondansetron 4 MG Tbdl  Commonly known as: ZOFRAN-ODT  Take 1 tablet (4 mg total) by mouth every 8 (eight) hours as  needed (nausea).     ONETOUCH DELICA PLUS LANCET 33 gauge Misc  Generic drug: lancets  Apply topically 3 (three) times daily.     ONETOUCH VERIO TEST STRIPS Strp  Generic drug: blood sugar diagnostic  1 strip 3 (three) times daily.     pantoprazole 40 MG tablet  Commonly known as: PROTONIX  Take 1 tablet (40 mg total) by mouth once daily.     TRULICITY 4.5 mg/0.5 mL pen injector  Generic drug: dulaglutide  Inject 4.5 mg into the skin every 7 days.     valsartan-hydrochlorothiazide 320-12.5 mg per tablet  Commonly known as: DIOVAN-HCT  Take 1 tablet by mouth once daily.     vitamin D 1000 units Tab  Commonly known as: VITAMIN D3  Take 5,000 Units by mouth 2 (two) times a day.        STOP taking these medications    celecoxib 200 MG capsule  Commonly known as: CeleBREX     metFORMIN 500 MG ER 24hr tablet  Commonly known as: GLUCOPHAGE-XR     oxyCODONE-acetaminophen  mg per tablet  Commonly known as: PERCOCET        ASK your doctor about these medications    fluticasone propionate 50 mcg/actuation nasal spray  Commonly known as: FLONASE  2 sprays (100 mcg total) by Each Nostril route once daily.     tiZANidine 4 MG tablet  Commonly known as: ZANAFLEX  Take 1 tablet (4 mg total) by mouth every 8 (eight) hours as needed (spasms).            Indwelling Lines/Drains at time of discharge:   Lines/Drains/Airways     Airway  Duration                Airway - Non-Surgical 10/31/22 0721 Nasal Cannula 12 days                Time spent on the discharge of patient: 35 minutes         CLIFFORD RIVERA MD  Department of Hospital Medicine  [facility] - Observation

## 2022-11-12 NOTE — PT/OT/SLP EVAL
Physical Therapy Evaluation/Discharge    Patient Name:  Kirill Gandhi III   MRN:  [id number]  Admit Date: 11/11/2022  Admitting Diagnosis:  Ileus  Length of Stay: 1 days  Recent Surgery: * No surgery found *      Recommendations:     Discharge Recommendations:  home   Discharge Equipment Recommendations: none   Barriers to discharge: None    Assessment:     Kirill Gandhi III is a 47 y.o. male admitted with a medical diagnosis of Ileus. PT evaluation complete and no goals established. Pt is functioning at high level and does not required acute care physical therapy services. Education provided to ambulate in hallway 3x/day with RN in order to maintain strength and endurance. Pt verbalized understanding. Please re-consult if functional mobility changes. Anticipate d/c to home; no needs.       Problem List:    Rehab Prognosis: Good;    Plan:     DC acute PT    Subjective   Communicated with RN prior to session.  Patient found HOB elevated upon PT entry to room, agreeable to evaluation. Kirill Gandhi III's mother present during session.    Chief Complaint: Abdominal Pain (Pt c/o abdominal/gas pain x1 week. -nausea/vomiting at this time. )    Patient/Family Comments/goals: to get better and return home   Pain/Comfort:  Pain Rating 1: 0/10  Pain Rating Post-Intervention 1: 0/10    Living Environment:  Pt lives with mother, girlfriend, and daughter in a [facility] with 1 FARAZ. Pt was ind with ambulation and ADLs. Pt uses a SPC as needed. Patient uses DME as follows: cane, straight, grab bar, bath bench. DME owned (not currently used): none    Patient reports they will have assistance from family upon discharge.    Objective:   Patient found with: telemetry, peripheral IV     General Precautions: Standard, Cardiac fall   Orthopedic Precautions:N/A   Braces: N/A   Oxygen Device: Room Air  Vitals: /62 (BP Location: Right arm, Patient Position: Lying)   Pulse 72   Temp 97.5 °F (36.4 °C) (Oral)   Resp  18   Ht 6' (1.829 m)   Wt (!) 174 kg (383 lb 9.6 oz)   SpO2 96%   BMI 52.03 kg/m²     Exams:  Cognition:   Alert and Cooperative  Ox4  Command following: Follows multistep  commands  Fluency: clear/fluent    RLE ROM: WFL  RLE Strength: WFL  LLE ROM: WFL  LLE Strength: WFL    Outcome Measures:  AM-PAC 6 CLICK MOBILITY  Turning over in bed (including adjusting bedclothes, sheets and blankets)?: 4  Sitting down on and standing up from a chair with arms (e.g., wheelchair, bedside commode, etc.): 4  Moving from lying on back to sitting on the side of the bed?: 4  Moving to and from a bed to a chair (including a wheelchair)?: 4  Need to walk in hospital room?: 4  Climbing 3-5 steps with a railing?: 4  Basic Mobility Total Score: 24     Functional Mobility:  Additional staff present: OT  Bed Mobility:  Supine to Sit: independence; HOB elevated  Scooting anteriorly to EOB to have both feet planted on floor: independence    Sitting Balance at Edge of Bed:  Assistance Level Required: [place]    Transfers:   Sit <> Stand Transfer: independence with no assistive device from EOB      Gait:   Patient ambulated: 250ft   Patient required: independent  Patient used: no assistive device  Gait Pattern observed: reciprocal gait  Gait Deviation(s): NA  Comments:   No LOB with horizontal head turns, change of speed, or change in direction  No SOB  No pain      Therapeutic Activities, Exercises, & Education:   Educated pt on PT role/POC  Educated pt on importance of OOB activity and daily ambulation   Pt verbalized understanding     Patient left up in chair with all lines intact, call button in reach, and RN notified.    GOALS:   Multidisciplinary Problems       Physical Therapy Goals       Not on file              Multidisciplinary Problems (Resolved)          Problem: Physical Therapy    Goal Priority Disciplines Outcome Goal Variances Interventions   Physical Therapy Goal   (Resolved)     PT, PT/OT Met                          History:     Past Medical History:   Diagnosis Date    Allergy     Anxiety     Back pain     Chronic bilateral low back pain with bilateral sciatica 12/14/2021    CTS (carpal tunnel syndrome) 06/26/2015    CTS (carpal tunnel syndrome) 6/26/2015    Depression     Diabetes 04/17/2015    Diabetes mellitus with insulin therapy     Elevated sed rate 6/26/2015    GERD (gastroesophageal reflux disease) 10/19/2022    Hx of vasectomy     Hypertension     Morbid obesity 4/16/2015    OCD (obsessive compulsive disorder)     Sleep apnea     Type 2 diabetes mellitus with hyperglycemia, with long-term current use of insulin 10/17/2022       Past Surgical History:   Procedure Laterality Date    ADENOIDECTOMY      COLONOSCOPY N/A 9/6/2022    Procedure: COLONOSCOPY;  Surgeon: Rossy Champion MD;  [facility]: [facility];  Service: Endoscopy;  Laterality: N/A;    EPIDURAL STEROID INJECTION Right 09/04/2019    Procedure: Injection, Steroid, Epidural Transforaminal;  Surgeon: Harjinder Batista Jr., MD;  Location: [facility] ENDO;  Service: Pain Management;  Laterality: Right;  Right L3 + L4 TF NATALIE    [id number]  [id number]    Arrive @ 1300; ASA last 8/27; Check BG; NEEDS CONSENT    ESOPHAGOGASTRODUODENOSCOPY N/A 10/31/2022    Procedure: EGD (ESOPHAGOGASTRODUODENOSCOPY);  Surgeon: Rossy Champion MD;  Location: [facility];  Service: Endoscopy;  Laterality: N/A;    FRACTURE SURGERY      bilateral elbow fracture 3 years ago    FUSION OF LUMBAR SPINE BY ANTERIOR APPROACH Left 04/26/2021    Procedure: FUSION, SPINE, LUMBAR, ANTERIOR APPROACH Left L3-4 L4-5 OLIF BRENDA, ALL Release, L5-S1 ALIF;  Surgeon: Jose L Brown MD;  Location: [facility] OR;  Service: Neurosurgery;  Laterality: Left;  Procedure: Left L3-4 L4-5 OLIF BRENDA, ALL Release, L5-S1 ALIF  Surgery Time: 2.5 Hrs  LOS: 2-3  Anesthesia: General  Blood: Type & Screen  Radiology: Josseline  Brace: LSO  Bed: Regular  Position: LAteral Righ    FUSION OF SACROILIAC JOINT Bilateral 04/13/2022     Procedure: FUSION, SACROILIAC JOINT Stg 2: Bilateral Open SI Joint fusion, Bedrock from Si Bone;  Surgeon: Jose L Brown MD;  Location: [facility] OR;  Service: Neurosurgery;  Laterality: Bilateral;    FUSION OF SPINE WITH INSTRUMENTATION N/A 04/26/2021    Procedure: FUSION, SPINE, WITH INSTRUMENTATION L3-S1 Posteior Segmental Instrumentation;  Surgeon: Jose L Brown MD;  Location: [facility] OR;  Service: Neurosurgery;  Laterality: N/A;  Procedure: L3-S1 Posteior Segmental Instrumentation  Sirgery Time: 2 Hrs  LOS: 2-3  Anesthesia: General  Blood: Tyoe & Screen  Radiology: Dual C arm  Brace:LSO  Bed: [place] [id number] Poster  Position: Prone  Equipment: Depuy     FUSION OF SPINE WITH INSTRUMENTATION N/A 04/13/2022    Procedure: FUSION, SPINE, WITH INSTRUMENTATION Stg 1: Revision of posterior L3-S1 hardware, Depuy. Sacro-pelvic fixation, screw augmentation PMMA. L4-S1 posterolateral arthrodesis. Open Gauge screws, 11-12mm;  Surgeon: Jose L Brown MD;  Location: [facility] OR;  Service: Neurosurgery;  Laterality: N/A;    HERNIA REPAIR      bilateral groin hernia    INJECTION OF STEROID Bilateral 12/13/2021    Procedure: INJECTION, STEROID Bilateral SI JOint Block and Steroid Injection;  Surgeon: Jose L Brown MD;  Location: [facility] OR;  Service: Neurosurgery;  Laterality: Bilateral;  Procedure: Bilateral SI JOint Block and Steroid Injection   Surgery Time: 30 min  LOS: 0  Anesthesia: MAC  Radiology: C-arm  Bed: Regular with pillows  Position: Prone    LUMBAR LAMINECTOMY Bilateral 11/29/2019    Procedure: LAMINECTOMY, SPINE, LUMBAR Rigth L3-4, Left L4-5 Laminectomy, medial Facetectomy, and microdiscectomy;  Surgeon: Jose L Brown MD;  Location: [facility] OR;  Service: Neurosurgery;  Laterality: Bilateral;  Procedure: Rigth L3-4, Left L4-5 Laminectomy, medial Facetectomy, and microdiscectomy  Surgery Time: 2.5 Hrs  LOS: 0-1  Anesthesia: General  Blood: Type & Screen  Radiology: C-arm  Micros    REMOVAL IMPLANT, POSTERIOR SEGMENT,  INTRAOCULAR  04/13/2022    Procedure: REMOVAL IMPLANT, POSTERIOR SEGMENT, INTRAOCULAR;  Surgeon: Jose L Brown MD;  Location: [facility];  Service: Neurosurgery;;    SPINE SURGERY      TONSILLECTOMY      VASECTOMY         Time Tracking:     PT Received On: 11/12/22  PT Start Time: 0753     PT Stop Time: 0802  PT Total Time (min): 9 min     Billable Minutes: Evaluation 9

## 2022-11-12 NOTE — HOSPITAL COURSE
Admitted for management of abdominal pain with belching/flatulence. Started IV reglan, NPO overnight; patient with significant improvement and relief of gas symptoms. Increased insulin to 35U detemir BID, will need continued increase in insulin. Will need follow-up for possible gastroparesis vs IBD, uncontrolled diabetes.

## 2022-11-12 NOTE — PLAN OF CARE
Problem: Physical Therapy  Goal: Physical Therapy Goal  Outcome: Met       Eval completed. Pt safe with functional mobility.

## 2022-11-12 NOTE — PLAN OF CARE
Problem: Occupational Therapy  Goal: Occupational Therapy Goal  Description: Pt will participate in OT eval: Met 11/12/22  Outcome: Met

## 2022-11-12 NOTE — PLAN OF CARE
PATIENT AAO CALM AND COOPERATIVE. DENIES ANY SOB, NEEDS OR CONCERNS. REVIEWED POC AND DISCHARGE INSTRUCTIONS WITH PATIENT AND FAMILY AT BEDSIDE. NO QUESTIONS VERBALIZED AT THIS TIME. VSS/NADN. PATIENT BELONGINGS REMAINED AT BEDSIDE DURING ADMISSION. PIV REMOVED TIP INTACT AND PRESSURE DRESSING APPLIED.

## 2022-11-12 NOTE — NURSING
Patient arrived to the floor via  belongings at side. VSS/NADN. Denies any pain; no need or concerns voiced during this time. Patient AAO calm and cooperative, independent with ADL's. Oriented to the floor and reviewed admission questions. SR upx2 call light and belongings within reach bed locked in lowest posiiton, family remains at bedside at this time.

## 2022-11-12 NOTE — PLAN OF CARE
POC reviewed w/ pt and mother @ bedside. Verbalized understanding.  Problem: Adult Inpatient Plan of Care  Goal: Plan of Care Review  Outcome: Ongoing, Progressing  Goal: Patient-Specific Goal (Individualized)  Outcome: Ongoing, Progressing  Goal: Absence of Hospital-Acquired Illness or Injury  Outcome: Ongoing, Progressing  Goal: Optimal Comfort and Wellbeing  Outcome: Ongoing, Progressing  Goal: Readiness for Transition of Care  Outcome: Ongoing, Progressing     Problem: Bariatric Environmental Safety  Goal: Safety Maintained with Care  Outcome: Ongoing, Progressing     Problem: Diabetes Comorbidity  Goal: Blood Glucose Level Within Targeted Range  Outcome: Ongoing, Progressing

## 2022-11-12 NOTE — PT/OT/SLP EVAL
"Occupational Therapy   Co-Evaluation and Discharge Note  Co-treat with PT to accommodate pt activity tolerance and need for skilled hands for safe intervention.    Name: Kirill Gandhi III  MRN: [id number]  Admitting Diagnosis:  Ileus   Recent Surgery: * No surgery found *      Recommendations:     Discharge Recommendations: home  Discharge Equipment Recommendations:  none  Barriers to discharge:  None    Assessment:     Kirill Gandhi III is a 47 y.o. male with a medical diagnosis of Ileus. At this time, patient is functioning at their prior level of function and does not require further acute OT services.     Plan:     During this hospitalization, patient does not require further acute OT services.  Please re-consult if situation changes.    Plan of Care Reviewed with: patient, mother    Subjective     Chief Complaint: ileus  Patient/Family Comments/goals: "I want to go home today"    Occupational Profile:  Living Environment: PT lives in [address] with family and tub shower  Previous level of function: pt was independent prior to admission  Roles and Routines: Pt enjoys spending time with family  Equipment Used at home:  cane, straight, grab bar, bath bench  Assistance upon Discharge: Home, no OT needs recommended upon d/c    Pain/Comfort:  Pain Rating 1: 0/10    Patients cultural, spiritual, Catholic conflicts given the current situation: no    Objective:     Communicated with: RN prior to session.  Patient found HOB elevated with telemetry, peripheral IV upon OT entry to room.    General Precautions: Standard, fall   Orthopedic Precautions:N/A   Braces: N/A  Respiratory Status: Room air     Occupational Performance:    Bed Mobility:    Patient completed Supine to Sit with independence    Functional Mobility/Transfers:  Patient completed Sit <> Stand Transfer with independence  with  no assistive device   Patient completed Toilet Transfer Step Transfer technique with independence with  no " AD  Functional Mobility: completed in preparation for ADLs with independence and no AD    Activities of Daily Living:  Lower Body Dressing: independence seated EOB    Cognitive/Visual Perceptual:  Cognitive/Psychosocial Skills:     -       Oriented to: Person, Place, Time, and Situation     Physical Exam:  Upper Extremity Range of Motion:     -       Right Upper Extremity: WFL  -       Left Upper Extremity: WFL  Upper Extremity Strength:    -       Right Upper Extremity: WFL  -       Left Upper Extremity: WFL   Strength:    -       Right Upper Extremity: WFL  -       Left Upper Extremity: WFL  Fine Motor Coordination:    -       Intact    AMPAC 6 Click ADL:  AMPAC Total Score: 22    Treatment & Education:  Pt educated on OT POC, no skilled acute OT services needed at this time as pt is functioning at [facility], to d/c from skilled acute OT services at this time  Pt educated on using call bell to request assistance for fxnl ambulation  All questions within OT scope of practice answered to satisfaction  Pt left with all lines intact and all needs met         Patient left sitting edge of bed with all lines intact, call button in reach, RN notified, and mother present    GOALS:   Multidisciplinary Problems       Occupational Therapy Goals       Not on file              Multidisciplinary Problems (Resolved)          Problem: Occupational Therapy    Goal Priority Disciplines Outcome Interventions   Occupational Therapy Goal   (Resolved)     OT, PT/OT Met    Description: Pt will participate in OT eval: Met 11/12/22                       History:     Past Medical History:   Diagnosis Date    Allergy     Anxiety     Back pain     Chronic bilateral low back pain with bilateral sciatica 12/14/2021    CTS (carpal tunnel syndrome) 06/26/2015    CTS (carpal tunnel syndrome) 6/26/2015    Depression     Diabetes 04/17/2015    Diabetes mellitus with insulin therapy     Elevated sed rate 6/26/2015    GERD (gastroesophageal reflux  disease) 10/19/2022    Hx of vasectomy     Hypertension     Morbid obesity 4/16/2015    OCD (obsessive compulsive disorder)     Sleep apnea     Type 2 diabetes mellitus with hyperglycemia, with long-term current use of insulin 10/17/2022         Past Surgical History:   Procedure Laterality Date    ADENOIDECTOMY      COLONOSCOPY N/A 9/6/2022    Procedure: COLONOSCOPY;  Surgeon: Rossy Champion MD;  Location: [facility];  Service: Endoscopy;  Laterality: N/A;    EPIDURAL STEROID INJECTION Right 09/04/2019    Procedure: Injection, Steroid, Epidural Transforaminal;  Surgeon: Harjinder Batista Jr., MD;  Location: [facility];  Service: Pain Management;  Laterality: Right;  Right L3 + L4 TF NATALIE    56[address]  [id number]    Arrive @ 1300; ASA last 8/27; Check BG; NEEDS CONSENT    ESOPHAGOGASTRODUODENOSCOPY N/A 10/31/2022    Procedure: EGD (ESOPHAGOGASTRODUODENOSCOPY);  Surgeon: Rossy Champion MD;  Location: [facility];  Service: Endoscopy;  Laterality: N/A;    FRACTURE SURGERY      bilateral elbow fracture 3 years ago    FUSION OF LUMBAR SPINE BY ANTERIOR APPROACH Left 04/26/2021    Procedure: FUSION, SPINE, LUMBAR, ANTERIOR APPROACH Left L3-4 L4-5 OLIF BRENDA, ALL Release, L5-S1 ALIF;  Surgeon: Jose L Brown MD;  Location: [facility];  Service: Neurosurgery;  Laterality: Left;  Procedure: Left L3-4 L4-5 OLIF BRENDA, ALL Release, L5-S1 ALIF  Surgery Time: 2.5 Hrs  LOS: 2-3  Anesthesia: General  Blood: Type & Screen  Radiology: Josseline  Brace: LSO  Bed: Regular  Position: LAteral Righ    FUSION OF SACROILIAC JOINT Bilateral 04/13/2022    Procedure: FUSION, SACROILIAC JOINT Stg 2: Bilateral Open SI Joint fusion, Bedrock from Si Bone;  Surgeon: Jose L Brown MD;  Location: [facility];  Service: Neurosurgery;  Laterality: Bilateral;    FUSION OF SPINE WITH INSTRUMENTATION N/A 04/26/2021    Procedure: FUSION, SPINE, WITH INSTRUMENTATION L3-S1 Posteior Segmental Instrumentation;  Surgeon: Jose L Brown MD;  Location: [facility];   Service: Neurosurgery;  Laterality: N/A;  Procedure: L3-S1 Posteior Segmental Instrumentation  Sirgery Time: 2 Hrs  LOS: 2-3  Anesthesia: General  Blood: Tyoe & Screen  Radiology: Dual C arm  Brace:LSO  Bed: [place] [id number] Poster  Position: Prone  Equipment: Depuy     FUSION OF SPINE WITH INSTRUMENTATION N/A 04/13/2022    Procedure: FUSION, SPINE, WITH INSTRUMENTATION Stg 1: Revision of posterior L3-S1 hardware, Depuy. Sacro-pelvic fixation, screw augmentation PMMA. L4-S1 posterolateral arthrodesis. Open Gauge screws, 11-12mm;  Surgeon: Jose L Brown MD;  Location: [facility];  Service: Neurosurgery;  Laterality: N/A;    HERNIA REPAIR      bilateral groin hernia    INJECTION OF STEROID Bilateral 12/13/2021    Procedure: INJECTION, STEROID Bilateral SI JOint Block and Steroid Injection;  Surgeon: Jose L Brown MD;  Location: [facility];  Service: Neurosurgery;  Laterality: Bilateral;  Procedure: Bilateral SI JOint Block and Steroid Injection   Surgery Time: 30 min  LOS: 0  Anesthesia: MAC  Radiology: C-arm  Bed: Regular with pillows  Position: Prone    LUMBAR LAMINECTOMY Bilateral 11/29/2019    Procedure: LAMINECTOMY, SPINE, LUMBAR Rigth L3-4, Left L4-5 Laminectomy, medial Facetectomy, and microdiscectomy;  Surgeon: Jose L Brown MD;  Location: [facility];  Service: Neurosurgery;  Laterality: Bilateral;  Procedure: Rigth L3-4, Left L4-5 Laminectomy, medial Facetectomy, and microdiscectomy  Surgery Time: 2.5 Hrs  LOS: 0-1  Anesthesia: General  Blood: Type & Screen  Radiology: C-arm  Micros    REMOVAL IMPLANT, POSTERIOR SEGMENT, INTRAOCULAR  04/13/2022    Procedure: REMOVAL IMPLANT, POSTERIOR SEGMENT, INTRAOCULAR;  Surgeon: Jose L Brown MD;  Location: [facility];  Service: Neurosurgery;;    SPINE SURGERY      TONSILLECTOMY      VASECTOMY         Time Tracking:     OT Date of Treatment: 11/12/22  OT Start Time: 0754  OT Stop Time: 0802  OT Total Time (min): 8 min  PT present throughout session    Billable Minutes:Evaluation  8    11/12/2022

## 2022-11-14 ENCOUNTER — PATIENT MESSAGE (OUTPATIENT)
Dept: NEUROSURGERY | Facility: CLINIC | Age: 47
End: 2022-11-14
Payer: MEDICAID

## 2022-11-14 ENCOUNTER — PATIENT MESSAGE (OUTPATIENT)
Dept: FAMILY MEDICINE | Facility: CLINIC | Age: 47
End: 2022-11-14
Payer: MEDICAID

## 2022-11-14 LAB — POCT GLUCOSE: 213 MG/DL (ref 70–110)

## 2022-11-15 ENCOUNTER — TELEPHONE (OUTPATIENT)
Dept: NEUROSURGERY | Facility: CLINIC | Age: 47
End: 2022-11-15
Payer: MEDICAID

## 2022-11-15 ENCOUNTER — OFFICE VISIT (OUTPATIENT)
Dept: GASTROENTEROLOGY | Facility: CLINIC | Age: 47
End: 2022-11-15
Payer: MEDICAID

## 2022-11-15 VITALS
WEIGHT: 315 LBS | DIASTOLIC BLOOD PRESSURE: 74 MMHG | HEART RATE: 91 BPM | BODY MASS INDEX: 42.66 KG/M2 | OXYGEN SATURATION: 94 % | SYSTOLIC BLOOD PRESSURE: 132 MMHG | HEIGHT: 72 IN

## 2022-11-15 DIAGNOSIS — R14.0 FLATULENCE/GAS PAIN/BELCHING: ICD-10-CM

## 2022-11-15 DIAGNOSIS — Z98.1 S/P LUMBAR FUSION: Primary | ICD-10-CM

## 2022-11-15 DIAGNOSIS — K21.9 GASTROESOPHAGEAL REFLUX DISEASE WITHOUT ESOPHAGITIS: Primary | ICD-10-CM

## 2022-11-15 PROBLEM — K56.7 ILEUS: Status: RESOLVED | Noted: 2022-11-11 | Resolved: 2022-11-15

## 2022-11-15 PROCEDURE — 3008F PR BODY MASS INDEX (BMI) DOCUMENTED: ICD-10-PCS | Mod: CPTII,,, | Performed by: INTERNAL MEDICINE

## 2022-11-15 PROCEDURE — 1111F DSCHRG MED/CURRENT MED MERGE: CPT | Mod: CPTII,,, | Performed by: INTERNAL MEDICINE

## 2022-11-15 PROCEDURE — 3052F PR MOST RECENT HEMOGLOBIN A1C LEVEL 8.0 - < 9.0%: ICD-10-PCS | Mod: CPTII,,, | Performed by: INTERNAL MEDICINE

## 2022-11-15 PROCEDURE — 3052F HG A1C>EQUAL 8.0%<EQUAL 9.0%: CPT | Mod: CPTII,,, | Performed by: INTERNAL MEDICINE

## 2022-11-15 PROCEDURE — 3078F DIAST BP <80 MM HG: CPT | Mod: CPTII,,, | Performed by: INTERNAL MEDICINE

## 2022-11-15 PROCEDURE — 99215 OFFICE O/P EST HI 40 MIN: CPT | Mod: PBBFAC,PO | Performed by: INTERNAL MEDICINE

## 2022-11-15 PROCEDURE — 1160F RVW MEDS BY RX/DR IN RCRD: CPT | Mod: CPTII,,, | Performed by: INTERNAL MEDICINE

## 2022-11-15 PROCEDURE — 3008F BODY MASS INDEX DOCD: CPT | Mod: CPTII,,, | Performed by: INTERNAL MEDICINE

## 2022-11-15 PROCEDURE — 3078F PR MOST RECENT DIASTOLIC BLOOD PRESSURE < 80 MM HG: ICD-10-PCS | Mod: CPTII,,, | Performed by: INTERNAL MEDICINE

## 2022-11-15 PROCEDURE — 99999 PR PBB SHADOW E&M-EST. PATIENT-LVL V: CPT | Mod: PBBFAC,,, | Performed by: INTERNAL MEDICINE

## 2022-11-15 PROCEDURE — 3072F PR LOW RISK FOR RETINOPATHY: ICD-10-PCS | Mod: CPTII,,, | Performed by: INTERNAL MEDICINE

## 2022-11-15 PROCEDURE — 99214 PR OFFICE/OUTPT VISIT, EST, LEVL IV, 30-39 MIN: ICD-10-PCS | Mod: S$PBB,,, | Performed by: INTERNAL MEDICINE

## 2022-11-15 PROCEDURE — 3066F PR DOCUMENTATION OF TREATMENT FOR NEPHROPATHY: ICD-10-PCS | Mod: CPTII,,, | Performed by: INTERNAL MEDICINE

## 2022-11-15 PROCEDURE — 99214 OFFICE O/P EST MOD 30 MIN: CPT | Mod: S$PBB,,, | Performed by: INTERNAL MEDICINE

## 2022-11-15 PROCEDURE — 3075F SYST BP GE 130 - 139MM HG: CPT | Mod: CPTII,,, | Performed by: INTERNAL MEDICINE

## 2022-11-15 PROCEDURE — 3066F NEPHROPATHY DOC TX: CPT | Mod: CPTII,,, | Performed by: INTERNAL MEDICINE

## 2022-11-15 PROCEDURE — 1111F PR DISCHARGE MEDS RECONCILED W/ CURRENT OUTPATIENT MED LIST: ICD-10-PCS | Mod: CPTII,,, | Performed by: INTERNAL MEDICINE

## 2022-11-15 PROCEDURE — 3075F PR MOST RECENT SYSTOLIC BLOOD PRESS GE 130-139MM HG: ICD-10-PCS | Mod: CPTII,,, | Performed by: INTERNAL MEDICINE

## 2022-11-15 PROCEDURE — 3060F POS MICROALBUMINURIA REV: CPT | Mod: CPTII,,, | Performed by: INTERNAL MEDICINE

## 2022-11-15 PROCEDURE — 1159F PR MEDICATION LIST DOCUMENTED IN MEDICAL RECORD: ICD-10-PCS | Mod: CPTII,,, | Performed by: INTERNAL MEDICINE

## 2022-11-15 PROCEDURE — 99999 PR PBB SHADOW E&M-EST. PATIENT-LVL V: ICD-10-PCS | Mod: PBBFAC,,, | Performed by: INTERNAL MEDICINE

## 2022-11-15 PROCEDURE — 3060F PR POS MICROALBUMINURIA RESULT DOCUMENTED/REVIEW: ICD-10-PCS | Mod: CPTII,,, | Performed by: INTERNAL MEDICINE

## 2022-11-15 PROCEDURE — 1159F MED LIST DOCD IN RCRD: CPT | Mod: CPTII,,, | Performed by: INTERNAL MEDICINE

## 2022-11-15 PROCEDURE — 3072F LOW RISK FOR RETINOPATHY: CPT | Mod: CPTII,,, | Performed by: INTERNAL MEDICINE

## 2022-11-15 PROCEDURE — 1160F PR REVIEW ALL MEDS BY PRESCRIBER/CLIN PHARMACIST DOCUMENTED: ICD-10-PCS | Mod: CPTII,,, | Performed by: INTERNAL MEDICINE

## 2022-11-15 RX ORDER — METOCLOPRAMIDE HYDROCHLORIDE 5 MG/5ML
5 SOLUTION ORAL
Qty: 500 ML | Refills: 2 | Status: SHIPPED | OUTPATIENT
Start: 2022-11-15

## 2022-11-15 NOTE — PATIENT INSTRUCTIONS
"Gastroparesis       Continue pantoprazole every morning on an empty stomach  Ok to use dicyclomine/Bentyl (diarrhea and cramping) and ondansetron/Zofran (nausea) as needed  Use 10 mg of metoclopramide (Reglan) at the beginning of an "episode" and then take 5 mg 6 hours later.  "

## 2022-11-16 NOTE — PROGRESS NOTES
"Subjective:       Patient ID: Kirill Gandhi III is a 47 y.o. male.    Chief Complaint: Abdominal Pain (ER f/u visit from 11/11/2022 abd pain)    48 yo M here for f/u, last visit 1 month ago.  We started with recent screening colonoscopy with me, followed up with complaints of excessive flatus, intermittent diarrhea, GERD, and episodic nausea.  He has long h/o excessive flatus that is independent of bowel pattern.  His normal bowel pattern is 2-3 BM per day but he can have up to 5 per day, with the consistency being highly variable.  He gets discrete episodes of feeling very gassy, having long large belches that smell of rotten eggs, associated nausea, and then usually followed by diarrhea.  He began taking fiber gummies after colonoscopy and can not associate more flatus since then but ideally he would like a fiber supplement that can be placed in his pill box daily so we switched to Citrucel caplets.  We also switched from Pepcid to Protonix and used Bentyl and Zofran as needed.  EGD was ok.  He began to feel that one of the "episodes" was coming on so he stopped the Citrucel but still ended up going to the ED 2 days later at 4 am.  CT showed possible ileus so he was admitted into the hospital.  He was given Reglan which helped greatly.  He has only been using it as needed since discharge, not daily.  At discharge they stopped PPI, Bentyl, and Zofran.    Review of Systems   Constitutional:  Negative for chills and fever.   Respiratory:  Negative for shortness of breath and wheezing.    Cardiovascular:  Negative for chest pain, palpitations and leg swelling.   Gastrointestinal:  Positive for diarrhea, nausea, vomiting and reflux.   Neurological:  Negative for dizziness and speech difficulty.       Objective:      /74 (BP Location: Right arm, Patient Position: Sitting, BP Method: Large (Manual))   Pulse 91   Ht 6' (1.829 m)   Wt (!) 174.1 kg (383 lb 14.4 oz)   SpO2 (!) 94%   BMI 52.07 kg/m² "     Physical Exam  Constitutional:       Appearance: Normal appearance. He is well-developed. He is obese. He is not ill-appearing.   HENT:      Head: Normocephalic and atraumatic.   Eyes:      Extraocular Movements: Extraocular movements intact.      Pupils: Pupils are equal, round, and reactive to light.   Pulmonary:      Effort: Pulmonary effort is normal. No respiratory distress.   Abdominal:      General: There is no distension.      Palpations: Abdomen is soft.   Musculoskeletal:      Cervical back: Normal range of motion.   Skin:     General: Skin is warm and dry.   Neurological:      General: No focal deficit present.      Mental Status: He is alert and oriented to person, place, and time.   Psychiatric:         Mood and Affect: Mood normal.         Behavior: Behavior normal.       Lab Results   Component Value Date    WBC 7.99 11/12/2022    HGB 11.4 (L) 11/12/2022    HCT 36.0 (L) 11/12/2022    MCV 88 11/12/2022     11/12/2022     CT was independently visualized and reviewed by me and showed mild fluid levels in SB, no dilation, no inflammatory changes.  Glucose > 400 at time of CT scan.    Lab Results   Component Value Date    LABA1C 6.8 (H) 03/24/2017    HGBA1C 8.7 (H) 11/02/2022       Old records from chart reviewed and summarized, significant for:  Colonoscopy 9/2022: 10 mm TA, sigmoid tics, IH  EGD 10/2022:  1 cm HH, Hp (-), celiac (-), EoE (-)    Assessment:       Problem List Items Addressed This Visit          GI    Gastroesophageal reflux disease without esophagitis - Primary    Relevant Medications    metoclopramide HCl (REGLAN) 5 mg/5 mL Soln    Other Relevant Orders    NM Gastric Emptying    Flatulence/gas pain/belching    Relevant Medications    metoclopramide HCl (REGLAN) 5 mg/5 mL Soln    Other Relevant Orders    NM Gastric Emptying         Plan:       Gastroesophageal reflux disease without esophagitis  -     metoclopramide HCl (REGLAN) 5 mg/5 mL Soln; Take 5 mLs (5 mg total) by mouth  4 (four) times daily before meals and nightly.  Dispense: 500 mL; Refill: 2  -     NM Gastric Emptying; Future; Expected date: 11/15/2022  -     Restart PPI    Flatulence/gas pain/belching  -     This is likely gastroparesis, will do GES to determine degree of delay  -     metoclopramide HCl (REGLAN) 5 mg/5 mL Soln; Take 5 mLs (5 mg total) by mouth 4 (four) times daily before meals and nightly.  Dispense: 500 mL; Refill: 2  -     Risk/benefits of Reglan discussed, risk of tardive dyskinesia and its likelihood of being temporary if it occurs but risk of permanence discussed.  He will use Reglan as needed only, not scheduled.  -     NM Gastric Emptying; Future; Expected date: 11/15/2022  -     Ok to use Zofran and Bentyl as needed.    I do not think that he had a true ileus.  He highly likely had stasis due to high blood sugar.  It is imperative that he get his HgA1c down as every time his glucose is over 200, he will have GI decreased motility.

## 2022-11-17 ENCOUNTER — PATIENT MESSAGE (OUTPATIENT)
Dept: PSYCHIATRY | Facility: CLINIC | Age: 47
End: 2022-11-17
Payer: MEDICAID

## 2022-11-17 ENCOUNTER — PATIENT MESSAGE (OUTPATIENT)
Dept: FAMILY MEDICINE | Facility: CLINIC | Age: 47
End: 2022-11-17
Payer: MEDICAID

## 2022-11-18 ENCOUNTER — PATIENT MESSAGE (OUTPATIENT)
Dept: FAMILY MEDICINE | Facility: CLINIC | Age: 47
End: 2022-11-18
Payer: MEDICAID

## 2022-11-18 RX ORDER — CELECOXIB 200 MG/1
200 CAPSULE ORAL 2 TIMES DAILY
Qty: 60 CAPSULE | Refills: 1 | Status: SHIPPED | OUTPATIENT
Start: 2022-11-18 | End: 2023-03-11

## 2022-11-21 ENCOUNTER — PATIENT MESSAGE (OUTPATIENT)
Dept: NEUROSURGERY | Facility: CLINIC | Age: 47
End: 2022-11-21
Payer: MEDICAID

## 2022-11-21 RX ORDER — TRAMADOL HYDROCHLORIDE 50 MG/1
50 TABLET ORAL EVERY 8 HOURS PRN
Qty: 90 TABLET | Refills: 3 | Status: SHIPPED | OUTPATIENT
Start: 2022-11-21 | End: 2023-02-05 | Stop reason: SDUPTHER

## 2022-11-23 ENCOUNTER — PATIENT MESSAGE (OUTPATIENT)
Dept: NEUROSURGERY | Facility: CLINIC | Age: 47
End: 2022-11-23
Payer: MEDICAID

## 2022-11-29 ENCOUNTER — HOSPITAL ENCOUNTER (OUTPATIENT)
Dept: RADIOLOGY | Facility: HOSPITAL | Age: 47
Discharge: HOME | End: 2022-11-29
Attending: INTERNAL MEDICINE
Payer: MEDICAID

## 2022-11-29 DIAGNOSIS — K21.9 GASTROESOPHAGEAL REFLUX DISEASE WITHOUT ESOPHAGITIS: ICD-10-CM

## 2022-11-29 DIAGNOSIS — R14.0 FLATULENCE/GAS PAIN/BELCHING: ICD-10-CM

## 2022-11-29 PROCEDURE — A9541 TC99M SULFUR COLLOID: HCPCS | Mod: PO

## 2022-11-30 ENCOUNTER — PATIENT MESSAGE (OUTPATIENT)
Dept: GASTROENTEROLOGY | Facility: CLINIC | Age: 47
End: 2022-11-30
Payer: MEDICAID

## 2022-12-06 ENCOUNTER — PATIENT MESSAGE (OUTPATIENT)
Dept: OTHER | Facility: OTHER | Age: 47
End: 2022-12-06
Payer: MEDICAID

## 2022-12-15 ENCOUNTER — PATIENT MESSAGE (OUTPATIENT)
Dept: PSYCHIATRY | Facility: CLINIC | Age: 47
End: 2022-12-15
Payer: MEDICAID

## 2022-12-16 DIAGNOSIS — F43.23 ADJUSTMENT DISORDER WITH MIXED ANXIETY AND DEPRESSED MOOD: ICD-10-CM

## 2022-12-16 DIAGNOSIS — F42.2 MIXED OBSESSIONAL THOUGHTS AND ACTS: ICD-10-CM

## 2022-12-16 RX ORDER — DULOXETIN HYDROCHLORIDE 60 MG/1
60 CAPSULE, DELAYED RELEASE ORAL 2 TIMES DAILY
Qty: 180 CAPSULE | Refills: 3 | Status: SHIPPED | OUTPATIENT
Start: 2022-12-16 | End: 2023-03-28 | Stop reason: SDUPTHER

## 2022-12-16 RX ORDER — BUSPIRONE HYDROCHLORIDE 15 MG/1
22.5 TABLET ORAL 2 TIMES DAILY
Qty: 270 TABLET | Refills: 3 | Status: SHIPPED | OUTPATIENT
Start: 2022-12-16 | End: 2023-03-02 | Stop reason: SDUPTHER

## 2022-12-27 ENCOUNTER — PATIENT MESSAGE (OUTPATIENT)
Dept: OTHER | Facility: OTHER | Age: 47
End: 2022-12-27
Payer: MEDICAID

## 2022-12-27 ENCOUNTER — OFFICE VISIT (OUTPATIENT)
Dept: SLEEP MEDICINE | Facility: CLINIC | Age: 47
End: 2022-12-27
Payer: MEDICAID

## 2022-12-27 DIAGNOSIS — G47.30 SLEEP APNEA, UNSPECIFIED TYPE: Primary | ICD-10-CM

## 2022-12-27 PROCEDURE — 3052F HG A1C>EQUAL 8.0%<EQUAL 9.0%: CPT | Mod: CPTII,,, | Performed by: PSYCHIATRY & NEUROLOGY

## 2022-12-27 PROCEDURE — 99212 OFFICE O/P EST SF 10 MIN: CPT | Mod: PBBFAC | Performed by: PSYCHIATRY & NEUROLOGY

## 2022-12-27 PROCEDURE — 99999 PR PBB SHADOW E&M-EST. PATIENT-LVL II: ICD-10-PCS | Mod: PBBFAC,,, | Performed by: PSYCHIATRY & NEUROLOGY

## 2022-12-27 PROCEDURE — 3072F LOW RISK FOR RETINOPATHY: CPT | Mod: CPTII,,, | Performed by: PSYCHIATRY & NEUROLOGY

## 2022-12-27 PROCEDURE — 3052F PR MOST RECENT HEMOGLOBIN A1C LEVEL 8.0 - < 9.0%: ICD-10-PCS | Mod: CPTII,,, | Performed by: PSYCHIATRY & NEUROLOGY

## 2022-12-27 PROCEDURE — 3066F PR DOCUMENTATION OF TREATMENT FOR NEPHROPATHY: ICD-10-PCS | Mod: CPTII,,, | Performed by: PSYCHIATRY & NEUROLOGY

## 2022-12-27 PROCEDURE — 3072F PR LOW RISK FOR RETINOPATHY: ICD-10-PCS | Mod: CPTII,,, | Performed by: PSYCHIATRY & NEUROLOGY

## 2022-12-27 PROCEDURE — 3060F PR POS MICROALBUMINURIA RESULT DOCUMENTED/REVIEW: ICD-10-PCS | Mod: CPTII,,, | Performed by: PSYCHIATRY & NEUROLOGY

## 2022-12-27 PROCEDURE — 3066F NEPHROPATHY DOC TX: CPT | Mod: CPTII,,, | Performed by: PSYCHIATRY & NEUROLOGY

## 2022-12-27 PROCEDURE — 3060F POS MICROALBUMINURIA REV: CPT | Mod: CPTII,,, | Performed by: PSYCHIATRY & NEUROLOGY

## 2022-12-27 PROCEDURE — 99999 PR PBB SHADOW E&M-EST. PATIENT-LVL II: CPT | Mod: PBBFAC,,, | Performed by: PSYCHIATRY & NEUROLOGY

## 2022-12-27 PROCEDURE — 99204 OFFICE O/P NEW MOD 45 MIN: CPT | Mod: S$PBB,,, | Performed by: PSYCHIATRY & NEUROLOGY

## 2022-12-27 PROCEDURE — 99204 PR OFFICE/OUTPT VISIT, NEW, LEVL IV, 45-59 MIN: ICD-10-PCS | Mod: S$PBB,,, | Performed by: PSYCHIATRY & NEUROLOGY

## 2022-12-27 NOTE — PATIENT INSTRUCTIONS
SLEEP LAB ([facility]) will contact you to schedulethe sleep study. Their number is [phone] (ext 2). Please call them if you do not hear from them in 2 weeks from now.  The [facility] is located on 7th floor of the [facility]; [facility] is located in [facility].    SLEEP CLINIC (my assistant) will call you when the sleep study results are ready - if you have not heard from us by 2 weeks from the date of the study, please call [phone] (ext 1) or you can use [facility] to contact me.    You are advised to abstain from driving should you feel sleepy or drowsy.  __________________________________      [employer]       DME (Durable Medical Equipment) [facility]     Please call [phone] ->option 1-> option1 - for shipping                                                              OR    Please call [phone] ->option 1-> option2 - for mask fitting   ___________________________________________________    Please call [employer] directly at [phone] and website is Please call [employer] directly at [phone] and website is [email or web address]  to register your machine for recall.

## 2022-12-27 NOTE — PROGRESS NOTES
Kirill Gandhi III is a 47 y.o. male is here to be evaluated for a sleep disorder; referred by No ref. provider found.    He was previously diagnosed with VICENTE - had 2 studies in [facility] (2013 and 2019 - both titrations). Baseline studies are n/a.  HE has been using CPAP science (not through [facility]).    CPAP 10 cm H2O (no APAP option) on an old CPAP  - no AHI feedback  Average usage - 9 hrs; 30/30 days with over 4 hrs last month    His machine is on recall - he is not sure if it is registered for recall.     The patient reports snoring, gasping for air, choking , witnessed apneas, excessive daytime sleepiness, and excessive daytime fatigue for many years prior to staarting CPAP use    Doing well on CPAP with improved sleep continuity and daytime sleepiness; 100%complaint.       Medications pertinent to sleep  disorders taken currently: no  Previous  medications taken  for sleep disorders:  no    Sleep studies  Baseline study n/a  2013 titration in [facility] - 10 cm H2O CPAP was recommended  2019 titration in [facility] - 10 cm H2O again was recommended.        EPWORTH SLEEPINESS SCALE TOTAL SCORE  12/26/2022   Total score 13         EPWORTH SLEEPINESS SCALE 12/26/2022   Sitting and reading 3   Watching TV 2   Sitting, inactive in a public place (e.g. a theatre or a meeting) 1   As a passenger in a car for an hour without a break 1   Lying down to rest in the afternoon when circumstances permit 3   Sitting and talking to someone 0   Sitting quietly after a lunch without alcohol 2   In a car, while stopped for a few minutes in traffic 1   Total score 13       PHQ9 3/10/2022   Total Score 12     No flowsheet data found.    EPWORTH SLEEPINESS SCALE 12/26/2022   Sitting and reading 3   Watching TV 2   Sitting, inactive in a public place (e.g. a theatre or a meeting) 1   As a passenger in a car for an hour without a break 1   Lying down to rest in the afternoon when circumstances permit 3   Sitting and talking to  someone 0   Sitting quietly after a lunch without alcohol 2   In a car, while stopped for a few minutes in traffic 1   Total score 13     Sleep Clinic New Patient 12/27/2022   What time do you go to bed on a week day? (Give a range) Between 10pm to 3am   What time do you go to bed on a day off? (Give a range) Between 10pm to 3am   How long does it take you to fall asleep? (Give a range) Depends on back meds taken. May be 2 minutes to 4 hours   On average, how many times per night do you wake up? Average about every three hours   How long does it take you to fall back into sleep? (Give a range) A minute   What time do you wake up to start your day on a week day? (Give a range) Depends on when i went to sleep   What time do you wake up to start your day on a day off? (Give a range) Depends on when i went to sleep   What time do you get out of bed? (Give a range) Depends on when i went to sleep   On average, how many hours do you sleep? 6 to 15   On average, how many naps do you take per day? Sometimes one   Rate your sleep quality from 0 to 5 (0-poor, 5-great). 2   Have you experienced:  N/a   How much weight have you lost or gained (in lbs.) in the last year? 0   On average, how many times per night do you go to the bathroom?  4   Have you ever had a sleep study/CPAP machine/surgery for sleep apnea? Yes   Have you ever had a CPAP machine for sleep apnea? Yes   Have you ever had surgery for sleep apnea? No       Sleep Clinic ROS  12/27/2022   Difficulty breathing through the nose?  Sometimes   Sore throat or dry mouth in the morning? Sometimes   Irregular or very fast heart beat?  No   Shortness of breath?  No   Acid reflux? Sometimes   Body aches and pains?  Yes   Morning headaches? Sometimes   Dizziness? No   Mood changes?  Yes   Do you exercise?  No   Do you feel like moving your legs a lot?  No       DME:         PAST MEDICAL HISTORY:    Active Ambulatory Problems     Diagnosis Date Noted    Morbid obesity  04/16/2015    Vitamin D deficiency 04/16/2015    VICENTE (obstructive sleep apnea) 06/17/2015    OCD (obsessive compulsive disorder) 02/26/2020    Anxiety 02/26/2020    Insomnia due to other mental disorder 02/05/2021    Palpitations 03/25/2021    DDD (degenerative disc disease), lumbar 04/26/2021    Adjustment disorder with mixed anxiety and depressed mood 05/19/2021    Mixed obsessional thoughts and acts 06/25/2021    Mild episode of recurrent major depressive disorder 06/25/2021    Fusion of spine, lumbosacral region 07/13/2021    Sacroiliac joint dysfunction of both sides 12/13/2021    Chronic bilateral low back pain with bilateral sciatica 12/14/2021    Radiculopathy of lumbosacral region 12/14/2021    Chest pain, atypical 03/10/2022    Pseudoarthrosis of lumbar spine 04/13/2022    Benign essential hypertension 04/20/2022    Type 2 diabetes mellitus with hyperglycemia, with long-term current use of insulin 10/17/2022    Excessive flatus 10/19/2022    Gastroesophageal reflux disease without esophagitis 10/19/2022    Hyponatremia 11/11/2022    Metabolic acidemia 11/11/2022    Dehydration with hyponatremia 11/11/2022    Flatulence/gas pain/belching 11/15/2022     Resolved Ambulatory Problems     Diagnosis Date Noted    CTS (carpal tunnel syndrome) 06/26/2015    DDD (degenerative disc disease), lumbar 09/04/2019    Lumbar disc herniation with radiculopathy 10/09/2019    Ileus 11/11/2022     Past Medical History:   Diagnosis Date    Allergy     Back pain     Depression     Diabetes 04/17/2015    Diabetes mellitus with insulin therapy     Elevated sed rate 6/26/2015    GERD (gastroesophageal reflux disease) 10/19/2022    Hx of vasectomy     Hypertension     Sleep apnea                 PAST SURGICAL HISTORY:    Past Surgical History:   Procedure Laterality Date    ADENOIDECTOMY      COLONOSCOPY N/A 9/6/2022    Procedure: COLONOSCOPY;  Surgeon: Rossy Champion MD;  Location: [facility];  Service: Endoscopy;  Laterality:  N/A;    EPIDURAL STEROID INJECTION Right 09/04/2019    Procedure: Injection, Steroid, Epidural Transforaminal;  Surgeon: Harjnider Batista Jr., MD;  Location: [facility];  Service: Pain Management;  Laterality: Right;  Right L3 + L4 TF NATALIE    [id number]  [id number]    Arrive @ 1300; ASA last 8/27; Check BG; NEEDS CONSENT    ESOPHAGOGASTRODUODENOSCOPY N/A 10/31/2022    Procedure: EGD (ESOPHAGOGASTRODUODENOSCOPY);  Surgeon: Rossy Champion MD;  Location: [facility] ENDO;  Service: Endoscopy;  Laterality: N/A;    FRACTURE SURGERY      bilateral elbow fracture 3 years ago    FUSION OF LUMBAR SPINE BY ANTERIOR APPROACH Left 04/26/2021    Procedure: FUSION, SPINE, LUMBAR, ANTERIOR APPROACH Left L3-4 L4-5 OLIF BRENDA, ALL Release, L5-S1 ALIF;  Surgeon: Jose L Brown MD;  Location: [facility];  Service: Neurosurgery;  Laterality: Left;  Procedure: Left L3-4 L4-5 OLIF BRENDA, ALL Release, L5-S1 ALIF  Surgery Time: 2.5 Hrs  LOS: 2-3  Anesthesia: General  Blood: Type & Screen  Radiology: Josseline  Brace: LSO  Bed: Regular  Position: LAteral Righ    FUSION OF SACROILIAC JOINT Bilateral 04/13/2022    Procedure: FUSION, SACROILIAC JOINT Stg 2: Bilateral Open SI Joint fusion, Bedrock from Si Bone;  Surgeon: Jose L Brown MD;  Location: [facility];  Service: Neurosurgery;  Laterality: Bilateral;    FUSION OF SPINE WITH INSTRUMENTATION N/A 04/26/2021    Procedure: FUSION, SPINE, WITH INSTRUMENTATION L3-S1 Posteior Segmental Instrumentation;  Surgeon: Jose L Brown MD;  Location: [facility] OR;  Service: Neurosurgery;  Laterality: N/A;  Procedure: L3-S1 Posteior Segmental Instrumentation  Sirgery Time: 2 Hrs  LOS: 2-3  Anesthesia: General  Blood: Tyoe & Screen  Radiology: Dual C arm  Brace:LSO  Bed: [place] [id number] Poster  Position: Prone  Equipment: Depuy     FUSION OF SPINE WITH INSTRUMENTATION N/A 04/13/2022    Procedure: FUSION, SPINE, WITH INSTRUMENTATION Stg 1: Revision of posterior L3-S1 hardware, Depuy. Sacro-pelvic fixation, screw augmentation PMMA.  L4-S1 posterolateral arthrodesis. Open Gauge screws, 11-12mm;  Surgeon: Jose L Brown MD;  Location: [facility] OR;  Service: Neurosurgery;  Laterality: N/A;    HERNIA REPAIR      bilateral groin hernia    INJECTION OF STEROID Bilateral 12/13/2021    Procedure: INJECTION, STEROID Bilateral SI JOint Block and Steroid Injection;  Surgeon: Jose L Brown MD;  Location: [facility] OR;  Service: Neurosurgery;  Laterality: Bilateral;  Procedure: Bilateral SI JOint Block and Steroid Injection   Surgery Time: 30 min  LOS: 0  Anesthesia: MAC  Radiology: C-arm  Bed: Regular with pillows  Position: Prone    LUMBAR LAMINECTOMY Bilateral 11/29/2019    Procedure: LAMINECTOMY, SPINE, LUMBAR Rigth L3-4, Left L4-5 Laminectomy, medial Facetectomy, and microdiscectomy;  Surgeon: Jose L Brown MD;  Location: [facility] OR;  Service: Neurosurgery;  Laterality: Bilateral;  Procedure: Rigth L3-4, Left L4-5 Laminectomy, medial Facetectomy, and microdiscectomy  Surgery Time: 2.5 Hrs  LOS: 0-1  Anesthesia: General  Blood: Type & Screen  Radiology: C-arm  Micros    REMOVAL IMPLANT, POSTERIOR SEGMENT, INTRAOCULAR  04/13/2022    Procedure: REMOVAL IMPLANT, POSTERIOR SEGMENT, INTRAOCULAR;  Surgeon: Jose L Brown MD;  Location: [facility] OR;  Service: Neurosurgery;;    SPINE SURGERY      TONSILLECTOMY      VASECTOMY           FAMILY HISTORY:                Family History   Problem Relation Age of Onset    Fibromyalgia Mother     Osteoarthritis Mother     Cataracts Maternal Grandmother     Macular degeneration Maternal Grandfather     Cataracts Maternal Grandfather     Rheum arthritis Neg Hx     Psoriasis Neg Hx     Lupus Neg Hx     Kidney disease Neg Hx     Inflammatory bowel disease Neg Hx     Amblyopia Neg Hx     Blindness Neg Hx     Glaucoma Neg Hx     Retinal detachment Neg Hx     Strabismus Neg Hx        SOCIAL HISTORY:          Tobacco:   Social History     Tobacco Use   Smoking Status Former    Packs/day: 0.00    Years: 24.00    Pack years: 0.00     Types: Vaping with nicotine, Cigarettes    Start date: 1/1/1997   Smokeless Tobacco Never   Tobacco Comments    Handout provided for [employer]oking Cess[employer] program       alcohol use:    Social History     Substance and Sexual Activity   Alcohol Use Not Currently    Comment: 750 ml  once a month or so                   ALLERGIES:    Review of patient's allergies indicates:   Allergen Reactions    Dilaudid [hydromorphone] Other (See Comments)     IV dilaudid severely drops BP       CURRENT MEDICATIONS:    Current Outpatient Medications   Medication Sig Dispense Refill    acetaminophen (TYLENOL) 500 MG tablet Take 1,000 mg by mouth 2 (two) times daily as needed for Pain.      ascorbic acid, vitamin C, (VITAMIN C) 1000 MG tablet Take 1,000 mg by mouth once daily.      aspirin (ECOTRIN) 81 MG EC tablet Take 81 mg by mouth every evening.      aspirin/sod bicarb/citric acid (GIOVANY-SELTZER ORAL) Take 2 tablets by mouth daily as needed (Upset stomach).      atorvastatin (LIPITOR) 10 MG tablet Take 1 tablet (10 mg total) by mouth every other day. 45 tablet 3    blood-glucose transmitter (DEXCOM G6 TRANSMITTER) Cayla Use to check glucose - 90 day supply 1 each 11    busPIRone (BUSPAR) 15 MG tablet Take 1.5 tablets (22.5 mg total) by mouth 2 (two) times daily. 270 tablet 3    celecoxib (CELEBREX) 200 MG capsule Take 1 capsule (200 mg total) by mouth 2 (two) times daily. 60 capsule 1    diazePAM (VALIUM) 5 MG tablet Take 1 tablet (5 mg total) by mouth daily as needed for Anxiety (muscle spasms). 30 tablet 0    dicyclomine (BENTYL) 20 mg tablet Take 1 tablet (20 mg total) by mouth 3 (three) times daily as needed (abdominal pain). 60 tablet 2    dulaglutide (TRULICITY) 4.5 mg/0.5 mL pen injector Inject 4.5 mg into the skin every 7 days. 4 pen 1    DULoxetine (CYMBALTA) 60 MG capsule Take 1 capsule (60 mg total) by mouth 2 (two) times daily. 180 capsule 3    flash glucose scanning reader Misc Disp one reader to monitor glucose-  "covered by insurance dexcom 1 each 1    flash glucose sensor Kit Dispense sensor to read glucose  dexcom 12 kit 11    fluticasone propionate (FLONASE) 50 mcg/actuation nasal spray 2 sprays (100 mcg total) by Each Nostril route once daily. (Patient taking differently: 2 sprays by Each Nostril route daily as needed for Rhinitis.) 48 mL 2    gabapentin (NEURONTIN) 800 MG tablet Take 1 tablet (800 mg total) by mouth 3 (three) times daily. 270 tablet 3    hydrOXYzine HCL (ATARAX) 25 MG tablet Take 2 tablets (50 mg total) by mouth nightly as needed (insomnia). 180 tablet 0    insulin aspart U-100 (NOVOLOG) 100 unit/mL (3 mL) InPn pen Inject Between 50 to 70 units 3 times a day with meals. (Patient taking differently: Inject Between 50 to 55 units 3 times a day with meals.) 45 mL 6    insulin detemir U-100 (LEVEMIR FLEXTOUCH U-100 INSULN) 100 unit/mL (3 mL) InPn pen Inject 80 units into the skin daily, increase by 5 every 3 days if needed up to 100 units each day 90 mL 1    lanolin/mineral oil/petrolatum (ARTIFICIAL TEARS OPHT) Place 2 drops into both eyes daily as needed (Dry eye).      methylcellulose, laxative, (CITRUCEL) 500 mg Tab Take 1 tablet by mouth nightly as needed (Constipation).      metoclopramide HCl (REGLAN) 5 mg/5 mL Soln Take 5 mLs (5 mg total) by mouth 4 (four) times daily before meals and nightly. 500 mL 2    multivitamin capsule Take 1 capsule by mouth once daily.      NON FORMULARY MEDICATION Uses Cpap Machine nightly on a setting of 10      ondansetron (ZOFRAN-ODT) 4 MG TbDL Take 1 tablet (4 mg total) by mouth every 8 (eight) hours as needed (nausea). 30 tablet 5    ONETOUCH DELICA PLUS LANCET 33 gauge Misc Apply topically 3 (three) times daily.      ONETOUCH VERIO TEST STRIPS Strp 1 strip 3 (three) times daily.      pantoprazole (PROTONIX) 40 MG tablet Take 1 tablet (40 mg total) by mouth once daily. 90 tablet 3    pen needle, diabetic 31 gauge x 1/4" Ndle Use with flex pen 100 each 11    tiZANidine " (ZANAFLEX) 4 MG tablet Take 1 tablet (4 mg total) by mouth every 8 (eight) hours as needed (spasms). (Patient taking differently: Take 4 mg by mouth 2 (two) times daily as needed (spasms).) 270 tablet 0    traMADoL (ULTRAM) 50 mg tablet Take 1 tablet (50 mg total) by mouth every 8 (eight) hours as needed for Pain. 90 tablet 3    valsartan-hydrochlorothiazide (DIOVAN-HCT) 320-12.5 mg per tablet Take 1 tablet by mouth once daily. 90 tablet 3    vitamin D 1000 units Tab Take 5,000 Units by mouth 2 (two) times a day.       No current facility-administered medications for this visit.                      PHYSICAL EXAM:  There were no vitals taken for this visit.  GENERAL: Overweight body habitus, well groomed.  HEENT:   HEENT:  Conjunctivae are non-erythematous; Pupils equal, round, and reactive to light; Nose is symmetrical; Nasal mucosa is pink and moist; Septum is midline; Inferior turbinates are hypertrophied; Nasal airflow is normal; Posterior pharynx is pink; Modified Mallampati:III; Posterior palate is low; Tonsils not visualized; NECK: Supple. Neck circumference is 18 inches. No thyromegaly. No palpable nodes.     SKIN: On face and neck: No abrasions, no rashes, no lesions.  No subcutaneous nodules are palpable.  RESPIRATORY: Chest is clear to auscultation.  Normal chest expansion and non-labored breathing at rest.  CARDIOVASCULAR: Normal S1, S2.  No murmurs, gallops or rubs. No carotid bruits bilaterally.  No edema. No clubbing. No cyanosis.    NEURO: Oriented to time, place and person. Normal attention span and concentration. Gait normal.    PSYCH: Affect is full. Mood is normal  MUSCULOSKELETAL: Moves 4 extremities. Gait normal.           ASSESSMENT:    1. Sleep Apnea NEC. Previously diagnosed VICENTE; needs a new sleep study to qualify for the machine and supplies.  The patient symptomatically has  snoring, gasping for air, choking , excessive daytime sleepiness, and excessive daytime fatigue  with exam findings  of crowded airway, elevated BMI, and large neck size. The patient has medical co-morbidities of diabetes and hypertension  which can be worsened by VICENTE. This warrants further investigation for possible obstructive sleep apnea.              PLAN:    Diagnostic: Home Sleep Study. The nature of this procedure and its indication was discussed with the patient. We will notify the patient about sleep study resuts via My Chart. Once the study has resulted, if still qualifies - will order APAP 10-20         During our discussion today, we talked about the etiology of  VICENTE as well as the potential ramifications of untreated sleep apnea, which could include daytime sleepiness, hypertension, heart disease and/or stroke.  We discussed potential treatment options, which could include weight loss, body positioning, continuous positive airway pressure (CPAP), or referral for surgical consideration. Meanwhile, he  is urged to avoid supine sleep, weight gain and alcoholic beverages since all of these can worsen VICENTE.     The patient was given open opportunity to ask questions and/or express concerns about treatment plan. Two point patient identifier confirmed.       Precautions: The patient was advised to abstain from driving should he feel sleepy or drowsy.    Follow up: MD after the sleep study has been completed.     31-minute visit. >50% spent counseling patient and coordination of care.  The patient was  cautioned against drowsy driving.

## 2022-12-29 ENCOUNTER — TELEPHONE (OUTPATIENT)
Dept: SLEEP MEDICINE | Facility: OTHER | Age: 47
End: 2022-12-29
Payer: MEDICAID

## 2023-01-01 ENCOUNTER — PATIENT MESSAGE (OUTPATIENT)
Dept: OTHER | Facility: OTHER | Age: 48
End: 2023-01-01
Payer: MEDICAID

## 2023-01-03 ENCOUNTER — OFFICE VISIT (OUTPATIENT)
Dept: CARDIOLOGY | Facility: CLINIC | Age: 48
End: 2023-01-03
Payer: MEDICAID

## 2023-01-03 ENCOUNTER — TELEPHONE (OUTPATIENT)
Dept: SLEEP MEDICINE | Facility: OTHER | Age: 48
End: 2023-01-03
Payer: MEDICAID

## 2023-01-03 VITALS
OXYGEN SATURATION: 98 % | BODY MASS INDEX: 52.35 KG/M2 | RESPIRATION RATE: 20 BRPM | SYSTOLIC BLOOD PRESSURE: 128 MMHG | HEART RATE: 96 BPM | DIASTOLIC BLOOD PRESSURE: 72 MMHG | WEIGHT: 315 LBS

## 2023-01-03 DIAGNOSIS — R00.2 PALPITATIONS: ICD-10-CM

## 2023-01-03 DIAGNOSIS — R07.89 CHEST PAIN, ATYPICAL: ICD-10-CM

## 2023-01-03 DIAGNOSIS — I10 BENIGN ESSENTIAL HYPERTENSION: Primary | ICD-10-CM

## 2023-01-03 PROCEDURE — 3072F PR LOW RISK FOR RETINOPATHY: ICD-10-PCS | Mod: CPTII,,, | Performed by: INTERNAL MEDICINE

## 2023-01-03 PROCEDURE — 99999 PR PBB SHADOW E&M-EST. PATIENT-LVL V: CPT | Mod: PBBFAC,,, | Performed by: INTERNAL MEDICINE

## 2023-01-03 PROCEDURE — 99214 OFFICE O/P EST MOD 30 MIN: CPT | Mod: S$PBB,,, | Performed by: INTERNAL MEDICINE

## 2023-01-03 PROCEDURE — 3008F PR BODY MASS INDEX (BMI) DOCUMENTED: ICD-10-PCS | Mod: CPTII,,, | Performed by: INTERNAL MEDICINE

## 2023-01-03 PROCEDURE — 1159F PR MEDICATION LIST DOCUMENTED IN MEDICAL RECORD: ICD-10-PCS | Mod: CPTII,,, | Performed by: INTERNAL MEDICINE

## 2023-01-03 PROCEDURE — 99999 PR PBB SHADOW E&M-EST. PATIENT-LVL V: ICD-10-PCS | Mod: PBBFAC,,, | Performed by: INTERNAL MEDICINE

## 2023-01-03 PROCEDURE — 99214 PR OFFICE/OUTPT VISIT, EST, LEVL IV, 30-39 MIN: ICD-10-PCS | Mod: S$PBB,,, | Performed by: INTERNAL MEDICINE

## 2023-01-03 PROCEDURE — 3008F BODY MASS INDEX DOCD: CPT | Mod: CPTII,,, | Performed by: INTERNAL MEDICINE

## 2023-01-03 PROCEDURE — 3072F LOW RISK FOR RETINOPATHY: CPT | Mod: CPTII,,, | Performed by: INTERNAL MEDICINE

## 2023-01-03 PROCEDURE — 3074F PR MOST RECENT SYSTOLIC BLOOD PRESSURE < 130 MM HG: ICD-10-PCS | Mod: CPTII,,, | Performed by: INTERNAL MEDICINE

## 2023-01-03 PROCEDURE — 3078F DIAST BP <80 MM HG: CPT | Mod: CPTII,,, | Performed by: INTERNAL MEDICINE

## 2023-01-03 PROCEDURE — 99215 OFFICE O/P EST HI 40 MIN: CPT | Mod: PBBFAC,PO | Performed by: INTERNAL MEDICINE

## 2023-01-03 PROCEDURE — 3074F SYST BP LT 130 MM HG: CPT | Mod: CPTII,,, | Performed by: INTERNAL MEDICINE

## 2023-01-03 PROCEDURE — 3078F PR MOST RECENT DIASTOLIC BLOOD PRESSURE < 80 MM HG: ICD-10-PCS | Mod: CPTII,,, | Performed by: INTERNAL MEDICINE

## 2023-01-03 PROCEDURE — 1159F MED LIST DOCD IN RCRD: CPT | Mod: CPTII,,, | Performed by: INTERNAL MEDICINE

## 2023-01-03 NOTE — PROGRESS NOTES
Subjective:    Patient ID:  Kirill Gandhi III is a 47 y.o. male who presents for follow-up of No chief complaint on file.      HPI    Referred by Dr Ibarra   Kirill Gandhi III is a 46 y.o. male video visit for preoperative clearance revision of surgical procedure and fusion of SI joint as well as diabetes follow-up.  Currently is without any cardiac symptoms such as chest pain palpitations shortness of breath PND orthopnea.  He has new respiratory illnesses.  No cough cold congestion fever chills nausea vomiting or urinary symptoms.  Diabetes-recently increased from 1.5-3 milligram Trulicity weekly.  Glucose readings have been running in the low 200s to high 106. Hemoglobin A1c was over 11 now 8.711 days ago-experiencing better control than previous.  Continues to struggle with appropriate diet-ingestion of a significant amount of carbohydrates.       EKG 3/7/22 NSR NSSTT changes - anterolateral and inferior TWI - new from 2021     PET stress 4/8/22    There are no clinically significant perfusion abnormalities. There is a small to moderate (10-15%) amount of mild resting heterogeneity in the anterior and apical wall(s) that improves with stress consisent with endothelial dysfunction secondary to diabetes.    The whole heart absolute myocardial perfusion values averaged 0.49 cc/min/g at rest, which is reduced; 1.17 cc/min/g at stress, which is mildly reduced; and CFR is 2.41 , which is low normal.    CT attenuation images demonstrate no coronary calcifications and no aortic calcifications.    The gated perfusion images showed an ejection fraction of 58% at rest and 66% during stress. A normal ejection fraction is greater than 53%.    The wall motion is normal at rest and during stress.    The LV cavity size is normal at rest and stress.    The EKG portion of this study is negative for ischemia.    There were no arrhythmias during stress.    The patient reported no chest pain during the stress  test.    There are no prior studies for comparison.        Stress echo 4/14/21   The stress echo portion of this study is negative for myocardial ischemia. Target heart rate was achieved.  The ECG portion of this study is negative for myocardial ischemia.  There were no arrhythmias during stress.  The test was stopped because the patient experienced fatigue.  The patient's exercise capacity was below average. Achieved 8 METs.  The left ventricle is normal in size with normal systolic function. The estimated ejection fraction is 60%.  Normal left ventricular diastolic function.  Normal right ventricular size with low normal right ventricular systolic function.  The estimated PA systolic pressure is 35 mmHg.  Normal central venous pressure (3 mmHg).     Holter 4/12/21  Baseline rhythm was normal sinus rhythm with normal intervals and an average heart rate of 76 bpm.  There was one episode with reported symtpoms of nausea, tiredness, and fatigue. This episode corresponded to normal sinus rhythm.  There were no atrial or ventricular arrhythmias.     3/10/22 Needs clearance for spine surgery revision. Activity limited. Mild HOLMAN. Denies CP   PET stress for new EKG changes - will clear for spine surgery if ok     4/12/22 Denies CP, mild stable HOLMAN  BP controlled  Cleared for spine surgery at low cardiac risk  Continue Rx for  HTN, HLD  OV 6 months    1/3/23 Denies CP or SOB. Gets palpitations about once a week - last 20 secinds  BP controlled    Review of Systems   Constitutional: Negative for decreased appetite.   HENT:  Negative for ear discharge.    Eyes:  Negative for blurred vision.   Endocrine: Negative for polyphagia.   Skin:  Negative for nail changes.   Genitourinary:  Negative for bladder incontinence.   Neurological:  Negative for aphonia.   Psychiatric/Behavioral:  Negative for hallucinations.    Allergic/Immunologic: Negative for hives.      Objective:    Physical Exam  Constitutional:       Appearance: He is  well-developed.   HENT:      Head: Normocephalic and atraumatic.   Eyes:      Conjunctiva/sclera: Conjunctivae normal.      Pupils: Pupils are equal, round, and reactive to light.   Cardiovascular:      Rate and Rhythm: Normal rate.      Pulses: Intact distal pulses.      Heart sounds: Normal heart sounds.   Pulmonary:      Effort: Pulmonary effort is normal.      Breath sounds: Normal breath sounds.   Abdominal:      General: Bowel sounds are normal.      Palpations: Abdomen is soft.   Musculoskeletal:         General: Normal range of motion.      Cervical back: Normal range of motion and neck supple.   Skin:     General: Skin is warm and dry.   Neurological:      Mental Status: He is alert and oriented to person, place, and time.         Assessment:       1. Benign essential hypertension    2. Palpitations    3. Chest pain, atypical         Plan:       Discussed event monitor and BB for palpitations - he feels that they are mild and would like to hold off  Continue Rx for  HTN, HLD  OV 1 year

## 2023-01-05 ENCOUNTER — TELEPHONE (OUTPATIENT)
Dept: SLEEP MEDICINE | Facility: OTHER | Age: 48
End: 2023-01-05
Payer: MEDICAID

## 2023-01-09 ENCOUNTER — CLINICAL SUPPORT (OUTPATIENT)
Dept: REHABILITATION | Facility: OTHER | Age: 48
End: 2023-01-09
Attending: NEUROLOGICAL SURGERY
Payer: MEDICAID

## 2023-01-09 DIAGNOSIS — M25.69 DECREASED ROM OF TRUNK AND BACK: ICD-10-CM

## 2023-01-09 DIAGNOSIS — M54.42 CHRONIC BILATERAL LOW BACK PAIN WITH BILATERAL SCIATICA: ICD-10-CM

## 2023-01-09 DIAGNOSIS — Z98.1 S/P LUMBAR SPINAL FUSION: ICD-10-CM

## 2023-01-09 DIAGNOSIS — R29.898 DECREASED STRENGTH OF TRUNK AND BACK: ICD-10-CM

## 2023-01-09 DIAGNOSIS — M54.41 CHRONIC BILATERAL LOW BACK PAIN WITH BILATERAL SCIATICA: ICD-10-CM

## 2023-01-09 DIAGNOSIS — Z98.1 S/P LUMBAR FUSION: ICD-10-CM

## 2023-01-09 DIAGNOSIS — M51.36 DDD (DEGENERATIVE DISC DISEASE), LUMBAR: Primary | ICD-10-CM

## 2023-01-09 DIAGNOSIS — G89.29 CHRONIC BILATERAL LOW BACK PAIN WITH BILATERAL SCIATICA: ICD-10-CM

## 2023-01-09 PROCEDURE — 97110 THERAPEUTIC EXERCISES: CPT

## 2023-01-09 PROCEDURE — 97162 PT EVAL MOD COMPLEX 30 MIN: CPT

## 2023-01-09 NOTE — PLAN OF CARE
[facility] BACK - PHYSICAL THERAPY LUMBAR EVALUATION     [facility]  [facility] Number: [id number]    Therapy Diagnosis:   Encounter Diagnoses   Name Primary?    S/P lumbar spinal fusion     S/P lumbar fusion     DDD (degenerative disc disease), lumbar Yes    Chronic bilateral low back pain with bilateral sciatica     Decreased strength of trunk and back     Decreased ROM of trunk and back      Physician: Jose L Brown MD    Physician Orders: PT Eval and Treat  Medical Diagnosis from Referral: Z98.1 (ICD-10-CM) - S/P lumbar fusion  Evaluation Date: 1/9/2023  Authorization Period Expiration: 11/15/2023  Plan of Care Expiration: 4/9/2023  Reassessment Due: 2/9/2023  Visit # / Visits authorized: 1/ 20    Time In: 1030  Time Out: 1200  Total Billable Time: 90 minutes  Insurance:Fee for service Insurance Patient      Precautions: Standard and Diabetes    Pattern of pain determined: 1PEN      Subjective   Date of onset: Over 4 years  History of current condition - Kirill reports: Pt is a 47 y.o. y.o male  presenting with c.o chronic LBP. Pt states that he had a lumbar/SI fusion in April 2022. Pt reports having difficulty with hygiene after having bowel movements, difficulty with standing for long periods of time for household ADLS such as loading the , cooking, getting food out the fridge, and with cleaning. Pt states that he could stand for about 5 minutes before he starts feeling an onset of symptoms       Medical History:   Past Medical History:   Diagnosis Date    Allergy     Anxiety     Back pain     Chronic bilateral low back pain with bilateral sciatica 12/14/2021    CTS (carpal tunnel syndrome) 06/26/2015    CTS (carpal tunnel syndrome) 6/26/2015    Depression     Diabetes 04/17/2015    Diabetes mellitus with insulin therapy     Elevated sed rate 6/26/2015    GERD (gastroesophageal reflux disease) 10/19/2022    Hx of vasectomy     Hypertension     Morbid obesity 4/16/2015     OCD (obsessive compulsive disorder)     Sleep apnea     Type 2 diabetes mellitus with hyperglycemia, with long-term current use of insulin 10/17/2022       Surgical History:   Kirill Gandhi III  has a past surgical history that includes Tonsillectomy; Adenoidectomy; Fracture surgery; Hernia repair; Epidural steroid injection (Right, 09/04/2019); Vasectomy; Lumbar laminectomy (Bilateral, 11/29/2019); Fusion of lumbar spine by anterior approach (Left, 04/26/2021); Fusion of spine with instrumentation (N/A, 04/26/2021); Injection of steroid (Bilateral, 12/13/2021); Fusion of spine with instrumentation (N/A, 04/13/2022); Fusion of sacroiliac joint (Bilateral, 04/13/2022); removal implant, posterior segment, intraocular (04/13/2022); Spine surgery; Colonoscopy (N/A, 9/6/2022); and Esophagogastroduodenoscopy (N/A, 10/31/2022).    Medications:   Kirill has a current medication list which includes the following prescription(s): acetaminophen, ascorbic acid (vitamin c), aspirin, aspirin/sod bicarb/citric acid, atorvastatin, dexcom g6 transmitter, buspirone, celecoxib, diazepam, dicyclomine, trulicity, duloxetine, flash glucose scanning reader, flash glucose sensor, fluticasone propionate, gabapentin, hydroxyzine hcl, insulin aspart u-100, levemir flextouch u-100 insuln, lanolin/mineral oil/petrolatum, citrucel, metoclopramide hcl, multivitamin, NON FORMULARY MEDICATION, ondansetron, onetouch delica plus lancet, onetouch verio test strips, pantoprazole, pen needle, diabetic, tizanidine, tramadol, valsartan-hydrochlorothiazide, vitamin d, and [DISCONTINUED] fluvoxamine.    Allergies:   Review of patient's allergies indicates:   Allergen Reactions    Dilaudid [hydromorphone] Other (See Comments)     IV dilaudid severely drops BP        Imaging: see chart    Prior Therapy: Yes, for low back  Prior Treatment: Multiple lumbar fusions  Social History: Lives in a house lives with their family  Occupation:  Unemployed  Leisure: Watch movies, play videos      Prior Level of Function:   Current Level of Function: Difficulty with bathroom hygiene (wiping)  DME owned/used: Back brace, walking stick    Pain:  Current 2/10, worst 10/10, best 0/10   Location: bilateral back   Description: Aching  Aggravating Factors: Standing and sitting on chairs without back support  Easing Factors: lying down  Disturbed Sleep: Yes      Pattern of pain questions:  1.  Where is your pain the worst? At the low back   2.  Is your pain constant or intermittent? Vanessa  3.  Does bending forward make your typical pain worse? Yes  4.  Since the start of your back pain, has there been a change in your bowel or bladder? None  5.  What can't you do now that you use to be able to do? Household ADLs, hygiene    Pts goals: Increase flexibility in other areas, be able to go for walks with minimal pain      Red Flag Screening:   Cough  Sneeze  Strain: (--)  Bladder/ bowel: (--)  Falls: (--)  Night pain: (+)  Unexplained weight loss: (--)  General health: Fair-Poor    OBJECTIVE     Postural examination/scapula alignment: Rounded shoulder, Head forward, Posterior pelvic tilt, Abnormal trunk flexion, Slouched posture, and Decreased lordosis  Joint integrity: Soft tissue approximation  Skin integrity: Intact  Edema: None noted secondary to large body habitus  Sitting: Sacral sitting  Standing: FHP, rounded shoulders, decreased lumbar lordosis  Correction of posture: better with lumbar roll    MOVEMENT LOSS    ROM Loss   Flexion major loss   Extension major loss   Side glide Right major loss   Side glide Left major loss   Rotation Right major loss   Rotation Left major loss     Lower Extremity Strength  Right LE  Left LE    Hip flexion: 5/5 Hip flexion: 5/5   Hip extension:  3-/5 Hip extension: 3-/5   Hip abduction: 3-/5 Hip abduction: 3-/5   Hip adduction:  5/5 Hip adduction:  5/5   Hip External Rotation 4/5 Hip External Rotation 4/5   Hip Internal  rotation   4/5 Hip Internal rotation 4/5   Knee Flexion 4+/5 Knee Flexion 4+/5   Knee Extension 5/5 Knee Extension 5/5   Ankle dorsiflexion: 4/5 Ankle dorsiflexion: 4/5   Ankle plantarflexion: 4/5 Ankle plantarflexion: 4/5       GAIT:  Assistive Device used: none  Level of Assistance: independent  Patient displays the following gait deviations:  antalgic gait.     Special Tests:   Test Name  Test Result   Prone Instability Test NT   SI Joint Provocation Test NT   Straight Leg Raise (+)   Neural Tension Test (+)   Crossed Straight Leg Raise NT   Walking on toes (-)   Walking on heels  (--)       NEUROLOGICAL SCREENING     Sensory deficit: Intact    Reflexes:    Left Right   Patella Tendon 2+ 2+   Achilles Tendon 2+ absent   Babinski  (--) (--)   Clonus (--) (--)     REPEATED TEST MOVEMENTS:    Baseline symptoms:  Repeated Flexion in Standing worse   Repeated Extension in Standing end range pain  worse   Repeated Flexion in lying end range pain   Repeated Extension in lying  end range pain  better       STATIC TESTS and other movements:   Baseline symptoms:  Prone lie no effect   Prone lie on elbows better   Sustained prone with  using mat end range pain  no worse   Sustained flexion worse   Sitting slouched  worse   Sitting erect better   Standing slouched NT   Standing erect  NT   Lying prone in extension  NT   Long sitting   NT       Baseline Isometric Testing on Med X equipment: Testing administered by PT  Date of testin2023  ROM 0-24 deg   Max Peak Torque 72    Min Peak Torque 13    Flex/Ext Ratio 5.54   % below normative data 79         Limitation/Restriction for FOTO Lumbar Survey    Therapist reviewed FOTO scores for Kirill Riggsford III on 2023.   FOTO documents entered into [employer] - see Media section.    Limitation Score: 71%             Treatment   Treatment Time In: 1130  Treatment Time Out: 1200  Total Treatment time separate from Evaluation: 30 minutes      Kirill received therapeutic  "exercises to develop/improve posture, lumbar/cervical ROM, strength and muscular endurance for 30 minutes including the following exercises:     Modified piriformis stretch 2x30" each side  Prone on elbows 3'    [facility] 1/9/2023   Lumbar Extension Seat Pad 0   Femur Restraint 7   Top Dead Center 24   Counterweight 395   Lumbar Flexion 24   Lumbar Extension 0   Lumbar Peak Torque 72   Min Torque 13   Test Percent Below Normative Data 79   Lumbar Weight 60   Repetitions 5   Rating of Perceived Exertion 3   Ice - Z Lie (in min.) 5         Written Home Exercises Provided: yes.  Exercises were reviewed and Kirill was able to demonstrate them prior to the end of the session.  Kirill demonstrated good  understanding of the education provided.     See EMR under Patient Instructions for exercises provided 1/9/2023.      Education provided:   - Patient received education regarding proper posture and body mechanics.  Patient was given top [facility] Visit 1 handouts which discuss what to expect in therapy, the purpose and opportunity for health coaching, the program,  wellness when discharged from therapy, back education and care specifically for posture seated, standing, lifting correctly, components of exercise, importance of nutrition and hydration, and importance of sleep.   Information on lumbar rolls provided.  - Laura roll tried, recommended, and purchase information was provided.    - Patient received a handout regarding anticipated muscular soreness following the isometric test and strategies for management were reviewed with patient including stretching, using ice and scheduled rest.   - Patient received education on the [facility] program, purpose of the isometric test, progression of back strengthening as well as wellness approach and systemic strengthening.  Details of the program were discussed.  Reviewed that patient should feel support/pressure from med ex restraints but no pain or " discomfort and patient expressed understanding.  Med x dynamic exercise and baseline IM test performed with instructions to guide the patient safely through the isometric testing.  Patient informed to perform isometric test correctly, and safely, building best force they safely can and not pushing through pain.  Patient demonstrated understanding of information      Kirill received cold pack for 5 minutes to lumbar spine.    Assessment   Kirill is a 47 y.o. male referred to [facility] with a medical diagnosis of Z98.1 (ICD-10-CM) - S/P lumbar fusion. Pt presents with decreased lumbar mobility in all directions, decreased trunk strength, and pain with household ADLs secondary to lumbar dysfunction. Pt's MedX testing reveals decreased torque production at the lumbar spine that is 79% below that of normative data. Pt is appropriate for PT services at this time in order to restore spine/hip mobility and strength.      Pain Pattern: 1PEN       Pt prognosis is Fair.   Pt will benefit from skilled outpatient Physical Therapy to address the deficits stated above and in the chart below, provide pt/family education, and to maximize pt's level of independence. Based on the above history and physical examination an active physical therapy program is recommended.  Pt will continue to benefit from skilled outpatient physical therapy to address the deficits listed below in the chart, provide pt/family education and to maximize pt's level of independence in the home and community environment. .       Plan of care discussed with patient: Yes  Pt's spiritual, cultural and educational needs considered and patient is agreeable to the plan of care and goals as stated below:     Anticipated Barriers for therapy: chronicity of symptoms, high BMI, lumbar surgeries, anxiety, diabetes    PT Evaluation Completed? Yes    Medical Necessity is demonstrated by the following  History  Co-morbidities and personal factors that may impact the  plan of care Co-morbidities:   anxiety, diabetes, difficulty sleeping, high BMI and prior lumbar surgery     Personal Factors:   no deficits       high   Examination  Body Structures and Functions, activity limitations and participation restrictions that may impact the plan of care Body Regions:   back     Body Systems:    ROM  strength  gait  transfers     Participation Restrictions:   none     Activity limitations:   Learning and applying knowledge  no deficits     General Tasks and Commands  no deficits     Communication  no deficits     Mobility  lifting and carrying objects  walking     Self care  looking after one's health     Domestic Life  shopping  doing house work (cleaning house, washing dishes, laundry)     Interactions/Relationships  no deficits     Life Areas  employment     Community and Social Life  community life  recreation and leisure             moderate   Clinical Presentation evolving clinical presentation with changing clinical characteristics moderate   Decision Making/ Complexity Score: moderate        GOALS: Pt is in agreement with the following goals.    Short term goals:  6 weeks or 10 visits   1.  Pt will demonstrate increased lumbar ROM by at least 5 degrees from the initial ROM value with improvements noted in functional ROM and ability to perform ADLs.  (approp and ongoing)  2.  Pt will demonstrate increased MedX average isometric strength value  by 10% from initial test resulting in improved ability to perform bending, lifting, and carrying activities safely, confidently.  (approp and ongoing)  3.  Patient report a reduction in worst pain score by 1-2 points for improved tolerance for household ADLs.  (approp and ongoing)  4.  Pt able to perform HEP correctly with minimal cueing or supervision from therapist to encourage independent management of symptoms. (approp and ongoing)      Long term goals: 10 weeks or 20 visits   1. Pt will demonstrate increased lumbar ROM by at least 10  "degrees from initial ROM value, resulting in improved ability to perform functional fwd bending while standing and sitting. (approp and ongoing)  2. Pt will demonstrate increased MedX average isometric strength value  by 30% from initial test resulting in improved ability to perform bending, lifting, and carrying activities safely, confidently.  (approp and ongoing)  3. Pt to demonstrate ability to independently control and reduce their pain through posture positioning and mechanical movements throughout a typical day.  (approp and ongoing)  4.  Pt will demonstrate reduced pain and improved functional outcomes as reported on the FOTO by reaching a limitation score of < or = 50% or less in order to demonstrate subjective improvement in pt's condition.    (approp and ongoing)  5. Pt will demonstrate independence with the HEP at discharge  (approp and ongoing)  6.  Pt to be able to walk a block around his neighborhood with max pain 3/10 in order to demonstrate community ambulation for weight loss goals.  (approp and ongoing)      Plan   Outpatient physical therapy 2x week for 10 weeks or 20 visits to include the following:   - Patient education  - Therapeutic exercise  - Manual therapy  - Performance testing   - Neuromuscular Re-education  - Therapeutic activity   - Modalities    Pt may be seen by PTA as part of the rehabilitation team.     Therapist: Jesus Alberto Barroso, PT  1/9/2023    "I certify the need for these services furnished under this plan of treatment and while under my care."    ____________________________________  Physician/Referring Practitioner    _______________  Date of Signature        "

## 2023-01-10 NOTE — PROGRESS NOTES
[facility]      [facility]  [facility] [id number]    Therapy Diagnosis:   Encounter Diagnoses   Name Primary?    Decreased strength of trunk and back Yes    Chronic bilateral low back pain with bilateral sciatica     DDD (degenerative disc disease), lumbar     Decreased ROM of trunk and back      Physician: Jose L Brown MD    Visit Date: 2023    Physician Orders: PT Eval and Treat  Medical Diagnosis from Referral: Z98.1 (ICD-10-CM) - S/P lumbar fusion  Evaluation Date: 2023  Authorization Period Expiration: 11/15/2023  Plan of Care Expiration: 2023  Reassessment Due: 2023  Visit # / Visits authorized:  (No lap belt)     Time In: 10:55 AM  Time Out: 11:55 AM  Total Billable Time: 55 minutes  Insurance:Fee for service Insurance Patient      Precautions: Standard and Diabetes     Pattern of pain determined: 1PEN    Subjective   Kirill reports some residual soreness after the initial evaluation. He reports generalized chronic lower back pain and stiffness.  States that he was able to perform HEP provided on eval without much difficulty. Also states that he never received therapy after his spinal fusion surgery revision last year.     Patient reports tolerating previous visit : Some residual muscular soreness after the initial eval.  Patient reports their pain to be 2/10 on a 0-10 scale with 0 being no pain and 10 being the worst pain imaginable.  Pain Location: bilateral back      Work and leisure: Unemployed/ Watch movies, play videos      Pt goals: Increase flexibility in other areas, be able to go for walks with minimal pain    Objective     Baseline Isometric Testing on Med X equipment: Testing administered by PT  Date of testin2023  ROM 0-24 deg   Max Peak Torque 72    Min Peak Torque 13    Flex/Ext Ratio 5.54   % below normative data 79     Outcomes:  Initial score: 71% limitation  Visit 5 score:  Goal:      Treatment   "  Pt was instructed in and performed the following:     Kirill received therapeutic exercises to develop/improved posture, cardiovascular endurance, muscular endurance, lumbar/cervical ROM, strength and muscular endurance for 55 minutes including the following exercises:     LTR x 10  +DKTC w/T-ball x 10  Modified piriformis stretch w/towel assist 3 x 20" each side  +PPT x 10  +PPT w/BKFO RTB x 10  Prone on elbows 2 minutes  +SOC x 10 (Min verbal and tactile cues)     [facility] - Short 1/12/2023   Visit Number 2   VAS Pain Rating 2   Time 5   Lumbar Stretches - Slouch 10   Extension in Lying 2   Flexion in Lying 10   Lumbar Extension - Seat Pad -   Femur Restraint -   Top Dead Center -   Counterweight -   Lumbar Flexion -   Lumbar Extension -   Lumbar Peak Torque -   Lumbar Weight 30   Repetitions 20   Rating of Perceived Exertion 3        Peripheral muscle strengthening which included 1 set of 15-20 repetitions at a slow, controlled 10-13 second per rep pace focused on strengthening supporting musculature for improved body mechanics and functional mobility.  Pt and therapist focused on proper form during treatment to ensure optimal strengthening of each targeted muscle group.  Machines were utilized including torso rotation, leg extension, leg curl, chest press, upright row. Tricep extension, bicep curl, leg press, and hip abduction added visit 3    Kirill received the following manual therapy techniques: NP      Home Exercises Provided and Patient Education Provided   Home exercises include:Modified piriformis stretch, EIL  Cardio program:visit 5  Lifting education date:visit 11  Posture/Lumbar roll: uses pillow at home  Fridge Magnet Discharge handout (date given):    Education provided:   - cues w/ex's  - Pacing with MedX    Written Home Exercises Provided: Patient instructed to cont prior HEP.  Added PPT, BKFO, SOC 1/12/23  Exercises were reviewed and Kirill was able to demonstrate them prior to the end " of the session.  Kirill demonstrated good  understanding of the education provided.     See EMR under Patient Instructions for exercises provided prior visit.    Assessment   Kirill returns for His 2nd [facility] visit with mild report of chronic lower back discomfort. Treatment consisted of a review and performance of HEP provided on the eval along with additional ex's to include: PPT, BKFO and SOC. He was able to perform all with minimal cues and without increased pain. Patient's Lumbar MedX resistance was initiated at 30 ft/lbs (< 50% Max Peak torque due to high flex/ext ratio) and he completed 20 reps with a RPE = 3/10. Will look to progress resistance 10-15% next visit. Pt was also able to complete half of the peripheral strengthening exercises without increased discomfort and will plan to complete the full circuit next visit as tolerated.    Patient is making  progress towards established goals.  Pt will continue to benefit from skilled outpatient physical therapy to address the deficits stated in the impairment chart, provide pt/family education and to maximize pt's level of independence in the home and community environment.     Anticipated Barriers for therapy: chronicity of symptoms, high BMI, lumbar surgeries, anxiety, diabetes  Pt's spiritual, cultural and educational needs considered and pt agreeable to plan of care and goals as stated below:     GOALS: Pt is in agreement with the following goals.     Short term goals:  6 weeks or 10 visits   1.  Pt will demonstrate increased lumbar ROM by at least 5 degrees from the initial ROM value with improvements noted in functional ROM and ability to perform ADLs.  (approp and ongoing)  2.  Pt will demonstrate increased MedX average isometric strength value  by 10% from initial test resulting in improved ability to perform bending, lifting, and carrying activities safely, confidently.  (approp and ongoing)  3.  Patient report a reduction in worst pain score by  1-2 points for improved tolerance for household ADLs.  (approp and ongoing)  4.  Pt able to perform HEP correctly with minimal cueing or supervision from therapist to encourage independent management of symptoms. (approp and ongoing)      Long term goals: 10 weeks or 20 visits   1. Pt will demonstrate increased lumbar ROM by at least 10 degrees from initial ROM value, resulting in improved ability to perform functional fwd bending while standing and sitting. (approp and ongoing)  2. Pt will demonstrate increased MedX average isometric strength value  by 30% from initial test resulting in improved ability to perform bending, lifting, and carrying activities safely, confidently.  (approp and ongoing)  3. Pt to demonstrate ability to independently control and reduce their pain through posture positioning and mechanical movements throughout a typical day.  (approp and ongoing)  4.  Pt will demonstrate reduced pain and improved functional outcomes as reported on the FOTO by reaching a limitation score of < or = 50% or less in order to demonstrate subjective improvement in pt's condition.    (approp and ongoing)  5. Pt will demonstrate independence with the HEP at discharge  (approp and ongoing)  6.  Pt to be able to walk a block around his neighborhood with max pain 3/10 in order to demonstrate community ambulation for weight loss goals.  (approp and ongoing)    Plan   Continue with established Plan of Care towards established PT goals.     Therapist: Jeffrey Smalls, PTA  1/10/2023

## 2023-01-11 ENCOUNTER — PATIENT MESSAGE (OUTPATIENT)
Dept: SLEEP MEDICINE | Facility: CLINIC | Age: 48
End: 2023-01-11

## 2023-01-11 DIAGNOSIS — G47.33 OSA (OBSTRUCTIVE SLEEP APNEA): Primary | ICD-10-CM

## 2023-01-12 ENCOUNTER — CLINICAL SUPPORT (OUTPATIENT)
Dept: REHABILITATION | Facility: OTHER | Age: 48
End: 2023-01-12
Attending: NEUROLOGICAL SURGERY
Payer: MEDICAID

## 2023-01-12 DIAGNOSIS — R29.898 DECREASED STRENGTH OF TRUNK AND BACK: Primary | ICD-10-CM

## 2023-01-12 DIAGNOSIS — M54.42 CHRONIC BILATERAL LOW BACK PAIN WITH BILATERAL SCIATICA: ICD-10-CM

## 2023-01-12 DIAGNOSIS — G89.29 CHRONIC BILATERAL LOW BACK PAIN WITH BILATERAL SCIATICA: ICD-10-CM

## 2023-01-12 DIAGNOSIS — M51.36 DDD (DEGENERATIVE DISC DISEASE), LUMBAR: ICD-10-CM

## 2023-01-12 DIAGNOSIS — M54.41 CHRONIC BILATERAL LOW BACK PAIN WITH BILATERAL SCIATICA: ICD-10-CM

## 2023-01-12 DIAGNOSIS — M25.69 DECREASED ROM OF TRUNK AND BACK: ICD-10-CM

## 2023-01-12 PROCEDURE — 97110 THERAPEUTIC EXERCISES: CPT | Mod: CQ

## 2023-01-20 ENCOUNTER — CLINICAL SUPPORT (OUTPATIENT)
Dept: REHABILITATION | Facility: OTHER | Age: 48
End: 2023-01-20
Attending: NEUROLOGICAL SURGERY
Payer: MEDICAID

## 2023-01-20 DIAGNOSIS — M54.42 CHRONIC BILATERAL LOW BACK PAIN WITH BILATERAL SCIATICA: ICD-10-CM

## 2023-01-20 DIAGNOSIS — M25.69 DECREASED ROM OF TRUNK AND BACK: ICD-10-CM

## 2023-01-20 DIAGNOSIS — G89.29 CHRONIC BILATERAL LOW BACK PAIN WITH BILATERAL SCIATICA: ICD-10-CM

## 2023-01-20 DIAGNOSIS — M51.36 DDD (DEGENERATIVE DISC DISEASE), LUMBAR: ICD-10-CM

## 2023-01-20 DIAGNOSIS — M54.41 CHRONIC BILATERAL LOW BACK PAIN WITH BILATERAL SCIATICA: ICD-10-CM

## 2023-01-20 DIAGNOSIS — R29.898 DECREASED STRENGTH OF TRUNK AND BACK: Primary | ICD-10-CM

## 2023-01-20 PROCEDURE — 97110 THERAPEUTIC EXERCISES: CPT

## 2023-01-20 NOTE — PROGRESS NOTES
[facility]      [facility]  [facility] Number: [id number]    Therapy Diagnosis:   Encounter Diagnoses   Name Primary?    Decreased strength of trunk and back Yes    Chronic bilateral low back pain with bilateral sciatica     DDD (degenerative disc disease), lumbar     Decreased ROM of trunk and back        Physician: Jose L Brown MD    Visit Date: 2023    Physician Orders: PT Eval and Treat  Medical Diagnosis from Referral: Z98.1 (ICD-10-CM) - S/P lumbar fusion  Evaluation Date: 2023  Authorization Period Expiration: 11/15/2023  Plan of Care Expiration: 2023  Reassessment Due: 2023  Visit # / Visits authorized: 3/ 20 (No lap belt)     Time In: 2:00 PM  Time Out: 3:00 PM  Total Billable Time: 55 minutes  Insurance:Fee for service Insurance Patient      Precautions: Standard and Diabetes     Pattern of pain determined: 1PEN    Subjective   Kirill reports he was a little sore after his first follow up, but not for more than a day. Back feels about the same.     Patient reports tolerating previous visit : Some residual muscular soreness after the initial eval.  Patient reports their pain to be 2/10 on a 0-10 scale with 0 being no pain and 10 being the worst pain imaginable.  Pain Location: bilateral back      Work and leisure: Unemployed/ Watch movies, play videos      Pt goals: Increase flexibility in other areas, be able to go for walks with minimal pain    Objective     Baseline Isometric Testing on Med X equipment: Testing administered by PT  Date of testin2023  ROM 0-24 deg   Max Peak Torque 72    Min Peak Torque 13    Flex/Ext Ratio 5.54   % below normative data 79     Outcomes:  Initial score: 71% limitation  Visit 5 score:  Goal:      Treatment    Pt was instructed in and performed the following:     Kirill received therapeutic exercises to develop/improved posture, cardiovascular endurance, muscular endurance, lumbar/cervical ROM,  "strength and muscular endurance for 55 minutes including the following exercises:     LTR x 10  DKTC w/T-ball x 10  Modified piriformis stretch w/towel assist 3 x 20" each side  PPT 15x5"  PPT w/BKFO +GTB x 10  +Bridging GTB x15  Prone on elbows 2 minutes  SOC x 10 (Min verbal and tactile cues)     [facility] 1/20/2023   Visit Number 3   VAS Pain Rating 2   Time 4   Lumbar Stretches - Slouch 10   Extension in Lying 2   Flexion in Lying 10   Lumbar Extension - Seat Pad -   Femur Restraint -   Top Dead Center -   Counterweight -   Lumbar Flexion -   Lumbar Extension -   Lumbar Peak Torque -   Lumbar Weight 40   Repetitions 20   Rating of Perceived Exertion 3       Peripheral muscle strengthening which included 1 set of 15-20 repetitions at a slow, controlled 10-13 second per rep pace focused on strengthening supporting musculature for improved body mechanics and functional mobility.  Pt and therapist focused on proper form during treatment to ensure optimal strengthening of each targeted muscle group.  Machines were utilized including torso rotation, leg extension, leg curl, chest press, upright row. Tricep extension, bicep curl, leg press, and hip abduction added visit 3    Kirill received the following manual therapy techniques: NP      Home Exercises Provided and Patient Education Provided   Home exercises include:Modified piriformis stretch, EIL  Cardio program:visit 5  Lifting education date:visit 11  Posture/Lumbar roll: uses pillow at home  Fridge Magnet Discharge handout (date given):    Education provided:   - cues w/ex's  - Pacing with MedX    Written Home Exercises Provided: Patient instructed to cont prior HEP.  Added PPT, BKFO, SOC 1/12/23  Exercises were reviewed and Kirill was able to demonstrate them prior to the end of the session.  Kirill demonstrated good  understanding of the education provided.     See EMR under Patient Instructions for exercises provided prior visit.    Assessment "   Kirill presents today with continued reports of low level back pain. Progressed resistance via green theraband and added bridges with band today with good toelrance and no adverse effects or pain aggravation. Medx resistance progressed to 40 ft/lbs and pt was able to complete 20 reps with 3/10 RPE. Progress 10-15% next visit.     Patient is making  progress towards established goals.  Pt will continue to benefit from skilled outpatient physical therapy to address the deficits stated in the impairment chart, provide pt/family education and to maximize pt's level of independence in the home and community environment.     Anticipated Barriers for therapy: chronicity of symptoms, high BMI, lumbar surgeries, anxiety, diabetes  Pt's spiritual, cultural and educational needs considered and pt agreeable to plan of care and goals as stated below:     GOALS: Pt is in agreement with the following goals.     Short term goals:  6 weeks or 10 visits   1.  Pt will demonstrate increased lumbar ROM by at least 5 degrees from the initial ROM value with improvements noted in functional ROM and ability to perform ADLs.  (approp and ongoing)  2.  Pt will demonstrate increased MedX average isometric strength value  by 10% from initial test resulting in improved ability to perform bending, lifting, and carrying activities safely, confidently.  (approp and ongoing)  3.  Patient report a reduction in worst pain score by 1-2 points for improved tolerance for household ADLs.  (approp and ongoing)  4.  Pt able to perform HEP correctly with minimal cueing or supervision from therapist to encourage independent management of symptoms. (approp and ongoing)      Long term goals: 10 weeks or 20 visits   1. Pt will demonstrate increased lumbar ROM by at least 10 degrees from initial ROM value, resulting in improved ability to perform functional fwd bending while standing and sitting. (approp and ongoing)  2. Pt will demonstrate increased MedX  average isometric strength value  by 30% from initial test resulting in improved ability to perform bending, lifting, and carrying activities safely, confidently.  (approp and ongoing)  3. Pt to demonstrate ability to independently control and reduce their pain through posture positioning and mechanical movements throughout a typical day.  (approp and ongoing)  4.  Pt will demonstrate reduced pain and improved functional outcomes as reported on the FOTO by reaching a limitation score of < or = 50% or less in order to demonstrate subjective improvement in pt's condition.    (approp and ongoing)  5. Pt will demonstrate independence with the HEP at discharge  (approp and ongoing)  6.  Pt to be able to walk a block around his neighborhood with max pain 3/10 in order to demonstrate community ambulation for weight loss goals.  (approp and ongoing)    Plan   Continue with established Plan of Care towards established PT goals.     Therapist: Burton Ang, PT  1/20/2023

## 2023-01-25 ENCOUNTER — CLINICAL SUPPORT (OUTPATIENT)
Dept: REHABILITATION | Facility: OTHER | Age: 48
End: 2023-01-25
Attending: NEUROLOGICAL SURGERY
Payer: MEDICAID

## 2023-01-25 DIAGNOSIS — M54.41 CHRONIC BILATERAL LOW BACK PAIN WITH BILATERAL SCIATICA: ICD-10-CM

## 2023-01-25 DIAGNOSIS — M51.36 DDD (DEGENERATIVE DISC DISEASE), LUMBAR: ICD-10-CM

## 2023-01-25 DIAGNOSIS — M25.69 DECREASED ROM OF TRUNK AND BACK: ICD-10-CM

## 2023-01-25 DIAGNOSIS — M54.42 CHRONIC BILATERAL LOW BACK PAIN WITH BILATERAL SCIATICA: ICD-10-CM

## 2023-01-25 DIAGNOSIS — R29.898 DECREASED STRENGTH OF TRUNK AND BACK: Primary | ICD-10-CM

## 2023-01-25 DIAGNOSIS — G89.29 CHRONIC BILATERAL LOW BACK PAIN WITH BILATERAL SCIATICA: ICD-10-CM

## 2023-01-25 PROCEDURE — 97110 THERAPEUTIC EXERCISES: CPT | Mod: CQ

## 2023-01-25 NOTE — PROGRESS NOTES
[facility]      [facility]  [facility] [id number]    Therapy Diagnosis:   Encounter Diagnoses   Name Primary?    Decreased strength of trunk and back Yes    Chronic bilateral low back pain with bilateral sciatica     DDD (degenerative disc disease), lumbar     Decreased ROM of trunk and back          Physician: Jose L Brown MD    Visit Date: 2023    Physician Orders: PT Eval and Treat  Medical Diagnosis from Referral: Z98.1 (ICD-10-CM) - S/P lumbar fusion  Evaluation Date: 2023  Authorization Period Expiration: 11/15/2023  Plan of Care Expiration: 2023  Reassessment Due: 2023  Visit # / Visits authorized:  (No lap belt)     Time In: 2:40 PM  Time Out: 3:00 PM  Total Billable Time: 55 minutes  Insurance:Fee for service Insurance Patient      Precautions: Standard and Diabetes     Pattern of pain determined: 1PEN    Subjective   Kirill reports he was a little sore after his LV but not for more than a day. Back feels about the same.     Patient reports tolerating previous visit : Some residual muscular soreness after the initial eval.  Patient reports their pain to be 2/10 on a 0-10 scale with 0 being no pain and 10 being the worst pain imaginable.  Pain Location: bilateral back      Work and leisure: Unemployed/ Watch movies, play videos      Pt goals: Increase flexibility in other areas, be able to go for walks with minimal pain    Objective     Baseline Isometric Testing on Med X equipment: Testing administered by PT  Date of testin2023  ROM 0-24 deg   Max Peak Torque 72    Min Peak Torque 13    Flex/Ext Ratio 5.54   % below normative data 79     Outcomes:  Initial score: 71% limitation  Visit 5 score:  Goal:      Treatment    Pt was instructed in and performed the following:     Kirill received therapeutic exercises to develop/improved posture, cardiovascular endurance, muscular endurance, lumbar/cervical ROM, strength and  "muscular endurance for 55 minutes including the following exercises:     LTR x 10  DKTC w/T-ball x 20  Modified piriformis stretch w/towel assist 3 x 20" each side  PPT 20x5"  PPT w/BKFO +GTB x 10  Bridging GTB x15  +PPT w/ march x10  Prone on elbows 2 minutes  SOC x 10   +EIS x10    [facility] 1/25/2023   Visit Number 4   VAS Pain Rating 2   Time 5   Lumbar Stretches - Slouch Overcorrection 10   Extension in Lying 2   Extension in Standing 10   Flexion in Lying 10   Lumbar Extension Seat Pad -   Femur Restraint -   Top Dead Center -   Counterweight -   Lumbar Flexion 26   Lumbar Extension 0   Lumbar Peak Torque -   Min Torque -   Test Percent Below Normative Data -   Lumbar Weight 45   Repetitions 20   Rating of Perceived Exertion 3   Ice - Z Lie (in min.) 5        Peripheral muscle strengthening which included 1 set of 15-20 repetitions at a slow, controlled 10-13 second per rep pace focused on strengthening supporting musculature for improved body mechanics and functional mobility.  Pt and therapist focused on proper form during treatment to ensure optimal strengthening of each targeted muscle group.  Machines were utilized including torso rotation, leg extension, leg curl, chest press, upright row. Tricep extension, bicep curl, leg press, and hip abduction added visit 3    Kirill received the following manual therapy techniques: NP      Home Exercises Provided and Patient Education Provided   Home exercises include:Modified piriformis stretch, EIL  Cardio program:visit 5  Lifting education date:visit 11  Posture/Lumbar roll: uses pillow at home  Fridge Magnet Discharge handout (date given):    Education provided:   - cues w/ex's  - Pacing with MedX    Written Home Exercises Provided: Patient instructed to cont prior HEP.  Added PPT, BKFO, SOC 1/12/23  Exercises were reviewed and Kirill was able to demonstrate them prior to the end of the session.  Kirill demonstrated good  understanding of the education " provided.     See EMR under Patient Instructions for exercises provided prior visit.    Assessment   Kirill presents today with continued reports of low level back pain. Treatment continued with lumbopelvic/core flexibility and strengthening ex's in addition to Supine March for more core stability and EIL for increase lumbar mobility, all of which he was able to perform without c/o. Medx resistance progressed to 45 ft/lbs and pt was able to complete 20 reps with 3/10 RPE. Progress 10-15% next visit. Peripheral machines were also progressed according to pt's tolerance and PRE rating.    Patient is making  progress towards established goals.  Pt will continue to benefit from skilled outpatient physical therapy to address the deficits stated in the impairment chart, provide pt/family education and to maximize pt's level of independence in the home and community environment.     Anticipated Barriers for therapy: chronicity of symptoms, high BMI, lumbar surgeries, anxiety, diabetes    Pt's spiritual, cultural and educational needs considered and pt agreeable to plan of care and goals as stated below:     GOALS: Pt is in agreement with the following goals.     Short term goals:  6 weeks or 10 visits   1.  Pt will demonstrate increased lumbar ROM by at least 5 degrees from the initial ROM value with improvements noted in functional ROM and ability to perform ADLs.  (approp and ongoing)  2.  Pt will demonstrate increased MedX average isometric strength value  by 10% from initial test resulting in improved ability to perform bending, lifting, and carrying activities safely, confidently.  (approp and ongoing)  3.  Patient report a reduction in worst pain score by 1-2 points for improved tolerance for household ADLs.  (approp and ongoing)  4.  Pt able to perform HEP correctly with minimal cueing or supervision from therapist to encourage independent management of symptoms. (approp and ongoing)      Long term goals: 10 weeks or  20 visits   1. Pt will demonstrate increased lumbar ROM by at least 10 degrees from initial ROM value, resulting in improved ability to perform functional fwd bending while standing and sitting. (approp and ongoing)  2. Pt will demonstrate increased MedX average isometric strength value  by 30% from initial test resulting in improved ability to perform bending, lifting, and carrying activities safely, confidently.  (approp and ongoing)  3. Pt to demonstrate ability to independently control and reduce their pain through posture positioning and mechanical movements throughout a typical day.  (approp and ongoing)  4.  Pt will demonstrate reduced pain and improved functional outcomes as reported on the FOTO by reaching a limitation score of < or = 50% or less in order to demonstrate subjective improvement in pt's condition.    (approp and ongoing)  5. Pt will demonstrate independence with the HEP at discharge  (approp and ongoing)  6.  Pt to be able to walk a block around his neighborhood with max pain 3/10 in order to demonstrate community ambulation for weight loss goals.  (approp and ongoing)    Plan   Continue with established Plan of Care towards established PT goals.     Therapist: Blaire Lamas, PTA  1/25/2023

## 2023-01-26 ENCOUNTER — PATIENT OUTREACH (OUTPATIENT)
Dept: ADMINISTRATIVE | Facility: HOSPITAL | Age: 48
End: 2023-01-26
Payer: MEDICAID

## 2023-01-26 ENCOUNTER — PATIENT MESSAGE (OUTPATIENT)
Dept: ADMINISTRATIVE | Facility: HOSPITAL | Age: 48
End: 2023-01-26
Payer: MEDICAID

## 2023-01-26 NOTE — LETTER
January 31, 2023    Kirill Gandhi [facility]  [id number]  [place] [address]             [facility]  [address]  [place] [address]  Phone: [phone] Dear Ralph [facility] is committed to your overall health.  To help you get the most out of each of your visits, we will review your information to make sure you are up to date on all of your recommended tests and/or procedures.       Marcos Ibarra MD  has found that your chart shows you may be due for:     Hemoglobin A1C  Lipid Panel  Foot Exam     If you have had any of the above done at another facility, please bring the records or information with you so that your record at [facility] will be complete.  If you would like to schedule any of these, please contact me.     If you are currently taking medication, please bring it with you to your appointment for review.           Thank you for letting us care for you,        Nayely Mcmullen, Care Coordinator  [facility]  Phone:  [phone]  Fax: [phone]

## 2023-01-26 NOTE — PROGRESS NOTES
Care Everywhere updates requested and reviewed.  Immunizations reconciled. Media reports reviewed.  Duplicate HM overrides and  orders removed.  Overdue HM topic chart audit and/or requested.  Overdue lab testing linked to upcoming lab appointments if applies.    Lab alecia and [employer] reviewed for  Lab testing       Health Maintenance Due   Topic Date Due    Lipid Panel  2022    COVID-19 Vaccine (5 - Booster) 2022    Foot Exam  2023    Hemoglobin A1c  2023

## 2023-01-31 ENCOUNTER — CLINICAL SUPPORT (OUTPATIENT)
Dept: REHABILITATION | Facility: OTHER | Age: 48
End: 2023-01-31
Attending: NEUROLOGICAL SURGERY
Payer: MEDICAID

## 2023-01-31 DIAGNOSIS — R29.898 DECREASED STRENGTH OF TRUNK AND BACK: Primary | ICD-10-CM

## 2023-01-31 DIAGNOSIS — M51.36 DDD (DEGENERATIVE DISC DISEASE), LUMBAR: ICD-10-CM

## 2023-01-31 DIAGNOSIS — M54.41 CHRONIC BILATERAL LOW BACK PAIN WITH BILATERAL SCIATICA: ICD-10-CM

## 2023-01-31 DIAGNOSIS — M54.42 CHRONIC BILATERAL LOW BACK PAIN WITH BILATERAL SCIATICA: ICD-10-CM

## 2023-01-31 DIAGNOSIS — G89.29 CHRONIC BILATERAL LOW BACK PAIN WITH BILATERAL SCIATICA: ICD-10-CM

## 2023-01-31 DIAGNOSIS — M25.69 DECREASED ROM OF TRUNK AND BACK: ICD-10-CM

## 2023-01-31 PROCEDURE — 97110 THERAPEUTIC EXERCISES: CPT

## 2023-01-31 NOTE — PROGRESS NOTES
[facility]      [facility]  [facility] Number[facility] [id number]    Therapy Diagnosis:   Encounter Diagnoses   Name Primary?    Decreased strength of trunk and back Yes    Chronic bilateral low back pain with bilateral sciatica     DDD (degenerative disc disease), lumbar     Decreased ROM of trunk and back          Physician: Jose L Brown MD    Visit Date: 2023    Physician Orders: PT Eval and Treat  Medical Diagnosis from Referral: Z98.1 (ICD-10-CM) - S/P lumbar fusion  Evaluation Date: 2023  Authorization Period Expiration: 11/15/2023  Plan of Care Expiration: 2023  Reassessment Due: 2023  Visit # / Visits authorized:  (No lap belt)     Time In: 1:30 PM  Time Out: 2:30 PM  Total Billable Time: 55 minutes  Insurance:Fee for service Insurance Patient      Precautions: Standard and Diabetes     Pattern of pain determined: 1PEN    Subjective   Kirill reports he was a little sore after his LV but not for more than a day. Pt reports increased achiness in low back today.     Patient reports tolerating previous visit : Some residual muscular soreness after the initial eval.  Patient reports their pain to be 4/10 on a 0-10 scale with 0 being no pain and 10 being the worst pain imaginable.  Pain Location: bilateral back      Work and leisure: Unemployed/ Watch movies, play videos      Pt goals: Increase flexibility in other areas, be able to go for walks with minimal pain    Objective     Baseline Isometric Testing on Med X equipment: Testing administered by PT  Date of testin2023  ROM 0-24 deg   Max Peak Torque 72    Min Peak Torque 13    Flex/Ext Ratio 5.54   % below normative data 79     Outcomes:  Initial score: 71% limitation  Visit 6 score:  Goal:      Treatment    Pt was instructed in and performed the following:     Kirill received therapeutic exercises to develop/improved posture, cardiovascular endurance, muscular endurance,  "lumbar/cervical ROM, strength and muscular endurance for 55 minutes including the following exercises:     5 min recumbent bike- progress to TM next visit   LTR x 10  DKTC w/T-ball x 20  Modified piriformis stretch w/towel assist 3 x 20" each side  PPT 20x5"  PPT w/BKFO +GTB x 10  Bridging GTB x20  +PPT w/ march x10  Prone on elbows 2 minutes  SOC x 10   +EIS x10  +Seated back extension with red bungee 1x10   +STS + airex x10    [facility] 1/31/2023   Visit Number 5   VAS Pain Rating 4   Time 5   Lumbar Stretches - Slouch Overcorrection 10   Extension in Lying 2   Extension in Standing 10   Flexion in Lying 10   Lumbar Extension Seat Pad -   Femur Restraint -   Top Dead Center -   Counterweight -   Lumbar Flexion 27   Lumbar Extension 0   Lumbar Peak Torque -   Min Torque -   Test Percent Below Normative Data -   Lumbar Weight 50   Repetitions 15   Rating of Perceived Exertion 3   Ice - Z Lie (in min.) 5             Peripheral muscle strengthening which included 1 set of 15-20 repetitions at a slow, controlled 10-13 second per rep pace focused on strengthening supporting musculature for improved body mechanics and functional mobility.  Pt and therapist focused on proper form during treatment to ensure optimal strengthening of each targeted muscle group.  Machines were utilized including torso rotation, leg extension, leg curl, chest press, upright row. Tricep extension, bicep curl, leg press, and hip abduction added visit 3    Kirill received the following manual therapy techniques: NP      Home Exercises Provided and Patient Education Provided   Home exercises include:Modified piriformis stretch, EIL  Cardio program:visit 5  Lifting education date:visit 11  Posture/Lumbar roll: uses pillow at home  Fridge Magnet Discharge handout (date given):    Education provided:   - cues w/ex's  - Pacing with MedX    Written Home Exercises Provided: Patient instructed to cont prior HEP.  Added PPT, BKFO, SOC " 1/12/23  Exercises were reviewed and Kirill was able to demonstrate them prior to the end of the session.  Kirill demonstrated good  understanding of the education provided.     See EMR under Patient Instructions for exercises provided prior visit.    Assessment   Kirill presents today with slightly increased reports of low back pain to 4/10. Treatment continued with lumbopelvic/core flexibility and strengthening ex's. Able to trial and add seated back extensions and STS today with no increase in irritation. Pt educated on the benefits of graded exposure to extension to improve lumbar mobility. Medx ROM progressed to 27 degrees and resistance progressed to 50 ft/lbs and pt was able to complete 15 reps with 3/10 RPE. Progress reps next visit. Pt requested to decrease weight on his bicep curls due to exercise being too hard last time and requiring multiple rest breaks. Peripheral machines were also progressed according to pt's tolerance and RPE rating. Discussed pt progressing to performing treadmill walking next visit in order to progress towards his walking goals ad pt verbalized understanding. Cardio handout discussed and distributed to pt this visit.       Patient is making  progress towards established goals.  Pt will continue to benefit from skilled outpatient physical therapy to address the deficits stated in the impairment chart, provide pt/family education and to maximize pt's level of independence in the home and community environment.     Anticipated Barriers for therapy: chronicity of symptoms, high BMI, lumbar surgeries, anxiety, diabetes    Pt's spiritual, cultural and educational needs considered and pt agreeable to plan of care and goals as stated below:     GOALS: Pt is in agreement with the following goals.     Short term goals:  6 weeks or 10 visits   1.  Pt will demonstrate increased lumbar ROM by at least 5 degrees from the initial ROM value with improvements noted in functional ROM and ability to  perform ADLs.  (approp and ongoing)  2.  Pt will demonstrate increased MedX average isometric strength value  by 10% from initial test resulting in improved ability to perform bending, lifting, and carrying activities safely, confidently.  (approp and ongoing)  3.  Patient report a reduction in worst pain score by 1-2 points for improved tolerance for household ADLs.  (approp and ongoing)  4.  Pt able to perform HEP correctly with minimal cueing or supervision from therapist to encourage independent management of symptoms. (approp and ongoing)      Long term goals: 10 weeks or 20 visits   1. Pt will demonstrate increased lumbar ROM by at least 10 degrees from initial ROM value, resulting in improved ability to perform functional fwd bending while standing and sitting. (approp and ongoing)  2. Pt will demonstrate increased MedX average isometric strength value  by 30% from initial test resulting in improved ability to perform bending, lifting, and carrying activities safely, confidently.  (approp and ongoing)  3. Pt to demonstrate ability to independently control and reduce their pain through posture positioning and mechanical movements throughout a typical day.  (approp and ongoing)  4.  Pt will demonstrate reduced pain and improved functional outcomes as reported on the FOTO by reaching a limitation score of < or = 50% or less in order to demonstrate subjective improvement in pt's condition.    (approp and ongoing)  5. Pt will demonstrate independence with the HEP at discharge  (approp and ongoing)  6.  Pt to be able to walk a block around his neighborhood with max pain 3/10 in order to demonstrate community ambulation for weight loss goals.  (approp and ongoing)    Plan   Continue with established Plan of Care towards established PT goals.     Therapist: Richy Sears, PT  1/31/2023

## 2023-02-01 ENCOUNTER — PATIENT MESSAGE (OUTPATIENT)
Dept: NEUROSURGERY | Facility: CLINIC | Age: 48
End: 2023-02-01
Payer: MEDICAID

## 2023-02-02 ENCOUNTER — CLINICAL SUPPORT (OUTPATIENT)
Dept: REHABILITATION | Facility: OTHER | Age: 48
End: 2023-02-02
Attending: NEUROLOGICAL SURGERY
Payer: MEDICAID

## 2023-02-02 DIAGNOSIS — M25.69 DECREASED ROM OF TRUNK AND BACK: ICD-10-CM

## 2023-02-02 DIAGNOSIS — M51.36 DDD (DEGENERATIVE DISC DISEASE), LUMBAR: ICD-10-CM

## 2023-02-02 DIAGNOSIS — M54.42 CHRONIC BILATERAL LOW BACK PAIN WITH BILATERAL SCIATICA: ICD-10-CM

## 2023-02-02 DIAGNOSIS — M54.41 CHRONIC BILATERAL LOW BACK PAIN WITH BILATERAL SCIATICA: ICD-10-CM

## 2023-02-02 DIAGNOSIS — R29.898 DECREASED STRENGTH OF TRUNK AND BACK: Primary | ICD-10-CM

## 2023-02-02 DIAGNOSIS — G89.29 CHRONIC BILATERAL LOW BACK PAIN WITH BILATERAL SCIATICA: ICD-10-CM

## 2023-02-02 PROCEDURE — 97110 THERAPEUTIC EXERCISES: CPT | Mod: CQ

## 2023-02-02 NOTE — PROGRESS NOTES
[facility]      [facility]  [facility] Number[facility] [id number]    Therapy Diagnosis:   Encounter Diagnoses   Name Primary?    Decreased strength of trunk and back Yes    Chronic bilateral low back pain with bilateral sciatica     DDD (degenerative disc disease), lumbar     Decreased ROM of trunk and back      Physician: Jose L Brown MD    Visit Date: 2023    Physician Orders: PT Eval and Treat  Medical Diagnosis from Referral: Z98.1 (ICD-10-CM) - S/P lumbar fusion  Evaluation Date: 2023  Authorization Period Expiration: 11/15/2023  Plan of Care Expiration: 2023  Reassessment Due: 2023  Visit # / Visits authorized:  (No lap belt)     Time In: 12;30 PM  Time Out: 1:35 PM  Total Billable Time: 60 minutes  Insurance:Fee for service Insurance Patient      Precautions: Standard and Diabetes     Pattern of pain determined: 1PEN    Subjective   Kirill reports tolerable lower back pain presently and reports some improvements with strength since beginning the program. He states that his back still gets tired and somewhat uncomfortable with prolonged walking.     Patient reports tolerating previous visit : Some residual muscular soreness after the initial eval.  Patient reports their pain to be 3/10 on a 0-10 scale with 0 being no pain and 10 being the worst pain imaginable.  Pain Location: bilateral back      Work and leisure: Unemployed/ Watch movies, play videos      Pt goals: Increase flexibility in other areas, be able to go for walks with minimal pain    Objective     Baseline Isometric Testing on Med X equipment: Testing administered by PT  Date of testin2023  ROM 0-24 deg   Max Peak Torque 72    Min Peak Torque 13    Flex/Ext Ratio 5.54   % below normative data 79     Outcomes:  Initial score: 71% limitation  Visit 6 score: 71%  Goal:      Treatment    Pt was instructed in and performed the following:     Kirill received therapeutic exercises  "to develop/improved posture, cardiovascular endurance, muscular endurance, lumbar/cervical ROM, strength and muscular endurance for 60 minutes including the following exercises:     TM warm-up x 5 minutes @ 1.5 mph    LTR x 10  DKTC w/T-ball x 20  Modified piriformis stretch w/towel assist 3 x 20" each side  PPT 10x5"  PPT w/BKFO +BTB x 10  Bridging +BTBx20  PPT w/ march x10  Prone on elbows 2 minutes  SOC x 20  EIS x10    [facility] 2/2/2023   Visit Number 6   VAS Pain Rating 3   Time -   Lumbar Stretches - Slouch 20   Extension in Lying 2   Extension in Standing 10   Flexion in Lying 10   Lumbar Extension - Seat Pad -   Femur Restraint -   Top Dead Center -   Counterweight -   Lumbar Flexion -   Lumbar Extension -   Lumbar Peak Torque -   Lumbar Weight 50   Repetitions 20   Rating of Perceived Exertion 3      NP  Seated back extension with red bungee 1x10   STS + airex x10    Peripheral muscle strengthening which included 1 set of 15-20 repetitions at a slow, controlled 10-13 second per rep pace focused on strengthening supporting musculature for improved body mechanics and functional mobility.  Pt and therapist focused on proper form during treatment to ensure optimal strengthening of each targeted muscle group.  Machines were utilized including torso rotation, leg extension, leg curl, chest press, upright row. Tricep extension, bicep curl, leg press, and hip abduction added visit 3    Kirill received the following manual therapy techniques: NP      Home Exercises Provided and Patient Education Provided   Home exercises include:Modified piriformis stretch, EIL  Cardio program:visit 5  Lifting education date:visit 11  Posture/Lumbar roll: uses pillow at home  Fridge Magnet Discharge handout (date given):    Education provided:   - cues w/ex's  - Pacing with MedX    Written Home Exercises Provided: Patient instructed to cont prior HEP.  Exercises were reviewed and Kirill was able to demonstrate them " prior to the end of the session.  Kirill demonstrated good  understanding of the education provided.     See EMR under Patient Instructions for exercises provided prior visit.    Assessment   Kirill returns with some continued chronic lower back discomfort (tolerable) but reports some improvements with overall strength since beginning the program. Treatment continued with lumbopelvic/core flexibility and strengthening ex's.  He was performed a warm-up on the TM today vs bike for the first time today without c/o. He was also progressed to BTB for bridging and BKFO this visit (BTB provided for home use) Min cues for correct ex performance but able to complete without increased pain. Lumbar MedX resistance was maintained at 50 ft/lbs progressing to complete 20 reps with a RPE = 3/10.  Will look to progress per HB protocol and patient tolerance.     Patient is making  progress towards established goals.  Pt will continue to benefit from skilled outpatient physical therapy to address the deficits stated in the impairment chart, provide pt/family education and to maximize pt's level of independence in the home and community environment.     Anticipated Barriers for therapy: chronicity of symptoms, high BMI, lumbar surgeries, anxiety, diabetes    Pt's spiritual, cultural and educational needs considered and pt agreeable to plan of care and goals as stated below:     GOALS: Pt is in agreement with the following goals.     Short term goals:  6 weeks or 10 visits   1.  Pt will demonstrate increased lumbar ROM by at least 5 degrees from the initial ROM value with improvements noted in functional ROM and ability to perform ADLs.  (approp and ongoing)  2.  Pt will demonstrate increased MedX average isometric strength value  by 10% from initial test resulting in improved ability to perform bending, lifting, and carrying activities safely, confidently.  (approp and ongoing)  3.  Patient report a reduction in worst pain score by 1-2  points for improved tolerance for household ADLs.  (approp and ongoing)  4.  Pt able to perform HEP correctly with minimal cueing or supervision from therapist to encourage independent management of symptoms. (approp and ongoing)      Long term goals: 10 weeks or 20 visits   1. Pt will demonstrate increased lumbar ROM by at least 10 degrees from initial ROM value, resulting in improved ability to perform functional fwd bending while standing and sitting. (approp and ongoing)  2. Pt will demonstrate increased MedX average isometric strength value  by 30% from initial test resulting in improved ability to perform bending, lifting, and carrying activities safely, confidently.  (approp and ongoing)  3. Pt to demonstrate ability to independently control and reduce their pain through posture positioning and mechanical movements throughout a typical day.  (approp and ongoing)  4.  Pt will demonstrate reduced pain and improved functional outcomes as reported on the FOTO by reaching a limitation score of < or = 50% or less in order to demonstrate subjective improvement in pt's condition.    (approp and ongoing)  5. Pt will demonstrate independence with the HEP at discharge  (approp and ongoing)  6.  Pt to be able to walk a block around his neighborhood with max pain 3/10 in order to demonstrate community ambulation for weight loss goals.  (approp and ongoing)    Plan   Continue with established Plan of Care towards established PT goals.     Therapist: Jeffrey Smalls, PTA  2/2/2023

## 2023-02-07 ENCOUNTER — PATIENT MESSAGE (OUTPATIENT)
Dept: NEUROSURGERY | Facility: CLINIC | Age: 48
End: 2023-02-07
Payer: MEDICAID

## 2023-02-07 ENCOUNTER — LAB VISIT (OUTPATIENT)
Dept: LAB | Facility: HOSPITAL | Age: 48
End: 2023-02-07
Attending: FAMILY MEDICINE
Payer: MEDICAID

## 2023-02-07 ENCOUNTER — CLINICAL SUPPORT (OUTPATIENT)
Dept: REHABILITATION | Facility: OTHER | Age: 48
End: 2023-02-07
Attending: NEUROLOGICAL SURGERY
Payer: MEDICAID

## 2023-02-07 DIAGNOSIS — M54.42 CHRONIC BILATERAL LOW BACK PAIN WITH BILATERAL SCIATICA: ICD-10-CM

## 2023-02-07 DIAGNOSIS — R29.898 DECREASED STRENGTH OF TRUNK AND BACK: Primary | ICD-10-CM

## 2023-02-07 DIAGNOSIS — M54.41 CHRONIC BILATERAL LOW BACK PAIN WITH BILATERAL SCIATICA: ICD-10-CM

## 2023-02-07 DIAGNOSIS — G89.29 CHRONIC BILATERAL LOW BACK PAIN WITH BILATERAL SCIATICA: ICD-10-CM

## 2023-02-07 DIAGNOSIS — M51.36 DDD (DEGENERATIVE DISC DISEASE), LUMBAR: ICD-10-CM

## 2023-02-07 DIAGNOSIS — M25.69 DECREASED ROM OF TRUNK AND BACK: ICD-10-CM

## 2023-02-07 DIAGNOSIS — Z79.4 TYPE 2 DIABETES MELLITUS WITH HYPERGLYCEMIA, WITH LONG-TERM CURRENT USE OF INSULIN: ICD-10-CM

## 2023-02-07 DIAGNOSIS — E11.65 TYPE 2 DIABETES MELLITUS WITH HYPERGLYCEMIA, WITH LONG-TERM CURRENT USE OF INSULIN: ICD-10-CM

## 2023-02-07 LAB
ALBUMIN/CREAT UR: NORMAL UG/MG (ref 0–30)
CREAT UR-MCNC: 67 MG/DL (ref 23–375)
ESTIMATED AVG GLUCOSE: 217 MG/DL (ref 68–131)
HBA1C MFR BLD: 9.2 % (ref 4–5.6)
MICROALBUMIN UR DL<=1MG/L-MCNC: <5 UG/ML

## 2023-02-07 PROCEDURE — 36415 COLL VENOUS BLD VENIPUNCTURE: CPT | Mod: PO | Performed by: FAMILY MEDICINE

## 2023-02-07 PROCEDURE — 82570 ASSAY OF URINE CREATININE: CPT | Mod: PO | Performed by: FAMILY MEDICINE

## 2023-02-07 PROCEDURE — 97110 THERAPEUTIC EXERCISES: CPT | Mod: CQ

## 2023-02-07 PROCEDURE — 83036 HEMOGLOBIN GLYCOSYLATED A1C: CPT | Performed by: FAMILY MEDICINE

## 2023-02-07 NOTE — PROGRESS NOTES
[facility]      [facility]  [facility] Number[facility] [id number]    Therapy Diagnosis:   Encounter Diagnoses   Name Primary?    Decreased strength of trunk and back Yes    Chronic bilateral low back pain with bilateral sciatica     DDD (degenerative disc disease), lumbar     Decreased ROM of trunk and back      Physician: Jose L Brown MD    Visit Date: 2023    Physician Orders: PT Eval and Treat  Medical Diagnosis from Referral: Z98.1 (ICD-10-CM) - S/P lumbar fusion  Evaluation Date: 2023  Authorization Period Expiration: 11/15/2023  Plan of Care Expiration: 2023  Reassessment Due: 2023  Visit # / Visits authorized:  (No lap belt)     Time In: 9:02 AM  Time Out:10:07 AM  Total Billable Time: 60 minutes  Insurance:Fee for service Insurance Patient      Precautions: Standard and Diabetes     Pattern of pain determined: 1PEN    Subjective   Kirill reports no back presently and reports some improvements with strength since beginning the program. He states that his back still gets tired and somewhat uncomfortable with prolonged walking and admits to fear with pushing himself too hard because of previous episodes of sudden leg weakness.    Patient reports tolerating previous visit : Min muscular soreness  Patient reports their pain to be 0/10 on a 0-10 scale with 0 being no pain and 10 being the worst pain imaginable.  Pain Location: bilateral back      Work and leisure: Unemployed/ Watch movies, play videos      Pt goals: Increase flexibility in other areas, be able to go for walks with minimal pain    Objective     Baseline Isometric Testing on Med X equipment: Testing administered by PT  Date of testin2023  ROM 0-24 deg   Max Peak Torque 72    Min Peak Torque 13    Flex/Ext Ratio 5.54   % below normative data 79     Outcomes:  Initial score: 71% limitation  Visit 6 score: 71%  Goal:      Treatment    Pt was instructed in and performed the  "following:     Kirill received therapeutic exercises to develop/improved posture, cardiovascular endurance, muscular endurance, lumbar/cervical ROM, strength and muscular endurance for 60 minutes including the following exercises:     TM warm-up x 5 minutes @ 1.5 mph    LTR x 10  DKTC w/T-ball x 20  Modified piriformis stretch w/towel assist 3 x 20" each side  PPT 10x5"  Bridging +BTBx20  Prone on elbows 1 minute  +EIL x 10  EIS x10  +Palloff press GTB x 15 each    [facility] 2/7/2023   Visit Number 7   VAS Pain Rating 0   Treadmill Time (in min.) 5   Time -   Lumbar Stretches - Slouch -   Extension in Lying 10   Extension in Standing 10   Flexion in Lying 10   Lumbar Extension - Seat Pad -   Femur Restraint -   Top Dead Center -   Counterweight -   Lumbar Flexion 30   Lumbar Extension 0   Lumbar Peak Torque -   Lumbar Weight 55   Repetitions 20   Rating of Perceived Exertion 3         NP  Seated back extension with red bungee 1x10   STS + airex x10  PPT w/ march x10  PPT w/BKFO +BTB x 10  SOC x 10    Peripheral muscle strengthening which included 1 set of 15-20 repetitions at a slow, controlled 10-13 second per rep pace focused on strengthening supporting musculature for improved body mechanics and functional mobility.  Pt and therapist focused on proper form during treatment to ensure optimal strengthening of each targeted muscle group.  Machines were utilized including torso rotation, leg extension, leg curl, chest press, upright row. Tricep extension, bicep curl, leg press, and hip abduction added visit 3    Kirill received the following manual therapy techniques: NP      Home Exercises Provided and Patient Education Provided   Home exercises include:Modified piriformis stretch, EIL  Cardio program:visit 5  Lifting education date:visit 11  Posture/Lumbar roll: uses pillow at home  Frie Magnet Discharge handout (date given):    Education provided:   - cues w/ex's  - Pacing with MedX    Written " Home Exercises Provided: Patient instructed to cont prior HEP.  Exercises were reviewed and Kirill was able to demonstrate them prior to the end of the session.  Kirill demonstrated good  understanding of the education provided.     See EMR under Patient Instructions for exercises provided prior visit.    Assessment   Kirill returns without c/o pain currently. Still has tendency for back tightness/discomfort with prolonged walking. Treatment continued with lumbopelvic/core flexibility and strengthening ex's.  Added EIL this visit which was well tolerated and also added Palloff press to work on core strengthening in standing. Min cues for correct ex performance but able to complete without increased pain. During MedX setup, patient demonstrated comfort with increased Flexion ROM to 30 degrees and this was updated accordingly. Lumbar MedX resistance was increased to 55 ft/lbs completing  20 reps with a RPE =  3/10.  Will look to progress per HB protocol and patient tolerance.     Patient is making  progress towards established goals.  Pt will continue to benefit from skilled outpatient physical therapy to address the deficits stated in the impairment chart, provide pt/family education and to maximize pt's level of independence in the home and community environment.     Anticipated Barriers for therapy: chronicity of symptoms, high BMI, lumbar surgeries, anxiety, diabetes    Pt's spiritual, cultural and educational needs considered and pt agreeable to plan of care and goals as stated below:     GOALS: Pt is in agreement with the following goals.     Short term goals:  6 weeks or 10 visits   1.  Pt will demonstrate increased lumbar ROM by at least 5 degrees from the initial ROM value with improvements noted in functional ROM and ability to perform ADLs.  (approp and ongoing)  2.  Pt will demonstrate increased MedX average isometric strength value  by 10% from initial test resulting in improved ability to perform bending,  lifting, and carrying activities safely, confidently.  (approp and ongoing)  3.  Patient report a reduction in worst pain score by 1-2 points for improved tolerance for household ADLs.  (approp and ongoing)  4.  Pt able to perform HEP correctly with minimal cueing or supervision from therapist to encourage independent management of symptoms. (approp and ongoing)      Long term goals: 10 weeks or 20 visits   1. Pt will demonstrate increased lumbar ROM by at least 10 degrees from initial ROM value, resulting in improved ability to perform functional fwd bending while standing and sitting. (approp and ongoing)  2. Pt will demonstrate increased MedX average isometric strength value  by 30% from initial test resulting in improved ability to perform bending, lifting, and carrying activities safely, confidently.  (approp and ongoing)  3. Pt to demonstrate ability to independently control and reduce their pain through posture positioning and mechanical movements throughout a typical day.  (approp and ongoing)  4.  Pt will demonstrate reduced pain and improved functional outcomes as reported on the FOTO by reaching a limitation score of < or = 50% or less in order to demonstrate subjective improvement in pt's condition.    (approp and ongoing)  5. Pt will demonstrate independence with the HEP at discharge  (approp and ongoing)  6.  Pt to be able to walk a block around his neighborhood with max pain 3/10 in order to demonstrate community ambulation for weight loss goals.  (approp and ongoing)    Plan   Continue with established Plan of Care towards established PT goals.     Therapist: Jeffrey Smalls, PTA  2/7/2023

## 2023-02-09 ENCOUNTER — OFFICE VISIT (OUTPATIENT)
Dept: FAMILY MEDICINE | Facility: CLINIC | Age: 48
End: 2023-02-09
Payer: MEDICAID

## 2023-02-09 ENCOUNTER — PATIENT MESSAGE (OUTPATIENT)
Dept: NEUROSURGERY | Facility: CLINIC | Age: 48
End: 2023-02-09
Payer: MEDICAID

## 2023-02-09 ENCOUNTER — CLINICAL SUPPORT (OUTPATIENT)
Dept: REHABILITATION | Facility: OTHER | Age: 48
End: 2023-02-09
Attending: NEUROLOGICAL SURGERY
Payer: MEDICAID

## 2023-02-09 VITALS
OXYGEN SATURATION: 96 % | SYSTOLIC BLOOD PRESSURE: 112 MMHG | DIASTOLIC BLOOD PRESSURE: 74 MMHG | WEIGHT: 315 LBS | HEIGHT: 72 IN | HEART RATE: 92 BPM | BODY MASS INDEX: 42.66 KG/M2

## 2023-02-09 DIAGNOSIS — M54.42 CHRONIC BILATERAL LOW BACK PAIN WITH BILATERAL SCIATICA: ICD-10-CM

## 2023-02-09 DIAGNOSIS — E11.9 TYPE 2 DIABETES MELLITUS WITHOUT COMPLICATION, WITH LONG-TERM CURRENT USE OF INSULIN: Primary | ICD-10-CM

## 2023-02-09 DIAGNOSIS — R29.898 DECREASED STRENGTH OF TRUNK AND BACK: Primary | ICD-10-CM

## 2023-02-09 DIAGNOSIS — Z79.4 TYPE 2 DIABETES MELLITUS WITHOUT COMPLICATION, WITH LONG-TERM CURRENT USE OF INSULIN: Primary | ICD-10-CM

## 2023-02-09 DIAGNOSIS — Z11.3 SCREENING FOR STD (SEXUALLY TRANSMITTED DISEASE): ICD-10-CM

## 2023-02-09 DIAGNOSIS — G89.29 CHRONIC BILATERAL LOW BACK PAIN WITH BILATERAL SCIATICA: ICD-10-CM

## 2023-02-09 DIAGNOSIS — M25.69 DECREASED ROM OF TRUNK AND BACK: ICD-10-CM

## 2023-02-09 DIAGNOSIS — M51.36 DDD (DEGENERATIVE DISC DISEASE), LUMBAR: ICD-10-CM

## 2023-02-09 DIAGNOSIS — M54.41 CHRONIC BILATERAL LOW BACK PAIN WITH BILATERAL SCIATICA: ICD-10-CM

## 2023-02-09 PROCEDURE — 3061F PR NEG MICROALBUMINURIA RESULT DOCUMENTED/REVIEW: ICD-10-PCS | Mod: CPTII,S$GLB,, | Performed by: FAMILY MEDICINE

## 2023-02-09 PROCEDURE — 3061F NEG MICROALBUMINURIA REV: CPT | Mod: CPTII,S$GLB,, | Performed by: FAMILY MEDICINE

## 2023-02-09 PROCEDURE — 1160F RVW MEDS BY RX/DR IN RCRD: CPT | Mod: CPTII,S$GLB,, | Performed by: FAMILY MEDICINE

## 2023-02-09 PROCEDURE — 1159F MED LIST DOCD IN RCRD: CPT | Mod: CPTII,S$GLB,, | Performed by: FAMILY MEDICINE

## 2023-02-09 PROCEDURE — 3066F NEPHROPATHY DOC TX: CPT | Mod: CPTII,S$GLB,, | Performed by: FAMILY MEDICINE

## 2023-02-09 PROCEDURE — 3072F PR LOW RISK FOR RETINOPATHY: ICD-10-PCS | Mod: CPTII,S$GLB,, | Performed by: FAMILY MEDICINE

## 2023-02-09 PROCEDURE — 1159F PR MEDICATION LIST DOCUMENTED IN MEDICAL RECORD: ICD-10-PCS | Mod: CPTII,S$GLB,, | Performed by: FAMILY MEDICINE

## 2023-02-09 PROCEDURE — 99214 PR OFFICE/OUTPT VISIT, EST, LEVL IV, 30-39 MIN: ICD-10-PCS | Mod: S$GLB,,, | Performed by: FAMILY MEDICINE

## 2023-02-09 PROCEDURE — 3008F PR BODY MASS INDEX (BMI) DOCUMENTED: ICD-10-PCS | Mod: CPTII,S$GLB,, | Performed by: FAMILY MEDICINE

## 2023-02-09 PROCEDURE — 3046F HEMOGLOBIN A1C LEVEL >9.0%: CPT | Mod: CPTII,S$GLB,, | Performed by: FAMILY MEDICINE

## 2023-02-09 PROCEDURE — 3072F LOW RISK FOR RETINOPATHY: CPT | Mod: CPTII,S$GLB,, | Performed by: FAMILY MEDICINE

## 2023-02-09 PROCEDURE — 3078F DIAST BP <80 MM HG: CPT | Mod: CPTII,S$GLB,, | Performed by: FAMILY MEDICINE

## 2023-02-09 PROCEDURE — 3066F PR DOCUMENTATION OF TREATMENT FOR NEPHROPATHY: ICD-10-PCS | Mod: CPTII,S$GLB,, | Performed by: FAMILY MEDICINE

## 2023-02-09 PROCEDURE — 3074F SYST BP LT 130 MM HG: CPT | Mod: CPTII,S$GLB,, | Performed by: FAMILY MEDICINE

## 2023-02-09 PROCEDURE — 97110 THERAPEUTIC EXERCISES: CPT

## 2023-02-09 PROCEDURE — 99214 OFFICE O/P EST MOD 30 MIN: CPT | Mod: S$GLB,,, | Performed by: FAMILY MEDICINE

## 2023-02-09 PROCEDURE — 3078F PR MOST RECENT DIASTOLIC BLOOD PRESSURE < 80 MM HG: ICD-10-PCS | Mod: CPTII,S$GLB,, | Performed by: FAMILY MEDICINE

## 2023-02-09 PROCEDURE — 3008F BODY MASS INDEX DOCD: CPT | Mod: CPTII,S$GLB,, | Performed by: FAMILY MEDICINE

## 2023-02-09 PROCEDURE — 3046F PR MOST RECENT HEMOGLOBIN A1C LEVEL > 9.0%: ICD-10-PCS | Mod: CPTII,S$GLB,, | Performed by: FAMILY MEDICINE

## 2023-02-09 PROCEDURE — 1160F PR REVIEW ALL MEDS BY PRESCRIBER/CLIN PHARMACIST DOCUMENTED: ICD-10-PCS | Mod: CPTII,S$GLB,, | Performed by: FAMILY MEDICINE

## 2023-02-09 PROCEDURE — 3074F PR MOST RECENT SYSTOLIC BLOOD PRESSURE < 130 MM HG: ICD-10-PCS | Mod: CPTII,S$GLB,, | Performed by: FAMILY MEDICINE

## 2023-02-09 RX ORDER — DULAGLUTIDE 4.5 MG/.5ML
4.5 INJECTION, SOLUTION SUBCUTANEOUS
Qty: 12 PEN | Refills: 1 | Status: SHIPPED | OUTPATIENT
Start: 2023-02-09 | End: 2023-03-08

## 2023-02-09 NOTE — PROGRESS NOTES
[facility]      [facility]  [facility]: [id number]    Therapy Diagnosis:   Encounter Diagnoses   Name Primary?    Decreased strength of trunk and back Yes    Chronic bilateral low back pain with bilateral sciatica     DDD (degenerative disc disease), lumbar     Decreased ROM of trunk and back        Physician: Jose L Brown MD    Visit Date: 2023    Physician Orders: PT Eval and Treat  Medical Diagnosis from Referral: Z98.1 (ICD-10-CM) - S/P lumbar fusion  Evaluation Date: 2023  Authorization Period Expiration: 11/15/2023  Plan of Care Expiration: 2023  Reassessment Due: 2023  Visit # / Visits authorized:  (No lap belt)     Time In: 12:25 PM  Time Out:1:25 PM  Total Billable Time: 60 minutes  Insurance:Fee for service Insurance Patient      Precautions: Standard and Diabetes and Fall      Pattern of pain determined: 1PEN    Subjective   Kirill reports increase in low back pain since 2 days ago. Pt reports his pain is a 4/10 achiness across the lumbar spine. Pt reports his L leg gave out on him while he was in the front yard and reports he fell onto his knees and elbows. Pt reports this is the first time he has fallen since his surgery.     Patient reports tolerating previous visit : Min muscular soreness  Patient reports their pain to be 4/10 on a 0-10 scale with 0 being no pain and 10 being the worst pain imaginable.  Pain Location: bilateral back      Work and leisure: Unemployed/ Watch movies, play videos      Pt goals: Increase flexibility in other areas, be able to go for walks with minimal pain    Objective     Baseline Isometric Testing on Med X equipment: Testing administered by PT  Date of testin2023  ROM 0-24 deg   Max Peak Torque 72    Min Peak Torque 13    Flex/Ext Ratio 5.54   % below normative data 79     Outcomes:  Initial score: 71% limitation  Visit 6 score: 71%  Goal:      Treatment    Pt was instructed in and  "performed the following:     Kirill received therapeutic exercises to develop/improved posture, cardiovascular endurance, muscular endurance, lumbar/cervical ROM, strength and muscular endurance for 60 minutes including the following exercises:     TM warm-up x 5 minutes @ 1.7 mph    LTR x 10  DKTC w/T-ball x 20  Modified piriformis stretch w/towel assist 3 x 20" each side  PPT 10x5"  Bridging +BTBx20  + Bridging on bosu 2x10   Prone on elbows 1 minute  EIS x10  + side steps with BTB 2x10 ea   +Palloff press GTB x 15 each     NP  Seated back extension with red bungee 1x10   STS + airex x10  PPT w/ march x10  PPT w/BKFO +BTB x 10  SOC x 10  +EIL x 10      [facility] 2/9/2023   Visit Number 8   VAS Pain Rating -   Treadmill Time (in min.) 5   Time -   Lumbar Stretches - Slouch Overcorrection -   Extension in Lying -   Extension in Standing 10   Flexion in Lying -   Lumbar Extension Seat Pad -   Femur Restraint -   Top Dead Center -   Counterweight -   Lumbar Flexion -   Lumbar Extension -   Lumbar Peak Torque -   Min Torque -   Test Percent Below Normative Data -   Lumbar Weight 60   Repetitions 20   Rating of Perceived Exertion 3   Ice - Z Lie (in min.) 5        Peripheral muscle strengthening which included 1 set of 15-20 repetitions at a slow, controlled 10-13 second per rep pace focused on strengthening supporting musculature for improved body mechanics and functional mobility.  Pt and therapist focused on proper form during treatment to ensure optimal strengthening of each targeted muscle group.  Machines were utilized including torso rotation, leg extension, leg curl, chest press, upright row. Tricep extension, bicep curl, leg press, and hip abduction added visit 3    Kirill received the following manual therapy techniques: NP      Home Exercises Provided and Patient Education Provided   Home exercises include:Modified piriformis stretch, EIL  Cardio program:visit 5  Lifting education date:visit " 11  Posture/Lumbar roll: uses pillow at home  Fridge Magnet Discharge handout (date given):    Education provided:   - cues w/ex's  - Pacing with MedX    Written Home Exercises Provided: Patient instructed to cont prior HEP.  Exercises were reviewed and Kirill was able to demonstrate them prior to the end of the session.  Kirill demonstrated good  understanding of the education provided.     See EMR under Patient Instructions for exercises provided prior visit.    Assessment   Kirill returns with increased pain to 4/10 in low back currently. Pt reports he had a fall 2 days ago where his LLE gave out on him and he fell onto his elbows and knees. Treatment continued with lumbopelvic/core flexibility and strengthening ex's. Able to trial side steps with theraband and bridges on bosu in order to address glute strengthening and endurance for carry over into walking program. Discussed starting a walking program outside of therapy. Gave pt homework to walk for 10 minutes atleast 3 days a week outside of therapy and pt verbalized understanding. Also discussed how to incorporate more walking into his daily life by having the pt walk when he is in the grocery store for atleast 5-10 minutes due to pt admitting he uses the scooter at the store. Educated pt on the importance of building up walking endurance and cardiovascular endurance for overall health, quality of life, and pain modulation. Pt admits to being fearful of walking due to not wanting to fall however educated pt on benefits of walking and how to increase overall endurance safely and verbalized understanding. Lumbar MedX resistance was increased to 60 ft/lbs completing  20 reps with a RPE =  3/10.  Will look to progress per HB protocol and patient tolerance.     Patient is making  progress towards established goals.  Pt will continue to benefit from skilled outpatient physical therapy to address the deficits stated in the impairment chart, provide pt/family education  and to maximize pt's level of independence in the home and community environment.     Anticipated Barriers for therapy: chronicity of symptoms, high BMI, lumbar surgeries, anxiety, diabetes    Pt's spiritual, cultural and educational needs considered and pt agreeable to plan of care and goals as stated below:     GOALS: Pt is in agreement with the following goals.     Short term goals:  6 weeks or 10 visits   1.  Pt will demonstrate increased lumbar ROM by at least 5 degrees from the initial ROM value with improvements noted in functional ROM and ability to perform ADLs.  (approp and ongoing)  2.  Pt will demonstrate increased MedX average isometric strength value  by 10% from initial test resulting in improved ability to perform bending, lifting, and carrying activities safely, confidently.  (approp and ongoing)  3.  Patient report a reduction in worst pain score by 1-2 points for improved tolerance for household ADLs.  (approp and ongoing)  4.  Pt able to perform HEP correctly with minimal cueing or supervision from therapist to encourage independent management of symptoms. (approp and ongoing)      Long term goals: 10 weeks or 20 visits   1. Pt will demonstrate increased lumbar ROM by at least 10 degrees from initial ROM value, resulting in improved ability to perform functional fwd bending while standing and sitting. (approp and ongoing)  2. Pt will demonstrate increased MedX average isometric strength value  by 30% from initial test resulting in improved ability to perform bending, lifting, and carrying activities safely, confidently.  (approp and ongoing)  3. Pt to demonstrate ability to independently control and reduce their pain through posture positioning and mechanical movements throughout a typical day.  (approp and ongoing)  4.  Pt will demonstrate reduced pain and improved functional outcomes as reported on the FOTO by reaching a limitation score of < or = 50% or less in order to demonstrate subjective  improvement in pt's condition.    (approp and ongoing)  5. Pt will demonstrate independence with the HEP at discharge  (approp and ongoing)  6.  Pt to be able to walk a block around his neighborhood with max pain 3/10 in order to demonstrate community ambulation for weight loss goals.  (approp and ongoing)    Plan   Continue with established Plan of Care towards established PT goals.     Therapist: Richy Sears, PT  2/9/2023

## 2023-02-13 ENCOUNTER — OFFICE VISIT (OUTPATIENT)
Dept: NEUROSURGERY | Facility: CLINIC | Age: 48
End: 2023-02-13
Payer: MEDICAID

## 2023-02-13 VITALS — WEIGHT: 315 LBS | BODY MASS INDEX: 42.66 KG/M2 | HEIGHT: 72 IN

## 2023-02-13 DIAGNOSIS — Z98.1 S/P LUMBAR FUSION: ICD-10-CM

## 2023-02-13 DIAGNOSIS — M54.9 DORSALGIA, UNSPECIFIED: Primary | ICD-10-CM

## 2023-02-13 DIAGNOSIS — F11.20 OPIOID DEPENDENCE WITH CURRENT USE: ICD-10-CM

## 2023-02-13 PROCEDURE — 3061F PR NEG MICROALBUMINURIA RESULT DOCUMENTED/REVIEW: ICD-10-PCS | Mod: CPTII,,, | Performed by: NEUROLOGICAL SURGERY

## 2023-02-13 PROCEDURE — 3072F LOW RISK FOR RETINOPATHY: CPT | Mod: CPTII,,, | Performed by: NEUROLOGICAL SURGERY

## 2023-02-13 PROCEDURE — 99213 OFFICE O/P EST LOW 20 MIN: CPT | Mod: S$PBB,,, | Performed by: NEUROLOGICAL SURGERY

## 2023-02-13 PROCEDURE — 3066F PR DOCUMENTATION OF TREATMENT FOR NEPHROPATHY: ICD-10-PCS | Mod: CPTII,,, | Performed by: NEUROLOGICAL SURGERY

## 2023-02-13 PROCEDURE — 3072F PR LOW RISK FOR RETINOPATHY: ICD-10-PCS | Mod: CPTII,,, | Performed by: NEUROLOGICAL SURGERY

## 2023-02-13 PROCEDURE — 3046F HEMOGLOBIN A1C LEVEL >9.0%: CPT | Mod: CPTII,,, | Performed by: NEUROLOGICAL SURGERY

## 2023-02-13 PROCEDURE — 3066F NEPHROPATHY DOC TX: CPT | Mod: CPTII,,, | Performed by: NEUROLOGICAL SURGERY

## 2023-02-13 PROCEDURE — 1159F MED LIST DOCD IN RCRD: CPT | Mod: CPTII,,, | Performed by: NEUROLOGICAL SURGERY

## 2023-02-13 PROCEDURE — 99999 PR PBB SHADOW E&M-EST. PATIENT-LVL V: CPT | Mod: PBBFAC,,, | Performed by: NEUROLOGICAL SURGERY

## 2023-02-13 PROCEDURE — 3061F NEG MICROALBUMINURIA REV: CPT | Mod: CPTII,,, | Performed by: NEUROLOGICAL SURGERY

## 2023-02-13 PROCEDURE — 99213 PR OFFICE/OUTPT VISIT, EST, LEVL III, 20-29 MIN: ICD-10-PCS | Mod: S$PBB,,, | Performed by: NEUROLOGICAL SURGERY

## 2023-02-13 PROCEDURE — 99999 PR PBB SHADOW E&M-EST. PATIENT-LVL V: ICD-10-PCS | Mod: PBBFAC,,, | Performed by: NEUROLOGICAL SURGERY

## 2023-02-13 PROCEDURE — 3008F BODY MASS INDEX DOCD: CPT | Mod: CPTII,,, | Performed by: NEUROLOGICAL SURGERY

## 2023-02-13 PROCEDURE — 3046F PR MOST RECENT HEMOGLOBIN A1C LEVEL > 9.0%: ICD-10-PCS | Mod: CPTII,,, | Performed by: NEUROLOGICAL SURGERY

## 2023-02-13 PROCEDURE — 3008F PR BODY MASS INDEX (BMI) DOCUMENTED: ICD-10-PCS | Mod: CPTII,,, | Performed by: NEUROLOGICAL SURGERY

## 2023-02-13 PROCEDURE — 1159F PR MEDICATION LIST DOCUMENTED IN MEDICAL RECORD: ICD-10-PCS | Mod: CPTII,,, | Performed by: NEUROLOGICAL SURGERY

## 2023-02-13 PROCEDURE — 99215 OFFICE O/P EST HI 40 MIN: CPT | Mod: PBBFAC,PN | Performed by: NEUROLOGICAL SURGERY

## 2023-02-13 RX ORDER — CYCLOBENZAPRINE HCL 10 MG
10 TABLET ORAL 3 TIMES DAILY PRN
Qty: 90 TABLET | Refills: 11 | Status: SHIPPED | OUTPATIENT
Start: 2023-02-13 | End: 2023-02-23

## 2023-02-13 RX ORDER — TRAMADOL HYDROCHLORIDE 50 MG/1
100 TABLET ORAL EVERY 8 HOURS PRN
Qty: 180 TABLET | Refills: 3 | Status: SHIPPED | OUTPATIENT
Start: 2023-02-13 | End: 2023-02-22

## 2023-02-13 NOTE — PROGRESS NOTES
Patient ID: Kirill Gandhi III is a 47 y.o. male.    Chief Complaint: Diabetes    HPI      Kirill Gandhi III is a 47 y.o. male following up for diabetes which is not on a full control.  Last hemoglobin A1c was 9.2.  Patient has been ingesting a lot of carbohydrates.    Patient has been a lot of pain from his back.  The although improved from previous surgeries.  Still has pain in his back.    Vitals:    02/09/23 1021   BP: 112/74   Pulse: 92   SpO2: 96%   Weight: (!) 176.2 kg (388 lb 7.2 oz)   Height: 6' (1.829 m)            Review of Symptoms      Physical Exam    Constitutional:  Oriented to person, place, and time.appears well-developed and well-nourished.  No distress.      HENT  Head: Normocephalic and atraumatic  Right Ear: External ear normal.   Left Ear: External ear normal.   Nose: External nose normal.   Mouth:  Moist mucus membranes.    Eyes:  Conjunctivae are normal. Right eye exhibits no discharge.  Left eye exhibits no discharge. No scleral icterus.  No periorbital edema    Cardiovascular:  Regular rate and rhythm with normal S1 and S2     Pulmonary/Chest:   Clear to auscultation bilaterally without wheezes, rhonchi or rales      Musculoskeletal:  No edema. No obvious deformity No wasting       Neurological:  Alert and oriented to person, place, and time.   Coordination normal.     Skin:   Skin is warm and dry.  No diaphoresis.   No rash noted.     Psychiatric: Normal mood and affect. Behavior is normal.  Judgment and thought content normal.     Complete Blood Count  Lab Results   Component Value Date    RBC 4.11 (L) 11/12/2022    HGB 11.4 (L) 11/12/2022    HCT 36.0 (L) 11/12/2022    MCV 88 11/12/2022    MCH 27.7 11/12/2022    MCHC 31.7 (L) 11/12/2022    RDW 16.7 (H) 11/12/2022     11/12/2022    MPV 10.1 11/12/2022    GRAN 4.8 11/12/2022    GRAN 60.5 11/12/2022    LYMPH 2.0 11/12/2022    LYMPH 24.8 11/12/2022    MONO 0.7 11/12/2022    MONO 9.3 11/12/2022    EOS 0.4 11/12/2022     BASO 0.03 11/12/2022    EOSINOPHIL 4.6 11/12/2022    BASOPHIL 0.4 11/12/2022    DIFFMETHOD Automated 11/12/2022       Comprehensive Metabolic Panel  Lab Results   Component Value Date     (H) 11/12/2022    BUN 6 11/12/2022    CREATININE 0.8 11/12/2022     11/12/2022    K 4.2 11/12/2022     11/12/2022    PROT 6.6 11/12/2022    ALBUMIN 3.0 (L) 11/12/2022    BILITOT 0.4 11/12/2022    AST 22 11/12/2022    ALKPHOS 83 11/12/2022    CO2 24 11/12/2022    ALT 26 11/12/2022    ANIONGAP 8 11/12/2022       TSH  No results found for: TSH    Assessment / Plan:      ICD-10-CM ICD-9-CM   1. Type 2 diabetes mellitus without complication, with long-term current use of insulin  E11.9 250.00    Z79.4 V58.67   2. Screening for STD (sexually transmitted disease)  Z11.3 V74.5     Type 2 diabetes mellitus without complication, with long-term current use of insulin  -     dulaglutide (TRULICITY) 4.5 mg/0.5 mL pen injector; Inject 4.5 mg into the skin every 7 days.  Dispense: 12 pen; Refill: 1  -     Comprehensive Metabolic Panel; Future; Expected date: 02/09/2023  -     CBC Auto Differential; Future; Expected date: 02/09/2023  -     Lipid Panel; Future; Expected date: 02/09/2023  -     TSH; Future; Expected date: 02/09/2023  -     T4, Free; Future; Expected date: 02/09/2023  -     Microalbumin/Creatinine Ratio, Urine; Future; Expected date: 02/09/2023    Screening for STD (sexually transmitted disease)  -     HIV 1/2 Ag/Ab (4th Gen); Future; Expected date: 02/09/2023  -     C. trachomatis/N. gonorrhoeae by AMP DNA; Future; Expected date: 02/09/2023  -     HIV 1/2 Ag/Ab (4th Gen); Future

## 2023-02-13 NOTE — PROGRESS NOTES
NEUROSURGICAL OUTPATIENT PROGRESS NOTE    DATE OF SERVICE:  2023    ATTENDING PHYSICIAN:  Jose L Brown MD    ARY:66%    NRS low back:7/10    NRS right le/10    NRS left le/10, numbness    Opioid use daily:  Tramadol 50 mg 3 times daily.  The tramadol we will decrease his pain to 2/10    Neuropathic pain meds daily:  Gabapentin 800 mg 3 times daily      Muscle relaxant daily:  Zanaflex 4 mg 3 times daily      SUBJECTIVE:    INTERIM HISTORY:  This is a 47 y.o. male who is s/p L2-S1 posterolateral arthrodesis and instrumentation, open SI joint fusion and sacral pelvic fixation 10 months ago for pseudoarthrosis at L5-S1.  Patient BMI went up since 2022.  He has developed chronic back pain which was treated initially with oxycodone.  The patient was then switched to tramadol.  He is doing the healthy back physical therapy program.  Unfortunately doing exercises started to have worsening back pain and left leg numbness.  He has not returned to work since his first back surgery.  He is receiving long-term disability.  No new onset of motor weakness, gait imbalance.    PAST MEDICAL HISTORY:  Active Ambulatory Problems     Diagnosis Date Noted    Morbid obesity 2015    Vitamin D deficiency 2015    VICENTE (obstructive sleep apnea) 2015    OCD (obsessive compulsive disorder) 2020    Anxiety 2020    Insomnia due to other mental disorder 2021    Palpitations 2021    DDD (degenerative disc disease), lumbar 2021    Adjustment disorder with mixed anxiety and depressed mood 2021    Mixed obsessional thoughts and acts 2021    Mild episode of recurrent major depressive disorder 2021    Fusion of spine, lumbosacral region 2021    Sacroiliac joint dysfunction of both sides 2021    Chronic bilateral low back pain with bilateral sciatica 2021    Radiculopathy of lumbosacral region 2021    Chest pain, atypical 03/10/2022     Pseudoarthrosis of lumbar spine 04/13/2022    Benign essential hypertension 04/20/2022    Type 2 diabetes mellitus with hyperglycemia, with long-term current use of insulin 10/17/2022    Excessive flatus 10/19/2022    Gastroesophageal reflux disease without esophagitis 10/19/2022    Hyponatremia 11/11/2022    Metabolic acidemia 11/11/2022    Dehydration with hyponatremia 11/11/2022    Flatulence/gas pain/belching 11/15/2022    Decreased strength of trunk and back 01/09/2023    Decreased ROM of trunk and back 01/09/2023     Resolved Ambulatory Problems     Diagnosis Date Noted    CTS (carpal tunnel syndrome) 06/26/2015    DDD (degenerative disc disease), lumbar 09/04/2019    Lumbar disc herniation with radiculopathy 10/09/2019    Ileus 11/11/2022     Past Medical History:   Diagnosis Date    Allergy     Back pain     Depression     Diabetes 04/17/2015    Diabetes mellitus with insulin therapy     Elevated sed rate 6/26/2015    GERD (gastroesophageal reflux disease) 10/19/2022    Hx of vasectomy     Hypertension     Sleep apnea        PAST SURGICAL HISTORY:  Past Surgical History:   Procedure Laterality Date    ADENOIDECTOMY      COLONOSCOPY N/A 9/6/2022    Procedure: COLONOSCOPY;  Surgeon: Rossy Champion MD;  Location: [facility];  Service: Endoscopy;  Laterality: N/A;    EPIDURAL STEROID INJECTION Right 09/04/2019    Procedure: Injection, Steroid, Epidural Transforaminal;  Surgeon: Harjinder Batista Jr., MD;  Location: [facility];  Service: Pain Management;  Laterality: Right;  Right L3 + L4 TF NATALIE    [id number]  [id number]    Arrive @ 1300; ASA last 8/27; Check BG; NEEDS CONSENT    ESOPHAGOGASTRODUODENOSCOPY N/A 10/31/2022    Procedure: EGD (ESOPHAGOGASTRODUODENOSCOPY);  Surgeon: Rossy Champion MD;  Location: [facility] ENDO;  Service: Endoscopy;  Laterality: N/A;    FRACTURE SURGERY      bilateral elbow fracture 3 years ago    FUSION OF LUMBAR SPINE BY ANTERIOR APPROACH Left 04/26/2021    Procedure: FUSION, SPINE,  LUMBAR, ANTERIOR APPROACH Left L3-4 L4-5 OLIF BRENDA, ALL Release, L5-S1 ALIF;  Surgeon: Jose L Brown MD;  Location: [facility] OR;  Service: Neurosurgery;  Laterality: Left;  Procedure: Left L3-4 L4-5 OLIF BRENDA, ALL Release, L5-S1 ALIF  Surgery Time: 2.5 Hrs  LOS: 2-3  Anesthesia: General  Blood: Type & Screen  Radiology: Josseline  Brace: LSO  Bed: Regular  Position: LAteral Righ    FUSION OF SACROILIAC JOINT Bilateral 04/13/2022    Procedure: FUSION, SACROILIAC JOINT Stg 2: Bilateral Open SI Joint fusion, Bedrock from Si Bone;  Surgeon: Jose L Brown MD;  Location: [facility] OR;  Service: Neurosurgery;  Laterality: Bilateral;    FUSION OF SPINE WITH INSTRUMENTATION N/A 04/26/2021    Procedure: FUSION, SPINE, WITH INSTRUMENTATION L3-S1 Posteior Segmental Instrumentation;  Surgeon: Jose L Brown MD;  Location: [facility] OR;  Service: Neurosurgery;  Laterality: N/A;  Procedure: L3-S1 Posteior Segmental Instrumentation  Sirgery Time: 2 Hrs  LOS: 2-3  Anesthesia: General  Blood: Tyoe & Screen  Radiology: Dual C arm  Brace:LSO  Bed: [place] [id number] Poster  Position: Prone  Equipment: Depuy     FUSION OF SPINE WITH INSTRUMENTATION N/A 04/13/2022    Procedure: FUSION, SPINE, WITH INSTRUMENTATION Stg 1: Revision of posterior L3-S1 hardware, Depuy. Sacro-pelvic fixation, screw augmentation PMMA. L4-S1 posterolateral arthrodesis. Open Gauge screws, 11-12mm;  Surgeon: Jose L Brown MD;  Location: [facility] OR;  Service: Neurosurgery;  Laterality: N/A;    HERNIA REPAIR      bilateral groin hernia    INJECTION OF STEROID Bilateral 12/13/2021    Procedure: INJECTION, STEROID Bilateral SI JOint Block and Steroid Injection;  Surgeon: Jose L Brown MD;  Location: [facility] OR;  Service: Neurosurgery;  Laterality: Bilateral;  Procedure: Bilateral SI JOint Block and Steroid Injection   Surgery Time: 30 min  LOS: 0  Anesthesia: MAC  Radiology: C-arm  Bed: Regular with pillows  Position: Prone    LUMBAR LAMINECTOMY Bilateral 11/29/2019    Procedure:  LAMINECTOMY, SPINE, LUMBAR Rigth L3-4, Left L4-5 Laminectomy, medial Facetectomy, and microdiscectomy;  Surgeon: Jose L Brown MD;  Location: [facility] OR;  Service: Neurosurgery;  Laterality: Bilateral;  Procedure: Rigth L3-4, Left L4-5 Laminectomy, medial Facetectomy, and microdiscectomy  Surgery Time: 2.5 Hrs  LOS: 0-1  Anesthesia: General  Blood: Type & Screen  Radiology: C-arm  Micros    REMOVAL IMPLANT, POSTERIOR SEGMENT, INTRAOCULAR  04/13/2022    Procedure: REMOVAL IMPLANT, POSTERIOR SEGMENT, INTRAOCULAR;  Surgeon: Jose L Brown MD;  Location: [facility] OR;  Service: Neurosurgery;;    SPINE SURGERY      TONSILLECTOMY      VASECTOMY         SOCIAL HISTORY:   Social History     Socioeconomic History    Marital status: Significant Other   Tobacco Use    Smoking status: Former     Packs/day: 0.00     Years: 24.00     Pack years: 0.00     Types: Vaping with nicotine, Cigarettes     Start date: 1/1/1997    Smokeless tobacco: Never    Tobacco comments:     Handout provided for [employer] Smoking Cessaiton program   Substance and Sexual Activity    Alcohol use: Not Currently     Comment: 750 ml  once a month or so    Drug use: Never    Sexual activity: Yes     Partners: Female     Birth control/protection: None     Comment: ; one child      Social Determinants of Health     Financial Resource Strain: Low Risk     Difficulty of Paying Living Expenses: Not hard at all   Food Insecurity: No Food Insecurity    Worried About Running Out of Food in the Last Year: Never true    Ran Out of Food in the Last Year: Never true   Transportation Needs: No Transportation Needs    Lack of Transportation (Medical): No    Lack of Transportation (Non-Medical): No   Physical Activity: Inactive    Days of Exercise per Week: 0 days    Minutes of Exercise per Session: 0 min   Stress: Stress Concern Present    Feeling of Stress : Very much   Social Connections: Unknown    Frequency of Communication with Friends and Family: Never     Frequency of Social Gatherings with Friends and Family: Once a week    Active Member of Clubs or Organizations: No    Attends Club or Organization Meetings: Never    Marital Status: Living with partner   Housing Stability: Low Risk     Unable to Pay for Housing in the Last Year: No    Number of Places Lived in the Last Year: 1    Unstable Housing in the Last Year: No       FAMILY HISTORY:  Family History   Problem Relation Age of Onset    Fibromyalgia Mother     Osteoarthritis Mother     Cataracts Maternal Grandmother     Macular degeneration Maternal Grandfather     Cataracts Maternal Grandfather     Rheum arthritis Neg Hx     Psoriasis Neg Hx     Lupus Neg Hx     Kidney disease Neg Hx     Inflammatory bowel disease Neg Hx     Amblyopia Neg Hx     Blindness Neg Hx     Glaucoma Neg Hx     Retinal detachment Neg Hx     Strabismus Neg Hx        CURRENTS MEDICATIONS:  Current Outpatient Medications on File Prior to Visit   Medication Sig Dispense Refill    acetaminophen (TYLENOL) 500 MG tablet Take 1,000 mg by mouth 2 (two) times daily as needed for Pain.      ascorbic acid, vitamin C, (VITAMIN C) 1000 MG tablet Take 1,000 mg by mouth once daily.      aspirin (ECOTRIN) 81 MG EC tablet Take 81 mg by mouth every evening.      aspirin/sod bicarb/citric acid (GIOVANY-SELTZER ORAL) Take 2 tablets by mouth daily as needed (Upset stomach).      atorvastatin (LIPITOR) 10 MG tablet Take 1 tablet (10 mg total) by mouth every other day. 45 tablet 3    blood-glucose transmitter (DEXCOM G6 TRANSMITTER) Cayla Use to check glucose - 90 day supply 1 each 11    busPIRone (BUSPAR) 15 MG tablet Take 1.5 tablets (22.5 mg total) by mouth 2 (two) times daily. 270 tablet 3    celecoxib (CELEBREX) 200 MG capsule Take 1 capsule (200 mg total) by mouth 2 (two) times daily. 60 capsule 1    dicyclomine (BENTYL) 20 mg tablet Take 1 tablet (20 mg total) by mouth 3 (three) times daily as needed (abdominal pain). 60 tablet 2    dulaglutide (TRULICITY)  4.5 mg/0.5 mL pen injector Inject 4.5 mg into the skin every 7 days. 12 pen 1    DULoxetine (CYMBALTA) 60 MG capsule Take 1 capsule (60 mg total) by mouth 2 (two) times daily. 180 capsule 3    flash glucose scanning reader Misc Disp one reader to monitor glucose- covered by insurance dexcom 1 each 1    flash glucose sensor Kit Dispense sensor to read glucose  dexcom 12 kit 11    fluticasone propionate (FLONASE) 50 mcg/actuation nasal spray 2 sprays (100 mcg total) by Each Nostril route once daily. 48 mL 2    gabapentin (NEURONTIN) 800 MG tablet Take 1 tablet (800 mg total) by mouth 3 (three) times daily. 270 tablet 3    hydrOXYzine HCL (ATARAX) 25 MG tablet Take 2 tablets (50 mg total) by mouth nightly as needed (insomnia). 180 tablet 0    insulin aspart U-100 (NOVOLOG) 100 unit/mL (3 mL) InPn pen Inject Between 50 to 70 units 3 times a day with meals. (Patient taking differently: Inject Between 50 to 55 units 3 times a day with meals.) 45 mL 6    insulin detemir U-100 (LEVEMIR FLEXTOUCH U-100 INSULN) 100 unit/mL (3 mL) InPn pen Inject 80 units into the skin daily, increase by 5 every 3 days if needed up to 100 units each day 90 mL 1    Lactobac no.41/Bifidobact no.7 (PROBIOTIC-10 ORAL) Take by mouth.      lanolin/mineral oil/petrolatum (ARTIFICIAL TEARS OPHT) Place 2 drops into both eyes daily as needed (Dry eye).      methylcellulose, laxative, (CITRUCEL) 500 mg Tab Take 1 tablet by mouth nightly as needed (Constipation).      metoclopramide HCl (REGLAN) 5 mg/5 mL Soln Take 5 mLs (5 mg total) by mouth 4 (four) times daily before meals and nightly. 500 mL 2    multivitamin capsule Take 1 capsule by mouth once daily.      NON FORMULARY MEDICATION Uses Cpap Machine nightly on a setting of 10      ondansetron (ZOFRAN-ODT) 4 MG TbDL Take 1 tablet (4 mg total) by mouth every 8 (eight) hours as needed (nausea). 30 tablet 5    ONETOUCH DELICA PLUS LANCET 33 gauge Misc Apply topically 3 (three) times daily.      ONETOUCH  "VERIO TEST STRIPS Strp 1 strip 3 (three) times daily.      pantoprazole (PROTONIX) 40 MG tablet Take 1 tablet (40 mg total) by mouth once daily. 90 tablet 3    pen needle, diabetic (BD ULTRA-FINE SHORT PEN NEEDLE) 31 gauge x 5/16" Ndle USE WITH FLEX  each 11    pen needle, diabetic 31 gauge x 1/4" Ndle Use with flex pen 100 each 11    valsartan-hydrochlorothiazide (DIOVAN-HCT) 320-12.5 mg per tablet Take 1 tablet by mouth once daily. 90 tablet 3    vitamin D 1000 units Tab Take 5,000 Units by mouth 2 (two) times a day.      [DISCONTINUED] tiZANidine (ZANAFLEX) 4 MG tablet Take 1 tablet (4 mg total) by mouth every 8 (eight) hours as needed (spasms). (Patient taking differently: Take 4 mg by mouth 2 (two) times daily as needed (spasms).) 270 tablet 0    [DISCONTINUED] traMADoL (ULTRAM) 50 mg tablet Take 1 tablet (50 mg total) by mouth every 8 (eight) hours as needed for Pain. 90 tablet 3    diazePAM (VALIUM) 5 MG tablet Take 1 tablet (5 mg total) by mouth daily as needed for Anxiety (muscle spasms). (Patient not taking: Reported on 2/9/2023) 30 tablet 0    [DISCONTINUED] fluvoxaMINE (LUVOX) 100 MG tablet Take 1.5 tablets (150 mg total) by mouth 2 (two) times daily. 270 tablet 0     No current facility-administered medications on file prior to visit.       ALLERGIES:  Review of patient's allergies indicates:   Allergen Reactions    Dilaudid [hydromorphone] Other (See Comments)     IV dilaudid severely drops BP       REVIEW OF SYSTEMS:  Review of Systems   Constitutional:  Negative for diaphoresis, fever and weight loss.   Respiratory:  Negative for shortness of breath.    Cardiovascular:  Negative for chest pain.   Gastrointestinal:  Negative for blood in stool.   Genitourinary:  Negative for hematuria.   Endo/Heme/Allergies:  Does not bruise/bleed easily.   All other systems reviewed and are negative.    OBJECTIVE:    PHYSICAL EXAMINATION:   There were no vitals filed for this visit.    Physical Exam:  Vitals " reviewed.    Constitutional: He appears well-developed and well-nourished.     Eyes: Pupils are equal, round, and reactive to light. Conjunctivae and EOM are normal.     Cardiovascular: Normal distal pulses and no edema.     Abdominal: Soft.     Skin: Skin displays no rash on trunk and no rash on extremities. Skin displays no lesions on trunk and no lesions on extremities.     Psych/Behavior: He is alert. He is oriented to person, place, and time. He has a normal mood and affect.     Musculoskeletal:        Neck: Range of motion is full.     Neurological:        DTRs: Tricep reflexes are 2+ on the left side.     Back Exam     Muscle Strength   Right Quadriceps:  5/5   Left Quadriceps:  5/5   Right Hamstrings:  5/5   Left Hamstrings:  5/5     Tests   Straight leg raise right: negative  Straight leg raise left: negative            Neurologic Exam     Mental Status   Oriented to person, place, and time.   Speech: speech is normal   Level of consciousness: alert    Cranial Nerves   Cranial nerves II through XII intact.     CN III, IV, VI   Pupils are equal, round, and reactive to light.  Extraocular motions are normal.     Motor Exam   Muscle bulk: normal  Overall muscle tone: normal    Strength   Right deltoid: 5/5  Left deltoid: 5/5  Right biceps: 5/5  Left biceps: 5/5  Right triceps: 5/5  Left triceps: 5/5  Right wrist flexion: 5/5  Left wrist flexion: 5/5  Right wrist extension: 5/5  Left wrist extension: 5/5  Right interossei: 5/5  Left interossei: 5/5  Right iliopsoas: 5/5  Left iliopsoas: 5/5  Right quadriceps: 5/5  Left quadriceps: 5/5  Right hamstrin/5  Left hamstrin/5  Right anterior tibial: 5/5  Left anterior tibial: 5/5  Right posterior tibial: 5/5  Left posterior tibial: 5/5  Right peroneal: 5/5  Left peroneal: 5/5  Right gastroc: 5/5  Left gastroc: 5/5    Sensory Exam   Light touch normal.   Pinprick normal.     Gait, Coordination, and Reflexes     Gait  Gait: normal    Reflexes   Left triceps:  2+  Right plantar: normal  Left plantar: normal  Right Hughes: absent  Left Hughes: absent  Right ankle clonus: absent  Left ankle clonus: absent      DIAGNOSTIC DATA:  I personally interpreted the following imaging:   November 2022 lumbar x-ray was showing good positioning of hardware, no lucency around the hardware.        ASSESMENT:  This is a 47 y.o. male who is status post 10 months L3-S1 fusion with open SI joint fusion and sacral pelvic fixation.  Chronic low back pain that recently got worse with physical therapy.    Problem List Items Addressed This Visit    None  Visit Diagnoses       Dorsalgia, unspecified    -  Primary    Relevant Orders    CT Lumbar Spine Without Contrast    Ambulatory referral/consult to Bariatric Surgery    BMI 50.0-59.9, adult        Relevant Orders    Ambulatory referral/consult to Bariatric Surgery    S/P lumbar fusion        Opioid dependence with current use                PLAN:  Ordering lumbar spine CT scan to assess fusion consolidation and to rule out hardware issues in the context of worsening back pain following physical therapy  Patient reports not having enough pain relief with tramadol at this time.  We will increase tramadol 200 mg q.8 hours p.r.n. as needed and will change muscle relaxer to Flexeril.  Patient will sign a pain contract in clinic.  All questions answered  We will call back once CT of the lumbar spine is performed.  We will also refer him to bariatric surgery for weight loss.        Jose L Brown MD  Cell:[phone]

## 2023-02-13 NOTE — PROGRESS NOTES
Oswestry Low Back Pain Disability Questionnaire    DATE OF SERVICE:  02/13/2023    ATTENDING PHYSICIAN:  Jose L Brown MD    Instructions    This questionnaire has been designed to give us information as to how your back or leg pain is affecting your ability to manage in everyday life. Please answer by checking ONE box in each section for the statement which best applies to you. We realize you may consider that two or more statements in any one section apply but please just shade out the spot that indicates the statement which most clearly describes your problem.    Section 1 - Pain intensity    * I have no pain at the moment.  * The pain is very mild at the moment.  * The pain is moderate at the moment.  * The pain is fairly severe at the moment.  * The pain is very severe at the moment.  * The pain is the worst imaginable at the moment.    Score : {NUMBERS; 0-5:20280}    Section 2 - Personal care (washing, dressing etc)    * I can look after myself normally without causing extra pain.  * I can look after myself normally but it causes extra pain.  * It is painful to look after myself and I am slow and careful.  * I need some help but manage most of my personal care.  * I need help every day in most aspects of self-care.  * I do not get dressed, I wash with difficulty and stay in bed.    Score : {NUMBERS; 0-5:20280}    Section 3 - Lifting    * I can lift heavy weights without extra pain.  * I can lift heavy weights but it gives extra pain.  * Pain prevents me from lifting heavy weights off the floor, but I can manage if they are conveniently placed eg. on a table.  * Pain prevents me from lifting heavy weights, but I can manage light to medium weights if they are conveniently positioned.  * I can lift very light weights.  * I cannot lift or carry anything at all.    Score : {NUMBERS; 0-5:59614}    Section 4 - Walking    * Pain does not prevent me walking any distance.  * Pain prevents me from walking more than 1  mile.         * Pain prevents me from walking more than 1/2 mile.         * Pain prevents me from walking more than 100 yards.            * I can only walk using a stick or crutches.  * I am in bed most of the time.    Score : {NUMBERS; 0-5:24214}    Section 5 - Sitting    * I can sit in any chair as long as I like.  * I can only sit in my favourite chair as long as I like.  * Pain prevents me sitting more than one hour.  * Pain prevents me from sitting more than 30 minutes.  * Pain prevents me from sitting more than 10 minutes.  * Pain prevents me from sitting at all.    Score : {NUMBERS; 0-5:81748}    Section 6 - Standing    * I can stand as long as I want without extra pain.  * I can stand as long as I want but it gives me extra pain.  * Pain prevents me from standing for more than 1 hour  * Pain prevents me from standing for more than 30 minutes.  * Pain prevents me from standing for more than 10 minutes.  * Pain prevents me from standing at all.     Score : {NUMBERS; 0-5:16719}    Section 7 - Sleeping    * My sleep is never disturbed by pain.  * My sleep is occasionally disturbed by pain.  * Because of pain I have less than 6 hours sleep.   * Because of pain I have less than 4 hours sleep.   * Because of pain I have less than 2 hours sleep.  * Pain prevents me from sleeping at all.    Score : {NUMBERS; 0-5:36700}    Section 8 - Sex life (if applicable)    * My sex life is normal and causes no extra pain.  * My sex life is normal but causes some extra pain.  * My sex life is nearly normal but is very painful.   * My sex life is severely restricted by pain.  * My sex life is nearly absent because of pain.   * Pain prevents any sex life at all.    Score : {NUMBERS; 0-5:09673}    Section 9 - Social life    * My social life is normal and gives me no extra pain.  * My social life is normal but increases the degree of pain.  * Pain has no significant effect on my social life apart from limiting my more energetic  interests eg, sport.  * Pain has restricted my social life and I do not go out as often.  * Pain has restricted my social life to my home.   * I have no social life because of pain.     Score : {NUMBERS; 0-5:20280}    Section 10 - Travelling    * I can travel anywhere without pain.  * I can travel anywhere but it gives me extra pain.  * Pain is bad but I manage journeys over two hours.  * Pain restricts me to journeys of less than one hour.  * Pain restricts me to short necessary journeys under 30 minutes.  * Pain prevents me from travelling except to receive treatment.    Score : {NUMBERS; 0-5:20280}      TOTAL SCORE :  *** / 50 x 2 = *** %      References  1. Anderson MELE, Tito PB. The Oswestry Disability Index. Spine 2000 Nov 15;25(22):2945-52; discussion 52.  from standing for more than from standing for more than from standing for more than from standing at all

## 2023-02-14 ENCOUNTER — PATIENT MESSAGE (OUTPATIENT)
Dept: FAMILY MEDICINE | Facility: CLINIC | Age: 48
End: 2023-02-14
Payer: MEDICAID

## 2023-02-14 ENCOUNTER — TELEPHONE (OUTPATIENT)
Dept: BARIATRICS | Facility: CLINIC | Age: 48
End: 2023-02-14
Payer: MEDICAID

## 2023-02-15 ENCOUNTER — TELEPHONE (OUTPATIENT)
Dept: FAMILY MEDICINE | Facility: CLINIC | Age: 48
End: 2023-02-15
Payer: MEDICAID

## 2023-02-15 NOTE — TELEPHONE ENCOUNTER
I would not do anything different.  I would not take Paxlovid.  Has many interactions and side effects.  In your case it is probably not warranted.

## 2023-02-15 NOTE — TELEPHONE ENCOUNTER
----- Message from Feliciano Davila sent at 2/15/2023  1:39 PM CST -----  .Type:  Needs Medical Advice    Who Called: pt    Would the patient rather a call back or a response via [facility]? Estrada back  Best Call Back Number: [phone]  Additional Information:     Pt stated he went to urgent care yesterday and he tested positive for Covid and was told to reach out to his PCP for medication for Covid pt did get antibiotics and nasal spray

## 2023-02-20 ENCOUNTER — PATIENT MESSAGE (OUTPATIENT)
Dept: NEUROSURGERY | Facility: CLINIC | Age: 48
End: 2023-02-20
Payer: MEDICAID

## 2023-02-20 ENCOUNTER — HOSPITAL ENCOUNTER (OUTPATIENT)
Dept: RADIOLOGY | Facility: HOSPITAL | Age: 48
Discharge: HOME | End: 2023-02-20
Attending: NEUROLOGICAL SURGERY
Payer: MEDICAID

## 2023-02-20 DIAGNOSIS — M54.9 DORSALGIA, UNSPECIFIED: ICD-10-CM

## 2023-02-20 PROCEDURE — 72131 CT LUMBAR SPINE W/O DYE: CPT | Mod: 26,,, | Performed by: RADIOLOGY

## 2023-02-20 PROCEDURE — 72131 CT LUMBAR SPINE W/O DYE: CPT | Mod: TC,PO

## 2023-02-20 PROCEDURE — 72131 CT LUMBAR SPINE WITHOUT CONTRAST: ICD-10-PCS | Mod: 26,,, | Performed by: RADIOLOGY

## 2023-02-20 NOTE — PROGRESS NOTES
Oswestry Low Back Pain Disability Questionnaire    DATE OF SERVICE:  02/20/2023    ATTENDING PHYSICIAN:  Jose L Brown MD    Instructions    This questionnaire has been designed to give us information as to how your back or leg pain is affecting your ability to manage in everyday life. Please answer by checking ONE box in each section for the statement which best applies to you. We realize you may consider that two or more statements in any one section apply but please just shade out the spot that indicates the statement which most clearly describes your problem.    Section 1 - Pain intensity    * I have no pain at the moment.  * The pain is very mild at the moment.  * The pain is moderate at the moment.  * The pain is fairly severe at the moment.  * The pain is very severe at the moment.  * The pain is the worst imaginable at the moment.    Score : 4    Section 2 - Personal care (washing, dressing etc)    * I can look after myself normally without causing extra pain.  * I can look after myself normally but it causes extra pain.  * It is painful to look after myself and I am slow and careful.  * I need some help but manage most of my personal care.  * I need help every day in most aspects of self-care.  * I do not get dressed, I wash with difficulty and stay in bed.    Score : 3    Section 3 - Lifting    * I can lift heavy weights without extra pain.  * I can lift heavy weights but it gives extra pain.  * Pain prevents me from lifting heavy weights off the floor, but I can manage if they are conveniently placed eg. on a table.  * Pain prevents me from lifting heavy weights, but I can manage light to medium weights if they are conveniently positioned.  * I can lift very light weights.  * I cannot lift or carry anything at all.    Score : 3    Section 4 - Walking    * Pain does not prevent me walking any distance.  * Pain prevents me from walking more than 1 mile.         * Pain prevents me from walking more than  1/2 mile.         * Pain prevents me from walking more than 100 yards.            * I can only walk using a stick or crutches.  * I am in bed most of the time.    Score : 3    Section 5 - Sitting    * I can sit in any chair as long as I like.  * I can only sit in my favourite chair as long as I like.  * Pain prevents me sitting more than one hour.  * Pain prevents me from sitting more than 30 minutes.  * Pain prevents me from sitting more than 10 minutes.  * Pain prevents me from sitting at all.    Score : 2    Section 6 - Standing    * I can stand as long as I want without extra pain.  * I can stand as long as I want but it gives me extra pain.  * Pain prevents me from standing for more than 1 hour  * Pain prevents me from standing for more than 30 minutes.  * Pain prevents me from standing for more than 10 minutes.  * Pain prevents me from standing at all.     Score : 4    Section 7 - Sleeping    * My sleep is never disturbed by pain.  * My sleep is occasionally disturbed by pain.  * Because of pain I have less than 6 hours sleep.   * Because of pain I have less than 4 hours sleep.   * Because of pain I have less than 2 hours sleep.  * Pain prevents me from sleeping at all.    Score : 2    Section 8 - Sex life (if applicable)    * My sex life is normal and causes no extra pain.  * My sex life is normal but causes some extra pain.  * My sex life is nearly normal but is very painful.   * My sex life is severely restricted by pain.  * My sex life is nearly absent because of pain.   * Pain prevents any sex life at all.    Score : 5    Section 9 - Social life    * My social life is normal and gives me no extra pain.  * My social life is normal but increases the degree of pain.  * Pain has no significant effect on my social life apart from limiting my more energetic interests eg, sport.  * Pain has restricted my social life and I do not go out as often.  * Pain has restricted my social life to my home.   * I have no  social life because of pain.     Score : 4    Section 10 - Travelling    * I can travel anywhere without pain.  * I can travel anywhere but it gives me extra pain.  * Pain is bad but I manage journeys over two hours.  * Pain restricts me to journeys of less than one hour.  * Pain restricts me to short necessary journeys under 30 minutes.  * Pain prevents me from travelling except to receive treatment.    Score : 3      TOTAL SCORE :  33 / 50 x 2 = 66 %      References  1. Volin MELE, Tito PB. The Oswestry Disability Index. Spine 2000 Nov 15;25(22):2946-52; discussion 52.  from standing for more than from standing for more than from standing for more than from standing at all

## 2023-02-22 ENCOUNTER — PATIENT MESSAGE (OUTPATIENT)
Dept: NEUROSURGERY | Facility: CLINIC | Age: 48
End: 2023-02-22
Payer: MEDICAID

## 2023-02-22 DIAGNOSIS — Z98.1 S/P LUMBAR FUSION: ICD-10-CM

## 2023-02-22 DIAGNOSIS — T84.498A LOOSENING OF HARDWARE IN SPINE: Primary | ICD-10-CM

## 2023-02-22 RX ORDER — TRAMADOL HYDROCHLORIDE 100 MG/1
100 TABLET, COATED ORAL EVERY 8 HOURS PRN
Qty: 90 TABLET | Refills: 3 | Status: SHIPPED | OUTPATIENT
Start: 2023-02-22 | End: 2023-07-30 | Stop reason: SDUPTHER

## 2023-02-23 ENCOUNTER — CLINICAL SUPPORT (OUTPATIENT)
Dept: REHABILITATION | Facility: HOSPITAL | Age: 48
End: 2023-02-23
Payer: MEDICAID

## 2023-02-23 DIAGNOSIS — M51.36 DDD (DEGENERATIVE DISC DISEASE), LUMBAR: ICD-10-CM

## 2023-02-23 DIAGNOSIS — R29.898 DECREASED STRENGTH OF TRUNK AND BACK: Primary | ICD-10-CM

## 2023-02-23 DIAGNOSIS — G89.29 CHRONIC BILATERAL LOW BACK PAIN WITH BILATERAL SCIATICA: ICD-10-CM

## 2023-02-23 DIAGNOSIS — M54.42 CHRONIC BILATERAL LOW BACK PAIN WITH BILATERAL SCIATICA: ICD-10-CM

## 2023-02-23 DIAGNOSIS — M54.41 CHRONIC BILATERAL LOW BACK PAIN WITH BILATERAL SCIATICA: ICD-10-CM

## 2023-02-23 DIAGNOSIS — M25.69 DECREASED ROM OF TRUNK AND BACK: ICD-10-CM

## 2023-02-23 PROCEDURE — 97110 THERAPEUTIC EXERCISES: CPT

## 2023-02-23 NOTE — PROGRESS NOTES
[facility]      [facility]  [facility] [id number]    Therapy Diagnosis:   Encounter Diagnoses   Name Primary?    Decreased strength of trunk and back Yes    Chronic bilateral low back pain with bilateral sciatica     DDD (degenerative disc disease), lumbar     Decreased ROM of trunk and back        Physician: Jose L Brown MD    Visit Date: 2023    Physician Orders: PT Eval and Treat  Medical Diagnosis from Referral: Z98.1 (ICD-10-CM) - S/P lumbar fusion  Evaluation Date: 2023  Authorization Period Expiration: 11/15/2023  Plan of Care Expiration: 2023  Reassessment Due: 2023  Visit # / Visits authorized:  (No lap belt)     Time In: 10:32 AM  Time Out: 11:32 AM  Total Billable Time: 55 minutes  Insurance:Fee for service Insurance Patient      Precautions: Standard and Diabetes and Fall      Pattern of pain determined: 1 PEN    Subjective   Kirill reports he missed PT sessions for the last 2 weeks due to testing positive for COVID. He reports feeling stiffness in low back today and mild pain. He denies any instances of L LE giving out since last visit.     Patient reports tolerating previous visit : Min muscular soreness  Patient reports their pain to be 3/10 on a 0-10 scale with 0 being no pain and 10 being the worst pain imaginable.  Pain Location: bilateral back      Work and leisure: Unemployed/ Watch movies, play videos      Pt goals: Increase flexibility in other areas, be able to go for walks with minimal pain    Objective     Baseline Isometric Testing on Med X equipment: Testing administered by PT  Date of testin2023  ROM 0-24 deg   Max Peak Torque 72    Min Peak Torque 13    Flex/Ext Ratio 5.54   % below normative data 79     Outcomes:  Initial score: 71% limitation  Visit 6 score: 71%  Goal:      Treatment    Pt was instructed in and performed the following:     Kirill received therapeutic exercises to  "develop/improved posture, cardiovascular endurance, muscular endurance, lumbar/cervical ROM, strength and muscular endurance for 55 minutes including the following exercises:     TM warm-up x 5 minutes @ 1.7 mph  LTR x 10  DKTC w/T-ball x 20  Modified piriformis stretch w/towel assist 3 x 20" each side  PPT 10x5"  Bridging +BTBx20 (NP)  Bridging with shoulders on bosu 2x10   Prone on elbows 1 minute  EIS x10  Side steps with BTB 2x10 ea   Palloff press GTB x 15 each (NP)     NP  Seated back extension with red bungee 1x10   STS + airex x10  PPT w/ march x10  PPT w/BKFO +BTB x 10  SOC x 10  +EIL x 10    [facility] 2/23/2023   Visit Number 9   VAS Pain Rating -   Treadmill Time (in min.) 5   Time -   Lumbar Stretches - Slouch Overcorrection -   Extension in Lying -   Extension in Standing 10   Flexion in Lying -   Lumbar Extension Seat Pad -   Femur Restraint -   Top Dead Center -   Counterweight -   Lumbar Flexion 33   Lumbar Extension 0   Lumbar Peak Torque -   Min Torque -   Test Percent Below Normative Data -   Lumbar Weight 60   Repetitions 20   Rating of Perceived Exertion 3   Ice - Z Lie (in min.) 5       Peripheral muscle strengthening which included 1 set of 15-20 repetitions at a slow, controlled 10-13 second per rep pace focused on strengthening supporting musculature for improved body mechanics and functional mobility.  Pt and therapist focused on proper form during treatment to ensure optimal strengthening of each targeted muscle group.  Machines were utilized including torso rotation, leg extension, leg curl, chest press, upright row. Tricep extension, bicep curl, leg press, and hip abduction added visit 3    Kirill received the following manual therapy techniques: NP      Home Exercises Provided and Patient Education Provided   Home exercises include:Modified piriformis stretch, EIL  Cardio program:visit 5  Lifting education date:visit 11  Posture/Lumbar roll: uses pillow at home  Fridge Magnet " Discharge handout (date given):    Education provided:   - cues w/ex's  - Pacing with MedX    Written Home Exercises Provided: Patient instructed to cont prior HEP.  Exercises were reviewed and Kirill was able to demonstrate them prior to the end of the session.  Kirill demonstrated good  understanding of the education provided.     See EMR under Patient Instructions for exercises provided prior visit.    Assessment   Kirill returns to therapy with reports of mild back pain. He missed PT sessions for the last 2 weeks due to being sick. Treatment continued with lumbopelvic/core flexibility, core stability, and strengthening ex's. Since pt missed 2 weeks of sessions, weight was kept the same for lumbar MedX machine and pt tolerated well. He was able to progress ROM from 0-30 to 0-33. He completed full peripheral machine circuit without verbal complaints. Will progress per HB protocol and pt's tolerance. Patient is making good progress towards established goals.  Pt will continue to benefit from skilled outpatient physical therapy to address the deficits stated in the impairment chart, provide pt/family education and to maximize pt's level of independence in the home and community environment.     Anticipated Barriers for therapy: chronicity of symptoms, high BMI, lumbar surgeries, anxiety, diabetes    Pt's spiritual, cultural and educational needs considered and pt agreeable to plan of care and goals as stated below:     GOALS: Pt is in agreement with the following goals.     Short term goals:  6 weeks or 10 visits   1.  Pt will demonstrate increased lumbar ROM by at least 5 degrees from the initial ROM value with improvements noted in functional ROM and ability to perform ADLs.  (approp and ongoing)  2.  Pt will demonstrate increased MedX average isometric strength value  by 10% from initial test resulting in improved ability to perform bending, lifting, and carrying activities safely, confidently.  (approp and  ongoing)  3.  Patient report a reduction in worst pain score by 1-2 points for improved tolerance for household ADLs.  (approp and ongoing)  4.  Pt able to perform HEP correctly with minimal cueing or supervision from therapist to encourage independent management of symptoms. (approp and ongoing)      Long term goals: 10 weeks or 20 visits   1. Pt will demonstrate increased lumbar ROM by at least 10 degrees from initial ROM value, resulting in improved ability to perform functional fwd bending while standing and sitting. (approp and ongoing)  2. Pt will demonstrate increased MedX average isometric strength value  by 30% from initial test resulting in improved ability to perform bending, lifting, and carrying activities safely, confidently.  (approp and ongoing)  3. Pt to demonstrate ability to independently control and reduce their pain through posture positioning and mechanical movements throughout a typical day.  (approp and ongoing)  4.  Pt will demonstrate reduced pain and improved functional outcomes as reported on the FOTO by reaching a limitation score of < or = 50% or less in order to demonstrate subjective improvement in pt's condition.    (approp and ongoing)  5. Pt will demonstrate independence with the HEP at discharge  (approp and ongoing)  6.  Pt to be able to walk a block around his neighborhood with max pain 3/10 in order to demonstrate community ambulation for weight loss goals.  (approp and ongoing)    Plan   Continue with established Plan of Care towards established PT goals.     Therapist: Arcelia Mcclelland, PT  2/28/2023

## 2023-02-27 ENCOUNTER — PATIENT MESSAGE (OUTPATIENT)
Dept: ADMINISTRATIVE | Facility: OTHER | Age: 48
End: 2023-02-27
Payer: MEDICAID

## 2023-02-28 ENCOUNTER — CLINICAL SUPPORT (OUTPATIENT)
Dept: REHABILITATION | Facility: HOSPITAL | Age: 48
End: 2023-02-28
Payer: MEDICAID

## 2023-02-28 DIAGNOSIS — M51.36 DDD (DEGENERATIVE DISC DISEASE), LUMBAR: ICD-10-CM

## 2023-02-28 DIAGNOSIS — R29.898 DECREASED STRENGTH OF TRUNK AND BACK: Primary | ICD-10-CM

## 2023-02-28 DIAGNOSIS — M54.41 CHRONIC BILATERAL LOW BACK PAIN WITH BILATERAL SCIATICA: ICD-10-CM

## 2023-02-28 DIAGNOSIS — M25.69 DECREASED ROM OF TRUNK AND BACK: ICD-10-CM

## 2023-02-28 DIAGNOSIS — G89.29 CHRONIC BILATERAL LOW BACK PAIN WITH BILATERAL SCIATICA: ICD-10-CM

## 2023-02-28 DIAGNOSIS — M54.42 CHRONIC BILATERAL LOW BACK PAIN WITH BILATERAL SCIATICA: ICD-10-CM

## 2023-02-28 PROCEDURE — 97110 THERAPEUTIC EXERCISES: CPT

## 2023-02-28 NOTE — PROGRESS NOTES
[facility]      [facility]  [id number]    Therapy Diagnosis:   Encounter Diagnoses   Name Primary?    Decreased strength of trunk and back Yes    Chronic bilateral low back pain with bilateral sciatica     DDD (degenerative disc disease), lumbar     Decreased ROM of trunk and back        Physician: Jose L Brown MD    Visit Date: 2023    Physician Orders: PT Eval and Treat  Medical Diagnosis from Referral: Z98.1 (ICD-10-CM) - S/P lumbar fusion  Evaluation Date: 2023  Authorization Period Expiration: 11/15/2023  Plan of Care Expiration: 2023  Reassessment Due: 3/9/2023  Visit # / Visits authorized: 10/ 20 (No lap belt)     Time In: 10:00 AM  Time Out: 11:00 AM  Total Billable Time: 55 minutes  Insurance:Fee for service Insurance Patient      Precautions: Standard and Diabetes and Fall      Pattern of pain determined: 1 PEN    Subjective   Kirill reports mild back pain upon arrival to session.     Patient reports tolerating previous visit : Min muscular soreness  Patient reports their pain to be 3/10 on a 0-10 scale with 0 being no pain and 10 being the worst pain imaginable.  Pain Location: bilateral back      Work and leisure: Unemployed/ Watch movies, play videos      Pt goals: Increase flexibility in other areas, be able to go for walks with minimal pain    Objective     Baseline Isometric Testing on Med X equipment: Testing administered by PT  Date of testin2023  ROM 0-24 deg   Max Peak Torque 72    Min Peak Torque 13    Flex/Ext Ratio 5.54   % below normative data 79%     Date of testin2023  ROM 0-33 deg   Max Peak Torque 133   Min Peak Torque 58   Flex/Ext Ratio 2.3:1   % below normative data 60%     Outcomes:  Initial score: 71% limitation  Visit 6 score: 71%  Visit 10 score: 63% limitation  Goal:      Treatment    Pt was instructed in and performed the following:     Kirill received therapeutic exercises to  "develop/improved posture, cardiovascular endurance, muscular endurance, lumbar/cervical ROM, strength and muscular endurance for 55 minutes including the following exercises:     TM warm-up x 5 minutes @ 1.7 mph  LTR x 10  DKTC w/T-ball x 20  Modified piriformis stretch w/towel assist 3 x 20" each side  PPT 10x5"  Bridging with shoulders on bosu 2x10   +Prone press ups x10  +Prone hip extension alternating x10  EIS x10  Side steps with BTB 2x10 ea        NP  Prone on elbows 1 minute  Bridging +BTBx20  Palloff press GTB x 15 each  Seated back extension with red bungee 1x10   STS + airex x10  PPT w/ march x10  PPT w/BKFO +BTB x 10  SOC x 10  +EIL x 10    [facility] 2/28/2023   Visit Number 10   VAS Pain Rating -   Treadmill Time (in min.) 5   Time -   Lumbar Stretches - Slouch Overcorrection -   Extension in Lying 10   Extension in Standing 10   Flexion in Lying -   Lumbar Extension Seat Pad -   Femur Restraint -   Top Dead Center -   Counterweight -   Lumbar Flexion 33   Lumbar Extension 0   Lumbar Peak Torque 133   Min Torque 58   Test Percent Below Normative Data 60   Lumbar Weight    Repetitions    Rating of Perceived Exertion    Ice - Z Lie (in min.) 5       Peripheral muscle strengthening which included 1 set of 15-20 repetitions at a slow, controlled 10-13 second per rep pace focused on strengthening supporting musculature for improved body mechanics and functional mobility.  Pt and therapist focused on proper form during treatment to ensure optimal strengthening of each targeted muscle group.  Machines were utilized including torso rotation, leg extension, leg curl, chest press, upright row. Tricep extension, bicep curl, leg press, and hip abduction added visit 3    Kirill received the following manual therapy techniques: NP      Home Exercises Provided and Patient Education Provided   Home exercises include:Modified piriformis stretch, EIL  Cardio program:visit 5  Lifting education date:visit " 11  Posture/Lumbar roll: uses pillow at home  Fridge Magnet Discharge handout (date given):    Education provided:   - cues w/ex's  - Pacing with MedX    Written Home Exercises Provided: Patient instructed to cont prior HEP.  Exercises were reviewed and Kirill was able to demonstrate them prior to the end of the session.  Kirill demonstrated good  understanding of the education provided.     See EMR under Patient Instructions for exercises provided prior visit.    Assessment   Kirill returns for 10th healthy back visit. Retest on lumbar MedX machine performed per HB protocol. He demonstrated improvement in ROM from 0-24 to 0-33 degrees. He demonstrated improved flexion/extension ratio from 5.5:1 to 2.3:1 and improvement in percentage below normative data from 79% to 60%.   Added EIL and prone hip extension alternating and pt tolerated well. He completed full peripheral machine circuit without difficulty.  Will progress per HB protocol and pt's tolerance. Patient is making good progress towards established goals.  Pt will continue to benefit from skilled outpatient physical therapy to address the deficits stated in the impairment chart, provide pt/family education and to maximize pt's level of independence in the home and community environment.     Anticipated Barriers for therapy: chronicity of symptoms, high BMI, lumbar surgeries, anxiety, diabetes    Pt's spiritual, cultural and educational needs considered and pt agreeable to plan of care and goals as stated below:     GOALS: Pt is in agreement with the following goals.     Short term goals:  6 weeks or 10 visits   1.  Pt will demonstrate increased lumbar ROM by at least 5 degrees from the initial ROM value with improvements noted in functional ROM and ability to perform ADLs.  Goal met 2/28/2023  2.  Pt will demonstrate increased MedX average isometric strength value  by 10% from initial test resulting in improved ability to perform bending, lifting, and carrying  activities safely, confidently.  Goal met 2/28/2023  3.  Patient report a reduction in worst pain score by 1-2 points for improved tolerance for household ADLs.  Goal met 2/28/2023  4.  Pt able to perform HEP correctly with minimal cueing or supervision from therapist to encourage independent management of symptoms. Goal met 2/28/2023     Long term goals: 10 weeks or 20 visits   1. Pt will demonstrate increased lumbar ROM by at least 10 degrees from initial ROM value, resulting in improved ability to perform functional fwd bending while standing and sitting. (approp and ongoing)  2. Pt will demonstrate increased MedX average isometric strength value  by 30% from initial test resulting in improved ability to perform bending, lifting, and carrying activities safely, confidently.  Goal met 2/28/2023  3. Pt to demonstrate ability to independently control and reduce their pain through posture positioning and mechanical movements throughout a typical day.  (approp and ongoing)  4.  Pt will demonstrate reduced pain and improved functional outcomes as reported on the FOTO by reaching a limitation score of < or = 50% or less in order to demonstrate subjective improvement in pt's condition.    (approp and ongoing)  5. Pt will demonstrate independence with the HEP at discharge  (approp and ongoing)  6.  Pt to be able to walk a block around his neighborhood with max pain 3/10 in order to demonstrate community ambulation for weight loss goals.  (approp and ongoing)    Plan   Continue with established Plan of Care towards established PT goals.     Therapist: Arcelia Mcclelland, PT  3/2/2023

## 2023-03-02 ENCOUNTER — OFFICE VISIT (OUTPATIENT)
Dept: PSYCHIATRY | Facility: CLINIC | Age: 48
End: 2023-03-02
Payer: MEDICAID

## 2023-03-02 ENCOUNTER — CLINICAL SUPPORT (OUTPATIENT)
Dept: REHABILITATION | Facility: HOSPITAL | Age: 48
End: 2023-03-02
Payer: MEDICAID

## 2023-03-02 ENCOUNTER — DOCUMENTATION ONLY (OUTPATIENT)
Dept: REHABILITATION | Facility: HOSPITAL | Age: 48
End: 2023-03-02
Payer: MEDICAID

## 2023-03-02 VITALS
SYSTOLIC BLOOD PRESSURE: 134 MMHG | DIASTOLIC BLOOD PRESSURE: 86 MMHG | HEART RATE: 112 BPM | BODY MASS INDEX: 53.68 KG/M2 | WEIGHT: 315 LBS

## 2023-03-02 DIAGNOSIS — F99 INSOMNIA DUE TO OTHER MENTAL DISORDER: ICD-10-CM

## 2023-03-02 DIAGNOSIS — M25.69 DECREASED ROM OF TRUNK AND BACK: ICD-10-CM

## 2023-03-02 DIAGNOSIS — M51.36 DDD (DEGENERATIVE DISC DISEASE), LUMBAR: ICD-10-CM

## 2023-03-02 DIAGNOSIS — F33.1 MODERATE EPISODE OF RECURRENT MAJOR DEPRESSIVE DISORDER: Primary | ICD-10-CM

## 2023-03-02 DIAGNOSIS — F51.05 INSOMNIA DUE TO OTHER MENTAL DISORDER: ICD-10-CM

## 2023-03-02 DIAGNOSIS — F41.9 ANXIETY: ICD-10-CM

## 2023-03-02 DIAGNOSIS — M54.41 CHRONIC BILATERAL LOW BACK PAIN WITH BILATERAL SCIATICA: ICD-10-CM

## 2023-03-02 DIAGNOSIS — F43.23 ADJUSTMENT DISORDER WITH MIXED ANXIETY AND DEPRESSED MOOD: ICD-10-CM

## 2023-03-02 DIAGNOSIS — F42.2 MIXED OBSESSIONAL THOUGHTS AND ACTS: ICD-10-CM

## 2023-03-02 DIAGNOSIS — G89.29 CHRONIC BILATERAL LOW BACK PAIN WITH BILATERAL SCIATICA: ICD-10-CM

## 2023-03-02 DIAGNOSIS — M54.42 CHRONIC BILATERAL LOW BACK PAIN WITH BILATERAL SCIATICA: ICD-10-CM

## 2023-03-02 DIAGNOSIS — R29.898 DECREASED STRENGTH OF TRUNK AND BACK: Primary | ICD-10-CM

## 2023-03-02 PROCEDURE — 3066F NEPHROPATHY DOC TX: CPT | Mod: CPTII,,, | Performed by: NURSE PRACTITIONER

## 2023-03-02 PROCEDURE — 99999 PR PBB SHADOW E&M-EST. PATIENT-LVL II: CPT | Mod: PBBFAC,,, | Performed by: NURSE PRACTITIONER

## 2023-03-02 PROCEDURE — 3079F PR MOST RECENT DIASTOLIC BLOOD PRESSURE 80-89 MM HG: ICD-10-PCS | Mod: CPTII,,, | Performed by: NURSE PRACTITIONER

## 2023-03-02 PROCEDURE — 99212 OFFICE O/P EST SF 10 MIN: CPT | Mod: PBBFAC | Performed by: NURSE PRACTITIONER

## 2023-03-02 PROCEDURE — 3046F HEMOGLOBIN A1C LEVEL >9.0%: CPT | Mod: CPTII,,, | Performed by: NURSE PRACTITIONER

## 2023-03-02 PROCEDURE — 3072F LOW RISK FOR RETINOPATHY: CPT | Mod: CPTII,,, | Performed by: NURSE PRACTITIONER

## 2023-03-02 PROCEDURE — 3079F DIAST BP 80-89 MM HG: CPT | Mod: CPTII,,, | Performed by: NURSE PRACTITIONER

## 2023-03-02 PROCEDURE — 97110 THERAPEUTIC EXERCISES: CPT | Mod: CQ

## 2023-03-02 PROCEDURE — 3046F PR MOST RECENT HEMOGLOBIN A1C LEVEL > 9.0%: ICD-10-PCS | Mod: CPTII,,, | Performed by: NURSE PRACTITIONER

## 2023-03-02 PROCEDURE — 3075F PR MOST RECENT SYSTOLIC BLOOD PRESS GE 130-139MM HG: ICD-10-PCS | Mod: CPTII,,, | Performed by: NURSE PRACTITIONER

## 2023-03-02 PROCEDURE — 99214 OFFICE O/P EST MOD 30 MIN: CPT | Mod: S$PBB,,, | Performed by: NURSE PRACTITIONER

## 2023-03-02 PROCEDURE — 3061F PR NEG MICROALBUMINURIA RESULT DOCUMENTED/REVIEW: ICD-10-PCS | Mod: CPTII,,, | Performed by: NURSE PRACTITIONER

## 2023-03-02 PROCEDURE — 3008F BODY MASS INDEX DOCD: CPT | Mod: CPTII,,, | Performed by: NURSE PRACTITIONER

## 2023-03-02 PROCEDURE — 99999 PR PBB SHADOW E&M-EST. PATIENT-LVL II: ICD-10-PCS | Mod: PBBFAC,,, | Performed by: NURSE PRACTITIONER

## 2023-03-02 PROCEDURE — 3008F PR BODY MASS INDEX (BMI) DOCUMENTED: ICD-10-PCS | Mod: CPTII,,, | Performed by: NURSE PRACTITIONER

## 2023-03-02 PROCEDURE — 3075F SYST BP GE 130 - 139MM HG: CPT | Mod: CPTII,,, | Performed by: NURSE PRACTITIONER

## 2023-03-02 PROCEDURE — 3072F PR LOW RISK FOR RETINOPATHY: ICD-10-PCS | Mod: CPTII,,, | Performed by: NURSE PRACTITIONER

## 2023-03-02 PROCEDURE — 99214 PR OFFICE/OUTPT VISIT, EST, LEVL IV, 30-39 MIN: ICD-10-PCS | Mod: S$PBB,,, | Performed by: NURSE PRACTITIONER

## 2023-03-02 PROCEDURE — 3066F PR DOCUMENTATION OF TREATMENT FOR NEPHROPATHY: ICD-10-PCS | Mod: CPTII,,, | Performed by: NURSE PRACTITIONER

## 2023-03-02 PROCEDURE — 3061F NEG MICROALBUMINURIA REV: CPT | Mod: CPTII,,, | Performed by: NURSE PRACTITIONER

## 2023-03-02 RX ORDER — BUSPIRONE HYDROCHLORIDE 15 MG/1
22.5 TABLET ORAL 2 TIMES DAILY
Qty: 270 TABLET | Refills: 0 | Status: SHIPPED | OUTPATIENT
Start: 2023-03-02 | End: 2023-03-28 | Stop reason: SDUPTHER

## 2023-03-02 RX ORDER — ARIPIPRAZOLE 2 MG/1
2 TABLET ORAL DAILY
Qty: 30 TABLET | Refills: 0 | Status: SHIPPED | OUTPATIENT
Start: 2023-03-02 | End: 2023-03-28 | Stop reason: SDUPTHER

## 2023-03-02 RX ORDER — PROPRANOLOL HYDROCHLORIDE 10 MG/1
TABLET ORAL
Qty: 90 TABLET | Refills: 0 | Status: SHIPPED | OUTPATIENT
Start: 2023-03-02 | End: 2023-03-28 | Stop reason: SDUPTHER

## 2023-03-02 RX ORDER — HYDROXYZINE HYDROCHLORIDE 25 MG/1
50 TABLET, FILM COATED ORAL NIGHTLY PRN
Qty: 180 TABLET | Refills: 0 | Status: SHIPPED | OUTPATIENT
Start: 2023-03-02 | End: 2023-08-03 | Stop reason: SDUPTHER

## 2023-03-02 NOTE — PROGRESS NOTES
[facility]      [facility]  [facility] [id number]    Therapy Diagnosis:   Encounter Diagnoses   Name Primary?    Decreased strength of trunk and back Yes    Chronic bilateral low back pain with bilateral sciatica     DDD (degenerative disc disease), lumbar     Decreased ROM of trunk and back        Physician: Jose L Brown MD    Visit Date: 3/2/2023    Physician Orders: PT Eval and Treat  Medical Diagnosis from Referral: Z98.1 (ICD-10-CM) - S/P lumbar fusion  Evaluation Date: 2023  Authorization Period Expiration: 11/15/2023  Plan of Care Expiration: 2023  Reassessment Due: 3/9/2023  Visit # / Visits authorized:  (No lap belt)     Time In: 10:00 AM  Time Out: 11: 00 AM  Total Billable Time: 60 minutes  Insurance:Fee for service Insurance Patient      Precautions: Standard and Diabetes and Fall      Pattern of pain determined: 1 PEN    Subjective   Kirill reports his usual lower back pain/stiffness which is tolerable presently. States that the HB program has been helpful overall and is encouraged by his progress thus far.    Patient reports tolerating previous visit : Min muscular soreness  Patient reports their pain to be 3/10 on a 0-10 scale with 0 being no pain and 10 being the worst pain imaginable.  Pain Location: bilateral back      Work and leisure: Unemployed/ Watch movies, play videos      Pt goals: Increase flexibility in other areas, be able to go for walks with minimal pain    Objective     Baseline Isometric Testing on Med X equipment: Testing administered by PT  Date of testin2023  ROM 0-24 deg   Max Peak Torque 72    Min Peak Torque 13    Flex/Ext Ratio 5.54   % below normative data 79%     Date of testin2023  ROM 0-33 deg   Max Peak Torque 133   Min Peak Torque 58   Flex/Ext Ratio 2.3:1   % below normative data 60%     Outcomes:  Initial score: 71% limitation  Visit 6 score: 71%  Visit 10 score: 63%  "limitation  Goal:      Treatment    Pt was instructed in and performed the following:     Kirill received therapeutic exercises to develop/improved posture, cardiovascular endurance, muscular endurance, lumbar/cervical ROM, strength and muscular endurance for 60 minutes including the following exercises:     TM warm-up x 5 minutes @ 2.0 mph  LTR x 10  DKTC w/T-ball x 20  Modified piriformis stretch w/towel assist 3 x 20" each side  PPT 10x5"  Bridging with shoulders on bosu 2x10   Prone press ups x10  Prone hip extension alternating x10  EIS x10  Side steps with BTB 2x10 ea ---NP  +Lifting education   Floor<->waist lift   [employer]'s lift    [facility] - Short 3/2/2023   Visit Number 11   VAS Pain Rating 3   Treadmill Time (in min.) 5   Time -   Lumbar Stretches - Slouch -   Extension in Lying 10   Extension in Standing 10   Flexion in Lying 10   Lumbar Extension - Seat Pad -   Femur Restraint -   Top Dead Center -   Counterweight -   Lumbar Flexion -   Lumbar Extension -   Lumbar Peak Torque -   Lumbar Weight 65   Repetitions 20   Rating of Perceived Exertion 2       NP  Prone on elbows 1 minute  Bridging +BTBx20  Palloff press GTB x 15 each  Seated back extension with red bungee 1x10   STS + airex x10  PPT w/ march x10  PPT w/BKFO +BTB x 10  SOC x 10      Peripheral muscle strengthening which included 1 set of 15-20 repetitions at a slow, controlled 10-13 second per rep pace focused on strengthening supporting musculature for improved body mechanics and functional mobility.  Pt and therapist focused on proper form during treatment to ensure optimal strengthening of each targeted muscle group.  Machines were utilized including torso rotation, leg extension, leg curl, chest press, upright row. Tricep extension, bicep curl, leg press, and hip abduction added visit 3    Kirill received the following manual therapy techniques: NP      Home Exercises Provided and Patient Education Provided   Home exercises " include:Modified piriformis stretch, EIL  Cardio program:visit 5  Lifting education date:visit 11, 3/2/23 copy of do's and don'ts of lifting handout provided  Posture/Lumbar roll: uses pillow at home  Fridge Magnet Discharge handout (date given):    Education provided:   - cues w/ex's  - Pacing with MedX    Written Home Exercises Provided: Patient instructed to cont prior HEP.  Exercises were reviewed and Kirill was able to demonstrate them prior to the end of the session.  Kirill demonstrated good  understanding of the education provided.     See EMR under Patient Instructions for exercises provided prior visit.    Assessment   Kirill returns with tolerable lower back discomfort/stiffness which is improved overall per subjective report. Treatment continued with lumbopelvic/core flexibility and strengthening ex's.  He was also instructed in the do's and don'ts of lifting demonstrating good understanding and ability after instruction. Lumbar MedX resistance was increased to 65 ft/lbs completing  20 reps with a RPE =  2/10.  Will look to progress per resistance 10-15% next visit per HB protocol and patient tolerance.    Patient is making good progress towards established goals.  Pt will continue to benefit from skilled outpatient physical therapy to address the deficits stated in the impairment chart, provide pt/family education and to maximize pt's level of independence in the home and community environment.     Anticipated Barriers for therapy: chronicity of symptoms, high BMI, lumbar surgeries, anxiety, diabetes    Pt's spiritual, cultural and educational needs considered and pt agreeable to plan of care and goals as stated below:     GOALS: Pt is in agreement with the following goals.     Short term goals:  6 weeks or 10 visits   1.  Pt will demonstrate increased lumbar ROM by at least 5 degrees from the initial ROM value with improvements noted in functional ROM and ability to perform ADLs.  Goal met 2/28/2023  2.   Pt will demonstrate increased MedX average isometric strength value  by 10% from initial test resulting in improved ability to perform bending, lifting, and carrying activities safely, confidently.  Goal met 2/28/2023  3.  Patient report a reduction in worst pain score by 1-2 points for improved tolerance for household ADLs.  Goal met 2/28/2023  4.  Pt able to perform HEP correctly with minimal cueing or supervision from therapist to encourage independent management of symptoms. Goal met 2/28/2023     Long term goals: 10 weeks or 20 visits   1. Pt will demonstrate increased lumbar ROM by at least 10 degrees from initial ROM value, resulting in improved ability to perform functional fwd bending while standing and sitting. (approp and ongoing)  2. Pt will demonstrate increased MedX average isometric strength value  by 30% from initial test resulting in improved ability to perform bending, lifting, and carrying activities safely, confidently.  Goal met 2/28/2023  3. Pt to demonstrate ability to independently control and reduce their pain through posture positioning and mechanical movements throughout a typical day.  (approp and ongoing)  4.  Pt will demonstrate reduced pain and improved functional outcomes as reported on the FOTO by reaching a limitation score of < or = 50% or less in order to demonstrate subjective improvement in pt's condition.    (approp and ongoing)  5. Pt will demonstrate independence with the HEP at discharge  (approp and ongoing)  6.  Pt to be able to walk a block around his neighborhood with max pain 3/10 in order to demonstrate community ambulation for weight loss goals.  (approp and ongoing)    Plan   Continue with established Plan of Care towards established PT goals.     Therapist: Jeffrey Smalls, PTA  3/2/2023

## 2023-03-02 NOTE — PROGRESS NOTES
PT/PTA met face to face to discuss pt's treatment plan and progress towards established goals. Pt will be seen by a physical therapist minimally every 6th visit or every 30 days.    Jeffrey Smalls PTA

## 2023-03-02 NOTE — PROGRESS NOTES
Outpatient Psychiatry Follow-Up Visit (MD/NP)    3/2/2023 8:18 AM  Kirill Gandhi III  1975  [id number]    Clinical Status of Patient:  Outpatient (Ambulatory)    Chief Complaint:  Kirill Gandhi III, a 47 y.o. male,who presents today for follow up of depression and anxiety.  Met with patient.        Interval History/Subjective Report/Content of Current Session:     The pt was last seen by MUSTAPHA Staples NP in this department on 11/7/2022. The pt is new to me. The plan at that time:    Medication Management: Continue current medications.   Increase Cymbalta 60 mg daily  Buspar 22.5 mg BID  Consider adding Abilify vs Rexulti  Gabapentin 800 mg TID - from pain management   Hydroxyzine 25 - 50 mg night as needed for insomnia - does not need refill today  Valium 2 mg daily prn anxiety - does not need refill today  Informed pt of the risks of continuous Benzodiazepine use including tolerance, dependence and withdrawals that may be life threatening upon abrupt cessation. Also advised not to take Benzodiazepines with Opiates or other sedatives and also not to drive or operate heavy machinery while using Benzodiazepines.  The risks and benefits of medication were discussed with the patient.        Today the pt reports Cymbalta is working better than Luvox. Mood improved when Cymbalta was increased to 120 mg but still endorses problematic depressed mood. Mood is better when he is busy. Fewer bad days. Endorses obsessional thoughts. Anxiety is heightened.   Still having a lot of back pain.    ASEs from psych meds: denies    Rarely takes Valium. Last filled #30 tabs on 11/18/22. Discussed the FDA advisory warning re taking benzos with opioids. Informed the pt that I will not be able to continue his benzo as long as he is on opioid pain meds, and he verbalized understanding. Discussed what he could take on a prn basis for anxiety.    Pt denies recurrent thoughts of death and denies SI/HI. Denies any sxs of mihaela.  Denies AVH, paranoia and delusions. No objective s/sx of psychosis or mihaela.         Psychotherapy:  Target symptoms: depression, anxiety   Why chosen therapy is appropriate versus another modality: relevant to diagnosis, patient responds to this modality  Outcome monitoring methods: self-report, observation  Therapeutic intervention type: supportive psychotherapy  Topics discussed/themes: building skills sets for symptom management  The patient's response to the intervention is accepting. The patient's progress toward treatment goals is good, fair.   Duration of intervention: 9 minutes.      Psychotropic medication review  Previous Trials-  Wellbutrin- ineffective on its own  Prozac- effective  Luvox- partial response  Risperidone - ineffective  Buspar - effective  Celexa- ineffective  Zoloft  Lexapro      Current meds-  Cymbalta  Gabapentin  Vistaril  Buspar    History:       Past Medical, Psychiatric, Family and Social History: The patient's past medical, psychiatric, family and social history, allergies, current medications, past surgical history, and problem list have been reviewed and updated as appropriate within the electronic medical record.         Review of Systems       Review of Systems   Constitutional:  Negative for chills, fever and malaise/fatigue.   Respiratory:  Negative for cough and shortness of breath.    Cardiovascular:  Negative for chest pain and palpitations.   Gastrointestinal:  Negative for abdominal pain, diarrhea and vomiting.   Genitourinary:  Negative for dysuria and hematuria.   Musculoskeletal:  Negative for falls and myalgias.   Skin:  Negative for rash.   Neurological:  Negative for tremors, seizures and headaches.   Psychiatric/Behavioral:          See HPI       Compliance: yes        Risk Parameters:  Patient reports no suicidal ideation  Patient reports no homicidal ideation  Patient reports no self-injurious behavior  Patient reports no violent behavior    Exam (detailed: at  least 9 elements; comprehensive: all 15 elements)     Constitutional  Vitals:  Most recent vital signs, dated less than 90 days prior to this appointment, were reviewed.   Vitals:    03/02/23 1531   BP: 134/86   Pulse: (!) 112   Weight: (!) 179.5 kg (395 lb 13.4 oz)              Musculoskeletal  Muscle Strength/Tone:  no dyskinesia, no dystonia, no tremor, no tic   Gait & Station:  non-ataxic     Psychiatric      Appearance:  unremarkable, age appropriate, well nourished, bearded, casually dressed, neatly groomed, obese   Behavior:  normal, friendly and cooperative, eye contact normal     Speech:  no latency; no press   Mood & Affect:  euthymic  congruent and appropriate   Thought Process:  normal and logical   Associations:  intact   Thought Content:  normal, no suicidality, no homicidality, delusions, or paranoia   Insight:  intact, has awareness of illness   Judgement: behavior is adequate to circumstances, age appropriate   Orientation:  grossly intact   Memory: intact for content of interview   Language: grossly intact   Attention Span & Concentration:  able to focus   Fund of Knowledge:  intact and appropriate to age and level of education       Medications:  Outpatient Encounter Medications as of 3/2/2023   Medication Sig Dispense Refill    acetaminophen (TYLENOL) 500 MG tablet Take 1,000 mg by mouth 2 (two) times daily as needed for Pain.      ascorbic acid, vitamin C, (VITAMIN C) 1000 MG tablet Take 1,000 mg by mouth once daily.      aspirin (ECOTRIN) 81 MG EC tablet Take 81 mg by mouth every evening.      aspirin/sod bicarb/citric acid (GIOVANY-SELTZER ORAL) Take 2 tablets by mouth daily as needed (Upset stomach).      atorvastatin (LIPITOR) 10 MG tablet Take 1 tablet (10 mg total) by mouth every other day. 45 tablet 3    blood-glucose transmitter (DEXCOM G6 TRANSMITTER) [place]la Use to check glucose - 90 day supply 1 each 11    busPIRone (BUSPAR) 15 MG tablet Take 1.5 tablets (22.5 mg total) by mouth 2 (two)  times daily. 270 tablet 3    celecoxib (CELEBREX) 200 MG capsule Take 1 capsule (200 mg total) by mouth 2 (two) times daily. 60 capsule 1    [] cyclobenzaprine (FLEXERIL) 10 MG tablet Take 1 tablet (10 mg total) by mouth 3 (three) times daily as needed for Muscle spasms. 90 tablet 11    diazePAM (VALIUM) 5 MG tablet Take 1 tablet (5 mg total) by mouth daily as needed for Anxiety (muscle spasms). (Patient not taking: Reported on 2023) 30 tablet 0    dicyclomine (BENTYL) 20 mg tablet Take 1 tablet (20 mg total) by mouth 3 (three) times daily as needed (abdominal pain). 60 tablet 2    dulaglutide (TRULICITY) 4.5 mg/0.5 mL pen injector Inject 4.5 mg into the skin every 7 days. 12 pen 1    DULoxetine (CYMBALTA) 60 MG capsule Take 1 capsule (60 mg total) by mouth 2 (two) times daily. 180 capsule 3    flash glucose scanning reader Misc Disp one reader to monitor glucose- covered by insurance dexcom 1 each 1    flash glucose sensor Kit Dispense sensor to read glucose  dexcom 12 kit 11    fluticasone propionate (FLONASE) 50 mcg/actuation nasal spray 2 sprays (100 mcg total) by Each Nostril route once daily. 48 mL 2    gabapentin (NEURONTIN) 800 MG tablet Take 1 tablet (800 mg total) by mouth 3 (three) times daily. 270 tablet 3    hydrOXYzine HCL (ATARAX) 25 MG tablet Take 2 tablets (50 mg total) by mouth nightly as needed (insomnia). 180 tablet 0    insulin aspart U-100 (NOVOLOG) 100 unit/mL (3 mL) InPn pen Inject Between 50 to 70 units 3 times a day with meals. (Patient taking differently: Inject Between 50 to 55 units 3 times a day with meals.) 45 mL 6    insulin detemir U-100 (LEVEMIR FLEXTOUCH U-100 INSULN) 100 unit/mL (3 mL) InPn pen Inject 80 units into the skin daily, increase by 5 every 3 days if needed up to 100 units each day 90 mL 1    Lactobac no.41/Bifidobact no.7 (PROBIOTIC-10 ORAL) Take by mouth.      lanolin/mineral oil/petrolatum (ARTIFICIAL TEARS OPHT) Place 2 drops into both eyes daily as needed  "(Dry eye).      methylcellulose, laxative, (CITRUCEL) 500 mg Tab Take 1 tablet by mouth nightly as needed (Constipation).      metoclopramide HCl (REGLAN) 5 mg/5 mL Soln Take 5 mLs (5 mg total) by mouth 4 (four) times daily before meals and nightly. 500 mL 2    multivitamin capsule Take 1 capsule by mouth once daily.      NON FORMULARY MEDICATION Uses Cpap Machine nightly on a setting of 10      ondansetron (ZOFRAN-ODT) 4 MG TbDL Take 1 tablet (4 mg total) by mouth every 8 (eight) hours as needed (nausea). 30 tablet 5    ONETOUCH DELICA PLUS LANCET 33 gauge Misc Apply topically 3 (three) times daily.      ONETOUCH VERIO TEST STRIPS Strp 1 strip 3 (three) times daily.      pantoprazole (PROTONIX) 40 MG tablet Take 1 tablet (40 mg total) by mouth once daily. 90 tablet 3    pen needle, diabetic (BD ULTRA-FINE SHORT PEN NEEDLE) 31 gauge x 5/16" Ndle USE WITH FLEX  each 11    pen needle, diabetic 31 gauge x 1/4" Ndle Use with flex pen 100 each 11    traMADoL 100 mg Tab Take 100 mg by mouth every 8 (eight) hours as needed (severe pain). 90 tablet 3    valsartan-hydrochlorothiazide (DIOVAN-HCT) 320-12.5 mg per tablet Take 1 tablet by mouth once daily. 90 tablet 3    vitamin D 1000 units Tab Take 5,000 Units by mouth 2 (two) times a day.      [DISCONTINUED] dulaglutide (TRULICITY) 4.5 mg/0.5 mL pen injector Inject 4.5 mg into the skin every 7 days. 4 pen 1    [DISCONTINUED] fluvoxaMINE (LUVOX) 100 MG tablet Take 1.5 tablets (150 mg total) by mouth 2 (two) times daily. 270 tablet 0    [DISCONTINUED] gabapentin (NEURONTIN) 800 MG tablet Take 1 tablet (800 mg total) by mouth 3 (three) times daily. 270 tablet 3    [DISCONTINUED] tiZANidine (ZANAFLEX) 4 MG tablet Take 1 tablet (4 mg total) by mouth every 8 (eight) hours as needed (spasms). (Patient taking differently: Take 4 mg by mouth 2 (two) times daily as needed (spasms).) 270 tablet 0    [DISCONTINUED] traMADoL (ULTRAM) 50 mg tablet Take 1 tablet (50 mg total) by " mouth every 8 (eight) hours as needed for Pain. 90 tablet 3    [DISCONTINUED] traMADoL (ULTRAM) 50 mg tablet Take 1 tablet (50 mg total) by mouth every 8 (eight) hours as needed for Pain. 90 tablet 3    [DISCONTINUED] traMADoL (ULTRAM) 50 mg tablet Take 2 tablets (100 mg total) by mouth every 8 (eight) hours as needed for Pain. 180 tablet 3     No facility-administered encounter medications on file as of 3/2/2023.       Allergy:  Review of patient's allergies indicates:   Allergen Reactions    Dilaudid [hydromorphone] Other (See Comments)     IV dilaudid severely drops BP         Assessment and Diagnosis   Status/Progress: Based on the examination today, the patient's problem(s) is/are inadequately controlled.  New problems have not been presented today.   Co-morbidities are complicating management of the primary condition.  There are no active rule-out diagnoses for this patient at this time.         General Impression:       ICD-10-CM ICD-9-CM   1. Moderate episode of recurrent major depressive disorder  F33.1 296.32   2. Anxiety  F41.9 300.00   3. Mixed obsessional thoughts and acts  F42.2 300.3   4. Adjustment disorder with mixed anxiety and depressed mood  F43.23 309.28   5. Insomnia due to other mental disorder  F51.05 300.9    F99 327.02       Intervention/Counseling/Treatment Plan     Medication Management:  Continue Buspar 22.5 mg BID  Continue Cymbalta 60 mg BID  Continue gabapentin 800 mg TID - managed by pain management  Continue hydroxyzine 25-50 mg q hs prn insomnia. Pt was told not drive or to take this med with ETOH or other sedating meds/substance. Pt verbalized understanding.  Start Abilify 2 mg q day  Start propranolol 10 mg TID prn anxiety  Labs: reviewed most recent  The treatment plan and follow up plan were reviewed with the patient.  Discussed with patient informed consent, risks vs. benefits, alternative treatments, side effect profile and the inherent unpredictability of individual  responses to these treatments. The patient expresses understanding of the above and displays the capacity to agree with this current plan and had no other questions.  Encouraged Patient to keep future appointments.   Take medications as prescribed and abstain from substance abuse.   Pt was told to present to ED or call 911 for SI/HI plan or intent, psychosis, or other psychiatric or medical emergency, and pt agrees to this and verbalized understanding.          Return to Clinic: 6 weeks        Face to Face time with patient: 31 minutes  Total time: 71 minutes of total time spent on the encounter, which includes face to face time and non-face to face time preparing to see the patient (eg, review of tests), Obtaining and/or reviewing separately obtained history, Documenting clinical information in the electronic or other health record, Independently interpreting results (not separately reported) and communicating results to the patient/family/caregiver, or Care coordination (not separately reported).       Verónica Briscoe, MSN, APRN, PMHNP-BC  [facility]

## 2023-03-07 ENCOUNTER — CLINICAL SUPPORT (OUTPATIENT)
Dept: REHABILITATION | Facility: HOSPITAL | Age: 48
End: 2023-03-07
Payer: MEDICAID

## 2023-03-07 DIAGNOSIS — M25.69 DECREASED ROM OF TRUNK AND BACK: ICD-10-CM

## 2023-03-07 DIAGNOSIS — M54.42 CHRONIC BILATERAL LOW BACK PAIN WITH BILATERAL SCIATICA: ICD-10-CM

## 2023-03-07 DIAGNOSIS — M54.41 CHRONIC BILATERAL LOW BACK PAIN WITH BILATERAL SCIATICA: ICD-10-CM

## 2023-03-07 DIAGNOSIS — R29.898 DECREASED STRENGTH OF TRUNK AND BACK: Primary | ICD-10-CM

## 2023-03-07 DIAGNOSIS — G89.29 CHRONIC BILATERAL LOW BACK PAIN WITH BILATERAL SCIATICA: ICD-10-CM

## 2023-03-07 DIAGNOSIS — M51.36 DDD (DEGENERATIVE DISC DISEASE), LUMBAR: ICD-10-CM

## 2023-03-07 PROCEDURE — 97110 THERAPEUTIC EXERCISES: CPT | Mod: CQ

## 2023-03-07 NOTE — PROGRESS NOTES
[facility]      [facility]  [facility] [id number]    Therapy Diagnosis:   Encounter Diagnoses   Name Primary?    Decreased strength of trunk and back Yes    Chronic bilateral low back pain with bilateral sciatica     DDD (degenerative disc disease), lumbar     Decreased ROM of trunk and back        Physician: Jose L Brown MD    Visit Date: 3/7/2023    Physician Orders: PT Eval and Treat  Medical Diagnosis from Referral: Z98.1 (ICD-10-CM) - S/P lumbar fusion  Evaluation Date: 2023  Authorization Period Expiration: 11/15/2023  Plan of Care Expiration: 2023  Reassessment Due: 3/9/2023  Visit # / Visits authorized:  (No lap belt)     Time In: 10:00 AM  Time Out: 11:00 AM  Total Billable Time: 55 minutes  Insurance:Fee for service Insurance Patient      Precautions: Standard and Diabetes and Fall      Pattern of pain determined: 1 PEN    Subjective   Kirill reports onset of some increased lower back pain and stiffness due to prolonged sitting yesterday.  States that his pain level was 8/10 earlier today and is now diminished to 4/10 currently with pain meds.     Patient reports tolerating previous visit : Min muscular soreness  Patient reports their pain to be 4/10 on a 0-10 scale with 0 being no pain and 10 being the worst pain imaginable.  Pain Location: bilateral back      Work and leisure: Unemployed/ Watch movies, play videos      Pt goals: Increase flexibility in other areas, be able to go for walks with minimal pain    Objective     Baseline Isometric Testing on Med X equipment: Testing administered by PT  Date of testin2023  ROM 0-24 deg   Max Peak Torque 72    Min Peak Torque 13    Flex/Ext Ratio 5.54   % below normative data 79%     Date of testin2023  ROM 0-33 deg   Max Peak Torque 133   Min Peak Torque 58   Flex/Ext Ratio 2.3:1   % below normative data 60%     Outcomes:  Initial score: 71% limitation  Visit 6 score:  "71%  Visit 10 score: 63% limitation  Goal:      Treatment    Pt was instructed in and performed the following:     Kirill received therapeutic exercises to develop/improved posture, cardiovascular endurance, muscular endurance, lumbar/cervical ROM, strength and muscular endurance for 55 minutes including the following exercises:     TM warm-up x 5 minutes @ 2.0 mph  LTR x 10  DKTC w/T-ball x 20  Modified piriformis stretch w/towel assist 3 x 20" each side  PPT 10x5"  Bridging with BTB 2 x 10  Prone press ups 2x10  Prone hip extension alternating x10  EIS x10  SOC x 10  +Paloff Press on Cable cross with 10# x 15 each    [facility] - Short 3/7/2023   Visit Number 12   VAS Pain Rating 4   Treadmill Time (in min.) 5   Time -   Lumbar Stretches - Slouch 10   Extension in Lying 20   Extension in Standing 10   Flexion in Lying 10   Lumbar Extension - Seat Pad -   Femur Restraint -   Top Dead Center -   Counterweight -   Lumbar Flexion -   Lumbar Extension -   Lumbar Peak Torque -   Lumbar Weight 70   Repetitions 20   Rating of Perceived Exertion 3        NP  Prone on elbows 1 minute  Bridging +BTBx20  Palloff press GTB x 15 each  Seated back extension with red bungee 1x10   STS + airex x10  PPT w/ march x10  PPT w/BKFO +BTB x 10  Side steps with BTB 2x10 ea ---NP      Peripheral muscle strengthening which included 1 set of 15-20 repetitions at a slow, controlled 10-13 second per rep pace focused on strengthening supporting musculature for improved body mechanics and functional mobility.  Pt and therapist focused on proper form during treatment to ensure optimal strengthening of each targeted muscle group.  Machines were utilized including torso rotation, leg extension, leg curl, chest press, upright row. Tricep extension, bicep curl, leg press, and hip abduction added visit 3    Kirill received the following manual therapy techniques: NP      Home Exercises Provided and Patient Education Provided   Home exercises " include:Modified piriformis stretch, EIL  Cardio program:visit 5  Lifting education date:visit 11, 3/2/23 copy of do's and don'ts of lifting handout provided  Posture/Lumbar roll: uses pillow at home  Fridge Magnet Discharge handout (date given):    Education provided:   - cues w/ex's  - Pacing with MedX    Written Home Exercises Provided: Patient instructed to cont prior HEP.  Exercises were reviewed and Kirill was able to demonstrate them prior to the end of the session.  Kirill demonstrated good  understanding of the education provided.     See EMR under Patient Instructions for exercises provided prior visit.    Assessment   Kirill returns with slightly elevated pain levels due to prolonged sitting yesterday. Treatment continued with lumbopelvic/core flexibility and strengthening ex's. Increased reps for EIL today and also added Paloff press on the cable cross for additional strengthening.. He was able to perform ex's within appropriate muscular fatigue and without increased pain. Lumbar MedX resistance was increased to 70 ft/lbs completing 20 reps with a RPE =  3/10.  Will look to progress per HB protocol and patient tolerance.    Patient is making good progress towards established goals.  Pt will continue to benefit from skilled outpatient physical therapy to address the deficits stated in the impairment chart, provide pt/family education and to maximize pt's level of independence in the home and community environment.     Anticipated Barriers for therapy: chronicity of symptoms, high BMI, lumbar surgeries, anxiety, diabetes    Pt's spiritual, cultural and educational needs considered and pt agreeable to plan of care and goals as stated below:     GOALS: Pt is in agreement with the following goals.     Short term goals:  6 weeks or 10 visits   1.  Pt will demonstrate increased lumbar ROM by at least 5 degrees from the initial ROM value with improvements noted in functional ROM and ability to perform ADLs.  Goal  met 2/28/2023  2.  Pt will demonstrate increased MedX average isometric strength value  by 10% from initial test resulting in improved ability to perform bending, lifting, and carrying activities safely, confidently.  Goal met 2/28/2023  3.  Patient report a reduction in worst pain score by 1-2 points for improved tolerance for household ADLs.  Goal met 2/28/2023  4.  Pt able to perform HEP correctly with minimal cueing or supervision from therapist to encourage independent management of symptoms. Goal met 2/28/2023     Long term goals: 10 weeks or 20 visits   1. Pt will demonstrate increased lumbar ROM by at least 10 degrees from initial ROM value, resulting in improved ability to perform functional fwd bending while standing and sitting. (approp and ongoing)  2. Pt will demonstrate increased MedX average isometric strength value  by 30% from initial test resulting in improved ability to perform bending, lifting, and carrying activities safely, confidently.  Goal met 2/28/2023  3. Pt to demonstrate ability to independently control and reduce their pain through posture positioning and mechanical movements throughout a typical day.  (approp and ongoing)  4.  Pt will demonstrate reduced pain and improved functional outcomes as reported on the FOTO by reaching a limitation score of < or = 50% or less in order to demonstrate subjective improvement in pt's condition.    (approp and ongoing)  5. Pt will demonstrate independence with the HEP at discharge  (approp and ongoing)  6.  Pt to be able to walk a block around his neighborhood with max pain 3/10 in order to demonstrate community ambulation for weight loss goals.  (approp and ongoing)    Plan   Continue with established Plan of Care towards established PT goals.     Therapist: Jeffrey Smalls PTA  3/7/2023

## 2023-03-09 ENCOUNTER — CLINICAL SUPPORT (OUTPATIENT)
Dept: REHABILITATION | Facility: HOSPITAL | Age: 48
End: 2023-03-09
Payer: MEDICAID

## 2023-03-09 DIAGNOSIS — R29.898 DECREASED STRENGTH OF TRUNK AND BACK: Primary | ICD-10-CM

## 2023-03-09 DIAGNOSIS — M54.41 CHRONIC BILATERAL LOW BACK PAIN WITH BILATERAL SCIATICA: ICD-10-CM

## 2023-03-09 DIAGNOSIS — M51.36 DDD (DEGENERATIVE DISC DISEASE), LUMBAR: ICD-10-CM

## 2023-03-09 DIAGNOSIS — M54.42 CHRONIC BILATERAL LOW BACK PAIN WITH BILATERAL SCIATICA: ICD-10-CM

## 2023-03-09 DIAGNOSIS — G89.29 CHRONIC BILATERAL LOW BACK PAIN WITH BILATERAL SCIATICA: ICD-10-CM

## 2023-03-09 DIAGNOSIS — M25.69 DECREASED ROM OF TRUNK AND BACK: ICD-10-CM

## 2023-03-09 PROCEDURE — 97110 THERAPEUTIC EXERCISES: CPT | Mod: CQ

## 2023-03-09 NOTE — PROGRESS NOTES
[facility]      [facility]  [facility] [id number]    Therapy Diagnosis:   Encounter Diagnoses   Name Primary?    Decreased strength of trunk and back Yes    Chronic bilateral low back pain with bilateral sciatica     DDD (degenerative disc disease), lumbar     Decreased ROM of trunk and back        Physician: Jose L Brown MD    Visit Date: 3/9/2023    Physician Orders: PT Eval and Treat  Medical Diagnosis from Referral: Z98.1 (ICD-10-CM) - S/P lumbar fusion  Evaluation Date: 2023  Authorization Period Expiration: 11/15/2023  Plan of Care Expiration: 2023  Reassessment Due: 3/9/2023  Visit # / Visits authorized:  (No lap belt)     Time In: 10:00 AM  Time Out: 11:05 AM  Total Billable Time: 60 minutes  Insurance:Fee for service Insurance Patient      Precautions: Standard and Diabetes and Fall      Pattern of pain determined: 1 PEN    Subjective   Kirill reports feeling better after last session some increased pain/discomfort stiffness has returned this morning. States that he took some pain meds prior to treatment today.  States that he feels the most benefit from the Lumbar extension machine to improve his strength.     Patient reports tolerating previous visit : Min muscular soreness  Patient reports their pain to be 4/10 on a 0-10 scale with 0 being no pain and 10 being the worst pain imaginable.  Pain Location: bilateral back      Work and leisure: Unemployed/ Watch movies, play videos      Pt goals: Increase flexibility in other areas, be able to go for walks with minimal pain    Objective     Baseline Isometric Testing on Med X equipment: Testing administered by PT  Date of testin2023  ROM 0-24 deg   Max Peak Torque 72    Min Peak Torque 13    Flex/Ext Ratio 5.54   % below normative data 79%     Date of testin2023  ROM 0-33 deg   Max Peak Torque 133   Min Peak Torque 58   Flex/Ext Ratio 2.3:1   % below normative data  "60%     Outcomes:  Initial score: 71% limitation  Visit 6 score: 71%  Visit 10 score: 63% limitation  Goal:      Treatment    Pt was instructed in and performed the following:     Kirill received therapeutic exercises to develop/improved posture, cardiovascular endurance, muscular endurance, lumbar/cervical ROM, strength and muscular endurance for 60 minutes including the following exercises:     TM warm-up x 5 minutes @ 2.0 mph  LTR x 10  DKTC w/T-ball x 20  Modified piriformis stretch w/towel assist 3 x 20" each side  PPT 10x5"  Bridging with BTB 2 x 10  Prone press ups 2x10  Prone hip extension alternating x10  EIS x10  SOC x 20  Paloff Press on Cable cross with 10# x 20 each    [facility] 3/9/2023   Visit Number 13   VAS Pain Rating 4   Treadmill Time (in min.) 5   Time -   Lumbar Stretches - Slouch 10   Extension in Lying 20   Extension in Standing 10   Flexion in Lying 20   Lumbar Extension - Seat Pad -   Femur Restraint -   Top Dead Center -   Counterweight -   Lumbar Flexion -   Lumbar Extension -   Lumbar Peak Torque -   Lumbar Weight 75   Repetitions 20   Rating of Perceived Exertion 4         NP  Prone on elbows 1 minute  Bridging +BTBx20  Palloff press GTB x 15 each  Seated back extension with red bungee 1x10   STS + airex x10  PPT w/ march x10  PPT w/BKFO +BTB x 10  Side steps with BTB 2x10 ea ---NP      Peripheral muscle strengthening which included 1 set of 15-20 repetitions at a slow, controlled 10-13 second per rep pace focused on strengthening supporting musculature for improved body mechanics and functional mobility.  Pt and therapist focused on proper form during treatment to ensure optimal strengthening of each targeted muscle group.  Machines were utilized including torso rotation, leg extension, leg curl, chest press, upright row. Tricep extension, bicep curl, leg press, and hip abduction added visit 3    Kirill received the following manual therapy techniques: NP      Home " Exercises Provided and Patient Education Provided   Home exercises include:Modified piriformis stretch, EIL  Cardio program:visit 5  Lifting education date:visit 11, 3/2/23 copy of do's and don'ts of lifting handout provided  Posture/Lumbar roll: uses pillow at home  Fridge Magnet Discharge handout (date given):    Education provided:   - cues w/ex's  - Pacing with MedX    Written Home Exercises Provided: Patient instructed to cont prior HEP.  Exercises were reviewed and Kirill was able to demonstrate them prior to the end of the session.  Kirill demonstrated good  understanding of the education provided.     See EMR under Patient Instructions for exercises provided prior visit.    Assessment   Kirill returns with moderate report of lower back stiffness/discomfort. He did report feeling better for 1 day after last session. Treatment continued with lumbopelvic/core flexibility and strengthening ex's. Increased reps for Paloff press on the cable cross for additional strengthening/endurance. He was able to perform ex's within appropriate muscular fatigue and without increased pain. Lumbar MedX resistance was increased to 75 ft/lbs completing 20 reps with a RPE = 4/10. Resistance on peripheral ex's was maintained as previous as patient reporting good challenges. Will look to progress per HB protocol and patient tolerance.    Patient is making good progress towards established goals.  Pt will continue to benefit from skilled outpatient physical therapy to address the deficits stated in the impairment chart, provide pt/family education and to maximize pt's level of independence in the home and community environment.     Anticipated Barriers for therapy: chronicity of symptoms, high BMI, lumbar surgeries, anxiety, diabetes    Pt's spiritual, cultural and educational needs considered and pt agreeable to plan of care and goals as stated below:     GOALS: Pt is in agreement with the following goals.     Short term goals:  6  weeks or 10 visits   1.  Pt will demonstrate increased lumbar ROM by at least 5 degrees from the initial ROM value with improvements noted in functional ROM and ability to perform ADLs.  Goal met 2/28/2023  2.  Pt will demonstrate increased MedX average isometric strength value  by 10% from initial test resulting in improved ability to perform bending, lifting, and carrying activities safely, confidently.  Goal met 2/28/2023  3.  Patient report a reduction in worst pain score by 1-2 points for improved tolerance for household ADLs.  Goal met 2/28/2023  4.  Pt able to perform HEP correctly with minimal cueing or supervision from therapist to encourage independent management of symptoms. Goal met 2/28/2023     Long term goals: 10 weeks or 20 visits   1. Pt will demonstrate increased lumbar ROM by at least 10 degrees from initial ROM value, resulting in improved ability to perform functional fwd bending while standing and sitting. (approp and ongoing)  2. Pt will demonstrate increased MedX average isometric strength value  by 30% from initial test resulting in improved ability to perform bending, lifting, and carrying activities safely, confidently.  Goal met 2/28/2023  3. Pt to demonstrate ability to independently control and reduce their pain through posture positioning and mechanical movements throughout a typical day.  (approp and ongoing)  4.  Pt will demonstrate reduced pain and improved functional outcomes as reported on the FOTO by reaching a limitation score of < or = 50% or less in order to demonstrate subjective improvement in pt's condition.    (approp and ongoing)  5. Pt will demonstrate independence with the HEP at discharge  (approp and ongoing)  6.  Pt to be able to walk a block around his neighborhood with max pain 3/10 in order to demonstrate community ambulation for weight loss goals.  (approp and ongoing)    Plan   Continue with established Plan of Care towards established PT goals.     Therapist:  Jeffrey Smalls, PTA  3/9/2023

## 2023-03-14 ENCOUNTER — CLINICAL SUPPORT (OUTPATIENT)
Dept: REHABILITATION | Facility: HOSPITAL | Age: 48
End: 2023-03-14
Payer: MEDICAID

## 2023-03-14 DIAGNOSIS — M51.36 DDD (DEGENERATIVE DISC DISEASE), LUMBAR: ICD-10-CM

## 2023-03-14 DIAGNOSIS — M54.41 CHRONIC BILATERAL LOW BACK PAIN WITH BILATERAL SCIATICA: ICD-10-CM

## 2023-03-14 DIAGNOSIS — M54.42 CHRONIC BILATERAL LOW BACK PAIN WITH BILATERAL SCIATICA: ICD-10-CM

## 2023-03-14 DIAGNOSIS — M25.69 DECREASED ROM OF TRUNK AND BACK: ICD-10-CM

## 2023-03-14 DIAGNOSIS — R29.898 DECREASED STRENGTH OF TRUNK AND BACK: Primary | ICD-10-CM

## 2023-03-14 DIAGNOSIS — G89.29 CHRONIC BILATERAL LOW BACK PAIN WITH BILATERAL SCIATICA: ICD-10-CM

## 2023-03-14 PROCEDURE — 97110 THERAPEUTIC EXERCISES: CPT

## 2023-03-14 NOTE — PROGRESS NOTES
[facility]      [facility]  [facility] [id number]    Therapy Diagnosis:   Encounter Diagnoses   Name Primary?    Decreased strength of trunk and back Yes    Chronic bilateral low back pain with bilateral sciatica     DDD (degenerative disc disease), lumbar     Decreased ROM of trunk and back      Physician: Jose L Brown MD    Visit Date: 3/14/2023    Physician Orders: PT Eval and Treat  Medical Diagnosis from Referral: Z98.1 (ICD-10-CM) - S/P lumbar fusion  Evaluation Date: 2023  Authorization Period Expiration: 11/15/2023  Plan of Care Expiration: 2023  Reassessment Due: 3/9/2023  Visit # / Visits authorized:  (No lap belt)     Time In: 9:58 AM  Time Out: 11:00 AM  Total Billable Time: 57 minutes  Insurance:Fee for service Insurance Patient      Precautions: Standard and Diabetes and Fall      Pattern of pain determined: 1 PEN    Subjective   Kirill reports he felt increased back pain after last visit and states his back was hurting for about a day following. He states he had to take pain medication to help calm the pain. He reports over the weekend he started to feel better and has minimal back pain today.     Patient reports tolerating previous visit : increased soreness/pain following  Patient reports their pain to be 1/10 on a 0-10 scale with 0 being no pain and 10 being the worst pain imaginable.  Pain Location: bilateral back      Work and leisure: Unemployed/ Watch movies, play videos      Pt goals: Increase flexibility in other areas, be able to go for walks with minimal pain    Objective     Baseline Isometric Testing on Med X equipment: Testing administered by PT  Date of testin2023  ROM 0-24 deg   Max Peak Torque 72    Min Peak Torque 13    Flex/Ext Ratio 5.54   % below normative data 79%     Date of testin2023  ROM 0-33 deg   Max Peak Torque 133   Min Peak Torque 58   Flex/Ext Ratio 2.3:1   % below normative  "data 60%     Outcomes:  Initial score: 71% limitation  Visit 6 score: 71%  Visit 10 score: 63% limitation  Goal:      Treatment    Pt was instructed in and performed the following:     Kirill received therapeutic exercises to develop/improved posture, cardiovascular endurance, muscular endurance, lumbar/cervical ROM, strength and muscular endurance for 57 minutes including the following exercises:     TM warm-up x 5 minutes @ 2.0 mph  LTR x 10  DKTC w/T-ball x 20  Modified piriformis stretch w/towel assist 3 x 20" each side  PPT 10x5"  Bridging with BTB 2 x 10  Prone press ups 2x10  Prone hip extension alternating x15  EIS x10  SOC x 20  Paloff Press on Cable cross with 10# x 20 each    [facility] 3/14/2023   Visit Number 14   VAS Pain Rating 1   Treadmill Time (in min.) 5   Time -   Lumbar Stretches - Slouch Overcorrection 20   Extension in Lying 20   Extension in Standing 10   Flexion in Lying 20   Lumbar Extension Seat Pad -   Femur Restraint -   Top Dead Center -   Counterweight -   Lumbar Flexion -   Lumbar Extension -   Lumbar Peak Torque -   Min Torque -   Test Percent Below Normative Data -   Lumbar Weight 75   Repetitions 20   Rating of Perceived Exertion 3   Ice - Z Lie (in min.) 5           NP  Prone on elbows 1 minute  STS + airex x10  PPT w/ march x10  PPT w/BKFO +BTB x 10  Side steps with BTB 2x10 ea ---NP      Peripheral muscle strengthening which included 1 set of 15-20 repetitions at a slow, controlled 10-13 second per rep pace focused on strengthening supporting musculature for improved body mechanics and functional mobility.  Pt and therapist focused on proper form during treatment to ensure optimal strengthening of each targeted muscle group.  Machines were utilized including torso rotation, leg extension, leg curl, chest press, upright row. Tricep extension, bicep curl, leg press, and hip abduction added visit 3    Kirill received the following manual therapy techniques: NP      Home " Exercises Provided and Patient Education Provided   Home exercises include:Modified piriformis stretch, EIL  Cardio program:visit 5  Lifting education date:visit 11, 3/2/23 copy of do's and don'ts of lifting handout provided  Posture/Lumbar roll: uses pillow at home  Fridge Magnet Discharge handout (date given):    Education provided:   - cues w/ex's  - Pacing with MedX    Written Home Exercises Provided: Patient instructed to cont prior HEP.  Exercises were reviewed and Kirill was able to demonstrate them prior to the end of the session.  Kirill demonstrated good  understanding of the education provided.     See EMR under Patient Instructions for exercises provided prior visit.    Assessment   Kirill returns with reports of minimal lower back pain currently, but states he was very sore after last visit. Treatment continued with lumbopelvic/core flexibility and strengthening exercises. Due to reports of increased soreness/pain following last visit, lumbar MedX weight was maintained at 75 ft/lbs with pt performing 20 reps with RPE rated 3/10. He was able to complete full peripheral machine circuit with appropriate muscular fatigue and without c/o increased pain. He reported feeling good with minimal soreness at end of session.  Will look to progress MedX weight by 5% next visit.     Patient is making good progress towards established goals.  Pt will continue to benefit from skilled outpatient physical therapy to address the deficits stated in the impairment chart, provide pt/family education and to maximize pt's level of independence in the home and community environment.     Anticipated Barriers for therapy: chronicity of symptoms, high BMI, lumbar surgeries, anxiety, diabetes    Pt's spiritual, cultural and educational needs considered and pt agreeable to plan of care and goals as stated below:     GOALS: Pt is in agreement with the following goals.     Short term goals:  6 weeks or 10 visits   1.  Pt will demonstrate  increased lumbar ROM by at least 5 degrees from the initial ROM value with improvements noted in functional ROM and ability to perform ADLs.  Goal met 2/28/2023  2.  Pt will demonstrate increased MedX average isometric strength value  by 10% from initial test resulting in improved ability to perform bending, lifting, and carrying activities safely, confidently.  Goal met 2/28/2023  3.  Patient report a reduction in worst pain score by 1-2 points for improved tolerance for household ADLs.  Goal met 2/28/2023  4.  Pt able to perform HEP correctly with minimal cueing or supervision from therapist to encourage independent management of symptoms. Goal met 2/28/2023     Long term goals: 10 weeks or 20 visits   1. Pt will demonstrate increased lumbar ROM by at least 10 degrees from initial ROM value, resulting in improved ability to perform functional fwd bending while standing and sitting. (approp and ongoing)  2. Pt will demonstrate increased MedX average isometric strength value  by 30% from initial test resulting in improved ability to perform bending, lifting, and carrying activities safely, confidently.  Goal met 2/28/2023  3. Pt to demonstrate ability to independently control and reduce their pain through posture positioning and mechanical movements throughout a typical day.  (approp and ongoing)  4.  Pt will demonstrate reduced pain and improved functional outcomes as reported on the FOTO by reaching a limitation score of < or = 50% or less in order to demonstrate subjective improvement in pt's condition.    (approp and ongoing)  5. Pt will demonstrate independence with the HEP at discharge  (approp and ongoing)  6.  Pt to be able to walk a block around his neighborhood with max pain 3/10 in order to demonstrate community ambulation for weight loss goals.  (approp and ongoing)    Plan   Continue with established Plan of Care towards established PT goals.     Therapist: Arcelia Mcclelland, PT  3/14/2023

## 2023-03-16 ENCOUNTER — CLINICAL SUPPORT (OUTPATIENT)
Dept: REHABILITATION | Facility: HOSPITAL | Age: 48
End: 2023-03-16
Payer: MEDICAID

## 2023-03-16 DIAGNOSIS — G89.29 CHRONIC BILATERAL LOW BACK PAIN WITH BILATERAL SCIATICA: ICD-10-CM

## 2023-03-16 DIAGNOSIS — R29.898 DECREASED STRENGTH OF TRUNK AND BACK: Primary | ICD-10-CM

## 2023-03-16 DIAGNOSIS — M54.41 CHRONIC BILATERAL LOW BACK PAIN WITH BILATERAL SCIATICA: ICD-10-CM

## 2023-03-16 DIAGNOSIS — M54.42 CHRONIC BILATERAL LOW BACK PAIN WITH BILATERAL SCIATICA: ICD-10-CM

## 2023-03-16 DIAGNOSIS — M51.36 DDD (DEGENERATIVE DISC DISEASE), LUMBAR: ICD-10-CM

## 2023-03-16 DIAGNOSIS — M25.69 DECREASED ROM OF TRUNK AND BACK: ICD-10-CM

## 2023-03-16 PROCEDURE — 97110 THERAPEUTIC EXERCISES: CPT | Mod: CQ

## 2023-03-16 NOTE — PROGRESS NOTES
[facility]      [facility]  [facility] [id number]    Therapy Diagnosis:   Encounter Diagnoses   Name Primary?    Decreased strength of trunk and back Yes    Chronic bilateral low back pain with bilateral sciatica     DDD (degenerative disc disease), lumbar     Decreased ROM of trunk and back      Physician: Jose L Brown MD    Visit Date: 3/16/2023    Physician Orders: PT Eval and Treat  Medical Diagnosis from Referral: Z98.1 (ICD-10-CM) - S/P lumbar fusion  Evaluation Date: 2023  Authorization Period Expiration: 11/15/2023  Plan of Care Expiration: 2023  Reassessment Due: 2023  Visit # / Visits authorized: 15/ 20 (No lap belt)     Time In: 10:00 AM  Time Out: 11:05 AM  Total Billable Time: 60 minutes  Insurance:Fee for service Insurance Patient      Precautions: Standard and Diabetes and Fall      Pattern of pain determined: 1 PEN    Subjective   Kirill reports increased back pain this morning due to difficulty sleeping. States that he took a 5 hour nap yesterday which likely contributed to his poor night's sleep. States that he has not taken any pain meds this morning.    Patient reports tolerating previous visit : increased soreness/pain following  Patient reports their pain to be 6/10 on a 0-10 scale with 0 being no pain and 10 being the worst pain imaginable.  Pain Location: bilateral back      Work and leisure: Unemployed/ Watch movies, play videos      Pt goals: Increase flexibility in other areas, be able to go for walks with minimal pain    Objective     Baseline Isometric Testing on Med X equipment: Testing administered by PT  Date of testin2023  ROM 0-24 deg   Max Peak Torque 72    Min Peak Torque 13    Flex/Ext Ratio 5.54   % below normative data 79%     Date of testin2023  ROM 0-33 deg   Max Peak Torque 133   Min Peak Torque 58   Flex/Ext Ratio 2.3:1   % below normative data 60%     Outcomes:  Initial score: 71%  "limitation  Visit 6 score: 71%  Visit 10 score: 63% limitation  Goal:      Treatment    Pt was instructed in and performed the following:     Kirill received therapeutic exercises to develop/improved posture, cardiovascular endurance, muscular endurance, lumbar/cervical ROM, strength and muscular endurance for 55 minutes including the following exercises:     TM warm-up x 5 minutes @ 2.0 mph  LTR x 10  DKTC w/T-ball x 20  Modified piriformis stretch w/towel assist 3 x 20" each side  PPT 10x5"  Bridging with BTB 2 x 10  Prone press ups 2x10  Prone hip extension alternating x15  EIS x10  SOC x 20  Paloff Press on Cable cross with 10# x 20 each    [facility] 3/16/2023   Visit Number 15   VAS Pain Rating 6   Treadmill Time (in min.) 5   Time -   Lumbar Stretches - Slouch 20   Extension in Lying 20   Extension in Standing 10   Flexion in Lying 20   Lumbar Extension - Seat Pad -   Femur Restraint -   Top Dead Center -   Counterweight -   Lumbar Flexion 36   Lumbar Extension 0   Lumbar Peak Torque -   Lumbar Weight 75   Repetitions 20   Rating of Perceived Exertion 3            NP  Prone on elbows 1 minute  STS + airex x10  PPT w/ march x10  PPT w/BKFO +BTB x 10  Side steps with BTB 2x10 ea ---NP      Peripheral muscle strengthening which included 1 set of 15-20 repetitions at a slow, controlled 10-13 second per rep pace focused on strengthening supporting musculature for improved body mechanics and functional mobility.  Pt and therapist focused on proper form during treatment to ensure optimal strengthening of each targeted muscle group.  Machines were utilized including torso rotation, leg extension, leg curl, chest press, upright row. Tricep extension, bicep curl, leg press, and hip abduction added visit 3    Kirill received the following manual therapy techniques: STM to lumbar Paraspinals x 5 minutes    Home Exercises Provided and Patient Education Provided   Home exercises include:Modified piriformis " stretch, EIL, LTR, SOC, Bridging, EIS  Cardio program:visit 5  Lifting education date:visit 11, 3/2/23 copy of do's and don'ts of lifting handout provided  Posture/Lumbar roll: uses pillow at home  Fridge Magnet Discharge handout (date given):    Education provided:   - cues w/ex's  - Pacing with MedX    Written Home Exercises Provided: Patient instructed to cont prior HEP.  Exercises were reviewed and Kirill was able to demonstrate them prior to the end of the session.  Kirill demonstrated good  understanding of the education provided.     See EMR under Patient Instructions for exercises provided prior visit.    Assessment   Kirill returns with elevated lower back pain/stiffness due to restless night of sleep. Treatment continued with lumbopelvic/core flexibility and strengthening exercises.  Added STM due to increased pain/stiffness today. Patient noting improved comfort post ex's and manual techniques with pain level down to 3/10. During MedX setup, he demonstrated improved comfort with increased flexion ROM to 36 degrees and this was updated accordingly. Lumbar MedX resistance was maintained at 75 ft/lbs (not increased due to increased discomfort today) and he completed 20 reps again within the new ROM with RPE = 3/10. Will look to progress per HB protocol and patient tolerance.    Patient is making good progress towards established goals.  Pt will continue to benefit from skilled outpatient physical therapy to address the deficits stated in the impairment chart, provide pt/family education and to maximize pt's level of independence in the home and community environment.     Anticipated Barriers for therapy: chronicity of symptoms, high BMI, lumbar surgeries, anxiety, diabetes    Pt's spiritual, cultural and educational needs considered and pt agreeable to plan of care and goals as stated below:     GOALS: Pt is in agreement with the following goals.     Short term goals:  6 weeks or 10 visits   1.  Pt will  demonstrate increased lumbar ROM by at least 5 degrees from the initial ROM value with improvements noted in functional ROM and ability to perform ADLs.  Goal met 2/28/2023  2.  Pt will demonstrate increased MedX average isometric strength value  by 10% from initial test resulting in improved ability to perform bending, lifting, and carrying activities safely, confidently.  Goal met 2/28/2023  3.  Patient report a reduction in worst pain score by 1-2 points for improved tolerance for household ADLs.  Goal met 2/28/2023  4.  Pt able to perform HEP correctly with minimal cueing or supervision from therapist to encourage independent management of symptoms. Goal met 2/28/2023     Long term goals: 10 weeks or 20 visits   1. Pt will demonstrate increased lumbar ROM by at least 10 degrees from initial ROM value, resulting in improved ability to perform functional fwd bending while standing and sitting. (approp and ongoing)  2. Pt will demonstrate increased MedX average isometric strength value  by 30% from initial test resulting in improved ability to perform bending, lifting, and carrying activities safely, confidently.  Goal met 2/28/2023  3. Pt to demonstrate ability to independently control and reduce their pain through posture positioning and mechanical movements throughout a typical day.  (approp and ongoing)  4.  Pt will demonstrate reduced pain and improved functional outcomes as reported on the FOTO by reaching a limitation score of < or = 50% or less in order to demonstrate subjective improvement in pt's condition.    (approp and ongoing)  5. Pt will demonstrate independence with the HEP at discharge  (approp and ongoing)  6.  Pt to be able to walk a block around his neighborhood with max pain 3/10 in order to demonstrate community ambulation for weight loss goals.  (approp and ongoing)    Plan   Continue with established Plan of Care towards established PT goals.     Therapist: Jeffrey Smalls  PTA  3/16/2023

## 2023-03-21 ENCOUNTER — CLINICAL SUPPORT (OUTPATIENT)
Dept: REHABILITATION | Facility: HOSPITAL | Age: 48
End: 2023-03-21
Payer: MEDICAID

## 2023-03-21 DIAGNOSIS — R29.898 DECREASED STRENGTH OF TRUNK AND BACK: Primary | ICD-10-CM

## 2023-03-21 DIAGNOSIS — G89.29 CHRONIC BILATERAL LOW BACK PAIN WITH BILATERAL SCIATICA: ICD-10-CM

## 2023-03-21 DIAGNOSIS — M54.41 CHRONIC BILATERAL LOW BACK PAIN WITH BILATERAL SCIATICA: ICD-10-CM

## 2023-03-21 DIAGNOSIS — M54.42 CHRONIC BILATERAL LOW BACK PAIN WITH BILATERAL SCIATICA: ICD-10-CM

## 2023-03-21 DIAGNOSIS — M25.69 DECREASED ROM OF TRUNK AND BACK: ICD-10-CM

## 2023-03-21 DIAGNOSIS — M51.36 DDD (DEGENERATIVE DISC DISEASE), LUMBAR: ICD-10-CM

## 2023-03-21 PROCEDURE — 97110 THERAPEUTIC EXERCISES: CPT | Mod: CQ

## 2023-03-21 NOTE — PROGRESS NOTES
[facility]      [facility]  [facility] Number: [id number]    Therapy Diagnosis:   Encounter Diagnoses   Name Primary?    Decreased strength of trunk and back Yes    Chronic bilateral low back pain with bilateral sciatica     DDD (degenerative disc disease), lumbar     Decreased ROM of trunk and back      Physician: Jose L Brown MD    Visit Date: 3/21/2023    Physician Orders: PT Eval and Treat  Medical Diagnosis from Referral: Z98.1 (ICD-10-CM) - S/P lumbar fusion  Evaluation Date: 2023  Authorization Period Expiration: 11/15/2023  Plan of Care Expiration: 2023  Reassessment Due: 2023  Visit # / Visits authorized:  (No lap belt)     Time In: 10:00 AM  Time Out: 11:00 AM  Total Billable Time: 55 minutes  Insurance:Fee for service Insurance Patient      Precautions: Standard and Diabetes and Fall      Pattern of pain determined: 1 PEN    Subjective   Kirill reports some increased stiffness and moderate lower back pain currently. States that he did not take any pain meds this morning. States that he felt really good after last session with the addition of manual techniques.    Patient reports tolerating previous visit : good relief from manual techniques  Patient reports their pain to be 5/10 on a 0-10 scale with 0 being no pain and 10 being the worst pain imaginable.  Pain Location: bilateral back      Work and leisure: Unemployed/ Watch movies, play videos      Pt goals: Increase flexibility in other areas, be able to go for walks with minimal pain    Objective     Baseline Isometric Testing on Med X equipment: Testing administered by PT  Date of testin2023  ROM 0-24 deg   Max Peak Torque 72    Min Peak Torque 13    Flex/Ext Ratio 5.54   % below normative data 79%     Date of testin2023  ROM 0-33 deg   Max Peak Torque 133   Min Peak Torque 58   Flex/Ext Ratio 2.3:1   % below normative data 60%     Outcomes:  Initial score: 71%  "limitation  Visit 6 score: 71%  Visit 10 score: 63% limitation  Goal:      Treatment    Pt was instructed in and performed the following:     Kirill received therapeutic exercises to develop/improved posture, cardiovascular endurance, muscular endurance, lumbar/cervical ROM, strength and muscular endurance for 55 minutes including the following exercises:     TM warm-up x 5 minutes @ 2.0 mph  LTR x 10  DKTC w/T-ball x 20  Modified piriformis stretch w/towel assist 3 x 20" each side  PPT 10x5"  Bridging with BTB 2 x 10  Prone press ups 2x10  Prone hip extension alternating x15  EIS x10  SOC x 20  +Lateral step out ->Paloff press on Cable Cross with 10# x 10 each  Paloff Press on Cable cross with 10# x 20 each-NP    [facility] 3/16/2023   Visit Number 15   VAS Pain Rating 6   Treadmill Time (in min.) 5   Time -   Lumbar Stretches - Slouch 20   Extension in Lying 20   Extension in Standing 10   Flexion in Lying 20   Lumbar Extension - Seat Pad -   Femur Restraint -   Top Dead Center -   Counterweight -   Lumbar Flexion 36   Lumbar Extension 0   Lumbar Peak Torque -   Lumbar Weight 75   Repetitions 20   Rating of Perceived Exertion 3            NP  Prone on elbows 1 minute  STS + airex x10  PPT w/ march x10  PPT w/BKFO +BTB x 10  Side steps with BTB 2x10 ea ---NP      Peripheral muscle strengthening which included 1 set of 15-20 repetitions at a slow, controlled 10-13 second per rep pace focused on strengthening supporting musculature for improved body mechanics and functional mobility.  Pt and therapist focused on proper form during treatment to ensure optimal strengthening of each targeted muscle group.  Machines were utilized including torso rotation, leg extension, leg curl, chest press, upright row. Tricep extension, bicep curl, leg press, and hip abduction added visit 3    Kirill received the following manual therapy techniques: STM to lumbar Paraspinals x 5 minutes, Stick massage    Home Exercises " Provided and Patient Education Provided   Home exercises include:Modified piriformis stretch, EIL, LTR, SOC, Bridging, EIS  Cardio program:visit 5  Lifting education date:visit 11, 3/2/23 copy of do's and don'ts of lifting handout provided  Posture/Lumbar roll: uses pillow at home  Fridge Magnet Discharge handout (date given):    Education provided:   - cues w/ex's  - Pacing with MedX    Written Home Exercises Provided: Patient instructed to cont prior HEP.  Exercises were reviewed and Kirill was able to demonstrate them prior to the end of the session.  Kirill demonstrated good  understanding of the education provided.     See EMR under Patient Instructions for exercises provided prior visit.    Assessment   Kirill returns with moderate report of lower back pain/stiffness. Treatment continued with lumbopelvic/core flexibility and strengthening exercises. Progressed to lateral step outs on cable cross w/Paloff press this visit performing without increased pain.  Continued with manual techniques to include STM which again provides some relief. Lumbar MedX resistance was increased to 80 ft/lbs ft/lbs completing 16 reps  with RPE = 4/10. Will look to progress per HB protocol and patient tolerance.    Patient is making progress towards established goals.  Pt will continue to benefit from skilled outpatient physical therapy to address the deficits stated in the impairment chart, provide pt/family education and to maximize pt's level of independence in the home and community environment.     Anticipated Barriers for therapy: chronicity of symptoms, high BMI, lumbar surgeries, anxiety, diabetes    Pt's spiritual, cultural and educational needs considered and pt agreeable to plan of care and goals as stated below:     GOALS: Pt is in agreement with the following goals.     Short term goals:  6 weeks or 10 visits   1.  Pt will demonstrate increased lumbar ROM by at least 5 degrees from the initial ROM value with improvements  noted in functional ROM and ability to perform ADLs.  Goal met 2/28/2023  2.  Pt will demonstrate increased MedX average isometric strength value  by 10% from initial test resulting in improved ability to perform bending, lifting, and carrying activities safely, confidently.  Goal met 2/28/2023  3.  Patient report a reduction in worst pain score by 1-2 points for improved tolerance for household ADLs.  Goal met 2/28/2023  4.  Pt able to perform HEP correctly with minimal cueing or supervision from therapist to encourage independent management of symptoms. Goal met 2/28/2023     Long term goals: 10 weeks or 20 visits   1. Pt will demonstrate increased lumbar ROM by at least 10 degrees from initial ROM value, resulting in improved ability to perform functional fwd bending while standing and sitting. (approp and ongoing)  2. Pt will demonstrate increased MedX average isometric strength value  by 30% from initial test resulting in improved ability to perform bending, lifting, and carrying activities safely, confidently.  Goal met 2/28/2023  3. Pt to demonstrate ability to independently control and reduce their pain through posture positioning and mechanical movements throughout a typical day.  (approp and ongoing)  4.  Pt will demonstrate reduced pain and improved functional outcomes as reported on the FOTO by reaching a limitation score of < or = 50% or less in order to demonstrate subjective improvement in pt's condition.    (approp and ongoing)  5. Pt will demonstrate independence with the HEP at discharge  (approp and ongoing)  6.  Pt to be able to walk a block around his neighborhood with max pain 3/10 in order to demonstrate community ambulation for weight loss goals.  (approp and ongoing)    Plan   Continue with established Plan of Care towards established PT goals.     Therapist: Jeffrey Smalls, MAYKEL  3/21/2023

## 2023-03-23 ENCOUNTER — CLINICAL SUPPORT (OUTPATIENT)
Dept: REHABILITATION | Facility: HOSPITAL | Age: 48
End: 2023-03-23
Payer: MEDICAID

## 2023-03-23 DIAGNOSIS — G89.29 CHRONIC BILATERAL LOW BACK PAIN WITH BILATERAL SCIATICA: ICD-10-CM

## 2023-03-23 DIAGNOSIS — M25.69 DECREASED ROM OF TRUNK AND BACK: ICD-10-CM

## 2023-03-23 DIAGNOSIS — M54.42 CHRONIC BILATERAL LOW BACK PAIN WITH BILATERAL SCIATICA: ICD-10-CM

## 2023-03-23 DIAGNOSIS — M51.36 DDD (DEGENERATIVE DISC DISEASE), LUMBAR: ICD-10-CM

## 2023-03-23 DIAGNOSIS — R29.898 DECREASED STRENGTH OF TRUNK AND BACK: Primary | ICD-10-CM

## 2023-03-23 DIAGNOSIS — M54.41 CHRONIC BILATERAL LOW BACK PAIN WITH BILATERAL SCIATICA: ICD-10-CM

## 2023-03-23 PROCEDURE — 97110 THERAPEUTIC EXERCISES: CPT | Mod: CQ

## 2023-03-23 NOTE — PROGRESS NOTES
[facility]      [facility]  [facility] Number: [id number]    Therapy Diagnosis:   Encounter Diagnoses   Name Primary?    Decreased strength of trunk and back Yes    Chronic bilateral low back pain with bilateral sciatica     DDD (degenerative disc disease), lumbar     Decreased ROM of trunk and back      Physician: Jose L Brown MD    Visit Date: 3/23/2023    Physician Orders: PT Eval and Treat  Medical Diagnosis from Referral: Z98.1 (ICD-10-CM) - S/P lumbar fusion  Evaluation Date: 2023  Authorization Period Expiration: 11/15/2023  Plan of Care Expiration: 2023  Reassessment Due: 2023  Visit # / Visits authorized:  (No lap belt)     Time In: 10:00 AM  Time Out: 11:05 AM  Total Billable Time: 60  minutes  Insurance:Fee for service Insurance Patient      Precautions: Standard and Diabetes and Fall      Pattern of pain determined: 1 PEN    Subjective   Kirill reports continued increased stiffness and moderate lower back pain currently. States that he usually always feels better after treatment sessions then symptoms return with generalized soreness.    Patient reports tolerating previous visit : good relief from manual techniques but residual soreness the next day.  Patient reports their pain to be 6/10 on a 0-10 scale with 0 being no pain and 10 being the worst pain imaginable.  Pain Location: bilateral back      Work and leisure: Unemployed/ Watch movies, play videos      Pt goals: Increase flexibility in other areas, be able to go for walks with minimal pain    Objective     Baseline Isometric Testing on Med X equipment: Testing administered by PT  Date of testin2023  ROM 0-24 deg   Max Peak Torque 72    Min Peak Torque 13    Flex/Ext Ratio 5.54   % below normative data 79%     Date of testin2023  ROM 0-33 deg   Max Peak Torque 133   Min Peak Torque 58   Flex/Ext Ratio 2.3:1   % below normative data 60%     Outcomes:  Initial  "score: 71% limitation  Visit 6 score: 71%  Visit 10 score: 63% limitation  Goal:      Treatment    Pt was instructed in and performed the following:     Kirill received therapeutic exercises to develop/improved posture, cardiovascular endurance, muscular endurance, lumbar/cervical ROM, strength and muscular endurance for 60 minutes including the following exercises:     TM warm-up x 5 minutes @ 2.0 mph    Established, reviewed and performed "Go-TO" ex's per HEP to include:   LTR x 10  DKTC w/T-ball x 20  Modified piriformis stretch w/towel assist 3 x 20" each side  PPT 10x5"  Bridging with BTB 2 x 10  Prone press ups 2x10  Prone hip extension alternating x15  EIS x10    [facility] 3/23/2023   Visit Number 17   VAS Pain Rating 6   Treadmill Time (in min.) 5   Time -   Lumbar Stretches - Slouch -   Extension in Lying 20   Extension in Standing 10   Flexion in Lying 20   Manual Therapy 5   Lumbar Extension - Seat Pad -   Femur Restraint -   Top Dead Center -   Counterweight -   Lumbar Flexion -   Lumbar Extension -   Lumbar Peak Torque -   Lumbar Weight 80   Repetitions 18   Rating of Perceived Exertion 4          NP  Prone on elbows 1 minute  STS + airex x10  PPT w/ march x10  PPT w/BKFO +BTB x 10  Side steps with BTB 2x10 ea ---NP  Lateral step out ->Paloff press on Cable Cross with 10# x 10 each  Paloff Press on Cable cross with 10# x 20 each-NP  SOC x 20    Peripheral muscle strengthening which included 1 set of 15-20 repetitions at a slow, controlled 10-13 second per rep pace focused on strengthening supporting musculature for improved body mechanics and functional mobility.  Pt and therapist focused on proper form during treatment to ensure optimal strengthening of each targeted muscle group.  Machines were utilized including torso rotation, leg extension, leg curl, chest press, upright row. Tricep extension, bicep curl, leg press, and hip abduction added visit 3    Kirill received the following " "manual therapy techniques: STM to lumbar Paraspinals x 5 minutes    Home Exercises Provided and Patient Education Provided   Home exercises include:Modified piriformis stretch, EIL, LTR, SOC, Bridging, EIS  Cardio program:visit 5  Lifting education date:visit 11, 3/2/23 copy of do's and don'ts of lifting handout provided  Posture/Lumbar roll: uses pillow at home  Fridge Magnet Discharge handout (date given): 3/23/23    Education provided:   - cues w/ex's  - Educated on Fridge Magnet "Go-To ex's" 3/23/23 with handout provided    Written Home Exercises Provided: Patient instructed to cont prior HEP.  Exercises were reviewed and Kirill was able to demonstrate them prior to the end of the session.  Kirill demonstrated good  understanding of the education provided.     See EMR under Patient Instructions for exercises provided prior visit and today's visit    Assessment   Kirill returns with moderate report of lower back pain/stiffness. He continues to report relief after therapy sessions however, moderate discomfort returns in the evenings and the following day. He was encouraged to continue with HEP compliance and his "Go-To" ex's were established, reviewed and performed without c/o.(Handout provided). Also continued with manual techniques to include STM which again provides some relief. Lumbar MedX resistance was maintained at 80 ft/lbs ft/lbs completing 18 reps  with RPE = 4/10. Peripheral ex resistance was not increased due to residual soreness. Will look to progress per HB protocol and patient tolerance.    Patient is making progress towards established goals.  Pt will continue to benefit from skilled outpatient physical therapy to address the deficits stated in the impairment chart, provide pt/family education and to maximize pt's level of independence in the home and community environment.     Anticipated Barriers for therapy: chronicity of symptoms, high BMI, lumbar surgeries, anxiety, diabetes    Pt's spiritual, " cultural and educational needs considered and pt agreeable to plan of care and goals as stated below:     GOALS: Pt is in agreement with the following goals.     Short term goals:  6 weeks or 10 visits   1.  Pt will demonstrate increased lumbar ROM by at least 5 degrees from the initial ROM value with improvements noted in functional ROM and ability to perform ADLs.  Goal met 2/28/2023  2.  Pt will demonstrate increased MedX average isometric strength value  by 10% from initial test resulting in improved ability to perform bending, lifting, and carrying activities safely, confidently.  Goal met 2/28/2023  3.  Patient report a reduction in worst pain score by 1-2 points for improved tolerance for household ADLs.  Goal met 2/28/2023  4.  Pt able to perform HEP correctly with minimal cueing or supervision from therapist to encourage independent management of symptoms. Goal met 2/28/2023     Long term goals: 10 weeks or 20 visits   1. Pt will demonstrate increased lumbar ROM by at least 10 degrees from initial ROM value, resulting in improved ability to perform functional fwd bending while standing and sitting. (approp and ongoing)  2. Pt will demonstrate increased MedX average isometric strength value  by 30% from initial test resulting in improved ability to perform bending, lifting, and carrying activities safely, confidently.  Goal met 2/28/2023  3. Pt to demonstrate ability to independently control and reduce their pain through posture positioning and mechanical movements throughout a typical day.  (approp and ongoing)  4.  Pt will demonstrate reduced pain and improved functional outcomes as reported on the FOTO by reaching a limitation score of < or = 50% or less in order to demonstrate subjective improvement in pt's condition.    (approp and ongoing)  5. Pt will demonstrate independence with the HEP at discharge  (approp and ongoing)  6.  Pt to be able to walk a block around his neighborhood with max pain 3/10 in  order to demonstrate community ambulation for weight loss goals.  (approp and ongoing)    Plan   Continue with established Plan of Care towards established PT goals.     Therapist: Jeffrey Smalls, PTA  3/23/2023

## 2023-03-23 NOTE — PATIENT INSTRUCTIONS
"HEALTHY BACK TOOLS        KEEP YOUR SPINE FEELING FINE   HEALTHY HABITS   Do your "GO TO" stretches 2/day   Get a good night's REST   Watch your POSTURE in sitting/standing Drink PLENTY of water   Use a lumbar roll Eat LOTS of fruits & vegetables   GET UP often (walk and/or stretch) Manage your STRESS   Make your workplace IDEAL FOR YOU  Don't smoke   Lift correctly EXERCISE                           WHAT TO DO WHEN SYMPTOMS FLARE UP  Back and neck pain may occasionally flare up.  If you experience a flare   up, remember your tools. Be encouraged, by remembering that flare-ups will   usually pass.   My Tools:    ~Use your "Go To" Stretches/Positions   ~Keep Moving-pain usually gets better if you move  ~Z lie (with or without ice)  10 min several times a day until symptoms reduce  ~Slowly resume normal activities   ~Practice Deep Breathing and Relaxation techniques                                                 MY EXERCISE PLAN  GO TO STRETCHES  2/day (like brushing your teeth) STRENGTHENING  2-3 times/week CARDIO PROGRAM  120 min/week   Side to side with legs x 10 Bridging with band 2 x 10 Walking or bike    Piriformis stretch w/strap 3 x 20 sec Leg lifts on stomach x 15    Knee to chest x 10, 5 seconds     Pelvic tilt x 20     Press ups 2 x 10     Back bends in standing x 10        "

## 2023-03-27 ENCOUNTER — LAB VISIT (OUTPATIENT)
Dept: LAB | Facility: HOSPITAL | Age: 48
End: 2023-03-27
Attending: FAMILY MEDICINE
Payer: MEDICAID

## 2023-03-27 DIAGNOSIS — E11.9 TYPE 2 DIABETES MELLITUS WITHOUT COMPLICATION, WITH LONG-TERM CURRENT USE OF INSULIN: ICD-10-CM

## 2023-03-27 DIAGNOSIS — Z11.3 SCREENING FOR STD (SEXUALLY TRANSMITTED DISEASE): ICD-10-CM

## 2023-03-27 DIAGNOSIS — Z79.4 TYPE 2 DIABETES MELLITUS WITHOUT COMPLICATION, WITH LONG-TERM CURRENT USE OF INSULIN: ICD-10-CM

## 2023-03-27 LAB
ALBUMIN SERPL BCP-MCNC: 4.1 G/DL (ref 3.5–5.2)
ALP SERPL-CCNC: 129 U/L (ref 38–126)
ALT SERPL W/O P-5'-P-CCNC: 40 U/L (ref 10–44)
ANION GAP SERPL CALC-SCNC: 8 MMOL/L (ref 8–16)
AST SERPL-CCNC: 33 U/L (ref 15–46)
BASOPHILS # BLD AUTO: 0.05 K/UL (ref 0–0.2)
BASOPHILS NFR BLD: 0.7 % (ref 0–1.9)
BILIRUB SERPL-MCNC: 0.4 MG/DL (ref 0.1–1)
CALCIUM SERPL-MCNC: 9 MG/DL (ref 8.7–10.5)
CHLORIDE SERPL-SCNC: 103 MMOL/L (ref 95–110)
CHOLEST SERPL-MCNC: 153 MG/DL (ref 120–199)
CHOLEST/HDLC SERPL: 4.9 {RATIO} (ref 2–5)
CO2 SERPL-SCNC: 29 MMOL/L (ref 23–29)
CREAT SERPL-MCNC: 0.77 MG/DL (ref 0.5–1.4)
DIFFERENTIAL METHOD: ABNORMAL
EOSINOPHIL # BLD AUTO: 0.2 K/UL (ref 0–0.5)
EOSINOPHIL NFR BLD: 3 % (ref 0–8)
ERYTHROCYTE [DISTWIDTH] IN BLOOD BY AUTOMATED COUNT: 14 % (ref 11.5–14.5)
EST. GFR  (NO RACE VARIABLE): >60 ML/MIN/1.73 M^2
GLUCOSE SERPL-MCNC: 239 MG/DL (ref 70–110)
HCT VFR BLD AUTO: 40.4 % (ref 40–54)
HDLC SERPL-MCNC: 31 MG/DL (ref 40–75)
HDLC SERPL: 20.3 % (ref 20–50)
HGB BLD-MCNC: 13.1 G/DL (ref 14–18)
HIV 1+2 AB+HIV1 P24 AG SERPL QL IA: NORMAL
HIV 1+2 AB+HIV1 P24 AG SERPL QL IA: NORMAL
IMM GRANULOCYTES # BLD AUTO: 0.04 K/UL (ref 0–0.04)
IMM GRANULOCYTES NFR BLD AUTO: 0.5 % (ref 0–0.5)
LDLC SERPL CALC-MCNC: 75 MG/DL (ref 63–159)
LYMPHOCYTES # BLD AUTO: 2 K/UL (ref 1–4.8)
LYMPHOCYTES NFR BLD: 27.7 % (ref 18–48)
MCH RBC QN AUTO: 28.7 PG (ref 27–31)
MCHC RBC AUTO-ENTMCNC: 32.4 G/DL (ref 32–36)
MCV RBC AUTO: 88 FL (ref 82–98)
MONOCYTES # BLD AUTO: 0.6 K/UL (ref 0.3–1)
MONOCYTES NFR BLD: 8.2 % (ref 4–15)
NEUTROPHILS # BLD AUTO: 4.4 K/UL (ref 1.8–7.7)
NEUTROPHILS NFR BLD: 59.9 % (ref 38–73)
NONHDLC SERPL-MCNC: 122 MG/DL
NRBC BLD-RTO: 0 /100 WBC
PLATELET # BLD AUTO: 254 K/UL (ref 150–450)
PMV BLD AUTO: 9.6 FL (ref 9.2–12.9)
POTASSIUM SERPL-SCNC: 4.3 MMOL/L (ref 3.5–5.1)
PROT SERPL-MCNC: 7.6 G/DL (ref 6–8.4)
RBC # BLD AUTO: 4.57 M/UL (ref 4.6–6.2)
SODIUM SERPL-SCNC: 140 MMOL/L (ref 136–145)
T4 FREE SERPL-MCNC: 0.79 NG/DL (ref 0.71–1.51)
TRIGL SERPL-MCNC: 235 MG/DL (ref 30–150)
TSH SERPL DL<=0.005 MIU/L-ACNC: 0.54 UIU/ML (ref 0.4–4)
UUN UR-MCNC: 12 MG/DL (ref 2–20)
WBC # BLD AUTO: 7.29 K/UL (ref 3.9–12.7)

## 2023-03-27 PROCEDURE — 36415 COLL VENOUS BLD VENIPUNCTURE: CPT | Mod: PO | Performed by: FAMILY MEDICINE

## 2023-03-27 PROCEDURE — 87389 HIV-1 AG W/HIV-1&-2 AB AG IA: CPT | Mod: PO | Performed by: FAMILY MEDICINE

## 2023-03-27 PROCEDURE — 80053 COMPREHEN METABOLIC PANEL: CPT | Mod: PO | Performed by: FAMILY MEDICINE

## 2023-03-27 PROCEDURE — 85025 COMPLETE CBC W/AUTO DIFF WBC: CPT | Mod: PO | Performed by: FAMILY MEDICINE

## 2023-03-27 PROCEDURE — 84439 ASSAY OF FREE THYROXINE: CPT | Performed by: FAMILY MEDICINE

## 2023-03-27 PROCEDURE — 80061 LIPID PANEL: CPT | Performed by: FAMILY MEDICINE

## 2023-03-27 PROCEDURE — 84443 ASSAY THYROID STIM HORMONE: CPT | Mod: PO | Performed by: FAMILY MEDICINE

## 2023-03-28 ENCOUNTER — OFFICE VISIT (OUTPATIENT)
Dept: PSYCHIATRY | Facility: CLINIC | Age: 48
End: 2023-03-28
Payer: MEDICAID

## 2023-03-28 ENCOUNTER — CLINICAL SUPPORT (OUTPATIENT)
Dept: REHABILITATION | Facility: HOSPITAL | Age: 48
End: 2023-03-28
Payer: MEDICAID

## 2023-03-28 VITALS
BODY MASS INDEX: 52.91 KG/M2 | SYSTOLIC BLOOD PRESSURE: 143 MMHG | WEIGHT: 315 LBS | HEART RATE: 97 BPM | DIASTOLIC BLOOD PRESSURE: 65 MMHG

## 2023-03-28 DIAGNOSIS — F99 INSOMNIA DUE TO OTHER MENTAL DISORDER: ICD-10-CM

## 2023-03-28 DIAGNOSIS — M51.36 DDD (DEGENERATIVE DISC DISEASE), LUMBAR: ICD-10-CM

## 2023-03-28 DIAGNOSIS — M54.41 CHRONIC BILATERAL LOW BACK PAIN WITH BILATERAL SCIATICA: ICD-10-CM

## 2023-03-28 DIAGNOSIS — M54.42 CHRONIC BILATERAL LOW BACK PAIN WITH BILATERAL SCIATICA: ICD-10-CM

## 2023-03-28 DIAGNOSIS — F51.05 INSOMNIA DUE TO OTHER MENTAL DISORDER: ICD-10-CM

## 2023-03-28 DIAGNOSIS — R29.898 DECREASED STRENGTH OF TRUNK AND BACK: Primary | ICD-10-CM

## 2023-03-28 DIAGNOSIS — G89.29 CHRONIC BILATERAL LOW BACK PAIN WITH BILATERAL SCIATICA: ICD-10-CM

## 2023-03-28 DIAGNOSIS — F33.1 MODERATE EPISODE OF RECURRENT MAJOR DEPRESSIVE DISORDER: Primary | ICD-10-CM

## 2023-03-28 DIAGNOSIS — F42.2 MIXED OBSESSIONAL THOUGHTS AND ACTS: ICD-10-CM

## 2023-03-28 DIAGNOSIS — M25.69 DECREASED ROM OF TRUNK AND BACK: ICD-10-CM

## 2023-03-28 DIAGNOSIS — F41.9 ANXIETY: ICD-10-CM

## 2023-03-28 PROCEDURE — 99214 OFFICE O/P EST MOD 30 MIN: CPT | Mod: S$PBB,,, | Performed by: NURSE PRACTITIONER

## 2023-03-28 PROCEDURE — 3046F PR MOST RECENT HEMOGLOBIN A1C LEVEL > 9.0%: ICD-10-PCS | Mod: CPTII,,, | Performed by: NURSE PRACTITIONER

## 2023-03-28 PROCEDURE — 3046F HEMOGLOBIN A1C LEVEL >9.0%: CPT | Mod: CPTII,,, | Performed by: NURSE PRACTITIONER

## 2023-03-28 PROCEDURE — 3077F SYST BP >= 140 MM HG: CPT | Mod: CPTII,,, | Performed by: NURSE PRACTITIONER

## 2023-03-28 PROCEDURE — 3008F BODY MASS INDEX DOCD: CPT | Mod: CPTII,,, | Performed by: NURSE PRACTITIONER

## 2023-03-28 PROCEDURE — 3061F PR NEG MICROALBUMINURIA RESULT DOCUMENTED/REVIEW: ICD-10-PCS | Mod: CPTII,,, | Performed by: NURSE PRACTITIONER

## 2023-03-28 PROCEDURE — 3072F PR LOW RISK FOR RETINOPATHY: ICD-10-PCS | Mod: CPTII,,, | Performed by: NURSE PRACTITIONER

## 2023-03-28 PROCEDURE — 3078F DIAST BP <80 MM HG: CPT | Mod: CPTII,,, | Performed by: NURSE PRACTITIONER

## 2023-03-28 PROCEDURE — 1160F RVW MEDS BY RX/DR IN RCRD: CPT | Mod: CPTII,,, | Performed by: NURSE PRACTITIONER

## 2023-03-28 PROCEDURE — 1160F PR REVIEW ALL MEDS BY PRESCRIBER/CLIN PHARMACIST DOCUMENTED: ICD-10-PCS | Mod: CPTII,,, | Performed by: NURSE PRACTITIONER

## 2023-03-28 PROCEDURE — 3061F NEG MICROALBUMINURIA REV: CPT | Mod: CPTII,,, | Performed by: NURSE PRACTITIONER

## 2023-03-28 PROCEDURE — 3066F PR DOCUMENTATION OF TREATMENT FOR NEPHROPATHY: ICD-10-PCS | Mod: CPTII,,, | Performed by: NURSE PRACTITIONER

## 2023-03-28 PROCEDURE — 97110 THERAPEUTIC EXERCISES: CPT

## 2023-03-28 PROCEDURE — 99214 PR OFFICE/OUTPT VISIT, EST, LEVL IV, 30-39 MIN: ICD-10-PCS | Mod: S$PBB,,, | Performed by: NURSE PRACTITIONER

## 2023-03-28 PROCEDURE — 3078F PR MOST RECENT DIASTOLIC BLOOD PRESSURE < 80 MM HG: ICD-10-PCS | Mod: CPTII,,, | Performed by: NURSE PRACTITIONER

## 2023-03-28 PROCEDURE — 3077F PR MOST RECENT SYSTOLIC BLOOD PRESSURE >= 140 MM HG: ICD-10-PCS | Mod: CPTII,,, | Performed by: NURSE PRACTITIONER

## 2023-03-28 PROCEDURE — 99999 PR PBB SHADOW E&M-EST. PATIENT-LVL II: CPT | Mod: PBBFAC,,, | Performed by: NURSE PRACTITIONER

## 2023-03-28 PROCEDURE — 3008F PR BODY MASS INDEX (BMI) DOCUMENTED: ICD-10-PCS | Mod: CPTII,,, | Performed by: NURSE PRACTITIONER

## 2023-03-28 PROCEDURE — 99212 OFFICE O/P EST SF 10 MIN: CPT | Mod: PBBFAC | Performed by: NURSE PRACTITIONER

## 2023-03-28 PROCEDURE — 1159F MED LIST DOCD IN RCRD: CPT | Mod: CPTII,,, | Performed by: NURSE PRACTITIONER

## 2023-03-28 PROCEDURE — 1159F PR MEDICATION LIST DOCUMENTED IN MEDICAL RECORD: ICD-10-PCS | Mod: CPTII,,, | Performed by: NURSE PRACTITIONER

## 2023-03-28 PROCEDURE — 3072F LOW RISK FOR RETINOPATHY: CPT | Mod: CPTII,,, | Performed by: NURSE PRACTITIONER

## 2023-03-28 PROCEDURE — 3066F NEPHROPATHY DOC TX: CPT | Mod: CPTII,,, | Performed by: NURSE PRACTITIONER

## 2023-03-28 PROCEDURE — 99999 PR PBB SHADOW E&M-EST. PATIENT-LVL II: ICD-10-PCS | Mod: PBBFAC,,, | Performed by: NURSE PRACTITIONER

## 2023-03-28 RX ORDER — BUSPIRONE HYDROCHLORIDE 30 MG/1
30 TABLET ORAL 2 TIMES DAILY
Qty: 180 TABLET | Refills: 0 | Status: SHIPPED | OUTPATIENT
Start: 2023-03-28 | End: 2023-04-26 | Stop reason: SDUPTHER

## 2023-03-28 RX ORDER — DULOXETIN HYDROCHLORIDE 60 MG/1
60 CAPSULE, DELAYED RELEASE ORAL 2 TIMES DAILY
Qty: 180 CAPSULE | Refills: 0 | Status: SHIPPED | OUTPATIENT
Start: 2023-03-28 | End: 2023-04-26 | Stop reason: SDUPTHER

## 2023-03-28 RX ORDER — ARIPIPRAZOLE 5 MG/1
5 TABLET ORAL DAILY
Qty: 30 TABLET | Refills: 2 | Status: SHIPPED | OUTPATIENT
Start: 2023-03-28 | End: 2023-04-26 | Stop reason: SDUPTHER

## 2023-03-28 RX ORDER — PROPRANOLOL HYDROCHLORIDE 20 MG/1
TABLET ORAL
Qty: 90 TABLET | Refills: 0 | Status: SHIPPED | OUTPATIENT
Start: 2023-03-28 | End: 2023-04-26 | Stop reason: SDUPTHER

## 2023-03-28 NOTE — PROGRESS NOTES
"Outpatient Psychiatry Follow-Up Visit (MD/NP)    3/28/2023 8:18 AM  Kirill Gandhi III  1975  [id number]    Clinical Status of Patient:  Outpatient (Ambulatory)    Chief Complaint:  Kirill Gandhi III, a 47 y.o. male,who presents today for follow up of depression and anxiety.  Met with patient.        Interval History/Subjective Report/Content of Current Session:     Today the pt reports that he has not noticed any improvement in his mood with the addition of Abilify and has not noticed any improvement in anxiety with the addition of propranolol. States he has "good and bad days about 50/50". Endorses depressed mood. Not sleeping well. Has trouble with sleep initiation and maintenance. Endorses obsessional thoughts. Anxiety is heightened.   Still having a lot of back pain. In PT. Dicussed [facility] for pain. He is interested in looking into this after he completes PT in a couple of weeks.    ASEs from psych meds: denies    Pt denies recurrent thoughts of death and denies SI/HI. Denies any sxs of mihaela. Denies AVH, paranoia and delusions. No objective s/sx of psychosis or mihaela.         Psychotherapy:  Target symptoms: depression, anxiety   Why chosen therapy is appropriate versus another modality: relevant to diagnosis, patient responds to this modality  Outcome monitoring methods: self-report, observation  Therapeutic intervention type: supportive psychotherapy  Topics discussed/themes: stress related to medical comorbidities, building skills sets for symptom management  The patient's response to the intervention is accepting. The patient's progress toward treatment goals is good, fair.   Duration of intervention: 7 minutes.      Psychotropic medication review  Previous Trials-  Wellbutrin- ineffective on its own  Prozac- effective  Luvox- partial response  Risperidone - ineffective  Buspar - effective  Celexa- ineffective  Zoloft  Lexapro      Current " meds-  Cymbalta  Gabapentin  Vistaril  Buspar    History:       Past Medical, Psychiatric, Family and Social History: The patient's past medical, psychiatric, family and social history, allergies, current medications, past surgical history, and problem list have been reviewed and updated as appropriate within the electronic medical record.         Review of Systems       Review of Systems   Constitutional:  Negative for chills, fever and malaise/fatigue.   Respiratory:  Negative for cough and shortness of breath.    Cardiovascular:  Negative for chest pain and palpitations.   Gastrointestinal:  Negative for abdominal pain, diarrhea and vomiting.   Genitourinary:  Negative for dysuria and hematuria.   Musculoskeletal:  Negative for falls and myalgias.   Skin:  Negative for rash.   Neurological:  Negative for tremors, seizures and headaches.   Psychiatric/Behavioral:          See HPI       Compliance: yes        Risk Parameters:  Patient reports no suicidal ideation  Patient reports no homicidal ideation  Patient reports no self-injurious behavior  Patient reports no violent behavior    Exam (detailed: at least 9 elements; comprehensive: all 15 elements)     Constitutional  Vitals:  Most recent vital signs, dated less than 90 days prior to this appointment, were reviewed.   Vitals:    03/28/23 0917   BP: (!) 143/65   Pulse: 97   Weight: (!) 176.9 kg (390 lb 1.7 oz)              Musculoskeletal  Muscle Strength/Tone:  no dyskinesia, no dystonia, no tremor, no tic   Gait & Station:  non-ataxic     Psychiatric      Appearance:  unremarkable, age appropriate, well nourished, bearded, casually dressed, neatly groomed, obese   Behavior:  normal, friendly and cooperative, eye contact normal     Speech:  no latency; no press   Mood & Affect:  euthymic  congruent and appropriate   Thought Process:  normal and logical   Associations:  intact   Thought Content:  normal, no suicidality, no homicidality, delusions, or paranoia    Insight:  intact, has awareness of illness   Judgement: behavior is adequate to circumstances, age appropriate   Orientation:  grossly intact   Memory: intact for content of interview   Language: grossly intact   Attention Span & Concentration:  able to focus   Fund of Knowledge:  intact and appropriate to age and level of education       Medications:  Outpatient Encounter Medications as of 3/28/2023   Medication Sig Dispense Refill    acetaminophen (TYLENOL) 500 MG tablet Take 1,000 mg by mouth 2 (two) times daily as needed for Pain.      ARIPiprazole (ABILIFY) 2 MG Tab Take 1 tablet (2 mg total) by mouth once daily. 30 tablet 0    ascorbic acid, vitamin C, (VITAMIN C) 1000 MG tablet Take 1,000 mg by mouth once daily.      aspirin (ECOTRIN) 81 MG EC tablet Take 81 mg by mouth every evening.      aspirin/sod bicarb/citric acid (GIOVANY-SELTZER ORAL) Take 2 tablets by mouth daily as needed (Upset stomach).      atorvastatin (LIPITOR) 10 MG tablet Take 1 tablet (10 mg total) by mouth every other day. 45 tablet 3    blood-glucose transmitter (DEXCOM G6 TRANSMITTER) [place] Use to check glucose - 90 day supply 1 each 11    busPIRone (BUSPAR) 15 MG tablet Take 1.5 tablets (22.5 mg total) by mouth 2 (two) times daily. 270 tablet 0    celecoxib (CELEBREX) 200 MG capsule TAKE 1 CAPSULE (200 MG TOTAL) BY MOUTH 2 (TWO) TIMES DAILY. 60 capsule 1    dicyclomine (BENTYL) 20 mg tablet Take 1 tablet (20 mg total) by mouth 3 (three) times daily as needed (abdominal pain). 60 tablet 2    DULoxetine (CYMBALTA) 60 MG capsule Take 1 capsule (60 mg total) by mouth 2 (two) times daily. 180 capsule 3    flash glucose scanning reader Misc Disp one reader to monitor glucose- covered by insurance dexcom 1 each 1    flash glucose sensor Kit Dispense sensor to read glucose  dexcom 12 kit 11    fluticasone propionate (FLONASE) 50 mcg/actuation nasal spray 2 sprays (100 mcg total) by Each Nostril route once daily. 48 mL 2    gabapentin (NEURONTIN)  "800 MG tablet Take 1 tablet (800 mg total) by mouth 3 (three) times daily. 270 tablet 3    hydrOXYzine HCL (ATARAX) 25 MG tablet Take 2 tablets (50 mg total) by mouth nightly as needed (insomnia). 180 tablet 0    insulin aspart U-100 (NOVOLOG) 100 unit/mL (3 mL) InPn pen Inject Between 50 to 70 units 3 times a day with meals. (Patient taking differently: Inject Between 50 to 55 units 3 times a day with meals.) 45 mL 6    insulin detemir U-100 (LEVEMIR FLEXTOUCH U-100 INSULN) 100 unit/mL (3 mL) InPn pen Inject 80 units into the skin daily, increase by 5 every 3 days if needed up to 100 units each day 90 mL 1    Lactobac no.41/Bifidobact no.7 (PROBIOTIC-10 ORAL) Take by mouth.      lanolin/mineral oil/petrolatum (ARTIFICIAL TEARS OPHT) Place 2 drops into both eyes daily as needed (Dry eye).      methylcellulose, laxative, (CITRUCEL) 500 mg Tab Take 1 tablet by mouth nightly as needed (Constipation).      metoclopramide HCl (REGLAN) 5 mg/5 mL Soln Take 5 mLs (5 mg total) by mouth 4 (four) times daily before meals and nightly. 500 mL 2    multivitamin capsule Take 1 capsule by mouth once daily.      NON FORMULARY MEDICATION Uses Cpap Machine nightly on a setting of 10      ondansetron (ZOFRAN-ODT) 4 MG TbDL Take 1 tablet (4 mg total) by mouth every 8 (eight) hours as needed (nausea). 30 tablet 5    ONETOUCH DELICA PLUS LANCET 33 gauge Misc Apply topically 3 (three) times daily.      ONETOUCH VERIO TEST STRIPS Strp 1 strip 3 (three) times daily.      pantoprazole (PROTONIX) 40 MG tablet Take 1 tablet (40 mg total) by mouth once daily. 90 tablet 3    pen needle, diabetic (BD ULTRA-FINE SHORT PEN NEEDLE) 31 gauge x 5/16" Ndle USE WITH FLEX  each 11    pen needle, diabetic 31 gauge x 1/4" Ndle Use with flex pen 100 each 11    propranoloL (INDERAL) 10 MG tablet Take 1 tablet po TID prn anxiety 90 tablet 0    tirzepatide 10 mg/0.5 mL PnIj Inject 10 mg into the skin every 7 days. 4 pen 5    traMADoL 100 mg Tab Take 100 " mg by mouth every 8 (eight) hours as needed (severe pain). 90 tablet 3    valsartan-hydrochlorothiazide (DIOVAN-HCT) 320-12.5 mg per tablet Take 1 tablet by mouth once daily. 90 tablet 3    vitamin D 1000 units Tab Take 5,000 Units by mouth 2 (two) times a day.      [DISCONTINUED] celecoxib (CELEBREX) 200 MG capsule Take 1 capsule (200 mg total) by mouth 2 (two) times daily. 60 capsule 1    [DISCONTINUED] dulaglutide (TRULICITY) 4.5 mg/0.5 mL pen injector Inject 4.5 mg into the skin every 7 days. 12 pen 1    [DISCONTINUED] fluvoxaMINE (LUVOX) 100 MG tablet Take 1.5 tablets (150 mg total) by mouth 2 (two) times daily. 270 tablet 0    [DISCONTINUED] tirzepatide 10 mg/0.5 mL PnIj Inject 10 mg into the skin every 7 days. 4 pen 5     No facility-administered encounter medications on file as of 3/28/2023.       Allergy:  Review of patient's allergies indicates:   Allergen Reactions    Dilaudid [hydromorphone] Other (See Comments)     IV dilaudid severely drops BP         Assessment and Diagnosis   Status/Progress: Based on the examination today, the patient's problem(s) is/are inadequately controlled.  New problems have not been presented today.   Co-morbidities are complicating management of the primary condition.  There are no active rule-out diagnoses for this patient at this time.         General Impression:       ICD-10-CM ICD-9-CM   1. Moderate episode of recurrent major depressive disorder  F33.1 296.32   2. Mixed obsessional thoughts and acts  F42.2 300.3   3. Anxiety  F41.9 300.00   4. Insomnia due to other mental disorder  F51.05 300.9    F99 327.02       Intervention/Counseling/Treatment Plan     Medication Management:  Increase Buspar to 30 mg BID  Continue Cymbalta 60 mg BID  Continue gabapentin 800 mg TID - managed by pain management  Continue hydroxyzine 25-50 mg q hs prn insomnia. Pt was told not drive or to take this med with ETOH or other sedating meds/substance. Pt verbalized understanding.  Increase  Abilify to 5 mg q day  Increase propranolol to 20 mg TID prn anxiety  Labs: reviewed most recent  The treatment plan and follow up plan were reviewed with the patient.  Discussed with patient informed consent, risks vs. benefits, alternative treatments, side effect profile and the inherent unpredictability of individual responses to these treatments. The patient expresses understanding of the above and displays the capacity to agree with this current plan and had no other questions.  Encouraged Patient to keep future appointments.   Take medications as prescribed and abstain from substance abuse.   Pt was told to present to ED or call 911 for SI/HI plan or intent, psychosis, or other psychiatric or medical emergency, and pt agrees to this and verbalized understanding.          Return to Clinic: 1 month        Face to Face time with patient: 31 minutes  Total time: 36 minutes of total time spent on the encounter, which includes face to face time and non-face to face time preparing to see the patient (eg, review of tests), Obtaining and/or reviewing separately obtained history, Documenting clinical information in the electronic or other health record, Independently interpreting results (not separately reported) and communicating results to the patient/family/caregiver, or Care coordination (not separately reported).       Verónica Briscoe, MSN, APRN, PMHNP-BC [facility]

## 2023-03-28 NOTE — PROGRESS NOTES
[facility]      [facility]  [facility] [id number]    Therapy Diagnosis:   Encounter Diagnoses   Name Primary?    Decreased strength of trunk and back Yes    Chronic bilateral low back pain with bilateral sciatica     DDD (degenerative disc disease), lumbar     Decreased ROM of trunk and back      Physician: Jose L Brown MD    Visit Date: 3/28/2023    Physician Orders: PT Eval and Treat  Medical Diagnosis from Referral: Z98.1 (ICD-10-CM) - S/P lumbar fusion  Evaluation Date: 2023  Authorization Period Expiration: 11/15/2023  Plan of Care Expiration: 2023  Reassessment Due: 2023  Visit # / Visits authorized:  (No lap belt)     Time In: 10:15 AM (late arrival)  Time Out: 11:10 AM  Total Billable Time: 50 minutes  Insurance:Fee for service Insurance Patient      Precautions: Standard and Diabetes and Fall      Pattern of pain determined: 1 PEN    Subjective   Kirill reports his back is feeling better today, minimal pain currently. He just came from an appointment at [facility] which required a lot of walking and he tolerated well.    Patient reports tolerating previous visit : minimal soreness  Patient reports their pain to be 2/10 on a 0-10 scale with 0 being no pain and 10 being the worst pain imaginable.  Pain Location: bilateral back      Work and leisure: Unemployed/ Watch movies, play videos      Pt goals: Increase flexibility in other areas, be able to go for walks with minimal pain    Objective     Baseline Isometric Testing on Med X equipment: Testing administered by PT  Date of testin2023  ROM 0-24 deg   Max Peak Torque 72    Min Peak Torque 13    Flex/Ext Ratio 5.54   % below normative data 79%     Date of testin2023  ROM 0-33 deg   Max Peak Torque 133   Min Peak Torque 58   Flex/Ext Ratio 2.3:1   % below normative data 60%     Outcomes:  Initial score: 71% limitation  Visit 6 score: 71%  Visit 10 score: 63%  "limitation  Goal:      Treatment    Pt was instructed in and performed the following:     Kirill received therapeutic exercises to develop/improved posture, cardiovascular endurance, muscular endurance, lumbar/cervical ROM, strength and muscular endurance for 50 minutes including the following exercises:     TM warm-up x 5 minutes @ 2.0 mph  NP today    LTR x 10  DKTC w/T-ball x 20  Modified piriformis stretch w/towel assist 3 x 20" each side  PPT 10x5"  Bridging with BTB 2 x 10  Prone press ups 2x10  Prone hip extension alternating 2x10  EIS x10  Lateral step out ->Paloff press on Cable Cross with 10# x15 each    [facility] 3/28/2023   Visit Number 18   VAS Pain Rating 2   Treadmill Time (in min.) -   Time -   Lumbar Stretches - Slouch Overcorrection -   Extension in Lying 20   Extension in Standing 10   Flexion in Lying 20   Manual Therapy -   Lumbar Extension Seat Pad -   Femur Restraint -   Top Dead Center -   Counterweight -   Lumbar Flexion -   Lumbar Extension -   Lumbar Peak Torque -   Min Torque -   Test Percent Below Normative Data -   Lumbar Weight 80   Repetitions 20   Rating of Perceived Exertion 4   Ice - Z Lie (in min.) 5            NP  Prone on elbows 1 minute  STS + airex x10  PPT w/ march x10  PPT w/BKFO +BTB x 10  Side steps with BTB 2x10 ea ---NP  Paloff Press on Cable cross with 10# x 20 each-NP  SOC x 20    Peripheral muscle strengthening which included 1 set of 15-20 repetitions at a slow, controlled 10-13 second per rep pace focused on strengthening supporting musculature for improved body mechanics and functional mobility.  Pt and therapist focused on proper form during treatment to ensure optimal strengthening of each targeted muscle group.  Machines were utilized including torso rotation, leg extension, leg curl, chest press, upright row. Tricep extension, bicep curl, leg press, and hip abduction added visit 3    Kirill received the following manual therapy techniques:  not " "performed     Home Exercises Provided and Patient Education Provided   Home exercises include:Modified piriformis stretch, EIL, LTR, SOC, Bridging, EIS  Cardio program:visit 5  Lifting education date:visit 11, 3/2/23 copy of do's and don'ts of lifting handout provided  Posture/Lumbar roll: uses pillow at home  Fridge [employer] Discharge handout (date given): 3/23/23    Education provided:   - cues w/ex's  - Educated on [employer] "Go-To ex's" 3/23/23 with handout provided    Written Home Exercises Provided: Patient instructed to cont prior HEP.  Exercises were reviewed and Kirill was able to demonstrate them prior to the end of the session.  Kirill demonstrated good  understanding of the education provided.     See EMR under Patient Instructions for exercises provided prior visit and today's visit    Assessment   Kirill returns with minimal low back pain rated 2/10 today. Treatment continued with lumbopelvic mobility, core stability, and strengthening exercises. He performed mat exercises without c/o increased symptoms. Reintroduced lateral step out + paloff press at cable machine and he tolerated well. Lumbar MedX resistance was maintained at 80 ft/lbs with pt progressing to 20 reps with RPE rated 4/10. Resistance on majority of peripheral machines progressed and pt completed full circuit with appropriate muscular fatigue noted. Will look to increase MedX weight by 5% next visit.     Patient is making progress towards established goals.  Pt will continue to benefit from skilled outpatient physical therapy to address the deficits stated in the impairment chart, provide pt/family education and to maximize pt's level of independence in the home and community environment.     Anticipated Barriers for therapy: chronicity of symptoms, high BMI, lumbar surgeries, anxiety, diabetes    Pt's spiritual, cultural and educational needs considered and pt agreeable to plan of care and goals as stated below:     GOALS: Pt is in " agreement with the following goals.     Short term goals:  6 weeks or 10 visits   1.  Pt will demonstrate increased lumbar ROM by at least 5 degrees from the initial ROM value with improvements noted in functional ROM and ability to perform ADLs.  Goal met 2/28/2023  2.  Pt will demonstrate increased MedX average isometric strength value  by 10% from initial test resulting in improved ability to perform bending, lifting, and carrying activities safely, confidently.  Goal met 2/28/2023  3.  Patient report a reduction in worst pain score by 1-2 points for improved tolerance for household ADLs.  Goal met 2/28/2023  4.  Pt able to perform HEP correctly with minimal cueing or supervision from therapist to encourage independent management of symptoms. Goal met 2/28/2023     Long term goals: 10 weeks or 20 visits   1. Pt will demonstrate increased lumbar ROM by at least 10 degrees from initial ROM value, resulting in improved ability to perform functional fwd bending while standing and sitting. (approp and ongoing)  2. Pt will demonstrate increased MedX average isometric strength value  by 30% from initial test resulting in improved ability to perform bending, lifting, and carrying activities safely, confidently.  Goal met 2/28/2023  3. Pt to demonstrate ability to independently control and reduce their pain through posture positioning and mechanical movements throughout a typical day.  (approp and ongoing)  4.  Pt will demonstrate reduced pain and improved functional outcomes as reported on the FOTO by reaching a limitation score of < or = 50% or less in order to demonstrate subjective improvement in pt's condition.    (approp and ongoing)  5. Pt will demonstrate independence with the HEP at discharge  (approp and ongoing)  6.  Pt to be able to walk a block around his neighborhood with max pain 3/10 in order to demonstrate community ambulation for weight loss goals.  (approp and ongoing)    Plan   Continue with  established Plan of Care towards established PT goals.     Therapist: Arcelia Mcclelland, PT  3/28/2023

## 2023-03-30 ENCOUNTER — CLINICAL SUPPORT (OUTPATIENT)
Dept: REHABILITATION | Facility: HOSPITAL | Age: 48
End: 2023-03-30
Payer: MEDICAID

## 2023-03-30 DIAGNOSIS — M25.69 DECREASED ROM OF TRUNK AND BACK: ICD-10-CM

## 2023-03-30 DIAGNOSIS — M54.41 CHRONIC BILATERAL LOW BACK PAIN WITH BILATERAL SCIATICA: ICD-10-CM

## 2023-03-30 DIAGNOSIS — R29.898 DECREASED STRENGTH OF TRUNK AND BACK: Primary | ICD-10-CM

## 2023-03-30 DIAGNOSIS — G89.29 CHRONIC BILATERAL LOW BACK PAIN WITH BILATERAL SCIATICA: ICD-10-CM

## 2023-03-30 DIAGNOSIS — M51.36 DDD (DEGENERATIVE DISC DISEASE), LUMBAR: ICD-10-CM

## 2023-03-30 DIAGNOSIS — M54.42 CHRONIC BILATERAL LOW BACK PAIN WITH BILATERAL SCIATICA: ICD-10-CM

## 2023-03-30 PROCEDURE — 97110 THERAPEUTIC EXERCISES: CPT

## 2023-03-30 NOTE — PROGRESS NOTES
[facility]      [facility]  [facility] Number[facility] [id number]    Therapy Diagnosis:   Encounter Diagnoses   Name Primary?    Decreased strength of trunk and back Yes    Chronic bilateral low back pain with bilateral sciatica     DDD (degenerative disc disease), lumbar     Decreased ROM of trunk and back      Physician: Jose L Brown MD    Visit Date: 3/30/2023    Physician Orders: PT Eval and Treat  Medical Diagnosis from Referral: Z98.1 (ICD-10-CM) - S/P lumbar fusion  Evaluation Date: 2023  Authorization Period Expiration: 11/15/2023  Plan of Care Expiration: 2023  Reassessment Due: 2023  Visit # / Visits authorized:  (No lap belt)     Time In: 9:05 AM  Time Out: 10:05 AM  Total Billable Time: 55 minutes  Insurance:Fee for service Insurance Patient      Precautions: Standard and Diabetes and Fall      Pattern of pain determined: 1 PEN    Subjective   Kirill arrives with reports of minimal back pain currently.     Patient reports tolerating previous visit : minimal soreness  Patient reports their pain to be 2/10 on a 0-10 scale with 0 being no pain and 10 being the worst pain imaginable.  Pain Location: bilateral back      Work and leisure: Unemployed/ Watch movies, play videos      Pt goals: Increase flexibility in other areas, be able to go for walks with minimal pain    Objective     Baseline Isometric Testing on Med X equipment: Testing administered by PT  Date of testin2023  ROM 0-24 deg   Max Peak Torque 72    Min Peak Torque 13    Flex/Ext Ratio 5.54   % below normative data 79%     Date of testin2023  ROM 0-33 deg   Max Peak Torque 133   Min Peak Torque 58   Flex/Ext Ratio 2.3:1   % below normative data 60%     Outcomes:  Initial score: 71% limitation  Visit 6 score: 71%  Visit 10 score: 63% limitation  Goal:      Treatment    Pt was instructed in and performed the following:     Kirill received therapeutic exercises to  "develop/improved posture, cardiovascular endurance, muscular endurance, lumbar/cervical ROM, strength and muscular endurance for 55 minutes including the following exercises:     TM warm-up x 5 minutes @ 2.0 mph      LTR x 10  DKTC w/T-ball x 20  Modified piriformis stretch w/towel assist 3 x 20" each side  PPT 10x5"  Bridging with BTB 2 x 10  Prone press ups 2x10  Prone hip extension alternating 2x10  EIS x10  Lateral step out ->Paloff press on Cable Cross with 10# 2x10 each    [facility] 3/30/2023   Visit Number 19   VAS Pain Rating 2   Treadmill Time (in min.) 5   Time -   Lumbar Stretches - Slouch Overcorrection -   Extension in Lying 20   Extension in Standing 10   Flexion in Lying 20   Manual Therapy -   Lumbar Extension Seat Pad -   Femur Restraint -   Top Dead Center -   Counterweight -   Lumbar Flexion -   Lumbar Extension -   Lumbar Peak Torque -   Min Torque -   Test Percent Below Normative Data -   Lumbar Weight 84   Repetitions 20   Rating of Perceived Exertion 4   Ice - Z Lie (in min.) 5        NP  Prone on elbows 1 minute  STS + airex x10  PPT w/ march x10  PPT w/BKFO +BTB x 10  Side steps with BTB 2x10 ea ---NP  Paloff Press on Cable cross with 10# x 20 each-NP  SOC x 20    Peripheral muscle strengthening which included 1 set of 15-20 repetitions at a slow, controlled 10-13 second per rep pace focused on strengthening supporting musculature for improved body mechanics and functional mobility.  Pt and therapist focused on proper form during treatment to ensure optimal strengthening of each targeted muscle group.  Machines were utilized including torso rotation, leg extension, leg curl, chest press, upright row. Tricep extension, bicep curl, leg press, and hip abduction added visit 3    Kirill received the following manual therapy techniques:  not performed     Home Exercises Provided and Patient Education Provided   Home exercises include:Modified piriformis stretch, EIL, LTR, SOC, Bridging, " "EIS  Cardio program:visit 5  Lifting education date:visit 11, 3/2/23 copy of do's and don'ts of lifting handout provided  Posture/Lumbar roll: uses pillow at home  [employer] Discharge handout (date given): 3/23/23    Education provided:   - cues w/ex's  - Educated on [employer] "Go-To ex's" 3/23/23 with handout provided    Written Home Exercises Provided: Patient instructed to cont prior HEP.  Exercises were reviewed and Kirill was able to demonstrate them prior to the end of the session.  Kirill demonstrated good  understanding of the education provided.     See EMR under Patient Instructions for exercises provided prior visit and today's visit    Assessment   Kirill presents for 19th healthy back visit with minimal low back pain rated 2/10 today. Discussed discharge planning and pt plans to get a gym membership at [employer] to continue with strengthening after discharge. Treatment continued with lumbopelvic mobility, core stability, and strengthening exercises. He performed mat exercises without c/o increased symptoms.  Lumbar MedX resistance was progressed to 84 ft/lbs with pt completing 20 reps with RPE rated 4/10. He completed full peripheral machine circuit with appropriate muscular fatigue noted. Will perform Lumbar MedX retest next visit as pt will be discharging from [facility] program at 20th visit.     Patient is making progress towards established goals.  Pt will continue to benefit from skilled outpatient physical therapy to address the deficits stated in the impairment chart, provide pt/family education and to maximize pt's level of independence in the home and community environment.     Anticipated Barriers for therapy: chronicity of symptoms, high BMI, lumbar surgeries, anxiety, diabetes    Pt's spiritual, cultural and educational needs considered and pt agreeable to plan of care and goals as stated below:     GOALS: Pt is in agreement with the following goals.     Short term goals:  6 weeks or 10 " visits   1.  Pt will demonstrate increased lumbar ROM by at least 5 degrees from the initial ROM value with improvements noted in functional ROM and ability to perform ADLs.  Goal met 2/28/2023  2.  Pt will demonstrate increased MedX average isometric strength value  by 10% from initial test resulting in improved ability to perform bending, lifting, and carrying activities safely, confidently.  Goal met 2/28/2023  3.  Patient report a reduction in worst pain score by 1-2 points for improved tolerance for household ADLs.  Goal met 2/28/2023  4.  Pt able to perform HEP correctly with minimal cueing or supervision from therapist to encourage independent management of symptoms. Goal met 2/28/2023     Long term goals: 10 weeks or 20 visits   1. Pt will demonstrate increased lumbar ROM by at least 10 degrees from initial ROM value, resulting in improved ability to perform functional fwd bending while standing and sitting. (approp and ongoing)  2. Pt will demonstrate increased MedX average isometric strength value  by 30% from initial test resulting in improved ability to perform bending, lifting, and carrying activities safely, confidently.  Goal met 2/28/2023  3. Pt to demonstrate ability to independently control and reduce their pain through posture positioning and mechanical movements throughout a typical day.  (approp and ongoing)  4.  Pt will demonstrate reduced pain and improved functional outcomes as reported on the FOTO by reaching a limitation score of < or = 50% or less in order to demonstrate subjective improvement in pt's condition.    (approp and ongoing)  5. Pt will demonstrate independence with the HEP at discharge  (approp and ongoing)  6.  Pt to be able to walk a block around his neighborhood with max pain 3/10 in order to demonstrate community ambulation for weight loss goals.  (approp and ongoing)    Plan   Continue with established Plan of Care towards established PT goals.     Therapist: Arcelia  Stas, PT  3/30/2023

## 2023-04-04 ENCOUNTER — CLINICAL SUPPORT (OUTPATIENT)
Dept: REHABILITATION | Facility: HOSPITAL | Age: 48
End: 2023-04-04
Payer: MEDICAID

## 2023-04-04 DIAGNOSIS — M25.69 DECREASED ROM OF TRUNK AND BACK: ICD-10-CM

## 2023-04-04 DIAGNOSIS — R29.898 DECREASED STRENGTH OF TRUNK AND BACK: Primary | ICD-10-CM

## 2023-04-04 DIAGNOSIS — G89.29 CHRONIC BILATERAL LOW BACK PAIN WITH BILATERAL SCIATICA: ICD-10-CM

## 2023-04-04 DIAGNOSIS — M54.42 CHRONIC BILATERAL LOW BACK PAIN WITH BILATERAL SCIATICA: ICD-10-CM

## 2023-04-04 DIAGNOSIS — M51.36 DDD (DEGENERATIVE DISC DISEASE), LUMBAR: ICD-10-CM

## 2023-04-04 DIAGNOSIS — M54.41 CHRONIC BILATERAL LOW BACK PAIN WITH BILATERAL SCIATICA: ICD-10-CM

## 2023-04-04 PROCEDURE — 97110 THERAPEUTIC EXERCISES: CPT

## 2023-04-04 NOTE — PROGRESS NOTES
[facility]      [facility]  [facility] [id number]    Therapy Diagnosis:   Encounter Diagnoses   Name Primary?    Decreased strength of trunk and back Yes    Chronic bilateral low back pain with bilateral sciatica     DDD (degenerative disc disease), lumbar     Decreased ROM of trunk and back      Physician: Jose L Brown MD    Visit Date: 2023    Physician Orders: PT Eval and Treat  Medical Diagnosis from Referral: Z98.1 (ICD-10-CM) - S/P lumbar fusion  Evaluation Date: 2023  Authorization Period Expiration: 11/15/2023  Plan of Care Expiration: 2023  Reassessment Due: 2023  Visit # / Visits authorized:  (No lap belt)     Time In: 9:05 AM  Time Out: 10:05 AM  Total Billable Time: 55 minutes  Insurance:Fee for service Insurance Patient      Precautions: Standard and Diabetes and Fall      Pattern of pain determined: 1 PEN    Subjective   Kirill reports feeling increased back pain after showering this morning. He states he still has difficulty bending forward at times due to pain. He reports overall the pain has improved since starting therapy, but the pain varies depending on the day and some days are better than others.     Patient reports tolerating previous visit : minimal soreness  Patient reports their pain to be 8/10 currently, 4/10 prior to shower on a 0-10 scale with 0 being no pain and 10 being the worst pain imaginable.  Pain Location: bilateral back      Work and leisure: Unemployed/ Watch movies, play videos      Pt goals: Increase flexibility in other areas, be able to go for walks with minimal pain    Objective     Baseline Isometric Testing on Med X equipment: Testing administered by PT  Date of testin2023  ROM 0-24 deg   Max Peak Torque 72    Min Peak Torque 13    Flex/Ext Ratio 5.54   % below normative data 79%     Date of testin2023  ROM 0-33 deg   Max Peak Torque 133   Min Peak Torque 58   Flex/Ext  "Ratio 2.3:1   % below normative data 60%       Date of testin2023  ROM 0-36 deg   Max Peak Torque 139   Min Peak Torque 85   Flex/Ext Ratio 1.6:1   % below normative data 46%     Outcomes:  Initial score: 71% limitation  Visit 6 score: 71%  Visit 10 score: 63% limitation  Discharge score: 65% limitation       Treatment    Pt was instructed in and performed the following:     Kirill received therapeutic exercises to develop/improved posture, cardiovascular endurance, muscular endurance, lumbar/cervical ROM, strength and muscular endurance for 55 minutes including the following exercises:     TM warm-up x 5 minutes @ 2.0 mph      LTR x 10  DKTC w/T-ball x 20  Modified piriformis stretch w/towel assist 3 x 20" each side  PPT 10x5"  Bridging with BTB 2 x 10  Prone press ups 2x10  Prone hip extension alternating 2x10  EIS x10  Lateral step out ->Paloff press on Cable Cross with 10# 2x10 each    [facility] 2023   Visit Number 20   VAS Pain Rating 8   Treadmill Time (in min.) 5   Time -   Lumbar Stretches - Slouch Overcorrection -   Extension in Lying 20   Extension in Standing 10   Flexion in Lying 20   Manual Therapy -   Lumbar Extension Seat Pad -   Femur Restraint -   Top Dead Center -   Counterweight -   Lumbar Flexion 36   Lumbar Extension 0   Lumbar Peak Torque 139   Min Torque 85   Test Percent Below Normative Data 46   Lumbar Weight -   Repetitions -   Rating of Perceived Exertion -   Ice - Z Lie (in min.) 5        Peripheral muscle strengthening which included 1 set of 15-20 repetitions at a slow, controlled 10-13 second per rep pace focused on strengthening supporting musculature for improved body mechanics and functional mobility.  Pt and therapist focused on proper form during treatment to ensure optimal strengthening of each targeted muscle group.  Machines were utilized including torso rotation, leg extension, leg curl, chest press, upright row. Tricep extension, bicep curl, leg press, and " "hip abduction added visit 3    Kirill received the following manual therapy techniques:  not performed     Home Exercises Provided and Patient Education Provided   Home exercises include:Modified piriformis stretch, EIL, LTR, SOC, Bridging, EIS  Cardio program:visit 5  Lifting education date:visit 11, 3/2/23 copy of do's and don'ts of lifting handout provided  Posture/Lumbar roll: uses pillow at home  [employer] Discharge handout (date given): 3/23/23    Education provided:   - cues w/ex's  - Educated on [employer] "Go-To ex's" 3/23/23 with handout provided    Written Home Exercises Provided: Patient instructed to cont prior HEP.  Exercises were reviewed and Kirill was able to demonstrate them prior to the end of the session.  Kirill demonstrated good  understanding of the education provided.     See EMR under Patient Instructions for exercises provided prior visit and today's visit    Assessment   Patient has attended 20 visits of the [facility] program for aerobic work, med ex isometric testing with dynamic strengthening on med ex equipment for spine, whole body strengthening on med ex equipment, HEP, education.  Patient has completed [facility] Program and is ready to be transitioned to home exercise program and plans to continue strengthening at [facility]. Importance of continuing there ex and body mech and ergonomics stressed.   Patient demonstrates improvement in ability to reduce symptoms, improved posture, improved ROM and improved strength.  Reviewed HEP, and importance of maintaining a healthy spine through continued stretching and performance of HEP, good posture, good ergonomics, good body mech with ADL, leisure, and work.  Discharge handout with HEP given, and discussed aspects of exercise program, cardio, strengthening, and stretching. Patient expressed understanding information given. Encouraged patient to also continue using cold pack on low back as needed for symptom management. "     -Improved lumbar ROM, initially on medx test 0-24 degrees and  currently 0-36 degrees  -Improved strength at each test point on lumbar med ex IM test with 37% average improvement noted   -Initial outcome tool score 71% limitation and current outcome tool score  65% indicating reduced pain and improved function      Anticipated Barriers for therapy: chronicity of symptoms, high BMI, lumbar surgeries, anxiety, diabetes    Pt's spiritual, cultural and educational needs considered and pt agreeable to plan of care and goals as stated below:     GOALS: Pt is in agreement with the following goals.     Short term goals:  6 weeks or 10 visits   1.  Pt will demonstrate increased lumbar ROM by at least 5 degrees from the initial ROM value with improvements noted in functional ROM and ability to perform ADLs.  Goal met 2/28/2023  2.  Pt will demonstrate increased MedX average isometric strength value  by 10% from initial test resulting in improved ability to perform bending, lifting, and carrying activities safely, confidently.  Goal met 2/28/2023  3.  Patient report a reduction in worst pain score by 1-2 points for improved tolerance for household ADLs.  Goal met 2/28/2023  4.  Pt able to perform HEP correctly with minimal cueing or supervision from therapist to encourage independent management of symptoms. Goal met 2/28/2023     Long term goals: 10 weeks or 20 visits   1. Pt will demonstrate increased lumbar ROM by at least 10 degrees from initial ROM value, resulting in improved ability to perform functional fwd bending while standing and sitting. Goal met 4/4/2023  2. Pt will demonstrate increased MedX average isometric strength value  by 30% from initial test resulting in improved ability to perform bending, lifting, and carrying activities safely, confidently.  Goal met 2/28/2023  3. Pt to demonstrate ability to independently control and reduce their pain through posture positioning and mechanical movements throughout  a typical day.  Goal met 4/4/2023  4.  Pt will demonstrate reduced pain and improved functional outcomes as reported on the FOTO by reaching a limitation score of < or = 50% or less in order to demonstrate subjective improvement in pt's condition. (Goal not met  5. Pt will demonstrate independence with the HEP at discharge  Goal met 4/4/2023  6.  Pt to be able to walk a block around his neighborhood with max pain 3/10 in order to demonstrate community ambulation for weight loss goals.      Plan   Pt is being discharged from [facility] program to continue with HEP.       Therapist: Arcelia Mcclelland, PT  4/6/2023

## 2023-04-10 ENCOUNTER — TELEPHONE (OUTPATIENT)
Dept: NEUROSURGERY | Facility: CLINIC | Age: 48
End: 2023-04-10
Payer: MEDICAID

## 2023-04-21 ENCOUNTER — OFFICE VISIT (OUTPATIENT)
Dept: ENDOCRINOLOGY | Facility: CLINIC | Age: 48
End: 2023-04-21
Payer: MEDICAID

## 2023-04-21 VITALS — BODY MASS INDEX: 53.51 KG/M2 | WEIGHT: 315 LBS

## 2023-04-21 DIAGNOSIS — I10 HYPERTENSION, ESSENTIAL: ICD-10-CM

## 2023-04-21 DIAGNOSIS — E11.65 UNCONTROLLED TYPE 2 DIABETES MELLITUS WITH HYPERGLYCEMIA: Primary | ICD-10-CM

## 2023-04-21 DIAGNOSIS — E78.5 HYPERLIPIDEMIA, UNSPECIFIED HYPERLIPIDEMIA TYPE: ICD-10-CM

## 2023-04-21 DIAGNOSIS — G62.9 NEUROPATHY: ICD-10-CM

## 2023-04-21 PROCEDURE — 3008F PR BODY MASS INDEX (BMI) DOCUMENTED: ICD-10-PCS | Mod: CPTII,,, | Performed by: GENERAL ACUTE CARE HOSPITAL

## 2023-04-21 PROCEDURE — 1159F PR MEDICATION LIST DOCUMENTED IN MEDICAL RECORD: ICD-10-PCS | Mod: CPTII,,, | Performed by: GENERAL ACUTE CARE HOSPITAL

## 2023-04-21 PROCEDURE — 99215 OFFICE O/P EST HI 40 MIN: CPT | Mod: PBBFAC | Performed by: GENERAL ACUTE CARE HOSPITAL

## 2023-04-21 PROCEDURE — 3046F PR MOST RECENT HEMOGLOBIN A1C LEVEL > 9.0%: ICD-10-PCS | Mod: CPTII,,, | Performed by: GENERAL ACUTE CARE HOSPITAL

## 2023-04-21 PROCEDURE — 99999 PR PBB SHADOW E&M-EST. PATIENT-LVL V: CPT | Mod: PBBFAC,,, | Performed by: GENERAL ACUTE CARE HOSPITAL

## 2023-04-21 PROCEDURE — 1159F MED LIST DOCD IN RCRD: CPT | Mod: CPTII,,, | Performed by: GENERAL ACUTE CARE HOSPITAL

## 2023-04-21 PROCEDURE — 3046F HEMOGLOBIN A1C LEVEL >9.0%: CPT | Mod: CPTII,,, | Performed by: GENERAL ACUTE CARE HOSPITAL

## 2023-04-21 PROCEDURE — 99204 OFFICE O/P NEW MOD 45 MIN: CPT | Mod: S$PBB,,, | Performed by: GENERAL ACUTE CARE HOSPITAL

## 2023-04-21 PROCEDURE — 3066F NEPHROPATHY DOC TX: CPT | Mod: CPTII,,, | Performed by: GENERAL ACUTE CARE HOSPITAL

## 2023-04-21 PROCEDURE — 3008F BODY MASS INDEX DOCD: CPT | Mod: CPTII,,, | Performed by: GENERAL ACUTE CARE HOSPITAL

## 2023-04-21 PROCEDURE — 99999 PR PBB SHADOW E&M-EST. PATIENT-LVL V: ICD-10-PCS | Mod: PBBFAC,,, | Performed by: GENERAL ACUTE CARE HOSPITAL

## 2023-04-21 PROCEDURE — 1160F PR REVIEW ALL MEDS BY PRESCRIBER/CLIN PHARMACIST DOCUMENTED: ICD-10-PCS | Mod: CPTII,,, | Performed by: GENERAL ACUTE CARE HOSPITAL

## 2023-04-21 PROCEDURE — 3066F PR DOCUMENTATION OF TREATMENT FOR NEPHROPATHY: ICD-10-PCS | Mod: CPTII,,, | Performed by: GENERAL ACUTE CARE HOSPITAL

## 2023-04-21 PROCEDURE — 99204 PR OFFICE/OUTPT VISIT, NEW, LEVL IV, 45-59 MIN: ICD-10-PCS | Mod: S$PBB,,, | Performed by: GENERAL ACUTE CARE HOSPITAL

## 2023-04-21 PROCEDURE — 1160F RVW MEDS BY RX/DR IN RCRD: CPT | Mod: CPTII,,, | Performed by: GENERAL ACUTE CARE HOSPITAL

## 2023-04-21 PROCEDURE — 3061F NEG MICROALBUMINURIA REV: CPT | Mod: CPTII,,, | Performed by: GENERAL ACUTE CARE HOSPITAL

## 2023-04-21 PROCEDURE — 3072F LOW RISK FOR RETINOPATHY: CPT | Mod: CPTII,,, | Performed by: GENERAL ACUTE CARE HOSPITAL

## 2023-04-21 PROCEDURE — 3072F PR LOW RISK FOR RETINOPATHY: ICD-10-PCS | Mod: CPTII,,, | Performed by: GENERAL ACUTE CARE HOSPITAL

## 2023-04-21 PROCEDURE — 3061F PR NEG MICROALBUMINURIA RESULT DOCUMENTED/REVIEW: ICD-10-PCS | Mod: CPTII,,, | Performed by: GENERAL ACUTE CARE HOSPITAL

## 2023-04-21 RX ORDER — METFORMIN HYDROCHLORIDE 500 MG/1
1000 TABLET, EXTENDED RELEASE ORAL 2 TIMES DAILY WITH MEALS
Qty: 360 TABLET | Refills: 3 | Status: SHIPPED | OUTPATIENT
Start: 2023-04-21 | End: 2024-04-20

## 2023-04-21 RX ORDER — DULAGLUTIDE 4.5 MG/.5ML
INJECTION, SOLUTION SUBCUTANEOUS
COMMUNITY
Start: 2023-03-18 | End: 2023-04-21

## 2023-04-21 RX ORDER — INSULIN HUMAN 500 [IU]/ML
INJECTION, SOLUTION SUBCUTANEOUS
Qty: 8 PEN | Refills: 11 | Status: SHIPPED | OUTPATIENT
Start: 2023-04-21 | End: 2023-08-11 | Stop reason: SDUPTHER

## 2023-04-21 RX ORDER — CYCLOBENZAPRINE HCL 10 MG
10 TABLET ORAL 3 TIMES DAILY
COMMUNITY
Start: 2023-03-16 | End: 2024-03-05

## 2023-04-21 RX ORDER — SYRINGE,SAFETY WITH NEEDLE,1ML 25GX1"
SYRINGE (EA) MISCELLANEOUS
Qty: 300 EACH | Refills: 11 | Status: SHIPPED | OUTPATIENT
Start: 2023-04-21 | End: 2024-01-18 | Stop reason: SDUPTHER

## 2023-04-21 RX ORDER — TIRZEPATIDE 12.5 MG/.5ML
12.5 INJECTION, SOLUTION SUBCUTANEOUS
Qty: 4 PEN | Refills: 3 | Status: CANCELLED | OUTPATIENT
Start: 2023-04-21

## 2023-04-21 NOTE — PROGRESS NOTES
Subjective:      Patient ID: Kirill Gandhi III is a 47 y.o.    Chief Complaint:  DM2; Initial visit     Patient Active Problem List   Diagnosis    Morbid obesity    Vitamin D deficiency    VICENTE (obstructive sleep apnea)    OCD (obsessive compulsive disorder)    Anxiety    Insomnia due to other mental disorder    Palpitations    DDD (degenerative disc disease), lumbar    Adjustment disorder with mixed anxiety and depressed mood    Mixed obsessional thoughts and acts    Mild episode of recurrent major depressive disorder    Fusion of spine, lumbosacral region    Sacroiliac joint dysfunction of both sides    Chronic bilateral low back pain with bilateral sciatica    Radiculopathy of lumbosacral region    Chest pain, atypical    Pseudoarthrosis of lumbar spine    Benign essential hypertension    Type 2 diabetes mellitus with hyperglycemia, with long-term current use of insulin    Excessive flatus    Gastroesophageal reflux disease without esophagitis    Hyponatremia    Metabolic acidemia    Dehydration with hyponatremia    Flatulence/gas pain/belching    Decreased strength of trunk and back    Decreased ROM of trunk and back        History of Present Illness  48 YO Male w/ medical history as above that presents to the endocrine clinic for initial evaluation of DM2.    With regards to Diabetes Mellitus:     -Type 2     -Duration:  Dx  > 10  yrs ago with symptoms and blood work for evaluation of fibromyalgia     -FH?  Grandmother,  Aunts       -Microvascular complications: (Retinopathy,  Neuropathy)   -Macrovascular complications:   DENIES     -DKA?  DENIES   -HHS?   DENIES     -DM Medications:   Trulicity 4.5mg, Levemir  60 UNITS BID,    Novolog 60 - 90 UNITS AFTER MEALS.       Taking around 400 units of isulin daily   (High insulin requirements due to insulin resistance)     -Compliance w/ Meds:  Yes     -Previously tried medications:   Rybelsus,  Metformin     -Glucose monitoring:    (230- 450s)   ABOVE GOAL      -Hyperglycemia symptoms:  Yes, polyuria, polydipsia, fatigue    -Hypoglycemia symptoms:  DENIES         -Diet:  High carb and sugar diet.    B:  6 eggs,  4 packs of grits,   L:  3 sandwiches,  rice beans, meat,    D: Similar to lunch     Pt eats large portions         -Activity:   Sedentary,  Back pain      -Pancreatitis?  NO     -Thyroid CA?  NO       Diabetes Management Status    Statin: Taking  ACE/ARB: Taking    Screening or Prevention Patient's value Goal Complete/Controlled?   HgA1C Testing and Control   Lab Results   Component Value Date    HGBA1C 9.2 (H) 02/07/2023      Annually/Less than 8% No     Lipid profile : 03/27/2023 Annually Yes     LDL control Lab Results   Component Value Date    LDLCALC 75.0 03/27/2023    Annually/Less than 100 mg/dl  Yes     Nephropathy screening Lab Results   Component Value Date    LABMICR <5.0 03/27/2023     Lab Results   Component Value Date    PROTEINUA Negative 11/11/2022    Annually Yes     Blood pressure BP Readings from Last 1 Encounters:   02/09/23 112/74    Less than 140/90 No     Dilated retinal exam : 10/12/2022 Annually Yes     Foot exam   : 01/12/2022 Annually No           ROS:   As above    Objective:     There were no vitals taken for this visit.    There is no height or weight on file to calculate BMI.      Physical Exam  Vitals reviewed.   Constitutional:       General: He is not in acute distress.     Appearance: Normal appearance. He is well-developed. He is not ill-appearing.   HENT:      Nose: Nose normal. No rhinorrhea.      Mouth/Throat:      Mouth: Mucous membranes are moist.   Eyes:      Extraocular Movements: Extraocular movements intact.      Pupils: Pupils are equal, round, and reactive to light.      Comments: No proptosis, No lid lag, No conjunctival erythema    Neck:      Thyroid: No thyromegaly.      Trachea: No tracheal deviation.      Comments: No thyromegaly or Thyroid nodules palpated on exam   Cardiovascular:      Rate and Rhythm:  Normal rate and regular rhythm.      Pulses: Normal pulses.   Pulmonary:      Effort: Pulmonary effort is normal.      Breath sounds: Normal breath sounds.   Abdominal:      Palpations: Abdomen is soft. There is no mass.      Tenderness: There is no abdominal tenderness.      Hernia: No hernia is present.      Comments: No scar tissue, No Violaceous striae    Musculoskeletal:         General: No swelling.      Cervical back: Neck supple. No tenderness.      Right lower leg: No edema.      Left lower leg: No edema.   Skin:     General: Skin is warm.      Findings: No rash.   Neurological:      General: No focal deficit present.      Mental Status: He is alert and oriented to person, place, and time.   Psychiatric:         Mood and Affect: Mood normal.         Judgment: Judgment normal.              Lab Review:   Lab Results   Component Value Date    HGBA1C 9.2 (H) 02/07/2023     Lab Results   Component Value Date    CHOL 153 03/27/2023    HDL 31 (L) 03/27/2023    LDLCALC 75.0 03/27/2023    TRIG 235 (H) 03/27/2023    CHOLHDL 20.3 03/27/2023     Lab Results   Component Value Date     03/27/2023    K 4.3 03/27/2023     03/27/2023    CO2 29 03/27/2023     (H) 03/27/2023    BUN 12 03/27/2023    CREATININE 0.77 03/27/2023    CALCIUM 9.0 03/27/2023    PROT 7.6 03/27/2023    ALBUMIN 4.1 03/27/2023    BILITOT 0.4 03/27/2023    ALKPHOS 129 (H) 03/27/2023    AST 33 03/27/2023    ALT 40 03/27/2023    ANIONGAP 8 03/27/2023    ESTGFRAFRICA >60 04/15/2022    EGFRNONAA >60 04/15/2022    TSH 0.541 03/27/2023     Vit D, 25-Hydroxy   Date Value Ref Range Status   02/04/2016 26 (L) 30 - 96 ng/mL Final     Comment:     Vitamin D deficiency.........<10 ng/mL                              Vitamin D insufficiency......10-29 ng/mL       Vitamin D sufficiency........> or equal to 30 ng/mL  Vitamin D toxicity............>100 ng/mL         Assessment and Plan     Uncontrolled type 2 diabetes mellitus with hyperglycemia    Lab  Results   Component Value Date    LABA1C 6.8 (H) 03/24/2017    HGBA1C 9.2 (H) 02/07/2023        -FS log  ABOVE GOAL   -Diet, exercise, lifestyle modifications and A1c Goals discussed   -Hypoglycemia symptoms and plan of action discussed   -Low carbohydrate diet < 150g daily  -Seen DM Education?      PLAN:     Due to A1C and glucose log above goal with very high insulin requirements and insulin resistance.  I will recommend the following.      DC Trulicity     STOP Levemir and Novolog U100 doses as patient may not be fully absorbing such high doses of U100    Start Mounjaro 10mg SC weekly     Start  Metformin 500mg XR (2 tabs BID)     Start U500 insulin   100 units (30 minutes before breakfast and before lunch)   120 units before Diner     DM education     Due to patient showing commitment w/ DM management, being on MDI , checking glucose 4 times a day and due to the COVID pandemic I believe he will benefit from DEXCOM continuous glucose monitor for better control of DM, hypoglycemia prevention and remote monitoring of glucose in between visit.          Hyperlipidemia, unspecified hyperlipidemia type  LDL at goal On Statin   Low Fat diet      Hypertension, essential     BP controlled on current BP meds   On ACE for renal protection   Low Na diet

## 2023-04-26 ENCOUNTER — OFFICE VISIT (OUTPATIENT)
Dept: PSYCHIATRY | Facility: CLINIC | Age: 48
End: 2023-04-26
Payer: MEDICAID

## 2023-04-26 DIAGNOSIS — F99 INSOMNIA DUE TO OTHER MENTAL DISORDER: ICD-10-CM

## 2023-04-26 DIAGNOSIS — F41.9 ANXIETY: ICD-10-CM

## 2023-04-26 DIAGNOSIS — F51.05 INSOMNIA DUE TO OTHER MENTAL DISORDER: ICD-10-CM

## 2023-04-26 DIAGNOSIS — G89.29 OTHER CHRONIC PAIN: ICD-10-CM

## 2023-04-26 DIAGNOSIS — F33.1 MODERATE EPISODE OF RECURRENT MAJOR DEPRESSIVE DISORDER: Primary | ICD-10-CM

## 2023-04-26 DIAGNOSIS — F42.2 MIXED OBSESSIONAL THOUGHTS AND ACTS: ICD-10-CM

## 2023-04-26 PROCEDURE — 99214 OFFICE O/P EST MOD 30 MIN: CPT | Mod: 95,,, | Performed by: NURSE PRACTITIONER

## 2023-04-26 PROCEDURE — 1160F RVW MEDS BY RX/DR IN RCRD: CPT | Mod: CPTII,95,, | Performed by: NURSE PRACTITIONER

## 2023-04-26 PROCEDURE — 3046F PR MOST RECENT HEMOGLOBIN A1C LEVEL > 9.0%: ICD-10-PCS | Mod: CPTII,95,, | Performed by: NURSE PRACTITIONER

## 2023-04-26 PROCEDURE — 1159F MED LIST DOCD IN RCRD: CPT | Mod: CPTII,95,, | Performed by: NURSE PRACTITIONER

## 2023-04-26 PROCEDURE — 1160F PR REVIEW ALL MEDS BY PRESCRIBER/CLIN PHARMACIST DOCUMENTED: ICD-10-PCS | Mod: CPTII,95,, | Performed by: NURSE PRACTITIONER

## 2023-04-26 PROCEDURE — 3066F PR DOCUMENTATION OF TREATMENT FOR NEPHROPATHY: ICD-10-PCS | Mod: CPTII,95,, | Performed by: NURSE PRACTITIONER

## 2023-04-26 PROCEDURE — 3072F PR LOW RISK FOR RETINOPATHY: ICD-10-PCS | Mod: CPTII,95,, | Performed by: NURSE PRACTITIONER

## 2023-04-26 PROCEDURE — 3072F LOW RISK FOR RETINOPATHY: CPT | Mod: CPTII,95,, | Performed by: NURSE PRACTITIONER

## 2023-04-26 PROCEDURE — 3061F PR NEG MICROALBUMINURIA RESULT DOCUMENTED/REVIEW: ICD-10-PCS | Mod: CPTII,95,, | Performed by: NURSE PRACTITIONER

## 2023-04-26 PROCEDURE — 3061F NEG MICROALBUMINURIA REV: CPT | Mod: CPTII,95,, | Performed by: NURSE PRACTITIONER

## 2023-04-26 PROCEDURE — 99214 PR OFFICE/OUTPT VISIT, EST, LEVL IV, 30-39 MIN: ICD-10-PCS | Mod: 95,,, | Performed by: NURSE PRACTITIONER

## 2023-04-26 PROCEDURE — 3046F HEMOGLOBIN A1C LEVEL >9.0%: CPT | Mod: CPTII,95,, | Performed by: NURSE PRACTITIONER

## 2023-04-26 PROCEDURE — 1159F PR MEDICATION LIST DOCUMENTED IN MEDICAL RECORD: ICD-10-PCS | Mod: CPTII,95,, | Performed by: NURSE PRACTITIONER

## 2023-04-26 PROCEDURE — 3066F NEPHROPATHY DOC TX: CPT | Mod: CPTII,95,, | Performed by: NURSE PRACTITIONER

## 2023-04-26 RX ORDER — DULOXETIN HYDROCHLORIDE 60 MG/1
60 CAPSULE, DELAYED RELEASE ORAL 2 TIMES DAILY
Qty: 180 CAPSULE | Refills: 0 | Status: SHIPPED | OUTPATIENT
Start: 2023-04-26 | End: 2023-08-03 | Stop reason: SDUPTHER

## 2023-04-26 RX ORDER — PROPRANOLOL HYDROCHLORIDE 20 MG/1
TABLET ORAL
Qty: 135 TABLET | Refills: 0 | Status: SHIPPED | OUTPATIENT
Start: 2023-04-26 | End: 2023-08-03 | Stop reason: SDUPTHER

## 2023-04-26 RX ORDER — ARIPIPRAZOLE 5 MG/1
5 TABLET ORAL DAILY
Qty: 90 TABLET | Refills: 0 | Status: SHIPPED | OUTPATIENT
Start: 2023-04-26 | End: 2023-08-03 | Stop reason: SDUPTHER

## 2023-04-26 RX ORDER — BUSPIRONE HYDROCHLORIDE 30 MG/1
30 TABLET ORAL 2 TIMES DAILY
Qty: 180 TABLET | Refills: 0 | Status: SHIPPED | OUTPATIENT
Start: 2023-04-26 | End: 2023-08-03 | Stop reason: SDUPTHER

## 2023-04-26 NOTE — PROGRESS NOTES
"Outpatient Psychiatry Follow-Up Visit (MD/NP) - Telemedicine Visit    4/26/2023 8:18 AM  Kirill Gandhi III  1975  [id number]      The patient location is: Patient reported that their location at the time of this visit was in the [facility]       Visit type: audiovisual    Each patient to whom he or she provides medical services by telemedicine is:  (1) informed of the relationship between the physician and patient and the respective role of any other health care provider with respect to management of the patient; and (2) notified that he or she may decline to receive medical services by telemedicine and may withdraw from such care at any time.      Clinical Status of Patient:  Outpatient (Ambulatory)    Chief Complaint:  Kirill Gandhi III, a 47 y.o. male,who presents today for follow up of depression and anxiety.  Met with patient.        Interval History/Subjective Report/Content of Current Session:     Today the pt reports that he has noticed an improvement in his mood and his anxiety since the last visit. Had three episodes of low mood with crying spells since his last visit, but notes that he is having more good days than bad and "my bad days aren't as bad as they were". Denies anhedonia and hopelessness. He feels his sxs of depression are stable on his current psych meds. He uses propranolol 20 mg TID and would like to be able to take an additional dose prn is possible. Uses hydroxyzine prn insomnia. Endorses obsessional thoughts. Still having a lot of back pain. Has completed PT and is interested in the [facility] for chronic pain.    ASEs from psych meds: denies    Pt denies recurrent thoughts of death and denies SI/HI. Denies any sxs of mihaela. Denies AVH, paranoia and delusions. No objective s/sx of psychosis or mihaela.         Psychotherapy:  Target symptoms: depression, anxiety   Why chosen therapy is appropriate versus another modality: relevant to diagnosis, patient responds " to this modality  Outcome monitoring methods: self-report, observation  Therapeutic intervention type: supportive psychotherapy  Topics discussed/themes: stress related to medical comorbidities, building skills sets for symptom management  The patient's response to the intervention is accepting. The patient's progress toward treatment goals is good.   Duration of intervention: 4 minutes.      Psychotropic medication review  Previous Trials-  Wellbutrin- ineffective on its own  Prozac- effective  Luvox- partial response  Risperidone - ineffective  Buspar - effective  Celexa- ineffective  Zoloft  Lexapro      Current meds-  Cymbalta  Gabapentin  Vistaril  Buspar    History:       Past Medical, Psychiatric, Family and Social History: The patient's past medical, psychiatric, family and social history, allergies, current medications, past surgical history, and problem list have been reviewed and updated as appropriate within the electronic medical record.         Review of Systems       Review of Systems   Constitutional:  Negative for chills, fever and malaise/fatigue.   Respiratory:  Negative for cough and shortness of breath.    Cardiovascular:  Negative for chest pain and palpitations.   Gastrointestinal:  Negative for abdominal pain, diarrhea and vomiting.   Genitourinary:  Negative for dysuria and hematuria.   Musculoskeletal:  Negative for falls and myalgias.   Skin:  Negative for rash.   Neurological:  Negative for tremors, seizures and headaches.   Psychiatric/Behavioral:          See HPI       Compliance: yes      Risk Parameters:  Patient reports no suicidal ideation  Patient reports no homicidal ideation  Patient reports no self-injurious behavior  Patient reports no violent behavior    Exam (detailed: at least 9 elements; comprehensive: all 15 elements)     Constitutional  Vitals:  Most recent vital signs, dated less than 90 days prior to this appointment, were reviewed.   There were no vitals filed for this  visit.             Musculoskeletal  Muscle Strength/Tone:  not examined - SHARIF due to virtual visit   Gait & Station:  SHARIF due to virtual visit     Psychiatric      Appearance:  unremarkable, age appropriate, well nourished, bearded, casually dressed, neatly groomed, obese   Behavior:  normal, friendly and cooperative, eye contact normal     Speech:  no latency; no press   Mood & Affect:  euthymic  congruent and appropriate   Thought Process:  normal and logical   Associations:  intact   Thought Content:  normal, no suicidality, no homicidality, delusions, or paranoia   Insight:  intact, has awareness of illness   Judgement: behavior is adequate to circumstances, age appropriate   Orientation:  grossly intact   Memory: intact for content of interview   Language: grossly intact   Attention Span & Concentration:  able to focus   Fund of Knowledge:  intact and appropriate to age and level of education       Medications:  Outpatient Encounter Medications as of 4/26/2023   Medication Sig Dispense Refill    acetaminophen (TYLENOL) 500 MG tablet Take 1,000 mg by mouth 2 (two) times daily as needed for Pain.      ARIPiprazole (ABILIFY) 5 MG Tab Take 1 tablet (5 mg total) by mouth once daily. 30 tablet 2    ascorbic acid, vitamin C, (VITAMIN C) 1000 MG tablet Take 1,000 mg by mouth once daily.      aspirin (ECOTRIN) 81 MG EC tablet Take 81 mg by mouth every evening.      aspirin/sod bicarb/citric acid (GIOVANY-SELTZER ORAL) Take 2 tablets by mouth daily as needed (Upset stomach).      atorvastatin (LIPITOR) 10 MG tablet Take 1 tablet (10 mg total) by mouth every other day. 45 tablet 3    busPIRone (BUSPAR) 30 MG Tab Take 1 tablet (30 mg total) by mouth 2 (two) times daily. 180 tablet 0    celecoxib (CELEBREX) 200 MG capsule TAKE 1 CAPSULE (200 MG TOTAL) BY MOUTH 2 (TWO) TIMES DAILY. 60 capsule 1    cyclobenzaprine (FLEXERIL) 10 MG tablet Take 10 mg by mouth 3 (three) times daily.      dicyclomine (BENTYL) 20 mg tablet Take 1  tablet (20 mg total) by mouth 3 (three) times daily as needed (abdominal pain). 60 tablet 2    DULoxetine (CYMBALTA) 60 MG capsule Take 1 capsule (60 mg total) by mouth 2 (two) times daily. 180 capsule 0    flash glucose scanning reader Misc Disp one reader to monitor glucose- covered by insurance dexcom 1 each 1    flash glucose sensor Kit Dispense sensor to read glucose  dexcom 12 kit 11    fluticasone propionate (FLONASE) 50 mcg/actuation nasal spray 2 sprays (100 mcg total) by Each Nostril route once daily. 48 mL 2    gabapentin (NEURONTIN) 800 MG tablet Take 1 tablet (800 mg total) by mouth 3 (three) times daily. 270 tablet 3    hydrOXYzine HCL (ATARAX) 25 MG tablet Take 2 tablets (50 mg total) by mouth nightly as needed (insomnia). 180 tablet 0    insulin regular hum U-500 conc (HUMULIN R U-500, CONC, KWIKPEN) 500 unit/mL (3 mL) insulin pen Use 100 units 30 minutes before breakfast and before lunch.  Use 120 units before diner. 8 pen 11    insulin syringe-needle U-100 (BD INSULIN SYRINGE ULTRA-FINE) 1 mL 31 gauge x 5/16 Syrg To use 4 times daily with insulin 300 each 11    Lactobac no.41/Bifidobact no.7 (PROBIOTIC-10 ORAL) Take by mouth.      lanolin/mineral oil/petrolatum (ARTIFICIAL TEARS OPHT) Place 2 drops into both eyes daily as needed (Dry eye).      metFORMIN (GLUCOPHAGE-XR) 500 MG ER 24hr tablet Take 2 tablets (1,000 mg total) by mouth 2 (two) times daily with meals. 360 tablet 3    methylcellulose, laxative, (CITRUCEL) 500 mg Tab Take 1 tablet by mouth nightly as needed (Constipation).      metoclopramide HCl (REGLAN) 5 mg/5 mL Soln Take 5 mLs (5 mg total) by mouth 4 (four) times daily before meals and nightly. 500 mL 2    multivitamin capsule Take 1 capsule by mouth once daily.      NON FORMULARY MEDICATION Uses Cpap Machine nightly on a setting of 10      ondansetron (ZOFRAN-ODT) 4 MG TbDL Take 1 tablet (4 mg total) by mouth every 8 (eight) hours as needed (nausea). 30 tablet 5    ONETOUCH DELICA  "PLUS LANCET 33 gauge Misc Apply topically 3 (three) times daily.      ONETOUCH VERIO TEST STRIPS Strp 1 strip 3 (three) times daily.      pantoprazole (PROTONIX) 40 MG tablet Take 1 tablet (40 mg total) by mouth once daily. 90 tablet 3    pen needle, diabetic (BD ULTRA-FINE SHORT PEN NEEDLE) 31 gauge x 5/16" Ndle USE WITH FLEX  each 11    pen needle, diabetic 31 gauge x 1/4" Ndle Use with flex pen 100 each 11    propranoloL (INDERAL) 20 MG tablet Take 1 tablet po TID prn anxiety 90 tablet 0    tirzepatide 10 mg/0.5 mL PnIj Inject 10 mg into the skin every 7 days. 4 pen 4    traMADoL 100 mg Tab Take 100 mg by mouth every 8 (eight) hours as needed (severe pain). 90 tablet 3    valsartan-hydrochlorothiazide (DIOVAN-HCT) 320-12.5 mg per tablet Take 1 tablet by mouth once daily. 90 tablet 3    vitamin D 1000 units Tab Take 5,000 Units by mouth 2 (two) times a day.      [DISCONTINUED] fluvoxaMINE (LUVOX) 100 MG tablet Take 1.5 tablets (150 mg total) by mouth 2 (two) times daily. 270 tablet 0     No facility-administered encounter medications on file as of 4/26/2023.       Allergy:  Review of patient's allergies indicates:   Allergen Reactions    Dilaudid [hydromorphone] Other (See Comments)     IV dilaudid severely drops BP         Assessment and Diagnosis   Status/Progress: Based on the examination today, the patient's problem(s) is/are improved and well controlled.  New problems have not been presented today.   Co-morbidities are complicating management of the primary condition.  There are no active rule-out diagnoses for this patient at this time.         General Impression:       ICD-10-CM ICD-9-CM   1. Moderate episode of recurrent major depressive disorder  F33.1 296.32   2. Mixed obsessional thoughts and acts  F42.2 300.3   3. Insomnia due to other mental disorder  F51.05 300.9    F99 327.02   4. Anxiety  F41.9 300.00   5. Other chronic pain  G89.29 338.29       Intervention/Counseling/Treatment Plan "     Medication Management:  Continue Buspar 30 mg BID  Continue Cymbalta 60 mg BID  Continue gabapentin 800 mg TID - managed by pain management  Continue hydroxyzine 25-50 mg q hs prn insomnia. Pt was told not drive or to take this med with ETOH or other sedating meds/substance. Pt verbalized understanding.  Continue Abilify 5 mg q day  Continue propranolol to 20 mg TID prn anxiety. May take an additional tab po q d prn anxiety.  Refer to the [facility] for pain management  Labs: reviewed most recent  The treatment plan and follow up plan were reviewed with the patient.  Discussed with patient informed consent, risks vs. benefits, alternative treatments, side effect profile and the inherent unpredictability of individual responses to these treatments. The patient expresses understanding of the above and displays the capacity to agree with this current plan and had no other questions.  Encouraged Patient to keep future appointments.   Take medications as prescribed and abstain from substance abuse.   Pt was told to present to ED or call 911 for SI/HI plan or intent, psychosis, or other psychiatric or medical emergency, and pt agrees to this and verbalized understanding.          Return to Clinic: 3 months, or sooner if needed        Face to Face time with patient: 31 minutes  Total time: 36 minutes of total time spent on the encounter, which includes face to face time and non-face to face time preparing to see the patient (eg, review of tests), Obtaining and/or reviewing separately obtained history, Documenting clinical information in the electronic or other health record, Independently interpreting results (not separately reported) and communicating results to the patient/family/caregiver, or Care coordination (not separately reported).       Verónica Briscoe, MSN, APRN, PMHNP-BC [facility]

## 2023-04-27 ENCOUNTER — HOSPITAL ENCOUNTER (OUTPATIENT)
Dept: RADIOLOGY | Facility: HOSPITAL | Age: 48
Discharge: HOME | End: 2023-04-27
Attending: PHYSICIAN ASSISTANT
Payer: MEDICAID

## 2023-04-27 DIAGNOSIS — Z98.1 S/P LUMBAR SPINAL FUSION: ICD-10-CM

## 2023-04-27 PROCEDURE — 72082 X-RAY EXAM ENTIRE SPI 2/3 VW: CPT | Mod: 26,,, | Performed by: RADIOLOGY

## 2023-04-27 PROCEDURE — 72082 XR SCOLIOSIS COMPLETE: ICD-10-PCS | Mod: 26,,, | Performed by: RADIOLOGY

## 2023-04-27 PROCEDURE — 72082 X-RAY EXAM ENTIRE SPI 2/3 VW: CPT | Mod: TC,FY,PO

## 2023-04-28 ENCOUNTER — PATIENT MESSAGE (OUTPATIENT)
Dept: ENDOCRINOLOGY | Facility: CLINIC | Age: 48
End: 2023-04-28
Payer: MEDICAID

## 2023-05-05 ENCOUNTER — PATIENT MESSAGE (OUTPATIENT)
Dept: PSYCHIATRY | Facility: CLINIC | Age: 48
End: 2023-05-05
Payer: MEDICAID

## 2023-05-10 ENCOUNTER — OFFICE VISIT (OUTPATIENT)
Dept: NEUROSURGERY | Facility: CLINIC | Age: 48
End: 2023-05-10
Payer: MEDICAID

## 2023-05-10 VITALS
SYSTOLIC BLOOD PRESSURE: 143 MMHG | DIASTOLIC BLOOD PRESSURE: 65 MMHG | HEART RATE: 97 BPM | HEIGHT: 72 IN | BODY MASS INDEX: 42.66 KG/M2 | WEIGHT: 315 LBS

## 2023-05-10 DIAGNOSIS — T84.498A LOOSENING OF HARDWARE IN SPINE: ICD-10-CM

## 2023-05-10 DIAGNOSIS — G93.0 ARACHNOID CYST: Primary | ICD-10-CM

## 2023-05-10 PROCEDURE — 3046F HEMOGLOBIN A1C LEVEL >9.0%: CPT | Mod: CPTII,,, | Performed by: NEUROLOGICAL SURGERY

## 2023-05-10 PROCEDURE — 3046F PR MOST RECENT HEMOGLOBIN A1C LEVEL > 9.0%: ICD-10-PCS | Mod: CPTII,,, | Performed by: NEUROLOGICAL SURGERY

## 2023-05-10 PROCEDURE — 3008F PR BODY MASS INDEX (BMI) DOCUMENTED: ICD-10-PCS | Mod: CPTII,,, | Performed by: NEUROLOGICAL SURGERY

## 2023-05-10 PROCEDURE — 3066F NEPHROPATHY DOC TX: CPT | Mod: CPTII,,, | Performed by: NEUROLOGICAL SURGERY

## 2023-05-10 PROCEDURE — 99215 OFFICE O/P EST HI 40 MIN: CPT | Mod: PBBFAC,PN | Performed by: NEUROLOGICAL SURGERY

## 2023-05-10 PROCEDURE — 99214 PR OFFICE/OUTPT VISIT, EST, LEVL IV, 30-39 MIN: ICD-10-PCS | Mod: S$PBB,,, | Performed by: NEUROLOGICAL SURGERY

## 2023-05-10 PROCEDURE — 99999 PR PBB SHADOW E&M-EST. PATIENT-LVL V: CPT | Mod: PBBFAC,,, | Performed by: NEUROLOGICAL SURGERY

## 2023-05-10 PROCEDURE — 3061F PR NEG MICROALBUMINURIA RESULT DOCUMENTED/REVIEW: ICD-10-PCS | Mod: CPTII,,, | Performed by: NEUROLOGICAL SURGERY

## 2023-05-10 PROCEDURE — 3077F SYST BP >= 140 MM HG: CPT | Mod: CPTII,,, | Performed by: NEUROLOGICAL SURGERY

## 2023-05-10 PROCEDURE — 3072F PR LOW RISK FOR RETINOPATHY: ICD-10-PCS | Mod: CPTII,,, | Performed by: NEUROLOGICAL SURGERY

## 2023-05-10 PROCEDURE — 3077F PR MOST RECENT SYSTOLIC BLOOD PRESSURE >= 140 MM HG: ICD-10-PCS | Mod: CPTII,,, | Performed by: NEUROLOGICAL SURGERY

## 2023-05-10 PROCEDURE — 3008F BODY MASS INDEX DOCD: CPT | Mod: CPTII,,, | Performed by: NEUROLOGICAL SURGERY

## 2023-05-10 PROCEDURE — 3078F DIAST BP <80 MM HG: CPT | Mod: CPTII,,, | Performed by: NEUROLOGICAL SURGERY

## 2023-05-10 PROCEDURE — 99214 OFFICE O/P EST MOD 30 MIN: CPT | Mod: S$PBB,,, | Performed by: NEUROLOGICAL SURGERY

## 2023-05-10 PROCEDURE — 3066F PR DOCUMENTATION OF TREATMENT FOR NEPHROPATHY: ICD-10-PCS | Mod: CPTII,,, | Performed by: NEUROLOGICAL SURGERY

## 2023-05-10 PROCEDURE — 3078F PR MOST RECENT DIASTOLIC BLOOD PRESSURE < 80 MM HG: ICD-10-PCS | Mod: CPTII,,, | Performed by: NEUROLOGICAL SURGERY

## 2023-05-10 PROCEDURE — 3061F NEG MICROALBUMINURIA REV: CPT | Mod: CPTII,,, | Performed by: NEUROLOGICAL SURGERY

## 2023-05-10 PROCEDURE — 3072F LOW RISK FOR RETINOPATHY: CPT | Mod: CPTII,,, | Performed by: NEUROLOGICAL SURGERY

## 2023-05-10 PROCEDURE — 99999 PR PBB SHADOW E&M-EST. PATIENT-LVL V: ICD-10-PCS | Mod: PBBFAC,,, | Performed by: NEUROLOGICAL SURGERY

## 2023-05-10 NOTE — PROGRESS NOTES
NEUROSURGICAL PROGRESS NOTE    DATE OF SERVICE:  05/10/2023    ATTENDING PHYSICIAN:  Jose L Brown MD    SUBJECTIVE:    INTERIM HISTORY:    This is a very pleasant 47 y.o. male, he is 1 year status post revision of posterolateral fusion from L3-S1, sacral pelvic fixation and open SI joint fusion for pseudoarthrosis with loosening of hardware.  He recently has completed the healthy back program.  In the healthy back program day did a lot of leg exercises, weight lifting and twisting exercises.  Since he stopped the healthy back he reports worsening back pain.  He reports 8/10 of back pain at this time.  Pain is also traveling down his buttock area and he has some leg numbness.  He has chronic gait imbalance.  His gait imbalance has been stable.  He feels pain is more in the L2-3 area.  Pain is pretty localized and the tramadol is not really helping at this time.  No new onset of motor weakness or sphincter dysfunction symptoms              PAST MEDICAL HISTORY:  Active Ambulatory Problems     Diagnosis Date Noted    Morbid obesity 04/16/2015    Vitamin D deficiency 04/16/2015    VICENTE (obstructive sleep apnea) 06/17/2015    OCD (obsessive compulsive disorder) 02/26/2020    Anxiety 02/26/2020    Insomnia due to other mental disorder 02/05/2021    Palpitations 03/25/2021    DDD (degenerative disc disease), lumbar 04/26/2021    Adjustment disorder with mixed anxiety and depressed mood 05/19/2021    Mixed obsessional thoughts and acts 06/25/2021    Mild episode of recurrent major depressive disorder 06/25/2021    Fusion of spine, lumbosacral region 07/13/2021    Sacroiliac joint dysfunction of both sides 12/13/2021    Chronic bilateral low back pain with bilateral sciatica 12/14/2021    Radiculopathy of lumbosacral region 12/14/2021    Chest pain, atypical 03/10/2022    Pseudoarthrosis of lumbar spine 04/13/2022    Benign essential hypertension 04/20/2022    Type 2 diabetes mellitus with hyperglycemia, with long-term  current use of insulin 10/17/2022    Excessive flatus 10/19/2022    Gastroesophageal reflux disease without esophagitis 10/19/2022    Hyponatremia 11/11/2022    Metabolic acidemia 11/11/2022    Dehydration with hyponatremia 11/11/2022    Flatulence/gas pain/belching 11/15/2022    Decreased strength of trunk and back 01/09/2023    Decreased ROM of trunk and back 01/09/2023     Resolved Ambulatory Problems     Diagnosis Date Noted    CTS (carpal tunnel syndrome) 06/26/2015    DDD (degenerative disc disease), lumbar 09/04/2019    Lumbar disc herniation with radiculopathy 10/09/2019    Ileus 11/11/2022     Past Medical History:   Diagnosis Date    Allergy     Back pain     Depression     Diabetes 04/17/2015    Diabetes mellitus with insulin therapy     Elevated sed rate 6/26/2015    GERD (gastroesophageal reflux disease) 10/19/2022    Hx of vasectomy     Hypertension     Sleep apnea        PAST SURGICAL HISTORY:  Past Surgical History:   Procedure Laterality Date    ADENOIDECTOMY      COLONOSCOPY N/A 9/6/2022    Procedure: COLONOSCOPY;  Surgeon: Rossy Champion MD;  Location: [facility];  Service: Endoscopy;  Laterality: N/A;    EPIDURAL STEROID INJECTION Right 09/04/2019    Procedure: Injection, Steroid, Epidural Transforaminal;  Surgeon: Harjinder aBtista Jr., MD;  Location: [facility] ENDO;  Service: Pain Management;  Laterality: Right;  Right L3 + L4 TF NATALIE    [id number]  [id number]    Arrive @ 1300; ASA last 8/27; Check BG; NEEDS CONSENT    ESOPHAGOGASTRODUODENOSCOPY N/A 10/31/2022    Procedure: EGD (ESOPHAGOGASTRODUODENOSCOPY);  Surgeon: Rossy Champion MD;  Location: [facility];  Service: Endoscopy;  Laterality: N/A;    FRACTURE SURGERY      bilateral elbow fracture 3 years ago    FUSION OF LUMBAR SPINE BY ANTERIOR APPROACH Left 04/26/2021    Procedure: FUSION, SPINE, LUMBAR, ANTERIOR APPROACH Left L3-4 L4-5 OLIF BRENDA, ALL Release, L5-S1 ALIF;  Surgeon: Jose L Brown MD;  Location: [facility] OR;  Service: Neurosurgery;   Laterality: Left;  Procedure: Left L3-4 L4-5 OLIF BRENDA, ALL Release, L5-S1 ALIF  Surgery Time: 2.5 Hrs  LOS: 2-3  Anesthesia: General  Blood: Type & Screen  Radiology: Josseline  Brace: LSO  Bed: Regular  Position: LAteral Righ    FUSION OF SACROILIAC JOINT Bilateral 04/13/2022    Procedure: FUSION, SACROILIAC JOINT Stg 2: Bilateral Open SI Joint fusion, Bedrock from Si Bone;  Surgeon: Jose L Brown MD;  Location: [facility];  Service: Neurosurgery;  Laterality: Bilateral;    FUSION OF SPINE WITH INSTRUMENTATION N/A 04/26/2021    Procedure: FUSION, SPINE, WITH INSTRUMENTATION L3-S1 Posteior Segmental Instrumentation;  Surgeon: Jose L Brown MD;  Location: [facility];  Service: Neurosurgery;  Laterality: N/A;  Procedure: L3-S1 Posteior Segmental Instrumentation  Sirgery Time: 2 Hrs  LOS: 2-3  Anesthesia: General  Blood: Tyoe & Screen  Radiology: Dual C arm  Brace:LSO  Bed: [place] [id number] Poster  Position: Prone  Equipment: Depuy     FUSION OF SPINE WITH INSTRUMENTATION N/A 04/13/2022    Procedure: FUSION, SPINE, WITH INSTRUMENTATION Stg 1: Revision of posterior L3-S1 hardware, Depuy. Sacro-pelvic fixation, screw augmentation PMMA. L4-S1 posterolateral arthrodesis. Open Gauge screws, 11-12mm;  Surgeon: Jose L Brown MD;  Location: [facility] OR;  Service: Neurosurgery;  Laterality: N/A;    HERNIA REPAIR      bilateral groin hernia    INJECTION OF STEROID Bilateral 12/13/2021    Procedure: INJECTION, STEROID Bilateral SI JOint Block and Steroid Injection;  Surgeon: Jose L Brown MD;  Location: [facility] OR;  Service: Neurosurgery;  Laterality: Bilateral;  Procedure: Bilateral SI JOint Block and Steroid Injection   Surgery Time: 30 min  LOS: 0  Anesthesia: MAC  Radiology: C-arm  Bed: Regular with pillows  Position: Prone    LUMBAR LAMINECTOMY Bilateral 11/29/2019    Procedure: LAMINECTOMY, SPINE, LUMBAR Rigth L3-4, Left L4-5 Laminectomy, medial Facetectomy, and microdiscectomy;  Surgeon: Jose L Brown MD;  Location: [facility];   Service: Neurosurgery;  Laterality: Bilateral;  Procedure: Rigth L3-4, Left L4-5 Laminectomy, medial Facetectomy, and microdiscectomy  Surgery Time: 2.5 Hrs  LOS: 0-1  Anesthesia: General  Blood: Type & Screen  Radiology: C-arm  Micros    REMOVAL IMPLANT, POSTERIOR SEGMENT, INTRAOCULAR  04/13/2022    Procedure: REMOVAL IMPLANT, POSTERIOR SEGMENT, INTRAOCULAR;  Surgeon: Jose L Brown MD;  Location: [facility] OR;  Service: Neurosurgery;;    SPINE SURGERY      TONSILLECTOMY      VASECTOMY         SOCIAL HISTORY:   Social History     Socioeconomic History    Marital status: Significant Other   Tobacco Use    Smoking status: Every Day     Types: Vaping with nicotine    Smokeless tobacco: Never    Tobacco comments:     Handout provided for Ambualtory Smoking Cessaiton program   Substance and Sexual Activity    Alcohol use: Not Currently     Comment: 750 ml  once a month or so    Drug use: Never    Sexual activity: Yes     Partners: Female     Birth control/protection: None     Comment: ; one child      Social Determinants of Health     Financial Resource Strain: Low Risk     Difficulty of Paying Living Expenses: Not hard at all   Food Insecurity: No Food Insecurity    Worried About Running Out of Food in the Last Year: Never true    Ran Out of Food in the Last Year: Never true   Transportation Needs: No Transportation Needs    Lack of Transportation (Medical): No    Lack of Transportation (Non-Medical): No   Physical Activity: Inactive    Days of Exercise per Week: 0 days    Minutes of Exercise per Session: 0 min   Stress: Stress Concern Present    Feeling of Stress : Rather much   Social Connections: Unknown    Frequency of Communication with Friends and Family: Never    Frequency of Social Gatherings with Friends and Family: Never    Active Member of Clubs or Organizations: No    Attends Club or Organization Meetings: Never    Marital Status: Living with partner   Housing Stability: Low Risk     Unable to Pay for  Housing in the Last Year: No    Number of Places Lived in the Last Year: 1    Unstable Housing in the Last Year: No       FAMILY HISTORY:  Family History   Problem Relation Age of Onset    Fibromyalgia Mother     Osteoarthritis Mother     Cataracts Maternal Grandmother     Macular degeneration Maternal Grandfather     Cataracts Maternal Grandfather     Rheum arthritis Neg Hx     Psoriasis Neg Hx     Lupus Neg Hx     Kidney disease Neg Hx     Inflammatory bowel disease Neg Hx     Amblyopia Neg Hx     Blindness Neg Hx     Glaucoma Neg Hx     Retinal detachment Neg Hx     Strabismus Neg Hx        CURRENTS MEDICATIONS:  Current Outpatient Medications on File Prior to Visit   Medication Sig Dispense Refill    acetaminophen (TYLENOL) 500 MG tablet Take 1,000 mg by mouth 2 (two) times daily as needed for Pain.      ARIPiprazole (ABILIFY) 5 MG Tab Take 1 tablet (5 mg total) by mouth once daily. 90 tablet 0    ascorbic acid, vitamin C, (VITAMIN C) 1000 MG tablet Take 1,000 mg by mouth once daily.      aspirin (ECOTRIN) 81 MG EC tablet Take 81 mg by mouth every evening.      aspirin/sod bicarb/citric acid (GIOVANY-SELTZER ORAL) Take 2 tablets by mouth daily as needed (Upset stomach).      atorvastatin (LIPITOR) 10 MG tablet Take 1 tablet (10 mg total) by mouth every other day. 45 tablet 3    busPIRone (BUSPAR) 30 MG Tab Take 1 tablet (30 mg total) by mouth 2 (two) times daily. 180 tablet 0    celecoxib (CELEBREX) 200 MG capsule TAKE 1 CAPSULE (200 MG TOTAL) BY MOUTH 2 (TWO) TIMES DAILY. 60 capsule 1    cyclobenzaprine (FLEXERIL) 10 MG tablet Take 10 mg by mouth 3 (three) times daily.      dicyclomine (BENTYL) 20 mg tablet Take 1 tablet (20 mg total) by mouth 3 (three) times daily as needed (abdominal pain). 60 tablet 2    DULoxetine (CYMBALTA) 60 MG capsule Take 1 capsule (60 mg total) by mouth 2 (two) times daily. 180 capsule 0    flash glucose scanning reader Misc Disp one reader to monitor glucose- covered by insurance dexcom  1 each 1    flash glucose sensor Kit Dispense sensor to read glucose  dexcom 12 kit 11    fluticasone propionate (FLONASE) 50 mcg/actuation nasal spray 2 sprays (100 mcg total) by Each Nostril route once daily. 48 mL 2    gabapentin (NEURONTIN) 800 MG tablet Take 1 tablet (800 mg total) by mouth 3 (three) times daily. 270 tablet 3    hydrOXYzine HCL (ATARAX) 25 MG tablet Take 2 tablets (50 mg total) by mouth nightly as needed (insomnia). 180 tablet 0    insulin regular hum U-500 conc (HUMULIN R U-500, CONC, KWIKPEN) 500 unit/mL (3 mL) insulin pen Use 100 units 30 minutes before breakfast and before lunch.  Use 120 units before diner. 8 pen 11    insulin syringe-needle U-100 (BD INSULIN SYRINGE ULTRA-FINE) 1 mL 31 gauge x 5/16 Syrg To use 4 times daily with insulin 300 each 11    Lactobac no.41/Bifidobact no.7 (PROBIOTIC-10 ORAL) Take by mouth.      lanolin/mineral oil/petrolatum (ARTIFICIAL TEARS OPHT) Place 2 drops into both eyes daily as needed (Dry eye).      metFORMIN (GLUCOPHAGE-XR) 500 MG ER 24hr tablet Take 2 tablets (1,000 mg total) by mouth 2 (two) times daily with meals. 360 tablet 3    methylcellulose, laxative, (CITRUCEL) 500 mg Tab Take 1 tablet by mouth nightly as needed (Constipation).      metoclopramide HCl (REGLAN) 5 mg/5 mL Soln Take 5 mLs (5 mg total) by mouth 4 (four) times daily before meals and nightly. 500 mL 2    multivitamin capsule Take 1 capsule by mouth once daily.      NON FORMULARY MEDICATION Uses Cpap Machine nightly on a setting of 10      ondansetron (ZOFRAN-ODT) 4 MG TbDL Take 1 tablet (4 mg total) by mouth every 8 (eight) hours as needed (nausea). 30 tablet 5    ONETOUCH DELICA PLUS LANCET 33 gauge Misc Apply topically 3 (three) times daily.      ONETOUCH VERIO TEST STRIPS Strp 1 strip 3 (three) times daily.      pantoprazole (PROTONIX) 40 MG tablet Take 1 tablet (40 mg total) by mouth once daily. 90 tablet 3    pen needle, diabetic (BD ULTRA-FINE SHORT PEN NEEDLE) 31 gauge x  "5/16" Ndle USE WITH FLEX  each 11    pen needle, diabetic 31 gauge x 1/4" Ndle Use with flex pen 100 each 11    propranoloL (INDERAL) 20 MG tablet Take 1 tablet po TID prn anxiety. May take an additional 1 tablet po q day prn anxiety. 135 tablet 0    tirzepatide 10 mg/0.5 mL PnIj Inject 10 mg into the skin every 7 days. 4 pen 4    traMADoL 100 mg Tab Take 100 mg by mouth every 8 (eight) hours as needed (severe pain). 90 tablet 3    valsartan-hydrochlorothiazide (DIOVAN-HCT) 320-12.5 mg per tablet Take 1 tablet by mouth once daily. 90 tablet 3    vitamin D 1000 units Tab Take 5,000 Units by mouth 2 (two) times a day.      [DISCONTINUED] fluvoxaMINE (LUVOX) 100 MG tablet Take 1.5 tablets (150 mg total) by mouth 2 (two) times daily. 270 tablet 0     No current facility-administered medications on file prior to visit.       ALLERGIES:  Review of patient's allergies indicates:   Allergen Reactions    Dilaudid [hydromorphone] Other (See Comments)     IV dilaudid severely drops BP       REVIEW OF SYSTEMS:  Review of Systems   Constitutional:  Negative for diaphoresis, fever and weight loss.   Respiratory:  Negative for shortness of breath.    Cardiovascular:  Negative for chest pain.   Gastrointestinal:  Negative for blood in stool.   Genitourinary:  Negative for hematuria.   Endo/Heme/Allergies:  Does not bruise/bleed easily.   All other systems reviewed and are negative.      OBJECTIVE:    PHYSICAL EXAMINATION:   Vitals:    05/10/23 1557   BP: (!) 143/65   Pulse: 97       Physical Exam:  Vitals reviewed.    Constitutional: He appears well-developed and well-nourished.     Eyes: Pupils are equal, round, and reactive to light. Conjunctivae and EOM are normal.     Cardiovascular: Normal distal pulses and no edema.     Abdominal: Soft.     Skin: Skin displays no rash on trunk and no rash on extremities. Skin displays no lesions on trunk and no lesions on extremities.     Psych/Behavior: He is alert. He is oriented to " person, place, and time. He has a normal mood and affect.     Musculoskeletal:        Neck: Range of motion is full.     Neurological:        DTRs: Tricep reflexes are 2+ on the right side and 2+ on the left side. Bicep reflexes are 2+ on the right side and 2+ on the left side. Brachioradialis reflexes are 2+ on the right side and 2+ on the left side. Patellar reflexes are 3+ on the right side and 3+ on the left side. Achilles reflexes are 2+ on the right side and 2+ on the left side.     Back Exam     Tenderness   The patient is experiencing tenderness in the lumbar.    Range of Motion   Extension:  abnormal   Flexion:  abnormal   Lateral bend right:  abnormal   Lateral bend left:  abnormal   Rotation right:  abnormal   Rotation left:  abnormal     Muscle Strength   Right Quadriceps:  5/5   Left Quadriceps:  5/5   Right Hamstrings:  5/5   Left Hamstrings:  5/5     Tests   Straight leg raise right: negative  Straight leg raise left: negative    Other   Toe walk: normal  Heel walk: normal              Neurologic Exam     Mental Status   Oriented to person, place, and time.   Speech: speech is normal   Level of consciousness: alert    Cranial Nerves   Cranial nerves II through XII intact.     CN III, IV, VI   Pupils are equal, round, and reactive to light.  Extraocular motions are normal.     Motor Exam   Muscle bulk: normal  Overall muscle tone: normal    Strength   Right deltoid: 5/5  Left deltoid: 5/5  Right biceps: 5/5  Left biceps: 5/5  Right triceps: 5/5  Left triceps: 5/5  Right wrist flexion: 5/5  Left wrist flexion: 5/5  Right wrist extension: 5/5  Left wrist extension: 5/5  Right interossei: 5/5  Left interossei: 5/5  Right iliopsoas: 5/5  Left iliopsoas: 5/5  Right quadriceps: 5/5  Left quadriceps: 5/5  Right hamstrin/5  Left hamstrin/5  Right anterior tibial: 5/5  Left anterior tibial: 5/5  Right posterior tibial: 5/5  Left posterior tibial: 5/5  Right peroneal: 5/5  Left peroneal: 5/5  Right  gastroc: 5/5  Left gastroc: 5/5    Sensory Exam   Light touch normal.   Pinprick normal.     Gait, Coordination, and Reflexes     Coordination   Finger to nose coordination: normal  Tandem walking coordination: abnormal    Reflexes   Right brachioradialis: 2+  Left brachioradialis: 2+  Right biceps: 2+  Left biceps: 2+  Right triceps: 2+  Left triceps: 2+  Right patellar: 3+  Left patellar: 3+  Right achilles: 2+  Left achilles: 2+  Right plantar: normal  Left plantar: normal  Right Hughes: absent  Left Hughes: absent  Right ankle clonus: absent  Left ankle clonus: absent      DIAGNOSTIC DATA:  I personally interpreted the following imaging:   November 2021 cervical thoracic and lumbar spine MRI was showing T3-4 dorsal and large CSF space with cord compression, possible arachnoid cyst.   CT lumbar spine to 2023 shows consolidation of from L4-S1, the L3 pedicle screws have some lucency around them    ASSESMENT:  This is a 47 y.o. male with     Problem List Items Addressed This Visit    None  Visit Diagnoses       Arachnoid cyst    -  Primary    Relevant Orders    MRI Thoracic Spine W WO Contrast    MRI Lumbar Spine W WO Contrast    Loosening of hardware in spine        Relevant Orders    MRI Lumbar Spine W WO Contrast    BMI 50.0-59.9, adult                  PLAN:  We will repeat thoracic and lumbar spine MRI with and without contrast to better assess the T3-4 arachnoid cyst and make sure that it is stable.  Also the lumbar spine MRI will help determine radiating if the patient has adjacent segment degeneration or any inflammatory infectious process associated with the lucency of the L3 screws on CT.  The patient has increased back pain for the last 2 months and has some radicular symptoms including bilateral leg numbness.  The patient has increase perioperative risks because of these comorbidities:  Morbid obesity of BMI of 53.         Jose L Brown MD  Cell:[phone]

## 2023-05-11 ENCOUNTER — OFFICE VISIT (OUTPATIENT)
Dept: PODIATRY | Facility: CLINIC | Age: 48
End: 2023-05-11
Payer: MEDICAID

## 2023-05-11 VITALS — HEIGHT: 72 IN | BODY MASS INDEX: 42.66 KG/M2 | WEIGHT: 315 LBS

## 2023-05-11 DIAGNOSIS — G62.9 NEUROPATHY: ICD-10-CM

## 2023-05-11 DIAGNOSIS — E11.49 TYPE II DIABETES MELLITUS WITH NEUROLOGICAL MANIFESTATIONS: ICD-10-CM

## 2023-05-11 DIAGNOSIS — E11.9 COMPREHENSIVE DIABETIC FOOT EXAMINATION, TYPE 2 DM, ENCOUNTER FOR: ICD-10-CM

## 2023-05-11 DIAGNOSIS — E66.01 MORBID OBESITY: ICD-10-CM

## 2023-05-11 DIAGNOSIS — E11.65 UNCONTROLLED TYPE 2 DIABETES MELLITUS WITH HYPERGLYCEMIA: Primary | ICD-10-CM

## 2023-05-11 PROCEDURE — 99213 OFFICE O/P EST LOW 20 MIN: CPT | Mod: S$PBB,,, | Performed by: PODIATRIST

## 2023-05-11 PROCEDURE — 3072F LOW RISK FOR RETINOPATHY: CPT | Mod: CPTII,,, | Performed by: PODIATRIST

## 2023-05-11 PROCEDURE — 3046F PR MOST RECENT HEMOGLOBIN A1C LEVEL > 9.0%: ICD-10-PCS | Mod: CPTII,,, | Performed by: PODIATRIST

## 2023-05-11 PROCEDURE — 99215 OFFICE O/P EST HI 40 MIN: CPT | Mod: PBBFAC,PN | Performed by: PODIATRIST

## 2023-05-11 PROCEDURE — 3061F NEG MICROALBUMINURIA REV: CPT | Mod: CPTII,,, | Performed by: PODIATRIST

## 2023-05-11 PROCEDURE — 3061F PR NEG MICROALBUMINURIA RESULT DOCUMENTED/REVIEW: ICD-10-PCS | Mod: CPTII,,, | Performed by: PODIATRIST

## 2023-05-11 PROCEDURE — 3066F NEPHROPATHY DOC TX: CPT | Mod: CPTII,,, | Performed by: PODIATRIST

## 2023-05-11 PROCEDURE — 99999 PR PBB SHADOW E&M-EST. PATIENT-LVL V: CPT | Mod: PBBFAC,,, | Performed by: PODIATRIST

## 2023-05-11 PROCEDURE — 3046F HEMOGLOBIN A1C LEVEL >9.0%: CPT | Mod: CPTII,,, | Performed by: PODIATRIST

## 2023-05-11 PROCEDURE — 1160F RVW MEDS BY RX/DR IN RCRD: CPT | Mod: CPTII,,, | Performed by: PODIATRIST

## 2023-05-11 PROCEDURE — 1159F MED LIST DOCD IN RCRD: CPT | Mod: CPTII,,, | Performed by: PODIATRIST

## 2023-05-11 PROCEDURE — 3008F PR BODY MASS INDEX (BMI) DOCUMENTED: ICD-10-PCS | Mod: CPTII,,, | Performed by: PODIATRIST

## 2023-05-11 PROCEDURE — 1160F PR REVIEW ALL MEDS BY PRESCRIBER/CLIN PHARMACIST DOCUMENTED: ICD-10-PCS | Mod: CPTII,,, | Performed by: PODIATRIST

## 2023-05-11 PROCEDURE — 99999 PR PBB SHADOW E&M-EST. PATIENT-LVL V: ICD-10-PCS | Mod: PBBFAC,,, | Performed by: PODIATRIST

## 2023-05-11 PROCEDURE — 3008F BODY MASS INDEX DOCD: CPT | Mod: CPTII,,, | Performed by: PODIATRIST

## 2023-05-11 PROCEDURE — 1159F PR MEDICATION LIST DOCUMENTED IN MEDICAL RECORD: ICD-10-PCS | Mod: CPTII,,, | Performed by: PODIATRIST

## 2023-05-11 PROCEDURE — 3072F PR LOW RISK FOR RETINOPATHY: ICD-10-PCS | Mod: CPTII,,, | Performed by: PODIATRIST

## 2023-05-11 PROCEDURE — 99213 PR OFFICE/OUTPT VISIT, EST, LEVL III, 20-29 MIN: ICD-10-PCS | Mod: S$PBB,,, | Performed by: PODIATRIST

## 2023-05-11 PROCEDURE — 3066F PR DOCUMENTATION OF TREATMENT FOR NEPHROPATHY: ICD-10-PCS | Mod: CPTII,,, | Performed by: PODIATRIST

## 2023-05-11 RX ORDER — LIDOCAINE HYDROCHLORIDE 40 MG/ML
4 SOLUTION TOPICAL
Status: DISCONTINUED | OUTPATIENT
Start: 2023-05-11 | End: 2023-05-11

## 2023-05-11 NOTE — PROGRESS NOTES
Subjective:      Patient ID: Kirill Gandhi III is a 47 y.o. male.    Chief Complaint: Diabetes Mellitus (PCP Dr. Bowen last neftali. 02/20/Pt. states Right great toenail may has fungus )      Kirill is a 47 y.o. male who presents to the clinic upon referral from Dr. Villa  for evaluation and treatment of diabetic feet. Kirill has a past medical history of Allergy, Anxiety, Back pain, Chronic bilateral low back pain with bilateral sciatica (12/14/2021), CTS (carpal tunnel syndrome) (06/26/2015), CTS (carpal tunnel syndrome) (6/26/2015), Depression, Diabetes (04/17/2015), Diabetes mellitus with insulin therapy, Elevated sed rate (6/26/2015), GERD (gastroesophageal reflux disease) (10/19/2022), vasectomy, Hypertension, Morbid obesity (4/16/2015), OCD (obsessive compulsive disorder), Sleep apnea, and Type 2 diabetes mellitus with hyperglycemia, with long-term current use of insulin (10/17/2022). Patient relates no major problem with feet. Only complaints today consist of tingling on bilateral lower extremities.    PCP: Marcos Ibarra MD    Date Last Seen by PCP:  In epic      Hemoglobin A1C   Date Value Ref Range Status   02/07/2023 9.2 (H) 4.0 - 5.6 % Final     Comment:     ADA Screening Guidelines:  5.7-6.4%  Consistent with prediabetes  >or=6.5%  Consistent with diabetes    High levels of fetal hemoglobin interfere with the HbA1C  assay. Heterozygous hemoglobin variants (HbS, HgC, etc)do  not significantly interfere with this assay.   However, presence of multiple variants may affect accuracy.     11/02/2022 8.7 (H) 4.0 - 5.6 % Final     Comment:     ADA Screening Guidelines:  5.7-6.4%  Consistent with prediabetes  >or=6.5%  Consistent with diabetes    High levels of fetal hemoglobin interfere with the HbA1C  assay. Heterozygous hemoglobin variants (HbS, HgC, etc)do  not significantly interfere with this assay.   However, presence of multiple variants may affect accuracy.     07/18/2022 8.1 (H) 4.0 -  5.6 % Final     Comment:     ADA Screening Guidelines:  5.7-6.4%  Consistent with prediabetes  >or=6.5%  Consistent with diabetes    High levels of fetal hemoglobin interfere with the HbA1C  assay. Heterozygous hemoglobin variants (HbS, HgC, etc)do  not significantly interfere with this assay.   However, presence of multiple variants may affect accuracy.           Level of ambulation/Occupation:   Smoking status:   Windy-D Def:   DM:  Other:     Review of Systems   Constitutional: Negative for decreased appetite and malaise/fatigue.   Cardiovascular:  Negative for claudication and leg swelling.   Musculoskeletal:  Positive for stiffness. Negative for arthritis, joint pain, joint swelling and muscle weakness.   Neurological:  Positive for numbness and sensory change. Negative for weakness.   Psychiatric/Behavioral:  Negative for altered mental status.            Past Medical History:   Diagnosis Date    Allergy     Anxiety     Back pain     Chronic bilateral low back pain with bilateral sciatica 12/14/2021    CTS (carpal tunnel syndrome) 06/26/2015    CTS (carpal tunnel syndrome) 6/26/2015    Depression     Diabetes 04/17/2015    Diabetes mellitus with insulin therapy     Elevated sed rate 6/26/2015    GERD (gastroesophageal reflux disease) 10/19/2022    Hx of vasectomy     Hypertension     Morbid obesity 4/16/2015    OCD (obsessive compulsive disorder)     Sleep apnea     Type 2 diabetes mellitus with hyperglycemia, with long-term current use of insulin 10/17/2022       Past Surgical History:   Procedure Laterality Date    ADENOIDECTOMY      COLONOSCOPY N/A 9/6/2022    Procedure: COLONOSCOPY;  Surgeon: Rossy Champion MD;  Location: [facility];  Service: Endoscopy;  Laterality: N/A;    EPIDURAL STEROID INJECTION Right 09/04/2019    Procedure: Injection, Steroid, Epidural Transforaminal;  Surgeon: Harjinder Batista Jr., MD;  Location: [facility] ENDO;  Service: Pain Management;  Laterality: Right;  Right L3 + L4 TF  NATALIE    [id number]  [id number]    Arrive @ 1300; ASA last 8/27; Check BG; NEEDS CONSENT    ESOPHAGOGASTRODUODENOSCOPY N/A 10/31/2022    Procedure: EGD (ESOPHAGOGASTRODUODENOSCOPY);  Surgeon: Rossy Champion MD;  Location: [facility];  Service: Endoscopy;  Laterality: N/A;    FRACTURE SURGERY      bilateral elbow fracture 3 years ago    FUSION OF LUMBAR SPINE BY ANTERIOR APPROACH Left 04/26/2021    Procedure: FUSION, SPINE, LUMBAR, ANTERIOR APPROACH Left L3-4 L4-5 OLIF BRENDA, ALL Release, L5-S1 ALIF;  Surgeon: Jose L Brown MD;  Location: [facility];  Service: Neurosurgery;  Laterality: Left;  Procedure: Left L3-4 L4-5 OLIF BRENDA, ALL Release, L5-S1 ALIF  Surgery Time: 2.5 Hrs  LOS: 2-3  Anesthesia: General  Blood: Type & Screen  Radiology: Josseline  Brace: LSO  Bed: Regular  Position: LAteral Righ    FUSION OF SACROILIAC JOINT Bilateral 04/13/2022    Procedure: FUSION, SACROILIAC JOINT Stg 2: Bilateral Open SI Joint fusion, Bedrock from Si Bone;  Surgeon: Jose L Brown MD;  Location: [facility];  Service: Neurosurgery;  Laterality: Bilateral;    FUSION OF SPINE WITH INSTRUMENTATION N/A 04/26/2021    Procedure: FUSION, SPINE, WITH INSTRUMENTATION L3-S1 Posteior Segmental Instrumentation;  Surgeon: Jose L Brown MD;  Location: [facility];  Service: Neurosurgery;  Laterality: N/A;  Procedure: L3-S1 Posteior Segmental Instrumentation  Sirgery Time: 2 Hrs  LOS: 2-3  Anesthesia: General  Blood: Tyoe & Screen  Radiology: Dual C arm  Brace:LSO  Bed: [place] [id number] Poster  Position: Prone  Equipment: Depuy     FUSION OF SPINE WITH INSTRUMENTATION N/A 04/13/2022    Procedure: FUSION, SPINE, WITH INSTRUMENTATION Stg 1: Revision of posterior L3-S1 hardware, Depuy. Sacro-pelvic fixation, screw augmentation PMMA. L4-S1 posterolateral arthrodesis. Open Gauge screws, 11-12mm;  Surgeon: Jose L Brown MD;  Location: [facility];  Service: Neurosurgery;  Laterality: N/A;    HERNIA REPAIR      bilateral groin hernia    INJECTION OF STEROID Bilateral  12/13/2021    Procedure: INJECTION, STEROID Bilateral SI JOint Block and Steroid Injection;  Surgeon: Jose L Brown MD;  Location: [facility] OR;  Service: Neurosurgery;  Laterality: Bilateral;  Procedure: Bilateral SI JOint Block and Steroid Injection   Surgery Time: 30 min  LOS: 0  Anesthesia: MAC  Radiology: C-arm  Bed: Regular with pillows  Position: Prone    LUMBAR LAMINECTOMY Bilateral 11/29/2019    Procedure: LAMINECTOMY, SPINE, LUMBAR Rigth L3-4, Left L4-5 Laminectomy, medial Facetectomy, and microdiscectomy;  Surgeon: Jose L Brown MD;  Location: [facility] OR;  Service: Neurosurgery;  Laterality: Bilateral;  Procedure: Rigth L3-4, Left L4-5 Laminectomy, medial Facetectomy, and microdiscectomy  Surgery Time: 2.5 Hrs  LOS: 0-1  Anesthesia: General  Blood: Type & Screen  Radiology: C-arm  Micros    REMOVAL IMPLANT, POSTERIOR SEGMENT, INTRAOCULAR  04/13/2022    Procedure: REMOVAL IMPLANT, POSTERIOR SEGMENT, INTRAOCULAR;  Surgeon: Jose L Brown MD;  Location: [facility] OR;  Service: Neurosurgery;;    SPINE SURGERY      TONSILLECTOMY      VASECTOMY         Family History   Problem Relation Age of Onset    Fibromyalgia Mother     Osteoarthritis Mother     Cataracts Maternal Grandmother     Macular degeneration Maternal Grandfather     Cataracts Maternal Grandfather     Rheum arthritis Neg Hx     Psoriasis Neg Hx     Lupus Neg Hx     Kidney disease Neg Hx     Inflammatory bowel disease Neg Hx     Amblyopia Neg Hx     Blindness Neg Hx     Glaucoma Neg Hx     Retinal detachment Neg Hx     Strabismus Neg Hx        Social History     Socioeconomic History    Marital status: Significant Other   Tobacco Use    Smoking status: Every Day     Types: Vaping with nicotine    Smokeless tobacco: Never    Tobacco comments:     Handout provided for [employer] Smoking Cessaiton program   Substance and Sexual Activity    Alcohol use: Not Currently     Comment: 750 ml  once a month or so    Drug use: Never    Sexual activity: Yes      Partners: Female     Birth control/protection: None     Comment: ; one child      Social Determinants of Health     Financial Resource Strain: Low Risk     Difficulty of Paying Living Expenses: Not hard at all   Food Insecurity: No Food Insecurity    Worried About Running Out of Food in the Last Year: Never true    Ran Out of Food in the Last Year: Never true   Transportation Needs: No Transportation Needs    Lack of Transportation (Medical): No    Lack of Transportation (Non-Medical): No   Physical Activity: Inactive    Days of Exercise per Week: 0 days    Minutes of Exercise per Session: 0 min   Stress: Stress Concern Present    Feeling of Stress : Rather much   Social Connections: Unknown    Frequency of Communication with Friends and Family: Never    Frequency of Social Gatherings with Friends and Family: Never    Active Member of Clubs or Organizations: No    Attends Club or Organization Meetings: Never    Marital Status: Living with partner   Housing Stability: Low Risk     Unable to Pay for Housing in the Last Year: No    Number of Places Lived in the Last Year: 1    Unstable Housing in the Last Year: No       Current Outpatient Medications   Medication Sig Dispense Refill    acetaminophen (TYLENOL) 500 MG tablet Take 1,000 mg by mouth 2 (two) times daily as needed for Pain.      ARIPiprazole (ABILIFY) 5 MG Tab Take 1 tablet (5 mg total) by mouth once daily. 90 tablet 0    ascorbic acid, vitamin C, (VITAMIN C) 1000 MG tablet Take 1,000 mg by mouth once daily.      aspirin (ECOTRIN) 81 MG EC tablet Take 81 mg by mouth every evening.      aspirin/sod bicarb/citric acid (GIOVANY-SELTZER ORAL) Take 2 tablets by mouth daily as needed (Upset stomach).      atorvastatin (LIPITOR) 10 MG tablet Take 1 tablet (10 mg total) by mouth every other day. 45 tablet 3    busPIRone (BUSPAR) 30 MG Tab Take 1 tablet (30 mg total) by mouth 2 (two) times daily. 180 tablet 0    celecoxib (CELEBREX) 200 MG capsule TAKE 1 CAPSULE  (200 MG TOTAL) BY MOUTH 2 (TWO) TIMES DAILY. 60 capsule 1    cyclobenzaprine (FLEXERIL) 10 MG tablet Take 10 mg by mouth 3 (three) times daily.      dicyclomine (BENTYL) 20 mg tablet Take 1 tablet (20 mg total) by mouth 3 (three) times daily as needed (abdominal pain). 60 tablet 2    DULoxetine (CYMBALTA) 60 MG capsule Take 1 capsule (60 mg total) by mouth 2 (two) times daily. 180 capsule 0    flash glucose scanning reader Misc Disp one reader to monitor glucose- covered by insurance dexcom 1 each 1    flash glucose sensor Kit Dispense sensor to read glucose  dexcom 12 kit 11    fluticasone propionate (FLONASE) 50 mcg/actuation nasal spray 2 sprays (100 mcg total) by Each Nostril route once daily. 48 mL 2    gabapentin (NEURONTIN) 800 MG tablet Take 1 tablet (800 mg total) by mouth 3 (three) times daily. 270 tablet 3    hydrOXYzine HCL (ATARAX) 25 MG tablet Take 2 tablets (50 mg total) by mouth nightly as needed (insomnia). 180 tablet 0    insulin regular hum U-500 conc (HUMULIN R U-500, CONC, KWIKPEN) 500 unit/mL (3 mL) insulin pen Use 100 units 30 minutes before breakfast and before lunch.  Use 120 units before diner. 8 pen 11    insulin syringe-needle U-100 (BD INSULIN SYRINGE ULTRA-FINE) 1 mL 31 gauge x 5/16 Syrg To use 4 times daily with insulin 300 each 11    Lactobac no.41/Bifidobact no.7 (PROBIOTIC-10 ORAL) Take by mouth.      lanolin/mineral oil/petrolatum (ARTIFICIAL TEARS OPHT) Place 2 drops into both eyes daily as needed (Dry eye).      metFORMIN (GLUCOPHAGE-XR) 500 MG ER 24hr tablet Take 2 tablets (1,000 mg total) by mouth 2 (two) times daily with meals. 360 tablet 3    methylcellulose, laxative, (CITRUCEL) 500 mg Tab Take 1 tablet by mouth nightly as needed (Constipation).      metoclopramide HCl (REGLAN) 5 mg/5 mL Soln Take 5 mLs (5 mg total) by mouth 4 (four) times daily before meals and nightly. 500 mL 2    multivitamin capsule Take 1 capsule by mouth once daily.      NON FORMULARY MEDICATION Uses  "Cpap Machine nightly on a setting of 10      ondansetron (ZOFRAN-ODT) 4 MG TbDL Take 1 tablet (4 mg total) by mouth every 8 (eight) hours as needed (nausea). 30 tablet 5    ONETOUCH DELICA PLUS LANCET 33 gauge Misc Apply topically 3 (three) times daily.      ONETOUCH VERIO TEST STRIPS Strp 1 strip 3 (three) times daily.      pantoprazole (PROTONIX) 40 MG tablet Take 1 tablet (40 mg total) by mouth once daily. 90 tablet 3    pen needle, diabetic (BD ULTRA-FINE SHORT PEN NEEDLE) 31 gauge x 5/16" Ndle USE WITH FLEX  each 11    pen needle, diabetic 31 gauge x 1/4" Ndle Use with flex pen 100 each 11    propranoloL (INDERAL) 20 MG tablet Take 1 tablet po TID prn anxiety. May take an additional 1 tablet po q day prn anxiety. 135 tablet 0    tirzepatide 10 mg/0.5 mL PnIj Inject 10 mg into the skin every 7 days. 4 pen 4    traMADoL 100 mg Tab Take 100 mg by mouth every 8 (eight) hours as needed (severe pain). 90 tablet 3    valsartan-hydrochlorothiazide (DIOVAN-HCT) 320-12.5 mg per tablet Take 1 tablet by mouth once daily. 90 tablet 3    vitamin D 1000 units Tab Take 5,000 Units by mouth 2 (two) times a day.      KLGA ketoprofen 10% LIDOcaine 10% gabapentin 6% amitriptyline 2% in transdermal cream Apply 1 to 2 grams 3 to 4 times daily 240 g 1     No current facility-administered medications for this visit.       Review of patient's allergies indicates:   Allergen Reactions    Dilaudid [hydromorphone] Other (See Comments)     IV dilaudid severely drops BP       Vitals:    05/11/23 1431   Weight: (!) 181.1 kg (399 lb 4.8 oz)   Height: 6' (1.829 m)   PainSc:   5   PainLoc: Foot       Objective:      Physical Exam  Constitutional:       General: He is not in acute distress.     Appearance: He is well-developed.   HENT:      Nose: Nose normal.   Eyes:      Conjunctiva/sclera: Conjunctivae normal.   Pulmonary:      Effort: Pulmonary effort is normal.   Chest:      Chest wall: No tenderness.   Abdominal:      Tenderness: " There is no abdominal tenderness.   Musculoskeletal:      Cervical back: Normal range of motion.   Neurological:      Mental Status: He is alert and oriented to person, place, and time.   Psychiatric:         Behavior: Behavior normal.       Vascular: Distal DP/PT pulses palpable 2/4. CRT < 3 sec to tips of toes. No vericosities noted to LEs. Hair growth present LE, warm to touch LE, No edema noted to LE.    Dermatologic: No open lesions, lacerations or wounds. Interdigital spaces clean, dry and intact. No erythema, rubor, calor noted LE    Musculoskeletal: MMT 5/5 in DF/PF/Inv/Ev resistance with no reproduction of pain in any direction. Passive range of motion of ankle and pedal joints is painless. No calf tenderness LE, Compartments soft/compressible. ROM of ankles with < 10 deg DF is noted b/l.     Neurological: Light touch, proprioception, and sharp/dull sensation are all intact. Protective threshold with the [place]-Wienstein monofilament is intact. Vibratory sensation intact.         Assessment:       Encounter Diagnoses   Name Primary?    Uncontrolled type 2 diabetes mellitus with hyperglycemia Yes    Neuropathy     Type II diabetes mellitus with neurological manifestations     Morbid obesity     Comprehensive diabetic foot examination, type 2 DM, encounter for          Plan:       Kirill was seen today for diabetes mellitus.    Diagnoses and all orders for this visit:    Uncontrolled type 2 diabetes mellitus with hyperglycemia  -     Ambulatory referral/consult to Podiatry  -     DIABETIC SHOES FOR HOME USE    Neuropathy  -     Ambulatory referral/consult to Podiatry  -     DIABETIC SHOES FOR HOME USE    Type II diabetes mellitus with neurological manifestations    Morbid obesity    Comprehensive diabetic foot examination, type 2 DM, encounter for    Other orders  -     Discontinue: LIDOcaine HCL 4% external solution 4 mL  -     MESFIN ketoprofen 10% LIDOcaine 10% gabapentin 6% amitriptyline 2% in transdermal  cream; Apply 1 to 2 grams 3 to 4 times daily      I counseled the patient on his conditions, their implications and medical management.    47 y.o. male with type 2 diabetes, diabetic foot risk assessment.     -Rx lidocaine topical cream  -rx. DM shoes   -palpable pulses.  No open ulcers.  Chronic diabetic neuropathy.     -I reviewed imaging, clinical history, and diagnosis as above with the patient. I attempted to use layman's terms to educate the patient as well as utilize foot models and/or pictures. I personally went through imaging with the patient.    -The nature of the condition, options for management, as well as potential risks and complications were discussed in detail with patient. Patient was amenable to my recommendations and left my office fully informed and will follow up as instructed or sooner if necessary.    -Advised for optimal glucose control and maintenance per primary care physician. Patient was also educated on healthy diet that is naturally rich in nutrients and low in fat and calories.   -Discussed reducing caloric intake, increase physical activity, weight loss, exercise, low salt diet, which may include blood sugar control to help with foot and ankle complications.  -It was discussed the importance of wearing shoes with adequate room in toe box to accommodate toe deformities. Recommended New Balance/[employer]ics shoe brands with adequate arch supports to alleviate abnormal pressure and improve stability of foot while walking. Avoid flat shoes and barefoot walking as these will exacerbate or worsen symptoms.   -f/u 1 year     Note dictated with voice recognition software, please excuse any grammatical errors.

## 2023-05-12 ENCOUNTER — PATIENT MESSAGE (OUTPATIENT)
Dept: ENDOCRINOLOGY | Facility: CLINIC | Age: 48
End: 2023-05-12
Payer: MEDICAID

## 2023-05-12 ENCOUNTER — TELEPHONE (OUTPATIENT)
Dept: ENDOCRINOLOGY | Facility: CLINIC | Age: 48
End: 2023-05-12
Payer: MEDICAID

## 2023-05-12 DIAGNOSIS — E11.65 UNCONTROLLED TYPE 2 DIABETES MELLITUS WITH HYPERGLYCEMIA: ICD-10-CM

## 2023-05-17 ENCOUNTER — PATIENT MESSAGE (OUTPATIENT)
Dept: ENDOCRINOLOGY | Facility: CLINIC | Age: 48
End: 2023-05-17
Payer: MEDICAID

## 2023-05-17 DIAGNOSIS — E11.65 UNCONTROLLED TYPE 2 DIABETES MELLITUS WITH HYPERGLYCEMIA: Primary | ICD-10-CM

## 2023-05-19 ENCOUNTER — TELEPHONE (OUTPATIENT)
Dept: PHARMACY | Facility: CLINIC | Age: 48
End: 2023-05-19
Payer: MEDICAID

## 2023-05-19 ENCOUNTER — TELEPHONE (OUTPATIENT)
Dept: PODIATRY | Facility: CLINIC | Age: 48
End: 2023-05-19
Payer: MEDICAID

## 2023-05-19 NOTE — TELEPHONE ENCOUNTER
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 (on a 150 mcg /6 days and one day of 300 mcg/week -patient/spouse not sure which day), Red Man Syndrome from IV vancomycin, chronic pain syndrome with patient apparently on escalating doses of Neurontin (per spouse now on 800 mg 4xDay) with PRN Tramadol and Vicodin due to patient severe pain from her skin lesions, with last admission to [facility] in 10/2018 for RIGHT LE cellulitis, endometrial CA diagnosed in 2015 with [facility] BSO and presumed to be in remission, with patient with home visiting RN with an Unna's boot on the RIGHT LE but both are wrapped since Monday, with self referral following rigors and shaking chills since last night.   Spouse noted increased confusion by patient at home last night.    # sepsis 2/2 cellulitis  # AMS likely 2/2 infection  # bullous pemphigoid steroid dependent ro flare  # surgical hypothyroidism  # poorly controlled type 2 DM  # uterine CA presumed to be disease free  # RIGHT> left LE cellulitis    sp IV Azactam and vanco  appreciate ID recs  BC NTD  appreciate derm recs  will given IVIG in house MWF per derm dosing for bullous phemphigoid to prevent flare (Gammagard 200gm/200mL- Infuse 80 gm/day QOD (M, W, F) for 1 week and repeat every 4 weeks)  cont prednisone 40mg  FS ACHS, SSI, lantus  CT head meningioma, no infarct  outpt takes neurontin 800mg QID rx by Dr Gonzales pain management, neurontin on hold  outpt takes tramadol 300mg extended release 24 hrs qd and norco 10-325mg q8 prn  cont Tramadol and low dose Oxycodone IR 5 mg PRN for pain relief  cont statin  PT    #Anemia- sp3u prbc, Hb stable at 7  , no e/o active bleeding, plan fro another prbc transfusion today  transfuse to keep hb >7  anemia mullen- iron studies, hemolytic mullen, folate, b12 - reviewe  cta/p -no rp bleed  as hb stable- bM bx can be done at later time per heme  occult bld  positive-GI cs - no interventions as hb stable -outpt fu    Hypothyroidism: elev tsh, pt currently on 150mcg/d but home dose- takes 300 twice wkly, and 150mcg remaining days  will adjust synthroid dosing to 300mcg qd for a week and switch back to home dose as above  d/w endo cs appreciated, will change to 200mcg qd upon dc, reveiwed TFTs    DVT prophylaxis    PCP:     Olivia Ross MD (PCP) [phone]       DIABETIC SHOE ORDER SENT TO [facility]

## 2023-05-24 ENCOUNTER — TELEPHONE (OUTPATIENT)
Dept: ENDOCRINOLOGY | Facility: CLINIC | Age: 48
End: 2023-05-24
Payer: MEDICAID

## 2023-05-24 ENCOUNTER — PATIENT MESSAGE (OUTPATIENT)
Dept: ENDOCRINOLOGY | Facility: CLINIC | Age: 48
End: 2023-05-24
Payer: MEDICAID

## 2023-05-24 DIAGNOSIS — E11.65 UNCONTROLLED TYPE 2 DIABETES MELLITUS WITH HYPERGLYCEMIA: Primary | ICD-10-CM

## 2023-05-24 RX ORDER — TIRZEPATIDE 15 MG/.5ML
15 INJECTION, SOLUTION SUBCUTANEOUS
Qty: 4 PEN | Refills: 11 | Status: SHIPPED | OUTPATIENT
Start: 2023-05-24

## 2023-05-26 ENCOUNTER — HOSPITAL ENCOUNTER (OUTPATIENT)
Dept: RADIOLOGY | Facility: HOSPITAL | Age: 48
Discharge: HOME | End: 2023-05-26
Attending: NEUROLOGICAL SURGERY
Payer: MEDICAID

## 2023-05-26 DIAGNOSIS — G93.0 ARACHNOID CYST: ICD-10-CM

## 2023-05-26 DIAGNOSIS — T84.498A LOOSENING OF HARDWARE IN SPINE: ICD-10-CM

## 2023-05-26 PROCEDURE — 72158 MRI LUMBAR SPINE W WO CONTRAST: ICD-10-PCS | Mod: 26,,, | Performed by: RADIOLOGY

## 2023-05-26 PROCEDURE — A9585 GADOBUTROL INJECTION: HCPCS | Performed by: NEUROLOGICAL SURGERY

## 2023-05-26 PROCEDURE — 72158 MRI LUMBAR SPINE W/O & W/DYE: CPT | Mod: 26,,, | Performed by: RADIOLOGY

## 2023-05-26 PROCEDURE — 72157 MRI CHEST SPINE W/O & W/DYE: CPT | Mod: TC

## 2023-05-26 PROCEDURE — 72157 MRI CHEST SPINE W/O & W/DYE: CPT | Mod: 26,,, | Performed by: RADIOLOGY

## 2023-05-26 PROCEDURE — 72157 MRI THORACIC SPINE W WO CONTRAST: ICD-10-PCS | Mod: 26,,, | Performed by: RADIOLOGY

## 2023-05-26 PROCEDURE — 25500020 PHARM REV CODE 255: Performed by: NEUROLOGICAL SURGERY

## 2023-05-26 PROCEDURE — 72158 MRI LUMBAR SPINE W/O & W/DYE: CPT | Mod: TC

## 2023-05-26 RX ORDER — GADOBUTROL 604.72 MG/ML
10 INJECTION INTRAVENOUS
Status: COMPLETED | OUTPATIENT
Start: 2023-05-26 | End: 2023-05-26

## 2023-05-26 RX ADMIN — GADOBUTROL 10 ML: 604.72 INJECTION INTRAVENOUS at 07:05

## 2023-05-29 ENCOUNTER — TELEPHONE (OUTPATIENT)
Dept: NEUROSURGERY | Facility: CLINIC | Age: 48
End: 2023-05-29
Payer: MEDICAID

## 2023-05-29 NOTE — TELEPHONE ENCOUNTER
Contacted pt, informed pt that  would like him to come in office to discuss his MRI results. I scheduled pt to see  on June 19 and placed the patient on the waiting list. Pt voiced understanding

## 2023-06-09 ENCOUNTER — CLINICAL SUPPORT (OUTPATIENT)
Dept: DIABETES | Facility: CLINIC | Age: 48
End: 2023-06-09
Payer: MEDICAID

## 2023-06-09 DIAGNOSIS — E11.65 UNCONTROLLED TYPE 2 DIABETES MELLITUS WITH HYPERGLYCEMIA: ICD-10-CM

## 2023-06-09 PROCEDURE — 99999 PR PBB SHADOW E&M-EST. PATIENT-LVL I: CPT | Mod: PBBFAC,,, | Performed by: DIETITIAN, REGISTERED

## 2023-06-09 PROCEDURE — 99999 PR PBB SHADOW E&M-EST. PATIENT-LVL I: ICD-10-PCS | Mod: PBBFAC,,, | Performed by: DIETITIAN, REGISTERED

## 2023-06-09 PROCEDURE — 99211 OFF/OP EST MAY X REQ PHY/QHP: CPT | Mod: PBBFAC,PN | Performed by: DIETITIAN, REGISTERED

## 2023-06-09 PROCEDURE — G0108 DIAB MANAGE TRN  PER INDIV: HCPCS | Mod: PBBFAC,PN | Performed by: DIETITIAN, REGISTERED

## 2023-06-09 NOTE — PROGRESS NOTES
Diabetes Care Specialist Progress Note  Author: Raquel Paula RD  Date: 6/9/2023    Program Intake  Current diabetes risk level:: moderate  In the last 12 months, have you:: used emergency room services  Was the ER or hospital admission related to diabetes?: Yes  Permission to speak with others about care:: no    Lab Results   Component Value Date    LABA1C 6.8 (H) 03/24/2017    HGBA1C 9.2 (H) 02/07/2023       Clinical    Problem Review  Reviewed Problem List with Patient: yes  Active comorbidities affecting diabetes self-care.: yes  Comorbidities: Hypertension, Other (comment) (morbid obesity)    Clinical Assessment  Current Diabetes Treatment: Oral Medication, Injectable, Insulin  Have you ever experienced hypoglycemia (low blood sugar)?: no (although he stated he feels low at 120 mg/dL)  Have you ever experienced hyperglycemia (high blood sugar)?: yes  In the last month, how often have you experienced high blood sugar?: more than once a day  Are you able to tell when your blood sugar is high?: Yes  What are your symptoms?: fatigue  Have you ever been hospitalized because your blood sugar was high?: yes (see comments)    Medication Information  How do you obtain your medications?: Patient drives  Do you sometimes have difficulty refilling your medications?: No  Medication adherence impacting ability to self-manage diabetes?: No    Labs  Do you have regular lab work to monitor your medications?: Yes  Type of Regular Lab Work: A1c, BMP, Cholesterol  Lab Compliance Barriers: No    Nutritional Status  Diet: Regular  Meal Plan 24 Hour Recall: Breakfast, Lunch, Dinner, Snack  Meal Plan 24 Hour Recall - Breakfast: skips often  Meal Plan 24 Hour Recall - Lunch: fast food - Felecia 4 burritos or Egg McMuffin and hot and spicy chicken sandwich with strawberry smoothie  Meal Plan 24 Hour Recall - Dinner: chickenn wings, park slaw, baked beans, crackers  Meal Plan 24 Hour Recall - Snack: peanuts, frosted mini wheat cereal,  drinks diet cokes and some water during the day; occasional regular soda  Change in appetite?: Yes (attributes it to Mounjaro)  Dentation:: Intact  Recent Changes in Weight: No Recent Weight Change  Current nutritional status an area of need that is impacting patient's ability to self-manage diabetes?: Yes    Additional Social History    Support  Does anyone support you with your diabetes care?: yes  Who supports you?: significant other, family member  Who takes you to your medical appointments?: self  Does the current support meet the patient's needs?: Yes  Is Support an area impacting ability to self-manage diabetes?: No    Access to Mass Media & Technology  Does the patient have access to any of the following devices or technologies?: Smart phone  Media or technology needs impacting ability to self-manage diabetes?: No    Cognitive/Behavioral Health  Alert and Oriented: Yes  Difficulty Thinking: No  Requires Prompting: No  Requires assistance for routine expression?: No  Cognitive or behavioral barriers impacting ability to self-manage diabetes?: No    Culture/Zoroastrianism  Culture or Restoration beliefs that may impact ability to access healthcare: No    Communication  Language preference: English  Hearing Problems: No  Communication needs impacting ability to self-manage diabetes?: No    Health Literacy  Preferred Learning Method: Face to Face, Reading Materials  How often do you need to have someone help you read instructions, pamphlets, or written material from your doctor or pharmacy?: Rarely  Health literacy needs impacting ability to self-manage diabetes?: No      Diabetes Self-Management Skills Assessment    Diabetes Disease Process/Treatment Options  Patient/caregiver able to state what happens when someone has diabetes.: yes  Patient/caregiver knows what type of diabetes they have.: yes  Diabetes Type : Type II  Patient/caregiver able to identify at least three signs and symptoms of diabetes.: yes  Identified  signs and symptoms:: increased thirst, frequent urination, fatigue  Patient able to identify at least three risk factors for diabetes.: yes  Identified risk factors:: being overweight, family history, reduced activity  Diabetes Disease Process/Treatment Options: Skills Assessment Completed: Yes  Assessment indicates:: Adequate understanding  Area of need?: No    Nutrition/Healthy Eating  Challenges to healthy eating:: portion control, lack of will power  Method of carbohydrate measurement:: no method  Patient can identify foods that impact blood sugar.: yes  Patient-identified foods:: soda, starches (bread, pasta, rice, cereal), starchy vegetables (corn, peas, beans), sweets  Nutrition/Healthy Eating Skills Assessment Completed:: Yes  Assessment indicates:: Instruction Needed  Area of need?: Yes    Physical Activity/Exercise  Patient's daily activity level:: sedentary  Patient formally exercises outside of work.: no  Reasons for not exercising:: physically unable to exercise currently  Patient can identify forms of physical activity.: yes  Stated forms of physical activity:: moving to burn calories  Patient can identify reasons why exercise/physical activity is important in diabetes management.: yes  Identified reasons:: helps insulin work better  Physical Activity/Exercise Skills Assessment Completed: : Yes  Assessment indicates:: Instruction Needed  Area of need?: No    Medications  Patient is able to describe current diabetes management routine.: yes  Diabetes management routine:: injectable medications, insulin, oral medications  Patient is able to identify current diabetes medications, dosages, and appropriate timing of medications.: yes  Patient understands the purpose of the medications taken for diabetes.: yes  Patient reports problems or concerns with current medication regimen.: no  Medication Skills Assessment Completed:: Yes  Assessment indicates:: Adequate understanding  Area of need?: No    Home Blood  Glucose Monitoring  Patient states that blood sugar is checked at home daily.: yes  Monitoring Method:: personal continuous glucose monitor  Personal CGM type:: Dexcom G7 which was started today  Patient is able to use personal CGM appropriately.: yes  CGM Report reviewed?: no  Home Blood Glucose Monitoring Skills Assessment Completed: : Yes  Assessment indicates:: Instruction Needed  Area of need?: Yes    Acute Complications  Acute Complications Skills Assessment Completed: : No  Deffered due to:: Time    Chronic Complications  Chronic Complications Skills Assessment Completed: : No  Deferred due to:: Time    Psychosocial/Coping  Psychosocial/Coping Skills Assessment Completed: : No  Deffered due to:: Time    Assessment Summary and Plan    Based on today's diabetes care assessment, the following areas of need were identified:      Social 6/9/2023   Support No   Access to Mass Media/Tech No   Cognitive/Behavioral Health No   Culture/Episcopalian No   Communication No   Health Literacy No        Clinical 6/9/2023   Medication Adherence No   Lab Compliance No   Nutritional Status Yes, see care plan        Diabetes Self-Management Skills 6/9/2023   Diabetes Disease Process/Treatment Options No   Nutrition/Healthy Eating Yes, see care plan   Physical Activity/Exercise No   Medication No   Home Blood Glucose Monitoring Yes, see care plan   Acute Complications -   Chronic Complications -   Psychosocial/Coping -          Today's interventions were provided through individual discussion, instruction, and written materials were provided.      Patient verbalized understanding of instruction and written materials.  Pt was able to return back demonstration of instructions today. Patient understood key points, needs reinforcement and further instruction.     Diabetes Self-Management Care Plan:    Today's Diabetes Self-Management Care Plan was developed with Kirill's input. Kirill has agreed to work toward the following goal(s) to  "improve his/her overall diabetes control.      Care Plan: Diabetes Management   Updates made since 5/10/2023 12:00 AM        Problem: Blood Glucose Self-Monitoring         Goal: Patient agrees to check and record blood sugars using the Dexcom G7.    Start Date: 6/9/2023   Expected End Date: 6/30/2024   Priority: High   Barriers: No Barriers Identified   Note:    6/9/23: Pt had used Dexcom G6, but unclear as to why it was stopped. He is here today to start Dexcom G7. Encourage pt to monitor BG before meals and 2 hours after meals to understand how food affects him and he can start making changes.     DEXCOM G7 CGM TRAINING     Patient referred to clinic today for Dexcom G6 continuous glucose sensor system training.  Patient arrived with  fully charged and Dexcom G6 mobile neftali downloaded to phone. Education was provided using "Quick Start Guide" per Dexcom protocol.     Pt will be using his/her phone as the primary .  Overview:  5min glucose reading updates, trending arrows, BG graph screens, battery life indicator, Blue Tooth Symbol.  Menus: trend Graph, start sensor, enter BG, events, Alerts, Settings, Shutdown, Stop Sensor   Settings:                          * Urgent Low: 55 mg/dL                          * Urgent Low Soon: on                          * Low alert: 90 mg/dl repeat 15 min                          * High alert: 350 mg/dl repeat 30 min                          * Rise rate: off                          * Fall Rate: off                          * Signal Loss: on repeat 20 min                          * No Reading: on repeat 20 min                          * Always sound: on                          * Alert Schedule:  (instructed on how to set up alert schedule if desired)     Reviewed additional setting options with patient, including Graph Height and Transmitter ID. Transmitter was paired with .    Reviewed where to find sensor insertion time and sensor expiration date. "   Discussed no need to calibrate sensor during the entirety of the 10 day wear. Discussed that pt can calibrate sensor if desired, but at that time she will need to continue calibrating every 12 hours for sensor to remain accurate. Reviewed appropriate calibration techniques.  Reviewed sensor site selection. Site selected and prepped using aseptic technique Inserted to abdomen. Transmitter placed in pod and secured.  Practiced sensor pod/transmitter removal from site, and removal of transmitter from sensor pod.  Patient able to demonstrate without difficulty.  Encouraged to review manual prior to starting another sensor.   Reviewed problem solving aspects of sensor transmission/ variables that can disrupt RF transmission.  range 20 feet, but the first 3 hrs keep within 3 feet of transmitter.  Pt instructed on lag time of interstitial fluid from CBG and was advised to tx hypoglycemia and dose insulin based on SMBG values.  Dexcom technical support contact number given and examples of when to contact them discussed. Pt will download software at home for Dexcom download.   Patient advised to always check glucose with glucometer should readings do not match symptoms felt (ex. Hypo or hyperglycemia).              Problem: Healthy Eating         Goal: Eat 3 meals daily with 45-60g/3-4 servings of Carbohydrate per meal.    Start Date: 6/9/2023   Expected End Date: 6/30/2024   Priority: High   Barriers: No Barriers Identified   Note:    6/9/23: Pt eats 2 meals every day, sometimes three. He eats large portions of fast food. He is on Mounjaro 15 mg and feels his appetite is now being affected by it. Had a long discussion on incorporating more vegetables into his meals and less fast food/fatty food/fried food so that he has improved eating habits in place when Mounjaro is discontinued to minimize weight gain. He drinks mostly diet soda and water at home. Likes smoothies which he noted spikes him up - discussed that  having smaller portions of smoothies may work better for him. Recommended that he lose at least 40 lbs.       Task: Reviewed the sources and role of Carbohydrate, Protein, and Fat and how each nutrient impacts blood sugar. Completed 6/9/23     Task: Explained how to count carbohydrates using the food label and the use of dry measuring cups for accurate carb counting. Completed 6/9/23        Task: Discussed strategies for choosing healthier menu options when dining out. Completed 6/9/23        Task: Recommended replacing beverages containing high sugar content with noncaloric/sugar free options and/or water. Completed 6/9/23        Task: Review the importance of balancing carbohydrates with each meal using portion control techniques to count servings of carbohydrate and label reading to identify serving size and amount of total carbs per serving. Completed 6/9/23         Follow Up Plan     Follow up in about 6 weeks (around 7/21/2023). Review Dexcom Data, assess carb counting/meal planning skills of pt and SO.    Today's care plan and follow up schedule was discussed with patient.  Kirill verbalized understanding of the care plan, goals, and agrees to follow up plan.        The patient was encouraged to communicate with his/her health care provider/physician and care team regarding his/her condition(s) and treatment.  I provided the patient with my contact information today and encouraged to contact me via phone or [facility] Patient Portal as needed.     Length of Visit   Total Time: 90 Minutes

## 2023-06-13 ENCOUNTER — PATIENT MESSAGE (OUTPATIENT)
Dept: PODIATRY | Facility: CLINIC | Age: 48
End: 2023-06-13
Payer: MEDICAID

## 2023-06-13 ENCOUNTER — PATIENT MESSAGE (OUTPATIENT)
Dept: ENDOCRINOLOGY | Facility: CLINIC | Age: 48
End: 2023-06-13
Payer: MEDICAID

## 2023-06-14 ENCOUNTER — OFFICE VISIT (OUTPATIENT)
Dept: SLEEP MEDICINE | Facility: CLINIC | Age: 48
End: 2023-06-14
Payer: MEDICAID

## 2023-06-14 VITALS — WEIGHT: 315 LBS | BODY MASS INDEX: 54.79 KG/M2

## 2023-06-14 DIAGNOSIS — G47.33 OSA (OBSTRUCTIVE SLEEP APNEA): Primary | ICD-10-CM

## 2023-06-14 PROCEDURE — 3008F PR BODY MASS INDEX (BMI) DOCUMENTED: ICD-10-PCS | Mod: CPTII,,, | Performed by: PSYCHIATRY & NEUROLOGY

## 2023-06-14 PROCEDURE — 3046F PR MOST RECENT HEMOGLOBIN A1C LEVEL > 9.0%: ICD-10-PCS | Mod: CPTII,,, | Performed by: PSYCHIATRY & NEUROLOGY

## 2023-06-14 PROCEDURE — 3072F LOW RISK FOR RETINOPATHY: CPT | Mod: CPTII,,, | Performed by: PSYCHIATRY & NEUROLOGY

## 2023-06-14 PROCEDURE — 3008F BODY MASS INDEX DOCD: CPT | Mod: CPTII,,, | Performed by: PSYCHIATRY & NEUROLOGY

## 2023-06-14 PROCEDURE — 99214 PR OFFICE/OUTPT VISIT, EST, LEVL IV, 30-39 MIN: ICD-10-PCS | Mod: S$PBB,,, | Performed by: PSYCHIATRY & NEUROLOGY

## 2023-06-14 PROCEDURE — 3046F HEMOGLOBIN A1C LEVEL >9.0%: CPT | Mod: CPTII,,, | Performed by: PSYCHIATRY & NEUROLOGY

## 2023-06-14 PROCEDURE — 99999 PR PBB SHADOW E&M-EST. PATIENT-LVL III: CPT | Mod: PBBFAC,,, | Performed by: PSYCHIATRY & NEUROLOGY

## 2023-06-14 PROCEDURE — 1159F MED LIST DOCD IN RCRD: CPT | Mod: CPTII,,, | Performed by: PSYCHIATRY & NEUROLOGY

## 2023-06-14 PROCEDURE — 3072F PR LOW RISK FOR RETINOPATHY: ICD-10-PCS | Mod: CPTII,,, | Performed by: PSYCHIATRY & NEUROLOGY

## 2023-06-14 PROCEDURE — 99999 PR PBB SHADOW E&M-EST. PATIENT-LVL III: ICD-10-PCS | Mod: PBBFAC,,, | Performed by: PSYCHIATRY & NEUROLOGY

## 2023-06-14 PROCEDURE — 1159F PR MEDICATION LIST DOCUMENTED IN MEDICAL RECORD: ICD-10-PCS | Mod: CPTII,,, | Performed by: PSYCHIATRY & NEUROLOGY

## 2023-06-14 PROCEDURE — 3066F NEPHROPATHY DOC TX: CPT | Mod: CPTII,,, | Performed by: PSYCHIATRY & NEUROLOGY

## 2023-06-14 PROCEDURE — 99214 OFFICE O/P EST MOD 30 MIN: CPT | Mod: S$PBB,,, | Performed by: PSYCHIATRY & NEUROLOGY

## 2023-06-14 PROCEDURE — 3066F PR DOCUMENTATION OF TREATMENT FOR NEPHROPATHY: ICD-10-PCS | Mod: CPTII,,, | Performed by: PSYCHIATRY & NEUROLOGY

## 2023-06-14 PROCEDURE — 99213 OFFICE O/P EST LOW 20 MIN: CPT | Mod: PBBFAC | Performed by: PSYCHIATRY & NEUROLOGY

## 2023-06-14 PROCEDURE — 3061F NEG MICROALBUMINURIA REV: CPT | Mod: CPTII,,, | Performed by: PSYCHIATRY & NEUROLOGY

## 2023-06-14 PROCEDURE — 3061F PR NEG MICROALBUMINURIA RESULT DOCUMENTED/REVIEW: ICD-10-PCS | Mod: CPTII,,, | Performed by: PSYCHIATRY & NEUROLOGY

## 2023-06-14 NOTE — PROGRESS NOTES
EPWORTH SLEEPINESS SCALE TOTAL SCORE  2022   Total score 12 13       Kirill Gandhi III is a 48 y.o. male seen today for CPAP  follow up. Last seen on 2022.    The patient denies improved sleep continuity and daytime sleepiness on PAP. ESS today is .  Denies break through snoring.  No  dry mouth.  No aerophagia or air hunger. Denies occasional mask leaks and discomfort. Using a N20 M nasal  mask     Taking Hydroxizine - through his psychiatrist for insomnia - 50 % of the nights    DME: SARAH now got his 2nd machine in early - Resmed 10 WITh a modem      Kirill Gandhi  05/15/2023 - 2023  Patient ID: [id number]  : 1975  Age: 48 years  Gender: Male  [facility]   [facility], [id number]  Compliance Report  Compliance  Payor Standard  Usage 05/15/2023 - 2023  Usage days 30/30 days (100%)  >= 4 hours 28 days (93%)  < 4 hours 2 days (7%)  Usage hours 259 hours 32 minutes  Average usage (total days) 8 hours 39 minutes  Average usage (days used) 8 hours 39 minutes  Median usage (days used) 8 hours 21 minutes  Total used hours (value since last reset - 2023) 933 hours  AirSense 10 AutoSet  Serial number [id number]  Mode AutoSet  Min Pressure 10 cmH2O  Max Pressure 20 cmH2O  EPR Fulltime  EPR level 3  Response Standard  Therapy  Pressure - cmH2O Median: 11.0 95th percentile: 12.3 Maximum: 13.4  Leaks - L/min Median: 5.2 95th percentile: 19.4 Maximum: 116.2  Events per hour AI: 1.7 HI: 0.0 AHI: 1.7  Apnea Index Central: 0.0 Obstructive: 0.5 Unknown: 1.1  RERA Index 0.0  Cheyne-Lora respiration (average duration per night) 0 minutes (0%            Sleep studies:     : HST Very Severe sleep disordered breathing  (AHI=60) is noted based on a 4% hypopnea desaturation criteria. The patient slept supine 65.9% of the night based on valid  recording time of 5.87 hours. The apneas/hypopneas are accompanied by minimal oxygen  desaturation (percent time  below 90% SpO2: 2.6%, Min SpO2: 85.5%).  2013 titration in [facility] - 10 cm H2O CPAP was recommended  2019 titration in [facility] - 10 cm H2O again was recommended.        PHYSICAL EXAM:  Wt (!) 183.3 kg (404 lb)   BMI 54.79 kg/m²   GENERAL: Overweight body habitus, well groomed.  HEENT:   HEENT:  Conjunctivae are non-erythematous; Pupils equal, round, and reactive to light; Nose is symmetrical; Nasal mucosa is pink and moist; Septum is midline; Inferior turbinates are hypertrophied; Nasal airflow is normal; Posterior pharynx is pink; Modified Mallampati:III; Posterior palate is low; Tonsils not visualized; NECK: Supple. Neck circumference is 18 inches. No thyromegaly. No palpable nodes.     SKIN: On face and neck: No abrasions, no rashes, no lesions.  No subcutaneous nodules are palpable.  RESPIRATORY: Chest is clear to auscultation.  Normal chest expansion and non-labored breathing at rest.  CARDIOVASCULAR: Normal S1, S2.  No murmurs, gallops or rubs. No carotid bruits bilaterally.  No edema. No clubbing. No cyanosis.    NEURO: Oriented to time, place and person. Normal attention span and concentration. Gait normal.    PSYCH: Affect is full. Mood is normal  MUSCULOSKELETAL: Moves 4 extremities. Gait normal.           ASSESSMENT:    1. VICENTE. Very severe.  Previously diagnosed VICENTE; needs a new sleep study to qualify for the machine and supplies.  The patient symptomatically has  snoring, gasping for air, choking , excessive daytime sleepiness, and excessive daytime fatigue  with exam findings of crowded airway, elevated BMI, and large neck size. The patient has medical co-morbidities of diabetes and hypertension  which can be worsened by VICENTE. This warrants further investigation for possible obstructive sleep apnea.              PLAN:    Continue current settings APAP 10-20 compliant use  CPAP supplies have been reordered.        During our discussion today, we talked about the etiology of  VICENTE  as well as the potential ramifications of untreated sleep apnea, which could include daytime sleepiness, hypertension, heart disease and/or stroke.  We discussed potential treatment options, which could include weight loss, body positioning, continuous positive airway pressure (CPAP), or referral for surgical consideration. Meanwhile, he  is urged to avoid supine sleep, weight gain and alcoholic beverages since all of these can worsen VICENTE.     The patient was given open opportunity to ask questions and/or express concerns about treatment plan. Two point patient identifier confirmed.       Precautions: The patient was advised to abstain from driving should he feel sleepy or drowsy.    Follow up: MD after the sleep study has been completed.   More than 50% of this 31 min visit were spent in counseling and care coordination.       ESS ([place] Sleepiness Scale) and other sleep medicine related questionnaires were reviewed with the patient.

## 2023-06-15 ENCOUNTER — LAB VISIT (OUTPATIENT)
Dept: LAB | Facility: HOSPITAL | Age: 48
End: 2023-06-15
Attending: FAMILY MEDICINE
Payer: MEDICAID

## 2023-06-15 DIAGNOSIS — E11.8 TYPE 2 DIABETES MELLITUS WITH COMPLICATION, WITHOUT LONG-TERM CURRENT USE OF INSULIN: ICD-10-CM

## 2023-06-15 LAB
ESTIMATED AVG GLUCOSE: 180 MG/DL (ref 68–131)
HBA1C MFR BLD: 7.9 % (ref 4–5.6)

## 2023-06-15 PROCEDURE — 36415 COLL VENOUS BLD VENIPUNCTURE: CPT | Mod: PO | Performed by: FAMILY MEDICINE

## 2023-06-15 PROCEDURE — 83036 HEMOGLOBIN GLYCOSYLATED A1C: CPT | Performed by: FAMILY MEDICINE

## 2023-06-16 ENCOUNTER — OFFICE VISIT (OUTPATIENT)
Dept: ENDOCRINOLOGY | Facility: CLINIC | Age: 48
End: 2023-06-16
Payer: MEDICAID

## 2023-06-16 DIAGNOSIS — E78.5 HYPERLIPIDEMIA, UNSPECIFIED HYPERLIPIDEMIA TYPE: ICD-10-CM

## 2023-06-16 DIAGNOSIS — E11.65 UNCONTROLLED TYPE 2 DIABETES MELLITUS WITH HYPERGLYCEMIA: Primary | ICD-10-CM

## 2023-06-16 DIAGNOSIS — I10 HYPERTENSION, ESSENTIAL: ICD-10-CM

## 2023-06-16 PROCEDURE — 3061F PR NEG MICROALBUMINURIA RESULT DOCUMENTED/REVIEW: ICD-10-PCS | Mod: CPTII,95,, | Performed by: GENERAL ACUTE CARE HOSPITAL

## 2023-06-16 PROCEDURE — 3061F NEG MICROALBUMINURIA REV: CPT | Mod: CPTII,95,, | Performed by: GENERAL ACUTE CARE HOSPITAL

## 2023-06-16 PROCEDURE — 1160F RVW MEDS BY RX/DR IN RCRD: CPT | Mod: CPTII,95,, | Performed by: GENERAL ACUTE CARE HOSPITAL

## 2023-06-16 PROCEDURE — 99214 PR OFFICE/OUTPT VISIT, EST, LEVL IV, 30-39 MIN: ICD-10-PCS | Mod: 25,95,, | Performed by: GENERAL ACUTE CARE HOSPITAL

## 2023-06-16 PROCEDURE — 3066F NEPHROPATHY DOC TX: CPT | Mod: CPTII,95,, | Performed by: GENERAL ACUTE CARE HOSPITAL

## 2023-06-16 PROCEDURE — 3051F PR MOST RECENT HEMOGLOBIN A1C LEVEL 7.0 - < 8.0%: ICD-10-PCS | Mod: CPTII,95,, | Performed by: GENERAL ACUTE CARE HOSPITAL

## 2023-06-16 PROCEDURE — 1159F PR MEDICATION LIST DOCUMENTED IN MEDICAL RECORD: ICD-10-PCS | Mod: CPTII,95,, | Performed by: GENERAL ACUTE CARE HOSPITAL

## 2023-06-16 PROCEDURE — 3066F PR DOCUMENTATION OF TREATMENT FOR NEPHROPATHY: ICD-10-PCS | Mod: CPTII,95,, | Performed by: GENERAL ACUTE CARE HOSPITAL

## 2023-06-16 PROCEDURE — 1159F MED LIST DOCD IN RCRD: CPT | Mod: CPTII,95,, | Performed by: GENERAL ACUTE CARE HOSPITAL

## 2023-06-16 PROCEDURE — 99214 OFFICE O/P EST MOD 30 MIN: CPT | Mod: 25,95,, | Performed by: GENERAL ACUTE CARE HOSPITAL

## 2023-06-16 PROCEDURE — 1160F PR REVIEW ALL MEDS BY PRESCRIBER/CLIN PHARMACIST DOCUMENTED: ICD-10-PCS | Mod: CPTII,95,, | Performed by: GENERAL ACUTE CARE HOSPITAL

## 2023-06-16 PROCEDURE — 3072F PR LOW RISK FOR RETINOPATHY: ICD-10-PCS | Mod: CPTII,95,, | Performed by: GENERAL ACUTE CARE HOSPITAL

## 2023-06-16 PROCEDURE — 3051F HG A1C>EQUAL 7.0%<8.0%: CPT | Mod: CPTII,95,, | Performed by: GENERAL ACUTE CARE HOSPITAL

## 2023-06-16 PROCEDURE — 3072F LOW RISK FOR RETINOPATHY: CPT | Mod: CPTII,95,, | Performed by: GENERAL ACUTE CARE HOSPITAL

## 2023-06-16 RX ORDER — DAPAGLIFLOZIN 5 MG/1
5 TABLET, FILM COATED ORAL DAILY
Qty: 30 TABLET | Refills: 11 | Status: SHIPPED | OUTPATIENT
Start: 2023-06-16

## 2023-06-16 NOTE — PROGRESS NOTES
Subjective:      Patient ID: Kirill Gandhi III is a 48 y.o.    Chief Complaint:  DM2; Follow up visit     The patient location is: HOME   The chief complaint leading to consultation is: DM2    Visit type: audiovisual    Face to Face time with patient: 20 Minutes   30 minutes of total time spent on the encounter, which includes face to face time and non-face to face time preparing to see the patient (eg, review of tests), Obtaining and/or reviewing separately obtained history, Documenting clinical information in the electronic or other health record, Independently interpreting results (not separately reported) and communicating results to the patient/family/caregiver, or Care coordination (not separately reported).         Each patient to whom he or she provides medical services by telemedicine is:  (1) informed of the relationship between the physician and patient and the respective role of any other health care provider with respect to management of the patient; and (2) notified that he or she may decline to receive medical services by telemedicine and may withdraw from such care at any time.    Notes:      Patient Active Problem List   Diagnosis    Morbid obesity    Vitamin D deficiency    VICENTE (obstructive sleep apnea)    OCD (obsessive compulsive disorder)    Anxiety    Insomnia due to other mental disorder    Palpitations    DDD (degenerative disc disease), lumbar    Adjustment disorder with mixed anxiety and depressed mood    Mixed obsessional thoughts and acts    Mild episode of recurrent major depressive disorder    Fusion of spine, lumbosacral region    Sacroiliac joint dysfunction of both sides    Chronic bilateral low back pain with bilateral sciatica    Radiculopathy of lumbosacral region    Chest pain, atypical    Pseudoarthrosis of lumbar spine    Benign essential hypertension    Type 2 diabetes mellitus with hyperglycemia, with long-term current use of insulin    Excessive flatus     Gastroesophageal reflux disease without esophagitis    Hyponatremia    Metabolic acidemia    Dehydration with hyponatremia    Flatulence/gas pain/belching    Decreased strength of trunk and back    Decreased ROM of trunk and back        History of Present Illness  47 YO Male w/ medical history as above that presents to the endocrine clinic for follow up evaluation of DM2. Pt today reports feels well and endorses improvement of glucose levels since switching to U500 insulin.    He reports he was using DEXCOM G7 on the Abdomen and he was getting low readings but when checking FS glucose were 50mg/dL above DEXCOM readings.  He switched DEXCOM G7 to his arms and readings are now accurate.      Since using DEXCOM on his arm he no longer is having hypoglycemic readings and glucose for the most part is stable except for days he does his insulin after the meals because forgets to bolus before the meals at times.  Denies hypoglycemias or hyperglycemic symptoms.      Interval history :     Pt was last seen on 4/2023 and at that time due to uncontrolled DM and insulin requirements > 300 units daily plan was to switch U100 insulin for U500 insulin TID and switch Trulicity for Mounjaro.   Start Metformin          Latest Reference Range & Units 02/07/23 11:23 06/15/23 09:08   Hemoglobin A1C External 4.0 - 5.6 % 9.2 (H) 7.9 (H)   (H): Data is abnormally high      A1C is improving since last visit       With regards to Diabetes Mellitus:     -Type 2     -Duration:  Dx  > 10  yrs ago with symptoms and blood work for evaluation of fibromyalgia     -FH?  Grandmother,  Aunts       -Microvascular complications: (Retinopathy,  Neuropathy)   -Macrovascular complications:   DENIES     -DKA?  DENIES   -HHS?   DENIES     -DM Medications:   Mounjaro 15mg SC weekly,  U500  100/100/120  30 minutes before meals.,  Metformin 500mg XR 2 tabs BID      -Compliance w/ Meds:  Yes     -Previously tried medications:   Rybelsus,  Metformin , Levemir  60  UNITS BID,    Novolog 60 - 90 UNITS AFTER MEALS    -Glucose monitoring:    DEXCOM G7              TIR at goal     TBR at goal       TAR  at goal       -Hyperglycemia symptoms:  RESOLVED SINCE LAST VISIT     -Hypoglycemia symptoms:  DENIES         -Diet:    TRYING TO FOLLOW A DIABETIC DIET             -Activity:   Sedentary,  Back pain      -Pancreatitis?  NO     -Thyroid CA?  NO       Diabetes Management Status    Statin: Taking  ACE/ARB: Taking    Screening or Prevention Patient's value Goal Complete/Controlled?   HgA1C Testing and Control   Lab Results   Component Value Date    HGBA1C 7.9 (H) 06/15/2023      Annually/Less than 8% No     Lipid profile : 03/27/2023 Annually Yes     LDL control Lab Results   Component Value Date    LDLCALC 75.0 03/27/2023    Annually/Less than 100 mg/dl  Yes     Nephropathy screening Lab Results   Component Value Date    LABMICR <5.0 03/27/2023     Lab Results   Component Value Date    PROTEINUA Negative 11/11/2022    Annually Yes     Blood pressure BP Readings from Last 1 Encounters:   05/10/23 (!) 143/65    Less than 140/90 No     Dilated retinal exam : 10/12/2022 Annually Yes     Foot exam   : 05/11/2023 Annually No           ROS:   As above    Objective:     There were no vitals taken for this visit.    There is no height or weight on file to calculate BMI.      Physical Exam  Vitals reviewed.   Constitutional:       General: He is not in acute distress.     Appearance: Normal appearance. He is well-developed. He is not ill-appearing.   HENT:      Nose: Nose normal. No rhinorrhea.      Mouth/Throat:      Mouth: Mucous membranes are moist.   Eyes:      Extraocular Movements: Extraocular movements intact.      Pupils: Pupils are equal, round, and reactive to light.      Comments: No proptosis, No lid lag, No conjunctival erythema    Neck:      Thyroid: No thyromegaly.      Trachea: No tracheal deviation.      Comments: No thyromegaly or Thyroid nodules palpated on exam    Cardiovascular:      Rate and Rhythm: Normal rate and regular rhythm.      Pulses: Normal pulses.   Pulmonary:      Effort: Pulmonary effort is normal.      Breath sounds: Normal breath sounds.   Abdominal:      Palpations: Abdomen is soft. There is no mass.      Tenderness: There is no abdominal tenderness.      Hernia: No hernia is present.      Comments: No scar tissue, No Violaceous striae    Musculoskeletal:         General: No swelling.      Cervical back: Neck supple. No tenderness.      Right lower leg: No edema.      Left lower leg: No edema.   Skin:     General: Skin is warm.      Findings: No rash.   Neurological:      General: No focal deficit present.      Mental Status: He is alert and oriented to person, place, and time.   Psychiatric:         Mood and Affect: Mood normal.         Judgment: Judgment normal.              Lab Review:   Lab Results   Component Value Date    HGBA1C 7.9 (H) 06/15/2023     Lab Results   Component Value Date    CHOL 153 03/27/2023    HDL 31 (L) 03/27/2023    LDLCALC 75.0 03/27/2023    TRIG 235 (H) 03/27/2023    CHOLHDL 20.3 03/27/2023     Lab Results   Component Value Date     03/27/2023    K 4.3 03/27/2023     03/27/2023    CO2 29 03/27/2023     (H) 03/27/2023    BUN 12 03/27/2023    CREATININE 0.77 03/27/2023    CALCIUM 9.0 03/27/2023    PROT 7.6 03/27/2023    ALBUMIN 4.1 03/27/2023    BILITOT 0.4 03/27/2023    ALKPHOS 129 (H) 03/27/2023    AST 33 03/27/2023    ALT 40 03/27/2023    ANIONGAP 8 03/27/2023    ESTGFRAFRICA >60 04/15/2022    EGFRNONAA >60 04/15/2022    TSH 0.541 03/27/2023     Vit D, 25-Hydroxy   Date Value Ref Range Status   02/04/2016 26 (L) 30 - 96 ng/mL Final     Comment:     Vitamin D deficiency.........<10 ng/mL                              Vitamin D insufficiency......10-29 ng/mL       Vitamin D sufficiency........> or equal to 30 ng/mL  Vitamin D toxicity............>100 ng/mL         Assessment and Plan     Uncontrolled type 2  diabetes mellitus with hyperglycemia    Lab Results   Component Value Date    LABA1C 6.8 (H) 03/24/2017    HGBA1C 7.9 (H) 06/15/2023        -FS log   MAINLY at goal except when doing late boluses     -Diet, exercise, lifestyle modifications and A1c Goals discussed   -Hypoglycemia symptoms and plan of action discussed   -Low carbohydrate diet < 150g daily  -Seen DM Education?      PLAN:     Due to Glucose range mainly at goal and post prandial hyperglycemias are likely 2/2 late bolus in the setting of U500 insulin. I will recommend the following.       Start Farxiga 5mg daily   (Side effects discussed in detail)     C/w Metformin 500mg XR  2 tabs BID     C/w Mounjaro 15mg SC weekly     C/w U500   100/100/120  (15- 30 minutes before meals)        Due to patient showing commitment w/ DM management, being on MDI , checking glucose 4 times a day and due to the COVID pandemic I believe he will benefit from DEXCOM continuous glucose monitor for better control of DM, hypoglycemia prevention and remote monitoring of glucose in between visit.       Labs prior to next visit         Hyperlipidemia, unspecified hyperlipidemia type  LDL at goal On Statin   Low Fat diet      Hypertension, essential     BP controlled on current BP meds   On ACE for renal protection   Low Na diet

## 2023-06-18 RX ORDER — CELECOXIB 200 MG/1
CAPSULE ORAL
Qty: 60 CAPSULE | Refills: 1 | Status: SHIPPED | OUTPATIENT
Start: 2023-06-18 | End: 2023-06-19 | Stop reason: SDUPTHER

## 2023-06-19 ENCOUNTER — OFFICE VISIT (OUTPATIENT)
Dept: NEUROSURGERY | Facility: CLINIC | Age: 48
End: 2023-06-19
Payer: MEDICAID

## 2023-06-19 VITALS — WEIGHT: 315 LBS | BODY MASS INDEX: 42.66 KG/M2 | HEIGHT: 72 IN

## 2023-06-19 DIAGNOSIS — G95.9 SPINAL CORD LESION: ICD-10-CM

## 2023-06-19 DIAGNOSIS — T39.395A ADVERSE EFFECT OF NON-STEROIDAL ANTI-INFLAMMATORY DRUG (NSAID), INITIAL ENCOUNTER: Primary | ICD-10-CM

## 2023-06-19 DIAGNOSIS — Z98.1 STATUS POST LUMBAR SPINAL FUSION: ICD-10-CM

## 2023-06-19 PROCEDURE — 99999 PR PBB SHADOW E&M-EST. PATIENT-LVL IV: ICD-10-PCS | Mod: PBBFAC,,, | Performed by: NEUROLOGICAL SURGERY

## 2023-06-19 PROCEDURE — 3051F PR MOST RECENT HEMOGLOBIN A1C LEVEL 7.0 - < 8.0%: ICD-10-PCS | Mod: CPTII,,, | Performed by: NEUROLOGICAL SURGERY

## 2023-06-19 PROCEDURE — 99213 OFFICE O/P EST LOW 20 MIN: CPT | Mod: S$PBB,,, | Performed by: NEUROLOGICAL SURGERY

## 2023-06-19 PROCEDURE — 1159F PR MEDICATION LIST DOCUMENTED IN MEDICAL RECORD: ICD-10-PCS | Mod: CPTII,,, | Performed by: NEUROLOGICAL SURGERY

## 2023-06-19 PROCEDURE — 99999 PR PBB SHADOW E&M-EST. PATIENT-LVL IV: CPT | Mod: PBBFAC,,, | Performed by: NEUROLOGICAL SURGERY

## 2023-06-19 PROCEDURE — 3061F PR NEG MICROALBUMINURIA RESULT DOCUMENTED/REVIEW: ICD-10-PCS | Mod: CPTII,,, | Performed by: NEUROLOGICAL SURGERY

## 2023-06-19 PROCEDURE — 3008F BODY MASS INDEX DOCD: CPT | Mod: CPTII,,, | Performed by: NEUROLOGICAL SURGERY

## 2023-06-19 PROCEDURE — 3066F NEPHROPATHY DOC TX: CPT | Mod: CPTII,,, | Performed by: NEUROLOGICAL SURGERY

## 2023-06-19 PROCEDURE — 3072F PR LOW RISK FOR RETINOPATHY: ICD-10-PCS | Mod: CPTII,,, | Performed by: NEUROLOGICAL SURGERY

## 2023-06-19 PROCEDURE — 3051F HG A1C>EQUAL 7.0%<8.0%: CPT | Mod: CPTII,,, | Performed by: NEUROLOGICAL SURGERY

## 2023-06-19 PROCEDURE — 1159F MED LIST DOCD IN RCRD: CPT | Mod: CPTII,,, | Performed by: NEUROLOGICAL SURGERY

## 2023-06-19 PROCEDURE — 3008F PR BODY MASS INDEX (BMI) DOCUMENTED: ICD-10-PCS | Mod: CPTII,,, | Performed by: NEUROLOGICAL SURGERY

## 2023-06-19 PROCEDURE — 3066F PR DOCUMENTATION OF TREATMENT FOR NEPHROPATHY: ICD-10-PCS | Mod: CPTII,,, | Performed by: NEUROLOGICAL SURGERY

## 2023-06-19 PROCEDURE — 99214 OFFICE O/P EST MOD 30 MIN: CPT | Mod: PBBFAC,PN | Performed by: NEUROLOGICAL SURGERY

## 2023-06-19 PROCEDURE — 3061F NEG MICROALBUMINURIA REV: CPT | Mod: CPTII,,, | Performed by: NEUROLOGICAL SURGERY

## 2023-06-19 PROCEDURE — 3072F LOW RISK FOR RETINOPATHY: CPT | Mod: CPTII,,, | Performed by: NEUROLOGICAL SURGERY

## 2023-06-19 PROCEDURE — 99213 PR OFFICE/OUTPT VISIT, EST, LEVL III, 20-29 MIN: ICD-10-PCS | Mod: S$PBB,,, | Performed by: NEUROLOGICAL SURGERY

## 2023-06-19 RX ORDER — CELECOXIB 200 MG/1
200 CAPSULE ORAL 2 TIMES DAILY
Qty: 60 CAPSULE | Refills: 11 | Status: SHIPPED | OUTPATIENT
Start: 2023-06-19 | End: 2023-08-31 | Stop reason: SDUPTHER

## 2023-06-19 NOTE — PROGRESS NOTES
NEUROSURGICAL PROGRESS NOTE    DATE OF SERVICE:  06/19/2023    ATTENDING PHYSICIAN:  Jose L Brown MD    SUBJECTIVE:     This is a very pleasant 47 y.o. male, he is 1 year status post revision of posterolateral fusion from L3-S1, sacral pelvic fixation and open SI joint fusion for pseudoarthrosis with loosening of hardware.  He recently has completed the healthy back program.  In the healthy back program day did a lot of leg exercises, weight lifting and twisting exercises.  Since he stopped the healthy back he reports worsening back pain.  He reports 8/10 of back pain at this time.  Pain is also traveling down his buttock area and he has some leg numbness.  He has chronic gait imbalance.  His gait imbalance has been stable.  He feels pain is more in the L2-3 area.  Pain is pretty localized and the tramadol is not really helping at this time.  No new onset of motor weakness or sphincter dysfunction symptoms    INTERIM HISTORY:  He is here to discuss is back pain issue.  He has completed a thoracic and lumbar spine MRI.  He is trying to lose weight.  His type 2 diabetes is better controlled with insulin at this time.  Denies having neck or upper extremity symptoms.  Mainly complaining just of back pain.  He is contemplating bariatric surgery.  He is taking the Celebrex twice daily.  He is taking tramadol as needed.               PAST MEDICAL HISTORY:  Active Ambulatory Problems     Diagnosis Date Noted    Morbid obesity 04/16/2015    Vitamin D deficiency 04/16/2015    VICENTE (obstructive sleep apnea) 06/17/2015    OCD (obsessive compulsive disorder) 02/26/2020    Anxiety 02/26/2020    Insomnia due to other mental disorder 02/05/2021    Palpitations 03/25/2021    DDD (degenerative disc disease), lumbar 04/26/2021    Adjustment disorder with mixed anxiety and depressed mood 05/19/2021    Mixed obsessional thoughts and acts 06/25/2021    Mild episode of recurrent major depressive disorder 06/25/2021    Fusion of spine,  lumbosacral region 07/13/2021    Sacroiliac joint dysfunction of both sides 12/13/2021    Chronic bilateral low back pain with bilateral sciatica 12/14/2021    Radiculopathy of lumbosacral region 12/14/2021    Chest pain, atypical 03/10/2022    Pseudoarthrosis of lumbar spine 04/13/2022    Benign essential hypertension 04/20/2022    Type 2 diabetes mellitus with hyperglycemia, with long-term current use of insulin 10/17/2022    Excessive flatus 10/19/2022    Gastroesophageal reflux disease without esophagitis 10/19/2022    Hyponatremia 11/11/2022    Metabolic acidemia 11/11/2022    Dehydration with hyponatremia 11/11/2022    Flatulence/gas pain/belching 11/15/2022    Decreased strength of trunk and back 01/09/2023    Decreased ROM of trunk and back 01/09/2023     Resolved Ambulatory Problems     Diagnosis Date Noted    CTS (carpal tunnel syndrome) 06/26/2015    DDD (degenerative disc disease), lumbar 09/04/2019    Lumbar disc herniation with radiculopathy 10/09/2019    Ileus 11/11/2022     Past Medical History:   Diagnosis Date    Allergy     Back pain     Depression     Diabetes 04/17/2015    Diabetes mellitus with insulin therapy     Elevated sed rate 6/26/2015    GERD (gastroesophageal reflux disease) 10/19/2022    Hx of vasectomy     Hypertension     Sleep apnea        PAST SURGICAL HISTORY:  Past Surgical History:   Procedure Laterality Date    ADENOIDECTOMY      COLONOSCOPY N/A 9/6/2022    Procedure: COLONOSCOPY;  Surgeon: Rossy Champion MD;  Location: [facility] ENDO;  Service: Endoscopy;  Laterality: N/A;    EPIDURAL STEROID INJECTION Right 09/04/2019    Procedure: Injection, Steroid, Epidural Transforaminal;  Surgeon: Harjinder Batista Jr., MD;  Location: [facility] ENDO;  Service: Pain Management;  Laterality: Right;  Right L3 + L4 TF NATALIE    [id number]  [id number]    Arrive @ 1300; ASA last 8/27; Check BG; NEEDS CONSENT    ESOPHAGOGASTRODUODENOSCOPY N/A 10/31/2022    Procedure: EGD (ESOPHAGOGASTRODUODENOSCOPY);  Surgeon:  Rossy Champion MD;  Location: [facility];  Service: Endoscopy;  Laterality: N/A;    FRACTURE SURGERY      bilateral elbow fracture 3 years ago    FUSION OF LUMBAR SPINE BY ANTERIOR APPROACH Left 04/26/2021    Procedure: FUSION, SPINE, LUMBAR, ANTERIOR APPROACH Left L3-4 L4-5 OLIF BRENDA, ALL Release, L5-S1 ALIF;  Surgeon: Jose L Brown MD;  Location: [facility] OR;  Service: Neurosurgery;  Laterality: Left;  Procedure: Left L3-4 L4-5 OLIF BERNDA, ALL Release, L5-S1 ALIF  Surgery Time: 2.5 Hrs  LOS: 2-3  Anesthesia: General  Blood: Type & Screen  Radiology: Josseline  Brace: LSO  Bed: Regular  Position: LAteral Righ    FUSION OF SACROILIAC JOINT Bilateral 04/13/2022    Procedure: FUSION, SACROILIAC JOINT Stg 2: Bilateral Open SI Joint fusion, Bedrock from Si Bone;  Surgeon: Jose L Brown MD;  Location: [facility] OR;  Service: Neurosurgery;  Laterality: Bilateral;    FUSION OF SPINE WITH INSTRUMENTATION N/A 04/26/2021    Procedure: FUSION, SPINE, WITH INSTRUMENTATION L3-S1 Posteior Segmental Instrumentation;  Surgeon: Jose L Brown MD;  Location: [facility] OR;  Service: Neurosurgery;  Laterality: N/A;  Procedure: L3-S1 Posteior Segmental Instrumentation  Sirgery Time: 2 Hrs  LOS: 2-3  Anesthesia: General  Blood: Tyoe & Screen  Radiology: Dual C arm  Brace:LSO  Bed: [place] [id number] Poster  Position: Prone  Equipment: Depuy     FUSION OF SPINE WITH INSTRUMENTATION N/A 04/13/2022    Procedure: FUSION, SPINE, WITH INSTRUMENTATION Stg 1: Revision of posterior L3-S1 hardware, Depuy. Sacro-pelvic fixation, screw augmentation PMMA. L4-S1 posterolateral arthrodesis. Open Gauge screws, 11-12mm;  Surgeon: Jose L Brown MD;  Location: [facility] OR;  Service: Neurosurgery;  Laterality: N/A;    HERNIA REPAIR      bilateral groin hernia    INJECTION OF STEROID Bilateral 12/13/2021    Procedure: INJECTION, STEROID Bilateral SI JOint Block and Steroid Injection;  Surgeon: Jose L Brown MD;  Location: [facility] OR;  Service: Neurosurgery;  Laterality:  Bilateral;  Procedure: Bilateral SI JOint Block and Steroid Injection   Surgery Time: 30 min  LOS: 0  Anesthesia: MAC  Radiology: C-arm  Bed: Regular with pillows  Position: Prone    LUMBAR LAMINECTOMY Bilateral 11/29/2019    Procedure: LAMINECTOMY, SPINE, LUMBAR Rigth L3-4, Left L4-5 Laminectomy, medial Facetectomy, and microdiscectomy;  Surgeon: JoseL Brown MD;  Location: [facility] OR;  Service: Neurosurgery;  Laterality: Bilateral;  Procedure: Rigth L3-4, Left L4-5 Laminectomy, medial Facetectomy, and microdiscectomy  Surgery Time: 2.5 Hrs  LOS: 0-1  Anesthesia: General  Blood: Type & Screen  Radiology: C-arm  Micros    REMOVAL IMPLANT, POSTERIOR SEGMENT, INTRAOCULAR  04/13/2022    Procedure: REMOVAL IMPLANT, POSTERIOR SEGMENT, INTRAOCULAR;  Surgeon: Jose L Brown MD;  Location: [facility] OR;  Service: Neurosurgery;;    SPINE SURGERY      TONSILLECTOMY      VASECTOMY         SOCIAL HISTORY:   Social History     Socioeconomic History    Marital status: Significant Other   Tobacco Use    Smoking status: Every Day     Types: Vaping with nicotine    Smokeless tobacco: Never    Tobacco comments:     Handout provided for Ambualtory Smoking Cessaiton program   Substance and Sexual Activity    Alcohol use: Not Currently     Comment: 750 ml  once a month or so    Drug use: Never    Sexual activity: Yes     Partners: Female     Birth control/protection: None     Comment: ; one child      Social Determinants of Health     Financial Resource Strain: Low Risk     Difficulty of Paying Living Expenses: Not hard at all   Food Insecurity: No Food Insecurity    Worried About Running Out of Food in the Last Year: Never true    Ran Out of Food in the Last Year: Never true   Transportation Needs: No Transportation Needs    Lack of Transportation (Medical): No    Lack of Transportation (Non-Medical): No   Physical Activity: Inactive    Days of Exercise per Week: 0 days    Minutes of Exercise per Session: 0 min   Stress: Stress  Concern Present    Feeling of Stress : To some extent   Social Connections: Unknown    Frequency of Communication with Friends and Family: Never    Frequency of Social Gatherings with Friends and Family: Never    Active Member of Clubs or Organizations: No    Attends Club or Organization Meetings: Never    Marital Status: Living with partner   Housing Stability: Low Risk     Unable to Pay for Housing in the Last Year: No    Number of Places Lived in the Last Year: 1    Unstable Housing in the Last Year: No       FAMILY HISTORY:  Family History   Problem Relation Age of Onset    Fibromyalgia Mother     Osteoarthritis Mother     Cataracts Maternal Grandmother     Macular degeneration Maternal Grandfather     Cataracts Maternal Grandfather     Rheum arthritis Neg Hx     Psoriasis Neg Hx     Lupus Neg Hx     Kidney disease Neg Hx     Inflammatory bowel disease Neg Hx     Amblyopia Neg Hx     Blindness Neg Hx     Glaucoma Neg Hx     Retinal detachment Neg Hx     Strabismus Neg Hx        CURRENTS MEDICATIONS:  Current Outpatient Medications on File Prior to Visit   Medication Sig Dispense Refill    acetaminophen (TYLENOL) 500 MG tablet Take 1,000 mg by mouth 2 (two) times daily as needed for Pain.      ARIPiprazole (ABILIFY) 5 MG Tab Take 1 tablet (5 mg total) by mouth once daily. 90 tablet 0    ascorbic acid, vitamin C, (VITAMIN C) 1000 MG tablet Take 1,000 mg by mouth once daily.      aspirin (ECOTRIN) 81 MG EC tablet Take 81 mg by mouth every evening.      aspirin/sod bicarb/citric acid (GIOVANY-SELTZER ORAL) Take 2 tablets by mouth daily as needed (Upset stomach).      atorvastatin (LIPITOR) 10 MG tablet Take 1 tablet (10 mg total) by mouth every other day. 45 tablet 3    busPIRone (BUSPAR) 30 MG Tab Take 1 tablet (30 mg total) by mouth 2 (two) times daily. 180 tablet 0    cyclobenzaprine (FLEXERIL) 10 MG tablet Take 10 mg by mouth 3 (three) times daily.      dapagliflozin propanediol (FARXIGA) 5 mg Tab tablet Take 1  tablet (5 mg total) by mouth once daily. 30 tablet 11    dicyclomine (BENTYL) 20 mg tablet Take 1 tablet (20 mg total) by mouth 3 (three) times daily as needed (abdominal pain). 60 tablet 2    DULoxetine (CYMBALTA) 60 MG capsule Take 1 capsule (60 mg total) by mouth 2 (two) times daily. 180 capsule 0    flash glucose scanning reader Misc Disp one reader to monitor glucose- covered by insurance dexcom 1 each 1    flash glucose sensor Kit Dispense sensor to read glucose  dexcom 12 kit 11    fluticasone propionate (FLONASE) 50 mcg/actuation nasal spray 2 sprays (100 mcg total) by Each Nostril route once daily. 48 mL 2    gabapentin (NEURONTIN) 800 MG tablet Take 1 tablet (800 mg total) by mouth 3 (three) times daily. 270 tablet 3    hydrOXYzine HCL (ATARAX) 25 MG tablet Take 2 tablets (50 mg total) by mouth nightly as needed (insomnia). 180 tablet 0    insulin regular hum U-500 conc (HUMULIN R U-500, CONC, KWIKPEN) 500 unit/mL (3 mL) insulin pen Use 100 units 30 minutes before breakfast and before lunch.  Use 120 units before diner. 8 pen 11    insulin syringe-needle U-100 (BD INSULIN SYRINGE ULTRA-FINE) 1 mL 31 gauge x 5/16 Syrg To use 4 times daily with insulin 300 each 11    KLGA ketoprofen 10% LIDOcaine 10% gabapentin 6% amitriptyline 2% in transdermal cream Apply 1 to 2 grams 3 to 4 times daily 240 g 1    Lactobac no.41/Bifidobact no.7 (PROBIOTIC-10 ORAL) Take by mouth.      lanolin/mineral oil/petrolatum (ARTIFICIAL TEARS OPHT) Place 2 drops into both eyes daily as needed (Dry eye).      metFORMIN (GLUCOPHAGE-XR) 500 MG ER 24hr tablet Take 2 tablets (1,000 mg total) by mouth 2 (two) times daily with meals. 360 tablet 3    metoclopramide HCl (REGLAN) 5 mg/5 mL Soln Take 5 mLs (5 mg total) by mouth 4 (four) times daily before meals and nightly. 500 mL 2    multivitamin capsule Take 1 capsule by mouth once daily.      NON FORMULARY MEDICATION Uses Cpap Machine nightly on a setting of 10      ondansetron (ZOFRAN-ODT)  "4 MG TbDL Take 1 tablet (4 mg total) by mouth every 8 (eight) hours as needed (nausea). 30 tablet 5    ONETOUCH DELICA PLUS LANCET 33 gauge Misc Apply topically 3 (three) times daily.      ONETOUCH VERIO TEST STRIPS Strp 1 strip 3 (three) times daily.      pantoprazole (PROTONIX) 40 MG tablet Take 1 tablet (40 mg total) by mouth once daily. 90 tablet 3    pen needle, diabetic (BD ULTRA-FINE SHORT PEN NEEDLE) 31 gauge x 5/16" Ndle USE WITH FLEX  each 11    pen needle, diabetic 31 gauge x 1/4" Ndle Use with flex pen 100 each 11    propranoloL (INDERAL) 20 MG tablet Take 1 tablet po TID prn anxiety. May take an additional 1 tablet po q day prn anxiety. 135 tablet 0    tirzepatide (MOUNJARO) 15 mg/0.5 mL PnIj Inject 15 mg into the skin every 7 days. 4 pen 11    traMADoL 100 mg Tab Take 100 mg by mouth every 8 (eight) hours as needed (severe pain). 90 tablet 3    valsartan-hydrochlorothiazide (DIOVAN-HCT) 320-12.5 mg per tablet Take 1 tablet by mouth once daily. 90 tablet 3    vitamin D 1000 units Tab Take 5,000 Units by mouth 2 (two) times a day.      [DISCONTINUED] celecoxib (CELEBREX) 200 MG capsule TAKE ONE CAPSULE BY MOUTH TWICE DAILY. 60 capsule 1    methylcellulose, laxative, (CITRUCEL) 500 mg Tab Take 1 tablet by mouth nightly as needed (Constipation).      [DISCONTINUED] fluvoxaMINE (LUVOX) 100 MG tablet Take 1.5 tablets (150 mg total) by mouth 2 (two) times daily. 270 tablet 0     No current facility-administered medications on file prior to visit.       ALLERGIES:  Review of patient's allergies indicates:   Allergen Reactions    Dilaudid [hydromorphone] Other (See Comments)     IV dilaudid severely drops BP       REVIEW OF SYSTEMS:  Review of Systems   Constitutional:  Negative for diaphoresis, fever and weight loss.   Respiratory:  Negative for shortness of breath.    Cardiovascular:  Negative for chest pain.   Gastrointestinal:  Negative for blood in stool.   Genitourinary:  Negative for hematuria. "   Endo/Heme/Allergies:  Does not bruise/bleed easily.   All other systems reviewed and are negative.      OBJECTIVE:    PHYSICAL EXAMINATION:   There were no vitals filed for this visit.    Physical Exam:  Vitals reviewed.    Constitutional: He appears well-developed and well-nourished.     Eyes: Pupils are equal, round, and reactive to light. Conjunctivae and EOM are normal.     Cardiovascular: Normal distal pulses and no edema.     Abdominal: Soft.     Skin: Skin displays no rash on trunk and no rash on extremities. Skin displays no lesions on trunk and no lesions on extremities.     Psych/Behavior: He is alert. He is oriented to person, place, and time. He has a normal mood and affect.     Musculoskeletal:        Neck: Range of motion is full.     Ortho Exam        Neurologic Exam     Mental Status   Oriented to person, place, and time.     Cranial Nerves     CN III, IV, VI   Pupils are equal, round, and reactive to light.  Extraocular motions are normal.     Motor Exam     Strength   Strength 5/5 throughout.       DIAGNOSTIC DATA:  I personally interpreted the following imaging:   Lumbar spine MRI reviewed showing L3-S1 fusion.  No significant stenosis at L2-3.  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 male with     Problem List Items Addressed This Visit    None  Visit Diagnoses       Adverse effect of non-steroidal anti-inflammatory drug (NSAID), initial encounter    -  Primary    Relevant Medications    celecoxib (CELEBREX) 200 MG capsule    Status post lumbar spinal fusion        Spinal cord lesion                  PLAN:  More than 50% of the time was spent on discussing conservative management treatments (medication, physical therapy exercises) and possible interventions (spinal injections and surgical procedures). Care  coordination was discussed.  We will follow-up as needed  Celebrex refill    20 minutes        Jose L Brown MD  Cell:[phone]

## 2023-06-21 ENCOUNTER — TELEPHONE (OUTPATIENT)
Dept: NEUROSURGERY | Facility: CLINIC | Age: 48
End: 2023-06-21
Payer: MEDICAID

## 2023-06-22 ENCOUNTER — PATIENT MESSAGE (OUTPATIENT)
Dept: NEUROSURGERY | Facility: CLINIC | Age: 48
End: 2023-06-22
Payer: MEDICAID

## 2023-06-23 ENCOUNTER — PATIENT MESSAGE (OUTPATIENT)
Dept: ENDOCRINOLOGY | Facility: CLINIC | Age: 48
End: 2023-06-23
Payer: MEDICAID

## 2023-06-24 ENCOUNTER — HOSPITAL ENCOUNTER (EMERGENCY)
Facility: HOSPITAL | Age: 48
Discharge: HOME | End: 2023-06-24
Attending: STUDENT IN AN ORGANIZED HEALTH CARE EDUCATION/TRAINING PROGRAM
Payer: MEDICAID

## 2023-06-24 VITALS
BODY MASS INDEX: 42.66 KG/M2 | SYSTOLIC BLOOD PRESSURE: 125 MMHG | OXYGEN SATURATION: 99 % | RESPIRATION RATE: 20 BRPM | WEIGHT: 315 LBS | TEMPERATURE: 98 F | DIASTOLIC BLOOD PRESSURE: 62 MMHG | HEIGHT: 72 IN | HEART RATE: 80 BPM

## 2023-06-24 DIAGNOSIS — S99.912A LEFT ANKLE INJURY, INITIAL ENCOUNTER: ICD-10-CM

## 2023-06-24 DIAGNOSIS — S99.922A FOOT INJURY, LEFT, INITIAL ENCOUNTER: ICD-10-CM

## 2023-06-24 PROCEDURE — 99283 EMERGENCY DEPT VISIT LOW MDM: CPT | Mod: ER

## 2023-06-24 NOTE — ED NOTES
Review of patient's allergies indicates:   Allergen Reactions    Dilaudid [hydromorphone] Other (See Comments)     IV dilaudid severely drops BP        Patient has verified the spelling of the name and  on armband.   APPEARANCE: Patient is alert, calm, oriented x 4, and does not appear distressed.  SKIN: Skin is normal for race, warm, and dry. Normal skin turgor and mucous membranes moist.  CARDIAC: Normal rate and rhythm, no murmur heard.   RESPIRATORY:Normal rate and effort. Breath sounds clear bilaterally throughout chest. Respirations are equal and unlabored.    GASTRO: Bowel sounds normal, abdomen is soft, no tenderness, and no abdominal distention.  MUSCLE: Has some swelling noted to left lateral foot and inner ankle area. C/o pain with moving or touch the side of left foot.   PERIPHERAL VASCULAR: peripheral pulses present. Normal cap refill. No edema. Warm to touch.  NEURO: 5/5 strength major flexors/extensors bilaterally. Sensory intact to light touch bilaterally. Disney coma scale: eyes open spontaneously-4, oriented & converses-5, obeys commands-6. No neurological abnormalities.   MENTAL STATUS: awake, alert and aware of environment.  EYE: No overt deficits noted. No drainage. Sclera WNL  ENT: EARS: no obvious drainage. NOSE: no active bleeding. THROAT: no redness or swelling.  : Voids without complication per patient

## 2023-06-27 NOTE — ED PROVIDER NOTES
"NAME:  Kirill Gandhi III  CSN:     [id number]  MRN:    [id number]  ADMIT DATE: 6/24/2023        eMERGENCY dEPARTMENT eNCOUnter    CHIEF COMPLAINT    Chief Complaint   Patient presents with    Ankle Pain     Pt C/O L ankle/foot pain following "twisting" ankle this AM.  Ecchymosis and swelling noted to L ankle/foot. Pulses intact.        HPI    Kirill Gandhi III is a 48 y.o. male with a past medical history of  has a past medical history of Allergy, Anxiety, Back pain, Chronic bilateral low back pain with bilateral sciatica (12/14/2021), CTS (carpal tunnel syndrome) (06/26/2015), CTS (carpal tunnel syndrome) (6/26/2015), Depression, Diabetes (04/17/2015), Diabetes mellitus with insulin therapy, Elevated sed rate (6/26/2015), GERD (gastroesophageal reflux disease) (10/19/2022), vasectomy, Hypertension, Morbid obesity (4/16/2015), OCD (obsessive compulsive disorder), Sleep apnea, and Type 2 diabetes mellitus with hyperglycemia, with long-term current use of insulin (10/17/2022).     he presents to the ED due to L ankle pain s/p inversion injury while walking on uneven pavement. States he did fall to his knees. Was able to gethimself up. No other injuries.  Has not been able to completely bear weight since that time due to discomfort.      HPI       PAST MEDICAL HISTORY  Past Medical History:   Diagnosis Date    Allergy     Anxiety     Back pain     Chronic bilateral low back pain with bilateral sciatica 12/14/2021    CTS (carpal tunnel syndrome) 06/26/2015    CTS (carpal tunnel syndrome) 6/26/2015    Depression     Diabetes 04/17/2015    Diabetes mellitus with insulin therapy     Elevated sed rate 6/26/2015    GERD (gastroesophageal reflux disease) 10/19/2022    Hx of vasectomy     Hypertension     Morbid obesity 4/16/2015    OCD (obsessive compulsive disorder)     Sleep apnea     Type 2 diabetes mellitus with hyperglycemia, with long-term current use of insulin 10/17/2022       SURGICAL HISTORY    Past " Surgical History:   Procedure Laterality Date    ADENOIDECTOMY      COLONOSCOPY N/A 9/6/2022    Procedure: COLONOSCOPY;  Surgeon: Rossy Champion MD;  Location: [facility] ENDO;  Service: Endoscopy;  Laterality: N/A;    EPIDURAL STEROID INJECTION Right 09/04/2019    Procedure: Injection, Steroid, Epidural Transforaminal;  Surgeon: Harjinder Batista Jr., MD;  Location: [facility] ENDO;  Service: Pain Management;  Laterality: Right;  Right L3 + L4 TF NATALIE    [id number]  [id number]    Arrive @ 1300; ASA last 8/27; Check BG; NEEDS CONSENT    ESOPHAGOGASTRODUODENOSCOPY N/A 10/31/2022    Procedure: EGD (ESOPHAGOGASTRODUODENOSCOPY);  Surgeon: Rossy Champion MD;  Location: [facility];  Service: Endoscopy;  Laterality: N/A;    FRACTURE SURGERY      bilateral elbow fracture 3 years ago    FUSION OF LUMBAR SPINE BY ANTERIOR APPROACH Left 04/26/2021    Procedure: FUSION, SPINE, LUMBAR, ANTERIOR APPROACH Left L3-4 L4-5 OLIF BRENDA, ALL Release, L5-S1 ALIF;  Surgeon: Jose L Brown MD;  Location: [facility];  Service: Neurosurgery;  Laterality: Left;  Procedure: Left L3-4 L4-5 OLIF BRENDA, ALL Release, L5-S1 ALIF  Surgery Time: 2.5 Hrs  LOS: 2-3  Anesthesia: General  Blood: Type & Screen  Radiology: Josseline  Brace: LSO  Bed: Regular  Position: LAteral Righ    FUSION OF SACROILIAC JOINT Bilateral 04/13/2022    Procedure: FUSION, SACROILIAC JOINT Stg 2: Bilateral Open SI Joint fusion, Bedrock from Si Bone;  Surgeon: Jose L Brown MD;  Location: [facility];  Service: Neurosurgery;  Laterality: Bilateral;    FUSION OF SPINE WITH INSTRUMENTATION N/A 04/26/2021    Procedure: FUSION, SPINE, WITH INSTRUMENTATION L3-S1 Posteior Segmental Instrumentation;  Surgeon: Jose L Brown MD;  Location: [facility] OR;  Service: Neurosurgery;  Laterality: N/A;  Procedure: L3-S1 Posteior Segmental Instrumentation  Sirgery Time: 2 Hrs  LOS: 2-3  Anesthesia: General  Blood: Tyoe & Screen  Radiology: Dual C arm  Brace:LSO  Bed: [place] [id number] Poster  Position: Prone  Equipment:  Depuy     FUSION OF SPINE WITH INSTRUMENTATION N/A 04/13/2022    Procedure: FUSION, SPINE, WITH INSTRUMENTATION Stg 1: Revision of posterior L3-S1 hardware, Depuy. Sacro-pelvic fixation, screw augmentation PMMA. L4-S1 posterolateral arthrodesis. Open Gauge screws, 11-12mm;  Surgeon: Jose L Brown MD;  Location: [facility] OR;  Service: Neurosurgery;  Laterality: N/A;    HERNIA REPAIR      bilateral groin hernia    INJECTION OF STEROID Bilateral 12/13/2021    Procedure: INJECTION, STEROID Bilateral SI JOint Block and Steroid Injection;  Surgeon: Jose L Brown MD;  Location: [facility] OR;  Service: Neurosurgery;  Laterality: Bilateral;  Procedure: Bilateral SI JOint Block and Steroid Injection   Surgery Time: 30 min  LOS: 0  Anesthesia: MAC  Radiology: C-arm  Bed: Regular with pillows  Position: Prone    LUMBAR LAMINECTOMY Bilateral 11/29/2019    Procedure: LAMINECTOMY, SPINE, LUMBAR Rigth L3-4, Left L4-5 Laminectomy, medial Facetectomy, and microdiscectomy;  Surgeon: Jose L Brown MD;  Location: [facility] OR;  Service: Neurosurgery;  Laterality: Bilateral;  Procedure: Rigth L3-4, Left L4-5 Laminectomy, medial Facetectomy, and microdiscectomy  Surgery Time: 2.5 Hrs  LOS: 0-1  Anesthesia: General  Blood: Type & Screen  Radiology: C-arm  Micros    REMOVAL IMPLANT, POSTERIOR SEGMENT, INTRAOCULAR  04/13/2022    Procedure: REMOVAL IMPLANT, POSTERIOR SEGMENT, INTRAOCULAR;  Surgeon: Jose L Brown MD;  Location: [facility];  Service: Neurosurgery;;    SPINE SURGERY      TONSILLECTOMY      VASECTOMY         FAMILY HISTORY    Family History   Problem Relation Age of Onset    Fibromyalgia Mother     Osteoarthritis Mother     Cataracts Maternal Grandmother     Macular degeneration Maternal Grandfather     Cataracts Maternal Grandfather     Rheum arthritis Neg Hx     Psoriasis Neg Hx     Lupus Neg Hx     Kidney disease Neg Hx     Inflammatory bowel disease Neg Hx     Amblyopia Neg Hx     Blindness Neg Hx     Glaucoma Neg Hx     Retinal  detachment Neg Hx     Strabismus Neg Hx        SOCIAL HISTORY    Social History     Socioeconomic History    Marital status: Significant Other   Tobacco Use    Smoking status: Every Day     Types: Vaping with nicotine    Smokeless tobacco: Never    Tobacco comments:     Handout provided for [employer] Smoking Cessaiton program   Substance and Sexual Activity    Alcohol use: Not Currently     Comment: 750 ml  once a month or so    Drug use: Never    Sexual activity: Yes     Partners: Female     Birth control/protection: None     Comment: ; one child      Social Determinants of Health     Financial Resource Strain: Low Risk     Difficulty of Paying Living Expenses: Not hard at all   Food Insecurity: No Food Insecurity    Worried About Running Out of Food in the Last Year: Never true    Ran Out of Food in the Last Year: Never true   Transportation Needs: No Transportation Needs    Lack of Transportation (Medical): No    Lack of Transportation (Non-Medical): No   Physical Activity: Inactive    Days of Exercise per Week: 0 days    Minutes of Exercise per Session: 0 min   Stress: Stress Concern Present    Feeling of Stress : To some extent   Social Connections: Unknown    Frequency of Communication with Friends and Family: Never    Frequency of Social Gatherings with Friends and Family: Never    Active Member of Clubs or Organizations: No    Attends Club or Organization Meetings: Never    Marital Status: Living with partner   Housing Stability: Low Risk     Unable to Pay for Housing in the Last Year: No    Number of Places Lived in the Last Year: 1    Unstable Housing in the Last Year: No       MEDICATIONS  Current Outpatient Medications   Medication Instructions    acetaminophen (TYLENOL) 1,000 mg, Oral, 2 times daily PRN    ARIPiprazole (ABILIFY) 5 mg, Oral, Daily    ascorbic acid (vitamin C) (VITAMIN C) 1,000 mg, Oral, Daily    aspirin (ECOTRIN) 81 mg, Oral, Nightly    aspirin/sod bicarb/citric acid  (GIOVANY-SELTZER ORAL) 2 tablets, Oral, Daily PRN    atorvastatin (LIPITOR) 10 mg, Oral, Every other day    busPIRone (BUSPAR) 30 mg, Oral, 2 times daily    celecoxib (CELEBREX) 200 mg, Oral, 2 times daily    cyclobenzaprine (FLEXERIL) 10 mg, Oral, 3 times daily    dicyclomine (BENTYL) 20 mg, Oral, 3 times daily PRN    DULoxetine (CYMBALTA) 60 mg, Oral, 2 times daily    FARXIGA 5 mg, Oral, Daily    flash glucose scanning reader Misc Disp one reader to monitor glucose- covered by insurance dexcom    flash glucose sensor Kit Dispense sensor to read glucose  dexcom    fluticasone propionate (FLONASE) 100 mcg, Each Nostril, Daily    gabapentin (NEURONTIN) 800 mg, Oral, 3 times daily    hydrOXYzine HCL (ATARAX) 50 mg, Oral, Nightly PRN    insulin regular hum U-500 conc (HUMULIN R U-500, CONC, KWIKPEN) 500 unit/mL (3 mL) insulin pen Use 100 units 30 minutes before breakfast and before lunch.  Use 120 units before diner.    insulin syringe-needle U-100 (BD INSULIN SYRINGE ULTRA-FINE) 1 mL 31 gauge x 5/16 Syrg To use 4 times daily with insulin    KLGA ketoprofen 10% LIDOcaine 10% gabapentin 6% amitriptyline 2% in transdermal cream Apply 1 to 2 grams 3 to 4 times daily    Lactobac no.41/Bifidobact no.7 (PROBIOTIC-10 ORAL) Oral    lanolin/mineral oil/petrolatum (ARTIFICIAL TEARS OPHT) 2 drops, Both Eyes, Daily PRN    metFORMIN (GLUCOPHAGE-XR) 1,000 mg, Oral, 2 times daily with meals    methylcellulose, laxative, (CITRUCEL) 500 mg Tab 1 tablet, Oral, Nightly PRN    metoclopramide HCl (REGLAN) 5 mg, Oral, Before meals & nightly    MOUNJARO 15 mg, Subcutaneous, Every 7 days    multivitamin capsule 1 capsule, Oral, Daily    NON FORMULARY MEDICATION Uses Cpap Machine nightly on a setting of 10     ondansetron (ZOFRAN-ODT) 4 mg, Oral, Every 8 hours PRN    ONETOUCH DELICA PLUS LANCET 33 gauge Misc Topical (Top), 3 times daily    ONETOUCH VERIO TEST STRIPS Strp 1 strip, 3 times daily    pantoprazole (PROTONIX) 40 mg, Oral, Daily    pen  "needle, diabetic (BD ULTRA-FINE SHORT PEN NEEDLE) 31 gauge x 5/16" Ndle USE WITH FLEX PEN    pen needle, diabetic 31 gauge x 1/4" Ndle Use with flex pen    propranoloL (INDERAL) 20 MG tablet Take 1 tablet po TID prn anxiety. May take an additional 1 tablet po q day prn anxiety.    traMADoL 100 mg, Oral, Every 8 hours PRN    valsartan-hydrochlorothiazide (DIOVAN-HCT) 320-12.5 mg per tablet 1 tablet, Oral, Daily    vitamin D (VITAMIN D3) 5,000 Units, Oral, 2 times daily       ALLERGIES    Review of patient's allergies indicates:   Allergen Reactions    Dilaudid [hydromorphone] Other (See Comments)     IV dilaudid severely drops BP         REVIEW OF SYSTEMS   Review of Systems       PHYSICAL EXAM    Reviewed Triage Note    VITAL SIGNS:   ED Triage Vitals [06/24/23 1535]   Enc Vitals Group      BP (!) 122/58      Pulse 84      Resp 19      Temp 98.8 °F (37.1 °C)      Temp Source Oral      SpO2 98 %      Weight (!) 404 lb      Height 6'      Head Circumference       Peak Flow       Pain Score       Pain Loc       Pain Edu?       Excl. in GC?        No data found.    Physical Exam    Nursing note and vitals reviewed.  Constitutional: He appears well-developed and well-nourished.   HENT:   Head: Normocephalic and atraumatic.   Eyes: EOM are normal. Pupils are equal, round, and reactive to light.   Neck: Neck supple.   Normal range of motion.  Cardiovascular:  Normal rate.           Pulmonary/Chest: No respiratory distress.   Musculoskeletal:         General: Normal range of motion.      Cervical back: Normal range of motion and neck supple.      Comments: Tenderness to palpation of the dorsum of the left lateral foot with some mild swelling.  Neurovascularly intact distally.     Neurological: He is alert and oriented to person, place, and time.   Skin: Skin is warm and dry.   Psychiatric: He has a normal mood and affect.          EKG     Interpreted by EM physician if performed:               LABS  Pertinent labs reviewed. " (See chart for details)   Labs Reviewed - No data to display      RADIOLOGY          Imaging Results              X-Ray Foot Complete Left (Final result)  Result time 06/24/23 16:54:05      Final result by Bernabe Ramos MD (06/24/23 16:54:05)                   Impression:      No acute abnormality      Electronically signed by: Bernabe Ramos  Date:    06/24/2023  Time:    16:54               Narrative:    EXAMINATION:  XR FOOT COMPLETE 3 VIEW LEFT    CLINICAL HISTORY:  .  Unspecified injury of left foot, initial encounter    TECHNIQUE:  AP, lateral and oblique views of the left foot were performed.    COMPARISON:  None    FINDINGS:  No acute fracture or dislocation.  Calcaneal spurring.  Prominent os trigonum.                                       X-Ray Ankle Complete Left (Final result)  Result time 06/24/23 17:10:46      Final result by Bernabe Ramos MD (06/24/23 17:10:46)                   Impression:      No acute abnormality.      Electronically signed by: Bernabe Ramos  Date:    06/24/2023  Time:    17:10               Narrative:    EXAMINATION:  XR ANKLE COMPLETE 3 VIEW LEFT    CLINICAL HISTORY:  XR ANKLE COMPLETE 3 VIEW LEFTUnspecified injury of left ankle, initial encounter    COMPARISON:  None    FINDINGS:  Multiple radiographic views  were obtained.    No evidence of acute fracture or dislocation.  Calcaneal spurring.  Prominent os trigonum.  Minimal degenerative joint disease.                                        PROCEDURES    Procedures      ED COURSE & MEDICAL DECISION MAKING    Pertinent Labs & Imaging studies reviewed. (See chart for details and specific orders.)        Summary of review of records:     MDM:  Kirill Gandhi III is a 48 y.o. male who presents for L ankle pain after inversion injury earlier today. Will assess for fracture vs sprain with XR of L foot and ankle.            Medications - No data to display    ED Course as of 06/27/23 1436   Sat Jun 24, 2023   1659 X-Ray Foot  Complete Left  No acute fracture or dislocation.  Calcaneal spurring.  Prominent os trigonum. [HL]   1719 X-Ray Ankle Complete Left  No acute abnormality. [HL]      ED Course User Index  [HL] Jennifer Barraza DO     Splint placed.  Advised weight-bearing as tolerated.  Crutches offered which he states he does not want to use.  States he has a walker at home that he can use.  Did discuss that he may still have a high grade sprain that will require close follow-up.  Referra to sports medicine provided and all questions addressed.     FINAL IMPRESSION    Final diagnoses:  [S92.922A] Foot injury, left, initial encounter  [S95.912A] Left ankle injury, initial encounter       DISPOSITION  Patient discharged in stable condition        ED Prescriptions    None       Follow-up Information       Follow up With Specialties Details Why Contact Info    Marcos Ibarra MD Family Medicine Schedule an appointment as soon as possible for a visit   [address] [place] [address] [phone]      [facility] - Emergency Dept Emergency Medicine  As needed, If symptoms worsen [address] [facility] [id number] [phone]              DISCLAIMER: This note was prepared with [employer] voice recognition transcription software. Garbled syntax, mangled pronouns, and other bizarre constructions may be attributed to that software system.      DO Jennifer Delgado DO  06/27/23 5341

## 2023-06-29 ENCOUNTER — PATIENT MESSAGE (OUTPATIENT)
Dept: PODIATRY | Facility: CLINIC | Age: 48
End: 2023-06-29
Payer: MEDICAID

## 2023-06-29 ENCOUNTER — PATIENT MESSAGE (OUTPATIENT)
Dept: FAMILY MEDICINE | Facility: CLINIC | Age: 48
End: 2023-06-29
Payer: MEDICAID

## 2023-06-29 DIAGNOSIS — M25.572 ACUTE LEFT ANKLE PAIN: Primary | ICD-10-CM

## 2023-06-30 ENCOUNTER — HOSPITAL ENCOUNTER (OUTPATIENT)
Dept: RADIOLOGY | Facility: HOSPITAL | Age: 48
Discharge: HOME | End: 2023-06-30
Attending: FAMILY MEDICINE
Payer: MEDICAID

## 2023-06-30 ENCOUNTER — PATIENT MESSAGE (OUTPATIENT)
Dept: FAMILY MEDICINE | Facility: CLINIC | Age: 48
End: 2023-06-30
Payer: MEDICAID

## 2023-06-30 DIAGNOSIS — M25.572 ACUTE LEFT ANKLE PAIN: ICD-10-CM

## 2023-06-30 PROCEDURE — 73610 X-RAY EXAM OF ANKLE: CPT | Mod: TC,FY,PO,LT

## 2023-06-30 PROCEDURE — 73620 X-RAY EXAM OF FOOT: CPT | Mod: TC,FY,PO,LT

## 2023-06-30 PROCEDURE — 73620 XR FOOT 2 VIEW LEFT: ICD-10-PCS | Mod: 26,LT,, | Performed by: RADIOLOGY

## 2023-06-30 PROCEDURE — 73610 XR ANKLE COMPLETE 3 VIEW LEFT: ICD-10-PCS | Mod: 26,LT,, | Performed by: RADIOLOGY

## 2023-06-30 PROCEDURE — 73610 X-RAY EXAM OF ANKLE: CPT | Mod: 26,LT,, | Performed by: RADIOLOGY

## 2023-06-30 PROCEDURE — 73620 X-RAY EXAM OF FOOT: CPT | Mod: 26,LT,, | Performed by: RADIOLOGY

## 2023-07-21 ENCOUNTER — CLINICAL SUPPORT (OUTPATIENT)
Dept: DIABETES | Facility: CLINIC | Age: 48
End: 2023-07-21
Payer: MEDICAID

## 2023-07-21 VITALS — WEIGHT: 315 LBS | HEIGHT: 72 IN | BODY MASS INDEX: 42.66 KG/M2

## 2023-07-21 DIAGNOSIS — Z79.4 TYPE 2 DIABETES MELLITUS WITH HYPERGLYCEMIA, WITH LONG-TERM CURRENT USE OF INSULIN: Primary | ICD-10-CM

## 2023-07-21 DIAGNOSIS — E11.65 TYPE 2 DIABETES MELLITUS WITH HYPERGLYCEMIA, WITH LONG-TERM CURRENT USE OF INSULIN: Primary | ICD-10-CM

## 2023-07-21 PROCEDURE — G0108 DIAB MANAGE TRN  PER INDIV: HCPCS | Mod: PBBFAC,PN | Performed by: DIETITIAN, REGISTERED

## 2023-07-21 PROCEDURE — 99999 PR PBB SHADOW E&M-EST. PATIENT-LVL I: CPT | Mod: PBBFAC,,, | Performed by: DIETITIAN, REGISTERED

## 2023-07-21 PROCEDURE — 99211 OFF/OP EST MAY X REQ PHY/QHP: CPT | Mod: PBBFAC,PN | Performed by: DIETITIAN, REGISTERED

## 2023-07-21 PROCEDURE — 99999 PR PBB SHADOW E&M-EST. PATIENT-LVL I: ICD-10-PCS | Mod: PBBFAC,,, | Performed by: DIETITIAN, REGISTERED

## 2023-07-21 NOTE — PROGRESS NOTES
Diabetes Care Specialist Progress Note  Author: Raquel Paula RD  Date: 7/21/2023    Program Intake  Reason for Diabetes Program Visit:: Intervention  Current diabetes risk level:: low  In the last 12 months, have you:: used emergency room services  Was the ER or hospital admission related to diabetes?: Yes  Permission to speak with others about care:: no    Lab Results   Component Value Date    LABA1C 6.8 (H) 03/24/2017    HGBA1C 7.9 (H) 06/15/2023       Clinical    Problem Review  Reviewed Problem List with Patient: yes  Active comorbidities affecting diabetes self-care.: yes  Comorbidities: Hypertension, Other (comment) (morbid obesity)    Clinical Assessment  Have you ever experienced hypoglycemia (low blood sugar)?: no (although he stated he feels low at 120 mg/dL)  Have you ever experienced hyperglycemia (high blood sugar)?: yes  In the last month, how often have you experienced high blood sugar?: more than once a day  Are you able to tell when your blood sugar is high?: Yes  What are your symptoms?: fatigue  Have you ever been hospitalized because your blood sugar was high?: yes (see comments)    Nutritional Status  Meal Plan 24 Hour Recall: Breakfast, Lunch, Dinner, Snack  Meal Plan 24 Hour Recall - Breakfast: skips often  Meal Plan 24 Hour Recall - Lunch: fast food - Felecia 4 burritos or Egg McMuffin and hot and spicy chicken sandwich with strawberry smoothie  Meal Plan 24 Hour Recall - Dinner: chickenn wings, park slaw, baked beans, crackers  Meal Plan 24 Hour Recall - Snack: peanuts, frosted mini wheat cereal, drinks diet cokes and some water during the day; occasional regular soda    Additional Social History    Support  Does anyone support you with your diabetes care?: yes    Access to Mass Media & Technology  Does the patient have access to any of the following devices or technologies?: Smart phone    Health Literacy  Preferred Learning Method: Face to Face, Reading Materials      Diabetes  Self-Management Skills Assessment    Diabetes Disease Process/Treatment Options  Patient/caregiver able to state what happens when someone has diabetes.: yes  Patient/caregiver knows what type of diabetes they have.: yes  Diabetes Type : Type II  Patient/caregiver able to identify at least three signs and symptoms of diabetes.: yes  Identified signs and symptoms:: increased thirst, frequent urination, fatigue  Patient able to identify at least three risk factors for diabetes.: yes  Identified risk factors:: being overweight, family history, reduced activity  Assessment indicates:: Adequate understanding  Area of need?: No    Nutrition/Healthy Eating  Challenges to healthy eating:: portion control, lack of will power  Method of carbohydrate measurement:: no method  Patient can identify foods that impact blood sugar.: yes  Patient-identified foods:: soda, starches (bread, pasta, rice, cereal), starchy vegetables (corn, peas, beans), sweets  Assessment indicates:: Instruction Needed  Area of need?: Yes    Physical Activity/Exercise  Patient's daily activity level:: sedentary  Patient formally exercises outside of work.: no  Reasons for not exercising:: physically unable to exercise currently  Patient can identify forms of physical activity.: yes  Stated forms of physical activity:: moving to burn calories  Patient can identify reasons why exercise/physical activity is important in diabetes management.: yes  Identified reasons:: helps insulin work better  Assessment indicates:: Instruction Needed  Area of need?: No    Medications  Patient is able to describe current diabetes management routine.: yes  Diabetes management routine:: injectable medications, insulin, oral medications  Patient is able to identify current diabetes medications, dosages, and appropriate timing of medications.: yes  Patient understands the purpose of the medications taken for diabetes.: yes  Patient reports problems or concerns with current  medication regimen.: no  Assessment indicates:: Adequate understanding  Area of need?: No    Home Blood Glucose Monitoring  Patient states that blood sugar is checked at home daily.: yes  Monitoring Method:: personal continuous glucose monitor  Personal CGM type:: Dexcom G7 which was started today  Patient is able to use personal CGM appropriately.: yes  Assessment indicates:: Instruction Needed  Area of need?: Yes    Acute Complications  Deffered due to:: Time    Chronic Complications  Deferred due to:: Time    Psychosocial/Coping  Deffered due to:: Time         Assessment Summary and Plan    Based on today's diabetes care assessment, the following areas of need were identified:      Social 6/9/2023   Support No   Access to Mass Media/Tech No   Cognitive/Behavioral Health No   Culture/Rastafari No   Communication No   Health Literacy No        Clinical 6/9/2023   Medication Adherence No   Lab Compliance No   Nutritional Status Yes, see care plan update        Diabetes Self-Management Skills 7/21/2023   Diabetes Disease Process/Treatment Options No   Nutrition/Healthy Eating Yes, see care plan update   Physical Activity/Exercise No   Medication No   Home Blood Glucose Monitoring Yes, see care plan update   Acute Complications -   Chronic Complications -   Psychosocial/Coping -          Today's interventions were provided through individual discussion, instruction, and written materials were provided.      Patient verbalized understanding of instruction and written materials.  Pt was able to return back demonstration of instructions today. Patient understood key points, needs reinforcement and further instruction.     Diabetes Self-Management Care Plan:    Today's Diabetes Self-Management Care Plan was developed with Kirill's input. Kirill has agreed to work toward the following goal(s) to improve his/her overall diabetes control.      Care Plan: Diabetes Management   Updates made since 6/21/2023 12:00 AM     "    Problem: Blood Glucose Self-Monitoring         Goal: Patient agrees to check and record blood sugars using the Dexcom G7.    Start Date: 6/9/2023   Expected End Date: 6/30/2024   Priority: High   Barriers: No Barriers Identified   Note:    7/21/23: Pt has had problems trying to get the G7 to adhere to his skin. He said this Dexcom version is more accurate than the G6. I only have 2 days of data, His average BG is high and TIR is 5%. We discussed TIR, BG and A1c goals.        6/9/23: Pt had used Dexcom G6, but unclear as to why it was stopped. He is here today to start Dexcom G7. Encourage pt to monitor BG before meals and 2 hours after meals to understand how food affects him and he can start making changes.     DEXCOM G7 CGM TRAINING     Patient referred to clinic today for Dexcom G6 continuous glucose sensor system training.  Patient arrived with  fully charged and Dexcom G6 mobile neftali downloaded to phone. Education was provided using "Quick Start Guide" per Dexcom protocol.     Pt will be using his/her phone as the primary .  Overview:  5min glucose reading updates, trending arrows, BG graph screens, battery life indicator, Blue Tooth Symbol.  Menus: trend Graph, start sensor, enter BG, events, Alerts, Settings, Shutdown, Stop Sensor   Settings:                          * Urgent Low: 55 mg/dL                          * Urgent Low Soon: on                          * Low alert: 90 mg/dl repeat 15 min                          * High alert: 350 mg/dl repeat 30 min                          * Rise rate: off                          * Fall Rate: off                          * Signal Loss: on repeat 20 min                          * No Reading: on repeat 20 min                          * Always sound: on                          * Alert Schedule:  (instructed on how to set up alert schedule if desired)     Reviewed additional setting options with patient, including Graph Height and Transmitter " ID. Transmitter was paired with .    Reviewed where to find sensor insertion time and sensor expiration date.   Discussed no need to calibrate sensor during the entirety of the 10 day wear. Discussed that pt can calibrate sensor if desired, but at that time she will need to continue calibrating every 12 hours for sensor to remain accurate. Reviewed appropriate calibration techniques.  Reviewed sensor site selection. Site selected and prepped using aseptic technique Inserted to abdomen. Transmitter placed in pod and secured.  Practiced sensor pod/transmitter removal from site, and removal of transmitter from sensor pod.  Patient able to demonstrate without difficulty.  Encouraged to review manual prior to starting another sensor.   Reviewed problem solving aspects of sensor transmission/ variables that can disrupt RF transmission.  range 20 feet, but the first 3 hrs keep within 3 feet of transmitter.  Pt instructed on lag time of interstitial fluid from CBG and was advised to tx hypoglycemia and dose insulin based on SMBG values.  Dexcom technical support contact number given and examples of when to contact them discussed. Pt will download software at home for Dexcom download.   Patient advised to always check glucose with glucometer should readings do not match symptoms felt (ex. Hypo or hyperglycemia).            Problem: Healthy Eating         Goal: Eat 3 meals daily with 45-60g/3-4 servings of Carbohydrate per meal.    Start Date: 6/9/2023   Expected End Date: 6/30/2024   Priority: High   Barriers: No Barriers Identified   Note:    7/21/23: Pt has been sporadic in Mounjaro use d/t supply issues at the pharmacy. He has been off of it for several weeks. He has not made many changes in the foods he eats. We talked about the need for changing types of foods he eats while he is on Mounjaro and focusing on higher quality foods while still considering portion sizes of carbs as this will greatly help him  when Mounjaro is discontinued. We discussed the need for practicing self-discipline when it comes to eating. He gets hungry in the middle of the night and cannot go back to sleep until he eats something. His Clarity shows his BG are above 250 nearly 24 hours/day. His BG has dropped to 160 during the night, and he gets up to eat. I suspect he is having symptoms of hypoglycemia because he is used to much higher numbers.     6/9/23: Pt eats 2 meals every day, sometimes three. He eats large portions of fast food. He is on Mounjaro 15 mg and feels his appetite is now being affected by it. Had a long discussion on incorporating more vegetables into his meals and less fast food/fatty food/fried food so that he has improved eating habits in place when Mounjaro is discontinued to minimize weight gain. He drinks mostly diet soda and water at home. Likes smoothies which he noted spikes him up - discussed that having smaller portions of smoothies may work better for him. Recommended that he lose at least 40 lbs.         Follow Up Plan     Follow up in about 3 months (around 10/21/2023). Review dexcom data, changes in eating habits, assess carb counting skills.    Today's care plan and follow up schedule was discussed with patient.  Kirill verbalized understanding of the care plan, goals, and agrees to follow up plan.        The patient was encouraged to communicate with his/her health care provider/physician and care team regarding his/her condition(s) and treatment.  I provided the patient with my contact information today and encouraged to contact me via phone or [facility] Patient Portal as needed.     Length of Visit   Total Time: 60 Minutes

## 2023-07-31 RX ORDER — TRAMADOL HYDROCHLORIDE 100 MG/1
100 TABLET, COATED ORAL EVERY 8 HOURS PRN
Qty: 90 TABLET | Refills: 3 | Status: SHIPPED | OUTPATIENT
Start: 2023-07-31 | End: 2023-09-08 | Stop reason: SDUPTHER

## 2023-08-02 ENCOUNTER — PATIENT MESSAGE (OUTPATIENT)
Dept: FAMILY MEDICINE | Facility: CLINIC | Age: 48
End: 2023-08-02
Payer: MEDICAID

## 2023-08-03 ENCOUNTER — OFFICE VISIT (OUTPATIENT)
Dept: PSYCHIATRY | Facility: CLINIC | Age: 48
End: 2023-08-03
Payer: MEDICAID

## 2023-08-03 DIAGNOSIS — F33.1 MODERATE EPISODE OF RECURRENT MAJOR DEPRESSIVE DISORDER: Primary | ICD-10-CM

## 2023-08-03 DIAGNOSIS — F51.05 INSOMNIA DUE TO OTHER MENTAL DISORDER: ICD-10-CM

## 2023-08-03 DIAGNOSIS — F42.2 MIXED OBSESSIONAL THOUGHTS AND ACTS: ICD-10-CM

## 2023-08-03 DIAGNOSIS — F99 INSOMNIA DUE TO OTHER MENTAL DISORDER: ICD-10-CM

## 2023-08-03 DIAGNOSIS — F41.9 ANXIETY: ICD-10-CM

## 2023-08-03 PROCEDURE — 3072F LOW RISK FOR RETINOPATHY: CPT | Mod: CPTII,95,, | Performed by: NURSE PRACTITIONER

## 2023-08-03 PROCEDURE — 1160F PR REVIEW ALL MEDS BY PRESCRIBER/CLIN PHARMACIST DOCUMENTED: ICD-10-PCS | Mod: CPTII,95,, | Performed by: NURSE PRACTITIONER

## 2023-08-03 PROCEDURE — 1159F MED LIST DOCD IN RCRD: CPT | Mod: CPTII,95,, | Performed by: NURSE PRACTITIONER

## 2023-08-03 PROCEDURE — 1160F RVW MEDS BY RX/DR IN RCRD: CPT | Mod: CPTII,95,, | Performed by: NURSE PRACTITIONER

## 2023-08-03 PROCEDURE — 3066F NEPHROPATHY DOC TX: CPT | Mod: CPTII,95,, | Performed by: NURSE PRACTITIONER

## 2023-08-03 PROCEDURE — 99214 OFFICE O/P EST MOD 30 MIN: CPT | Mod: 95,,, | Performed by: NURSE PRACTITIONER

## 2023-08-03 PROCEDURE — 3051F PR MOST RECENT HEMOGLOBIN A1C LEVEL 7.0 - < 8.0%: ICD-10-PCS | Mod: CPTII,95,, | Performed by: NURSE PRACTITIONER

## 2023-08-03 PROCEDURE — 3066F PR DOCUMENTATION OF TREATMENT FOR NEPHROPATHY: ICD-10-PCS | Mod: CPTII,95,, | Performed by: NURSE PRACTITIONER

## 2023-08-03 PROCEDURE — 99214 PR OFFICE/OUTPT VISIT, EST, LEVL IV, 30-39 MIN: ICD-10-PCS | Mod: 95,,, | Performed by: NURSE PRACTITIONER

## 2023-08-03 PROCEDURE — 1159F PR MEDICATION LIST DOCUMENTED IN MEDICAL RECORD: ICD-10-PCS | Mod: CPTII,95,, | Performed by: NURSE PRACTITIONER

## 2023-08-03 PROCEDURE — 3051F HG A1C>EQUAL 7.0%<8.0%: CPT | Mod: CPTII,95,, | Performed by: NURSE PRACTITIONER

## 2023-08-03 PROCEDURE — 3072F PR LOW RISK FOR RETINOPATHY: ICD-10-PCS | Mod: CPTII,95,, | Performed by: NURSE PRACTITIONER

## 2023-08-03 PROCEDURE — 3061F NEG MICROALBUMINURIA REV: CPT | Mod: CPTII,95,, | Performed by: NURSE PRACTITIONER

## 2023-08-03 PROCEDURE — 3061F PR NEG MICROALBUMINURIA RESULT DOCUMENTED/REVIEW: ICD-10-PCS | Mod: CPTII,95,, | Performed by: NURSE PRACTITIONER

## 2023-08-03 RX ORDER — BUSPIRONE HYDROCHLORIDE 30 MG/1
30 TABLET ORAL 2 TIMES DAILY
Qty: 180 TABLET | Refills: 0 | Status: SHIPPED | OUTPATIENT
Start: 2023-08-03 | End: 2024-01-02

## 2023-08-03 RX ORDER — DULOXETIN HYDROCHLORIDE 60 MG/1
60 CAPSULE, DELAYED RELEASE ORAL 2 TIMES DAILY
Qty: 180 CAPSULE | Refills: 0 | Status: SHIPPED | OUTPATIENT
Start: 2023-08-03 | End: 2023-11-13

## 2023-08-03 RX ORDER — HYDROXYZINE HYDROCHLORIDE 25 MG/1
50 TABLET, FILM COATED ORAL NIGHTLY PRN
Qty: 180 TABLET | Refills: 0 | Status: SHIPPED | OUTPATIENT
Start: 2023-08-03 | End: 2023-12-11

## 2023-08-03 RX ORDER — PROPRANOLOL HYDROCHLORIDE 20 MG/1
TABLET ORAL
Qty: 135 TABLET | Refills: 0 | Status: SHIPPED | OUTPATIENT
Start: 2023-08-03 | End: 2023-09-29

## 2023-08-03 RX ORDER — ARIPIPRAZOLE 5 MG/1
5 TABLET ORAL DAILY
Qty: 90 TABLET | Refills: 0 | Status: SHIPPED | OUTPATIENT
Start: 2023-08-03 | End: 2023-11-13

## 2023-08-03 NOTE — PROGRESS NOTES
"Outpatient Psychiatry Follow-Up Visit (MD/NP) - Telemedicine Visit    8/3/2023 8:18 AM  Kirill Gandhi III  1975  [id number]      The patient location is: Patient reported that their location at the time of this visit was in the [facility]       Visit type: audiovisual    Each patient to whom he or she provides medical services by telemedicine is:  (1) informed of the relationship between the physician and patient and the respective role of any other health care provider with respect to management of the patient; and (2) notified that he or she may decline to receive medical services by telemedicine and may withdraw from such care at any time.      Clinical Status of Patient:  Outpatient (Ambulatory)    Chief Complaint:  Kirill Gandhi III, a 48 y.o. male,who presents today for follow up of depression and anxiety.  Met with patient.        Interval History/Subjective Report/Content of Current Session:     States "I think we found the right combination of medications for my symptoms". Denies crying spells, amotivation, anhedonia, and hopelessness. Endorses obsessional thoughts.  States that his physical pain keeps him awake some nights. During those times, hydroxyzine does not help.  States he did not see the message re the [facility] clinic in the pt portal. He will go back and check. He will let me know if cannot find the message and if he is interested.    ASEs from psych meds: denies    Pt denies recurrent thoughts of death and denies SI/HI. Denies any sxs of mihaela. Denies AVH, paranoia and delusions. No objective s/sx of psychosis or mihaela.         Psychotherapy:  Target symptoms: depression, anxiety   Why chosen therapy is appropriate versus another modality: relevant to diagnosis, patient responds to this modality  Outcome monitoring methods: self-report, observation  Therapeutic intervention type: supportive psychotherapy  Topics discussed/themes: stress related to medical comorbidities, " building skills sets for symptom management  The patient's response to the intervention is accepting. The patient's progress toward treatment goals is good.   Duration of intervention: 5 minutes.      Psychotropic medication review  Previous Trials-  Wellbutrin- ineffective on its own  Prozac- effective  Luvox- partial response  Risperidone - ineffective  Buspar - effective  Celexa- ineffective  Zoloft  Lexapro      Current meds-  Cymbalta  Gabapentin  Vistaril  Buspar  Abilify    History:       Past Medical, Psychiatric, Family and Social History: The patient's past medical, psychiatric, family and social history, allergies, current medications, past surgical history, and problem list have been reviewed and updated as appropriate within the electronic medical record.         Review of Systems       Review of Systems   Constitutional:  Negative for chills, fever and malaise/fatigue.   Respiratory:  Negative for cough and shortness of breath.    Cardiovascular:  Negative for chest pain and palpitations.   Gastrointestinal:  Negative for abdominal pain, diarrhea and vomiting.   Genitourinary:  Negative for dysuria and hematuria.   Musculoskeletal:  Negative for falls and myalgias.   Skin:  Negative for rash.   Neurological:  Negative for tremors, seizures and headaches.   Psychiatric/Behavioral:          See HPI       Compliance: yes      Risk Parameters:  Patient reports no suicidal ideation  Patient reports no homicidal ideation  Patient reports no self-injurious behavior  Patient reports no violent behavior    Exam (detailed: at least 9 elements; comprehensive: all 15 elements)     Constitutional  Vitals:  Most recent vital signs, dated greater than 90 days prior to this appointment, were reviewed.   There were no vitals filed for this visit.             Musculoskeletal  Muscle Strength/Tone:  not examined - SHARIF due to virtual visit   Gait & Station:  SHARIF due to virtual visit     Psychiatric      Appearance:   unremarkable, age appropriate, well nourished, bearded, casually dressed, neatly groomed, obese   Behavior:  normal, friendly and cooperative, eye contact normal     Speech:  no latency; no press   Mood & Affect:  euthymic  congruent and appropriate   Thought Process:  normal and logical   Associations:  intact   Thought Content:  normal, no suicidality, no homicidality, delusions, or paranoia   Insight:  intact, has awareness of illness   Judgement: behavior is adequate to circumstances, age appropriate   Orientation:  grossly intact   Memory: intact for content of interview   Language: grossly intact   Attention Span & Concentration:  able to focus   Fund of Knowledge:  intact and appropriate to age and level of education       Medications:  Outpatient Encounter Medications as of 8/3/2023   Medication Sig Dispense Refill    acetaminophen (TYLENOL) 500 MG tablet Take 1,000 mg by mouth 2 (two) times daily as needed for Pain.      ARIPiprazole (ABILIFY) 5 MG Tab Take 1 tablet (5 mg total) by mouth once daily. 90 tablet 0    ascorbic acid, vitamin C, (VITAMIN C) 1000 MG tablet Take 1,000 mg by mouth once daily.      aspirin (ECOTRIN) 81 MG EC tablet Take 81 mg by mouth every evening.      aspirin/sod bicarb/citric acid (GIOVANY-SELTZER ORAL) Take 2 tablets by mouth daily as needed (Upset stomach).      atorvastatin (LIPITOR) 10 MG tablet Take 1 tablet (10 mg total) by mouth every other day. 45 tablet 3    busPIRone (BUSPAR) 30 MG Tab Take 1 tablet (30 mg total) by mouth 2 (two) times daily. 180 tablet 0    celecoxib (CELEBREX) 200 MG capsule Take 1 capsule (200 mg total) by mouth 2 (two) times daily. 60 capsule 11    cyclobenzaprine (FLEXERIL) 10 MG tablet Take 10 mg by mouth 3 (three) times daily.      dapagliflozin propanediol (FARXIGA) 5 mg Tab tablet Take 1 tablet (5 mg total) by mouth once daily. 30 tablet 11    dicyclomine (BENTYL) 20 mg tablet Take 1 tablet (20 mg total) by mouth 3 (three) times daily as needed  (abdominal pain). 60 tablet 2    DULoxetine (CYMBALTA) 60 MG capsule Take 1 capsule (60 mg total) by mouth 2 (two) times daily. 180 capsule 0    flash glucose scanning reader Misc Disp one reader to monitor glucose- covered by insurance dexcom 1 each 1    flash glucose sensor Kit Dispense sensor to read glucose  dexcom 12 kit 11    fluticasone propionate (FLONASE) 50 mcg/actuation nasal spray 2 sprays (100 mcg total) by Each Nostril route once daily. 48 mL 2    gabapentin (NEURONTIN) 800 MG tablet Take 1 tablet (800 mg total) by mouth 3 (three) times daily. 270 tablet 3    hydrOXYzine HCL (ATARAX) 25 MG tablet Take 2 tablets (50 mg total) by mouth nightly as needed (insomnia). 180 tablet 0    insulin regular hum U-500 conc (HUMULIN R U-500, CONC, KWIKPEN) 500 unit/mL (3 mL) insulin pen Use 100 units 30 minutes before breakfast and before lunch.  Use 120 units before diner. 8 pen 11    insulin syringe-needle U-100 (BD INSULIN SYRINGE ULTRA-FINE) 1 mL 31 gauge x 5/16 Syrg To use 4 times daily with insulin 300 each 11    KLGA ketoprofen 10% LIDOcaine 10% gabapentin 6% amitriptyline 2% in transdermal cream Apply 1 to 2 grams 3 to 4 times daily 240 g 1    Lactobac no.41/Bifidobact no.7 (PROBIOTIC-10 ORAL) Take by mouth.      lanolin/mineral oil/petrolatum (ARTIFICIAL TEARS OPHT) Place 2 drops into both eyes daily as needed (Dry eye).      metFORMIN (GLUCOPHAGE-XR) 500 MG ER 24hr tablet Take 2 tablets (1,000 mg total) by mouth 2 (two) times daily with meals. 360 tablet 3    methylcellulose, laxative, (CITRUCEL) 500 mg Tab Take 1 tablet by mouth nightly as needed (Constipation).      metoclopramide HCl (REGLAN) 5 mg/5 mL Soln Take 5 mLs (5 mg total) by mouth 4 (four) times daily before meals and nightly. 500 mL 2    multivitamin capsule Take 1 capsule by mouth once daily.      NON FORMULARY MEDICATION Uses Cpap Machine nightly on a setting of 10      ondansetron (ZOFRAN-ODT) 4 MG TbDL Take 1 tablet (4 mg total) by mouth  "every 8 (eight) hours as needed (nausea). 30 tablet 5    ONETOUCH DELICA PLUS LANCET 33 gauge Misc Apply topically 3 (three) times daily.      ONETOUCH VERIO TEST STRIPS Strp 1 strip 3 (three) times daily.      pantoprazole (PROTONIX) 40 MG tablet Take 1 tablet (40 mg total) by mouth once daily. 90 tablet 3    pen needle, diabetic (BD ULTRA-FINE SHORT PEN NEEDLE) 31 gauge x 5/16" Ndle USE WITH FLEX  each 11    pen needle, diabetic 31 gauge x 1/4" Ndle Use with flex pen 100 each 11    propranoloL (INDERAL) 20 MG tablet Take 1 tablet po TID prn anxiety. May take an additional 1 tablet po q day prn anxiety. 135 tablet 0    tirzepatide (MOUNJARO) 15 mg/0.5 mL PnIj Inject 15 mg into the skin every 7 days. 4 pen 11    traMADoL 100 mg Tab Take 100 mg by mouth every 8 (eight) hours as needed (severe pain). 90 tablet 3    valsartan-hydrochlorothiazide (DIOVAN-HCT) 320-12.5 mg per tablet Take 1 tablet by mouth once daily. 90 tablet 3    vitamin D 1000 units Tab Take 5,000 Units by mouth 2 (two) times a day.      [DISCONTINUED] fluvoxaMINE (LUVOX) 100 MG tablet Take 1.5 tablets (150 mg total) by mouth 2 (two) times daily. 270 tablet 0     No facility-administered encounter medications on file as of 8/3/2023.       Allergy:  Review of patient's allergies indicates:   Allergen Reactions    Dilaudid [hydromorphone] Other (See Comments)     IV dilaudid severely drops BP         Assessment and Diagnosis   Status/Progress: Based on the examination today, the patient's problem(s) is/are well controlled.  New problems have not been presented today.   Co-morbidities are complicating management of the primary condition.  There are no active rule-out diagnoses for this patient at this time.         General Impression:       ICD-10-CM ICD-9-CM   1. Moderate episode of recurrent major depressive disorder  F33.1 296.32   2. Mixed obsessional thoughts and acts  F42.2 300.3   3. Anxiety  F41.9 300.00   4. Insomnia due to other mental " disorder  F51.05 300.9    F99 327.02       Intervention/Counseling/Treatment Plan     Medication Management:  Continue Buspar 30 mg BID  Continue Cymbalta 60 mg BID  Continue gabapentin 800 mg TID - managed by pain management  Continue hydroxyzine 25-50 mg q hs prn insomnia. Pt was told not drive or to take this med with ETOH or other sedating meds/substance. Pt verbalized understanding.  Continue Abilify 5 mg q day  Continue propranolol to 20 mg TID prn anxiety. May take an additional tab po q d prn anxiety.  Labs: reviewed most recent  The treatment plan and follow up plan were reviewed with the patient.  Discussed with patient informed consent, risks vs. benefits, alternative treatments, side effect profile and the inherent unpredictability of individual responses to these treatments. The patient expresses understanding of the above and displays the capacity to agree with this current plan and had no other questions.  Encouraged Patient to keep future appointments.   Take medications as prescribed and abstain from substance abuse.   Pt was told to present to ED or call 911 for SI/HI plan or intent, psychosis, or other psychiatric or medical emergency, and pt agrees to this and verbalized understanding.          Return to Clinic: 3 months, or sooner if needed        Face to Face time with patient: 13 minutes  Total time: 23 minutes of total time spent on the encounter, which includes face to face time and non-face to face time preparing to see the patient (eg, review of tests), Obtaining and/or reviewing separately obtained history, Documenting clinical information in the electronic or other health record, Independently interpreting results (not separately reported) and communicating results to the patient/family/caregiver, or Care coordination (not separately reported).       Verónica Briscoe, MSN, APRN, PMHNP-BC [facility]

## 2023-08-09 ENCOUNTER — LAB VISIT (OUTPATIENT)
Dept: LAB | Facility: HOSPITAL | Age: 48
End: 2023-08-09
Attending: FAMILY MEDICINE
Payer: MEDICAID

## 2023-08-09 DIAGNOSIS — E11.8 TYPE 2 DIABETES MELLITUS WITH COMPLICATION, WITHOUT LONG-TERM CURRENT USE OF INSULIN: ICD-10-CM

## 2023-08-09 LAB
ESTIMATED AVG GLUCOSE: 160 MG/DL (ref 68–131)
HBA1C MFR BLD: 7.2 % (ref 4–5.6)

## 2023-08-09 PROCEDURE — 83036 HEMOGLOBIN GLYCOSYLATED A1C: CPT | Performed by: FAMILY MEDICINE

## 2023-08-09 PROCEDURE — 36415 COLL VENOUS BLD VENIPUNCTURE: CPT | Mod: PO | Performed by: FAMILY MEDICINE

## 2023-08-09 NOTE — PROGRESS NOTES
Subjective:      Patient ID: Kirill Gandhi III is a 48 y.o.    Chief Complaint:  DM2; Follow up visit     Patient Active Problem List   Diagnosis    Morbid obesity    Vitamin D deficiency    VICENTE (obstructive sleep apnea)    OCD (obsessive compulsive disorder)    Anxiety    Insomnia due to other mental disorder    Palpitations    DDD (degenerative disc disease), lumbar    Adjustment disorder with mixed anxiety and depressed mood    Mixed obsessional thoughts and acts    Mild episode of recurrent major depressive disorder    Fusion of spine, lumbosacral region    Sacroiliac joint dysfunction of both sides    Chronic bilateral low back pain with bilateral sciatica    Radiculopathy of lumbosacral region    Chest pain, atypical    Pseudoarthrosis of lumbar spine    Benign essential hypertension    Type 2 diabetes mellitus with hyperglycemia, with long-term current use of insulin    Excessive flatus    Gastroesophageal reflux disease without esophagitis    Hyponatremia    Metabolic acidemia    Dehydration with hyponatremia    Flatulence/gas pain/belching    Decreased strength of trunk and back    Decreased ROM of trunk and back    Dyslipidemia      History of Present Illness  48 y.o. Male w/ medical history as above that presents to the endocrine clinic for follow up evaluation of DM2. Previous patient of Dr Morris. Last visit in June 2022    He is accompanied by his GF, Geraldine.     At his last visit, we added Farxiga 5 mg daily.    With regards to Diabetes Mellitus:     -Type 2     -Duration:  Dx  > 10  yrs ago with symptoms and blood work for evaluation of fibromyalgia     -FH?  Grandmother,  Aunts     -Microvascular complications: (Retinopathy,  Neuropathy)   Due for eye exam in Oct 2023  Gastroparesis: denies   No Nephropathy  No CAD  Does describe a rubber band feeling on awakening at times  Will check vitamin B12    -DKA?  DENIES   -HHS?   DENIES     -DM Medications:     Metformin 500mg XR 2 tabs twice  daily  Mounjaro 15mg SC weekly,    Farxiga 5 mg daily    U500  100/100/120      He is trying to give 30 minutes before meals but does not always happen. He is changing needle every time and rotating sites.     -Compliance w/ Meds: Was out of Mounjaro at the end of July     -Previously tried medications:     Rybelsus,    Metformin ,   Levemir  60 UNITS twice daily    Novolog 60 - 90 UNITS AFTER MEALS    -Glucose monitoring:    DEXCOM G7      Dexcom reviewed: blood sugars are mostly at goal over the last week. Postprandial hyperglycemia in late July            Hypoglycemia: occ; about 1-2 times a week; has hypoglycemia awareness  Corrects with food -fruit   Has glucose gel   He has been feeling hypoglycemia symptoms <100    -Diet:   TRYING TO FOLLOW A DIABETIC DIET     -Activity:   Sedentary,  Back pain      Diabetes education: July 2023    Medic alert: will look into   Glucagon: does not have     -Pancreatitis?  NO     -Thyroid CA?  NO     Diabetes Management Status    Statin: Taking  ACE/ARB: Taking    Screening or Prevention Patient's value Goal Complete/Controlled?   HgA1C Testing and Control   Lab Results   Component Value Date    HGBA1C 7.2 (H) 08/09/2023      Annually/Less than 8% No     Lipid profile : 03/27/2023 Annually Yes     LDL control Lab Results   Component Value Date    LDLCALC 75.0 03/27/2023    Annually/Less than 100 mg/dl  Yes     Nephropathy screening Lab Results   Component Value Date    LABMICR <5.0 03/27/2023     Lab Results   Component Value Date    PROTEINUA Negative 11/11/2022    Annually Yes     Blood pressure BP Readings from Last 1 Encounters:   08/11/23 120/70    Less than 140/90 No     Dilated retinal exam : 10/12/2022 Annually Yes     Foot exam   : 05/11/2023 Annually No        With regards to dyslipidemia,     He is on atorvastatin 10 mg every other day     Latest Reference Range & Units 12/09/21 14:32 03/27/23 10:33   Cholesterol 120 - 199 mg/dL 158 153   HDL 40 - 75 mg/dL 36 (L) 31  "(L)   HDL/Cholesterol Ratio 20.0 - 50.0 % 22.8 20.3   LDL Cholesterol External 63.0 - 159.0 mg/dL 67.2 75.0   Non-HDL Cholesterol mg/dL 122 122   Total Cholesterol/HDL Ratio 2.0 - 5.0  4.4 4.9   Triglycerides 30 - 150 mg/dL 274 (H) 235 (H)   (L): Data is abnormally low  (H): Data is abnormally high    He is wearing CPAP    Review of Systems   Constitutional:  Negative for fatigue and unexpected weight change.   Eyes:  Negative for visual disturbance.   Endocrine: Negative for polydipsia, polyphagia and polyuria.     Objective:     /70   Pulse 94   Ht 6' 1" (1.854 m)   Wt (!) 180 kg (396 lb 13.3 oz)   SpO2 98%   BMI 52.36 kg/m²     Body mass index is 52.36 kg/m².    Physical Exam  Vitals reviewed.   Constitutional:       Appearance: He is obese.   Pulmonary:      Effort: Pulmonary effort is normal.   Neurological:      Mental Status: He is alert.     Denies injection site edema or erythema. No lipo hypertropthy or atrophy  Diabetes Foot Exam:  Feet no cuts or scratches  Shoes appropriate  DM foot exam done in May 2023      Lab Review:   Lab Results   Component Value Date    HGBA1C 7.2 (H) 08/09/2023     Lab Results   Component Value Date    CHOL 153 03/27/2023    HDL 31 (L) 03/27/2023    LDLCALC 75.0 03/27/2023    TRIG 235 (H) 03/27/2023    CHOLHDL 20.3 03/27/2023     Lab Results   Component Value Date     03/27/2023    K 4.3 03/27/2023     03/27/2023    CO2 29 03/27/2023     (H) 03/27/2023    BUN 12 03/27/2023    CREATININE 0.77 03/27/2023    CALCIUM 9.0 03/27/2023    PROT 7.6 03/27/2023    ALBUMIN 4.1 03/27/2023    BILITOT 0.4 03/27/2023    ALKPHOS 129 (H) 03/27/2023    AST 33 03/27/2023    ALT 40 03/27/2023    ANIONGAP 8 03/27/2023    ESTGFRAFRICA >60 04/15/2022    EGFRNONAA >60 04/15/2022    TSH 0.541 03/27/2023     Vit D, 25-Hydroxy   Date Value Ref Range Status   02/04/2016 26 (L) 30 - 96 ng/mL Final     Comment:     Vitamin D deficiency.........<10 ng/mL                            " "  Vitamin D insufficiency......10-29 ng/mL       Vitamin D sufficiency........> or equal to 30 ng/mL  Vitamin D toxicity............>100 ng/mL         Assessment and Plan     1. Type 2 diabetes mellitus with hyperglycemia, with long-term current use of insulin  Comprehensive Metabolic Panel    Hemoglobin A1C    VITAMIN B12    dasiglucagon 0.6 mg/0.6 mL AtIn    Ambulatory referral/consult to Bariatric Surgery    DISCONTINUED: dasiglucagon 0.6 mg/0.6 mL AtIn      2. Benign essential hypertension        3. Dyslipidemia  insulin regular hum U-500 conc (HUMULIN R U-500, CONC, KWIKPEN) 500 unit/mL (3 mL) insulin pen      4. Uncontrolled type 2 diabetes mellitus with hyperglycemia  atorvastatin (LIPITOR) 10 MG tablet    pen needle, diabetic (BD ULTRA-FINE ORIG PEN NEEDLE) 29 gauge x 1/2" Ndle      5. Morbid obesity  Ambulatory referral/consult to Bariatric Surgery        Type 2 diabetes mellitus with hyperglycemia, with long-term current use of insulin  -- Reviewed goals of therapy are to get the best control we can without hypoglycemia  A1c improving, not at goal.  GMI at goal   Dexcom reviewed: blood sugars are mostly at goal over the last week. Postprandial hyperglycemia in late July    Consult bariatric medicine   Lost 8 pounds since starting Mounjaro.     Medication Changes   Continue   Mounjaro 15mg SC weekly,    U500  100/100/120  30 minutes before meals.,    Metformin 500mg XR 2 tabs BID  Farxiga 5 mg daily      -- Reviewed patient's current insulin regimen. Clarified proper insulin dose and timing in relation to meals, etc. Insulin injection sites and proper rotation instructed.    -- Advised frequent self blood glucose monitoring.  Patient encouraged to document glucose results and bring them to every clinic visit    -- Hypoglycemia precautions discussed. Instructed on precautions before driving.    -- Call for Bg repeatedly < 90 or > 180.   -- Close adherence to lifestyle changes recommended.   -- Periodic " follow ups for eye evaluations, foot care and dental care suggested.       Benign essential hypertension   BP controlled on current BP meds   On ACE for renal protection   Low Na diet      Dyslipidemia  LDL above goal  Take atorvastatin 10 mg daily   Take OTC CQ10 100 mcg daily to prevent leg cramps    Morbid obesity  Consult bariatric surgery    Follow up in about 4 months (around 12/11/2023).  Labs on RTC

## 2023-08-10 PROBLEM — Z79.4 TYPE 2 DIABETES MELLITUS WITH HYPERGLYCEMIA, WITH LONG-TERM CURRENT USE OF INSULIN: Chronic | Status: ACTIVE | Noted: 2022-10-17

## 2023-08-10 PROBLEM — E11.65 TYPE 2 DIABETES MELLITUS WITH HYPERGLYCEMIA, WITH LONG-TERM CURRENT USE OF INSULIN: Chronic | Status: ACTIVE | Noted: 2022-10-17

## 2023-08-10 PROBLEM — E78.5 DYSLIPIDEMIA: Status: ACTIVE | Noted: 2023-08-10

## 2023-08-10 NOTE — ASSESSMENT & PLAN NOTE
-- Reviewed goals of therapy are to get the best control we can without hypoglycemia  A1c improving, not at goal.  GMI at goal   Dexcom reviewed: blood sugars are mostly at goal over the last week. Postprandial hyperglycemia in late July    Consult bariatric medicine   Lost 8 pounds since starting Mounjaro.     Medication Changes   Continue   Mounjaro 15mg SC weekly,    U500  100/100/120  30 minutes before meals.,    Metformin 500mg XR 2 tabs BID  Farxiga 5 mg daily      -- Reviewed patient's current insulin regimen. Clarified proper insulin dose and timing in relation to meals, etc. Insulin injection sites and proper rotation instructed.    -- Advised frequent self blood glucose monitoring.  Patient encouraged to document glucose results and bring them to every clinic visit    -- Hypoglycemia precautions discussed. Instructed on precautions before driving.    -- Call for Bg repeatedly < 90 or > 180.   -- Close adherence to lifestyle changes recommended.   -- Periodic follow ups for eye evaluations, foot care and dental care suggested.

## 2023-08-11 ENCOUNTER — OFFICE VISIT (OUTPATIENT)
Dept: ENDOCRINOLOGY | Facility: CLINIC | Age: 48
End: 2023-08-11
Payer: MEDICAID

## 2023-08-11 ENCOUNTER — PATIENT OUTREACH (OUTPATIENT)
Dept: ADMINISTRATIVE | Facility: HOSPITAL | Age: 48
End: 2023-08-11
Payer: MEDICAID

## 2023-08-11 ENCOUNTER — PATIENT MESSAGE (OUTPATIENT)
Dept: ADMINISTRATIVE | Facility: HOSPITAL | Age: 48
End: 2023-08-11
Payer: MEDICAID

## 2023-08-11 VITALS
HEIGHT: 73 IN | SYSTOLIC BLOOD PRESSURE: 120 MMHG | OXYGEN SATURATION: 98 % | DIASTOLIC BLOOD PRESSURE: 70 MMHG | HEART RATE: 94 BPM | BODY MASS INDEX: 41.75 KG/M2 | WEIGHT: 315 LBS

## 2023-08-11 DIAGNOSIS — E78.5 DYSLIPIDEMIA: ICD-10-CM

## 2023-08-11 DIAGNOSIS — E11.65 TYPE 2 DIABETES MELLITUS WITH HYPERGLYCEMIA, WITH LONG-TERM CURRENT USE OF INSULIN: Chronic | ICD-10-CM

## 2023-08-11 DIAGNOSIS — E11.65 UNCONTROLLED TYPE 2 DIABETES MELLITUS WITH HYPERGLYCEMIA: ICD-10-CM

## 2023-08-11 DIAGNOSIS — E66.01 MORBID OBESITY: ICD-10-CM

## 2023-08-11 DIAGNOSIS — I10 BENIGN ESSENTIAL HYPERTENSION: ICD-10-CM

## 2023-08-11 DIAGNOSIS — Z79.4 TYPE 2 DIABETES MELLITUS WITH HYPERGLYCEMIA, WITH LONG-TERM CURRENT USE OF INSULIN: Chronic | ICD-10-CM

## 2023-08-11 PROCEDURE — 3074F SYST BP LT 130 MM HG: CPT | Mod: CPTII,,, | Performed by: NURSE PRACTITIONER

## 2023-08-11 PROCEDURE — 3051F PR MOST RECENT HEMOGLOBIN A1C LEVEL 7.0 - < 8.0%: ICD-10-PCS | Mod: CPTII,,, | Performed by: NURSE PRACTITIONER

## 2023-08-11 PROCEDURE — 3066F NEPHROPATHY DOC TX: CPT | Mod: CPTII,,, | Performed by: NURSE PRACTITIONER

## 2023-08-11 PROCEDURE — 99999 PR PBB SHADOW E&M-EST. PATIENT-LVL V: ICD-10-PCS | Mod: PBBFAC,,, | Performed by: NURSE PRACTITIONER

## 2023-08-11 PROCEDURE — 3061F NEG MICROALBUMINURIA REV: CPT | Mod: CPTII,,, | Performed by: NURSE PRACTITIONER

## 2023-08-11 PROCEDURE — 3078F PR MOST RECENT DIASTOLIC BLOOD PRESSURE < 80 MM HG: ICD-10-PCS | Mod: CPTII,,, | Performed by: NURSE PRACTITIONER

## 2023-08-11 PROCEDURE — 1159F MED LIST DOCD IN RCRD: CPT | Mod: CPTII,,, | Performed by: NURSE PRACTITIONER

## 2023-08-11 PROCEDURE — 99215 OFFICE O/P EST HI 40 MIN: CPT | Mod: PBBFAC | Performed by: NURSE PRACTITIONER

## 2023-08-11 PROCEDURE — 3051F HG A1C>EQUAL 7.0%<8.0%: CPT | Mod: CPTII,,, | Performed by: NURSE PRACTITIONER

## 2023-08-11 PROCEDURE — 95251 CONT GLUC MNTR ANALYSIS I&R: CPT | Mod: ,,, | Performed by: NURSE PRACTITIONER

## 2023-08-11 PROCEDURE — 3066F PR DOCUMENTATION OF TREATMENT FOR NEPHROPATHY: ICD-10-PCS | Mod: CPTII,,, | Performed by: NURSE PRACTITIONER

## 2023-08-11 PROCEDURE — 3072F PR LOW RISK FOR RETINOPATHY: ICD-10-PCS | Mod: CPTII,,, | Performed by: NURSE PRACTITIONER

## 2023-08-11 PROCEDURE — 1160F PR REVIEW ALL MEDS BY PRESCRIBER/CLIN PHARMACIST DOCUMENTED: ICD-10-PCS | Mod: CPTII,,, | Performed by: NURSE PRACTITIONER

## 2023-08-11 PROCEDURE — 3061F PR NEG MICROALBUMINURIA RESULT DOCUMENTED/REVIEW: ICD-10-PCS | Mod: CPTII,,, | Performed by: NURSE PRACTITIONER

## 2023-08-11 PROCEDURE — 99999 PR PBB SHADOW E&M-EST. PATIENT-LVL V: CPT | Mod: PBBFAC,,, | Performed by: NURSE PRACTITIONER

## 2023-08-11 PROCEDURE — 95251 PR GLUCOSE MONITOR, 72 HOUR, PHYS INTERP: ICD-10-PCS | Mod: ,,, | Performed by: NURSE PRACTITIONER

## 2023-08-11 PROCEDURE — 3072F LOW RISK FOR RETINOPATHY: CPT | Mod: CPTII,,, | Performed by: NURSE PRACTITIONER

## 2023-08-11 PROCEDURE — 1159F PR MEDICATION LIST DOCUMENTED IN MEDICAL RECORD: ICD-10-PCS | Mod: CPTII,,, | Performed by: NURSE PRACTITIONER

## 2023-08-11 PROCEDURE — 1160F RVW MEDS BY RX/DR IN RCRD: CPT | Mod: CPTII,,, | Performed by: NURSE PRACTITIONER

## 2023-08-11 PROCEDURE — 3074F PR MOST RECENT SYSTOLIC BLOOD PRESSURE < 130 MM HG: ICD-10-PCS | Mod: CPTII,,, | Performed by: NURSE PRACTITIONER

## 2023-08-11 PROCEDURE — 3008F BODY MASS INDEX DOCD: CPT | Mod: CPTII,,, | Performed by: NURSE PRACTITIONER

## 2023-08-11 PROCEDURE — 99214 OFFICE O/P EST MOD 30 MIN: CPT | Mod: 25,S$PBB,, | Performed by: NURSE PRACTITIONER

## 2023-08-11 PROCEDURE — 99214 PR OFFICE/OUTPT VISIT, EST, LEVL IV, 30-39 MIN: ICD-10-PCS | Mod: 25,S$PBB,, | Performed by: NURSE PRACTITIONER

## 2023-08-11 PROCEDURE — 3008F PR BODY MASS INDEX (BMI) DOCUMENTED: ICD-10-PCS | Mod: CPTII,,, | Performed by: NURSE PRACTITIONER

## 2023-08-11 PROCEDURE — 3078F DIAST BP <80 MM HG: CPT | Mod: CPTII,,, | Performed by: NURSE PRACTITIONER

## 2023-08-11 RX ORDER — PEN NEEDLE, DIABETIC 29 G X1/2"
NEEDLE, DISPOSABLE MISCELLANEOUS
Qty: 300 EACH | Refills: 3 | Status: SHIPPED | OUTPATIENT
Start: 2023-08-11

## 2023-08-11 RX ORDER — INSULIN HUMAN 500 [IU]/ML
INJECTION, SOLUTION SUBCUTANEOUS
Qty: 8 PEN | Refills: 11 | Status: SHIPPED | OUTPATIENT
Start: 2023-08-11 | End: 2023-12-22

## 2023-08-11 RX ORDER — ATORVASTATIN CALCIUM 10 MG/1
10 TABLET, FILM COATED ORAL EVERY OTHER DAY
Qty: 45 TABLET | Refills: 3 | Status: SHIPPED | OUTPATIENT
Start: 2023-08-11 | End: 2023-08-31 | Stop reason: SDUPTHER

## 2023-08-11 NOTE — PROGRESS NOTES
Care Everywhere updates requested and reviewed.  Immunizations reconciled. Media reports reviewed.  Duplicate HM overrides and  orders removed.  Overdue HM topic chart audit and/or requested.  Overdue lab testing linked to upcoming lab appointments if applies.    Health Maintenance Due   Topic Date Due    Low Dose Statin  Never done    COVID-19 Vaccine (5 - Mixed Product series) 2022    Eye Exam  10/12/2023

## 2023-08-11 NOTE — PATIENT INSTRUCTIONS
"Cholesterol    Take atorvastatin 10 mg daily    Take OTC CQ10 100 mcg daily to prevent leg cramps    High cholesterol foods to avoid/limit:     C: Cheese, milk, dairy  A: Animal Fats: hot dogs and hamburgers  G: "Getting it away from home": going out to eat a lot  E: Extra Fat: cookies, candy, and sweets  F: Family History    Remember high cholesterol can clog your arteries and increase risk for heart attack or stroke.     Diabetes    A1c improving, not at goal.  GMI at goal   Dexcom reviewed: blood sugars are mostly at goal over the last week. Postprandial hyperglycemia in late July    Consult bariatric medicine   Lost 8 pounds since starting Mounjaro.     Medication Changes   Continue   Mounjaro 15mg SC weekly,    U500  100/100/120  30 minutes before meals.,    Metformin 500mg XR 2 tabs BID  Farxiga 5 mg daily        Discussed MOA and SE. Discussed MOA is to make her urinate out sugar. Discussed side effects include UTIs/yeast infections, Gm's gangrene, DKA, and orthostatic hypotension. Encouragd to call for intolerable side effects, instructed to drink at least 4-16 oz bottles of water daily, use good bathroom hygiene, eat at least 30 grams of carbs per each meal, call for persistent nausea, vomiting, diarrhea, and change positions carefully. Hold medication if you are ill. Hold medication 3 day prior to surgery.    Diabetic ketoacidosis is a medical emergency that usually requires IV insulin and IV fluids. This can develop when your body can't produce enough insulin. Without enough insulin, your body breaks down fat for fuel which causes high levels of acid called ketones.     If you notice abdominal pain, diarrhea, nausea, vomiting, lethargy, difficulty breathing, confusion, and unusual fatigue or sleepiness, please check your urine ketones using the strips. If your urine ketones are positive, please go to ER immediately.       Goal blood sugar is  fasting   Goal blood sugar 1 hour after meal is " less than 180  Goal blood sugar 2 hour after meal is less than 140      Hypoglycemia - Low Blood Sugar    What can you do?  Rule of 15:   Test your blood sugar  If glucose is between 50-70 mg/dL then ingest 15 grams of fast-acting carbs  If glucose is less than 50 mg/dL then ingest 30 grams of fast-acting carbs  Ingest 15 grams of fast-acting carbohydrate - such as:   3-4 glucose tablets  4 oz juice  ½ can regular soda pop  15 skittles or mini jelly beans   Re-check your blood sugar in 15 minutes. If its less than 70mg/dl, repeat steps 2 and 3.  If your next meal is more than 1 hour away, eat an additional 15 grams of carbohydrate and 1 ounce of protein (examples include crackers with cheese or one-half of a sandwich with peanut butter). It is important not to eat too much because this can raise your blood sugar above the target level.      After your blood sugar has normalized, think about why you went low. If you notice a pattern of low blood sugars, contact your Diabetes Team. We may need to adjust your medication.        Snacks can be an important part of a balanced, healthy meal plan. They allow you to eat more frequently, feeling full and satisfied throughout the day. Also, they allow you to spread carbohydrates evenly, which may stabilize blood sugars.  Plus, snacks are enjoyable!     The amount of carbohydrate needed at snacks varies. Generally, about 15-30 grams of carbohydrate per snack is recommended.  Below you will find some tasty treats.       0-5 gm carb  Crystal Light  Vitamin Water Zero  Herbal tea, unsweetened  2 tsp peanut butter on celery  1./2 cup sugar-free jell-o  1 sugar-free popsicle  ¼ cup blueberries  8oz Blue Loretta unsweetened almond milk  5 baby carrots & celery sticks, cucumbers, bell peppers dipped in ¼ cup salsa, 2Tbsp light ranch dressing or 2Tbsp plain Greek yogurt  10 Goldfish crackers  ½ oz low-fat cheese or string cheese  1 closed handful of nuts, unsalted  1 Tbsp of sunflower  seeds, unsalted  1 cup Smart Pop popcorn  1 whole grain brown rice cake        15 gm carb  1 small piece of fruit or ½ banana or 1/2 cup lite canned fruit  3 alessandra cracker squares  3 cups Smart Pop popcorn, top spray butter, Mariano lite salt or cinnamon and Truvia  5 Vanilla Wafers  ½ cup low fat, no added sugar ice cream or frozen yogurt (Blue bell, Blue Bunny, Weight Watchers, Skinny Cow)  ½ turkey, ham, or chicken sandwich  ½ c fruit with ½ c Cottage cheese  4-6 unsalted wheat crackers with 1 oz low fat cheese or 1 tbsp peanut butter   30-45 goldfish crackers (depending on flavor)   7-8 Taoism mini brown rice cakes (caramel, apple cinnamon, chocolate)   12 Taoism mini brown rice cakes (cheddar, bbq, ranch)   1/3 cup hummus dip with raw veg  1/2 whole wheat feng, 1Tbsp hummus  Mini Pizza (1/2 whole wheat English muffin, low-fat  cheese, tomato sauce)  100 calorie snack pack (Oreo, Chips Ahoy, Ritz Mix, Baked Cheetos)  4-6 oz. light or Greek Style yogurt (Chobani, Yoplait, Okios, [place]oneyfield)  ½ cup sugar-free pudding    6 in. wheat tortilla or feng oven toasted chips (topped with spray butter flavoring, cinnamon, Truvia OR spray butter, garlic powder, chili powder)   18 BBQ Popchips (available at [employer], [employer], [employer] Market)

## 2023-08-14 ENCOUNTER — TELEPHONE (OUTPATIENT)
Dept: BARIATRICS | Facility: CLINIC | Age: 48
End: 2023-08-14
Payer: MEDICAID

## 2023-08-14 ENCOUNTER — PATIENT MESSAGE (OUTPATIENT)
Dept: NEUROSURGERY | Facility: CLINIC | Age: 48
End: 2023-08-14
Payer: MEDICAID

## 2023-08-14 ENCOUNTER — PATIENT MESSAGE (OUTPATIENT)
Dept: ENDOCRINOLOGY | Facility: CLINIC | Age: 48
End: 2023-08-14
Payer: MEDICAID

## 2023-08-14 NOTE — TELEPHONE ENCOUNTER
No care due was identified.  [facility] Embedded Care Due Messages. Reference number: [id number].   8/14/2023 8:35:05 AM CDT

## 2023-08-15 ENCOUNTER — PATIENT MESSAGE (OUTPATIENT)
Dept: ENDOCRINOLOGY | Facility: CLINIC | Age: 48
End: 2023-08-15
Payer: MEDICAID

## 2023-08-15 RX ORDER — VALSARTAN AND HYDROCHLOROTHIAZIDE 320; 12.5 MG/1; MG/1
1 TABLET, FILM COATED ORAL
Qty: 90 TABLET | Refills: 3 | Status: SHIPPED | OUTPATIENT
Start: 2023-08-15 | End: 2024-03-28 | Stop reason: SDUPTHER

## 2023-08-25 DIAGNOSIS — E11.65 TYPE 2 DIABETES MELLITUS WITH HYPERGLYCEMIA, WITH LONG-TERM CURRENT USE OF INSULIN: Chronic | ICD-10-CM

## 2023-08-25 DIAGNOSIS — Z79.4 TYPE 2 DIABETES MELLITUS WITH HYPERGLYCEMIA, WITH LONG-TERM CURRENT USE OF INSULIN: Chronic | ICD-10-CM

## 2023-08-28 DIAGNOSIS — Z79.4 TYPE 2 DIABETES MELLITUS WITH HYPERGLYCEMIA, WITH LONG-TERM CURRENT USE OF INSULIN: Chronic | ICD-10-CM

## 2023-08-28 DIAGNOSIS — E11.65 TYPE 2 DIABETES MELLITUS WITH HYPERGLYCEMIA, WITH LONG-TERM CURRENT USE OF INSULIN: Chronic | ICD-10-CM

## 2023-08-28 RX ORDER — DASIGLUCAGON 0.6 MG/.6ML
1 INJECTION, SOLUTION SUBCUTANEOUS
Qty: 1.2 ML | Refills: 1 | Status: SHIPPED | OUTPATIENT
Start: 2023-08-28

## 2023-08-31 ENCOUNTER — OFFICE VISIT (OUTPATIENT)
Dept: FAMILY MEDICINE | Facility: CLINIC | Age: 48
End: 2023-08-31
Payer: MEDICAID

## 2023-08-31 VITALS
TEMPERATURE: 99 F | HEART RATE: 81 BPM | WEIGHT: 315 LBS | DIASTOLIC BLOOD PRESSURE: 60 MMHG | BODY MASS INDEX: 41.75 KG/M2 | OXYGEN SATURATION: 96 % | SYSTOLIC BLOOD PRESSURE: 104 MMHG | HEIGHT: 73 IN

## 2023-08-31 DIAGNOSIS — Z79.4 TYPE 2 DIABETES MELLITUS WITHOUT COMPLICATION, WITH LONG-TERM CURRENT USE OF INSULIN: Primary | ICD-10-CM

## 2023-08-31 DIAGNOSIS — E11.65 UNCONTROLLED TYPE 2 DIABETES MELLITUS WITH HYPERGLYCEMIA: ICD-10-CM

## 2023-08-31 DIAGNOSIS — T39.395A ADVERSE EFFECT OF NON-STEROIDAL ANTI-INFLAMMATORY DRUG (NSAID), INITIAL ENCOUNTER: ICD-10-CM

## 2023-08-31 DIAGNOSIS — E11.9 TYPE 2 DIABETES MELLITUS WITHOUT COMPLICATION, WITH LONG-TERM CURRENT USE OF INSULIN: Primary | ICD-10-CM

## 2023-08-31 DIAGNOSIS — I10 ESSENTIAL HYPERTENSION: ICD-10-CM

## 2023-08-31 PROCEDURE — 1160F RVW MEDS BY RX/DR IN RCRD: CPT | Mod: CPTII,S$GLB,, | Performed by: FAMILY MEDICINE

## 2023-08-31 PROCEDURE — 3061F NEG MICROALBUMINURIA REV: CPT | Mod: CPTII,S$GLB,, | Performed by: FAMILY MEDICINE

## 2023-08-31 PROCEDURE — 1159F MED LIST DOCD IN RCRD: CPT | Mod: CPTII,S$GLB,, | Performed by: FAMILY MEDICINE

## 2023-08-31 PROCEDURE — 3061F PR NEG MICROALBUMINURIA RESULT DOCUMENTED/REVIEW: ICD-10-PCS | Mod: CPTII,S$GLB,, | Performed by: FAMILY MEDICINE

## 2023-08-31 PROCEDURE — 3074F PR MOST RECENT SYSTOLIC BLOOD PRESSURE < 130 MM HG: ICD-10-PCS | Mod: CPTII,S$GLB,, | Performed by: FAMILY MEDICINE

## 2023-08-31 PROCEDURE — 3051F HG A1C>EQUAL 7.0%<8.0%: CPT | Mod: CPTII,S$GLB,, | Performed by: FAMILY MEDICINE

## 2023-08-31 PROCEDURE — 1159F PR MEDICATION LIST DOCUMENTED IN MEDICAL RECORD: ICD-10-PCS | Mod: CPTII,S$GLB,, | Performed by: FAMILY MEDICINE

## 2023-08-31 PROCEDURE — 3066F NEPHROPATHY DOC TX: CPT | Mod: CPTII,S$GLB,, | Performed by: FAMILY MEDICINE

## 2023-08-31 PROCEDURE — 99214 OFFICE O/P EST MOD 30 MIN: CPT | Mod: S$GLB,,, | Performed by: FAMILY MEDICINE

## 2023-08-31 PROCEDURE — 3078F PR MOST RECENT DIASTOLIC BLOOD PRESSURE < 80 MM HG: ICD-10-PCS | Mod: CPTII,S$GLB,, | Performed by: FAMILY MEDICINE

## 2023-08-31 PROCEDURE — 3008F PR BODY MASS INDEX (BMI) DOCUMENTED: ICD-10-PCS | Mod: CPTII,S$GLB,, | Performed by: FAMILY MEDICINE

## 2023-08-31 PROCEDURE — 3066F PR DOCUMENTATION OF TREATMENT FOR NEPHROPATHY: ICD-10-PCS | Mod: CPTII,S$GLB,, | Performed by: FAMILY MEDICINE

## 2023-08-31 PROCEDURE — 3051F PR MOST RECENT HEMOGLOBIN A1C LEVEL 7.0 - < 8.0%: ICD-10-PCS | Mod: CPTII,S$GLB,, | Performed by: FAMILY MEDICINE

## 2023-08-31 PROCEDURE — 1160F PR REVIEW ALL MEDS BY PRESCRIBER/CLIN PHARMACIST DOCUMENTED: ICD-10-PCS | Mod: CPTII,S$GLB,, | Performed by: FAMILY MEDICINE

## 2023-08-31 PROCEDURE — 3078F DIAST BP <80 MM HG: CPT | Mod: CPTII,S$GLB,, | Performed by: FAMILY MEDICINE

## 2023-08-31 PROCEDURE — 3074F SYST BP LT 130 MM HG: CPT | Mod: CPTII,S$GLB,, | Performed by: FAMILY MEDICINE

## 2023-08-31 PROCEDURE — 3072F LOW RISK FOR RETINOPATHY: CPT | Mod: CPTII,S$GLB,, | Performed by: FAMILY MEDICINE

## 2023-08-31 PROCEDURE — 3072F PR LOW RISK FOR RETINOPATHY: ICD-10-PCS | Mod: CPTII,S$GLB,, | Performed by: FAMILY MEDICINE

## 2023-08-31 PROCEDURE — 99214 PR OFFICE/OUTPT VISIT, EST, LEVL IV, 30-39 MIN: ICD-10-PCS | Mod: S$GLB,,, | Performed by: FAMILY MEDICINE

## 2023-08-31 PROCEDURE — 3008F BODY MASS INDEX DOCD: CPT | Mod: CPTII,S$GLB,, | Performed by: FAMILY MEDICINE

## 2023-08-31 RX ORDER — ATORVASTATIN CALCIUM 10 MG/1
10 TABLET, FILM COATED ORAL DAILY
Qty: 90 TABLET | Refills: 3 | Status: SHIPPED | OUTPATIENT
Start: 2023-08-31 | End: 2024-03-28 | Stop reason: SDUPTHER

## 2023-08-31 RX ORDER — EPINEPHRINE 0.22MG
100 AEROSOL WITH ADAPTER (ML) INHALATION DAILY
COMMUNITY

## 2023-08-31 RX ORDER — MELOXICAM 15 MG/1
15 TABLET ORAL DAILY
Qty: 90 TABLET | Refills: 1 | Status: SHIPPED | OUTPATIENT
Start: 2023-08-31 | End: 2024-03-05

## 2023-08-31 RX ORDER — LANOLIN ALCOHOL/MO/W.PET/CERES
100 CREAM (GRAM) TOPICAL DAILY
COMMUNITY

## 2023-09-01 ENCOUNTER — PATIENT MESSAGE (OUTPATIENT)
Dept: FAMILY MEDICINE | Facility: CLINIC | Age: 48
End: 2023-09-01
Payer: MEDICAID

## 2023-09-01 DIAGNOSIS — M54.50 CHRONIC LEFT-SIDED LOW BACK PAIN WITHOUT SCIATICA: Primary | ICD-10-CM

## 2023-09-01 DIAGNOSIS — G89.29 CHRONIC LEFT-SIDED LOW BACK PAIN WITHOUT SCIATICA: Primary | ICD-10-CM

## 2023-09-04 NOTE — PROGRESS NOTES
" Patient ID: Kirill Gandhi III is a 48 y.o. male.    Chief Complaint: Medication Problem, Follow-up, Mass (arms), and Low-back Pain    HPI      Kirill Gandhi III is a 48 y.o. male here following up for anxiety which is under fair control, hypertension which is stable, lower back pain which he continues to ramos with pain however much better position than previously.  Dyslipidemia on cholesterol-lowering medication not causing any significant problems, obstructive sleep apnea stable and type 2 diabetes which she uses insulin.  This is under much better control with the endocrinologist.    Vitals:    08/31/23 1045   BP: 104/60   BP Location: Left arm   Patient Position: Sitting   Pulse: 81   Temp: 98.5 °F (36.9 °C)   TempSrc: Oral   SpO2: 96%   Weight: (!) 182.8 kg (403 lb)   Height: 6' 1" (1.854 m)            Review of Symptoms      Physical Exam    Constitutional:  Oriented to person, place, and time.appears well-developed and well-nourished.  No distress.      HENT  Head: Normocephalic and atraumatic  Right Ear: External ear normal.   Left Ear: External ear normal.   Nose: External nose normal.   Mouth:  Moist mucus membranes.    Eyes:  Conjunctivae are normal. Right eye exhibits no discharge.  Left eye exhibits no discharge. No scleral icterus.  No periorbital edema    Cardiovascular:  Regular rate and rhythm with normal S1 and S2     Pulmonary/Chest:   Clear to auscultation bilaterally without wheezes, rhonchi or rales      Musculoskeletal:  No edema. No obvious deformity No wasting       Neurological:  Alert and oriented to person, place, and time.   Coordination normal.     Skin:   Skin is warm and dry.  No diaphoresis.   No rash noted.     Psychiatric: Normal mood and affect. Behavior is normal.  Judgment and thought content normal.     Complete Blood Count  Lab Results   Component Value Date    RBC 4.57 (L) 03/27/2023    HGB 13.1 (L) 03/27/2023    HCT 40.4 03/27/2023    MCV 88 03/27/2023 "    MCH 28.7 03/27/2023    MCHC 32.4 03/27/2023    RDW 14.0 03/27/2023     03/27/2023    MPV 9.6 03/27/2023    GRAN 4.4 03/27/2023    GRAN 59.9 03/27/2023    LYMPH 2.0 03/27/2023    LYMPH 27.7 03/27/2023    MONO 0.6 03/27/2023    MONO 8.2 03/27/2023    EOS 0.2 03/27/2023    BASO 0.05 03/27/2023    EOSINOPHIL 3.0 03/27/2023    BASOPHIL 0.7 03/27/2023    DIFFMETHOD Automated 03/27/2023       Comprehensive Metabolic Panel  Lab Results   Component Value Date     (H) 03/27/2023    BUN 12 03/27/2023    CREATININE 0.77 03/27/2023     03/27/2023    K 4.3 03/27/2023     03/27/2023    PROT 7.6 03/27/2023    ALBUMIN 4.1 03/27/2023    BILITOT 0.4 03/27/2023    AST 33 03/27/2023    ALKPHOS 129 (H) 03/27/2023    CO2 29 03/27/2023    ALT 40 03/27/2023    ANIONGAP 8 03/27/2023       TSH  Lab Results   Component Value Date    TSH 0.541 03/27/2023       Assessment / Plan:      ICD-10-CM ICD-9-CM   1. Type 2 diabetes mellitus without complication, with long-term current use of insulin  E11.9 250.00    Z79.4 V58.67   2. Uncontrolled type 2 diabetes mellitus with hyperglycemia  E11.65 250.02   3. Adverse effect of non-steroidal anti-inflammatory drug (NSAID), initial encounter  T39.395A E935.8   4. Essential hypertension  I10 401.9     Type 2 diabetes mellitus without complication, with long-term current use of insulin    Uncontrolled type 2 diabetes mellitus with hyperglycemia  -     atorvastatin (LIPITOR) 10 MG tablet; Take 1 tablet (10 mg total) by mouth once daily.  Dispense: 90 tablet; Refill: 3    Adverse effect of non-steroidal anti-inflammatory drug (NSAID), initial encounter  -     meloxicam (MOBIC) 15 MG tablet; Take 1 tablet (15 mg total) by mouth once daily. Dc celebrex  Dispense: 90 tablet; Refill: 1    Essential hypertension

## 2023-09-05 ENCOUNTER — PATIENT MESSAGE (OUTPATIENT)
Dept: NEUROSURGERY | Facility: CLINIC | Age: 48
End: 2023-09-05
Payer: MEDICAID

## 2023-09-05 ENCOUNTER — TELEPHONE (OUTPATIENT)
Dept: NEUROSURGERY | Facility: CLINIC | Age: 48
End: 2023-09-05
Payer: MEDICAID

## 2023-09-05 NOTE — TELEPHONE ENCOUNTER
Returned pt's call, pt stated that he would like to know if the PA was sent to [facility] for his medication. I stated to pt that we sent it to the [facility] in [place]. Pt is requesting that we send him that documentation stating that's where we need to send it. I stated that is fine. Pt voiced understanding

## 2023-09-06 ENCOUNTER — PATIENT MESSAGE (OUTPATIENT)
Dept: NEUROSURGERY | Facility: CLINIC | Age: 48
End: 2023-09-06
Payer: MEDICAID

## 2023-09-06 ENCOUNTER — TELEPHONE (OUTPATIENT)
Dept: PAIN MEDICINE | Facility: CLINIC | Age: 48
End: 2023-09-06
Payer: MEDICAID

## 2023-09-06 NOTE — TELEPHONE ENCOUNTER
----- Message from Aleksandra Garcia MA sent at 9/5/2023  9:09 AM CDT -----    ----- Message -----  From: Gerald Barros  Sent: 9/1/2023   5:04 PM CDT  To: [facility] Clinical Staff; #    Type:  Sooner Apoointment Request    Caller is requesting a sooner appointment.  Caller declined first available appointment listed below.  Caller will not accept being placed on the waitlist and is requesting a message be sent to doctor.  Name of Caller:pt  When is the first available appointment?none  Symptoms:Chronic left-sided low back pain without sciatica [M54.50, G89.29]  Would the patient rather a call back or a response via [facility]? Call  Best Call Back Number:[phone]  Additional Information: pt does have an referral in the system

## 2023-09-08 RX ORDER — TRAMADOL HYDROCHLORIDE 50 MG/1
100 TABLET ORAL EVERY 8 HOURS PRN
Qty: 180 TABLET | Refills: 3 | Status: SHIPPED | OUTPATIENT
Start: 2023-09-08 | End: 2024-01-10 | Stop reason: SDUPTHER

## 2023-09-13 NOTE — PROCEDURES
"Dear Referring Provider,    The sleep study that you ordered is complete. You have ordered sleep LAB services to perform the sleep study for Kirill Gandhi III.     Please find Sleep Study result in "Chart Review" under the "Media tab."     As the ordering provider, you are responsible for reviewing the results and implementing a treatment plan with your patient. If you need a Sleep Medicine provider to explain the sleep study findings and arrange treatment for the patient, please refer patient for consultation to our Sleep Clinic via [facility] with Ambulatory Consult Sleep.    To do that please place an order for an "Ambulatory Consult Sleep" - it will go to our clinic work queue for our Medical Assistant to contact the patient for an appointment.     For any questions, please contact our clinic staff at [phone] to talk to clinical staff.      "
PNA, HF exacerbation

## 2023-09-29 DIAGNOSIS — F41.9 ANXIETY: ICD-10-CM

## 2023-09-29 RX ORDER — PROPRANOLOL HYDROCHLORIDE 20 MG/1
TABLET ORAL
Qty: 90 TABLET | Refills: 2 | Status: SHIPPED | OUTPATIENT
Start: 2023-09-29 | End: 2024-01-16 | Stop reason: SDUPTHER

## 2023-11-11 DIAGNOSIS — F42.2 MIXED OBSESSIONAL THOUGHTS AND ACTS: ICD-10-CM

## 2023-11-11 DIAGNOSIS — F33.1 MODERATE EPISODE OF RECURRENT MAJOR DEPRESSIVE DISORDER: ICD-10-CM

## 2023-11-13 RX ORDER — ARIPIPRAZOLE 5 MG/1
5 TABLET ORAL DAILY
Qty: 30 TABLET | Refills: 0 | Status: SHIPPED | OUTPATIENT
Start: 2023-11-13 | End: 2023-12-11

## 2023-11-13 RX ORDER — DULOXETIN HYDROCHLORIDE 60 MG/1
60 CAPSULE, DELAYED RELEASE ORAL 2 TIMES DAILY
Qty: 60 CAPSULE | Refills: 0 | Status: SHIPPED | OUTPATIENT
Start: 2023-11-13 | End: 2024-01-16 | Stop reason: SDUPTHER

## 2023-11-18 ENCOUNTER — PATIENT MESSAGE (OUTPATIENT)
Dept: GASTROENTEROLOGY | Facility: CLINIC | Age: 48
End: 2023-11-18
Payer: MEDICAID

## 2023-11-18 DIAGNOSIS — K21.9 GASTROESOPHAGEAL REFLUX DISEASE WITHOUT ESOPHAGITIS: Primary | ICD-10-CM

## 2023-11-20 RX ORDER — METOCLOPRAMIDE 5 MG/1
5 TABLET ORAL
Qty: 120 TABLET | Refills: 2 | Status: SHIPPED | OUTPATIENT
Start: 2023-11-20

## 2023-12-08 ENCOUNTER — LAB VISIT (OUTPATIENT)
Dept: LAB | Facility: HOSPITAL | Age: 48
End: 2023-12-08
Attending: NURSE PRACTITIONER
Payer: MEDICAID

## 2023-12-08 DIAGNOSIS — E11.65 TYPE 2 DIABETES MELLITUS WITH HYPERGLYCEMIA, WITH LONG-TERM CURRENT USE OF INSULIN: Chronic | ICD-10-CM

## 2023-12-08 DIAGNOSIS — Z79.4 TYPE 2 DIABETES MELLITUS WITH HYPERGLYCEMIA, WITH LONG-TERM CURRENT USE OF INSULIN: Chronic | ICD-10-CM

## 2023-12-08 LAB
ALBUMIN SERPL BCP-MCNC: 3.9 G/DL (ref 3.5–5.2)
ALP SERPL-CCNC: 98 U/L (ref 38–126)
ALT SERPL W/O P-5'-P-CCNC: 60 U/L (ref 10–44)
ANION GAP SERPL CALC-SCNC: 9 MMOL/L (ref 8–16)
AST SERPL-CCNC: 53 U/L (ref 15–46)
BILIRUB SERPL-MCNC: 0.5 MG/DL (ref 0.1–1)
CALCIUM SERPL-MCNC: 9.2 MG/DL (ref 8.7–10.5)
CHLORIDE SERPL-SCNC: 103 MMOL/L (ref 95–110)
CO2 SERPL-SCNC: 27 MMOL/L (ref 23–29)
CREAT SERPL-MCNC: 0.83 MG/DL (ref 0.5–1.4)
EST. GFR  (NO RACE VARIABLE): >60 ML/MIN/1.73 M^2
ESTIMATED AVG GLUCOSE: 140 MG/DL (ref 68–131)
GLUCOSE SERPL-MCNC: 187 MG/DL (ref 70–110)
HBA1C MFR BLD: 6.5 % (ref 4–5.6)
POTASSIUM SERPL-SCNC: 4.3 MMOL/L (ref 3.5–5.1)
PROT SERPL-MCNC: 7.7 G/DL (ref 6–8.4)
SODIUM SERPL-SCNC: 139 MMOL/L (ref 136–145)
UUN UR-MCNC: 15 MG/DL (ref 2–20)
VIT B12 SERPL-MCNC: 791 PG/ML (ref 210–950)

## 2023-12-08 PROCEDURE — 83036 HEMOGLOBIN GLYCOSYLATED A1C: CPT | Performed by: NURSE PRACTITIONER

## 2023-12-08 PROCEDURE — 36415 COLL VENOUS BLD VENIPUNCTURE: CPT | Mod: PN | Performed by: NURSE PRACTITIONER

## 2023-12-08 PROCEDURE — 82607 VITAMIN B-12: CPT | Mod: PN | Performed by: NURSE PRACTITIONER

## 2023-12-08 PROCEDURE — 80053 COMPREHEN METABOLIC PANEL: CPT | Mod: PN | Performed by: NURSE PRACTITIONER

## 2023-12-08 NOTE — PROGRESS NOTES
Subjective:      Patient ID: Kirill Gandhi III is a 48 y.o.    Chief Complaint:  DM2; Follow up visit     Patient Active Problem List   Diagnosis    Morbid obesity    Vitamin D deficiency    VICENTE (obstructive sleep apnea)    OCD (obsessive compulsive disorder)    Anxiety    Insomnia due to other mental disorder    Palpitations    DDD (degenerative disc disease), lumbar    Adjustment disorder with mixed anxiety and depressed mood    Mixed obsessional thoughts and acts    Mild episode of recurrent major depressive disorder    Fusion of spine, lumbosacral region    Sacroiliac joint dysfunction of both sides    Chronic bilateral low back pain with bilateral sciatica    Radiculopathy of lumbosacral region    Chest pain, atypical    Pseudoarthrosis of lumbar spine    Benign essential hypertension    Type 2 diabetes mellitus with hyperglycemia, with long-term current use of insulin    Excessive flatus    Gastroesophageal reflux disease without esophagitis    Hyponatremia    Metabolic acidemia    Dehydration with hyponatremia    Flatulence/gas pain/belching    Decreased strength of trunk and back    Decreased ROM of trunk and back    Dyslipidemia      History of Present Illness  48 y.o. Male w/ medical history as above that presents to the endocrine clinic for follow up evaluation of DM2. Previous patient of Dr Morris. Last visit in Aug 2023    The patient location is: at home   The chief complaint leading to consultation is: diabetes     Visit type: audiovisual    Face to Face time with patient: 21 minutes   35 minutes of total time spent on the encounter, which includes face to face time and non-face to face time preparing to see the patient (eg, review of tests), Obtaining and/or reviewing separately obtained history, Documenting clinical information in the electronic or other health record, Independently interpreting results (not separately reported) and communicating results to the patient/family/caregiver, or  Care coordination (not separately reported).    Each patient to whom he or she provides medical services by telemedicine is:  (1) informed of the relationship between the physician and patient and the respective role of any other health care provider with respect to management of the patient; and (2) notified that he or she may decline to receive medical services by telemedicine and may withdraw from such care at any time.    Denies changes in medical history since his last visit.   Denies any weight loss since last visit. We sent him list of bariatric surgery covered with insurance but he is not ready.     With regards to Diabetes Mellitus:     -Type 2     -Duration:  Dx  > 10  yrs ago with symptoms and blood work for evaluation of fibromyalgia     -FH?  Grandmother,  Aunts     -Microvascular complications: (Retinopathy,  Neuropathy)   Due for eye exam in Oct 2023  Gastroparesis: denies   No Nephropathy  No CAD  Does describe a rubber band feeling on awakening at times    -DKA?  DENIES   -HHS?   DENIES     -DM Medications:     Metformin 500mg XR 2 tabs twice daily  Mounjaro 15mg SC weekly -has been out for at least 3 weeks as he needed PA  Farxiga 5 mg daily    U500 100/100/120      He is trying to give 30 minutes before meals but does not always happen. He is changing needle every time and rotating sites.     -Compliance w/ Meds: He has been out of Mounjaro as above     -Previously tried medications:     Rybelsus,    Metformin ,   Levemir  60 UNITS twice daily    Novolog 60 - 90 UNITS AFTER MEALS    -Glucose monitoring: DEXCOM G7      Dexcom reviewed: old data from end of November as he moved and lost 2 sensors due to lack of adhesiveness   He noticed higher blood sugars with lack of Mounjaro.   Blood sugars are higher after meals.     Hypoglycemia: occ; about 1-2 overnight; has hypoglycemia awareness  Corrects with food -fruit   Has glucose gel   He has been feeling hypoglycemia symptoms <100    -Diet:   TRYING  TO FOLLOW A DIABETIC DIET     -Activity:   Sedentary,  Back pain      Diabetes education: July 2023    Medic alert: will look into   Glucagon: does not have     -Pancreatitis?  NO     -Thyroid CA?  NO     Diabetes Management Status    Statin: Taking  ACE/ARB: Taking    With regards to elevated LFTs,      Latest Reference Range & Units 12/08/23 11:41   AST 15 - 46 U/L 53 (H)   ALT 10 - 44 U/L 60 (H)   (H): Data is abnormally high    FIB-4 Calculation: 1.24 at 12/12/2023  1:43 PM     FIB-4 below 1.30 is considered as low-risk for advanced fibrosis  FIB-4 over 2.67 is considered as high-risk for advanced fibrosis  FIB-4 values between 1.30 and 2.67 are considered as intermediate-risk of advanced fibrosis for ages 36-64.     For ages > 64 the cut-off for low-risk goes to < 2.  This is a screening tool and clinical judgement should be used in the interpretation of these results.      Screening or Prevention Patient's value Goal Complete/Controlled?   HgA1C Testing and Control   Lab Results   Component Value Date    HGBA1C 6.5 (H) 12/08/2023      Annually/Less than 8% No     Lipid profile : 03/27/2023 Annually Yes     LDL control Lab Results   Component Value Date    LDLCALC 75.0 03/27/2023    Annually/Less than 100 mg/dl  Yes     Nephropathy screening Lab Results   Component Value Date    LABMICR <5.0 03/27/2023     Lab Results   Component Value Date    PROTEINUA Negative 11/11/2022    Annually Yes     Blood pressure BP Readings from Last 1 Encounters:   08/31/23 104/60    Less than 140/90 No     Dilated retinal exam : 10/12/2022 Annually Yes     Foot exam   : 05/11/2023 Annually No        With regards to dyslipidemia,     He is on atorvastatin 10 mg DAILY and he is taking CQ10 daily.    Latest Reference Range & Units 12/09/21 14:32 03/27/23 10:33   Cholesterol 120 - 199 mg/dL 158 153   HDL 40 - 75 mg/dL 36 (L) 31 (L)   HDL/Cholesterol Ratio 20.0 - 50.0 % 22.8 20.3   LDL Cholesterol External 63.0 - 159.0 mg/dL 67.2 75.0    Non-HDL Cholesterol mg/dL 122 122   Total Cholesterol/HDL Ratio 2.0 - 5.0  4.4 4.9   Triglycerides 30 - 150 mg/dL 274 (H) 235 (H)   (L): Data is abnormally low  (H): Data is abnormally high    He is wearing CPAP    Review of Systems   Constitutional:  Negative for fatigue and unexpected weight change.   Eyes:  Negative for visual disturbance.   Endocrine: Negative for polydipsia, polyphagia and polyuria.     Objective:     There were no vitals taken for this visit.    There is no height or weight on file to calculate BMI.    Physical Exam  Vitals reviewed.   Constitutional:       Appearance: He is obese.   Pulmonary:      Effort: Pulmonary effort is normal.   Neurological:      Mental Status: He is alert.       Lab Review:   Lab Results   Component Value Date    HGBA1C 6.5 (H) 12/08/2023     Lab Results   Component Value Date    CHOL 153 03/27/2023    HDL 31 (L) 03/27/2023    LDLCALC 75.0 03/27/2023    TRIG 235 (H) 03/27/2023    CHOLHDL 20.3 03/27/2023     Lab Results   Component Value Date     12/08/2023    K 4.3 12/08/2023     12/08/2023    CO2 27 12/08/2023     (H) 12/08/2023    BUN 15 12/08/2023    CREATININE 0.83 12/08/2023    CALCIUM 9.2 12/08/2023    PROT 7.7 12/08/2023    ALBUMIN 3.9 12/08/2023    BILITOT 0.5 12/08/2023    ALKPHOS 98 12/08/2023    AST 53 (H) 12/08/2023    ALT 60 (H) 12/08/2023    ANIONGAP 9 12/08/2023    ESTGFRAFRICA >60 04/15/2022    EGFRNONAA >60 04/15/2022    TSH 0.541 03/27/2023     Vit D, 25-Hydroxy   Date Value Ref Range Status   02/04/2016 26 (L) 30 - 96 ng/mL Final     Comment:     Vitamin D deficiency.........<10 ng/mL                              Vitamin D insufficiency......10-29 ng/mL       Vitamin D sufficiency........> or equal to 30 ng/mL  Vitamin D toxicity............>100 ng/mL       Assessment and Plan     1. Type 2 diabetes mellitus with hyperglycemia, with long-term current use of insulin  Hemoglobin A1C    Comprehensive Metabolic Panel     Microalbumin/Creatinine Ratio, Urine      2. Vitamin D deficiency  Vitamin D      3. Morbid obesity  Ambulatory referral/consult to Hepatology      4. Dyslipidemia  Lipid Panel      5. Benign essential hypertension        6. VICENTE (obstructive sleep apnea)        7. Neuropathy  Ambulatory referral/consult to Pain Clinic      8. Elevated LFTs  Ambulatory referral/consult to Hepatology        Type 2 diabetes mellitus with hyperglycemia, with long-term current use of insulin  -- Reviewed goals of therapy are to get the best control we can without hypoglycemia  A1c at goal   Dexcom: blood sugars are higher after meals and higher without Mounjaro   He will get back on Mounjaro and I will review data in 2 weeks and consider changing insulin doses versus adding symlin. Discussed I believe blood sugars are higher right now due to lack of Mounjaro.   Consult pain management for quentza -neuropathy  Consult hepatology for elevated fibrosis score and elevated LFTs   Not interested in bariatric medicine      Medication Changes   Continue   Mounjaro 15mg SC weekly,    U500  100/100/120  30 minutes before meals.,    Metformin 500mg XR 2 tabs twice daily   Farxiga 5 mg daily     Check labs on RTC    -- Reviewed patient's current insulin regimen. Clarified proper insulin dose and timing in relation to meals, etc. Insulin injection sites and proper rotation instructed.    -- Advised frequent self blood glucose monitoring.  Patient encouraged to document glucose results and bring them to every clinic visit    -- Hypoglycemia precautions discussed. Instructed on precautions before driving.    -- Call for Bg repeatedly < 90 or > 180.   -- Close adherence to lifestyle changes recommended.   -- Periodic follow ups for eye evaluations, foot care and dental care suggested.       Vitamin D deficiency        Morbid obesity  Consult bariatric surgery    Dyslipidemia  LDL above goal  Take atorvastatin 10 mg daily   Take OTC CQ10 100 mcg daily to  prevent leg cramps    Benign essential hypertension   BP controlled on current BP meds   On ACE for renal protection   Low Na diet      VICENTE (obstructive sleep apnea)      Follow up in about 4 months (around 4/12/2024).  Labs on RTC

## 2023-12-11 ENCOUNTER — PATIENT MESSAGE (OUTPATIENT)
Dept: PSYCHIATRY | Facility: CLINIC | Age: 48
End: 2023-12-11
Payer: MEDICAID

## 2023-12-11 DIAGNOSIS — F42.2 MIXED OBSESSIONAL THOUGHTS AND ACTS: ICD-10-CM

## 2023-12-11 DIAGNOSIS — F99 INSOMNIA DUE TO OTHER MENTAL DISORDER: ICD-10-CM

## 2023-12-11 DIAGNOSIS — F33.1 MODERATE EPISODE OF RECURRENT MAJOR DEPRESSIVE DISORDER: ICD-10-CM

## 2023-12-11 DIAGNOSIS — F51.05 INSOMNIA DUE TO OTHER MENTAL DISORDER: ICD-10-CM

## 2023-12-11 RX ORDER — HYDROXYZINE HYDROCHLORIDE 25 MG/1
50 TABLET, FILM COATED ORAL NIGHTLY PRN
Qty: 60 TABLET | Refills: 0 | Status: SHIPPED | OUTPATIENT
Start: 2023-12-11 | End: 2024-01-16 | Stop reason: SDUPTHER

## 2023-12-11 RX ORDER — ARIPIPRAZOLE 5 MG/1
5 TABLET ORAL
Qty: 30 TABLET | Refills: 0 | Status: SHIPPED | OUTPATIENT
Start: 2023-12-11 | End: 2024-01-16 | Stop reason: SDUPTHER

## 2023-12-11 NOTE — ASSESSMENT & PLAN NOTE
-- Reviewed goals of therapy are to get the best control we can without hypoglycemia  A1c at goal   Dexcom: blood sugars are higher after meals and higher without Mounjaro   He will get back on Mounjaro and I will review data in 2 weeks and consider changing insulin doses versus adding symlin. Discussed I believe blood sugars are higher right now due to lack of Mounjaro.   Consult pain management for quentza -neuropathy  Consult hepatology for elevated fibrosis score and elevated LFTs   Not interested in bariatric medicine      Medication Changes   Continue   Mounjaro 15mg SC weekly,    U500  100/100/120  30 minutes before meals.,    Metformin 500mg XR 2 tabs twice daily   Farxiga 5 mg daily     Check labs on RTC    -- Reviewed patient's current insulin regimen. Clarified proper insulin dose and timing in relation to meals, etc. Insulin injection sites and proper rotation instructed.    -- Advised frequent self blood glucose monitoring.  Patient encouraged to document glucose results and bring them to every clinic visit    -- Hypoglycemia precautions discussed. Instructed on precautions before driving.    -- Call for Bg repeatedly < 90 or > 180.   -- Close adherence to lifestyle changes recommended.   -- Periodic follow ups for eye evaluations, foot care and dental care suggested.

## 2023-12-12 ENCOUNTER — OFFICE VISIT (OUTPATIENT)
Dept: ENDOCRINOLOGY | Facility: CLINIC | Age: 48
End: 2023-12-12
Payer: MEDICAID

## 2023-12-12 ENCOUNTER — PATIENT MESSAGE (OUTPATIENT)
Dept: ENDOCRINOLOGY | Facility: CLINIC | Age: 48
End: 2023-12-12

## 2023-12-12 DIAGNOSIS — G62.9 NEUROPATHY: ICD-10-CM

## 2023-12-12 DIAGNOSIS — Z79.4 TYPE 2 DIABETES MELLITUS WITH HYPERGLYCEMIA, WITH LONG-TERM CURRENT USE OF INSULIN: Primary | Chronic | ICD-10-CM

## 2023-12-12 DIAGNOSIS — R79.89 ELEVATED LFTS: ICD-10-CM

## 2023-12-12 DIAGNOSIS — E11.65 TYPE 2 DIABETES MELLITUS WITH HYPERGLYCEMIA, WITH LONG-TERM CURRENT USE OF INSULIN: Primary | Chronic | ICD-10-CM

## 2023-12-12 DIAGNOSIS — I10 BENIGN ESSENTIAL HYPERTENSION: ICD-10-CM

## 2023-12-12 DIAGNOSIS — E66.01 MORBID OBESITY: ICD-10-CM

## 2023-12-12 DIAGNOSIS — E55.9 VITAMIN D DEFICIENCY: ICD-10-CM

## 2023-12-12 DIAGNOSIS — E78.5 DYSLIPIDEMIA: ICD-10-CM

## 2023-12-12 DIAGNOSIS — G47.33 OSA (OBSTRUCTIVE SLEEP APNEA): ICD-10-CM

## 2023-12-12 PROCEDURE — 1159F PR MEDICATION LIST DOCUMENTED IN MEDICAL RECORD: ICD-10-PCS | Mod: CPTII,95,, | Performed by: NURSE PRACTITIONER

## 2023-12-12 PROCEDURE — 99214 PR OFFICE/OUTPT VISIT, EST, LEVL IV, 30-39 MIN: ICD-10-PCS | Mod: 25,95,, | Performed by: NURSE PRACTITIONER

## 2023-12-12 PROCEDURE — 1160F RVW MEDS BY RX/DR IN RCRD: CPT | Mod: CPTII,95,, | Performed by: NURSE PRACTITIONER

## 2023-12-12 PROCEDURE — 1160F PR REVIEW ALL MEDS BY PRESCRIBER/CLIN PHARMACIST DOCUMENTED: ICD-10-PCS | Mod: CPTII,95,, | Performed by: NURSE PRACTITIONER

## 2023-12-12 PROCEDURE — 3061F PR NEG MICROALBUMINURIA RESULT DOCUMENTED/REVIEW: ICD-10-PCS | Mod: CPTII,95,, | Performed by: NURSE PRACTITIONER

## 2023-12-12 PROCEDURE — 3044F PR MOST RECENT HEMOGLOBIN A1C LEVEL <7.0%: ICD-10-PCS | Mod: CPTII,95,, | Performed by: NURSE PRACTITIONER

## 2023-12-12 PROCEDURE — 95251 PR GLUCOSE MONITOR, 72 HOUR, PHYS INTERP: ICD-10-PCS | Mod: S$PBB,NDTC,, | Performed by: NURSE PRACTITIONER

## 2023-12-12 PROCEDURE — 3066F PR DOCUMENTATION OF TREATMENT FOR NEPHROPATHY: ICD-10-PCS | Mod: CPTII,95,, | Performed by: NURSE PRACTITIONER

## 2023-12-12 PROCEDURE — 3061F NEG MICROALBUMINURIA REV: CPT | Mod: CPTII,95,, | Performed by: NURSE PRACTITIONER

## 2023-12-12 PROCEDURE — 1159F MED LIST DOCD IN RCRD: CPT | Mod: CPTII,95,, | Performed by: NURSE PRACTITIONER

## 2023-12-12 PROCEDURE — 95251 CONT GLUC MNTR ANALYSIS I&R: CPT | Mod: S$PBB,NDTC,, | Performed by: NURSE PRACTITIONER

## 2023-12-12 PROCEDURE — 3072F PR LOW RISK FOR RETINOPATHY: ICD-10-PCS | Mod: CPTII,95,, | Performed by: NURSE PRACTITIONER

## 2023-12-12 PROCEDURE — 3044F HG A1C LEVEL LT 7.0%: CPT | Mod: CPTII,95,, | Performed by: NURSE PRACTITIONER

## 2023-12-12 PROCEDURE — 3072F LOW RISK FOR RETINOPATHY: CPT | Mod: CPTII,95,, | Performed by: NURSE PRACTITIONER

## 2023-12-12 PROCEDURE — 3066F NEPHROPATHY DOC TX: CPT | Mod: CPTII,95,, | Performed by: NURSE PRACTITIONER

## 2023-12-12 PROCEDURE — 99214 OFFICE O/P EST MOD 30 MIN: CPT | Mod: 25,95,, | Performed by: NURSE PRACTITIONER

## 2023-12-12 NOTE — PATIENT INSTRUCTIONS
"Cholesterol               Take atorvastatin 10 mg daily               Take OTC CQ10 200 mcg daily to prevent leg cramps     High cholesterol foods to avoid/limit:      C: Cheese, milk, dairy  A: Animal Fats: hot dogs and hamburgers  G: "Getting it away from home": going out to eat a lot  E: Extra Fat: cookies, candy, and sweets  F: Family History     Remember high cholesterol can clog your arteries and increase risk for heart attack or stroke.      Diabetes     A1c at goal   Dexcom: blood sugars are higher after meals and higher without Mounjaro   He will get back on Mounjaro and I will review data in 2 weeks and consider changing insulin doses versus adding symlin. Discussed I believe blood sugars are higher right now due to lack of Mounjaro.   Consult pain management for quentza -neuropathy  Consult hepatology for elevated fibrosis score and elevated LFTs   Not interested in bariatric medicine      Medication Changes   Continue   Mounjaro 15mg SC weekly,    U500  100/100/120  30 minutes before meals.,    Metformin 500mg XR 2 tabs twice daily   Farxiga 5 mg daily     Check labs on RTC  "

## 2023-12-15 ENCOUNTER — TELEPHONE (OUTPATIENT)
Dept: PAIN MEDICINE | Facility: CLINIC | Age: 48
End: 2023-12-15
Payer: MEDICAID

## 2023-12-15 DIAGNOSIS — E11.65 TYPE 2 DIABETES MELLITUS WITH HYPERGLYCEMIA, WITH LONG-TERM CURRENT USE OF INSULIN: Primary | ICD-10-CM

## 2023-12-15 DIAGNOSIS — Z79.4 TYPE 2 DIABETES MELLITUS WITH HYPERGLYCEMIA, WITH LONG-TERM CURRENT USE OF INSULIN: Primary | ICD-10-CM

## 2023-12-15 RX ORDER — PRAMLINTIDE ACETATE 1000 UG/ML
60 INJECTION SUBCUTANEOUS
Qty: 3 ML | Refills: 3 | Status: SHIPPED | OUTPATIENT
Start: 2023-12-15 | End: 2024-01-05 | Stop reason: SDUPTHER

## 2023-12-15 NOTE — TELEPHONE ENCOUNTER
Spoke with pt in regards to message in the portal. Pt was advise to contact his insurance to see what provider is in his net work. Pt stated his understanding. No further concerns was expressed. Call ended. ----- Message from Jamila Ibanez sent at 12/15/2023  8:38 AM CST -----  Type:  Needs Medical Advice    Who Called:  Pt    Would the patient rather a call back or a response via [facility]?  call  Best Call Back Number:   [phone]  Additional Information:  Kirill is looking to schedule an appt with Dr Mccloud as a NP.  He does have a referral but no available appt on my end to schedule pt.

## 2023-12-19 DIAGNOSIS — Z79.4 TYPE 2 DIABETES MELLITUS WITH HYPERGLYCEMIA, WITH LONG-TERM CURRENT USE OF INSULIN: Primary | ICD-10-CM

## 2023-12-19 DIAGNOSIS — E11.65 TYPE 2 DIABETES MELLITUS WITH HYPERGLYCEMIA, WITH LONG-TERM CURRENT USE OF INSULIN: Primary | ICD-10-CM

## 2023-12-21 RX ORDER — BLOOD-GLUCOSE SENSOR
EACH MISCELLANEOUS
Qty: 3 EACH | Refills: 3 | Status: SHIPPED | OUTPATIENT
Start: 2023-12-21 | End: 2023-12-22 | Stop reason: SDUPTHER

## 2023-12-22 DIAGNOSIS — Z79.4 TYPE 2 DIABETES MELLITUS WITH HYPERGLYCEMIA, WITH LONG-TERM CURRENT USE OF INSULIN: Primary | ICD-10-CM

## 2023-12-22 DIAGNOSIS — E11.65 TYPE 2 DIABETES MELLITUS WITH HYPERGLYCEMIA, WITH LONG-TERM CURRENT USE OF INSULIN: Primary | ICD-10-CM

## 2023-12-22 DIAGNOSIS — E78.5 DYSLIPIDEMIA: ICD-10-CM

## 2023-12-22 RX ORDER — BLOOD-GLUCOSE SENSOR
3 EACH MISCELLANEOUS CONTINUOUS
Qty: 3 EACH | Status: SHIPPED | OUTPATIENT
Start: 2023-12-22 | End: 2023-12-29 | Stop reason: SDUPTHER

## 2023-12-22 RX ORDER — INSULIN HUMAN 500 [IU]/ML
INJECTION, SOLUTION SUBCUTANEOUS
Qty: 8 PEN | Refills: 11 | Status: SHIPPED | OUTPATIENT
Start: 2023-12-22 | End: 2024-01-18

## 2023-12-22 RX ORDER — BLOOD-GLUCOSE SENSOR
EACH MISCELLANEOUS
Qty: 3 EACH | Refills: 3 | Status: SHIPPED | OUTPATIENT
Start: 2023-12-22 | End: 2024-01-03

## 2023-12-22 RX ORDER — BLOOD-GLUCOSE TRANSMITTER
1 EACH MISCELLANEOUS CONTINUOUS
Qty: 1 EACH | Status: SHIPPED | OUTPATIENT
Start: 2023-12-22 | End: 2024-01-05 | Stop reason: SDUPTHER

## 2023-12-29 DIAGNOSIS — E11.65 TYPE 2 DIABETES MELLITUS WITH HYPERGLYCEMIA, WITH LONG-TERM CURRENT USE OF INSULIN: ICD-10-CM

## 2023-12-29 DIAGNOSIS — Z79.4 TYPE 2 DIABETES MELLITUS WITH HYPERGLYCEMIA, WITH LONG-TERM CURRENT USE OF INSULIN: ICD-10-CM

## 2023-12-29 RX ORDER — BLOOD-GLUCOSE SENSOR
3 EACH MISCELLANEOUS CONTINUOUS
Qty: 3 EACH | Status: SHIPPED | OUTPATIENT
Start: 2023-12-29 | End: 2024-12-28

## 2024-01-02 DIAGNOSIS — F42.2 MIXED OBSESSIONAL THOUGHTS AND ACTS: ICD-10-CM

## 2024-01-02 RX ORDER — BUSPIRONE HYDROCHLORIDE 30 MG/1
30 TABLET ORAL 2 TIMES DAILY
Qty: 60 TABLET | Refills: 0 | Status: SHIPPED | OUTPATIENT
Start: 2024-01-02 | End: 2024-01-16 | Stop reason: SDUPTHER

## 2024-01-05 ENCOUNTER — PATIENT MESSAGE (OUTPATIENT)
Dept: ENDOCRINOLOGY | Facility: CLINIC | Age: 49
End: 2024-01-05
Payer: MEDICAID

## 2024-01-05 DIAGNOSIS — E11.65 TYPE 2 DIABETES MELLITUS WITH HYPERGLYCEMIA, WITH LONG-TERM CURRENT USE OF INSULIN: ICD-10-CM

## 2024-01-05 DIAGNOSIS — Z79.4 TYPE 2 DIABETES MELLITUS WITH HYPERGLYCEMIA, WITH LONG-TERM CURRENT USE OF INSULIN: ICD-10-CM

## 2024-01-05 RX ORDER — BLOOD-GLUCOSE TRANSMITTER
1 EACH MISCELLANEOUS CONTINUOUS
Qty: 1 EACH | Status: SHIPPED | OUTPATIENT
Start: 2024-01-05 | End: 2025-01-04

## 2024-01-05 RX ORDER — PRAMLINTIDE ACETATE 1000 UG/ML
60 INJECTION SUBCUTANEOUS
Qty: 3 ML | Refills: 3 | Status: SHIPPED | OUTPATIENT
Start: 2024-01-05 | End: 2025-01-04

## 2024-01-10 ENCOUNTER — OFFICE VISIT (OUTPATIENT)
Dept: NEUROSURGERY | Facility: CLINIC | Age: 49
End: 2024-01-10
Payer: MEDICAID

## 2024-01-10 VITALS
DIASTOLIC BLOOD PRESSURE: 72 MMHG | WEIGHT: 315 LBS | HEIGHT: 73 IN | HEART RATE: 86 BPM | BODY MASS INDEX: 41.75 KG/M2 | SYSTOLIC BLOOD PRESSURE: 128 MMHG

## 2024-01-10 DIAGNOSIS — M54.9 DORSALGIA, UNSPECIFIED: Primary | ICD-10-CM

## 2024-01-10 DIAGNOSIS — Z98.1 STATUS POST LUMBAR SPINAL FUSION: ICD-10-CM

## 2024-01-10 PROCEDURE — 99999 PR PBB SHADOW E&M-EST. PATIENT-LVL V: CPT | Mod: PBBFAC,,, | Performed by: NEUROLOGICAL SURGERY

## 2024-01-10 PROCEDURE — 3074F SYST BP LT 130 MM HG: CPT | Mod: CPTII,,, | Performed by: NEUROLOGICAL SURGERY

## 2024-01-10 PROCEDURE — 3078F DIAST BP <80 MM HG: CPT | Mod: CPTII,,, | Performed by: NEUROLOGICAL SURGERY

## 2024-01-10 PROCEDURE — 99213 OFFICE O/P EST LOW 20 MIN: CPT | Mod: S$PBB,,, | Performed by: NEUROLOGICAL SURGERY

## 2024-01-10 PROCEDURE — 99215 OFFICE O/P EST HI 40 MIN: CPT | Mod: PBBFAC,PN | Performed by: NEUROLOGICAL SURGERY

## 2024-01-10 PROCEDURE — 3008F BODY MASS INDEX DOCD: CPT | Mod: CPTII,,, | Performed by: NEUROLOGICAL SURGERY

## 2024-01-10 PROCEDURE — 1159F MED LIST DOCD IN RCRD: CPT | Mod: CPTII,,, | Performed by: NEUROLOGICAL SURGERY

## 2024-01-10 RX ORDER — TRAMADOL HYDROCHLORIDE 50 MG/1
100 TABLET ORAL EVERY 8 HOURS PRN
Qty: 180 TABLET | Refills: 3 | Status: SHIPPED | OUTPATIENT
Start: 2024-01-10

## 2024-01-10 NOTE — PROGRESS NOTES
NEUROSURGICAL PROGRESS NOTE    DATE OF SERVICE:  01/10/2024    ATTENDING PHYSICIAN:  Jose L Brown MD    SUBJECTIVE:  This is a very pleasant 47 y.o. male, he is 1 year status post revision of posterolateral fusion from L3-S1, sacral pelvic fixation and open SI joint fusion for pseudoarthrosis with loosening of hardware.  He recently has completed the healthy back program.  In the healthy back program day did a lot of leg exercises, weight lifting and twisting exercises.  Since he stopped the healthy back he reports worsening back pain.  He reports 8/10 of back pain at this time.  Pain is also traveling down his buttock area and he has some leg numbness.  He has chronic gait imbalance.  His gait imbalance has been stable.  He feels pain is more in the L2-3 area.  Pain is pretty localized and the tramadol is not really helping at this time.  No new onset of motor weakness or sphincter dysfunction symptoms     INTERIM HISTORY:  06/19/23    He is here to discuss is back pain issue.  He has completed a thoracic and lumbar spine MRI.  He is trying to lose weight.  His type 2 diabetes is better controlled with insulin at this time.  Denies having neck or upper extremity symptoms.  Mainly complaining just of back pain.  He is contemplating bariatric surgery.  He is taking the Celebrex twice daily.  He is taking tramadol as needed.     INTERIM HISTORY:    Still has difficulty walking for long period of time without severe back pain.  Reports some left more than right leg pain mainly in the anterior thighs.  Have some balance issues.  Also complains of thoracic back pain.  Was contemplating bariatric surgery but he is afraid of not being able to eat less.  Reports that his eating disorder is related to anxiety and being bored.  He has a applied for long-term disability with social security              PAST MEDICAL HISTORY:  Active Ambulatory Problems     Diagnosis Date Noted    Morbid obesity 04/16/2015    Vitamin D  deficiency 04/16/2015    VICENTE (obstructive sleep apnea) 06/17/2015    OCD (obsessive compulsive disorder) 02/26/2020    Anxiety 02/26/2020    Insomnia due to other mental disorder 02/05/2021    Palpitations 03/25/2021    DDD (degenerative disc disease), lumbar 04/26/2021    Adjustment disorder with mixed anxiety and depressed mood 05/19/2021    Mixed obsessional thoughts and acts 06/25/2021    Mild episode of recurrent major depressive disorder 06/25/2021    Fusion of spine, lumbosacral region 07/13/2021    Sacroiliac joint dysfunction of both sides 12/13/2021    Chronic bilateral low back pain with bilateral sciatica 12/14/2021    Radiculopathy of lumbosacral region 12/14/2021    Chest pain, atypical 03/10/2022    Pseudoarthrosis of lumbar spine 04/13/2022    Benign essential hypertension 04/20/2022    Type 2 diabetes mellitus with hyperglycemia, with long-term current use of insulin 10/17/2022    Excessive flatus 10/19/2022    Gastroesophageal reflux disease without esophagitis 10/19/2022    Hyponatremia 11/11/2022    Metabolic acidemia 11/11/2022    Dehydration with hyponatremia 11/11/2022    Flatulence/gas pain/belching 11/15/2022    Decreased strength of trunk and back 01/09/2023    Decreased ROM of trunk and back 01/09/2023    Dyslipidemia 08/10/2023     Resolved Ambulatory Problems     Diagnosis Date Noted    CTS (carpal tunnel syndrome) 06/26/2015    DDD (degenerative disc disease), lumbar 09/04/2019    Lumbar disc herniation with radiculopathy 10/09/2019    Ileus 11/11/2022     Past Medical History:   Diagnosis Date    Allergy     Back pain     Depression     Diabetes 04/17/2015    Diabetes mellitus with insulin therapy     Elevated sed rate 6/26/2015    GERD (gastroesophageal reflux disease) 10/19/2022    Hx of vasectomy     Hypertension     Sleep apnea        PAST SURGICAL HISTORY:  Past Surgical History:   Procedure Laterality Date    ADENOIDECTOMY      COLONOSCOPY N/A 9/6/2022    Procedure: COLONOSCOPY;   Surgeon: Rossy Champion MD;  Location: [facility] ENDO;  Service: Endoscopy;  Laterality: N/A;    EPIDURAL STEROID INJECTION Right 09/04/2019    Procedure: Injection, Steroid, Epidural Transforaminal;  Surgeon: Harjinder Batista Jr., MD;  Location: [facility] ENDO;  Service: Pain Management;  Laterality: Right;  Right L3 + L4 TF NATALIE    [id number]  [id number]    Arrive @ 1300; ASA last 8/27; Check BG; NEEDS CONSENT    ESOPHAGOGASTRODUODENOSCOPY N/A 10/31/2022    Procedure: EGD (ESOPHAGOGASTRODUODENOSCOPY);  Surgeon: Rossy Champion MD;  Location: [facility] ENDO;  Service: Endoscopy;  Laterality: N/A;    FRACTURE SURGERY      bilateral elbow fracture 3 years ago    FUSION OF LUMBAR SPINE BY ANTERIOR APPROACH Left 04/26/2021    Procedure: FUSION, SPINE, LUMBAR, ANTERIOR APPROACH Left L3-4 L4-5 OLIF BRENDA, ALL Release, L5-S1 ALIF;  Surgeon: Jose L Brown MD;  Location: [facility] OR;  Service: Neurosurgery;  Laterality: Left;  Procedure: Left L3-4 L4-5 OLIF BRENDA, ALL Release, L5-S1 ALIF  Surgery Time: 2.5 Hrs  LOS: 2-3  Anesthesia: General  Blood: Type & Screen  Radiology: Josseline  Brace: LSO  Bed: Regular  Position: LAteral Righ    FUSION OF SACROILIAC JOINT Bilateral 04/13/2022    Procedure: FUSION, SACROILIAC JOINT Stg 2: Bilateral Open SI Joint fusion, Bedrock from Si Bone;  Surgeon: Jose L Brown MD;  Location: [facility] OR;  Service: Neurosurgery;  Laterality: Bilateral;    FUSION OF SPINE WITH INSTRUMENTATION N/A 04/26/2021    Procedure: FUSION, SPINE, WITH INSTRUMENTATION L3-S1 Posteior Segmental Instrumentation;  Surgeon: Jose L Brown MD;  Location: [facility] OR;  Service: Neurosurgery;  Laterality: N/A;  Procedure: L3-S1 Posteior Segmental Instrumentation  Sirgery Time: 2 Hrs  LOS: 2-3  Anesthesia: General  Blood: Tyoe & Screen  Radiology: Dual C arm  Brace:LSO  Bed: [place] [address] Poster  Position: Prone  Equipment: [email or web address]     FUSION OF SPINE WITH INSTRUMENTATION N/A 04/13/2022    Procedure: FUSION, SPINE, WITH INSTRUMENTATION Stg 1:  Revision of posterior L3-S1 hardware, Depuy. Sacro-pelvic fixation, screw augmentation PMMA. L4-S1 posterolateral arthrodesis. Open Gauge screws, 11-12mm;  Surgeon: Jose L Brown MD;  Location: [facility] OR;  Service: Neurosurgery;  Laterality: N/A;    HERNIA REPAIR      bilateral groin hernia    INJECTION OF STEROID Bilateral 12/13/2021    Procedure: INJECTION, STEROID Bilateral SI JOint Block and Steroid Injection;  Surgeon: Jose L Brown MD;  Location: [facility] OR;  Service: Neurosurgery;  Laterality: Bilateral;  Procedure: Bilateral SI JOint Block and Steroid Injection   Surgery Time: 30 min  LOS: 0  Anesthesia: MAC  Radiology: C-arm  Bed: Regular with pillows  Position: Prone    LUMBAR LAMINECTOMY Bilateral 11/29/2019    Procedure: LAMINECTOMY, SPINE, LUMBAR Rigth L3-4, Left L4-5 Laminectomy, medial Facetectomy, and microdiscectomy;  Surgeon: Jose L Brown MD;  Location: [facility] OR;  Service: Neurosurgery;  Laterality: Bilateral;  Procedure: Rigth L3-4, Left L4-5 Laminectomy, medial Facetectomy, and microdiscectomy  Surgery Time: 2.5 Hrs  LOS: 0-1  Anesthesia: General  Blood: Type & Screen  Radiology: C-arm  Micros    REMOVAL IMPLANT, POSTERIOR SEGMENT, INTRAOCULAR  04/13/2022    Procedure: REMOVAL IMPLANT, POSTERIOR SEGMENT, INTRAOCULAR;  Surgeon: Jose L Brown MD;  Location: [facility];  Service: Neurosurgery;;    SPINE SURGERY      TONSILLECTOMY      VASECTOMY         SOCIAL HISTORY:   Social History     Socioeconomic History    Marital status: Significant Other   Tobacco Use    Smoking status: Every Day     Types: Vaping with nicotine     Passive exposure: Current    Smokeless tobacco: Never    Tobacco comments:     Handout provided for Amb[employer]ory Smoking Cessaiton program   Substance and Sexual Activity    Alcohol use: Not Currently     Comment: 750 ml  once a month or so    Drug use: Never    Sexual activity: Yes     Partners: Female     Birth control/protection: None     Comment: ; one child       Social Determinants of Health     Financial Resource Strain: Low Risk  (12/12/2023)    Overall Financial Resource Strain (CARDIA)     Difficulty of Paying Living Expenses: Not hard at all   Food Insecurity: No Food Insecurity (12/12/2023)    Hunger Vital Sign     Worried About Running Out of Food in the Last Year: Never true     Ran Out of Food in the Last Year: Never true   Transportation Needs: No Transportation Needs (12/12/2023)    PRAPARE - Transportation     Lack of Transportation (Medical): No     Lack of Transportation (Non-Medical): No   Physical Activity: Inactive (12/12/2023)    Exercise Vital Sign     Days of Exercise per Week: 0 days     Minutes of Exercise per Session: 0 min   Stress: Stress Concern Present (12/12/2023)    [employer] [place] [employer] - Occupational Stress Questionnaire     Feeling of Stress : To some extent   Social Connections: Unknown (12/12/2023)    Social Connection and Isolation Panel [NHANES]     Frequency of Communication with Friends and Family: Never     Frequency of Social Gatherings with Friends and Family: Never     Active Member of Clubs or Organizations: No     Attends Club or Organization Meetings: Never     Marital Status: Living with partner   Housing Stability: Low Risk  (12/12/2023)    Housing Stability Vital Sign     Unable to Pay for Housing in the Last Year: No     Number of Places Lived in the Last Year: 2     Unstable Housing in the Last Year: No       FAMILY HISTORY:  Family History   Problem Relation Age of Onset    Fibromyalgia Mother     Osteoarthritis Mother     Cataracts Maternal Grandmother     Macular degeneration Maternal Grandfather     Cataracts Maternal Grandfather     Rheum arthritis Neg Hx     Psoriasis Neg Hx     Lupus Neg Hx     Kidney disease Neg Hx     Inflammatory bowel disease Neg Hx     Amblyopia Neg Hx     Blindness Neg Hx     Glaucoma Neg Hx     Retinal detachment Neg Hx     Strabismus Neg Hx        CURRENTS  MEDICATIONS:  Current Outpatient Medications on File Prior to Visit   Medication Sig Dispense Refill    acetaminophen (TYLENOL) 500 MG tablet Take 1,000 mg by mouth 2 (two) times daily as needed for Pain.      ARIPiprazole (ABILIFY) 5 MG Tab TAKE 1 TABLET BY MOUTH DAILY 30 tablet 0    ascorbic acid, vitamin C, (VITAMIN C) 1000 MG tablet Take 1,000 mg by mouth once daily.      aspirin (ECOTRIN) 81 MG EC tablet Take 81 mg by mouth every evening.      aspirin/sod bicarb/citric acid (GIOVANY-SELTZER ORAL) Take 2 tablets by mouth daily as needed (Upset stomach).      atorvastatin (LIPITOR) 10 MG tablet Take 1 tablet (10 mg total) by mouth once daily. 90 tablet 3    blood-glucose sensor (DEXCOM G6 SENSOR) Cayla 3 each by Misc.(Non-Drug; Combo Route) route continuous. 3 each prn    blood-glucose transmitter (DEXCOM G6 TRANSMITTER) Cayla 1 each by Misc.(Non-Drug; Combo Route) route continuous. 1 each prn    busPIRone (BUSPAR) 30 MG Tab Take 1 tablet (30 mg total) by mouth 2 (two) times daily. 60 tablet 0    coenzyme Q10 (CO Q-10) 100 mg capsule Take 100 mg by mouth once daily.      cyanocobalamin (VITAMIN B-12) 1000 MCG tablet Take 100 mcg by mouth once daily.      cyclobenzaprine (FLEXERIL) 10 MG tablet Take 10 mg by mouth 3 (three) times daily.      dapagliflozin propanediol (FARXIGA) 5 mg Tab tablet Take 1 tablet (5 mg total) by mouth once daily. 30 tablet 11    dicyclomine (BENTYL) 20 mg tablet Take 1 tablet (20 mg total) by mouth 3 (three) times daily as needed (abdominal pain). 60 tablet 2    flash glucose scanning reader Misc Disp one reader to monitor glucose- covered by insurance dexcom 1 each 1    flash glucose sensor Kit Dispense sensor to read glucose  dexcom 12 kit 11    fluticasone propionate (FLONASE) 50 mcg/actuation nasal spray 2 sprays (100 mcg total) by Each Nostril route once daily. 48 mL 2    gabapentin (NEURONTIN) 800 MG tablet Take 1 tablet (800 mg total) by mouth 3 (three) times daily. 270 tablet 3     "hydrOXYzine HCL (ATARAX) 25 MG tablet Take 2 tablets (50 mg total) by mouth nightly as needed (insomnia). 60 tablet 0    insulin regular hum U-500 conc (HUMULIN R U-500, CONC, KWIKPEN) 500 unit/mL (3 mL) insulin pen 110-110-130 before respective meals 8 pen 11    insulin syringe-needle U-100 (BD INSULIN SYRINGE ULTRA-FINE) 1 mL 31 gauge x 5/16 Syrg To use 4 times daily with insulin 300 each 11    Lactobac no.41/Bifidobact no.7 (PROBIOTIC-10 ORAL) Take by mouth.      lanolin/mineral oil/petrolatum (ARTIFICIAL TEARS OPHT) Place 2 drops into both eyes daily as needed (Dry eye).      meloxicam (MOBIC) 15 MG tablet Take 1 tablet (15 mg total) by mouth once daily. Dc celebrex 90 tablet 1    metFORMIN (GLUCOPHAGE-XR) 500 MG ER 24hr tablet Take 2 tablets (1,000 mg total) by mouth 2 (two) times daily with meals. 360 tablet 3    metoclopramide HCl (REGLAN) 5 MG tablet Take 1 tablet (5 mg total) by mouth 4 (four) times daily before meals and nightly. 120 tablet 2    metoclopramide HCl (REGLAN) 5 mg/5 mL Soln Take 5 mLs (5 mg total) by mouth 4 (four) times daily before meals and nightly. 500 mL 2    multivitamin capsule Take 1 capsule by mouth once daily.      NON FORMULARY MEDICATION Uses Cpap Machine nightly on a setting of 10      ondansetron (ZOFRAN-ODT) 4 MG TbDL Take 1 tablet (4 mg total) by mouth every 8 (eight) hours as needed (nausea). 30 tablet 5    ONETOUCH DELICA PLUS LANCET 33 gauge Misc Apply topically 3 (three) times daily.      ONETOUCH VERIO TEST STRIPS Strp 1 strip 3 (three) times daily.      pen needle, diabetic (BD ULTRA-FINE ORIG PEN NEEDLE) 29 gauge x 1/2" Ndle To use 3 needles daily 300 each 3    pen needle, diabetic 31 gauge x 1/4" Ndle Use with flex pen 100 each 11    pramlintide (SYMLINPEN 60) 1,500 mcg/1.5 mL injection Inject 0.06 mLs (60 mcg total) into the skin 3 (three) times daily before meals. 3 mL 3    propranoloL (INDERAL) 20 MG tablet TAKE 1 TABLET BY MOUTH THREE TIMES A DAY AS NEEDED FOR " ANXIETY . MAY TAKE AN EXTRA TABLET DAILY AS NEEDED FOR ANXIETY 90 tablet 2    tirzepatide (MOUNJARO) 15 mg/0.5 mL PnIj Inject 15 mg into the skin every 7 days. 4 pen 11    traMADoL (ULTRAM) 50 mg tablet Take 2 tablets (100 mg total) by mouth every 8 (eight) hours as needed (severe pain). 180 tablet 3    valsartan-hydrochlorothiazide (DIOVAN-HCT) 320-12.5 mg per tablet TAKE 1 TABLET BY MOUTH ONCE A DAY 90 tablet 3    vitamin D 1000 units Tab Take 5,000 Units by mouth 2 (two) times a day.      ZEGALOGUE AUTOINJECTOR 0.6 mg/0.6 mL AtIn Inject 1 Box  into the skin as needed (hypoglycemia). Glucagon is an emergency pen that is used to increase your blood sugar. Glucagon is a pen that is used in an emergency situation where you are unable to eat or drink anything. Glucagon is a medication that is given by someone else-spouse, child, etc. 1.2 mL 1    DULoxetine (CYMBALTA) 60 MG capsule Take 1 capsule (60 mg total) by mouth 2 (two) times daily. 60 capsule 0    pantoprazole (PROTONIX) 40 MG tablet Take 1 tablet (40 mg total) by mouth once daily. 90 tablet 3    [DISCONTINUED] fluvoxaMINE (LUVOX) 100 MG tablet Take 1.5 tablets (150 mg total) by mouth 2 (two) times daily. 270 tablet 0     No current facility-administered medications on file prior to visit.       ALLERGIES:  Review of patient's allergies indicates:   Allergen Reactions    Dilaudid [hydromorphone] Other (See Comments)     IV dilaudid severely drops BP       REVIEW OF SYSTEMS:  Review of Systems   Constitutional:  Negative for diaphoresis, fever and weight loss.   Respiratory:  Negative for shortness of breath.    Cardiovascular:  Negative for chest pain.   Gastrointestinal:  Negative for blood in stool.   Genitourinary:  Negative for hematuria.   Endo/Heme/Allergies:  Does not bruise/bleed easily.   All other systems reviewed and are negative.        OBJECTIVE:    PHYSICAL EXAMINATION:   Vitals:    01/10/24 1458   BP: 128/72   Pulse: 86       Physical  Exam:  Vitals reviewed.    Constitutional: He appears well-developed and well-nourished.     Eyes: Pupils are equal, round, and reactive to light. Conjunctivae and EOM are normal.     Cardiovascular: Normal distal pulses and no edema.     Abdominal: Soft.     Skin: Skin displays no rash on trunk and no rash on extremities. Skin displays no lesions on trunk and no lesions on extremities.     Psych/Behavior: He is alert. He is oriented to person, place, and time. He has a normal mood and affect.     Musculoskeletal:        Neck: Range of motion is full.     Neurological:        DTRs: Tricep reflexes are 2+ on the right side and 2+ on the left side. Bicep reflexes are 2+ on the right side and 2+ on the left side. Brachioradialis reflexes are 2+ on the right side and 2+ on the left side. Patellar reflexes are 2+ on the right side and 2+ on the left side. Achilles reflexes are 2+ on the right side and 2+ on the left side.       Back Exam     Muscle Strength   Right Quadriceps:  5/5   Left Quadriceps:  5/5   Right Hamstrings:  5/5   Left Hamstrings:  5/5             SI joint:   Palpation at the right and left SI joints not painful  JASEN test is negative bilaterally  Gaenslen test is negative bilaterally  Thigh thrust test is negative bilaterally    Neurologic Exam     Mental Status   Oriented to person, place, and time.   Speech: speech is normal   Level of consciousness: alert    Cranial Nerves   Cranial nerves II through XII intact.     CN III, IV, VI   Pupils are equal, round, and reactive to light.  Extraocular motions are normal.     Motor Exam   Muscle bulk: normal  Overall muscle tone: normal    Strength   Right deltoid: 5/5  Left deltoid: 5/5  Right biceps: 5/5  Left biceps: 5/5  Right triceps: 5/5  Left triceps: 5/5  Right wrist flexion: 5/5  Left wrist flexion: 5/5  Right wrist extension: 5/5  Left wrist extension: 5/5  Right interossei: 5/5  Left interossei: 5/5  Right iliopsoas: 5/5  Left iliopsoas:  5/5  Right quadriceps: 5/5  Left quadriceps: 5/5  Right hamstrin/5  Left hamstrin/5  Right anterior tibial: 5/5  Left anterior tibial: 5/5  Right posterior tibial: 5/5  Left posterior tibial: 5/5  Right peroneal: 5/5  Left peroneal: 5/5  Right gastroc: 5/5  Left gastroc: 5/5    Sensory Exam   Light touch normal.   Pinprick normal.     Gait, Coordination, and Reflexes     Gait  Gait: normal    Coordination   Finger to nose coordination: normal  Tandem walking coordination: normal    Reflexes   Right brachioradialis: 2+  Left brachioradialis: 2+  Right biceps: 2+  Left biceps: 2+  Right triceps: 2+  Left triceps: 2+  Right patellar: 2+  Left patellar: 2+  Right achilles: 2+  Left achilles: 2+  Right plantar: normal  Left plantar: normal  Right Hughes: absent  Left Hughes: absent  Right ankle clonus: absent  Left ankle clonus: absent        DIAGNOSTIC DATA:  I personally interpreted the following imaging:   No recent imaging  May 2023 MRI was showing L 3 to pelvis fusion, no residual stenosis  2023 CT scan was showing L3 to pelvis instrumentation, mild lucency around the L3 screw    ASSESMENT:  This is a 48 y.o. male with     Problem List Items Addressed This Visit    None  Visit Diagnoses       Dorsalgia, unspecified    -  Primary    Relevant Orders    CT Lumbar Spine Without Contrast    Status post lumbar spinal fusion        BMI 50.0-59.9, adult                  PLAN:  We will complete disability paper  Refill on tramadol  Repeat CT of the lumbar spine to reassess the lucency around the L3 pedicle screws    More than 50% of the time was spent on discussing conservative management treatments (medication, physical therapy exercises) and possible interventions (spinal injections and surgical procedures). Care coordination was discussed.    20 minutes        Jose L Brown MD  Cell:[phone]

## 2024-01-11 ENCOUNTER — TELEPHONE (OUTPATIENT)
Dept: NEUROSURGERY | Facility: CLINIC | Age: 49
End: 2024-01-11
Payer: MEDICAID

## 2024-01-11 ENCOUNTER — PATIENT MESSAGE (OUTPATIENT)
Dept: FAMILY MEDICINE | Facility: CLINIC | Age: 49
End: 2024-01-11
Payer: MEDICAID

## 2024-01-11 NOTE — LETTER
January 12, 2024    Kirill Gandhi III  [address]  [facility] [address]         [place] [address] [address] [address]  Phone: [phone]  Fax: [phone] January 12, 2024     Patient: Kirill Gandhi III   YOB: 1975   Date of Visit: 1/11/2024       To Whom It May Concern:    It is my medical opinion that Kirill Gandhi has limited mobility and pain due to severe chronic lower back abnormalities.  He can not stand for long periods of time and often requires mobility assistance.    If you have any questions or concerns, please don't hesitate to call.    Sincerely,        Marocs Ibarra MD

## 2024-01-11 NOTE — TELEPHONE ENCOUNTER
Pt's disability forms were scanned into chart under  and faxed to [facility] [phone]. Confirmation was received. Pt notified.   No

## 2024-01-12 ENCOUNTER — PATIENT MESSAGE (OUTPATIENT)
Dept: FAMILY MEDICINE | Facility: CLINIC | Age: 49
End: 2024-01-12
Payer: MEDICAID

## 2024-01-16 ENCOUNTER — OFFICE VISIT (OUTPATIENT)
Dept: PSYCHIATRY | Facility: CLINIC | Age: 49
End: 2024-01-16
Payer: MEDICAID

## 2024-01-16 DIAGNOSIS — F99 INSOMNIA DUE TO OTHER MENTAL DISORDER: ICD-10-CM

## 2024-01-16 DIAGNOSIS — F41.9 ANXIETY: ICD-10-CM

## 2024-01-16 DIAGNOSIS — F33.42 RECURRENT MAJOR DEPRESSIVE DISORDER, IN FULL REMISSION: Primary | ICD-10-CM

## 2024-01-16 DIAGNOSIS — F42.2 MIXED OBSESSIONAL THOUGHTS AND ACTS: ICD-10-CM

## 2024-01-16 DIAGNOSIS — F51.05 INSOMNIA DUE TO OTHER MENTAL DISORDER: ICD-10-CM

## 2024-01-16 PROCEDURE — 1159F MED LIST DOCD IN RCRD: CPT | Mod: CPTII,95,, | Performed by: NURSE PRACTITIONER

## 2024-01-16 PROCEDURE — 99214 OFFICE O/P EST MOD 30 MIN: CPT | Mod: 95,,, | Performed by: NURSE PRACTITIONER

## 2024-01-16 PROCEDURE — 1160F RVW MEDS BY RX/DR IN RCRD: CPT | Mod: CPTII,95,, | Performed by: NURSE PRACTITIONER

## 2024-01-16 RX ORDER — ARIPIPRAZOLE 5 MG/1
5 TABLET ORAL DAILY
Qty: 30 TABLET | Refills: 5 | Status: SHIPPED | OUTPATIENT
Start: 2024-01-16 | End: 2024-05-07 | Stop reason: SDUPTHER

## 2024-01-16 RX ORDER — PROPRANOLOL HYDROCHLORIDE 20 MG/1
TABLET ORAL
Qty: 90 TABLET | Refills: 5 | Status: SHIPPED | OUTPATIENT
Start: 2024-01-16 | End: 2024-05-07 | Stop reason: SDUPTHER

## 2024-01-16 RX ORDER — BUSPIRONE HYDROCHLORIDE 30 MG/1
30 TABLET ORAL 2 TIMES DAILY
Qty: 60 TABLET | Refills: 5 | Status: SHIPPED | OUTPATIENT
Start: 2024-01-16 | End: 2024-05-07 | Stop reason: SDUPTHER

## 2024-01-16 RX ORDER — DULOXETIN HYDROCHLORIDE 60 MG/1
60 CAPSULE, DELAYED RELEASE ORAL 2 TIMES DAILY
Qty: 60 CAPSULE | Refills: 5 | Status: SHIPPED | OUTPATIENT
Start: 2024-01-16 | End: 2024-05-07 | Stop reason: SDUPTHER

## 2024-01-16 RX ORDER — HYDROXYZINE HYDROCHLORIDE 25 MG/1
50 TABLET, FILM COATED ORAL NIGHTLY PRN
Qty: 60 TABLET | Refills: 5 | Status: SHIPPED | OUTPATIENT
Start: 2024-01-16 | End: 2024-05-07

## 2024-01-16 NOTE — PROGRESS NOTES
"Outpatient Psychiatry Follow-Up Visit (MD/NP) - Telemedicine Visit    1/16/2024 8:18 AM  Kirill Gandhi III  1975  [id number]      The patient location is: Patient reported that their location at the time of this visit was in the [facility]       Visit type: audiovisual    Each patient to whom he or she provides medical services by telemedicine is:  (1) informed of the relationship between the physician and patient and the respective role of any other health care provider with respect to management of the patient; and (2) notified that he or she may decline to receive medical services by telemedicine and may withdraw from such care at any time.      Clinical Status of Patient:  Outpatient (Ambulatory)    Chief Complaint:  Kirill Gandhi III, a 48 y.o. male,who presents today for follow up of depression and anxiety.  Met with patient.        Interval History/Subjective Report/Content of Current Session:     States "I'm on a good combination of medications. I'm having more good days than bad days". Denies crying spells, amotivation, anhedonia, and hopelessness. Endorses obsessional thoughts, but they have improved. They are not as intense and they do not occur as often as they were.  States that his physical pain keeps him awake some nights. He uses a CPAP and wakes at night to urinate frequently.     ASEs from psych meds: denies    Pt denies recurrent thoughts of death and denies SI/HI. Denies any sxs of mihaela. Denies AVH, paranoia and delusions. No objective s/sx of psychosis or mihaela.         Psychotherapy:  Target symptoms: depression, anxiety   Why chosen therapy is appropriate versus another modality: relevant to diagnosis, patient responds to this modality  Outcome monitoring methods: self-report, observation  Therapeutic intervention type: supportive psychotherapy  Topics discussed/themes: stress related to medical comorbidities, building skills sets for symptom management  The " patient's response to the intervention is accepting. The patient's progress toward treatment goals is good.   Duration of intervention: 5 minutes.      Psychotropic medication review  Previous Trials-  Wellbutrin- ineffective on its own  Prozac- effective  Luvox- partial response  Risperidone - ineffective  Buspar - effective  Celexa- ineffective  Zoloft  Lexapro      Current meds-  Cymbalta  Gabapentin - prescribed by his pain mx provider  Vistaril  Buspar  Abilify    History:       Past Medical, Psychiatric, Family and Social History: The patient's past medical, psychiatric, family and social history, allergies, current medications, past surgical history, and problem list have been reviewed and updated as appropriate within the electronic medical record.         Review of Systems       Review of Systems   Constitutional:  Negative for chills, fever and malaise/fatigue.   Respiratory:  Negative for cough and shortness of breath.    Cardiovascular:  Negative for chest pain and palpitations.   Gastrointestinal:  Negative for abdominal pain, diarrhea and vomiting.   Genitourinary:  Negative for dysuria and hematuria.   Musculoskeletal:  Negative for falls and myalgias.   Skin:  Negative for rash.   Neurological:  Negative for tremors, seizures and headaches.   Psychiatric/Behavioral:          See HPI         Compliance: yes      Risk Parameters:  Patient reports no suicidal ideation  Patient reports no homicidal ideation  Patient reports no self-injurious behavior  Patient reports no violent behavior    Exam (detailed: at least 9 elements; comprehensive: all 15 elements)     Constitutional  Vitals:  Most recent vital signs, dated greater than 90 days prior to this appointment, were reviewed.   There were no vitals filed for this visit.             Musculoskeletal  Muscle Strength/Tone:  not examined - SHARIF due to virtual visit   Gait & Station:  SHARIF due to virtual visit     Psychiatric      Appearance:  unremarkable,  age appropriate, well nourished, bearded, casually dressed, neatly groomed, obese   Behavior:  normal, friendly and cooperative, eye contact normal     Speech:  no latency; no press   Mood & Affect:  euthymic  congruent and appropriate   Thought Process:  normal and logical   Associations:  intact   Thought Content:  normal, no suicidality, no homicidality, delusions, or paranoia   Insight:  intact, has awareness of illness   Judgement: behavior is adequate to circumstances, age appropriate   Orientation:  grossly intact   Memory: intact for content of interview   Language: grossly intact   Attention Span & Concentration:  able to focus   Fund of Knowledge:  intact and appropriate to age and level of education       Medications:  Outpatient Encounter Medications as of 1/16/2024   Medication Sig Dispense Refill    acetaminophen (TYLENOL) 500 MG tablet Take 1,000 mg by mouth 2 (two) times daily as needed for Pain.      ARIPiprazole (ABILIFY) 5 MG Tab TAKE 1 TABLET BY MOUTH DAILY 30 tablet 0    ascorbic acid, vitamin C, (VITAMIN C) 1000 MG tablet Take 1,000 mg by mouth once daily.      aspirin (ECOTRIN) 81 MG EC tablet Take 81 mg by mouth every evening.      aspirin/sod bicarb/citric acid (GIOVANY-SELTZER ORAL) Take 2 tablets by mouth daily as needed (Upset stomach).      atorvastatin (LIPITOR) 10 MG tablet Take 1 tablet (10 mg total) by mouth once daily. 90 tablet 3    blood-glucose sensor (DEXCOM G6 SENSOR) Cayla 3 each by Misc.(Non-Drug; Combo Route) route continuous. 3 each prn    blood-glucose transmitter (DEXCOM G6 TRANSMITTER) Cayla 1 each by Misc.(Non-Drug; Combo Route) route continuous. 1 each prn    busPIRone (BUSPAR) 30 MG Tab Take 1 tablet (30 mg total) by mouth 2 (two) times daily. 60 tablet 0    coenzyme Q10 (CO Q-10) 100 mg capsule Take 100 mg by mouth once daily.      cyanocobalamin (VITAMIN B-12) 1000 MCG tablet Take 100 mcg by mouth once daily.      cyclobenzaprine (FLEXERIL) 10 MG tablet Take 10 mg by  mouth 3 (three) times daily.      dapagliflozin propanediol (FARXIGA) 5 mg Tab tablet Take 1 tablet (5 mg total) by mouth once daily. 30 tablet 11    dicyclomine (BENTYL) 20 mg tablet Take 1 tablet (20 mg total) by mouth 3 (three) times daily as needed (abdominal pain). 60 tablet 2    DULoxetine (CYMBALTA) 60 MG capsule Take 1 capsule (60 mg total) by mouth 2 (two) times daily. 60 capsule 0    flash glucose scanning reader Misc Disp one reader to monitor glucose- covered by insurance dexcom 1 each 1    flash glucose sensor Kit Dispense sensor to read glucose  dexcom 12 kit 11    fluticasone propionate (FLONASE) 50 mcg/actuation nasal spray 2 sprays (100 mcg total) by Each Nostril route once daily. 48 mL 2    gabapentin (NEURONTIN) 800 MG tablet Take 1 tablet (800 mg total) by mouth 3 (three) times daily. 270 tablet 3    [] hydrOXYzine HCL (ATARAX) 25 MG tablet Take 2 tablets (50 mg total) by mouth nightly as needed (insomnia). 60 tablet 0    insulin regular hum U-500 conc (HUMULIN R U-500, CONC, KWIKPEN) 500 unit/mL (3 mL) insulin pen 110-110-130 before respective meals 8 pen 11    insulin syringe-needle U-100 (BD INSULIN SYRINGE ULTRA-FINE) 1 mL 31 gauge x 5/16 Syrg To use 4 times daily with insulin 300 each 11    Lactobac no.41/Bifidobact no.7 (PROBIOTIC-10 ORAL) Take by mouth.      lanolin/mineral oil/petrolatum (ARTIFICIAL TEARS OPHT) Place 2 drops into both eyes daily as needed (Dry eye).      meloxicam (MOBIC) 15 MG tablet Take 1 tablet (15 mg total) by mouth once daily. Dc celebrex 90 tablet 1    metFORMIN (GLUCOPHAGE-XR) 500 MG ER 24hr tablet Take 2 tablets (1,000 mg total) by mouth 2 (two) times daily with meals. 360 tablet 3    metoclopramide HCl (REGLAN) 5 MG tablet Take 1 tablet (5 mg total) by mouth 4 (four) times daily before meals and nightly. 120 tablet 2    metoclopramide HCl (REGLAN) 5 mg/5 mL Soln Take 5 mLs (5 mg total) by mouth 4 (four) times daily before meals and nightly. 500 mL 2     "multivitamin capsule Take 1 capsule by mouth once daily.      NON FORMULARY MEDICATION Uses Cpap Machine nightly on a setting of 10      ondansetron (ZOFRAN-ODT) 4 MG TbDL Take 1 tablet (4 mg total) by mouth every 8 (eight) hours as needed (nausea). 30 tablet 5    ONETOUCH DELICA PLUS LANCET 33 gauge Misc Apply topically 3 (three) times daily.      ONETOUCH VERIO TEST STRIPS Strp 1 strip 3 (three) times daily.      pantoprazole (PROTONIX) 40 MG tablet Take 1 tablet (40 mg total) by mouth once daily. 90 tablet 3    pen needle, diabetic (BD ULTRA-FINE ORIG PEN NEEDLE) 29 gauge x 1/2" Ndle To use 3 needles daily 300 each 3    pen needle, diabetic 31 gauge x 1/4" Ndle Use with flex pen 100 each 11    pramlintide (SYMLINPEN 60) 1,500 mcg/1.5 mL injection Inject 0.06 mLs (60 mcg total) into the skin 3 (three) times daily before meals. 3 mL 3    propranoloL (INDERAL) 20 MG tablet TAKE 1 TABLET BY MOUTH THREE TIMES A DAY AS NEEDED FOR ANXIETY . MAY TAKE AN EXTRA TABLET DAILY AS NEEDED FOR ANXIETY 90 tablet 2    tirzepatide (MOUNJARO) 15 mg/0.5 mL PnIj Inject 15 mg into the skin every 7 days. 4 pen 11    traMADoL (ULTRAM) 50 mg tablet Take 2 tablets (100 mg total) by mouth every 8 (eight) hours as needed (severe pain). 180 tablet 3    valsartan-hydrochlorothiazide (DIOVAN-HCT) 320-12.5 mg per tablet TAKE 1 TABLET BY MOUTH ONCE A DAY 90 tablet 3    vitamin D 1000 units Tab Take 5,000 Units by mouth 2 (two) times a day.      ZEGALOGUE AUTOINJECTOR 0.6 mg/0.6 mL AtIn Inject 1 Box  into the skin as needed (hypoglycemia). Glucagon is an emergency pen that is used to increase your blood sugar. Glucagon is a pen that is used in an emergency situation where you are unable to eat or drink anything. Glucagon is a medication that is given by someone else-spouse, child, etc. 1.2 mL 1    [DISCONTINUED] blood-glucose sensor (DEXCOM G6 SENSOR) Cayla 3 each by Misc.(Non-Drug; Combo Route) route continuous. 3 each prn    [DISCONTINUED] " blood-glucose sensor (DEXCOM G7 SENSOR) Cayla To change every 10 days 3 each 3    [DISCONTINUED] blood-glucose sensor (DEXCOM G7 SENSOR) Cayla To change every 10 days 3 each 3    [DISCONTINUED] blood-glucose transmitter (DEXCOM G6 TRANSMITTER) Cayla 1 each by Misc.(Non-Drug; Combo Route) route continuous. 1 each prn    [DISCONTINUED] busPIRone (BUSPAR) 30 MG Tab Take 1 tablet (30 mg total) by mouth 2 (two) times daily. 180 tablet 0    [DISCONTINUED] flash glucose sensor Kit Dispense sensor to read glucose  dexcom 12 kit 11    [DISCONTINUED] fluvoxaMINE (LUVOX) 100 MG tablet Take 1.5 tablets (150 mg total) by mouth 2 (two) times daily. 270 tablet 0    [DISCONTINUED] insulin regular hum U-500 conc (HUMULIN R U-500, CONC, KWIKPEN) 500 unit/mL (3 mL) insulin pen Use 100 units 30 minutes before breakfast and before lunch.  Use 120 units before diner. 8 pen 11    [DISCONTINUED] pramlintide (SYMLINPEN 60) 1,500 mcg/1.5 mL injection Inject 0.06 mLs (60 mcg total) into the skin 3 (three) times daily before meals. 3 mL 3    [DISCONTINUED] traMADoL (ULTRAM) 50 mg tablet Take 2 tablets (100 mg total) by mouth every 8 (eight) hours as needed (severe pain). 180 tablet 3     No facility-administered encounter medications on file as of 1/16/2024.       Allergy:  Review of patient's allergies indicates:   Allergen Reactions    Dilaudid [hydromorphone] Other (See Comments)     IV dilaudid severely drops BP         Assessment and Diagnosis   Status/Progress: Based on the examination today, the patient's problem(s) is/are well controlled.  New problems have not been presented today.   Co-morbidities are complicating management of the primary condition.  There are no active rule-out diagnoses for this patient at this time.         General Impression:       ICD-10-CM ICD-9-CM   1. Recurrent major depressive disorder, in full remission  F33.42 296.36   2. Mixed obsessional thoughts and acts  F42.2 300.3   3. Anxiety  F41.9 300.00   4.  Insomnia due to other mental disorder  F51.05 300.9    F99 327.02       Intervention/Counseling/Treatment Plan     Medication Management:  Continue Buspar 30 mg BID  Continue Cymbalta 60 mg BID  Continue gabapentin 800 mg TID - managed by pain management  Continue hydroxyzine 25-50 mg q hs prn insomnia. Pt was told not drive or to take this med with ETOH or other sedating meds/substance. Pt verbalized understanding.  Continue Abilify 5 mg q day  Continue propranolol to 20 mg TID prn anxiety. May take an additional tab po q d prn anxiety.  Labs: reviewed most recent  The treatment plan and follow up plan were reviewed with the patient.  Discussed with patient informed consent, risks vs. benefits, alternative treatments, side effect profile and the inherent unpredictability of individual responses to these treatments. The patient expresses understanding of the above and displays the capacity to agree with this current plan and had no other questions.  Encouraged Patient to keep future appointments.   Take medications as prescribed and abstain from substance abuse.   Pt was told to present to ED or call 911 for SI/HI plan or intent, psychosis, or other psychiatric or medical emergency, and pt agrees to this and verbalized understanding.          Return to Clinic: 6 months, or sooner if needed        Face to Face time with patient: 12 minutes  Total time: 18 minutes of total time spent on the encounter, which includes face to face time and non-face to face time preparing to see the patient (eg, review of tests), Obtaining and/or reviewing separately obtained history, Documenting clinical information in the electronic or other health record, Independently interpreting results (not separately reported) and communicating results to the patient/family/caregiver, or Care coordination (not separately reported).       Verónica Briscoe, MSN, APRN, PMHNP-BC [facility]

## 2024-01-18 ENCOUNTER — PATIENT MESSAGE (OUTPATIENT)
Dept: ENDOCRINOLOGY | Facility: CLINIC | Age: 49
End: 2024-01-18
Payer: MEDICAID

## 2024-01-18 DIAGNOSIS — E78.5 DYSLIPIDEMIA: ICD-10-CM

## 2024-01-18 DIAGNOSIS — E11.65 UNCONTROLLED TYPE 2 DIABETES MELLITUS WITH HYPERGLYCEMIA: ICD-10-CM

## 2024-01-18 RX ORDER — SYRINGE,SAFETY WITH NEEDLE,1ML 25GX1"
SYRINGE (EA) MISCELLANEOUS
Qty: 400 EACH | Refills: 11 | Status: SHIPPED | OUTPATIENT
Start: 2024-01-18

## 2024-01-18 RX ORDER — INSULIN HUMAN 500 [IU]/ML
INJECTION, SOLUTION SUBCUTANEOUS
Qty: 10 PEN | Refills: 11 | Status: SHIPPED | OUTPATIENT
Start: 2024-01-18 | End: 2024-05-09

## 2024-01-18 NOTE — TELEPHONE ENCOUNTER
Care Due:                  Date            Visit Type   Department     Provider  --------------------------------------------------------------------------------                                EP -                              PRIMARY      [facility] FAMILY  Last Visit: 08-      CARE ([facility])   MEDICINE       Marcos Ibarra                              EP -                              PRIMARY      [facility] FAMILY  Next Visit: 03-      CARE ([facility])   MEDICINE       Marcos Ibarra                                                            Last  Test          Frequency    Reason                     Performed    Due Date  --------------------------------------------------------------------------------    CBC.........  12 months..  meloxicam................  03- 03-    Lipid Panel.  12 months..  atorvastatin.............  03- 03-    [facility] Embedded Care Due Messages. Reference number: [id number].   1/18/2024 3:29:17 PM CST

## 2024-01-19 ENCOUNTER — HOSPITAL ENCOUNTER (OUTPATIENT)
Dept: RADIOLOGY | Facility: HOSPITAL | Age: 49
Discharge: HOME | End: 2024-01-19
Attending: NEUROLOGICAL SURGERY
Payer: MEDICAID

## 2024-01-19 DIAGNOSIS — M54.9 DORSALGIA, UNSPECIFIED: ICD-10-CM

## 2024-01-19 PROCEDURE — 72131 CT LUMBAR SPINE W/O DYE: CPT | Mod: 26,,, | Performed by: RADIOLOGY

## 2024-01-19 PROCEDURE — 72131 CT LUMBAR SPINE W/O DYE: CPT | Mod: TC

## 2024-01-19 RX ORDER — PEN NEEDLE, DIABETIC 31 GX5/16"
NEEDLE, DISPOSABLE MISCELLANEOUS
Qty: 100 EACH | Refills: 11 | OUTPATIENT
Start: 2024-01-19

## 2024-01-19 NOTE — TELEPHONE ENCOUNTER
" Provider Staff:  Action required for this patient    Requires labs      Please see care gap opportunities below in Care Due Message.    Thanks!  [facility]     Appointments      Date Provider   Last Visit   8/31/2023 Marcos Ibarra MD   Next Visit   3/28/2024 Marcos Ibarra MD     Refill Decision Note   Kirill Gandhi  is requesting a refill authorization.  Brief Assessment and Rationale for Refill:  Quick Discontinue     Medication Therapy Plan:  E-Prescribing Status: Receipt confirmed by pharmacy (8/11/2023 10:52 AM CDT);  pen needle, diabetic (BD ULTRA-FINE ORIG PEN NEEDLE) 29 gauge x 1/2" Ndle      Comments:     Note composed:9:26 AM 01/19/2024             Appointments     Last Visit   8/31/2023 Marcos Ibarra MD   Next Visit   3/28/2024 Marcos Ibarra MD           Appointments     Last Visit   8/31/2023 Marcos Ibarra MD   Next Visit   3/28/2024 Marcos Ibarra MD         "

## 2024-01-31 ENCOUNTER — TELEPHONE (OUTPATIENT)
Dept: SLEEP MEDICINE | Facility: CLINIC | Age: 49
End: 2024-01-31
Payer: MEDICAID

## 2024-01-31 NOTE — TELEPHONE ENCOUNTER
----- Message from Bria Mcclelland sent at 1/31/2024  2:02 PM CST -----  Pt Requesting to Schedule an Appointment     Pt is requesting to schedule an appointment that our scheduling dept cannot schedule.    Who called: pt  Best call back #: [phone]  When pt wants appt:  Reason for appt: snoring while connected to Cpap machine; trouble falling and staying asleep  Additional notes:

## 2024-01-31 NOTE — TELEPHONE ENCOUNTER
Spoke w/ pt, informed pt that there is no openings. Pt states that he will call back on next month to see if there are openings available.

## 2024-02-06 ENCOUNTER — PATIENT MESSAGE (OUTPATIENT)
Dept: ENDOCRINOLOGY | Facility: CLINIC | Age: 49
End: 2024-02-06
Payer: MEDICAID

## 2024-02-15 ENCOUNTER — PATIENT MESSAGE (OUTPATIENT)
Dept: ADMINISTRATIVE | Facility: OTHER | Age: 49
End: 2024-02-15
Payer: MEDICAID

## 2024-02-15 ENCOUNTER — TELEPHONE (OUTPATIENT)
Dept: NEUROSURGERY | Facility: CLINIC | Age: 49
End: 2024-02-15
Payer: MEDICAID

## 2024-02-15 ENCOUNTER — PATIENT MESSAGE (OUTPATIENT)
Dept: NEUROSURGERY | Facility: CLINIC | Age: 49
End: 2024-02-15
Payer: MEDICAID

## 2024-02-15 NOTE — TELEPHONE ENCOUNTER
Pt's disability forms were scanned into chart under  and faxed to [employer] [phone]. Confirmation was received. Pt notified.

## 2024-02-19 ENCOUNTER — TELEPHONE (OUTPATIENT)
Dept: NEUROSURGERY | Facility: CLINIC | Age: 49
End: 2024-02-19
Payer: MEDICAID

## 2024-03-05 RX ORDER — CYCLOBENZAPRINE HCL 10 MG
TABLET ORAL
Qty: 90 TABLET | Refills: 11 | Status: SHIPPED | OUTPATIENT
Start: 2024-03-05 | End: 2024-06-11 | Stop reason: SDUPTHER

## 2024-03-05 RX ORDER — GABAPENTIN 800 MG/1
800 TABLET ORAL 3 TIMES DAILY
Qty: 90 TABLET | Refills: 11 | Status: SHIPPED | OUTPATIENT
Start: 2024-03-05 | End: 2024-06-11 | Stop reason: SDUPTHER

## 2024-03-12 ENCOUNTER — PATIENT MESSAGE (OUTPATIENT)
Dept: NEUROSURGERY | Facility: CLINIC | Age: 49
End: 2024-03-12
Payer: MEDICAID

## 2024-03-28 ENCOUNTER — PATIENT MESSAGE (OUTPATIENT)
Dept: ENDOCRINOLOGY | Facility: CLINIC | Age: 49
End: 2024-03-28
Payer: MEDICAID

## 2024-03-28 ENCOUNTER — TELEPHONE (OUTPATIENT)
Dept: ENDOCRINOLOGY | Facility: CLINIC | Age: 49
End: 2024-03-28
Payer: MEDICAID

## 2024-03-28 ENCOUNTER — OFFICE VISIT (OUTPATIENT)
Dept: FAMILY MEDICINE | Facility: CLINIC | Age: 49
End: 2024-03-28
Payer: MEDICAID

## 2024-03-28 VITALS
BODY MASS INDEX: 41.75 KG/M2 | DIASTOLIC BLOOD PRESSURE: 80 MMHG | HEART RATE: 79 BPM | HEIGHT: 73 IN | WEIGHT: 315 LBS | TEMPERATURE: 98 F | OXYGEN SATURATION: 98 % | SYSTOLIC BLOOD PRESSURE: 116 MMHG

## 2024-03-28 DIAGNOSIS — T39.395A ADVERSE EFFECT OF NON-STEROIDAL ANTI-INFLAMMATORY DRUG (NSAID), INITIAL ENCOUNTER: ICD-10-CM

## 2024-03-28 DIAGNOSIS — E11.65 UNCONTROLLED TYPE 2 DIABETES MELLITUS WITH HYPERGLYCEMIA: ICD-10-CM

## 2024-03-28 DIAGNOSIS — E11.65 UNCONTROLLED TYPE 2 DIABETES MELLITUS WITH HYPERGLYCEMIA: Primary | ICD-10-CM

## 2024-03-28 PROCEDURE — 3079F DIAST BP 80-89 MM HG: CPT | Mod: CPTII,S$GLB,, | Performed by: FAMILY MEDICINE

## 2024-03-28 PROCEDURE — 1159F MED LIST DOCD IN RCRD: CPT | Mod: CPTII,S$GLB,, | Performed by: FAMILY MEDICINE

## 2024-03-28 PROCEDURE — 3008F BODY MASS INDEX DOCD: CPT | Mod: CPTII,S$GLB,, | Performed by: FAMILY MEDICINE

## 2024-03-28 PROCEDURE — 3074F SYST BP LT 130 MM HG: CPT | Mod: CPTII,S$GLB,, | Performed by: FAMILY MEDICINE

## 2024-03-28 PROCEDURE — 99213 OFFICE O/P EST LOW 20 MIN: CPT | Mod: S$GLB,,, | Performed by: FAMILY MEDICINE

## 2024-03-28 RX ORDER — VALSARTAN AND HYDROCHLOROTHIAZIDE 320; 12.5 MG/1; MG/1
1 TABLET, FILM COATED ORAL DAILY
Qty: 90 TABLET | Refills: 3 | Status: SHIPPED | OUTPATIENT
Start: 2024-03-28

## 2024-03-28 RX ORDER — ATORVASTATIN CALCIUM 10 MG/1
10 TABLET, FILM COATED ORAL DAILY
Qty: 90 TABLET | Refills: 3 | Status: SHIPPED | OUTPATIENT
Start: 2024-03-28 | End: 2025-03-28

## 2024-03-28 RX ORDER — MELOXICAM 15 MG/1
TABLET ORAL
Qty: 30 TABLET | Refills: 2 | Status: SHIPPED | OUTPATIENT
Start: 2024-03-28

## 2024-04-01 NOTE — PROGRESS NOTES
" Patient ID: Kirill Gandhi III is a 48 y.o. male.    Chief Complaint: Follow-up    HPI      Kirill Gandhi III is a 48 y.o. male here for routine follow-up.  Follow-up for type 2 diabetes-also seen by endocrine.  Chronic lower back pain for which he has had multiple surgeries.  Use of NSAIDs meloxicam on a daily basis " would like a higher dose    Vitals:    03/28/24 1047   BP: 116/80   BP Location: Left arm   Patient Position: Sitting   Pulse: 79   Temp: 97.9 °F (36.6 °C)   TempSrc: Oral   SpO2: 98%   Weight: (!) 188 kg (414 lb 7.4 oz)   Height: 6' 1" (1.854 m)            Review of Symptoms      Physical Exam    Constitutional:  Oriented to person, place, and time.appears well-developed and well-nourished.  No distress.      HENT  Head: Normocephalic and atraumatic  Right Ear: External ear normal.   Left Ear: External ear normal.   Nose: External nose normal.   Mouth:  Moist mucus membranes.    Eyes:  Conjunctivae are normal. Right eye exhibits no discharge.  Left eye exhibits no discharge. No scleral icterus.  No periorbital edema    Cardiovascular:  Regular rate and rhythm with normal S1 and S2     Pulmonary/Chest:   Clear to auscultation bilaterally without wheezes, rhonchi or rales      Musculoskeletal:  No edema. No obvious deformity No wasting       Neurological:  Alert and oriented to person, place, and time.   Coordination normal.     Skin:   Skin is warm and dry.  No diaphoresis.   No rash noted.     Psychiatric: Normal mood and affect. Behavior is normal.  Judgment and thought content normal.     Complete Blood Count  Lab Results   Component Value Date    RBC 4.57 (L) 03/27/2023    HGB 13.1 (L) 03/27/2023    HCT 40.4 03/27/2023    MCV 88 03/27/2023    MCH 28.7 03/27/2023    MCHC 32.4 03/27/2023    RDW 14.0 03/27/2023     03/27/2023    MPV 9.6 03/27/2023    GRAN 4.4 03/27/2023    GRAN 59.9 03/27/2023    LYMPH 2.0 03/27/2023    LYMPH 27.7 03/27/2023    MONO 0.6 03/27/2023    MONO " "8.2 03/27/2023    EOS 0.2 03/27/2023    BASO 0.05 03/27/2023    EOSINOPHIL 3.0 03/27/2023    BASOPHIL 0.7 03/27/2023    DIFFMETHOD Automated 03/27/2023       Comprehensive Metabolic Panel  Lab Results   Component Value Date     (H) 12/08/2023    BUN 15 12/08/2023    CREATININE 0.83 12/08/2023     12/08/2023    K 4.3 12/08/2023     12/08/2023    PROT 7.7 12/08/2023    ALBUMIN 3.9 12/08/2023    BILITOT 0.5 12/08/2023    AST 53 (H) 12/08/2023    ALKPHOS 98 12/08/2023    CO2 27 12/08/2023    ALT 60 (H) 12/08/2023    ANIONGAP 9 12/08/2023       TSH  No results found for: "TSH"    Assessment / Plan:      ICD-10-CM ICD-9-CM   1. Uncontrolled type 2 diabetes mellitus with hyperglycemia  E11.65 250.02   2. Adverse effect of non-steroidal anti-inflammatory drug (NSAID), initial encounter  T39.395A E935.8     Uncontrolled type 2 diabetes mellitus with hyperglycemia    Adverse effect of non-steroidal anti-inflammatory drug (NSAID), initial encounter    Other orders  -     atorvastatin (LIPITOR) 10 MG tablet; Take 1 tablet (10 mg total) by mouth once daily.  Dispense: 90 tablet; Refill: 3  -     meloxicam (MOBIC) 15 MG tablet; TAKE 1 TABLET BY MOUTH ONCE DAILY, DISCONTINUE CELEBREX  Dispense: 30 tablet; Refill: 2  -     valsartan-hydrochlorothiazide (DIOVAN-HCT) 320-12.5 mg per tablet; Take 1 tablet by mouth once daily.  Dispense: 90 tablet; Refill: 3    Discussed NSAIDs and difficulty taking those and problems.    Diabetes discussed continue control medication especially at night-has had some lower blood glucose readings then.  "

## 2024-04-04 ENCOUNTER — OFFICE VISIT (OUTPATIENT)
Dept: HEPATOLOGY | Facility: CLINIC | Age: 49
End: 2024-04-04
Payer: MEDICAID

## 2024-04-04 VITALS — HEIGHT: 72 IN | BODY MASS INDEX: 42.66 KG/M2 | WEIGHT: 315 LBS

## 2024-04-04 DIAGNOSIS — R79.89 ELEVATED LFTS: Primary | ICD-10-CM

## 2024-04-04 DIAGNOSIS — E78.5 DYSLIPIDEMIA: ICD-10-CM

## 2024-04-04 DIAGNOSIS — E66.01 CLASS 3 SEVERE OBESITY DUE TO EXCESS CALORIES WITH SERIOUS COMORBIDITY AND BODY MASS INDEX (BMI) OF 50.0 TO 59.9 IN ADULT: ICD-10-CM

## 2024-04-04 DIAGNOSIS — Z79.4 TYPE 2 DIABETES MELLITUS WITH HYPERGLYCEMIA, WITH LONG-TERM CURRENT USE OF INSULIN: Chronic | ICD-10-CM

## 2024-04-04 DIAGNOSIS — E11.65 TYPE 2 DIABETES MELLITUS WITH HYPERGLYCEMIA, WITH LONG-TERM CURRENT USE OF INSULIN: Chronic | ICD-10-CM

## 2024-04-04 DIAGNOSIS — K76.0 FATTY LIVER: ICD-10-CM

## 2024-04-04 PROCEDURE — 1160F RVW MEDS BY RX/DR IN RCRD: CPT | Mod: CPTII,,, | Performed by: NURSE PRACTITIONER

## 2024-04-04 PROCEDURE — 1159F MED LIST DOCD IN RCRD: CPT | Mod: CPTII,,, | Performed by: NURSE PRACTITIONER

## 2024-04-04 PROCEDURE — 99215 OFFICE O/P EST HI 40 MIN: CPT | Mod: PBBFAC | Performed by: NURSE PRACTITIONER

## 2024-04-04 PROCEDURE — 99999 PR PBB SHADOW E&M-EST. PATIENT-LVL V: CPT | Mod: PBBFAC,,, | Performed by: NURSE PRACTITIONER

## 2024-04-04 PROCEDURE — 99214 OFFICE O/P EST MOD 30 MIN: CPT | Mod: S$PBB,,, | Performed by: NURSE PRACTITIONER

## 2024-04-04 PROCEDURE — 3008F BODY MASS INDEX DOCD: CPT | Mod: CPTII,,, | Performed by: NURSE PRACTITIONER

## 2024-04-04 NOTE — PATIENT INSTRUCTIONS
1.MRI to look for fat or scar tissue in the liver with return to clinic   2. Will check immunity markers for Hepatitis A and B and arrange for vaccination if needed  3. Labs before return to clinic to  check for multiple causes of liver disease. These labs will release to you as soon as they are resulted but we will discuss them in detail at your upcoming visit to discuss what the lab results mean.   4.  Follow up in 1 month with MRI and labs before     These things are important to improve fatty liver:  Limit alcohol consumption, no more than 2 serving(s) of alcohol in any day (1 serving is 5 ounces of wine, 12 ounces of beer, or 1.5 ounces of liquor) and do NOT recommend any daily alcohol use    Weight loss goal of 100 lbs. [facility] offers benefits to patients so let me know if you want a referral to the [facility] gym. Also, [facility] has dieticians that can create a weight loss plan. If you are interested let me know and I can place a referral. If so, contact the dietician team at [facility] at email [email or web address] or call [phone] to get scheduled. They do offer video visits   Low carb/sugar and high protein diet (~1 g/kg/day of protein).Try to limit your carb intake to LESS than 30-45 grams of carbs with a meal or LESS than 5-10 grams with any snack (total of any snack foods eaten during that time). Use Continuity Control Pal or Lose It neftali to add up your carbs through the day. Do NOT drink any beverages with calories or carbs (this can lead to high blood sugar and weight gain). The main thing to focus on is low calorie, high protein, low carb)  Exercise, 5 days per week, 30 minutes per day, as tolerated  Recommend good cholesterol, blood pressure, blood sugar levels   There is a new medication called Rezdiffra for the treatment of F2-F3 liver scarring due to fatty liver. It is only indicated for patients with significant scar tissue from fatty liver (will  discuss based on MRI)    In some people, fatty liver can progress to steatohepatitis (inflamatory fatty liver) and possibly to cirrhosis, putting one at increased risk for liver cancer, liver failure, and death. Therefore, the lifestyle changes are very important to decrease this risk.

## 2024-04-04 NOTE — PROGRESS NOTES
[facility] VISIT NEW PT NOTE    REFERRING PROVIDER:  Sandra Barrios  PCP: Marcos Ibarra MD     CHIEF COMPLAINT: elevated liver enzymes    HPI: This is a 48 y.o. patient with PMH noted below, presenting for evaluation of fatty liver disease     Serologic workup - will obtain    Prior serologic workup:   Lab Results   Component Value Date    ANASCREEN Negative <1:160 04/16/2015    FERRITIN 27 07/20/2016    FESATURATED 14 (L) 07/20/2016    HEPBSAG Negative 04/16/2015    HEPCAB Non-reactive 11/11/2022    HEPAIGM Negative 04/16/2015         Risk factors for fatty liver include obesity, HLD, HTN, DM    Liver fibrosis staging:  -- MRI elasto- will obtain    Interval HPI: Presents today alone.   Limiting SSB  Intermittent fried/fast food  No carb counting    Labs done 2023 show elevates transaminase levels (ALT > AST, elevated x1 previously mostly normal)  Platelets WNL, alk phos WNL  Synthetic liver functioning WNL    Lab Results   Component Value Date    ALT 60 (H) 12/08/2023    AST 53 (H) 12/08/2023    ALKPHOS 98 12/08/2023    BILITOT 0.5 12/08/2023    ALBUMIN 3.9 12/08/2023    INR 1.0 03/07/2022     03/27/2023       Abd CT done 11/2022 showed hepatomegaly and fatty liver    Reports mother has fatty liver. No Alcohol consumption, see below  Social History     Substance and Sexual Activity   Alcohol Use Not Currently       Immunity to Hep A and B - will check with labs       Allergy and medication list reviewed and updated     PMHX:  has a past medical history of Allergy, Anxiety, Back pain, Chronic bilateral low back pain with bilateral sciatica (12/14/2021), CTS (carpal tunnel syndrome) (06/26/2015), CTS (carpal tunnel syndrome) (6/26/2015), Depression, Diabetes (04/17/2015), Diabetes mellitus with insulin therapy, Elevated sed rate (6/26/2015), GERD (gastroesophageal reflux disease) (10/19/2022), vasectomy, Hypertension, Morbid obesity (4/16/2015), OCD (obsessive compulsive disorder),  Sleep apnea, and Type 2 diabetes mellitus with hyperglycemia, with long-term current use of insulin (10/17/2022).    PSHX:  has a past surgical history that includes Tonsillectomy; Adenoidectomy; Fracture surgery; Hernia repair; Epidural steroid injection (Right, 09/04/2019); Vasectomy; Lumbar laminectomy (Bilateral, 11/29/2019); Fusion of lumbar spine by anterior approach (Left, 04/26/2021); Fusion of spine with instrumentation (N/A, 04/26/2021); Injection of steroid (Bilateral, 12/13/2021); Fusion of spine with instrumentation (N/A, 04/13/2022); Fusion of sacroiliac joint (Bilateral, 04/13/2022); removal implant, posterior segment, intraocular (04/13/2022); Spine surgery; Colonoscopy (N/A, 9/6/2022); and Esophagogastroduodenoscopy (N/A, 10/31/2022).    FAMILY HISTORY: Updated and reviewed in EPIC    ROS:   GENERAL: Denies fatigue  CARDIOVASCULAR: Denies edema  GI: Denies abdominal pain  SKIN: Denies rash, itching   NEURO: Denies confusion, memory loss, or mood changes    PHYSICAL EXAM:   In no acute distress; alert and oriented to person, place and time  VITALS: Ht 6' (1.829 m)   Wt (!) 188.4 kg (415 lb 5.6 oz)   BMI 56.33 kg/m²   EYES: Sclerae anicteric  GI: Soft, non-tender, non-distended. No ascites.  EXTREMITIES:  No edema.  SKIN: Warm and dry. No jaundice. No telangectasias noted. No palmar erythema.  NEURO:  No asterixis.  PSYCH:  Thought and speech pattern appropriate. Behavior normal      EDUCATION:  See instructions discussed with patient in Instructions section of the After Visit Summary     ASSESSMENT & PLAN:  48 y.o. male with:  1.  Fatty liver, likely related to metabolic risk factors obesity, DM, HLD, HTN  - see HPI  -- Immunity to Hep A and B : check with labs  -- MRI with RTC  -- Recommendations discussed with patient:  Limit alcohol consumption, no more than 2 serving(s) of alcohol in any day (1 serving is 5 ounces of wine, 12 ounces of beer, or 1.5 ounces of liquor) and do NOT recommend any  daily alcohol use    2 Weight loss goal of 100 lbs  3. Low carb/sugar, high fiber and protein diet, limit your carb intake to LESS than 30-45 grams of carbs with a meal or LESS than 5-10 grams with any snack   4. Exercise, 5 days per week, 30 minutes per day, as tolerated  5. Recommend good cholesterol, blood pressure, blood sugar levels   6. There is a new medication called Rezdiffra for the treatment of F2-F3 liver scarring due to fatty liver. It is only indicated for those with stage 2 or 3 scar tissue (will discuss after MRI)    2. Elevated liver enzymes  -- will complete full sero w/u   -- labs before return to clinic     Orders Placed This Encounter   Procedures    MR Elastography    Alpha-1-Antitrypsin    KUSHAL Screen w/Reflex    Antimitochondrial Antibody    Anti-Smooth Muscle Antibody    Ceruloplasmin    Hepatic Function Panel    Hepatitis Panel, Acute    Hepatitis A antibody, IgG    Hepatitis B Core Antibody, Total    Hepatitis B Surface Ab, Qualitative    IgG    Iron and TIBC        3. Obesity, HTN, HLD, DM   -- Body mass index is 56.33 kg/m².   -- increases risk for fatty liver      Follow up in about 1 month (around 5/4/2024). with lab and MRI before  Orders Placed This Encounter   Procedures    MR Elastography    Alpha-1-Antitrypsin    KUSHAL Screen w/Reflex    Antimitochondrial Antibody    Anti-Smooth Muscle Antibody    Ceruloplasmin    Hepatic Function Panel    Hepatitis Panel, Acute    Hepatitis A antibody, IgG    Hepatitis B Core Antibody, Total    Hepatitis B Surface Ab, Qualitative    IgG    Iron and TIBC        Thank you for allowing me to participate in the care of Kirillfrancesca [facility]    XIAO Davis seen in conjunction with JASMINE SILVESTRE    I spent a total of 30 minutes on the day of the visit.This includes face to face time and non-face to face time preparing to see the patient (eg, review of tests), obtaining and/or reviewing separately obtained history, documenting clinical  information in the electronic or other health record, independently interpreting results and communicating results to the patient/family/caregiver, and coordinating care.         CC'ed note to:   Sandra Barrios NP [place], Andrew J., MD

## 2024-04-10 ENCOUNTER — PATIENT MESSAGE (OUTPATIENT)
Dept: OPTOMETRY | Facility: CLINIC | Age: 49
End: 2024-04-10
Payer: MEDICAID

## 2024-04-11 ENCOUNTER — PATIENT MESSAGE (OUTPATIENT)
Dept: ENDOCRINOLOGY | Facility: CLINIC | Age: 49
End: 2024-04-11
Payer: MEDICAID

## 2024-04-14 ENCOUNTER — HOSPITAL ENCOUNTER (EMERGENCY)
Facility: HOSPITAL | Age: 49
Discharge: HOME | End: 2024-04-14
Attending: EMERGENCY MEDICINE
Payer: MEDICAID

## 2024-04-14 VITALS
SYSTOLIC BLOOD PRESSURE: 136 MMHG | TEMPERATURE: 98 F | RESPIRATION RATE: 20 BRPM | DIASTOLIC BLOOD PRESSURE: 73 MMHG | BODY MASS INDEX: 42.66 KG/M2 | HEIGHT: 72 IN | WEIGHT: 315 LBS | HEART RATE: 74 BPM | OXYGEN SATURATION: 96 %

## 2024-04-14 DIAGNOSIS — M79.674 PAIN OF RIGHT GREAT TOE: Primary | ICD-10-CM

## 2024-04-14 PROCEDURE — 99283 EMERGENCY DEPT VISIT LOW MDM: CPT | Mod: 25,ER

## 2024-04-14 RX ORDER — NAPROXEN 500 MG/1
500 TABLET ORAL 2 TIMES DAILY WITH MEALS
Qty: 40 TABLET | Refills: 0 | Status: SHIPPED | OUTPATIENT
Start: 2024-04-14 | End: 2024-05-04

## 2024-04-14 NOTE — ED NOTES
Pt presents to ED with C/O R great toe pain with onset yesterday. Pt denies any injury or trauma. Pt states he has a Hx of neuropathy. He states worse pain with walking. The states pain is 10/10 with ambulation and no pain when the foot is at rest.

## 2024-04-14 NOTE — DISCHARGE INSTRUCTIONS
You have been diagnosed with musculoskeletal pain. Please take the prescribed anti-inflammatory medication as scheduled.  Please rest, ice, compress, and elevate your injury, as this may help to relieve pain.  If this is a new injury, your pain may be worse tomorrow.  Apply ice to to your injury 15 minutes and at least 1 hour off.  Please make sure there is something between the ice and your skin to prevent injury.    You have been prescribed Naproxen for pain. This is an Non-Steroidal Anti-Inflammatory (NSAID) Medication. Please do not take any additional NSAIDs while you are taking this medication including (Advil, Aleve, Motrin, Ibuprofen, Mobic\meloxicam, Naprosyn, Toradol, ketoralac, etc.). Please stop taking this medication if you experience: weakness, itching, yellow skin or eyes, joint pains, vomiting blood, blood or black stools, unusual weight gain, or swelling in your arms, legs, hands, or feet.

## 2024-04-14 NOTE — ED PROVIDER NOTES
Encounter Date: 4/14/2024       History     Chief Complaint   Patient presents with    Foot Pain     Pain and swelling to right great toe since yesterday. Patient denies trauma or injury.      Kirill Gandhi III is a 48 y.o. male  has a past medical history of Allergy, Anxiety, Back pain, Chronic bilateral low back pain with bilateral sciatica, CTS (carpal tunnel syndrome), CTS (carpal tunnel syndrome), Depression, Diabetes, Diabetes mellitus with insulin therapy, Elevated sed rate, GERD (gastroesophageal reflux disease), vasectomy, Hypertension, Morbid obesity, OCD (obsessive compulsive disorder), Sleep apnea, and Type 2 diabetes mellitus with hyperglycemia, with long-term current use of insulin. presenting to the Emergency Department for right great toe pain since yesterday. Denies trauma or injury.  Reports swelling of the toe.  Reports pain with walking.  No history of gout.  On hydrochlorothiazide therapy.        The history is provided by the patient.     Review of patient's allergies indicates:   Allergen Reactions    Dilaudid [hydromorphone] Other (See Comments)     IV dilaudid severely drops BP     Past Medical History:   Diagnosis Date    Allergy     Anxiety     Back pain     Chronic bilateral low back pain with bilateral sciatica 12/14/2021    CTS (carpal tunnel syndrome) 06/26/2015    CTS (carpal tunnel syndrome) 6/26/2015    Depression     Diabetes 04/17/2015    Diabetes mellitus with insulin therapy     Elevated sed rate 6/26/2015    GERD (gastroesophageal reflux disease) 10/19/2022    Hx of vasectomy     Hypertension     Morbid obesity 4/16/2015    OCD (obsessive compulsive disorder)     Sleep apnea     Type 2 diabetes mellitus with hyperglycemia, with long-term current use of insulin 10/17/2022     Past Surgical History:   Procedure Laterality Date    ADENOIDECTOMY      COLONOSCOPY N/A 9/6/2022    Procedure: COLONOSCOPY;  Surgeon: Rossy Champion MD;  Location: [facility];  Service:  Endoscopy;  Laterality: N/A;    EPIDURAL STEROID INJECTION Right 09/04/2019    Procedure: Injection, Steroid, Epidural Transforaminal;  Surgeon: Harjinder Batista Jr., MD;  Location: [facility];  Service: Pain Management;  Laterality: Right;  Right L3 + L4 TF NATALIE    [id number]  [id number]    Arrive @ 1300; ASA last 8/27; Check BG; NEEDS CONSENT    ESOPHAGOGASTRODUODENOSCOPY N/A 10/31/2022    Procedure: EGD (ESOPHAGOGASTRODUODENOSCOPY);  Surgeon: Rossy Champion MD;  Location: [facility] ENDO;  Service: Endoscopy;  Laterality: N/A;    FRACTURE SURGERY      bilateral elbow fracture 3 years ago    FUSION OF LUMBAR SPINE BY ANTERIOR APPROACH Left 04/26/2021    Procedure: FUSION, SPINE, LUMBAR, ANTERIOR APPROACH Left L3-4 L4-5 OLIF BRENDA, ALL Release, L5-S1 ALIF;  Surgeon: Jose L Brown MD;  Location: [facility];  Service: Neurosurgery;  Laterality: Left;  Procedure: Left L3-4 L4-5 OLIF BRENDA, ALL Release, L5-S1 ALIF  Surgery Time: 2.5 Hrs  LOS: 2-3  Anesthesia: General  Blood: Type & Screen  Radiology: Josseline  Brace: LSO  Bed: Regular  Position: LAteral Righ    FUSION OF SACROILIAC JOINT Bilateral 04/13/2022    Procedure: FUSION, SACROILIAC JOINT Stg 2: Bilateral Open SI Joint fusion, Bedrock from Si Bone;  Surgeon: Jose L Brown MD;  Location: [facility];  Service: Neurosurgery;  Laterality: Bilateral;    FUSION OF SPINE WITH INSTRUMENTATION N/A 04/26/2021    Procedure: FUSION, SPINE, WITH INSTRUMENTATION L3-S1 Posteior Segmental Instrumentation;  Surgeon: Jose L Brown MD;  Location: [facility] OR;  Service: Neurosurgery;  Laterality: N/A;  Procedure: L3-S1 Posteior Segmental Instrumentation  Sirgery Time: 2 Hrs  LOS: 2-3  Anesthesia: General  Blood: Tyoe & Screen  Radiology: Dual C arm  Brace:LSO  Bed: [place] [id number] Poster  Position: Prone  Equipment: Depuy     FUSION OF SPINE WITH INSTRUMENTATION N/A 04/13/2022    Procedure: FUSION, SPINE, WITH INSTRUMENTATION Stg 1: Revision of posterior L3-S1 hardware, Depuy. Sacro-pelvic fixation,  screw augmentation PMMA. L4-S1 posterolateral arthrodesis. Open Gauge screws, 11-12mm;  Surgeon: Jose L Brown MD;  Location: [facility] OR;  Service: Neurosurgery;  Laterality: N/A;    HERNIA REPAIR      bilateral groin hernia    INJECTION OF STEROID Bilateral 12/13/2021    Procedure: INJECTION, STEROID Bilateral SI JOint Block and Steroid Injection;  Surgeon: Jose L Brown MD;  Location: [facility] OR;  Service: Neurosurgery;  Laterality: Bilateral;  Procedure: Bilateral SI JOint Block and Steroid Injection   Surgery Time: 30 min  LOS: 0  Anesthesia: MAC  Radiology: C-arm  Bed: Regular with pillows  Position: Prone    LUMBAR LAMINECTOMY Bilateral 11/29/2019    Procedure: LAMINECTOMY, SPINE, LUMBAR Rigth L3-4, Left L4-5 Laminectomy, medial Facetectomy, and microdiscectomy;  Surgeon: Jose L Brown MD;  Location: [facility] OR;  Service: Neurosurgery;  Laterality: Bilateral;  Procedure: Rigth L3-4, Left L4-5 Laminectomy, medial Facetectomy, and microdiscectomy  Surgery Time: 2.5 Hrs  LOS: 0-1  Anesthesia: General  Blood: Type & Screen  Radiology: C-arm  Micros    REMOVAL IMPLANT, POSTERIOR SEGMENT, INTRAOCULAR  04/13/2022    Procedure: REMOVAL IMPLANT, POSTERIOR SEGMENT, INTRAOCULAR;  Surgeon: Jose L Brown MD;  Location: [facility] OR;  Service: Neurosurgery;;    SPINE SURGERY      TONSILLECTOMY      VASECTOMY       Family History   Problem Relation Name Age of Onset    Fibromyalgia Mother      Osteoarthritis Mother      Cataracts Maternal Grandmother      Macular degeneration Maternal Grandfather      Cataracts Maternal Grandfather      Rheum arthritis Neg Hx      Psoriasis Neg Hx      Lupus Neg Hx      Kidney disease Neg Hx      Inflammatory bowel disease Neg Hx      Amblyopia Neg Hx      Blindness Neg Hx      Glaucoma Neg Hx      Retinal detachment Neg Hx      Strabismus Neg Hx       Social History     Tobacco Use    Smoking status: Every Day     Types: Vaping with nicotine     Passive exposure: Current    Smokeless tobacco:  Never    Tobacco comments:     Handout provided for [employer] Smoking Cessaiton program   Substance Use Topics    Alcohol use: Not Currently    Drug use: Never     Review of Systems   Musculoskeletal:  Positive for arthralgias.   Skin:  Negative for wound.   All other systems reviewed and are negative.      Physical Exam     Initial Vitals [04/14/24 1329]   BP Pulse Resp Temp SpO2   136/73 74 20 98.4 °F (36.9 °C) 96 %      MAP       --         Physical Exam    Nursing note and vitals reviewed.  Constitutional: He appears well-developed and well-nourished. He is not diaphoretic.  Non-toxic appearance. No distress.   HENT:   Head: Normocephalic and atraumatic.   Right Ear: External ear normal.   Left Ear: External ear normal.   Eyes: Conjunctivae and EOM are normal.   Neck: Neck supple.   Normal range of motion.  Cardiovascular:  Normal rate and intact distal pulses.           Pulmonary/Chest: No respiratory distress.   Abdominal: He exhibits no distension.   Musculoskeletal:         General: Normal range of motion.      Cervical back: Normal range of motion and neck supple.      Comments: Right foot: Patient has tenderness to palpation of the 1st MTP.  No appreciated swelling, erythema, edema, warmth. Full ROM of the right great toe. Sensation intact. PT and DP pulses 2+. Cap refill less than 2 seconds.      Neurological: He is alert and oriented to person, place, and time. GCS score is 15. GCS eye subscore is 4. GCS verbal subscore is 5. GCS motor subscore is 6.   Skin: Skin is dry. Capillary refill takes less than 2 seconds.   Psychiatric: He has a normal mood and affect. His behavior is normal. Judgment and thought content normal.         ED Course   Procedures  Labs Reviewed - No data to display       Imaging Results              X-Ray Toe 2 or More Views Right (Final result)  Result time 04/14/24 13:50:30      Final result by Pablito Prince III, MD (04/14/24 13:50:30)                   Impression:      No  acute findings.      Electronically signed by: Pablito Prince MD  Date:    04/14/2024  Time:    13:50               Narrative:    EXAMINATION:  XR TOE 2 OR MORE VIEWS RIGHT    CLINICAL HISTORY:  pain to right great toe;    FINDINGS:  Bone alignment is satisfactory.  No fracture or dislocation.  No advanced arthritic change.  No significant soft tissue findings.                                       Medications - No data to display  Medical Decision Making  This is an emergent evaluation of 48 y.o. male in the ED presenting for orthopedic complaint. Physical exam reveals a non-toxic, afebrile, and well-appearing male in no apparent respiratory distress. Pertinent physical exam findings above. Vital signs stable. If available, previous records reviewed.    My overall impression is pain in great toe. Differential Diagnoses: Including but not limited to Sprain/strain, fracture, contusion, dislocation, gout, septic arthritis    Discharge Meds/Instructions: naproxen. RICE method. PCP f/u.     There does not appear to be any indication for further emergent testing, observation, or hospitalization at this time. A mutual shared decision making discussion was had with the patient. Patient appears stable for and is comfortable with discharge home. The diagnosis, treatment plan, instructions for follow-up as well as ED return precautions were discussed. Advised to follow-up with PCP for outpatient follow-up in 2-3 days. Signs and symptoms that would warrant immediate return to ED were reviewed prior to discharge. All questions and concerns were asked, answered, and addressed. Patient expressed understanding and agreement with the plan.     Amount and/or Complexity of Data Reviewed  Radiology: ordered and independent interpretation performed. Decision-making details documented in ED Course.    Risk  Prescription drug management.               ED Course as of 04/14/24 1446   Sun Apr 14, 2024   1353 X-Ray Toe 2 or More Views  Right  Independent interpretation by me: no acute fracture. I have read the radiologist's report and agree with their findings.   [LH]      ED Course User Index  [LH] Sara Fung PA-C                           Clinical Impression:  Final diagnoses:  [M79.674] Pain of right great toe (Primary)          ED Disposition Condition    Discharge Stable          ED Prescriptions       Medication Sig Dispense Start Date End Date Auth. Provider    naproxen (NAPROSYN) 500 MG tablet Take 1 tablet (500 mg total) by mouth 2 (two) times daily with meals. for 20 days 40 tablet 4/14/2024 5/4/2024 Sara Fung PA-C          Follow-up Information       Follow up With Specialties Details Why Contact Info    Marcos Ibarra MD Family Medicine Schedule an appointment as soon as possible for a visit   [address] [place] [id number] [phone]               Sara Fung PA-C  04/14/24 2458

## 2024-04-14 NOTE — ED NOTES
APPEARANCE: Alert, oriented and in no acute distress.  HEENT: Speaks without hoarseness.  CARDIAC: Normal rate and rhythm.    PERIPHERAL VASCULAR: peripheral pulses present. Normal cap refill. No edema. Warm to touch.    RESPIRATORY:Normal rate and effort. Respirations are equal and unlabored no obvious signs of distress.  GASTRO: soft, nondistended, nontender. Denies nausea, vomiting, or diarrhea.  : voids spontaneously and without difficulty.   MUSC: Full ROM. Pain and swelling to right great toe. Pain increases with weight bearing and ambulating.   SKIN: Skin is warm and dry, without discoloration. Mucous membranes moist.  NEURO: Pt is awake, alert, aware of environment. No neurologic deficits noted.

## 2024-04-16 ENCOUNTER — TELEPHONE (OUTPATIENT)
Dept: NEUROSURGERY | Facility: CLINIC | Age: 49
End: 2024-04-16

## 2024-04-16 ENCOUNTER — OFFICE VISIT (OUTPATIENT)
Dept: NEUROSURGERY | Facility: CLINIC | Age: 49
End: 2024-04-16
Payer: MEDICAID

## 2024-04-16 VITALS
WEIGHT: 315 LBS | HEART RATE: 107 BPM | HEIGHT: 72 IN | DIASTOLIC BLOOD PRESSURE: 93 MMHG | SYSTOLIC BLOOD PRESSURE: 148 MMHG | BODY MASS INDEX: 42.66 KG/M2

## 2024-04-16 DIAGNOSIS — Z98.1 S/P LUMBAR SPINAL FUSION: Primary | ICD-10-CM

## 2024-04-16 PROCEDURE — 1160F RVW MEDS BY RX/DR IN RCRD: CPT | Mod: CPTII,,, | Performed by: PHYSICIAN ASSISTANT

## 2024-04-16 PROCEDURE — 99214 OFFICE O/P EST MOD 30 MIN: CPT | Mod: S$PBB,,, | Performed by: PHYSICIAN ASSISTANT

## 2024-04-16 PROCEDURE — 3008F BODY MASS INDEX DOCD: CPT | Mod: CPTII,,, | Performed by: PHYSICIAN ASSISTANT

## 2024-04-16 PROCEDURE — 3080F DIAST BP >= 90 MM HG: CPT | Mod: CPTII,,, | Performed by: PHYSICIAN ASSISTANT

## 2024-04-16 PROCEDURE — 3077F SYST BP >= 140 MM HG: CPT | Mod: CPTII,,, | Performed by: PHYSICIAN ASSISTANT

## 2024-04-16 PROCEDURE — 1159F MED LIST DOCD IN RCRD: CPT | Mod: CPTII,,, | Performed by: PHYSICIAN ASSISTANT

## 2024-04-16 PROCEDURE — 99999 PR PBB SHADOW E&M-EST. PATIENT-LVL IV: CPT | Mod: PBBFAC,,, | Performed by: PHYSICIAN ASSISTANT

## 2024-04-16 PROCEDURE — 99214 OFFICE O/P EST MOD 30 MIN: CPT | Mod: PBBFAC,PN | Performed by: PHYSICIAN ASSISTANT

## 2024-04-16 RX ORDER — TRAMADOL HYDROCHLORIDE 50 MG/1
100 TABLET ORAL EVERY 8 HOURS PRN
Qty: 180 TABLET | Refills: 3 | Status: SHIPPED | OUTPATIENT
Start: 2024-04-16 | End: 2024-06-11 | Stop reason: SDUPTHER

## 2024-04-16 NOTE — PROGRESS NOTES
Subjective:     Patient ID:  Kirill Gadnhi III is a 48 y.o. male.    Kevin    Chief Complaint:  Back and bilateral leg pain and paresthesias    HPI    Kirill Gandhi III is a 48 y.o. male who presents for follow up.  Patient continues to have pain in the mid to low back with bilateral numbness and pain in the legs.  Better when sitting in a reclining chair.    Patient denies any recent accidents or trauma, no saddle anesthesias, and no bowel or bladder incontinence.      Review of Systems:  Constitution: Negative for chills, fever, night sweats and weight loss.   Musculoskeletal: Negative for falls.   Gastrointestinal: Negative for bowel incontinence, nausea and vomiting.   Genitourinary: Negative for bladder incontinence.   Neurological: Negative for disturbances in coordination and loss of balance.      Objective:      Vitals:    04/16/24 0857   BP: (!) 148/93   Pulse: 107   Weight: (!) 187.8 kg (414 lb 0.4 oz)   Height: 6' (1.829 m)   PainSc:   9   PainLoc: Back         Physical Exam:      General:  Kirill Gandhi III is well-developed, well-nourished, appears stated age, in no acute distress, alert and oriented to person, place, and time.    Pulmonary/Chest:  Respiratory effort normal  Abdominal: Exhibits no distension  Psychiatric:  Normal mood and affect.  Behavior is normal.  Judgement and thought content normal      Musculoskeletal:      Lumbar ROM:   Pain in lumbar flexion, extension, left lateral bending, and right lateral bending.    Lumbar Spine Inspection:  Healed surgical scars with no visible rashes.    Lumbar Spine Palpation:  No tenderness to low back palpation.      Neurological:  Alert and oriented to person, place, and time    Muscle strength against resistance:     Right Left   Hip flexion  5 / 5 5 / 5   Hip extension 5 / 5 5 / 5   Hip abduction 5 / 5 5 / 5   Hip adduction  5 / 5 5 / 5   Knee extension  5 / 5 5 / 5   Knee flexion 5 / 5 5 / 5   Dorsiflexion  5 / 5 5  / 5   EHL  5 / 5 5 / 5   Plantar flexion  5 / 5 5 / 5   Inversion of the feet 5 / 5 5 / 5   Eversion of the feet  5 / 5 5 / 5       Reflexes:     Right Left   Patellar 2+ 2+   Achilles 2+ 2+     Clonus:  Negative bilaterally    On gross examination of the bilateral upper extremities, patient has full painfree ROM with no signs of clubbing, cyanosis, edema, or weakness.             Assessment:          1. S/P lumbar spinal fusion            Plan:          Orders Placed This Encounter    traMADoL (ULTRAM) 50 mg tablet       Refilled tramadol  Will review CT lumbar spine with Dr. Brown    Follow-Up:  Follow up if symptoms worsen or fail to improve. If there are any questions prior to this, the patient was instructed to contact the office.       KENRICK Gonzalez, PA-C  Neurosurgery  [facility]  04/16/2024

## 2024-04-16 NOTE — TELEPHONE ENCOUNTER
Dr. Brown,    Patient was here for medication refill.  He states he did not get results from the CT lspine you ordered in January.    Can you review and let me know?    Thanks,  Lolly Duggan, [facility], PA-C  Neurosurgery  [facility]  04/16/2024

## 2024-04-17 ENCOUNTER — TELEPHONE (OUTPATIENT)
Dept: NEUROSURGERY | Facility: CLINIC | Age: 49
End: 2024-04-17
Payer: MEDICAID

## 2024-04-17 NOTE — TELEPHONE ENCOUNTER
Please let him know Dr. Brown reviewed the CT lspine and everything is well fused.    He is referring him to outside Pain Management who will need to manage his medications going forward per Dr. Brown.    He can contact his Medicaid insurance to find a provider in network or try [facility].  Please fax the referral to the office he decides on.    Thanks,  Lolly Duggan, [facility], PA-C  Neurosurgery  [facility]  04/17/2024

## 2024-04-17 NOTE — TELEPHONE ENCOUNTER
I informed pt per Lolly,      Please let him know Dr. Brown reviewed the CT lspine and everything is well fused.     He is referring him to outside Pain Management who will need to manage his medications going forward per Dr. Brown.     He can contact his Medicaid insurance to find a provider in network or try [facility].  Please fax the referral to the office he decides on.    Pt voiced understanding.

## 2024-04-22 ENCOUNTER — LAB VISIT (OUTPATIENT)
Dept: LAB | Facility: HOSPITAL | Age: 49
End: 2024-04-22
Payer: MEDICAID

## 2024-04-22 DIAGNOSIS — K76.0 FATTY LIVER: ICD-10-CM

## 2024-04-22 DIAGNOSIS — R79.89 ELEVATED LFTS: ICD-10-CM

## 2024-04-22 LAB
A1AT SERPL-MCNC: 161 MG/DL (ref 100–190)
ALBUMIN SERPL BCP-MCNC: 4.1 G/DL (ref 3.5–5.2)
ALP SERPL-CCNC: 91 U/L (ref 38–126)
ALT SERPL W/O P-5'-P-CCNC: 62 U/L (ref 10–44)
AST SERPL-CCNC: 51 U/L (ref 15–46)
BILIRUB SERPL-MCNC: 0.4 MG/DL (ref 0.1–1)
CERULOPLASMIN SERPL-MCNC: 29 MG/DL (ref 15–45)
HAV IGG SER QL IA: REACTIVE
HAV IGM SERPL QL IA: NORMAL
HBV CORE AB SERPL QL IA: NORMAL
HBV CORE IGM SERPL QL IA: NORMAL
HBV SURFACE AB SER-ACNC: <3 MIU/ML
HBV SURFACE AB SER-ACNC: NORMAL M[IU]/ML
HBV SURFACE AG SERPL QL IA: NORMAL
HCV AB SERPL QL IA: NORMAL
IGG SERPL-MCNC: 1339 MG/DL (ref 650–1600)
IRON SERPL-MCNC: 56 UG/DL (ref 45–160)
PROT SERPL-MCNC: 8 G/DL (ref 6–8.4)
SATURATED IRON: 15 % (ref 20–50)
TOTAL IRON BINDING CAPACITY: 363 UG/DL (ref 250–450)
TRANSFERRIN SERPL-MCNC: 245 MG/DL (ref 200–375)

## 2024-04-22 PROCEDURE — 82784 ASSAY IGA/IGD/IGG/IGM EACH: CPT | Mod: PN

## 2024-04-22 PROCEDURE — 82390 ASSAY OF CERULOPLASMIN: CPT | Mod: PN

## 2024-04-22 PROCEDURE — 86706 HEP B SURFACE ANTIBODY: CPT | Mod: 91,PN

## 2024-04-22 PROCEDURE — 86704 HEP B CORE ANTIBODY TOTAL: CPT | Mod: PN

## 2024-04-22 PROCEDURE — 36415 COLL VENOUS BLD VENIPUNCTURE: CPT | Mod: PN

## 2024-04-22 PROCEDURE — 86039 ANTINUCLEAR ANTIBODIES (ANA): CPT | Mod: PN

## 2024-04-22 PROCEDURE — 86235 NUCLEAR ANTIGEN ANTIBODY: CPT | Mod: 59,PN

## 2024-04-22 PROCEDURE — 80076 HEPATIC FUNCTION PANEL: CPT | Mod: PN

## 2024-04-22 PROCEDURE — 86015 ACTIN ANTIBODY EACH: CPT | Mod: PN

## 2024-04-22 PROCEDURE — 80074 ACUTE HEPATITIS PANEL: CPT | Mod: PN

## 2024-04-22 PROCEDURE — 86038 ANTINUCLEAR ANTIBODIES: CPT | Mod: PN

## 2024-04-22 PROCEDURE — 86790 VIRUS ANTIBODY NOS: CPT | Mod: PN

## 2024-04-22 PROCEDURE — 82103 ALPHA-1-ANTITRYPSIN TOTAL: CPT | Mod: PN

## 2024-04-22 PROCEDURE — 83540 ASSAY OF IRON: CPT | Mod: PN

## 2024-04-22 PROCEDURE — 86381 MITOCHONDRIAL ANTIBODY EACH: CPT | Mod: PN

## 2024-04-23 LAB
ANA PATTERN 1: NORMAL
ANA SER QL IF: POSITIVE
ANA TITR SER IF: NORMAL {TITER}
MITOCHONDRIA AB TITR SER IF: NORMAL {TITER}
SMOOTH MUSCLE AB TITR SER IF: ABNORMAL {TITER}

## 2024-04-24 ENCOUNTER — TELEPHONE (OUTPATIENT)
Dept: ENDOCRINOLOGY | Facility: CLINIC | Age: 49
End: 2024-04-24
Payer: MEDICAID

## 2024-04-24 LAB
ANTI SM ANTIBODY: 0.2 RATIO (ref 0–0.99)
ANTI SM/RNP ANTIBODY: 0.12 RATIO (ref 0–0.99)
ANTI-SM INTERPRETATION: NEGATIVE
ANTI-SM/RNP INTERPRETATION: NEGATIVE
ANTI-SSA ANTIBODY: 0.08 RATIO (ref 0–0.99)
ANTI-SSA INTERPRETATION: NEGATIVE
ANTI-SSB ANTIBODY: 0.15 RATIO (ref 0–0.99)
ANTI-SSB INTERPRETATION: NEGATIVE
DSDNA AB SER-ACNC: NORMAL [IU]/ML

## 2024-04-29 ENCOUNTER — TELEPHONE (OUTPATIENT)
Dept: GASTROENTEROLOGY | Facility: CLINIC | Age: 49
End: 2024-04-29
Payer: MEDICAID

## 2024-04-29 ENCOUNTER — OFFICE VISIT (OUTPATIENT)
Dept: FAMILY MEDICINE | Facility: CLINIC | Age: 49
End: 2024-04-29
Payer: MEDICAID

## 2024-04-29 VITALS
OXYGEN SATURATION: 97 % | HEART RATE: 75 BPM | WEIGHT: 315 LBS | BODY MASS INDEX: 42.66 KG/M2 | DIASTOLIC BLOOD PRESSURE: 60 MMHG | TEMPERATURE: 98 F | HEIGHT: 72 IN | SYSTOLIC BLOOD PRESSURE: 120 MMHG

## 2024-04-29 DIAGNOSIS — M79.671 ACUTE FOOT PAIN, RIGHT: Primary | ICD-10-CM

## 2024-04-29 PROCEDURE — 3074F SYST BP LT 130 MM HG: CPT | Mod: CPTII,S$GLB,, | Performed by: FAMILY MEDICINE

## 2024-04-29 PROCEDURE — 3078F DIAST BP <80 MM HG: CPT | Mod: CPTII,S$GLB,, | Performed by: FAMILY MEDICINE

## 2024-04-29 PROCEDURE — 3008F BODY MASS INDEX DOCD: CPT | Mod: CPTII,S$GLB,, | Performed by: FAMILY MEDICINE

## 2024-04-29 PROCEDURE — 1160F RVW MEDS BY RX/DR IN RCRD: CPT | Mod: CPTII,S$GLB,, | Performed by: FAMILY MEDICINE

## 2024-04-29 PROCEDURE — 1159F MED LIST DOCD IN RCRD: CPT | Mod: CPTII,S$GLB,, | Performed by: FAMILY MEDICINE

## 2024-04-29 PROCEDURE — 99213 OFFICE O/P EST LOW 20 MIN: CPT | Mod: S$GLB,,, | Performed by: FAMILY MEDICINE

## 2024-04-29 NOTE — TELEPHONE ENCOUNTER
Returned pt's call. Pt stated he needed an appointment for stomach issues. Asked pt what issues was he having. Pt stated he is having abdominal pain and diarrhea. Informed pt our next available appt is with our NP SOHAM as Dr Champion is no longer having any clinic since she is leaving [facility]. Pt scheduled office visit with NP SOHAM on Friday June 14, 2024 at 2:00pm. Pt informed he has been added to the wait lis so if someone cancels he will be notified of a sooner appt. Informed pt our office location. Pt voiced understanding.       ----- Message from Edmundo Hidalgo sent at 4/29/2024 11:55 AM CDT -----  Contact: Pt  .Type:  Needs Medical Advice    Who Called: pt  Symptoms (please be specific):  stomach issues     Would the patient rather a call back or a response via [facility]?  Call back  Best Call Back Number: [phone]  Additional Information:

## 2024-04-29 NOTE — PROGRESS NOTES
Patient ID: Kirill Gandhi III is a 48 y.o. male.    Chief Complaint: Gout and Foot Pain    HPI       Kirill Gandhi III is a 48 y.o. male complains of right foot pain.  Patient was seen in the emergency room right foot pain at the base of the right 1st toe.  Pain was not at the metatarsophalangeal joint-joint was not red was tender but not exquisitely tender.  Seems emergency room for this given naproxen and the pain went away after few days.  You days later had pain at the base of toes two through four went away after 24-48 hours.  No erythema      Review of Symptoms      /60 (BP Location: Left arm, Patient Position: Sitting)   Pulse 75   Temp 98.1 °F (36.7 °C) (Oral)   Ht 6' (1.829 m)   Wt (!) 187.1 kg (412 lb 9.5 oz)   SpO2 97%   BMI 55.96 kg/m²     Physical Exam    Vitals:    04/29/24 1500   BP: 120/60   BP Location: Left arm   Patient Position: Sitting   Pulse: 75   Temp: 98.1 °F (36.7 °C)   TempSrc: Oral   SpO2: 97%   Weight: (!) 187.1 kg (412 lb 9.5 oz)   Height: 6' (1.829 m)       Constitutional:   Oriented to person, place, and time.appears well-developed and well-nourished.   No distress.      HENT  Head: Normocephalic and atraumatic  Right Ear: External ear normal.   Left Ear: External ear normal.   Nose: External nose normal.   Mouth: Moist mucous membranes    Eyes:   Conjunctivae are normal. Right eye exhibits no discharge. Left eye exhibits no discharge. No scleral icterus. No periorbital edema    Musculoskeletal:  No edema. No obvious deformity No wasting    Right foot within normal limits-tenderness with deep palpation.  No full range of motion no swelling no erythema     Neurological:  Alert and oriented to person, place, and time. Coordination normal.     Skin:   Skin is warm and dry.  No diaphoresis.   No rash noted.     Psychiatric: Normal mood and affect. Behavior is normal. Judgment and thought content normal.     Complete Blood Count  Lab Results   Component  "Value Date    RBC 4.57 (L) 03/27/2023    HGB 13.1 (L) 03/27/2023    HCT 40.4 03/27/2023    MCV 88 03/27/2023    MCH 28.7 03/27/2023    MCHC 32.4 03/27/2023    RDW 14.0 03/27/2023     03/27/2023    MPV 9.6 03/27/2023    GRAN 4.4 03/27/2023    GRAN 59.9 03/27/2023    LYMPH 2.0 03/27/2023    LYMPH 27.7 03/27/2023    MONO 0.6 03/27/2023    MONO 8.2 03/27/2023    EOS 0.2 03/27/2023    BASO 0.05 03/27/2023    EOSINOPHIL 3.0 03/27/2023    BASOPHIL 0.7 03/27/2023    DIFFMETHOD Automated 03/27/2023       Comprehensive Metabolic Panel  Lab Results   Component Value Date     (H) 12/08/2023    BUN 15 12/08/2023    CREATININE 0.83 12/08/2023     12/08/2023    K 4.3 12/08/2023     12/08/2023    PROT 8.0 04/22/2024    ALBUMIN 4.1 04/22/2024    BILITOT 0.4 04/22/2024    AST 51 (H) 04/22/2024    ALKPHOS 91 04/22/2024    CO2 27 12/08/2023    ALT 62 (H) 04/22/2024    ANIONGAP 9 12/08/2023       TSH  No results found for: "TSH"    Assessment / Plan:      ICD-10-CM ICD-9-CM   1. Acute foot pain, right  M79.671 729.5     Acute foot pain, right  -     URIC ACID; Future; Expected date: 04/29/2024    Discussed pros and cons of ordering-would like  "

## 2024-05-03 ENCOUNTER — HOSPITAL ENCOUNTER (OUTPATIENT)
Dept: RADIOLOGY | Facility: HOSPITAL | Age: 49
Discharge: HOME | End: 2024-05-03
Attending: NURSE PRACTITIONER
Payer: MEDICAID

## 2024-05-03 DIAGNOSIS — E66.01 CLASS 3 SEVERE OBESITY DUE TO EXCESS CALORIES WITH SERIOUS COMORBIDITY AND BODY MASS INDEX (BMI) OF 50.0 TO 59.9 IN ADULT: ICD-10-CM

## 2024-05-03 DIAGNOSIS — K76.0 FATTY LIVER: ICD-10-CM

## 2024-05-03 PROCEDURE — 76391 MR ELASTOGRAPHY: CPT | Mod: 26,,, | Performed by: INTERNAL MEDICINE

## 2024-05-03 PROCEDURE — 76391 MR ELASTOGRAPHY: CPT | Mod: TC

## 2024-05-07 ENCOUNTER — OFFICE VISIT (OUTPATIENT)
Dept: PSYCHIATRY | Facility: CLINIC | Age: 49
End: 2024-05-07
Payer: MEDICAID

## 2024-05-07 DIAGNOSIS — F33.42 RECURRENT MAJOR DEPRESSIVE DISORDER, IN FULL REMISSION: Primary | ICD-10-CM

## 2024-05-07 DIAGNOSIS — F51.05 INSOMNIA DUE TO OTHER MENTAL DISORDER: ICD-10-CM

## 2024-05-07 DIAGNOSIS — F41.9 ANXIETY: ICD-10-CM

## 2024-05-07 DIAGNOSIS — F42.2 MIXED OBSESSIONAL THOUGHTS AND ACTS: ICD-10-CM

## 2024-05-07 DIAGNOSIS — F99 INSOMNIA DUE TO OTHER MENTAL DISORDER: ICD-10-CM

## 2024-05-07 PROCEDURE — 99214 OFFICE O/P EST MOD 30 MIN: CPT | Mod: 95,,, | Performed by: NURSE PRACTITIONER

## 2024-05-07 PROCEDURE — 1160F RVW MEDS BY RX/DR IN RCRD: CPT | Mod: CPTII,95,, | Performed by: NURSE PRACTITIONER

## 2024-05-07 PROCEDURE — 1159F MED LIST DOCD IN RCRD: CPT | Mod: CPTII,95,, | Performed by: NURSE PRACTITIONER

## 2024-05-07 RX ORDER — ARIPIPRAZOLE 5 MG/1
5 TABLET ORAL DAILY
Qty: 30 TABLET | Refills: 5 | Status: SHIPPED | OUTPATIENT
Start: 2024-05-07 | End: 2024-11-03

## 2024-05-07 RX ORDER — PROPRANOLOL HYDROCHLORIDE 20 MG/1
TABLET ORAL
Qty: 90 TABLET | Refills: 5 | Status: SHIPPED | OUTPATIENT
Start: 2024-05-07

## 2024-05-07 RX ORDER — DULOXETIN HYDROCHLORIDE 60 MG/1
60 CAPSULE, DELAYED RELEASE ORAL 2 TIMES DAILY
Qty: 60 CAPSULE | Refills: 5 | Status: SHIPPED | OUTPATIENT
Start: 2024-05-07 | End: 2024-11-03

## 2024-05-07 RX ORDER — BUSPIRONE HYDROCHLORIDE 30 MG/1
30 TABLET ORAL 2 TIMES DAILY
Qty: 60 TABLET | Refills: 5 | Status: SHIPPED | OUTPATIENT
Start: 2024-05-07 | End: 2024-11-03

## 2024-05-07 NOTE — PROGRESS NOTES
"Outpatient Psychiatry Follow-Up Visit (MD/NP) - Telemedicine Visit    5/7/2024 8:18 AM  Kirill Gandhi III  1975  [id number]      The patient location is: Patient reported that their location at the time of this visit was in the [facility]       Visit type: audiovisual    Each patient to whom he or she provides medical services by telemedicine is:  (1) informed of the relationship between the physician and patient and the respective role of any other health care provider with respect to management of the patient; and (2) notified that he or she may decline to receive medical services by telemedicine and may withdraw from such care at any time.      Clinical Status of Patient:  Outpatient (Ambulatory)    Chief Complaint:  Kirill Gandhi III, a 48 y.o. male,who presents today for follow up of depression and anxiety.  Met with patient.        Interval History/Subjective Report/Content of Current Session:     States his mood is good. States "I think that depression and anxiety is really better compared to what I used to be". Denies crying spells, amotivation, anhedonia, and hopelessness. Endorses obsessional thoughts, but they have improved. They are not as intense and they do not occur as often as they were. States they are "tame".  He is still having problems with physical back pain. He takes pain meds. States he d/c'd hydroxyzine because it made him feel restless and jumpy and worsened his anxiety. States he is sleeping better now and does not feel like he needs a sleep aid.    ASEs from psych meds: see above    Pt denies recurrent thoughts of death and denies SI/HI. Denies any sxs of mihaela. Denies AVH, paranoia and delusions. No objective s/sx of psychosis or mihaela.         Psychotherapy:  Target symptoms: depression, anxiety   Why chosen therapy is appropriate versus another modality: relevant to diagnosis, patient responds to this modality  Outcome monitoring methods: self-report, " observation  Therapeutic intervention type: supportive psychotherapy  Topics discussed/themes: stress related to medical comorbidities, building skills sets for symptom management  The patient's response to the intervention is accepting. The patient's progress toward treatment goals is good.   Duration of intervention: 5 minutes.      Psychotropic medication review  Previous Trials-  Wellbutrin- ineffective on its own  Prozac- effective  Luvox- partial response  Risperidone - ineffective  Buspar - effective  Celexa- ineffective  Zoloft  Lexapro  Hydroxyzine - restlessness and feeling jumpy; worsened insomnia    Current meds-  Cymbalta  Gabapentin - prescribed by his pain mx provider  Buspar  Abilify  Propranolol    History:       Past Medical, Psychiatric, Family and Social History: The patient's past medical, psychiatric, family and social history, allergies, current medications, past surgical history, and problem list have been reviewed and updated as appropriate within the electronic medical record.         Review of Systems       Review of Systems   Constitutional:  Negative for chills, fever and malaise/fatigue.   Respiratory:  Negative for cough and shortness of breath.    Cardiovascular:  Negative for chest pain and palpitations.   Gastrointestinal:  Negative for abdominal pain, diarrhea and vomiting.   Genitourinary:  Negative for dysuria and hematuria.   Musculoskeletal:  Negative for falls and myalgias.   Skin:  Negative for rash.   Neurological:  Negative for tremors, seizures and headaches.   Psychiatric/Behavioral:          See HPI         Compliance: yes      Risk Parameters:  Patient reports no suicidal ideation  Patient reports no homicidal ideation  Patient reports no self-injurious behavior  Patient reports no violent behavior    Exam (detailed: at least 9 elements; comprehensive: all 15 elements)     Constitutional  Vitals:  Most recent vital signs, dated greater than 90 days prior to this  appointment, were reviewed.   There were no vitals filed for this visit.             Musculoskeletal  Muscle Strength/Tone:  not examined - SHARIF due to virtual visit   Gait & Station:  SHARIF due to virtual visit     Psychiatric      Appearance:  unremarkable, age appropriate, well nourished, bearded, casually dressed, neatly groomed, obese   Behavior:  normal, friendly and cooperative, eye contact normal     Speech:  no latency; no press   Mood & Affect:  euthymic  congruent and appropriate   Thought Process:  normal and logical   Associations:  intact   Thought Content:  normal, no suicidality, no homicidality, delusions, or paranoia   Insight:  intact, has awareness of illness   Judgement: behavior is adequate to circumstances, age appropriate   Orientation:  grossly intact   Memory: intact for content of interview   Language: grossly intact   Attention Span & Concentration:  able to focus   Fund of Knowledge:  intact and appropriate to age and level of education       Medications:  Outpatient Encounter Medications as of 5/7/2024   Medication Sig Dispense Refill    acetaminophen (TYLENOL) 500 MG tablet Take 1,000 mg by mouth 2 (two) times daily as needed for Pain.      ARIPiprazole (ABILIFY) 5 MG Tab Take 1 tablet (5 mg total) by mouth once daily. for 180 doses 30 tablet 5    ascorbic acid, vitamin C, (VITAMIN C) 1000 MG tablet Take 1,000 mg by mouth once daily.      aspirin (ECOTRIN) 81 MG EC tablet Take 81 mg by mouth every evening.      aspirin/sod bicarb/citric acid (GIOVANY-SELTZER ORAL) Take 2 tablets by mouth daily as needed (Upset stomach).      atorvastatin (LIPITOR) 10 MG tablet Take 1 tablet (10 mg total) by mouth once daily. 90 tablet 3    blood-glucose sensor (DEXCOM G6 SENSOR) Cayla 3 each by Misc.(Non-Drug; Combo Route) route continuous. 3 each prn    blood-glucose transmitter (DEXCOM G6 TRANSMITTER) Cayla 1 each by Misc.(Non-Drug; Combo Route) route continuous. 1 each prn    busPIRone (BUSPAR) 30 MG Tab  Take 1 tablet (30 mg total) by mouth 2 (two) times daily. 60 tablet 5    coenzyme Q10 (CO Q-10) 100 mg capsule Take 100 mg by mouth once daily.      cyanocobalamin (VITAMIN B-12) 1000 MCG tablet Take 100 mcg by mouth once daily.      cyclobenzaprine (FLEXERIL) 10 MG tablet TAKE 1 TABLET BY MOUTH THREE TIMES A DAY AS NEEDED FOR MUSCLE SPASMS 90 tablet 11    dapagliflozin propanediol (FARXIGA) 5 mg Tab tablet Take 1 tablet (5 mg total) by mouth once daily. 30 tablet 11    dicyclomine (BENTYL) 20 mg tablet Take 1 tablet (20 mg total) by mouth 3 (three) times daily as needed (abdominal pain). 60 tablet 2    DULoxetine (CYMBALTA) 60 MG capsule Take 1 capsule (60 mg total) by mouth 2 (two) times daily. 60 capsule 5    fluticasone propionate (FLONASE) 50 mcg/actuation nasal spray 2 sprays (100 mcg total) by Each Nostril route once daily. 48 mL 2    gabapentin (NEURONTIN) 800 MG tablet TAKE 1 TABLET BY MOUTH THREE TIMES A DAY 90 tablet 11    hydrOXYzine HCL (ATARAX) 25 MG tablet Take 2 tablets (50 mg total) by mouth nightly as needed (insomnia). 60 tablet 5    insulin regular hum U-500 conc (HUMULIN R U-500, CONC, KWIKPEN) 500 unit/mL (3 mL) insulin pen 120-110-135 before respective meals 10 pen 11    insulin syringe-needle U-100 (BD INSULIN SYRINGE ULTRA-FINE) 1 mL 31 gauge x 5/16 Syrg To use 6 times daily with insulin 400 each 11    Lactobac no.41/Bifidobact no.7 (PROBIOTIC-10 ORAL) Take by mouth.      lanolin/mineral oil/petrolatum (ARTIFICIAL TEARS OPHT) Place 2 drops into both eyes daily as needed (Dry eye).      meloxicam (MOBIC) 15 MG tablet TAKE 1 TABLET BY MOUTH ONCE DAILY, DISCONTINUE CELEBREX 30 tablet 2    metFORMIN (GLUCOPHAGE-XR) 500 MG ER 24hr tablet Take 2 tablets (1,000 mg total) by mouth 2 (two) times daily with meals. 360 tablet 3    metoclopramide HCl (REGLAN) 5 MG tablet Take 1 tablet (5 mg total) by mouth 4 (four) times daily before meals and nightly. 120 tablet 2    multivitamin capsule Take 1 capsule  "by mouth once daily.      [] naproxen (NAPROSYN) 500 MG tablet Take 1 tablet (500 mg total) by mouth 2 (two) times daily with meals. for 20 days 40 tablet 0    NON FORMULARY MEDICATION Uses Cpap Machine nightly on a setting of 10      ondansetron (ZOFRAN-ODT) 4 MG TbDL Take 1 tablet (4 mg total) by mouth every 8 (eight) hours as needed (nausea). 30 tablet 5    ONETOUCH DELICA PLUS LANCET 33 gauge Misc Apply topically 3 (three) times daily.      ONETOUCH VERIO TEST STRIPS Strp 1 strip 3 (three) times daily.      pantoprazole (PROTONIX) 40 MG tablet Take 1 tablet (40 mg total) by mouth once daily. 90 tablet 3    pen needle, diabetic (BD ULTRA-FINE ORIG PEN NEEDLE) 29 gauge x 1/2" Ndle To use 3 needles daily 300 each 3    pen needle, diabetic 31 gauge x 1/4" Ndle Use with flex pen 100 each 11    pramlintide (SYMLINPEN 60) 1,500 mcg/1.5 mL injection Inject 0.06 mLs (60 mcg total) into the skin 3 (three) times daily before meals. 3 mL 3    propranoloL (INDERAL) 20 MG tablet TAKE 1 TABLET BY MOUTH THREE TIMES A DAY AS NEEDED FOR ANXIETY . MAY TAKE AN EXTRA TABLET DAILY AS NEEDED FOR ANXIETY 90 tablet 5    semaglutide (OZEMPIC) 0.25 mg or 0.5 mg(2 mg/1.5 mL) pen injector Inject 0.25 mg into the skin every 7 days. Take 0.25 mg for 4 weeks, then increase to 0.5 mg weekly (Patient not taking: Reported on 2024) 1.5 mL 0    tirzepatide (MOUNJARO) 15 mg/0.5 mL PnIj Inject 15 mg into the skin every 7 days. 4 pen 11    traMADoL (ULTRAM) 50 mg tablet Take 2 tablets (100 mg total) by mouth every 8 (eight) hours as needed (severe pain). 180 tablet 3    valsartan-hydrochlorothiazide (DIOVAN-HCT) 320-12.5 mg per tablet Take 1 tablet by mouth once daily. 90 tablet 3    vitamin D 1000 units Tab Take 5,000 Units by mouth 2 (two) times a day.      ZEGALOGUE AUTOINJECTOR 0.6 mg/0.6 mL AtIn Inject 1 Box  into the skin as needed (hypoglycemia). Glucagon is an emergency pen that is used to increase your blood sugar. Glucagon is a " pen that is used in an emergency situation where you are unable to eat or drink anything. Glucagon is a medication that is given by someone else-spouse, child, etc. 1.2 mL 1    [DISCONTINUED] flash glucose scanning reader Misc Disp one reader to monitor glucose- covered by insurance dexcom (Patient not taking: Reported on 4/29/2024) 1 each 1    [DISCONTINUED] flash glucose sensor Kit Dispense sensor to read glucose  dexcom (Patient not taking: Reported on 4/29/2024) 12 kit 11    [DISCONTINUED] fluvoxaMINE (LUVOX) 100 MG tablet Take 1.5 tablets (150 mg total) by mouth 2 (two) times daily. 270 tablet 0    [DISCONTINUED] metoclopramide HCl (REGLAN) 5 mg/5 mL Soln Take 5 mLs (5 mg total) by mouth 4 (four) times daily before meals and nightly. (Patient not taking: Reported on 4/29/2024) 500 mL 2     No facility-administered encounter medications on file as of 5/7/2024.       Allergy:  Review of patient's allergies indicates:   Allergen Reactions    Dilaudid [hydromorphone] Other (See Comments)     IV dilaudid severely drops BP         Assessment and Diagnosis   Status/Progress: Based on the examination today, the patient's problem(s) is/are well controlled.  New problems have not been presented today.   Co-morbidities are complicating management of the primary condition.  There are no active rule-out diagnoses for this patient at this time.         General Impression:       ICD-10-CM ICD-9-CM   1. Recurrent major depressive disorder, in full remission  F33.42 296.36   2. Mixed obsessional thoughts and acts  F42.2 300.3   3. Anxiety  F41.9 300.00   4. Insomnia due to other mental disorder  F51.05 300.9    F99 327.02       Intervention/Counseling/Treatment Plan     Medication Management:  Continue Buspar 30 mg BID  Continue Cymbalta 60 mg BID  Continue gabapentin 800 mg TID - managed by pain management  Continue Abilify 5 mg q day  Continue propranolol to 20 mg TID prn anxiety. May take an additional tab po q d prn  anxiety.  Labs: reviewed most recent  The treatment plan and follow up plan were reviewed with the patient.  Discussed with patient informed consent, risks vs. benefits, alternative treatments, side effect profile and the inherent unpredictability of individual responses to these treatments. The patient expresses understanding of the above and displays the capacity to agree with this current plan and had no other questions.  Encouraged Patient to keep future appointments.   Take medications as prescribed and abstain from substance abuse.   Pt was told to present to ED or call 911 for SI/HI plan or intent, psychosis, or other psychiatric or medical emergency, and pt agrees to this and verbalized understanding.          Return to Clinic: 6 months, or sooner if needed        Face to Face time with patient: 15 minutes  Total time: 20 minutes of total time spent on the encounter, which includes face to face time and non-face to face time preparing to see the patient (eg, review of tests), Obtaining and/or reviewing separately obtained history, Documenting clinical information in the electronic or other health record, Independently interpreting results (not separately reported) and communicating results to the patient/family/caregiver, or Care coordination (not separately reported).       Verónica Briscoe, MSN, APRN, PMHNP-BC [facility]

## 2024-05-09 DIAGNOSIS — Z79.4 TYPE 2 DIABETES MELLITUS WITH HYPERGLYCEMIA, WITH LONG-TERM CURRENT USE OF INSULIN: ICD-10-CM

## 2024-05-09 DIAGNOSIS — E11.65 TYPE 2 DIABETES MELLITUS WITH HYPERGLYCEMIA, WITH LONG-TERM CURRENT USE OF INSULIN: ICD-10-CM

## 2024-05-09 DIAGNOSIS — E78.5 DYSLIPIDEMIA: ICD-10-CM

## 2024-05-09 RX ORDER — INSULIN HUMAN 500 [IU]/ML
135 INJECTION, SOLUTION SUBCUTANEOUS
Qty: 9 PEN | Refills: 10 | Status: SHIPPED | OUTPATIENT
Start: 2024-05-09 | End: 2025-05-09

## 2024-05-09 RX ORDER — PRAMLINTIDE ACETATE 1000 UG/ML
60 INJECTION SUBCUTANEOUS
Qty: 3 ML | Refills: 3 | Status: SHIPPED | OUTPATIENT
Start: 2024-05-09 | End: 2025-05-09

## 2024-05-14 ENCOUNTER — OFFICE VISIT (OUTPATIENT)
Dept: HEPATOLOGY | Facility: CLINIC | Age: 49
End: 2024-05-14
Payer: MEDICAID

## 2024-05-14 VITALS — HEIGHT: 73 IN | BODY MASS INDEX: 41.75 KG/M2 | WEIGHT: 315 LBS

## 2024-05-14 DIAGNOSIS — R79.89 ELEVATED LFTS: ICD-10-CM

## 2024-05-14 DIAGNOSIS — E78.5 DYSLIPIDEMIA: ICD-10-CM

## 2024-05-14 DIAGNOSIS — K75.81 METABOLIC DYSFUNCTION-ASSOCIATED STEATOHEPATITIS (MASH): Primary | ICD-10-CM

## 2024-05-14 DIAGNOSIS — E66.01 CLASS 3 SEVERE OBESITY DUE TO EXCESS CALORIES WITH SERIOUS COMORBIDITY AND BODY MASS INDEX (BMI) OF 50.0 TO 59.9 IN ADULT: ICD-10-CM

## 2024-05-14 DIAGNOSIS — K74.00 HEPATIC FIBROSIS: ICD-10-CM

## 2024-05-14 DIAGNOSIS — E11.65 TYPE 2 DIABETES MELLITUS WITH HYPERGLYCEMIA, WITH LONG-TERM CURRENT USE OF INSULIN: ICD-10-CM

## 2024-05-14 DIAGNOSIS — Z23 NEED FOR HEPATITIS B VACCINATION: ICD-10-CM

## 2024-05-14 DIAGNOSIS — Z79.4 TYPE 2 DIABETES MELLITUS WITH HYPERGLYCEMIA, WITH LONG-TERM CURRENT USE OF INSULIN: ICD-10-CM

## 2024-05-14 PROCEDURE — 3008F BODY MASS INDEX DOCD: CPT | Mod: CPTII,,,

## 2024-05-14 PROCEDURE — 1159F MED LIST DOCD IN RCRD: CPT | Mod: CPTII,,,

## 2024-05-14 PROCEDURE — 99215 OFFICE O/P EST HI 40 MIN: CPT | Mod: S$PBB,,,

## 2024-05-14 PROCEDURE — 99214 OFFICE O/P EST MOD 30 MIN: CPT | Mod: PBBFAC

## 2024-05-14 PROCEDURE — 99999 PR PBB SHADOW E&M-EST. PATIENT-LVL IV: CPT | Mod: PBBFAC,,,

## 2024-05-14 PROCEDURE — 1160F RVW MEDS BY RX/DR IN RCRD: CPT | Mod: CPTII,,,

## 2024-05-14 RX ORDER — HEPATITIS B VACCINE (RECOMBINANT) ADJUVANTED 20 UG/.5ML
0.5 INJECTION, SOLUTION INTRAMUSCULAR ONCE
Qty: 0.5 ML | Refills: 1 | Status: SHIPPED | OUTPATIENT
Start: 2024-05-14 | End: 2024-05-15

## 2024-05-14 NOTE — PROGRESS NOTES
[facility] VISIT EST PT NOTE    PCP: Marcos Ibarra MD     CHIEF COMPLAINT: fatty liver, elevated liver enzymes    HPI: This is a 48 y.o. patient with PMH noted below, presenting for follow up of  fatty liver disease.    Previous serologic w/u negative for Peter's, alpha-1 antitrypsin deficiency, hemochromatosis, autoimmune etiology, and viral hepatitis.    Prior serologic workup:   Lab Results   Component Value Date    SMOOTHMUSCAB Positive 1:40 (A) 04/22/2024    AMAIFA Negative 1:40 04/22/2024    IGGSERUM 1339 04/22/2024    ANASCREEN Positive (A) 04/22/2024    FERRITIN 27 07/20/2016    FESATURATED 15 (L) 04/22/2024    CERULOPLSM 29.0 04/22/2024    HEPBSAG Non-reactive 04/22/2024    HEPCAB Non-reactive 04/22/2024    HEPAIGM Non-reactive 04/22/2024     Interval HPI: Presents today with girlfriend. States that he has not adjusted his diet yet. He is currently on Mounjaro 15 mg and feels this helps with controlling his appetite. He is not losing weight but is not gaining. Currently 415 lbs.     Diet: whatever is cooked, no carb counting, rarely SSB  Exercise: unable to exercise due to back injury  Supplements: none    Risk factors for fatty liver include:  Obesity - current BMI 54.86  HTN - managed well with medication  HLD - atorvastatin 10mg  T2DM - last A1C 6.5    Risk factors for fatty liver include obesity, HTN, HLD, T2DM    Labs done 4/2024 show elevated transaminase levels (ALT > AST), mildly intermittently elevated since 2017 - mostly normal  Platelets WNL, alk phos WNL  Synthetic liver functioning WNL    Lab Results   Component Value Date    ALT 62 (H) 04/22/2024    AST 51 (H) 04/22/2024    ALKPHOS 91 04/22/2024    BILITOT 0.4 04/22/2024    ALBUMIN 4.1 04/22/2024    INR 1.0 03/07/2022     03/27/2023     Abd CT done 11/2022 showed hepatomegaly and fatty liver     Liver fibrosis staging:  -- MRI elasto noted Cholelithiasis and splenomegaly.   Liver fat fraction: 30.4 %, consistent  with moderate to severe steatosis.  Liver iron content: 1.06 mg/g, within normal limits.  Liver elasticity: 3.76 kPa, consistent with F1 or F2 fibrosis. Elevated liver stiffness (>2.74 kPa)      No alcohol consumption, see below  Social History     Substance and Sexual Activity   Alcohol Use Not Currently     + Immunity to Hep A  - will need Hep B    Reports mother has fatty liver.      Allergy and medication list reviewed and updated     PMHX:  has a past medical history of Allergy, Anxiety, Back pain, Chronic bilateral low back pain with bilateral sciatica (12/14/2021), CTS (carpal tunnel syndrome) (06/26/2015), CTS (carpal tunnel syndrome) (6/26/2015), Depression, Diabetes (04/17/2015), Diabetes mellitus with insulin therapy, Elevated sed rate (6/26/2015), GERD (gastroesophageal reflux disease) (10/19/2022), vasectomy, Hypertension, Morbid obesity (4/16/2015), OCD (obsessive compulsive disorder), Sleep apnea, and Type 2 diabetes mellitus with hyperglycemia, with long-term current use of insulin (10/17/2022).    PSHX:  has a past surgical history that includes Tonsillectomy; Adenoidectomy; Fracture surgery; Hernia repair; Epidural steroid injection (Right, 09/04/2019); Vasectomy; Lumbar laminectomy (Bilateral, 11/29/2019); Fusion of lumbar spine by anterior approach (Left, 04/26/2021); Fusion of spine with instrumentation (N/A, 04/26/2021); Injection of steroid (Bilateral, 12/13/2021); Fusion of spine with instrumentation (N/A, 04/13/2022); Fusion of sacroiliac joint (Bilateral, 04/13/2022); removal implant, posterior segment, intraocular (04/13/2022); Spine surgery; Colonoscopy (N/A, 9/6/2022); and Esophagogastroduodenoscopy (N/A, 10/31/2022).    FAMILY HISTORY: Updated and reviewed in EPIC    ROS:   GENERAL: Denies fatigue  CARDIOVASCULAR: Denies edema  GI: Denies abdominal pain  SKIN: Denies rash, itching   NEURO: Denies confusion, memory loss, or mood changes    PHYSICAL EXAM:   In no acute distress; alert and  "oriented to person, place and time  VITALS: Ht 6' 1" (1.854 m)   Wt (!) 188.6 kg (415 lb 12.6 oz)   BMI 54.86 kg/m²   EYES: Sclerae anicteric  GI: Soft, non-tender, non-distended. No ascites.  EXTREMITIES:  No edema.  SKIN: Warm and dry. No jaundice. No telangectasias noted. No palmar erythema.  NEURO:  No asterixis.  PSYCH:  Thought and speech pattern appropriate. Behavior normal      EDUCATION:  See instructions discussed with patient in Instructions section of the After Visit Summary     ASSESSMENT & PLAN:  48 y.o. male with:   1.  Fatty liver, likely related to metabolic risk factors obesity, HTN, HLD, T2DM ([facility])  - see HPI  -- Immunity to Hep A: needs Hep B vaccine - sent to pharmacy  -- MRI elasto annually to assess fatty liver  -- Recommendations discussed with patient:  Continue no alcohol use  2 Weight loss goal of 40-50 lbs  3. Low carb/sugar, high fiber and protein diet, limit your carb intake to LESS than 30-45 grams of carbs with a meal or LESS than 5-10 grams with any snack   4. Exercise, 5 days per week, 30 minutes per day, as tolerated  5. Recommend good cholesterol, blood pressure, blood sugar levels   6. There is a new medication called Rezdiffra for the treatment of F2-F3 liver scarring due to fatty liver. It is only indicated for those with stage 2 or 3 scar tissue   7. Will send in Rezdiffra due to stage 2 fibrosis based on MRI elasto    2. Liver fibrosis  --Stage 2 fibrosis based on fibroscan today  -- Would like to try Rezdiffra  -- MRI elasto annually to assess fibrosis    3. Elevated liver enzymes  -- Sero workup negative for autoimmune, genetic, & viral hepatitis  -- labs in 3 months and 6 months since starting Rezdiffra    4. Obesity, HTN, HLD, T2DM   -- Body mass index is 54.86 kg/m².   -- increases risk for fatty liver      Follow up in about 6 months (around 11/14/2024). with labs before  Orders Placed This Encounter   Procedures    Hepatic Function Panel    Hepatic Function Panel    "     Thank you for allowing me to participate in the care of Kirill Gandhi III    XIAO YOST    I spent a total of 45 minutes on the day of the visit.This includes face to face time and non-face to face time preparing to see the patient (eg, review of tests), obtaining and/or reviewing separately obtained history, documenting clinical information in the electronic or other health record, independently interpreting results and communicating results to the patient/family/caregiver, and coordinating care.         CC'ed note to:   Marcos Ibarra MD

## 2024-05-14 NOTE — PATIENT INSTRUCTIONS
1. MRI elsto to look for fat and scar tissue, currently severe fat in liver and stage 2 scar tissue  2. Needs Hepatitis B vaccine - sent to [facility]  3. Labs in 3 months and 6 months since starting Rezdiffra  4.  Follow up in 6 months    These things are important to improve fatty liver:  Continue no alcohol  Weight loss goal of 40-50 lbs. [facility] offers benefits to patients so let me know if you want a referral to the [facility] gym. Also, [facility] has dieticians that can create a weight loss plan. If you are interested let me know and I can place a referral. If so, contact the dietician team at [facility] at email [email or web address] or call [phone] to get scheduled. They do offer video visits   Low carb/sugar and high protein diet (~1 g/kg/day of protein).Try to limit your carb intake to LESS than 30-45 grams of carbs with a meal or LESS than 5-10 grams with any snack (total of any snack foods eaten during that time). Use [employer] Pal or Lose It neftali to add up your carbs through the day. Do NOT drink any beverages with calories or carbs (this can lead to high blood sugar and weight gain). The main thing to focus on is low calorie, high protein, low carb)  Exercise, 5 days per week, 30 minutes per day, as tolerated  Recommend good cholesterol, blood pressure, blood sugar levels   There is a new medication called Rezdiffra for the treatment of F2-F3 liver scarring due to fatty liver. It is only indicated for patients with significant scar tissue from fatty liver (will send in)    In some people, fatty liver can progress to steatohepatitis (inflamatory fatty liver) and possibly to cirrhosis, putting one at increased risk for liver cancer, liver failure, and death. Therefore, the lifestyle changes are very important to decrease this risk.      Rezdiffra    There is now an FDA approved treatment for fatty liver called Rezdiffra. It is only indicated in  the treatment of fatty liver in those who have stage 2 or stage 3 scar tissue damage    Your fibroscan showed stage 2 scar tissue, therefore you are a candidate for this medication     The dose is weight based and is as follows  >100 kg weight = 100 mg daily   The medication can be taken with or without food    Medications that you cannot take with Rezdiffra: Gemfibrozil (ok to take Fenofibrate, just not Gemfibrozil)    Medications that need dose adjustments:  -- Plavix (can only use 60-80 mg of Rezdiffra)  -- statins    Max dose of Pravastatin/Atorvastatin = 40 mg daily (currently on 10 mg)   Max dose of Simvastatin/Rosuvastatin = 20 mg daily     Warnings  -- it can cause worsening gallstones, gallbladder inflammation but occurred in <1% of patients in the study    Side effects  -- nausea and diarrhea were most common. It occurred most in the first month, then decreased over that time back to the patient's baseline/normal  -- itching  -- vomiting  -- abdominal pain   -- constipation     Recommended lab monitoring after starting    -- labs 3 and 6 months after starting   -- if labs stable then, can do yearly     Decreased kidney function restrictions  Avoid if GFR <30    I sent it to the [facility]. For now, they cannot dispense the medication, but they can figure out what pharmacies across the [place] can dispense it, then arrange for them to send it to you. Please contact the [facility] at [phone] or [phone] to check on your prescription and check if they need any additional information from you

## 2024-05-16 ENCOUNTER — OFFICE VISIT (OUTPATIENT)
Dept: OPTOMETRY | Facility: CLINIC | Age: 49
End: 2024-05-16
Payer: MEDICAID

## 2024-05-16 DIAGNOSIS — H25.13 NUCLEAR SCLEROSIS OF BOTH EYES: ICD-10-CM

## 2024-05-16 DIAGNOSIS — E11.9 TYPE 2 DIABETES MELLITUS WITHOUT RETINOPATHY: Primary | ICD-10-CM

## 2024-05-16 DIAGNOSIS — H52.7 REFRACTIVE ERROR: ICD-10-CM

## 2024-05-16 DIAGNOSIS — H04.123 DRY EYE SYNDROME OF BOTH EYES: ICD-10-CM

## 2024-05-16 PROCEDURE — 92015 DETERMINE REFRACTIVE STATE: CPT | Mod: ,,, | Performed by: OPTOMETRIST

## 2024-05-16 PROCEDURE — 99999 PR PBB SHADOW E&M-EST. PATIENT-LVL III: CPT | Mod: PBBFAC,,, | Performed by: OPTOMETRIST

## 2024-05-16 PROCEDURE — 99213 OFFICE O/P EST LOW 20 MIN: CPT | Mod: PBBFAC,PN | Performed by: OPTOMETRIST

## 2024-05-16 PROCEDURE — 1159F MED LIST DOCD IN RCRD: CPT | Mod: CPTII,,, | Performed by: OPTOMETRIST

## 2024-05-16 PROCEDURE — 2023F DILAT RTA XM W/O RTNOPTHY: CPT | Mod: CPTII,,, | Performed by: OPTOMETRIST

## 2024-05-16 PROCEDURE — 92014 COMPRE OPH EXAM EST PT 1/>: CPT | Mod: S$PBB,,, | Performed by: OPTOMETRIST

## 2024-05-16 NOTE — PROGRESS NOTES
HPI    CC: Pt presents today for diabetic eye exam. Pt reports no vision   complaints and denies eye pain.    TOSIN: 10/12/2022, Dr. Holt    (-) Changes in vision   (-) Pain  (+) Irritation, due to allergies  (-) Itching   (-) Flashes  (+) Floaters; longstanding   (+) Glasses wearer  (-) CL wearer  (+) Uses eye gtts, OTC Tears prn OU     Does patient want a refraction today? Yes     (-) Eye injury  (-) Eye surgery   (-)POHx  (-)FOHx      (+)DM  Hemoglobin A1C       Date                     Value               Ref Range             Status                12/08/2023               6.5 (H)             4.0 - 5.6 %           Final                  08/09/2023               7.2 (H)             4.0 - 5.6 %           Final                  06/15/2023               7.9 (H)             4.0 - 5.6 %           Final                   Last edited by Yeni Jack MA on 5/16/2024  1:09 PM.            Assessment /Plan     For exam results, see Encounter Report.    Type 2 diabetes mellitus without retinopathy    Nuclear sclerosis of both eyes    Dry eye syndrome of both eyes    Refractive error      No retinopathy noted, OU. Continue proper BS control and annual diabetic eye exams. Monitor yearly.      2. Educated pt on findings. Not visually significant. No need for removal at this time. Monitor yearly.      3. Educated pt on findings. Recommended ATs TID-QID + dc/gel QHS for added lubrication and comfort. OTC pataday prn for allergy symptoms. If symptoms worsen or dont improve, RTC. Monitor.      4. Updated SRx. Mild change OD, moderate change OS from habitual. Monitor yearly.        RTC in 1 year for annual DM eye exam or sooner if needed.

## 2024-05-20 ENCOUNTER — PATIENT MESSAGE (OUTPATIENT)
Dept: FAMILY MEDICINE | Facility: CLINIC | Age: 49
End: 2024-05-20
Payer: MEDICAID

## 2024-05-20 DIAGNOSIS — E11.65 UNCONTROLLED TYPE 2 DIABETES MELLITUS WITH HYPERGLYCEMIA: ICD-10-CM

## 2024-05-21 RX ORDER — METFORMIN HYDROCHLORIDE 500 MG/1
1000 TABLET, EXTENDED RELEASE ORAL 2 TIMES DAILY WITH MEALS
Qty: 360 TABLET | Refills: 3 | Status: SHIPPED | OUTPATIENT
Start: 2024-05-21 | End: 2025-05-21

## 2024-05-24 ENCOUNTER — TELEPHONE (OUTPATIENT)
Dept: FAMILY MEDICINE | Facility: CLINIC | Age: 49
End: 2024-05-24
Payer: MEDICAID

## 2024-05-24 NOTE — TELEPHONE ENCOUNTER
----- Message from Renay Michael sent at 5/24/2024 10:12 AM CDT -----  Regarding: Notice of patient discharge from Digital Medicine  Dr. Marcos Ibarra,     Unfortunately, Mr. Kirill Gandhi III has failed to meet basic participation requirements for the [facility] Digital Medicine Program and will be discharged from the program. We have tried to contact him on multiple occasions without success. Unfortunately, he is not a good candidate for digital medicine monitoring.     Thank you for your support of Digital Medicine! Please contact me if you have any questions or concerns.    Sincerely,  Renay Michael

## 2024-05-31 ENCOUNTER — OFFICE VISIT (OUTPATIENT)
Dept: SLEEP MEDICINE | Facility: CLINIC | Age: 49
End: 2024-05-31
Attending: PSYCHIATRY & NEUROLOGY
Payer: MEDICAID

## 2024-05-31 VITALS
BODY MASS INDEX: 41.75 KG/M2 | HEART RATE: 91 BPM | WEIGHT: 315 LBS | HEIGHT: 73 IN | DIASTOLIC BLOOD PRESSURE: 66 MMHG | SYSTOLIC BLOOD PRESSURE: 97 MMHG

## 2024-05-31 DIAGNOSIS — E11.65 TYPE 2 DIABETES MELLITUS WITH HYPERGLYCEMIA, WITH LONG-TERM CURRENT USE OF INSULIN: Chronic | ICD-10-CM

## 2024-05-31 DIAGNOSIS — G47.33 OSA (OBSTRUCTIVE SLEEP APNEA): Primary | ICD-10-CM

## 2024-05-31 DIAGNOSIS — I10 BENIGN ESSENTIAL HYPERTENSION: ICD-10-CM

## 2024-05-31 DIAGNOSIS — E11.65 UNCONTROLLED TYPE 2 DIABETES MELLITUS WITH HYPERGLYCEMIA: ICD-10-CM

## 2024-05-31 DIAGNOSIS — Z79.4 TYPE 2 DIABETES MELLITUS WITH HYPERGLYCEMIA, WITH LONG-TERM CURRENT USE OF INSULIN: Chronic | ICD-10-CM

## 2024-05-31 PROCEDURE — 99999 PR PBB SHADOW E&M-EST. PATIENT-LVL II: CPT | Mod: PBBFAC,,, | Performed by: NURSE PRACTITIONER

## 2024-05-31 PROCEDURE — 99214 OFFICE O/P EST MOD 30 MIN: CPT | Mod: S$PBB,,, | Performed by: NURSE PRACTITIONER

## 2024-05-31 PROCEDURE — 3074F SYST BP LT 130 MM HG: CPT | Mod: CPTII,,, | Performed by: NURSE PRACTITIONER

## 2024-05-31 PROCEDURE — 3078F DIAST BP <80 MM HG: CPT | Mod: CPTII,,, | Performed by: NURSE PRACTITIONER

## 2024-05-31 PROCEDURE — 99212 OFFICE O/P EST SF 10 MIN: CPT | Mod: PBBFAC | Performed by: NURSE PRACTITIONER

## 2024-05-31 PROCEDURE — 3008F BODY MASS INDEX DOCD: CPT | Mod: CPTII,,, | Performed by: NURSE PRACTITIONER

## 2024-05-31 RX ORDER — TRAZODONE HYDROCHLORIDE 50 MG/1
50-100 TABLET ORAL NIGHTLY
Qty: 60 TABLET | Refills: 3 | Status: SHIPPED | OUTPATIENT
Start: 2024-05-31 | End: 2024-09-28

## 2024-05-31 NOTE — PROGRESS NOTES
"Cc: VICENTE, new to me, last seen Dr Hernandez 6/2023    Continued/long time user apap 10-20cm using N20 mask. ESS=8. Snoring/apneic pauses resolved with mask use. GF present today.sleep frequently disrupted then can be up 1hr. Generic vistaril not helping per psychiatry.   Interrogation 90/90d used avg 11.5hr/n AHI 1.4, 90% tile 12.7cm    HgBA1c 6.5  BP 97/66 (BP Location: Left forearm, Patient Position: Sitting, BP Method: X-Large (Automatic))   Pulse 91   Ht 6' 1" (1.854 m)   Wt (!) 185.6 kg (409 lb 3.2 oz)   BMI 53.99 kg/m²     1/2023: HST AHI 60/ Min SpO2: 85.5%  2013 titration in [facility] - 10 cm H2O CPAP was recommended  2019 titration in [facility] - 10 cm H2O again was recommended.      Assessment:  VICENTE, severe. Continued adherence with PAP, AHI<5. Benefiting. Needs supplies  Insomnia      Plan:  [id number] continue,s witch to F40/FFM. See if helps consolidated sleep, possible mouth opening causing disruptions.DME [facility] supplies  Discussed control of VICENTE  Attempt to delay getting into bed until much later, avoid>9hr time in bed.   Rtc 12mos, [facility] communication status  Trazadone 50-100mg po qhs  "

## 2024-06-02 ENCOUNTER — PATIENT MESSAGE (OUTPATIENT)
Dept: PSYCHIATRY | Facility: CLINIC | Age: 49
End: 2024-06-02
Payer: MEDICAID

## 2024-06-03 RX ORDER — TIRZEPATIDE 15 MG/.5ML
15 INJECTION, SOLUTION SUBCUTANEOUS
Qty: 4 PEN | Refills: 11 | Status: SHIPPED | OUTPATIENT
Start: 2024-06-03

## 2024-06-05 ENCOUNTER — TELEPHONE (OUTPATIENT)
Dept: FAMILY MEDICINE | Facility: CLINIC | Age: 49
End: 2024-06-05
Payer: MEDICAID

## 2024-06-05 ENCOUNTER — OFFICE VISIT (OUTPATIENT)
Dept: PAIN MEDICINE | Facility: CLINIC | Age: 49
End: 2024-06-05
Payer: MEDICAID

## 2024-06-05 ENCOUNTER — TELEPHONE (OUTPATIENT)
Dept: PAIN MEDICINE | Facility: CLINIC | Age: 49
End: 2024-06-05
Payer: MEDICAID

## 2024-06-05 VITALS — WEIGHT: 315 LBS | BODY MASS INDEX: 53.52 KG/M2

## 2024-06-05 DIAGNOSIS — G62.9 NEUROPATHY: Primary | ICD-10-CM

## 2024-06-05 DIAGNOSIS — E11.40 DIABETIC NEUROPATHY, PAINFUL: ICD-10-CM

## 2024-06-05 DIAGNOSIS — M96.1 POSTLAMINECTOMY SYNDROME OF LUMBAR REGION: ICD-10-CM

## 2024-06-05 DIAGNOSIS — Z98.1 S/P FUSION OF SACROILIAC JOINT: ICD-10-CM

## 2024-06-05 DIAGNOSIS — G57.93 NEUROPATHY OF BOTH FEET: ICD-10-CM

## 2024-06-05 DIAGNOSIS — E11.40 CHRONIC PAINFUL DIABETIC NEUROPATHY: ICD-10-CM

## 2024-06-05 DIAGNOSIS — E11.42 DIABETIC PERIPHERAL NEUROPATHY: Primary | ICD-10-CM

## 2024-06-05 PROCEDURE — 99204 OFFICE O/P NEW MOD 45 MIN: CPT | Mod: S$PBB,,, | Performed by: ANESTHESIOLOGY

## 2024-06-05 PROCEDURE — 99999 PR PBB SHADOW E&M-EST. PATIENT-LVL IV: CPT | Mod: PBBFAC,,, | Performed by: ANESTHESIOLOGY

## 2024-06-05 PROCEDURE — 1159F MED LIST DOCD IN RCRD: CPT | Mod: CPTII,,, | Performed by: ANESTHESIOLOGY

## 2024-06-05 PROCEDURE — 3008F BODY MASS INDEX DOCD: CPT | Mod: CPTII,,, | Performed by: ANESTHESIOLOGY

## 2024-06-05 PROCEDURE — 99214 OFFICE O/P EST MOD 30 MIN: CPT | Mod: PBBFAC,PN | Performed by: ANESTHESIOLOGY

## 2024-06-05 PROCEDURE — 1160F RVW MEDS BY RX/DR IN RCRD: CPT | Mod: CPTII,,, | Performed by: ANESTHESIOLOGY

## 2024-06-05 NOTE — TELEPHONE ENCOUNTER
----- Message from Clark Smith MD sent at 6/5/2024  1:45 PM CDT -----  Interventional Therapy: Qutenza patch, bilateral Feet, 6 patch.  Clark Smith MD  Anesthesiologist  Interventional Pain Management  06/05/2024

## 2024-06-05 NOTE — TELEPHONE ENCOUNTER
----- Message from Vale Stiles sent at 6/5/2024  2:28 PM CDT -----  Regarding: appt req  Contact: pt  Type: Appointment Request    Caller is requesting an appointment     Name of Caller: Pt  Reason for appointment: Back Pain  Would the patient rather a call back or a response via [facility]? Call back  Best Call Back Number:   [phone]  Additional Information: please call , Thank You

## 2024-06-05 NOTE — PROGRESS NOTES
New patient evaluation  [facility]    Kirill Gandhi III  : 1975  Date: 2024     CHIEF COMPLAINT:  Foot Pain  Referring Physician: Sandra Barrios NP  Primary Care Physician: Marcos Ibarra MD    HPI:  This is a 48 y.o. male with a chief complaint of Foot Pain  . The patient has Past medical history/Past surgical history of postlaminectomy syndrome, lumbar spine fusion, SI fusion, obesity, diabetes, metabolic syndrome, bilateral feet neuropathy, obstructive sleep apnea, chronic pain syndrome on tramadol    Patient was evaluated and referred by endocrinologist for bilateral feet neuropathy  She was recently seen by neurosurgery in 2024 for back pain and bilateral numbness in lower extremity, he has a recommendation to obtain CT scan lumbar spine that showed L3 posterior instrumentation fusion with no acute finding  he was getting tramadol 50 mg, 100 mg 3 times a day per neurosurgery since .      Diabetic: Yes    Anticogualtion drugs: 81 mg Aspirin     Allergy To Iodine: No    Currently on Antibiotic: No    Current Description of Pain Symptoms:    History of Recent Fall or Trauma: No   Onset: Chronic, started several years   Pain Location:  Bilateral feet burning(dorsal and plantar aspect) +bilat lower extremity from knee to feet  Radiates/associated symptoms:  Burning, no numbness or tingling  Pain is Getting worse over the last 6 months    The pain is described as  Burning, no numbness or tingling  Exacerbating factors: constant.   Mitigating factors none.   Symptoms interfere with daily activity, sleeping, currently he is not working  The patient feels like symptoms have been worsening.   Patient denies night fever/night sweats, urinary incontinence, bowel incontinence, significant weight loss, significant motor weakness, and loss of sensations.    Pain score:   Current: 1/10  Best: 1/10  Worst: 10/10    Current pain medications:  For  depression    ARIPiprazole (ABILIFY) 5 MG Tab    busPIRone (BUSPAR) 30 MG Tab  For pain    cyclobenzaprine (FLEXERIL) 10 MG tablet, TID    gabapentin (NEURONTIN) 800 MG tablet, TID    meloxicam (MOBIC) 15 MG tablet, PRN    traMADoL (ULTRAM) 50 mg tablet, 100 mg TID    acetaminophen (TYLENOL) 500 MG tablet,  Current Narcotics/Opioid /benzo Medications:  Opioids- Tramadol (Ultram)  Benzodiazepines: No    UDS:  NA    PDMP:  Reviewed and consistent with medication use as prescribed.     Previous Chronic Pain Treatment History:  Physical Therapy/HEP/Physician Lead Exercise Program:  Over the past 12 months, Patient has done  0 sessions.  PT response: n/a   Dates of the PT sessions: n/a,   Is patient participating in home exercise program (HEP): Yes.    Non-interventional Pain Therapy:  []Chiropractor.   []Acupuncture/Dry needle.  []TENS unit.  []Heat/ICE.  []Back Brace.    Medications previously tried:  NSAIDs: Meloxicam (Mobic) and Celebrex (Celecoxib)  Topical Agent: No  TCA/SSRI/SNRI: SNRI: Duloxetine (Cymbalta)   Anti-convulsants: Gabapentin , previously on Lyrica  Muscle Relaxants: Flexeril (Cyclobenzaprine), tizanidine  Opioids- Tramadol (Ultram).    Interventional Pain Procedures:  Epidural steroid injection 2019    Previous spine/Relevant joint surgery:  L3-bi-iliac posterior instrumented fusion with decompressive laminectomies and interbody disc spacers: 2019, 2020 and 2022 with good result as he was able to have better functionality and walking  Surgical History:   has a past surgical history that includes Tonsillectomy; Adenoidectomy; Fracture surgery; Hernia repair; Epidural steroid injection (Right, 09/04/2019); Vasectomy; Lumbar laminectomy (Bilateral, 11/29/2019); Fusion of lumbar spine by anterior approach (Left, 04/26/2021); Fusion of spine with instrumentation (N/A, 04/26/2021); Injection of steroid (Bilateral, 12/13/2021); Fusion of spine with instrumentation (N/A, 04/13/2022); Fusion of sacroiliac  joint (Bilateral, 04/13/2022); removal implant, posterior segment, intraocular (04/13/2022); Spine surgery; Colonoscopy (N/A, 9/6/2022); and Esophagogastroduodenoscopy (N/A, 10/31/2022).  Medical History:   has a past medical history of Allergy, Anxiety, Back pain, Chronic bilateral low back pain with bilateral sciatica (12/14/2021), CTS (carpal tunnel syndrome) (06/26/2015), CTS (carpal tunnel syndrome) (6/26/2015), Depression, Diabetes (04/17/2015), Diabetes mellitus with insulin therapy, Elevated sed rate (6/26/2015), GERD (gastroesophageal reflux disease) (10/19/2022), vasectomy, Hypertension, Morbid obesity (4/16/2015), OCD (obsessive compulsive disorder), Sleep apnea, and Type 2 diabetes mellitus with hyperglycemia, with long-term current use of insulin (10/17/2022).  Family History:  family history includes Cataracts in his maternal grandfather and maternal grandmother; Fibromyalgia in his mother; Macular degeneration in his maternal grandfather; Osteoarthritis in his mother.  Allergies:  Dilaudid [hydromorphone]   Social History/SUBSTANCE ABUSE HISTORY:  Personal history of substance abuse: No   reports that he has been smoking vaping with nicotine. He has been exposed to tobacco smoke. He has never used smokeless tobacco. He reports that he does not currently use alcohol. He reports that he does not use drugs.  LABS:  CBC  Lab Results   Component Value Date    WBC 7.29 03/27/2023    HGB 13.1 (L) 03/27/2023    HCT 40.4 03/27/2023     Coagulation Profile   Lab Results   Component Value Date     03/27/2023       Lab Results   Component Value Date    INR 1.0 03/07/2022     CMP:  BMP  Lab Results   Component Value Date     12/08/2023    K 4.3 12/08/2023     12/08/2023    CO2 27 12/08/2023    BUN 15 12/08/2023    CREATININE 0.83 12/08/2023    CALCIUM 9.2 12/08/2023    ANIONGAP 9 12/08/2023    EGFRNORACEVR >60.0 12/08/2023     Lab Results   Component Value Date    ALT 62 (H) 04/22/2024    AST 51  (H) 04/22/2024    ALKPHOS 91 04/22/2024    BILITOT 0.4 04/22/2024     HGBA1C:  Lab Results   Component Value Date    LABA1C 6.8 (H) 03/24/2017    HGBA1C 6.5 (H) 12/08/2023       ROS:    Review of Systems   GENERAL:  No weight loss, malaise or fevers.  HEENT:   No recent changes in vision or hearing  NECK:  Negative for lumps, no difficulty with swallowing.  RESPIRATORY:  Negative for cough, wheezing or shortness of breath, patient denies any recent URI.  CARDIOVASCULAR:  Negative for chest pain or palpitations.  GI:  Negative for abdominal discomfort, blood in stools or black stools or change in bowel habits.  MUSCULOSKELETAL:  See HPI.  SKIN:  Negative for lesions, rash, and itching.  PSYCH:  No mood disorder or recent psychosocial stressors.   HEMATOLOGY/LYMPHOLOGY:  See the blood thinner sectioned in HPI.  NEURO:  See HPI  All other reviewed and negative other than HPI.    PHYSICAL EXAM:  VITALS: Wt (!) 184 kg (405 lb 10.3 oz)   BMI 53.52 kg/m²   Body mass index is 53.52 kg/m².  GENERAL: Well appearing, in no acute distress, alert and oriented x3, answers questions appropriately.   PSYCH: Flat affect.  SKIN: Skin color, texture, turgor normal, no rashes or lesions.  HEAD/FACE:  Normocephalic, atraumatic. Cranial nerves grossly intact.  CV: Regular rate  PULM: No evidence of respiratory difficulty, symmetric chest rise.  GI:  Soft and non-Distended.  BACK/SIJ/HIP:  Lumbar Spine Exam:       Inspection: No erythema, bruising.       Palpation: (+) TTP of lumbar paraspinals bilaterally      ROM:  Limited in flexion, extension, lateral bending.       (+) Facet loading bilaterally      (NA) Straight Leg Raise bilaterally      (NA) JASEN bilaterally, Tenderness over the PSIS, Yeoman test  Hip Exam:      Inspection: No gross deformity or apparent leg length discrepancy      Palpation:  No TTP to bilateral greater trochanteric bursas.       ROM:  No limitation or pain in internal rotation, external rotation  b/l  Neurologic Exam:     Alert. Speech is fluent and appropriate.     Strength: 4/5 in bilateral hip flexion and knee extension     Sensation:  Grossly intact to light touch in bilateral lower extremities     Tone: No abnormality appreciated in bilateral lower extremities    GAIT:  Antalgic gait, unsteady gait    DIAGNOSTIC STUDIES AND MEDICAL RECORDS REVIEW:  CT lumbar spine 2024     L3-bi-iliac posterior instrumented fusion with decompressive laminectomies and interbody disc spacers. No evidence of hardware failure or loosening. Alignment anatomic.  Ill-defined fluid noted posterior to the surgical bed, improved.  No fractures, marrow replacement process, or evidence of spondylo discitis.  No paraspinal masses.     Spondylosis: There is prominent left-sided facet joint arthropathy at L2-3.  There is mild disc height loss at L1-2 and L2-3 with mild disc bulging.  These findings contribute to mild spinal canal narrowing, correlating with the prior MRI exam.  L3 through S1 is fused, as above.     Impression:     L3-pelvic posterior instrumented fusion, as above.  No acute findings.    ASSESSMENT:  Kirill Gandhi III is a 48 y.o. male with the following diagnoses based on history, exam, and imagin. Diabetic peripheral neuropathy  - Ambulatory referral/consult to Pain Clinic    2. Chronic painful diabetic neuropathy    3. Neuropathy of both feet    4. Postlaminectomy syndrome of lumbar region    5. S/P fusion of sacroiliac joint     ---------------------------------------------------------------------  This is a pleasant 48 y.o. male patient with PMH/PSH of postlaminectomy syndrome, lumbar spine fusion, SI fusion, obesity, diabetes, metabolic syndrome, bilateral feet neuropathy, obstructive sleep apnea, chronic pain syndrome on tramadol, presenting with bilateral feet neuropathy consistent with painful diabetic neuropathy, patient had a recommendation from his endocrinologist to consider  Qutenza patch  Treatment Plan:    Diagnostics/Referrals:  None    Medications:  Tylenol as needed   NSAIDs:  Over-the-counter  Topical Agent: No  TCA/SSRI/SNRI: None  Anti-convulsants:  Continue with gabapentin as prescribed per neurosurgery  Muscle Relaxants:  Continue Flexeril as prescribed per neurosurgery  Opioids:  Continue with tramadol as prescribed per neurosurgery    Interventional Therapy: Qutenza patch, bilateral Feet, 6 patch .  Sedation: NA.    Regarding the above interventions, the patient has been educated regarding the risks (including bleeding, infection, increased pain, nerve damage, or allergic reaction), benefits, and alternatives. The patient states he understands and is eager to proceed.    Patient Education: Counseled patient regarding the importance of activity modification, I have stressed the importance of physical activity and a home exercise plan to help with pain and improve health.    Follow-up: RTC 2 weeks to apply the patch.    May consider:  Caudal epidural steroid injection.    I would like to thank Sandra Barrios NP for the opportunity to assist in the care of this patient. We had a very nice visit and I look forward to continuing their care. Please let me know if I can be of further assistance.     Clark Smith MD  Anesthesiologist  Interventional Pain Medicine  06/05/2024    Disclaimer:  This note was prepared using voice recognition system and is likely to have sound alike errors that may have been overlooked even after proof reading.  Please call me with any questions.

## 2024-06-05 NOTE — PROGRESS NOTES
New patient evaluation  [facility]    Kirill Gandhi III  : 1975  Date: 2024     CHIEF COMPLAINT:  No chief complaint on file.    Referring Physician: Sandra Barrios NP  Primary Care Physician: Marcos Ibarra MD    HPI:  This is a 48 y.o. male with a chief complaint of No chief complaint on file.  . The patient has Past medical history/Past surgical history of ***    Patient was evaluated and referred by ***  She was recently seen by neurosurgery in 2024 for back pain and bilateral numbness in lower extremity, he has a recommendation to obtain CT scan lumbar spine that showed L3 posterior instrumentation fusion with no acute finding   he was getting tramadol 50 mg, 100 mg 3 times a day per neurosurgery since .      Diabetic: {GAYes/No/NA:63980}    {Anticogualtion drugs:33645}    Allergy To Iodine: {GAYes/No/NA:47992}    Currently on Antibiotic: {GAYes/No/NA:78182}    Current Description of Pain Symptoms:    History of Recent Fall or Trauma: {GAYes/No/NA:61140}   Onset: Chronic, started ***  Pain Location: ***  Radiates/associated symptoms: ***.   Pain is Getting worse over the last *** months    The pain is described as {Desc; pain character:56073}.   Exacerbating factors: {Causes; Pain:22481}.   Mitigating factors ***.   Symptoms interfere with daily activity, sleeping, and ***.   The patient feels like symptoms have been {IUW:53869}.   Patient {Denies / Reports:71697} {RED FLAGS:05837}.    Pain score:   Current: {PAIN 0-10:85065}/10  Best: {PAIN 0-10:57944}/10  Worst: {PAIN 0-10:73956}/10    Current pain medications:    Current Outpatient Medications:     acetaminophen (TYLENOL) 500 MG tablet, Take 1,000 mg by mouth 2 (two) times daily as needed for Pain., Disp: , Rfl:     ARIPiprazole (ABILIFY) 5 MG Tab, Take 1 tablet (5 mg total) by mouth once daily. for 180 doses, Disp: 30 tablet, Rfl: 5    ascorbic acid, vitamin C, (VITAMIN C) 1000 MG  tablet, Take 1,000 mg by mouth once daily., Disp: , Rfl:     aspirin (ECOTRIN) 81 MG EC tablet, Take 81 mg by mouth every evening., Disp: , Rfl:     aspirin/sod bicarb/citric acid (GIOVANY-SELTZER ORAL), Take 2 tablets by mouth daily as needed (Upset stomach)., Disp: , Rfl:     atorvastatin (LIPITOR) 10 MG tablet, Take 1 tablet (10 mg total) by mouth once daily., Disp: 90 tablet, Rfl: 3    blood-glucose sensor (DEXCOM G6 SENSOR) Cayla, 3 each by Misc.(Non-Drug; Combo Route) route continuous., Disp: 3 each, Rfl: prn    blood-glucose transmitter (DEXCOM G6 TRANSMITTER) Cayla, 1 each by Misc.(Non-Drug; Combo Route) route continuous., Disp: 1 each, Rfl: prn    busPIRone (BUSPAR) 30 MG Tab, Take 1 tablet (30 mg total) by mouth 2 (two) times daily., Disp: 60 tablet, Rfl: 5    coenzyme Q10 (CO Q-10) 100 mg capsule, Take 100 mg by mouth once daily., Disp: , Rfl:     cyanocobalamin (VITAMIN B-12) 1000 MCG tablet, Take 100 mcg by mouth once daily., Disp: , Rfl:     cyclobenzaprine (FLEXERIL) 10 MG tablet, TAKE 1 TABLET BY MOUTH THREE TIMES A DAY AS NEEDED FOR MUSCLE SPASMS, Disp: 90 tablet, Rfl: 11    dapagliflozin propanediol (FARXIGA) 5 mg Tab tablet, Take 1 tablet (5 mg total) by mouth once daily., Disp: 30 tablet, Rfl: 11    dicyclomine (BENTYL) 20 mg tablet, Take 1 tablet (20 mg total) by mouth 3 (three) times daily as needed (abdominal pain)., Disp: 60 tablet, Rfl: 2    DULoxetine (CYMBALTA) 60 MG capsule, Take 1 capsule (60 mg total) by mouth 2 (two) times daily., Disp: 60 capsule, Rfl: 5    fluticasone propionate (FLONASE) 50 mcg/actuation nasal spray, 2 sprays (100 mcg total) by Each Nostril route once daily., Disp: 48 mL, Rfl: 2    gabapentin (NEURONTIN) 800 MG tablet, TAKE 1 TABLET BY MOUTH THREE TIMES A DAY, Disp: 90 tablet, Rfl: 11    insulin regular hum U-500 conc (HUMULIN R U-500, CONC, KWIKPEN) 500 unit/mL (3 mL) insulin pen, Inject 135 Units into the skin 3 (three) times daily with meals. INJECT 120 UNITS BEFORE  "BREKAFAST, INJECT 110 UNITS BEFORE LUNCH, AND INJECT 135 UNITS BEFORE DINNER, Disp: 9 Pen, Rfl: 10    insulin syringe-needle U-100 (BD INSULIN SYRINGE ULTRA-FINE) 1 mL 31 gauge x 5/16 Syrg, To use 6 times daily with insulin, Disp: 400 each, Rfl: 11    Lactobac no.41/Bifidobact no.7 (PROBIOTIC-10 ORAL), Take by mouth., Disp: , Rfl:     lanolin/mineral oil/petrolatum (ARTIFICIAL TEARS OPHT), Place 2 drops into both eyes daily as needed (Dry eye)., Disp: , Rfl:     meloxicam (MOBIC) 15 MG tablet, TAKE 1 TABLET BY MOUTH ONCE DAILY, DISCONTINUE CELEBREX, Disp: 30 tablet, Rfl: 2    metFORMIN (GLUCOPHAGE-XR) 500 MG ER 24hr tablet, Take 2 tablets (1,000 mg total) by mouth 2 (two) times daily with meals., Disp: 360 tablet, Rfl: 3    metoclopramide HCl (REGLAN) 5 MG tablet, Take 1 tablet (5 mg total) by mouth 4 (four) times daily before meals and nightly., Disp: 120 tablet, Rfl: 2    multivitamin capsule, Take 1 capsule by mouth once daily., Disp: , Rfl:     NON FORMULARY MEDICATION, Uses Cpap Machine nightly on a setting of 10, Disp: , Rfl:     ondansetron (ZOFRAN-ODT) 4 MG TbDL, Take 1 tablet (4 mg total) by mouth every 8 (eight) hours as needed (nausea)., Disp: 30 tablet, Rfl: 5    ONETOUCH DELICA PLUS LANCET 33 gauge Misc, Apply topically 3 (three) times daily., Disp: , Rfl:     ONETOUCH VERIO TEST STRIPS Strp, 1 strip 3 (three) times daily., Disp: , Rfl:     pantoprazole (PROTONIX) 40 MG tablet, Take 1 tablet (40 mg total) by mouth once daily., Disp: 90 tablet, Rfl: 3    pen needle, diabetic (BD ULTRA-FINE ORIG PEN NEEDLE) 29 gauge x 1/2" Ndle, To use 3 needles daily, Disp: 300 each, Rfl: 3    pen needle, diabetic 31 gauge x 1/4" Ndle, Use with flex pen, Disp: 100 each, Rfl: 11    pramlintide (SYMLINPEN 60) 1,500 mcg/1.5 mL injection, Inject 0.06 mLs (60 mcg total) into the skin 3 (three) times daily before meals., Disp: 3 mL, Rfl: 3    propranoloL (INDERAL) 20 MG tablet, TAKE 1 TABLET BY MOUTH THREE TIMES A DAY AS " NEEDED FOR ANXIETY . MAY TAKE AN EXTRA TABLET DAILY AS NEEDED FOR ANXIETY, Disp: 90 tablet, Rfl: 5    resmetirom 100 mg Tab, Take 100 mg by mouth once daily., Disp: 30 tablet, Rfl: 6    tirzepatide (MOUNJARO) 15 mg/0.5 mL PnIj, Inject 15 mg into the skin every 7 days., Disp: 4 Pen, Rfl: 11    traMADoL (ULTRAM) 50 mg tablet, Take 2 tablets (100 mg total) by mouth every 8 (eight) hours as needed (severe pain)., Disp: 180 tablet, Rfl: 3    traZODone (DESYREL) 50 MG tablet, Take 1-2 tablets ( mg total) by mouth every evening., Disp: 60 tablet, Rfl: 3    valsartan-hydrochlorothiazide (DIOVAN-HCT) 320-12.5 mg per tablet, Take 1 tablet by mouth once daily., Disp: 90 tablet, Rfl: 3    vitamin D 1000 units Tab, Take 5,000 Units by mouth 2 (two) times a day., Disp: , Rfl:     ZEGALOGUE AUTOINJECTOR 0.6 mg/0.6 mL AtIn, Inject 1 Box  into the skin as needed (hypoglycemia). Glucagon is an emergency pen that is used to increase your blood sugar. Glucagon is a pen that is used in an emergency situation where you are unable to eat or drink anything. Glucagon is a medication that is given by someone else-spouse, child, etc., Disp: 1.2 mL, Rfl: 1    Current Narcotics/Opioid /benzo Medications:  Opioids- {GAopioid:86672}  Benzodiazepines: {GAYes/No/NA:76567}    UDS:  NA    PDMP:  {:36562}     Previous Chronic Pain Treatment History:  Physical Therapy/HEP/Physician Lead Exercise Program:  Over the past 12 months, Patient has done  *** sessions.  PT response: {PT response:05631} Helpful.   Dates of the PT sessions: ***, ***  Is patient participating in home exercise program (HEP): {GAYes/No/NA:17692}.    Non-interventional Pain Therapy:  []Chiropractor.   []Acupuncture/Dry needle.  []TENS unit.  []Heat/ICE.  []Back Brace.    Medications previously tried:  NSAIDs: {GANSIAD:20613}  Topical Agent: {GAYes/No/NA:52132}  TCA/SSRI/SNRI: {GATCA/SSRI/SNRI:75711}  Anti-convulsants: {GAAnticonvulsants:45722}  Muscle Relaxants: {GAmuscle  Relaxant:38646}  Opioids- {GAopioid:64464}.    Interventional Pain Procedures:  ***    Previous spine/Relevant joint surgery:  ***  Surgical History:   has a past surgical history that includes Tonsillectomy; Adenoidectomy; Fracture surgery; Hernia repair; Epidural steroid injection (Right, 09/04/2019); Vasectomy; Lumbar laminectomy (Bilateral, 11/29/2019); Fusion of lumbar spine by anterior approach (Left, 04/26/2021); Fusion of spine with instrumentation (N/A, 04/26/2021); Injection of steroid (Bilateral, 12/13/2021); Fusion of spine with instrumentation (N/A, 04/13/2022); Fusion of sacroiliac joint (Bilateral, 04/13/2022); removal implant, posterior segment, intraocular (04/13/2022); Spine surgery; Colonoscopy (N/A, 9/6/2022); and Esophagogastroduodenoscopy (N/A, 10/31/2022).  Medical History:   has a past medical history of Allergy, Anxiety, Back pain, Chronic bilateral low back pain with bilateral sciatica (12/14/2021), CTS (carpal tunnel syndrome) (06/26/2015), CTS (carpal tunnel syndrome) (6/26/2015), Depression, Diabetes (04/17/2015), Diabetes mellitus with insulin therapy, Elevated sed rate (6/26/2015), GERD (gastroesophageal reflux disease) (10/19/2022), vasectomy, Hypertension, Morbid obesity (4/16/2015), OCD (obsessive compulsive disorder), Sleep apnea, and Type 2 diabetes mellitus with hyperglycemia, with long-term current use of insulin (10/17/2022).  Family History:  family history includes Cataracts in his maternal grandfather and maternal grandmother; Fibromyalgia in his mother; Macular degeneration in his maternal grandfather; Osteoarthritis in his mother.  Allergies:  Dilaudid [hydromorphone]   Social History/SUBSTANCE ABUSE HISTORY:  Personal history of substance abuse: No   reports that he has been smoking vaping with nicotine. He has been exposed to tobacco smoke. He has never used smokeless tobacco. He reports that he does not currently use alcohol. He reports that he does not use  drugs.  LABS:  CBC  Lab Results   Component Value Date    WBC 7.29 03/27/2023    HGB 13.1 (L) 03/27/2023    HCT 40.4 03/27/2023     Coagulation Profile   Lab Results   Component Value Date     03/27/2023       Lab Results   Component Value Date    INR 1.0 03/07/2022     CMP:  BMP  Lab Results   Component Value Date     12/08/2023    K 4.3 12/08/2023     12/08/2023    CO2 27 12/08/2023    BUN 15 12/08/2023    CREATININE 0.83 12/08/2023    CALCIUM 9.2 12/08/2023    ANIONGAP 9 12/08/2023    EGFRNORACEVR >60.0 12/08/2023     Lab Results   Component Value Date    ALT 62 (H) 04/22/2024    AST 51 (H) 04/22/2024    ALKPHOS 91 04/22/2024    BILITOT 0.4 04/22/2024     HGBA1C:  Lab Results   Component Value Date    LABA1C 6.8 (H) 03/24/2017    HGBA1C 6.5 (H) 12/08/2023       ROS:    Review of Systems   GENERAL:  No weight loss, malaise or fevers.  HEENT:   No recent changes in vision or hearing  NECK:  Negative for lumps, no difficulty with swallowing.  RESPIRATORY:  Negative for cough, wheezing or shortness of breath, patient denies any recent URI.  CARDIOVASCULAR:  Negative for chest pain or palpitations.  GI:  Negative for abdominal discomfort, blood in stools or black stools or change in bowel habits.  MUSCULOSKELETAL:  See HPI.  SKIN:  Negative for lesions, rash, and itching.  PSYCH:  No mood disorder or recent psychosocial stressors.   HEMATOLOGY/LYMPHOLOGY:  See the blood thinner sectioned in HPI.  NEURO:  See HPI  All other reviewed and negative other than HPI.    PHYSICAL EXAM:  VITALS: There were no vitals taken for this visit.  There is no height or weight on file to calculate BMI.  GENERAL: Well appearing, in no acute distress, alert and oriented x3, answers questions appropriately.   PSYCH: Flat affect.  SKIN: Skin color, texture, turgor normal, no rashes or lesions.  HEAD/FACE:  Normocephalic, atraumatic. Cranial nerves grossly intact.  CV: Regular rate  PULM: No evidence of respiratory  difficulty, symmetric chest rise.  GI:  Soft and non-Distended.  NECK: (***) pain to palpation over the cervical paraspinous muscles. Spurling: ***. (***) pain with neck flexion, extension, or lateral flexion, Muscle strength in RT UE ***/5 and Left UE ***/5, Hand  5/5 bilaterally   BACK/SIJ/HIP:  Lumbar Spine Exam:       Inspection: No erythema, bruising.       Palpation: (***) TTP of lumbar paraspinals bilaterally      ROM:  Limited in flexion, extension, lateral bending.       (***) Facet loading bilaterally      (***) Straight Leg Raise bilaterally      (***) JASEN bilaterally, Tenderness over the PSIS, Yeoman test  Hip Exam:      Inspection: No gross deformity or apparent leg length discrepancy      Palpation:  No TTP to bilateral greater trochanteric bursas.       ROM:  No limitation or pain in internal rotation, external rotation b/l  Neurologic Exam:     Alert. Speech is fluent and appropriate.     Strength: ***/5 in *** hip flexion and knee extension     Sensation:  Grossly intact to light touch in bilateral lower extremities     Tone: No abnormality appreciated in bilateral lower extremities  Knee exam:  No gross deformity or apparent leg length discrepancy, positive tenderness over the anteromedial aspect of the knee cap, positive limitation due to pain in flexion and extension, sensation caudal grossly intact to light touch in bilateral lower extremity, no atrophy or tone abnormalities    GAIT: ***    DIAGNOSTIC STUDIES AND MEDICAL RECORDS REVIEW:  ***  ASSESSMENT:  Kirill Gandhi III is a 48 y.o. male with the following diagnoses based on history, exam, and imaging:  There are no diagnoses linked to this encounter.   ---------------------------------------------------------------------  This is a pleasant 48 y.o. male patient with PMH/PSH of ***, presenting with***.     We discussed the underlying diagnoses and multiple treatment options including non-opioid medications, interventional  "procedures, physical therapy, home exercise, core muscle enhancement, and weight loss.  The risks and benefits of each treatment option were discussed and all questions were answered.      Treatment Plan:    Diagnostics/Referrals: {gaimage:30041}    Medications:    NSAIDs: {GANSIAD:05356}  Topical Agent: {GAYes/No/NA:45736}  TCA/SSRI/SNRI: {GATCA/SSRI/SNRI:56530}  Anti-convulsants: {GAAnticonvulsants:82030}  Muscle Relaxants: {GAmuscle Relaxant:83230}  Opioids: {GAopioid:31411::"None"}    Interventional Therapy: {GAProcedure:10022}.  Sedation: {GAsedation:48923}.  Clearance to stop Blood thinner: {Anticogualtion drugs:45140}    Regarding the above interventions, the patient has been educated regarding the risks (including bleeding, infection, increased pain, nerve damage, or allergic reaction), benefits, and alternatives. The patient states he understands and is eager to proceed.    Physical Rehabilitation: {GAPT:44202}    Patient Education: Counseled patient regarding the importance of {:19588}, I have stressed the importance of physical activity and a home exercise plan to help with pain and improve health.    Follow-up: RTC ***.    May consider:     I would like to thank Sandra Barrios NP for the opportunity to assist in the care of this patient. We had a very nice visit and I look forward to continuing their care. Please let me know if I can be of further assistance.     Clark Smith MD  Anesthesiologist  Interventional Pain Medicine  06/05/2024    Disclaimer:  This note was prepared using voice recognition system and is likely to have sound alike errors that may have been overlooked even after proof reading.  Please call me with any questions.    "

## 2024-06-06 ENCOUNTER — TELEPHONE (OUTPATIENT)
Dept: OPHTHALMOLOGY | Facility: CLINIC | Age: 49
End: 2024-06-06
Payer: MEDICAID

## 2024-06-11 ENCOUNTER — LAB VISIT (OUTPATIENT)
Dept: LAB | Facility: HOSPITAL | Age: 49
End: 2024-06-11
Attending: FAMILY MEDICINE
Payer: MEDICAID

## 2024-06-11 ENCOUNTER — OFFICE VISIT (OUTPATIENT)
Dept: FAMILY MEDICINE | Facility: CLINIC | Age: 49
End: 2024-06-11
Payer: MEDICAID

## 2024-06-11 VITALS
HEART RATE: 100 BPM | BODY MASS INDEX: 41.75 KG/M2 | WEIGHT: 315 LBS | SYSTOLIC BLOOD PRESSURE: 120 MMHG | DIASTOLIC BLOOD PRESSURE: 82 MMHG | TEMPERATURE: 98 F | HEIGHT: 73 IN | OXYGEN SATURATION: 97 %

## 2024-06-11 DIAGNOSIS — E11.9 TYPE 2 DIABETES MELLITUS WITHOUT COMPLICATION, WITH LONG-TERM CURRENT USE OF INSULIN: ICD-10-CM

## 2024-06-11 DIAGNOSIS — M51.36 DDD (DEGENERATIVE DISC DISEASE), LUMBAR: ICD-10-CM

## 2024-06-11 DIAGNOSIS — M79.671 ACUTE FOOT PAIN, RIGHT: ICD-10-CM

## 2024-06-11 DIAGNOSIS — M54.17 RADICULOPATHY OF LUMBOSACRAL REGION: ICD-10-CM

## 2024-06-11 DIAGNOSIS — E11.65 UNCONTROLLED TYPE 2 DIABETES MELLITUS WITH HYPERGLYCEMIA: Primary | ICD-10-CM

## 2024-06-11 DIAGNOSIS — Z79.4 TYPE 2 DIABETES MELLITUS WITHOUT COMPLICATION, WITH LONG-TERM CURRENT USE OF INSULIN: ICD-10-CM

## 2024-06-11 DIAGNOSIS — G47.33 OSA (OBSTRUCTIVE SLEEP APNEA): ICD-10-CM

## 2024-06-11 LAB
ALBUMIN SERPL BCP-MCNC: 4.1 G/DL (ref 3.5–5.2)
ALP SERPL-CCNC: 95 U/L (ref 38–126)
ALT SERPL W/O P-5'-P-CCNC: 62 U/L (ref 10–44)
ANION GAP SERPL CALC-SCNC: 9 MMOL/L (ref 8–16)
AST SERPL-CCNC: 68 U/L (ref 15–46)
BILIRUB SERPL-MCNC: 0.4 MG/DL (ref 0.1–1)
CALCIUM SERPL-MCNC: 9.3 MG/DL (ref 8.7–10.5)
CHLORIDE SERPL-SCNC: 105 MMOL/L (ref 95–110)
CHOLEST SERPL-MCNC: 112 MG/DL (ref 120–199)
CHOLEST/HDLC SERPL: 4 {RATIO} (ref 2–5)
CO2 SERPL-SCNC: 26 MMOL/L (ref 23–29)
CREAT SERPL-MCNC: 0.75 MG/DL (ref 0.5–1.4)
EST. GFR  (NO RACE VARIABLE): >60 ML/MIN/1.73 M^2
ESTIMATED AVG GLUCOSE: 166 MG/DL (ref 68–131)
GLUCOSE SERPL-MCNC: 231 MG/DL (ref 70–110)
HBA1C MFR BLD: 7.4 % (ref 4–5.6)
HDLC SERPL-MCNC: 28 MG/DL (ref 40–75)
HDLC SERPL: 25 % (ref 20–50)
LDLC SERPL CALC-MCNC: 40.6 MG/DL (ref 63–159)
NONHDLC SERPL-MCNC: 84 MG/DL
POTASSIUM SERPL-SCNC: 4.6 MMOL/L (ref 3.5–5.1)
PROT SERPL-MCNC: 7.6 G/DL (ref 6–8.4)
SODIUM SERPL-SCNC: 140 MMOL/L (ref 136–145)
TRIGL SERPL-MCNC: 217 MG/DL (ref 30–150)
URATE SERPL-MCNC: 5.8 MG/DL (ref 3.4–7)
UUN UR-MCNC: 10 MG/DL (ref 2–20)

## 2024-06-11 PROCEDURE — 36415 COLL VENOUS BLD VENIPUNCTURE: CPT | Mod: PN | Performed by: FAMILY MEDICINE

## 2024-06-11 PROCEDURE — 83036 HEMOGLOBIN GLYCOSYLATED A1C: CPT | Performed by: FAMILY MEDICINE

## 2024-06-11 PROCEDURE — 3079F DIAST BP 80-89 MM HG: CPT | Mod: CPTII,S$GLB,, | Performed by: FAMILY MEDICINE

## 2024-06-11 PROCEDURE — 84550 ASSAY OF BLOOD/URIC ACID: CPT | Performed by: FAMILY MEDICINE

## 2024-06-11 PROCEDURE — 1160F RVW MEDS BY RX/DR IN RCRD: CPT | Mod: CPTII,S$GLB,, | Performed by: FAMILY MEDICINE

## 2024-06-11 PROCEDURE — 3008F BODY MASS INDEX DOCD: CPT | Mod: CPTII,S$GLB,, | Performed by: FAMILY MEDICINE

## 2024-06-11 PROCEDURE — 99214 OFFICE O/P EST MOD 30 MIN: CPT | Mod: S$GLB,,, | Performed by: FAMILY MEDICINE

## 2024-06-11 PROCEDURE — 80053 COMPREHEN METABOLIC PANEL: CPT | Mod: PN | Performed by: FAMILY MEDICINE

## 2024-06-11 PROCEDURE — 80061 LIPID PANEL: CPT | Performed by: FAMILY MEDICINE

## 2024-06-11 PROCEDURE — 1159F MED LIST DOCD IN RCRD: CPT | Mod: CPTII,S$GLB,, | Performed by: FAMILY MEDICINE

## 2024-06-11 PROCEDURE — 3074F SYST BP LT 130 MM HG: CPT | Mod: CPTII,S$GLB,, | Performed by: FAMILY MEDICINE

## 2024-06-11 RX ORDER — GABAPENTIN 800 MG/1
800 TABLET ORAL 3 TIMES DAILY
Qty: 90 TABLET | Refills: 2 | Status: SHIPPED | OUTPATIENT
Start: 2024-06-11

## 2024-06-11 RX ORDER — TRAMADOL HYDROCHLORIDE 50 MG/1
100 TABLET ORAL EVERY 8 HOURS PRN
Qty: 180 TABLET | Refills: 2 | Status: SHIPPED | OUTPATIENT
Start: 2024-06-11

## 2024-06-11 RX ORDER — CYCLOBENZAPRINE HCL 10 MG
10 TABLET ORAL 3 TIMES DAILY
Qty: 90 TABLET | Refills: 2 | Status: SHIPPED | OUTPATIENT
Start: 2024-06-11

## 2024-06-11 NOTE — PROGRESS NOTES
" Patient ID: Kirill Gandhi III is a 48 y.o. male.    Chief Complaint: Back Pain    HPI       Kirill Gandhi III is a 48 y.o. male here for chronic lower back pain.  Was seen by neurosurgeon and send the pain management because of chronic use of pain medication without any procedural needs.  Pain management does not want to provide medical management of pain with tramadol and cyclobenzaprine and Neurontin because of according to patient-low strength of narcotic -he should see his primary care doctor.    Diabetes mellitus trying to have control this.    Hypertension controlled      Review of Symptoms      /82 (BP Location: Left arm, Patient Position: Sitting)   Pulse 100   Temp 98.4 °F (36.9 °C) (Oral)   Ht 6' 1" (1.854 m)   Wt (!) 187.5 kg (413 lb 4 oz)   SpO2 97%   BMI 54.52 kg/m²     Physical Exam    Vitals:    06/11/24 1324   BP: 120/82   BP Location: Left arm   Patient Position: Sitting   Pulse: 100   Temp: 98.4 °F (36.9 °C)   TempSrc: Oral   SpO2: 97%   Weight: (!) 187.5 kg (413 lb 4 oz)   Height: 6' 1" (1.854 m)       Constitutional:   Oriented to person, place, and time.appears well-developed and well-nourished.   No distress.      HENT  Head: Normocephalic and atraumatic  Right Ear: External ear normal.   Left Ear: External ear normal.   Nose: External nose normal.   Mouth: Moist mucous membranes    Eyes:   Conjunctivae are normal. Right eye exhibits no discharge. Left eye exhibits no discharge. No scleral icterus. No periorbital edema    Musculoskeletal:  No edema. No obvious deformity No wasting     Neurological:  Alert and oriented to person, place, and time. Coordination normal.     Skin:   Skin is warm and dry.  No diaphoresis.   No rash noted.     Psychiatric: Normal mood and affect. Behavior is normal. Judgment and thought content normal.     Complete Blood Count  Lab Results   Component Value Date    RBC 4.57 (L) 03/27/2023    HGB 13.1 (L) 03/27/2023    HCT 40.4 " "03/27/2023    MCV 88 03/27/2023    MCH 28.7 03/27/2023    MCHC 32.4 03/27/2023    RDW 14.0 03/27/2023     03/27/2023    MPV 9.6 03/27/2023    GRAN 4.4 03/27/2023    GRAN 59.9 03/27/2023    LYMPH 2.0 03/27/2023    LYMPH 27.7 03/27/2023    MONO 0.6 03/27/2023    MONO 8.2 03/27/2023    EOS 0.2 03/27/2023    BASO 0.05 03/27/2023    EOSINOPHIL 3.0 03/27/2023    BASOPHIL 0.7 03/27/2023    DIFFMETHOD Automated 03/27/2023       Comprehensive Metabolic Panel  Lab Results   Component Value Date    PROT 8.0 04/22/2024    ALBUMIN 4.1 04/22/2024    BILITOT 0.4 04/22/2024    AST 51 (H) 04/22/2024    ALKPHOS 91 04/22/2024    ALT 62 (H) 04/22/2024       TSH  No results found for: "TSH"    Assessment / Plan:      ICD-10-CM ICD-9-CM   1. Uncontrolled type 2 diabetes mellitus with hyperglycemia  E11.65 250.02   2. Type 2 diabetes mellitus without complication, with long-term current use of insulin  E11.9 250.00    Z79.4 V58.67   3. Radiculopathy of lumbosacral region  M54.17 724.4   4. DDD (degenerative disc disease), lumbar  M51.36 722.52   5. VICENTE (obstructive sleep apnea)  G47.33 327.23     Uncontrolled type 2 diabetes mellitus with hyperglycemia    Type 2 diabetes mellitus without complication, with long-term current use of insulin  -     Comprehensive Metabolic Panel; Future; Expected date: 06/11/2024  -     Hemoglobin A1C; Future; Expected date: 06/11/2024  -     Microalbumin/Creatinine Ratio, Urine; Future; Expected date: 06/11/2024  -     Lipid Panel; Future; Expected date: 06/11/2024    Radiculopathy of lumbosacral region    DDD (degenerative disc disease), lumbar  -     Comprehensive Metabolic Panel; Future; Expected date: 06/11/2024  -     Hemoglobin A1C; Future; Expected date: 06/11/2024  -     Microalbumin/Creatinine Ratio, Urine; Future; Expected date: 06/11/2024  -     Lipid Panel; Future; Expected date: 06/11/2024    VICENTE (obstructive sleep apnea)  -     Comprehensive Metabolic Panel; Future; Expected date: " 06/11/2024  -     Hemoglobin A1C; Future; Expected date: 06/11/2024  -     Microalbumin/Creatinine Ratio, Urine; Future; Expected date: 06/11/2024  -     Lipid Panel; Future; Expected date: 06/11/2024    Other orders  -     traMADoL (ULTRAM) 50 mg tablet; Take 2 tablets (100 mg total) by mouth every 8 (eight) hours as needed (severe pain).  Dispense: 180 tablet; Refill: 2  -     cyclobenzaprine (FLEXERIL) 10 MG tablet; Take 1 tablet (10 mg total) by mouth 3 (three) times daily. TAKE 1 TABLET BY MOUTH THREE TIMES A DAY AS NEEDED FOR MUSCLE SPASMS  Dispense: 90 tablet; Refill: 2  -     gabapentin (NEURONTIN) 800 MG tablet; Take 1 tablet (800 mg total) by mouth 3 (three) times daily.  Dispense: 90 tablet; Refill: 2    Pain management-filled out contract.    Will feel medicine every three months-four months till next appointment-for annual visit.  We will do video visits month to three four

## 2024-06-14 ENCOUNTER — TELEPHONE (OUTPATIENT)
Dept: FAMILY MEDICINE | Facility: CLINIC | Age: 49
End: 2024-06-14
Payer: MEDICAID

## 2024-06-19 ENCOUNTER — PROCEDURE VISIT (OUTPATIENT)
Dept: PAIN MEDICINE | Facility: CLINIC | Age: 49
End: 2024-06-19
Payer: MEDICAID

## 2024-06-19 DIAGNOSIS — M96.1 POSTLAMINECTOMY SYNDROME OF LUMBAR REGION: ICD-10-CM

## 2024-06-19 DIAGNOSIS — E11.42 DIABETIC PERIPHERAL NEUROPATHY: ICD-10-CM

## 2024-06-19 DIAGNOSIS — E11.40 DIABETIC NEUROPATHY, PAINFUL: Primary | ICD-10-CM

## 2024-06-19 DIAGNOSIS — G57.93 NEUROPATHY OF BOTH FEET: ICD-10-CM

## 2024-06-19 PROCEDURE — 99999PBSHW PR PBB SHADOW TECHNICAL ONLY FILED TO HB: Mod: JZ,PBBFAC,,

## 2024-06-19 NOTE — PROCEDURES
PROCEDURE: Quenza (Capsaicin 8% topical system) - bilateral Feet    PATIENT NAME: Kirlil Gandhi III   MRN: [id number]     DATE OF PROCEDURE: 06/19/2024    Diagnosis:  Diabetes mellitus due to underlying condition with diabetic neuropathy    Patch # 1 this year.    Postprocedural Diagnosis: Same    Complications: None  Outcome: Good    Informed Consent:  The patient's condition and proposed procedures, risks (including complications of nerve damage, skin irritation, infection, and failure of pain relief), and alternatives were discussed with the patient or responsible party.  The patient's/responsible party's questions were answered.  The patient/responsible party appeared to understand and chose to proceed.  Informed consent was obtained.    Procedural Pause:  A procedural pause verifying correct patient, medical record number, allergies, medications to be administered, current vital signs, and surgical site was performed immediately prior to beginning the procedure.    Procedure in Detail:  The procedure was performed in the procedure treatment room.  After obtaining written consent, the patient was placed in a supine position. 6 patches were used for the procedure today.  The patient applied 4% lidocaine ointment to the area 1 hour prior to treatment., and the target area was outlined with a marker.  The patch(es) were applied to the target area and secured with tape.  A coban was used to further secure the patches in place.  The patient was allowed to rest in a comfortable position, and monitored by staff every few minutes to ensure comfort.  The option for ice pack was provided to the patient should they start experiencing burning.  The patch(es) remained in place for 60 minutes.  The patch(es) were then removed and the cleaning gel was applied and allowed to sit for an additional 60 seconds, after which the area was wiped clean.     The patient was then discharged in stable condition.    Note Electronically  Signed By:  Clark Smith  06/19/2024

## 2024-07-10 ENCOUNTER — OFFICE VISIT (OUTPATIENT)
Dept: PAIN MEDICINE | Facility: CLINIC | Age: 49
End: 2024-07-10
Payer: MEDICAID

## 2024-07-10 VITALS — BODY MASS INDEX: 53.49 KG/M2 | WEIGHT: 315 LBS

## 2024-07-10 DIAGNOSIS — M96.1 POSTLAMINECTOMY SYNDROME OF LUMBAR REGION: Primary | ICD-10-CM

## 2024-07-10 DIAGNOSIS — Z98.1 S/P FUSION OF SACROILIAC JOINT: ICD-10-CM

## 2024-07-10 PROCEDURE — 99215 OFFICE O/P EST HI 40 MIN: CPT | Mod: PBBFAC,PN | Performed by: ANESTHESIOLOGY

## 2024-07-10 PROCEDURE — 99999 PR PBB SHADOW E&M-EST. PATIENT-LVL V: CPT | Mod: PBBFAC,,, | Performed by: ANESTHESIOLOGY

## 2024-07-10 PROCEDURE — 3008F BODY MASS INDEX DOCD: CPT | Mod: CPTII,,, | Performed by: ANESTHESIOLOGY

## 2024-07-10 PROCEDURE — 1159F MED LIST DOCD IN RCRD: CPT | Mod: CPTII,,, | Performed by: ANESTHESIOLOGY

## 2024-07-10 PROCEDURE — 3061F NEG MICROALBUMINURIA REV: CPT | Mod: CPTII,,, | Performed by: ANESTHESIOLOGY

## 2024-07-10 PROCEDURE — 3051F HG A1C>EQUAL 7.0%<8.0%: CPT | Mod: CPTII,,, | Performed by: ANESTHESIOLOGY

## 2024-07-10 PROCEDURE — 3066F NEPHROPATHY DOC TX: CPT | Mod: CPTII,,, | Performed by: ANESTHESIOLOGY

## 2024-07-10 PROCEDURE — 1160F RVW MEDS BY RX/DR IN RCRD: CPT | Mod: CPTII,,, | Performed by: ANESTHESIOLOGY

## 2024-07-10 PROCEDURE — 99213 OFFICE O/P EST LOW 20 MIN: CPT | Mod: S$PBB,,, | Performed by: ANESTHESIOLOGY

## 2024-07-10 NOTE — PROGRESS NOTES
EST patient evaluation  [facility]    Kirill Gandhi III  : 1975  Date: 7/10/2024     CHIEF COMPLAINT:  No chief complaint on file.  Referring Physician: No ref. provider found  Primary Care Physician: Marcos Ibarra MD    HPI:  This is a 49 y.o. male with a chief complaint of No chief complaint on file.  . The patient has Past medical history/Past surgical history of postlaminectomy syndrome, lumbar spine fusion, SI fusion, obesity, diabetes, metabolic syndrome, bilateral feet neuropathy, obstructive sleep apnea, chronic pain syndrome on tramadol    Patient was evaluated and referred by endocrinologist for bilateral feet neuropathy  She was recently seen by neurosurgery in 2024 for back pain and bilateral numbness in lower extremity, he has a recommendation to obtain CT scan lumbar spine that showed L3 posterior instrumentation fusion with no acute finding  he was getting tramadol 50 mg, 100 mg 3 times a day per neurosurgery since .      Interval History 07/10/2024:  Kirill Gandhi III is here for follow up visit after Qutenza application bilateral feet in 2024, patient reported *** improvement in pain and functionality.  Today he presents with ***      Current pain score: ***/10      Diabetic: Yes    Anticogualtion drugs: 81 mg Aspirin     Allergy To Iodine: No    Currently on Antibiotic: No    Current Description of Pain Symptoms:    History of Recent Fall or Trauma: No   Onset: Chronic, started several years   Pain Location:  Bilateral feet burning(dorsal and plantar aspect) +bilat lower extremity from knee to feet  Radiates/associated symptoms:  Burning, no numbness or tingling  Pain is Getting worse over the last 6 months    The pain is described as  Burning, no numbness or tingling  Exacerbating factors: constant.   Mitigating factors none.   Symptoms interfere with daily activity, sleeping, currently he is not working  The  patient feels like symptoms have been worsening.   Patient denies night fever/night sweats, urinary incontinence, bowel incontinence, significant weight loss, significant motor weakness, and loss of sensations.    Pain score:   Best: 1/10  Worst: 10/10    Current pain medications:  For depression    ARIPiprazole (ABILIFY) 5 MG Tab    busPIRone (BUSPAR) 30 MG Tab  For pain    cyclobenzaprine (FLEXERIL) 10 MG tablet, TID    gabapentin (NEURONTIN) 800 MG tablet, TID    meloxicam (MOBIC) 15 MG tablet, PRN    traMADoL (ULTRAM) 50 mg tablet, 100 mg TID    acetaminophen (TYLENOL) 500 MG tablet,  Current Narcotics/Opioid /benzo Medications:  Opioids- Tramadol (Ultram) per PCP  Benzodiazepines: No    UDS:  NA    PDMP:  Reviewed and consistent with medication use as prescribed.     Previous Chronic Pain Treatment History:  Physical Therapy/HEP/Physician Lead Exercise Program:  Over the past 12 months, Patient has done  0 sessions.  PT response: n/a   Dates of the PT sessions: n/a,   Is patient participating in home exercise program (HEP): Yes.    Non-interventional Pain Therapy:  []Chiropractor.   []Acupuncture/Dry needle.  []TENS unit.  []Heat/ICE.  []Back Brace.    Medications previously tried:  NSAIDs: Meloxicam (Mobic) and Celebrex (Celecoxib)  Topical Agent: No  TCA/SSRI/SNRI: SNRI: Duloxetine (Cymbalta)   Anti-convulsants: Gabapentin , previously on Lyrica  Muscle Relaxants: Flexeril (Cyclobenzaprine), tizanidine  Opioids- Tramadol (Ultram).    Interventional Pain Procedures:  Epidural steroid injection 2019    Previous spine/Relevant joint surgery:  L3-bi-iliac posterior instrumented fusion with decompressive laminectomies and interbody disc spacers: 2019, 2020 and 2022 with good result as he was able to have better functionality and walking    Surgical History:   has a past surgical history that includes Tonsillectomy; Adenoidectomy; Fracture surgery; Hernia repair; Epidural steroid injection (Right, 09/04/2019);  Vasectomy; Lumbar laminectomy (Bilateral, 11/29/2019); Fusion of lumbar spine by anterior approach (Left, 04/26/2021); Fusion of spine with instrumentation (N/A, 04/26/2021); Injection of steroid (Bilateral, 12/13/2021); Fusion of spine with instrumentation (N/A, 04/13/2022); Fusion of sacroiliac joint (Bilateral, 04/13/2022); removal implant, posterior segment, intraocular (04/13/2022); Spine surgery; Colonoscopy (N/A, 9/6/2022); and Esophagogastroduodenoscopy (N/A, 10/31/2022).  Medical History:   has a past medical history of Allergy, Anxiety, Back pain, Chronic bilateral low back pain with bilateral sciatica (12/14/2021), CTS (carpal tunnel syndrome) (06/26/2015), CTS (carpal tunnel syndrome) (6/26/2015), Depression, Diabetes (04/17/2015), Diabetes mellitus with insulin therapy, Elevated sed rate (6/26/2015), GERD (gastroesophageal reflux disease) (10/19/2022), vasectomy, Hypertension, Morbid obesity (4/16/2015), OCD (obsessive compulsive disorder), Sleep apnea, and Type 2 diabetes mellitus with hyperglycemia, with long-term current use of insulin (10/17/2022).  Family History:  family history includes Cataracts in his maternal grandfather and maternal grandmother; Fibromyalgia in his mother; Macular degeneration in his maternal grandfather; Osteoarthritis in his mother.  Allergies:  Dilaudid [hydromorphone]   Social History/SUBSTANCE ABUSE HISTORY:  Personal history of substance abuse: No   reports that he has been smoking vaping with nicotine. He has been exposed to tobacco smoke. He has never used smokeless tobacco. He reports that he does not currently use alcohol. He reports that he does not use drugs.  LABS:  CBC  Lab Results   Component Value Date    WBC 7.29 03/27/2023    HGB 13.1 (L) 03/27/2023    HCT 40.4 03/27/2023     Coagulation Profile   Lab Results   Component Value Date     03/27/2023       Lab Results   Component Value Date    INR 1.0 03/07/2022     CMP:  BMP  Lab Results   Component  Value Date     06/11/2024    K 4.6 06/11/2024     06/11/2024    CO2 26 06/11/2024    BUN 10 06/11/2024    CREATININE 0.75 06/11/2024    CALCIUM 9.3 06/11/2024    ANIONGAP 9 06/11/2024    EGFRNORACEVR >60.0 06/11/2024     Lab Results   Component Value Date    ALT 62 (H) 06/11/2024    AST 68 (H) 06/11/2024    ALKPHOS 95 06/11/2024    BILITOT 0.4 06/11/2024     HGBA1C:  Lab Results   Component Value Date    LABA1C 6.8 (H) 03/24/2017    HGBA1C 7.4 (H) 06/11/2024       ROS:    Review of Systems   GENERAL:  No weight loss, malaise or fevers.  HEENT:   No recent changes in vision or hearing  NECK:  Negative for lumps, no difficulty with swallowing.  RESPIRATORY:  Negative for cough, wheezing or shortness of breath, patient denies any recent URI.  CARDIOVASCULAR:  Negative for chest pain or palpitations.  GI:  Negative for abdominal discomfort, blood in stools or black stools or change in bowel habits.  MUSCULOSKELETAL:  See HPI.  SKIN:  Negative for lesions, rash, and itching.  PSYCH:  No mood disorder or recent psychosocial stressors.   HEMATOLOGY/LYMPHOLOGY:  See the blood thinner sectioned in HPI.  NEURO:  See HPI  All other reviewed and negative other than HPI.    PHYSICAL EXAM:  VITALS: There were no vitals taken for this visit.  There is no height or weight on file to calculate BMI.  GENERAL: Well appearing, in no acute distress, alert and oriented x3, answers questions appropriately.   PSYCH: Flat affect.  SKIN: Skin color, texture, turgor normal, no rashes or lesions.  HEAD/FACE:  Normocephalic, atraumatic. Cranial nerves grossly intact.  CV: Regular rate  PULM: No evidence of respiratory difficulty, symmetric chest rise.  GI:  Soft and non-Distended.  BACK/SIJ/HIP:  Lumbar Spine Exam:       Inspection: No erythema, bruising.       Palpation: (+) TTP of lumbar paraspinals bilaterally      ROM:  Limited in flexion, extension, lateral bending.       (+) Facet loading bilaterally      (NA) Straight Leg  Raise bilaterally      (NA) JASEN bilaterally, Tenderness over the PSIS, Yeoman test  Hip Exam:      Inspection: No gross deformity or apparent leg length discrepancy      Palpation:  No TTP to bilateral greater trochanteric bursas.       ROM:  No limitation or pain in internal rotation, external rotation b/l  Neurologic Exam:     Alert. Speech is fluent and appropriate.     Strength: 4/5 in bilateral hip flexion and knee extension     Sensation:  Grossly intact to light touch in bilateral lower extremities     Tone: No abnormality appreciated in bilateral lower extremities    GAIT:  Antalgic gait, unsteady gait    DIAGNOSTIC STUDIES AND MEDICAL RECORDS REVIEW:  CT lumbar spine January 20, 2024   L3-bi-iliac posterior instrumented fusion with decompressive laminectomies and interbody disc spacers. No evidence of hardware failure or loosening. Alignment anatomic.  Ill-defined fluid noted posterior to the surgical bed, improved.  No fractures, marrow replacement process, or evidence of spondylo discitis.  No paraspinal masses.     Spondylosis: There is prominent left-sided facet joint arthropathy at L2-3.  There is mild disc height loss at L1-2 and L2-3 with mild disc bulging.  These findings contribute to mild spinal canal narrowing, correlating with the prior MRI exam.  L3 through S1 is fused, as above.     Impression:     L3-pelvic posterior instrumented fusion, as above.  No acute findings.    ASSESSMENT:  Kirill Gandhi III is a 49 y.o. male with the following diagnoses based on history, exam, and imaging:  There are no diagnoses linked to this encounter.   ---------------------------------------------------------------------  This is a pleasant 49 y.o. male patient with PMH/PSH of postlaminectomy syndrome, lumbar spine fusion, SI fusion, obesity, diabetes, metabolic syndrome, bilateral feet neuropathy, obstructive sleep apnea, chronic pain syndrome on tramadol, presenting with bilateral feet neuropathy  consistent with painful diabetic neuropathy, patient had a recommendation from his endocrinologist to consider Qutenza patch  Treatment Plan:    Diagnostics/Referrals:  None    Medications:  Tylenol as needed   NSAIDs:  Over-the-counter  Topical Agent: No  TCA/SSRI/SNRI: None  Anti-convulsants:  Continue with gabapentin as prescribed per neurosurgery  Muscle Relaxants:  Continue Flexeril as prescribed per neurosurgery  Opioids:  Continue with tramadol as prescribed per PCP    Interventional Therapy:     Patient Education: Counseled patient regarding the importance of activity modification, I have stressed the importance of physical activity and a home exercise plan to help with pain and improve health.    Follow-up: RTC 3 Months    May consider:  Caudal epidural steroid injection.    Clark Smith MD  Anesthesiologist  Interventional Pain Medicine  07/10/2024    Disclaimer:  This note was prepared using voice recognition system and is likely to have sound alike errors that may have been overlooked even after proof reading.  Please call me with any questions.

## 2024-07-10 NOTE — PROGRESS NOTES
EST patient evaluation  [facility]    Kirill Gandhi III  : 1975  Date: 7/10/2024     CHIEF COMPLAINT:  Follow-up  Referring Physician: No ref. provider found  Primary Care Physician: Marcos Ibarra MD    HPI:  This is a 49 y.o. male with a chief complaint of Follow-up  . The patient has Past medical history/Past surgical history of postlaminectomy syndrome, lumbar spine fusion, SI fusion, obesity, diabetes, metabolic syndrome, bilateral feet neuropathy, obstructive sleep apnea, chronic pain syndrome on tramadol    Patient was evaluated and referred by endocrinologist for bilateral feet neuropathy  She was recently seen by neurosurgery in 2024 for back pain and bilateral numbness in lower extremity, he has a recommendation to obtain CT scan lumbar spine that showed L3 posterior instrumentation fusion with no acute finding  he was getting tramadol 50 mg, 100 mg 3 times a day per neurosurgery since .      Interval History 07/10/2024:  Kirill Gandhi III is here for follow up visit after Qutenza application bilateral feet in 2024, patient reported 80%  improvement in pain and functionality.  Today he presents with back pain and he would like to discuss treatment options.  Current pain score: 8/10 (back) 2/10 (feet)    Diabetic: Yes    Anticogualtion drugs: 81 mg Aspirin     Allergy To Iodine: No    Currently on Antibiotic: No    Current Description of Pain Symptoms:    History of Recent Fall or Trauma: No   Onset: Chronic, started several years   Pain Location:  Bilateral feet burning(dorsal and plantar aspect) +bilat lower extremity from knee to feet + low back pain in addition to bilateral thigh pain  Radiates/associated symptoms:  Burning, no numbness or tingling  Pain is Getting worse over the last 6 months    The pain is described as  Burning, no numbness or tingling  Exacerbating factors: constant.   Mitigating factors none.    Symptoms interfere with daily activity, sleeping, currently he is not working  The patient feels like symptoms have been worsening.   Patient denies night fever/night sweats, urinary incontinence, bowel incontinence, significant weight loss, significant motor weakness, and loss of sensations.    Pain score:   Best: 1/10  Worst: 10/10    Current pain medications:  For depression    ARIPiprazole (ABILIFY) 5 MG Tab    busPIRone (BUSPAR) 30 MG Tab  For pain    cyclobenzaprine (FLEXERIL) 10 MG tablet, TID    gabapentin (NEURONTIN) 800 MG tablet, TID    meloxicam (MOBIC) 15 MG tablet, PRN    traMADoL (ULTRAM) 50 mg tablet, 100 mg TID    acetaminophen (TYLENOL) 500 MG tablet,PRN  Current Narcotics/Opioid /benzo Medications:  Opioids- Tramadol (Ultram) per PCP  Benzodiazepines: No    UDS:  NA    PDMP:  Reviewed and consistent with medication use as prescribed.     Previous Chronic Pain Treatment History:  Physical Therapy/HEP/Physician Lead Exercise Program:  Over the past 12 months, Patient has done  0 sessions.  PT response: n/a   Dates of the PT sessions: n/a,   Is patient participating in home exercise program (HEP): Yes.    Non-interventional Pain Therapy:  []Chiropractor.   []Acupuncture/Dry needle.  []TENS unit.  []Heat/ICE.  []Back Brace.    Medications previously tried:  NSAIDs: Meloxicam (Mobic) and Celebrex (Celecoxib)  Topical Agent: No  TCA/SSRI/SNRI: SNRI: Duloxetine (Cymbalta)   Anti-convulsants: Gabapentin , previously on Lyrica  Muscle Relaxants: Flexeril (Cyclobenzaprine), tizanidine  Opioids- Tramadol (Ultram).    Interventional Pain Procedures:  Epidural steroid injection 2019  Qutenza application bilateral feet in June 2024 - 80% for feet    Previous spine/Relevant joint surgery:  L3-bi-iliac posterior instrumented fusion with decompressive laminectomies and interbody disc spacers and bilateral SI fusion: 2019, 2020 and 2022 with good result as he was able to have better functionality and  walking    Surgical History:   has a past surgical history that includes Tonsillectomy; Adenoidectomy; Fracture surgery; Hernia repair; Epidural steroid injection (Right, 09/04/2019); Vasectomy; Lumbar laminectomy (Bilateral, 11/29/2019); Fusion of lumbar spine by anterior approach (Left, 04/26/2021); Fusion of spine with instrumentation (N/A, 04/26/2021); Injection of steroid (Bilateral, 12/13/2021); Fusion of spine with instrumentation (N/A, 04/13/2022); Fusion of sacroiliac joint (Bilateral, 04/13/2022); removal implant, posterior segment, intraocular (04/13/2022); Spine surgery; Colonoscopy (N/A, 9/6/2022); and Esophagogastroduodenoscopy (N/A, 10/31/2022).  Medical History:   has a past medical history of Allergy, Anxiety, Back pain, Chronic bilateral low back pain with bilateral sciatica (12/14/2021), CTS (carpal tunnel syndrome) (06/26/2015), CTS (carpal tunnel syndrome) (6/26/2015), Depression, Diabetes (04/17/2015), Diabetes mellitus with insulin therapy, Elevated sed rate (6/26/2015), GERD (gastroesophageal reflux disease) (10/19/2022), vasectomy, Hypertension, Morbid obesity (4/16/2015), OCD (obsessive compulsive disorder), Sleep apnea, and Type 2 diabetes mellitus with hyperglycemia, with long-term current use of insulin (10/17/2022).  Family History:  family history includes Cataracts in his maternal grandfather and maternal grandmother; Fibromyalgia in his mother; Macular degeneration in his maternal grandfather; Osteoarthritis in his mother.  Allergies:  Dilaudid [hydromorphone]   Social History/SUBSTANCE ABUSE HISTORY:  Personal history of substance abuse: No   reports that he has been smoking vaping with nicotine. He has been exposed to tobacco smoke. He has never used smokeless tobacco. He reports that he does not currently use alcohol. He reports that he does not use drugs.  LABS:  CBC  Lab Results   Component Value Date    WBC 7.29 03/27/2023    HGB 13.1 (L) 03/27/2023    HCT 40.4 03/27/2023      Coagulation Profile   Lab Results   Component Value Date     03/27/2023       Lab Results   Component Value Date    INR 1.0 03/07/2022     CMP:  BMP  Lab Results   Component Value Date     06/11/2024    K 4.6 06/11/2024     06/11/2024    CO2 26 06/11/2024    BUN 10 06/11/2024    CREATININE 0.75 06/11/2024    CALCIUM 9.3 06/11/2024    ANIONGAP 9 06/11/2024    EGFRNORACEVR >60.0 06/11/2024     Lab Results   Component Value Date    ALT 62 (H) 06/11/2024    AST 68 (H) 06/11/2024    ALKPHOS 95 06/11/2024    BILITOT 0.4 06/11/2024     HGBA1C:  Lab Results   Component Value Date    LABA1C 6.8 (H) 03/24/2017    HGBA1C 7.4 (H) 06/11/2024       ROS:    Review of Systems   GENERAL:  No weight loss, malaise or fevers.  HEENT:   No recent changes in vision or hearing  NECK:  Negative for lumps, no difficulty with swallowing.  RESPIRATORY:  Negative for cough, wheezing or shortness of breath, patient denies any recent URI.  CARDIOVASCULAR:  Negative for chest pain or palpitations.  GI:  Negative for abdominal discomfort, blood in stools or black stools or change in bowel habits.  MUSCULOSKELETAL:  See HPI.  SKIN:  Negative for lesions, rash, and itching.  PSYCH:  No mood disorder or recent psychosocial stressors.   HEMATOLOGY/LYMPHOLOGY:  See the blood thinner sectioned in HPI.  NEURO:  See HPI  All other reviewed and negative other than HPI.    PHYSICAL EXAM:  VITALS: Wt (!) 183.9 kg (405 lb 6.8 oz)   BMI 53.49 kg/m²   Body mass index is 53.49 kg/m².  GENERAL: Well appearing, in no acute distress, alert and oriented x3, answers questions appropriately.   PSYCH: Flat affect.  SKIN: Skin color, texture, turgor normal, no rashes or lesions.  HEAD/FACE:  Normocephalic, atraumatic. Cranial nerves grossly intact.  CV: Regular rate  PULM: No evidence of respiratory difficulty, symmetric chest rise.  GI:  Soft and non-Distended.  BACK/SIJ/HIP:  Lumbar Spine Exam:       Inspection: No erythema, bruising.        Palpation: (+) TTP of lumbar paraspinals bilaterally      ROM:  Limited in flexion, extension, lateral bending.       (+) Facet loading bilaterally      (NA) Straight Leg Raise bilaterally      (NA) JASEN bilaterally, Tenderness over the PSIS, Yeoman test  Hip Exam:      Inspection: No gross deformity or apparent leg length discrepancy      Palpation:  No TTP to bilateral greater trochanteric bursas.       ROM:  No limitation or pain in internal rotation, external rotation b/l  Neurologic Exam:     Alert. Speech is fluent and appropriate.     Strength: 4/5 in bilateral hip flexion and knee extension     Sensation:  Grossly intact to light touch in bilateral lower extremities     Tone: No abnormality appreciated in bilateral lower extremities    GAIT:  Antalgic gait, unsteady gait    DIAGNOSTIC STUDIES AND MEDICAL RECORDS REVIEW:  MRI THORACIC SPINE W WO CONTRAST; MRI LUMBAR SPINE W WO CONTRAST 2023    Operative change: L3-pelvis posterior instrumented fusion with interbody spacers, right L3-L4 and left L4-L5 hemilaminectomies.     Alignment: Normal thoracic kyphosis is preserved.  Lumbar alignment is maintained.  No spondylolisthesis.     Vertebrae: Thoracolumbar body heights are maintained. No acute fracture. No marrow infiltrative process.     Discs: T5-T8, and T9-T10 intervertebral disc height loss.  Lumbar intervertebral disc heights are maintained at the non-operative levels.     Cord: Mildly diminished caliber of the T3-T4 spinal cord, similar to prior with continued close approximation to the ventral thecal sac and prominence of the dorsal CSF space.  Punctate focus of mildly increased T2 signal of the T3 cord which again could reflect tiny syrinx or myelopathic change, though nonspecific and better visualized on the prior MRI.  Conus is normal in appearance and terminates at L1.     Soft tissue structures: Visualized intrathoracic/intra-abdominal viscera are unremarkable.  Paraspinal muscular atrophy.   Relatively stable 4.0 x 0.8 cm dorsal midline subcutaneous soft tissue collection with surrounding scar and soft tissue edema lumbar level.     Degenerative change:     Circumferential disc bulges and facet arthropathy contributing to mild bilateral foraminal narrowing at T1-T2 and T2-T3.  Paracentral disc protrusions on the right at T5-T6 and on the left at T6-T7 contributing to mass effect on the adjacent lateral recesses.  Facet arthropathy contributing to mild right foraminal narrowing at T8-T9.  No significant foraminal narrowing or spinal canal stenosis identified at the remaining thoracic levels.     T12-L1: No significant foraminal narrowing or spinal canal stenosis.     L1-L2: Circumferential disc bulge and facet arthropathy contributing to mild bilateral foraminal narrowing.  No spinal canal stenosis.     L2-L3: Circumferential disc bulge and facet arthropathy contributing to mild bilateral foraminal narrowing and mild spinal canal stenosis.     Note operative levels demonstrate limited visualization due to metal artifact, body habitus and postoperative change.     L3-L4: Operative level.  Mild left, moderate right foraminal narrowing.  No significant spinal canal stenosis.     L4-L5: Operative level.  Neural foramina are poorly visualized.  Mild/moderate foraminal narrowing suggested.  No spinal canal stenosis.     L5-S1: Operative level.  Neural foramina are poorly visualized.  Mild/moderate left foraminal narrowing suggested.  No spinal canal stenosis.     Impression:     1. Operative change of L3-pelvic posterior instrumented fusion.  Stable fluid collection in the dorsal midline soft tissues, likely chronic postoperative seroma or hematoma.  2. Stable diminished thoracic cord caliber at T3-T4 with punctate mildly increased signal of the T3 cord.  Findings are unchanged compared to MRI 11/23/2021; of note, this finding was better visualized on the prior MRI.  3. Stable thoracolumbar spondylosis, as  above.     ASSESSMENT:  Kirill Gandhi III is a 49 y.o. male with the following diagnoses based on history, exam, and imagin. Postlaminectomy syndrome of lumbar region    2. S/P fusion of sacroiliac joint    3. Scoliosis, unspecified scoliosis type, unspecified spinal region  - MRI Thoracic Spine Without Contrast; Future    4. Dorsalgia, unspecified  - MRI Lumbar Spine Without Contrast; Future  ---------------------------------------------------------------  This is a pleasant 49 y.o. male patient with PMH/PSH of postlaminectomy syndrome, lumbar spine fusion, SI fusion, obesity, diabetes, metabolic syndrome, bilateral feet neuropathy, obstructive sleep apnea, chronic pain syndrome on tramadol, presenting with bilateral feet neuropathy consistent with painful diabetic neuropathy, patient had a recommendation from his endocrinologist to consider Qutenza patch which provided him with more than 80% improvement in pain and functionality.    Today he presents with low back pain and bilateral lower extremity radiation consistent with postlaminectomy syndrome.  Treatment Plan:    Diagnostics/Referrals:  Updating MRI thoracic and lumbar spine.    Medications:  Tylenol as needed   NSAIDs:  Over-the-counter  Topical Agent: No  TCA/SSRI/SNRI: None  Anti-convulsants:  Continue with gabapentin as prescribed  Muscle Relaxants:  Continue Flexeril as prescribed  Opioids:  Continue with tramadol as prescribed per PCP    Interventional Therapy:  May consider spinal cord stimulator if he changes his insurance  He was hesitant to proceed with any type of interventional procedure or injection as he had no relief from prior injections.    Patient Education: Counseled patient regarding the importance of activity modification, I have stressed the importance of physical activity and a home exercise plan to help with pain and improve health.    Follow-up: RTC 2 Months    May consider:  Caudal epidural steroid injection    Clark  MD Sarah  Anesthesiologist  Interventional Pain Medicine  07/10/2024    Disclaimer:  This note was prepared using voice recognition system and is likely to have sound alike errors that may have been overlooked even after proof reading.  Please call me with any questions.

## 2024-08-13 ENCOUNTER — PATIENT MESSAGE (OUTPATIENT)
Dept: ADMINISTRATIVE | Facility: OTHER | Age: 49
End: 2024-08-13
Payer: MEDICAID

## 2024-09-04 DIAGNOSIS — E78.5 DYSLIPIDEMIA: ICD-10-CM

## 2024-09-04 RX ORDER — INSULIN HUMAN 500 [IU]/ML
INJECTION, SOLUTION SUBCUTANEOUS
Qty: 6 PEN | Refills: 3 | Status: SHIPPED | OUTPATIENT
Start: 2024-09-04

## 2024-09-10 ENCOUNTER — PATIENT MESSAGE (OUTPATIENT)
Dept: PAIN MEDICINE | Facility: CLINIC | Age: 49
End: 2024-09-10
Payer: MEDICAID

## 2024-09-16 ENCOUNTER — LAB VISIT (OUTPATIENT)
Dept: LAB | Facility: HOSPITAL | Age: 49
End: 2024-09-16
Payer: MEDICAID

## 2024-09-16 DIAGNOSIS — K75.81 METABOLIC DYSFUNCTION-ASSOCIATED STEATOHEPATITIS (MASH): ICD-10-CM

## 2024-09-16 LAB
ALBUMIN SERPL BCP-MCNC: 3.8 G/DL (ref 3.5–5.2)
ALP SERPL-CCNC: 97 U/L (ref 38–126)
ALT SERPL W/O P-5'-P-CCNC: 43 U/L (ref 10–44)
AST SERPL-CCNC: 54 U/L (ref 15–46)
BILIRUB SERPL-MCNC: 0.4 MG/DL (ref 0.1–1)
PROT SERPL-MCNC: 7.2 G/DL (ref 6–8.4)

## 2024-09-16 PROCEDURE — 36415 COLL VENOUS BLD VENIPUNCTURE: CPT | Mod: PN

## 2024-09-16 PROCEDURE — 80076 HEPATIC FUNCTION PANEL: CPT | Mod: PN

## 2024-09-24 ENCOUNTER — PATIENT MESSAGE (OUTPATIENT)
Dept: FAMILY MEDICINE | Facility: CLINIC | Age: 49
End: 2024-09-24
Payer: MEDICAID

## 2024-09-24 RX ORDER — CYCLOBENZAPRINE HCL 10 MG
10 TABLET ORAL 3 TIMES DAILY
Qty: 90 TABLET | Refills: 2 | Status: SHIPPED | OUTPATIENT
Start: 2024-09-24

## 2024-09-24 RX ORDER — TRAMADOL HYDROCHLORIDE 50 MG/1
100 TABLET ORAL EVERY 8 HOURS PRN
Qty: 180 TABLET | Refills: 2 | Status: SHIPPED | OUTPATIENT
Start: 2024-09-24

## 2024-10-02 ENCOUNTER — OFFICE VISIT (OUTPATIENT)
Dept: PAIN MEDICINE | Facility: CLINIC | Age: 49
End: 2024-10-02
Payer: MEDICAID

## 2024-10-02 VITALS — BODY MASS INDEX: 53.68 KG/M2 | WEIGHT: 315 LBS

## 2024-10-02 DIAGNOSIS — M96.1 FAILED BACK SURGICAL SYNDROME: ICD-10-CM

## 2024-10-02 DIAGNOSIS — M96.1 POSTLAMINECTOMY SYNDROME OF LUMBAR REGION: ICD-10-CM

## 2024-10-02 DIAGNOSIS — Z98.1 S/P FUSION OF SACROILIAC JOINT: Primary | ICD-10-CM

## 2024-10-02 PROCEDURE — 3008F BODY MASS INDEX DOCD: CPT | Mod: CPTII,,, | Performed by: ANESTHESIOLOGY

## 2024-10-02 PROCEDURE — 3061F NEG MICROALBUMINURIA REV: CPT | Mod: CPTII,,, | Performed by: ANESTHESIOLOGY

## 2024-10-02 PROCEDURE — 1159F MED LIST DOCD IN RCRD: CPT | Mod: CPTII,,, | Performed by: ANESTHESIOLOGY

## 2024-10-02 PROCEDURE — 1160F RVW MEDS BY RX/DR IN RCRD: CPT | Mod: CPTII,,, | Performed by: ANESTHESIOLOGY

## 2024-10-02 PROCEDURE — 99999 PR PBB SHADOW E&M-EST. PATIENT-LVL IV: CPT | Mod: PBBFAC,,, | Performed by: ANESTHESIOLOGY

## 2024-10-02 PROCEDURE — 99214 OFFICE O/P EST MOD 30 MIN: CPT | Mod: PBBFAC,PN | Performed by: ANESTHESIOLOGY

## 2024-10-02 PROCEDURE — 99214 OFFICE O/P EST MOD 30 MIN: CPT | Mod: S$PBB,,, | Performed by: ANESTHESIOLOGY

## 2024-10-02 PROCEDURE — 3066F NEPHROPATHY DOC TX: CPT | Mod: CPTII,,, | Performed by: ANESTHESIOLOGY

## 2024-10-02 PROCEDURE — 3051F HG A1C>EQUAL 7.0%<8.0%: CPT | Mod: CPTII,,, | Performed by: ANESTHESIOLOGY

## 2024-10-02 NOTE — PROGRESS NOTES
EST patient evaluation  [facility]    Kirill Gandhi III  : 1975  Date: 10/2/2024     CHIEF COMPLAINT:  Discuss Injection   Referring Physician: No ref. provider found  Primary Care Physician: Marcos Ibarra MD    HPI:  This is a 49 y.o. male with a chief complaint of Discuss Injection   . The patient has Past medical history/Past surgical history of postlaminectomy syndrome, lumbar spine fusion, SI fusion, obesity, diabetes, metabolic syndrome, bilateral feet neuropathy, obstructive sleep apnea, chronic pain syndrome on tramadol    Patient was evaluated and referred by endocrinologist for bilateral feet neuropathy  She was recently seen by neurosurgery in 2024 for back pain and bilateral numbness in lower extremity, he has a recommendation to obtain CT scan lumbar spine that showed L3 posterior instrumentation fusion with no acute finding  he was getting tramadol 50 mg, 100 mg 3 times a day per neurosurgery since .      Interval History 07/10/2024:  Kirill Gandhi III is here for follow up visit after Qutenza application bilateral feet in 2024, patient reported 80%  improvement in pain and functionality.  Today he presents with back pain and he would like to discuss treatment options.  Current pain score: 8/10 (back) 2/10 (feet)    Interval History 10/02/2024:  Kirill Gandhi III is here for three-months follow up visit to discuss treatment for low back pain and bilateral lower extremity radiation  Current pain score: 8/10      Diabetic: Yes    Anticogualtion drugs: 81 mg Aspirin     Allergy To Iodine: No    Currently on Antibiotic: No    Current Description of Pain Symptoms:    History of Recent Fall or Trauma: No   Onset: Chronic, started several years   Pain Location:  Bilateral feet burning(dorsal and plantar aspect) +bilat lower extremity from knee to feet + low back pain in addition to bilateral thigh  pain  Radiates/associated symptoms:  Burning, no numbness or tingling  Pain is Getting worse over the last 6 months    The pain is described as  Burning, no numbness or tingling  Exacerbating factors: constant.   Mitigating factors none.   Symptoms interfere with daily activity, sleeping, currently he is not working  The patient feels like symptoms have been worsening.   Patient denies night fever/night sweats, urinary incontinence, bowel incontinence, significant weight loss, significant motor weakness, and loss of sensations.    Pain score:   Best: 1/10  Worst: 10/10    Current pain medications:  For depression    ARIPiprazole (ABILIFY) 5 MG Tab    busPIRone (BUSPAR) 30 MG Tab  For pain    cyclobenzaprine (FLEXERIL) 10 MG tablet, TID    gabapentin (NEURONTIN) 800 MG tablet, TID    meloxicam (MOBIC) 15 MG tablet, PRN    traMADoL (ULTRAM) 50 mg tablet, 100 mg TID    acetaminophen (TYLENOL) 500 MG tablet,PRN  Current Narcotics/Opioid /benzo Medications:  Opioids- Tramadol (Ultram) per PCP  Benzodiazepines: No    UDS:  NA    PDMP:  Reviewed and consistent with medication use as prescribed.     Previous Chronic Pain Treatment History:  Physical Therapy/HEP/Physician Lead Exercise Program:  Over the past 12 months, Patient has done  0 sessions.  PT response: n/a   Dates of the PT sessions: n/a,   Is patient participating in home exercise program (HEP): Yes.  In the last 6 weeks over the last 6 months    Non-interventional Pain Therapy:  []Chiropractor.   []Acupuncture/Dry needle.  []TENS unit.  []Heat/ICE.  []Back Brace.    Medications previously tried:  NSAIDs: Meloxicam (Mobic) and Celebrex (Celecoxib)  Topical Agent: No  TCA/SSRI/SNRI: SNRI: Duloxetine (Cymbalta)   Anti-convulsants: Gabapentin , previously on Lyrica  Muscle Relaxants: Flexeril (Cyclobenzaprine), tizanidine  Opioids- Tramadol (Ultram).    Interventional Pain Procedures:  Epidural steroid injection 2019  Qutenza application bilateral feet in June 2024  - 80% for feet    Previous spine/Relevant joint surgery:  L3-bi-iliac posterior instrumented fusion with decompressive laminectomies and interbody disc spacers and bilateral SI fusion: 2019, 2020 and 2022 with good result as he was able to have better functionality and walking    Surgical History:   has a past surgical history that includes Tonsillectomy; Adenoidectomy; Fracture surgery; Hernia repair; Epidural steroid injection (Right, 09/04/2019); Vasectomy; Lumbar laminectomy (Bilateral, 11/29/2019); Fusion of lumbar spine by anterior approach (Left, 04/26/2021); Fusion of spine with instrumentation (N/A, 04/26/2021); Injection of steroid (Bilateral, 12/13/2021); Fusion of spine with instrumentation (N/A, 04/13/2022); Fusion of sacroiliac joint (Bilateral, 04/13/2022); removal implant, posterior segment, intraocular (04/13/2022); Spine surgery; Colonoscopy (N/A, 9/6/2022); and Esophagogastroduodenoscopy (N/A, 10/31/2022).  Medical History:   has a past medical history of Allergy, Anxiety, Back pain, Chronic bilateral low back pain with bilateral sciatica (12/14/2021), CTS (carpal tunnel syndrome) (06/26/2015), CTS (carpal tunnel syndrome) (6/26/2015), Depression, Diabetes (04/17/2015), Diabetes mellitus with insulin therapy, Elevated sed rate (6/26/2015), GERD (gastroesophageal reflux disease) (10/19/2022), vasectomy, Hypertension, Morbid obesity (4/16/2015), OCD (obsessive compulsive disorder), Sleep apnea, and Type 2 diabetes mellitus with hyperglycemia, with long-term current use of insulin (10/17/2022).  Family History:  family history includes Cataracts in his maternal grandfather and maternal grandmother; Fibromyalgia in his mother; Macular degeneration in his maternal grandfather; Osteoarthritis in his mother.  Allergies:  Dilaudid [hydromorphone]   Social History/SUBSTANCE ABUSE HISTORY:  Personal history of substance abuse: No   reports that he has been smoking vaping with nicotine. He has been exposed  to tobacco smoke. He has never used smokeless tobacco. He reports that he does not currently use alcohol. He reports that he does not use drugs.  LABS:  CBC  Lab Results   Component Value Date    WBC 7.29 03/27/2023    HGB 13.1 (L) 03/27/2023    HCT 40.4 03/27/2023     Coagulation Profile   Lab Results   Component Value Date     03/27/2023       Lab Results   Component Value Date    INR 1.0 03/07/2022     CMP:  BMP  Lab Results   Component Value Date     06/11/2024    K 4.6 06/11/2024     06/11/2024    CO2 26 06/11/2024    BUN 10 06/11/2024    CREATININE 0.75 06/11/2024    CALCIUM 9.3 06/11/2024    ANIONGAP 9 06/11/2024    EGFRNORACEVR >60.0 06/11/2024     Lab Results   Component Value Date    ALT 43 09/16/2024    AST 54 (H) 09/16/2024    ALKPHOS 97 09/16/2024    BILITOT 0.4 09/16/2024     HGBA1C:  Lab Results   Component Value Date    LABA1C 6.8 (H) 03/24/2017    HGBA1C 7.4 (H) 06/11/2024     ROS:    Review of Systems   GENERAL:  No weight loss, malaise or fevers.  HEENT:   No recent changes in vision or hearing  NECK:  Negative for lumps, no difficulty with swallowing.  RESPIRATORY:  Negative for cough, wheezing or shortness of breath, patient denies any recent URI.  CARDIOVASCULAR:  Negative for chest pain or palpitations.  GI:  Negative for abdominal discomfort, blood in stools or black stools or change in bowel habits.  MUSCULOSKELETAL:  See HPI.  SKIN:  Negative for lesions, rash, and itching.  PSYCH:  No mood disorder or recent psychosocial stressors.   HEMATOLOGY/LYMPHOLOGY:  See the blood thinner sectioned in HPI.  NEURO:  See HPI  All other reviewed and negative other than HPI.    PHYSICAL EXAM:  VITALS: Wt (!) 184.5 kg (406 lb 13.7 oz)   BMI 53.68 kg/m²   Body mass index is 53.68 kg/m².  GENERAL: Well appearing, in no acute distress, alert and oriented x3, answers questions appropriately.   PSYCH: Flat affect.  SKIN: Skin color, texture, turgor normal, no rashes or lesions.  HEAD/FACE:   Normocephalic, atraumatic. Cranial nerves grossly intact.  CV: Regular rate  PULM: No evidence of respiratory difficulty, symmetric chest rise.  GI:  Soft and non-Distended.  BACK/SIJ/HIP:  Lumbar Spine Exam:       Inspection: No erythema, bruising.       Palpation: (+) TTP of lumbar paraspinals bilaterally      ROM:  Limited in flexion, extension, lateral bending.       (+) Facet loading bilaterally      (NA) Straight Leg Raise bilaterally      (NA) JASEN bilaterally, Tenderness over the PSIS, Yeoman test  Hip Exam:      Inspection: No gross deformity or apparent leg length discrepancy      Palpation:  No TTP to bilateral greater trochanteric bursas.       ROM:  No limitation or pain in internal rotation, external rotation b/l  Neurologic Exam:     Alert. Speech is fluent and appropriate.     Strength: 4/5 in bilateral hip flexion and knee extension     Sensation:  Grossly intact to light touch in bilateral lower extremities     Tone: No abnormality appreciated in bilateral lower extremities    GAIT:  Antalgic gait, unsteady gait    DIAGNOSTIC STUDIES AND MEDICAL RECORDS REVIEW:  MRI THORACIC SPINE W WO CONTRAST; MRI LUMBAR SPINE W WO CONTRAST 2023    Operative change: L3-pelvis posterior instrumented fusion with interbody spacers, right L3-L4 and left L4-L5 hemilaminectomies.     Alignment: Normal thoracic kyphosis is preserved.  Lumbar alignment is maintained.  No spondylolisthesis.     Vertebrae: Thoracolumbar body heights are maintained. No acute fracture. No marrow infiltrative process.     Discs: T5-T8, and T9-T10 intervertebral disc height loss.  Lumbar intervertebral disc heights are maintained at the non-operative levels.     Cord: Mildly diminished caliber of the T3-T4 spinal cord, similar to prior with continued close approximation to the ventral thecal sac and prominence of the dorsal CSF space.  Punctate focus of mildly increased T2 signal of the T3 cord which again could reflect tiny syrinx or  myelopathic change, though nonspecific and better visualized on the prior MRI.  Conus is normal in appearance and terminates at L1.     Soft tissue structures: Visualized intrathoracic/intra-abdominal viscera are unremarkable.  Paraspinal muscular atrophy.  Relatively stable 4.0 x 0.8 cm dorsal midline subcutaneous soft tissue collection with surrounding scar and soft tissue edema lumbar level.     Degenerative change:     Circumferential disc bulges and facet arthropathy contributing to mild bilateral foraminal narrowing at T1-T2 and T2-T3.  Paracentral disc protrusions on the right at T5-T6 and on the left at T6-T7 contributing to mass effect on the adjacent lateral recesses.  Facet arthropathy contributing to mild right foraminal narrowing at T8-T9.  No significant foraminal narrowing or spinal canal stenosis identified at the remaining thoracic levels.     T12-L1: No significant foraminal narrowing or spinal canal stenosis.     L1-L2: Circumferential disc bulge and facet arthropathy contributing to mild bilateral foraminal narrowing.  No spinal canal stenosis.     L2-L3: Circumferential disc bulge and facet arthropathy contributing to mild bilateral foraminal narrowing and mild spinal canal stenosis.     Note operative levels demonstrate limited visualization due to metal artifact, body habitus and postoperative change.     L3-L4: Operative level.  Mild left, moderate right foraminal narrowing.  No significant spinal canal stenosis.     L4-L5: Operative level.  Neural foramina are poorly visualized.  Mild/moderate foraminal narrowing suggested.  No spinal canal stenosis.     L5-S1: Operative level.  Neural foramina are poorly visualized.  Mild/moderate left foraminal narrowing suggested.  No spinal canal stenosis.     Impression:     1. Operative change of L3-pelvic posterior instrumented fusion.  Stable fluid collection in the dorsal midline soft tissues, likely chronic postoperative seroma or hematoma.  2.  Stable diminished thoracic cord caliber at T3-T4 with punctate mildly increased signal of the T3 cord.  Findings are unchanged compared to MRI 2021; of note, this finding was better visualized on the prior MRI.  3. Stable thoracolumbar spondylosis, as above.     ASSESSMENT:  Kirill Gandhi III is a 49 y.o. male with the following diagnoses based on history, exam, and imagin. S/P fusion of sacroiliac joint    2. Postlaminectomy syndrome of lumbar region    3. Failed back surgical syndrome    ---------------------------------------------------------------  This is a pleasant 49 y.o. male patient with PMH/PSH of postlaminectomy syndrome, lumbar spine fusion, SI fusion, obesity, diabetes, metabolic syndrome, bilateral feet neuropathy, obstructive sleep apnea, chronic pain syndrome on tramadol, presenting with bilateral feet neuropathy consistent with painful diabetic neuropathy, patient had a recommendation from his endocrinologist to consider Qutenza patch which provided him with more than 80% improvement in pain and functionality.    Today he presents with low back pain and bilateral lower extremity radiation consistent with postlaminectomy syndrome, interested in proceeding with caudal epidural steroid injection  Treatment Plan:    Diagnostics/Referrals:  Continue with a home exercise    Medications:  Tylenol as needed   NSAIDs:  Over-the-counter  Topical Agent: No  TCA/SSRI/SNRI: None  Anti-convulsants:  Continue with gabapentin as prescribed  Muscle Relaxants:  Continue Flexeril as prescribed  Opioids:  Continue with tramadol as prescribed per PCP    Interventional Therapy:  Please schedule for caudal epidural steroid injection  No sedation as he may have no ride    Patient Education: Counseled patient regarding the importance of activity modification, I have stressed the importance of physical activity and a home exercise plan to help with pain and improve health.    Follow-up: RTC for  MELINDA    May consider: ROBBIN Smith MD  Anesthesiologist  Interventional Pain Medicine  10/02/2024    Disclaimer:  This note was prepared using voice recognition system and is likely to have sound alike errors that may have been overlooked even after proof reading.  Please call me with any questions.

## 2024-10-02 NOTE — PROGRESS NOTES
EST patient evaluation  [facility]    Kirill Gandhi III  : 1975  Date: 10/2/2024     CHIEF COMPLAINT:  No chief complaint on file.  Referring Physician: No ref. provider found  Primary Care Physician: Marcos Ibarra MD    HPI:  This is a 49 y.o. male with a chief complaint of No chief complaint on file.  . The patient has Past medical history/Past surgical history of postlaminectomy syndrome, lumbar spine fusion, SI fusion, obesity, diabetes, metabolic syndrome, bilateral feet neuropathy, obstructive sleep apnea, chronic pain syndrome on tramadol    Patient was evaluated and referred by endocrinologist for bilateral feet neuropathy  She was recently seen by neurosurgery in 2024 for back pain and bilateral numbness in lower extremity, he has a recommendation to obtain CT scan lumbar spine that showed L3 posterior instrumentation fusion with no acute finding  he was getting tramadol 50 mg, 100 mg 3 times a day per neurosurgery since .      Interval History 07/10/2024:  Kirill Gandhi III is here for follow up visit after Qutenza application bilateral feet in 2024, patient reported 80%  improvement in pain and functionality.  Today he presents with back pain and he would like to discuss treatment options.  Current pain score: 8/10 (back) 2/10 (feet)    Interval History 10/02/2024:  Kirill Gandhi III is here for three-months follow up visit ***  Current pain score: ***/10      Diabetic: Yes    Anticogualtion drugs: 81 mg Aspirin     Allergy To Iodine: No    Currently on Antibiotic: No    Current Description of Pain Symptoms:    History of Recent Fall or Trauma: No   Onset: Chronic, started several years   Pain Location:  Bilateral feet burning(dorsal and plantar aspect) +bilat lower extremity from knee to feet + low back pain in addition to bilateral thigh pain  Radiates/associated symptoms:  Burning, no numbness or  tingling  Pain is Getting worse over the last 6 months    The pain is described as  Burning, no numbness or tingling  Exacerbating factors: constant.   Mitigating factors none.   Symptoms interfere with daily activity, sleeping, currently he is not working  The patient feels like symptoms have been worsening.   Patient denies night fever/night sweats, urinary incontinence, bowel incontinence, significant weight loss, significant motor weakness, and loss of sensations.    Pain score:   Best: 1/10  Worst: 10/10    Current pain medications:  For depression    ARIPiprazole (ABILIFY) 5 MG Tab    busPIRone (BUSPAR) 30 MG Tab  For pain    cyclobenzaprine (FLEXERIL) 10 MG tablet, TID    gabapentin (NEURONTIN) 800 MG tablet, TID    meloxicam (MOBIC) 15 MG tablet, PRN    traMADoL (ULTRAM) 50 mg tablet, 100 mg TID    acetaminophen (TYLENOL) 500 MG tablet,PRN  Current Narcotics/Opioid /benzo Medications:  Opioids- Tramadol (Ultram) per PCP  Benzodiazepines: No    UDS:  NA    PDMP:  Reviewed and consistent with medication use as prescribed.     Previous Chronic Pain Treatment History:  Physical Therapy/HEP/Physician Lead Exercise Program:  Over the past 12 months, Patient has done  0 sessions.  PT response: n/a   Dates of the PT sessions: n/a,   Is patient participating in home exercise program (HEP): Yes.    Non-interventional Pain Therapy:  []Chiropractor.   []Acupuncture/Dry needle.  []TENS unit.  []Heat/ICE.  []Back Brace.    Medications previously tried:  NSAIDs: Meloxicam (Mobic) and Celebrex (Celecoxib)  Topical Agent: No  TCA/SSRI/SNRI: SNRI: Duloxetine (Cymbalta)   Anti-convulsants: Gabapentin , previously on Lyrica  Muscle Relaxants: Flexeril (Cyclobenzaprine), tizanidine  Opioids- Tramadol (Ultram).    Interventional Pain Procedures:  Epidural steroid injection 2019  Qutenza application bilateral feet in June 2024 - 80% for feet    Previous spine/Relevant joint surgery:  L3-bi-iliac posterior instrumented fusion with  decompressive laminectomies and interbody disc spacers and bilateral SI fusion: 2019, 2020 and 2022 with good result as he was able to have better functionality and walking    Surgical History:   has a past surgical history that includes Tonsillectomy; Adenoidectomy; Fracture surgery; Hernia repair; Epidural steroid injection (Right, 09/04/2019); Vasectomy; Lumbar laminectomy (Bilateral, 11/29/2019); Fusion of lumbar spine by anterior approach (Left, 04/26/2021); Fusion of spine with instrumentation (N/A, 04/26/2021); Injection of steroid (Bilateral, 12/13/2021); Fusion of spine with instrumentation (N/A, 04/13/2022); Fusion of sacroiliac joint (Bilateral, 04/13/2022); removal implant, posterior segment, intraocular (04/13/2022); Spine surgery; Colonoscopy (N/A, 9/6/2022); and Esophagogastroduodenoscopy (N/A, 10/31/2022).  Medical History:   has a past medical history of Allergy, Anxiety, Back pain, Chronic bilateral low back pain with bilateral sciatica (12/14/2021), CTS (carpal tunnel syndrome) (06/26/2015), CTS (carpal tunnel syndrome) (6/26/2015), Depression, Diabetes (04/17/2015), Diabetes mellitus with insulin therapy, Elevated sed rate (6/26/2015), GERD (gastroesophageal reflux disease) (10/19/2022), vasectomy, Hypertension, Morbid obesity (4/16/2015), OCD (obsessive compulsive disorder), Sleep apnea, and Type 2 diabetes mellitus with hyperglycemia, with long-term current use of insulin (10/17/2022).  Family History:  family history includes Cataracts in his maternal grandfather and maternal grandmother; Fibromyalgia in his mother; Macular degeneration in his maternal grandfather; Osteoarthritis in his mother.  Allergies:  Dilaudid [hydromorphone]   Social History/SUBSTANCE ABUSE HISTORY:  Personal history of substance abuse: No   reports that he has been smoking vaping with nicotine. He has been exposed to tobacco smoke. He has never used smokeless tobacco. He reports that he does not currently use alcohol.  He reports that he does not use drugs.  LABS:  CBC  Lab Results   Component Value Date    WBC 7.29 03/27/2023    HGB 13.1 (L) 03/27/2023    HCT 40.4 03/27/2023     Coagulation Profile   Lab Results   Component Value Date     03/27/2023       Lab Results   Component Value Date    INR 1.0 03/07/2022     CMP:  BMP  Lab Results   Component Value Date     06/11/2024    K 4.6 06/11/2024     06/11/2024    CO2 26 06/11/2024    BUN 10 06/11/2024    CREATININE 0.75 06/11/2024    CALCIUM 9.3 06/11/2024    ANIONGAP 9 06/11/2024    EGFRNORACEVR >60.0 06/11/2024     Lab Results   Component Value Date    ALT 43 09/16/2024    AST 54 (H) 09/16/2024    ALKPHOS 97 09/16/2024    BILITOT 0.4 09/16/2024     HGBA1C:  Lab Results   Component Value Date    LABA1C 6.8 (H) 03/24/2017    HGBA1C 7.4 (H) 06/11/2024       ROS:    Review of Systems   GENERAL:  No weight loss, malaise or fevers.  HEENT:   No recent changes in vision or hearing  NECK:  Negative for lumps, no difficulty with swallowing.  RESPIRATORY:  Negative for cough, wheezing or shortness of breath, patient denies any recent URI.  CARDIOVASCULAR:  Negative for chest pain or palpitations.  GI:  Negative for abdominal discomfort, blood in stools or black stools or change in bowel habits.  MUSCULOSKELETAL:  See HPI.  SKIN:  Negative for lesions, rash, and itching.  PSYCH:  No mood disorder or recent psychosocial stressors.   HEMATOLOGY/LYMPHOLOGY:  See the blood thinner sectioned in HPI.  NEURO:  See HPI  All other reviewed and negative other than HPI.    PHYSICAL EXAM:  VITALS: There were no vitals taken for this visit.  There is no height or weight on file to calculate BMI.  GENERAL: Well appearing, in no acute distress, alert and oriented x3, answers questions appropriately.   PSYCH: Flat affect.  SKIN: Skin color, texture, turgor normal, no rashes or lesions.  HEAD/FACE:  Normocephalic, atraumatic. Cranial nerves grossly intact.  CV: Regular rate  PULM: No  evidence of respiratory difficulty, symmetric chest rise.  GI:  Soft and non-Distended.  BACK/SIJ/HIP:  Lumbar Spine Exam:       Inspection: No erythema, bruising.       Palpation: (+) TTP of lumbar paraspinals bilaterally      ROM:  Limited in flexion, extension, lateral bending.       (+) Facet loading bilaterally      (NA) Straight Leg Raise bilaterally      (NA) JASEN bilaterally, Tenderness over the PSIS, Yeoman test  Hip Exam:      Inspection: No gross deformity or apparent leg length discrepancy      Palpation:  No TTP to bilateral greater trochanteric bursas.       ROM:  No limitation or pain in internal rotation, external rotation b/l  Neurologic Exam:     Alert. Speech is fluent and appropriate.     Strength: 4/5 in bilateral hip flexion and knee extension     Sensation:  Grossly intact to light touch in bilateral lower extremities     Tone: No abnormality appreciated in bilateral lower extremities    GAIT:  Antalgic gait, unsteady gait    DIAGNOSTIC STUDIES AND MEDICAL RECORDS REVIEW:  MRI THORACIC SPINE W WO CONTRAST; MRI LUMBAR SPINE W WO CONTRAST 2023    Operative change: L3-pelvis posterior instrumented fusion with interbody spacers, right L3-L4 and left L4-L5 hemilaminectomies.     Alignment: Normal thoracic kyphosis is preserved.  Lumbar alignment is maintained.  No spondylolisthesis.     Vertebrae: Thoracolumbar body heights are maintained. No acute fracture. No marrow infiltrative process.     Discs: T5-T8, and T9-T10 intervertebral disc height loss.  Lumbar intervertebral disc heights are maintained at the non-operative levels.     Cord: Mildly diminished caliber of the T3-T4 spinal cord, similar to prior with continued close approximation to the ventral thecal sac and prominence of the dorsal CSF space.  Punctate focus of mildly increased T2 signal of the T3 cord which again could reflect tiny syrinx or myelopathic change, though nonspecific and better visualized on the prior MRI.  Conus is  normal in appearance and terminates at L1.     Soft tissue structures: Visualized intrathoracic/intra-abdominal viscera are unremarkable.  Paraspinal muscular atrophy.  Relatively stable 4.0 x 0.8 cm dorsal midline subcutaneous soft tissue collection with surrounding scar and soft tissue edema lumbar level.     Degenerative change:     Circumferential disc bulges and facet arthropathy contributing to mild bilateral foraminal narrowing at T1-T2 and T2-T3.  Paracentral disc protrusions on the right at T5-T6 and on the left at T6-T7 contributing to mass effect on the adjacent lateral recesses.  Facet arthropathy contributing to mild right foraminal narrowing at T8-T9.  No significant foraminal narrowing or spinal canal stenosis identified at the remaining thoracic levels.     T12-L1: No significant foraminal narrowing or spinal canal stenosis.     L1-L2: Circumferential disc bulge and facet arthropathy contributing to mild bilateral foraminal narrowing.  No spinal canal stenosis.     L2-L3: Circumferential disc bulge and facet arthropathy contributing to mild bilateral foraminal narrowing and mild spinal canal stenosis.     Note operative levels demonstrate limited visualization due to metal artifact, body habitus and postoperative change.     L3-L4: Operative level.  Mild left, moderate right foraminal narrowing.  No significant spinal canal stenosis.     L4-L5: Operative level.  Neural foramina are poorly visualized.  Mild/moderate foraminal narrowing suggested.  No spinal canal stenosis.     L5-S1: Operative level.  Neural foramina are poorly visualized.  Mild/moderate left foraminal narrowing suggested.  No spinal canal stenosis.     Impression:     1. Operative change of L3-pelvic posterior instrumented fusion.  Stable fluid collection in the dorsal midline soft tissues, likely chronic postoperative seroma or hematoma.  2. Stable diminished thoracic cord caliber at T3-T4 with punctate mildly increased signal of  the T3 cord.  Findings are unchanged compared to MRI 11/23/2021; of note, this finding was better visualized on the prior MRI.  3. Stable thoracolumbar spondylosis, as above.     ASSESSMENT:  Kirill Gandhi III is a 49 y.o. male with the following diagnoses based on history, exam, and imaging:  There are no diagnoses linked to this encounter.  ---------------------------------------------------------------  This is a pleasant 49 y.o. male patient with PMH/PSH of postlaminectomy syndrome, lumbar spine fusion, SI fusion, obesity, diabetes, metabolic syndrome, bilateral feet neuropathy, obstructive sleep apnea, chronic pain syndrome on tramadol, presenting with bilateral feet neuropathy consistent with painful diabetic neuropathy, patient had a recommendation from his endocrinologist to consider Qutenza patch which provided him with more than 80% improvement in pain and functionality.    Today he presents with low back pain and bilateral lower extremity radiation consistent with postlaminectomy syndrome.  Treatment Plan:    Diagnostics/Referrals:  Updating MRI thoracic and lumbar spine.    Medications:  Tylenol as needed   NSAIDs:  Over-the-counter  Topical Agent: No  TCA/SSRI/SNRI: None  Anti-convulsants:  Continue with gabapentin as prescribed  Muscle Relaxants:  Continue Flexeril as prescribed  Opioids:  Continue with tramadol as prescribed per PCP    Interventional Therapy:  May consider spinal cord stimulator if he changes his insurance  He was hesitant to proceed with any type of interventional procedure or injection as he had no relief from prior injections.    Patient Education: Counseled patient regarding the importance of activity modification, I have stressed the importance of physical activity and a home exercise plan to help with pain and improve health.    Follow-up: RTC 2 Months    May consider:  Caudal epidural steroid injection    Clark Smith MD  Anesthesiologist  Interventional Pain  Medicine  10/02/2024    Disclaimer:  This note was prepared using voice recognition system and is likely to have sound alike errors that may have been overlooked even after proof reading.  Please call me with any questions.

## 2024-10-09 ENCOUNTER — OFFICE VISIT (OUTPATIENT)
Dept: OPHTHALMOLOGY | Facility: CLINIC | Age: 49
End: 2024-10-09
Payer: MEDICAID

## 2024-10-09 ENCOUNTER — CLINICAL SUPPORT (OUTPATIENT)
Dept: OPHTHALMOLOGY | Facility: CLINIC | Age: 49
End: 2024-10-09
Payer: MEDICAID

## 2024-10-09 DIAGNOSIS — H57.813 BROW PTOSIS, BILATERAL: Primary | ICD-10-CM

## 2024-10-09 DIAGNOSIS — H02.423 ACQUIRED MYOGENIC PTOSIS OF EYELID, BILATERAL: ICD-10-CM

## 2024-10-09 PROCEDURE — 99214 OFFICE O/P EST MOD 30 MIN: CPT | Mod: PBBFAC,25 | Performed by: OPHTHALMOLOGY

## 2024-10-09 PROCEDURE — 99999 PR PBB SHADOW E&M-EST. PATIENT-LVL IV: CPT | Mod: PBBFAC,,, | Performed by: OPHTHALMOLOGY

## 2024-10-09 PROCEDURE — 99204 OFFICE O/P NEW MOD 45 MIN: CPT | Mod: S$PBB,,, | Performed by: OPHTHALMOLOGY

## 2024-10-09 NOTE — PROGRESS NOTES
HPI    Kirill Gandhi is a/an 49 y.o. male here for ptosis.  States that he has a RUL MARGIN Lesion that he is concerned about  Referred by:   How long have eyelid(s) been droopy? 4+ years   Any h/o wearing hard CLs? -  Do eyelids feel heavy? +  Does the height of the eyelid(s) fluctuate throughout the day? +  Do eyelid(s) interfere with daily activities such as driving, reading,   working? +  Spontaneous orbital pain? -  Orbital pain w eye movement? -  Red eyes? -  Red eyelids? -  Eyelid swelling? -  History of cardiac pacemaker? -    Patient states lesion has been present on RUL MARGIN for about 3 years.   There is no pain, irritation, or bleeding. (NO) history of skin cancer.   Patient would like to discuss removal today.     Last edited by Donnell Lopez on 10/9/2024  2:08 PM.            Assessment /Plan     For exam results, see Encounter Report.    Brow ptosis, bilateral    Acquired myogenic ptosis of eyelid, bilateral  -     External Photography - OU - Both Eyes  -     Goldmann Perimetry - OU - Intermediate - Both Eyes        49 y.o. male with bilateral upper eyelid heaviness affecting affecting activities of daily living over the last 4 years. This has been progressive. There is no prior history of eyelid surgery or trauma.     On exam, the patient has bilateral temporal brow ptosis with the inferior aspect of the brow sitting below the frontal bone. He has bilateral upper eyelid acquired myogenic ptosis symmetrically. He has mild bilateral upper and lower eyelid dermatochalasis with mild lateral canthal laxity. He has a right upper eyelid lesion.     Pt. With significant improvement of superior visual fields with lids and brows taped vs. untaped.    We discussed options for eyelid ptosis repair including external levator resection.    We discussed options for brow ptosis repair including direct temporal browlift or browpexy.    Recommend bilateral upper eyelid external levator resection and  bilateral brow ptosis repair. Plan for biopsy of right upper eyelid lesion.     Informed consent obtained after extensive risks/benefits/alternatives were discussed with the patient including but not limited to pain, bleeding, infection, ocular injury, loss of the eye, asymmetry, need for revision in future, scarring.  Alternatives such as waiting were discussed.  All questions were answered.      Hold ASA, NSAIDS, fish oil, Vitamin E and Multivitamins 5 to 7 days prior to procedure.    Hold Xarelto for three days prior to surgery, and hold eliquis for two days prior to surgery.    Recommend medical clearance by your PCP prior to your procedure.     Return for surgery

## 2024-10-09 NOTE — PROGRESS NOTES
Ptosis GVF/External photos done ou./ rel/fix/coop. Good ou./ chart checked for latex allergy.-[facility]

## 2024-10-11 ENCOUNTER — PATIENT OUTREACH (OUTPATIENT)
Dept: FAMILY MEDICINE | Facility: CLINIC | Age: 49
End: 2024-10-11
Payer: MEDICAID

## 2024-10-11 ENCOUNTER — OFFICE VISIT (OUTPATIENT)
Dept: FAMILY MEDICINE | Facility: CLINIC | Age: 49
End: 2024-10-11
Payer: MEDICAID

## 2024-10-11 ENCOUNTER — TELEPHONE (OUTPATIENT)
Dept: PAIN MEDICINE | Facility: CLINIC | Age: 49
End: 2024-10-11
Payer: MEDICAID

## 2024-10-11 VITALS
HEART RATE: 92 BPM | DIASTOLIC BLOOD PRESSURE: 84 MMHG | BODY MASS INDEX: 41.75 KG/M2 | HEIGHT: 73 IN | SYSTOLIC BLOOD PRESSURE: 132 MMHG | OXYGEN SATURATION: 98 % | TEMPERATURE: 99 F | WEIGHT: 315 LBS

## 2024-10-11 DIAGNOSIS — E11.65 UNCONTROLLED TYPE 2 DIABETES MELLITUS WITH HYPERGLYCEMIA: ICD-10-CM

## 2024-10-11 DIAGNOSIS — R11.0 NAUSEA: ICD-10-CM

## 2024-10-11 DIAGNOSIS — Z23 NEED FOR INFLUENZA VACCINATION: ICD-10-CM

## 2024-10-11 DIAGNOSIS — M54.17 RADICULOPATHY OF LUMBOSACRAL REGION: Primary | ICD-10-CM

## 2024-10-11 PROCEDURE — 3079F DIAST BP 80-89 MM HG: CPT | Mod: CPTII,S$GLB,, | Performed by: FAMILY MEDICINE

## 2024-10-11 PROCEDURE — 90656 IIV3 VACC NO PRSV 0.5 ML IM: CPT | Mod: S$GLB,,, | Performed by: FAMILY MEDICINE

## 2024-10-11 PROCEDURE — 3008F BODY MASS INDEX DOCD: CPT | Mod: CPTII,S$GLB,, | Performed by: FAMILY MEDICINE

## 2024-10-11 PROCEDURE — 3066F NEPHROPATHY DOC TX: CPT | Mod: CPTII,S$GLB,, | Performed by: FAMILY MEDICINE

## 2024-10-11 PROCEDURE — 90471 IMMUNIZATION ADMIN: CPT | Mod: S$GLB,,, | Performed by: FAMILY MEDICINE

## 2024-10-11 PROCEDURE — 3051F HG A1C>EQUAL 7.0%<8.0%: CPT | Mod: CPTII,S$GLB,, | Performed by: FAMILY MEDICINE

## 2024-10-11 PROCEDURE — 1159F MED LIST DOCD IN RCRD: CPT | Mod: CPTII,S$GLB,, | Performed by: FAMILY MEDICINE

## 2024-10-11 PROCEDURE — 3075F SYST BP GE 130 - 139MM HG: CPT | Mod: CPTII,S$GLB,, | Performed by: FAMILY MEDICINE

## 2024-10-11 PROCEDURE — 99213 OFFICE O/P EST LOW 20 MIN: CPT | Mod: 25,S$GLB,, | Performed by: FAMILY MEDICINE

## 2024-10-11 PROCEDURE — 1160F RVW MEDS BY RX/DR IN RCRD: CPT | Mod: CPTII,S$GLB,, | Performed by: FAMILY MEDICINE

## 2024-10-11 PROCEDURE — 3061F NEG MICROALBUMINURIA REV: CPT | Mod: CPTII,S$GLB,, | Performed by: FAMILY MEDICINE

## 2024-10-11 RX ORDER — TRAMADOL HYDROCHLORIDE 50 MG/1
100 TABLET ORAL EVERY 8 HOURS PRN
Qty: 180 TABLET | Refills: 2 | Status: SHIPPED | OUTPATIENT
Start: 2024-10-11

## 2024-10-11 RX ORDER — MELOXICAM 15 MG/1
TABLET ORAL
Qty: 90 TABLET | Refills: 3 | Status: SHIPPED | OUTPATIENT
Start: 2024-10-11

## 2024-10-11 RX ORDER — ATORVASTATIN CALCIUM 10 MG/1
10 TABLET, FILM COATED ORAL DAILY
Qty: 90 TABLET | Refills: 3 | Status: SHIPPED | OUTPATIENT
Start: 2024-10-11 | End: 2025-10-11

## 2024-10-11 RX ORDER — ONDANSETRON 4 MG/1
4 TABLET, ORALLY DISINTEGRATING ORAL EVERY 8 HOURS PRN
Qty: 30 TABLET | Refills: 5 | Status: SHIPPED | OUTPATIENT
Start: 2024-10-11

## 2024-10-11 RX ORDER — METFORMIN HYDROCHLORIDE 500 MG/1
1000 TABLET, EXTENDED RELEASE ORAL 2 TIMES DAILY WITH MEALS
Qty: 360 TABLET | Refills: 3 | Status: SHIPPED | OUTPATIENT
Start: 2024-10-11 | End: 2025-10-11

## 2024-10-11 RX ORDER — VALSARTAN AND HYDROCHLOROTHIAZIDE 320; 12.5 MG/1; MG/1
1 TABLET, FILM COATED ORAL DAILY
Qty: 90 TABLET | Refills: 3 | Status: SHIPPED | OUTPATIENT
Start: 2024-10-11

## 2024-10-11 RX ORDER — LIDOCAINE 50 MG/G
1 PATCH TOPICAL DAILY
Qty: 30 PATCH | Refills: 1 | Status: SHIPPED | OUTPATIENT
Start: 2024-10-11

## 2024-10-11 RX ORDER — CYCLOBENZAPRINE HCL 10 MG
10 TABLET ORAL 3 TIMES DAILY
Qty: 90 TABLET | Refills: 2 | Status: SHIPPED | OUTPATIENT
Start: 2024-10-11

## 2024-10-11 RX ORDER — GABAPENTIN 800 MG/1
800 TABLET ORAL 3 TIMES DAILY
Qty: 90 TABLET | Refills: 3 | Status: SHIPPED | OUTPATIENT
Start: 2024-10-11

## 2024-10-11 NOTE — TELEPHONE ENCOUNTER
Patient still waiting to schedule eye procedure. He will contact clinic once that is scheduled to schedule for inj.

## 2024-10-11 NOTE — TELEPHONE ENCOUNTER
----- Message from Nurse Mercado sent at 10/2/2024  4:17 PM CDT -----    ----- Message -----  From: Clark Smith MD  Sent: 10/2/2024  12:09 PM CDT  To: Shahla Swan RT; Sarah Rodas Staff    · Interventional Therapy:  Please schedule for caudal epidural steroid injection with catheter  No sedation as he may have no ride

## 2024-10-14 NOTE — PROGRESS NOTES
" Patient ID: Kirill Gandhi III is a 49 y.o. male.    Chief Complaint: Follow-up (6mth follow up )    HPI       Kirill Gandhi III is a 49 y.o. male here for routine follow-up.  Patient with use of opioid medication clinic pain for his lower back.  Also diabetes-getting better control with current medications sees Endocrine.      Review of Symptoms      /84   Pulse 92   Temp 98.6 °F (37 °C) (Oral)   Ht 6' 1" (1.854 m)   Wt (!) 186.3 kg (410 lb 11.5 oz)   SpO2 98%   BMI 54.19 kg/m²     Physical Exam    Vitals:    10/11/24 1413   BP: 132/84   Pulse: 92   Temp: 98.6 °F (37 °C)   TempSrc: Oral   SpO2: 98%   Weight: (!) 186.3 kg (410 lb 11.5 oz)   Height: 6' 1" (1.854 m)       Constitutional:   Oriented to person, place, and time.appears well-developed and well-nourished.   No distress.      HENT  Head: Normocephalic and atraumatic  Right Ear: External ear normal.   Left Ear: External ear normal.   Nose: External nose normal.   Mouth: Moist mucous membranes    Eyes:   Conjunctivae are normal. Right eye exhibits no discharge. Left eye exhibits no discharge. No scleral icterus. No periorbital edema    Musculoskeletal:  No edema. No obvious deformity No wasting     Neurological:  Alert and oriented to person, place, and time. Coordination normal.     Skin:   Skin is warm and dry.  No diaphoresis.   No rash noted.     Psychiatric: Normal mood and affect. Behavior is normal. Judgment and thought content normal.     Complete Blood Count  Lab Results   Component Value Date    RBC 4.57 (L) 03/27/2023    HGB 13.1 (L) 03/27/2023    HCT 40.4 03/27/2023    MCV 88 03/27/2023    MCH 28.7 03/27/2023    MCHC 32.4 03/27/2023    RDW 14.0 03/27/2023     03/27/2023    MPV 9.6 03/27/2023    GRAN 4.4 03/27/2023    GRAN 59.9 03/27/2023    LYMPH 2.0 03/27/2023    LYMPH 27.7 03/27/2023    MONO 0.6 03/27/2023    MONO 8.2 03/27/2023    EOS 0.2 03/27/2023    BASO 0.05 03/27/2023    EOSINOPHIL 3.0 03/27/2023    " "BASOPHIL 0.7 03/27/2023    DIFFMETHOD Automated 03/27/2023       Comprehensive Metabolic Panel  Lab Results   Component Value Date     (H) 06/11/2024    BUN 10 06/11/2024    CREATININE 0.75 06/11/2024     06/11/2024    K 4.6 06/11/2024     06/11/2024    PROT 7.2 09/16/2024    ALBUMIN 3.8 09/16/2024    BILITOT 0.4 09/16/2024    AST 54 (H) 09/16/2024    ALKPHOS 97 09/16/2024    CO2 26 06/11/2024    ALT 43 09/16/2024    ANIONGAP 9 06/11/2024       TSH  No results found for: "TSH"    Assessment / Plan:      ICD-10-CM ICD-9-CM   1. Radiculopathy of lumbosacral region  M54.17 724.4   2. Uncontrolled type 2 diabetes mellitus with hyperglycemia  E11.65 250.02   3. Nausea  R11.0 787.02   4. Need for influenza vaccination  Z23 V04.81     Radiculopathy of lumbosacral region  -     LIDOcaine (LIDODERM) 5 %; Place 1 patch onto the skin once daily. Remove & Discard patch within 12 hours or as directed by MD  Dispense: 30 patch; Refill: 1    Uncontrolled type 2 diabetes mellitus with hyperglycemia    Nausea    Need for influenza vaccination  -     (VFC) influenza (Flulaval, Fluzone, Fluarix) 45 mcg/0.5 mL IM vaccine (> or = 6 mo) 0.5 mL    Other orders  -     cyclobenzaprine (FLEXERIL) 10 MG tablet; Take 1 tablet (10 mg total) by mouth 3 (three) times daily. TAKE 1 TABLET BY MOUTH THREE TIMES A DAY AS NEEDED FOR MUSCLE SPASMS  Dispense: 90 tablet; Refill: 2  -     traMADoL (ULTRAM) 50 mg tablet; Take 2 tablets (100 mg total) by mouth every 8 (eight) hours as needed (severe pain).  Dispense: 180 tablet; Refill: 2  -     atorvastatin (LIPITOR) 10 MG tablet; Take 1 tablet (10 mg total) by mouth once daily.  Dispense: 90 tablet; Refill: 3  -     metFORMIN (GLUCOPHAGE-XR) 500 MG ER 24hr tablet; Take 2 tablets (1,000 mg total) by mouth 2 (two) times daily with meals.  Dispense: 360 tablet; Refill: 3  -     ondansetron (ZOFRAN-ODT) 4 MG TbDL; Take 1 tablet (4 mg total) by mouth every 8 (eight) hours as needed (nausea). "  Dispense: 30 tablet; Refill: 5  -     valsartan-hydrochlorothiazide (DIOVAN-HCT) 320-12.5 mg per tablet; Take 1 tablet by mouth once daily.  Dispense: 90 tablet; Refill: 3  -     meloxicam (MOBIC) 15 MG tablet; TAKE 1 TABLET BY MOUTH ONCE DAILY, DISCONTINUE CELEBREX  Dispense: 90 tablet; Refill: 3  -     gabapentin (NEURONTIN) 800 MG tablet; Take 1 tablet (800 mg total) by mouth 3 (three) times daily.  Dispense: 90 tablet; Refill: 3

## 2024-10-15 ENCOUNTER — TELEPHONE (OUTPATIENT)
Dept: OPHTHALMOLOGY | Facility: CLINIC | Age: 49
End: 2024-10-15
Payer: MEDICAID

## 2024-10-15 NOTE — TELEPHONE ENCOUNTER
----- Message from Beverly sent at 10/15/2024 11:58 AM CDT -----  Contact: pt @ [phone]  RAFAEL GELLER calling regarding Appointment Access  (message) for #Pt is calling to speak with Jessie to schedule surgery, asking for call back

## 2024-10-16 ENCOUNTER — TELEPHONE (OUTPATIENT)
Dept: OPHTHALMOLOGY | Facility: CLINIC | Age: 49
End: 2024-10-16
Payer: MEDICAID

## 2024-10-16 RX ORDER — TRAZODONE HYDROCHLORIDE 50 MG/1
TABLET ORAL
Qty: 60 TABLET | Refills: 3 | Status: SHIPPED | OUTPATIENT
Start: 2024-10-16

## 2024-10-16 NOTE — TELEPHONE ENCOUNTER
Requested Prescriptions     Pending Prescriptions Disp Refills    traZODone (DESYREL) 50 MG tablet [Pharmacy Med Name: TRAZODONE 50 MG TABLET] 60 tablet 3     Sig: TAKE 1-2 TABLETS BY MOUTH EVERY EVENING     Lov 5/31/24

## 2024-10-17 ENCOUNTER — TELEPHONE (OUTPATIENT)
Dept: FAMILY MEDICINE | Facility: CLINIC | Age: 49
End: 2024-10-17
Payer: MEDICAID

## 2024-10-17 ENCOUNTER — OFFICE VISIT (OUTPATIENT)
Dept: ENDOCRINOLOGY | Facility: CLINIC | Age: 49
End: 2024-10-17
Payer: MEDICAID

## 2024-10-17 ENCOUNTER — PATIENT MESSAGE (OUTPATIENT)
Dept: PAIN MEDICINE | Facility: CLINIC | Age: 49
End: 2024-10-17
Payer: MEDICAID

## 2024-10-17 VITALS — HEART RATE: 70 BPM | BODY MASS INDEX: 54.06 KG/M2 | WEIGHT: 315 LBS | OXYGEN SATURATION: 97 %

## 2024-10-17 DIAGNOSIS — Z79.4 TYPE 2 DIABETES MELLITUS WITH HYPERGLYCEMIA, WITH LONG-TERM CURRENT USE OF INSULIN: ICD-10-CM

## 2024-10-17 DIAGNOSIS — E11.65 UNCONTROLLED TYPE 2 DIABETES MELLITUS WITH HYPERGLYCEMIA: ICD-10-CM

## 2024-10-17 DIAGNOSIS — E11.65 TYPE 2 DIABETES MELLITUS WITH HYPERGLYCEMIA, WITH LONG-TERM CURRENT USE OF INSULIN: ICD-10-CM

## 2024-10-17 DIAGNOSIS — E78.5 DYSLIPIDEMIA: ICD-10-CM

## 2024-10-17 DIAGNOSIS — E66.01 MORBID OBESITY: Primary | ICD-10-CM

## 2024-10-17 DIAGNOSIS — I10 BENIGN ESSENTIAL HYPERTENSION: ICD-10-CM

## 2024-10-17 PROCEDURE — 99999 PR PBB SHADOW E&M-EST. PATIENT-LVL V: CPT | Mod: PBBFAC,,, | Performed by: STUDENT IN AN ORGANIZED HEALTH CARE EDUCATION/TRAINING PROGRAM

## 2024-10-17 PROCEDURE — 99215 OFFICE O/P EST HI 40 MIN: CPT | Mod: PBBFAC | Performed by: STUDENT IN AN ORGANIZED HEALTH CARE EDUCATION/TRAINING PROGRAM

## 2024-10-17 RX ORDER — INSULIN HUMAN 500 [IU]/ML
INJECTION, SOLUTION SUBCUTANEOUS
Qty: 6 PEN | Refills: 6 | Status: SHIPPED | OUTPATIENT
Start: 2024-10-17

## 2024-10-17 RX ORDER — TIRZEPATIDE 15 MG/.5ML
15 INJECTION, SOLUTION SUBCUTANEOUS
Qty: 4 PEN | Refills: 11 | Status: SHIPPED | OUTPATIENT
Start: 2024-10-17

## 2024-10-17 RX ORDER — DAPAGLIFLOZIN 5 MG/1
5 TABLET, FILM COATED ORAL DAILY
Qty: 30 TABLET | Refills: 11 | Status: SHIPPED | OUTPATIENT
Start: 2024-10-17 | End: 2024-10-17 | Stop reason: SDUPTHER

## 2024-10-17 RX ORDER — INSULIN HUMAN 500 [IU]/ML
110-135 INJECTION, SOLUTION SUBCUTANEOUS
Qty: 6 PEN | Refills: 3 | Status: CANCELLED | OUTPATIENT
Start: 2024-10-17

## 2024-10-17 RX ORDER — DAPAGLIFLOZIN 5 MG/1
5 TABLET, FILM COATED ORAL DAILY
Qty: 30 TABLET | Refills: 11 | Status: SHIPPED | OUTPATIENT
Start: 2024-10-17

## 2024-10-17 RX ORDER — PIOGLITAZONEHYDROCHLORIDE 30 MG/1
30 TABLET ORAL DAILY
Qty: 90 TABLET | Refills: 3 | Status: SHIPPED | OUTPATIENT
Start: 2024-10-17 | End: 2025-10-17

## 2024-10-17 NOTE — PATIENT INSTRUCTIONS
Start  Pioglitazone 30 mg once daily    Continue   Mounjaro 15mg SC weekly,    Metformin 500mg XR 2 tabs twice daily   Farxiga 5 mg daily     Increase  U500 120-130-150  30 minutes before meals

## 2024-10-17 NOTE — PROGRESS NOTES
I have seen the patient, reviewed the fellow's history and physical, assessment, plan, and progress note. I have personally interviewed and examined the patient at bedside and agree with the findings.     Agree that bariatric surgery is indicated. Patient has concerns about long term effects, cost and insurance coverage. Will refer to bariatric surgery clinic to look into options. Adding pioglitazone to promote insulin sensitivity. Discussed potential side-effects.    Jose L Elaine MD  Endocrinology Staff

## 2024-10-17 NOTE — PROGRESS NOTES
Subjective:      Patient ID: Kirill Gandhi III is a 49 y.o.    Chief Complaint:  DM2; Follow up visit       History of Present Illness  49 y.o. Male w/ medical history as above that presents to the endocrine clinic for follow up evaluation of DM2. Previous patient of Dr Morris. Last visit with Sandra Barrios in Dec 2023     Interval history    Recently got back on Mounjaro 2 weeks ago. Has had issues with higher BG levels when off Mounjaro.    With regards to Diabetes Mellitus:     -Type 2     -Duration:  Dx > 10  yrs ago with symptoms and blood work for evaluation of fibromyalgia     -FH?  Grandmother,  Aunts     -Microvascular complications: (Retinopathy,  Neuropathy)   Has seen eye doctor 2024  Gastroparesis: denies   No Nephropathy  No CAD  Does describe a rubber band feeling on awakening at times    -DKA?  DENIES   -HHS?   DENIES     -DM Medications:     Metformin 500mg XR 2 tabs twice daily  Mounjaro 15mg SC weekly   Farxiga 5 mg daily    U500 100/100/120      He is trying to give 30 minutes before meals but does not always happen. He is changing needle every time and rotating sites.     -Compliance w/ Meds: He has been out of Mounjaro as above     -Previously tried medications:     Rybelsus    Metformin   Levemir  Novolog     -Glucose monitoring: DEXCOM G7            Poor TIR, GMI above goal, prandial hyperglycemia especially lunch and dinner.    Hypoglycemia: not happening; has hypoglycemia awareness  Corrects with food -fruit   Has glucose gel      -Diet:   Not following a diet consistently    -Activity:   Sedentary,  Back pain      Diabetes education: July 2023     Glucagon: does not have     -Pancreatitis?  NO     -Thyroid CA?  NO     Diabetes Management Status    Statin: Taking  ACE/ARB: Taking    With regards to elevated LFTs,      Latest Reference Range & Units 12/08/23 11:41   AST 15 - 46 U/L 53 (H)   ALT 10 - 44 U/L 60 (H)   (H): Data is abnormally high    FIB-4 Calculation: 1.24 at  12/12/2023  1:43 PM     FIB-4 below 1.30 is considered as low-risk for advanced fibrosis  FIB-4 over 2.67 is considered as high-risk for advanced fibrosis  FIB-4 values between 1.30 and 2.67 are considered as intermediate-risk of advanced fibrosis for ages 36-64.     For ages > 64 the cut-off for low-risk goes to < 2.  This is a screening tool and clinical judgement should be used in the interpretation of these results.      Screening or Prevention Patient's value Goal Complete/Controlled?   HgA1C Testing and Control   Lab Results   Component Value Date    HGBA1C 7.4 (H) 06/11/2024      Annually/Less than 8% No     Lipid profile : 06/11/2024 Annually Yes     LDL control Lab Results   Component Value Date    LDLCALC 40.6 (L) 06/11/2024    Annually/Less than 100 mg/dl  Yes     Nephropathy screening Lab Results   Component Value Date    LABMICR <5.0 06/11/2024     Lab Results   Component Value Date    PROTEINUA Negative 11/11/2022    Annually Yes     Blood pressure BP Readings from Last 1 Encounters:   10/11/24 132/84    Less than 140/90 No     Dilated retinal exam : 05/16/2024 Annually Yes     Foot exam   : 04/29/2024 Annually No        With regards to dyslipidemia,   He is on atorvastatin 10 mg and he is taking CQ10 daily.     He is wearing CPAP    Review of Systems   Constitutional:  Negative for fatigue and unexpected weight change.   Eyes:  Negative for visual disturbance.   Endocrine: Negative for polydipsia, polyphagia and polyuria.     Objective:     Pulse 70   Wt (!) 185.9 kg (409 lb 11.6 oz)   SpO2 97%   BMI 54.06 kg/m²     Body mass index is 54.06 kg/m².    Physical Exam  Vitals reviewed.   Constitutional:       Appearance: He is obese.   Pulmonary:      Effort: Pulmonary effort is normal. No respiratory distress.   Skin:     General: Skin is warm and dry.   Neurological:      General: No focal deficit present.      Mental Status: He is alert.       Lab Review:   Lab Results   Component Value Date     HGBA1C 7.4 (H) 06/11/2024     Lab Results   Component Value Date    CHOL 112 (L) 06/11/2024    HDL 28 (L) 06/11/2024    LDLCALC 40.6 (L) 06/11/2024    TRIG 217 (H) 06/11/2024    CHOLHDL 25.0 06/11/2024     Assessment and Plan     1. Morbid obesity  Ambulatory referral/consult to Bariatric Surgery      2. Uncontrolled type 2 diabetes mellitus with hyperglycemia  tirzepatide (MOUNJARO) 15 mg/0.5 mL PnIj    dapagliflozin propanediol (FARXIGA) 5 mg Tab tablet    DISCONTINUED: dapagliflozin propanediol (FARXIGA) 5 mg Tab tablet      3. Type 2 diabetes mellitus with hyperglycemia, with long-term current use of insulin  Ambulatory referral/consult to Diabetes Education    pioglitazone (ACTOS) 30 MG tablet      4. Dyslipidemia  insulin regular hum U-500 conc (HUMULIN R U-500, CONC, KWIKPEN) 500 unit/mL (3 mL) insulin pen      5. Benign essential hypertension            Type 2 diabetes mellitus with hyperglycemia, with long-term current use of insulin  Dexcom data reviewed: TIR 13% below goal with post-prandial spikes. No low Bgs.  Discussed options for bariatric surgery, pt is afraid of having to eat less, but he is willing to discuss it with the team if covered by insurance.    Medications:  Start  Pioglitazone 30 mg once daily    Continue   Mounjaro 15mg SC weekly,    Metformin 500mg XR 2 tabs twice daily   Farxiga 5 mg daily     Increase  U500 120-130-150  30 minutes before meals     Check labs on RTC     -- Reviewed patient's current insulin regimen. Clarified proper insulin dose and timing in relation to meals, etc. Insulin injection sites and proper rotation instructed.      Dyslipidemia  LDL at goal on atorvastatin 10 mg daily  Recommend continuing.    Benign essential hypertension  BP controlled on current BP meds   On ACE for renal protection   Recommend low Na diet    Follow up in about 4 months (around 2/17/2025) for DM.    Mariann Tubbs MD  [facility]

## 2024-10-17 NOTE — ASSESSMENT & PLAN NOTE
Dexcom data reviewed: TIR 13% below goal with post-prandial spikes. No low Bgs.  Discussed options for bariatric surgery, pt is afraid of having to eat less, but he is willing to discuss it with the team if covered by insurance.    Medications:  Start  Pioglitazone 30 mg once daily    Continue   Mounjaro 15mg SC weekly,    Metformin 500mg XR 2 tabs twice daily   Farxiga 5 mg daily     Increase  U500 120-130-150  30 minutes before meals     Check labs on RTC     -- Reviewed patient's current insulin regimen. Clarified proper insulin dose and timing in relation to meals, etc. Insulin injection sites and proper rotation instructed.

## 2024-10-23 ENCOUNTER — PROCEDURE VISIT (OUTPATIENT)
Dept: PAIN MEDICINE | Facility: CLINIC | Age: 49
End: 2024-10-23
Payer: MEDICAID

## 2024-10-23 VITALS — HEART RATE: 78 BPM | SYSTOLIC BLOOD PRESSURE: 135 MMHG | DIASTOLIC BLOOD PRESSURE: 81 MMHG

## 2024-10-23 DIAGNOSIS — G57.93 NEUROPATHY OF BOTH FEET: ICD-10-CM

## 2024-10-23 DIAGNOSIS — E11.40 CHRONIC PAINFUL DIABETIC NEUROPATHY: Primary | ICD-10-CM

## 2024-10-23 PROCEDURE — 64999 UNLISTED PX NERVOUS SYSTEM: CPT | Mod: PBBFAC,PN | Performed by: ANESTHESIOLOGY

## 2024-10-23 PROCEDURE — 99999PBSHW PR PBB SHADOW TECHNICAL ONLY FILED TO HB: Mod: JZ,PBBFAC,,

## 2024-10-23 RX ADMIN — CAPSAICIN 3 PATCH: KIT at 10:10

## 2024-10-23 NOTE — PROCEDURES
PROCEDURE: Qutenza (Capsaicin 8% topical system) - bilateral Feet    PATIENT NAME: Kirill Gandhi III   MRN: [id number]     DATE OF PROCEDURE: 10/23/2024    Diagnosis:  Diabetes mellitus due to underlying condition with diabetic neuropathy    Patch # 2 this year.    Postprocedural Diagnosis: Same    Complications: None  Outcome: Good    Informed Consent:  The patient's condition and proposed procedures, risks (including complications of nerve damage, skin irritation, infection, and failure of pain relief), and alternatives were discussed with the patient or responsible party.  The patient's/responsible party's questions were answered.  The patient/responsible party appeared to understand and chose to proceed.  Informed consent was obtained.    Procedural Pause:  A procedural pause verifying correct patient, medical record number, allergies, medications to be administered, current vital signs, and surgical site was performed immediately prior to beginning the procedure.    Procedure in Detail:  The procedure was performed in the procedure treatment room.  After obtaining written consent, the patient was placed in a supine position. 6 patches were used for the procedure today.  The patient applied 4% lidocaine ointment to the area 1 hour prior to treatment., and the target area was outlined with a marker.  The patch(es) were applied to the target area and secured with tape.  The patient was allowed to rest in a comfortable position, and monitored by staff every few minutes to ensure comfort.  The option for ice pack was provided to the patient should they start experiencing burning.  The patch(es) remained in place for 60 minutes.  The patch(es) were then removed and the cleaning gel was applied and allowed to sit for an additional 60 seconds, after which the area was wiped clean.     The patient was then discharged in stable condition.    Note Electronically Signed By:  Clark Smith  10/23/2024

## 2024-10-30 ENCOUNTER — PATIENT MESSAGE (OUTPATIENT)
Dept: ENDOCRINOLOGY | Facility: CLINIC | Age: 49
End: 2024-10-30
Payer: MEDICAID

## 2024-11-11 ENCOUNTER — PATIENT MESSAGE (OUTPATIENT)
Dept: ENDOCRINOLOGY | Facility: CLINIC | Age: 49
End: 2024-11-11
Payer: MEDICAID

## 2024-11-11 DIAGNOSIS — Z79.4 TYPE 2 DIABETES MELLITUS WITH HYPERGLYCEMIA, WITH LONG-TERM CURRENT USE OF INSULIN: ICD-10-CM

## 2024-11-11 DIAGNOSIS — E11.65 TYPE 2 DIABETES MELLITUS WITH HYPERGLYCEMIA, WITH LONG-TERM CURRENT USE OF INSULIN: ICD-10-CM

## 2024-11-11 RX ORDER — PRAMLINTIDE ACETATE 1000 UG/ML
60 INJECTION SUBCUTANEOUS
Qty: 3 ML | Refills: 3 | Status: CANCELLED | OUTPATIENT
Start: 2024-11-11 | End: 2025-11-11

## 2024-11-11 RX ORDER — PRAMLINTIDE ACETATE 1000 UG/ML
60 INJECTION SUBCUTANEOUS
Qty: 3 ML | Refills: 3 | Status: SHIPPED | OUTPATIENT
Start: 2024-11-11 | End: 2025-11-11

## 2024-11-15 ENCOUNTER — PATIENT MESSAGE (OUTPATIENT)
Dept: ENDOCRINOLOGY | Facility: CLINIC | Age: 49
End: 2024-11-15
Payer: MEDICAID

## 2024-11-15 DIAGNOSIS — E11.65 UNCONTROLLED TYPE 2 DIABETES MELLITUS WITH HYPERGLYCEMIA: ICD-10-CM

## 2024-11-18 ENCOUNTER — TELEPHONE (OUTPATIENT)
Dept: OPHTHALMOLOGY | Facility: CLINIC | Age: 49
End: 2024-11-18

## 2024-11-18 DIAGNOSIS — F41.9 ANXIETY: ICD-10-CM

## 2024-11-18 DIAGNOSIS — H02.403 PTOSIS OF BOTH EYELIDS: Primary | ICD-10-CM

## 2024-11-18 RX ORDER — PROPRANOLOL HYDROCHLORIDE 20 MG/1
TABLET ORAL
Qty: 90 TABLET | Refills: 0 | Status: SHIPPED | OUTPATIENT
Start: 2024-11-18 | End: 2024-11-20 | Stop reason: SDUPTHER

## 2024-11-18 RX ORDER — TIRZEPATIDE 15 MG/.5ML
15 INJECTION, SOLUTION SUBCUTANEOUS
Qty: 4 PEN | Refills: 11 | Status: SHIPPED | OUTPATIENT
Start: 2024-11-18

## 2024-11-20 ENCOUNTER — OFFICE VISIT (OUTPATIENT)
Dept: PSYCHIATRY | Facility: CLINIC | Age: 49
End: 2024-11-20
Payer: MEDICAID

## 2024-11-20 VITALS
HEART RATE: 81 BPM | DIASTOLIC BLOOD PRESSURE: 60 MMHG | WEIGHT: 315 LBS | SYSTOLIC BLOOD PRESSURE: 140 MMHG | BODY MASS INDEX: 54.99 KG/M2

## 2024-11-20 DIAGNOSIS — F41.9 ANXIETY: ICD-10-CM

## 2024-11-20 DIAGNOSIS — F99 INSOMNIA DUE TO OTHER MENTAL DISORDER: ICD-10-CM

## 2024-11-20 DIAGNOSIS — F51.05 INSOMNIA DUE TO OTHER MENTAL DISORDER: ICD-10-CM

## 2024-11-20 DIAGNOSIS — F33.42 RECURRENT MAJOR DEPRESSIVE DISORDER, IN FULL REMISSION: Primary | ICD-10-CM

## 2024-11-20 DIAGNOSIS — F42.2 MIXED OBSESSIONAL THOUGHTS AND ACTS: ICD-10-CM

## 2024-11-20 PROCEDURE — 99211 OFF/OP EST MAY X REQ PHY/QHP: CPT | Mod: PBBFAC | Performed by: NURSE PRACTITIONER

## 2024-11-20 PROCEDURE — 3061F NEG MICROALBUMINURIA REV: CPT | Mod: CPTII,,, | Performed by: NURSE PRACTITIONER

## 2024-11-20 PROCEDURE — 1159F MED LIST DOCD IN RCRD: CPT | Mod: CPTII,,, | Performed by: NURSE PRACTITIONER

## 2024-11-20 PROCEDURE — 99999 PR PBB SHADOW E&M-EST. PATIENT-LVL I: CPT | Mod: PBBFAC,,, | Performed by: NURSE PRACTITIONER

## 2024-11-20 PROCEDURE — 99214 OFFICE O/P EST MOD 30 MIN: CPT | Mod: S$PBB,,, | Performed by: NURSE PRACTITIONER

## 2024-11-20 PROCEDURE — 1160F RVW MEDS BY RX/DR IN RCRD: CPT | Mod: CPTII,,, | Performed by: NURSE PRACTITIONER

## 2024-11-20 PROCEDURE — 3066F NEPHROPATHY DOC TX: CPT | Mod: CPTII,,, | Performed by: NURSE PRACTITIONER

## 2024-11-20 PROCEDURE — 3051F HG A1C>EQUAL 7.0%<8.0%: CPT | Mod: CPTII,,, | Performed by: NURSE PRACTITIONER

## 2024-11-20 RX ORDER — BUPROPION HYDROCHLORIDE 100 MG/1
TABLET, EXTENDED RELEASE ORAL
Qty: 53 TABLET | Refills: 0 | Status: SHIPPED | OUTPATIENT
Start: 2024-11-20 | End: 2024-12-20

## 2024-11-20 RX ORDER — PROPRANOLOL HYDROCHLORIDE 20 MG/1
TABLET ORAL
Qty: 90 TABLET | Refills: 0 | Status: SHIPPED | OUTPATIENT
Start: 2024-11-20

## 2024-11-20 RX ORDER — DULOXETIN HYDROCHLORIDE 60 MG/1
60 CAPSULE, DELAYED RELEASE ORAL 2 TIMES DAILY
Qty: 60 CAPSULE | Refills: 5 | Status: SHIPPED | OUTPATIENT
Start: 2024-11-20 | End: 2025-05-19

## 2024-11-20 RX ORDER — BUSPIRONE HYDROCHLORIDE 30 MG/1
30 TABLET ORAL 2 TIMES DAILY
Qty: 60 TABLET | Refills: 5 | Status: SHIPPED | OUTPATIENT
Start: 2024-11-20 | End: 2025-05-19

## 2024-11-20 RX ORDER — ARIPIPRAZOLE 5 MG/1
5 TABLET ORAL DAILY
Qty: 30 TABLET | Refills: 5 | Status: SHIPPED | OUTPATIENT
Start: 2024-11-20 | End: 2025-05-19

## 2024-11-20 NOTE — PROGRESS NOTES
"Outpatient Psychiatry Follow-Up Visit (MD/NP)     11/20/2024 8:18 AM  Kirill Gandhi III  1975  [id number]          Clinical Status of Patient:  Outpatient (Ambulatory)    Chief Complaint:  Kirill Gandhi III, a 49 y.o. male,who presents today for follow up of depression and anxiety.  Met with patient.        Interval History/Subjective Report/Content of Current Session:     States mood is good" States "this is the best I've ever been". Denies crying spells, amotivation, anhedonia, and hopelessness. Endorses obsessional thoughts, but they have improved. They are not as intense and they do not occur as often as they were.   States he has been experiencing low libido and wonders if it could be from his psych meds. States it doesn't really bother him, but it is a problem for his girlfriend. She wanted him to discuss this with me today.  States he sleeps very well. Will wake during the night when his BG drops.  Lives with his girlfriend, his 19 yo daughter and his mother.  They are planning to spend Thanksgiving with his cousin and her family.    ASEs from psych meds: see above    Pt denies recurrent thoughts of death and denies SI/HI. Denies any sxs of mihaela. Denies AVH, paranoia and delusions. No objective s/sx of psychosis or mihaela.         Psychotherapy:  Target symptoms: depression, anxiety   Why chosen therapy is appropriate versus another modality: relevant to diagnosis, patient responds to this modality  Outcome monitoring methods: self-report, observation  Therapeutic intervention type: supportive psychotherapy  Topics discussed/themes: building skills sets for symptom management  The patient's response to the intervention is accepting. The patient's progress toward treatment goals is excellent.   Duration of intervention: 7 minutes.      Psychotropic medication review  Previous Trials-  Wellbutrin- ineffective on its own  Prozac- effective  Luvox- partial response  Risperidone - " ineffective  Buspar - effective  Celexa- ineffective  Zoloft  Lexapro  Hydroxyzine - restlessness and feeling jumpy; worsened insomnia    Current meds-  Cymbalta  Gabapentin - prescribed by his pain mx provider  Buspar  Abilify  Propranolol    History:       Past Medical, Psychiatric, Family and Social History: The patient's past medical, psychiatric, family and social history, allergies, current medications, past surgical history, and problem list have been reviewed and updated as appropriate within the electronic medical record.         Review of Systems       Review of Systems   Constitutional:  Negative for chills, fever and malaise/fatigue.   Respiratory:  Negative for cough and shortness of breath.    Cardiovascular:  Negative for chest pain and palpitations.   Gastrointestinal:  Negative for abdominal pain, diarrhea and vomiting.   Genitourinary:  Negative for dysuria and hematuria.   Musculoskeletal:  Negative for falls and myalgias.   Skin:  Negative for rash.   Neurological:  Negative for tremors, seizures and headaches.   Psychiatric/Behavioral:          See HPI         Compliance: yes      Risk Parameters:  Patient reports no suicidal ideation  Patient reports no homicidal ideation  Patient reports no self-injurious behavior  Patient reports no violent behavior    Exam (detailed: at least 9 elements; comprehensive: all 15 elements)     Constitutional  Vitals:  Most recent vital signs, dated greater than 90 days prior to this appointment, were reviewed.   There were no vitals filed for this visit.             Musculoskeletal  Muscle Strength/Tone:  no dyskinesia, no dystonia, no tremor, no tic   Gait & Station:  non-ataxic     Psychiatric      Appearance:  unremarkable, age appropriate, well nourished, bearded, casually dressed, neatly groomed, obese   Behavior:  normal, friendly and cooperative, eye contact normal     Speech:  no latency; no press   Mood & Affect:  euthymic  congruent and appropriate    Thought Process:  normal and logical   Associations:  intact   Thought Content:  normal, no suicidality, no homicidality, delusions, or paranoia   Insight:  intact, has awareness of illness   Judgement: behavior is adequate to circumstances, age appropriate   Orientation:  grossly intact   Memory: intact for content of interview   Language: grossly intact   Attention Span & Concentration:  able to focus   Fund of Knowledge:  intact and appropriate to age and level of education       Medications:  Outpatient Encounter Medications as of 11/20/2024   Medication Sig Dispense Refill    acetaminophen (TYLENOL) 500 MG tablet Take 1,000 mg by mouth 2 (two) times daily as needed for Pain.      ARIPiprazole (ABILIFY) 5 MG Tab Take 1 tablet (5 mg total) by mouth once daily. for 180 doses 30 tablet 5    ascorbic acid, vitamin C, (VITAMIN C) 1000 MG tablet Take 1,000 mg by mouth once daily.      aspirin (ECOTRIN) 81 MG EC tablet Take 81 mg by mouth every evening.      aspirin/sod bicarb/citric acid (GIOVANY-SELTZER ORAL) Take 2 tablets by mouth daily as needed (Upset stomach).      atorvastatin (LIPITOR) 10 MG tablet Take 1 tablet (10 mg total) by mouth once daily. 90 tablet 3    blood-glucose sensor (DEXCOM G6 SENSOR) Cayla 3 each by Misc.(Non-Drug; Combo Route) route continuous. 3 each prn    blood-glucose transmitter (DEXCOM G6 TRANSMITTER) Cayla 1 each by Misc.(Non-Drug; Combo Route) route continuous. 1 each prn    busPIRone (BUSPAR) 30 MG Tab Take 1 tablet (30 mg total) by mouth 2 (two) times daily. 60 tablet 5    coenzyme Q10 (CO Q-10) 100 mg capsule Take 100 mg by mouth once daily.      cyanocobalamin (VITAMIN B-12) 1000 MCG tablet Take 100 mcg by mouth once daily.      cyclobenzaprine (FLEXERIL) 10 MG tablet Take 1 tablet (10 mg total) by mouth 3 (three) times daily. TAKE 1 TABLET BY MOUTH THREE TIMES A DAY AS NEEDED FOR MUSCLE SPASMS 90 tablet 2    dapagliflozin propanediol (FARXIGA) 5 mg Tab tablet Take 1 tablet (5 mg  total) by mouth once daily. 30 tablet 11    dicyclomine (BENTYL) 20 mg tablet Take 1 tablet (20 mg total) by mouth 3 (three) times daily as needed (abdominal pain). 60 tablet 2    DULoxetine (CYMBALTA) 60 MG capsule Take 1 capsule (60 mg total) by mouth 2 (two) times daily. 60 capsule 5    fluticasone propionate (FLONASE) 50 mcg/actuation nasal spray 2 sprays (100 mcg total) by Each Nostril route once daily. 48 mL 2    gabapentin (NEURONTIN) 800 MG tablet Take 1 tablet (800 mg total) by mouth 3 (three) times daily. 90 tablet 3    insulin regular hum U-500 conc (HUMULIN R U-500, CONC, KWIKPEN) 500 unit/mL (3 mL) insulin pen Take 120 units with breakfast, 130 units with lunch and 150 units with dinner 6 Pen 6    insulin syringe-needle U-100 (BD INSULIN SYRINGE ULTRA-FINE) 1 mL 31 gauge x 5/16 Syrg To use 6 times daily with insulin 400 each 11    Lactobac no.41/Bifidobact no.7 (PROBIOTIC-10 ORAL) Take by mouth.      lanolin/mineral oil/petrolatum (ARTIFICIAL TEARS OPHT) Place 2 drops into both eyes daily as needed (Dry eye).      LIDOcaine (LIDODERM) 5 % Place 1 patch onto the skin once daily. Remove & Discard patch within 12 hours or as directed by MD 30 patch 1    meloxicam (MOBIC) 15 MG tablet TAKE 1 TABLET BY MOUTH ONCE DAILY, DISCONTINUE CELEBREX 90 tablet 3    metFORMIN (GLUCOPHAGE-XR) 500 MG ER 24hr tablet Take 2 tablets (1,000 mg total) by mouth 2 (two) times daily with meals. 360 tablet 3    metoclopramide HCl (REGLAN) 5 MG tablet Take 1 tablet (5 mg total) by mouth 4 (four) times daily before meals and nightly. 120 tablet 2    multivitamin capsule Take 1 capsule by mouth once daily.      NON FORMULARY MEDICATION Uses Cpap Machine nightly on a setting of 10      ondansetron (ZOFRAN-ODT) 4 MG TbDL Take 1 tablet (4 mg total) by mouth every 8 (eight) hours as needed (nausea). 30 tablet 5    ONETOUCH DELICA PLUS LANCET 33 gauge Misc Apply topically 3 (three) times daily.      ONETOUCH VERIO TEST STRIPS Strp 1  "strip 3 (three) times daily.      pantoprazole (PROTONIX) 40 MG tablet Take 1 tablet (40 mg total) by mouth once daily. 90 tablet 3    pen needle, diabetic (BD ULTRA-FINE ORIG PEN NEEDLE) 29 gauge x 1/2" Ndle To use 3 needles daily 300 each 3    pen needle, diabetic 31 gauge x 1/4" Ndle Use with flex pen 100 each 11    pioglitazone (ACTOS) 30 MG tablet Take 1 tablet (30 mg total) by mouth once daily. 90 tablet 3    pramlintide (SYMLINPEN 60) 1,500 mcg/1.5 mL injection Inject 0.06 mLs (60 mcg total) into the skin 3 (three) times daily before meals. 3 mL 3    propranoloL (INDERAL) 20 MG tablet TAKE 1 TABLET BY MOUTH THREE TIMES A DAY AS NEEDED FOR ANXIETY. MAY TAKE AN EXTRA TABLET AS NEEDED FOR ANXIETY 90 tablet 0    resmetirom 100 mg Tab Take 100 mg by mouth once daily. 30 tablet 6    tirzepatide (MOUNJARO) 15 mg/0.5 mL PnIj Inject 15 mg into the skin every 7 days. 4 Pen 11    traMADoL (ULTRAM) 50 mg tablet Take 2 tablets (100 mg total) by mouth every 8 (eight) hours as needed (severe pain). 180 tablet 2    traZODone (DESYREL) 50 MG tablet TAKE 1-2 TABLETS BY MOUTH EVERY EVENING 60 tablet 3    valsartan-hydrochlorothiazide (DIOVAN-HCT) 320-12.5 mg per tablet Take 1 tablet by mouth once daily. 90 tablet 3    vitamin D 1000 units Tab Take 5,000 Units by mouth 2 (two) times a day.      ZEGALOGUE AUTOINJECTOR 0.6 mg/0.6 mL AtIn Inject 1 Box  into the skin as needed (hypoglycemia). Glucagon is an emergency pen that is used to increase your blood sugar. Glucagon is a pen that is used in an emergency situation where you are unable to eat or drink anything. Glucagon is a medication that is given by someone else-spouse, child, etc. 1.2 mL 1    [DISCONTINUED] fluvoxaMINE (LUVOX) 100 MG tablet Take 1.5 tablets (150 mg total) by mouth 2 (two) times daily. 270 tablet 0    [DISCONTINUED] pramlintide (SYMLINPEN 60) 1,500 mcg/1.5 mL injection Inject 0.06 mLs (60 mcg total) into the skin 3 (three) times daily before meals. 3 mL 3    " [DISCONTINUED] propranoloL (INDERAL) 20 MG tablet TAKE 1 TABLET BY MOUTH THREE TIMES A DAY AS NEEDED FOR ANXIETY . MAY TAKE AN EXTRA TABLET DAILY AS NEEDED FOR ANXIETY 90 tablet 5    [DISCONTINUED] resmetirom 100 mg Tab Take 100 mg by mouth once daily. 30 tablet 6    [DISCONTINUED] tirzepatide (MOUNJARO) 15 mg/0.5 mL PnIj Inject 15 mg into the skin every 7 days. 4 Pen 11    [DISCONTINUED] 0.9%  NaCl infusion       [DISCONTINUED] dexAMETHasone injection       [DISCONTINUED] fentaNYL 50 mcg/mL injection       [DISCONTINUED] iohexoL (OMNIPAQUE 300) injection       [DISCONTINUED] LIDOcaine (PF) 10 mg/ml (1%) injection       [DISCONTINUED] LIDOcaine (PF) 10 mg/ml (1%) injection       [DISCONTINUED] midazolam (VERSED) 1 mg/mL injection        No facility-administered encounter medications on file as of 11/20/2024.       Allergy:  Review of patient's allergies indicates:   Allergen Reactions    Dilaudid [hydromorphone] Other (See Comments)     IV dilaudid severely drops BP         Assessment and Diagnosis   Status/Progress: Based on the examination today, the patient's problem(s) is/are well controlled.  New problems have not been presented today.   Co-morbidities are complicating management of the primary condition.  There are no active rule-out diagnoses for this patient at this time.         General Impression:       ICD-10-CM ICD-9-CM   1. Recurrent major depressive disorder, in full remission  F33.42 296.36   2. Mixed obsessional thoughts and acts  F42.2 300.3   3. Anxiety  F41.9 300.00   4. Insomnia due to other mental disorder  F51.05 300.9    F99 327.02         Intervention/Counseling/Treatment Plan     Medication Management:  Continue Buspar 30 mg BID  Continue Cymbalta 60 mg BID  Continue gabapentin 800 mg TID - managed by pain management  Continue Abilify 5 mg q day  Continue propranolol 20 mg TID prn anxiety. May take an additional tab po q d prn anxiety  Start Wellbutrin  mg q 7 days then increase to 200  mg q day to target decreased libido  Labs: reviewed most recent  The treatment plan and follow up plan were reviewed with the patient.  Discussed with patient informed consent, risks vs. benefits, alternative treatments, side effect profile and the inherent unpredictability of individual responses to these treatments. The patient expresses understanding of the above and displays the capacity to agree with this current plan and had no other questions.  Encouraged Patient to keep future appointments.   Take medications as prescribed and abstain from substance abuse.   Pt was told to present to ED or call 911 for SI/HI plan or intent, psychosis, or other psychiatric or medical emergency, and pt agrees to this and verbalized understanding.          Return to Clinic: 6 months, or sooner if needed        Face to Face time with patient: 30 minutes  Total time: 45 minutes of total time spent on the encounter, which includes face to face time and non-face to face time preparing to see the patient (eg, review of tests), Obtaining and/or reviewing separately obtained history, Documenting clinical information in the electronic or other health record, Independently interpreting results (not separately reported) and communicating results to the patient/family/caregiver, or Care coordination (not separately reported).       Verónica Briscoe, MSN, APRN, PMHNP-BC  [facility]

## 2024-12-02 ENCOUNTER — PATIENT MESSAGE (OUTPATIENT)
Dept: FAMILY MEDICINE | Facility: CLINIC | Age: 49
End: 2024-12-02
Payer: MEDICAID

## 2024-12-02 DIAGNOSIS — E11.65 UNCONTROLLED TYPE 2 DIABETES MELLITUS WITH HYPERGLYCEMIA: Primary | ICD-10-CM

## 2024-12-03 ENCOUNTER — TELEPHONE (OUTPATIENT)
Dept: PODIATRY | Facility: CLINIC | Age: 49
End: 2024-12-03
Payer: MEDICAID

## 2024-12-03 NOTE — TELEPHONE ENCOUNTER
Tried calling patient phone was busy.    ----- Message from Shaista sent at 12/3/2024  8:32 AM CST -----  Regarding: Referral      What is the request in detail: Please call pt to schedule Podiatry referral. Please advise.    Can the clinic reply by [facility]? No    Best Call Back Number: [phone]      Additional Information:

## 2024-12-06 ENCOUNTER — PATIENT MESSAGE (OUTPATIENT)
Dept: OPHTHALMOLOGY | Facility: CLINIC | Age: 49
End: 2024-12-06
Payer: MEDICAID

## 2024-12-13 ENCOUNTER — LAB VISIT (OUTPATIENT)
Dept: LAB | Facility: HOSPITAL | Age: 49
End: 2024-12-13
Attending: FAMILY MEDICINE
Payer: MEDICAID

## 2024-12-13 DIAGNOSIS — K75.81 METABOLIC DYSFUNCTION-ASSOCIATED STEATOHEPATITIS (MASH): ICD-10-CM

## 2024-12-13 DIAGNOSIS — E11.8 TYPE 2 DIABETES MELLITUS WITH COMPLICATION, WITHOUT LONG-TERM CURRENT USE OF INSULIN: ICD-10-CM

## 2024-12-13 LAB
ALBUMIN SERPL BCP-MCNC: 3.8 G/DL (ref 3.5–5.2)
ALP SERPL-CCNC: 78 U/L (ref 38–126)
ALT SERPL W/O P-5'-P-CCNC: 23 U/L (ref 10–44)
AST SERPL-CCNC: 25 U/L (ref 15–46)
BILIRUB SERPL-MCNC: 0.3 MG/DL (ref 0.1–1)
ESTIMATED AVG GLUCOSE: 117 MG/DL (ref 68–131)
HBA1C MFR BLD: 5.7 % (ref 4–5.6)
PROT SERPL-MCNC: 7.2 G/DL (ref 6–8.4)

## 2024-12-13 PROCEDURE — 83036 HEMOGLOBIN GLYCOSYLATED A1C: CPT | Performed by: FAMILY MEDICINE

## 2024-12-13 PROCEDURE — 80076 HEPATIC FUNCTION PANEL: CPT | Mod: PN

## 2024-12-13 PROCEDURE — 36415 COLL VENOUS BLD VENIPUNCTURE: CPT | Mod: PN

## 2024-12-16 ENCOUNTER — OFFICE VISIT (OUTPATIENT)
Dept: HEPATOLOGY | Facility: CLINIC | Age: 49
End: 2024-12-16
Payer: MEDICAID

## 2024-12-16 VITALS — WEIGHT: 315 LBS | HEIGHT: 73 IN | BODY MASS INDEX: 41.75 KG/M2

## 2024-12-16 DIAGNOSIS — K74.00 HEPATIC FIBROSIS: ICD-10-CM

## 2024-12-16 DIAGNOSIS — I10 BENIGN ESSENTIAL HYPERTENSION: ICD-10-CM

## 2024-12-16 DIAGNOSIS — E66.01 CLASS 3 SEVERE OBESITY DUE TO EXCESS CALORIES WITH SERIOUS COMORBIDITY AND BODY MASS INDEX (BMI) OF 50.0 TO 59.9 IN ADULT: ICD-10-CM

## 2024-12-16 DIAGNOSIS — E78.5 DYSLIPIDEMIA: ICD-10-CM

## 2024-12-16 DIAGNOSIS — K75.81 METABOLIC DYSFUNCTION-ASSOCIATED STEATOHEPATITIS (MASH): Primary | ICD-10-CM

## 2024-12-16 DIAGNOSIS — E11.65 TYPE 2 DIABETES MELLITUS WITH HYPERGLYCEMIA, WITH LONG-TERM CURRENT USE OF INSULIN: ICD-10-CM

## 2024-12-16 DIAGNOSIS — Z79.4 TYPE 2 DIABETES MELLITUS WITH HYPERGLYCEMIA, WITH LONG-TERM CURRENT USE OF INSULIN: ICD-10-CM

## 2024-12-16 DIAGNOSIS — E66.813 CLASS 3 SEVERE OBESITY DUE TO EXCESS CALORIES WITH SERIOUS COMORBIDITY AND BODY MASS INDEX (BMI) OF 50.0 TO 59.9 IN ADULT: ICD-10-CM

## 2024-12-16 DIAGNOSIS — R79.89 ELEVATED LFTS: ICD-10-CM

## 2024-12-16 PROCEDURE — 3044F HG A1C LEVEL LT 7.0%: CPT | Mod: CPTII,,,

## 2024-12-16 PROCEDURE — 99999 PR PBB SHADOW E&M-EST. PATIENT-LVL V: CPT | Mod: PBBFAC,,,

## 2024-12-16 PROCEDURE — 1159F MED LIST DOCD IN RCRD: CPT | Mod: CPTII,,,

## 2024-12-16 PROCEDURE — 99215 OFFICE O/P EST HI 40 MIN: CPT | Mod: S$PBB,,,

## 2024-12-16 PROCEDURE — 1160F RVW MEDS BY RX/DR IN RCRD: CPT | Mod: CPTII,,,

## 2024-12-16 PROCEDURE — 3061F NEG MICROALBUMINURIA REV: CPT | Mod: CPTII,,,

## 2024-12-16 PROCEDURE — 3066F NEPHROPATHY DOC TX: CPT | Mod: CPTII,,,

## 2024-12-16 PROCEDURE — 99215 OFFICE O/P EST HI 40 MIN: CPT | Mod: PBBFAC

## 2024-12-16 PROCEDURE — 3008F BODY MASS INDEX DOCD: CPT | Mod: CPTII,,,

## 2024-12-16 NOTE — PROGRESS NOTES
[facility] VISIT EST PT NOTE    PCP: Marcos Ibarra MD     CHIEF COMPLAINT: fatty liver, elevated liver enzymes       HISTORY       HPI: This is a 49 y.o. patient with PMH noted below, presenting for follow up of fatty liver disease.    Previous serologic w/u negative for Peter's, alpha-1 antitrypsin deficiency, hemochromatosis, autoimmune etiology, and viral hepatitis.    Prior serologic workup:   Lab Results   Component Value Date    SMOOTHMUSCAB Positive 1:40 (A) 04/22/2024    AMAIFA Negative 1:40 04/22/2024    IGGSERUM 1339 04/22/2024    ANASCREEN Positive (A) 04/22/2024    FERRITIN 27 07/20/2016    FESATURATED 15 (L) 04/22/2024    CERULOPLSM 29.0 04/22/2024    HEPBSAG Non-reactive 04/22/2024    HEPCAB Non-reactive 04/22/2024    HEPAIGM Non-reactive 04/22/2024       Interval HPI: Presents today with girlfriend. Currently 439#. States that he is on the rezdiffra daily. No complaints on side effects. States that he is also on the mounjaro which is helping with decreasing his appetite, but he is struggling with instances of hypoglycemia about once a day and throughout the night too. Is worried about when he has to then eat sugar if this is causing the weight gain. Also is wondering if he should stop or reduce his metformin. Instructed patient to reach out to endocrine to discuss this and not change anything without talking to his provider. Has drastically improved his A1C.    Diet: smaller portions, not eating 2nd  Exercise: unable to exercise due to back injury  Supplements: none    Risk factors for fatty liver include:  Obesity - current BMI 57.94  HTN - managed well with medication  HLD - atorvastatin 10mg  T2DM - last A1C 5.7      LABS & DIAGNOSTIC STUDIES        Labs done 12/2024 show normal transaminase levels, previously intermittently elevated since 2017   Platelets WNL, alk phos WNL  Synthetic liver functioning WNL    Lab Results   Component Value Date    ALT 23 12/13/2024    AST 25  12/13/2024    ALKPHOS 78 12/13/2024    BILITOT 0.3 12/13/2024    ALBUMIN 3.8 12/13/2024    INR 1.0 03/07/2022     03/27/2023     Imaging:  Abd CT done 11/2022 showed hepatomegaly and fatty liver     Liver fibrosis staging:  -- MRI elasto 5/2024 noted Cholelithiasis and splenomegaly.   Liver fat fraction: 30.4 %, consistent with moderate to severe steatosis.  Liver iron content: 1.06 mg/g, within normal limits.  Liver elasticity: 3.76 kPa, consistent with F1 or F2 fibrosis. Elevated liver stiffness (>2.74 kPa)      No alcohol consumption, see below  Social History     Substance and Sexual Activity   Alcohol Use Not Currently     + Immunity to Hep A  - completed Hep B vaccine    Reports mother has fatty liver.      Allergy and medication list reviewed and updated     PMHX:  has a past medical history of Allergy, Anxiety, Back pain, Chronic bilateral low back pain with bilateral sciatica (12/14/2021), CTS (carpal tunnel syndrome) (06/26/2015), CTS (carpal tunnel syndrome) (6/26/2015), Depression, Diabetes (04/17/2015), Diabetes mellitus with insulin therapy, Elevated sed rate (6/26/2015), GERD (gastroesophageal reflux disease) (10/19/2022), vasectomy, Hypertension, Morbid obesity (4/16/2015), OCD (obsessive compulsive disorder), Sleep apnea, and Type 2 diabetes mellitus with hyperglycemia, with long-term current use of insulin (10/17/2022).    PSHX:  has a past surgical history that includes Tonsillectomy; Adenoidectomy; Fracture surgery; Hernia repair; Epidural steroid injection (Right, 09/04/2019); Vasectomy; Lumbar laminectomy (Bilateral, 11/29/2019); Fusion of lumbar spine by anterior approach (Left, 04/26/2021); Fusion of spine with instrumentation (N/A, 04/26/2021); Injection of steroid (Bilateral, 12/13/2021); Fusion of spine with instrumentation (N/A, 04/13/2022); Fusion of sacroiliac joint (Bilateral, 04/13/2022); removal implant, posterior segment, intraocular (04/13/2022); Spine surgery; Colonoscopy  "(N/A, 9/6/2022); Esophagogastroduodenoscopy (N/A, 10/31/2022); and Caudal epidural steroid injection (N/A, 10/30/2024).    FAMILY HISTORY: Updated and reviewed in EPIC    ROS:   GENERAL: Denies fatigue  CARDIOVASCULAR: Denies edema  GI: Denies abdominal pain  SKIN: Denies rash, itching   NEURO: Denies confusion, memory loss, or mood changes    PHYSICAL EXAM:   In no acute distress; alert and oriented to person, place and time  VITALS: Ht 6' 1" (1.854 m)   Wt (!) 199.2 kg (439 lb 2.5 oz)   BMI 57.94 kg/m²   EYES: Sclerae anicteric  GI: Soft, non-tender, non-distended. No ascites.  EXTREMITIES:  No edema.  SKIN: Warm and dry. No jaundice. No telangectasias noted. No palmar erythema.  NEURO:  No asterixis.  PSYCH:  Thought and speech pattern appropriate. Behavior normal      ASSESSMENT & PLAN          EDUCATION:  See instructions discussed with patient in Instructions section of the After Visit Summary     ASSESSMENT & PLAN:  49 y.o. male with:   1.  Fatty liver, likely related to metabolic risk factors obesity, HTN, HLD, T2DM ([facility])  - see HPI  -- MRI elasto annually to assess fatty liver - will obtain prior to next visit  -- Recommendations discussed with patient:  Continue no alcohol use  2 Weight loss goal of 40-50 lbs  3. Low carb/sugar, high fiber and protein diet, limit your carb intake to LESS than 30-45 grams of carbs with a meal or LESS than 5-10 grams with any snack   4. Exercise, 5 days per week, 30 minutes per day, as tolerated  5. Recommend good cholesterol, blood pressure, blood sugar levels   6. There is a new medication called Rezdiffra for the treatment of F2-F3 liver scarring due to fatty liver. It is only indicated for those with stage 2 or 3 scar tissue   7. Will send in Rezdiffra due to stage 2 fibrosis based on MRI elasto    2. Liver fibrosis  -- Stage 2 fibrosis based on MRI elasto  -- Continue 100 mg rezdiffra daily  -- MRI elasto in 6 months to reassess fibrosis  -- AFP with labs    3. " Elevated liver enzymes  -- Sero workup negative for autoimmune, genetic, & viral hepatitis  -- likely from MASH  -- recent improvement in liver enzymes - possibly from rezdiffra use    4. Obesity, HTN, HLD, T2DM   -- Body mass index is 57.94 kg/m².   -- increases risk for fatty liver  -- reach out to endocrine to discuss instances of hypoglycemia      Follow up in about 6 months (around 6/16/2025). with labs, MRI elasto before  Orders Placed This Encounter   Procedures    MR Elastography    Comprehensive Metabolic Panel        Thank you for allowing me to participate in the care of XIAO Santiago III    I spent a total of 40 minutes on the day of the visit.This includes face to face time and non-face to face time preparing to see the patient (eg, review of tests), obtaining and/or reviewing separately obtained history, documenting clinical information in the electronic or other health record, independently interpreting results and communicating results to the patient/family/caregiver, and coordinating care.         CC'ed note to:   Marcos Ibarra MD

## 2024-12-16 NOTE — PATIENT INSTRUCTIONS
1. Repeat MRI elasto and labs in 6 months  2. Continue Rezdiffra  3. Follow up in 6 months    FATTY LIVER:  These things are important to improve fatty liver:  Continue no alcohol  Weight loss goal of 40-50 lbs. [facility] offers benefits to patients so let me know if you want a referral to the [facility] gym. Also, [facility] has dieticians that can create a weight loss plan. If you are interested let me know and I can place a referral. If so, contact the dietician team at [facility] at email [email or web address] or call [phone] to get scheduled. They do offer video visits   Low carb/sugar and high protein diet (~1 g/kg/day of protein).Try to limit your carb intake to LESS than 30-45 grams of carbs with a meal or LESS than 5-10 grams with any snack (total of any snack foods eaten during that time). Use MyFitness Pal or Lose It neftali to add up your carbs through the day. Do NOT drink any beverages with calories or carbs (this can lead to high blood sugar and weight gain). The main thing to focus on is low calorie, high protein, low carb)  Exercise, 5 days per week, 30 minutes per day, as tolerated  Recommend good cholesterol, blood pressure, blood sugar levels   There is a new medication called Rezdiffra for the treatment of F2-F3 liver scarring due to fatty liver. It is only indicated for patients with significant scar tissue from fatty liver (will send in)    In some people, fatty liver can progress to steatohepatitis (inflamatory fatty liver) and possibly to cirrhosis, putting one at increased risk for liver cancer, liver failure, and death. Therefore, the lifestyle changes are very important to decrease this risk.

## 2024-12-17 ENCOUNTER — PATIENT MESSAGE (OUTPATIENT)
Dept: ENDOCRINOLOGY | Facility: CLINIC | Age: 49
End: 2024-12-17
Payer: MEDICAID

## 2024-12-17 DIAGNOSIS — E78.5 DYSLIPIDEMIA: ICD-10-CM

## 2024-12-17 RX ORDER — INSULIN HUMAN 500 [IU]/ML
INJECTION, SOLUTION SUBCUTANEOUS
Qty: 6 PEN | Refills: 6 | Status: SHIPPED | OUTPATIENT
Start: 2024-12-17

## 2024-12-21 ENCOUNTER — PATIENT MESSAGE (OUTPATIENT)
Dept: PSYCHIATRY | Facility: CLINIC | Age: 49
End: 2024-12-21
Payer: MEDICAID

## 2024-12-23 DIAGNOSIS — F33.42 RECURRENT MAJOR DEPRESSIVE DISORDER, IN FULL REMISSION: ICD-10-CM

## 2024-12-23 RX ORDER — BUPROPION HYDROCHLORIDE 100 MG/1
200 TABLET, EXTENDED RELEASE ORAL DAILY
Qty: 60 TABLET | Refills: 1 | Status: SHIPPED | OUTPATIENT
Start: 2024-12-23 | End: 2025-02-21

## 2025-01-02 ENCOUNTER — PATIENT MESSAGE (OUTPATIENT)
Dept: FAMILY MEDICINE | Facility: CLINIC | Age: 50
End: 2025-01-02
Payer: MEDICAID

## 2025-01-03 ENCOUNTER — HOSPITAL ENCOUNTER (EMERGENCY)
Facility: HOSPITAL | Age: 50
Discharge: HOME | End: 2025-01-03
Attending: EMERGENCY MEDICINE
Payer: MEDICAID

## 2025-01-03 VITALS
HEIGHT: 72 IN | HEART RATE: 76 BPM | BODY MASS INDEX: 42.66 KG/M2 | TEMPERATURE: 98 F | DIASTOLIC BLOOD PRESSURE: 53 MMHG | RESPIRATION RATE: 20 BRPM | SYSTOLIC BLOOD PRESSURE: 111 MMHG | WEIGHT: 315 LBS | OXYGEN SATURATION: 100 %

## 2025-01-03 DIAGNOSIS — R06.00 DYSPNEA: ICD-10-CM

## 2025-01-03 DIAGNOSIS — R07.9 CHEST PAIN, UNSPECIFIED TYPE: Primary | ICD-10-CM

## 2025-01-03 LAB
ALBUMIN SERPL BCP-MCNC: 3.8 G/DL (ref 3.5–5.2)
ALP SERPL-CCNC: 68 U/L (ref 38–126)
ALT SERPL W/O P-5'-P-CCNC: 22 U/L (ref 10–44)
ANION GAP SERPL CALC-SCNC: 8 MMOL/L (ref 8–16)
AST SERPL-CCNC: 26 U/L (ref 15–46)
BASOPHILS # BLD AUTO: 0.07 K/UL (ref 0–0.2)
BASOPHILS NFR BLD: 0.8 % (ref 0–1.9)
BILIRUB SERPL-MCNC: 0.3 MG/DL (ref 0.1–1)
CALCIUM SERPL-MCNC: 9.1 MG/DL (ref 8.7–10.5)
CHLORIDE SERPL-SCNC: 104 MMOL/L (ref 95–110)
CO2 SERPL-SCNC: 26 MMOL/L (ref 23–29)
CREAT SERPL-MCNC: 0.92 MG/DL (ref 0.5–1.4)
DIFFERENTIAL METHOD BLD: ABNORMAL
EOSINOPHIL # BLD AUTO: 0.2 K/UL (ref 0–0.5)
EOSINOPHIL NFR BLD: 2.8 % (ref 0–8)
ERYTHROCYTE [DISTWIDTH] IN BLOOD BY AUTOMATED COUNT: 13.8 % (ref 11.5–14.5)
EST. GFR  (NO RACE VARIABLE): >60 ML/MIN/1.73 M^2
GLUCOSE SERPL-MCNC: 116 MG/DL (ref 70–110)
HCT VFR BLD AUTO: 34.5 % (ref 40–54)
HGB BLD-MCNC: 10.9 G/DL (ref 14–18)
IMM GRANULOCYTES # BLD AUTO: 0.05 K/UL (ref 0–0.04)
IMM GRANULOCYTES NFR BLD AUTO: 0.6 % (ref 0–0.5)
LYMPHOCYTES # BLD AUTO: 2.2 K/UL (ref 1–4.8)
LYMPHOCYTES NFR BLD: 25.8 % (ref 18–48)
MCH RBC QN AUTO: 29.2 PG (ref 27–31)
MCHC RBC AUTO-ENTMCNC: 31.6 G/DL (ref 32–36)
MCV RBC AUTO: 93 FL (ref 82–98)
MONOCYTES # BLD AUTO: 0.9 K/UL (ref 0.3–1)
MONOCYTES NFR BLD: 10.5 % (ref 4–15)
NEUTROPHILS # BLD AUTO: 5 K/UL (ref 1.8–7.7)
NEUTROPHILS NFR BLD: 59.5 % (ref 38–73)
NRBC BLD-RTO: 0 /100 WBC
NT-PROBNP SERPL-MCNC: 855 PG/ML (ref 5–450)
PLATELET # BLD AUTO: 291 K/UL (ref 150–450)
PMV BLD AUTO: 9.6 FL (ref 9.2–12.9)
POCT GLUCOSE: 120 MG/DL (ref 70–110)
POTASSIUM SERPL-SCNC: 3.8 MMOL/L (ref 3.5–5.1)
PROT SERPL-MCNC: 7.3 G/DL (ref 6–8.4)
RBC # BLD AUTO: 3.73 M/UL (ref 4.6–6.2)
SODIUM SERPL-SCNC: 138 MMOL/L (ref 136–145)
TROPONIN I SERPL-MCNC: <0.012 NG/ML (ref 0.01–0.03)
UUN UR-MCNC: 14 MG/DL (ref 2–20)
WBC # BLD AUTO: 8.45 K/UL (ref 3.9–12.7)

## 2025-01-03 PROCEDURE — 99285 EMERGENCY DEPT VISIT HI MDM: CPT | Mod: 25,ER

## 2025-01-03 PROCEDURE — 80053 COMPREHEN METABOLIC PANEL: CPT | Mod: ER | Performed by: STUDENT IN AN ORGANIZED HEALTH CARE EDUCATION/TRAINING PROGRAM

## 2025-01-03 PROCEDURE — 83880 ASSAY OF NATRIURETIC PEPTIDE: CPT | Mod: ER | Performed by: STUDENT IN AN ORGANIZED HEALTH CARE EDUCATION/TRAINING PROGRAM

## 2025-01-03 PROCEDURE — 84484 ASSAY OF TROPONIN QUANT: CPT | Mod: ER | Performed by: STUDENT IN AN ORGANIZED HEALTH CARE EDUCATION/TRAINING PROGRAM

## 2025-01-03 PROCEDURE — 93005 ELECTROCARDIOGRAM TRACING: CPT | Mod: ER

## 2025-01-03 PROCEDURE — 93010 ELECTROCARDIOGRAM REPORT: CPT | Mod: ,,, | Performed by: STUDENT IN AN ORGANIZED HEALTH CARE EDUCATION/TRAINING PROGRAM

## 2025-01-03 PROCEDURE — 82962 GLUCOSE BLOOD TEST: CPT | Mod: ER

## 2025-01-03 PROCEDURE — 85025 COMPLETE CBC W/AUTO DIFF WBC: CPT | Mod: ER | Performed by: STUDENT IN AN ORGANIZED HEALTH CARE EDUCATION/TRAINING PROGRAM

## 2025-01-03 NOTE — FIRST PROVIDER EVALUATION
Medical screening examination initiated.  I have conducted a focused provider triage encounter, findings are as follows:    Brief history of present illness:  worsening HOLMAN, swelling to BLE, pain to R chest with deep inspiration over the past week. No cardiac hx. Pmhx of DM, HLD, obesity, VICENTE     Vitals:    01/03/25 1721   BP: (!) 140/74   Pulse: 86   Resp: 18   Temp: 98.6 °F (37 °C)   TempSrc: Oral   SpO2: 100%   Weight: (!) 198.7 kg (438 lb)   Height: 6' (1.829 m)       Pertinent physical exam:  obese habitus, 1+ pitting edema BL, BSCTA, HRRR    Brief workup plan:  labs, CXR, EKG    Preliminary workup initiated; this workup will be continued and followed by the physician or advanced practice provider that is assigned to the patient when roomed.

## 2025-01-04 NOTE — ED PROVIDER NOTES
Encounter Date: 1/3/2025       History     Chief Complaint   Patient presents with    Shortness of Breath     Reports SOB and pain to right rib area x2 days Also reports swelling to lower extremities. No hx of CHF. Pt thinks he has CHF.      HPI  49 y.o.  Co sob and chest pain this AM, feels ok now  Co swelling to LEs x 5 days  Worried that he has CHF  No f/c/cough uri sx    Review of patient's allergies indicates:   Allergen Reactions    Dilaudid [hydromorphone] Other (See Comments)     IV dilaudid severely drops BP     Past Medical History:   Diagnosis Date    Allergy     Anxiety     Back pain     Chronic bilateral low back pain with bilateral sciatica 12/14/2021    CTS (carpal tunnel syndrome) 06/26/2015    CTS (carpal tunnel syndrome) 6/26/2015    Depression     Diabetes 04/17/2015    Diabetes mellitus with insulin therapy     Elevated sed rate 6/26/2015    GERD (gastroesophageal reflux disease) 10/19/2022    Hx of vasectomy     Hypertension     Morbid obesity 4/16/2015    OCD (obsessive compulsive disorder)     Sleep apnea     Type 2 diabetes mellitus with hyperglycemia, with long-term current use of insulin 10/17/2022     Past Surgical History:   Procedure Laterality Date    ADENOIDECTOMY      CAUDAL EPIDURAL STEROID INJECTION N/A 10/30/2024    Procedure: Caudal NATALIE;  Surgeon: Clark Smith MD;  Location: [facility] CATH LAB;  Service: Pain Management;  Laterality: N/A;  with catheter    COLONOSCOPY N/A 9/6/2022    Procedure: COLONOSCOPY;  Surgeon: Rossy Champion MD;  Location: [facility] ENDO;  Service: Endoscopy;  Laterality: N/A;    EPIDURAL STEROID INJECTION Right 09/04/2019    Procedure: Injection, Steroid, Epidural Transforaminal;  Surgeon: Harjinder Batista Jr., MD;  Location: [facility] ENDO;  Service: Pain Management;  Laterality: Right;  Right L3 + L4 TF NATALIE    [id number]  [id number]    Arrive @ 1300; ASA last 8/27; Check BG; NEEDS CONSENT    ESOPHAGOGASTRODUODENOSCOPY N/A 10/31/2022    Procedure: EGD  (ESOPHAGOGASTRODUODENOSCOPY);  Surgeon: Rossy Champion MD;  Location: [facility];  Service: Endoscopy;  Laterality: N/A;    FRACTURE SURGERY      bilateral elbow fracture 3 years ago    FUSION OF LUMBAR SPINE BY ANTERIOR APPROACH Left 04/26/2021    Procedure: FUSION, SPINE, LUMBAR, ANTERIOR APPROACH Left L3-4 L4-5 OLIF BRENDA, ALL Release, L5-S1 ALIF;  Surgeon: Jose L Brown MD;  Location: [facility];  Service: Neurosurgery;  Laterality: Left;  Procedure: Left L3-4 L4-5 OLIF BRENDA, ALL Release, L5-S1 ALIF  Surgery Time: 2.5 Hrs  LOS: 2-3  Anesthesia: General  Blood: Type & Screen  Radiology: Josseline  Brace: LSO  Bed: Regular  Position: LAteral [facility]    FUSION OF SACROILIAC JOINT Bilateral 04/13/2022    Procedure: FUSION, SACROILIAC JOINT Stg 2: Bilateral Open SI Joint fusion, Bedrock from Si Bone;  Surgeon: Jose L Brown MD;  Location: [facility];  Service: Neurosurgery;  Laterality: Bilateral;    FUSION OF SPINE WITH INSTRUMENTATION N/A 04/26/2021    Procedure: FUSION, SPINE, WITH INSTRUMENTATION L3-S1 Posteior Segmental Instrumentation;  Surgeon: Jose L Brown MD;  Location: [facility];  Service: Neurosurgery;  Laterality: N/A;  Procedure: L3-S1 Posteior Segmental Instrumentation  Sirgery Time: 2 Hrs  LOS: 2-3  Anesthesia: General  Blood: Tyoe & Screen  Radiology: Dual C arm  Brace:LSO  Bed: [place] [address] Poster  Position: Prone  Equipment: Depuy     FUSION OF SPINE WITH INSTRUMENTATION N/A 04/13/2022    Procedure: FUSION, SPINE, WITH INSTRUMENTATION Stg 1: Revision of posterior L3-S1 hardware, Depuy. Sacro-pelvic fixation, screw augmentation PMMA. L4-S1 posterolateral arthrodesis. Open Gauge screws, 11-12mm;  Surgeon: Jose L Brown MD;  Location: [facility];  Service: Neurosurgery;  Laterality: N/A;    HERNIA REPAIR      bilateral groin hernia    INJECTION OF STEROID Bilateral 12/13/2021    Procedure: INJECTION, STEROID Bilateral SI JOint Block and Steroid Injection;  Surgeon: Jose L Brown MD;  Location: [facility];   Service: Neurosurgery;  Laterality: Bilateral;  Procedure: Bilateral SI JOint Block and Steroid Injection   Surgery Time: 30 min  LOS: 0  Anesthesia: MAC  Radiology: C-arm  Bed: Regular with pillows  Position: Prone    LUMBAR LAMINECTOMY Bilateral 11/29/2019    Procedure: LAMINECTOMY, SPINE, LUMBAR Rigth L3-4, Left L4-5 Laminectomy, medial Facetectomy, and microdiscectomy;  Surgeon: Jose L Brown MD;  Location: [facility] OR;  Service: Neurosurgery;  Laterality: Bilateral;  Procedure: Rigth L3-4, Left L4-5 Laminectomy, medial Facetectomy, and microdiscectomy  Surgery Time: 2.5 Hrs  LOS: 0-1  Anesthesia: General  Blood: Type & Screen  Radiology: C-arm  Micros    REMOVAL IMPLANT, POSTERIOR SEGMENT, INTRAOCULAR  04/13/2022    Procedure: REMOVAL IMPLANT, POSTERIOR SEGMENT, INTRAOCULAR;  Surgeon: Jose L Brown MD;  Location: [facility] OR;  Service: Neurosurgery;;    SPINE SURGERY      TONSILLECTOMY      VASECTOMY       Family History   Problem Relation Name Age of Onset    Fibromyalgia Mother      Osteoarthritis Mother      Cataracts Maternal Grandmother      Macular degeneration Maternal Grandfather      Cataracts Maternal Grandfather      Rheum arthritis Neg Hx      Psoriasis Neg Hx      Lupus Neg Hx      Kidney disease Neg Hx      Inflammatory bowel disease Neg Hx      Amblyopia Neg Hx      Blindness Neg Hx      Glaucoma Neg Hx      Retinal detachment Neg Hx      Strabismus Neg Hx       Social History     Tobacco Use    Smoking status: Former     Types: Vaping with nicotine     Passive exposure: Past    Smokeless tobacco: Never    Tobacco comments:     Handout provided for [employer]oking Cessaiton program 10/11/24 - pt has stopped vaping   Substance Use Topics    Alcohol use: Not Currently    Drug use: Never     Review of Systems  All systems were reviewed/examined and were negative except as noted in the HPI.    Physical Exam     Initial Vitals [01/03/25 1721]   BP Pulse Resp Temp SpO2   (!) 140/74 86 18 98.6 °F (37  °C) 100 %      MAP       --         Physical Exam    General: the patient is awake, alert, and in no apparent distress.  Head: normocephalic and atraumatic, sclera are clear  Neck: supple without meningismus  Chest: clear to auscultation bilaterally, no respiratory distress  Heart: regular rate and rhythm  ABD soft, nontender, nondistended, no peritoneal signs  No calf t  Extremities: warm and well perfused  Skin: warm and dry  Psych conversant  Neuro: awake, alert, moving all extremities    ED Course   Procedures  Labs Reviewed   CBC W/ AUTO DIFFERENTIAL - Abnormal       Result Value    WBC 8.45      RBC 3.73 (*)     Hemoglobin 10.9 (*)     Hematocrit 34.5 (*)     MCV 93      MCH 29.2      MCHC 31.6 (*)     RDW 13.8      Platelets 291      MPV 9.6      Immature Granulocytes 0.6 (*)     Gran # (ANC) 5.0      Immature Grans (Abs) 0.05 (*)     Lymph # 2.2      Mono # 0.9      Eos # 0.2      Baso # 0.07      nRBC 0      Gran % 59.5      Lymph % 25.8      Mono % 10.5      Eosinophil % 2.8      Basophil % 0.8      Differential Method Automated     COMPREHENSIVE METABOLIC PANEL - Abnormal    Sodium 138      Potassium 3.8      Chloride 104      CO2 26      Glucose 116 (*)     BUN 14      Creatinine 0.92      Calcium 9.1      Total Protein 7.3      Albumin 3.8      Total Bilirubin 0.3      Alkaline Phosphatase 68      AST 26      ALT 22      Anion Gap 8      eGFR >60.0     NT-PRO NATRIURETIC PEPTIDE - Abnormal    NT-proBNP 855 (*)    POCT GLUCOSE - Abnormal    POCT Glucose 120 (*)    TROPONIN I    Troponin I <0.012            Imaging Results              X-Ray Chest PA And Lateral (Final result)  Result time 01/03/25 18:18:41      Final result by Bernabe Ramos MD (01/03/25 18:18:41)                   Impression:      No acute process seen      Electronically signed by: Bernabe Ramos  Date:    01/03/2025  Time:    18:18               Narrative:    EXAMINATION:  XR CHEST PA AND LATERAL    CLINICAL HISTORY:  Chest  Pain;    TECHNIQUE:  PA and lateral views of the chest were performed.    COMPARISON:  None    FINDINGS:  Lungs are clear.  No acute osseous injury.  Cardiac silhouette within normal limits.                                       Medications - No data to display  Medical Decision Making                Medical Decision Making:    This is an emergent evaluation of a patient presenting to the ED.  Nursing notes were reviewed.  I personally reviewed, read, and interpreted the ECG and any monitoring strips.  ECG: normal EKG, normal sinus rhythm, unchanged from previous tracings   There are no critical interval prolongations nor critical ST-segment ischemic changes.  Compared with prior if available.  Read and interpreted by me independently.    Cxr neg  I reviewed radiology images personally along with interpretations.  I personally reviewed and interpreted the laboratory results.  Labs non critical  Mildly elev BNP  I decided to obtain and review old medical records, which showed: prior echo in 2021    PERC Criteria:  The patient is less than age 50 years, heart rate is less than 100 bpm, O2 saturation is 95% or greater, there is no history of DVT/PE, no trauma/surgery in the past 4 weeks, no hemoptysis, exogenous estrogen, nor unilateral leg swelling.  The patient is clinically at low risk for pulmonary embolism and combined with the low clinical suspicion for pulmonary embolism, no further workup is indicated.    Evaluation for Emergency Medical Condition  The patient received a medical screening exam and within a reasonable degree of clinical confidence an emergency medical condition has not been identified.  The patient is instructed on proper follow up and return precautions to the ED.    Ref cardiology      Kenyon Rojas MD, CHAU                        Clinical Impression:  Final diagnoses:  [R06.00] Dyspnea  [R07.9] Chest pain, unspecified type (Primary)          ED Disposition Condition    Discharge           ED  Prescriptions    None       Follow-up Information       Follow up With Specialties Details Why Contact Info    Marcos Ibarra MD Family Medicine Schedule an appointment as soon as possible for a visit   [address] [facility] [address]  [phone]      [facility] Cardiology Schedule an appointment as soon as possible for a visit   [address] [facility] [address] [phone]          Discharged to home in stable condition, return to ED warnings given, follow up and patient care instructions given.      Kenyon Rojas MD, CHAU, [facility]  Department of Emergency Medicine       Harjinder Rojas MD  01/04/25 0358

## 2025-01-06 ENCOUNTER — PATIENT MESSAGE (OUTPATIENT)
Dept: OPHTHALMOLOGY | Facility: CLINIC | Age: 50
End: 2025-01-06
Payer: MEDICAID

## 2025-01-06 LAB
OHS QRS DURATION: 86 MS
OHS QTC CALCULATION: 439 MS

## 2025-01-07 ENCOUNTER — TELEPHONE (OUTPATIENT)
Dept: OPHTHALMOLOGY | Facility: CLINIC | Age: 50
End: 2025-01-07
Payer: MEDICAID

## 2025-01-07 DIAGNOSIS — Z79.4 TYPE 2 DIABETES MELLITUS WITH HYPERGLYCEMIA, WITH LONG-TERM CURRENT USE OF INSULIN: ICD-10-CM

## 2025-01-07 DIAGNOSIS — E11.65 TYPE 2 DIABETES MELLITUS WITH HYPERGLYCEMIA, WITH LONG-TERM CURRENT USE OF INSULIN: ICD-10-CM

## 2025-01-07 RX ORDER — BLOOD-GLUCOSE SENSOR
EACH MISCELLANEOUS
Qty: 3 EACH | Status: SHIPPED | OUTPATIENT
Start: 2025-01-07

## 2025-01-07 NOTE — TELEPHONE ENCOUNTER
----- Message from Deedee sent at 1/6/2025  6:52 PM CST -----  Type: General Call Back     Name of Caller:pt   Reason pt is calling for a time for his surgery for tomorrow   Would the patient rather a call back or a response via [facility]? Call   Best Call Back Number:[phone]   Additional Information:

## 2025-01-08 ENCOUNTER — TELEPHONE (OUTPATIENT)
Dept: OPHTHALMOLOGY | Facility: CLINIC | Age: 50
End: 2025-01-08
Payer: MEDICAID

## 2025-01-08 NOTE — TELEPHONE ENCOUNTER
Called pt to r/s his sx due to health issues pt could not have sx a [facility] and had to be moved to [facility] pt moved from 1/7 to 2/26 called pt there was no answer and mailbox ws to full to leave a vm a msg was sent to pt through [employer] portal with new sx info

## 2025-01-13 ENCOUNTER — OFFICE VISIT (OUTPATIENT)
Dept: FAMILY MEDICINE | Facility: CLINIC | Age: 50
End: 2025-01-13
Payer: MEDICAID

## 2025-01-13 ENCOUNTER — PATIENT MESSAGE (OUTPATIENT)
Dept: FAMILY MEDICINE | Facility: CLINIC | Age: 50
End: 2025-01-13

## 2025-01-13 DIAGNOSIS — R52 PAIN: ICD-10-CM

## 2025-01-13 DIAGNOSIS — Z79.4 TYPE 2 DIABETES MELLITUS WITHOUT COMPLICATION, WITH LONG-TERM CURRENT USE OF INSULIN: ICD-10-CM

## 2025-01-13 DIAGNOSIS — G89.29 CHRONIC BILATERAL LOW BACK PAIN WITH BILATERAL SCIATICA: Primary | ICD-10-CM

## 2025-01-13 DIAGNOSIS — M54.41 CHRONIC BILATERAL LOW BACK PAIN WITH BILATERAL SCIATICA: Primary | ICD-10-CM

## 2025-01-13 DIAGNOSIS — E11.9 TYPE 2 DIABETES MELLITUS WITHOUT COMPLICATION, WITH LONG-TERM CURRENT USE OF INSULIN: ICD-10-CM

## 2025-01-13 DIAGNOSIS — M54.42 CHRONIC BILATERAL LOW BACK PAIN WITH BILATERAL SCIATICA: Primary | ICD-10-CM

## 2025-01-13 RX ORDER — TRAMADOL HYDROCHLORIDE 50 MG/1
100 TABLET ORAL EVERY 8 HOURS PRN
Qty: 180 TABLET | Refills: 2 | Status: SHIPPED | OUTPATIENT
Start: 2025-01-13

## 2025-01-13 RX ORDER — CYCLOBENZAPRINE HCL 10 MG
10 TABLET ORAL 3 TIMES DAILY
Qty: 90 TABLET | Refills: 2 | Status: SHIPPED | OUTPATIENT
Start: 2025-01-13

## 2025-01-13 RX ORDER — FUROSEMIDE 20 MG/1
TABLET ORAL
Qty: 14 TABLET | Refills: 0 | Status: SHIPPED | OUTPATIENT
Start: 2025-01-13 | End: 2025-01-21

## 2025-01-17 ENCOUNTER — PATIENT MESSAGE (OUTPATIENT)
Dept: FAMILY MEDICINE | Facility: CLINIC | Age: 50
End: 2025-01-17
Payer: MEDICAID

## 2025-01-21 RX ORDER — BUMETANIDE 1 MG/1
1 TABLET ORAL DAILY
Qty: 30 TABLET | Refills: 1 | Status: SHIPPED | OUTPATIENT
Start: 2025-01-21 | End: 2026-01-21

## 2025-01-21 RX ORDER — POTASSIUM CHLORIDE 20 MEQ/1
20 TABLET, EXTENDED RELEASE ORAL EVERY OTHER DAY
Qty: 30 TABLET | Refills: 1 | Status: SHIPPED | OUTPATIENT
Start: 2025-01-21

## 2025-01-21 NOTE — PROGRESS NOTES
" Patient ID: Kirill Gandhi III is a 49 y.o. male.    Chief Complaint: Follow-up    HPI       Kirill Gandhi III is a 49 y.o. male following up on use pain medication for chronic back pain.  Also for diabetes mellitus and lower leg edema.                  Review of Symptoms    Constitutional  No change in activity, No chills fever   Resp  Neg hemoptysis, stridor, choking  CVS  Neg chest pain, palpitations    There were no vitals taken for this visit.    Physical Exam    There were no vitals filed for this visit.    Constitutional:   Oriented to person, place, and time.appears well-developed and well-nourished.   No distress.        Psychiatric: Normal mood and affect. Behavior is normal. Judgment and thought content normal.     Physical Exam              Complete Blood Count  Lab Results   Component Value Date    RBC 3.73 (L) 01/03/2025    HGB 10.9 (L) 01/03/2025    HCT 34.5 (L) 01/03/2025    MCV 93 01/03/2025    MCH 29.2 01/03/2025    MCHC 31.6 (L) 01/03/2025    RDW 13.8 01/03/2025     01/03/2025    MPV 9.6 01/03/2025    GRAN 5.0 01/03/2025    GRAN 59.5 01/03/2025    LYMPH 2.2 01/03/2025    LYMPH 25.8 01/03/2025    MONO 0.9 01/03/2025    MONO 10.5 01/03/2025    EOS 0.2 01/03/2025    BASO 0.07 01/03/2025    EOSINOPHIL 2.8 01/03/2025    BASOPHIL 0.8 01/03/2025    DIFFMETHOD Automated 01/03/2025       Comprehensive Metabolic Panel  Lab Results   Component Value Date     (H) 01/03/2025    BUN 14 01/03/2025    CREATININE 0.92 01/03/2025     01/03/2025    K 3.8 01/03/2025     01/03/2025    PROT 7.3 01/03/2025    ALBUMIN 3.8 01/03/2025    BILITOT 0.3 01/03/2025    AST 26 01/03/2025    ALKPHOS 68 01/03/2025    CO2 26 01/03/2025    ALT 22 01/03/2025    ANIONGAP 8 01/03/2025       TSH  No results found for: "TSH"    Assessment / Plan:      ICD-10-CM ICD-9-CM   1. Chronic bilateral low back pain with bilateral sciatica  M54.42 724.2    M54.41 724.3    G89.29 338.29   2. Pain  R52 " 780.96   3. Type 2 diabetes mellitus without complication, with long-term current use of insulin  E11.9 250.00    Z79.4 V58.67     Assessment & Plan      Chronic lower back pain-continue tramadol-continue lower back exercises   Diabetes mellitus continue with current medications follow-up with routine hemoglobin A1c is microalbumin and eye doctor     Lower leg edema-diuretics periodically-try to minimize use         The patient location is:  Home  The chief complaint leading to consultation is:  Back pain diabetes lower leg edema  Visit type: Virtual visit with synchronous audio and video  Total time spent with patient:  10 minute  Each patient to whom he or she provides medical services by telemedicine is:  (1) informed of the relationship between the physician and patient and the respective role of any other health care provider with respect to management of the patient; and (2) notified that he or she may decline to receive medical services by telemedicine and may withdraw from such care at any time.  Answers submitted by the patient for this visit:  Review of Systems Questionnaire (Submitted on 1/8/2025)  activity change: No  unexpected weight change: Yes  neck pain: No  hearing loss: No  rhinorrhea: No  trouble swallowing: No  eye discharge: No  visual disturbance: No  chest tightness: Yes  wheezing: No  chest pain: Yes  palpitations: Yes  blood in stool: No  constipation: No  vomiting: No  diarrhea: No  polydipsia: No  polyuria: Yes  difficulty urinating: No  urgency: Yes  hematuria: No  joint swelling: No  arthralgias: No  headaches: No  weakness: No  confusion: No  dysphoric mood: No

## 2025-01-28 ENCOUNTER — PATIENT MESSAGE (OUTPATIENT)
Dept: PAIN MEDICINE | Facility: CLINIC | Age: 50
End: 2025-01-28
Payer: MEDICAID

## 2025-01-28 ENCOUNTER — TELEPHONE (OUTPATIENT)
Dept: PAIN MEDICINE | Facility: CLINIC | Age: 50
End: 2025-01-28
Payer: MEDICAID

## 2025-01-28 DIAGNOSIS — E11.40 CHRONIC PAINFUL DIABETIC NEUROPATHY: Primary | ICD-10-CM

## 2025-01-29 ENCOUNTER — PATIENT MESSAGE (OUTPATIENT)
Dept: PAIN MEDICINE | Facility: CLINIC | Age: 50
End: 2025-01-29
Payer: MEDICAID

## 2025-02-03 NOTE — PROGRESS NOTES
Subjective:      Patient ID: Kirill Gandhi III is a 49 y.o.    Chief Complaint:  DM2; Follow up visit       History of Present Illness  49 y.o. Male w/ medical history as above that presents to the endocrine clinic for follow up evaluation of DM2. Previous patient of Dr. Morris. Last visit with me in Oct 2024.     Interval history    We started pioglitazone and decreased his insulin doses.  He says it has been working well for him and his average BG has been better than before. He has gained 50 lb since starting the medication, however, which he mostly attributes to increased food intake bc he has had some hypoglycemia.    With regards to Diabetes Mellitus:     -Type 2     -Duration:  Dx > 10  yrs ago with symptoms and blood work for evaluation of fibromyalgia     -FH?  Grandmother,  Aunts     -Microvascular complications: (Retinopathy,  Neuropathy)   Has seen eye doctor 2024  Gastroparesis: denies   No Nephropathy  No CAD    -DKA?  Denies  -HHS?   Denies    -DM Medications:     Pioglitazone 30 mg  Metformin 500 mg XR 2 tabs twice daily  Pramlintide 60 mcg 3 times daily  Mounjaro 15 mg SC weekly   Farxiga 5 mg daily    U500 110-110-130     He is trying to give 30 minutes before meals but does not always happen. He is changing needle every time and rotating sites.     -Previously tried medications:     Rybelsus    Metformin   Levemir  Novolog     -Glucose monitoring: DEXCOM G7      He did not bring his  and is not connected to the clinic on [facility] portal.    He says his GMI and glucose levels have been significantly lower than before.  Once he saw low BG levels, he tried going to 70 units TID but that did not work well and caused significant hyperglycemia.    Hypoglycemia:  He says his BG drops about 6h after he eats, usually happens overnight. BG gets as low as 40.  Not interested on weight loss surgery at this point (couldn't adhere to diet).    Corrects with food -fruit   Has glucose gel   "    -Diet:   Trying to be better about his diet but has had to "eat up to his insulin" given low BG    -Activity:   Sedentary, back pain      Glucagon: does have it    -Pancreatitis?  No    -Thyroid CA?  No     Diabetes Management Status    Statin: Taking  ACE/ARB: Taking    With regards to elevated LFTs     FIB-4 Calculation: 0.9 at 1/3/2025  6:10 PM  Calculated from:  SGOT/AST: 26 U/L at 1/3/2025  6:10 PM  SGPT/ALT: 22 U/L at 1/3/2025  6:10 PM  Platelets: 291 K/uL at 1/3/2025  6:10 PM  Age: 49 years    Improved FIB-4 from previous. On resmetirom 100 mg.      Screening or Prevention Patient's value Goal Complete/Controlled?   HgA1C Testing and Control   Lab Results   Component Value Date    HGBA1C 5.7 (H) 12/13/2024     HGB 10.9   Annually/Less than 8% No     Lipid profile : 06/11/2024 Annually Yes     LDL control Lab Results   Component Value Date    LDLCALC 40.6 (L) 06/11/2024    Annually/Less than 100 mg/dl  Yes     Nephropathy screening Lab Results   Component Value Date    LABMICR <5.0 06/11/2024     Lab Results   Component Value Date    PROTEINUA Negative 11/11/2022    Annually Yes     Blood pressure BP Readings from Last 1 Encounters:   02/04/25 109/66    Less than 140/90 No     Dilated retinal exam : 05/16/2024 Annually Yes     Foot exam   : 04/29/2024 has appt with podiatry scheduled  Getting Qutenza patches. Annually No        With regards to dyslipidemia,   He is on atorvastatin 10 mg and he is taking CQ10 daily.     He is wearing CPAP    Review of Systems   Constitutional:  Negative for fatigue and unexpected weight change.   Eyes:  Negative for visual disturbance.   Endocrine: Negative for polydipsia, polyphagia and polyuria.     Objective:     /62 (BP Location: Right forearm, Patient Position: Sitting)   Ht 6' (1.829 m)   Wt (!) 205.7 kg (453 lb 6 oz)   BMI 61.49 kg/m²     Body mass index is 61.49 kg/m².    Physical Exam  Vitals reviewed.   Constitutional:       Appearance: He is obese. "   Pulmonary:      Effort: Pulmonary effort is normal. No respiratory distress.   Skin:     General: Skin is warm and dry.   Neurological:      General: No focal deficit present.      Mental Status: He is alert.         Assessment and Plan     Type 2 diabetes mellitus with hyperglycemia, with long-term current use of insulin  Dexcom data not available today. Per pt, better control than previous with pioglitazone - A1c is lower but he also has anemia now. Weight gain d/t dietary habits.  Discussed options for bariatric surgery, pt is afraid of having to eat less.  Goal is to avoid dangerous lows.      Medications:    Continue   Mounjaro 15mg SC weekly,    Metformin 500mg XR 2 tabs twice daily   Pioglitazone 30 mg once daily    Increase  Farxiga 10 mg daily      Decrease  U500 to 100 units TIDAC    Stop  Pramlintide - unclear benefit at this point and complicates regimens    Check labs on RTC     -- Reviewed patient's current insulin regimen. Clarified proper insulin dose and timing in relation to meals, etc. Insulin injection sites and proper rotation instructed.      Class 3 severe obesity due to excess calories with serious comorbidity and body mass index (BMI) of 50.0 to 59.9 in adult  Not interested in surgery. Suggested behavioral changes.    Metabolic dysfunction-associated steatohepatitis (MASH)  Improved from FIB-4 score, on resmetirom.    Dyslipidemia  LDL at goal on atorvastatin 10 mg daily  Recommend continuing.       Benign essential hypertension  BP controlled on current BP meds   On ACE for renal protection      Follow up in about 4 months (around 6/4/2025) for DM2.  Visit today included increased complexity associated with the care of the episodic problem addressed and managing the longitudinal care of the patient due to the serious and/or complex managed problem(s).    Mariann Tubbs MD  [facility]

## 2025-02-04 ENCOUNTER — OFFICE VISIT (OUTPATIENT)
Dept: CARDIOLOGY | Facility: CLINIC | Age: 50
End: 2025-02-04
Payer: MEDICAID

## 2025-02-04 ENCOUNTER — OFFICE VISIT (OUTPATIENT)
Dept: ENDOCRINOLOGY | Facility: CLINIC | Age: 50
End: 2025-02-04
Payer: MEDICAID

## 2025-02-04 ENCOUNTER — PATIENT MESSAGE (OUTPATIENT)
Dept: FAMILY MEDICINE | Facility: CLINIC | Age: 50
End: 2025-02-04
Payer: MEDICAID

## 2025-02-04 VITALS
WEIGHT: 315 LBS | SYSTOLIC BLOOD PRESSURE: 108 MMHG | HEIGHT: 72 IN | BODY MASS INDEX: 42.66 KG/M2 | DIASTOLIC BLOOD PRESSURE: 62 MMHG

## 2025-02-04 VITALS
BODY MASS INDEX: 42.66 KG/M2 | DIASTOLIC BLOOD PRESSURE: 66 MMHG | HEIGHT: 72 IN | SYSTOLIC BLOOD PRESSURE: 109 MMHG | WEIGHT: 315 LBS | HEART RATE: 96 BPM | OXYGEN SATURATION: 97 %

## 2025-02-04 DIAGNOSIS — E11.65 TYPE 2 DIABETES MELLITUS WITH HYPERGLYCEMIA, WITH LONG-TERM CURRENT USE OF INSULIN: Primary | ICD-10-CM

## 2025-02-04 DIAGNOSIS — R00.2 PALPITATIONS: ICD-10-CM

## 2025-02-04 DIAGNOSIS — E66.01 CLASS 3 SEVERE OBESITY DUE TO EXCESS CALORIES WITH SERIOUS COMORBIDITY AND BODY MASS INDEX (BMI) OF 50.0 TO 59.9 IN ADULT: ICD-10-CM

## 2025-02-04 DIAGNOSIS — G47.33 OSA (OBSTRUCTIVE SLEEP APNEA): ICD-10-CM

## 2025-02-04 DIAGNOSIS — E66.813 CLASS 3 SEVERE OBESITY DUE TO EXCESS CALORIES WITH SERIOUS COMORBIDITY AND BODY MASS INDEX (BMI) OF 50.0 TO 59.9 IN ADULT: ICD-10-CM

## 2025-02-04 DIAGNOSIS — K75.81 METABOLIC DYSFUNCTION-ASSOCIATED STEATOHEPATITIS (MASH): ICD-10-CM

## 2025-02-04 DIAGNOSIS — I10 BENIGN ESSENTIAL HYPERTENSION: ICD-10-CM

## 2025-02-04 DIAGNOSIS — R07.9 CHEST PAIN, UNSPECIFIED TYPE: ICD-10-CM

## 2025-02-04 DIAGNOSIS — R06.09 DOE (DYSPNEA ON EXERTION): Primary | ICD-10-CM

## 2025-02-04 DIAGNOSIS — E78.5 DYSLIPIDEMIA: ICD-10-CM

## 2025-02-04 DIAGNOSIS — R07.89 CHEST PAIN, ATYPICAL: ICD-10-CM

## 2025-02-04 DIAGNOSIS — E11.65 UNCONTROLLED TYPE 2 DIABETES MELLITUS WITH HYPERGLYCEMIA: ICD-10-CM

## 2025-02-04 DIAGNOSIS — Z79.4 TYPE 2 DIABETES MELLITUS WITH HYPERGLYCEMIA, WITH LONG-TERM CURRENT USE OF INSULIN: Primary | ICD-10-CM

## 2025-02-04 PROCEDURE — 99999 PR PBB SHADOW E&M-EST. PATIENT-LVL V: CPT | Mod: PBBFAC,,,

## 2025-02-04 PROCEDURE — 3078F DIAST BP <80 MM HG: CPT | Mod: CPTII,,, | Performed by: INTERNAL MEDICINE

## 2025-02-04 PROCEDURE — 99215 OFFICE O/P EST HI 40 MIN: CPT | Mod: S$PBB,,,

## 2025-02-04 PROCEDURE — 99999 PR PBB SHADOW E&M-EST. PATIENT-LVL II: CPT | Mod: PBBFAC,,, | Performed by: STUDENT IN AN ORGANIZED HEALTH CARE EDUCATION/TRAINING PROGRAM

## 2025-02-04 PROCEDURE — 3074F SYST BP LT 130 MM HG: CPT | Mod: CPTII,,,

## 2025-02-04 PROCEDURE — 3072F LOW RISK FOR RETINOPATHY: CPT | Mod: CPTII,,, | Performed by: INTERNAL MEDICINE

## 2025-02-04 PROCEDURE — 3008F BODY MASS INDEX DOCD: CPT | Mod: CPTII,,,

## 2025-02-04 PROCEDURE — 3078F DIAST BP <80 MM HG: CPT | Mod: CPTII,,,

## 2025-02-04 PROCEDURE — G2211 COMPLEX E/M VISIT ADD ON: HCPCS | Mod: S$PBB,,, | Performed by: INTERNAL MEDICINE

## 2025-02-04 PROCEDURE — 99215 OFFICE O/P EST HI 40 MIN: CPT | Mod: PBBFAC,27,PO

## 2025-02-04 PROCEDURE — 99214 OFFICE O/P EST MOD 30 MIN: CPT | Mod: S$PBB,,, | Performed by: INTERNAL MEDICINE

## 2025-02-04 PROCEDURE — 3072F LOW RISK FOR RETINOPATHY: CPT | Mod: CPTII,,,

## 2025-02-04 PROCEDURE — 3074F SYST BP LT 130 MM HG: CPT | Mod: CPTII,,, | Performed by: INTERNAL MEDICINE

## 2025-02-04 PROCEDURE — 3008F BODY MASS INDEX DOCD: CPT | Mod: CPTII,,, | Performed by: INTERNAL MEDICINE

## 2025-02-04 PROCEDURE — 1159F MED LIST DOCD IN RCRD: CPT | Mod: CPTII,,,

## 2025-02-04 PROCEDURE — 99212 OFFICE O/P EST SF 10 MIN: CPT | Mod: PBBFAC | Performed by: STUDENT IN AN ORGANIZED HEALTH CARE EDUCATION/TRAINING PROGRAM

## 2025-02-04 RX ORDER — DAPAGLIFLOZIN 10 MG/1
10 TABLET, FILM COATED ORAL DAILY
Qty: 90 TABLET | Refills: 3 | Status: SHIPPED | OUTPATIENT
Start: 2025-02-04

## 2025-02-04 NOTE — PROGRESS NOTES
Cardiology Clinic note    Subjective:   Patient ID:  Kirill Gandhi III is a 49 y.o. male who presents for evaluation of CP, HOLMAN, leg swelling    HPI    2/4/2025: Previous patient of Dr. Bals as below, initial visit for me. 50 yo M with hx of HTN, HLD, DM2 (last A1C 5.7), GERD, palpitations, VICENTE, obesity present to clinic for evaluation of CP/HOLMAN/LE edema. Seen in clinic with mother, reports overall doing okay. Over the past few months has noted weight gain, worsening LE swelling, and new onset CP/HOLMAN. Reports HOLMAN has worsened over the past 1-2m. No significant orthopnea/PND; no resting SOB. Reports CP is random, reported as soreness/shocking, happening at random times. No associated exacerbating or relieving factors. Does not seem to be associated with exertion. Also endorses intermittent palpitations. No associated s/s. Denies syncope/presyncope, orthopnea, PND. Compliant with CPAP. Reports compliance and tolerance with all medications.    1/3/2023  Referred by Dr Ibarra   Kirill Gandhi III is a 46 y.o. male video visit for preoperative clearance revision of surgical procedure and fusion of SI joint as well as diabetes follow-up.  Currently is without any cardiac symptoms such as chest pain palpitations shortness of breath PND orthopnea.  He has new respiratory illnesses.  No cough cold congestion fever chills nausea vomiting or urinary symptoms.  Diabetes-recently increased from 1.5-3 milligram Trulicity weekly.  Glucose readings have been running in the low 200s to high 106. Hemoglobin A1c was over 11 now 8.711 days ago-experiencing better control than previous.  Continues to struggle with appropriate diet-ingestion of a significant amount of carbohydrates.       EKG 3/7/22 NSR NSSTT changes - anterolateral and inferior TWI - new from 2021     PET stress 4/8/22    There are no clinically significant perfusion abnormalities. There is a small to moderate (10-15%) amount of mild resting  heterogeneity in the anterior and apical wall(s) that improves with stress consisent with endothelial dysfunction secondary to diabetes.    The whole heart absolute myocardial perfusion values averaged 0.49 cc/min/g at rest, which is reduced; 1.17 cc/min/g at stress, which is mildly reduced; and CFR is 2.41 , which is low normal.    CT attenuation images demonstrate no coronary calcifications and no aortic calcifications.    The gated perfusion images showed an ejection fraction of 58% at rest and 66% during stress. A normal ejection fraction is greater than 53%.    The wall motion is normal at rest and during stress.    The LV cavity size is normal at rest and stress.    The EKG portion of this study is negative for ischemia.    There were no arrhythmias during stress.    The patient reported no chest pain during the stress test.    There are no prior studies for comparison.        Stress echo 4/14/21   The stress echo portion of this study is negative for myocardial ischemia. Target heart rate was achieved.  The ECG portion of this study is negative for myocardial ischemia.  There were no arrhythmias during stress.  The test was stopped because the patient experienced fatigue.  The patient's exercise capacity was below average. Achieved 8 METs.  The left ventricle is normal in size with normal systolic function. The estimated ejection fraction is 60%.  Normal left ventricular diastolic function.  Normal right ventricular size with low normal right ventricular systolic function.  The estimated PA systolic pressure is 35 mmHg.  Normal central venous pressure (3 mmHg).     Holter 4/12/21  Baseline rhythm was normal sinus rhythm with normal intervals and an average heart rate of 76 bpm.  There was one episode with reported symtpoms of nausea, tiredness, and fatigue. This episode corresponded to normal sinus rhythm.  There were no atrial or ventricular arrhythmias.     3/10/22 Needs clearance for spine surgery  revision. Activity limited. Mild HOLMAN. Denies CP   PET stress for new EKG changes - will clear for spine surgery if ok     4/12/22 Denies CP, mild stable HOLMAN  BP controlled  Cleared for spine surgery at low cardiac risk  Continue Rx for  HTN, HLD  OV 6 months     1/3/23 Denies CP or SOB. Gets palpitations about once a week - last 20 secinds  BP controlled    Past Medical History:   Diagnosis Date    Allergy     Anxiety     Back pain     Chronic bilateral low back pain with bilateral sciatica 12/14/2021    CTS (carpal tunnel syndrome) 06/26/2015    CTS (carpal tunnel syndrome) 6/26/2015    Depression     Diabetes 04/17/2015    Diabetes mellitus with insulin therapy     Elevated sed rate 6/26/2015    GERD (gastroesophageal reflux disease) 10/19/2022    Hx of vasectomy     Hypertension     Morbid obesity 4/16/2015    OCD (obsessive compulsive disorder)     Sleep apnea     Type 2 diabetes mellitus with hyperglycemia, with long-term current use of insulin 10/17/2022         Patient Active Problem List    Diagnosis Date Noted    Need for hepatitis B vaccination 05/14/2024    Hepatic fibrosis 05/14/2024    Metabolic dysfunction-associated steatohepatitis (MASH) 04/04/2024    Elevated LFTs 04/04/2024    Dyslipidemia 08/10/2023    Decreased strength of trunk and back 01/09/2023    Decreased ROM of trunk and back 01/09/2023    Flatulence/gas pain/belching 11/15/2022    Hyponatremia 11/11/2022    Metabolic acidemia 11/11/2022    Dehydration with hyponatremia 11/11/2022    Excessive flatus 10/19/2022    Gastroesophageal reflux disease without esophagitis 10/19/2022    Type 2 diabetes mellitus with hyperglycemia, with long-term current use of insulin 10/17/2022    Benign essential hypertension 04/20/2022    Pseudoarthrosis of lumbar spine 04/13/2022    Chest pain, atypical 03/10/2022    Chronic bilateral low back pain with bilateral sciatica 12/14/2021    Radiculopathy of lumbosacral region 12/14/2021    Sacroiliac joint  dysfunction of both sides 12/13/2021    Fusion of spine, lumbosacral region 07/13/2021    Mixed obsessional thoughts and acts 06/25/2021    Mild episode of recurrent major depressive disorder 06/25/2021    Adjustment disorder with mixed anxiety and depressed mood 05/19/2021    DDD (degenerative disc disease), lumbar 04/26/2021    Palpitations 03/25/2021    Insomnia due to other mental disorder 02/05/2021    OCD (obsessive compulsive disorder) 02/26/2020    Anxiety 02/26/2020    VICENTE (obstructive sleep apnea) 06/17/2015    Class 3 severe obesity due to excess calories with serious comorbidity and body mass index (BMI) of 50.0 to 59.9 in adult 04/16/2015    Vitamin D deficiency 04/16/2015       Patient's Medications   New Prescriptions    No medications on file   Previous Medications    ACETAMINOPHEN (TYLENOL) 500 MG TABLET    Take 1,000 mg by mouth 2 (two) times daily as needed for Pain.    ARIPIPRAZOLE (ABILIFY) 5 MG TAB    Take 1 tablet (5 mg total) by mouth once daily. for 180 doses    ASCORBIC ACID, VITAMIN C, (VITAMIN C) 1000 MG TABLET    Take 1,000 mg by mouth once daily.    ASPIRIN (ECOTRIN) 81 MG EC TABLET    Take 81 mg by mouth every evening.    ASPIRIN/SOD BICARB/CITRIC ACID (GIOVANY-SELTZER ORAL)    Take 2 tablets by mouth daily as needed (Upset stomach).    ATORVASTATIN (LIPITOR) 10 MG TABLET    Take 1 tablet (10 mg total) by mouth once daily.    BLOOD-GLUCOSE TRANSMITTER (DEXCOM G6 TRANSMITTER) MARIAELENA    1 each by Misc.(Non-Drug; Combo Route) route continuous.    BUMETANIDE (BUMEX) 1 MG TABLET    Take 1 tablet (1 mg total) by mouth once daily. DC furosemide    BUPROPION (WELLBUTRIN SR) 100 MG TBSR 12 HR TABLET    Take 2 tablets (200 mg total) by mouth once daily.    BUSPIRONE (BUSPAR) 30 MG TAB    Take 1 tablet (30 mg total) by mouth 2 (two) times daily.    COENZYME Q10 (CO Q-10) 100 MG CAPSULE    Take 100 mg by mouth once daily.    CYANOCOBALAMIN (VITAMIN B-12) 1000 MCG TABLET    Take 100 mcg by mouth once  daily.    CYCLOBENZAPRINE (FLEXERIL) 10 MG TABLET    Take 1 tablet (10 mg total) by mouth 3 (three) times daily. TAKE 1 TABLET BY MOUTH THREE TIMES A DAY AS NEEDED FOR MUSCLE SPASMS    DAPAGLIFLOZIN PROPANEDIOL (FARXIGA) 10 MG TABLET    Take 1 tablet (10 mg total) by mouth once daily.    DEXCOM G6 SENSOR MARIAELENA    USE AS DIRECTED    DICYCLOMINE (BENTYL) 20 MG TABLET    Take 1 tablet (20 mg total) by mouth 3 (three) times daily as needed (abdominal pain).    DULOXETINE (CYMBALTA) 60 MG CAPSULE    Take 1 capsule (60 mg total) by mouth 2 (two) times daily.    FLUTICASONE PROPIONATE (FLONASE) 50 MCG/ACTUATION NASAL SPRAY    2 sprays (100 mcg total) by Each Nostril route once daily.    GABAPENTIN (NEURONTIN) 800 MG TABLET    Take 1 tablet (800 mg total) by mouth 3 (three) times daily.    INSULIN REGULAR HUM U-500 CONC (HUMULIN R U-500, CONC, KWIKPEN) 500 UNIT/ML (3 ML) INSULIN PEN    Take 120 units with breakfast, 130 units with lunch and 150 units with dinner    INSULIN SYRINGE-NEEDLE U-100 (BD INSULIN SYRINGE ULTRA-FINE) 1 ML 31 GAUGE X 5/16 SYRG    To use 6 times daily with insulin    LACTOBAC NO.41/BIFIDOBACT NO.7 (PROBIOTIC-10 ORAL)    Take by mouth.    LANOLIN/MINERAL OIL/PETROLATUM (ARTIFICIAL TEARS OPHT)    Place 2 drops into both eyes daily as needed (Dry eye).    LIDOCAINE (LIDODERM) 5 %    Place 1 patch onto the skin once daily. Remove & Discard patch within 12 hours or as directed by MD    MELOXICAM (MOBIC) 15 MG TABLET    TAKE 1 TABLET BY MOUTH ONCE DAILY, DISCONTINUE CELEBREX    METFORMIN (GLUCOPHAGE-XR) 500 MG ER 24HR TABLET    Take 2 tablets (1,000 mg total) by mouth 2 (two) times daily with meals.    METOCLOPRAMIDE HCL (REGLAN) 5 MG TABLET    Take 1 tablet (5 mg total) by mouth 4 (four) times daily before meals and nightly.    MULTIVITAMIN CAPSULE    Take 1 capsule by mouth once daily.    NON FORMULARY MEDICATION    Uses Cpap Machine nightly on a setting of 10    ONDANSETRON (ZOFRAN-ODT) 4 MG TBDL    Take  "1 tablet (4 mg total) by mouth every 8 (eight) hours as needed (nausea).    ONETOUCH DELICA PLUS LANCET 33 GAUGE MISC    Apply topically 3 (three) times daily.    ONETOUCH VERIO TEST STRIPS STRP    1 strip 3 (three) times daily.    PANTOPRAZOLE (PROTONIX) 40 MG TABLET    Take 1 tablet (40 mg total) by mouth once daily.    PEN NEEDLE, DIABETIC (BD ULTRA-FINE ORIG PEN NEEDLE) 29 GAUGE X 1/2" NDLE    To use 3 needles daily    PEN NEEDLE, DIABETIC 31 GAUGE X 1/4" NDLE    Use with flex pen    PIOGLITAZONE (ACTOS) 30 MG TABLET    Take 1 tablet (30 mg total) by mouth once daily.    POTASSIUM CHLORIDE SA (K-DUR,KLOR-CON) 20 MEQ TABLET    Take 1 tablet (20 mEq total) by mouth every other day.    PROPRANOLOL (INDERAL) 20 MG TABLET    TAKE 1 TABLET BY MOUTH THREE TIMES A DAY AS NEEDED FOR ANXIETY. MAY TAKE AN EXTRA TABLET AS NEEDED FOR ANXIETY    RESMETIROM 100 MG TAB    Take 100 mg by mouth once daily.    TIRZEPATIDE (MOUNJARO) 15 MG/0.5 ML PNIJ    Inject 15 mg into the skin every 7 days.    TRAMADOL (ULTRAM) 50 MG TABLET    Take 2 tablets (100 mg total) by mouth every 8 (eight) hours as needed (severe pain).    TRAZODONE (DESYREL) 50 MG TABLET    TAKE 1-2 TABLETS BY MOUTH EVERY EVENING    VALSARTAN-HYDROCHLOROTHIAZIDE (DIOVAN-HCT) 320-12.5 MG PER TABLET    Take 1 tablet by mouth once daily.    VITAMIN D 1000 UNITS TAB    Take 5,000 Units by mouth 2 (two) times a day.    ZEGALOGUE AUTOINJECTOR 0.6 MG/0.6 ML ATIN    Inject 1 Box  into the skin as needed (hypoglycemia). Glucagon is an emergency pen that is used to increase your blood sugar. Glucagon is a pen that is used in an emergency situation where you are unable to eat or drink anything. Glucagon is a medication that is given by someone else-spouse, child, etc.   Modified Medications    No medications on file   Discontinued Medications    No medications on file        Review of Systems   Constitutional: Positive for weight gain. Negative for chills, decreased appetite, " diaphoresis, malaise/fatigue and weight loss.   Cardiovascular:  Positive for chest pain, dyspnea on exertion, leg swelling and palpitations. Negative for claudication, irregular heartbeat, near-syncope, orthopnea, paroxysmal nocturnal dyspnea and syncope.   Respiratory:  Negative for cough, hemoptysis, shortness of breath and snoring.    Gastrointestinal:  Negative for bloating, abdominal pain, nausea and vomiting.   Neurological:  Negative for light-headedness and weakness.       Family History   Problem Relation Name Age of Onset    Fibromyalgia Mother      Osteoarthritis Mother      Cataracts Maternal Grandmother      Macular degeneration Maternal Grandfather      Cataracts Maternal Grandfather      Rheum arthritis Neg Hx      Psoriasis Neg Hx      Lupus Neg Hx      Kidney disease Neg Hx      Inflammatory bowel disease Neg Hx      Amblyopia Neg Hx      Blindness Neg Hx      Glaucoma Neg Hx      Retinal detachment Neg Hx      Strabismus Neg Hx         Social History     Socioeconomic History    Marital status: Significant Other   Tobacco Use    Smoking status: Former     Types: Vaping with nicotine     Passive exposure: Past    Smokeless tobacco: Never    Tobacco comments:     Handout provided for [employer]oking Cessaiton program 10/11/24 - pt has stopped vaping   Substance and Sexual Activity    Alcohol use: Not Currently    Drug use: Never    Sexual activity: Yes     Partners: Female     Birth control/protection: None     Comment: ; one child      Social Drivers of Health     Financial Resource Strain: Low Risk  (2/4/2025)    Overall Financial Resource Strain (CARDIA)     Difficulty of Paying Living Expenses: Not hard at all   Food Insecurity: No Food Insecurity (2/4/2025)    Hunger Vital Sign     Worried About Running Out of Food in the Last Year: Never true     Ran Out of Food in the Last Year: Never true   Transportation Needs: No Transportation Needs (1/15/2024)    PRAPARE - Transportation      Lack of Transportation (Medical): No     Lack of Transportation (Non-Medical): No   Physical Activity: Inactive (2/4/2025)    Exercise Vital Sign     Days of Exercise per Week: 0 days     Minutes of Exercise per Session: 0 min   Stress: No Stress Concern Present (2/4/2025)    [employer] - Occupational Stress Questionnaire     Feeling of Stress : Only a little   Housing Stability: Low Risk  (1/15/2024)    Housing Stability Vital Sign     Unable to Pay for Housing in the Last Year: No     Number of Places Lived in the Last Year: 2     Unstable Housing in the Last Year: No            Objective:   Vitals  Vitals:    02/04/25 1433 02/04/25 1434   BP: 133/71 109/66   Pulse: 96    SpO2: 97%    Weight: (!) 206.4 kg (455 lb 0.5 oz)    Height: 6' (1.829 m)           Physical Exam  Vitals and nursing note reviewed.   Constitutional:       Appearance: Normal appearance.   Cardiovascular:      Rate and Rhythm: Normal rate and regular rhythm.      Heart sounds: No murmur heard.     No gallop.   Pulmonary:      Effort: Pulmonary effort is normal.      Breath sounds: Normal breath sounds. No wheezing or rales.   Abdominal:      General: Bowel sounds are normal. There is no distension.      Palpations: Abdomen is soft.      Tenderness: There is no abdominal tenderness.   Musculoskeletal:      Right lower leg: Edema present.      Left lower leg: Edema present.   Skin:     General: Skin is warm and dry.   Neurological:      Mental Status: He is alert and oriented to person, place, and time.           Lab Results    Lab Results   Component Value Date    WBC 8.45 01/03/2025    HGB 10.9 (L) 01/03/2025    HCT 34.5 (L) 01/03/2025    HCT 31 (L) 04/13/2022    MCV 93 01/03/2025       Lab Results   Component Value Date     01/03/2025    INR 1.0 03/07/2022       Lab Results   Component Value Date    K 3.8 01/03/2025    MG 1.6 11/12/2022    BUN 14 01/03/2025    CREATININE 0.92 01/03/2025       Lab Results    Component Value Date     (H) 01/03/2025    HGBA1C 5.7 (H) 12/13/2024       Lab Results   Component Value Date    AST 26 01/03/2025    ALT 22 01/03/2025    ALBUMIN 3.8 01/03/2025    PROT 7.3 01/03/2025       Lab Results   Component Value Date    CHOL 112 (L) 06/11/2024    HDL 28 (L) 06/11/2024    LDLCALC 40.6 (L) 06/11/2024    TRIG 217 (H) 06/11/2024       Lab Results   Component Value Date    CRP 16.3 (H) 06/26/2015         Assessment:     1. HOLMAN (dyspnea on exertion)    2. Chest pain, unspecified type    3. Palpitations    4. Benign essential hypertension    5. Class 3 severe obesity due to excess calories with serious comorbidity and body mass index (BMI) of 50.0 to 59.9 in adult    6. Dyslipidemia    7. Chest pain, atypical    8. VICENTE (obstructive sleep apnea)        Plan:     CP, HOLMAN  -given risk factors, rec PET stress to r/o ischemic etiology  -update echo to eval EF, r/o DD  -agree with initiation of GDMT for CHF - continue vaslartan-HCTZ, bumex, farxiga as above for now- will monitor kidney function closely  -further recs pending stress/ TTE    2. Palpitations  -48-hour holter to r/o arrhythmic etiology    3. HTN  -well controlled, continue regimen as above    4. HLD  -continue statin    5. Obesity  -low sodium, heart healthy diet; mod intensity exercise 30m/day 3-4x/wk; weight loss    6. Nicotine dependence  -quincy  -educated on importance of absolute cessation  -will refer to cessation team once ready to quit        The ASCVD Risk score (Errol DK, et al., 2019) failed to calculate for the following reasons:    The valid total cholesterol range is 130 to 320 mg/dL    Orders Placed This Encounter   Procedures    Basic metabolic panel     Standing Status:   Future     Standing Expiration Date:   5/5/2026     Order Specific Question:   Send normal result to authorizing provider's In Basket if patient is active on MyChart:     Answer:   Yes    Magnesium     Standing Status:   Future     Standing  Expiration Date:   5/5/2026     Order Specific Question:   Send normal result to authorizing provider's In Basket if patient is active on MyChart:     Answer:   Yes    Cardiac PET Scan Stress     Standing Status:   Future     Standing Expiration Date:   2/4/2026     Order Specific Question:   Which medicaton for the stress procedure?     Answer:   Regadenoson     Order Specific Question:   Release to patient     Answer:   Immediate    Holter monitor - 48 hour     Standing Status:   Future     Standing Expiration Date:   2/4/2026     Order Specific Question:   Release to patient     Answer:   Immediate    Echo     Standing Status:   Future     Standing Expiration Date:   2/4/2026     Order Specific Question:   Release to patient     Answer:   Immediate       He expressed verbal understanding and agreed with the plan    Follow up in ~ 1m    Pertinent cardiac images and EKG reviewed independently.    Continue with current medical plan and lifestyle changes.  Return sooner for concerns or questions. If symptoms persist go to the ED.  I have reviewed all pertinent data including patient's medical history in detail and updated the computerized patient record.     Counseling included discussion regarding imaging findings, diagnosis, possibilities, treatment options, risks and benefits.    Thank you for the opportunity to care for this patient. Will be available for questions if needed.      Signed:  Capo Quintero DNP  02/04/2025

## 2025-02-04 NOTE — ASSESSMENT & PLAN NOTE
Dexcom data not available today. Per pt, better control than previous with pioglitazone - A1c is lower but he also has anemia now. Weight gain d/t dietary habits.  Discussed options for bariatric surgery, pt is afraid of having to eat less.  Goal is to avoid dangerous lows.      Medications:    Continue   Mounjaro 15mg SC weekly,    Metformin 500mg XR 2 tabs twice daily   Pioglitazone 30 mg once daily    Increase  Farxiga 10 mg daily      Decrease  U500 to 100 units TIDAC    Stop  Pramlintide - unclear benefit at this point and complicates regimens    Check labs on RTC     -- Reviewed patient's current insulin regimen. Clarified proper insulin dose and timing in relation to meals, etc. Insulin injection sites and proper rotation instructed.

## 2025-02-04 NOTE — PATIENT INSTRUCTIONS
Pioglitazone 30 mg  Metformin 500mg XR 2 tabs twice daily  Mounjaro 15mg SC weekly   Farxiga 5 mg daily      Changes:  U500 100 before breakfast, lunch and dinner  Increase Farxiga to 10 mg daily  Stop Pramlintide/Symlinpen 60 mcg 3 times daily

## 2025-02-05 ENCOUNTER — PROCEDURE VISIT (OUTPATIENT)
Dept: PAIN MEDICINE | Facility: CLINIC | Age: 50
End: 2025-02-05
Payer: MEDICAID

## 2025-02-05 VITALS
HEART RATE: 102 BPM | DIASTOLIC BLOOD PRESSURE: 66 MMHG | HEIGHT: 72 IN | OXYGEN SATURATION: 95 % | SYSTOLIC BLOOD PRESSURE: 168 MMHG | WEIGHT: 315 LBS | BODY MASS INDEX: 42.66 KG/M2

## 2025-02-05 DIAGNOSIS — G57.93 NEUROPATHY OF BOTH FEET: ICD-10-CM

## 2025-02-05 DIAGNOSIS — E11.42 DIABETIC PERIPHERAL NEUROPATHY: ICD-10-CM

## 2025-02-05 DIAGNOSIS — E11.40 CHRONIC PAINFUL DIABETIC NEUROPATHY: Primary | ICD-10-CM

## 2025-02-05 PROCEDURE — 99999PBSHW PR PBB SHADOW TECHNICAL ONLY FILED TO HB: Mod: JZ,PBBFAC,,

## 2025-02-05 PROCEDURE — 64999 UNLISTED PX NERVOUS SYSTEM: CPT | Mod: S$PBB,,, | Performed by: ANESTHESIOLOGY

## 2025-02-05 PROCEDURE — 64999 UNLISTED PX NERVOUS SYSTEM: CPT | Mod: PBBFAC,PN | Performed by: ANESTHESIOLOGY

## 2025-02-05 RX ADMIN — Medication 3 PATCH: at 01:02

## 2025-02-05 NOTE — PROCEDURES
PROCEDURE: Qutenza (Capsaicin 8% topical system) - bilateral Feet    PATIENT NAME: Kirill Gandhi III   MRN: [id number]     DATE OF PROCEDURE: 02/05/2025    Diagnosis:  Diabetes mellitus due to underlying condition with diabetic neuropathy    Patch # 3 this year.    Postprocedural Diagnosis: Same    Complications: None  Outcome: Good    Informed Consent:  The patient's condition and proposed procedures, risks (including complications of nerve damage, skin irritation, infection, and failure of pain relief), and alternatives were discussed with the patient or responsible party.  The patient's/responsible party's questions were answered.  The patient/responsible party appeared to understand and chose to proceed.  Informed consent was obtained.    Procedural Pause:  A procedural pause verifying correct patient, medical record number, allergies, medications to be administered, current vital signs, and surgical site was performed immediately prior to beginning the procedure.    Procedure in Detail:  The procedure was performed in the procedure treatment room.  After obtaining written consent, the patient was placed in a supine position. 6 patches were used for the procedure today.  The patient applied 4% lidocaine ointment to the area 1 hour prior to treatment., and the target area was outlined with a marker.  The patch(es) were applied to the target area and secured with tape.  The patient was allowed to rest in a comfortable position, and monitored by staff every few minutes to ensure comfort.  The option for ice pack was provided to the patient should they start experiencing burning.  The patch(es) remained in place for 60 minutes.  The patch(es) were then removed and the cleaning gel was applied and allowed to sit for an additional 60 seconds, after which the area was wiped clean.     The patient was then discharged in stable condition.    Note Electronically Signed By:  Clark Smith  02/05/2025

## 2025-02-06 ENCOUNTER — PATIENT MESSAGE (OUTPATIENT)
Dept: ENDOCRINOLOGY | Facility: CLINIC | Age: 50
End: 2025-02-06
Payer: MEDICAID

## 2025-02-06 DIAGNOSIS — Z79.4 TYPE 2 DIABETES MELLITUS WITHOUT COMPLICATION, WITH LONG-TERM CURRENT USE OF INSULIN: Primary | ICD-10-CM

## 2025-02-06 DIAGNOSIS — E11.65 TYPE 2 DIABETES MELLITUS WITH HYPERGLYCEMIA, WITH LONG-TERM CURRENT USE OF INSULIN: ICD-10-CM

## 2025-02-06 DIAGNOSIS — Z79.4 TYPE 2 DIABETES MELLITUS WITH HYPERGLYCEMIA, WITH LONG-TERM CURRENT USE OF INSULIN: ICD-10-CM

## 2025-02-06 DIAGNOSIS — D64.9 ANEMIA, UNSPECIFIED TYPE: ICD-10-CM

## 2025-02-06 DIAGNOSIS — I10 ESSENTIAL HYPERTENSION: ICD-10-CM

## 2025-02-06 DIAGNOSIS — E11.9 TYPE 2 DIABETES MELLITUS WITHOUT COMPLICATION, WITH LONG-TERM CURRENT USE OF INSULIN: Primary | ICD-10-CM

## 2025-02-06 RX ORDER — BLOOD-GLUCOSE TRANSMITTER
EACH MISCELLANEOUS
Qty: 1 EACH | Status: SHIPPED | OUTPATIENT
Start: 2025-02-06

## 2025-02-10 ENCOUNTER — PATIENT MESSAGE (OUTPATIENT)
Dept: OPHTHALMOLOGY | Facility: CLINIC | Age: 50
End: 2025-02-10
Payer: MEDICAID

## 2025-02-10 ENCOUNTER — TELEPHONE (OUTPATIENT)
Dept: OPHTHALMOLOGY | Facility: CLINIC | Age: 50
End: 2025-02-10
Payer: MEDICAID

## 2025-02-10 NOTE — TELEPHONE ENCOUNTER
Called pt per  to get his sx r/s to 3/12 due to an urgent case  has to do on 2/26 but there was no answer and pt mailbox to full to leave a message

## 2025-02-19 ENCOUNTER — OFFICE VISIT (OUTPATIENT)
Dept: PODIATRY | Facility: CLINIC | Age: 50
End: 2025-02-19
Payer: MEDICAID

## 2025-02-19 ENCOUNTER — OFFICE VISIT (OUTPATIENT)
Dept: PAIN MEDICINE | Facility: CLINIC | Age: 50
End: 2025-02-19
Payer: MEDICAID

## 2025-02-19 VITALS — WEIGHT: 315 LBS | BODY MASS INDEX: 42.66 KG/M2 | HEIGHT: 72 IN

## 2025-02-19 DIAGNOSIS — M96.1 FAILED BACK SURGICAL SYNDROME: ICD-10-CM

## 2025-02-19 DIAGNOSIS — B35.1 ONYCHOMYCOSIS DUE TO DERMATOPHYTE: ICD-10-CM

## 2025-02-19 DIAGNOSIS — Z98.1 S/P FUSION OF SACROILIAC JOINT: ICD-10-CM

## 2025-02-19 DIAGNOSIS — E11.65 UNCONTROLLED TYPE 2 DIABETES MELLITUS WITH HYPERGLYCEMIA: ICD-10-CM

## 2025-02-19 DIAGNOSIS — L60.3 NAIL DYSTROPHY: ICD-10-CM

## 2025-02-19 DIAGNOSIS — E11.40 CHRONIC PAINFUL DIABETIC NEUROPATHY: Primary | ICD-10-CM

## 2025-02-19 DIAGNOSIS — E66.01 CLASS 3 SEVERE OBESITY DUE TO EXCESS CALORIES WITH SERIOUS COMORBIDITY AND BODY MASS INDEX (BMI) OF 50.0 TO 59.9 IN ADULT: ICD-10-CM

## 2025-02-19 DIAGNOSIS — Z79.4 TYPE 2 DIABETES MELLITUS WITH HYPERGLYCEMIA, WITH LONG-TERM CURRENT USE OF INSULIN: Primary | Chronic | ICD-10-CM

## 2025-02-19 DIAGNOSIS — E66.813 CLASS 3 SEVERE OBESITY DUE TO EXCESS CALORIES WITH SERIOUS COMORBIDITY AND BODY MASS INDEX (BMI) OF 50.0 TO 59.9 IN ADULT: ICD-10-CM

## 2025-02-19 DIAGNOSIS — E11.42 DIABETIC PERIPHERAL NEUROPATHY: ICD-10-CM

## 2025-02-19 DIAGNOSIS — M96.1 POSTLAMINECTOMY SYNDROME OF LUMBAR REGION: ICD-10-CM

## 2025-02-19 DIAGNOSIS — E11.65 TYPE 2 DIABETES MELLITUS WITH HYPERGLYCEMIA, WITH LONG-TERM CURRENT USE OF INSULIN: Primary | Chronic | ICD-10-CM

## 2025-02-19 DIAGNOSIS — G57.93 NEUROPATHY OF BOTH FEET: ICD-10-CM

## 2025-02-19 DIAGNOSIS — M79.671 RIGHT FOOT PAIN: Primary | ICD-10-CM

## 2025-02-19 PROCEDURE — 99215 OFFICE O/P EST HI 40 MIN: CPT | Mod: PBBFAC,27,PN | Performed by: PODIATRIST

## 2025-02-19 PROCEDURE — 99214 OFFICE O/P EST MOD 30 MIN: CPT | Mod: PBBFAC,PN | Performed by: ANESTHESIOLOGY

## 2025-02-19 RX ORDER — CICLOPIROX 80 MG/ML
SOLUTION TOPICAL NIGHTLY
Qty: 6.6 ML | Refills: 6 | Status: SHIPPED | OUTPATIENT
Start: 2025-02-19

## 2025-02-19 NOTE — PROGRESS NOTES
[facility] - PODIATRY  [address]  [address]  [place] [address]  Dept: [phone]  Dept Fax: [phone]    Leno Amanda Jr., DPM     Assessment:   MDM    Coding  1. Type 2 diabetes mellitus with hyperglycemia, with long-term current use of insulin  Foot Exam Performed    Ambulatory referral/consult to Diabetes Education    DIABETIC SHOES FOR HOME USE      2. Uncontrolled type 2 diabetes mellitus with hyperglycemia  Ambulatory referral/consult to Podiatry    Foot Exam Performed    Ambulatory referral/consult to Diabetes Education      3. Class 3 severe obesity due to excess calories with serious comorbidity and body mass index (BMI) of 50.0 to 59.9 in adult  Foot Exam Performed    Ambulatory referral/consult to Diabetes Education      4. Onychomycosis due to dermatophyte  Foot Exam Performed    ciclopirox (PENLAC) 8 % Soln      5. Nail dystrophy  Foot Exam Performed          Plan:     Procedures  1. Type 2 diabetes mellitus with hyperglycemia, with long-term current use of insulin  -     Foot Exam Performed  -     Ambulatory referral/consult to Diabetes Education; Future; Expected date: 02/26/2025  -     DIABETIC SHOES FOR HOME USE    2. Uncontrolled type 2 diabetes mellitus with hyperglycemia  -     Ambulatory referral/consult to Podiatry  -     Foot Exam Performed  -     Ambulatory referral/consult to Diabetes Education; Future; Expected date: 02/26/2025    3. Class 3 severe obesity due to excess calories with serious comorbidity and body mass index (BMI) of 50.0 to 59.9 in adult  -     Foot Exam Performed  -     Ambulatory referral/consult to Diabetes Education; Future; Expected date: 02/26/2025    4. Onychomycosis due to dermatophyte  -     Foot Exam Performed  -     ciclopirox (PENLAC) 8 % Soln; Apply topically nightly.  Dispense: 6.6 mL; Refill: 6    5. Nail dystrophy  -     Foot Exam Performed      Kirill was seen today for toe pain.    Diagnoses and all  orders for this visit:    Type 2 diabetes mellitus with hyperglycemia, with long-term current use of insulin  -     Foot Exam Performed  -     Ambulatory referral/consult to Diabetes Education; Future  -     DIABETIC SHOES FOR HOME USE    Uncontrolled type 2 diabetes mellitus with hyperglycemia  -     Ambulatory referral/consult to Podiatry  -     Foot Exam Performed  -     Ambulatory referral/consult to Diabetes Education; Future    Class 3 severe obesity due to excess calories with serious comorbidity and body mass index (BMI) of 50.0 to 59.9 in adult  -     Foot Exam Performed  -     Ambulatory referral/consult to Diabetes Education; Future    Onychomycosis due to dermatophyte  -     Foot Exam Performed  -     ciclopirox (PENLAC) 8 % Soln; Apply topically nightly.    Nail dystrophy  -     Foot Exam Performed      ADA Risk Classification: No LOPS,No PAD, No deformity - 0: rtc 12 months    -pt seen, evaluated, and managed  -dx discussed in detail. All questions/concerns addressed  -all tx options discussed. All alternatives, risks, benefits of all txs discussed  -comprehensive diabetic foot exam with risk assessment performed today  -the patient was educated about the diagnosis and discussed reducing caloric intake, increase physical activity  -XR/imaging reviewed by me: agree with read  -labs reviewed by me: agree with read  -Shoe inspection. Diabetic Foot Education. Patient reminded of the importance of good nutrition and blood sugar control to help prevent podiatric complications of diabetes. Patient instructed on proper foot hygeine. We discussed wearing proper shoe gear, daily foot inspections, never walking without protective shoe gear, never putting sharp instruments to feet.  -rxs dispensed: penlac and dm shoes  -referrals: dm education  -WB: wbat        Follow up in about 1 year (around 2/19/2026).    Subjective:      Patient ID: Kirill Gandhi III is a 49 y.o. male.    Chief Complaint:   Chief  Complaint   Patient presents with    Toe Pain     Right toe        Kirill Oneil Riggsford III presents to the clinic upon referral from Dr. Ibarra  for evaluation and treatment of diabetic feet. Patient relates no major problem with feet. Only complaints today consist of needing dm foot examDictation #1  MRN:[id number]  CSN:[id number] .      Toe Pain         Last Podiatry Enc: Visit date not found  Last Enc w/ Me: Visit date not found    Outside reports reviewed: historical medical records.  Family hx: as below  Past Medical History:   Diagnosis Date    Allergy     Anxiety     Back pain     Chronic bilateral low back pain with bilateral sciatica 12/14/2021    CTS (carpal tunnel syndrome) 06/26/2015    CTS (carpal tunnel syndrome) 6/26/2015    Depression     Diabetes 04/17/2015    Diabetes mellitus with insulin therapy     Elevated sed rate 6/26/2015    GERD (gastroesophageal reflux disease) 10/19/2022    Hx of vasectomy     Hypertension     Morbid obesity 4/16/2015    OCD (obsessive compulsive disorder)     Sleep apnea     Type 2 diabetes mellitus with hyperglycemia, with long-term current use of insulin 10/17/2022     Past Surgical History:   Procedure Laterality Date    ADENOIDECTOMY      CAUDAL EPIDURAL STEROID INJECTION N/A 10/30/2024    Procedure: Caudal NATALIE;  Surgeon: Clark Smith MD;  Location: [facility] CATH LAB;  Service: Pain Management;  Laterality: N/A;  with catheter    COLONOSCOPY N/A 9/6/2022    Procedure: COLONOSCOPY;  Surgeon: Rossy Champion MD;  Location: [facility] ENDO;  Service: Endoscopy;  Laterality: N/A;    EPIDURAL STEROID INJECTION Right 09/04/2019    Procedure: Injection, Steroid, Epidural Transforaminal;  Surgeon: Harjinder Batista Jr., MD;  Location: [facility] ENDO;  Service: Pain Management;  Laterality: Right;  Right L3 + L4 TF NATALIE    [id number]  [id number]    Arrive @ 1300; ASA last 8/27; Check BG; NEEDS CONSENT    ESOPHAGOGASTRODUODENOSCOPY N/A 10/31/2022    Procedure: EGD  (ESOPHAGOGASTRODUODENOSCOPY);  Surgeon: Rossy Champion MD;  Location: [facility];  Service: Endoscopy;  Laterality: N/A;    FRACTURE SURGERY      bilateral elbow fracture 3 years ago    FUSION OF LUMBAR SPINE BY ANTERIOR APPROACH Left 04/26/2021    Procedure: FUSION, SPINE, LUMBAR, ANTERIOR APPROACH Left L3-4 L4-5 OLIF BRENDA, ALL Release, L5-S1 ALIF;  Surgeon: Jose L Brown MD;  Location: [facility];  Service: Neurosurgery;  Laterality: Left;  Procedure: Left L3-4 L4-5 OLIF BRENDA, ALL Release, L5-S1 ALIF  Surgery Time: 2.5 Hrs  LOS: 2-3  Anesthesia: General  Blood: Type & Screen  Radiology: Josseline  Brace: LSO  Bed: Regular  Position: LAteral [facility]    FUSION OF SACROILIAC JOINT Bilateral 04/13/2022    Procedure: FUSION, SACROILIAC JOINT Stg 2: Bilateral Open SI Joint fusion, Bedrock from Si Bone;  Surgeon: Jose L Brown MD;  Location: [facility];  Service: Neurosurgery;  Laterality: Bilateral;    FUSION OF SPINE WITH INSTRUMENTATION N/A 04/26/2021    Procedure: FUSION, SPINE, WITH INSTRUMENTATION L3-S1 Posteior Segmental Instrumentation;  Surgeon: Jose L Brown MD;  Location: [facility];  Service: Neurosurgery;  Laterality: N/A;  Procedure: L3-S1 Posteior Segmental Instrumentation  Sirgery Time: 2 Hrs  LOS: 2-3  Anesthesia: General  Blood: Tyoe & Screen  Radiology: Dual C arm  Brace:LSO  Bed: [place] [id number] Poster  Position: Prone  Equipment: Depuy     FUSION OF SPINE WITH INSTRUMENTATION N/A 04/13/2022    Procedure: FUSION, SPINE, WITH INSTRUMENTATION Stg 1: Revision of posterior L3-S1 hardware, Depuy. Sacro-pelvic fixation, screw augmentation PMMA. L4-S1 posterolateral arthrodesis. Open Gauge screws, 11-12mm;  Surgeon: Jose L Brown MD;  Location: [facility];  Service: Neurosurgery;  Laterality: N/A;    HERNIA REPAIR      bilateral groin hernia    INJECTION OF STEROID Bilateral 12/13/2021    Procedure: INJECTION, STEROID Bilateral SI JOint Block and Steroid Injection;  Surgeon: Jose L Brown MD;  Location: [facility];   Service: Neurosurgery;  Laterality: Bilateral;  Procedure: Bilateral SI JOint Block and Steroid Injection   Surgery Time: 30 min  LOS: 0  Anesthesia: MAC  Radiology: C-arm  Bed: Regular with pillows  Position: Prone    LUMBAR LAMINECTOMY Bilateral 11/29/2019    Procedure: LAMINECTOMY, SPINE, LUMBAR Rigth L3-4, Left L4-5 Laminectomy, medial Facetectomy, and microdiscectomy;  Surgeon: Jose L Brown MD;  Location: [facility] OR;  Service: Neurosurgery;  Laterality: Bilateral;  Procedure: Rigth L3-4, Left L4-5 Laminectomy, medial Facetectomy, and microdiscectomy  Surgery Time: 2.5 Hrs  LOS: 0-1  Anesthesia: General  Blood: Type & Screen  Radiology: C-arm  Micros    REMOVAL IMPLANT, POSTERIOR SEGMENT, INTRAOCULAR  04/13/2022    Procedure: REMOVAL IMPLANT, POSTERIOR SEGMENT, INTRAOCULAR;  Surgeon: Jose L Brown MD;  Location: [facility] OR;  Service: Neurosurgery;;    SPINE SURGERY      TONSILLECTOMY      VASECTOMY       Family History   Problem Relation Name Age of Onset    Fibromyalgia Mother      Osteoarthritis Mother      Cataracts Maternal Grandmother      Macular degeneration Maternal Grandfather      Cataracts Maternal Grandfather      Rheum arthritis Neg Hx      Psoriasis Neg Hx      Lupus Neg Hx      Kidney disease Neg Hx      Inflammatory bowel disease Neg Hx      Amblyopia Neg Hx      Blindness Neg Hx      Glaucoma Neg Hx      Retinal detachment Neg Hx      Strabismus Neg Hx       Current Medications[1]  Review of patient's allergies indicates:   Allergen Reactions    Dilaudid [hydromorphone] Other (See Comments)     IV dilaudid severely drops BP     Social History[2]    ROS    REVIEW OF SYSTEMS: Negative as documented below as well as positive findings in bold.       Constitutional  Respiratory  Gastrointestinal  Skin   - Fever - Cough - Heartburn - Rash   - Chills - Spit blood - Nausea - Itching   - Weight Loss - Shortness of breath - Vomiting - Nail pain   - Malaise/Fatigue - Wheezing - Abdominal Pain  Wound/Ulcer    - Weight Gain   - Blood in Stool  Poor wound healing       - Diarrhea          Cardiovascular  Genitourinary  Neurological  HEENT   - Chest Pain - Dysuria - Burning Sensation of feet - Headache   - Palpitations - Hematuria - Tingling / Paresthesia - Congestion   - Pain at night in legs - Flank Pain - Dizziness - Sore Throat   - Cramping   - Tremor - Blurred Vision   - Leg Swelling   - Sensory Change - Double Vision   - Dizzy when standing   - Speech Change - Eye Redness       - Focal Weakness - Dry Eyes       - Loss of Consciousness          Endocrine  Musculoskeletal  Psychiatric   - Cold intolerance - Muscle Pain - Depression   - Heat intolerance - Neck Pain - Insomnia   - Anemia - Joint Pain - Memory Loss   -  Easy bruising, bleeding - Heel pain - Anxiety      Toe Pain        Leg/Ankle/Foot Pain         Objective:     There were no vitals taken for this visit.  Vitals:    02/19/25 1423   PainSc: 0-No pain       Physical Exam    General Appearance:   Patient appears well developed, well nourished  Patient appears stated age    Psychiatric:   Patient is oriented to time, place, and person.  Patient has appropriate mood and affect    Neck:  Trachea Midline  No visible masses    Respiratory/Ears:  No distress or labored breathing.  Able to differentiate between normal talking voice and whisper.  Able to follow commands    Eyes:  Visual Acuity intact  Lids and conjunctivae normal. No discoloration noted.    Physical Exam  Vitals and nursing note reviewed.   Feet:      Right foot:      Protective Sensation: 10 sites tested.  7 sites sensed.      Toenail Condition: Fungal disease present.     Left foot:      Protective Sensation: 10 sites tested.  7 sites sensed.      Toenail Condition: Fungal disease present.  Psychiatric:         Thought Content: Thought content normal.         Judgment: Judgment normal.       Ortho Exam  General    Nursing note and vitals reviewed.  Psychiatric: Judgment and thought content normal.              Foot Exam  Foot/Ankle Musculoskeletal Exam    B/l LE exam con't:  V:  DP 2/4, PT 2/4   CRT< 3s to all digits tested   Tibial and popliteal lymph nodes are w/o abnormality    hair growth present bilaterally, coloration normal, edema present bilaterally, varicosities present bilaterally    N:  Patient displays normal ankle reflexes   SILT in SP/DP/T/Aryan/Saph distributions    Ortho: +Motor EHL/FHL/TA/GA   no TTP of foot or ankles   Compartments soft/compressible. No pain on passive stretch of big toe. No calf  Pain.       Derm:  skin intact, skin warm and dry, skin without ulcers or lesions, skin without induration, nails abnormal    Imaging / Labs:    Hemoglobin A1C   Date Value Ref Range Status   12/13/2024 5.7 (H) 4.0 - 5.6 % Final     Comment:     ADA Screening Guidelines:  5.7-6.4%  Consistent with prediabetes  >or=6.5%  Consistent with diabetes    High levels of fetal hemoglobin interfere with the HbA1C  assay. Heterozygous hemoglobin variants (HbS, HgC, etc)do  not significantly interfere with this assay.   However, presence of multiple variants may affect accuracy.     06/11/2024 7.4 (H) 4.0 - 5.6 % Final     Comment:     ADA Screening Guidelines:  5.7-6.4%  Consistent with prediabetes  >or=6.5%  Consistent with diabetes    High levels of fetal hemoglobin interfere with the HbA1C  assay. Heterozygous hemoglobin variants (HbS, HgC, etc)do  not significantly interfere with this assay.   However, presence of multiple variants may affect accuracy.     12/08/2023 6.5 (H) 4.0 - 5.6 % Final     Comment:     ADA Screening Guidelines:  5.7-6.4%  Consistent with prediabetes  >or=6.5%  Consistent with diabetes    High levels of fetal hemoglobin interfere with the HbA1C  assay. Heterozygous hemoglobin variants (HbS, HgC, etc)do  not significantly interfere with this assay.   However, presence of multiple variants may affect accuracy.         No results found.      Note: This was dictated using a computer  transcription program. Although proofread, it may contain computer transcription errors and phonetic errors. Other human proofreading errors may also exist. Corrections may be performed at a later time. Please contact us for any clarification if needed.    Leno Amanda DPM  [facility]         [1]   Current Outpatient Medications   Medication Sig Dispense Refill    acetaminophen (TYLENOL) 500 MG tablet Take 1,000 mg by mouth 2 (two) times daily as needed for Pain.      ARIPiprazole (ABILIFY) 5 MG Tab Take 1 tablet (5 mg total) by mouth once daily. for 180 doses 30 tablet 5    ascorbic acid, vitamin C, (VITAMIN C) 1000 MG tablet Take 1,000 mg by mouth once daily.      aspirin (ECOTRIN) 81 MG EC tablet Take 81 mg by mouth every evening.      aspirin/sod bicarb/citric acid (GIOVANY-SELTZER ORAL) Take 2 tablets by mouth daily as needed (Upset stomach).      atorvastatin (LIPITOR) 10 MG tablet Take 1 tablet (10 mg total) by mouth once daily. 90 tablet 3    bumetanide (BUMEX) 1 MG tablet Take 1 tablet (1 mg total) by mouth once daily. DC furosemide 30 tablet 1    buPROPion (WELLBUTRIN SR) 100 MG TBSR 12 hr tablet Take 2 tablets (200 mg total) by mouth once daily. 60 tablet 1    busPIRone (BUSPAR) 30 MG Tab Take 1 tablet (30 mg total) by mouth 2 (two) times daily. 60 tablet 5    coenzyme Q10 (CO Q-10) 100 mg capsule Take 100 mg by mouth once daily.      cyanocobalamin (VITAMIN B-12) 1000 MCG tablet Take 100 mcg by mouth once daily.      cyclobenzaprine (FLEXERIL) 10 MG tablet Take 1 tablet (10 mg total) by mouth 3 (three) times daily. TAKE 1 TABLET BY MOUTH THREE TIMES A DAY AS NEEDED FOR MUSCLE SPASMS 90 tablet 2    dapagliflozin propanediol (FARXIGA) 10 mg tablet Take 1 tablet (10 mg total) by mouth once daily. 90 tablet 3    DEXCOM G6 SENSOR Cayla USE AS DIRECTED 3 each prn    DEXCOM G6 TRANSMITTER Cayla USE TO CHECK BLOOD SUGAR 1 each prn    dicyclomine (BENTYL) 20 mg tablet Take 1 tablet  (20 mg total) by mouth 3 (three) times daily as needed (abdominal pain). 60 tablet 2    DULoxetine (CYMBALTA) 60 MG capsule Take 1 capsule (60 mg total) by mouth 2 (two) times daily. 60 capsule 5    fluticasone propionate (FLONASE) 50 mcg/actuation nasal spray 2 sprays (100 mcg total) by Each Nostril route once daily. (Patient taking differently: 2 sprays by Each Nostril route as needed.) 48 mL 2    gabapentin (NEURONTIN) 800 MG tablet Take 1 tablet (800 mg total) by mouth 3 (three) times daily. 90 tablet 3    insulin regular hum U-500 conc (HUMULIN R U-500, CONC, KWIKPEN) 500 unit/mL (3 mL) insulin pen Take 120 units with breakfast, 130 units with lunch and 150 units with dinner 6 Pen 6    insulin syringe-needle U-100 (BD INSULIN SYRINGE ULTRA-FINE) 1 mL 31 gauge x 5/16 Syrg To use 6 times daily with insulin 400 each 11    Lactobac no.41/Bifidobact no.7 (PROBIOTIC-10 ORAL) Take by mouth.      lanolin/mineral oil/petrolatum (ARTIFICIAL TEARS OPHT) Place 2 drops into both eyes daily as needed (Dry eye).      LIDOcaine (LIDODERM) 5 % Place 1 patch onto the skin once daily. Remove & Discard patch within 12 hours or as directed by MD 30 patch 1    meloxicam (MOBIC) 15 MG tablet TAKE 1 TABLET BY MOUTH ONCE DAILY, DISCONTINUE CELEBREX 90 tablet 3    metFORMIN (GLUCOPHAGE-XR) 500 MG ER 24hr tablet Take 2 tablets (1,000 mg total) by mouth 2 (two) times daily with meals. 360 tablet 3    metoclopramide HCl (REGLAN) 5 MG tablet Take 1 tablet (5 mg total) by mouth 4 (four) times daily before meals and nightly. (Patient taking differently: Take 5 mg by mouth as needed.) 120 tablet 2    multivitamin capsule Take 1 capsule by mouth once daily.      NON FORMULARY MEDICATION Uses Cpap Machine nightly on a setting of 10      ondansetron (ZOFRAN-ODT) 4 MG TbDL Take 1 tablet (4 mg total) by mouth every 8 (eight) hours as needed (nausea). 30 tablet 5    ONETOUCH DELICA PLUS LANCET 33 gauge Misc Apply topically 3 (three) times daily.    "   ONETOUCH VERIO TEST STRIPS Strp 1 strip 3 (three) times daily.      pantoprazole (PROTONIX) 40 MG tablet Take 1 tablet (40 mg total) by mouth once daily. 90 tablet 3    pen needle, diabetic (BD ULTRA-FINE ORIG PEN NEEDLE) 29 gauge x 1/2" Ndle To use 3 needles daily 300 each 3    pen needle, diabetic 31 gauge x 1/4" Ndle Use with flex pen 100 each 11    pioglitazone (ACTOS) 30 MG tablet Take 1 tablet (30 mg total) by mouth once daily. 90 tablet 3    potassium chloride SA (K-DUR,KLOR-CON) 20 MEQ tablet Take 1 tablet (20 mEq total) by mouth every other day. 30 tablet 1    propranoloL (INDERAL) 20 MG tablet TAKE 1 TABLET BY MOUTH THREE TIMES A DAY AS NEEDED FOR ANXIETY. MAY TAKE AN EXTRA TABLET AS NEEDED FOR ANXIETY 90 tablet 0    resmetirom 100 mg Tab Take 100 mg by mouth once daily. 30 tablet 11    tirzepatide (MOUNJARO) 15 mg/0.5 mL PnIj Inject 15 mg into the skin every 7 days. 4 Pen 11    traMADoL (ULTRAM) 50 mg tablet Take 2 tablets (100 mg total) by mouth every 8 (eight) hours as needed (severe pain). 180 tablet 2    traZODone (DESYREL) 50 MG tablet TAKE 1-2 TABLETS BY MOUTH EVERY EVENING 60 tablet 3    valsartan-hydrochlorothiazide (DIOVAN-HCT) 320-12.5 mg per tablet Take 1 tablet by mouth once daily. 90 tablet 3    vitamin D 1000 units Tab Take 5,000 Units by mouth 2 (two) times a day.      ZEGALOGUE AUTOINJECTOR 0.6 mg/0.6 mL AtIn Inject 1 Box  into the skin as needed (hypoglycemia). Glucagon is an emergency pen that is used to increase your blood sugar. Glucagon is a pen that is used in an emergency situation where you are unable to eat or drink anything. Glucagon is a medication that is given by someone else-spouse, child, etc. 1.2 mL 1    ciclopirox (PENLAC) 8 % Soln Apply topically nightly. 6.6 mL 6     No current facility-administered medications for this visit.   [2]   Social History  Socioeconomic History    Marital status: Significant Other   Tobacco Use    Smoking status: Former     Types: Vaping " with nicotine     Passive exposure: Past    Smokeless tobacco: Never    Tobacco comments:     Handout provided for [employer] Smoking Cessaiton program 10/11/24 - pt has stopped vaping   Substance and Sexual Activity    Alcohol use: Not Currently    Drug use: Never    Sexual activity: Yes     Partners: Female     Birth control/protection: None     Comment: ; one child      Social Drivers of Health     Financial Resource Strain: Low Risk  (2/4/2025)    Overall Financial Resource Strain (CARDIA)     Difficulty of Paying Living Expenses: Not hard at all   Food Insecurity: No Food Insecurity (2/4/2025)    Hunger Vital Sign     Worried About Running Out of Food in the Last Year: Never true     Ran Out of Food in the Last Year: Never true   Transportation Needs: No Transportation Needs (1/15/2024)    PRAPARE - Transportation     Lack of Transportation (Medical): No     Lack of Transportation (Non-Medical): No   Physical Activity: Inactive (2/4/2025)    Exercise Vital Sign     Days of Exercise per Week: 0 days     Minutes of Exercise per Session: 0 min   Stress: No Stress Concern Present (2/4/2025)    [employer] - Occupational Stress Questionnaire     Feeling of Stress : Only a little   Housing Stability: Low Risk  (1/15/2024)    Housing Stability Vital Sign     Unable to Pay for Housing in the Last Year: No     Number of Places Lived in the Last Year: 2     Unstable Housing in the Last Year: No

## 2025-02-19 NOTE — PATIENT INSTRUCTIONS
Diabetes: Inspecting Your Feet      Diabetes increases your chances of developing foot problems. So inspect your feet every day. This helps you find small skin irritations before they become serious ulcers or infections. If you have trouble seeing the bottoms of your feet, use a mirror or ask a family member or friend to help.  How to check your feet  Below are tips to help you look for foot problems. Try to check your feet at the same time each day, such as when you get out of bed in the morning:  Check the top of each foot. The tops of toes, back of the heel, and outer edge of the foot can get a lot of rubbing from poor-fitting shoes.  Check the bottom of each foot. Daily wear and tear often leads to problems at pressure spots.  Check the toes and nails. Fungal infections often occur between toes. Toenail problems can also be a sign of fungal infections or lead to breaks in the skin.  Check your shoes, too. Loose objects inside a shoe can injure the foot. Use your hand to feel inside your shoes for things like moose, loose stitching, or rough areas that could irritate your skin.  Warning signs  Look for any color changes in the foot. Redness with streaks can signal a severe infection, which needs immediate medical attention. Tell your healthcare provider right away if you have any of these problems:  Swelling, sometimes with color changes, may be a sign of poor blood flow or infection. Symptoms include tenderness and an increase in the size of your foot.  Warm or hot areas on your feet may be signs of infection. A foot that is cold may not be getting enough blood.  Sensations such as burning, tingling, or pins and needles can be signs of a problem. Also check for areas that may be numb.  Hot spots are caused by friction or pressure. Look for hot spots in areas that get a lot of rubbing. Hot spots can turn into blisters, calluses, or sores.  Cracks and sores are caused by dry or irritated skin. They are a sign  that the skin is breaking down, which can lead to infection.  Toenail problems to watch for include nails growing into the skin (ingrown toenail) and causing redness or pain. Thick, yellow, or discolored nails can signal a fungal infection.  Drainage and odor can develop from untreated sores and ulcers. Call your healthcare provider right away if you notice white or yellow drainage, bleeding, or unpleasant odor.   Date Last Reviewed: 6/1/2016 © 2000-2017 [employer]. [address] [place] [id number]. All rights reserved. This information is not intended as a substitute for professional medical care. Always follow your healthcare professional's instructions.    Long-Term Complications of Diabetes    Diabetes can cause health problems over time. These are called complications. They are more likely to happen if your blood sugar is often too high. Over time, high blood sugar can damage blood vessels in your body. It is important to keep your blood sugar in your target range. This can help prevent or delay complications from diabetes.  Possible complications  Complications of diabetes include:    Eye problems, including damage to the blood vessels in the eyes (retinopathy), pressure in the eye (glaucoma), and clouding of the eye's lens (a cataract). Eye problems can eventually lead to irreversible blindness.   Tooth and gum problems (periodontal disease), causing loss of teeth and bone  Blood vessel (vascular) disease leading to circulation problems, heart attack or stroke, or a need for amputation of a limb   Problems with sexual function leading to erectile dysfunction in men and sexual discomfort in women   Kidney disease (nephropathy) can eventually lead to kidney failure, which may require dialysis or kidney transplant   Nerve problems (neuropathy), causing pain or loss of feeling in your feet and other parts of your body, potentially leading to an amputation of a limb   High blood pressure  (hypertension), putting strain on your heart and blood vessels  Serious infections, possibly leading to loss of toes, feet, or limbs    How to avoid complications  The serious consequences of these complications may be avoidable for most people with diabetes by managing your blood glucose, blood pressure, and cholesterol levels. This can help you feel better and stay healthy. You can manage diabetes by tracking your blood sugar. You can also eat healthy and exercise to avoid gaining weight. And you should take medicine if directed by your healthcare provider.      Obesity and Your Feet    Obesity is an ever-increasing problem in [place] society. Currently, up to one third of the [place] population is considered obese, defined as a body mass index greater than 30. Although it seems obvious, many studies have found a direct link between increased BMI and foot problems. Not only is there an increased risk of wear-and-tear problems (such as arthritis, tendonitis and heel pain), but also an increased risk of developing type II diabetes. As little as one pound above your ideal weight can increase pressure in your hips, knees and ankles by as much as eight pounds. Simply walking up a flight of stairs or up an incline can increase pressure on the ankle by four to six times. Weight control can be an essential component to alleviate foot pain.    Your foot and ankle surgeon can be your biggest advocate in losing weight. A surgical procedure, such as gastric banding or gastric bypass, may be desired to help obesity. Often, the surgeon will require a large amount of weight loss (40 to 100+ lbs) before surgery is performed. In addition to diet modification, exercise, such as walking, is encouraged. This can be difficult with foot pain. A foot and ankle surgeon can advise on shoe selection, stretching and even orthotics to keep you walking and help you reach your goals.    Eating the Right Number of Calories ([id number]  Guidelines)  Calories are a measure of the energy you get from food. If you eat more calories than you use, you will gain weight. If you eat fewer calories than you use, you will lose weight. Below are tables that give the number of calories needed each day. Look for your gender, age, and activity level. If you stick to this number, you should neither gain nor lose weight. Note that this is an estimated number of calories.* Your exact number may differ.  Women  Age in years Low activity level (calories/day) Moderate activity level (calories/day) High activity level (calories/day)   19 to 30 1,800-2,000 2,000-2,200 2,400   31 to 50 1,800 2,000 2,200   51 and older 1,600 1,800 2,000-2,200      Men  Age in years Low activity level  (calories/day) Moderate activity level (calories/day) High activity level (calories/day)   19 to 30 2,400-2,600 2,600-2,800 3,000   31 to 50 2,200-2,400 2,400-2,600 2,800-3,000   51 and older 2,000-2,200 2,200-2,400 2,400-2,800   Activity levels defined  Low. Only light physical activity such as that done during typical daily life.  Moderate. Light physical activity done during typical daily life AND physical activity equal to walking about 1.5 to 3 miles a day at 3 to 4 miles per hour.  High. Light physical activity done during typical daily life AND physical activity equal to walking more than 3 miles a day at 3 to 4 miles per hour.        Diabetes Foot Care Guidelines  Diabetic foot care is essential as diabetes can be dangerous to your feet--even a small cut can produce serious consequences. Diabetes may cause nerve damage that takes away the feeling in your feet. Diabetes may also reduce blood flow to the feet, making it harder to heal an injury or resist infection. Because of these problems, you may not notice a foreign object in your shoe. As a result, you could develop a blister or a sore. This could lead to an infection or a nonhealing wound that could put you at risk for an  amputation.    To avoid serious foot problems that could result in losing a toe, foot or leg, follow these guidelines.    Inspect your feet daily. Check for cuts, blisters, redness, swelling or nail problems. Use a magnifying hand mirror to look at the bottom of your feet. Call your doctor if you notice anything.    Bathe feet in lukewarm, never hot, water. Keep your feet clean by washing them daily. Use only lukewarm water--the temperature you would use on a  baby.    Be gentle when bathing your feet. Wash them using a soft washcloth or sponge. Dry by blotting or patting and carefully dry between the toes.    Moisturize your feet but not between your toes. Use a moisturizer daily to keep dry skin from itching or cracking. But don't moisturize between the toes--that could encourage a fungal infection.    Cut nails carefully. Cut them straight across and file the edges. Dont cut nails too short, as this could lead to ingrown toenails. If you have concerns about your nails, consult your doctor.    Never treat corns or calluses yourself. No bathroom surgery or medicated pads. Visit your doctor for appropriate treatment.    Wear clean, dry socks. Change them daily.    Consider socks made specifically for patients living with diabetes. These socks have extra cushioning, do not have elastic tops, are higher than the ankle and are made from fibers that wick moisture away from the skin.    Wear socks to bed. If your feet get cold at night, wear socks. Never use a heating pad or a hot water bottle.    Shake out your shoes and feel the inside before wearing. Remember, your feet may not be able to feel a pebble or other foreign object, so always inspect your shoes before putting them on.    Keep your feet warm and dry. Dont let your feet get wet in snow or rain. Wear warm socks and shoes in winter.    Consider using an antiperspirant on the soles of your feet. This is helpful if you have excessive sweating of the  feet.    Never walk barefoot. Not even at home! Always wear shoes or slippers. You could step on something and get a scratch or cut.    Take care of your diabetes. Keep your blood sugar levels under control.    Do not smoke. Smoking restricts blood flow in your feet.    Get periodic foot exams. Seeing your foot and ankle surgeon on a regular basis can help prevent the foot complications of diabetes.

## 2025-02-19 NOTE — PROGRESS NOTES
EST patient evaluation  [facility]    Kirill Gandhi III  : 1975  Date: 2025     CHIEF COMPLAINT:  No chief complaint on file.  Referring Physician: No ref. provider found  Primary Care Physician: Marcos Ibarra MD    HPI:  This is a 49 y.o. male with a chief complaint of No chief complaint on file.  . The patient has Past medical history/Past surgical history of postlaminectomy syndrome, lumbar spine fusion, SI fusion, obesity, diabetes, metabolic syndrome, bilateral feet neuropathy, obstructive sleep apnea, chronic pain syndrome on tramadol    Patient was evaluated and referred by endocrinologist for bilateral feet neuropathy  She was recently seen by neurosurgery in 2024 for back pain and bilateral numbness in lower extremity, he has a recommendation to obtain CT scan lumbar spine that showed L3 posterior instrumentation fusion with no acute finding  he was getting tramadol 50 mg, 100 mg 3 times a day per neurosurgery since .      Interval History 07/10/2024:  Kirill Gandhi III is here for follow up visit after Qutenza application bilateral feet in 2024, patient reported 80%  improvement in pain and functionality.  Today he presents with back pain and he would like to discuss treatment options.  Current pain score: 8/10 (back) 2/10 (feet)    Interval History 10/02/2024:  Kirill Gandhi III is here for three-months follow up visit to discuss treatment for low back pain and bilateral lower extremity radiation  Current pain score: 8/10    Interval History 2025:  Kirill Gandhi III is here for  follow up visit, ***    Current pain score: ***/10        Diabetic: Yes    Anticogualtion drugs: 81 mg Aspirin     Allergy To Iodine: No    Currently on Antibiotic: No    Current Description of Pain Symptoms:    History of Recent Fall or Trauma: No   Onset: Chronic, started several years   Pain Location:  Bilateral  feet burning(dorsal and plantar aspect) +bilat lower extremity from knee to feet + low back pain in addition to bilateral thigh pain  Radiates/associated symptoms:  Burning, no numbness or tingling  Pain is Getting worse over the last 6 months    The pain is described as  Burning, no numbness or tingling  Exacerbating factors: constant.   Mitigating factors none.   Symptoms interfere with daily activity, sleeping, currently he is not working  The patient feels like symptoms have been worsening.   Patient denies night fever/night sweats, urinary incontinence, bowel incontinence, significant weight loss, significant motor weakness, and loss of sensations.    Pain score:   Best: 1/10  Worst: 10/10    Current pain medications:  For depression    ARIPiprazole (ABILIFY) 5 MG Tab    busPIRone (BUSPAR) 30 MG Tab  For pain    cyclobenzaprine (FLEXERIL) 10 MG tablet, TID    gabapentin (NEURONTIN) 800 MG tablet, TID    meloxicam (MOBIC) 15 MG tablet, PRN    traMADoL (ULTRAM) 50 mg tablet, 100 mg TID    acetaminophen (TYLENOL) 500 MG tablet,PRN  Current Narcotics/Opioid /benzo Medications:  Opioids- Tramadol (Ultram) per PCP  Benzodiazepines: No    UDS:  NA    PDMP:  Reviewed and consistent with medication use as prescribed.     Previous Chronic Pain Treatment History:  Physical Therapy/HEP/Physician Lead Exercise Program:  Over the past 12 months, Patient has done  0 sessions.  PT response: n/a   Dates of the PT sessions: n/a,   Is patient participating in home exercise program (HEP): Yes.  In the last 6 weeks over the last 6 months    Non-interventional Pain Therapy:  []Chiropractor.   []Acupuncture/Dry needle.  []TENS unit.  []Heat/ICE.  []Back Brace.    Medications previously tried:  NSAIDs: Meloxicam (Mobic) and Celebrex (Celecoxib)  Topical Agent: No  TCA/SSRI/SNRI: SNRI: Duloxetine (Cymbalta)   Anti-convulsants: Gabapentin , previously on Lyrica  Muscle Relaxants: Flexeril (Cyclobenzaprine), tizanidine  Opioids- Tramadol  (Ultram).    Interventional Pain Procedures:  Epidural steroid injection 2019  Qutenza application bilateral feet in June 2024 - 80% for feet    Previous spine/Relevant joint surgery:  L3-bi-iliac posterior instrumented fusion with decompressive laminectomies and interbody disc spacers and bilateral SI fusion: 2019, 2020 and 2022 with good result as he was able to have better functionality and walking    Surgical History:   has a past surgical history that includes Tonsillectomy; Adenoidectomy; Fracture surgery; Hernia repair; Epidural steroid injection (Right, 09/04/2019); Vasectomy; Lumbar laminectomy (Bilateral, 11/29/2019); Fusion of lumbar spine by anterior approach (Left, 04/26/2021); Fusion of spine with instrumentation (N/A, 04/26/2021); Injection of steroid (Bilateral, 12/13/2021); Fusion of spine with instrumentation (N/A, 04/13/2022); Fusion of sacroiliac joint (Bilateral, 04/13/2022); removal implant, posterior segment, intraocular (04/13/2022); Spine surgery; Colonoscopy (N/A, 9/6/2022); Esophagogastroduodenoscopy (N/A, 10/31/2022); and Caudal epidural steroid injection (N/A, 10/30/2024).  Medical History:   has a past medical history of Allergy, Anxiety, Back pain, Chronic bilateral low back pain with bilateral sciatica (12/14/2021), CTS (carpal tunnel syndrome) (06/26/2015), CTS (carpal tunnel syndrome) (6/26/2015), Depression, Diabetes (04/17/2015), Diabetes mellitus with insulin therapy, Elevated sed rate (6/26/2015), GERD (gastroesophageal reflux disease) (10/19/2022), vasectomy, Hypertension, Morbid obesity (4/16/2015), OCD (obsessive compulsive disorder), Sleep apnea, and Type 2 diabetes mellitus with hyperglycemia, with long-term current use of insulin (10/17/2022).  Family History:  family history includes Cataracts in his maternal grandfather and maternal grandmother; Fibromyalgia in his mother; Macular degeneration in his maternal grandfather; Osteoarthritis in his  mother.  Allergies:  Dilaudid [hydromorphone]   Social History/SUBSTANCE ABUSE HISTORY:  Personal history of substance abuse: No   reports that he has quit smoking. His smoking use included vaping with nicotine. He has been exposed to tobacco smoke. He has never used smokeless tobacco. He reports that he does not currently use alcohol. He reports that he does not use drugs.  LABS:  CBC  Lab Results   Component Value Date    WBC 8.45 01/03/2025    HGB 10.9 (L) 01/03/2025    HCT 34.5 (L) 01/03/2025     Coagulation Profile   Lab Results   Component Value Date     01/03/2025       Lab Results   Component Value Date    INR 1.0 03/07/2022     CMP:  BMP  Lab Results   Component Value Date     01/03/2025    K 3.8 01/03/2025     01/03/2025    CO2 26 01/03/2025    BUN 14 01/03/2025    CREATININE 0.92 01/03/2025    CALCIUM 9.1 01/03/2025    ANIONGAP 8 01/03/2025    EGFRNORACEVR >60.0 01/03/2025     Lab Results   Component Value Date    ALT 22 01/03/2025    AST 26 01/03/2025    ALKPHOS 68 01/03/2025    BILITOT 0.3 01/03/2025     HGBA1C:  Lab Results   Component Value Date    LABA1C 6.8 (H) 03/24/2017    HGBA1C 5.7 (H) 12/13/2024     ROS:    Review of Systems   GENERAL:  No weight loss, malaise or fevers.  HEENT:   No recent changes in vision or hearing  NECK:  Negative for lumps, no difficulty with swallowing.  RESPIRATORY:  Negative for cough, wheezing or shortness of breath, patient denies any recent URI.  CARDIOVASCULAR:  Negative for chest pain or palpitations.  GI:  Negative for abdominal discomfort, blood in stools or black stools or change in bowel habits.  MUSCULOSKELETAL:  See HPI.  SKIN:  Negative for lesions, rash, and itching.  PSYCH:  No mood disorder or recent psychosocial stressors.   HEMATOLOGY/LYMPHOLOGY:  See the blood thinner sectioned in HPI.  NEURO:  See HPI  All other reviewed and negative other than HPI.    PHYSICAL EXAM:  VITALS: There were no vitals taken for this visit.  There is no  height or weight on file to calculate BMI.  GENERAL: Well appearing, in no acute distress, alert and oriented x3, answers questions appropriately.   PSYCH: Flat affect.  SKIN: Skin color, texture, turgor normal, no rashes or lesions.  HEAD/FACE:  Normocephalic, atraumatic. Cranial nerves grossly intact.  CV: Regular rate  PULM: No evidence of respiratory difficulty, symmetric chest rise.  GI:  Soft and non-Distended.  BACK/SIJ/HIP:  Lumbar Spine Exam:       Inspection: No erythema, bruising.       Palpation: (+) TTP of lumbar paraspinals bilaterally      ROM:  Limited in flexion, extension, lateral bending.       (+) Facet loading bilaterally      (NA) Straight Leg Raise bilaterally      (NA) JASEN bilaterally, Tenderness over the PSIS, Yeoman test  Hip Exam:      Inspection: No gross deformity or apparent leg length discrepancy      Palpation:  No TTP to bilateral greater trochanteric bursas.       ROM:  No limitation or pain in internal rotation, external rotation b/l  Neurologic Exam:     Alert. Speech is fluent and appropriate.     Strength: 4/5 in bilateral hip flexion and knee extension     Sensation:  Grossly intact to light touch in bilateral lower extremities     Tone: No abnormality appreciated in bilateral lower extremities    GAIT:  Antalgic gait, unsteady gait    DIAGNOSTIC STUDIES AND MEDICAL RECORDS REVIEW:  MRI THORACIC SPINE W WO CONTRAST; MRI LUMBAR SPINE W WO CONTRAST 2023    Operative change: L3-pelvis posterior instrumented fusion with interbody spacers, right L3-L4 and left L4-L5 hemilaminectomies.     Alignment: Normal thoracic kyphosis is preserved.  Lumbar alignment is maintained.  No spondylolisthesis.     Vertebrae: Thoracolumbar body heights are maintained. No acute fracture. No marrow infiltrative process.     Discs: T5-T8, and T9-T10 intervertebral disc height loss.  Lumbar intervertebral disc heights are maintained at the non-operative levels.     Cord: Mildly diminished caliber of the  T3-T4 spinal cord, similar to prior with continued close approximation to the ventral thecal sac and prominence of the dorsal CSF space.  Punctate focus of mildly increased T2 signal of the T3 cord which again could reflect tiny syrinx or myelopathic change, though nonspecific and better visualized on the prior MRI.  Conus is normal in appearance and terminates at L1.     Soft tissue structures: Visualized intrathoracic/intra-abdominal viscera are unremarkable.  Paraspinal muscular atrophy.  Relatively stable 4.0 x 0.8 cm dorsal midline subcutaneous soft tissue collection with surrounding scar and soft tissue edema lumbar level.     Degenerative change:     Circumferential disc bulges and facet arthropathy contributing to mild bilateral foraminal narrowing at T1-T2 and T2-T3.  Paracentral disc protrusions on the right at T5-T6 and on the left at T6-T7 contributing to mass effect on the adjacent lateral recesses.  Facet arthropathy contributing to mild right foraminal narrowing at T8-T9.  No significant foraminal narrowing or spinal canal stenosis identified at the remaining thoracic levels.     T12-L1: No significant foraminal narrowing or spinal canal stenosis.     L1-L2: Circumferential disc bulge and facet arthropathy contributing to mild bilateral foraminal narrowing.  No spinal canal stenosis.     L2-L3: Circumferential disc bulge and facet arthropathy contributing to mild bilateral foraminal narrowing and mild spinal canal stenosis.     Note operative levels demonstrate limited visualization due to metal artifact, body habitus and postoperative change.     L3-L4: Operative level.  Mild left, moderate right foraminal narrowing.  No significant spinal canal stenosis.     L4-L5: Operative level.  Neural foramina are poorly visualized.  Mild/moderate foraminal narrowing suggested.  No spinal canal stenosis.     L5-S1: Operative level.  Neural foramina are poorly visualized.  Mild/moderate left foraminal narrowing  suggested.  No spinal canal stenosis.     Impression:     1. Operative change of L3-pelvic posterior instrumented fusion.  Stable fluid collection in the dorsal midline soft tissues, likely chronic postoperative seroma or hematoma.  2. Stable diminished thoracic cord caliber at T3-T4 with punctate mildly increased signal of the T3 cord.  Findings are unchanged compared to MRI 11/23/2021; of note, this finding was better visualized on the prior MRI.  3. Stable thoracolumbar spondylosis, as above.     ASSESSMENT:  Kirill Gandhi III is a 49 y.o. male with the following diagnoses based on history, exam, and imaging:  There are no diagnoses linked to this encounter.    ---------------------------------------------------------------  This is a pleasant 49 y.o. male patient with PMH/PSH of postlaminectomy syndrome, lumbar spine fusion, SI fusion, obesity, diabetes, metabolic syndrome, bilateral feet neuropathy, obstructive sleep apnea, chronic pain syndrome on tramadol, presenting with bilateral feet neuropathy consistent with painful diabetic neuropathy, patient had a recommendation from his endocrinologist to consider Qutenza patch which provided him with more than 80% improvement in pain and functionality.    Today he presents with low back pain and bilateral lower extremity radiation consistent with postlaminectomy syndrome, interested in proceeding with caudal epidural steroid injection  Treatment Plan:    Diagnostics/Referrals:  Continue with a home exercise    Medications:  Tylenol as needed   NSAIDs:  Over-the-counter  Topical Agent: No  TCA/SSRI/SNRI: None  Anti-convulsants:  Continue with gabapentin as prescribed  Muscle Relaxants:  Continue Flexeril as prescribed  Opioids:  Continue with tramadol as prescribed per PCP    Interventional Therapy:  Please schedule for caudal epidural steroid injection  No sedation as he may have no ride    Patient Education: Counseled patient regarding the importance of  activity modification, I have stressed the importance of physical activity and a home exercise plan to help with pain and improve health.    Follow-up: RTC for QTZ    May consider: ROBBIN Smith MD  Anesthesiologist  Interventional Pain Medicine  02/19/2025    Disclaimer:  This note was prepared using voice recognition system and is likely to have sound alike errors that may have been overlooked even after proof reading.  Please call me with any questions.

## 2025-02-19 NOTE — PROGRESS NOTES
EST patient evaluation  [facility]    Kirill Gandhi III  : 1975  Date: 2025     CHIEF COMPLAINT:  Low-back Pain, Follow-up, and Mid-back Pain  Referring Physician: No ref. provider found  Primary Care Physician: Marcos Ibarra MD    HPI:  This is a 49 y.o. male with a chief complaint of Low-back Pain, Follow-up, and Mid-back Pain  . The patient has Past medical history/Past surgical history of postlaminectomy syndrome, lumbar spine fusion, SI fusion, obesity, diabetes, metabolic syndrome, bilateral feet neuropathy, obstructive sleep apnea, chronic pain syndrome on tramadol    Patient was evaluated and referred by endocrinologist for bilateral feet neuropathy  She was recently seen by neurosurgery in 2024 for back pain and bilateral numbness in lower extremity, he has a recommendation to obtain CT scan lumbar spine that showed L3 posterior instrumentation fusion with no acute finding  he was getting tramadol 50 mg, 100 mg 3 times a day per neurosurgery since .      Interval History 07/10/2024:  Kirill Gandhi III is here for follow up visit after Qutenza application bilateral feet in 2024, patient reported 80%  improvement in pain and functionality.  Today he presents with back pain and he would like to discuss treatment options.  Current pain score: 8/10 (back) 2/10 (feet)    Interval History 10/02/2024:  Kirill Gandhi III is here for three-months follow up visit to discuss treatment for low back pain and bilateral lower extremity radiation  Current pain score: 8/10    Interval History 2025:  Kirill Gandhi III is here for  follow up visit, epidural steroid injection, however he continues to have good relief from Qutenza.  Current pain score: 10/10      Diabetic: Yes    Anticogualtion drugs: 81 mg Aspirin     Allergy To Iodine: No    Currently on Antibiotic: No    Current Description of Pain  Symptoms:    History of Recent Fall or Trauma: No   Onset: Chronic, started several years   Pain Location:  Bilateral feet burning(dorsal and plantar aspect) +bilat lower extremity from knee to feet + low back pain in addition to bilateral thigh pain  Radiates/associated symptoms:  Burning, no numbness or tingling  Pain is Getting worse over the last 6 months    The pain is described as  Burning, no numbness or tingling  Exacerbating factors: constant.   Mitigating factors none.   Symptoms interfere with daily activity, sleeping, currently he is not working  The patient feels like symptoms have been worsening.   Patient denies night fever/night sweats, urinary incontinence, bowel incontinence, significant weight loss, significant motor weakness, and loss of sensations.    Pain score:   Best: 1/10  Worst: 10/10    Current pain medications:  For depression    ARIPiprazole (ABILIFY) 5 MG Tab    busPIRone (BUSPAR) 30 MG Tab  For pain    cyclobenzaprine (FLEXERIL) 10 MG tablet, TID    gabapentin (NEURONTIN) 800 MG tablet, TID    meloxicam (MOBIC) 15 MG tablet, PRN    traMADoL (ULTRAM) 50 mg tablet, 100 mg TID    acetaminophen (TYLENOL) 500 MG tablet,PRN  Current Narcotics/Opioid /benzo Medications:  Opioids- Tramadol (Ultram) per PCP  Benzodiazepines: No    UDS:  NA    PDMP:  Reviewed and consistent with medication use as prescribed.     Previous Chronic Pain Treatment History:  Physical Therapy/HEP/Physician Lead Exercise Program:  Over the past 12 months, Patient has done  0 sessions.  PT response: n/a   Dates of the PT sessions: n/a,   Is patient participating in home exercise program (HEP): Yes.  In the last 6 weeks over the last 6 months    Non-interventional Pain Therapy:  []Chiropractor.   []Acupuncture/Dry needle.  []TENS unit.  []Heat/ICE.  []Back Brace.    Medications previously tried:  NSAIDs: Meloxicam (Mobic) and Celebrex (Celecoxib)  Topical Agent: No  TCA/SSRI/SNRI: SNRI: Duloxetine (Cymbalta)    Anti-convulsants: Gabapentin , previously on Lyrica  Muscle Relaxants: Flexeril (Cyclobenzaprine), tizanidine  Opioids- Tramadol (Ultram).    Interventional Pain Procedures:  Epidural steroid injection 2019  -10/18/2024: DONNY 0% relief     -06/2024: Qutenza application bilateral feet - 80% for feet for three-month  -10 /2024: Qutenza application bilateral feet- 80% for feet for three-month  - 02/05/2025: Qutenza application bilateral feet- 80% for feet for three-month    Previous spine/Relevant joint surgery:  L3-bi-iliac posterior instrumented fusion with decompressive laminectomies and interbody disc spacers and bilateral SI fusion: 2019, 2020 and 2022 with good result as he was able to have better functionality and walking    Surgical History:   has a past surgical history that includes Tonsillectomy; Adenoidectomy; Fracture surgery; Hernia repair; Epidural steroid injection (Right, 09/04/2019); Vasectomy; Lumbar laminectomy (Bilateral, 11/29/2019); Fusion of lumbar spine by anterior approach (Left, 04/26/2021); Fusion of spine with instrumentation (N/A, 04/26/2021); Injection of steroid (Bilateral, 12/13/2021); Fusion of spine with instrumentation (N/A, 04/13/2022); Fusion of sacroiliac joint (Bilateral, 04/13/2022); removal implant, posterior segment, intraocular (04/13/2022); Spine surgery; Colonoscopy (N/A, 9/6/2022); Esophagogastroduodenoscopy (N/A, 10/31/2022); and Caudal epidural steroid injection (N/A, 10/30/2024).  Medical History:   has a past medical history of Allergy, Anxiety, Back pain, Chronic bilateral low back pain with bilateral sciatica (12/14/2021), CTS (carpal tunnel syndrome) (06/26/2015), CTS (carpal tunnel syndrome) (6/26/2015), Depression, Diabetes (04/17/2015), Diabetes mellitus with insulin therapy, Elevated sed rate (6/26/2015), GERD (gastroesophageal reflux disease) (10/19/2022), vasectomy, Hypertension, Morbid obesity (4/16/2015), OCD (obsessive compulsive disorder), Sleep apnea,  and Type 2 diabetes mellitus with hyperglycemia, with long-term current use of insulin (10/17/2022).  Family History:  family history includes Cataracts in his maternal grandfather and maternal grandmother; Fibromyalgia in his mother; Macular degeneration in his maternal grandfather; Osteoarthritis in his mother.  Allergies:  Dilaudid [hydromorphone]   Social History/SUBSTANCE ABUSE HISTORY:  Personal history of substance abuse: No   reports that he has quit smoking. His smoking use included vaping with nicotine. He has been exposed to tobacco smoke. He has never used smokeless tobacco. He reports that he does not currently use alcohol. He reports that he does not use drugs.  LABS:  CBC  Lab Results   Component Value Date    WBC 8.45 01/03/2025    HGB 10.9 (L) 01/03/2025    HCT 34.5 (L) 01/03/2025     Coagulation Profile   Lab Results   Component Value Date     01/03/2025       Lab Results   Component Value Date    INR 1.0 03/07/2022     CMP:  BMP  Lab Results   Component Value Date     01/03/2025    K 3.8 01/03/2025     01/03/2025    CO2 26 01/03/2025    BUN 14 01/03/2025    CREATININE 0.92 01/03/2025    CALCIUM 9.1 01/03/2025    ANIONGAP 8 01/03/2025    EGFRNORACEVR >60.0 01/03/2025     Lab Results   Component Value Date    ALT 22 01/03/2025    AST 26 01/03/2025    ALKPHOS 68 01/03/2025    BILITOT 0.3 01/03/2025     HGBA1C:  Lab Results   Component Value Date    LABA1C 6.8 (H) 03/24/2017    HGBA1C 5.7 (H) 12/13/2024     ROS:    Review of Systems   GENERAL:  No weight loss, malaise or fevers.  HEENT:   No recent changes in vision or hearing  NECK:  Negative for lumps, no difficulty with swallowing.  RESPIRATORY:  Negative for cough, wheezing or shortness of breath, patient denies any recent URI.  CARDIOVASCULAR:  Negative for chest pain or palpitations.  GI:  Negative for abdominal discomfort, blood in stools or black stools or change in bowel habits.  MUSCULOSKELETAL:  See HPI.  SKIN:  Negative  for lesions, rash, and itching.  PSYCH:  No mood disorder or recent psychosocial stressors.   HEMATOLOGY/LYMPHOLOGY:  See the blood thinner sectioned in HPI.  NEURO:  See HPI  All other reviewed and negative other than HPI.    PHYSICAL EXAM:  VITALS: Ht 6' (1.829 m)   Wt (!) 208 kg (458 lb 8.9 oz)   BMI 62.19 kg/m²   Body mass index is 62.19 kg/m².  GENERAL: Well appearing, in no acute distress, alert and oriented x3, answers questions appropriately.   PSYCH: Flat affect.  SKIN: Skin color, texture, turgor normal, no rashes or lesions.  HEAD/FACE:  Normocephalic, atraumatic. Cranial nerves grossly intact.  CV: Regular rate  PULM: No evidence of respiratory difficulty, symmetric chest rise.  GI:  Soft and non-Distended.  BACK/SIJ/HIP:  Lumbar Spine Exam:       Inspection: No erythema, bruising.       Palpation: (+) TTP of lumbar paraspinals bilaterally      ROM:  Limited in flexion, extension, lateral bending.       (+) Facet loading bilaterally      (NA) Straight Leg Raise bilaterally      (NA) JASEN bilaterally, Tenderness over the PSIS, Yeoman test  Hip Exam:      Inspection: No gross deformity or apparent leg length discrepancy      Palpation:  No TTP to bilateral greater trochanteric bursas.       ROM:  No limitation or pain in internal rotation, external rotation b/l  Neurologic Exam:     Alert. Speech is fluent and appropriate.     Strength: 4/5 in bilateral hip flexion and knee extension     Sensation:  Grossly intact to light touch in bilateral lower extremities     Tone: No abnormality appreciated in bilateral lower extremities    GAIT:  Antalgic gait, unsteady gait    DIAGNOSTIC STUDIES AND MEDICAL RECORDS REVIEW:  MRI THORACIC SPINE W WO CONTRAST; MRI LUMBAR SPINE W WO CONTRAST 2023    Operative change: L3-pelvis posterior instrumented fusion with interbody spacers, right L3-L4 and left L4-L5 hemilaminectomies.     Alignment: Normal thoracic kyphosis is preserved.  Lumbar alignment is maintained.  No  spondylolisthesis.     Vertebrae: Thoracolumbar body heights are maintained. No acute fracture. No marrow infiltrative process.     Discs: T5-T8, and T9-T10 intervertebral disc height loss.  Lumbar intervertebral disc heights are maintained at the non-operative levels.     Cord: Mildly diminished caliber of the T3-T4 spinal cord, similar to prior with continued close approximation to the ventral thecal sac and prominence of the dorsal CSF space.  Punctate focus of mildly increased T2 signal of the T3 cord which again could reflect tiny syrinx or myelopathic change, though nonspecific and better visualized on the prior MRI.  Conus is normal in appearance and terminates at L1.     Soft tissue structures: Visualized intrathoracic/intra-abdominal viscera are unremarkable.  Paraspinal muscular atrophy.  Relatively stable 4.0 x 0.8 cm dorsal midline subcutaneous soft tissue collection with surrounding scar and soft tissue edema lumbar level.     Degenerative change:     Circumferential disc bulges and facet arthropathy contributing to mild bilateral foraminal narrowing at T1-T2 and T2-T3.  Paracentral disc protrusions on the right at T5-T6 and on the left at T6-T7 contributing to mass effect on the adjacent lateral recesses.  Facet arthropathy contributing to mild right foraminal narrowing at T8-T9.  No significant foraminal narrowing or spinal canal stenosis identified at the remaining thoracic levels.     T12-L1: No significant foraminal narrowing or spinal canal stenosis.     L1-L2: Circumferential disc bulge and facet arthropathy contributing to mild bilateral foraminal narrowing.  No spinal canal stenosis.     L2-L3: Circumferential disc bulge and facet arthropathy contributing to mild bilateral foraminal narrowing and mild spinal canal stenosis.     Note operative levels demonstrate limited visualization due to metal artifact, body habitus and postoperative change.     L3-L4: Operative level.  Mild left, moderate  right foraminal narrowing.  No significant spinal canal stenosis.     L4-L5: Operative level.  Neural foramina are poorly visualized.  Mild/moderate foraminal narrowing suggested.  No spinal canal stenosis.     L5-S1: Operative level.  Neural foramina are poorly visualized.  Mild/moderate left foraminal narrowing suggested.  No spinal canal stenosis.     Impression:     1. Operative change of L3-pelvic posterior instrumented fusion.  Stable fluid collection in the dorsal midline soft tissues, likely chronic postoperative seroma or hematoma.  2. Stable diminished thoracic cord caliber at T3-T4 with punctate mildly increased signal of the T3 cord.  Findings are unchanged compared to MRI 2021; of note, this finding was better visualized on the prior MRI.  3. Stable thoracolumbar spondylosis, as above.     ASSESSMENT:  Kirill Gandhi III is a 49 y.o. male with the following diagnoses based on history, exam, and imagin. Chronic painful diabetic neuropathy    2. Neuropathy of both feet    3. Diabetic peripheral neuropathy    4. S/P fusion of sacroiliac joint    5. Postlaminectomy syndrome of lumbar region    6. Failed back surgical syndrome      ---------------------------------------------------------------  This is a pleasant 49 y.o. male patient with PMH/PSH of postlaminectomy syndrome, lumbar spine fusion, SI fusion, obesity, diabetes, metabolic syndrome, bilateral feet neuropathy, obstructive sleep apnea, chronic pain syndrome on tramadol, presenting with bilateral feet neuropathy consistent with painful diabetic neuropathy, patient had a recommendation from his endocrinologist to consider Qutenza patch which provided him with more than 80% improvement in pain and functionality for three-months.    Today he presents with low back pain and bilateral lower extremity radiation consistent with postlaminectomy syndrome, had no relief from caudal epidural steroid injection, unfortunately there is no  other interventional procedure I can offer him given his complex lumbar spine surgical history.  I will suggest exploring spinal cord stimulator however insurance is not covering  Treatment Plan:    Diagnostics/Referrals:  Continue with a home exercise    Medications:  Tylenol as needed   NSAIDs:  Over-the-counter  Topical Agent: No  TCA/SSRI/SNRI: None  Anti-convulsants:  Continue with gabapentin as prescribed  Muscle Relaxants:  Continue Flexeril as prescribed  Opioids:  Continue with tramadol as prescribed per PCP    Interventional Therapy:  Possible consider spinal cord stimulator    Patient Education: Counseled patient regarding the importance of activity modification, I have stressed the importance of physical activity and a home exercise plan to help with pain and improve health.    Follow-up: RTC for QTZ    May consider: ROBBIN Smith MD  Anesthesiologist  Interventional Pain Medicine  02/19/2025    Disclaimer:  This note was prepared using voice recognition system and is likely to have sound alike errors that may have been overlooked even after proof reading.  Please call me with any questions.

## 2025-02-20 ENCOUNTER — CLINICAL SUPPORT (OUTPATIENT)
Dept: DIABETES | Facility: CLINIC | Age: 50
End: 2025-02-20
Payer: MEDICAID

## 2025-02-20 DIAGNOSIS — E66.813 CLASS 3 SEVERE OBESITY DUE TO EXCESS CALORIES WITH SERIOUS COMORBIDITY AND BODY MASS INDEX (BMI) OF 50.0 TO 59.9 IN ADULT: ICD-10-CM

## 2025-02-20 DIAGNOSIS — E11.65 UNCONTROLLED TYPE 2 DIABETES MELLITUS WITH HYPERGLYCEMIA: ICD-10-CM

## 2025-02-20 DIAGNOSIS — E11.65 TYPE 2 DIABETES MELLITUS WITH HYPERGLYCEMIA, WITH LONG-TERM CURRENT USE OF INSULIN: ICD-10-CM

## 2025-02-20 DIAGNOSIS — E66.01 CLASS 3 SEVERE OBESITY DUE TO EXCESS CALORIES WITH SERIOUS COMORBIDITY AND BODY MASS INDEX (BMI) OF 50.0 TO 59.9 IN ADULT: ICD-10-CM

## 2025-02-20 DIAGNOSIS — Z79.4 TYPE 2 DIABETES MELLITUS WITH HYPERGLYCEMIA, WITH LONG-TERM CURRENT USE OF INSULIN: ICD-10-CM

## 2025-02-20 PROCEDURE — G0108 DIAB MANAGE TRN  PER INDIV: HCPCS | Mod: PBBFAC | Performed by: DIETITIAN, REGISTERED

## 2025-02-20 PROCEDURE — 99999PBSHW PR PBB SHADOW TECHNICAL ONLY FILED TO HB: Mod: PBBFAC,,,

## 2025-02-20 PROCEDURE — 99212 OFFICE O/P EST SF 10 MIN: CPT | Mod: PBBFAC | Performed by: DIETITIAN, REGISTERED

## 2025-02-20 PROCEDURE — 99999 PR PBB SHADOW E&M-EST. PATIENT-LVL II: CPT | Mod: PBBFAC,,,

## 2025-02-20 NOTE — PROGRESS NOTES
Diabetes Care Specialist Progress Note  Author: Nedra Muñoz RD, CDE  Date: 2/20/2025      Intake  Program Intake  Reason for Diabetes Program Visit:: Initial Diabetes Assessment  Type of Intervention:: Individual  Individual: Education  Education: Self-Management Skill Review    Current diabetes risk level:: low    Lab Results   Component Value Date    HGBA1C 5.7 (H) 12/13/2024       In the last month, have you used the ER or been admitted to the hospital: Yes  Was the ER or hospital admission related to diabetes?: No      Current Diabetes Treatment: Insulin, DM Injectables, Oral Medications  Oral Medication Type/Dose: metformin, actos, farxiga  DM Injectables Type/Dose: mounjaro  Method of insulin delivery?: Injections  Injection Type: Pens  Pen Type/Dose: Humulin U500    Metformin  Actos  Farxiga  Mounjaro  Humulin U500  100 units AC (2-3 times daily)          Continuous Glucose Monitoring  Personal CGM type:: Dexcom G7  GMI Date: 02/20/25  GMI Value: 6.5 %                    Lifestyle Coping Support & Clinical  Lifestyle/Coping/Support  Learning Barriers:: None  Culture or [employer]ism beliefs that may impact ability to access healthcare: No  Psychosocial/Coping Skills Assessment Completed: : Yes  Assessment indicates:: Adequate understanding  Area of need?: No    Problem Review  Active Comorbidities: Hypertension, Hyperlipidemia/Dyslipidemia        Diabetes Self-Management Skills Assessment  Medication Skills Assessment  Patient is able to identify current diabetes medications, dosages, and appropriate timing of medications.: yes  Patient reports problems or concerns with current medication regimen.: no  Patient is  aware that some diabetes medications can cause low blood sugar?: Yes  Medication Skills Assessment Completed:: Yes  Assessment indicates:: Instruction Needed  Area of need?: Yes    Diabetes Disease Process/Treatment Options  Diabetes Type?: Type II  When were you diagnosed?: >10 years  Is patient  aware of what causes diabetes?: Yes  Does patient understand the pathophysiology of diabetes?: Yes  Diabetes Disease Process/Treatment Options: Skills Assessment Completed: Yes  Area of need?: No    Nutrition/Healthy Eating  Patient can identify foods that impact blood sugar.: yes  Nutrition/Healthy Eating Skills Assessment Completed:: Yes  Assessment indicates:: Instruction Needed  Area of need?: No    Home Blood Glucose Monitoring  Patient states that blood sugar is checked at home daily.: yes  Monitoring Method:: personal continuous glucose monitor  Personal CGM type:: Dexcom G7  Home Blood Glucose Monitoring Skills Assessment Completed: : Yes  Assessment indicates:: Adequate understanding  Area of need?: No    Acute Complications  Have you ever had hypoglycemia (low BG 70 or less)?: yes  Have you ever had hyperglycemia (high  or more)?: yes  Acute Complications Skills Assessment Completed: : Yes  Assessment indicates:: Adequate understanding  Area of need?: No    Chronic Complications  Reviewed health maintenance: yes  Area of need?: No          Assessment Summary and Plan    Based on today's diabetes care assessment, the following areas of need were identified:      Identified Areas of Need      Medication/Current Diabetes Treatment: Yes  See Care Plan     Lifestyle Coping/Support: No   Diabetes Disease Process/Treatment Options: No   Nutrition/Healthy Eating: Yes  Will review at f/u       Physical Activity/Exercise: Yes  Will review at f/u       Home Blood Glucose Monitoring: No    Acute Complications: No    Chronic Complications: No     Today's interventions were provided through individual discussion, instruction, and written materials were provided.      Patient verbalized understanding of instruction and written materials.  Pt was able to return back demonstration of instructions today. Patient understood key points, needs reinforcement and further instruction.             Diabetes Self-Management  Care Plan:    Today's Diabetes Self-Management Care Plan was developed with Kirill's input. Kirill has agreed to work toward the following goal(s) to improve his/her overall diabetes control.      Care Plan: Diabetes Management   Updates made since 2/27/2024 12:00 AM        Problem: Medications         Goal: Patient Agrees to take Diabetes Medication(s) as instructed    Start Date: 2/20/2025   Expected End Date: 2/28/2026   Priority: High   Barriers: Knowledge deficit   Note:    2/20/25      U500 review; instructed to log doses on dexcom neftali; discussed dose adjustments based on snack vs meal, missed meal, etc      Kirill is here today primarily to set up his Clarity account so provider can view readings remotely.   Linked dexcom and clarity neftali to clinic account for ongoing monitoring.     He reports is taking his U500 dose only twice per day about 2 days out of the week. Otherwise taking 3 times per day.   Usually doses are at the following times:  B 7-10  L 1-2  D 5-8    Reviewed some of his clarity data today.   Having some hypo, or generally just riding very low.  Admits he regularly eats more sugar than he normally would because he knows his BG would drop too low otherwise.     Not always eating 3 meals daily - sometimes just 2 and maybe a snack.     Discussed MOA of U500 and that missing 1 of his doses may cause hyperglycemia later.  Also reviewed that he may not need full dose if he is not eating a full meal.   Discussed potentially cutting dose in half if skipping a meal, or taking ~70 units if eating a smaller meal snack.      Discussed also may need less at dinner meal as he tends to run lower overnight.     He will log doses in dexcom neftali moving forward and will come back for review in 2 weeks.            Task: Reviewed with patient all current diabetes medications and provided basic review of the purpose, dosage, frequency, side effects, and storage of both oral and injectable diabetes medications.  Completed 2/26/2025        Task: Discussed guidelines for preventing, detecting and treating hypoglycemia and hyperglycemia and reviewed the importance of meal and medication timing with diabetes mediations for prevention of hypoglycemia and maximum drug benefit. Completed 2/26/2025                Follow Up Plan     F/u in 2 weeks        Today's care plan and follow up schedule was discussed with patient.  Kirill verbalized understanding of the care plan, goals, and agrees to follow up plan.        The patient was encouraged to communicate with his/her health care provider/physician and care team regarding his/her condition(s) and treatment.  I provided the patient with my contact information today and encouraged to contact me via phone or [facility] Patient Portal as needed.           Length of Visit   Total Time: 60 Minutes

## 2025-02-21 ENCOUNTER — HOSPITAL ENCOUNTER (OUTPATIENT)
Dept: CARDIOLOGY | Facility: HOSPITAL | Age: 50
Discharge: HOME | End: 2025-02-21
Payer: MEDICAID

## 2025-02-21 VITALS — HEIGHT: 72 IN | BODY MASS INDEX: 42.66 KG/M2 | WEIGHT: 315 LBS

## 2025-02-21 DIAGNOSIS — R06.09 DOE (DYSPNEA ON EXERTION): ICD-10-CM

## 2025-02-21 DIAGNOSIS — R00.2 PALPITATIONS: ICD-10-CM

## 2025-02-21 LAB
APICAL FOUR CHAMBER EJECTION FRACTION: 76 %
APICAL TWO CHAMBER EJECTION FRACTION: 68 %
ASCENDING AORTA: 2.54 CM
AV INDEX (PROSTH): 0.69
AV MEAN GRADIENT: 12 MMHG
AV PEAK GRADIENT: 19 MMHG
AV VALVE AREA BY VELOCITY RATIO: 3.1 CM²
AV VALVE AREA: 3.1 CM²
AV VELOCITY RATIO: 0.68
BSA FOR ECHO PROCEDURE: 3.25 M2
CV ECHO LV RWT: 0.22 CM
DOP CALC AO PEAK VEL: 2.2 M/S
DOP CALC AO VTI: 39.2 CM
DOP CALC LVOT AREA: 4.5 CM2
DOP CALC LVOT DIAMETER: 2.4 CM
DOP CALC LVOT PEAK VEL: 1.5 M/S
DOP CALC LVOT STROKE VOLUME: 122.5 CM3
DOP CALC MV VTI: 38.1 CM
DOP CALCLVOT PEAK VEL VTI: 27.1 CM
E WAVE DECELERATION TIME: 193 MSEC
E/A RATIO: 1.75
E/E' RATIO: 14 M/S
ECHO LV POSTERIOR WALL: 0.7 CM (ref 0.6–1.1)
FRACTIONAL SHORTENING: 25.4 % (ref 28–44)
INTERVENTRICULAR SEPTUM: 0.7 CM (ref 0.6–1.1)
IVC DIAMETER: 1.26 CM
IVRT: 42 MSEC
LEFT ATRIUM AREA SYSTOLIC (APICAL 2 CHAMBER): 19.75 CM2
LEFT ATRIUM AREA SYSTOLIC (APICAL 4 CHAMBER): 29.53 CM2
LEFT ATRIUM VOLUME INDEX MOD: 29 ML/M2
LEFT ATRIUM VOLUME MOD: 89 ML
LEFT INTERNAL DIMENSION IN SYSTOLE: 4.7 CM (ref 2.1–4)
LEFT VENTRICLE DIASTOLIC VOLUME INDEX: 67.03 ML/M2
LEFT VENTRICLE DIASTOLIC VOLUME: 203.1 ML
LEFT VENTRICLE END DIASTOLIC VOLUME APICAL 2 CHAMBER: 151.62 ML
LEFT VENTRICLE END DIASTOLIC VOLUME APICAL 4 CHAMBER: 126.41 ML
LEFT VENTRICLE END SYSTOLIC VOLUME APICAL 2 CHAMBER: 61.55 ML
LEFT VENTRICLE END SYSTOLIC VOLUME APICAL 4 CHAMBER: 96.45 ML
LEFT VENTRICLE MASS INDEX: 56.9 G/M2
LEFT VENTRICLE SYSTOLIC VOLUME INDEX: 33 ML/M2
LEFT VENTRICLE SYSTOLIC VOLUME: 99.88 ML
LEFT VENTRICULAR INTERNAL DIMENSION IN DIASTOLE: 6.3 CM (ref 3.5–6)
LEFT VENTRICULAR MASS: 172.4 G
LV LATERAL E/E' RATIO: 12.7 M/S
LV SEPTAL E/E' RATIO: 15.6 M/S
LVED V (TEICH): 203.1 ML
LVES V (TEICH): 99.88 ML
LVOT MG: 5.72 MMHG
LVOT MV: 1.15 CM/S
MV A" WAVE DURATION": 102.76 MSEC
MV MEAN GRADIENT: 3 MMHG
MV PEAK A VEL: 0.8 M/S
MV PEAK E VEL: 1.4 M/S
MV PEAK GRADIENT: 6 MMHG
MV STENOSIS PRESSURE HALF TIME: 55.94 MS
MV VALVE AREA BY CONTINUITY EQUATION: 3.22 CM2
MV VALVE AREA P 1/2 METHOD: 3.93 CM2
OHS CV RV/LV RATIO: 0.56 CM
OHS LV EJECTION FRACTION SIMPSONS BIPLANE MOD: 71 %
PISA MRMAX VEL: 4.15 M/S
PISA TR MAX VEL: 2.2 M/S
PV PEAK GRADIENT: 6 MMHG
PV PEAK VELOCITY: 1.25 M/S
RA PRESSURE ESTIMATED: 3 MMHG
RA VOL SYS: 79.86 ML
RIGHT ATRIAL AREA: 22.6 CM2
RIGHT ATRIUM VOLUME AREA LENGTH APICAL 4 CHAMBER: 77.16 ML
RIGHT VENTRICLE DIASTOLIC BASEL DIMENSION: 3.5 CM
RV TB RVSP: 5 MMHG
RV TISSUE DOPPLER FREE WALL SYSTOLIC VELOCITY 1 (APICAL 4 CHAMBER VIEW): 18.35 CM/S
SINUS: 2.64 CM
STJ: 2.57 CM
TDI LATERAL: 0.11 M/S
TDI SEPTAL: 0.09 M/S
TDI: 0.1 M/S
TR MAX PG: 19 MMHG
TRICUSPID ANNULAR PLANE SYSTOLIC EXCURSION: 2.81 CM
TV REST PULMONARY ARTERY PRESSURE: 22 MMHG
Z-SCORE OF LEFT VENTRICULAR DIMENSION IN END DIASTOLE: -28.28
Z-SCORE OF LEFT VENTRICULAR DIMENSION IN END SYSTOLE: -19.97

## 2025-02-21 PROCEDURE — 93227 XTRNL ECG REC<48 HR R&I: CPT | Mod: ,,, | Performed by: INTERNAL MEDICINE

## 2025-02-21 PROCEDURE — 93307 TTE W/O DOPPLER COMPLETE: CPT | Mod: PN

## 2025-02-21 PROCEDURE — 93226 XTRNL ECG REC<48 HR SCAN A/R: CPT | Mod: PN

## 2025-02-21 PROCEDURE — 93307 TTE W/O DOPPLER COMPLETE: CPT | Mod: 26,,, | Performed by: INTERNAL MEDICINE

## 2025-02-22 ENCOUNTER — RESULTS FOLLOW-UP (OUTPATIENT)
Dept: FAMILY MEDICINE | Facility: CLINIC | Age: 50
End: 2025-02-22
Payer: MEDICAID

## 2025-02-23 RX ORDER — CHOLECALCIFEROL (VITAMIN D3) 25 MCG
1000 TABLET ORAL DAILY
Qty: 90 TABLET | Refills: 3 | Status: SHIPPED | OUTPATIENT
Start: 2025-02-23

## 2025-02-23 RX ORDER — FERROUS GLUCONATE 324(38)MG
324 TABLET ORAL
Qty: 90 TABLET | Refills: 3 | Status: SHIPPED | OUTPATIENT
Start: 2025-02-23

## 2025-02-24 ENCOUNTER — HOSPITAL ENCOUNTER (OUTPATIENT)
Dept: CARDIOLOGY | Facility: HOSPITAL | Age: 50
Discharge: HOME | End: 2025-02-24
Payer: MEDICAID

## 2025-02-24 ENCOUNTER — RESULTS FOLLOW-UP (OUTPATIENT)
Dept: CARDIOLOGY | Facility: CLINIC | Age: 50
End: 2025-02-24

## 2025-02-24 VITALS
HEART RATE: 88 BPM | WEIGHT: 315 LBS | BODY MASS INDEX: 42.66 KG/M2 | SYSTOLIC BLOOD PRESSURE: 111 MMHG | HEIGHT: 72 IN | DIASTOLIC BLOOD PRESSURE: 41 MMHG

## 2025-02-24 DIAGNOSIS — R07.9 CHEST PAIN, UNSPECIFIED TYPE: ICD-10-CM

## 2025-02-24 DIAGNOSIS — R06.09 DOE (DYSPNEA ON EXERTION): ICD-10-CM

## 2025-02-24 LAB
CFR FLOW - ANTERIOR: 1.43
CFR FLOW - INFERIOR: 1.41
CFR FLOW - LATERAL: 1.44
CFR FLOW - MAX: 2.01
CFR FLOW - MIN: 0.96
CFR FLOW - SEPTAL: 1.44
CFR FLOW - WHOLE HEART: 1.43
CV STRESS BASE HR: 93 BPM
DIASTOLIC BLOOD PRESSURE: 79 MMHG
EJECTION FRACTION- HIGH: 59 %
END DIASTOLIC INDEX-HIGH: 155 ML/M2
END DIASTOLIC INDEX-LOW: 91 ML/M2
END SYSTOLIC INDEX-HIGH: 78 ML/M2
END SYSTOLIC INDEX-LOW: 40 ML/M2
NUC REST DIASTOLIC VOLUME INDEX: 156
NUC REST EJECTION FRACTION: 69
NUC REST SYSTOLIC VOLUME INDEX: 48
NUC STRESS DIASTOLIC VOLUME INDEX: 140
NUC STRESS EJECTION FRACTION: 64 %
NUC STRESS SYSTOLIC VOLUME INDEX: 50
OHS CV CPX 1 MINUTE RECOVERY HEART RATE: 88 BPM
OHS CV CPX 85 PERCENT MAX PREDICTED HEART RATE MALE: 147
OHS CV CPX MAX PREDICTED HEART RATE: 173
OHS CV CPX PATIENT IS FEMALE: 0
OHS CV CPX PATIENT IS MALE: 1
OHS CV CPX PEAK DIASTOLIC BLOOD PRESSURE: 58 MMHG
OHS CV CPX PEAK HEAR RATE: 99 BPM
OHS CV CPX PEAK RATE PRESSURE PRODUCT: NORMAL
OHS CV CPX PEAK SYSTOLIC BLOOD PRESSURE: 117 MMHG
OHS CV CPX PERCENT MAX PREDICTED HEART RATE ACHIEVED: 57
OHS CV CPX RATE PRESSURE PRODUCT PRESENTING: 9858
OHS CV INITIAL DOSE: 38.8 MCG/KG/MIN
OHS CV MODERATELY REDUCED FLOW CAPACITY: 54 %
OHS CV MYOCARDIAL STEAL: 1 %
OHS CV NO ISCHEMIA MILDLY REDUCED FLOW CAPACTY: 27 %
OHS CV NO ISCHEMIA MINIMALLY REDUCED FLOW CAPACITY: 0 %
OHS CV NORMAL FLOW CAPACITY COMPARABLE TO HEALTHY YOUNG VOLUNTEERS: 0 %
OHS CV PEAK DOSE: 38.4 MCG/KG/MIN
OHS CV PET ID: 8240
OHS CV SEVERELY REDUCED FLOW CAPACITY: 19 %
OHS CV TOTAL EXAM DLP: 3874.79 MGY-CM
REST FLOW - ANTERIOR: 0.65 CC/MIN/G
REST FLOW - INFERIOR: 0.64 CC/MIN/G
REST FLOW - LATERAL: 0.74 CC/MIN/G
REST FLOW - MAX: 1.02 CC/MIN/G
REST FLOW - MIN: 0.3 CC/MIN/G
REST FLOW - SEPTAL: 0.54 CC/MIN/G
REST FLOW - WHOLE HEART: 0.64 CC/MIN/G
RETIRED EF AND QEF - SEE NOTES: 47 %
STRESS FLOW - ANTERIOR: 0.93 CC/MIN/G
STRESS FLOW - INFERIOR: 0.88 CC/MIN/G
STRESS FLOW - LATERAL: 1.06 CC/MIN/G
STRESS FLOW - MAX: 1.4 CC/MIN/G
STRESS FLOW - MIN: 0.36 CC/MIN/G
STRESS FLOW - SEPTAL: 0.77 CC/MIN/G
STRESS FLOW - WHOLE HEART: 0.91 CC/MIN/G
SYSTOLIC BLOOD PRESSURE: 106 MMHG

## 2025-02-24 PROCEDURE — 78434 AQMBF PET REST & RX STRESS: CPT | Mod: 26,,, | Performed by: INTERNAL MEDICINE

## 2025-02-24 PROCEDURE — 93016 CV STRESS TEST SUPVJ ONLY: CPT | Mod: ,,, | Performed by: INTERNAL MEDICINE

## 2025-02-24 PROCEDURE — 63600175 PHARM REV CODE 636 W HCPCS

## 2025-02-24 PROCEDURE — 93018 CV STRESS TEST I&R ONLY: CPT | Mod: ,,, | Performed by: INTERNAL MEDICINE

## 2025-02-24 PROCEDURE — 78431 MYOCRD IMG PET RST&STRS CT: CPT | Mod: 26,,, | Performed by: INTERNAL MEDICINE

## 2025-02-24 PROCEDURE — A9555 RB82 RUBIDIUM: HCPCS

## 2025-02-24 PROCEDURE — 93017 CV STRESS TEST TRACING ONLY: CPT

## 2025-02-24 RX ORDER — REGADENOSON 0.08 MG/ML
0.4 INJECTION, SOLUTION INTRAVENOUS ONCE
Status: COMPLETED | OUTPATIENT
Start: 2025-02-24 | End: 2025-02-24

## 2025-02-24 RX ORDER — AMINOPHYLLINE 25 MG/ML
75 INJECTION, SOLUTION INTRAVENOUS ONCE
Status: COMPLETED | OUTPATIENT
Start: 2025-02-24 | End: 2025-02-24

## 2025-02-24 RX ADMIN — RUBIDIUM CHLORIDE RB-82 38.4 MILLICURIE: 150 INJECTION, SOLUTION INTRAVENOUS at 01:02

## 2025-02-24 RX ADMIN — AMINOPHYLLINE 75 MG: 25 INJECTION, SOLUTION INTRAVENOUS at 01:02

## 2025-02-24 RX ADMIN — RUBIDIUM CHLORIDE RB-82 38.8 MILLICURIE: 150 INJECTION, SOLUTION INTRAVENOUS at 12:02

## 2025-02-24 RX ADMIN — REGADENOSON 0.4 MG: 0.08 INJECTION, SOLUTION INTRAVENOUS at 01:02

## 2025-02-25 ENCOUNTER — TELEPHONE (OUTPATIENT)
Dept: FAMILY MEDICINE | Facility: CLINIC | Age: 50
End: 2025-02-25
Payer: MEDICAID

## 2025-02-25 NOTE — TELEPHONE ENCOUNTER
[facility], Marcos TIWARI MD to Kirill Gandhi III Regarding results: 4        2/22/25 10:41 AM  Your iron stores have decreased over the last 10 months-however good news is your blood count has improved a little bit/remained stable.  I would consider daily iron therapy at this point.  Everything else looks great.    Last read by Kirill Gandhi III at 12:36PM on 2/25/2025.  Kirill Gandhi III to [facility] (supporting Marcos Ibarra MD) Regarding results: 4        2/22/25 10:43 AM  Can you please send in a prescription for iron and vitamin D to [facility]. My daughter has the same insurance and they cover it for her.  Thanks Kirill.

## 2025-02-25 NOTE — TELEPHONE ENCOUNTER
Me  BC    2/25/25  2:08 PM  Note  I have called pt and spoken to pt's girlfriend garland. Pt was not home pt will call back clinic.     Pt to  occult blood card and orders at the front office.

## 2025-02-25 NOTE — TELEPHONE ENCOUNTER
I have called pt and spoken to pt's girlfriend garland. Pt was not home pt will call back clinic.    Pt to  occult blood card and orders at the front office.

## 2025-02-26 NOTE — TELEPHONE ENCOUNTER
I have spoken to pt and stated MD has ordered stool cards. Pt stated he will come by today to .    Malick Sifuentes [facility] Staff  Caller: pt (Today,  9:45 AM)  Type:  Patient Returning Call    Who Called:pt  Who Left Message for Patient:Shabnam Marcelo MA  Does the patient know what this is regarding?: no  Would the patient rather a call back or a response via [facility]? call  Best Call Back Number:[phone]  Additional Information:

## 2025-02-27 LAB
OHS CV EVENT MONITOR DAY: 0
OHS CV HOLTER LENGTH DECIMAL HOURS: 47.98
OHS CV HOLTER LENGTH HOURS: 47
OHS CV HOLTER LENGTH MINUTES: 59
OHS CV HOLTER SINUS AVERAGE HR: 83
OHS CV HOLTER SINUS MAX HR: 117
OHS CV HOLTER SINUS MIN HR: 63

## 2025-02-28 ENCOUNTER — LAB VISIT (OUTPATIENT)
Dept: LAB | Facility: HOSPITAL | Age: 50
End: 2025-02-28
Payer: MEDICAID

## 2025-02-28 DIAGNOSIS — K92.1 BLACK STOOL: ICD-10-CM

## 2025-02-28 LAB
BASOPHILS # BLD AUTO: 0.03 K/UL (ref 0–0.2)
BASOPHILS NFR BLD: 0.4 % (ref 0–1.9)
DIFFERENTIAL METHOD BLD: ABNORMAL
EOSINOPHIL # BLD AUTO: 0.6 K/UL (ref 0–0.5)
EOSINOPHIL NFR BLD: 8.1 % (ref 0–8)
ERYTHROCYTE [DISTWIDTH] IN BLOOD BY AUTOMATED COUNT: 15.9 % (ref 11.5–14.5)
HCT VFR BLD AUTO: 31 % (ref 40–54)
HGB BLD-MCNC: 9.3 G/DL (ref 14–18)
IMM GRANULOCYTES # BLD AUTO: 0.04 K/UL (ref 0–0.04)
IMM GRANULOCYTES NFR BLD AUTO: 0.5 % (ref 0–0.5)
LYMPHOCYTES # BLD AUTO: 1.5 K/UL (ref 1–4.8)
LYMPHOCYTES NFR BLD: 18.8 % (ref 18–48)
MCH RBC QN AUTO: 26.8 PG (ref 27–31)
MCHC RBC AUTO-ENTMCNC: 30 G/DL (ref 32–36)
MCV RBC AUTO: 89 FL (ref 82–98)
MONOCYTES # BLD AUTO: 0.6 K/UL (ref 0.3–1)
MONOCYTES NFR BLD: 7.9 % (ref 4–15)
NEUTROPHILS # BLD AUTO: 5 K/UL (ref 1.8–7.7)
NEUTROPHILS NFR BLD: 64.3 % (ref 38–73)
NRBC BLD-RTO: 0 /100 WBC
OB PNL STL: POSITIVE
PLATELET # BLD AUTO: 314 K/UL (ref 150–450)
PMV BLD AUTO: 9.7 FL (ref 9.2–12.9)
RBC # BLD AUTO: 3.47 M/UL (ref 4.6–6.2)
WBC # BLD AUTO: 7.76 K/UL (ref 3.9–12.7)

## 2025-02-28 PROCEDURE — 85025 COMPLETE CBC W/AUTO DIFF WBC: CPT | Mod: PN | Performed by: FAMILY MEDICINE

## 2025-02-28 PROCEDURE — 82272 OCCULT BLD FECES 1-3 TESTS: CPT | Mod: PN | Performed by: FAMILY MEDICINE

## 2025-03-04 ENCOUNTER — RESULTS FOLLOW-UP (OUTPATIENT)
Dept: FAMILY MEDICINE | Facility: CLINIC | Age: 50
End: 2025-03-04

## 2025-03-04 DIAGNOSIS — R19.5 HEME POSITIVE STOOL: ICD-10-CM

## 2025-03-04 DIAGNOSIS — D64.9 ANEMIA, UNSPECIFIED TYPE: ICD-10-CM

## 2025-03-04 DIAGNOSIS — K92.1 BLACK STOOL: Primary | ICD-10-CM

## 2025-03-06 ENCOUNTER — PATIENT MESSAGE (OUTPATIENT)
Dept: PSYCHIATRY | Facility: CLINIC | Age: 50
End: 2025-03-06
Payer: MEDICAID

## 2025-03-06 DIAGNOSIS — F41.9 ANXIETY: ICD-10-CM

## 2025-03-06 DIAGNOSIS — F33.42 RECURRENT MAJOR DEPRESSIVE DISORDER, IN FULL REMISSION: ICD-10-CM

## 2025-03-06 RX ORDER — PROPRANOLOL HYDROCHLORIDE 20 MG/1
TABLET ORAL
Qty: 90 TABLET | Refills: 0 | Status: SHIPPED | OUTPATIENT
Start: 2025-03-06

## 2025-03-06 RX ORDER — BUPROPION HYDROCHLORIDE 100 MG/1
200 TABLET, EXTENDED RELEASE ORAL
Qty: 60 TABLET | Refills: 1 | Status: SHIPPED | OUTPATIENT
Start: 2025-03-06

## 2025-03-10 ENCOUNTER — PATIENT MESSAGE (OUTPATIENT)
Dept: FAMILY MEDICINE | Facility: CLINIC | Age: 50
End: 2025-03-10
Payer: MEDICAID

## 2025-03-11 ENCOUNTER — PATIENT MESSAGE (OUTPATIENT)
Dept: ENDOCRINOLOGY | Facility: CLINIC | Age: 50
End: 2025-03-11
Payer: MEDICAID

## 2025-03-11 DIAGNOSIS — E78.5 DYSLIPIDEMIA: ICD-10-CM

## 2025-03-11 RX ORDER — BUMETANIDE 2 MG/1
2 TABLET ORAL DAILY
Qty: 30 TABLET | Refills: 11 | Status: SHIPPED | OUTPATIENT
Start: 2025-03-11 | End: 2026-03-11

## 2025-03-12 NOTE — TELEPHONE ENCOUNTER
Humulin U500 [employer] approved  Authorization valid 3/12/2025 through 9/8/2025  [employer] [place] ID [id number]

## 2025-03-13 ENCOUNTER — PATIENT MESSAGE (OUTPATIENT)
Dept: SLEEP MEDICINE | Facility: CLINIC | Age: 50
End: 2025-03-13
Payer: MEDICAID

## 2025-03-13 ENCOUNTER — TELEPHONE (OUTPATIENT)
Dept: SLEEP MEDICINE | Facility: CLINIC | Age: 50
End: 2025-03-13
Payer: MEDICAID

## 2025-03-13 RX ORDER — TRAZODONE HYDROCHLORIDE 100 MG/1
100 TABLET ORAL NIGHTLY
Qty: 30 TABLET | Refills: 11 | Status: SHIPPED | OUTPATIENT
Start: 2025-03-13 | End: 2026-03-13

## 2025-03-13 RX ORDER — INSULIN HUMAN 500 [IU]/ML
INJECTION, SOLUTION SUBCUTANEOUS
Qty: 6 PEN | Refills: 6 | Status: SHIPPED | OUTPATIENT
Start: 2025-03-13

## 2025-03-18 ENCOUNTER — PATIENT MESSAGE (OUTPATIENT)
Dept: FAMILY MEDICINE | Facility: CLINIC | Age: 50
End: 2025-03-18
Payer: MEDICAID

## 2025-03-18 NOTE — TELEPHONE ENCOUNTER
Can you assist this pt with scheduling or how we can get this pt scheduled? I had assumed based on the notes that the referral had been sent to [facility].

## 2025-03-20 NOTE — PROGRESS NOTES
Cardiology Clinic note    Subjective:   Patient ID:  Kirill Gandhi III is a 49 y.o. male who presents for evaluation of CP, HOLMAN, leg swelling    HPI    3/25/2025: F/U CP, HOLMAN, leg swelling. Seen in clinic unaccompanied, reports overall doing pretty well. Ongoing LE edema despite diuretics. Still with intermittent palps. Still with intermittent CPs, PET stress -ve for ischemic etiology, reivewed with patient as below. Echo and 48-hour holter unremarkable. No new CV s/s. Compliant with CPAP. Reports compliance and tolerance with all medications.     2/4/2025: Previous patient of Dr. Blas as below, initial visit for me. 48 yo M with hx of HTN, HLD, DM2 (last A1C 5.7), GERD, palpitations, VICENTE, obesity present to clinic for evaluation of CP/HOLMAN/LE edema. Seen in clinic with mother, reports overall doing okay. Over the past few months has noted weight gain, worsening LE swelling, and new onset CP/HOLMAN. Reports HOLMAN has worsened over the past 1-2m. No significant orthopnea/PND; no resting SOB. Reports CP is random, reported as soreness/shocking, happening at random times. No associated exacerbating or relieving factors. Does not seem to be associated with exertion. Also endorses intermittent palpitations. No associated s/s. Denies syncope/presyncope, orthopnea, PND. Compliant with CPAP. Reports compliance and tolerance with all medications.    1/3/2023  Referred by Dr Ibarra   Kirill Gandhi III is a 46 y.o. male video visit for preoperative clearance revision of surgical procedure and fusion of SI joint as well as diabetes follow-up.  Currently is without any cardiac symptoms such as chest pain palpitations shortness of breath PND orthopnea.  He has new respiratory illnesses.  No cough cold congestion fever chills nausea vomiting or urinary symptoms.  Diabetes-recently increased from 1.5-3 milligram Trulicity weekly.  Glucose readings have been running in the low 200s to high 106. Hemoglobin A1c  was over 11 now 8.711 days ago-experiencing better control than previous.  Continues to struggle with appropriate diet-ingestion of a significant amount of carbohydrates.         CV Hx  ---------------------------------------------------------      PET Stress 3/2025    The myocardial perfusion images show no evidence of ischemia or scar.    The whole heart absolute myocardial perfusion values were normal at rest, moderately reduced during stress and CFR is moderately reduced.    CT attenuation images demonstrate no coronary calcifications and no aortic calcifications.    The stress flow is severely reduced diffusely throughout the heart consistent with decreased vasodilator stress or caffeine or possibly three vessel balanced ischemia, however no coronary calcifications visualized on CT scan making three vessel balanced ischemia less likely.    The gated perfusion images showed an ejection fraction of 69% at rest and 64% during stress. A normal ejection fraction is greater than 47%.    There is normal wall motion at rest and normal wall motion during stress.    The ECG portion of the study is abnormal but not diagnostic.      Echo 2/2025    Left Ventricle: The left ventricle is mildly dilated. Normal wall thickness. Normal wall motion. There is normal systolic function with a visually estimated ejection fraction of 60 - 65%. Quantitated ejection fraction is 71%. There is normal diastolic function.    Right Ventricle: The right ventricular cavity is at the upper limits of normal in size. Systolic function is normal.    Left Atrium: Normal left atrial size.    Aortic Valve: The aortic valve is a trileaflet valve. There is no stenosis. Aortic valve peak velocity is 2.2 m/s. Mean gradient is 12 mmHg.  Elevated gradient across the LVOT rather than the aortic valve    Mitral Valve: The mean pressure gradient across the mitral valve is 3 mmHg at a heart rate of  bpm. There is trace regurgitation.    Aorta: Aortic root is  normal in size measuring 2.64 cm.    Pulmonary Artery: The estimated pulmonary artery systolic pressure is 22 mmHg.    IVC/SVC: Normal venous pressure at 3 mmHg.    Pericardium: There is no pericardial effusion.    EKG 3/7/22 NSR NSSTT changes - anterolateral and inferior TWI - new from 2021     PET stress 4/8/22    There are no clinically significant perfusion abnormalities. There is a small to moderate (10-15%) amount of mild resting heterogeneity in the anterior and apical wall(s) that improves with stress consisent with endothelial dysfunction secondary to diabetes.    The whole heart absolute myocardial perfusion values averaged 0.49 cc/min/g at rest, which is reduced; 1.17 cc/min/g at stress, which is mildly reduced; and CFR is 2.41 , which is low normal.    CT attenuation images demonstrate no coronary calcifications and no aortic calcifications.    The gated perfusion images showed an ejection fraction of 58% at rest and 66% during stress. A normal ejection fraction is greater than 53%.    The wall motion is normal at rest and during stress.    The LV cavity size is normal at rest and stress.    The EKG portion of this study is negative for ischemia.    There were no arrhythmias during stress.    The patient reported no chest pain during the stress test.    There are no prior studies for comparison.        Stress echo 4/14/21   The stress echo portion of this study is negative for myocardial ischemia. Target heart rate was achieved.  The ECG portion of this study is negative for myocardial ischemia.  There were no arrhythmias during stress.  The test was stopped because the patient experienced fatigue.  The patient's exercise capacity was below average. Achieved 8 METs.  The left ventricle is normal in size with normal systolic function. The estimated ejection fraction is 60%.  Normal left ventricular diastolic function.  Normal right ventricular size with low normal right ventricular systolic function.  The  estimated PA systolic pressure is 35 mmHg.  Normal central venous pressure (3 mmHg).     Holter 4/12/21  Baseline rhythm was normal sinus rhythm with normal intervals and an average heart rate of 76 bpm.  There was one episode with reported symtpoms of nausea, tiredness, and fatigue. This episode corresponded to normal sinus rhythm.  There were no atrial or ventricular arrhythmias.     3/10/22 Needs clearance for spine surgery revision. Activity limited. Mild HOLMAN. Denies CP   PET stress for new EKG changes - will clear for spine surgery if ok     4/12/22 Denies CP, mild stable HOLMAN  BP controlled  Cleared for spine surgery at low cardiac risk  Continue Rx for  HTN, HLD  OV 6 months     1/3/23 Denies CP or SOB. Gets palpitations about once a week - last 20 secinds  BP controlled    Past Medical History:   Diagnosis Date    Allergy     Anxiety     Back pain     Chronic bilateral low back pain with bilateral sciatica 12/14/2021    CTS (carpal tunnel syndrome) 06/26/2015    CTS (carpal tunnel syndrome) 6/26/2015    Depression     Diabetes 04/17/2015    Diabetes mellitus with insulin therapy     Elevated sed rate 6/26/2015    GERD (gastroesophageal reflux disease) 10/19/2022    Hx of vasectomy     Hypertension     Morbid obesity 4/16/2015    OCD (obsessive compulsive disorder)     Sleep apnea     Type 2 diabetes mellitus with hyperglycemia, with long-term current use of insulin 10/17/2022         Patient Active Problem List    Diagnosis Date Noted    Leg edema 03/25/2025    Need for hepatitis B vaccination 05/14/2024    Hepatic fibrosis 05/14/2024    Metabolic dysfunction-associated steatohepatitis (MASH) 04/04/2024    Elevated LFTs 04/04/2024    Dyslipidemia 08/10/2023    Decreased strength of trunk and back 01/09/2023    Decreased ROM of trunk and back 01/09/2023    Flatulence/gas pain/belching 11/15/2022    Hyponatremia 11/11/2022    Metabolic acidemia 11/11/2022    Dehydration with hyponatremia 11/11/2022    Excessive  flatus 10/19/2022    Gastroesophageal reflux disease without esophagitis 10/19/2022    Type 2 diabetes mellitus with hyperglycemia, with long-term current use of insulin 10/17/2022    Benign essential hypertension 04/20/2022    Pseudoarthrosis of lumbar spine 04/13/2022    Chest pain, atypical 03/10/2022    Chronic bilateral low back pain with bilateral sciatica 12/14/2021    Radiculopathy of lumbosacral region 12/14/2021    Sacroiliac joint dysfunction of both sides 12/13/2021    Fusion of spine, lumbosacral region 07/13/2021    Mixed obsessional thoughts and acts 06/25/2021    Mild episode of recurrent major depressive disorder 06/25/2021    Adjustment disorder with mixed anxiety and depressed mood 05/19/2021    DDD (degenerative disc disease), lumbar 04/26/2021    Palpitations 03/25/2021    Insomnia due to other mental disorder 02/05/2021    OCD (obsessive compulsive disorder) 02/26/2020    Anxiety 02/26/2020    VICENTE (obstructive sleep apnea) 06/17/2015    Class 3 severe obesity due to excess calories with serious comorbidity and body mass index (BMI) of 50.0 to 59.9 in adult 04/16/2015    Vitamin D deficiency 04/16/2015       Patient's Medications   New Prescriptions    No medications on file   Previous Medications    ACETAMINOPHEN (TYLENOL) 500 MG TABLET    Take 1,000 mg by mouth 2 (two) times daily as needed for Pain.    ARIPIPRAZOLE (ABILIFY) 5 MG TAB    Take 1 tablet (5 mg total) by mouth once daily. for 180 doses    ASCORBIC ACID, VITAMIN C, (VITAMIN C) 1000 MG TABLET    Take 1,000 mg by mouth once daily.    ASPIRIN (ECOTRIN) 81 MG EC TABLET    Take 81 mg by mouth every evening.    ASPIRIN/SOD BICARB/CITRIC ACID (GIOVANY-SELTZER ORAL)    Take 2 tablets by mouth daily as needed (Upset stomach).    ATORVASTATIN (LIPITOR) 10 MG TABLET    Take 1 tablet (10 mg total) by mouth once daily.    BUMETANIDE (BUMEX) 2 MG TABLET    Take 1 tablet (2 mg total) by mouth once daily.    BUPROPION (WELLBUTRIN SR) 100 MG TBSR 12  HR TABLET    TAKE 2 TABLETS BY MOUTH ONCE A DAY    BUSPIRONE (BUSPAR) 30 MG TAB    Take 1 tablet (30 mg total) by mouth 2 (two) times daily.    CICLOPIROX (PENLAC) 8 % SOLN    Apply topically nightly.    COENZYME Q10 (CO Q-10) 100 MG CAPSULE    Take 100 mg by mouth once daily.    CYANOCOBALAMIN (VITAMIN B-12) 1000 MCG TABLET    Take 100 mcg by mouth once daily.    CYCLOBENZAPRINE (FLEXERIL) 10 MG TABLET    Take 1 tablet (10 mg total) by mouth 3 (three) times daily. TAKE 1 TABLET BY MOUTH THREE TIMES A DAY AS NEEDED FOR MUSCLE SPASMS    DAPAGLIFLOZIN PROPANEDIOL (FARXIGA) 10 MG TABLET    Take 1 tablet (10 mg total) by mouth once daily.    DEXCOM G6 SENSOR MARIAELENA    USE AS DIRECTED    DEXCOM G6 TRANSMITTER MARIAELENA    USE TO CHECK BLOOD SUGAR    DICYCLOMINE (BENTYL) 20 MG TABLET    Take 1 tablet (20 mg total) by mouth 3 (three) times daily as needed (abdominal pain).    DULOXETINE (CYMBALTA) 60 MG CAPSULE    Take 1 capsule (60 mg total) by mouth 2 (two) times daily.    FERROUS GLUCONATE (FERGON) 324 MG TABLET    Take 1 tablet (324 mg total) by mouth daily with breakfast.    FLUTICASONE PROPIONATE (FLONASE) 50 MCG/ACTUATION NASAL SPRAY    2 sprays (100 mcg total) by Each Nostril route once daily.    GABAPENTIN (NEURONTIN) 800 MG TABLET    Take 1 tablet (800 mg total) by mouth 3 (three) times daily.    INSULIN REGULAR HUM U-500 CONC (HUMULIN R U-500, CONC, KWIKPEN) 500 UNIT/ML (3 ML) INSULIN PEN    Take 100 units three times daily with meals.    INSULIN SYRINGE-NEEDLE U-100 (BD INSULIN SYRINGE ULTRA-FINE) 1 ML 31 GAUGE X 5/16 SYRG    To use 6 times daily with insulin    LACTOBAC NO.41/BIFIDOBACT NO.7 (PROBIOTIC-10 ORAL)    Take by mouth.    LANOLIN/MINERAL OIL/PETROLATUM (ARTIFICIAL TEARS OPHT)    Place 2 drops into both eyes daily as needed (Dry eye).    LIDOCAINE (LIDODERM) 5 %    Place 1 patch onto the skin once daily. Remove & Discard patch within 12 hours or as directed by MD    MELOXICAM (MOBIC) 15 MG TABLET    TAKE 1  "TABLET BY MOUTH ONCE DAILY, DISCONTINUE CELEBREX    METFORMIN (GLUCOPHAGE-XR) 500 MG ER 24HR TABLET    Take 2 tablets (1,000 mg total) by mouth 2 (two) times daily with meals.    METOCLOPRAMIDE HCL (REGLAN) 5 MG TABLET    Take 1 tablet (5 mg total) by mouth 4 (four) times daily before meals and nightly.    MULTIVITAMIN CAPSULE    Take 1 capsule by mouth once daily.    NON FORMULARY MEDICATION    Uses Cpap Machine nightly on a setting of 10    ONDANSETRON (ZOFRAN-ODT) 4 MG TBDL    Take 1 tablet (4 mg total) by mouth every 8 (eight) hours as needed (nausea).    ONETOUCH DELICA PLUS LANCET 33 GAUGE MISC    Apply topically 3 (three) times daily.    ONETOUCH VERIO TEST STRIPS STRP    1 strip 3 (three) times daily.    PANTOPRAZOLE (PROTONIX) 40 MG TABLET    Take 1 tablet (40 mg total) by mouth once daily.    PEN NEEDLE, DIABETIC (BD ULTRA-FINE ORIG PEN NEEDLE) 29 GAUGE X 1/2" NDLE    To use 3 needles daily    PEN NEEDLE, DIABETIC 31 GAUGE X 1/4" NDLE    Use with flex pen    PIOGLITAZONE (ACTOS) 30 MG TABLET    Take 1 tablet (30 mg total) by mouth once daily.    POTASSIUM CHLORIDE SA (K-DUR,KLOR-CON) 20 MEQ TABLET    Take 1 tablet (20 mEq total) by mouth every other day.    PROPRANOLOL (INDERAL) 20 MG TABLET    TAKE 1 TABLET BY MOUTH THREE TIMES A DAY AS NEEDED FOR ANXIETY. MAY TAKE AN EXTRA TABLET AS NEEDED FOR ANXIETY    RESMETIROM 100 MG TAB    Take 100 mg by mouth once daily.    TIRZEPATIDE (MOUNJARO) 15 MG/0.5 ML PNIJ    Inject 15 mg into the skin every 7 days.    TRAMADOL (ULTRAM) 50 MG TABLET    Take 2 tablets (100 mg total) by mouth every 8 (eight) hours as needed (severe pain).    TRAZODONE (DESYREL) 100 MG TABLET    Take 1 tablet (100 mg total) by mouth every evening.    VALSARTAN-HYDROCHLOROTHIAZIDE (DIOVAN-HCT) 320-12.5 MG PER TABLET    Take 1 tablet by mouth once daily.    VITAMIN D (VITAMIN D3) 1000 UNITS TAB    Take 1 tablet (1,000 Units total) by mouth once daily.    VITAMIN D 1000 UNITS TAB    Take 5,000 " Units by mouth 2 (two) times a day.    ZEGALOGUE AUTOINJECTOR 0.6 MG/0.6 ML ATIN    Inject 1 Box  into the skin as needed (hypoglycemia). Glucagon is an emergency pen that is used to increase your blood sugar. Glucagon is a pen that is used in an emergency situation where you are unable to eat or drink anything. Glucagon is a medication that is given by someone else-spouse, child, etc.   Modified Medications    No medications on file   Discontinued Medications    No medications on file        Review of Systems   Constitutional: Positive for weight gain. Negative for chills, decreased appetite, diaphoresis, malaise/fatigue and weight loss.   Cardiovascular:  Positive for dyspnea on exertion, leg swelling and palpitations. Negative for chest pain, claudication, irregular heartbeat, near-syncope, orthopnea, paroxysmal nocturnal dyspnea and syncope.   Respiratory:  Negative for cough, hemoptysis, shortness of breath and snoring.    Gastrointestinal:  Negative for bloating, abdominal pain, nausea and vomiting.   Neurological:  Negative for light-headedness and weakness.       Family History   Problem Relation Name Age of Onset    Fibromyalgia Mother      Osteoarthritis Mother      Cataracts Maternal Grandmother      Macular degeneration Maternal Grandfather      Cataracts Maternal Grandfather      Rheum arthritis Neg Hx      Psoriasis Neg Hx      Lupus Neg Hx      Kidney disease Neg Hx      Inflammatory bowel disease Neg Hx      Amblyopia Neg Hx      Blindness Neg Hx      Glaucoma Neg Hx      Retinal detachment Neg Hx      Strabismus Neg Hx         Social History     Socioeconomic History    Marital status: Significant Other   Tobacco Use    Smoking status: Former     Types: Vaping with nicotine     Passive exposure: Past    Smokeless tobacco: Never    Tobacco comments:     Handout provided for [employer]oking Cess[employer]on program 10/11/24 - pt has stopped vaping   Substance and Sexual Activity    Alcohol use: Not  Currently    Drug use: Never    Sexual activity: Yes     Partners: Female     Birth control/protection: None     Comment: ; one child      Social Drivers of Health     Financial Resource Strain: Low Risk  (2/4/2025)    Overall Financial Resource Strain (CARDIA)     Difficulty of Paying Living Expenses: Not hard at all   Food Insecurity: No Food Insecurity (2/4/2025)    Hunger Vital Sign     Worried About Running Out of Food in the Last Year: Never true     Ran Out of Food in the Last Year: Never true   Transportation Needs: No Transportation Needs (1/15/2024)    PRAPARE - Transportation     Lack of Transportation (Medical): No     Lack of Transportation (Non-Medical): No   Physical Activity: Inactive (2/4/2025)    Exercise Vital Sign     Days of Exercise per Week: 0 days     Minutes of Exercise per Session: 0 min   Stress: No Stress Concern Present (2/4/2025)    [employer] [place] [employer] - Occupational Stress Questionnaire     Feeling of Stress : Only a little   Housing Stability: Low Risk  (1/15/2024)    Housing Stability Vital Sign     Unable to Pay for Housing in the Last Year: No     Number of Places Lived in the Last Year: 2     Unstable Housing in the Last Year: No            Objective:   Vitals  Vitals:    03/25/25 1412 03/25/25 1414   BP: (!) 147/74 115/71   Pulse: 89    SpO2: 97%    Weight: (!) 216.1 kg (476 lb 6.6 oz)    Height: 6' (1.829 m)             Physical Exam  Vitals and nursing note reviewed.   Constitutional:       Appearance: Normal appearance.   Cardiovascular:      Rate and Rhythm: Normal rate and regular rhythm.      Heart sounds: No murmur heard.     No gallop.   Pulmonary:      Effort: Pulmonary effort is normal.      Breath sounds: Normal breath sounds. No wheezing or rales.   Abdominal:      General: Bowel sounds are normal. There is no distension.      Palpations: Abdomen is soft.      Tenderness: There is no abdominal tenderness.   Musculoskeletal:      Right lower  leg: Edema present.      Left lower leg: Edema present.   Skin:     General: Skin is warm and dry.   Neurological:      Mental Status: He is alert and oriented to person, place, and time.           Lab Results    Lab Results   Component Value Date    WBC 7.76 02/28/2025    HGB 9.3 (L) 02/28/2025    HCT 31.0 (L) 02/28/2025    HCT 31 (L) 04/13/2022    MCV 89 02/28/2025       Lab Results   Component Value Date     02/28/2025    INR 1.0 03/07/2022       Lab Results   Component Value Date    K 4.4 02/28/2025    MG 2.3 02/28/2025    BUN 26 (H) 02/28/2025    CREATININE 1.15 02/28/2025       Lab Results   Component Value Date     (H) 02/28/2025    HGBA1C 5.7 (H) 12/13/2024       Lab Results   Component Value Date    AST 26 01/03/2025    ALT 22 01/03/2025    ALBUMIN 3.8 01/03/2025    PROT 7.3 01/03/2025       Lab Results   Component Value Date    CHOL 112 (L) 06/11/2024    HDL 28 (L) 06/11/2024    LDLCALC 40.6 (L) 06/11/2024    TRIG 217 (H) 06/11/2024       Lab Results   Component Value Date    CRP 16.3 (H) 06/26/2015         Assessment:     1. Benign essential hypertension    2. Dyslipidemia    3. Palpitations    4. Leg edema    5. VICENTE (obstructive sleep apnea)          Plan:     CP, HOLMAN  -Echo, PET, holter -ve for cardiac etiology  - continue vaslartan-HCTZ, farxiga as above for now -  monitor kidney function closely  -continue asa, statin for primary prevention given family/ PMH  -ER precautions given    2. LE edema, ?HFpEF  -chest x-ray -ve for pulmonary congestion, Echo without evidence of rEF, DD  -lungs CTA bilat  -LE edema seems localized, likely venous insufficiency  -conservative management for now - compression stockings, elevation, low salt diet, exercise, weight loss  -careful with long-term diuretics  -if unimproved can consider venous insufficiency ultrasound to eval significance of insufficiency     2. Palpitations  -48-hour holter -ve for arrhythmic etiology  -if recurrent can consider PRN BBs  vs 30d RAZ    3. HTN  -well controlled, continue regimen as above    4. HLD  -continue statin    5. Obesity  -low sodium, heart healthy diet; mod intensity exercise 30m/day 3-4x/wk; weight loss    6. Nicotine dependence  -vapes  -educated on importance of absolute cessation  -will refer to cessation team once ready to quit        The ASCVD Risk score (Errol DK, et al., 2019) failed to calculate for the following reasons:    The valid total cholesterol range is 130 to 320 mg/dL    Orders Placed This Encounter   Procedures    COMPRESSION STOCKINGS     Garment type::   knee high     Body region::   BLE     Pressure amount::   20-30 mmHg       He expressed verbal understanding and agreed with the plan    Follow up in 12m or sooner PRN    Pertinent cardiac images and EKG reviewed independently.    Continue with current medical plan and lifestyle changes.  Return sooner for concerns or questions. If symptoms persist go to the ED.  I have reviewed all pertinent data including patient's medical history in detail and updated the computerized patient record.     Counseling included discussion regarding imaging findings, diagnosis, possibilities, treatment options, risks and benefits.    Thank you for the opportunity to care for this patient. Will be available for questions if needed.      Signed:  Capo Quintero, NESHA  03/25/2025

## 2025-03-24 ENCOUNTER — PATIENT MESSAGE (OUTPATIENT)
Dept: SLEEP MEDICINE | Facility: CLINIC | Age: 50
End: 2025-03-24
Payer: MEDICAID

## 2025-03-25 ENCOUNTER — OFFICE VISIT (OUTPATIENT)
Dept: CARDIOLOGY | Facility: CLINIC | Age: 50
End: 2025-03-25
Payer: MEDICAID

## 2025-03-25 VITALS
DIASTOLIC BLOOD PRESSURE: 71 MMHG | WEIGHT: 315 LBS | BODY MASS INDEX: 42.66 KG/M2 | SYSTOLIC BLOOD PRESSURE: 115 MMHG | HEIGHT: 72 IN | OXYGEN SATURATION: 97 % | HEART RATE: 89 BPM

## 2025-03-25 DIAGNOSIS — I10 BENIGN ESSENTIAL HYPERTENSION: Primary | ICD-10-CM

## 2025-03-25 DIAGNOSIS — E78.5 DYSLIPIDEMIA: ICD-10-CM

## 2025-03-25 DIAGNOSIS — R00.2 PALPITATIONS: ICD-10-CM

## 2025-03-25 DIAGNOSIS — G47.33 OSA (OBSTRUCTIVE SLEEP APNEA): ICD-10-CM

## 2025-03-25 DIAGNOSIS — R60.0 LEG EDEMA: ICD-10-CM

## 2025-03-25 PROCEDURE — 99215 OFFICE O/P EST HI 40 MIN: CPT | Mod: PBBFAC,PO

## 2025-03-25 PROCEDURE — 99999 PR PBB SHADOW E&M-EST. PATIENT-LVL V: CPT | Mod: PBBFAC,,,

## 2025-03-26 ENCOUNTER — OFFICE VISIT (OUTPATIENT)
Dept: NEUROLOGY | Facility: CLINIC | Age: 50
End: 2025-03-26
Payer: MEDICAID

## 2025-03-26 VITALS — BODY MASS INDEX: 42.66 KG/M2 | WEIGHT: 315 LBS | HEIGHT: 72 IN

## 2025-03-26 DIAGNOSIS — G47.33 OSA (OBSTRUCTIVE SLEEP APNEA): Primary | ICD-10-CM

## 2025-03-26 DIAGNOSIS — R79.81 ELEVATED CO2 LEVEL: ICD-10-CM

## 2025-03-26 NOTE — PROGRESS NOTES
The patient location is: [place]  The chief complaint leading to consultation is: VICENTE    Visit type: audiovisual    Face to Face time with patient: 12 minutes of total time spent on the encounter, which includes face to face time and non-face to face time preparing to see the patient (eg, review of tests), Obtaining and/or reviewing separately obtained history, Documenting clinical information in the electronic or other health record, Independently interpreting results (not separately reported) and communicating results to the patient/family/caregiver, or Care coordination (not separately reported). Each patient to whom he or she provides medical services by telemedicine is:  (1) informed of the relationship between the physician and patient and the respective role of any other health care provider with respect to management of the patient; and (2) notified that he or she may decline to receive medical services by telemedicine and may withdraw from such care at any time.      Continued/long time user apap 10-20cm using N20 mask. Tried low profile FFM for a short time. Even if offered $10, 000 he would not give up his machine. His serum CO2 elevated recently (30) and is short of breath and gained weight. Snoring/apneic pauses resolved with mask use. Trazadone 100mg helps sleep onset  Remote 30davg 8:46h/n AHI 1.6, 90% tile 13.7cm    Ht 6' (1.829 m)   Wt (!) 215.9 kg (476 lb)   BMI 64.56 kg/m²       1/2023: HST AHI 60/ Min SpO2: 85.5%  2013 titration in [facility] - 10 cm H2O CPAP was recommended  2019 titration in [facility] - 10 cm H2O again was recommended (359#)      Assessment:  VICENTE, severe. Continued adherence with PAP, AHI<5. Benefiting. Needs supplies and having elevated serum CO2, dyspnea and gained 100# since last titration study  Insomnia      Plan:  [id number] continue, OBTAIN cpap titration study with etCO2 monitoring. DME [facility] supplies  Discussed control of VICENTE  Continue Trazadone 100mg qhs

## 2025-04-01 ENCOUNTER — PATIENT MESSAGE (OUTPATIENT)
Dept: FAMILY MEDICINE | Facility: CLINIC | Age: 50
End: 2025-04-01
Payer: MEDICAID

## 2025-04-01 ENCOUNTER — TELEPHONE (OUTPATIENT)
Dept: SLEEP MEDICINE | Facility: OTHER | Age: 50
End: 2025-04-01
Payer: MEDICAID

## 2025-04-01 ENCOUNTER — PATIENT MESSAGE (OUTPATIENT)
Dept: SLEEP MEDICINE | Facility: CLINIC | Age: 50
End: 2025-04-01
Payer: MEDICAID

## 2025-04-01 DIAGNOSIS — I89.0 LYMPHEDEMA: Primary | ICD-10-CM

## 2025-04-01 DIAGNOSIS — K92.1 BLACK STOOL: ICD-10-CM

## 2025-04-01 DIAGNOSIS — R19.5 HEME POSITIVE STOOL: ICD-10-CM

## 2025-04-02 ENCOUNTER — HOSPITAL ENCOUNTER (OUTPATIENT)
Dept: SLEEP MEDICINE | Facility: OTHER | Age: 50
Discharge: HOME | End: 2025-04-02
Attending: NURSE PRACTITIONER
Payer: MEDICAID

## 2025-04-02 DIAGNOSIS — G47.33 OSA (OBSTRUCTIVE SLEEP APNEA): ICD-10-CM

## 2025-04-02 DIAGNOSIS — R79.81 ELEVATED CO2 LEVEL: ICD-10-CM

## 2025-04-02 PROCEDURE — 95811 POLYSOM 6/>YRS CPAP 4/> PARM: CPT

## 2025-04-03 NOTE — TELEPHONE ENCOUNTER
Dr Ibarra   The patient was notified on 3/18/25 that his referral was sent to [facility] - [facility] does not take Medicaid.   When Marine called the patient today he said that [facility] notified him that they are not accepting new patients        Marine advised the pt to reach out to his insurance to determine what GI are on his plan

## 2025-04-03 NOTE — PROGRESS NOTES
An overnight titration with transcutaneous CO2 monitoring study was performed on [facility]. The following was explained to the pt prior to the study: the time to bed and wake time, the set-up process timeframe and the purpose of each sensor, the reason (if sensors fall off) the technician will need to enter the room during the night, the possibility of the tech fitting a PAP mask on pt for treatment in the middle of the night, and how to call out for assistance during the night. A post-study letter was handed to the pt in the morning.

## 2025-04-04 NOTE — PROCEDURES
[facility]    Titration Interpretation Report    Patient Name:  Kirill Gandhi  MRN#:  [id number]  :  1975  Study Date:  2025  Referring Provider:  Eva Merlos    The patient is a 49 year old Male who is 6' and weighs 476.0 lbs.  His BMI equals 65.2.  A full night PAP titration was performed.    Polysomnogram Data  A full night polysomnogram recorded the standard physiologic parameters including EEG, EOG, EMG, EKG, nasal and oral airflow.  Respiratory parameters of chest and abdominal movements were recorded with (RIP) Respiratory Inductance Plethsmography.  Oxygen saturation was recorded by pulse oximetry.    Titration Summary  The patient was titrated at pressures ranging from 10* cm/H20 with supplemental oxygen at - up to 14* cm/H20 with supplemental oxygen at -.  The last pressure used in the study was 14* cm/H20 with supplemental oxygen at -.    Sleep Architecture  The total recording time of the polysomnogram was 446.3 minutes.  The total sleep time was 406.5 minutes.  The patient spent 2.3% of total sleep time in Stage N1, 47.4% in Stage N2, 34.4% in Stages N3, and 15.9% in REM.  Sleep latency was 2.7 minutes.  REM latency was 113.5 minutes.  Sleep Efficiency was 91.1%.  Total wake time was 40.5 minutes for a total wake percentage of 7.6%.  Wake after Sleep Onset was 37.0 minutes.    Respiratory Summary  The polysomnogram revealed a presence of - obstructive, - central, and - mixed apneas resulting in Total Apnea index of - events per hour.  There were 8 hypopneas resulting in Total Hypopnea index of 1.2 events per hour.  The combined Apnea/Hypopnea index was 1.2 events per hour.  There were a total of 1 RERA events resulting in a Respiratory Disturbance Index (RDI) of 1.3 events per hour.     Mean oxygen saturation was 95.3%.  The lowest oxygen saturation during sleep was 88.0%.  Time spent <=88% oxygen saturation was 0.6 minutes (0.1%).      Limb Movement Activity  There  "were 11 limb movements recorded.  Of this total, 11 were classified as PLMs.  Of the PLMs, 1 were associated with arousals.  The Limb Movement index was 1.6 per hour while the PLM index was 1.6 per hour and PLM with arousals index was 0.1 per hour.    Cardiac: single lead EKG revealed normal sinus rhythm       Mask: used pt's own nasal mask- Large  CPAP = 12 cwp was partially effective in supine position in stage N2 sleep  CPAP = 14 cwp was largely effective in supine position in stage REM sleep  CPAP = 14 cwp was effective in supine position in stage N2 sleep  The patients response to the study:  " 'I slept better in the beginning of the study because my back started to hurt.""    Oxygenation:  No significant hypoxemia was observed     Impression:  -obstructive sleep apnea  -successful PAP titration study  -rebound amounts of N3 was seen    Recommendations:  -initial auto-CPAP settings CPAP min = 12 cwp  and CPAP max =  16 cwp  -if the patient complains of sleep disruption, if possible, CPAP min should be adjusted to 14cwp or higher to control events in stage REM sleep   -the patient has follow up with Sleep Medicine        Harjinder Ariza MD    (This Sleep Study was interpreted by a Board Certified Sleep Specialist who conducted an epoch-by-epoch review of the entire raw data recording.)  (The indication for this sleep study was reviewed and deemed appropriate by [facility] Practice Parameters or other reasons by a Board Certified Sleep Specialist.)        [facility]     Titration Report     Patient Name: Kirill Gandhi Study Date: 4/2/2025   YOB: 1975 MRN #: [id number]   Age: 49 year KEITH #: -   Sex: Male Referring Provider: Eva Merlos   Height: 6' Recording Tech: Michael Broussard RPSGT   Weight: 476.0 lbs Scoring Tech: Francisco Leblanc RRT RPSGT   BMI: 65.2 Interpreting Physician: -   ESS: - Neck Circumference: -      Study Overview     Lights Off: 09:09:26 PM   Count Index   Lights " On: 04:35:43 AM Awakenings: 22 3.2   Time in Bed: 446.3 min. Arousals: 31 4.6   Total Sleep Time: 406.5 min. Apneas & Hypopneas: 8 1.2    Sleep Efficiency: 91.1% Limb Movements: 11 1.6   Sleep Latency: 2.7 min. Snores: - -   Wake After Sleep Onset: 37.0 min. Desaturations: 20 3.0    REM Latency from Sleep Onset: 113.5 min. Minimum SpO2 TST: 88.0%       Sleep Architecture                                                                                                                           % of Time in Bed     Stages Time (mins) % Sleep Time   Wake 40.5     Stage N1 9.5 2.3%   Stage N2 192.5 47.4%   Stage N3 140.0 34.4%   REM 64.5 15.9%         Arousal Summary       NREM REM Sleep Index   Respiratory Arousals 3 - 3 0.4   PLM Arousals 1 - 1 0.1   Isolated Limb Movement Arousals - - - -   Spontaneous Arousals 25 2 27 4.0   Total 29 2 31 4.6         Limb Movement Summary       Count Index   Isolated Limb Movements - -   Periodic Limb Movements (PLMs) 11 1.6   Total Limb Movements 11 1.6       Respiratory Summary       By Sleep Stage By Body Position Total    NREM REM Supine Non-Supine    Time (min) 342.0 64.5 349.5 57.0 406.5                 Obstructive Apnea - - - - -   Mixed Apnea - - - - -   Central Apnea - - - - -   Central Apnea Index - - - - -   Total Apneas - - - - -   Total Apnea Index - - - - -                 Total Hypopnea 6 2 7 1 8   Total Hypopnea Index 1.1 1.9 1.2 1.1 1.2                 Apnea & Hypopnea 6 2 7 1 8   Apnea & Hypopnea Index 1.1 1.9 1.2 1.1 1.2                 RERAs 1 - 1 - 1   RERA Index 0.2 - 0.2 - 0.1                 RDI 1.2 1.9 1.4 1.1 1.3      Scoring Criteria: Hypopneas scored at 4% desaturation criteria.     Respiratory Event Durations       Apnea Hypopnea    NREM REM NREM REM   Average (seconds) - - 14.7 10.9   Maximum (seconds) - - 21.7 11.7         Oxygen Saturation Summary       Wake NREM REM TST Total   Average SpO2 96.4% 95.3% 94.7% 95.2% 95.3%   Minimum SpO2 81.0% 88.0% 90.0%  88.0% 81.0%   Maximum SpO2 99.0% 100.0% 96.0% 100.0% 100.0%      Oxygen Saturation Distribution     Range (%) Time in range (min) Time in range (%)    90.0 - 100.0 423.5 98.0%   80.0 - 90.0 4.5 1.0%   70.0 - 80.0 - -   60.0 - 70.0 - -   50.0 - 60.0 - -   0.0 - 50.0 - -   Time Spent <=88% SpO2     Range (%) Time in range (min) Time in range (%)   0.0 - 88.0 0.6 0.1%              Count Index   Desaturations 20 3.0           Cardiac Summary       Wake NREM REM Sleep Total   Average Pulse Rate (BPM) 79.2 77.4 75.4 77.1 77.2   Minimum Pulse Rate (BPM) 73.0 67.0 67.0 67.0 67.0   Maximum Pulse Rate (BPM) 89.0 84.0 81.0 84.0 89.0      Pulse Rate Distribution     Range (bpm) Time in range (min) Time in range (%)   0.0 - 40.0 - -   40.0 - 60.0 - -   60.0 - 80.0 396.9 91.8%   80.0 - 100.0 35.7 8.2%   100.0 - 120.0 - -   120.0 - 140.0 - -   140.0 - 200.0 - -      EtCO2 Summary     Stage Min (mmHg) Average (mmHg) Max (mmHg)   Wake - - -   NREM(1+2+3) - - -   REM - - -      Range (mmHg) Time in range (min) Time in range (%)   20.0 - 40.0 - -   40.0 - 50.0 - -   50.0 - 55.0 - -   55.0 - 100.0 - -   Excluded data <20.0 & >100.0 447.0 100.0%      TcCO2 Summary     Stage Min (mmHg) Average (mmHg) Max (mmHg)   Wake 39.2 46.5 52.1   NREM(1+2+3) 40.3 47.1 52.5   REM 44.9 46.4 48.8      Range (mmHg) Time in range (min) Time in range (%)   20.0 - 40.0 0.8 0.2%   40.0 - 50.0 384.2 85.9%   50.0 - 55.0 49.1 11.0%   55.0 - 100.0 - -   Excluded data <20.0 & >100.0 12.9 2.9%      Comments     -         Titration Summary     PAP Device PAP Level O2 Level Time (min) TST (min) NREM (min) REM (min) Wake (min) Sleep Eff% OA# CA# MA# Hyp# AHI RERA RDI Min SpO2 SpO2 <=88% (min) Ar. Index   CPAP 10 - 14.5 11.5 11.5 0.0 3.0 79.3% - - - - - - - 94.0  0.0 -   CPAP 11 - 5.5 5.5 5.5 0.0 0.0 100.0% - - - - - - - 96.0  0.0 10.9   CPAP 12 - 87.5 85.0 85.0 0.0 2.5 97.1% - - - 3 2.1 - 2.1 89.0  0.0 9.9   CPAP 13 - 11.5 11.5 9.0 2.5 0.0 100.0% - - - 3 15.7 - 15.7  90.0  0.0 -   CPAP 14 - 328.0 293.0 231.0 62.0 35.0 89.3% - - - 2 0.4 1 0.6 88.0  0.2 3.3

## 2025-04-05 PROCEDURE — 95811 POLYSOM 6/>YRS CPAP 4/> PARM: CPT | Mod: 26,,, | Performed by: INTERNAL MEDICINE

## 2025-04-07 DIAGNOSIS — R52 PAIN: ICD-10-CM

## 2025-04-07 DIAGNOSIS — F41.9 ANXIETY: ICD-10-CM

## 2025-04-07 DIAGNOSIS — E78.5 DYSLIPIDEMIA: ICD-10-CM

## 2025-04-07 RX ORDER — TRAMADOL HYDROCHLORIDE 50 MG/1
50 TABLET ORAL EVERY 6 HOURS PRN
Qty: 180 TABLET | Refills: 0 | OUTPATIENT
Start: 2025-04-07

## 2025-04-07 RX ORDER — TRAMADOL HYDROCHLORIDE 50 MG/1
100 TABLET ORAL EVERY 8 HOURS PRN
Qty: 180 TABLET | Refills: 2 | Status: SHIPPED | OUTPATIENT
Start: 2025-04-07

## 2025-04-07 RX ORDER — CYCLOBENZAPRINE HCL 10 MG
TABLET ORAL
Qty: 90 TABLET | Refills: 2 | Status: SHIPPED | OUTPATIENT
Start: 2025-04-07

## 2025-04-07 RX ORDER — PROPRANOLOL HYDROCHLORIDE 20 MG/1
TABLET ORAL
Qty: 90 TABLET | Refills: 0 | Status: SHIPPED | OUTPATIENT
Start: 2025-04-07

## 2025-04-07 RX ORDER — ATORVASTATIN CALCIUM 10 MG/1
10 TABLET, FILM COATED ORAL
Qty: 90 TABLET | Refills: 0 | Status: SHIPPED | OUTPATIENT
Start: 2025-04-07

## 2025-04-07 RX ORDER — INSULIN HUMAN 500 [IU]/ML
INJECTION, SOLUTION SUBCUTANEOUS
Qty: 6 PEN | Refills: 3 | Status: SHIPPED | OUTPATIENT
Start: 2025-04-07

## 2025-04-07 NOTE — TELEPHONE ENCOUNTER
No care due was identified.  [facility] Embedded Care Due Messages. Reference number: [id number].   4/07/2025 9:47:02 AM CDT

## 2025-04-07 NOTE — TELEPHONE ENCOUNTER
Care Due:                  Date            Visit Type   Department     Provider  --------------------------------------------------------------------------------                                ESTABLISHED                              PATIENT -    [facility]  Last Visit: 01-      VIRTUAL      MEDICINE       Marcos Ibarra                              EP -                              PRIMARY      [facility]  Next Visit: 04-      CARE ([facility])   MEDICINE       Marcos Ibarra                                                            Last  Test          Frequency    Reason                     Performed    Due Date  --------------------------------------------------------------------------------    HBA1C.......  6 months...  metFORMIN................  12- 06-    Lipid Panel.  12 months..  atorvastatin.............  06- 06-    Vitamin D...  12 months..  vitamin..................  Not Found    Overdue    [employer] Embedded Care Due Messages. Reference number: [id number].   4/07/2025 9:45:15 AM CDT

## 2025-04-08 ENCOUNTER — RESULTS FOLLOW-UP (OUTPATIENT)
Dept: SLEEP MEDICINE | Facility: CLINIC | Age: 50
End: 2025-04-08

## 2025-04-08 ENCOUNTER — PATIENT MESSAGE (OUTPATIENT)
Dept: NEUROLOGY | Facility: CLINIC | Age: 50
End: 2025-04-08
Payer: MEDICAID

## 2025-04-08 ENCOUNTER — PATIENT MESSAGE (OUTPATIENT)
Dept: SLEEP MEDICINE | Facility: CLINIC | Age: 50
End: 2025-04-08
Payer: MEDICAID

## 2025-04-08 RX ORDER — ATORVASTATIN CALCIUM 10 MG/1
10 TABLET, FILM COATED ORAL
Qty: 30 TABLET | OUTPATIENT
Start: 2025-04-08

## 2025-04-08 NOTE — TELEPHONE ENCOUNTER
Refill Decision Note   Kirill Gandhi  is requesting a refill authorization.  Brief Assessment and Rationale for Refill:  Quick Discontinue     Medication Therapy Plan:  Receipt confirmed by pharmacy (4/7/2025  8:41 PM CDT)    Medication Reconciliation Completed: No   Comments:     No Care Gaps recommended.     Note composed:10:47 AM 04/08/2025

## 2025-04-08 NOTE — TELEPHONE ENCOUNTER
Refill Routing Note   Medication(s) are not appropriate for processing by [facility] for the following reason(s):        Outside of protocol    [facility] action(s):  Approve  Route             Appointments  past 12m or future 3m with PCP    Date Provider   Last Visit   1/13/2025 Marcos Ibarra MD   Next Visit   4/17/2025 Marcos Ibarra MD   ED visits in past 90 days: 0        Note composed:8:41 PM 04/07/2025

## 2025-04-08 NOTE — TELEPHONE ENCOUNTER
No care due was identified.  [facility] Embedded Care Due Messages. Reference number: [id number].   4/07/2025 9:28:57 PM CDT

## 2025-04-10 ENCOUNTER — PATIENT MESSAGE (OUTPATIENT)
Dept: CARDIOLOGY | Facility: CLINIC | Age: 50
End: 2025-04-10
Payer: MEDICAID

## 2025-04-10 ENCOUNTER — OFFICE VISIT (OUTPATIENT)
Dept: CARDIOLOGY | Facility: CLINIC | Age: 50
End: 2025-04-10
Payer: MEDICAID

## 2025-04-10 VITALS
SYSTOLIC BLOOD PRESSURE: 149 MMHG | HEART RATE: 93 BPM | OXYGEN SATURATION: 93 % | WEIGHT: 315 LBS | DIASTOLIC BLOOD PRESSURE: 69 MMHG | BODY MASS INDEX: 42.66 KG/M2 | HEIGHT: 72 IN

## 2025-04-10 DIAGNOSIS — I20.2 REFRACTORY ANGINA PECTORIS: ICD-10-CM

## 2025-04-10 DIAGNOSIS — I89.0 LYMPHEDEMA: Primary | ICD-10-CM

## 2025-04-10 PROCEDURE — 99215 OFFICE O/P EST HI 40 MIN: CPT | Mod: PBBFAC,PO | Performed by: STUDENT IN AN ORGANIZED HEALTH CARE EDUCATION/TRAINING PROGRAM

## 2025-04-10 PROCEDURE — 99999 PR PBB SHADOW E&M-EST. PATIENT-LVL V: CPT | Mod: PBBFAC,,, | Performed by: STUDENT IN AN ORGANIZED HEALTH CARE EDUCATION/TRAINING PROGRAM

## 2025-04-10 RX ORDER — DIPHENHYDRAMINE HCL 50 MG
50 CAPSULE ORAL ONCE
OUTPATIENT
Start: 2025-04-10 | End: 2025-04-10

## 2025-04-10 RX ORDER — SODIUM CHLORIDE 0.9 % (FLUSH) 0.9 %
10 SYRINGE (ML) INJECTION
Status: SHIPPED | OUTPATIENT
Start: 2025-04-10

## 2025-04-10 RX ORDER — SODIUM CHLORIDE 9 MG/ML
INJECTION, SOLUTION INTRAVENOUS CONTINUOUS
OUTPATIENT
Start: 2025-04-10 | End: 2025-04-10

## 2025-04-10 NOTE — PROGRESS NOTES
"  Cardiology Clinic Visit    History of Present Illness:       Kirill Oneil Riggskhoa TOPETE is a pleasant 49 y.o. male with PMHx of anxiety, CTS, DM, VICENTE, HTN , obesity here for chest pressure. He had a PET stress was negative but concerning for severely reduced diffusely throughout the heart with decreased vasodilator stress or caffeine  or "possibly three vessel balanced ischemia but no CT calcification" I express that likely there is no significant blockage but is adamant and concerned about his chest pressure. Will proceed with coronary angiogram and rule out oCAD.     Fhx + GF 57 yo stents       3/25/2025: F/U CP, HOLMAN, leg swelling. Seen in clinic unaccompanied, reports overall doing pretty well. Ongoing LE edema despite diuretics. Still with intermittent palps. Still with intermittent CPs, PET stress -ve for ischemic etiology, reivewed with patient as below. Echo and 48-hour holter unremarkable. No new CV s/s. Compliant with CPAP. Reports compliance and tolerance with all medications.     2/4/2025: Previous patient of Dr. Blas as below, initial visit for me. 48 yo M with hx of HTN, HLD, DM2 (last A1C 5.7), GERD, palpitations, VICENTE, obesity present to clinic for evaluation of CP/HOLMAN/LE edema. Seen in clinic with mother, reports overall doing okay. Over the past few months has noted weight gain, worsening LE swelling, and new onset CP/HOLMAN. Reports HOLMAN has worsened over the past 1-2m. No significant orthopnea/PND; no resting SOB. Reports CP is random, reported as soreness/shocking, happening at random times. No associated exacerbating or relieving factors. Does not seem to be associated with exertion. Also endorses intermittent palpitations. No associated s/s. Denies syncope/presyncope, orthopnea, PND. Compliant with CPAP. Reports compliance and tolerance with all medications.    1/3/2023  Referred by Dr Sueedna DentBuckingham YOSELYN is a 46 y.o. male video visit for preoperative clearance revision of " surgical procedure and fusion of SI joint as well as diabetes follow-up.  Currently is without any cardiac symptoms such as chest pain palpitations shortness of breath PND orthopnea.  He has new respiratory illnesses.  No cough cold congestion fever chills nausea vomiting or urinary symptoms.  Diabetes-recently increased from 1.5-3 milligram Trulicity weekly.  Glucose readings have been running in the low 200s to high 106. Hemoglobin A1c was over 11 now 8.711 days ago-experiencing better control than previous.  Continues to struggle with appropriate diet-ingestion of a significant amount of carbohydrates.         CV Hx  ---------------------------------------------------------      PET Stress 3/2025    The myocardial perfusion images show no evidence of ischemia or scar.    The whole heart absolute myocardial perfusion values were normal at rest, moderately reduced during stress and CFR is moderately reduced.    CT attenuation images demonstrate no coronary calcifications and no aortic calcifications.    The stress flow is severely reduced diffusely throughout the heart consistent with decreased vasodilator stress or caffeine or possibly three vessel balanced ischemia, however no coronary calcifications visualized on CT scan making three vessel balanced ischemia less likely.    The gated perfusion images showed an ejection fraction of 69% at rest and 64% during stress. A normal ejection fraction is greater than 47%.    There is normal wall motion at rest and normal wall motion during stress.    The ECG portion of the study is abnormal but not diagnostic.      Echo 2/2025    Left Ventricle: The left ventricle is mildly dilated. Normal wall thickness. Normal wall motion. There is normal systolic function with a visually estimated ejection fraction of 60 - 65%. Quantitated ejection fraction is 71%. There is normal diastolic function.    Right Ventricle: The right ventricular cavity is at the upper limits of normal in  size. Systolic function is normal.    Left Atrium: Normal left atrial size.    Aortic Valve: The aortic valve is a trileaflet valve. There is no stenosis. Aortic valve peak velocity is 2.2 m/s. Mean gradient is 12 mmHg.  Elevated gradient across the LVOT rather than the aortic valve    Mitral Valve: The mean pressure gradient across the mitral valve is 3 mmHg at a heart rate of  bpm. There is trace regurgitation.    Aorta: Aortic root is normal in size measuring 2.64 cm.    Pulmonary Artery: The estimated pulmonary artery systolic pressure is 22 mmHg.    IVC/SVC: Normal venous pressure at 3 mmHg.    Pericardium: There is no pericardial effusion.    EKG 3/7/22 NSR NSSTT changes - anterolateral and inferior TWI - new from 2021     PET stress 4/8/22    There are no clinically significant perfusion abnormalities. There is a small to moderate (10-15%) amount of mild resting heterogeneity in the anterior and apical wall(s) that improves with stress consisent with endothelial dysfunction secondary to diabetes.    The whole heart absolute myocardial perfusion values averaged 0.49 cc/min/g at rest, which is reduced; 1.17 cc/min/g at stress, which is mildly reduced; and CFR is 2.41 , which is low normal.    CT attenuation images demonstrate no coronary calcifications and no aortic calcifications.    The gated perfusion images showed an ejection fraction of 58% at rest and 66% during stress. A normal ejection fraction is greater than 53%.    The wall motion is normal at rest and during stress.    The LV cavity size is normal at rest and stress.    The EKG portion of this study is negative for ischemia.    There were no arrhythmias during stress.    The patient reported no chest pain during the stress test.    There are no prior studies for comparison.        Stress echo 4/14/21   The stress echo portion of this study is negative for myocardial ischemia. Target heart rate was achieved.  The ECG portion of this study is negative  for myocardial ischemia.  There were no arrhythmias during stress.  The test was stopped because the patient experienced fatigue.  The patient's exercise capacity was below average. Achieved 8 METs.  The left ventricle is normal in size with normal systolic function. The estimated ejection fraction is 60%.  Normal left ventricular diastolic function.  Normal right ventricular size with low normal right ventricular systolic function.  The estimated PA systolic pressure is 35 mmHg.  Normal central venous pressure (3 mmHg).     Holter 4/12/21  Baseline rhythm was normal sinus rhythm with normal intervals and an average heart rate of 76 bpm.  There was one episode with reported symtpoms of nausea, tiredness, and fatigue. This episode corresponded to normal sinus rhythm.  There were no atrial or ventricular arrhythmias.       History obtained by patient interview and supplemented by nursing documentation and chart review.   PMHx:  has a past medical history of Allergy, Anxiety, Back pain, Chronic bilateral low back pain with bilateral sciatica (12/14/2021), CTS (carpal tunnel syndrome) (06/26/2015), CTS (carpal tunnel syndrome) (6/26/2015), Depression, Diabetes (04/17/2015), Diabetes mellitus with insulin therapy, Elevated sed rate (6/26/2015), GERD (gastroesophageal reflux disease) (10/19/2022), vasectomy, Hypertension, Morbid obesity (4/16/2015), OCD (obsessive compulsive disorder), Sleep apnea, and Type 2 diabetes mellitus with hyperglycemia, with long-term current use of insulin (10/17/2022).   SurgHx:  has a past surgical history that includes Tonsillectomy; Adenoidectomy; Fracture surgery; Hernia repair; Epidural steroid injection (Right, 09/04/2019); Vasectomy; Lumbar laminectomy (Bilateral, 11/29/2019); Fusion of lumbar spine by anterior approach (Left, 04/26/2021); Fusion of spine with instrumentation (N/A, 04/26/2021); Injection of steroid (Bilateral, 12/13/2021); Fusion of spine with instrumentation (N/A,  04/13/2022); Fusion of sacroiliac joint (Bilateral, 04/13/2022); removal implant, posterior segment, intraocular (04/13/2022); Spine surgery; Colonoscopy (N/A, 9/6/2022); Esophagogastroduodenoscopy (N/A, 10/31/2022); and Caudal epidural steroid injection (N/A, 10/30/2024).   FamHx: family history includes Cataracts in his maternal grandfather and maternal grandmother; Fibromyalgia in his mother; Macular degeneration in his maternal grandfather; Osteoarthritis in his mother.   SocialHx:  reports that he has quit smoking. His smoking use included vaping with nicotine. He has been exposed to tobacco smoke. He has never used smokeless tobacco. He reports that he does not currently use alcohol. He reports that he does not use drugs.  Home Meds:  Current Outpatient Medications   Medication Instructions    acetaminophen (TYLENOL) 1,000 mg, 2 times daily PRN    ARIPiprazole (ABILIFY) 5 mg, Oral, Daily    ascorbic acid (vitamin C) (VITAMIN C) 1,000 mg, Daily    aspirin (ECOTRIN) 81 mg, Nightly    aspirin/sod bicarb/citric acid (GIOVANY-SELTZER ORAL) 2 tablets, Daily PRN    atorvastatin (LIPITOR) 10 mg, Oral    bumetanide (BUMEX) 2 mg, Oral, Daily    buPROPion (WELLBUTRIN SR) 200 mg, Oral    busPIRone (BUSPAR) 30 mg, Oral, 2 times daily    ciclopirox (PENLAC) 8 % Soln Topical (Top), Nightly    coenzyme Q10 (CO Q-10) 100 mg, Daily    cyanocobalamin (VITAMIN B-12) 100 mcg, Daily    cyclobenzaprine (FLEXERIL) 10 MG tablet TAKE 1 TABLET BY MOUTH THREE TIMES A DAY AS NEEDED FOR MUSCLE SPASMS    dapagliflozin propanediol (FARXIGA) 10 mg, Oral, Daily    DEXCOM G6 SENSOR Cayla USE AS DIRECTED    DEXCOM G6 TRANSMITTER Cayla USE TO CHECK BLOOD SUGAR    dicyclomine (BENTYL) 20 mg, Oral, 3 times daily PRN    DULoxetine (CYMBALTA) 60 mg, Oral, 2 times daily    ferrous gluconate (FERGON) 324 mg, Oral, With breakfast    fluticasone propionate (FLONASE) 100 mcg, Each Nostril, Daily    gabapentin (NEURONTIN) 800 mg, Oral, 3 times daily    insulin  "regular hum U-500 conc (HUMULIN R U-500, CONC, KWIKPEN) 500 unit/mL (3 mL) insulin pen INJECT 120 UNITS WITH BREAKFAST, INJECT 130 UNITS WITH LUNCH, AND INJECT 150 UNITS WITH DINNER    insulin syringe-needle U-100 (BD INSULIN SYRINGE ULTRA-FINE) 1 mL 31 gauge x 5/16 Syrg To use 6 times daily with insulin    Lactobac no.41/Bifidobact no.7 (PROBIOTIC-10 ORAL) Take by mouth.    lanolin/mineral oil/petrolatum (ARTIFICIAL TEARS OPHT) 2 drops, Daily PRN    LIDOcaine (LIDODERM) 5 % 1 patch, Transdermal, Daily, Remove & Discard patch within 12 hours or as directed by MD    meloxicam (MOBIC) 15 MG tablet TAKE 1 TABLET BY MOUTH ONCE DAILY, DISCONTINUE CELEBREX    metFORMIN (GLUCOPHAGE-XR) 1,000 mg, Oral, 2 times daily with meals    metoclopramide HCl (REGLAN) 5 mg, Oral, Before meals & nightly    MOUNJARO 15 mg, Subcutaneous, Every 7 days    multivitamin capsule 1 capsule, Daily    NON FORMULARY MEDICATION Uses Cpap Machine nightly on a setting of 10    ondansetron (ZOFRAN-ODT) 4 mg, Oral, Every 8 hours PRN    ONETOUCH DELICA PLUS LANCET 33 gauge Misc 3 times daily    ONETOUCH VERIO TEST STRIPS Strp 1 strip, 3 times daily    pantoprazole (PROTONIX) 40 mg, Oral, Daily    pen needle, diabetic (BD ULTRA-FINE ORIG PEN NEEDLE) 29 gauge x 1/2" Ndle To use 3 needles daily    pen needle, diabetic 31 gauge x 1/4" Ndle Use with flex pen    pioglitazone (ACTOS) 30 mg, Oral, Daily    potassium chloride SA (K-DUR,KLOR-CON) 20 MEQ tablet 20 mEq, Oral, Every other day    propranoloL (INDERAL) 20 MG tablet TAKE 1 TABLET BY MOUTH THREE TIMES A DAY AS NEEDED FOR ANXIETY. MAY TAKE AN EXTRA TABLET AS NEEDED FOR ANXIETY    resmetirom 100 mg, Oral, Daily    traMADoL (ULTRAM) 100 mg, Oral, Every 8 hours PRN    traZODone (DESYREL) 100 mg, Oral, Nightly    valsartan-hydrochlorothiazide (DIOVAN-HCT) 320-12.5 mg per tablet 1 tablet, Oral, Daily    vitamin D (VITAMIN D3) 5,000 Units, 2 times daily    vitamin D (VITAMIN D3) 1,000 Units, Oral, Daily    " "ZEGALOGUE AUTOINJECTOR 0.6 mg/0.6 mL AtIn 1 Box , Subcutaneous, As needed (PRN), Glucagon is an emergency pen that is used to increase your blood sugar. Glucagon is a pen that is used in an emergency situation where you are unable to eat or drink anything. Glucagon is a medication that is given by someone else-spouse, child, etc.       Review of Systems: Comprehensive ROS was performed and is negative unless otherwise noted in HPI.   Objective   Objective/Exam:   BP (!) 149/69 (BP Location: Right forearm, Patient Position: Sitting)   Pulse 93   Ht 6' (1.829 m)   Wt (!) 222.4 kg (490 lb 4.9 oz)   SpO2 (!) 93%   BMI 66.50 kg/m²    Wt Readings from Last 4 Encounters:   04/10/25 (!) 222.4 kg (490 lb 4.9 oz)   03/26/25 (!) 215.9 kg (476 lb)   03/25/25 (!) 216.1 kg (476 lb 6.6 oz)   02/24/25 (!) 207.7 kg (457 lb 14.3 oz)      Constitutional: NAD, Atraumatic, Conversant   HEENT: MMM, Sclera anicteric, No JVD   Cardiovascular: RRR, no murmurs noted, no chest tenderness to palpation, 2+ radial pulses b/l  Pulmonary: normal respiratory effort, CTAB, no crackles, wheezes  Abdominal: S/NT/ND  Musculoskeletal: No lower extremity edema noted b/l  Neurological: No gross neurological deficits  Skin: W/D/I  Psych: Appropriate affect, normal mood  Labs/Imaging/Procedures   Personally reviewed  Lab Results   Component Value Date     02/28/2025    K 4.4 02/28/2025     02/28/2025    CO2 30 (H) 02/28/2025    BUN 26 (H) 02/28/2025    CREATININE 1.15 02/28/2025    ANIONGAP 4 (L) 02/28/2025     Lab Results   Component Value Date    HGBA1C 5.7 (H) 12/13/2024     No results found for: "BNP", "BNPTRIAGEBLO"   Lab Results   Component Value Date    WBC 7.76 02/28/2025    HGB 9.3 (L) 02/28/2025    HCT 31.0 (L) 02/28/2025    HCT 31 (L) 04/13/2022     02/28/2025    GRAN 5.0 02/28/2025    GRAN 64.3 02/28/2025     Lab Results   Component Value Date    CHOL 112 (L) 06/11/2024    HDL 28 (L) 06/11/2024    LDLCALC 40.6 (L) " "06/11/2024    LDLCALC 75.0 03/27/2023    LDLCALC 67.2 12/09/2021    TRIG 217 (H) 06/11/2024     Lab Results   Component Value Date    TSH 0.541 03/27/2023     No results found for: "APOLIPOPROTE"  No results found for: "LIPOA"     TTE:  Results for orders placed during the hospital encounter of 02/21/25    Echo    Interpretation Summary    Left Ventricle: The left ventricle is mildly dilated. Normal wall thickness. Normal wall motion. There is normal systolic function with a visually estimated ejection fraction of 60 - 65%. Quantitated ejection fraction is 71%. There is normal diastolic function.    Right Ventricle: The right ventricular cavity is at the upper limits of normal in size. Systolic function is normal.    Left Atrium: Normal left atrial size.    Aortic Valve: The aortic valve is a trileaflet valve. There is no stenosis. Aortic valve peak velocity is 2.2 m/s. Mean gradient is 12 mmHg.  Elevated gradient across the LVOT rather than the aortic valve    Mitral Valve: The mean pressure gradient across the mitral valve is 3 mmHg at a heart rate of  bpm. There is trace regurgitation.    Aorta: Aortic root is normal in size measuring 2.64 cm.    Pulmonary Artery: The estimated pulmonary artery systolic pressure is 22 mmHg.    IVC/SVC: Normal venous pressure at 3 mmHg.    Pericardium: There is no pericardial effusion.    Stress Testing:   No results found for this or any previous visit.     Coronary Angiogram:  Results for orders placed during the hospital encounter of 10/30/24    Pain Management    Narrative  Procedure performed in the Invasive Lab  - See Procedure Log link below for nursing documentation  - See OpNote on Surgeries Tab for physician findings  - See Imaging Tab for radiologist dictation    -Reviewing Medical records & lab results  -Independently reviewing and interpreting (if not documented by myself) EKGs, echocardiograms, catherizations   -Obtaining a history, performing a relevant exam, " counseling/educating the patient/family  -Documenting clinical information in the EMR including ordering of tests  -Care coordination and communicating with other health care providers       Problem List:     1. Lymphedema    2. Refractory angina pectoris      Assessment/Plan:       CP, HOLMAN  -Echo, PET, holter with negative studies. He continued to endorsed chest pressure. Will proceed with coronary angiogram  - continue vaslartan-HCTZ, farxiga as above for now -  monitor kidney function closely  -continue asa, statin for primary prevention given family/ PMH  -ER precautions given    2. LE edema, ?HFpEF  -chest x-ray -ve for pulmonary congestion, Echo without evidence of rEF, DD  -lungs CTA bilat  -LE edema seems localized, likely venous insufficiency  -conservative management for now - compression stockings, elevation, low salt diet, exercise, weight loss  -careful with long-term diuretics      2. Palpitations  -48-hour holter -ve for arrhythmic etiology  -if recurrent can consider PRN BBs vs 30d RAZ    3. HTN  -well controlled, continue regimen as above    4. HLD  -continue statin    5. Obesity  -low sodium, heart healthy diet; mod intensity exercise 30m/day 3-4x/wk; weight loss    6. Nicotine dependence  -quincy  -educated on importance of absolute cessation  -will refer to cessation team once ready to quit      Preventative Care:  Lipids:  PVD(V/A)      Patient expressed verbal understanding and agreed with the plan     Return sooner for concerns or questions. If symptoms persist go to the ED.  I have reviewed all pertinent data including patient's medical history in detail and updated the computerized patient record.     Total time spent counseling greater than fifty percent of total visit time.  Counseling included discussion regarding imaging findings, diagnosis, possibilities, treatment options, risks and benefits.      Thank you for the opportunity to care for this patient. Please don't hesitate to reach out  with any questions/concerns         Fernando Ferraro MD  Cardiovascular Disease; Interventional Cardiology  [facility]

## 2025-04-12 ENCOUNTER — PATIENT MESSAGE (OUTPATIENT)
Dept: CARDIOLOGY | Facility: CLINIC | Age: 50
End: 2025-04-12
Payer: MEDICAID

## 2025-04-12 DIAGNOSIS — R07.89 CHEST PAIN, ATYPICAL: Primary | ICD-10-CM

## 2025-04-12 DIAGNOSIS — I20.2 REFRACTORY ANGINA PECTORIS: ICD-10-CM

## 2025-04-15 RX ORDER — NITROGLYCERIN 0.4 MG/1
0.4 TABLET SUBLINGUAL EVERY 5 MIN PRN
Qty: 30 TABLET | Refills: 3 | Status: SHIPPED | OUTPATIENT
Start: 2025-04-15 | End: 2025-10-12

## 2025-04-17 ENCOUNTER — PATIENT MESSAGE (OUTPATIENT)
Dept: PAIN MEDICINE | Facility: CLINIC | Age: 50
End: 2025-04-17
Payer: MEDICAID

## 2025-04-17 ENCOUNTER — OFFICE VISIT (OUTPATIENT)
Dept: FAMILY MEDICINE | Facility: CLINIC | Age: 50
End: 2025-04-17
Payer: MEDICAID

## 2025-04-17 VITALS
HEIGHT: 72 IN | DIASTOLIC BLOOD PRESSURE: 64 MMHG | SYSTOLIC BLOOD PRESSURE: 120 MMHG | WEIGHT: 315 LBS | TEMPERATURE: 98 F | HEART RATE: 94 BPM | BODY MASS INDEX: 42.66 KG/M2 | OXYGEN SATURATION: 98 %

## 2025-04-17 DIAGNOSIS — Z79.4 TYPE 2 DIABETES MELLITUS WITHOUT COMPLICATION, WITH LONG-TERM CURRENT USE OF INSULIN: Primary | ICD-10-CM

## 2025-04-17 DIAGNOSIS — E11.9 TYPE 2 DIABETES MELLITUS WITHOUT COMPLICATION, WITH LONG-TERM CURRENT USE OF INSULIN: Primary | ICD-10-CM

## 2025-04-17 DIAGNOSIS — I10 ESSENTIAL HYPERTENSION: ICD-10-CM

## 2025-04-17 PROCEDURE — 1159F MED LIST DOCD IN RCRD: CPT | Mod: CPTII,S$GLB,, | Performed by: FAMILY MEDICINE

## 2025-04-17 PROCEDURE — G2211 COMPLEX E/M VISIT ADD ON: HCPCS | Mod: S$GLB,,, | Performed by: FAMILY MEDICINE

## 2025-04-17 PROCEDURE — 3074F SYST BP LT 130 MM HG: CPT | Mod: CPTII,S$GLB,, | Performed by: FAMILY MEDICINE

## 2025-04-17 PROCEDURE — 3078F DIAST BP <80 MM HG: CPT | Mod: CPTII,S$GLB,, | Performed by: FAMILY MEDICINE

## 2025-04-17 PROCEDURE — 1160F RVW MEDS BY RX/DR IN RCRD: CPT | Mod: CPTII,S$GLB,, | Performed by: FAMILY MEDICINE

## 2025-04-17 PROCEDURE — 99214 OFFICE O/P EST MOD 30 MIN: CPT | Mod: S$GLB,,, | Performed by: FAMILY MEDICINE

## 2025-04-17 PROCEDURE — 3072F LOW RISK FOR RETINOPATHY: CPT | Mod: CPTII,S$GLB,, | Performed by: FAMILY MEDICINE

## 2025-04-17 PROCEDURE — 3008F BODY MASS INDEX DOCD: CPT | Mod: CPTII,S$GLB,, | Performed by: FAMILY MEDICINE

## 2025-04-17 RX ORDER — DOXYCYCLINE HYCLATE 100 MG
100 TABLET ORAL 2 TIMES DAILY
Qty: 30 TABLET | Refills: 0 | Status: SHIPPED | OUTPATIENT
Start: 2025-04-17

## 2025-04-21 NOTE — PROGRESS NOTES
Patient ID: Kirill Gandhi III is a 49 y.o. male.    Chief Complaint: Follow-up and Low-back Pain    HPI    Kirill Gandhi III is a 49 y.o. male     History of Present Illness    CHIEF COMPLAINT:  Patient presents today for follow up    CARDIAC:  He reports experiencing chest pressure. He recently saw Dr. Ferraro, a cardiologist, at [place]. An angiogram has been scheduled to evaluate the cardiac symptoms further.    GI CONCERNS:  He has an upcoming gastroenterologist appointment scheduled for June. He has a history of positive occult blood test with colonoscopy and endoscopy with biopsy performed within the past 5 years. He reports loose stools that correlate with increased salad consumption.    EDEMA:  He reports improvement in fluid retention but continues to experience persistent symptoms. While putting on shoes has become easier, indicating some reduction in swelling, he still experiences difficulty with this task.    SKIN:  He has a current abscess between the buttocks near the perianal region. He reports history of two resolved abscesses on the inner thigh fold.    MEDICATIONS:  Current medications include Margero, Metformin, Actos, and insulin. Recent insulin dosing changes from previous regimen of 150 units for breakfast, 140 units for lunch, and 130 units for dinner to current doses of 190 units and 80 units.         Vitals:    04/17/25 1045   BP: 120/64   BP Location: Left arm   Patient Position: Sitting   Pulse: 94   Temp: 98.2 °F (36.8 °C)   TempSrc: Oral   SpO2: 98%   Weight: (!) 214.6 kg (473 lb 1.7 oz)   Height: 6' (1.829 m)            Review of Symptoms      Physical Exam    Constitutional:  Oriented to person, place, and time.appears well-developed and well-nourished.  No distress.      HENT  Head: Normocephalic and atraumatic  Right Ear: External ear normal.   Left Ear: External ear normal.   Nose: External nose normal.   Mouth:  Moist mucus membranes.    Eyes:  Conjunctivae  "are normal. Right eye exhibits no discharge.  Left eye exhibits no discharge. No scleral icterus.  No periorbital edema    Cardiovascular:  Regular rate and rhythm with normal S1 and S2     Pulmonary/Chest:   Clear to auscultation bilaterally without wheezes, rhonchi or rales      Musculoskeletal:  3+ edema bilateral lower extremity. No obvious deformity No wasting       Neurological:  Alert and oriented to person, place, and time.   Coordination normal.     Skin:   Skin is warm and dry.  No diaphoresis.   No rash noted.     Psychiatric: Normal mood and affect. Behavior is normal.  Judgment and thought content normal.     Physical Exam              Complete Blood Count  Lab Results   Component Value Date    RBC 3.47 (L) 02/28/2025    HGB 9.3 (L) 02/28/2025    HCT 31.0 (L) 02/28/2025    MCV 89 02/28/2025    MCH 26.8 (L) 02/28/2025    MCHC 30.0 (L) 02/28/2025    RDW 15.9 (H) 02/28/2025     02/28/2025    MPV 9.7 02/28/2025    GRAN 5.0 02/28/2025    GRAN 64.3 02/28/2025    LYMPH 1.5 02/28/2025    LYMPH 18.8 02/28/2025    MONO 0.6 02/28/2025    MONO 7.9 02/28/2025    EOS 0.6 (H) 02/28/2025    BASO 0.03 02/28/2025    EOSINOPHIL 8.1 (H) 02/28/2025    BASOPHIL 0.4 02/28/2025    DIFFMETHOD Automated 02/28/2025       Comprehensive Metabolic Panel  Lab Results   Component Value Date     (H) 02/28/2025    BUN 26 (H) 02/28/2025    CREATININE 1.15 02/28/2025     02/28/2025    K 4.4 02/28/2025     02/28/2025    PROT 7.3 01/03/2025    ALBUMIN 3.8 01/03/2025    BILITOT 0.3 01/03/2025    AST 26 01/03/2025    ALKPHOS 68 01/03/2025    CO2 30 (H) 02/28/2025    ALT 22 01/03/2025    ANIONGAP 4 (L) 02/28/2025       TSH  No results found for: "TSH"    Assessment / Plan:      ICD-10-CM ICD-9-CM   1. Type 2 diabetes mellitus without complication, with long-term current use of insulin  E11.9 250.00    Z79.4 V58.67   2. Essential hypertension  I10 401.9     Type 2 diabetes mellitus without complication, with long-term " current use of insulin  -     Comprehensive Metabolic Panel; Future  -     CBC Auto Differential; Future  -     Lipid Panel; Future  -     TSH; Future    Essential hypertension  -     Comprehensive Metabolic Panel; Future  -     CBC Auto Differential; Future  -     Lipid Panel; Future  -     TSH; Future    Other orders  -     doxycycline (VIBRA-TABS) 100 MG tablet; Take 1 tablet (100 mg total) by mouth 2 (two) times daily. Any generic brand capsule or tablet  Dispense: 30 tablet; Refill: 0        Assessment & Plan    - Assessed recent gastroenterology referral and upcoming appointment.  - Evaluated recent cardiology consultation with Dr. Ferraro, who recommended angiogram due to chest pressure symptoms.  - Determined fluid retention issues likely due to lymphedema or dependent edema rather than heart failure.  - Assessed rectal bleeding as likely benign origin (possibly diverticular, hemorrhoidal, or arteriovenous malformations), but recommended further investigation.  - Started doxycycline for 2 weeks for buttock abscess near perianal area.  - Identified weight gain as related to diabetes management and insulin use, explaining how insulin can contribute to weight gain.    TYPE 2 DIABETES MELLITUS:  - Noted that the patient's diabetes is under control, but weight gain is a concern.  - Observed improvement in the patient's A1C, indicating better diabetes control.  - Discussed the relationship between insulin use, weight gain, and diabetes control with the patient.  - Continued multiple medications for diabetes management, including Margero (insulin), Metformin, and Actos (pioglitazone).  - Adjusted insulin dosage: decreased from 150 units for breakfast, 140 for lunch, and 130 for dinner to 190 and 80 units.  - Confirmed the patient's current use of insulin (Margero) for diabetes management.  - Adjusted insulin dosage: from 150 units for breakfast, 140 for lunch, and 130 for dinner to 190 and 80 units.  - Confirmed  the patient's current use of oral hypoglycemic drugs including Metformin and Actos (pioglitazone).  - Continued Metformin and Actos (pioglitazone) for diabetes management.  - Patient to continue efforts to lose weight to help manage fluid retention and diabetes.    CHEST PAIN:  - Noted that the patient reports experiencing pressure in the chest.  - Discussed the importance of investigating chest pain to rule out coronary artery disease and heart failure.  - Referred the patient for an angiogram to investigate the chest pressure.    LOCALIZED EDEMA:  - Noted improvement in fluid retention, but the patient still has difficulty putting on shoes.  - Assessed the edema as lymphedema or dependent edema, not related to heart failure.  - Explained the mechanism of fluid retention in legs, describing the role of the lymphatic system and veins in fluid drainage.  - Recommend reducing excessive water intake throughout the day to manage fluid retention.  - Advised incorporating more physical activity, specifically walking and moving muscles, to improve fluid drainage in legs.    Gluteal ABSCESS:  - Noted that the patient reports a knot under the skin near the anus that forms and pops like a pimple.  - Assessed the condition as an abscess.  - Prescribed doxycycline for 2 weeks for buttock abscess near perianal area.    DIARRHEA:  - Noted that the patient reports loose stools associated with increased salad consumption.  - Prescribed fiber supplement (Fibercon) as needed to bulk up stools.    PERSONAL HISTORY AND FOLLOW-UP:  - Ordered lab work for June.  - Noted that the patient had a colonoscopy and upper endoscopy less than 5 years ago, with a sample sent for biopsy.  - Acknowledged the need for follow-up colonoscopy due to positive occult blood test, despite recent procedures.  - Recommend a follow-up colonoscopy to investigate the source of occult blood.  - Follow up in 3 months for video visit.  - Follow up in 6 months for  face-to-face office visit for pain medication management.  - Contact the office if lab results indicate something important.         This note was generated with the assistance of ambient listening technology. Verbal consent was obtained by the patient and accompanying visitor(s) for the recording of patient appointment to facilitate this note. I attest to having reviewed and edited the generated note for accuracy, though some syntax or spelling errors may persist. Please contact the author of this note for any clarification.

## 2025-05-03 DIAGNOSIS — F41.9 ANXIETY: ICD-10-CM

## 2025-05-03 DIAGNOSIS — F42.2 MIXED OBSESSIONAL THOUGHTS AND ACTS: ICD-10-CM

## 2025-05-03 DIAGNOSIS — F33.42 RECURRENT MAJOR DEPRESSIVE DISORDER, IN FULL REMISSION: ICD-10-CM

## 2025-05-03 NOTE — TELEPHONE ENCOUNTER
No care due was identified.  [facility] Embedded Care Due Messages. Reference number: [id number].   5/03/2025 3:18:07 PM CDT

## 2025-05-04 RX ORDER — ATORVASTATIN CALCIUM 10 MG/1
10 TABLET, FILM COATED ORAL
Qty: 90 TABLET | Refills: 0 | Status: SHIPPED | OUTPATIENT
Start: 2025-05-04

## 2025-05-04 NOTE — TELEPHONE ENCOUNTER
Refill Decision Note   Kirill Gandih  is requesting a refill authorization.  Brief Assessment and Rationale for Refill:  Approve     Medication Therapy Plan:         Comments:     Note composed:1:02 PM 05/04/2025             Appointments     Last Visit   4/17/2025 Marcos Ibarra MD   Next Visit   7/17/2025 Marcos Ibarra MD

## 2025-05-05 ENCOUNTER — PATIENT MESSAGE (OUTPATIENT)
Dept: PSYCHIATRY | Facility: CLINIC | Age: 50
End: 2025-05-05
Payer: MEDICAID

## 2025-05-05 ENCOUNTER — CLINICAL SUPPORT (OUTPATIENT)
Dept: REHABILITATION | Facility: HOSPITAL | Age: 50
End: 2025-05-05
Payer: MEDICAID

## 2025-05-05 DIAGNOSIS — I89.0 LYMPHEDEMA OF BOTH LOWER EXTREMITIES: ICD-10-CM

## 2025-05-05 DIAGNOSIS — I89.0 LYMPHEDEMA: ICD-10-CM

## 2025-05-05 DIAGNOSIS — R60.0 LEG EDEMA: Primary | ICD-10-CM

## 2025-05-05 DIAGNOSIS — I89.0 LYMPHEDEMA: Primary | ICD-10-CM

## 2025-05-05 PROCEDURE — 97165 OT EVAL LOW COMPLEX 30 MIN: CPT

## 2025-05-05 PROCEDURE — 97530 THERAPEUTIC ACTIVITIES: CPT

## 2025-05-05 RX ORDER — PROPRANOLOL HYDROCHLORIDE 20 MG/1
TABLET ORAL
Qty: 90 TABLET | Refills: 0 | Status: SHIPPED | OUTPATIENT
Start: 2025-05-05

## 2025-05-05 RX ORDER — BUPROPION HYDROCHLORIDE 100 MG/1
200 TABLET, EXTENDED RELEASE ORAL DAILY
Qty: 60 TABLET | Refills: 0 | Status: SHIPPED | OUTPATIENT
Start: 2025-05-05 | End: 2025-06-04

## 2025-05-05 RX ORDER — DULOXETIN HYDROCHLORIDE 60 MG/1
60 CAPSULE, DELAYED RELEASE ORAL 2 TIMES DAILY
Qty: 60 CAPSULE | Refills: 0 | Status: SHIPPED | OUTPATIENT
Start: 2025-05-05 | End: 2025-06-04

## 2025-05-05 NOTE — PROGRESS NOTES
Outpatient Rehab    Occupational Therapy Evaluation    Patient Name: Kirill Gandhi III  MRN: [id number]  YOB: 1975  Encounter Date: 5/5/2025    Therapy Diagnosis:   Encounter Diagnoses   Name Primary?    Lymphedema     Leg edema Yes    Lymphedema of both lower extremities      Physician: Fernando Roberts MD    Physician Orders: Eval and Treat  Medical Diagnosis: Lymphedema    Visit # / Visits Authorized: 1 / 1  Insurance Authorization Period: 4/10/2025 to 4/10/2026  Date of Evaluation: 5/5/2025  Plan of Care Certification: 5/5/2025 to 7/26/2025     Time In: 0908   Time Out: 1000  Total Time (in minutes): 52   Total Billable Time (in minutes):  52    Intake Outcome Measure for FOTO Survey    Therapist reviewed FOTO scores for Kirill Gandhi III on 5/5/2025.   FOTO report - see Media section or FOTO account episode details.          Subjective   History of Present Illness  Kirill is a 49 y.o. male who reports to occupational therapy with a chief concern of Swelling of BLE.     The patient reports a medical diagnosis of Lymphedema (I89.0).            History of Present Condition/Illness: Swelling developed in RLE and LLE spontaneously approximately 3 months ago. Denies hx of cellulitis, DVT, injury, trauma, cancer, or other identifiable cause. Patient currently does not wear compression. Denies alleviating or worsening factors. Associated symptoms include limb heaviness, pain, improper fit of clothing, and improper fit of shoes. Patient has never participated in lymphedema therapy.      Additional Lymphedema History  The patient's medical history relevant to lymphedema includes Body mass index >25 at time of diagnosis and Abdominal surgery.    Back surgery with anterior approach, hernia repair.    Activities of Daily Living  Social history was obtained from Patient.               Previously independent with activities of daily living? No  Activities previously needing assistance  include Dressing - lower body and Bathing.   Currently independent with activities of daily living? No  Activities currently needing assistance include Bathing and Dressing - lower body.        Previously independent with instrumental activities of daily living? No  Activities previously needing assistance include: Meal prep.   Currently independent with instrumental activities of daily living? No  Activities currently needing assistance include: Meal prep.            Pain     Patient reports a current pain level of 0/10. Pain at best is reported as 0/10. Pain at worst is reported as 8/10.   Location: BLE- above ankles  Clinical Progression (since onset): Stable  Pain Qualities: Burning         Living Arrangements  Living Situation  Housing: Home independently  Living Arrangements: Family members    Equipment/Treatments  Mobility Equipment: Straight cane        Employment  Employment Status: On disability          Past Medical History/Physical Systems Review:   Kirill Gandhi III  has a past medical history of Allergy, Anxiety, Back pain, Chronic bilateral low back pain with bilateral sciatica, CTS (carpal tunnel syndrome), CTS (carpal tunnel syndrome), Depression, Diabetes, Diabetes mellitus with insulin therapy, Elevated sed rate, GERD (gastroesophageal reflux disease), vasectomy, Hypertension, Morbid obesity, OCD (obsessive compulsive disorder), Sleep apnea, and Type 2 diabetes mellitus with hyperglycemia, with long-term current use of insulin.    Kirill Gandhi III  has a past surgical history that includes Tonsillectomy; Adenoidectomy; Fracture surgery; Hernia repair; Epidural steroid injection (Right, 09/04/2019); Vasectomy; Lumbar laminectomy (Bilateral, 11/29/2019); Fusion of lumbar spine by anterior approach (Left, 04/26/2021); Fusion of spine with instrumentation (N/A, 04/26/2021); Injection of steroid (Bilateral, 12/13/2021); Fusion of spine with instrumentation (N/A, 04/13/2022); Fusion  of sacroiliac joint (Bilateral, 04/13/2022); removal implant, posterior segment, intraocular (04/13/2022); Spine surgery; Colonoscopy (N/A, 9/6/2022); Esophagogastroduodenoscopy (N/A, 10/31/2022); and Caudal epidural steroid injection (N/A, 10/30/2024).    Kirill has a current medication list which includes the following prescription(s): acetaminophen, aripiprazole, ascorbic acid (vitamin c), aspirin, aspirin/sod bicarb/citric acid, atorvastatin, bumetanide, bupropion, buspirone, ciclopirox, coenzyme q10, cyanocobalamin, cyclobenzaprine, dapagliflozin propanediol, dexcom g6 sensor, dexcom g6 transmitter, dicyclomine, doxycycline, duloxetine, ferrous gluconate, fluticasone propionate, gabapentin, humulin r u-500 (conc) kwikpen, insulin syringe-needle u-100, lactobacillus no.46/b.animalis, lanolin/mineral oil/petrolatum, lidocaine, meloxicam, metformin, metoclopramide hcl, multivitamin, nitroglycerin, NON FORMULARY MEDICATION, ondansetron, onetouch delica plus lancet, onetouch verio test strips, pantoprazole, pen needle, diabetic, pen needle, diabetic, pioglitazone, potassium chloride sa, propranolol, resmetirom, mounjaro, tramadol, trazodone, valsartan-hydrochlorothiazide, vitamin d, vitamin d, zegalogue autoinjector, and [DISCONTINUED] fluvoxamine, and the following Facility-Administered Medications: perflutren protein-a microsphr and sodium chloride 0.9%.    Review of patient's allergies indicates:   Allergen Reactions    Dilaudid [hydromorphone] Other (See Comments)     IV dilaudid severely drops BP        Objective   Lower Extremity Skin Observations  Right Lower Extremity Skin Observations  Right Lower Extremity Edema Non-pitting or Pitting: Pitting  Right Lower Extremity Pitting Edema Severity: Moderate pitting, indentation lasts between 15 sec and 30 sec  Right Lower Extremity Skin Temperature: Normal  Right Lower Extremity Stemmer's Sign: No    Left Lower Extremity Skin Observations  Left Lower Extremity Edema  Non-pitting or Pitting: Pitting  Left Lower Extremity Pitting Edema Severity: Moderate pitting, indentation lasts between 15 sec and 30 sec  Left Lower Extremity Skin Temperature: Normal  Left Lower Extremity Stemmer's Sign: No           Lower extremity Girth Measurements (in centimeters): taken in supine  [place] Left lower extremity  Date: 5/5/25 Right lower extremity   Date: 5/5/25   Metatarsal Head 29.0 cm 29.5 cm   Medial Malleolus 36.5 cm 34.0 cm   (+) 5cm 37.0 cm 35.5 cm   (+) 10cm 42.0 cm 41.5 cm   (+) 20cm 56.0 cm 54.0 cm   (+) 30cm 52.5 cm 54.5 cm       Picture of BLE, taken in supine, via Epic Haiku on therapist's cell phone with verbal consent from patient:          Treatment:  Therapeutic Activity  TA 1: see education section.  TA 2: SEQUENTIAL COMPRESSION PUMP: full leg sleeve applied to RLE and LLE Airos 6 with default setting with distal pressures starting at 45mmHg entire extremity simultaneous to education.  Self Care and ADLs  ADL 1: See education section.  Other Activities  Activity 1: SEQUENTIAL COMPRESSION PUMP: full leg sleeve applied to RLE and LLE Airos 6 with default setting with distal pressures starting at 45mmHg entire extremity simultaneous to education.    Time Entry(in minutes):  OT Evaluation (Low) Time Entry: 40  Therapeutic Activity Time Entry: 12    Assessment & Plan   Assessment  Kirill presents with a condition of Low complexity.   Presentation of Symptoms: Stable  Will Comorbidities Impact Care: Yes  Multiple surgeries, chronic back pain, DM2    ADL Limitations : Bathing/showering, Dressing  IADL Limitations: Health management and maintenance  Functional Limitations: Sitting tolerance, Standing tolerance                 Evaluation/Treatment Response: Patient responded to treatment well  Prognosis: Good  Assessment Details: Pt presents with stage 2 lymphedema of RLE and LLE. Anticipate great reductions with complete decongestive therapy course. In addition to in therapy manual  lymphatic drainage, pneumatic pump, and multi-layer bandaging, recommending daytime compression, home pneumatic pump twice per day, exercise, and elevation, in addition to complete decongestive therapy. Pt is amenable to POC.      Plan  From an occupational therapy perspective, the patient would benefit from: Skilled Rehab Services    Planned therapy interventions include: Therapeutic exercise, Therapeutic activities, Manual therapy, ADLs/IADLs, and Lymphatic compression wrapping.            Visit Frequency: 2 times Per Week for 4 Weeks.       This plan was discussed with Patient.   Discussion participants: Agreed Upon Plan of Care  Plan details: Pt has stage 2 lymphedema secondary to unclear etiology, ? Cardiac history, obesity, and is in the active (phase 1) stage of treatment. A compression garment is required to reduce and maintain limb girth and prevent progression of lymphedema to prevent infections, wounds, pain, and orthopedic issues. Pt is unable to independently eduardo traditional compression stockings/sleeve due to decreased strength, ROM, and large body habitus, therefore, a gradient compression wrap with adjustable straps is required for home management.  Refill is present AM and PM, therefore, a gradient compression garment for nighttime use is also required for home management.  Patient has tried ready to wear garments but refill and swelling persists.           Patient's spiritual, cultural, and educational needs considered and patient agreeable to plan of care and goals.     Education  Education was done with Patient. The patient's learning style includes Demonstration and Listening. The patient Demonstrates understanding and Verbalizes understanding.          Patient was educated in  Compression needs: 20-30 mm hg knee highs vs Velcro wraps pending progress. Home management plan: Wear schedule: Eduardo first thing in the morning, remove at the end of the day as close to bedtime as possible. Do not sleep  in garments. If using a home pneumatic pump, remove garments when using pump. If it is not yet bedtime, resume wearing garments until bed. Donning/doffing techniques Wash and management of products Cost/coverage of compression garments: Medicare now pays for compression. Private insurance coverage is on a case-by-case basis. In order to determine coverage an Rx with a diagnosis of lymphedema is sent to a participating DME for a benefits check.  Edema wear as temporary compression garment until able to secure long term compression needs Commitment to attendance and securing compression needs are critical to lymphedema management Pneumatic pump benefits, set-up, use, referral process, coverage through insurance. Coverage is based on insurance plan as well as if deductible has been met. Pneumatic pump is an added tool for managing lymphedema at home and is meant to be used in addition to wearing compression garments daily but does not substitute compression garments.  Seek new referral if edema status worsens or changes in the future despite compliance with home management techniques.  Monitor for signs/symptoms of infection and seek medical attention immediately if symptoms occur.         Goals:   Active       Long Term Goals       Patient and/or caregiver to alex/doff compression garment independently and with daily compliance to assist in lymphedema management, skin elasticity, and tissue density        Start:  05/05/25    Expected End:  06/16/25            Patient and/or caregiver will be independent in use of pneumatic compression pump or self manual lymphatic drainage techniques to areas within reach to enhance lymphatic drainage and skin condition.         Start:  05/05/25    Expected End:  06/16/25            Patient to be independent and compliant with home exercise program to allow for increased function in affected limb.        Start:  05/05/25    Expected End:  06/16/25            Patient will show  decreased total girth measurement in BLE by up to 4 cm to allow for lower extremity symmetry, shoe and clothing choice, and ability to apply needed compression daily.        Start:  05/05/25    Expected End:  06/16/25               Short Term Goals       Patient will demonstrate 100% knowledge of lymphedema precautions and signs of infection to allow for reduced lymphedema risk, infection risk, and/or exacerbation of condition.         Start:  05/05/25    Expected End:  05/26/25            Patient will tolerate daily activities wearing multilayered bandaging to allow for lymphatic drainage support, skin elasticity, and reduction in shape and size of limb.         Start:  05/05/25    Expected End:  05/26/25            Patient and/or caregiver will order/obtain appropriate compression garments to maintain lymphatic and venous support.         Start:  05/05/25    Expected End:  05/26/25            Patient will show decreased total girth measurement in BLE by up to 2 cm to allow for lower extremity symmetry, shoe and clothing choice, and ability to apply needed compression.        Start:  05/05/25    Expected End:  05/26/25                Sunshine Mera OT

## 2025-05-07 ENCOUNTER — RESULTS FOLLOW-UP (OUTPATIENT)
Dept: PODIATRY | Facility: CLINIC | Age: 50
End: 2025-05-07

## 2025-05-13 ENCOUNTER — PATIENT MESSAGE (OUTPATIENT)
Dept: PAIN MEDICINE | Facility: CLINIC | Age: 50
End: 2025-05-13
Payer: MEDICAID

## 2025-05-14 ENCOUNTER — PROCEDURE VISIT (OUTPATIENT)
Dept: PAIN MEDICINE | Facility: CLINIC | Age: 50
End: 2025-05-14
Payer: MEDICAID

## 2025-05-14 VITALS — WEIGHT: 315 LBS | BODY MASS INDEX: 42.66 KG/M2 | HEIGHT: 72 IN

## 2025-05-14 DIAGNOSIS — E11.40 DIABETIC NEUROPATHY, PAINFUL: Primary | ICD-10-CM

## 2025-05-14 PROCEDURE — 99999PBSHW PR PBB SHADOW TECHNICAL ONLY FILED TO HB: Mod: JZ,PBBFAC,,

## 2025-05-14 PROCEDURE — 64999 UNLISTED PX NERVOUS SYSTEM: CPT | Mod: PBBFAC,PN | Performed by: ANESTHESIOLOGY

## 2025-05-14 NOTE — PROCEDURES
PROCEDURE: Qutenza (Capsaicin 8% topical system) - bilateral Feet    PATIENT NAME: Kirill Gandhi III   MRN: [id number]     DATE OF PROCEDURE: 05/14/2025    Diagnosis:  Diabetes mellitus due to underlying condition with diabetic neuropathy    Patch # 4 this year.    Postprocedural Diagnosis: Same    Complications: None  Outcome: Good    Informed Consent:  The patient's condition and proposed procedures, risks (including complications of nerve damage, skin irritation, infection, and failure of pain relief), and alternatives were discussed with the patient or responsible party.  The patient's/responsible party's questions were answered.  The patient/responsible party appeared to understand and chose to proceed.  Informed consent was obtained.    Procedural Pause:  A procedural pause verifying correct patient, medical record number, allergies, medications to be administered, current vital signs, and surgical site was performed immediately prior to beginning the procedure.    Procedure in Detail:  The procedure was performed in the procedure treatment room.  After obtaining written consent, the patient was placed in a supine position. 6 patches were used for the procedure today.  The patient applied 4% lidocaine ointment to the area 1 hour prior to treatment., and the target area was outlined with a marker.  The patch(es) were applied to the target area and secured with tape.  The patient was allowed to rest in a comfortable position, and monitored by staff every few minutes to ensure comfort.  The option for ice pack was provided to the patient should they start experiencing burning.  The patch(es) remained in place for 60 minutes.  The patch(es) were then removed and the cleaning gel was applied and allowed to sit for an additional 60 seconds, after which the area was wiped clean.     The patient was then discharged in stable condition.    Note Electronically Signed By:  Clark Smith  05/14/2025

## 2025-05-15 ENCOUNTER — CLINICAL SUPPORT (OUTPATIENT)
Dept: REHABILITATION | Facility: HOSPITAL | Age: 50
End: 2025-05-15
Payer: MEDICAID

## 2025-05-15 DIAGNOSIS — I89.0 LYMPHEDEMA OF BOTH LOWER EXTREMITIES: Primary | ICD-10-CM

## 2025-05-15 DIAGNOSIS — I89.0 LYMPHEDEMA: Primary | ICD-10-CM

## 2025-05-15 PROCEDURE — 97530 THERAPEUTIC ACTIVITIES: CPT

## 2025-05-15 NOTE — PROGRESS NOTES
Outpatient Rehab    Occupational Therapy Visit    Patient Name: Kirill Gandhi III  MRN: [id number]  YOB: 1975  Encounter Date: 5/15/2025    Therapy Diagnosis:   Encounter Diagnosis   Name Primary?    Lymphedema of both lower extremities Yes     Physician: Fernando Roberts MD    Physician Orders: Eval and Treat  Medical Diagnosis: Lymphedema, not elsewhere classified    Visit # / Visits Authorized: 1 / 12  Insurance Authorization Period: 5/15/2025 to 7/14/2025  Date of Evaluation: 4/10/2025  Plan of Care Certification: 5/5/2025 to 7/28/2025      Time In: 0800   Time Out: 0900  Total Time (in minutes): 60   Total Billable Time (in minutes):      FOTO:  Intake Score:  %  Survey Score 2:  %  Survey Score 3:  %    Precautions:       Subjective   He did not go to [facility] because when he went a few months ago they told him his stockings weren't covered and he would have to pay $50 so he purchased a pair online instead that are fitting well and already improving his swelling..  Pain reported as 0/10.      Objective            Treatment:  Therapeutic Activity  TA 1: see education section.  TA 2: SEQUENTIAL COMPRESSION PUMP: full leg sleeve applied to RLE and LLE VES with default setting with distal pressures starting at 45mmHg entire extremity simultaneous to education.  TA 3: MULTILAYERED BANDAGING: issued supplies and bandaged RLE and LLE base of toes to below knee with cotton stockinette, cellona padding,2 8 cm, and 2 10 cm Rosidal K bandages to leave intact 24-72 hrs as tolerated, discontinue with any problems, return rolled bandages next session. Wash and wear schedules confirmed.      Time Entry(in minutes):  Therapeutic Activity Time Entry: 60    Assessment & Plan   Assessment: Denied numbness/tingling with pump this session. Educated pt on Medicaid coverage. Rx sent to [facility] for additional pair of wide calf compression stockings through insurance.  Evaluation/Treatment Tolerance: Patient  tolerated treatment well    Patient will continue to benefit from skilled outpatient occupational therapy to address the deficits listed in the problem list box on initial evaluation, provide pt/family education and to maximize pt's level of independence in the home and community environment.     Patient's spiritual, cultural, and educational needs considered and patient agreeable to plan of care and goals.     Education  Education was done with Patient. The patient's learning style includes Demonstration and Listening. The patient Demonstrates understanding and Verbalizes understanding.          Patient was educated in  Compression needs: 20-30 mm hg knee highs vs Velcro wraps pending progress. Home management plan: Wear schedule: Eduardo first thing in the morning, remove at the end of the day as close to bedtime as possible. Do not sleep in garments. If using a home pneumatic pump, remove garments when using pump. If it is not yet bedtime, resume wearing garments until bed. Donning/doffing techniques Wash and management of products Cost/coverage of compression garments: Medicare now pays for compression. Private insurance coverage is on a case-by-case basis. In order to determine coverage an Rx with a diagnosis of lymphedema is sent to a participating DME for a benefits check.  Edema wear as temporary compression garment until able to secure long term compression needs Commitment to attendance and securing compression needs are critical to lymphedema management Pneumatic pump benefits, set-up, use, referral process, coverage through insurance. Coverage is based on insurance plan as well as if deductible has been met. Pneumatic pump is an added tool for managing lymphedema at home and is meant to be used in addition to wearing compression garments daily but does not substitute compression garments.  Seek new referral if edema status worsens or changes in the future despite compliance with home management techniques.   Monitor for signs/symptoms of infection and seek medical attention immediately if symptoms occur.         Plan: Continue per initial POC.    Goals:   Active       Long Term Goals       Patient and/or caregiver to alex/doff compression garment independently and with daily compliance to assist in lymphedema management, skin elasticity, and tissue density  (Ongoing)       Start:  05/05/25    Expected End:  06/16/25            Patient and/or caregiver will be independent in use of pneumatic compression pump or self manual lymphatic drainage techniques to areas within reach to enhance lymphatic drainage and skin condition.   (Ongoing)       Start:  05/05/25    Expected End:  06/16/25            Patient to be independent and compliant with home exercise program to allow for increased function in affected limb.  (Ongoing)       Start:  05/05/25    Expected End:  06/16/25            Patient will show decreased total girth measurement in BLE by up to 4 cm to allow for lower extremity symmetry, shoe and clothing choice, and ability to apply needed compression daily.  (Ongoing)       Start:  05/05/25    Expected End:  06/16/25               Short Term Goals       Patient will demonstrate 100% knowledge of lymphedema precautions and signs of infection to allow for reduced lymphedema risk, infection risk, and/or exacerbation of condition.   (Ongoing)       Start:  05/05/25    Expected End:  05/26/25            Patient will tolerate daily activities wearing multilayered bandaging to allow for lymphatic drainage support, skin elasticity, and reduction in shape and size of limb.   (Ongoing)       Start:  05/05/25    Expected End:  05/26/25            Patient and/or caregiver will order/obtain appropriate compression garments to maintain lymphatic and venous support.   (Ongoing)       Start:  05/05/25    Expected End:  05/26/25            Patient will show decreased total girth measurement in BLE by up to 2 cm to allow for lower  extremity symmetry, shoe and clothing choice, and ability to apply needed compression.  (Ongoing)       Start:  05/05/25    Expected End:  05/26/25                Sunshine Mera OT

## 2025-05-16 ENCOUNTER — PATIENT MESSAGE (OUTPATIENT)
Dept: PSYCHIATRY | Facility: CLINIC | Age: 50
End: 2025-05-16
Payer: MEDICAID

## 2025-05-17 DIAGNOSIS — F33.42 RECURRENT MAJOR DEPRESSIVE DISORDER, IN FULL REMISSION: ICD-10-CM

## 2025-05-18 RX ORDER — ATORVASTATIN CALCIUM 10 MG/1
10 TABLET, FILM COATED ORAL DAILY
Qty: 90 TABLET | Refills: 0 | Status: SHIPPED | OUTPATIENT
Start: 2025-05-18

## 2025-05-18 NOTE — TELEPHONE ENCOUNTER
No care due was identified.  [facility] Embedded Care Due Messages. Reference number: [id number].   5/17/2025 9:35:34 PM CDT

## 2025-05-18 NOTE — TELEPHONE ENCOUNTER
Refill Routing Note   Medication(s) are not appropriate for processing by [facility] for the following reason(s):        New or recently adjusted medication: Potassium less than 90 days under the signature of responsible provider   Drug-disease interaction      [facility] action(s):  Defer             Appointments  past 12m or future 3m with PCP    Date Provider   Last Visit   4/17/2025 Marcos Ibarra MD   Next Visit   7/17/2025 Marcos Ibarra MD   ED visits in past 90 days: 0        Note composed:3:42 PM 05/18/2025

## 2025-05-19 ENCOUNTER — CLINICAL SUPPORT (OUTPATIENT)
Dept: REHABILITATION | Facility: HOSPITAL | Age: 50
End: 2025-05-19
Payer: MEDICAID

## 2025-05-19 DIAGNOSIS — I89.0 LYMPHEDEMA OF BOTH LOWER EXTREMITIES: Primary | ICD-10-CM

## 2025-05-19 PROCEDURE — 97530 THERAPEUTIC ACTIVITIES: CPT

## 2025-05-19 RX ORDER — POTASSIUM CHLORIDE 20 MEQ/1
20 TABLET, EXTENDED RELEASE ORAL EVERY OTHER DAY
Qty: 45 TABLET | Refills: 3 | Status: SHIPPED | OUTPATIENT
Start: 2025-05-19

## 2025-05-19 RX ORDER — VALSARTAN AND HYDROCHLOROTHIAZIDE 320; 12.5 MG/1; MG/1
1 TABLET, FILM COATED ORAL DAILY
Qty: 90 TABLET | Refills: 3 | Status: SHIPPED | OUTPATIENT
Start: 2025-05-19

## 2025-05-19 RX ORDER — ARIPIPRAZOLE 5 MG/1
5 TABLET ORAL DAILY
Qty: 15 TABLET | Refills: 0 | Status: SHIPPED | OUTPATIENT
Start: 2025-05-19 | End: 2025-06-03

## 2025-05-19 NOTE — PROGRESS NOTES
Outpatient Rehab    Occupational Therapy Visit    Patient Name: Kirill Gandhi III  MRN: [id number]  YOB: 1975  Encounter Date: 5/19/2025    Therapy Diagnosis:   Encounter Diagnosis   Name Primary?    Lymphedema of both lower extremities Yes     Physician: Fernando Roberts MD    Physician Orders: Eval and Treat  Medical Diagnosis: Lymphedema, not elsewhere classified    Visit # / Visits Authorized: 2 / 12  Insurance Authorization Period: 5/15/2025 to 7/14/2025  Date of Evaluation: 4/10/2025  Plan of Care Certification: 5/5/2025 to 7/28/2025      Time In: 1000   Time Out: 1100  Total Time (in minutes): 60   Total Billable Time (in minutes):           Subjective   Swelling reduced after last session. Bandages remained in place x 26 hours. He has been wearing stockings daily since removing bandages. Experienced one episode of burning sensation in feet and legs yesterday but admits missing one Gabapentin dose though after removing compression stockings symptoms were relieved. He is going to go to [facility] before Thursday's session to  new 20-30 mm hg stockings..  Pain reported as 0/10.      Objective            Treatment:  Therapeutic Activity  TA 1: see education section.  TA 2: SEQUENTIAL COMPRESSION PUMP: full leg sleeve applied to RLE and LLE VES with default setting with distal pressures starting at 45mmHg entire extremity simultaneous to education.  TA 3: MULTILAYERED BANDAGING: issued supplies and bandaged RLE and LLE base of toes to below knee with cotton stockinette, cellona padding,2 8 cm, and 2 10 cm Rosidal K bandages to leave intact 24-72 hrs as tolerated, discontinue with any problems, return rolled bandages next session. Wash and wear schedules confirmed.      Time Entry(in minutes):  Therapeutic Activity Time Entry: 60    Assessment & Plan   Assessment: Edema reducing! Reinforced importance of purchasing new 20-30 mm hg stockings from [facility] as current stockings do not  provide adequate compression. Patient is compliant with daily compression garments. Patient would benefit from a home pump: 4 weeks of conservative treatment will be on 6/3/25.  Evaluation/Treatment Tolerance: Patient tolerated treatment well    Patient will continue to benefit from skilled outpatient occupational therapy to address the deficits listed in the problem list box on initial evaluation, provide pt/family education and to maximize pt's level of independence in the home and community environment.     Patient's spiritual, cultural, and educational needs considered and patient agreeable to plan of care and goals.     Education  Education was done with Patient. The patient's learning style includes Demonstration and Listening. The patient Demonstrates understanding and Verbalizes understanding.          Patient was educated in  Compression needs: 20-30 mm hg knee highs vs Velcro wraps pending progress. Home management plan: Wear schedule: Eduardo first thing in the morning, remove at the end of the day as close to bedtime as possible. Do not sleep in garments. If using a home pneumatic pump, remove garments when using pump. If it is not yet bedtime, resume wearing garments until bed. Donning/doffing techniques Wash and management of products Cost/coverage of compression garments: Medicare now pays for compression. Private insurance coverage is on a case-by-case basis. In order to determine coverage an Rx with a diagnosis of lymphedema is sent to a participating DME for a benefits check.  Edema wear as temporary compression garment until able to secure long term compression needs Commitment to attendance and securing compression needs are critical to lymphedema management Pneumatic pump benefits, set-up, use, referral process, coverage through insurance. Coverage is based on insurance plan as well as if deductible has been met. Pneumatic pump is an added tool for managing lymphedema at home and is meant to be  used in addition to wearing compression garments daily but does not substitute compression garments.  Seek new referral if edema status worsens or changes in the future despite compliance with home management techniques.  Monitor for signs/symptoms of infection and seek medical attention immediately if symptoms occur.           Plan: Continue per initial POC.    Goals:   Active       Long Term Goals       Patient and/or caregiver to alex/doff compression garment independently and with daily compliance to assist in lymphedema management, skin elasticity, and tissue density  (Progressing)       Start:  05/05/25    Expected End:  06/16/25            Patient and/or caregiver will be independent in use of pneumatic compression pump or self manual lymphatic drainage techniques to areas within reach to enhance lymphatic drainage and skin condition.   (Progressing)       Start:  05/05/25    Expected End:  06/16/25            Patient to be independent and compliant with home exercise program to allow for increased function in affected limb.  (Progressing)       Start:  05/05/25    Expected End:  06/16/25            Patient will show decreased total girth measurement in BLE by up to 4 cm to allow for lower extremity symmetry, shoe and clothing choice, and ability to apply needed compression daily.  (Progressing)       Start:  05/05/25    Expected End:  06/16/25               Short Term Goals       Patient will demonstrate 100% knowledge of lymphedema precautions and signs of infection to allow for reduced lymphedema risk, infection risk, and/or exacerbation of condition.   (Progressing)       Start:  05/05/25    Expected End:  05/26/25            Patient will tolerate daily activities wearing multilayered bandaging to allow for lymphatic drainage support, skin elasticity, and reduction in shape and size of limb.   (Progressing)       Start:  05/05/25    Expected End:  05/26/25            Patient and/or caregiver will  order/obtain appropriate compression garments to maintain lymphatic and venous support.   (Progressing)       Start:  05/05/25    Expected End:  05/26/25            Patient will show decreased total girth measurement in BLE by up to 2 cm to allow for lower extremity symmetry, shoe and clothing choice, and ability to apply needed compression.  (Progressing)       Start:  05/05/25    Expected End:  05/26/25                Sunshine Mera OT

## 2025-05-20 ENCOUNTER — PATIENT MESSAGE (OUTPATIENT)
Dept: CARDIOLOGY | Facility: CLINIC | Age: 50
End: 2025-05-20
Payer: MEDICAID

## 2025-05-22 ENCOUNTER — CLINICAL SUPPORT (OUTPATIENT)
Dept: REHABILITATION | Facility: HOSPITAL | Age: 50
End: 2025-05-22
Payer: MEDICAID

## 2025-05-22 DIAGNOSIS — I89.0 LYMPHEDEMA OF BOTH LOWER EXTREMITIES: Primary | ICD-10-CM

## 2025-05-22 PROCEDURE — 97530 THERAPEUTIC ACTIVITIES: CPT

## 2025-05-22 NOTE — PROGRESS NOTES
Outpatient Rehab    Occupational Therapy Visit    Patient Name: Kirill Gandhi III  MRN: [id number]  YOB: 1975  Encounter Date: 5/22/2025    Therapy Diagnosis:   Encounter Diagnosis   Name Primary?    Lymphedema of both lower extremities Yes     Physician: Fernando Roberts MD    Physician Orders: Eval and Treat  Medical Diagnosis: Lymphedema, not elsewhere classified    Visit # / Visits Authorized: 3 / 12  Insurance Authorization Period: 5/15/2025 to 7/14/2025  Date of Evaluation: 4/10/2025  Plan of Care Certification: 5/5/2025 to 7/28/2025      Time In: 1435   Time Out: 1535  Total Time (in minutes): 60   Total Billable Time (in minutes):           Subjective   He went to [facility] this morning and was told he had to have an appointment and that even though the measurements were in the order they could not help him and he would have to be re-measured. His girlfriend has great difficulty donning his stockings..  Pain reported as 0/10.      Objective            Treatment:  Therapeutic Activity  TA 1: see education section.  TA 2: SEQUENTIAL COMPRESSION PUMP: full leg sleeve applied to RLE and LLE VES with default setting with distal pressures starting at 45mmHg entire extremity simultaneous to education.  TA 3: Inelastic Calf Velcro wraps and edema wear size small on feet donned at end of session    Time Entry(in minutes):  Therapeutic Activity Time Entry: 60    Assessment & Plan   Assessment: Issued donated calf Velcro wraps in session and edema wear size small double layer for feet. Pt reports these will greatly help compression compliance due to easy donning for his girlfriend who assists him. He is to wear daily until next session.  Evaluation/Treatment Tolerance: Patient tolerated treatment well    The patient will continue to benefit from skilled outpatient occupational therapy in order to address the deficits listed in the problem list on the initial evaluation, provide patient and family  education, and maximize the patients level of independence in the home and community environments.     The patient's spiritual, cultural, and educational needs were considered, and the patient is agreeable to the plan of care and goals.     Education  Education was done with Patient. The patient's learning style includes Demonstration and Listening. The patient Demonstrates understanding and Verbalizes understanding.          Patient was educated in  Compression needs: 20-30 mm hg knee highs vs Velcro wraps pending progress. Home management plan: Wear schedule: Eduardo first thing in the morning, remove at the end of the day as close to bedtime as possible. Do not sleep in garments. If using a home pneumatic pump, remove garments when using pump. If it is not yet bedtime, resume wearing garments until bed. Donning/doffing techniques Wash and management of products Cost/coverage of compression garments: Medicare now pays for compression. Private insurance coverage is on a case-by-case basis. In order to determine coverage an Rx with a diagnosis of lymphedema is sent to a participating DME for a benefits check.  Edema wear as temporary compression garment until able to secure long term compression needs Commitment to attendance and securing compression needs are critical to lymphedema management Pneumatic pump benefits, set-up, use, referral process, coverage through insurance. Coverage is based on insurance plan as well as if deductible has been met. Pneumatic pump is an added tool for managing lymphedema at home and is meant to be used in addition to wearing compression garments daily but does not substitute compression garments.  Seek new referral if edema status worsens or changes in the future despite compliance with home management techniques.  Monitor for signs/symptoms of infection and seek medical attention immediately if symptoms occur.         Plan: Continue per initial POC.    Goals:   Active       Long Term  Goals       Patient and/or caregiver to alex/doff compression garment independently and with daily compliance to assist in lymphedema management, skin elasticity, and tissue density  (Progressing)       Start:  05/05/25    Expected End:  06/16/25            Patient and/or caregiver will be independent in use of pneumatic compression pump or self manual lymphatic drainage techniques to areas within reach to enhance lymphatic drainage and skin condition.   (Progressing)       Start:  05/05/25    Expected End:  06/16/25            Patient to be independent and compliant with home exercise program to allow for increased function in affected limb.  (Progressing)       Start:  05/05/25    Expected End:  06/16/25            Patient will show decreased total girth measurement in BLE by up to 4 cm to allow for lower extremity symmetry, shoe and clothing choice, and ability to apply needed compression daily.  (Progressing)       Start:  05/05/25    Expected End:  06/16/25               Short Term Goals       Patient will demonstrate 100% knowledge of lymphedema precautions and signs of infection to allow for reduced lymphedema risk, infection risk, and/or exacerbation of condition.   (Progressing)       Start:  05/05/25    Expected End:  05/26/25            Patient will tolerate daily activities wearing multilayered bandaging to allow for lymphatic drainage support, skin elasticity, and reduction in shape and size of limb.   (Progressing)       Start:  05/05/25    Expected End:  05/26/25            Patient and/or caregiver will order/obtain appropriate compression garments to maintain lymphatic and venous support.   (Progressing)       Start:  05/05/25    Expected End:  05/26/25            Patient will show decreased total girth measurement in BLE by up to 2 cm to allow for lower extremity symmetry, shoe and clothing choice, and ability to apply needed compression.  (Progressing)       Start:  05/05/25    Expected End:   05/26/25                Sunshine Mera, OT

## 2025-05-29 ENCOUNTER — CLINICAL SUPPORT (OUTPATIENT)
Dept: REHABILITATION | Facility: HOSPITAL | Age: 50
End: 2025-05-29
Payer: MEDICAID

## 2025-05-29 DIAGNOSIS — I89.0 LYMPHEDEMA OF BOTH LOWER EXTREMITIES: Primary | ICD-10-CM

## 2025-05-29 PROCEDURE — 97016 VASOPNEUMATIC DEVICE THERAPY: CPT

## 2025-05-29 PROCEDURE — 97535 SELF CARE MNGMENT TRAINING: CPT

## 2025-05-29 NOTE — PROGRESS NOTES
Outpatient Rehab    Occupational Therapy Visit    Patient Name: Kirill Gandhi III  MRN: [id number]  YOB: 1975  Encounter Date: 5/29/2025    Therapy Diagnosis:   Encounter Diagnosis   Name Primary?    Lymphedema of both lower extremities Yes     Physician: Fernando Roberts MD    Physician Orders: Eval and Treat  Medical Diagnosis: Lymphedema, not elsewhere classified    Visit # / Visits Authorized: 4 / 12  Insurance Authorization Period: 5/15/2025 to 7/14/2025  Date of Evaluation: 4/10/2025  Plan of Care Certification: 5/5/2025 to 7/28/2025      Time In: 1232   Time Out: 1328  Total Time (in minutes): 56   Total Billable Time (in minutes): 56         Subjective   The Velcro wraps are a life saver! His legs look and feel back to normal..  Pain reported as 0/10.      Objective            Lower extremity Girth Measurements (in centimeters): taken in supine  [place] Left lower extremity  Date: 5/5/25 Right lower extremity   Date: 5/5/25 LLE  5/29/25 RLE  5/29/25   Metatarsal Head 29.0 cm 29.5 cm 28.0 cm 28.0 cm   Medial Malleolus 36.5 cm 34.0 cm 34.0 cm 32.0 cm   (+) 5cm 37.0 cm 35.5 cm 34.0 cm 33.0 cm   (+) 10cm 42.0 cm 41.5 cm 41.5 cm 41.0 cm   (+) 20cm 56.0 cm 54.0 cm 53.5 cm 52.0 cm   (+) 30cm 52.5 cm 54.5 cm 52.5 cm 53.5 cm       Treatment:  Therapeutic Activity  TA 1: see education section.  TA 2: SEQUENTIAL COMPRESSION PUMP: full leg sleeve applied to RLE and LLE VES with default setting with distal pressures starting at 45mmHg entire extremity simultaneous to education.  TA 3: Inelastic Calf Velcro wraps and edema wear size small on feet donned at end of session    Time Entry(in minutes):  Vasopneumatic Devices Time Entry: 45  Self Care/Home Management (ADLs) Time Entry: 25    Assessment & Plan   Assessment: BLE reduced to typical limb size! Pt will attend Monday's session to ensure containment and then RTC in 3 weeks to ensure independence with home management  techniques.  Evaluation/Treatment Tolerance: Patient tolerated treatment well    The patient will continue to benefit from skilled outpatient occupational therapy in order to address the deficits listed in the problem list on the initial evaluation, provide patient and family education, and maximize the patients level of independence in the home and community environments.     The patient's spiritual, cultural, and educational needs were considered, and the patient is agreeable to the plan of care and goals.     Education  Education was done with Patient. The patient's learning style includes Demonstration and Listening. The patient Demonstrates understanding and Verbalizes understanding.          Patient was educated in  Compression needs: 20-30 mm hg knee highs vs Velcro wraps pending progress. Home management plan: Wear schedule: Eduardo first thing in the morning, remove at the end of the day as close to bedtime as possible. Do not sleep in garments. If using a home pneumatic pump, remove garments when using pump. If it is not yet bedtime, resume wearing garments until bed. Donning/doffing techniques Wash and management of products Cost/coverage of compression garments: Medicare now pays for compression. Private insurance coverage is on a case-by-case basis. In order to determine coverage an Rx with a diagnosis of lymphedema is sent to a participating DME for a benefits check.  Edema wear as temporary compression garment until able to secure long term compression needs Commitment to attendance and securing compression needs are critical to lymphedema management Pneumatic pump benefits, set-up, use, referral process, coverage through insurance. Coverage is based on insurance plan as well as if deductible has been met. Pneumatic pump is an added tool for managing lymphedema at home and is meant to be used in addition to wearing compression garments daily but does not substitute compression garments.  Seek new  referral if edema status worsens or changes in the future despite compliance with home management techniques.  Monitor for signs/symptoms of infection and seek medical attention immediately if symptoms occur.           Plan: Continue per initial POC.    Goals:   Active       Long Term Goals       Patient and/or caregiver to alex/doff compression garment independently and with daily compliance to assist in lymphedema management, skin elasticity, and tissue density  (Met)       Start:  05/05/25    Expected End:  06/16/25    Resolved:  05/29/25         Patient and/or caregiver will be independent in use of pneumatic compression pump or self manual lymphatic drainage techniques to areas within reach to enhance lymphatic drainage and skin condition.   (Met)       Start:  05/05/25    Expected End:  06/16/25    Resolved:  05/29/25         Patient to be independent and compliant with home exercise program to allow for increased function in affected limb.  (Met)       Start:  05/05/25    Expected End:  06/16/25    Resolved:  05/29/25         Patient will show decreased total girth measurement in BLE by up to 4 cm to allow for lower extremity symmetry, shoe and clothing choice, and ability to apply needed compression daily.  (Progressing)       Start:  05/05/25    Expected End:  06/16/25              Resolved       Short Term Goals       Patient will demonstrate 100% knowledge of lymphedema precautions and signs of infection to allow for reduced lymphedema risk, infection risk, and/or exacerbation of condition.   (Met)       Start:  05/05/25    Expected End:  05/26/25    Resolved:  05/29/25         Patient will tolerate daily activities wearing multilayered bandaging to allow for lymphatic drainage support, skin elasticity, and reduction in shape and size of limb.   (Met)       Start:  05/05/25    Expected End:  05/26/25    Resolved:  05/29/25         Patient and/or caregiver will order/obtain appropriate compression garments  to maintain lymphatic and venous support.   (Met)       Start:  05/05/25    Expected End:  05/26/25    Resolved:  05/29/25         Patient will show decreased total girth measurement in BLE by up to 2 cm to allow for lower extremity symmetry, shoe and clothing choice, and ability to apply needed compression.  (Met)       Start:  05/05/25    Expected End:  05/26/25    Resolved:  05/29/25             Sunshine Mera OT

## 2025-05-30 ENCOUNTER — PATIENT MESSAGE (OUTPATIENT)
Dept: HEPATOLOGY | Facility: CLINIC | Age: 50
End: 2025-05-30
Payer: MEDICAID

## 2025-05-30 ENCOUNTER — OFFICE VISIT (OUTPATIENT)
Dept: NEUROSURGERY | Facility: CLINIC | Age: 50
End: 2025-05-30
Payer: MEDICAID

## 2025-05-30 ENCOUNTER — TELEPHONE (OUTPATIENT)
Dept: NEUROSURGERY | Facility: CLINIC | Age: 50
End: 2025-05-30

## 2025-05-30 ENCOUNTER — HOSPITAL ENCOUNTER (OUTPATIENT)
Dept: RADIOLOGY | Facility: HOSPITAL | Age: 50
Discharge: HOME | End: 2025-05-30
Attending: PHYSICIAN ASSISTANT
Payer: MEDICAID

## 2025-05-30 VITALS
BODY MASS INDEX: 42.66 KG/M2 | SYSTOLIC BLOOD PRESSURE: 100 MMHG | HEART RATE: 93 BPM | WEIGHT: 315 LBS | HEIGHT: 72 IN | DIASTOLIC BLOOD PRESSURE: 62 MMHG

## 2025-05-30 DIAGNOSIS — G89.29 CHRONIC BILATERAL LOW BACK PAIN WITH BILATERAL SCIATICA: ICD-10-CM

## 2025-05-30 DIAGNOSIS — M54.41 CHRONIC BILATERAL LOW BACK PAIN WITH BILATERAL SCIATICA: ICD-10-CM

## 2025-05-30 DIAGNOSIS — M54.42 CHRONIC BILATERAL LOW BACK PAIN WITH BILATERAL SCIATICA: ICD-10-CM

## 2025-05-30 DIAGNOSIS — G89.29 CHRONIC BILATERAL LOW BACK PAIN WITH BILATERAL SCIATICA: Primary | ICD-10-CM

## 2025-05-30 DIAGNOSIS — M54.42 CHRONIC BILATERAL LOW BACK PAIN WITH BILATERAL SCIATICA: Primary | ICD-10-CM

## 2025-05-30 DIAGNOSIS — M54.41 CHRONIC BILATERAL LOW BACK PAIN WITH BILATERAL SCIATICA: Primary | ICD-10-CM

## 2025-05-30 PROCEDURE — 72110 X-RAY EXAM L-2 SPINE 4/>VWS: CPT | Mod: TC,FY

## 2025-05-30 PROCEDURE — 99215 OFFICE O/P EST HI 40 MIN: CPT | Mod: PBBFAC,25,PN | Performed by: PHYSICIAN ASSISTANT

## 2025-05-30 PROCEDURE — 99999 PR PBB SHADOW E&M-EST. PATIENT-LVL V: CPT | Mod: PBBFAC,,, | Performed by: PHYSICIAN ASSISTANT

## 2025-05-30 PROCEDURE — 72110 X-RAY EXAM L-2 SPINE 4/>VWS: CPT | Mod: 26,,, | Performed by: RADIOLOGY

## 2025-05-30 NOTE — PROGRESS NOTES
Subjective:     Patient ID:  Kirill Gandhi III is a 49 y.o. male.    Kevin    Chief Complaint:  Back pain and bilateral leg pain    HPI    Kirill Gandhi III is a 49 y.o. male who presents for follow up.  Patient had previous spine surgery with Dr. Brown.  He continues to have constant pain in the back and midback that is worse with standing and walking.  He can only walk 20 ft before he has to sit down.  He has bilateral anterior leg pain to the knees equally on both sides more so with sitting.  He has bilateral posterior leg numbness to the knees and numbness in his testicles.    The back pain bothers him more than the leg pain.      He was referred to pain management and had an epidural steroid injection without relief.  He states that spinal cord stimulator is not covered under his Medicaid insurance.    Patient denies any recent accidents or trauma, no saddle anesthesias, and no bowel or bladder incontinence.      Review of Systems:  Constitution: Negative for chills, fever, night sweats and weight loss.   Musculoskeletal: Negative for falls.   Gastrointestinal: Negative for bowel incontinence, nausea and vomiting.   Genitourinary: Negative for bladder incontinence.   Neurological: Negative for disturbances in coordination and loss of balance.      Objective:      Vitals:    05/30/25 1317   BP: 100/62   Pulse: 93   Weight: (!) 211.4 kg (466 lb 0.8 oz)   Height: 6' (1.829 m)   PainSc:   8   PainLoc: Back         Physical Exam: Exam done in the chair      General:  Kirill Gandhi III is well-developed, well-nourished, appears stated age, in no acute distress, alert and oriented to person, place, and time.    Pulmonary/Chest:  Respiratory effort normal  Abdominal: Exhibits no distension  Psychiatric:  Normal mood and affect.  Behavior is normal.  Judgement and thought content normal      Musculoskeletal:      Lumbar ROM:   Pain in lumbar flexion, extension, left lateral bending, and  right lateral bending.          Neurological:  Alert and oriented to person, place, and time    Muscle strength against resistance:     Right Left   Hip flexion  5 / 5 5 / 5   Hip extension 5 / 5 5 / 5   Hip abduction 5 / 5 5 / 5   Hip adduction  5 / 5 5 / 5   Knee extension  5 / 5 5 / 5   Knee flexion 5 / 5 5 / 5   Dorsiflexion  5 / 5 5 / 5   EHL  5 / 5 5 / 5   Plantar flexion  5 / 5 5 / 5   Inversion of the feet 5 / 5 5 / 5   Eversion of the feet  5 / 5 5 / 5       Reflexes:     Right Left   Patellar 2+ 2+   Achilles 2+ 2+     Clonus:  Negative bilaterally    On gross examination of the bilateral upper extremities, patient has full painfree ROM with no signs of clubbing, cyanosis, edema, or weakness.     No current imaging to review      Assessment:          1. Chronic bilateral low back pain with bilateral sciatica            Plan:          Orders Placed This Encounter    X-Ray Lumbar Spine Ap Lateral w/Flex Ext       Lspine xray  Will review with Dr. Brown    Follow-Up:  Follow up if symptoms worsen or fail to improve. If there are any questions prior to this, the patient was instructed to contact the office.       Lolly Duggan [facility], PA-C  Neurosurgery  [place] [facility]  05/30/2025

## 2025-05-30 NOTE — TELEPHONE ENCOUNTER
Dr. Brown,    Kirill Riggs [place] III is a 49 y.o. male who presents for follow up.  He continues to have constant pain in the back and midback that is worse with standing and walking.  He can only walk 20 ft before he has to sit down.  He has bilateral anterior leg pain to the knees equally on both sides more so with sitting.  He has bilateral posterior leg numbness to the knees and numbness in his testicles.    The back pain bothers him more than the leg pain.      He was referred to pain management and had an epidural steroid injection without relief.  He states that spinal cord stimulator is not covered under his Medicaid insurance.    I ordered xrays.  What else do you recommend for him next?    Lolly Duggan, [facility], PA-C  Neurosurgery  [facility]  05/30/2025

## 2025-06-02 ENCOUNTER — CLINICAL SUPPORT (OUTPATIENT)
Dept: REHABILITATION | Facility: HOSPITAL | Age: 50
End: 2025-06-02
Payer: MEDICAID

## 2025-06-02 DIAGNOSIS — I89.0 LYMPHEDEMA OF BOTH LOWER EXTREMITIES: Primary | ICD-10-CM

## 2025-06-02 PROCEDURE — 97530 THERAPEUTIC ACTIVITIES: CPT

## 2025-06-03 ENCOUNTER — APPOINTMENT (OUTPATIENT)
Dept: LAB | Facility: HOSPITAL | Age: 50
End: 2025-06-03
Attending: FAMILY MEDICINE
Payer: MEDICAID

## 2025-06-03 ENCOUNTER — TELEPHONE (OUTPATIENT)
Dept: NEUROSURGERY | Facility: CLINIC | Age: 50
End: 2025-06-03
Payer: MEDICAID

## 2025-06-03 DIAGNOSIS — F33.42 RECURRENT MAJOR DEPRESSIVE DISORDER, IN FULL REMISSION: ICD-10-CM

## 2025-06-03 DIAGNOSIS — F42.2 MIXED OBSESSIONAL THOUGHTS AND ACTS: ICD-10-CM

## 2025-06-04 ENCOUNTER — PATIENT MESSAGE (OUTPATIENT)
Dept: PSYCHIATRY | Facility: CLINIC | Age: 50
End: 2025-06-04
Payer: MEDICAID

## 2025-06-04 ENCOUNTER — RESULTS FOLLOW-UP (OUTPATIENT)
Dept: ENDOCRINOLOGY | Facility: CLINIC | Age: 50
End: 2025-06-04
Payer: MEDICAID

## 2025-06-04 DIAGNOSIS — E87.5 HYPERKALEMIA: ICD-10-CM

## 2025-06-04 DIAGNOSIS — Z79.4 TYPE 2 DIABETES MELLITUS WITH HYPERGLYCEMIA, WITH LONG-TERM CURRENT USE OF INSULIN: Primary | ICD-10-CM

## 2025-06-04 DIAGNOSIS — E11.65 TYPE 2 DIABETES MELLITUS WITH HYPERGLYCEMIA, WITH LONG-TERM CURRENT USE OF INSULIN: Primary | ICD-10-CM

## 2025-06-04 RX ORDER — DULOXETIN HYDROCHLORIDE 60 MG/1
60 CAPSULE, DELAYED RELEASE ORAL 2 TIMES DAILY
Qty: 60 CAPSULE | Refills: 0 | Status: SHIPPED | OUTPATIENT
Start: 2025-06-04

## 2025-06-04 RX ORDER — BUPROPION HYDROCHLORIDE 100 MG/1
200 TABLET, EXTENDED RELEASE ORAL
Qty: 60 TABLET | Refills: 0 | Status: SHIPPED | OUTPATIENT
Start: 2025-06-04

## 2025-06-06 ENCOUNTER — DOCUMENTATION ONLY (OUTPATIENT)
Dept: NEUROSURGERY | Facility: CLINIC | Age: 50
End: 2025-06-06
Payer: MEDICAID

## 2025-06-09 RX ORDER — ATORVASTATIN CALCIUM 10 MG/1
10 TABLET, FILM COATED ORAL
Qty: 90 TABLET | Refills: 3 | Status: SHIPPED | OUTPATIENT
Start: 2025-06-09

## 2025-06-09 NOTE — TELEPHONE ENCOUNTER
Care Due:                  Date            Visit Type   Department     Provider  --------------------------------------------------------------------------------                                EP -                              PRIMARY      [facility] FAMILY  Last Visit: 04-      CARE ([facility])   MEDICINE       Marcos Ibarra                              EP -                              PRIMARY      [facility] FAMILY  Next Visit: 07-      CARE ([facility])   MEDICINE       Marcos Ibarra                                                            Last  Test          Frequency    Reason                     Performed    Due Date  --------------------------------------------------------------------------------    Vitamin D...  12 months..  vitamin..................  Not Found    Overdue    [employer] Embedded Care Due Messages. Reference number: [id number].   6/08/2025 11:07:38 PM CDT

## 2025-06-09 NOTE — TELEPHONE ENCOUNTER
Refill Routing Note   Medication(s) are not appropriate for processing by [facility] for the following reason(s):        New or recently adjusted medication    [facility] action(s):  Defer             Appointments  past 12m or future 3m with PCP    Date Provider   Last Visit   4/17/2025 Marcos Ibarra MD   Next Visit   7/17/2025 Marcos Ibarra MD   ED visits in past 90 days: 0        Note composed:7:28 AM 06/09/2025

## 2025-06-10 ENCOUNTER — OFFICE VISIT (OUTPATIENT)
Dept: PSYCHIATRY | Facility: CLINIC | Age: 50
End: 2025-06-10
Payer: MEDICAID

## 2025-06-10 VITALS
BODY MASS INDEX: 63.78 KG/M2 | DIASTOLIC BLOOD PRESSURE: 69 MMHG | HEART RATE: 99 BPM | WEIGHT: 315 LBS | SYSTOLIC BLOOD PRESSURE: 161 MMHG

## 2025-06-10 DIAGNOSIS — F33.42 RECURRENT MAJOR DEPRESSIVE DISORDER, IN FULL REMISSION: Primary | ICD-10-CM

## 2025-06-10 DIAGNOSIS — F41.9 ANXIETY: ICD-10-CM

## 2025-06-10 DIAGNOSIS — F99 INSOMNIA DUE TO OTHER MENTAL DISORDER: ICD-10-CM

## 2025-06-10 DIAGNOSIS — F51.05 INSOMNIA DUE TO OTHER MENTAL DISORDER: ICD-10-CM

## 2025-06-10 DIAGNOSIS — F42.2 MIXED OBSESSIONAL THOUGHTS AND ACTS: ICD-10-CM

## 2025-06-10 PROCEDURE — 3077F SYST BP >= 140 MM HG: CPT | Mod: CPTII,,, | Performed by: NURSE PRACTITIONER

## 2025-06-10 PROCEDURE — 3078F DIAST BP <80 MM HG: CPT | Mod: CPTII,,, | Performed by: NURSE PRACTITIONER

## 2025-06-10 PROCEDURE — 90833 PSYTX W PT W E/M 30 MIN: CPT | Mod: S$PBB,,, | Performed by: NURSE PRACTITIONER

## 2025-06-10 PROCEDURE — 1159F MED LIST DOCD IN RCRD: CPT | Mod: CPTII,,, | Performed by: NURSE PRACTITIONER

## 2025-06-10 PROCEDURE — 3008F BODY MASS INDEX DOCD: CPT | Mod: CPTII,,, | Performed by: NURSE PRACTITIONER

## 2025-06-10 PROCEDURE — 1160F RVW MEDS BY RX/DR IN RCRD: CPT | Mod: CPTII,,, | Performed by: NURSE PRACTITIONER

## 2025-06-10 PROCEDURE — 99999 PR PBB SHADOW E&M-EST. PATIENT-LVL II: CPT | Mod: PBBFAC,,, | Performed by: NURSE PRACTITIONER

## 2025-06-10 PROCEDURE — 99212 OFFICE O/P EST SF 10 MIN: CPT | Mod: PBBFAC | Performed by: NURSE PRACTITIONER

## 2025-06-10 PROCEDURE — 3072F LOW RISK FOR RETINOPATHY: CPT | Mod: CPTII,,, | Performed by: NURSE PRACTITIONER

## 2025-06-10 PROCEDURE — 3044F HG A1C LEVEL LT 7.0%: CPT | Mod: CPTII,,, | Performed by: NURSE PRACTITIONER

## 2025-06-10 PROCEDURE — 99214 OFFICE O/P EST MOD 30 MIN: CPT | Mod: S$PBB,,, | Performed by: NURSE PRACTITIONER

## 2025-06-10 RX ORDER — ARIPIPRAZOLE 5 MG/1
5 TABLET ORAL DAILY
Qty: 90 TABLET | Refills: 1 | Status: SHIPPED | OUTPATIENT
Start: 2025-06-10 | End: 2025-12-07

## 2025-06-10 RX ORDER — BUSPIRONE HYDROCHLORIDE 30 MG/1
30 TABLET ORAL 2 TIMES DAILY
Qty: 180 TABLET | Refills: 1 | Status: SHIPPED | OUTPATIENT
Start: 2025-06-10 | End: 2025-12-07

## 2025-06-10 RX ORDER — PROPRANOLOL HYDROCHLORIDE 20 MG/1
TABLET ORAL
Qty: 270 TABLET | Refills: 1 | Status: SHIPPED | OUTPATIENT
Start: 2025-06-10

## 2025-06-10 RX ORDER — DULOXETIN HYDROCHLORIDE 60 MG/1
60 CAPSULE, DELAYED RELEASE ORAL 2 TIMES DAILY
Qty: 180 CAPSULE | Refills: 1 | Status: SHIPPED | OUTPATIENT
Start: 2025-06-10 | End: 2025-12-07

## 2025-06-10 NOTE — PROGRESS NOTES
Outpatient Psychiatry Follow-Up Visit (MD/NP)     6/10/2025 8:18 AM  Kirill Gandhi III  1975  [id number]          Clinical Status of Patient:  Outpatient (Ambulatory)    Chief Complaint:  Kirill Gandhi III, a 49 y.o. male,who presents today for follow up of depression and anxiety.  Met with patient.        Interval History/Subjective Report/Content of Current Session:     States Wellbutrin was not effective for his low libido.   He states he has gained a large amount of weight and this is upsetting to him. Discussed asking his PCP for a referral to bariatric medicine.  Was approved for disability.  His dog  in . He is grieving. Has some brief crying spells but denies depressed mood, amotivaion, and anhedonia. His girlfriend is concerned about this but he is not. He feels like it is just normal grief.  Endorses obsessional thoughts, but they have improved. They are not as intense and they do not occur as often as they were.   Lives with his girlfriend, his 21 yo daughter and his mother.    ASEs from psych meds: see above    Pt denies recurrent thoughts of death and denies SI/HI. Denies any sxs of mihaela. Denies AVH, paranoia and delusions. No objective s/sx of psychosis or mihaela.         Psychotherapy:  Target symptoms: depression, anxiety   Why chosen therapy is appropriate versus another modality: relevant to diagnosis, patient responds to this modality  Outcome monitoring methods: self-report, observation  Therapeutic intervention type: supportive psychotherapy  Topics discussed/themes: building skills sets for symptom management  The patient's response to the intervention is accepting. The patient's progress toward treatment goals is excellent.   Duration of intervention: 16 minutes.      Psychotropic medication review  Previous Trials-  Wellbutrin- ineffective on its own  Prozac- effective  Luvox- partial response  Risperidone - ineffective  Buspar - effective  Celexa-  ineffective  Zoloft  Lexapro  Hydroxyzine - restlessness and feeling jumpy; worsened insomnia    Current meds-  Cymbalta  Gabapentin - prescribed by his pain mx provider  Buspar  Abilify  Propranolol    History:       Past Medical, Psychiatric, Family and Social History: The patient's past medical, psychiatric, family and social history, allergies, current medications, past surgical history, and problem list have been reviewed and updated as appropriate within the electronic medical record.         Review of Systems       Review of Systems   Constitutional:  Negative for chills, fever and malaise/fatigue.   Respiratory:  Negative for cough and shortness of breath.    Cardiovascular:  Negative for chest pain and palpitations.   Gastrointestinal:  Negative for abdominal pain, diarrhea and vomiting.   Genitourinary:  Negative for dysuria and hematuria.   Musculoskeletal:  Negative for falls and myalgias.   Skin:  Negative for rash.   Neurological:  Negative for tremors, seizures and headaches.   Psychiatric/Behavioral:          See HPI         Compliance: yes      Risk Parameters:  Patient reports no suicidal ideation  Patient reports no homicidal ideation  Patient reports no self-injurious behavior  Patient reports no violent behavior    Exam (detailed: at least 9 elements; comprehensive: all 15 elements)     Constitutional  Vitals:  Most recent vital signs, dated less than 90 days prior to this appointment, were reviewed.   Vitals:    06/10/25 0946   BP: (!) 161/69   Pulse: 99   Weight: (!) 213.3 kg (470 lb 3.9 oz)                Musculoskeletal  Muscle Strength/Tone:  no dyskinesia, no dystonia, no tremor, no tic   Gait & Station:  non-ataxic, uses cane     Psychiatric      Appearance:  unremarkable, age appropriate, well nourished, bearded, casually dressed, neatly groomed, obese   Behavior:  normal, friendly and cooperative, eye contact normal     Speech:  no latency; no press   Mood & Affect:  euthymic  congruent  and appropriate   Thought Process:  normal and logical   Associations:  intact   Thought Content:  normal, no suicidality, no homicidality, delusions, or paranoia   Insight:  intact, has awareness of illness   Judgement: behavior is adequate to circumstances, age appropriate   Orientation:  grossly intact   Memory: intact for content of interview   Language: grossly intact   Attention Span & Concentration:  able to focus   Fund of Knowledge:  intact and appropriate to age and level of education       Medications:  Outpatient Encounter Medications as of 6/10/2025   Medication Sig Dispense Refill    acetaminophen (TYLENOL) 500 MG tablet Take 1,000 mg by mouth 2 (two) times daily as needed for Pain.      ARIPiprazole (ABILIFY) 5 MG Tab Take 1 tablet (5 mg total) by mouth once daily. for 15 days 15 tablet 0    ascorbic acid, vitamin C, (VITAMIN C) 1000 MG tablet Take 1,000 mg by mouth once daily.      aspirin (ECOTRIN) 81 MG EC tablet Take 81 mg by mouth every evening.      aspirin/sod bicarb/citric acid (GIOVANY-SELTZER ORAL) Take 2 tablets by mouth daily as needed (Upset stomach).      atorvastatin (LIPITOR) 10 MG tablet TAKE 1 TABLET BY MOUTH ONCE A DAY 90 tablet 3    bumetanide (BUMEX) 2 MG tablet Take 1 tablet (2 mg total) by mouth once daily. 30 tablet 11    buPROPion (WELLBUTRIN SR) 100 MG TBSR 12 hr tablet TAKE 2 TABLETS BY MOUTH ONCE A DAY 60 tablet 0    busPIRone (BUSPAR) 30 MG Tab Take 1 tablet (30 mg total) by mouth 2 (two) times daily. 60 tablet 5    ciclopirox (PENLAC) 8 % Soln Apply topically nightly. 6.6 mL 6    coenzyme Q10 (CO Q-10) 100 mg capsule Take 100 mg by mouth once daily.      cyanocobalamin (VITAMIN B-12) 1000 MCG tablet Take 100 mcg by mouth once daily.      cyclobenzaprine (FLEXERIL) 10 MG tablet TAKE 1 TABLET BY MOUTH THREE TIMES A DAY AS NEEDED FOR MUSCLE SPASMS 90 tablet 2    dapagliflozin propanediol (FARXIGA) 10 mg tablet Take 1 tablet (10 mg total) by mouth once daily. 90 tablet 3     DEXCOM G6 SENSOR Cayla USE AS DIRECTED 3 each prn    DEXCOM G6 TRANSMITTER Cayla USE TO CHECK BLOOD SUGAR 1 each prn    dicyclomine (BENTYL) 20 mg tablet Take 1 tablet (20 mg total) by mouth 3 (three) times daily as needed (abdominal pain). 60 tablet 2    doxycycline (VIBRA-TABS) 100 MG tablet Take 1 tablet (100 mg total) by mouth 2 (two) times daily. Any generic brand capsule or tablet 30 tablet 0    DULoxetine (CYMBALTA) 60 MG capsule TAKE 1 CAPSULE BY MOUTH TWO TIMES A DAY 60 capsule 0    ferrous gluconate (FERGON) 324 MG tablet Take 1 tablet (324 mg total) by mouth daily with breakfast. 90 tablet 3    fluticasone propionate (FLONASE) 50 mcg/actuation nasal spray 2 sprays (100 mcg total) by Each Nostril route once daily. (Patient taking differently: 2 sprays by Each Nostril route as needed for Allergies.) 48 mL 2    gabapentin (NEURONTIN) 800 MG tablet Take 1 tablet (800 mg total) by mouth 3 (three) times daily. 90 tablet 3    insulin regular hum U-500 conc (HUMULIN R U-500, CONC, KWIKPEN) 500 unit/mL (3 mL) insulin pen INJECT 120 UNITS WITH BREAKFAST, INJECT 130 UNITS WITH LUNCH, AND INJECT 150 UNITS WITH DINNER 6 Pen 3    insulin syringe-needle U-100 (BD INSULIN SYRINGE ULTRA-FINE) 1 mL 31 gauge x 5/16 Syrg To use 6 times daily with insulin 400 each 11    Lactobac no.41/Bifidobact no.7 (PROBIOTIC-10 ORAL) Take by mouth.      lanolin/mineral oil/petrolatum (ARTIFICIAL TEARS OPHT) Place 2 drops into both eyes daily as needed (Dry eye).      LIDOcaine (LIDODERM) 5 % Place 1 patch onto the skin once daily. Remove & Discard patch within 12 hours or as directed by MD 30 patch 1    meloxicam (MOBIC) 15 MG tablet TAKE 1 TABLET BY MOUTH ONCE DAILY, DISCONTINUE CELEBREX 90 tablet 3    metFORMIN (GLUCOPHAGE-XR) 500 MG ER 24hr tablet Take 2 tablets (1,000 mg total) by mouth 2 (two) times daily with meals. 360 tablet 3    metoclopramide HCl (REGLAN) 5 MG tablet Take 1 tablet (5 mg total) by mouth 4 (four) times daily before  "meals and nightly. (Patient taking differently: Take 5 mg by mouth as needed.) 120 tablet 2    multivitamin capsule Take 1 capsule by mouth once daily.      nitroGLYCERIN (NITROSTAT) 0.4 MG SL tablet Place 1 tablet (0.4 mg total) under the tongue every 5 (five) minutes as needed for Chest pain. 30 tablet 3    NON FORMULARY MEDICATION Uses Cpap Machine nightly on a setting of 10      ondansetron (ZOFRAN-ODT) 4 MG TbDL Take 1 tablet (4 mg total) by mouth every 8 (eight) hours as needed (nausea). 30 tablet 5    ONETOUCH DELICA PLUS LANCET 33 gauge Misc Apply topically 3 (three) times daily.      ONETO24 Quan VERIO TEST STRIPS Strp 1 strip 3 (three) times daily.      pantoprazole (PROTONIX) 40 MG tablet Take 1 tablet (40 mg total) by mouth once daily. 90 tablet 3    pen needle, diabetic (BD ULTRA-FINE ORIG PEN NEEDLE) 29 gauge x 1/2" Ndle To use 3 needles daily 300 each 3    pen needle, diabetic 31 gauge x 1/4" Ndle Use with flex pen 100 each 11    pioglitazone (ACTOS) 30 MG tablet Take 1 tablet (30 mg total) by mouth once daily. 90 tablet 3    potassium chloride SA (K-DUR,KLOR-CON) 20 MEQ tablet TAKE 1 TABLET BY MOUTH EVERY OTHER DAY 45 tablet 3    propranoloL (INDERAL) 20 MG tablet TAKE 1 TABLET BY MOUTH THREE TIMES A DAY AS NEEDED FOR ANXIETY. MAY TAKE AN EXTRA TABLET AS NEEDED FOR ANXIETY 90 tablet 0    resmetirom 100 mg Tab Take 100 mg by mouth once daily. 30 tablet 11    tirzepatide (MOUNJARO) 15 mg/0.5 mL PnIj Inject 15 mg into the skin every 7 days. 4 Pen 11    traMADoL (ULTRAM) 50 mg tablet Take 2 tablets (100 mg total) by mouth every 8 (eight) hours as needed (severe pain). 180 tablet 2    traZODone (DESYREL) 100 MG tablet Take 1 tablet (100 mg total) by mouth every evening. 30 tablet 11    valsartan-hydrochlorothiazide (DIOVAN-HCT) 320-12.5 mg per tablet Take 1 tablet by mouth once daily. 90 tablet 3    vitamin D (VITAMIN D3) 1000 units Tab Take 1 tablet (1,000 Units total) by mouth once daily. 90 tablet 3    " vitamin D 1000 units Tab Take 5,000 Units by mouth 2 (two) times a day.      ZEGALOGUE AUTOINJECTOR 0.6 mg/0.6 mL AtIn Inject 1 Box  into the skin as needed (hypoglycemia). Glucagon is an emergency pen that is used to increase your blood sugar. Glucagon is a pen that is used in an emergency situation where you are unable to eat or drink anything. Glucagon is a medication that is given by someone else-spouse, child, etc. 1.2 mL 1    [DISCONTINUED] atorvastatin (LIPITOR) 10 MG tablet Take 1 tablet (10 mg total) by mouth once daily. 90 tablet 0    [DISCONTINUED] fluvoxaMINE (LUVOX) 100 MG tablet Take 1.5 tablets (150 mg total) by mouth 2 (two) times daily. 270 tablet 0     Facility-Administered Encounter Medications as of 6/10/2025   Medication Dose Route Frequency Provider Last Rate Last Admin    perflutren protein-A microsphr 0.22 mg/mL IV susp  0.5 mL Intravenous Once Yousef, You LUCIANO MD        sodium chloride 0.9% flush 10 mL  10 mL Intravenous PRN Fernando Roberts MD           Allergy:  Review of patient's allergies indicates:   Allergen Reactions    Dilaudid [hydromorphone] Other (See Comments)     IV dilaudid severely drops BP         Assessment and Diagnosis   Status/Progress: Based on the examination today, the patient's problem(s) is/are well controlled.  New problems have not been presented today.   Co-morbidities are complicating management of the primary condition.  There are no active rule-out diagnoses for this patient at this time.         General Impression:       ICD-10-CM ICD-9-CM   1. Recurrent major depressive disorder, in full remission  F33.42 296.36   2. Mixed obsessional thoughts and acts  F42.2 300.3   3. Anxiety  F41.9 300.00   4. Insomnia due to other mental disorder  F51.05 300.9    F99 327.02         Intervention/Counseling/Treatment Plan     Medication Management:  Continue Buspar 30 mg BID  Continue Cymbalta 60 mg BID  Continue gabapentin 800 mg TID - managed by pain  management  Continue Abilify 5 mg q day  Continue propranolol 20 mg TID prn anxiety. May take an additional tab po q d prn anxiety  Decrease Wellbutrin SR to 100 mg q  x 10 days then discontinue   Labs: reviewed most recent  Antipsychotics: I discussed with the patient the risks of extrapyramidal side effects (dystonia, akathisia, parkinsonism) including irreversible EPS known as tardive dyskinesia, metabolic syndrome (inlcuding hyperglycemia and hyperlipidemia), Neuroleptic Malignant Syndrome (fever, muscle rigidity, autonomic instability), orthostatic hypotension, and tardive dyskinesia with antipsychotic use. Pt was told to report these symptoms right away. Pt was notified that it is possible for these movements to become permanent. The risks and benefits of medication were discussed with the patient. The patient expresses understanding of the above and displays the capacity to agree with this treatment given said understanding. Patient also agrees that, currently, the benefits outweigh the risks and would like to pursue treatment at this time.  The treatment plan and follow up plan were reviewed with the patient.  Discussed with patient informed consent, risks vs. benefits, alternative treatments, side effect profile and the inherent unpredictability of individual responses to these treatments. The patient expresses understanding of the above and displays the capacity to agree with this current plan and had no other questions.  Encouraged Patient to keep future appointments.   Take medications as prescribed and abstain from substance abuse.   Pt was told to present to ED or call 911 for SI/HI plan or intent, psychosis, or other psychiatric or medical emergency, and pt agrees to this and verbalized understanding.          Return to Clinic: 6 months, or sooner if needed        Face to Face time with patient: 28 minutes  Total time: 33 minutes of total time spent on the encounter, which includes face to face time  and non-face to face time preparing to see the patient (eg, review of tests), Obtaining and/or reviewing separately obtained history, Documenting clinical information in the electronic or other health record, Independently interpreting results (not separately reported) and communicating results to the patient/family/caregiver, or Care coordination (not separately reported).       Verónica Briscoe, MSN, APRN, PMHNP-BC  [facility]

## 2025-06-11 ENCOUNTER — LAB VISIT (OUTPATIENT)
Dept: LAB | Facility: HOSPITAL | Age: 50
End: 2025-06-11
Attending: FAMILY MEDICINE
Payer: MEDICARE

## 2025-06-11 DIAGNOSIS — E87.5 HYPERKALEMIA: ICD-10-CM

## 2025-06-11 DIAGNOSIS — E11.65 TYPE 2 DIABETES MELLITUS WITH HYPERGLYCEMIA, WITH LONG-TERM CURRENT USE OF INSULIN: ICD-10-CM

## 2025-06-11 DIAGNOSIS — Z79.4 TYPE 2 DIABETES MELLITUS WITH HYPERGLYCEMIA, WITH LONG-TERM CURRENT USE OF INSULIN: ICD-10-CM

## 2025-06-11 LAB
ANION GAP (OHS): 11 MMOL/L (ref 8–16)
BUN SERPL-MCNC: 44 MG/DL (ref 2–20)
CALCIUM SERPL-MCNC: 9.4 MG/DL (ref 8.7–10.5)
CHLORIDE SERPL-SCNC: 105 MMOL/L (ref 95–110)
CO2 SERPL-SCNC: 24 MMOL/L (ref 23–29)
CREAT SERPL-MCNC: 1.7 MG/DL (ref 0.5–1.4)
GFR SERPLBLD CREATININE-BSD FMLA CKD-EPI: 49 ML/MIN/1.73/M2
GLUCOSE SERPL-MCNC: 170 MG/DL (ref 70–110)
POTASSIUM SERPL-SCNC: 5.4 MMOL/L (ref 3.5–5.1)
SODIUM SERPL-SCNC: 140 MMOL/L (ref 136–145)

## 2025-06-11 PROCEDURE — 36415 COLL VENOUS BLD VENIPUNCTURE: CPT | Mod: PN

## 2025-06-11 PROCEDURE — 80048 BASIC METABOLIC PNL TOTAL CA: CPT | Mod: PN

## 2025-06-12 ENCOUNTER — TELEPHONE (OUTPATIENT)
Dept: NEUROSURGERY | Facility: CLINIC | Age: 50
End: 2025-06-12
Payer: MEDICAID

## 2025-06-12 ENCOUNTER — RESULTS FOLLOW-UP (OUTPATIENT)
Dept: FAMILY MEDICINE | Facility: CLINIC | Age: 50
End: 2025-06-12

## 2025-06-12 DIAGNOSIS — I10 ESSENTIAL HYPERTENSION: Primary | ICD-10-CM

## 2025-06-12 NOTE — TELEPHONE ENCOUNTER
Ethan Young MA [place]endorf, Melanie R., PA-C  I sent the note Dr.Denis villalpando          Previous Messages       ----- Message -----  From: Lolly Duggan PA-C  Sent: 6/11/2025   1:40 PM CDT  To: Ethan Young MA  Subject: RE: MRI LUMBAR SPINE WITHOUT CONTRAST            Ok cool.  Was the MRI approved?    KENRICK Gonzalez PA-C  Neurosurgery  [facility]  06/11/2025  ----- Message -----  From: Ethan Young MA  Sent: 6/11/2025   1:02 PM CDT  To: Lolly Duggan PA-C  Subject: FW: MRI LUMBAR SPINE WITHOUT CONTRAST            Verónica did it  ----- Message -----  From: Lolly Duggan PA-C  Sent: 6/11/2025  12:41 PM CDT  To: Yg Rdz  Subject: RE: MRI LUMBAR SPINE WITHOUT CONTRAST            I have been out the past week and just returning today.    Can this peer to peer still be done?    KENRICK Gonzalez PA-C  Neurosurgery  [facility]  06/11/2025  ----- Message -----  From: Yg Covarrubias  Sent: 6/6/2025  10:24 AM CDT  To: Lolly Duggan PA-C; Urban Valencia*  Subject: MRI LUMBAR SPINE WITHOUT CONTRAST                Good Morning,     The initial review of your request for MRI LUMBAR SPINE WITHOUT CONTRAST has been completed by a physician reviewer and the clinical information received does not support the requirements of medical necessity guidelines for this procedure. The auth was DENIED for the following:      What was given to us in your doctor's notes or phone call does not meet [employer]'s Guidelines for a(n) Lumbar Spine MRI.  · Your doctor said you have pain in your back.  · According to [employer] Clinical Guideline 044 for Lumbar Spine MRI we need notes from your doctor that show you tried back exercises (such as formal physical therapy, a medically directed home exercise program, or chiropractic treatments) for six weeks and did not get better. The dates need to show this was done in the last six months. (Documentation  that shows participation may include the following: a discharge summary, notes stating the total number of visits with dates, or full details of the home exercise plan and days performed) before we can review for an approval.  · From the doctor's notes and/or phone call, we have not received the information needed.    Office note from 5/30/25 and recent xray      Therefore, a Peer to Peer Discussion may be required to process authorization request. Please contact [facility] at [phone]. Case #[id number]. Pt is scheduled for 6/10/25 and the Peer to Peer needs to be completed urgently.     Thanks,  Yg BLANC

## 2025-06-15 ENCOUNTER — PATIENT MESSAGE (OUTPATIENT)
Dept: NEUROSURGERY | Facility: CLINIC | Age: 50
End: 2025-06-15
Payer: MEDICARE

## 2025-06-15 RX ORDER — ATORVASTATIN CALCIUM 10 MG/1
10 TABLET, FILM COATED ORAL
Qty: 90 TABLET | Refills: 3 | OUTPATIENT
Start: 2025-06-15

## 2025-06-15 NOTE — TELEPHONE ENCOUNTER
No care due was identified.  [facility] Embedded Care Due Messages. Reference number: [id number].   6/14/2025 9:31:02 PM CDT

## 2025-06-15 NOTE — TELEPHONE ENCOUNTER
Refill Decision Note   Kirill Riggsford  is requesting a refill authorization.  Brief Assessment and Rationale for Refill:  Quick Discontinue     Medication Therapy Plan:  Receipt confirmed by pharmacy (6/9/2025  6:04 PM CDT)      Comments:     Note composed:3:43 PM 06/15/2025

## 2025-06-18 ENCOUNTER — CLINICAL SUPPORT (OUTPATIENT)
Dept: REHABILITATION | Facility: HOSPITAL | Age: 50
End: 2025-06-18
Payer: MEDICARE

## 2025-06-18 ENCOUNTER — PATIENT MESSAGE (OUTPATIENT)
Dept: FAMILY MEDICINE | Facility: CLINIC | Age: 50
End: 2025-06-18
Payer: MEDICARE

## 2025-06-18 DIAGNOSIS — G47.33 OSA (OBSTRUCTIVE SLEEP APNEA): Primary | ICD-10-CM

## 2025-06-18 DIAGNOSIS — I89.0 LYMPHEDEMA: Primary | ICD-10-CM

## 2025-06-18 DIAGNOSIS — E66.01 MORBID OBESITY: ICD-10-CM

## 2025-06-18 DIAGNOSIS — I89.0 LYMPHEDEMA OF BOTH LOWER EXTREMITIES: Primary | ICD-10-CM

## 2025-06-18 PROCEDURE — 97535 SELF CARE MNGMENT TRAINING: CPT

## 2025-06-18 PROCEDURE — 97016 VASOPNEUMATIC DEVICE THERAPY: CPT

## 2025-06-18 NOTE — PROGRESS NOTES
Outpatient Rehab    Occupational Therapy Discharge    Patient Name: Kirill Gandhi III  MRN: [id number]  YOB: 1975  Encounter Date: 6/18/2025    Therapy Diagnosis:   Encounter Diagnosis   Name Primary?    Lymphedema of both lower extremities Yes     Physician: Fernando Roberts MD    Physician Orders: Eval and Treat  Medical Diagnosis: Lymphedema, not elsewhere classified  Surgical Diagnosis: Not applicable for this Episode   Surgical Date: Not applicable for this Episode  Days Since Last Surgery: Not applicable for this Episode    Visit # / Visits Authorized: 6 / 12  Insurance Authorization Period: 5/15/2025 to 7/14/2025  Date of Evaluation: 4/10/2025  Plan of Care Certification: 5/5/2025 to 7/28/2025      Time In: 1230   Time Out: 1340  Total Time (in minutes): 70   Total Billable Time (in minutes): 70         Subjective   His legs have felt good since last session except for a little bit of swelling pooling at the top of his calf where the velcro wrap ends to below knee. He has not recieved home pump yet but the rep is coming to his house tomorrow to measure him for the sleeves. Overall he feels the swelling is well controlled. As of June 1st he has Medicare coverage. His Medicaid expires July 1st..  Pain reported as 8/10.      Objective          Lower extremity Girth Measurements (in centimeters): taken in supine  [place] Left lower extremity  Date: 5/5/25 Right lower extremity   Date: 5/5/25 LLE  5/29/25 RLE  5/29/25 LLE  6/2/25 RLE  6/2/25   Metatarsal Head 29.0 cm 29.5 cm 28.0 cm 28.0 cm 28.0 cm 27.5 cm   Medial Malleolus 36.5 cm 34.0 cm 34.0 cm 32.0 cm 34.0 cm 34.0 cm   (+) 5cm 37.0 cm 35.5 cm 34.0 cm 33.0 cm 33.0 cm 32.0 cm   (+) 10cm 42.0 cm 41.5 cm 41.5 cm 41.0 cm 40.0 cm 40.5 cm   (+) 20cm 56.0 cm 54.0 cm 53.5 cm 52.0 cm 55.0 cm 52.0 cm   (+) 30cm 52.5 cm 54.5 cm 52.5 cm 53.5 cm 53.5 cm 54.0 cm         Treatment:  Manual Therapy  MT 1: deferred bandaging this session  Self Care  and ADLs  ADL 1: See education section.  ADL 2: Inelastic Velcro wraps- calf and foot/ankle garments donned at end of session. pt instructed on proper application.  Other Activities  Activity 1: SEQUENTIAL COMPRESSION PUMP: full leg sleeve applied to RLE and LLE Airos 6 with default setting with distal pressures starting at 45mmHg entire extremity simultaneous to education.    Time Entry(in minutes):  Vasopneumatic Devices Time Entry: 45  Self Care/Home Management (ADLs) Time Entry: 40    Assessment & Plan   Assessment: Pt has participated in complete decongestive therapy x 6 weeks. BLE have reduced as evidence by decreased girth measurements and presence of bony prominences and landmarks. Girth measurements are no longer reducing. Pt has met maximum benefit of therapy at this time and is ready for transition to home management (see treatment section). All questions and concerns addressed. Issued 3 sets of calf and foot/ankle Velcro wraps in session. Tall calf garments issued to prevent fluid migration below knee. Provided [employer] medical Rx for provider to fill out if he would like to purchase L&R ReadyWraps instead of [employer] Compreflex garments in the future.   Evaluation/Treatment Tolerance: Patient tolerated treatment well    The patient's spiritual, cultural, and educational needs were considered, and the patient is agreeable to the plan of care and goals.     Education  Education was done with Patient. The patient's learning style includes Demonstration and Listening. The patient Demonstrates understanding and Verbalizes understanding.          Patient was educated in  Compression needs: inelastic Velcro wrap- foot/ankle and inelastic Velcro wrap- calf Home management plan: Wear schedule: Eduardo first thing in the morning, remove at the end of the day as close to bedtime as possible. Do not sleep in garments. If using a home pneumatic pump, remove garments when using pump. If it is not yet bedtime, resume  wearing garments until bed. Donning/doffing techniques Wash and management of products Cost/coverage of compression garments: Medicare now pays for compression. Private insurance coverage is on a case-by-case basis. In order to determine coverage an Rx with a diagnosis of lymphedema is sent to a participating DME for a benefits check.  Medicare Coverage: Daytime garments - 3 sets (one garment for each affected body part) every six months,  standard or custom fit, or a combination of both. Nighttime garments - 2 sets (one garment for each affected body part) every two years, standard or custom fit, or a combination of both. Bandaging supplies - no set limit in the rule. Accessories - no set limit, will be determined on a case-by-case basis depending on the needs of the patient. Coverage is under Medicare part B. After deductible is met, a 20% copayment is required for all items. Secondary insurance often covers copayment's.  Commitment to attendance and securing compression needs are critical to lymphedema management Pneumatic pump benefits, set-up, use, referral process, coverage through insurance. Coverage is based on insurance plan as well as if deductible has been met. Pneumatic pump is an added tool for managing lymphedema at home and is meant to be used in addition to wearing compression garments daily but does not substitute compression garments.  Seek new referral if edema status worsens or changes in the future despite compliance with home management techniques.  Monitor for signs/symptoms of infection and seek medical attention immediately if symptoms occur. Do not need to return to therapy for new compression garments. PCP/referring provider can re-order compression garment Rx. Rx must include i89.0 lymphedema dx code. DME requires a face-to-face provider appointment within 6 months with i89.0 lymphedema dx code and medical justification statement for compression garments.         Plan: Discharge to home  management. See education section.    Goals:   Resolved       Long Term Goals       Patient and/or caregiver to alex/doff compression garment independently and with daily compliance to assist in lymphedema management, skin elasticity, and tissue density  (Met)       Start:  05/05/25    Expected End:  06/16/25    Resolved:  05/29/25         Patient and/or caregiver will be independent in use of pneumatic compression pump or self manual lymphatic drainage techniques to areas within reach to enhance lymphatic drainage and skin condition.   (Met)       Start:  05/05/25    Expected End:  06/16/25    Resolved:  05/29/25         Patient to be independent and compliant with home exercise program to allow for increased function in affected limb.  (Met)       Start:  05/05/25    Expected End:  06/16/25    Resolved:  05/29/25         Patient will show decreased total girth measurement in BLE by up to 4 cm to allow for lower extremity symmetry, shoe and clothing choice, and ability to apply needed compression daily.  (Met)       Start:  05/05/25    Expected End:  06/16/25    Resolved:  06/02/25            Short Term Goals       Patient will demonstrate 100% knowledge of lymphedema precautions and signs of infection to allow for reduced lymphedema risk, infection risk, and/or exacerbation of condition.   (Met)       Start:  05/05/25    Expected End:  05/26/25    Resolved:  05/29/25         Patient will tolerate daily activities wearing multilayered bandaging to allow for lymphatic drainage support, skin elasticity, and reduction in shape and size of limb.   (Met)       Start:  05/05/25    Expected End:  05/26/25    Resolved:  05/29/25         Patient and/or caregiver will order/obtain appropriate compression garments to maintain lymphatic and venous support.   (Met)       Start:  05/05/25    Expected End:  05/26/25    Resolved:  05/29/25         Patient will show decreased total girth measurement in BLE by up to 2 cm to allow  for lower extremity symmetry, shoe and clothing choice, and ability to apply needed compression.  (Met)       Start:  05/05/25    Expected End:  05/26/25    Resolved:  05/29/25             Sunshine Mera OT

## 2025-06-19 VITALS — SYSTOLIC BLOOD PRESSURE: 100 MMHG | DIASTOLIC BLOOD PRESSURE: 58 MMHG

## 2025-06-23 ENCOUNTER — TELEPHONE (OUTPATIENT)
Dept: OPTOMETRY | Facility: CLINIC | Age: 50
End: 2025-06-23
Payer: MEDICARE

## 2025-06-24 ENCOUNTER — PATIENT MESSAGE (OUTPATIENT)
Dept: FAMILY MEDICINE | Facility: CLINIC | Age: 50
End: 2025-06-24
Payer: MEDICARE

## 2025-06-24 ENCOUNTER — OFFICE VISIT (OUTPATIENT)
Dept: GASTROENTEROLOGY | Facility: CLINIC | Age: 50
End: 2025-06-24
Payer: MEDICARE

## 2025-06-24 VITALS
HEART RATE: 97 BPM | BODY MASS INDEX: 42.66 KG/M2 | SYSTOLIC BLOOD PRESSURE: 110 MMHG | WEIGHT: 315 LBS | DIASTOLIC BLOOD PRESSURE: 68 MMHG | HEIGHT: 72 IN

## 2025-06-24 DIAGNOSIS — K21.9 GASTROESOPHAGEAL REFLUX DISEASE, UNSPECIFIED WHETHER ESOPHAGITIS PRESENT: ICD-10-CM

## 2025-06-24 DIAGNOSIS — K80.20 GALLSTONES: ICD-10-CM

## 2025-06-24 DIAGNOSIS — F11.20 OPIOID DEPENDENCE WITH CURRENT USE: ICD-10-CM

## 2025-06-24 DIAGNOSIS — K60.2 ANAL FISSURE: Primary | ICD-10-CM

## 2025-06-24 DIAGNOSIS — R19.5 HEME POSITIVE STOOL: ICD-10-CM

## 2025-06-24 DIAGNOSIS — R19.8 ALTERNATING CONSTIPATION AND DIARRHEA: ICD-10-CM

## 2025-06-24 DIAGNOSIS — K62.5 RECTAL BLEEDING: ICD-10-CM

## 2025-06-24 PROCEDURE — 99215 OFFICE O/P EST HI 40 MIN: CPT | Mod: PBBFAC,PN | Performed by: NURSE PRACTITIONER

## 2025-06-24 PROCEDURE — 99214 OFFICE O/P EST MOD 30 MIN: CPT | Mod: S$PBB,,, | Performed by: NURSE PRACTITIONER

## 2025-06-24 PROCEDURE — 99999 PR PBB SHADOW E&M-EST. PATIENT-LVL V: CPT | Mod: PBBFAC,,, | Performed by: NURSE PRACTITIONER

## 2025-06-24 RX ORDER — PANTOPRAZOLE SODIUM 40 MG/1
40 TABLET, DELAYED RELEASE ORAL DAILY
Qty: 90 TABLET | Refills: 3 | Status: SHIPPED | OUTPATIENT
Start: 2025-06-24

## 2025-06-24 RX ORDER — NITROGLYCERIN 4 MG/G
OINTMENT RECTAL
Qty: 30 G | Refills: 1 | Status: SHIPPED | OUTPATIENT
Start: 2025-06-24

## 2025-06-24 RX ORDER — ATORVASTATIN CALCIUM 10 MG/1
10 TABLET, FILM COATED ORAL DAILY
Qty: 90 TABLET | Refills: 3 | Status: SHIPPED | OUTPATIENT
Start: 2025-06-24

## 2025-06-25 NOTE — TELEPHONE ENCOUNTER
No care due was identified.  Health [facility] Embedded Care Due Messages. Reference number: [id number].   6/24/2025 9:30:06 PM CDT

## 2025-06-25 NOTE — PROGRESS NOTES
ENDOCRINOLOGY FOLLOW UP VISIT: 06/26/2025    The patient location is: Home  The chief complaint leading to consultation is: Diabetes and abnormal thyroid labs    Visit type: audiovisual    Face to Face time with patient: 28 minutes  40 minutes of total time spent on the encounter, which includes face to face time and non-face to face time preparing to see the patient (eg, review of tests), Obtaining and/or reviewing separately obtained history, Documenting clinical information in the electronic or other health record, Independently interpreting results (not separately reported) and communicating results to the patient/family/caregiver, or Care coordination (not separately reported).     Each patient to whom he or she provides medical services by telemedicine is:  (1) informed of the relationship between the physician and patient and the respective role of any other health care provider with respect to management of the patient; and (2) notified that he or she may decline to receive medical services by telemedicine and may withdraw from such care at any time.    Subjective:      Patient ID: Kirill Gandhi III is a 50 y.o. male.    Chief Complaint:  Follow-up and Thyroid Nodule    History of Present Illness      Patient presents today for follow up of diabetes management and abnormal thyroid function. He has a history of diabetes diagnosed in early 30s. Blood sugars were previously consistently in the 400s but have significantly improved with current medication regimen. He experienced one low blood sugar episode last night due to late dinner. Blood sugars were approximately 90% in range during a recent three-week strict diet attempt, during which he temporarily discontinued insulin before returning to previous eating habits. He reports eating three meals daily, sometimes inconsistently, and denies current dietary restrictions. He notes significant improvement in diabetes management since starting current  "regimen, particularly with Mounjaro. He has experienced approximately 20 lbs of weight loss in recent months. Current medications include Mounjaro 15mg, Humulin 100 units with meals (administered 25-30 minutes before eating), Actos 30mg daily, Metformin 500mg two tablets twice daily, and Farxiga 10mg daily. He reports diabetic neuropathy managed with TENS patches for approximately nine months, with pain currently controlled. He denies any visual problems related to diabetes or known kidney complications. Recent laboratory studies showed low TSH. He experiences persistent fatigue, diarrhea, palpitations, and shortness of breath, which prompted cardiac evaluation. He denies tremors and worsening anxiety. He has a pending cardiac angiogram, conditionally recommended pending weight loss. Shortness of breath was the primary symptom prompting initial cardiac evaluation. He currently has no formal cardiac diagnosis. He currently uses vape and maintains a sedentary lifestyle with no physical activity due to persistent back pain. Family history is positive for diabetes in his aunt and grandmother. He denies family history of thyroid issues.          Regarding diabetes:    Initially Diagnosed with T2D:  Early 30's    Lab Study: Result:   C-Peptid No results found for: "GLUTAMICACID"   HOWIE No results found for: "CPEPTIDE"         Current symptoms/problems:   Denies any polyuria, polydipsia, nocturia, nausea vomiting, abdominal discomfort, visual change    Denies h/o frequent UTIs/yeast infections, DKA, ketogenic diet, diuretics   Denies h/o pancreatitis, recurrent gallstones, daily etoh use, MEN syndrome, pancreatic tumors, gastroparesis, diabetic retinopathy    Neuropathy on Qutenza patches with pain management.      Known diabetic complications: peripheral neuropathy  Cardiovascular risk factors: diabetes mellitus, dyslipidemia, hypertension, male gender, obesity (BMI >= 30 kg/m2), and sedentary lifestyle    Current diabetic " medications include:    1.   Mounjaro 15 mg weekly   2.   Humalin 100 TID (25-30 mins prior to injection)   3.   Pioglitazone 30 mg daily   4.   Metformin 500 mg XR (1000 mg BID)   5.   Farxiga 10 mg daily    Other medications tried:  Levemir  Novolog    Diet: on average, 3 meals per day.  With largest meal being at night.  Was recently on very low carb diet.      Exercise: none    Glucose Trends:          Any episodes of hypoglycemia? no    Family Hx:  Aunt and grandmother with DM           Wt Readings from Last 10 Encounters:   06/24/25 (!) 214.8 kg (473 lb 10.5 oz)   05/30/25 (!) 211.4 kg (466 lb 0.8 oz)   05/14/25 (!) 212 kg (467 lb 4.2 oz)   04/17/25 (!) 214.6 kg (473 lb 1.7 oz)   04/10/25 (!) 222.4 kg (490 lb 4.9 oz)   03/26/25 (!) 215.9 kg (476 lb)   03/25/25 (!) 216.1 kg (476 lb 6.6 oz)   02/24/25 (!) 207.7 kg (457 lb 14.3 oz)   02/21/25 (!) 207.7 kg (458 lb)   02/19/25 (!) 208 kg (458 lb 8.9 oz)       Diabetes Management Status    Statin: Taking  ACE/ARB: Taking      Screening or Prevention Patient's value   HgA1C Testing and Control Lab Results   Component Value Date    HGBA1C 5.9 (H) 06/03/2025    HGBA1C 5.7 (H) 12/13/2024    HGBA1C 7.4 (H) 06/11/2024    HGBA1C 6.5 (H) 12/08/2023    HGBA1C 7.2 (H) 08/09/2023      Lipid profile  : 06/03/2025   LDL control (goal LDL <70) Lab Results   Component Value Date    LDLCALC 33.4 (L) 06/03/2025      Nephropathy screening Lab Results   Component Value Date    LABMICR <5.0 06/11/2024     Lab Results   Component Value Date    PROTEINUA Negative 11/11/2022      Blood pressure BP Readings from Last 3 Encounters:   06/24/25 110/68   06/19/25 (!) 100/58   05/30/25 100/62      Dilated retinal exam : 05/16/2024   Foot exam : 02/19/2025        Regarding Abnormal Thyroid Testing:    - Duration of hyperthyroidism:  Abnormal TSH over the years intermittently since at least 2019  - Etiology determined to be: Possibly multinodular goiter  - Current relevant medications: None  -    Lab Results   Component Value Date    TSH 0.292 (L) 06/03/2025    TSH 0.541 03/27/2023    TSH 0.365 (L) 10/07/2020    FREET4 0.97 06/03/2025    FREET4 0.79 03/27/2023    FREET4 0.93 10/07/2020     - Most recent thyroid imaging:  None    - Most recent DEXA:  None    - Current symptoms:   No   Yes  [x]    []   Heat intolerance/sweating  []    [x]   Palpitations (randomly for many years)  []    [x]   Shortness of breath  [x]    []   Neck swelling/pain/pressure or hoarseness  [x]    []   Weight loss  [x]    []   Tremor  [x]    []   Anxiety/Nervousness  []    [x]   Fatigue  []    [x]   Diarrhea (for many years)  [x]    []   Constipation  [x]    []   Eye bulging/redness/pain/swelling  [x]    []   Hair thinning    - Pertinent factors:  No   Yes  []    [x]   Tobacco Use (uses Vape)  [x]    []   Exogenous thyroid medication  [x]    []   Recent iodinated contrast  [x]    []   Biotin use   [x]    []   Amiodarone or Lithium use   [x]    []   Chemotherapy   [x]    []   Prior neck radiation     - Personal or Family History:   No   Yes  [x]    []   Thyroid Disorder   [x]    []   Thyroid Cancer      ROS:   As above    Objective:     There were no vitals taken for this visit.  BP Readings from Last 3 Encounters:   06/24/25 110/68   06/19/25 (!) 100/58   05/30/25 100/62     Wt Readings from Last 1 Encounters:   06/24/25 1031 (!) 214.8 kg (473 lb 10.5 oz)     There is no height or weight on file to calculate BMI.    Physical Exam    General: No acute distress. Nontoxic appearing.  Psychiatric: Normal mood. Normal affect. No evidence of SI.           Lab Review:   Lab Results   Component Value Date    HGBA1C 5.9 (H) 06/03/2025     Lab Results   Component Value Date    CHOL 110 (L) 06/03/2025    HDL 29 (L) 06/03/2025    LDLCALC 33.4 (L) 06/03/2025    TRIG 238 (H) 06/03/2025    CHOLHDL 26.4 06/03/2025     Lab Results   Component Value Date     06/11/2025    K 5.4 (H) 06/11/2025     06/11/2025    CO2 24 06/11/2025      (H) 06/11/2025    BUN 44 (H) 06/11/2025    CREATININE 1.7 (H) 06/11/2025    CALCIUM 9.4 06/11/2025    PROT 8.0 06/03/2025    ALBUMIN 4.1 06/03/2025    BILITOT 0.2 06/03/2025    ALKPHOS 70 06/03/2025    AST 21 06/03/2025    ALT 21 06/03/2025    ANIONGAP 11 06/11/2025    ESTGFRAFRICA >60 04/15/2022    EGFRNONAA >60 04/15/2022    TSH 0.292 (L) 06/03/2025     Vit D, 25-Hydroxy   Date Value Ref Range Status   02/04/2016 26 (L) 30 - 96 ng/mL Final     Comment:     Vitamin D deficiency.........<10 ng/mL                              Vitamin D insufficiency......10-29 ng/mL       Vitamin D sufficiency........> or equal to 30 ng/mL  Vitamin D toxicity............>100 ng/mL         Assessment and Plan       Evaluated recent low TSH result, likely transient. No overt hyperthyroidism symptoms present.  Considered potential multinodular goiter based on past TSH values, but no imaging available to confirm.  Opted against anti-thyroid medications due to potential negative impact on metabolism and weight.  Diabetes management: Time in range 55%, GMI 7.6%. Noted hyperglycemia spikes, particularly after dinner.  Acknowledged recent 20-pound weight loss, currently stable.    TYPE 2 DIABETES MELLITUS WITH HYPERGLYCEMIA, WITH LONG-TERM CURRENT USE OF INSULIN:  - Dexcom CGM data downloaded and reviewed showing persistent spikes in glucose with meals, mainly with dinner  - Educated on the importance of confirming suspected low glucose readings with a finger stick test.  - Explained the role of Farxiga in promoting sugar loss through urine and its potential side effects, including UTIs.  - Continued Humulin R insulin: 120 units at dinner (increased from 100 units), 100 units with breakfast and lunch.  - Continued Metformin 500 mg, 2 tablets twice daily.  - Continued Pioglitazone 30 mg daily.  - Continued Farxiga 10 mg daily.  - Continued Mounjaro 15 mg weekly.  - Patient to continue making healthy eating choices.  - Patient to engage in  physical activity as tolerated, considering back pain limitations.  - Scheduled for bariatric medicine evaluation.    ABNORMAL TSH:  - Explained potential causes of transient low TSH, including illness and thyroid nodules.  - Ordered thyroid function tests (TSH and free T4) to be completed within 1-2 weeks.    SUBCLINICAL HYPERTHYROIDISM:  - Discussed symptoms of hyperthyroidism, including rapid heartbeat, heat intolerance, and sweating.  - Explained the impact of anti-thyroid medications on metabolism and potential for weight gain.    PLAN SUMMARY:  - Continue Farxiga 10 mg daily  - Continue Mounjaro 15 mg weekly  - Order thyroid function tests (TSH and free T4) within 1-2 weeks  - Continue Metformin 500 mg, 2 tablets twice daily  - Continue Pioglitazone 30 mg daily  - Increase Humulin R insulin: 120 units at dinner, 100 units with breakfast and lunch  - Scheduled for bariatric medicine evaluation  - Patient to maintain healthy eating choices and engage in physical activity as tolerated          RTC in 3-4 months.  Labs soon for thyroid function.      **Visit today included increased complexity associated with the care of the problems addressed and managing the longitudinal care of the patient due to the serious and/or complex managed problems      Memo Williamson,      This note was generated with the assistance of ambient listening technology. Verbal consent was obtained by the patient and accompanying visitor(s) for the recording of patient appointment to facilitate this note. I attest to having reviewed and edited the generated note for accuracy, though some syntax or spelling errors may persist. Please contact the author of this note for any clarification.

## 2025-06-25 NOTE — TELEPHONE ENCOUNTER
Refill Decision Note   Kirill Gandhi  is requesting a refill authorization.  Brief Assessment and Rationale for Refill:  Approve     Medication Therapy Plan:         Comments:     Note composed:10:25 PM 06/24/2025

## 2025-06-26 ENCOUNTER — HOSPITAL ENCOUNTER (OUTPATIENT)
Dept: RADIOLOGY | Facility: HOSPITAL | Age: 50
Discharge: HOME | End: 2025-06-26
Attending: ANESTHESIOLOGY
Payer: MEDICARE

## 2025-06-26 ENCOUNTER — OFFICE VISIT (OUTPATIENT)
Dept: ENDOCRINOLOGY | Facility: CLINIC | Age: 50
End: 2025-06-26
Payer: MEDICARE

## 2025-06-26 DIAGNOSIS — E05.90 SUBCLINICAL HYPERTHYROIDISM: ICD-10-CM

## 2025-06-26 DIAGNOSIS — M54.9 DORSALGIA, UNSPECIFIED: ICD-10-CM

## 2025-06-26 DIAGNOSIS — Z79.4 TYPE 2 DIABETES MELLITUS WITH HYPERGLYCEMIA, WITH LONG-TERM CURRENT USE OF INSULIN: Primary | ICD-10-CM

## 2025-06-26 DIAGNOSIS — E11.65 TYPE 2 DIABETES MELLITUS WITH HYPERGLYCEMIA, WITH LONG-TERM CURRENT USE OF INSULIN: Primary | ICD-10-CM

## 2025-06-26 DIAGNOSIS — R79.89 ABNORMAL TSH: ICD-10-CM

## 2025-06-26 PROCEDURE — 72148 MRI LUMBAR SPINE W/O DYE: CPT | Mod: 26,,, | Performed by: RADIOLOGY

## 2025-06-26 PROCEDURE — 72148 MRI LUMBAR SPINE W/O DYE: CPT | Mod: TC

## 2025-06-26 RX ORDER — CHOLESTYRAMINE 4 G/9G
4 POWDER, FOR SUSPENSION ORAL
Qty: 90 PACKET | Refills: 3 | Status: SHIPPED | OUTPATIENT
Start: 2025-06-26 | End: 2026-06-26

## 2025-06-26 NOTE — PATIENT INSTRUCTIONS
As mentioned please increase Humalin dose at dinner to 120 units.  Reach out if sugar is constantly above 200 or if drops in sugar below 80 frequently.  We may need to adjust medicine sooner at that time.    We will set up thyroid labs soon for you.  Follow up will be set up with us about 3-4 months from now.

## 2025-06-26 NOTE — PROGRESS NOTES
Subjective:       Patient ID: Kirill Gandhi III is a 50 y.o. male.    Chief Complaint: Rectal Bleeding and Diarrhea    49 y/o male with multiple diagnoses as listed in problem list and medical history referred by PCP for rectal bleeding. Patient is new to me. Last seen by Dr. Champion in 2022. Patient reports long history of intermittent diarrhea. Has diarrhea at least EOD with 3-5 BMs daily with watery stool. Rarely has formed stool. Reports seeing bright red blood with mixed stool and some rectal pain after BM. Has history of internal hemorrhoids seen on colonoscopy in 2022. States fiber supplements have not improved diarrhea. He has heartburn and upper abdominal pain after eating. Taking OTC antacids without relief. Has frequent belching that smell like rotten eggs with associated nausea. No vomiting. GES in 2022 was normal.          Endoscopy History  - 9/06/2022 Colonoscopy: One 10 mm polyp in the ascending colon, removed with a hot snare. Resected and retrieved. Diverticulosis in the sigmoid colon. Internal hemorrhoids.   - 10/31/2022 EGD: Normal esophagus. Biopsied. Erythematous mucosa in the antrum. Biopsied. Normal examined duodenum. Biopsied. 1 cm hiatal hernia.     Imaging History  - 11/29/2022 GES: Normal study. The gastric emptying half time was calculated to be 28 minutes. Normal is less than 90 minutes.     Past Medical History:   Diagnosis Date    Allergy     Anxiety     Back pain     Chronic bilateral low back pain with bilateral sciatica 12/14/2021    CTS (carpal tunnel syndrome) 06/26/2015    CTS (carpal tunnel syndrome) 6/26/2015    Depression     Diabetes 04/17/2015    Diabetes mellitus with insulin therapy     Elevated sed rate 6/26/2015    GERD (gastroesophageal reflux disease) 10/19/2022    Hx of vasectomy     Hypertension     Morbid obesity 4/16/2015    OCD (obsessive compulsive disorder)     Sleep apnea     Type 2 diabetes mellitus with hyperglycemia, with long-term current use of  insulin 10/17/2022       Past Surgical History:   Procedure Laterality Date    ADENOIDECTOMY      CAUDAL EPIDURAL STEROID INJECTION N/A 10/30/2024    Procedure: Caudal NATALIE;  Surgeon: Clark Smith MD;  Location: [facility] CATH LAB;  Service: Pain Management;  Laterality: N/A;  with catheter    COLONOSCOPY N/A 9/6/2022    Procedure: COLONOSCOPY;  Surgeon: Rossy Champion MD;  Location: [facility] ENDO;  Service: Endoscopy;  Laterality: N/A;    EPIDURAL STEROID INJECTION Right 09/04/2019    Procedure: Injection, Steroid, Epidural Transforaminal;  Surgeon: Harjinder Batista Jr., MD;  Location: [facility] ENDO;  Service: Pain Management;  Laterality: Right;  Right L3 + L4 TF NATALIE    90020  [id number]    Arrive @ 1300; ASA last 8/27; Check BG; NEEDS CONSENT    ESOPHAGOGASTRODUODENOSCOPY N/A 10/31/2022    Procedure: EGD (ESOPHAGOGASTRODUODENOSCOPY);  Surgeon: Rossy Champion MD;  Location: [facility] ENDO;  Service: Endoscopy;  Laterality: N/A;    FRACTURE SURGERY      bilateral elbow fracture 3 years ago    FUSION OF LUMBAR SPINE BY ANTERIOR APPROACH Left 04/26/2021    Procedure: FUSION, SPINE, LUMBAR, ANTERIOR APPROACH Left L3-4 L4-5 OLIF BRENDA, ALL Release, L5-S1 ALIF;  Surgeon: Jose L Brown MD;  Location: [facility];  Service: Neurosurgery;  Laterality: Left;  Procedure: Left L3-4 L4-5 OLIF BRENDA, ALL Release, L5-S1 ALIF  Surgery Time: 2.5 Hrs  LOS: 2-3  Anesthesia: General  Blood: Type & Screen  Radiology: Josseline  Brace: LSO  Bed: Regular  Position: LAteral Righ    FUSION OF SACROILIAC JOINT Bilateral 04/13/2022    Procedure: FUSION, SACROILIAC JOINT Stg 2: Bilateral Open SI Joint fusion, Bedrock from Si Bone;  Surgeon: Jose L Brown MD;  Location: [facility] OR;  Service: Neurosurgery;  Laterality: Bilateral;    FUSION OF SPINE WITH INSTRUMENTATION N/A 04/26/2021    Procedure: FUSION, SPINE, WITH INSTRUMENTATION L3-S1 Posteior Segmental Instrumentation;  Surgeon: Jose L Brown MD;  Location: [facility] OR;  Service: Neurosurgery;   Laterality: N/A;  Procedure: L3-S1 Posteior Segmental Instrumentation  Sirgery Time: 2 Hrs  LOS: 2-3  Anesthesia: General  Blood: Tyoe & Screen  Radiology: Dual C arm  Brace:LSO  Bed: [place] [id number] Poster  Position: Prone  Equipment: Depuy     FUSION OF SPINE WITH INSTRUMENTATION N/A 04/13/2022    Procedure: FUSION, SPINE, WITH INSTRUMENTATION Stg 1: Revision of posterior L3-S1 hardware, Depuy. Sacro-pelvic fixation, screw augmentation PMMA. L4-S1 posterolateral arthrodesis. Open Gauge screws, 11-12mm;  Surgeon: Jose L Brown MD;  Location: [facility] OR;  Service: Neurosurgery;  Laterality: N/A;    HERNIA REPAIR      bilateral groin hernia    INJECTION OF STEROID Bilateral 12/13/2021    Procedure: INJECTION, STEROID Bilateral SI JOint Block and Steroid Injection;  Surgeon: Jose L Brown MD;  Location: [facility] OR;  Service: Neurosurgery;  Laterality: Bilateral;  Procedure: Bilateral SI JOint Block and Steroid Injection   Surgery Time: 30 min  LOS: 0  Anesthesia: MAC  Radiology: C-arm  Bed: Regular with pillows  Position: Prone    LUMBAR LAMINECTOMY Bilateral 11/29/2019    Procedure: LAMINECTOMY, SPINE, LUMBAR Rigth L3-4, Left L4-5 Laminectomy, medial Facetectomy, and microdiscectomy;  Surgeon: Jose L Brown MD;  Location: [facility] OR;  Service: Neurosurgery;  Laterality: Bilateral;  Procedure: Rigth L3-4, Left L4-5 Laminectomy, medial Facetectomy, and microdiscectomy  Surgery Time: 2.5 Hrs  LOS: 0-1  Anesthesia: General  Blood: Type & Screen  Radiology: C-arm  Micros    REMOVAL IMPLANT, POSTERIOR SEGMENT, INTRAOCULAR  04/13/2022    Procedure: REMOVAL IMPLANT, POSTERIOR SEGMENT, INTRAOCULAR;  Surgeon: Jose L Brown MD;  Location: [facility] OR;  Service: Neurosurgery;;    SPINE SURGERY      TONSILLECTOMY      VASECTOMY         Family History   Problem Relation Name Age of Onset    Fibromyalgia Mother      Osteoarthritis Mother      Cataracts Maternal Grandmother      Macular degeneration Maternal Grandfather      Cataracts  Maternal Grandfather      Rheum arthritis Neg Hx      Psoriasis Neg Hx      Lupus Neg Hx      Kidney disease Neg Hx      Inflammatory bowel disease Neg Hx      Amblyopia Neg Hx      Blindness Neg Hx      Glaucoma Neg Hx      Retinal detachment Neg Hx      Strabismus Neg Hx         Social History     Socioeconomic History    Marital status: Significant Other   Tobacco Use    Smoking status: Every Day     Types: Vaping with nicotine     Passive exposure: Current    Smokeless tobacco: Never    Tobacco comments:     Handout provided for [employer] Smoking Cessaiton program 10/11/24 - pt has stopped vaping   Substance and Sexual Activity    Alcohol use: Not Currently    Drug use: Never    Sexual activity: Yes     Partners: Female     Birth control/protection: None     Comment: ; one child      Social Drivers of Health     Financial Resource Strain: Low Risk  (2/4/2025)    Overall Financial Resource Strain (CARDIA)     Difficulty of Paying Living Expenses: Not hard at all   Food Insecurity: No Food Insecurity (2/4/2025)    Hunger Vital Sign     Worried About Running Out of Food in the Last Year: Never true     Ran Out of Food in the Last Year: Never true   Transportation Needs: No Transportation Needs (1/15/2024)    PRAPARE - Transportation     Lack of Transportation (Medical): No     Lack of Transportation (Non-Medical): No   Physical Activity: Inactive (2/4/2025)    Exercise Vital Sign     Days of Exercise per Week: 0 days     Minutes of Exercise per Session: 0 min   Stress: No Stress Concern Present (2/4/2025)    [employer] [place] [employer] - Occupational Stress Questionnaire     Feeling of Stress : Only a little   Housing Stability: Low Risk  (1/15/2024)    Housing Stability Vital Sign     Unable to Pay for Housing in the Last Year: No     Number of Places Lived in the Last Year: 2     Unstable Housing in the Last Year: No       Review of Systems   Constitutional:  Negative for appetite change and  unexpected weight change.   HENT:  Negative for trouble swallowing.    Respiratory:  Negative for shortness of breath.    Cardiovascular:  Negative for chest pain.   Gastrointestinal:  Positive for abdominal pain, blood in stool, diarrhea and nausea.   Hematological:  Negative for adenopathy. Does not bruise/bleed easily.   Psychiatric/Behavioral:  Negative for dysphoric mood.          Objective:     Vitals:    06/24/25 1031   BP: 110/68   BP Location: Right forearm   Patient Position: Sitting   Pulse: 97   Weight: (!) 214.8 kg (473 lb 10.5 oz)   Height: 6' (1.829 m)          Physical Exam  Constitutional:       General: He is not in acute distress.     Appearance: He is obese.   HENT:      Head: Normocephalic.   Eyes:      Conjunctiva/sclera: Conjunctivae normal.   Pulmonary:      Effort: Pulmonary effort is normal.   Abdominal:      General: Bowel sounds are normal.      Palpations: Abdomen is soft.      Tenderness: There is no abdominal tenderness.      Comments: Exam limited by body habitus   Genitourinary:      Musculoskeletal:         General: Normal range of motion.      Cervical back: Normal range of motion.   Skin:     General: Skin is warm and dry.   Neurological:      Mental Status: He is alert and oriented to person, place, and time.   Psychiatric:         Mood and Affect: Mood normal.         Behavior: Behavior normal.               Assessment:         ICD-10-CM ICD-9-CM   1. Anal fissure  K60.2 565.0   2. Rectal bleeding  K62.5 569.3   3. Heme positive stool  R19.5 792.1   4. Alternating constipation and diarrhea  R19.8 787.99   5. Gastroesophageal reflux disease, unspecified whether esophagitis present  K21.9 530.81   6. Gallstones  K80.20 574.20   7. Opioid dependence with current use  F11.20 304.00       Plan:       Anal fissure  -     nitroglycerin (RECTIV) 0.4 % (w/w) Oint; Apply small amount twice daily.  Dispense: 30 g; Refill: 1  -     Ambulatory referral/consult to Colorectal Surgery; Future;  Expected date: 07/01/2025    Rectal bleeding  -     Ambulatory referral/consult to Colorectal Surgery; Future; Expected date: 07/01/2025    Heme positive stool  -     Likely 2/2 IH    Alternating constipation and diarrhea  -     cholestyramine (QUESTRAN) 4 gram packet; Take 1 packet (4 g total) by mouth 3 (three) times daily with meals.  Dispense: 90 packet; Refill: 3    Gastroesophageal reflux disease, unspecified whether esophagitis present  -     pantoprazole (PROTONIX) 40 MG tablet; Take 1 tablet (40 mg total) by mouth once daily.  Dispense: 90 tablet; Refill: 3    Gallstones  -     NM Hepatobiliary (HIDA) W Pharm and EF When Performed; Future; Expected date: 06/24/2025    Opioid dependence with current use         -     Followed  by pain managment      Follow up if symptoms worsen or fail to improve.     Patient's Medications   New Prescriptions    CHOLESTYRAMINE (QUESTRAN) 4 GRAM PACKET    Take 1 packet (4 g total) by mouth 3 (three) times daily with meals.    NITROGLYCERIN (RECTIV) 0.4 % (W/W) OINT    Apply small amount twice daily.   Previous Medications    ACETAMINOPHEN (TYLENOL) 500 MG TABLET    Take 1,000 mg by mouth 2 (two) times daily as needed for Pain.    ARIPIPRAZOLE (ABILIFY) 5 MG TAB    Take 1 tablet (5 mg total) by mouth once daily.    ASCORBIC ACID, VITAMIN C, (VITAMIN C) 1000 MG TABLET    Take 1,000 mg by mouth once daily.    ASPIRIN (ECOTRIN) 81 MG EC TABLET    Take 81 mg by mouth every evening.    ASPIRIN/SOD BICARB/CITRIC ACID (GIOVANY-SELTZER ORAL)    Take 2 tablets by mouth daily as needed (Upset stomach).    BUMETANIDE (BUMEX) 2 MG TABLET    Take 1 tablet (2 mg total) by mouth once daily.    BUSPIRONE (BUSPAR) 30 MG TAB    Take 1 tablet (30 mg total) by mouth 2 (two) times daily.    CICLOPIROX (PENLAC) 8 % SOLN    Apply topically nightly.    COENZYME Q10 (CO Q-10) 100 MG CAPSULE    Take 100 mg by mouth once daily.    CYANOCOBALAMIN (VITAMIN B-12) 1000 MCG TABLET    Take 100 mcg by mouth once  daily.    CYCLOBENZAPRINE (FLEXERIL) 10 MG TABLET    TAKE 1 TABLET BY MOUTH THREE TIMES A DAY AS NEEDED FOR MUSCLE SPASMS    DAPAGLIFLOZIN PROPANEDIOL (FARXIGA) 10 MG TABLET    Take 1 tablet (10 mg total) by mouth once daily.    DEXCOM G6 SENSOR MARIAELENA    USE AS DIRECTED    DEXCOM G6 TRANSMITTER MARIAELENA    USE TO CHECK BLOOD SUGAR    DICYCLOMINE (BENTYL) 20 MG TABLET    Take 1 tablet (20 mg total) by mouth 3 (three) times daily as needed (abdominal pain).    DULOXETINE (CYMBALTA) 60 MG CAPSULE    Take 1 capsule (60 mg total) by mouth 2 (two) times daily.    FERROUS GLUCONATE (FERGON) 324 MG TABLET    Take 1 tablet (324 mg total) by mouth daily with breakfast.    FLUTICASONE PROPIONATE (FLONASE) 50 MCG/ACTUATION NASAL SPRAY    2 sprays (100 mcg total) by Each Nostril route once daily.    GABAPENTIN (NEURONTIN) 800 MG TABLET    Take 1 tablet (800 mg total) by mouth 3 (three) times daily.    INSULIN REGULAR HUM U-500 CONC (HUMULIN R U-500, CONC, KWIKPEN) 500 UNIT/ML (3 ML) INSULIN PEN    INJECT 120 UNITS WITH BREAKFAST, INJECT 130 UNITS WITH LUNCH, AND INJECT 150 UNITS WITH DINNER    INSULIN SYRINGE-NEEDLE U-100 (BD INSULIN SYRINGE ULTRA-FINE) 1 ML 31 GAUGE X 5/16 SYRG    To use 6 times daily with insulin    LACTOBAC NO.41/BIFIDOBACT NO.7 (PROBIOTIC-10 ORAL)    Take by mouth.    LANOLIN/MINERAL OIL/PETROLATUM (ARTIFICIAL TEARS OPHT)    Place 2 drops into both eyes daily as needed (Dry eye).    LIDOCAINE (LIDODERM) 5 %    Place 1 patch onto the skin once daily. Remove & Discard patch within 12 hours or as directed by MD    MELOXICAM (MOBIC) 15 MG TABLET    TAKE 1 TABLET BY MOUTH ONCE DAILY, DISCONTINUE CELEBREX    METFORMIN (GLUCOPHAGE-XR) 500 MG ER 24HR TABLET    Take 2 tablets (1,000 mg total) by mouth 2 (two) times daily with meals.    METOCLOPRAMIDE HCL (REGLAN) 5 MG TABLET    Take 1 tablet (5 mg total) by mouth 4 (four) times daily before meals and nightly.    MULTIVITAMIN CAPSULE    Take 1 capsule by mouth once daily.  "   NITROGLYCERIN (NITROSTAT) 0.4 MG SL TABLET    Place 1 tablet (0.4 mg total) under the tongue every 5 (five) minutes as needed for Chest pain.    NON FORMULARY MEDICATION    Uses Cpap Machine nightly on a setting of 10    ONDANSETRON (ZOFRAN-ODT) 4 MG TBDL    Take 1 tablet (4 mg total) by mouth every 8 (eight) hours as needed (nausea).    ONETOUCH DELICA PLUS LANCET 33 GAUGE MISC    Apply topically 3 (three) times daily.    ONETOUCH VERIO TEST STRIPS STRP    1 strip 3 (three) times daily.    PEN NEEDLE, DIABETIC (BD ULTRA-FINE ORIG PEN NEEDLE) 29 GAUGE X 1/2" NDLE    To use 3 needles daily    PEN NEEDLE, DIABETIC 31 GAUGE X 1/4" NDLE    Use with flex pen    PIOGLITAZONE (ACTOS) 30 MG TABLET    Take 1 tablet (30 mg total) by mouth once daily.    POTASSIUM CHLORIDE SA (K-DUR,KLOR-CON) 20 MEQ TABLET    TAKE 1 TABLET BY MOUTH EVERY OTHER DAY    PROPRANOLOL (INDERAL) 20 MG TABLET    TAKE 1 TABLET BY MOUTH THREE TIMES A DAY AS NEEDED FOR ANXIETY. MAY TAKE AN EXTRA TABLET AS NEEDED FOR ANXIETY    RESMETIROM 100 MG TAB    Take 100 mg by mouth once daily.    TIRZEPATIDE (MOUNJARO) 15 MG/0.5 ML PNIJ    Inject 15 mg into the skin every 7 days.    TRAMADOL (ULTRAM) 50 MG TABLET    Take 2 tablets (100 mg total) by mouth every 8 (eight) hours as needed (severe pain).    TRAZODONE (DESYREL) 100 MG TABLET    Take 1 tablet (100 mg total) by mouth every evening.    VALSARTAN-HYDROCHLOROTHIAZIDE (DIOVAN-HCT) 320-12.5 MG PER TABLET    Take 1 tablet by mouth once daily.    VITAMIN D (VITAMIN D3) 1000 UNITS TAB    Take 1 tablet (1,000 Units total) by mouth once daily.    VITAMIN D 1000 UNITS TAB    Take 5,000 Units by mouth 2 (two) times a day.    ZEGALOGUE AUTOINJECTOR 0.6 MG/0.6 ML ATIN    Inject 1 Box  into the skin as needed (hypoglycemia). Glucagon is an emergency pen that is used to increase your blood sugar. Glucagon is a pen that is used in an emergency situation where you are unable to eat or drink anything. Glucagon is a " medication that is given by someone else-spouse, child, etc.   Modified Medications    Modified Medication Previous Medication    ATORVASTATIN (LIPITOR) 10 MG TABLET atorvastatin (LIPITOR) 10 MG tablet       Take 1 tablet (10 mg total) by mouth once daily.    TAKE 1 TABLET BY MOUTH ONCE A DAY    PANTOPRAZOLE (PROTONIX) 40 MG TABLET pantoprazole (PROTONIX) 40 MG tablet       Take 1 tablet (40 mg total) by mouth once daily.    Take 1 tablet (40 mg total) by mouth once daily.   Discontinued Medications    DOXYCYCLINE (VIBRA-TABS) 100 MG TABLET    Take 1 tablet (100 mg total) by mouth 2 (two) times daily. Any generic brand capsule or tablet

## 2025-06-26 NOTE — Clinical Note
Please set up TSH and T4 labs with this patient in the next 1-2 weeks.  He will need follow up in 3-4 months.  Thanks

## 2025-06-27 ENCOUNTER — OFFICE VISIT (OUTPATIENT)
Dept: SLEEP MEDICINE | Facility: CLINIC | Age: 50
End: 2025-06-27
Attending: INTERNAL MEDICINE
Payer: MEDICARE

## 2025-06-27 VITALS
BODY MASS INDEX: 42.66 KG/M2 | HEART RATE: 91 BPM | SYSTOLIC BLOOD PRESSURE: 133 MMHG | DIASTOLIC BLOOD PRESSURE: 71 MMHG | HEIGHT: 72 IN | WEIGHT: 315 LBS

## 2025-06-27 DIAGNOSIS — G47.00 INSOMNIA, UNSPECIFIED TYPE: ICD-10-CM

## 2025-06-27 DIAGNOSIS — G47.33 OSA (OBSTRUCTIVE SLEEP APNEA): Primary | ICD-10-CM

## 2025-06-27 PROCEDURE — 99999 PR PBB SHADOW E&M-EST. PATIENT-LVL II: CPT | Mod: PBBFAC,,, | Performed by: NURSE PRACTITIONER

## 2025-06-27 PROCEDURE — 99212 OFFICE O/P EST SF 10 MIN: CPT | Mod: PBBFAC | Performed by: NURSE PRACTITIONER

## 2025-06-27 RX ORDER — TRAZODONE HYDROCHLORIDE 100 MG/1
100 TABLET ORAL NIGHTLY
Qty: 30 TABLET | Refills: 11 | Status: SHIPPED | OUTPATIENT
Start: 2025-06-27 | End: 2026-06-27

## 2025-06-27 NOTE — PROGRESS NOTES
Cc: VICENTE        Continued/long time user apap 12-16cm using N20 mask. Had titration study in interim. Snoring/apneic pauses resolved with mask use. Trazadone 100mg continues to help sleep onset  Interrogation avg 9hr/n 180/180dused, AHI 1.4, 90% tile 13.9cm    /71 (BP Location: Left arm, Patient Position: Sitting)   Pulse 91   Ht 6' (1.829 m)   Wt (!) 214 kg (471 lb 11.2 oz)   BMI 63.97 kg/m²       1/2023: HST AHI 60/ Min SpO2: 85.5%  2013 titration in [facility] - 10 cm H2O CPAP was recommended  2019 titration in [facility] - 10 cm H2O again was recommended (359#)      Assessment:  VIECNTE, severe. Continued adherence with PAP, AHI<5. Benefiting.   Insomnia      Plan:  Sahv95-12 continue, 5min ramp off. DME [facility] supplies  Discussed control of VICENTE  Continue Trazadone 100mg qhs

## 2025-06-30 ENCOUNTER — PATIENT MESSAGE (OUTPATIENT)
Dept: NEUROSURGERY | Facility: CLINIC | Age: 50
End: 2025-06-30
Payer: MEDICARE

## 2025-06-30 ENCOUNTER — TELEPHONE (OUTPATIENT)
Dept: NEUROSURGERY | Facility: CLINIC | Age: 50
End: 2025-06-30
Payer: MEDICARE

## 2025-07-01 ENCOUNTER — PATIENT MESSAGE (OUTPATIENT)
Dept: GASTROENTEROLOGY | Facility: CLINIC | Age: 50
End: 2025-07-01
Payer: MEDICARE

## 2025-07-02 ENCOUNTER — PATIENT MESSAGE (OUTPATIENT)
Dept: ENDOCRINOLOGY | Facility: CLINIC | Age: 50
End: 2025-07-02
Payer: MEDICARE

## 2025-07-04 DIAGNOSIS — M54.17 RADICULOPATHY OF LUMBOSACRAL REGION: Primary | ICD-10-CM

## 2025-07-04 DIAGNOSIS — F42.2 MIXED OBSESSIONAL THOUGHTS AND ACTS: ICD-10-CM

## 2025-07-05 RX ORDER — DULOXETIN HYDROCHLORIDE 60 MG/1
60 CAPSULE, DELAYED RELEASE ORAL 2 TIMES DAILY
Qty: 60 CAPSULE | Refills: 0 | Status: SHIPPED | OUTPATIENT
Start: 2025-07-05 | End: 2025-08-04

## 2025-07-05 NOTE — TELEPHONE ENCOUNTER
No care due was identified.  [facility] Embedded Care Due Messages. Reference number: [id number].   7/04/2025 10:29:02 PM CDT

## 2025-07-07 ENCOUNTER — LAB VISIT (OUTPATIENT)
Dept: LAB | Facility: HOSPITAL | Age: 50
End: 2025-07-07
Attending: STUDENT IN AN ORGANIZED HEALTH CARE EDUCATION/TRAINING PROGRAM
Payer: MEDICARE

## 2025-07-07 DIAGNOSIS — E05.90 SUBCLINICAL HYPERTHYROIDISM: ICD-10-CM

## 2025-07-07 DIAGNOSIS — R79.89 ABNORMAL TSH: ICD-10-CM

## 2025-07-07 LAB
T4 FREE SERPL-MCNC: 0.81 NG/DL (ref 0.71–1.51)
TSH SERPL-ACNC: 0.55 UIU/ML (ref 0.4–4)

## 2025-07-07 PROCEDURE — 84439 ASSAY OF FREE THYROXINE: CPT | Mod: PN

## 2025-07-07 PROCEDURE — 36415 COLL VENOUS BLD VENIPUNCTURE: CPT | Mod: PN

## 2025-07-07 PROCEDURE — 84443 ASSAY THYROID STIM HORMONE: CPT | Mod: PN

## 2025-07-08 ENCOUNTER — PATIENT MESSAGE (OUTPATIENT)
Dept: CARDIOLOGY | Facility: CLINIC | Age: 50
End: 2025-07-08
Payer: MEDICARE

## 2025-07-08 RX ORDER — GABAPENTIN 800 MG/1
800 TABLET ORAL 3 TIMES DAILY
Qty: 90 TABLET | Refills: 3 | Status: SHIPPED | OUTPATIENT
Start: 2025-07-08

## 2025-07-09 ENCOUNTER — TELEPHONE (OUTPATIENT)
Dept: GASTROENTEROLOGY | Facility: CLINIC | Age: 50
End: 2025-07-09
Payer: MEDICARE

## 2025-07-09 NOTE — TELEPHONE ENCOUNTER
Spoke with patient s/o to inform her that we will have to reschedule patient HIDA scan because it has not yet been approved by his insurance company. Patient s/o stated that patient will be contacting us to reschedule.

## 2025-07-11 ENCOUNTER — TELEPHONE (OUTPATIENT)
Dept: SURGERY | Facility: CLINIC | Age: 50
End: 2025-07-11
Payer: MEDICARE

## 2025-07-11 ENCOUNTER — PATIENT MESSAGE (OUTPATIENT)
Dept: GASTROENTEROLOGY | Facility: CLINIC | Age: 50
End: 2025-07-11
Payer: MEDICARE

## 2025-07-13 ENCOUNTER — TELEPHONE (OUTPATIENT)
Dept: FAMILY MEDICINE | Facility: CLINIC | Age: 50
End: 2025-07-13
Payer: MEDICARE

## 2025-07-13 NOTE — TELEPHONE ENCOUNTER
Improvement in your renal function.  Let us recheck this again in about three weeks.     So perform this test before the end of the month.

## 2025-07-14 ENCOUNTER — TELEPHONE (OUTPATIENT)
Dept: SURGERY | Facility: CLINIC | Age: 50
End: 2025-07-14
Payer: MEDICARE

## 2025-07-15 ENCOUNTER — HOSPITAL ENCOUNTER (EMERGENCY)
Facility: HOSPITAL | Age: 50
Discharge: HOME | End: 2025-07-15
Attending: STUDENT IN AN ORGANIZED HEALTH CARE EDUCATION/TRAINING PROGRAM
Payer: MEDICARE

## 2025-07-15 ENCOUNTER — OFFICE VISIT (OUTPATIENT)
Dept: OPTOMETRY | Facility: CLINIC | Age: 50
End: 2025-07-15
Payer: MEDICARE

## 2025-07-15 VITALS
RESPIRATION RATE: 20 BRPM | HEIGHT: 72 IN | DIASTOLIC BLOOD PRESSURE: 78 MMHG | SYSTOLIC BLOOD PRESSURE: 130 MMHG | WEIGHT: 315 LBS | BODY MASS INDEX: 42.66 KG/M2 | TEMPERATURE: 98 F | OXYGEN SATURATION: 99 % | HEART RATE: 79 BPM

## 2025-07-15 DIAGNOSIS — S96.912A STRAIN OF LEFT FOOT, INITIAL ENCOUNTER: Primary | ICD-10-CM

## 2025-07-15 DIAGNOSIS — H52.7 REFRACTIVE ERROR: ICD-10-CM

## 2025-07-15 DIAGNOSIS — S99.929A FOOT INJURY: ICD-10-CM

## 2025-07-15 DIAGNOSIS — E11.9 TYPE 2 DIABETES MELLITUS WITHOUT RETINOPATHY: Primary | ICD-10-CM

## 2025-07-15 DIAGNOSIS — H25.13 NUCLEAR SCLEROSIS OF BOTH EYES: ICD-10-CM

## 2025-07-15 DIAGNOSIS — H04.123 DRY EYE SYNDROME OF BOTH EYES: ICD-10-CM

## 2025-07-15 PROCEDURE — 92015 DETERMINE REFRACTIVE STATE: CPT | Mod: S$GLB,,, | Performed by: OPTOMETRIST

## 2025-07-15 PROCEDURE — 2023F DILAT RTA XM W/O RTNOPTHY: CPT | Mod: CPTII,S$GLB,, | Performed by: OPTOMETRIST

## 2025-07-15 PROCEDURE — 99283 EMERGENCY DEPT VISIT LOW MDM: CPT | Mod: 25,ER

## 2025-07-15 PROCEDURE — 99999 PR PBB SHADOW E&M-EST. PATIENT-LVL IV: CPT | Mod: PBBFAC,,, | Performed by: OPTOMETRIST

## 2025-07-15 PROCEDURE — 92014 COMPRE OPH EXAM EST PT 1/>: CPT | Mod: S$GLB,,, | Performed by: OPTOMETRIST

## 2025-07-15 PROCEDURE — 1159F MED LIST DOCD IN RCRD: CPT | Mod: CPTII,S$GLB,, | Performed by: OPTOMETRIST

## 2025-07-15 PROCEDURE — 3044F HG A1C LEVEL LT 7.0%: CPT | Mod: CPTII,S$GLB,, | Performed by: OPTOMETRIST

## 2025-07-15 RX ORDER — CYCLOBENZAPRINE HCL 10 MG
TABLET ORAL
Qty: 90 TABLET | Refills: 2 | Status: SHIPPED | OUTPATIENT
Start: 2025-07-15

## 2025-07-15 NOTE — DISCHARGE INSTRUCTIONS
Mr. Rowan,     Thank you for letting me care for you today! It was nice meeting you, and I hope you feel better soon.   If you would like access to your chart and what was done today please utilize the [facility].   Please don't hesitate to return if your symptoms worsen or you develop any other worrisome symptoms.    Our goal in the emergency department is to always give you outstanding care and exceptional service. You may receive a survey by mail or e-mail in the next week regarding your experience in our ED. We would greatly appreciate you completing and returning the survey. Your feedback provides us with a way to recognize our staff who give very good care and it helps us learn how to improve when your experience was below our aspiration of excellence.     Sincerely,    Christie Lyons PA-C  Emergency Department Physician Assistant  [facility], [place], and [facility]

## 2025-07-15 NOTE — ED PROVIDER NOTES
"Encounter Date: 7/15/2025       History     Chief Complaint   Patient presents with    Foot Injury     Pt C/O L foot and L 5th toe pain following foot vs "tote bin" X 2 days PTA.      50-year-old male with past medical history of type 2 diabetes, GERD, anxiety, hypertension presents to the ED for further evaluation of left foot pain x 2 days. States he stubbed his toe with a tote bin. Notes he is able to bear some weight on affected foot,able to ambulate with some pain. No other injuries sustained. States he takes strong pain medications daily for his back pain. Notes his girlfriend michaelle taped his toes with some relief. No other acute complaints.     The history is provided by the patient.     Review of patient's allergies indicates:   Allergen Reactions    Dilaudid [hydromorphone] Other (See Comments)     IV dilaudid severely drops BP     Past Medical History:   Diagnosis Date    Allergy     Anxiety     Back pain     Chronic bilateral low back pain with bilateral sciatica 12/14/2021    CTS (carpal tunnel syndrome) 06/26/2015    CTS (carpal tunnel syndrome) 6/26/2015    Depression     Diabetes 04/17/2015    Diabetes mellitus with insulin therapy     Elevated sed rate 6/26/2015    GERD (gastroesophageal reflux disease) 10/19/2022    Hx of vasectomy     Hypertension     Morbid obesity 4/16/2015    OCD (obsessive compulsive disorder)     Sleep apnea     Type 2 diabetes mellitus with hyperglycemia, with long-term current use of insulin 10/17/2022     Past Surgical History:   Procedure Laterality Date    ADENOIDECTOMY      CAUDAL EPIDURAL STEROID INJECTION N/A 10/30/2024    Procedure: Caudal NATALIE;  Surgeon: Clark Smith MD;  Location: [facility] CATH LAB;  Service: Pain Management;  Laterality: N/A;  with catheter    COLONOSCOPY N/A 9/6/2022    Procedure: COLONOSCOPY;  Surgeon: Rossy Champion MD;  Location: [facility] ENDO;  Service: Endoscopy;  Laterality: N/A;    EPIDURAL STEROID INJECTION Right 09/04/2019    Procedure: " Injection, Steroid, Epidural Transforaminal;  Surgeon: Harjinder Batista Jr., MD;  Location: [facility] ENDO;  Service: Pain Management;  Laterality: Right;  Right L3 + L4 TF NATALIE    [id number]  [id number]    Arrive @ 1300; ASA last 8/27; Check BG; NEEDS CONSENT    ESOPHAGOGASTRODUODENOSCOPY N/A 10/31/2022    Procedure: EGD (ESOPHAGOGASTRODUODENOSCOPY);  Surgeon: Rossy Champion MD;  Location: [facility] ENDO;  Service: Endoscopy;  Laterality: N/A;    FRACTURE SURGERY      bilateral elbow fracture 3 years ago    FUSION OF LUMBAR SPINE BY ANTERIOR APPROACH Left 04/26/2021    Procedure: FUSION, SPINE, LUMBAR, ANTERIOR APPROACH Left L3-4 L4-5 OLIF BRENDA, ALL Release, L5-S1 ALIF;  Surgeon: Jose L Brown MD;  Location: [facility];  Service: Neurosurgery;  Laterality: Left;  Procedure: Left L3-4 L4-5 OLIF BRENDA, ALL Release, L5-S1 ALIF  Surgery Time: 2.5 Hrs  LOS: 2-3  Anesthesia: General  Blood: Type & Screen  Radiology: Josseline  Brace: LSO  Bed: Regular  Position: LAteral Righ    FUSION OF SACROILIAC JOINT Bilateral 04/13/2022    Procedure: FUSION, SACROILIAC JOINT Stg 2: Bilateral Open SI Joint fusion, Bedrock from Si Bone;  Surgeon: Jose L Brown MD;  Location: [facility];  Service: Neurosurgery;  Laterality: Bilateral;    FUSION OF SPINE WITH INSTRUMENTATION N/A 04/26/2021    Procedure: FUSION, SPINE, WITH INSTRUMENTATION L3-S1 Posteior Segmental Instrumentation;  Surgeon: Jose L Brown MD;  Location: [facility] OR;  Service: Neurosurgery;  Laterality: N/A;  Procedure: L3-S1 Posteior Segmental Instrumentation  Sirgery Time: 2 Hrs  LOS: 2-3  Anesthesia: General  Blood: Tyoe & Screen  Radiology: Dual C arm  Brace:LSO  Bed: [place] [address] Poster  Position: Prone  Equipment: Depuy     FUSION OF SPINE WITH INSTRUMENTATION N/A 04/13/2022    Procedure: FUSION, SPINE, WITH INSTRUMENTATION Stg 1: Revision of posterior L3-S1 hardware, Depuy. Sacro-pelvic fixation, screw augmentation PMMA. L4-S1 posterolateral arthrodesis. Open Gauge screws, 11-12mm;   Surgeon: Jose L Brown MD;  Location: [facility] OR;  Service: Neurosurgery;  Laterality: N/A;    HERNIA REPAIR      bilateral groin hernia    INJECTION OF STEROID Bilateral 12/13/2021    Procedure: INJECTION, STEROID Bilateral SI JOint Block and Steroid Injection;  Surgeon: Jose L Brown MD;  Location: [facility] OR;  Service: Neurosurgery;  Laterality: Bilateral;  Procedure: Bilateral SI JOint Block and Steroid Injection   Surgery Time: 30 min  LOS: 0  Anesthesia: MAC  Radiology: C-arm  Bed: Regular with pillows  Position: Prone    LUMBAR LAMINECTOMY Bilateral 11/29/2019    Procedure: LAMINECTOMY, SPINE, LUMBAR Rigth L3-4, Left L4-5 Laminectomy, medial Facetectomy, and microdiscectomy;  Surgeon: Jose L Brown MD;  Location: [facility] OR;  Service: Neurosurgery;  Laterality: Bilateral;  Procedure: Rigth L3-4, Left L4-5 Laminectomy, medial Facetectomy, and microdiscectomy  Surgery Time: 2.5 Hrs  LOS: 0-1  Anesthesia: General  Blood: Type & Screen  Radiology: C-arm  Micros    REMOVAL IMPLANT, POSTERIOR SEGMENT, INTRAOCULAR  04/13/2022    Procedure: REMOVAL IMPLANT, POSTERIOR SEGMENT, INTRAOCULAR;  Surgeon: Jose L Brown MD;  Location: [facility] OR;  Service: Neurosurgery;;    SPINE SURGERY      TONSILLECTOMY      VASECTOMY       Family History   Problem Relation Name Age of Onset    Fibromyalgia Mother      Osteoarthritis Mother      Cataracts Maternal Grandmother      Macular degeneration Maternal Grandfather      Cataracts Maternal Grandfather      Rheum arthritis Neg Hx      Psoriasis Neg Hx      Lupus Neg Hx      Kidney disease Neg Hx      Inflammatory bowel disease Neg Hx      Amblyopia Neg Hx      Blindness Neg Hx      Glaucoma Neg Hx      Retinal detachment Neg Hx      Strabismus Neg Hx       Social History[1]  Review of Systems   Constitutional:  Negative for chills and fever.   Respiratory:  Negative for shortness of breath.    Cardiovascular:  Negative for chest pain.   Gastrointestinal:  Negative for abdominal  distention, abdominal pain and vomiting.   Musculoskeletal:  Positive for arthralgias. Negative for neck pain and neck stiffness.   Skin:  Negative for color change and wound.       Physical Exam     Initial Vitals [07/15/25 1225]   BP Pulse Resp Temp SpO2   126/76 80 19 98.3 °F (36.8 °C) 97 %      MAP       --         Physical Exam    Constitutional: He appears well-developed and well-nourished. He is not diaphoretic. No distress.   Neck: Neck supple.   Cardiovascular:  Normal rate and normal heart sounds.           Pulmonary/Chest: Breath sounds normal. No respiratory distress.   Abdominal: Abdomen is soft. He exhibits no distension. There is no abdominal tenderness.   Musculoskeletal:         General: Tenderness present.      Cervical back: Neck supple.        Feet:       Comments: There is slight, pin poin tenderness to the L small toe, dorsal aspect and under the MTP joint. No overlying skin changes. NV and sensations intact. Distal pulses equal BL.     Neurological: He is alert and oriented to person, place, and time. GCS score is 15. GCS eye subscore is 4. GCS verbal subscore is 5. GCS motor subscore is 6.         ED Course   Procedures  Labs Reviewed - No data to display       Imaging Results              X-Ray Foot Complete Left (In process)                      Medications - No data to display  Medical Decision Making  Differential Diagnosis includes, but is not limited to:  Fracture, dislocation, compartment syndrome, nerve injury/palsy, vascular injury, DVT, rhabdomyolysis, hemarthrosis, septic joint, cellulitis, bursitis, muscle strain, ligament tear/sprain, laceration, foreign body, abrasion, soft tissue contusion, osteoarthritis.      ED management     50-year-old male with past medical history of type 2 diabetes, GERD, anxiety, hypertension presents to the ED for further evaluation of left foot pain x 2 days.  Patient is not toxic appearing, hemodynamically stable and resting comfortably on bed.  Patient is well-appearing.  Awake and alert.  Afebrile with vitals WNL. No distress on exam. Pt able to ambulate with affected foot. X-ray of the left foot showed no fracture or dislocation. There is a small spur at the site of attachment of the plantar fascia to the calcaneus. Advised RICE and supportive treatments. Will provide post-op shoe for support. Advised to follow up with PCP.     I have discussed the specifics of the workup with the patient and the patient has verbalized understanding of the details of the workup, the diagnosis, the treatment plan, and the need for outpatient follow-up with PCP. ED precautions given. Discussed with pt about returning to the ED, if symptoms fail to improve or worsen.    RESULTS:  Documented in ED course.   Labs/ekg interpreted by myself       Voice recognition software utilized in this note. Typographical and content errors may occur with this process. While efforts are made to detect and correct such errors, in some cases errors will persist. For this reason, wording in this document should be considered in the proper context and not strictly verbatim.           Amount and/or Complexity of Data Reviewed  Radiology: ordered. Decision-making details documented in ED Course.               ED Course as of 07/15/25 1438   Tue Jul 15, 2025   1231 BP: 126/76 [NW]   1231 Temp: 98.3 °F (36.8 °C) [NW]   1231 Temp Source: Oral [NW]   1231 Pulse: 80 [NW]   1231 Resp: 19 [NW]   1231 SpO2: 97 % [NW]   1318 X-Ray Foot Complete Left  Impression:     1. No fracture or dislocation  2. There is a small spur at the site of attachment of the plantar fascia to the calcaneus.   [NW]      ED Course User Index  [NW] Christie Lyons PA-C                               Clinical Impression:  Final diagnoses:  [S99.929A] Foot injury                       [1]   Social History  Tobacco Use    Smoking status: Every Day     Types: Vaping with nicotine     Passive exposure: Current    Smokeless tobacco:  Never    Tobacco comments:     Handout provided for [employer] Cess[employer] program 10/11/24 - pt has stopped vaping   Substance Use Topics    Alcohol use: Not Currently    Drug use: Never        Christie Lyons PA-C  07/15/25 1160

## 2025-07-15 NOTE — TELEPHONE ENCOUNTER
No care due was identified.  [facility] Embedded Care Due Messages. Reference number: [id number].   7/15/2025 4:06:58 PM CDT

## 2025-07-15 NOTE — TELEPHONE ENCOUNTER
The message below is from 6/11/2025 BMP    He needed to repeat BMP but when he went 7/7/25 only thyroid was drawn    He needs to return to the lab to draw BMP soon    OV s/c for 7/18      I LM for the pt to rtn call to explain

## 2025-07-15 NOTE — PROGRESS NOTES
HPI    CC: 51 yo M presents today for diabetic eye exam. Pt report no new vision   or ocular complaints today.    TOSIN: 5/16/2024    (-) Changes in vision   (-) Pain  (-) Irritation   (-) Itching   (-) Flashes  (+) Floaters, longstanding  (+) Glasses wearer  (-) CL wearer  (-) Uses eye gtts    Does patient want a refraction today? yes    (-) Eye injury  (-) Eye surgery   (+)POHx   Type 2 diabetes mellitus without retinopathy   Nuclear sclerosis of both eyes   Dry eye syndrome of both eyes   Brow ptosis, bilateral    Acquired myogenic ptosis of eyelid, bilateral  (-)FOHx    (+)DM  Hemoglobin A1C       Date                     Value               Ref Range             Status                12/13/2024               5.7 (H)             4.0 - 5.6 %           Final                  06/11/2024               7.4 (H)             4.0 - 5.6 %           Final                  12/08/2023               6.5 (H)             4.0 - 5.6 %           Final                        06/03/2025               5.9 (H)             4.0 - 5.6 %           Final                   Last edited by Yeni Jack MA on 7/15/2025  8:17 AM.            Assessment /Plan     For exam results, see Encounter Report.    Type 2 diabetes mellitus without retinopathy    Nuclear sclerosis of both eyes    Dry eye syndrome of both eyes    Refractive error      No retinopathy noted, OU. Continue proper BS control and annual diabetic eye exams. Monitor yearly.      2. Educated pt on findings. Not visually significant. No need for removal at this time. Monitor yearly.      3. Educated pt on findings. Recommended ATs TID-QID + dc/gel QHS for added lubrication and comfort. If symptoms worsen or dont improve, RTC. Monitor.      4. Updated SRx. Mild change from habitual. Monitor yearly.        RTC in 1 year for annual eye exam or sooner if needed.

## 2025-07-16 ENCOUNTER — LAB VISIT (OUTPATIENT)
Dept: LAB | Facility: HOSPITAL | Age: 50
End: 2025-07-16
Attending: FAMILY MEDICINE
Payer: MEDICARE

## 2025-07-16 DIAGNOSIS — I10 ESSENTIAL HYPERTENSION: ICD-10-CM

## 2025-07-16 DIAGNOSIS — E11.65 UNCONTROLLED TYPE 2 DIABETES MELLITUS WITH HYPERGLYCEMIA: ICD-10-CM

## 2025-07-16 LAB
ANION GAP (OHS): 10 MMOL/L (ref 8–16)
BUN SERPL-MCNC: 29 MG/DL (ref 2–20)
CALCIUM SERPL-MCNC: 8.8 MG/DL (ref 8.7–10.5)
CHLORIDE SERPL-SCNC: 103 MMOL/L (ref 95–110)
CO2 SERPL-SCNC: 29 MMOL/L (ref 23–29)
CREAT SERPL-MCNC: 1.8 MG/DL (ref 0.5–1.4)
GFR SERPLBLD CREATININE-BSD FMLA CKD-EPI: 46 ML/MIN/1.73/M2
GLUCOSE SERPL-MCNC: 166 MG/DL (ref 70–110)
POTASSIUM SERPL-SCNC: 4.9 MMOL/L (ref 3.5–5.1)
SODIUM SERPL-SCNC: 142 MMOL/L (ref 136–145)

## 2025-07-16 PROCEDURE — 82310 ASSAY OF CALCIUM: CPT | Mod: PN

## 2025-07-16 PROCEDURE — 36415 COLL VENOUS BLD VENIPUNCTURE: CPT | Mod: PN

## 2025-07-16 RX ORDER — TIRZEPATIDE 15 MG/.5ML
15 INJECTION, SOLUTION SUBCUTANEOUS
Qty: 4 PEN | Refills: 11 | Status: SHIPPED | OUTPATIENT
Start: 2025-07-16 | End: 2025-07-16

## 2025-07-16 RX ORDER — TIRZEPATIDE 15 MG/.5ML
15 INJECTION, SOLUTION SUBCUTANEOUS
Qty: 4 PEN | Refills: 11 | Status: SHIPPED | OUTPATIENT
Start: 2025-07-16

## 2025-07-18 ENCOUNTER — TELEPHONE (OUTPATIENT)
Dept: FAMILY MEDICINE | Facility: CLINIC | Age: 50
End: 2025-07-18

## 2025-07-18 ENCOUNTER — OFFICE VISIT (OUTPATIENT)
Dept: FAMILY MEDICINE | Facility: CLINIC | Age: 50
End: 2025-07-18
Payer: MEDICARE

## 2025-07-18 ENCOUNTER — TELEPHONE (OUTPATIENT)
Dept: CARDIOLOGY | Facility: CLINIC | Age: 50
End: 2025-07-18
Payer: MEDICARE

## 2025-07-18 VITALS
OXYGEN SATURATION: 95 % | SYSTOLIC BLOOD PRESSURE: 134 MMHG | HEART RATE: 90 BPM | HEIGHT: 72 IN | TEMPERATURE: 98 F | DIASTOLIC BLOOD PRESSURE: 84 MMHG | BODY MASS INDEX: 42.66 KG/M2 | WEIGHT: 315 LBS

## 2025-07-18 DIAGNOSIS — N18.32 CHRONIC KIDNEY DISEASE, STAGE 3B: ICD-10-CM

## 2025-07-18 DIAGNOSIS — G95.9 SPINAL CORD LESION: ICD-10-CM

## 2025-07-18 DIAGNOSIS — R52 PAIN: ICD-10-CM

## 2025-07-18 DIAGNOSIS — N18.9 CHRONIC KIDNEY DISEASE, UNSPECIFIED CKD STAGE: Primary | ICD-10-CM

## 2025-07-18 DIAGNOSIS — F33.1 MODERATE EPISODE OF RECURRENT MAJOR DEPRESSIVE DISORDER: ICD-10-CM

## 2025-07-18 PROCEDURE — 3044F HG A1C LEVEL LT 7.0%: CPT | Mod: CPTII,S$GLB,, | Performed by: FAMILY MEDICINE

## 2025-07-18 PROCEDURE — 1160F RVW MEDS BY RX/DR IN RCRD: CPT | Mod: CPTII,S$GLB,, | Performed by: FAMILY MEDICINE

## 2025-07-18 PROCEDURE — 1159F MED LIST DOCD IN RCRD: CPT | Mod: CPTII,S$GLB,, | Performed by: FAMILY MEDICINE

## 2025-07-18 PROCEDURE — 3075F SYST BP GE 130 - 139MM HG: CPT | Mod: CPTII,S$GLB,, | Performed by: FAMILY MEDICINE

## 2025-07-18 PROCEDURE — G2211 COMPLEX E/M VISIT ADD ON: HCPCS | Mod: S$GLB,,, | Performed by: FAMILY MEDICINE

## 2025-07-18 PROCEDURE — 99214 OFFICE O/P EST MOD 30 MIN: CPT | Mod: S$GLB,,, | Performed by: FAMILY MEDICINE

## 2025-07-18 PROCEDURE — 3079F DIAST BP 80-89 MM HG: CPT | Mod: CPTII,S$GLB,, | Performed by: FAMILY MEDICINE

## 2025-07-18 PROCEDURE — 3008F BODY MASS INDEX DOCD: CPT | Mod: CPTII,S$GLB,, | Performed by: FAMILY MEDICINE

## 2025-07-18 RX ORDER — TRAMADOL HYDROCHLORIDE 50 MG/1
100 TABLET, FILM COATED ORAL EVERY 8 HOURS PRN
Qty: 180 TABLET | Refills: 2 | Status: SHIPPED | OUTPATIENT
Start: 2025-07-21 | End: 2025-07-20 | Stop reason: SDUPTHER

## 2025-07-18 NOTE — TELEPHONE ENCOUNTER
Per secure chat from Dr Vargas reached out to the patient to let him know that due to body habitus (478 lbs) and the weight limit on the cath table being 450 lbs that we will have to postpone procedure. Patient verbalized understanding.

## 2025-07-21 ENCOUNTER — HOSPITAL ENCOUNTER (OUTPATIENT)
Dept: RADIOLOGY | Facility: HOSPITAL | Age: 50
Discharge: HOME | End: 2025-07-21
Attending: FAMILY MEDICINE
Payer: MEDICARE

## 2025-07-21 ENCOUNTER — TELEPHONE (OUTPATIENT)
Dept: NEPHROLOGY | Facility: CLINIC | Age: 50
End: 2025-07-21
Payer: MEDICARE

## 2025-07-21 DIAGNOSIS — N18.9 CHRONIC KIDNEY DISEASE, UNSPECIFIED CKD STAGE: ICD-10-CM

## 2025-07-21 DIAGNOSIS — I10 ESSENTIAL HYPERTENSION: ICD-10-CM

## 2025-07-21 PROCEDURE — 76770 US EXAM ABDO BACK WALL COMP: CPT | Mod: 26,,, | Performed by: RADIOLOGY

## 2025-07-21 PROCEDURE — 76770 US EXAM ABDO BACK WALL COMP: CPT | Mod: TC,PN

## 2025-07-21 NOTE — PROGRESS NOTES
Patient ID: Kirill Gandhi III is a 50 y.o. male.    Chief Complaint: Medication Refill and Swelling    HPI    Kirill Gandhi III is a 50 y.o. male     History of Present Illness    CHIEF COMPLAINT:  Patient presents today to discuss kidneys and swelling.    RENAL FUNCTION:  He reports history of kidney problems with fluctuating creatinine levels, reaching a peak of 2.2 and most recently measuring 1.7. He currently takes Meloxicam daily.    EDEMA:  He reports significant swelling that is particularly severe today. He manages fluid retention with Bumex and compression interventions, including compression stockings and medium-sized compression devices that wrap around his legs. While the combination helps manage swelling, it does not completely resolve the issue. He is uncertain about continuing Bumex. His potassium levels remain elevated, and he previously discontinued potassium supplementation.    WEIGHT MANAGEMENT:  He currently weighs 478 lbs, approaching the 500-pound limit for medical procedures, including a pending cardiac angiogram. He has a scheduled telemedicine consultation for weight loss surgery. Previous weight loss attempts include losing 50 lbs, which was subsequently regained. While initially hesitant about surgical intervention, he is now considering options such as gastric bypass or sleeve gastrectomy.    SLEEP APNEA:  He continues to use nasal pillows for CPAP with good compliance. He completed a sleep study two weeks ago at this facility and reports CPAP is working effectively.    RECENT INJURY:  He recently sustained a toe sprain and was evaluated in the emergency room. X-ray confirmed no fracture. The toe was taped for 3-4 days, with tape removal yesterday. He reports severe pain during the acute phase of injury.    CURRENT MEDICATIONS:  He takes Olanzapine and Bumex.         Vitals:    07/18/25 1044   BP: 134/84   BP Location: Left arm   Patient Position: Sitting   Pulse: 90    Temp: 98 °F (36.7 °C)   TempSrc: Oral   SpO2: 95%   Weight: (!) 216.8 kg (478 lb 1.1 oz)   Height: 6' (1.829 m)            Review of Symptoms      Physical Exam    Constitutional:  Oriented to person, place, and time.appears well-developed and well-nourished.  No distress.   Obesity     HENT  Head: Normocephalic and atraumatic  Right Ear: External ear normal.   Left Ear: External ear normal.   Nose: External nose normal.   Mouth:  Moist mucus membranes.    Eyes:  Conjunctivae are normal. Right eye exhibits no discharge.  Left eye exhibits no discharge. No scleral icterus.  No periorbital edema    Cardiovascular:  Regular rate and rhythm with normal S1 and S2     Pulmonary/Chest:   Clear to auscultation bilaterally without wheezes, rhonchi or rales       Neurological:  Alert and oriented to person, place, and time.   Coordination normal.     Skin:   Skin is warm and dry.  No diaphoresis.   No rash noted.     Psychiatric: Normal mood and affect. Behavior is normal.  Judgment and thought content normal.             Complete Blood Count  Lab Results   Component Value Date    RBC 4.25 (L) 06/03/2025    HGB 11.9 (L) 06/03/2025    HCT 38.0 (L) 06/03/2025    MCV 89 06/03/2025    MCH 28.0 06/03/2025    MCHC 31.3 (L) 06/03/2025    RDW 16.8 (H) 06/03/2025     06/03/2025    MPV 9.9 06/03/2025    GRAN 5.0 02/28/2025    GRAN 64.3 02/28/2025    LYMPH 24.0 06/03/2025    LYMPH 1.49 06/03/2025    MONO 11.9 06/03/2025    MONO 0.74 06/03/2025    EOS 3.7 06/03/2025    EOS 0.23 06/03/2025    BASO 0.03 02/28/2025    EOSINOPHIL 8.1 (H) 02/28/2025    BASOPHIL 1.1 06/03/2025    BASOPHIL 0.07 06/03/2025    DIFFMETHOD Automated 02/28/2025       Comprehensive Metabolic Panel  Lab Results   Component Value Date     (H) 07/16/2025    BUN 29 (H) 07/16/2025    CREATININE 1.8 (H) 07/16/2025     07/16/2025    K 4.9 07/16/2025     07/16/2025    PROT 8.0 06/03/2025    ALBUMIN 4.1 06/03/2025    BILITOT 0.2 06/03/2025    AST  21 06/03/2025    ALKPHOS 70 06/03/2025    CO2 29 07/16/2025    ALT 21 06/03/2025    ANIONGAP 10 07/16/2025       TSH  Lab Results   Component Value Date    TSH 0.547 07/07/2025       Assessment / Plan:      ICD-10-CM ICD-9-CM   1. Chronic kidney disease, unspecified CKD stage  N18.9 585.9   2. Moderate episode of recurrent major depressive disorder  F33.1 296.32   3. Spinal cord lesion  G95.9 336.9   4. Chronic kidney disease, stage 3b  N18.32 585.3   5. Pain  R52 780.96     Chronic kidney disease, unspecified CKD stage  -     Ambulatory referral/consult to Nephrology; Future; Expected date: 07/25/2025  -     Cancel: PTH, Intact; Future; Expected date: 07/18/2025  -     Renal Function Panel; Future; Expected date: 07/18/2025  -     Vitamin D; Future; Expected date: 07/18/2025    Moderate episode of recurrent major depressive disorder    Spinal cord lesion    Chronic kidney disease, stage 3b  -     Vitamin D; Future; Expected date: 07/18/2025  -     Cancel: PTH, Intact; Future; Expected date: 07/18/2025  -     PTH, Intact; Future; Expected date: 07/18/2025    Pain  -     Discontinue: traMADoL (ULTRAM) 50 mg tablet; Take 2 tablets (100 mg total) by mouth every 8 (eight) hours as needed (severe pain).  Dispense: 180 tablet; Refill: 2        Assessment & Plan    - Considered gastric bypass surgery as the best option for significant weight loss and management of comorbidities, given current health status and inability to regulate fluids effectively.  - Discontinued Meloxicam due to potential negative impact on renal function.  - Continued Bumex for fluid management, despite risks to renal function, as it is necessary to prevent fluid overload.  - Monitoring renal function closely, with recent creatinine levels showing improvement from 2.2 to 1.7.  - Evaluated effectiveness of compression devices and leg wraps for edema management.  - Assessed CPAP compliance and effectiveness for sleep apnea management.  - Reviewing  recent sleep study results.  - Monitoring potassium levels, which have been elevated despite use of Bumex.  - Considered risks and benefits of gastric bypass vs. gastric sleeve, with bypass offering better long-term weight loss but higher complication risk.    CHRONIC KIDNEY DISEASE:  - Monitored creatinine levels, which have decreased from 2.2 to 1.7, indicating some improvement.  - Discontinued Meloxicam due to potential negative impact on renal function.  - Continued Bumex for fluid management, despite risks to renal function, as it is necessary to prevent fluid overload.  - Explained mechanisms of fluid retention and challenges of balancing fluid management with renal function.  - Ordered lab work to be completed in 2 weeks.  - Referred to nephrologist for renal function evaluation and preservation.  - Instructed the patient to contact the office if unable to get an appointment with the nephrologist.    EDEMA:  - Noted continued use of compression devices and leg wraps for management, though not 100% effective.  - Recommend continued use of these compression methods.  - Maintained Bumex for fluid management as discussed in the kidney disease plan.    MORBID OBESITY:  - Currently at 478 lbs, noting previous weight loss of 50 lbs that was regained.  - Evaluated impact of obesity on kidney function and overall health, determining increased risk of earlier mortality.  - Educated on importance of weight loss for overall health improvement, including benefits for diabetes, blood pressure, and glucose control.  - Recommend GLP-1 medications such as Trulicity and Mounjaro for weight loss.  - Referred to bariatric surgeon for gastric bypass surgery consultation as the best option for long-term weight loss and health improvement.  - Instructed the patient to attend scheduled appointment to discuss bariatric surgery financial options.    OBSTRUCTIVE SLEEP APNEA:  - Monitored the patient's use of nasal pillows for CPAP  therapy, which is working well.  - Recommend continued use of this effective therapy.    HYPERKALEMIA:  - Monitored potassium levels, which are high normal or elevated.  - Planned to recheck potassium levels soon due to high readings.  - Discontinued potassium supplementation due to elevated levels.    FOOT SPRAIN:  - Monitored the patient's sprained toe, initially suspected to be fractured.  - Evaluated x-ray results confirming no fracture in the toe.  - Noted the toe was taped for 3-4 days and the patient is now able to go without tape.    OTHER:  - Explained concept of dextrocardia and situs inversus as rare anatomical variations.    FOLLOW-UP:  - Follow up virtually in 3 months.  - Follow up in person in 6 months.         This note was generated with the assistance of ambient listening technology. Verbal consent was obtained by the patient and accompanying visitor(s) for the recording of patient appointment to facilitate this note. I attest to having reviewed and edited the generated note for accuracy, though some syntax or spelling errors may persist. Please contact the author of this note for any clarification.

## 2025-07-22 ENCOUNTER — TELEPHONE (OUTPATIENT)
Dept: GASTROENTEROLOGY | Facility: CLINIC | Age: 50
End: 2025-07-22
Payer: MEDICARE

## 2025-07-22 ENCOUNTER — TELEPHONE (OUTPATIENT)
Dept: FAMILY MEDICINE | Facility: CLINIC | Age: 50
End: 2025-07-22
Payer: MEDICARE

## 2025-07-22 ENCOUNTER — PATIENT MESSAGE (OUTPATIENT)
Dept: FAMILY MEDICINE | Facility: CLINIC | Age: 50
End: 2025-07-22
Payer: MEDICARE

## 2025-07-22 DIAGNOSIS — R10.10 UPPER ABDOMINAL PAIN: Primary | ICD-10-CM

## 2025-07-22 DIAGNOSIS — R52 PAIN: ICD-10-CM

## 2025-07-22 RX ORDER — TRAMADOL HYDROCHLORIDE 50 MG/1
100 TABLET, FILM COATED ORAL EVERY 8 HOURS PRN
Qty: 180 TABLET | Refills: 2 | Status: SHIPPED | OUTPATIENT
Start: 2025-07-22

## 2025-07-22 NOTE — TELEPHONE ENCOUNTER
I spoke with Dania (medical director for [facility]) regarding approval for HIDA scan. Dania was informed that pt is scheduled for an abdominal US on 7/29/25 due to abdominal pain. Dania stated that no further information was necessary at this time.

## 2025-07-22 NOTE — TELEPHONE ENCOUNTER
Spoke with Rep from [employer] regarding this patient, was told that the NP will have to call with the necessary information regarding patient HIDA scan

## 2025-07-22 NOTE — TELEPHONE ENCOUNTER
Copied from CRM #[id number]. Topic: General Inquiry - Patient Advice  >> Jul 22, 2025 10:30 AM Lisa wrote:  Type:  Needs Medical Advice    Who Called: [facility] Dania    Mao Call Back Number: [phone]    Additional Information: Patient is requesting a call back from nurse

## 2025-07-22 NOTE — TELEPHONE ENCOUNTER
Copied from CRM #[id number]. Topic: General Inquiry - Patient Advice  >> Jul 22, 2025  2:27 PM Nanci wrote:  Type:  Needs Medical Advice    Who Called: [facility]'s health (moncho)  Would the patient rather a call back or a response via [facility]? Call   Best Call Back Number: [phone]  Additional Information:  Would like to discuss US that was submitted today br Dr Galindo   Medical director wants to know how do you all know if he has gall stones   Due today  >> Jul 22, 2025  2:35 PM Med Assistant e[employer]nia wrote:    ----- Message -----  From: Nanci Lopez  Sent: 7/22/2025   2:29 PM CDT  To: [facility] Staff

## 2025-07-22 NOTE — TELEPHONE ENCOUNTER
Copied from CRM #[id number]. Topic: General Inquiry - Return Call  >> Jul 22, 2025  4:43 PM Isabela wrote:  Type:  Test Results    Who Called: Dania (medical director) [facility]  Name of Test (Lab/Mammo/Etc): Ultrasound  Date of Test: 07/21/2025  Ordering Provider: [facility]  Where the test was performed: [facility]  Would the patient rather a call back or a response via [facility]? Call back  Best Call Back Number: [phone]  Additional Information:   additional for ultrasound of gallbladder

## 2025-07-24 ENCOUNTER — LAB VISIT (OUTPATIENT)
Dept: LAB | Facility: HOSPITAL | Age: 50
End: 2025-07-24
Payer: MEDICARE

## 2025-07-24 DIAGNOSIS — N28.9 RENAL INSUFFICIENCY: Primary | ICD-10-CM

## 2025-07-24 DIAGNOSIS — N25.81 SECONDARY HYPERPARATHYROIDISM OF RENAL ORIGIN: ICD-10-CM

## 2025-07-24 DIAGNOSIS — N28.9 RENAL INSUFFICIENCY: ICD-10-CM

## 2025-07-24 DIAGNOSIS — N18.30 STAGE 3 CHRONIC KIDNEY DISEASE, UNSPECIFIED WHETHER STAGE 3A OR 3B CKD: ICD-10-CM

## 2025-07-24 LAB
25(OH)D3+25(OH)D2 SERPL-MCNC: 26 NG/ML (ref 30–96)
ABSOLUTE EOSINOPHIL (OHS): 0.38 K/UL
ABSOLUTE MONOCYTE (OHS): 1.01 K/UL (ref 0.3–1)
ABSOLUTE NEUTROPHIL COUNT (OHS): 5.04 K/UL (ref 1.8–7.7)
ALBUMIN SERPL BCP-MCNC: 4.1 G/DL (ref 3.5–5.2)
ANION GAP (OHS): 10 MMOL/L (ref 8–16)
BASOPHILS # BLD AUTO: 0.07 K/UL
BASOPHILS NFR BLD AUTO: 0.8 %
BILIRUB UR QL STRIP.AUTO: NEGATIVE
BUN SERPL-MCNC: 36 MG/DL (ref 2–20)
CALCIUM SERPL-MCNC: 9.2 MG/DL (ref 8.7–10.5)
CHLORIDE SERPL-SCNC: 99 MMOL/L (ref 95–110)
CLARITY UR: CLEAR
CO2 SERPL-SCNC: 29 MMOL/L (ref 23–29)
COLOR UR AUTO: YELLOW
CREAT SERPL-MCNC: 2 MG/DL (ref 0.5–1.4)
CREAT UR-MCNC: 16 MG/DL (ref 23–375)
ERYTHROCYTE [DISTWIDTH] IN BLOOD BY AUTOMATED COUNT: 16.6 % (ref 11.5–14.5)
GFR SERPLBLD CREATININE-BSD FMLA CKD-EPI: 40 ML/MIN/1.73/M2
GLUCOSE SERPL-MCNC: 138 MG/DL (ref 70–110)
GLUCOSE UR QL STRIP: ABNORMAL
HCT VFR BLD AUTO: 38 % (ref 40–54)
HGB BLD-MCNC: 11.9 GM/DL (ref 14–18)
HGB UR QL STRIP: NEGATIVE
IMM GRANULOCYTES # BLD AUTO: 0.04 K/UL (ref 0–0.04)
IMM GRANULOCYTES NFR BLD AUTO: 0.4 % (ref 0–0.5)
IRON SATN MFR SERPL: 18 % (ref 20–50)
IRON SERPL-MCNC: 75 UG/DL (ref 45–160)
KETONES UR QL STRIP: NEGATIVE
LEUKOCYTE ESTERASE UR QL STRIP: NEGATIVE
LYMPHOCYTES # BLD AUTO: 2.42 K/UL (ref 1–4.8)
MCH RBC QN AUTO: 28.5 PG (ref 27–31)
MCHC RBC AUTO-ENTMCNC: 31.3 G/DL (ref 32–36)
MCV RBC AUTO: 91 FL (ref 82–98)
NITRITE UR QL STRIP: NEGATIVE
NUCLEATED RBC (/100WBC) (OHS): 0 /100 WBC
PH UR STRIP: 6 [PH]
PHOSPHATE SERPL-MCNC: 5 MG/DL (ref 2.7–4.5)
PLATELET # BLD AUTO: 334 K/UL (ref 150–450)
PMV BLD AUTO: 9.4 FL (ref 9.2–12.9)
POTASSIUM SERPL-SCNC: 5.3 MMOL/L (ref 3.5–5.1)
PROT UR QL STRIP: NEGATIVE
PROT UR-MCNC: <7 MG/DL
PROT/CREAT UR: ABNORMAL MG/G{CREAT}
PTH-INTACT SERPL-MCNC: 60.2 PG/ML (ref 9–77)
RBC # BLD AUTO: 4.18 M/UL (ref 4.6–6.2)
RELATIVE EOSINOPHIL (OHS): 4.2 %
RELATIVE LYMPHOCYTE (OHS): 27 % (ref 18–48)
RELATIVE MONOCYTE (OHS): 11.3 % (ref 4–15)
RELATIVE NEUTROPHIL (OHS): 56.3 % (ref 38–73)
SODIUM SERPL-SCNC: 138 MMOL/L (ref 136–145)
SP GR UR STRIP: 1.01
TIBC SERPL-MCNC: 419 UG/DL (ref 250–450)
TRANSFERRIN SERPL-MCNC: 283 MG/DL (ref 200–375)
UROBILINOGEN UR STRIP-ACNC: NEGATIVE EU/DL
WBC # BLD AUTO: 8.96 K/UL (ref 3.9–12.7)

## 2025-07-24 PROCEDURE — 82306 VITAMIN D 25 HYDROXY: CPT | Mod: PN

## 2025-07-24 PROCEDURE — 85025 COMPLETE CBC W/AUTO DIFF WBC: CPT | Mod: PN

## 2025-07-24 PROCEDURE — 81003 URINALYSIS AUTO W/O SCOPE: CPT | Mod: PN

## 2025-07-24 PROCEDURE — 84466 ASSAY OF TRANSFERRIN: CPT | Mod: PN

## 2025-07-24 PROCEDURE — 82570 ASSAY OF URINE CREATININE: CPT | Mod: PN

## 2025-07-24 PROCEDURE — 83970 ASSAY OF PARATHORMONE: CPT | Mod: PN

## 2025-07-24 PROCEDURE — 36415 COLL VENOUS BLD VENIPUNCTURE: CPT | Mod: PN

## 2025-07-24 PROCEDURE — 80069 RENAL FUNCTION PANEL: CPT | Mod: PN

## 2025-07-25 ENCOUNTER — TELEPHONE (OUTPATIENT)
Dept: NEPHROLOGY | Facility: CLINIC | Age: 50
End: 2025-07-25
Payer: MEDICARE

## 2025-07-28 ENCOUNTER — OFFICE VISIT (OUTPATIENT)
Dept: NEPHROLOGY | Facility: CLINIC | Age: 50
End: 2025-07-28
Payer: MEDICARE

## 2025-07-28 VITALS
OXYGEN SATURATION: 98 % | WEIGHT: 315 LBS | HEIGHT: 72 IN | DIASTOLIC BLOOD PRESSURE: 72 MMHG | HEART RATE: 95 BPM | SYSTOLIC BLOOD PRESSURE: 118 MMHG | BODY MASS INDEX: 42.66 KG/M2

## 2025-07-28 DIAGNOSIS — N18.9 CHRONIC KIDNEY DISEASE, UNSPECIFIED CKD STAGE: ICD-10-CM

## 2025-07-28 DIAGNOSIS — N18.30 STAGE 3 CHRONIC KIDNEY DISEASE, UNSPECIFIED WHETHER STAGE 3A OR 3B CKD: Primary | ICD-10-CM

## 2025-07-28 DIAGNOSIS — E88.9: ICD-10-CM

## 2025-07-28 DIAGNOSIS — I12.9 HYPERTENSIVE CHRONIC KIDNEY DISEASE WITH STAGE 1 THROUGH STAGE 4 CHRONIC KIDNEY DISEASE, OR UNSPECIFIED CHRONIC KIDNEY DISEASE: ICD-10-CM

## 2025-07-28 DIAGNOSIS — N17.9 AKI (ACUTE KIDNEY INJURY): Primary | ICD-10-CM

## 2025-07-28 DIAGNOSIS — E87.5 HYPERKALEMIA: ICD-10-CM

## 2025-07-28 PROCEDURE — 3066F NEPHROPATHY DOC TX: CPT | Mod: CPTII,S$GLB,,

## 2025-07-28 PROCEDURE — 99204 OFFICE O/P NEW MOD 45 MIN: CPT | Mod: S$GLB,,,

## 2025-07-28 PROCEDURE — 3044F HG A1C LEVEL LT 7.0%: CPT | Mod: CPTII,S$GLB,,

## 2025-07-28 PROCEDURE — 3008F BODY MASS INDEX DOCD: CPT | Mod: CPTII,S$GLB,,

## 2025-07-28 PROCEDURE — 3078F DIAST BP <80 MM HG: CPT | Mod: CPTII,S$GLB,,

## 2025-07-28 PROCEDURE — 99999 PR PBB SHADOW E&M-EST. PATIENT-LVL V: CPT | Mod: PBBFAC,,,

## 2025-07-28 PROCEDURE — 3074F SYST BP LT 130 MM HG: CPT | Mod: CPTII,S$GLB,,

## 2025-07-28 PROCEDURE — 1160F RVW MEDS BY RX/DR IN RCRD: CPT | Mod: CPTII,S$GLB,,

## 2025-07-28 PROCEDURE — 1159F MED LIST DOCD IN RCRD: CPT | Mod: CPTII,S$GLB,,

## 2025-07-28 RX ORDER — BUMETANIDE 1 MG/1
1 TABLET ORAL 2 TIMES DAILY
Qty: 180 TABLET | Refills: 3 | Status: SHIPPED | OUTPATIENT
Start: 2025-07-28 | End: 2026-07-28

## 2025-07-28 NOTE — PROGRESS NOTES
Nephrology Clinic Initial Note    Chief Complaint     VIKTORIA VS CKD    HPI   Kirill Gandhi III is a 50 y.o. male with PMH of HTN, DM, Morbid Obesity, Nicotine dependence, Chronic back pain was on Meloxicam, VICENTE, Hepatic fibrosis on Remetirom and Lower limb swelling   Was referred to clinic for high Cr results for VIKTORIA VS CKD evaluation    Patient was accompanied with his mother    Patient denied nausea/vomiting, change in taste or appetite  Patient denied SOB, orthopnea, PNDs or cough  Patient denied Dysuria, frequency, urgency, dribbling, feeling incomplete emptying of bladder or difficulty initiating urine  Patient has been taking Meloxicam regularly for back pain and was stopped recently due to high Cr level  Patient has family history of kidney disease  Patient has been started on Pioglitazone since last year for DM control since he was on 120 units TID of Rapid acting insulin  Patient  was started on Bumex for lower limb swelling 2 mg once a day it improved the swelling but it didn't go away  Was seen by cardiology and he is still under investigation for chest pain (ECHO showed mildly dilated LVF but normal LVEF) and Cardiac Stress Test was negative but concerning for reduced stress flow through the heart or caffeine for possible of balanced 3 vessel disease and they will proceed with CTA coronary         Lab Results   Component Value Date    CREATININE 2.0 (H) 07/24/2025    CREATININE 1.8 (H) 07/16/2025    CREATININE 1.7 (H) 06/11/2025    CREATININE 2.5 (H) 06/03/2025    CREATININE 1.15 02/28/2025    CREATININE 0.92 01/03/2025    CREATININE 0.75 06/11/2024    CREATININE 0.83 12/08/2023    CREATININE 0.77 03/27/2023    CREATININE 0.8 11/12/2022       * Vitals:    LAST RECORDED VALUE 24 HOUR RANGE   Temperature:    [unfilled]   Heart Rate: 95  [unfilled]   Blood Pressure: 118/72 [unfilled]   Pulse ox 98 %              General ROS   Review of Systems   All other systems reviewed and are negative.      PMH      Past Medical History:   Diagnosis Date    Allergy     Anxiety     Back pain     Chronic bilateral low back pain with bilateral sciatica 12/14/2021    CTS (carpal tunnel syndrome) 06/26/2015    CTS (carpal tunnel syndrome) 6/26/2015    Depression     Diabetes 04/17/2015    Diabetes mellitus with insulin therapy     Elevated sed rate 6/26/2015    GERD (gastroesophageal reflux disease) 10/19/2022    Hx of vasectomy     Hypertension     Morbid obesity 4/16/2015    OCD (obsessive compulsive disorder)     Sleep apnea     Type 2 diabetes mellitus with hyperglycemia, with long-term current use of insulin 10/17/2022       PSH     Past Surgical History:   Procedure Laterality Date    ADENOIDECTOMY      CAUDAL EPIDURAL STEROID INJECTION N/A 10/30/2024    Procedure: Caudal NATALIE;  Surgeon: Clark Smith MD;  Location: [facility] CATH LAB;  Service: Pain Management;  Laterality: N/A;  with catheter    COLONOSCOPY N/A 9/6/2022    Procedure: COLONOSCOPY;  Surgeon: Rossy Champion MD;  Location: [facility] ENDO;  Service: Endoscopy;  Laterality: N/A;    EPIDURAL STEROID INJECTION Right 09/04/2019    Procedure: Injection, Steroid, Epidural Transforaminal;  Surgeon: Harjinder Batista Jr., MD;  Location: [facility] ENDO;  Service: Pain Management;  Laterality: Right;  Right L3 + L4 TF NATALIE    [id number]  [id number]    Arrive @ 1300; ASA last 8/27; Check BG; NEEDS CONSENT    ESOPHAGOGASTRODUODENOSCOPY N/A 10/31/2022    Procedure: EGD (ESOPHAGOGASTRODUODENOSCOPY);  Surgeon: Rossy Champion MD;  Location: [facility] ENDO;  Service: Endoscopy;  Laterality: N/A;    FRACTURE SURGERY      bilateral elbow fracture 3 years ago    FUSION OF LUMBAR SPINE BY ANTERIOR APPROACH Left 04/26/2021    Procedure: FUSION, SPINE, LUMBAR, ANTERIOR APPROACH Left L3-4 L4-5 OLIF BRENDA, ALL Release, L5-S1 ALIF;  Surgeon: Jose L Brown MD;  Location: [facility] OR;  Service: Neurosurgery;  Laterality: Left;  Procedure: Left L3-4 L4-5 OLIF BRENDA, ALL Release, L5-S1 ALIF  Surgery Time: 2.5  Hrs  LOS: 2-3  Anesthesia: General  Blood: Type & Screen  Radiology: Josseline  Brace: LSO  Bed: Regular  Position: LAteral Righ    FUSION OF SACROILIAC JOINT Bilateral 04/13/2022    Procedure: FUSION, SACROILIAC JOINT Stg 2: Bilateral Open SI Joint fusion, Bedrock from Si Bone;  Surgeon: Jose L Brown MD;  Location: [facility];  Service: Neurosurgery;  Laterality: Bilateral;    FUSION OF SPINE WITH INSTRUMENTATION N/A 04/26/2021    Procedure: FUSION, SPINE, WITH INSTRUMENTATION L3-S1 Posteior Segmental Instrumentation;  Surgeon: Jose L Brown MD;  Location: [facility] OR;  Service: Neurosurgery;  Laterality: N/A;  Procedure: L3-S1 Posteior Segmental Instrumentation  Sirgery Time: 2 Hrs  LOS: 2-3  Anesthesia: General  Blood: Tyoe & Screen  Radiology: Dual C arm  Brace:LSO  Bed: [place] [id number] Poster  Position: Prone  Equipment: Depuy     FUSION OF SPINE WITH INSTRUMENTATION N/A 04/13/2022    Procedure: FUSION, SPINE, WITH INSTRUMENTATION Stg 1: Revision of posterior L3-S1 hardware, Depuy. Sacro-pelvic fixation, screw augmentation PMMA. L4-S1 posterolateral arthrodesis. Open Gauge screws, 11-12mm;  Surgeon: Jose L Brown MD;  Location: [facility] OR;  Service: Neurosurgery;  Laterality: N/A;    HERNIA REPAIR      bilateral groin hernia    INJECTION OF STEROID Bilateral 12/13/2021    Procedure: INJECTION, STEROID Bilateral SI JOint Block and Steroid Injection;  Surgeon: Jose L Brown MD;  Location: [facility] OR;  Service: Neurosurgery;  Laterality: Bilateral;  Procedure: Bilateral SI JOint Block and Steroid Injection   Surgery Time: 30 min  LOS: 0  Anesthesia: MAC  Radiology: C-arm  Bed: Regular with pillows  Position: Prone    LUMBAR LAMINECTOMY Bilateral 11/29/2019    Procedure: LAMINECTOMY, SPINE, LUMBAR Rigth L3-4, Left L4-5 Laminectomy, medial Facetectomy, and microdiscectomy;  Surgeon: Jose L Brown MD;  Location: [facility] OR;  Service: Neurosurgery;  Laterality: Bilateral;  Procedure: Rigth L3-4, Left L4-5 Laminectomy, medial  Facetectomy, and microdiscectomy  Surgery Time: 2.5 Hrs  LOS: 0-1  Anesthesia: General  Blood: Type & Screen  Radiology: C-arm  Micros    REMOVAL IMPLANT, POSTERIOR SEGMENT, INTRAOCULAR  04/13/2022    Procedure: REMOVAL IMPLANT, POSTERIOR SEGMENT, INTRAOCULAR;  Surgeon: Jose L Brown MD;  Location: [facility] OR;  Service: Neurosurgery;;    SPINE SURGERY      TONSILLECTOMY      VASECTOMY         FH     Family History   Problem Relation Name Age of Onset    Fibromyalgia Mother      Osteoarthritis Mother      Cataracts Maternal Grandmother      Macular degeneration Maternal Grandfather      Cataracts Maternal Grandfather      Rheum arthritis Neg Hx      Psoriasis Neg Hx      Lupus Neg Hx      Kidney disease Neg Hx      Inflammatory bowel disease Neg Hx      Amblyopia Neg Hx      Blindness Neg Hx      Glaucoma Neg Hx      Retinal detachment Neg Hx      Strabismus Neg Hx         Social Hx   Social History[1]    Allergies     Review of patient's allergies indicates:   Allergen Reactions    Dilaudid [hydromorphone] Other (See Comments)     IV dilaudid severely drops BP       Medications       Current medications  Medications Ordered Prior to Encounter[2]      Physical Examination   [unfilled]  [unfilled]      Physical Exam  Vitals reviewed.   Constitutional:       Appearance: He is obese.   HENT:      Head: Normocephalic.   Eyes:      General: No scleral icterus.     Conjunctiva/sclera: Conjunctivae normal.   Pulmonary:      Effort: Pulmonary effort is normal. No respiratory distress.   Abdominal:      Palpations: Abdomen is soft.   Musculoskeletal:         General: Swelling (+2 pitting edema) present.      Cervical back: Neck supple.   Skin:     General: Skin is dry.      Coloration: Skin is not jaundiced or pale.   Neurological:      Mental Status: He is alert and oriented to person, place, and time.   Psychiatric:         Mood and Affect: Mood normal.       Nephrology panel    CBC  Lab Results   Component Value Date    WBC  8.96 07/24/2025    NEUTROABS 5,670 03/24/2017    RBC 4.18 (L) 07/24/2025    HCT 38.0 (L) 07/24/2025    MCV 91 07/24/2025    MCH 28.5 07/24/2025    MCHC 31.3 (L) 07/24/2025    RDW 16.6 (H) 07/24/2025    MPV 9.4 07/24/2025       BMP  Lab Results   Component Value Date    BUN 36 (H) 07/24/2025     07/24/2025    K 5.3 (H) 07/24/2025    CL 99 07/24/2025    ANIONGAP 10 07/24/2025    CREATININE 2.0 (H) 07/24/2025    CALCIUM 9.2 07/24/2025       URINALYSIS  Lab Results   Component Value Date    PROTEINUA Negative 07/24/2025    UROBILINOGEN Negative 07/24/2025    KETONESU Trace (A) 11/11/2022    NITRITE Negative 07/24/2025    LEUKOCYTESUR Negative 07/24/2025    COLORU Yellow 07/24/2025    RBCUA 1 11/13/2019         PTH,INTACT  Lab Results   Component Value Date    PTH 60.2 07/24/2025       CALCIUM  Lab Results   Component Value Date    CALCIUM 9.2 07/24/2025       PHOSPHORUS  Lab Results   Component Value Date    PHOS 5.0 (H) 07/24/2025       Lab Results   Component Value Date    CREATININE 2.0 (H) 07/24/2025    CREATININE 1.8 (H) 07/16/2025    CREATININE 1.7 (H) 06/11/2025    CREATININE 2.5 (H) 06/03/2025    CREATININE 1.15 02/28/2025    CREATININE 0.92 01/03/2025    CREATININE 0.75 06/11/2024    CREATININE 0.83 12/08/2023    CREATININE 0.77 03/27/2023    CREATININE 0.8 11/12/2022       Assessment and Plan of care         Assessment and Plan:  Kirill Gandhi III is a 50 y.o. male with PMH of HTN, DM, Morbid Obesity, Nicotine dependence, Chronic back pain was on Meloxicam, VIECNTE, Hepatic fibrosis on Remetirom and Lower limb swelling   Was referred to clinic for high Cr results for VIKTORIA VS CKD evaluation      VIKTORIA VS CKD    Etiology thought to ATI be secondary to NSAIDs use especially with Valsartan (Valsartan decrease the renal blood flow by dilating the efferent arterioles and NSAIDs decrease renal blood flow by constricting the afferent arterioles which can cause major decrease renal blood flow)  Cr peaked at  2.5 improved to 1.7 and increased again to 2  Lab Results   Component Value Date    CREATININE 2.0 (H) 07/24/2025    CREATININE 1.8 (H) 07/16/2025    CREATININE 1.7 (H) 06/11/2025    CREATININE 2.5 (H) 06/03/2025    CREATININE 1.15 02/28/2025    CREATININE 0.92 01/03/2025    CREATININE 0.75 06/11/2024    CREATININE 0.83 12/08/2023    CREATININE 0.77 03/27/2023    CREATININE 0.8 11/12/2022   Patient stopped taking Meloxicam for the last month now  Renal US showed Both kidneys with normal size but with mild corical thinning (could be a sign of CKD)  Plan:  ATI injury usually the Cr will start to increase until it peaks then it will Plateau and hopefully will start to improve with tubular recovery  The Cr improvement might not go back to baseline or stay higher than the baseline and that will be the new baseline for the patient  Patient may have CKD but since the Cr numbers is high since 06/03 it is still early to declare it as CKD  Avoid NSAIDs lifelong and discuss with PCP other options for pain control (Tylenol is the only OTC that can be use d for pain in patient's case)  There is a plan by Cardiology for CTA coronary (Would recommend to delay until the patient Cr stabilizes unless it is needed urgently)  Continue Valsartan, Mounjaro and Jardiance  Continue on Metfromin for now but dose might need to be adjusted in the future  Avoid Nephrotoxic medications  New Renal Panel is sent to be done on Monday 08/04/2025       Acid-Base Status    Lab Results   Component Value Date    CO2 29 07/24/2025    ANIONGAP 10 07/24/2025   Patient may be having Obesity Hypoventilation Syndrome and that will lead to chronic metabolic compensation due to chronic CO2 Retention  Patient have high total CO2 Chronically  Plan:  VBGs will be ordered for Monday 08/04/2025 check pCO2 level    Volume/BP Status     Patient has +2 pitting edema  Secondary to Pioglitazone use most likely (Pioglitazone has the side effect of fluid  retention)  Patient doesn't have proteinuria or decreased urination or signs of heart failure for now to explain the lower limb swelling  BP readings are Below 120/80 at the clinic and at home   Plan:  Continue on Valsartan/HCTZ  Refer to nutrition for diet and scottie loss  Patient is being already seen in Obesity clinic for possible Bariatric surgery to help with weight loss  Stop Pioglitazone  Change Bumex to 1 mg BID PO instead of 2 gm once daily    Electrolyte Status  Lab Results   Component Value Date     07/24/2025    K 5.3 (H) 07/24/2025    CL 99 07/24/2025    CALCIUM 9.2 07/24/2025    PHOS 5.0 (H) 07/24/2025    MG 2.3 02/28/2025   Hyperkalemia (Patient has high readings of K chronically) most likely due to ARBs and NSAIDs use   Mild hyperphosphatemia (most likely due to VIKTORIA)  Plan:   Bumex was changed to 1 mg BID as mentioned above  Continue on Jardiance  We will have another lab on Monday 08/04/2025 for follow up of K level    RTC in 2 weeks    JUNE MARTIN  Nephrology Physician  07/28/2025 11:41 AM           [1]   Social History  Socioeconomic History    Marital status: Significant Other   Tobacco Use    Smoking status: Every Day     Types: Vaping with nicotine     Passive exposure: Current    Smokeless tobacco: Never    Tobacco comments:     Handout provided for [employer] Smoking Cessaiton program 10/11/24 - pt has stopped vaping   Substance and Sexual Activity    Alcohol use: Not Currently    Drug use: Never    Sexual activity: Yes     Partners: Female     Birth control/protection: None     Comment: ; one child      Social Drivers of Health     Financial Resource Strain: Low Risk  (2/4/2025)    Overall Financial Resource Strain (CARDIA)     Difficulty of Paying Living Expenses: Not hard at all   Food Insecurity: No Food Insecurity (2/4/2025)    Hunger Vital Sign     Worried About Running Out of Food in the Last Year: Never true     Ran Out of Food in the Last Year: Never true    Transportation Needs: No Transportation Needs (1/15/2024)    PRAPARE - Transportation     Lack of Transportation (Medical): No     Lack of Transportation (Non-Medical): No   Physical Activity: Inactive (2/4/2025)    Exercise Vital Sign     Days of Exercise per Week: 0 days     Minutes of Exercise per Session: 0 min   Stress: No Stress Concern Present (2/4/2025)    [employer] [place] [employer] - Occupational Stress Questionnaire     Feeling of Stress : Only a little   Housing Stability: Low Risk  (1/15/2024)    Housing Stability Vital Sign     Unable to Pay for Housing in the Last Year: No     Number of Places Lived in the Last Year: 2     Unstable Housing in the Last Year: No   [2]   Current Outpatient Medications on File Prior to Visit   Medication Sig Dispense Refill    acetaminophen (TYLENOL) 500 MG tablet Take 1,000 mg by mouth 2 (two) times daily as needed for Pain.      ARIPiprazole (ABILIFY) 5 MG Tab Take 1 tablet (5 mg total) by mouth once daily. 90 tablet 1    ascorbic acid, vitamin C, (VITAMIN C) 1000 MG tablet Take 1,000 mg by mouth once daily.      aspirin (ECOTRIN) 81 MG EC tablet Take 81 mg by mouth every evening.      aspirin/sod bicarb/citric acid (GIOVANY-SELTZER ORAL) Take 2 tablets by mouth daily as needed (Upset stomach).      atorvastatin (LIPITOR) 10 MG tablet Take 1 tablet (10 mg total) by mouth once daily. 90 tablet 3    blood-glucose sensor (DEXCOM G6 SENSOR) Cayla 3 Devices by Misc.(Non-Drug; Combo Route) route every 30 days. 3 each 11    blood-glucose transmitter (DEXCOM G6 TRANSMITTER) Cayla 1 Device by Misc.(Non-Drug; Combo Route) route every 3 (three) months. 1 each 3    busPIRone (BUSPAR) 30 MG Tab Take 1 tablet (30 mg total) by mouth 2 (two) times daily. 180 tablet 1    cholestyramine (QUESTRAN) 4 gram packet Take 1 packet (4 g total) by mouth 3 (three) times daily with meals. 90 packet 3    ciclopirox (PENLAC) 8 % Soln Apply topically nightly. 6.6 mL 6    coenzyme Q10 (CO  Q-10) 100 mg capsule Take 100 mg by mouth once daily.      cyanocobalamin (VITAMIN B-12) 1000 MCG tablet Take 100 mcg by mouth once daily.      cyclobenzaprine (FLEXERIL) 10 MG tablet TAKE 1 TABLET BY MOUTH THREE TIMES A DAY AS NEEDED FOR MUSCLE SPASMS 90 tablet 2    dapagliflozin propanediol (FARXIGA) 10 mg tablet Take 1 tablet (10 mg total) by mouth once daily. 90 tablet 3    dicyclomine (BENTYL) 20 mg tablet Take 1 tablet (20 mg total) by mouth 3 (three) times daily as needed (abdominal pain). 60 tablet 2    DULoxetine (CYMBALTA) 60 MG capsule Take 1 capsule (60 mg total) by mouth 2 (two) times daily. 60 capsule 0    ferrous gluconate (FERGON) 324 MG tablet Take 1 tablet (324 mg total) by mouth daily with breakfast. 90 tablet 3    fluticasone propionate (FLONASE) 50 mcg/actuation nasal spray 2 sprays (100 mcg total) by Each Nostril route once daily. 48 mL 2    gabapentin (NEURONTIN) 800 MG tablet TAKE 1 TABLET BY MOUTH THREE TIMES A DAY 90 tablet 3    insulin regular hum U-500 conc (HUMULIN R U-500, CONC, KWIKPEN) 500 unit/mL (3 mL) insulin pen INJECT 120 UNITS WITH BREAKFAST, INJECT 130 UNITS WITH LUNCH, AND INJECT 150 UNITS WITH DINNER 6 Pen 3    insulin syringe-needle U-100 (BD INSULIN SYRINGE ULTRA-FINE) 1 mL 31 gauge x 5/16 Syrg To use 6 times daily with insulin 400 each 11    Lactobac no.41/Bifidobact no.7 (PROBIOTIC-10 ORAL) Take by mouth.      lanolin/mineral oil/petrolatum (ARTIFICIAL TEARS OPHT) Place 2 drops into both eyes daily as needed (Dry eye).      LIDOcaine (LIDODERM) 5 % Place 1 patch onto the skin once daily. Remove & Discard patch within 12 hours or as directed by MD 30 patch 1    metFORMIN (GLUCOPHAGE-XR) 500 MG ER 24hr tablet Take 2 tablets (1,000 mg total) by mouth 2 (two) times daily with meals. 360 tablet 3    metoclopramide HCl (REGLAN) 5 MG tablet Take 1 tablet (5 mg total) by mouth 4 (four) times daily before meals and nightly. 120 tablet 2    multivitamin capsule Take 1 capsule by  "mouth once daily.      nitroGLYCERIN (NITROSTAT) 0.4 MG SL tablet Place 1 tablet (0.4 mg total) under the tongue every 5 (five) minutes as needed for Chest pain. 30 tablet 3    nitroglycerin (RECTIV) 0.4 % (w/w) Oint Apply small amount twice daily. 30 g 1    NON FORMULARY MEDICATION Uses Cpap Machine nightly on a setting of 10      ondansetron (ZOFRAN-ODT) 4 MG TbDL Take 1 tablet (4 mg total) by mouth every 8 (eight) hours as needed (nausea). 30 tablet 5    ONETOUCH DELICA PLUS LANCET 33 gauge Misc Apply topically 3 (three) times daily.      ONETOUCH VERIO TEST STRIPS Strp 1 strip 3 (three) times daily.      pantoprazole (PROTONIX) 40 MG tablet Take 1 tablet (40 mg total) by mouth once daily. 90 tablet 3    pen needle, diabetic (BD ULTRA-FINE ORIG PEN NEEDLE) 29 gauge x 1/2" Ndle To use 3 needles daily 300 each 3    pen needle, diabetic 31 gauge x 1/4" Ndle Use with flex pen 100 each 11    propranoloL (INDERAL) 20 MG tablet TAKE 1 TABLET BY MOUTH THREE TIMES A DAY AS NEEDED FOR ANXIETY. MAY TAKE AN EXTRA TABLET AS NEEDED FOR ANXIETY 270 tablet 1    resmetirom 100 mg Tab Take 100 mg by mouth once daily. 30 tablet 11    tirzepatide (MOUNJARO) 15 mg/0.5 mL PnIj Inject 15 mg into the skin every 7 days. 4 Pen 11    traMADoL (ULTRAM) 50 mg tablet Take 2 tablets (100 mg total) by mouth every 8 (eight) hours as needed (severe pain). 180 tablet 2    traZODone (DESYREL) 100 MG tablet Take 1 tablet (100 mg total) by mouth every evening. 30 tablet 11    valsartan-hydrochlorothiazide (DIOVAN-HCT) 320-12.5 mg per tablet Take 1 tablet by mouth once daily. 90 tablet 3    vitamin D (VITAMIN D3) 1000 units Tab Take 1 tablet (1,000 Units total) by mouth once daily. 90 tablet 3    vitamin D 1000 units Tab Take 5,000 Units by mouth 2 (two) times a day.      ZEGALOGUE AUTOINJECTOR 0.6 mg/0.6 mL AtIn Inject 1 Box  into the skin as needed (hypoglycemia). Glucagon is an emergency pen that is used to increase your blood sugar. Glucagon is a " pen that is used in an emergency situation where you are unable to eat or drink anything. Glucagon is a medication that is given by someone else-spouse, child, etc. 1.2 mL 1    [DISCONTINUED] bumetanide (BUMEX) 2 MG tablet Take 1 tablet (2 mg total) by mouth once daily. 30 tablet 11    [DISCONTINUED] pioglitazone (ACTOS) 30 MG tablet Take 1 tablet (30 mg total) by mouth once daily. 90 tablet 3    [DISCONTINUED] potassium chloride SA (K-DUR,KLOR-CON) 20 MEQ tablet TAKE 1 TABLET BY MOUTH EVERY OTHER DAY 45 tablet 3    [DISCONTINUED] fluvoxaMINE (LUVOX) 100 MG tablet Take 1.5 tablets (150 mg total) by mouth 2 (two) times daily. 270 tablet 0     Current Facility-Administered Medications on File Prior to Visit   Medication Dose Route Frequency Provider Last Rate Last Admin    perflutren protein-A microsphr 0.22 mg/mL IV susp  0.5 mL Intravenous Once Yousef, You LUCIANO MD        sodium chloride 0.9% flush 10 mL  10 mL Intravenous PRN Fernando Roberts MD

## 2025-07-28 NOTE — PATIENT INSTRUCTIONS
Please stop taking Pioglitazone (ACTOS)  Avoid the Meloxicam, Motren, Aleve, Advil, Ibuprofen, BC Powder  Please do lab test on Monday 08/04/2025  Nutrtion will call to arrange for low potassium and low salt diet  Will see you in 2 weeks

## 2025-07-29 ENCOUNTER — PATIENT MESSAGE (OUTPATIENT)
Dept: NEPHROLOGY | Facility: CLINIC | Age: 50
End: 2025-07-29
Payer: MEDICARE

## 2025-07-29 ENCOUNTER — HOSPITAL ENCOUNTER (OUTPATIENT)
Dept: RADIOLOGY | Facility: HOSPITAL | Age: 50
Discharge: HOME | End: 2025-07-29
Attending: NURSE PRACTITIONER
Payer: MEDICARE

## 2025-07-29 DIAGNOSIS — R10.10 UPPER ABDOMINAL PAIN: ICD-10-CM

## 2025-07-29 PROCEDURE — 76705 ECHO EXAM OF ABDOMEN: CPT | Mod: 26,,, | Performed by: RADIOLOGY

## 2025-07-29 PROCEDURE — 76705 ECHO EXAM OF ABDOMEN: CPT | Mod: TC,PN

## 2025-08-04 ENCOUNTER — LAB VISIT (OUTPATIENT)
Dept: LAB | Facility: HOSPITAL | Age: 50
End: 2025-08-04
Attending: FAMILY MEDICINE
Payer: MEDICARE

## 2025-08-04 DIAGNOSIS — N18.9 CHRONIC KIDNEY DISEASE, UNSPECIFIED CKD STAGE: ICD-10-CM

## 2025-08-04 DIAGNOSIS — N18.32 CHRONIC KIDNEY DISEASE, STAGE 3B: ICD-10-CM

## 2025-08-04 LAB
25(OH)D3+25(OH)D2 SERPL-MCNC: 38 NG/ML (ref 30–96)
ALBUMIN SERPL BCP-MCNC: 4 G/DL (ref 3.5–5.2)
ANION GAP (OHS): 12 MMOL/L (ref 8–16)
BUN SERPL-MCNC: 33 MG/DL (ref 2–20)
CALCIUM SERPL-MCNC: 9.3 MG/DL (ref 8.7–10.5)
CHLORIDE SERPL-SCNC: 103 MMOL/L (ref 95–110)
CO2 SERPL-SCNC: 29 MMOL/L (ref 23–29)
CREAT SERPL-MCNC: 1.8 MG/DL (ref 0.5–1.4)
GFR SERPLBLD CREATININE-BSD FMLA CKD-EPI: 46 ML/MIN/1.73/M2
GLUCOSE SERPL-MCNC: 112 MG/DL (ref 70–110)
PHOSPHATE SERPL-MCNC: 4.6 MG/DL (ref 2.7–4.5)
POTASSIUM SERPL-SCNC: 4.2 MMOL/L (ref 3.5–5.1)
PTH-INTACT SERPL-MCNC: 70.1 PG/ML (ref 9–77)
SODIUM SERPL-SCNC: 144 MMOL/L (ref 136–145)

## 2025-08-04 PROCEDURE — 36415 COLL VENOUS BLD VENIPUNCTURE: CPT | Mod: PN

## 2025-08-04 PROCEDURE — 80069 RENAL FUNCTION PANEL: CPT | Mod: PN

## 2025-08-04 PROCEDURE — 82306 VITAMIN D 25 HYDROXY: CPT | Mod: PN

## 2025-08-04 PROCEDURE — 83970 ASSAY OF PARATHORMONE: CPT | Mod: PN

## 2025-08-05 ENCOUNTER — TELEPHONE (OUTPATIENT)
Dept: SURGERY | Facility: CLINIC | Age: 50
End: 2025-08-05
Payer: MEDICARE

## 2025-08-06 ENCOUNTER — PATIENT MESSAGE (OUTPATIENT)
Dept: ENDOCRINOLOGY | Facility: CLINIC | Age: 50
End: 2025-08-06
Payer: MEDICARE

## 2025-08-06 DIAGNOSIS — E78.5 DYSLIPIDEMIA: ICD-10-CM

## 2025-08-07 ENCOUNTER — OFFICE VISIT (OUTPATIENT)
Dept: SURGERY | Facility: CLINIC | Age: 50
End: 2025-08-07
Payer: MEDICARE

## 2025-08-07 ENCOUNTER — LAB VISIT (OUTPATIENT)
Dept: LAB | Facility: HOSPITAL | Age: 50
End: 2025-08-07
Payer: MEDICARE

## 2025-08-07 VITALS
WEIGHT: 315 LBS | RESPIRATION RATE: 18 BRPM | HEIGHT: 72 IN | BODY MASS INDEX: 42.66 KG/M2 | SYSTOLIC BLOOD PRESSURE: 113 MMHG | DIASTOLIC BLOOD PRESSURE: 67 MMHG | HEART RATE: 88 BPM

## 2025-08-07 DIAGNOSIS — N18.30 STAGE 3 CHRONIC KIDNEY DISEASE, UNSPECIFIED WHETHER STAGE 3A OR 3B CKD: ICD-10-CM

## 2025-08-07 DIAGNOSIS — K62.5 RECTAL BLEEDING: ICD-10-CM

## 2025-08-07 DIAGNOSIS — K60.2 ANAL FISSURE: ICD-10-CM

## 2025-08-07 LAB
ABSOLUTE EOSINOPHIL (OHS): 0.3 K/UL
ABSOLUTE MONOCYTE (OHS): 1.19 K/UL (ref 0.3–1)
ABSOLUTE NEUTROPHIL COUNT (OHS): 7.39 K/UL (ref 1.8–7.7)
ALBUMIN SERPL BCP-MCNC: 3.4 G/DL (ref 3.5–5.2)
ANION GAP (OHS): 14 MMOL/L (ref 8–16)
BACTERIA #/AREA URNS HPF: ABNORMAL /HPF
BASOPHILS # BLD AUTO: 0.11 K/UL
BASOPHILS NFR BLD AUTO: 1 %
BILIRUB UR QL STRIP.AUTO: NEGATIVE
BUN SERPL-MCNC: 31 MG/DL (ref 6–20)
CALCIUM SERPL-MCNC: 9.2 MG/DL (ref 8.7–10.5)
CHLORIDE SERPL-SCNC: 101 MMOL/L (ref 95–110)
CLARITY UR: CLEAR
CO2 SERPL-SCNC: 25 MMOL/L (ref 23–29)
COLOR UR AUTO: YELLOW
CREAT SERPL-MCNC: 2 MG/DL (ref 0.5–1.4)
CREAT UR-MCNC: 115.8 MG/DL (ref 23–375)
ERYTHROCYTE [DISTWIDTH] IN BLOOD BY AUTOMATED COUNT: 16 % (ref 11.5–14.5)
GFR SERPLBLD CREATININE-BSD FMLA CKD-EPI: 40 ML/MIN/1.73/M2
GLUCOSE SERPL-MCNC: 159 MG/DL (ref 70–110)
GLUCOSE UR QL STRIP: ABNORMAL
HCT VFR BLD AUTO: 36.7 % (ref 40–54)
HGB BLD-MCNC: 11.4 GM/DL (ref 14–18)
HGB UR QL STRIP: NEGATIVE
IMM GRANULOCYTES # BLD AUTO: 0.05 K/UL (ref 0–0.04)
IMM GRANULOCYTES NFR BLD AUTO: 0.4 % (ref 0–0.5)
KETONES UR QL STRIP: NEGATIVE
LEUKOCYTE ESTERASE UR QL STRIP: NEGATIVE
LYMPHOCYTES # BLD AUTO: 2.21 K/UL (ref 1–4.8)
MCH RBC QN AUTO: 28.4 PG (ref 27–31)
MCHC RBC AUTO-ENTMCNC: 31.1 G/DL (ref 32–36)
MCV RBC AUTO: 92 FL (ref 82–98)
MICROSCOPIC COMMENT: ABNORMAL
NITRITE UR QL STRIP: NEGATIVE
NUCLEATED RBC (/100WBC) (OHS): 0 /100 WBC
PH UR STRIP: 6 [PH]
PHOSPHATE SERPL-MCNC: 3.6 MG/DL (ref 2.7–4.5)
PLATELET # BLD AUTO: 320 K/UL (ref 150–450)
PMV BLD AUTO: 9.6 FL (ref 9.2–12.9)
POTASSIUM SERPL-SCNC: 4.4 MMOL/L (ref 3.5–5.1)
PROT UR QL STRIP: NEGATIVE
PROT UR-MCNC: 8 MG/DL
PROT/CREAT UR: 0.07 MG/G{CREAT}
RBC # BLD AUTO: 4.01 M/UL (ref 4.6–6.2)
RELATIVE EOSINOPHIL (OHS): 2.7 %
RELATIVE LYMPHOCYTE (OHS): 19.6 % (ref 18–48)
RELATIVE MONOCYTE (OHS): 10.6 % (ref 4–15)
RELATIVE NEUTROPHIL (OHS): 65.7 % (ref 38–73)
SODIUM SERPL-SCNC: 140 MMOL/L (ref 136–145)
SP GR UR STRIP: 1.01
UROBILINOGEN UR STRIP-ACNC: NEGATIVE EU/DL
WBC # BLD AUTO: 11.25 K/UL (ref 3.9–12.7)
WBC #/AREA URNS HPF: 6 /HPF (ref 0–5)
YEAST URNS QL MICRO: ABNORMAL /HPF

## 2025-08-07 PROCEDURE — 84156 ASSAY OF PROTEIN URINE: CPT | Mod: PN

## 2025-08-07 PROCEDURE — 99999 PR PBB SHADOW E&M-EST. PATIENT-LVL V: CPT | Mod: PBBFAC,,, | Performed by: NURSE PRACTITIONER

## 2025-08-07 PROCEDURE — 3078F DIAST BP <80 MM HG: CPT | Mod: CPTII,S$GLB,, | Performed by: NURSE PRACTITIONER

## 2025-08-07 PROCEDURE — 99203 OFFICE O/P NEW LOW 30 MIN: CPT | Mod: S$GLB,,, | Performed by: NURSE PRACTITIONER

## 2025-08-07 PROCEDURE — 3008F BODY MASS INDEX DOCD: CPT | Mod: CPTII,S$GLB,, | Performed by: NURSE PRACTITIONER

## 2025-08-07 PROCEDURE — 1159F MED LIST DOCD IN RCRD: CPT | Mod: CPTII,S$GLB,, | Performed by: NURSE PRACTITIONER

## 2025-08-07 PROCEDURE — 3066F NEPHROPATHY DOC TX: CPT | Mod: CPTII,S$GLB,, | Performed by: NURSE PRACTITIONER

## 2025-08-07 PROCEDURE — 3044F HG A1C LEVEL LT 7.0%: CPT | Mod: CPTII,S$GLB,, | Performed by: NURSE PRACTITIONER

## 2025-08-07 PROCEDURE — 1160F RVW MEDS BY RX/DR IN RCRD: CPT | Mod: CPTII,S$GLB,, | Performed by: NURSE PRACTITIONER

## 2025-08-07 PROCEDURE — 36415 COLL VENOUS BLD VENIPUNCTURE: CPT

## 2025-08-07 PROCEDURE — 82374 ASSAY BLOOD CARBON DIOXIDE: CPT

## 2025-08-07 PROCEDURE — 81003 URINALYSIS AUTO W/O SCOPE: CPT | Mod: PN

## 2025-08-07 PROCEDURE — 3074F SYST BP LT 130 MM HG: CPT | Mod: CPTII,S$GLB,, | Performed by: NURSE PRACTITIONER

## 2025-08-07 PROCEDURE — 85025 COMPLETE CBC W/AUTO DIFF WBC: CPT

## 2025-08-07 RX ORDER — INSULIN HUMAN 500 [IU]/ML
INJECTION, SOLUTION SUBCUTANEOUS
Qty: 6 PEN | Refills: 3 | Status: SHIPPED | OUTPATIENT
Start: 2025-08-07

## 2025-08-07 NOTE — PATIENT INSTRUCTIONS
Nitro cream twice a day, every day to anus till follow up with surgeon  Citrucel fiber supplement every day, powder or pill okay, follow bottle instructions  Drink plenty of water  No straining  Spend <5 mins on toilet

## 2025-08-07 NOTE — PROGRESS NOTES
CRS Office Visit History and Physical    Referring Md:   Russel Galindo, Johnathan  [address]   [address]  [place]  [place] [address]    SUBJECTIVE:     Chief Complaint: anal fissure    History of Present Illness:  The patient is a new patient to this practice.   Course is as follows:  Patient is a 50 y.o. male presents with anal fissure. Saw GI for diarrhea, PE review shows dx with MLP anal fissure on 6/24/25 and was given rectiv.  Started the rectiv, using it about every three days. Its been there less than 6 months.   He has alternating BMs, mainly diarrhea. He started cholestyramine powder packets this can cause him to be blocked up for a day or two.       Last Colonoscopy: 9/2022 - One 10 mm polyp in the ascending colon, removed                          with a hot snare. Resected and retrieved.                          - Diverticulosis in the sigmoid colon.                          - Internal hemorrhoids.   Family history of colorectal cancer or IBD: Maternal grandfathers sister.    Review of patient's allergies indicates:   Allergen Reactions    Dilaudid [hydromorphone] Other (See Comments)     IV dilaudid severely drops BP       Past Medical History:   Diagnosis Date    Allergy     Anxiety     Back pain     Chronic bilateral low back pain with bilateral sciatica 12/14/2021    CTS (carpal tunnel syndrome) 06/26/2015    CTS (carpal tunnel syndrome) 6/26/2015    Depression     Diabetes 04/17/2015    Diabetes mellitus with insulin therapy     Elevated sed rate 6/26/2015    GERD (gastroesophageal reflux disease) 10/19/2022    Hx of vasectomy     Hypertension     Morbid obesity 4/16/2015    OCD (obsessive compulsive disorder)     Sleep apnea     Type 2 diabetes mellitus with hyperglycemia, with long-term current use of insulin 10/17/2022     Past Surgical History:   Procedure Laterality Date    ADENOIDECTOMY      CAUDAL EPIDURAL STEROID INJECTION N/A 10/30/2024    Procedure: Caudal NATALIE;  Surgeon: Clark Smith  MD;  Location: [facility] CATH LAB;  Service: Pain Management;  Laterality: N/A;  with catheter    COLONOSCOPY N/A 9/6/2022    Procedure: COLONOSCOPY;  Surgeon: Rossy Champion MD;  Location: [facility] ENDO;  Service: Endoscopy;  Laterality: N/A;    EPIDURAL STEROID INJECTION Right 09/04/2019    Procedure: Injection, Steroid, Epidural Transforaminal;  Surgeon: Harjinder Batista Jr., MD;  Location: [facility] ENDO;  Service: Pain Management;  Laterality: Right;  Right L3 + L4 TF NATALIE    [id number]  [id number]    Arrive @ 1300; ASA last 8/27; Check BG; NEEDS CONSENT    ESOPHAGOGASTRODUODENOSCOPY N/A 10/31/2022    Procedure: EGD (ESOPHAGOGASTRODUODENOSCOPY);  Surgeon: Rossy Champion MD;  Location: [facility] ENDO;  Service: Endoscopy;  Laterality: N/A;    FRACTURE SURGERY      bilateral elbow fracture 3 years ago    FUSION OF LUMBAR SPINE BY ANTERIOR APPROACH Left 04/26/2021    Procedure: FUSION, SPINE, LUMBAR, ANTERIOR APPROACH Left L3-4 L4-5 OLIF BRENDA, ALL Release, L5-S1 ALIF;  Surgeon: Jose L Brown MD;  Location: [facility];  Service: Neurosurgery;  Laterality: Left;  Procedure: Left L3-4 L4-5 OLIF BRENDA, ALL Release, L5-S1 ALIF  Surgery Time: 2.5 Hrs  LOS: 2-3  Anesthesia: General  Blood: Type & Screen  Radiology: Josseline  Brace: LSO  Bed: Regular  Position: LAteral Righ    FUSION OF SACROILIAC JOINT Bilateral 04/13/2022    Procedure: FUSION, SACROILIAC JOINT Stg 2: Bilateral Open SI Joint fusion, Bedrock from Si Bone;  Surgeon: Jose L Brown MD;  Location: [facility];  Service: Neurosurgery;  Laterality: Bilateral;    FUSION OF SPINE WITH INSTRUMENTATION N/A 04/26/2021    Procedure: FUSION, SPINE, WITH INSTRUMENTATION L3-S1 Posteior Segmental Instrumentation;  Surgeon: Jose L Brown MD;  Location: [facility];  Service: Neurosurgery;  Laterality: N/A;  Procedure: L3-S1 Posteior Segmental Instrumentation  Sirgery Time: 2 Hrs  LOS: 2-3  Anesthesia: General  Blood: Tyoe & Screen  Radiology: Dual C arm  Brace:LSO  Bed: [place] [id number]  Poster  Position: Prone  Equipment: Depuy     FUSION OF SPINE WITH INSTRUMENTATION N/A 04/13/2022    Procedure: FUSION, SPINE, WITH INSTRUMENTATION Stg 1: Revision of posterior L3-S1 hardware, Depuy. Sacro-pelvic fixation, screw augmentation PMMA. L4-S1 posterolateral arthrodesis. Open Gauge screws, 11-12mm;  Surgeon: Jose L Brown MD;  Location: [facility] OR;  Service: Neurosurgery;  Laterality: N/A;    HERNIA REPAIR      bilateral groin hernia    INJECTION OF STEROID Bilateral 12/13/2021    Procedure: INJECTION, STEROID Bilateral SI JOint Block and Steroid Injection;  Surgeon: Jose L Brown MD;  Location: [facility] OR;  Service: Neurosurgery;  Laterality: Bilateral;  Procedure: Bilateral SI JOint Block and Steroid Injection   Surgery Time: 30 min  LOS: 0  Anesthesia: MAC  Radiology: C-arm  Bed: Regular with pillows  Position: Prone    LUMBAR LAMINECTOMY Bilateral 11/29/2019    Procedure: LAMINECTOMY, SPINE, LUMBAR Rigth L3-4, Left L4-5 Laminectomy, medial Facetectomy, and microdiscectomy;  Surgeon: Jose L Brown MD;  Location: [facility] OR;  Service: Neurosurgery;  Laterality: Bilateral;  Procedure: Rigth L3-4, Left L4-5 Laminectomy, medial Facetectomy, and microdiscectomy  Surgery Time: 2.5 Hrs  LOS: 0-1  Anesthesia: General  Blood: Type & Screen  Radiology: C-arm  Micros    REMOVAL IMPLANT, POSTERIOR SEGMENT, INTRAOCULAR  04/13/2022    Procedure: REMOVAL IMPLANT, POSTERIOR SEGMENT, INTRAOCULAR;  Surgeon: Jose L Brown MD;  Location: [facility] OR;  Service: Neurosurgery;;    SPINE SURGERY      TONSILLECTOMY      VASECTOMY       Family History   Problem Relation Name Age of Onset    Fibromyalgia Mother      Osteoarthritis Mother      Cataracts Maternal Grandmother      Macular degeneration Maternal Grandfather      Cataracts Maternal Grandfather      Rheum arthritis Neg Hx      Psoriasis Neg Hx      Lupus Neg Hx      Kidney disease Neg Hx      Inflammatory bowel disease Neg Hx      Amblyopia Neg Hx      Blindness Neg Hx       Glaucoma Neg Hx      Retinal detachment Neg Hx      Strabismus Neg Hx       Social History[1]     Review of Systems:  ROS    OBJECTIVE:     Vital Signs (Most Recent)  /67 (BP Location: Left arm, Patient Position: Sitting)   Pulse 88   Resp 18   Ht 6' (1.829 m)   Wt (!) 211.2 kg (465 lb 9.8 oz)   BMI 63.15 kg/m²     Physical Exam:  General: White male in no distress   Neuro: Alert and oriented to person, place, and time.  Moves all extremities.     HEENT: No icterus.  Trachea midline  Respiratory: Respirations are even and unlabored, no cough or audible wheezing  Skin: Warm dry and intact, No visible rashes, no jaundice    Labs reviewed today:  Lab Results   Component Value Date    WBC 8.96 07/24/2025    HGB 11.9 (L) 07/24/2025    HCT 38.0 (L) 07/24/2025     07/24/2025    CHOL 110 (L) 06/03/2025    TRIG 238 (H) 06/03/2025    HDL 29 (L) 06/03/2025    ALT 21 06/03/2025    AST 21 06/03/2025     08/04/2025    K 4.2 08/04/2025     08/04/2025    CREATININE 1.8 (H) 08/04/2025    BUN 33 (H) 08/04/2025    CO2 29 08/04/2025    TSH 0.547 07/07/2025    INR 1.0 03/07/2022    HGBA1C 5.9 (H) 06/03/2025    MICROALBUR 0.6 03/25/2020       Anorectal Exam:    Anal Skin: MLP anal fissure    Digital Rectal Exam:  deferred      ASSESSMENT/PLAN:     Diagnoses and all orders for this visit:    Anal fissure  -     Ambulatory referral/consult to Colorectal Surgery    Rectal bleeding  -     Ambulatory referral/consult to Colorectal Surgery    51 yo M here with anal fissure and bleeding. Has alternating BMs, cholestyramine can cause hard stools even when taken PRN.   Due for c scope 9/2025 but it sounds like there is reservation to clear him from cards perspective and they would like him to lose weight which he states has been difficult. For now we will focus on fissure healing.     The patient was instructed to:  Nitro cream twice a day, every day to anus till follow up with surgeon  Citrucel fiber supplement  every day, powder or pill okay, follow bottle instructions  Drink plenty of water  No straining  Spend <5 mins on toilet    BRIE Nelson  Colon and Rectal Surgery           [1]   Social History  Tobacco Use    Smoking status: Every Day     Types: Vaping with nicotine     Passive exposure: Current    Smokeless tobacco: Never    Tobacco comments:     Handout provided for [employer] Smoking Cessaiton program 10/11/24 - pt has stopped vaping   Substance Use Topics    Alcohol use: Not Currently    Drug use: Never

## 2025-08-11 ENCOUNTER — OFFICE VISIT (OUTPATIENT)
Dept: NEPHROLOGY | Facility: CLINIC | Age: 50
End: 2025-08-11
Payer: MEDICARE

## 2025-08-11 ENCOUNTER — TELEPHONE (OUTPATIENT)
Dept: GASTROENTEROLOGY | Facility: CLINIC | Age: 50
End: 2025-08-11
Payer: MEDICARE

## 2025-08-11 VITALS
DIASTOLIC BLOOD PRESSURE: 66 MMHG | OXYGEN SATURATION: 100 % | HEART RATE: 108 BPM | BODY MASS INDEX: 64.31 KG/M2 | SYSTOLIC BLOOD PRESSURE: 114 MMHG | WEIGHT: 315 LBS

## 2025-08-11 DIAGNOSIS — N17.9 AKI (ACUTE KIDNEY INJURY): ICD-10-CM

## 2025-08-11 DIAGNOSIS — N25.81 SECONDARY HYPERPARATHYROIDISM OF RENAL ORIGIN: ICD-10-CM

## 2025-08-11 DIAGNOSIS — Z91.89 AT RISK FOR ACID-BASE IMBALANCE: ICD-10-CM

## 2025-08-11 DIAGNOSIS — Z79.4 TYPE 2 DIABETES MELLITUS WITHOUT COMPLICATION, WITH LONG-TERM CURRENT USE OF INSULIN: ICD-10-CM

## 2025-08-11 DIAGNOSIS — N18.30 STAGE 3 CHRONIC KIDNEY DISEASE, UNSPECIFIED WHETHER STAGE 3A OR 3B CKD: Primary | ICD-10-CM

## 2025-08-11 DIAGNOSIS — E11.9 TYPE 2 DIABETES MELLITUS WITHOUT COMPLICATION, WITH LONG-TERM CURRENT USE OF INSULIN: ICD-10-CM

## 2025-08-11 DIAGNOSIS — E87.5 HYPERKALEMIA: ICD-10-CM

## 2025-08-11 DIAGNOSIS — I12.9 HYPERTENSIVE CHRONIC KIDNEY DISEASE WITH STAGE 1 THROUGH STAGE 4 CHRONIC KIDNEY DISEASE, OR UNSPECIFIED CHRONIC KIDNEY DISEASE: ICD-10-CM

## 2025-08-11 PROBLEM — R79.89 ABNORMAL TSH: Status: RESOLVED | Noted: 2025-06-26 | Resolved: 2025-08-11

## 2025-08-11 PROCEDURE — 1159F MED LIST DOCD IN RCRD: CPT | Mod: CPTII,S$GLB,,

## 2025-08-11 PROCEDURE — 3078F DIAST BP <80 MM HG: CPT | Mod: CPTII,S$GLB,,

## 2025-08-11 PROCEDURE — 99999 PR PBB SHADOW E&M-EST. PATIENT-LVL III: CPT | Mod: PBBFAC,,,

## 2025-08-11 PROCEDURE — 3008F BODY MASS INDEX DOCD: CPT | Mod: CPTII,S$GLB,,

## 2025-08-11 PROCEDURE — 3066F NEPHROPATHY DOC TX: CPT | Mod: CPTII,S$GLB,,

## 2025-08-11 PROCEDURE — 3044F HG A1C LEVEL LT 7.0%: CPT | Mod: CPTII,S$GLB,,

## 2025-08-11 PROCEDURE — 1160F RVW MEDS BY RX/DR IN RCRD: CPT | Mod: CPTII,S$GLB,,

## 2025-08-11 PROCEDURE — G2211 COMPLEX E/M VISIT ADD ON: HCPCS | Mod: S$GLB,,,

## 2025-08-11 PROCEDURE — 3074F SYST BP LT 130 MM HG: CPT | Mod: CPTII,S$GLB,,

## 2025-08-11 PROCEDURE — 99214 OFFICE O/P EST MOD 30 MIN: CPT | Mod: S$GLB,,,

## 2025-08-12 ENCOUNTER — PATIENT MESSAGE (OUTPATIENT)
Dept: SURGERY | Facility: CLINIC | Age: 50
End: 2025-08-12
Payer: MEDICARE

## 2025-08-13 DIAGNOSIS — K60.2 ANAL FISSURE: ICD-10-CM

## 2025-08-13 DIAGNOSIS — K62.89 ANAL PAIN: Primary | ICD-10-CM

## 2025-08-13 RX ORDER — LIDOCAINE 50 MG/G
OINTMENT TOPICAL
Qty: 30 G | Refills: 2 | Status: SHIPPED | OUTPATIENT
Start: 2025-08-13

## 2025-08-20 ENCOUNTER — TELEPHONE (OUTPATIENT)
Dept: ENDOCRINOLOGY | Facility: CLINIC | Age: 50
End: 2025-08-20
Payer: MEDICARE

## 2025-08-20 RX ORDER — INSULIN GLARGINE 100 [IU]/ML
30 INJECTION, SOLUTION SUBCUTANEOUS DAILY
Qty: 15 ML | Refills: 2 | Status: SHIPPED | OUTPATIENT
Start: 2025-08-20 | End: 2026-08-20

## 2025-08-25 ENCOUNTER — PATIENT MESSAGE (OUTPATIENT)
Dept: PAIN MEDICINE | Facility: CLINIC | Age: 50
End: 2025-08-25
Payer: MEDICARE

## 2025-08-25 DIAGNOSIS — Z00.00 ENCOUNTER FOR MEDICARE ANNUAL WELLNESS EXAM: ICD-10-CM

## 2025-08-30 ENCOUNTER — PATIENT MESSAGE (OUTPATIENT)
Dept: NEPHROLOGY | Facility: CLINIC | Age: 50
End: 2025-08-30
Payer: MEDICARE

## 2025-08-30 DIAGNOSIS — N17.9 AKI (ACUTE KIDNEY INJURY): ICD-10-CM

## 2025-09-02 RX ORDER — PIOGLITAZONE 30 MG/1
30 TABLET ORAL DAILY
Qty: 90 TABLET | Refills: 3 | Status: SHIPPED | OUTPATIENT
Start: 2025-09-02 | End: 2026-09-02

## 2025-09-02 RX ORDER — BUMETANIDE 2 MG/1
2 TABLET ORAL 2 TIMES DAILY
Qty: 180 TABLET | Refills: 3 | Status: SHIPPED | OUTPATIENT
Start: 2025-09-02 | End: 2026-09-02

## (undated) DEVICE — KIT SPINAL PATIENT CARE JACK

## (undated) DEVICE — ADHESIVE MASTISOL VIAL 48/BX

## (undated) DEVICE — SUT 0 VICRYL / UR6 (J603)

## (undated) DEVICE — SUT 2-0 ETHILON 18 FS

## (undated) DEVICE — DRAPE C-ARM/MOBILE XRAY 44X80

## (undated) DEVICE — GELATIN SURGIPOWDER ABSORBABLE

## (undated) DEVICE — BLADE ELECTRO EDGE INSULATED

## (undated) DEVICE — DRESSING TEGADERM 2 3/8 X 2.75

## (undated) DEVICE — DRAPE STERI-DRAPE 1000 17X11IN

## (undated) DEVICE — TAP 8MM SPINAL POWER

## (undated) DEVICE — DRESSING TRANS 4X4 TEGADERM

## (undated) DEVICE — COVER OVERHEAD SURG LT BLUE

## (undated) DEVICE — APPLIER CLIP ENDO LIGAMAX 5MM

## (undated) DEVICE — GLOVE BIOGEL SKINSENSE PI 7.0

## (undated) DEVICE — NDL SPINAL SPINOCAN 22GX3.5

## (undated) DEVICE — SUT VICRYL PLUS 2-0 CT1 18

## (undated) DEVICE — COVER BACK TABLE 72X21

## (undated) DEVICE — NDL 22GA X1 1/2 REG BEVEL

## (undated) DEVICE — SUT ETHILON 3-0 PS2 18 BLK

## (undated) DEVICE — CORD BIPOLAR 12 FOOT

## (undated) DEVICE — DRESSING TELFA N ADH 3X8

## (undated) DEVICE — SEE MEDLINE ITEM 157125

## (undated) DEVICE — DRESSING AQUACEL SACRAL 8 X 7

## (undated) DEVICE — SEE MEDLINE ITEM 157116

## (undated) DEVICE — SPONGE GAUZE 16PLY 4X4

## (undated) DEVICE — SEE MEDLINE ITEM 157148

## (undated) DEVICE — ALCOHOL 70% ISOP W/GREEN 16OZ

## (undated) DEVICE — DRESSING AQUACEL FOAM 5 X 5

## (undated) DEVICE — SYR DISP LL 5CC

## (undated) DEVICE — SKINMARKER W/RULER DEVON

## (undated) DEVICE — SEE MEDLINE ITEM 146313

## (undated) DEVICE — ELECTRODE REM PLYHSV RETURN 9

## (undated) DEVICE — APPLICATOR CHLORAPREP ORN 26ML

## (undated) DEVICE — COVER SNAP KAP 30

## (undated) DEVICE — GLOVE 7.5 PROTEXIS PI BLUE

## (undated) DEVICE — SEE MEDLINE ITEM 157131

## (undated) DEVICE — SUT MONOCRYL 4-0 PS-2

## (undated) DEVICE — SUT VICRYL 2 0 CT 2

## (undated) DEVICE — TOWEL OR DISP STRL BLUE 4/PK

## (undated) DEVICE — SEE MEDLINE ITEM 157150

## (undated) DEVICE — SEE MEDLINE ITEM 157117

## (undated) DEVICE — TUBING SUC UNIV W/CONN 12FT

## (undated) DEVICE — TOWEL OR NONABSORB ADH 17X26

## (undated) DEVICE — DRESSING ABSRBNT ISLAND 3.6X8

## (undated) DEVICE — GAUZE SPONGE 4X4 12PLY

## (undated) DEVICE — SUT CTD VICRYL 3-0 CR/SH

## (undated) DEVICE — DRESSING AQUACEL FOAM 3 X 3

## (undated) DEVICE — STAPLER SKIN ROTATING HEAD

## (undated) DEVICE — SPONGE DERMA 8PLY 2X2

## (undated) DEVICE — DRESSING SURGICAL 1/2X1/2

## (undated) DEVICE — DRAIN TLS 7MM POREX

## (undated) DEVICE — SEE MEDLINE ITEM 146292

## (undated) DEVICE — DRAPE STERI INSTRUMENT 1018

## (undated) DEVICE — SEE MEDLINE ITEM 156905

## (undated) DEVICE — DRESSING TRANS 4X4 3/4

## (undated) DEVICE — FORCEP BPLR BAYONETTED STR

## (undated) DEVICE — Device

## (undated) DEVICE — DRAPE LAP TIBURON 77X122IN

## (undated) DEVICE — BANDAGE ADHESIVE PLAS STRL 1X3

## (undated) DEVICE — PENCIL ROCKER SWITCH 10FT CORD

## (undated) DEVICE — CONTAINER SPECIMEN STRL 4OZ

## (undated) DEVICE — CLOSURE SKIN STERI STRIP 1/2X4

## (undated) DEVICE — NDL SPINAL 20GX3.5 HUB

## (undated) DEVICE — CANNULA EXPEDIUM OPN 16GX160MM

## (undated) DEVICE — GLOVE BIOGEL PI MICRO INDIC 7

## (undated) DEVICE — DRESSING AQUACEL FOAM 4X4IN

## (undated) DEVICE — NDL HYPO REG 25G X 1 1/2

## (undated) DEVICE — SUT VICRYL PLUS 0 CT1 18IN

## (undated) DEVICE — SUT JJ41G O VICRYL

## (undated) DEVICE — DRESSING MEPILEX BORDER 4 X 4

## (undated) DEVICE — BURR MIS CURVED 3.0MM

## (undated) DEVICE — SYS ILLUMINATION MARS3V SPINE

## (undated) DEVICE — DRAPE LEICA MICROSCOPE 48X120

## (undated) DEVICE — GLOVE SURGICAL LATEX SZ 7

## (undated) DEVICE — BUR BONE CUT MICRO TPS 3X3.8MM

## (undated) DEVICE — KIT CONFIDENCE W/O NEEDLES

## (undated) DEVICE — TAP POWER 6MM DOUBLE LEAD

## (undated) DEVICE — GLOVE PROTEXIS HYDROGEL SZ7

## (undated) DEVICE — TAPE SILK 3IN